# Patient Record
Sex: FEMALE | Race: BLACK OR AFRICAN AMERICAN | NOT HISPANIC OR LATINO | Employment: OTHER | URBAN - METROPOLITAN AREA
[De-identification: names, ages, dates, MRNs, and addresses within clinical notes are randomized per-mention and may not be internally consistent; named-entity substitution may affect disease eponyms.]

---

## 2017-01-03 RX ORDER — MELOXICAM 15 MG/1
TABLET ORAL
Qty: 30 TABLET | Refills: 2 | OUTPATIENT
Start: 2017-01-03

## 2017-01-24 RX ORDER — MELOXICAM 15 MG/1
TABLET ORAL
Qty: 30 TABLET | Refills: 0 | Status: SHIPPED | OUTPATIENT
Start: 2017-01-24 | End: 2017-02-20 | Stop reason: SDUPTHER

## 2017-01-24 RX ORDER — OXYBUTYNIN CHLORIDE 5 MG/1
5 TABLET ORAL 3 TIMES DAILY PRN
Qty: 90 TABLET | Refills: 0 | Status: SHIPPED | OUTPATIENT
Start: 2017-01-24 | End: 2017-02-20 | Stop reason: SDUPTHER

## 2017-01-24 NOTE — TELEPHONE ENCOUNTER
----- Message from Hillary Rich sent at 1/24/2017  2:04 PM CST -----  Contact: pt  Pt states she can afford the meloxicam, but can not afford the other medication, pt don't have the name.    Patient needs a new script for meloxicam called into [facility] pharmacy at [address] [facility].  Please call patient at [phone], pt states she also need a cheaper brand of script,Tolterozine tartrate 4mg,  Thanks!

## 2017-01-24 NOTE — TELEPHONE ENCOUNTER
lov 9/24/15  lp 8/7/15  Pt states her detrol is to expensive wants to know if it can be changed to something else  Physical scheduled for 2/20/17

## 2017-01-24 NOTE — TELEPHONE ENCOUNTER
----- Message from Monica Hamilton sent at 1/24/2017  1:35 PM CST -----  Contact: pt  States she would like to see if she can change her medication, its to expensive. Please call pt at [phone]. Thank you

## 2017-02-20 ENCOUNTER — OFFICE VISIT (OUTPATIENT)
Dept: FAMILY MEDICINE | Facility: CLINIC | Age: 67
End: 2017-02-20
Payer: MEDICARE

## 2017-02-20 VITALS
HEIGHT: 60 IN | DIASTOLIC BLOOD PRESSURE: 65 MMHG | WEIGHT: 166 LBS | TEMPERATURE: 98 F | SYSTOLIC BLOOD PRESSURE: 138 MMHG | HEART RATE: 114 BPM | BODY MASS INDEX: 32.59 KG/M2 | RESPIRATION RATE: 18 BRPM

## 2017-02-20 DIAGNOSIS — Z12.11 COLON CANCER SCREENING: ICD-10-CM

## 2017-02-20 DIAGNOSIS — D17.1 LIPOMA OF ABDOMINAL WALL: ICD-10-CM

## 2017-02-20 DIAGNOSIS — Z00.00 PREVENTATIVE HEALTH CARE: Primary | ICD-10-CM

## 2017-02-20 DIAGNOSIS — Z12.31 ENCOUNTER FOR SCREENING MAMMOGRAM FOR MALIGNANT NEOPLASM OF BREAST: ICD-10-CM

## 2017-02-20 DIAGNOSIS — E66.9 OBESITY (BMI 30.0-34.9): ICD-10-CM

## 2017-02-20 DIAGNOSIS — Z23 NEED FOR VACCINATION WITH 13-POLYVALENT PNEUMOCOCCAL CONJUGATE VACCINE: ICD-10-CM

## 2017-02-20 DIAGNOSIS — I10 ESSENTIAL HYPERTENSION: ICD-10-CM

## 2017-02-20 DIAGNOSIS — F17.200 TOBACCO USE DISORDER: ICD-10-CM

## 2017-02-20 PROCEDURE — 90670 PCV13 VACCINE IM: CPT | Mod: S$GLB,,, | Performed by: FAMILY MEDICINE

## 2017-02-20 PROCEDURE — 3078F DIAST BP <80 MM HG: CPT | Mod: S$GLB,,, | Performed by: FAMILY MEDICINE

## 2017-02-20 PROCEDURE — 99397 PER PM REEVAL EST PAT 65+ YR: CPT | Mod: 25,S$GLB,, | Performed by: FAMILY MEDICINE

## 2017-02-20 PROCEDURE — 99499 UNLISTED E&M SERVICE: CPT | Mod: S$PBB,,, | Performed by: FAMILY MEDICINE

## 2017-02-20 PROCEDURE — 3075F SYST BP GE 130 - 139MM HG: CPT | Mod: S$GLB,,, | Performed by: FAMILY MEDICINE

## 2017-02-20 PROCEDURE — G0009 ADMIN PNEUMOCOCCAL VACCINE: HCPCS | Mod: S$GLB,,, | Performed by: FAMILY MEDICINE

## 2017-02-20 PROCEDURE — 90688 IIV4 VACCINE SPLT 0.5 ML IM: CPT | Mod: S$GLB,,, | Performed by: FAMILY MEDICINE

## 2017-02-20 PROCEDURE — 99999 PR PBB SHADOW E&M-EST. PATIENT-LVL III: CPT | Mod: PBBFAC,,, | Performed by: FAMILY MEDICINE

## 2017-02-20 PROCEDURE — G0008 ADMIN INFLUENZA VIRUS VAC: HCPCS | Mod: S$GLB,,, | Performed by: FAMILY MEDICINE

## 2017-02-20 RX ORDER — OXYBUTYNIN CHLORIDE 5 MG/1
5 TABLET ORAL 3 TIMES DAILY PRN
Qty: 90 TABLET | Refills: 11 | Status: SHIPPED | OUTPATIENT
Start: 2017-02-20 | End: 2018-02-26 | Stop reason: SDUPTHER

## 2017-02-20 RX ORDER — HYDROCHLOROTHIAZIDE 12.5 MG/1
12.5 CAPSULE ORAL DAILY
Qty: 30 CAPSULE | Refills: 11 | Status: SHIPPED | OUTPATIENT
Start: 2017-02-20 | End: 2018-02-26 | Stop reason: SDUPTHER

## 2017-02-20 RX ORDER — MELOXICAM 15 MG/1
TABLET ORAL
Qty: 30 TABLET | Refills: 3 | Status: SHIPPED | OUTPATIENT
Start: 2017-02-20 | End: 2018-01-27 | Stop reason: SDUPTHER

## 2017-02-20 NOTE — PROGRESS NOTES
CHIEF COMPLAINT: This is a 66-year-old female here for preventive health exam.    SUBJECTIVE: Patient reports increased size of lipoma in right upper quadrant causing discomfort.  She requests surgical evaluation.  Patient's blood pressure is elevated today at 174/88.  She reports that blood pressure is elevated because her brother recently  about heart attack.  However, medical record reflects elevated blood pressure in the past.  Patient has lost 13 pounds in the last year.  The patient takes oxybutynin 3 times daily for urinary incontinence.  She takes meloxicam for osteoarthritis of hands.  Patient continues to smoke one pack of cigarettes per day.  She is not interested in quitting at this time.    Eye exam 2015. Mammogram 2015. Colonoscopy 2013, due again in 2016 (noncompliant).  Bone DEXA scan 2013, due again in 2018.  Tdap 2013.  Influenza vaccine 2015.     ROS:  GENERAL: Patient denies fever, chills, night sweats. Patient denies weight gain. Patient denies anorexia, fatigue, weakness or swollen glands.  SKIN: Patient denies rash or hair loss.  HEENT: Patient denies sore throat, ear pain, hearing loss, nasal congestion, or runny nose. Patient denies visual disturbance, eye irritation or discharge.  LUNGS: Patient denies cough, wheeze or hemoptysis.  CARDIOVASCULAR: Patient denies chest pain, shortness of breath, palpitations, syncope or lower extremity edema.  GI: Patient denies abdominal pain, nausea, vomiting, diarrhea, constipation, blood in stool or melena.  GENITOURINARY: Patient denies pelvic pain, vaginal discharge, itch or odor. Patient denies irregular vaginal bleeding. Patient denies dysuria, frequency, hematuria, nocturia, urgency or incontinence.  BREASTS: Patient denies breast pain, mass or nipple discharge.  MUSCULOSKELETAL: Patient denies joint swelling, redness or warmth.  NEUROLOGIC: Patient denies headache, vertigo, paresthesias,  weakness in limb, dysarthria, dysphagia or abnormality of gait.  PSYCHIATRIC: Patient denies anxiety, depression, or memory loss.     OBJECTIVE:   GENERAL: Well-developed well-nourished obese black female alert and oriented x3, in no acute distress. Memory, judgment and cognition without deficit. Weight loss of 13 pounds in the last 2 years.  SKIN: Clear without rash. Normal color and tone.  HEENT: Eyes: Clear conjunctivae. Pupils equal reactive to light and accommodation. Ears: Clear TMs. Clear canals. Nose: Without congestion. Pharynx: Without injection  or exudates.  NECK: Supple, normal range of motion. No masses, lymphadenopathy or enlarged thyroid. No JVD. Carotids 2+ and equal. No bruits.  LUNGS: Clear to auscultation. Normal respiratory effort.  CARDIOVASCULAR: Regular rhythm, normal S1, S2 without murmur, gallop or rub.  BACK: No CVA or spinal tenderness.  BREASTS: No masses, tenderness or nipple discharge.  ABDOMEN:  Large lipoma right upper quadrant, nontender. Active bowel sounds. Soft, nontender without mass or organomegaly. No rebound or guarding.  EXTREMITIES: Without cyanosis, clubbing or edema. Distal pulses 2+ and equal. Normal range of motion in all extremities. No joint effusion, erythema or warmth. Bilateral hammertoes. Calluses/hyperkeratotic areas plantar surface of both feet.  NEUROLOGIC: Cranial nerves II through XII without deficit. Motor strength equal bilaterally. Sensation normal to touch. Deep tendon reflexes 2+ and equal. Gait without abnormality. No tremor. Negative cerebellar signs.  PELVIC: Deferred.  MARCO. No masses, nontender heme-negative stool x2.    ASSESSMENT:  1. Preventative health care    2. Essential hypertension    3. Obesity (BMI 30.0-34.9)    4. Tobacco use disorder    5. Lipoma of abdominal wall    6. Encounter for screening mammogram for malignant neoplasm of breast    7. Colon cancer screening    8. Need for vaccination with 13-polyvalent pneumococcal conjugate  vaccine      PLAN:   1.  Weight reduction.  Exercise regularly.  2.  Age-appropriate counseling.  3.  Fasting lab.  4.  Mammogram.  5.  Colonoscopy.  6.  Prevnar vaccine.  7.  Influenza vaccine.  8.  Refill oxybutynin and meloxicam.  9.  Start HCTZ 12.5 mg daily.  Monitor blood pressure.  Report readings in 2-3 weeks.  10.Patient declines general surgery consult at this time.

## 2017-02-20 NOTE — MR AVS SNAPSHOT
[facility]  [address] [address]  Phone: [phone]                  Leia Rodriguez   2017 4:00 PM   Office Visit    Description:  Female : 1950   Provider:  Yasmin Navarro MD   Department:  [facility]           Reason for Visit     Annual Exam           Diagnoses this Visit        Comments    Preventative health care    -  Primary     Essential hypertension         Obesity (BMI 30.0-34.9)         Tobacco use disorder         Lipoma of abdominal wall         Encounter for screening mammogram for malignant neoplasm of breast         Colon cancer screening         Need for vaccination with 13-polyvalent pneumococcal conjugate vaccine                To Do List           Goals (5 Years of Data)     None       These Medications        Disp Refills Start End    hydrochlorothiazide (MICROZIDE) 12.5 mg capsule 30 capsule 11 2017    Take 1 capsule (12.5 mg total) by mouth once daily. For high blood pressure - Oral    Pharmacy: [facility]/pharmacy #[id number] - [place] - [address] AT [facility] Ph #: [phone]       meloxicam (MOBIC) 15 MG tablet 30 tablet 3 2017     TAKE 1 TABLET (15 MG TOTAL) BY MOUTH ONCE DAILY.    Pharmacy: [facility]/pharmacy #[id number] [facility] [place] [address] [facility] Ph #: [phone]       Notes to Pharmacy: Refill once.  No refills until she sees me.  She has not seen me since 2015.    oxybutynin (DITROPAN) 5 MG Tab 90 tablet 11 2017    Take 1 tablet (5 mg total) by mouth 3 (three) times daily as needed. - Oral    Pharmacy: [facility]/pharmacy #[id number] - [place] - [address] AT AT [facility] Ph #: [phone]         [facility] On [facility]     [facility] On Call Nurse Care Line -  Assistance  Registered nurses in the [facility] On Call Center provide clinical advisement, health education, appointment booking, and  other advisory services.  Call for this free service at [phone].             Medications           START taking these NEW medications        Refills    hydrochlorothiazide (MICROZIDE) 12.5 mg capsule 11    Sig: Take 1 capsule (12.5 mg total) by mouth once daily. For high blood pressure    Class: Normal    Route: Oral           Verify that the below list of medications is an accurate representation of the medications you are currently taking.  If none reported, the list may be blank. If incorrect, please contact your healthcare provider. Carry this list with you in case of emergency.           Current Medications     meloxicam (MOBIC) 15 MG tablet TAKE 1 TABLET (15 MG TOTAL) BY MOUTH ONCE DAILY.    oxybutynin (DITROPAN) 5 MG Tab Take 1 tablet (5 mg total) by mouth 3 (three) times daily as needed.    hydrochlorothiazide (MICROZIDE) 12.5 mg capsule Take 1 capsule (12.5 mg total) by mouth once daily. For high blood pressure    urea (CARMOL) 40 % Crea Apply topically 2 (two) times daily.           Clinical Reference Information           Your Vitals Were     BP Pulse Temp Resp Height Weight    138/65 114 97.9 °F (36.6 °C) (Tympanic) 18 5' (1.524 m) 75.3 kg (166 lb 0.1 oz)    BMI                32.42 kg/m2          Blood Pressure          Most Recent Value    BP  138/65      Allergies as of 2/20/2017     No Known Allergies      Immunizations Administered on Date of Encounter - 2/20/2017     Name Date Dose VIS Date Route    Pneumococcal Conjugate - 13 Valent  Incomplete 0.5 mL 11/5/2015 Intramuscular      Orders Placed During Today's Visit      Normal Orders This Visit    Case request GI: COLONOSCOPY     Pneumococcal Conjugate Vaccine (13 Valent) (IM)     Future Labs/Procedures Expected by Expires    CBC auto differential  2/20/2017 4/21/2018    Comprehensive metabolic panel  2/20/2017 4/21/2018    Lipid panel  2/20/2017 4/21/2018    Mammo Digital Screening Bilat with CAD  2/20/2017 4/22/2018    TSH  2/20/2017  4/21/2018      [facility] Sign-Up     Activating your [facility] account is as easy as 1-2-3!     1) Visit [email or web address], select Sign Up Now, enter this activation code and your date of birth, then select Next.  [email or web address]  Expires: 4/6/2017  4:43 PM      2) Create a username and password to use when you visit [facility] in the future and select a security question in case you lose your password and select Next.    3) Enter your e-mail address and click Sign Up!    Additional Information  If you have questions, please e-mail [email or web address] or call [phone] to talk to our [facility] staff. Remember, [facility] is NOT to be used for urgent needs. For medical emergencies, dial 911.         Smoking Cessation     If you would like to quit smoking:   You may be eligible for free services if you are a [place] resident and started smoking cigarettes before September 1, 1988.  Call the Smoking Cessation Trust (SCT) toll free at (022) 471-5265 or (735) 440-1436.   Call 8-101-QUIT-NOW if you do not meet the above criteria.            Language Assistance Services     ATTENTION: Language assistance services are available, free of charge. Please call [phone].      ATENCIÓN: Si habla español, tiene a santoyo disposición servicios gratuitos de asistencia lingüística. Llame al [phone].     CHÚ Ý: N?u b?n nói Ti?ng Vi?t, có các d?ch v? h? tr? ngôn ng? mi?n phí dành cho b?n. G?i s? [phone].         [facility] complies with applicable Federal civil rights laws and does not discriminate on the basis of race, color, national origin, age, disability, or sex.

## 2017-02-28 ENCOUNTER — LAB VISIT (OUTPATIENT)
Dept: LAB | Facility: HOSPITAL | Age: 67
End: 2017-02-28
Attending: FAMILY MEDICINE
Payer: MEDICARE

## 2017-02-28 DIAGNOSIS — I10 ESSENTIAL HYPERTENSION: ICD-10-CM

## 2017-02-28 LAB
ALBUMIN SERPL BCP-MCNC: 4.3 G/DL
ALP SERPL-CCNC: 73 U/L
ALT SERPL W/O P-5'-P-CCNC: 14 U/L
ANION GAP SERPL CALC-SCNC: 7 MMOL/L
AST SERPL-CCNC: 15 U/L
BASOPHILS # BLD AUTO: 0.02 K/UL
BASOPHILS NFR BLD: 0.4 %
BILIRUB SERPL-MCNC: 1.3 MG/DL
BUN SERPL-MCNC: 15 MG/DL
CALCIUM SERPL-MCNC: 10.3 MG/DL
CHLORIDE SERPL-SCNC: 99 MMOL/L
CHOLEST/HDLC SERPL: 3.1 {RATIO}
CO2 SERPL-SCNC: 30 MMOL/L
CREAT SERPL-MCNC: 0.8 MG/DL
DIFFERENTIAL METHOD: NORMAL
EOSINOPHIL # BLD AUTO: 0.2 K/UL
EOSINOPHIL NFR BLD: 3.1 %
ERYTHROCYTE [DISTWIDTH] IN BLOOD BY AUTOMATED COUNT: 13.8 %
EST. GFR  (AFRICAN AMERICAN): >60 ML/MIN/1.73 M^2
EST. GFR  (NON AFRICAN AMERICAN): >60 ML/MIN/1.73 M^2
GLUCOSE SERPL-MCNC: 95 MG/DL
HCT VFR BLD AUTO: 41.6 %
HDL/CHOLESTEROL RATIO: 32.2 %
HDLC SERPL-MCNC: 227 MG/DL
HDLC SERPL-MCNC: 73 MG/DL
HGB BLD-MCNC: 13.8 G/DL
LDLC SERPL CALC-MCNC: 137.2 MG/DL
LYMPHOCYTES # BLD AUTO: 2.5 K/UL
LYMPHOCYTES NFR BLD: 45.6 %
MCH RBC QN AUTO: 30.9 PG
MCHC RBC AUTO-ENTMCNC: 33.2 %
MCV RBC AUTO: 93 FL
MONOCYTES # BLD AUTO: 0.5 K/UL
MONOCYTES NFR BLD: 9.3 %
NEUTROPHILS # BLD AUTO: 2.3 K/UL
NEUTROPHILS NFR BLD: 41.4 %
NONHDLC SERPL-MCNC: 154 MG/DL
PLATELET # BLD AUTO: 291 K/UL
PMV BLD AUTO: 10.7 FL
POTASSIUM SERPL-SCNC: 5.2 MMOL/L
PROT SERPL-MCNC: 8 G/DL
RBC # BLD AUTO: 4.47 M/UL
SODIUM SERPL-SCNC: 136 MMOL/L
TRIGL SERPL-MCNC: 84 MG/DL
TSH SERPL DL<=0.005 MIU/L-ACNC: 0.8 UIU/ML
WBC # BLD AUTO: 5.46 K/UL

## 2017-02-28 PROCEDURE — 80061 LIPID PANEL: CPT

## 2017-02-28 PROCEDURE — 85025 COMPLETE CBC W/AUTO DIFF WBC: CPT

## 2017-02-28 PROCEDURE — 84443 ASSAY THYROID STIM HORMONE: CPT

## 2017-02-28 PROCEDURE — 80053 COMPREHEN METABOLIC PANEL: CPT

## 2017-02-28 PROCEDURE — 36415 COLL VENOUS BLD VENIPUNCTURE: CPT | Mod: PO

## 2017-03-03 RX ORDER — SODIUM, POTASSIUM,MAG SULFATES 17.5-3.13G
SOLUTION, RECONSTITUTED, ORAL ORAL
Qty: 354 ML | Refills: 0 | Status: ON HOLD | OUTPATIENT
Start: 2017-03-03 | End: 2017-03-08 | Stop reason: CLARIF

## 2017-03-06 ENCOUNTER — TELEPHONE (OUTPATIENT)
Dept: FAMILY MEDICINE | Facility: CLINIC | Age: 67
End: 2017-03-06

## 2017-03-06 NOTE — TELEPHONE ENCOUNTER
----- Message from Arielle Monroe sent at 3/6/2017  2:59 PM CST -----  States she is schedule for a colonoscopy and she contacted her pharmacy to find out the price of the prep she have to take before her procedure. States she was advised it cost $80.00. Wants to know if she can get something that is less expensive.    Pt use..    [id number] - [place] - [address] AT AT [facility]  [address] [place] [address]  Phone: [phone] Fax: [phone]    Please adv/call [phone].//thanks. cw

## 2017-03-08 ENCOUNTER — ANESTHESIA EVENT (OUTPATIENT)
Dept: ENDOSCOPY | Facility: HOSPITAL | Age: 67
End: 2017-03-08
Payer: MEDICARE

## 2017-03-08 ENCOUNTER — SURGERY (OUTPATIENT)
Age: 67
End: 2017-03-08

## 2017-03-08 ENCOUNTER — HOSPITAL ENCOUNTER (OUTPATIENT)
Facility: HOSPITAL | Age: 67
Discharge: HOME | End: 2017-03-08
Attending: INTERNAL MEDICINE | Admitting: INTERNAL MEDICINE
Payer: MEDICARE

## 2017-03-08 ENCOUNTER — ANESTHESIA (OUTPATIENT)
Dept: ENDOSCOPY | Facility: HOSPITAL | Age: 67
End: 2017-03-08
Payer: MEDICARE

## 2017-03-08 VITALS
OXYGEN SATURATION: 98 % | RESPIRATION RATE: 19 BRPM | HEIGHT: 60 IN | RESPIRATION RATE: 16 BRPM | BODY MASS INDEX: 32 KG/M2 | SYSTOLIC BLOOD PRESSURE: 134 MMHG | TEMPERATURE: 98 F | HEART RATE: 80 BPM | DIASTOLIC BLOOD PRESSURE: 74 MMHG | WEIGHT: 163 LBS

## 2017-03-08 DIAGNOSIS — Z12.11 SCREEN FOR COLON CANCER: ICD-10-CM

## 2017-03-08 PROCEDURE — 88305 TISSUE EXAM BY PATHOLOGIST: CPT | Performed by: PATHOLOGY

## 2017-03-08 PROCEDURE — 45385 COLONOSCOPY W/LESION REMOVAL: CPT | Performed by: INTERNAL MEDICINE

## 2017-03-08 PROCEDURE — 25000003 PHARM REV CODE 250: Performed by: INTERNAL MEDICINE

## 2017-03-08 PROCEDURE — 27201089 HC SNARE, DISP (ANY): Performed by: INTERNAL MEDICINE

## 2017-03-08 PROCEDURE — 25000003 PHARM REV CODE 250: Performed by: NURSE ANESTHETIST, CERTIFIED REGISTERED

## 2017-03-08 PROCEDURE — 37000009 HC ANESTHESIA EA ADD 15 MINS: Performed by: INTERNAL MEDICINE

## 2017-03-08 PROCEDURE — 27201012 HC FORCEPS, HOT/COLD, DISP: Performed by: INTERNAL MEDICINE

## 2017-03-08 PROCEDURE — 88305 TISSUE EXAM BY PATHOLOGIST: CPT | Mod: 26,,, | Performed by: PATHOLOGY

## 2017-03-08 PROCEDURE — 45380 COLONOSCOPY AND BIOPSY: CPT | Mod: 59,,, | Performed by: INTERNAL MEDICINE

## 2017-03-08 PROCEDURE — 63600175 PHARM REV CODE 636 W HCPCS: Performed by: NURSE ANESTHETIST, CERTIFIED REGISTERED

## 2017-03-08 PROCEDURE — 45385 COLONOSCOPY W/LESION REMOVAL: CPT | Mod: PT,,, | Performed by: INTERNAL MEDICINE

## 2017-03-08 PROCEDURE — 45380 COLONOSCOPY AND BIOPSY: CPT | Performed by: INTERNAL MEDICINE

## 2017-03-08 PROCEDURE — 37000008 HC ANESTHESIA 1ST 15 MINUTES: Performed by: INTERNAL MEDICINE

## 2017-03-08 RX ORDER — PROPOFOL 10 MG/ML
INJECTION, EMULSION INTRAVENOUS
Status: DISCONTINUED | OUTPATIENT
Start: 2017-03-08 | End: 2017-03-08

## 2017-03-08 RX ORDER — SODIUM CHLORIDE, SODIUM LACTATE, POTASSIUM CHLORIDE, CALCIUM CHLORIDE 600; 310; 30; 20 MG/100ML; MG/100ML; MG/100ML; MG/100ML
INJECTION, SOLUTION INTRAVENOUS CONTINUOUS PRN
Status: DISCONTINUED | OUTPATIENT
Start: 2017-03-08 | End: 2017-03-08

## 2017-03-08 RX ORDER — LIDOCAINE HCL/PF 100 MG/5ML
SYRINGE (ML) INTRAVENOUS
Status: DISCONTINUED | OUTPATIENT
Start: 2017-03-08 | End: 2017-03-08

## 2017-03-08 RX ORDER — SODIUM CHLORIDE, SODIUM LACTATE, POTASSIUM CHLORIDE, CALCIUM CHLORIDE 600; 310; 30; 20 MG/100ML; MG/100ML; MG/100ML; MG/100ML
INJECTION, SOLUTION INTRAVENOUS CONTINUOUS
Status: DISCONTINUED | OUTPATIENT
Start: 2017-03-08 | End: 2017-03-08 | Stop reason: HOSPADM

## 2017-03-08 RX ADMIN — PROPOFOL 30 MG: 10 INJECTION, EMULSION INTRAVENOUS at 07:03

## 2017-03-08 RX ADMIN — PROPOFOL 20 MG: 10 INJECTION, EMULSION INTRAVENOUS at 07:03

## 2017-03-08 RX ADMIN — PROPOFOL 110 MG: 10 INJECTION, EMULSION INTRAVENOUS at 07:03

## 2017-03-08 RX ADMIN — LIDOCAINE HYDROCHLORIDE 75 MG: 20 INJECTION, SOLUTION INTRAVENOUS at 07:03

## 2017-03-08 RX ADMIN — PROPOFOL 20 MG: 10 INJECTION, EMULSION INTRAVENOUS at 08:03

## 2017-03-08 RX ADMIN — SODIUM CHLORIDE, SODIUM LACTATE, POTASSIUM CHLORIDE, AND CALCIUM CHLORIDE: .6; .31; .03; .02 INJECTION, SOLUTION INTRAVENOUS at 07:03

## 2017-03-08 RX ADMIN — SODIUM CHLORIDE, SODIUM LACTATE, POTASSIUM CHLORIDE, AND CALCIUM CHLORIDE: 600; 310; 30; 20 INJECTION, SOLUTION INTRAVENOUS at 07:03

## 2017-03-08 NOTE — DISCHARGE INSTRUCTIONS
Diverticulosis    Diverticulosis means that small pouches have formed in the wall of your large intestine (colon). Most often, this problem causes no symptoms and is common as people age. But the pouches in the colon are at risk of becoming infected. When this happens, the condition is called diverticulitis. Although most people with diverticulosis never develop diverticulitis, it is still not uncommon. Rectal bleeding can also occur and in less common situations, a type of colon inflammation called colitis.  While most people do not have symptoms, some people with diverticulosis may have:  · Abdominal cramps and pain  · Bloating  · Constipation  · Change in bowel habits  Causes  The exact cause of diverticulosis (and diverticulitis) has not been proved, but a few things are associated with the condition:  · Low-fiber diet  · Constipation  · Lack of exercise  Your healthcare provider will talk with you about how to manage your condition. Diet changes may be all that are needed to help control diverticulosis and prevent progression to diverticulitis. If you develop diverticulitis, you will likely need other treatments.  Home care  You may be told to take fiber supplements daily. Fiber adds bulk to the stool so that it passes through the colon more easily. Stool softeners may be recommended. You may also be given medications for pain relief. Be sure to take all medications as directed.  In the past, people were told to avoid corn, nuts, and seeds. This is no longer necessary.  Follow these guidelines when caring for yourself at home:  · Eat unprocessed foods that are high in fiber. Whole grains, fruits, and vegetables are good choices.  · Drink 6 to 8 glasses of water every day unless your healthcare provider has you limit how much fluid you should have.  · Watch for changes in your bowel movements. Tell your provider if you notice any changes.  · Begin an exercise program. Ask your provider how to get started.  Generally, walking is the best.  · Get plenty of rest and sleep.  Follow-up care  Follow up with your healthcare provider, or as advised. Regular visits may be needed to check on your health. Sometimes special procedures such as colonoscopy, are needed after an episode of diverticulitis or blooding. Be sure to keep all your appointments.  If a stool sample was taken, or cultures were done, you should be told if they are positive, or if your treatment needs to be changed. You can call as directed for the results.  If X-rays were done, a radiologist will look at them. You will be told if there is a change in your treatment.  If antibiotics were prescribed, be sure to finish them all.  When to seek medical advice  Call your healthcare provider right away if any of these occur:  · Fever of 100.4°F (38°C) or higher, or as directed by your healthcare provider  · Severe cramps in the lower left side of the abdomen or pain that is getting worse  · Tenderness in the lower left side of the abdomen or worsening pain throughout the abdomen  · Diarrhea or constipation that doesn't get better within 24 hours  · Nausea and vomiting  · Bleeding from the rectum  Call 911  Call emergency services if any of the following occur:  · Trouble breathing  · Confusion  · Very drowsy or trouble awakening  · Fainting or loss of consciousness  · Rapid heart rate  · Chest pain  Date Last Reviewed: 12/30/2015 © 2000-2016 [employer]. [address] [place] [address]. All rights reserved. This information is not intended as a substitute for professional medical care. Always follow your healthcare professional's instructions.        Understanding Colon and Rectal Polyps    The colon (also called the large intestine) is a muscular tube that forms the last part of the digestive tract. It absorbs water and stores food waste. The colon is about 4 to 6 feet long. The rectum is the last 6 inches of the colon. The colon and rectum  have a smooth lining composed of millions of cells. Changes in these cells can lead to growths in the colon that can become cancerous and should be removed. Multiple tests are available to screen for colon cancer, but the colonoscopy is the most recommended test. During colonoscopy, these polyps can be removed. How often you need this test depends on many things including your condition, your family history, symptoms, and what the findings were at the previous colonoscopy.   When the colon lining changes  Changes that happen in the cells that line the colon or rectum can lead to growths called polyps. Over a period of years, polyps can turn cancerous. Removing polyps early may prevent cancer from ever forming.  Polyps  Polyps are fleshy clumps of tissue that form on the lining of the colon or rectum. Small polyps are usually benign (not cancerous). However, over time, cells in a polyp can change and become cancerous. Certain types of polyps known as adenomatous polyps are premalignant. The risk for invasive cancer increases with the size of the polyp and certain cell and gene features. This means that they can become cancerous if they're not removed. Hyperplastic polyps are benign. They can grow quite large and not turn cancerous.   Cancer  Almost all colorectal cancers start when polyp cells begin growing abnormally. As a cancerous tumor grows, it may involve more and more of the colon or rectum. In time, cancer can also grow beyond the colon or rectum and spread to nearby organs or to glands called lymph nodes. The cells can also travel to other parts of the body. This is known as metastasis. The earlier a cancerous tumor is removed, the better the chance of preventing its spread.    Date Last Reviewed: 8/1/2016  © 4540-8587 [employer]. [address] [place] [address], [place] [address]. All rights reserved. This information is not intended as a substitute for professional medical care. Always follow your  healthcare professional's instructions.

## 2017-03-08 NOTE — TRANSFER OF CARE
Anesthesia Transfer of Care Note    Patient: eLia Rodriguez    Procedure(s) Performed: Procedure(s) (LRB):  COLONOSCOPY (N/A)    Patient location: PACU    Anesthesia Type: MAC    Transport from OR: Transported from OR on room air with adequate spontaneous ventilation    Post pain: adequate analgesia    Post assessment: no apparent anesthetic complications    Post vital signs: stable    Level of consciousness: awake and alert    Nausea/Vomiting: no nausea/vomiting    Complications: none          Last vitals:   Visit Vitals    BP (!) 147/88 (BP Location: Right leg, Patient Position: Lying, BP Method: Automatic)    Pulse 82    Temp 36.8 °C (98.2 °F) (Oral)    Resp 16    Ht 5' (1.524 m)    Wt 73.9 kg (163 lb)    SpO2 96%    Breastfeeding No    BMI 31.83 kg/m2

## 2017-03-08 NOTE — IP AVS SNAPSHOT
[address] [place] [address]           Patient Discharge Instructions     Our goal is to set you up for success. This packet includes information on your condition, medications, and your home care. It will help you to care for yourself so you don't get sicker and need to go back to the hospital.     Please ask your nurse if you have any questions.        There are many details to remember when preparing to leave the hospital. Here is what you will need to do:    1. Take your medicine. If you are prescribed medications, review your Medication List in the following pages. You may have new medications to  at the pharmacy and others that you'll need to stop taking. Review the instructions for how and when to take your medications. Talk with your doctor or nurses if you are unsure of what to do.     2. Go to your follow-up appointments. Specific follow-up information is listed in the following pages. Your may be contacted by a transition nurse or clinical provider about future appointments. Be sure we have all of the phone numbers to reach you, if needed. Please contact your provider's office if you are unable to make an appointment.     3. Watch for warning signs. Your doctor or nurse will give you detailed warning signs to watch for and when to call for assistance. These instructions may also include educational information about your condition. If you experience any of warning signs to your health, call your doctor.               ** Verify the list of medication(s) below is accurate and up to date. Carry this with you in case of emergency. If your medications have changed, please notify your healthcare provider.             Medication List      CONTINUE taking these medications        Additional Info                      hydrochlorothiazide 12.5 mg capsule   Commonly known as:  MICROZIDE   Quantity:  30 capsule   Refills:  11   Dose:  12.5 mg    Instructions:   Take 1 capsule (12.5 mg total) by mouth once daily. For high blood pressure     Begin Date    AM    Noon    PM    Bedtime       meloxicam 15 MG tablet   Commonly known as:  MOBIC   Quantity:  30 tablet   Refills:  3   Comments:  Refill once.  No refills until she sees me.  She has not seen me since September 2015.    Instructions:  TAKE 1 TABLET (15 MG TOTAL) BY MOUTH ONCE DAILY.     Begin Date    AM    Noon    PM    Bedtime       oxybutynin 5 MG Tab   Commonly known as:  DITROPAN   Quantity:  90 tablet   Refills:  11   Dose:  5 mg    Instructions:  Take 1 tablet (5 mg total) by mouth 3 (three) times daily as needed.     Begin Date    AM    Noon    PM    Bedtime       urea 40 % Crea   Commonly known as:  CARMOL   Quantity:  1 Tube   Refills:  3    Instructions:  Apply topically 2 (two) times daily.     Begin Date    AM    Noon    PM    Bedtime                  Please bring to all follow up appointments:    1. A copy of your discharge instructions.  2. All medicines you are currently taking in their original bottles.  3. Identification and insurance card.    Please arrive 15 minutes ahead of scheduled appointment time.    Please call 24 hours in advance if you must reschedule your appointment and/or time.        Follow-up Information     Follow up with Yasmin Navarro MD.    Specialty:  Family Medicine    Contact information:    [address] [facility] [address] [phone]          Discharge Instructions     Future Orders    Activity as tolerated     Diet general     Questions:    Total calories:      Fat restriction, if any:      Protein restriction, if any:      Na restriction, if any:      Fluid restriction:      Additional restrictions:          Discharge Instructions         Diverticulosis    Diverticulosis means that small pouches have formed in the wall of your large intestine (colon). Most often, this problem causes no symptoms and is common as people age. But the pouches in the colon are at risk  of becoming infected. When this happens, the condition is called diverticulitis. Although most people with diverticulosis never develop diverticulitis, it is still not uncommon. Rectal bleeding can also occur and in less common situations, a type of colon inflammation called colitis.  While most people do not have symptoms, some people with diverticulosis may have:  · Abdominal cramps and pain  · Bloating  · Constipation  · Change in bowel habits  Causes  The exact cause of diverticulosis (and diverticulitis) has not been proved, but a few things are associated with the condition:  · Low-fiber diet  · Constipation  · Lack of exercise  Your healthcare provider will talk with you about how to manage your condition. Diet changes may be all that are needed to help control diverticulosis and prevent progression to diverticulitis. If you develop diverticulitis, you will likely need other treatments.  Home care  You may be told to take fiber supplements daily. Fiber adds bulk to the stool so that it passes through the colon more easily. Stool softeners may be recommended. You may also be given medications for pain relief. Be sure to take all medications as directed.  In the past, people were told to avoid corn, nuts, and seeds. This is no longer necessary.  Follow these guidelines when caring for yourself at home:  · Eat unprocessed foods that are high in fiber. Whole grains, fruits, and vegetables are good choices.  · Drink 6 to 8 glasses of water every day unless your healthcare provider has you limit how much fluid you should have.  · Watch for changes in your bowel movements. Tell your provider if you notice any changes.  · Begin an exercise program. Ask your provider how to get started. Generally, walking is the best.  · Get plenty of rest and sleep.  Follow-up care  Follow up with your healthcare provider, or as advised. Regular visits may be needed to check on your health. Sometimes special procedures such as  colonoscopy, are needed after an episode of diverticulitis or blooding. Be sure to keep all your appointments.  If a stool sample was taken, or cultures were done, you should be told if they are positive, or if your treatment needs to be changed. You can call as directed for the results.  If X-rays were done, a radiologist will look at them. You will be told if there is a change in your treatment.  If antibiotics were prescribed, be sure to finish them all.  When to seek medical advice  Call your healthcare provider right away if any of these occur:  · Fever of 100.4°F (38°C) or higher, or as directed by your healthcare provider  · Severe cramps in the lower left side of the abdomen or pain that is getting worse  · Tenderness in the lower left side of the abdomen or worsening pain throughout the abdomen  · Diarrhea or constipation that doesn't get better within 24 hours  · Nausea and vomiting  · Bleeding from the rectum  Call 911  Call emergency services if any of the following occur:  · Trouble breathing  · Confusion  · Very drowsy or trouble awakening  · Fainting or loss of consciousness  · Rapid heart rate  · Chest pain  Date Last Reviewed: 12/30/2015  © 9835-9824 [employer]. [address] [place] [address]. All rights reserved. This information is not intended as a substitute for professional medical care. Always follow your healthcare professional's instructions.        Understanding Colon and Rectal Polyps    The colon (also called the large intestine) is a muscular tube that forms the last part of the digestive tract. It absorbs water and stores food waste. The colon is about 4 to 6 feet long. The rectum is the last 6 inches of the colon. The colon and rectum have a smooth lining composed of millions of cells. Changes in these cells can lead to growths in the colon that can become cancerous and should be removed. Multiple tests are available to screen for colon cancer, but the  colonoscopy is the most recommended test. During colonoscopy, these polyps can be removed. How often you need this test depends on many things including your condition, your family history, symptoms, and what the findings were at the previous colonoscopy.   When the colon lining changes  Changes that happen in the cells that line the colon or rectum can lead to growths called polyps. Over a period of years, polyps can turn cancerous. Removing polyps early may prevent cancer from ever forming.  Polyps  Polyps are fleshy clumps of tissue that form on the lining of the colon or rectum. Small polyps are usually benign (not cancerous). However, over time, cells in a polyp can change and become cancerous. Certain types of polyps known as adenomatous polyps are premalignant. The risk for invasive cancer increases with the size of the polyp and certain cell and gene features. This means that they can become cancerous if they're not removed. Hyperplastic polyps are benign. They can grow quite large and not turn cancerous.   Cancer  Almost all colorectal cancers start when polyp cells begin growing abnormally. As a cancerous tumor grows, it may involve more and more of the colon or rectum. In time, cancer can also grow beyond the colon or rectum and spread to nearby organs or to glands called lymph nodes. The cells can also travel to other parts of the body. This is known as metastasis. The earlier a cancerous tumor is removed, the better the chance of preventing its spread.    Date Last Reviewed: 8/1/2016  © 4262-8922 [employer]. [address] [place] [address]. All rights reserved. This information is not intended as a substitute for professional medical care. Always follow your healthcare professional's instructions.            Admission Information     Date & Time Provider Department [facility]    3/8/2017  6:25 AM Tyron Paris MD [facility] [id number]      Care Providers      Provider Role Specialty Primary office phone    Tyron Paris MD Attending Provider Gastroenterology [phone]    Tyron Paris MD Surgeon  Gastroenterology [phone]      Your Vitals Were     BP Pulse Temp Resp Height Weight    147/88 (BP Location: Right leg, Patient Position: Lying, BP Method: Automatic) 82 98.2 °F (36.8 °C) (Oral) 16 5' (1.524 m) 73.9 kg (163 lb)    SpO2 BMI             96% 31.83 kg/m2         Recent Lab Values     No lab values to display.      Pending Labs     Order Current Status    Specimen to Pathology - Surgery Collected (03/08/17 0814)      Allergies as of 3/8/2017     No Known Allergies      [facility]     [facility] Nurse Care Line - 24/7 Assistance  Unless otherwise directed by your provider, please contact [facility], our nurse care line that is available for 24/7 assistance.     Registered nurses in the [facility] provide clinical advisement, health education, appointment booking, and other advisory services.  Call for this free service at [phone].        Advance Directives     An advance directive is a document which, in the event you are no longer able to make decisions for yourself, tells your healthcare team what kind of treatment you do or do not want to receive, or who you would like to make those decisions for you.  If you do not currently have an advance directive, [facility] encourages you to create one.  For more information call:  [phone]), [phone] ([phone]),  or log on to [email or web address].        Language Assistance Services     ATTENTION: Language assistance services are available, free of charge. Please call [phone].      ATENCIÓN: Si habla español, tiene a santoyo disposición servicios gratuitos de asistencia lingüística. Llame al [phone].     CHÚ Ý: N?u b?n nói Ti?ng Vi?t, có các d?ch v? h? tr? ngôn ng? mi?n phí dành cho b?n. G?i s? [phone].        [facility] Sign-Up      Activating your [facility] account is as easy as 1-2-3!     1) Visit [email or web address], select Sign Up Now, enter this activation code and your date of birth, then select Next.  [email or web address]  Expires: 4/6/2017  4:43 PM      2) Create a username and password to use when you visit [facility] in the future and select a security question in case you lose your password and select Next.    3) Enter your e-mail address and click Sign Up!    Additional Information  If you have questions, please e-mail [email or web address] or call [phone] to talk to our [facility] staff. Remember, [facility] is NOT to be used for urgent needs. For medical emergencies, dial 911.          [facility] complies with applicable Federal civil rights laws and does not discriminate on the basis of race, color, national origin, age, disability, or sex.

## 2017-03-08 NOTE — H&P
Short Stay Endoscopy History and Physical    PCP - Yasmin Navarro MD    Procedure - Colonoscopy  ASA - II  Mallampati - per anesthesia  History of Anesthesia problems - no  Family history Anesthesia problems -  no     HPI:  This is a 66 y.o. female here for evaluation of :  Colon polyps    Average Risk Screening:no  Family history of colon cancer: No  History of polyps: yes  Anemia: No  Blood in stools: No  Diarrhea: No  Abdominal Pain: No    Review of Systems:  CONSTITUTIONAL: Denies weight change,  fatigue, fevers, chills, night sweats.  CARDIOVASCULAR: Denies chest pain, shortness of breath, orthopnea and edema.  RESPIRATORY: Denies cough, hemoptysis, dyspnea, and wheezing.  GI: See HPI.    Medical History:  Past Medical History:   Diagnosis Date    Essential hypertension     Hemorrhoids     Lipoma of abdominal wall     Obesity     Overactive bladder     Pap smear abnormality of cervix with ASCUS favoring benign     Thyroid nodule     Tobacco use disorder     Tubular adenoma of colon     Urinary incontinence        Surgical History:   Past Surgical History:   Procedure Laterality Date    ANTERIOR VAGINAL REPAIR      BLADDER SURGERY      CATARACT EXTRACTION Right      SECTION      X2    HYSTERECTOMY      THYROID LOBECTOMY Left 2005    TOTAL VAGINAL HYSTERECTOMY      With BSO    TUBAL LIGATION         Family History:   Family History   Problem Relation Age of Onset    Hypertension Father     Cataracts Father     Prostate cancer Father     Hypertension Daughter     Cirrhosis Mother     Alcohol abuse Mother     Cervical cancer Daughter     Diabetes Brother     Heart attack Brother     Heart murmur Brother        Social History:   Social History   Substance Use Topics    Smoking status: Current Every Day Smoker     Packs/day: 1.00     Years: 50.00     Types: Cigarettes    Smokeless tobacco: Current User    Alcohol use 50.4 oz/week     84 Cans of beer per week        Allergies: Reviewed.    Medications:  No current facility-administered medications on file prior to encounter.      Current Outpatient Prescriptions on File Prior to Encounter   Medication Sig Dispense Refill    hydrochlorothiazide (MICROZIDE) 12.5 mg capsule Take 1 capsule (12.5 mg total) by mouth once daily. For high blood pressure 30 capsule 11    oxybutynin (DITROPAN) 5 MG Tab Take 1 tablet (5 mg total) by mouth 3 (three) times daily as needed. 90 tablet 11    meloxicam (MOBIC) 15 MG tablet TAKE 1 TABLET (15 MG TOTAL) BY MOUTH ONCE DAILY. 30 tablet 3    urea (CARMOL) 40 % Crea Apply topically 2 (two) times daily. 1 Tube 3       Physical Exam:  Vital Signs:   Vitals:    03/08/17 0711   BP: (!) 170/83   Pulse: 79   Resp: 20   Temp: 98.2 °F (36.8 °C)     General Appearance: Well appearing in no acute distress  ENT: OP clear  Chest: CTA B  CV: RRR, no m/r/g  Abd: s/nt/nd/nabs  Ext: no edema    Labs:  Lab Results   Component Value Date    WBC 5.46 02/28/2017    HGB 13.8 02/28/2017    HCT 41.6 02/28/2017    MCV 93 02/28/2017     02/28/2017     No results found for: INR, PROTIME  No results found for: IRON, TIBC, FERRITIN, SATURATEDIRO      IMPRESSION:  Patient Active Problem List   Diagnosis    Urinary incontinence    Obesity (BMI 30.0-34.9)    Primary osteoarthritis of both hands    Essential hypertension    Tobacco use disorder    Lipoma of abdominal wall    Screen for colon cancer       Colon polyps     Plan:  I have explained the risks and benefits of colonoscopy to the patient including but not limited to bleeding, perforation, infection, and death. The patient wishes to proceed with colonoscopy.

## 2017-03-08 NOTE — ANESTHESIA POSTPROCEDURE EVALUATION
Anesthesia Post Evaluation    Patient: Leia Rodriguez    Procedure(s) Performed: Procedure(s) (LRB):  COLONOSCOPY (N/A)    Final Anesthesia Type: MAC  Patient location during evaluation: PACU  Patient participation: Yes- Able to Participate  Level of consciousness: awake and alert and oriented  Post-procedure vital signs: reviewed and stable  Pain management: adequate  Airway patency: patent  PONV status at discharge: No PONV  Anesthetic complications: no      Cardiovascular status: blood pressure returned to baseline  Respiratory status: unassisted, room air and spontaneous ventilation  Hydration status: euvolemic  Follow-up not needed.        Visit Vitals    BP (!) 147/88 (BP Location: Right leg, Patient Position: Lying, BP Method: Automatic)    Pulse 82    Temp 36.8 °C (98.2 °F) (Oral)    Resp 16    Ht 5' (1.524 m)    Wt 73.9 kg (163 lb)    SpO2 96%    Breastfeeding No    BMI 31.83 kg/m2       Pain/Naveen Score: Pain Assessment Performed: Yes (3/8/2017  8:18 AM)  Presence of Pain: denies (3/8/2017  8:18 AM)  Naveen Score: 10 (3/8/2017  8:18 AM)

## 2017-03-08 NOTE — ANESTHESIA PREPROCEDURE EVALUATION
03/08/2017  Leia Rodriguez is a 66 y.o., female.    [facility] Anesthesia Evaluation    I have reviewed the Patient Summary Reports.    I have reviewed the Nursing Notes.   I have reviewed the Medications.     Review of Systems  Anesthesia Hx:  No problems with previous Anesthesia    Social:  Smoker    Cardiovascular:   Hypertension  Hypertension, Essential Hypertension    Hepatic/GI:   Bowel Prep.    Musculoskeletal:   Arthritis     Neurological:   Denies Neuromuscular Disease.  Denies Neuromuscular Disease   Endocrine:  Metabolic Disorders, Obesity / BMI > 30  Psych:   Psychiatric History          Physical Exam  General:  Obesity    Airway/Jaw/Neck:  Airway Findings: Mouth Opening: Normal Tongue: Normal  General Airway Assessment: Adult       Chest/Lungs:  Chest/Lungs Findings: Normal Respiratory Rate     Heart/Vascular:  Heart Findings: Rate: Normal             Anesthesia Plan  Type of Anesthesia, risks & benefits discussed:  Anesthesia Type:  MAC  Patient's Preference:   Intra-op Monitoring Plan:   Intra-op Monitoring Plan Comments:   Post Op Pain Control Plan:   Post Op Pain Control Plan Comments:   Induction:   IV  Beta Blocker:  Patient is not currently on a Beta-Blocker (No further documentation required).       Informed Consent: Patient understands risks and agrees with Anesthesia plan.  Questions answered. Anesthesia consent signed with patient.  ASA Score: 2     Day of Surgery Review of History & Physical: I have interviewed and examined the patient. I have reviewed the patient's H&P dated:  There are no significant changes.          Ready For Surgery From Anesthesia Perspective.

## 2017-03-08 NOTE — ANESTHESIA RELEASE NOTE
Anesthesia Release from PACU Note    Patient: Leia Rodriguez    Procedure(s) Performed: Procedure(s) (LRB):  COLONOSCOPY (N/A)    Anesthesia type: MAC    Post pain: Adequate analgesia    Post assessment: no apparent anesthetic complications, tolerated procedure well and no evidence of recall    Last Vitals:   Visit Vitals    BP (!) 147/88 (BP Location: Right leg, Patient Position: Lying, BP Method: Automatic)    Pulse 82    Temp 36.8 °C (98.2 °F) (Oral)    Resp 16    Ht 5' (1.524 m)    Wt 73.9 kg (163 lb)    SpO2 96%    Breastfeeding No    BMI 31.83 kg/m2       Post vital signs: stable    Level of consciousness: awake, alert  and oriented    Nausea/Vomiting: no nausea/no vomiting    Complications: none    Airway Patency: patent    Respiratory: unassisted, spontaneous ventilation, room air    Cardiovascular: stable    Hydration: euvolemic

## 2017-03-08 NOTE — BRIEF OP NOTE
[facility]  Brief Operative Note     SUMMARY     Surgery Date: 3/8/2017     Surgeon(s) and Role:     * Tyron Paris MD - Primary    Assisting Surgeon: None    Pre-op Diagnosis:  Colon cancer screening [Z12.11]    Post-op Diagnosis:  Post-Op Diagnosis Codes:     * Colon cancer screening [Z12.11]    Procedure(s) (LRB):  COLONOSCOPY (N/A)    Anesthesia: Choice    Description of the findings of the procedure: Procedure completed. See Procedure note for details.     Findings/Key Components:  Procedure completed. See Procedure note for details.     Prosthetic/Devices: None    Estimated Blood Loss: None         Specimens:   Specimen (12h ago through future)    Start     Ordered    03/08/17 0758  Specimen to Pathology - Surgery  Once     Comments:  1. Cecal polyp  2. Ascending colon polyp  3. Transverse colon polyp  4. Rectal polyp    03/08/17 0814          Discharge Note    SUMMARY     Admit Date: 3/8/2017    Discharge Date and Time: 3/8/2017    Hospital Course (synopsis of major diagnoses, care, treatment, and services provided during the course of the hospital stay): Procedure completed. See Procedure note for details.     Final Diagnosis: Post-Op Diagnosis Codes:     * Colon cancer screening [Z12.11]    Disposition: Discharge home when discharge criteria met.    Follow Up/Patient Instructions: With primary care physician as previously scheduled.    Medications:  Reconciled Home Medications: Current Discharge Medication List      CONTINUE these medications which have NOT CHANGED    Details   hydrochlorothiazide (MICROZIDE) 12.5 mg capsule Take 1 capsule (12.5 mg total) by mouth once daily. For high blood pressure  Qty: 30 capsule, Refills: 11      oxybutynin (DITROPAN) 5 MG Tab Take 1 tablet (5 mg total) by mouth 3 (three) times daily as needed.  Qty: 90 tablet, Refills: 11      meloxicam (MOBIC) 15 MG tablet TAKE 1 TABLET (15 MG TOTAL) BY MOUTH ONCE DAILY.  Qty: 30 tablet, Refills: 3     Comments: Refill once.  No refills until she sees me.  She has not seen me since September 2015.      urea (CARMOL) 40 % Crea Apply topically 2 (two) times daily.  Qty: 1 Tube, Refills: 3             Discharge Procedure Orders  Diet general     Activity as tolerated       Follow-up Information     Follow up with Yasmin Navarro MD.    Specialty:  Family Medicine    Contact information:    [address] [place] [address] [phone]

## 2017-03-22 ENCOUNTER — HOSPITAL ENCOUNTER (OUTPATIENT)
Dept: RADIOLOGY | Facility: HOSPITAL | Age: 67
Discharge: HOME | End: 2017-03-22
Attending: FAMILY MEDICINE
Payer: MEDICARE

## 2017-03-22 DIAGNOSIS — Z12.31 ENCOUNTER FOR SCREENING MAMMOGRAM FOR MALIGNANT NEOPLASM OF BREAST: ICD-10-CM

## 2017-03-22 PROCEDURE — 77067 SCR MAMMO BI INCL CAD: CPT | Mod: 26,,, | Performed by: RADIOLOGY

## 2017-03-22 PROCEDURE — 77067 SCR MAMMO BI INCL CAD: CPT | Mod: TC

## 2017-03-27 ENCOUNTER — TELEPHONE (OUTPATIENT)
Dept: RADIOLOGY | Facility: HOSPITAL | Age: 67
End: 2017-03-27

## 2017-03-27 DIAGNOSIS — R92.8 ABNORMAL MAMMOGRAM: Primary | ICD-10-CM

## 2017-03-27 NOTE — TELEPHONE ENCOUNTER
Breast Care Management Follow-Up:    Date of Mammogram:03/22/17    Mammogram Reason:Screening    Mammogram Results:10mm lobular mass in the left breast      Referrals/Recommendations:Left dx mammo and u/s - suspicious solid mass in the left breast. Cat 4. Surgery consult rec.        Patient Status:  03/27/17 Results given to pt. Mammo and u/s on 3/29/17. Results letter and report mailed to pt.  03/30/17 Results and rec given to pt. Appt with Seema Negro NP. Results letter and report mailed to pt.

## 2017-03-28 ENCOUNTER — TELEPHONE (OUTPATIENT)
Dept: RADIOLOGY | Facility: HOSPITAL | Age: 67
End: 2017-03-28

## 2017-03-29 ENCOUNTER — HOSPITAL ENCOUNTER (OUTPATIENT)
Dept: RADIOLOGY | Facility: HOSPITAL | Age: 67
Discharge: HOME | End: 2017-03-29
Attending: FAMILY MEDICINE
Payer: MEDICARE

## 2017-03-29 DIAGNOSIS — R92.8 ABNORMAL MAMMOGRAM: ICD-10-CM

## 2017-03-29 PROCEDURE — 76642 ULTRASOUND BREAST LIMITED: CPT | Mod: TC,PO,LT

## 2017-03-29 PROCEDURE — 76642 ULTRASOUND BREAST LIMITED: CPT | Mod: 26,LT,, | Performed by: RADIOLOGY

## 2017-03-29 PROCEDURE — 77061 BREAST TOMOSYNTHESIS UNI: CPT | Mod: TC,LT

## 2017-03-29 PROCEDURE — 77061 BREAST TOMOSYNTHESIS UNI: CPT | Mod: 26,LT,, | Performed by: RADIOLOGY

## 2017-03-29 PROCEDURE — 77065 DX MAMMO INCL CAD UNI: CPT | Mod: 26,LT,, | Performed by: RADIOLOGY

## 2017-03-30 ENCOUNTER — TELEPHONE (OUTPATIENT)
Dept: RADIOLOGY | Facility: HOSPITAL | Age: 67
End: 2017-03-30

## 2017-04-12 ENCOUNTER — OFFICE VISIT (OUTPATIENT)
Dept: SURGERY | Facility: CLINIC | Age: 67
End: 2017-04-12
Payer: MEDICARE

## 2017-04-12 VITALS
WEIGHT: 161.81 LBS | HEART RATE: 106 BPM | BODY MASS INDEX: 31.6 KG/M2 | SYSTOLIC BLOOD PRESSURE: 143 MMHG | DIASTOLIC BLOOD PRESSURE: 73 MMHG | TEMPERATURE: 99 F

## 2017-04-12 DIAGNOSIS — N63.0 BREAST MASS: Primary | ICD-10-CM

## 2017-04-12 PROCEDURE — 3077F SYST BP >= 140 MM HG: CPT | Mod: S$GLB,,, | Performed by: SURGERY

## 2017-04-12 PROCEDURE — 99999 PR PBB SHADOW E&M-EST. PATIENT-LVL III: CPT | Mod: PBBFAC,,, | Performed by: SURGERY

## 2017-04-12 PROCEDURE — 3078F DIAST BP <80 MM HG: CPT | Mod: S$GLB,,, | Performed by: SURGERY

## 2017-04-12 PROCEDURE — 1160F RVW MEDS BY RX/DR IN RCRD: CPT | Mod: S$GLB,,, | Performed by: SURGERY

## 2017-04-12 PROCEDURE — 99203 OFFICE O/P NEW LOW 30 MIN: CPT | Mod: S$GLB,,, | Performed by: SURGERY

## 2017-04-12 NOTE — MR AVS SNAPSHOT
[facility]  [address] [place] [address]  Phone: [phone]  Fax: [phone]                  Leia Rodriguez   2017 2:30 PM   Office Visit    Description:  Female : 1950   Provider:  Jony Duke MD   Department:  [facility]           Reason for Visit     Consult           Diagnoses this Visit        Comments    Breast mass    -  Primary            To Do List           Future Appointments        Provider Department Dept Phone    2017 9:30 AM Jony Duke MD [facility] [phone]      Goals (5 Years of Data)     None      [facility]     [facility] Nurse Care Line -  Assistance  Unless otherwise directed by your provider, please contact [facility], our nurse care line that is available for  assistance.     Registered nurses in the [facility] On Call Center provide: appointment scheduling, clinical advisement, health education, and other advisory services.  Call: [phone] (toll free)               Medications                Verify that the below list of medications is an accurate representation of the medications you are currently taking.  If none reported, the list may be blank. If incorrect, please contact your healthcare provider. Carry this list with you in case of emergency.           Current Medications     hydrochlorothiazide (MICROZIDE) 12.5 mg capsule Take 1 capsule (12.5 mg total) by mouth once daily. For high blood pressure    meloxicam (MOBIC) 15 MG tablet TAKE 1 TABLET (15 MG TOTAL) BY MOUTH ONCE DAILY.    oxybutynin (DITROPAN) 5 MG Tab Take 1 tablet (5 mg total) by mouth 3 (three) times daily as needed.    urea (CARMOL) 40 % Crea Apply topically 2 (two) times daily.           Clinical Reference Information           Your Vitals Were     BP Pulse Temp Weight BMI    143/73 106 98.5 °F (36.9 °C) (Oral) 73.4 kg (161 lb 13.1 oz) 31.6 kg/m2      Blood Pressure          Most Recent Value    BP  (!)   143/73      Allergies as of 4/12/2017     No Known Allergies      Immunizations Administered on Date of Encounter - 4/12/2017     None      Orders Placed During Today's Visit     Future Labs/Procedures Expected by Expires    Mammo Breast Stereotactic Biopsy 1st sit  4/12/2017 6/12/2018      Smoking Cessation     If you would like to quit smoking:   You may be eligible for free services if you are a [place] resident and started smoking cigarettes before September 1, 1988.  Call the Smoking Cessation Trust ([facility]) toll free at (462) 578-6787 or (024) 622-1829.   Call 1-800-QUIT-NOW if you do not meet the above criteria.   Contact us via email: [email or web address]   View our website for more information: [email or web address]        Language Assistance Services     ATTENTION: Language assistance services are available, free of charge. Please call [phone].      ATENCIÓN: Si habla español, tiene a santoyo disposición servicios gratuitos de asistencia lingüística. Llame al [phone].     CHÚ Ý: N?u b?n nói Ti?ng Vi?t, có các d?ch v? h? tr? ngôn ng? mi?n phí dành cho b?n. G?i s? [phone].         [facility] complies with applicable Federal civil rights laws and does not discriminate on the basis of race, color, national origin, age, disability, or sex.

## 2017-04-13 NOTE — PROGRESS NOTES
Patient ID: Leia Rodriguez is a 66 y.o. female.    Chief Complaint: Consult (abnormal mammogram)      HPI:  HPI Comments: Patient found on routine mammogram to have solid 7mm lesion at 7 oclock . It is mildly irregular. She denies lumps, denies nipple discharge, no fh of breast cancer, no hrt, and no prior bx of breast. She is .       Review of Systems   Constitutional: Negative.    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 ,   Respiratory: Negative.         Smokers cough   Cardiovascular: Negative.    Endocrine: Negative.    Genitourinary: Positive for frequency.   Musculoskeletal:        Hand and back arthritic pain   Neurological: Negative.    Hematological: Negative.    Psychiatric/Behavioral: Negative.        Current Outpatient Prescriptions   Medication Sig Dispense Refill    hydrochlorothiazide (MICROZIDE) 12.5 mg capsule Take 1 capsule (12.5 mg total) by mouth once daily. For high blood pressure 30 capsule 11    meloxicam (MOBIC) 15 MG tablet TAKE 1 TABLET (15 MG TOTAL) BY MOUTH ONCE DAILY. 30 tablet 3    oxybutynin (DITROPAN) 5 MG Tab Take 1 tablet (5 mg total) by mouth 3 (three) times daily as needed. 90 tablet 11    urea (CARMOL) 40 % Crea Apply topically 2 (two) times daily. 1 Tube 3     No current facility-administered medications for this visit.        Review of patient's allergies indicates:  No Known Allergies    Past Medical History:   Diagnosis Date    Essential hypertension     Hemorrhoids     Lipoma of abdominal wall     Obesity     Overactive bladder     Pap smear abnormality of cervix with ASCUS favoring benign     Thyroid nodule     Tobacco use disorder     Tubular adenoma of colon     Urinary incontinence        Past Surgical History:   Procedure Laterality Date    ANTERIOR VAGINAL REPAIR      BLADDER SURGERY      CATARACT EXTRACTION Right      SECTION      X2    COLONOSCOPY N/A 3/8/2017    Procedure: COLONOSCOPY;  Surgeon: Tyron Paris MD;  Location:  [facility];  Service: Endoscopy;  Laterality: N/A;    HYSTERECTOMY      THYROID LOBECTOMY Left 2005    TOTAL VAGINAL HYSTERECTOMY      With BSO    TUBAL LIGATION         Family History   Problem Relation Age of Onset    Hypertension Father     Cataracts Father     Prostate cancer Father     Hypertension Daughter     Cirrhosis Mother     Alcohol abuse Mother     Cervical cancer Daughter     Diabetes Brother     Heart attack Brother     Heart murmur Brother        Social History     Social History    Marital status:      Spouse name: N/A    Number of children: N/A    Years of education: N/A     Occupational History    Not on file.     Social History Main Topics    Smoking status: Current Every Day Smoker     Packs/day: 1.00     Years: 50.00     Types: Cigarettes    Smokeless tobacco: Current User    Alcohol use 50.4 oz/week     84 Cans of beer per week    Drug use: No    Sexual activity: No     Other Topics Concern    Not on file     Social History Narrative    The patient is .  She is retired from [employer].       Vitals:    04/12/17 1515   BP: (!) 143/73   Pulse: 106   Temp: 98.5 °F (36.9 °C)       Physical Exam   Constitutional: She is oriented to person, place, and time. She appears well-developed and well-nourished.   HENT:   Head: Normocephalic.   Eyes:   Blind in right eye   Neck: Normal range of motion. Neck supple. No thyromegaly present.   Cardiovascular: Normal rate, regular rhythm and normal heart sounds.    Pulmonary/Chest: Effort normal.   Abdominal: Soft. Bowel sounds are normal.   5cm soft right upper quadrant mobile fatty mass, non tender   Musculoskeletal:   Some stiffness in hands   Lymphadenopathy:     She has no cervical adenopathy.   Neurological: She is alert and oriented to person, place, and time.   Skin:   Breast exam is normal bilaterally. I do not palpate any masses , axilla have shoddy nodes.    Psychiatric: She has a normal mood and affect. Her  behavior is normal.   Vitals reviewed.  reviewed mammogram that shows distinct solid mass.     Assessment & Plan:    7mm solid mass 7 oclock left breast not palpable  P: core bx . Discussed this with patient and daughter. If suspicious or malignant, will need excision. Will see post bx

## 2017-04-28 ENCOUNTER — HOSPITAL ENCOUNTER (OUTPATIENT)
Dept: RADIOLOGY | Facility: HOSPITAL | Age: 67
Discharge: HOME | End: 2017-04-28
Attending: SURGERY
Payer: MEDICARE

## 2017-04-28 ENCOUNTER — OFFICE VISIT (OUTPATIENT)
Dept: SURGERY | Facility: CLINIC | Age: 67
End: 2017-04-28
Payer: MEDICARE

## 2017-04-28 ENCOUNTER — CLINICAL SUPPORT (OUTPATIENT)
Dept: CARDIOLOGY | Facility: CLINIC | Age: 67
End: 2017-04-28
Payer: MEDICARE

## 2017-04-28 VITALS
HEART RATE: 89 BPM | WEIGHT: 165.81 LBS | DIASTOLIC BLOOD PRESSURE: 79 MMHG | SYSTOLIC BLOOD PRESSURE: 172 MMHG | TEMPERATURE: 98 F | BODY MASS INDEX: 32.38 KG/M2

## 2017-04-28 DIAGNOSIS — C50.212 MALIGNANT NEOPLASM OF UPPER-INNER QUADRANT OF LEFT FEMALE BREAST: ICD-10-CM

## 2017-04-28 DIAGNOSIS — C50.312 MALIGNANT NEOPLASM OF LOWER-INNER QUADRANT OF LEFT FEMALE BREAST: ICD-10-CM

## 2017-04-28 DIAGNOSIS — C50.212 MALIGNANT NEOPLASM OF UPPER-INNER QUADRANT OF LEFT FEMALE BREAST: Primary | ICD-10-CM

## 2017-04-28 PROCEDURE — 1160F RVW MEDS BY RX/DR IN RCRD: CPT | Mod: S$GLB,,, | Performed by: SURGERY

## 2017-04-28 PROCEDURE — 71020 XR CHEST PA AND LATERAL: CPT | Mod: TC

## 2017-04-28 PROCEDURE — 3078F DIAST BP <80 MM HG: CPT | Mod: S$GLB,,, | Performed by: SURGERY

## 2017-04-28 PROCEDURE — 93000 ELECTROCARDIOGRAM COMPLETE: CPT | Mod: S$GLB,,, | Performed by: INTERNAL MEDICINE

## 2017-04-28 PROCEDURE — 99999 PR PBB SHADOW E&M-EST. PATIENT-LVL IV: CPT | Mod: PBBFAC,,, | Performed by: SURGERY

## 2017-04-28 PROCEDURE — 99212 OFFICE O/P EST SF 10 MIN: CPT | Mod: S$GLB,,, | Performed by: SURGERY

## 2017-04-28 PROCEDURE — 71020 XR CHEST PA AND LATERAL: CPT | Mod: 26,,, | Performed by: RADIOLOGY

## 2017-04-28 PROCEDURE — 3077F SYST BP >= 140 MM HG: CPT | Mod: S$GLB,,, | Performed by: SURGERY

## 2017-04-28 RX ORDER — SODIUM CHLORIDE 9 MG/ML
INJECTION, SOLUTION INTRAVENOUS CONTINUOUS
Status: CANCELLED | OUTPATIENT
Start: 2017-04-28

## 2017-04-28 RX ORDER — ONDANSETRON 4 MG/1
8 TABLET, ORALLY DISINTEGRATING ORAL EVERY 8 HOURS PRN
Status: CANCELLED | OUTPATIENT
Start: 2017-04-28

## 2017-04-28 NOTE — MR AVS SNAPSHOT
[facility]  [address]  [place] [address]  Phone: [phone]  Fax: [phone]                  Leia Rodriguez   2017 11:30 AM   Office Visit    Description:  Female : 1950   Provider:  Jony Duke MD   Department:  [facility]           Reason for Visit     Follow-up           Diagnoses this Visit        Comments    Malignant neoplasm of upper-inner quadrant of left female breast    -  Primary            To Do List           Future Appointments        Provider Department Dept Phone    2017 1:00 PM EKG, [facility] [phone]    2017 2:00 PM LAB, SAME DAY [facility] [phone]    2017 11:30 AM Jony Duke MD [facility] [phone]      Goals (5 Years of Data)     None      [facility]     [facility] Nurse Care Line -  Assistance  Unless otherwise directed by your provider, please contact [facility], our nurse care line that is available for  assistance.     Registered nurses in the [facility] Center provide: appointment scheduling, clinical advisement, health education, and other advisory services.  Call: [phone] (toll free)               Medications                Verify that the below list of medications is an accurate representation of the medications you are currently taking.  If none reported, the list may be blank. If incorrect, please contact your healthcare provider. Carry this list with you in case of emergency.           Current Medications     hydrochlorothiazide (MICROZIDE) 12.5 mg capsule Take 1 capsule (12.5 mg total) by mouth once daily. For high blood pressure    meloxicam (MOBIC) 15 MG tablet TAKE 1 TABLET (15 MG TOTAL) BY MOUTH ONCE DAILY.    oxybutynin (DITROPAN) 5 MG Tab Take 1 tablet (5 mg total) by mouth 3 (three) times daily as needed.    urea (CARMOL) 40 % Crea Apply topically 2 (two) times  daily.           Clinical Reference Information           Your Vitals Were     BP Pulse Temp Weight BMI    172/79 89 98.4 °F (36.9 °C) (Oral) 75.2 kg (165 lb 12.6 oz) 32.38 kg/m2      Blood Pressure          Most Recent Value    BP  (!)  172/79      Allergies as of 4/28/2017     No Known Allergies      Immunizations Administered on Date of Encounter - 4/28/2017     None      Orders Placed During Today's Visit      Normal Orders This Visit    Case Request Operating Room: MASTECTOMY-PARTIAL after needle loc     Future Labs/Procedures Expected by Expires    CBC auto differential  4/28/2017 6/27/2018    Comprehensive metabolic panel  4/28/2017 6/27/2018    Mammo Needle Loc with Mammo Guidance 1st  4/28/2017 6/26/2018    X-Ray Chest PA And Lateral  4/28/2017 4/28/2018    EKG 12-lead  As directed 4/28/2018      Smoking Cessation     If you would like to quit smoking:   You may be eligible for free services if you are a [place] resident and started smoking cigarettes before September 1, 1988.  Call the Smoking Cessation Trust ([facility]) toll free at (165) 636-6503 or (488) 577-2907.   Call 1-800-QUIT-NOW if you do not meet the above criteria.   Contact us via email: [email or web address]   View our website for more information: [email or web address]        Language Assistance Services     ATTENTION: Language assistance services are available, free of charge. Please call [phone].      ATENCIÓN: Si habla español, tiene a santoyo disposición servicios gratuitos de asistencia lingüística. Llame al [phone].     CHÚ Ý: N?u b?n nói Ti?ng Vi?t, có các d?ch v? h? tr? ngôn ng? mi?n phí dành cho b?n. G?i s? [phone].         [facility] complies with applicable Federal civil rights laws and does not discriminate on the basis of race, color, national origin, age, disability, or sex.

## 2017-04-29 NOTE — PROGRESS NOTES
CLINIC VISIT    The patient returns now in followup.  Please see my previous note.  Since I have   seen the patient on the last visit, she underwent a stereotactic core biopsy of   a small 7 mm lesion in the lower inner quadrant of the left breast   approximately 7 o'clock fairly deep in the breast.  She denies really any   problems post-biopsy.    PHYSICAL EXAMINATION:  GENERAL:  Today, the patient is alert and oriented.  VITALS SIGNS:  Her blood pressure mildly elevated 170/79, pulse is 80,   temperature 98.3.  She is 75 kg.  HEART:  Reveals normal rate.  CHEST:  Clear.  BREAST:  Examination of the left breast reveals what appears to be the needle   biopsy incision about 6 o'clock left breast.  About 7 o'clock position, there is   a small area of induration fairly deep in the breast at the area of the lesion,   but it is minimally tender.  No significant bruising.    Pathology returned that this small lesion obtained, apocrine ductal carcinoma in   situ.  There was no invasive carcinoma identified and it was a low-grade DCIS.    She was ER positive 99%, OH positive 5% to 10%.    IMPRESSION:  A 7 mm area of ductal carcinoma in situ, left breast, lower inner   quadrant.  This is a nonpalpable mass.    RECOMMENDATIONS:  I had a long discussion with the patient and the patient's   daughter.  I did talk to him about this was a breast cancer, but it was in situ   cancer and the vast majority easily cured with treatment.  My treatment   recommendation will be to do a partial mastectomy, which involves placing a   needle to localize the area, right before the surgery and then going from   localization to surgery and remove this area with a margin of normal tissue.  I   told her we would mammogram the tissue removed to make sure that the proof of   mass have been removed.  I will also check for margins.  I also talked to her   that she would need a postop radiation treatment.  Because this is DCIS and   noninvasive tumor,  she would not need a sentinel node biopsy.  I also talked to   her about a small chance of less than 20%, that the margins will not be clear of   the next biopsy, require going back and getting a little more of a margin.    Also about a 20% chance that there is some invasive component to this lesion   requiring her to go back for sentinel node biopsy.  I did  the   alternatives of mastectomy, but she also wants to proceed with needle   localization than breast partial mastectomy followed by radiation therapy.  I   have counseled her as to the risks including bleeding, infection, local   recurrence, possibility of not finding the lesion despite needle localization   and the patient agrees with the procedure.      JUVENTINO/MONA  dd: 04/28/2017 16:42:08 (CDT)  td: 04/28/2017 20:14:23 (CDT)  Doc ID   #[id number]  Job ID #[id number]    CC: Yasmin Navarro M.D.

## 2017-05-18 ENCOUNTER — HOSPITAL ENCOUNTER (OUTPATIENT)
Dept: RADIOLOGY | Facility: HOSPITAL | Age: 67
Discharge: HOME | End: 2017-05-18
Attending: SURGERY | Admitting: SURGERY
Payer: MEDICARE

## 2017-05-18 ENCOUNTER — SURGERY (OUTPATIENT)
Age: 67
End: 2017-05-18

## 2017-05-18 DIAGNOSIS — C50.212 MALIGNANT NEOPLASM OF UPPER-INNER QUADRANT OF LEFT FEMALE BREAST: ICD-10-CM

## 2017-06-02 DIAGNOSIS — C50.912 BREAST CANCER, LEFT: ICD-10-CM

## 2017-06-02 DIAGNOSIS — C50.311 MALIGNANT NEOPLASM OF LOWER-INNER QUADRANT OF RIGHT FEMALE BREAST: Primary | ICD-10-CM

## 2017-06-02 RX ORDER — SODIUM CHLORIDE 9 MG/ML
INJECTION, SOLUTION INTRAVENOUS CONTINUOUS
Status: CANCELLED | OUTPATIENT
Start: 2017-06-02

## 2017-06-13 ENCOUNTER — LAB VISIT (OUTPATIENT)
Dept: LAB | Facility: HOSPITAL | Age: 67
End: 2017-06-13
Attending: SURGERY
Payer: MEDICARE

## 2017-06-13 DIAGNOSIS — C50.219: Primary | ICD-10-CM

## 2017-06-13 DIAGNOSIS — C50.219: ICD-10-CM

## 2017-06-13 PROCEDURE — 36415 COLL VENOUS BLD VENIPUNCTURE: CPT | Mod: PO

## 2017-06-13 PROCEDURE — 85025 COMPLETE CBC W/AUTO DIFF WBC: CPT

## 2017-06-13 PROCEDURE — 80053 COMPREHEN METABOLIC PANEL: CPT

## 2017-06-13 NOTE — PRE-PROCEDURE INSTRUCTIONS
Pre op instructions reviewed with patient per phone:    To confirm, Your surgeon has instructed you:  Surgery is scheduled 6/15/17 at 1130.      [facility] [place] 1st floor main lobby by 0730 To Rad @0900. Pre admit office will call day prior to surgery to verify arrival time.      INSTRUCTIONS IMPORTANT!!!  ¨ Do not eat, drink, or smoke after 12 midnight. May have water or clear liquid juice until 3 hrs prior to surgery. OK to brush teeth, no gum, candy or mints!    ¨ Take only these medicines with a small swallow of water-morning of surgery.  none  ____  Do not wear makeup, including mascara.  ____  No powder, lotions or creams to surgical area.  ____  Please remove all jewelry, including piercings and leave at home.  ____  No money or valuables needed. Please leave at home.  ____  Please bring identification and insurance information to hospital.  ____  If going home the same day, arrange for a ride home. You will not be able to   drive if Anesthesia was used.  ____  Children, under 12 years old, must remain in the waiting room with an adult.  They are not allowed in patient areas.  ____  Wear loose fitting clothing. Allow for dressings, bandages.  ____  Stop Aspirin, Ibuprofen, Motrin and Aleve at least 5-7 days before surgery, unless otherwise instructed by your doctor, or the nurse.   You MAY use Tylenol/acetaminophen until day of surgery.  ____  If you take diabetic medication, do not take am of surgery unless instructed by   Doctor.  ____ Stop taking any Fish Oil supplement or any Vitamins that contain Vitamin E at least 5 days prior to surgery.          Bathing Instructions-- The night before surgery and the morning prior to coming to the hospital:   -Do not shave the surgical area.   -Shower and wash your hair and body as usual with anti-bacterial  soap and shampoo.   -Rinse your hair and body completely.   -Use one packet of hibiclens to wash the surgical site (using  your hand) gently for 5 minutes.  Do not scrub you skin too hard.   -Do not use hibiclens on your head, face, or genitals.   -Do not wash with anti-bacterial soap after you use the hibiclens.   -Rinse your body thoroughly.   -Dry with clean, soft towel.  Do not use lotion, cream, deodorant, or powders on   the surgical site.    Use antibacterial soap in place of hibiclens if your surgery is on the head, face or genitals.         Surgical Site Infection    Prevention of surgical site infections:     -Keep incisions clean and dry.   -Do not soak/submerge incisions in water until completely healed.   -Do not apply lotions, powders, creams, or deodorants to site.   -Always make sure hands are cleaned with antibacterial soap/ alcohol-based   prior to touching the surgical site.  (This includes doctors, nurses, staff, and yourself.)    Signs and symptoms:   -Redness and pain around the area where you had surgery   -Drainage of cloudy fluid from your surgical wound   -Fever over 100.4  I have read or had read and explained to me, and understand the above information.

## 2017-06-14 ENCOUNTER — ANESTHESIA EVENT (OUTPATIENT)
Dept: SURGERY | Facility: HOSPITAL | Age: 67
End: 2017-06-14
Payer: MEDICARE

## 2017-06-14 LAB
ALBUMIN SERPL BCP-MCNC: 4 G/DL
ALP SERPL-CCNC: 81 U/L
ALT SERPL W/O P-5'-P-CCNC: 10 U/L
ANION GAP SERPL CALC-SCNC: 9 MMOL/L
AST SERPL-CCNC: 16 U/L
BASOPHILS # BLD AUTO: 0.02 K/UL
BASOPHILS NFR BLD: 0.3 %
BILIRUB SERPL-MCNC: 0.9 MG/DL
BUN SERPL-MCNC: 14 MG/DL
CALCIUM SERPL-MCNC: 9.6 MG/DL
CHLORIDE SERPL-SCNC: 97 MMOL/L
CO2 SERPL-SCNC: 31 MMOL/L
CREAT SERPL-MCNC: 0.8 MG/DL
DIFFERENTIAL METHOD: ABNORMAL
EOSINOPHIL # BLD AUTO: 0.2 K/UL
EOSINOPHIL NFR BLD: 2.6 %
ERYTHROCYTE [DISTWIDTH] IN BLOOD BY AUTOMATED COUNT: 13 %
EST. GFR  (AFRICAN AMERICAN): >60 ML/MIN/1.73 M^2
EST. GFR  (NON AFRICAN AMERICAN): >60 ML/MIN/1.73 M^2
GLUCOSE SERPL-MCNC: 106 MG/DL
HCT VFR BLD AUTO: 41.4 %
HGB BLD-MCNC: 13.6 G/DL
LYMPHOCYTES # BLD AUTO: 2.5 K/UL
LYMPHOCYTES NFR BLD: 36.3 %
MCH RBC QN AUTO: 31.4 PG
MCHC RBC AUTO-ENTMCNC: 32.9 %
MCV RBC AUTO: 96 FL
MONOCYTES # BLD AUTO: 0.5 K/UL
MONOCYTES NFR BLD: 7 %
NEUTROPHILS # BLD AUTO: 3.8 K/UL
NEUTROPHILS NFR BLD: 53.5 %
PLATELET # BLD AUTO: 272 K/UL
PMV BLD AUTO: 10.9 FL
POTASSIUM SERPL-SCNC: 3.7 MMOL/L
PROT SERPL-MCNC: 7.9 G/DL
RBC # BLD AUTO: 4.33 M/UL
SODIUM SERPL-SCNC: 137 MMOL/L
WBC # BLD AUTO: 7 K/UL

## 2017-06-14 NOTE — H&P
Chief Complaint: Consult (abnormal mammogram)        HPI:  HPI Comments: Patient found on routine mammogram to have solid 7mm lesion at 7 oclock . It is mildly irregular. She denies lumps, denies nipple discharge, no fh of breast cancer, no hrt, and no prior bx of breast. She is .         Review of Systems   Constitutional: Negative.    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 ,   Respiratory: Negative.         Smokers cough   Cardiovascular: Negative.    Endocrine: Negative.    Genitourinary: Positive for frequency.   Musculoskeletal:        Hand and back arthritic pain   Neurological: Negative.    Hematological: Negative.    Psychiatric/Behavioral: Negative.           Current Medications           Current Outpatient Prescriptions   Medication Sig Dispense Refill    hydrochlorothiazide (MICROZIDE) 12.5 mg capsule Take 1 capsule (12.5 mg total) by mouth once daily. For high blood pressure 30 capsule 11    meloxicam (MOBIC) 15 MG tablet TAKE 1 TABLET (15 MG TOTAL) BY MOUTH ONCE DAILY. 30 tablet 3    oxybutynin (DITROPAN) 5 MG Tab Take 1 tablet (5 mg total) by mouth 3 (three) times daily as needed. 90 tablet 11    urea (CARMOL) 40 % Crea Apply topically 2 (two) times daily. 1 Tube 3      No current facility-administered medications for this visit.             Review of patient's allergies indicates:  No Known Allergies          Past Medical History:   Diagnosis Date    Essential hypertension      Hemorrhoids      Lipoma of abdominal wall      Obesity      Overactive bladder      Pap smear abnormality of cervix with ASCUS favoring benign      Thyroid nodule      Tobacco use disorder      Tubular adenoma of colon      Urinary incontinence                 Past Surgical History:   Procedure Laterality Date    ANTERIOR VAGINAL REPAIR        BLADDER SURGERY        CATARACT EXTRACTION Right       SECTION         X2    COLONOSCOPY N/A 3/8/2017     Procedure: COLONOSCOPY;  Surgeon: Tyron BWOENS  MD Raina;  [facility];  Service: Endoscopy;  Laterality: N/A;    HYSTERECTOMY        THYROID LOBECTOMY Left 2005    TOTAL VAGINAL HYSTERECTOMY         With BSO    TUBAL LIGATION                   Family History   Problem Relation Age of Onset    Hypertension Father      Cataracts Father      Prostate cancer Father      Hypertension Daughter      Cirrhosis Mother      Alcohol abuse Mother      Cervical cancer Daughter      Diabetes Brother      Heart attack Brother      Heart murmur Brother            Social History    Social History            Social History    Marital status:        Spouse name: N/A    Number of children: N/A    Years of education: N/A          Occupational History    Not on file.             Social History Main Topics    Smoking status: Current Every Day Smoker       Packs/day: 1.00       Years: 50.00       Types: Cigarettes    Smokeless tobacco: Current User    Alcohol use 50.4 oz/week        84 Cans of beer per week     Drug use: No    Sexual activity: No           Other Topics Concern    Not on file          Social History Narrative     The patient is .  She is retired from [employer].                Vitals:     04/12/17 1515   BP: (!) 143/73   Pulse: 106   Temp: 98.5 °F (36.9 °C)         Physical Exam   Constitutional: She is oriented to person, place, and time. She appears well-developed and well-nourished.   HENT:   Head: Normocephalic.   Eyes:   Blind in right eye   Neck: Normal range of motion. Neck supple. No thyromegaly present.   Cardiovascular: Normal rate, regular rhythm and normal heart sounds.    Pulmonary/Chest: Effort normal.   Abdominal: Soft. Bowel sounds are normal.   5cm soft right upper quadrant mobile fatty mass, non tender   Musculoskeletal:   Some stiffness in hands   Lymphadenopathy:     She has no cervical adenopathy.   Neurological: She is alert and oriented to person, place, and time.   Skin:   Breast exam is  normal bilaterally. I do not palpate any masses , axilla have shoddy nodes.    Psychiatric: She has a normal mood and affect. Her behavior is normal.   Vitals reviewed.  reviewed mammogram that shows distinct solid mass.      Assessment & Plan:    7mm solid mass 7 oclock left breast not palpable  P: core bx . Discussed this with patient and daughter. If suspicious or malignant, will need excision. Will see post bx         Communicat     ions    AMB Visit Summary: Provider Version sent to Jaret Navarro MD and Yasmin Navarro MD  Sent 4/13/2017

## 2017-06-14 NOTE — INTERVAL H&P NOTE
The patient has been examined and the H&P has been reviewed:    I concur with the findings and no changes have occurred since H&P was written. the patient has dcis on left breast by core bx. She was scheduled for lumpectomy after needle loc but needle loc machine was down so now here after rescheduling.     Anesthesia/Surgery risks, benefits and alternative options discussed and understood by patient/family.          There are no hospital problems to display for this patient.

## 2017-06-15 ENCOUNTER — ANESTHESIA (OUTPATIENT)
Dept: SURGERY | Facility: HOSPITAL | Age: 67
End: 2017-06-15
Payer: MEDICARE

## 2017-06-15 ENCOUNTER — HOSPITAL ENCOUNTER (OUTPATIENT)
Facility: HOSPITAL | Age: 67
Discharge: HOME | End: 2017-06-15
Attending: SURGERY | Admitting: SURGERY
Payer: MEDICARE

## 2017-06-15 ENCOUNTER — HOSPITAL ENCOUNTER (OUTPATIENT)
Dept: RADIOLOGY | Facility: HOSPITAL | Age: 67
Discharge: HOME | End: 2017-06-15
Attending: SURGERY | Admitting: SURGERY
Payer: MEDICARE

## 2017-06-15 VITALS
HEART RATE: 73 BPM | BODY MASS INDEX: 30.4 KG/M2 | WEIGHT: 161 LBS | TEMPERATURE: 97 F | SYSTOLIC BLOOD PRESSURE: 165 MMHG | DIASTOLIC BLOOD PRESSURE: 75 MMHG | RESPIRATION RATE: 18 BRPM | OXYGEN SATURATION: 95 % | HEIGHT: 61 IN

## 2017-06-15 DIAGNOSIS — C50.311 MALIGNANT NEOPLASM OF LOWER-INNER QUADRANT OF RIGHT FEMALE BREAST: ICD-10-CM

## 2017-06-15 DIAGNOSIS — C50.919 BREAST CA: ICD-10-CM

## 2017-06-15 DIAGNOSIS — C50.912 BREAST CANCER, LEFT: ICD-10-CM

## 2017-06-15 PROCEDURE — 25000003 PHARM REV CODE 250: Performed by: CLINIC/CENTER

## 2017-06-15 PROCEDURE — 25000003 PHARM REV CODE 250: Performed by: ANESTHESIOLOGY

## 2017-06-15 PROCEDURE — 37000008 HC ANESTHESIA 1ST 15 MINUTES: Performed by: SURGERY

## 2017-06-15 PROCEDURE — 27201423 OPTIME MED/SURG SUP & DEVICES STERILE SUPPLY: Performed by: SURGERY

## 2017-06-15 PROCEDURE — 71000039 HC RECOVERY, EACH ADD'L HOUR: Performed by: SURGERY

## 2017-06-15 PROCEDURE — 25000003 PHARM REV CODE 250: Performed by: SURGERY

## 2017-06-15 PROCEDURE — 63600175 PHARM REV CODE 636 W HCPCS: Performed by: CLINIC/CENTER

## 2017-06-15 PROCEDURE — 36000707: Performed by: SURGERY

## 2017-06-15 PROCEDURE — 88307 TISSUE EXAM BY PATHOLOGIST: CPT | Mod: 26,,, | Performed by: PATHOLOGY

## 2017-06-15 PROCEDURE — 71000015 HC POSTOP RECOV 1ST HR: Performed by: SURGERY

## 2017-06-15 PROCEDURE — 36000706: Performed by: SURGERY

## 2017-06-15 PROCEDURE — 19281 PERQ DEVICE BREAST 1ST IMAG: CPT | Mod: TC

## 2017-06-15 PROCEDURE — 71000033 HC RECOVERY, INTIAL HOUR: Performed by: SURGERY

## 2017-06-15 PROCEDURE — 37000009 HC ANESTHESIA EA ADD 15 MINS: Performed by: SURGERY

## 2017-06-15 PROCEDURE — 63600175 PHARM REV CODE 636 W HCPCS: Performed by: SURGERY

## 2017-06-15 PROCEDURE — 88307 TISSUE EXAM BY PATHOLOGIST: CPT | Performed by: PATHOLOGY

## 2017-06-15 PROCEDURE — 63600175 PHARM REV CODE 636 W HCPCS: Performed by: ANESTHESIOLOGY

## 2017-06-15 RX ORDER — SUCCINYLCHOLINE CHLORIDE 20 MG/ML
INJECTION INTRAMUSCULAR; INTRAVENOUS
Status: DISCONTINUED | OUTPATIENT
Start: 2017-06-15 | End: 2017-06-15

## 2017-06-15 RX ORDER — LIDOCAINE HYDROCHLORIDE 10 MG/ML
1 INJECTION, SOLUTION EPIDURAL; INFILTRATION; INTRACAUDAL; PERINEURAL ONCE
Status: DISCONTINUED | OUTPATIENT
Start: 2017-06-15 | End: 2017-06-15 | Stop reason: HOSPADM

## 2017-06-15 RX ORDER — SODIUM CHLORIDE, SODIUM LACTATE, POTASSIUM CHLORIDE, CALCIUM CHLORIDE 600; 310; 30; 20 MG/100ML; MG/100ML; MG/100ML; MG/100ML
INJECTION, SOLUTION INTRAVENOUS CONTINUOUS
Status: DISCONTINUED | OUTPATIENT
Start: 2017-06-15 | End: 2017-06-15 | Stop reason: HOSPADM

## 2017-06-15 RX ORDER — LABETALOL HYDROCHLORIDE 5 MG/ML
5 INJECTION, SOLUTION INTRAVENOUS EVERY 10 MIN PRN
Status: DISCONTINUED | OUTPATIENT
Start: 2017-06-15 | End: 2017-06-15 | Stop reason: HOSPADM

## 2017-06-15 RX ORDER — MORPHINE SULFATE 10 MG/ML
2 INJECTION INTRAMUSCULAR; INTRAVENOUS; SUBCUTANEOUS EVERY 5 MIN PRN
Status: DISCONTINUED | OUTPATIENT
Start: 2017-06-15 | End: 2017-06-15 | Stop reason: SDUPTHER

## 2017-06-15 RX ORDER — SODIUM CHLORIDE 9 MG/ML
3 INJECTION, SOLUTION INTRAMUSCULAR; INTRAVENOUS; SUBCUTANEOUS
Status: DISCONTINUED | OUTPATIENT
Start: 2017-06-15 | End: 2017-06-15 | Stop reason: HOSPADM

## 2017-06-15 RX ORDER — LIDOCAINE HYDROCHLORIDE 10 MG/ML
INJECTION INFILTRATION; PERINEURAL
Status: DISCONTINUED | OUTPATIENT
Start: 2017-06-15 | End: 2017-06-15

## 2017-06-15 RX ORDER — ACETAMINOPHEN 10 MG/ML
1000 INJECTION, SOLUTION INTRAVENOUS ONCE
Status: COMPLETED | OUTPATIENT
Start: 2017-06-15 | End: 2017-06-15

## 2017-06-15 RX ORDER — MEPERIDINE HYDROCHLORIDE 50 MG/ML
12.5 INJECTION INTRAMUSCULAR; INTRAVENOUS; SUBCUTANEOUS ONCE AS NEEDED
Status: DISCONTINUED | OUTPATIENT
Start: 2017-06-15 | End: 2017-06-15 | Stop reason: SDUPTHER

## 2017-06-15 RX ORDER — HYDROCODONE BITARTRATE AND ACETAMINOPHEN 5; 325 MG/1; MG/1
1 TABLET ORAL EVERY 4 HOURS PRN
Status: DISCONTINUED | OUTPATIENT
Start: 2017-06-15 | End: 2017-06-15 | Stop reason: HOSPADM

## 2017-06-15 RX ORDER — SODIUM CHLORIDE 9 MG/ML
3 INJECTION, SOLUTION INTRAMUSCULAR; INTRAVENOUS; SUBCUTANEOUS EVERY 8 HOURS
Status: DISCONTINUED | OUTPATIENT
Start: 2017-06-15 | End: 2017-06-15 | Stop reason: HOSPADM

## 2017-06-15 RX ORDER — ONDANSETRON 2 MG/ML
INJECTION INTRAMUSCULAR; INTRAVENOUS
Status: DISCONTINUED | OUTPATIENT
Start: 2017-06-15 | End: 2017-06-15

## 2017-06-15 RX ORDER — SODIUM CHLORIDE 9 MG/ML
INJECTION, SOLUTION INTRAVENOUS CONTINUOUS
Status: DISCONTINUED | OUTPATIENT
Start: 2017-06-15 | End: 2017-06-15 | Stop reason: HOSPADM

## 2017-06-15 RX ORDER — CEFAZOLIN SODIUM 2 G/50ML
2 SOLUTION INTRAVENOUS
Status: COMPLETED | OUTPATIENT
Start: 2017-06-15 | End: 2017-06-15

## 2017-06-15 RX ORDER — LIDOCAINE HYDROCHLORIDE 10 MG/ML
20 INJECTION, SOLUTION EPIDURAL; INFILTRATION; INTRACAUDAL; PERINEURAL ONCE
Status: DISCONTINUED | OUTPATIENT
Start: 2017-06-15 | End: 2017-06-15 | Stop reason: HOSPADM

## 2017-06-15 RX ORDER — PROPOFOL 10 MG/ML
VIAL (ML) INTRAVENOUS
Status: DISCONTINUED | OUTPATIENT
Start: 2017-06-15 | End: 2017-06-15

## 2017-06-15 RX ORDER — OXYCODONE HYDROCHLORIDE 5 MG/1
5 TABLET ORAL
Status: DISCONTINUED | OUTPATIENT
Start: 2017-06-15 | End: 2017-06-15 | Stop reason: HOSPADM

## 2017-06-15 RX ORDER — HYDROMORPHONE HYDROCHLORIDE 2 MG/ML
0.2 INJECTION, SOLUTION INTRAMUSCULAR; INTRAVENOUS; SUBCUTANEOUS EVERY 5 MIN PRN
Status: DISCONTINUED | OUTPATIENT
Start: 2017-06-15 | End: 2017-06-15 | Stop reason: HOSPADM

## 2017-06-15 RX ORDER — ROCURONIUM BROMIDE 10 MG/ML
INJECTION, SOLUTION INTRAVENOUS
Status: DISCONTINUED | OUTPATIENT
Start: 2017-06-15 | End: 2017-06-15

## 2017-06-15 RX ORDER — FENTANYL CITRATE 50 UG/ML
INJECTION, SOLUTION INTRAMUSCULAR; INTRAVENOUS
Status: DISCONTINUED | OUTPATIENT
Start: 2017-06-15 | End: 2017-06-15

## 2017-06-15 RX ORDER — METHYLENE BLUE 5 MG/ML
50 INJECTION INTRAVENOUS ONCE
Status: DISCONTINUED | OUTPATIENT
Start: 2017-06-15 | End: 2017-06-15 | Stop reason: HOSPADM

## 2017-06-15 RX ORDER — MIDAZOLAM HYDROCHLORIDE 1 MG/ML
INJECTION, SOLUTION INTRAMUSCULAR; INTRAVENOUS
Status: DISCONTINUED | OUTPATIENT
Start: 2017-06-15 | End: 2017-06-15

## 2017-06-15 RX ORDER — ONDANSETRON 8 MG/1
8 TABLET, ORALLY DISINTEGRATING ORAL EVERY 8 HOURS PRN
Status: DISCONTINUED | OUTPATIENT
Start: 2017-06-15 | End: 2017-06-15 | Stop reason: HOSPADM

## 2017-06-15 RX ORDER — HYDROCODONE BITARTRATE AND ACETAMINOPHEN 5; 325 MG/1; MG/1
1 TABLET ORAL
Qty: 15 TABLET | Refills: 0 | Status: SHIPPED | OUTPATIENT
Start: 2017-06-15 | End: 2017-06-22 | Stop reason: SDUPTHER

## 2017-06-15 RX ORDER — MEPERIDINE HYDROCHLORIDE 50 MG/ML
12.5 INJECTION INTRAMUSCULAR; INTRAVENOUS; SUBCUTANEOUS ONCE AS NEEDED
Status: DISCONTINUED | OUTPATIENT
Start: 2017-06-15 | End: 2017-06-15 | Stop reason: HOSPADM

## 2017-06-15 RX ORDER — BUPIVACAINE HYDROCHLORIDE AND EPINEPHRINE 2.5; 5 MG/ML; UG/ML
INJECTION, SOLUTION EPIDURAL; INFILTRATION; INTRACAUDAL; PERINEURAL
Status: DISCONTINUED | OUTPATIENT
Start: 2017-06-15 | End: 2017-06-15 | Stop reason: HOSPADM

## 2017-06-15 RX ADMIN — LIDOCAINE HYDROCHLORIDE 60 MG: 10 INJECTION, SOLUTION INFILTRATION; PERINEURAL at 11:06

## 2017-06-15 RX ADMIN — LABETALOL HYDROCHLORIDE 5 MG: 5 INJECTION INTRAVENOUS at 02:06

## 2017-06-15 RX ADMIN — FENTANYL CITRATE 25 MCG: 50 INJECTION, SOLUTION INTRAMUSCULAR; INTRAVENOUS at 11:06

## 2017-06-15 RX ADMIN — PROPOFOL 30 MG: 10 INJECTION, EMULSION INTRAVENOUS at 12:06

## 2017-06-15 RX ADMIN — CEFAZOLIN SODIUM 2 G: 2 SOLUTION INTRAVENOUS at 11:06

## 2017-06-15 RX ADMIN — ONDANSETRON 4 MG: 2 INJECTION, SOLUTION INTRAMUSCULAR; INTRAVENOUS at 11:06

## 2017-06-15 RX ADMIN — PROPOFOL 50 MG: 10 INJECTION, EMULSION INTRAVENOUS at 11:06

## 2017-06-15 RX ADMIN — EPHEDRINE SULFATE 10 MG: 50 INJECTION, SOLUTION INTRAMUSCULAR; INTRAVENOUS; SUBCUTANEOUS at 12:06

## 2017-06-15 RX ADMIN — FENTANYL CITRATE 50 MCG: 50 INJECTION, SOLUTION INTRAMUSCULAR; INTRAVENOUS at 11:06

## 2017-06-15 RX ADMIN — FENTANYL CITRATE 25 MCG: 50 INJECTION, SOLUTION INTRAMUSCULAR; INTRAVENOUS at 12:06

## 2017-06-15 RX ADMIN — LABETALOL HYDROCHLORIDE 5 MG: 5 INJECTION INTRAVENOUS at 01:06

## 2017-06-15 RX ADMIN — SUCCINYLCHOLINE CHLORIDE 100 MG: 20 INJECTION, SOLUTION INTRAMUSCULAR; INTRAVENOUS at 11:06

## 2017-06-15 RX ADMIN — ACETAMINOPHEN 1000 MG: 10 INJECTION, SOLUTION INTRAVENOUS at 02:06

## 2017-06-15 RX ADMIN — FENTANYL CITRATE 50 MCG: 50 INJECTION, SOLUTION INTRAMUSCULAR; INTRAVENOUS at 12:06

## 2017-06-15 RX ADMIN — MIDAZOLAM HYDROCHLORIDE 2 MG: 1 INJECTION, SOLUTION INTRAMUSCULAR; INTRAVENOUS at 11:06

## 2017-06-15 RX ADMIN — ROCURONIUM BROMIDE 5 MG: 10 INJECTION, SOLUTION INTRAVENOUS at 11:06

## 2017-06-15 RX ADMIN — SODIUM CHLORIDE, SODIUM LACTATE, POTASSIUM CHLORIDE, AND CALCIUM CHLORIDE: 600; 310; 30; 20 INJECTION, SOLUTION INTRAVENOUS at 11:06

## 2017-06-15 RX ADMIN — SODIUM CHLORIDE, SODIUM LACTATE, POTASSIUM CHLORIDE, AND CALCIUM CHLORIDE: 600; 310; 30; 20 INJECTION, SOLUTION INTRAVENOUS at 12:06

## 2017-06-15 RX ADMIN — PROPOFOL 150 MG: 10 INJECTION, EMULSION INTRAVENOUS at 11:06

## 2017-06-15 NOTE — DISCHARGE INSTRUCTIONS
General Information:    1.  Do not drink alcoholic beverages including beer for 24 hours or as long as you are on pain medication..  2.  Do not drive a motor vehicle, operate machinery or power tools, or signs legal papers for 24 hours or as long as you are on pain medication.   3.  You may experience light-headedness, dizziness, and sleepiness following surgery. Please do not stay alone. A responsible adult should be with you for this 24 hour period.  4.  Go home and rest.  5. Progress slowly to a normal diet unless instructed.  Otherwise, begin with liquids such as soft drinks, then soup and crackers working up to solid foods. Drink plenty of nonalcoholic fluids.  6.  Certain anesthetics and pain medications produce nausea and vomiting in certain       individuals. If nausea becomes a problem at home, call you doctor.  7. A nurse will be calling you sometime after surgery. Do not be alarmed. This is our way of finding out how you are doing.  8. Several times every hour while you are awake, take 2-3 deep breaths and cough. If you had stomach surgery hold a pillow or rolled towel firmly against your stomach before you cough. This will help with any pain the cough might cause.  9. Several times every hour while you are awake, pump and flex your feet 5-6 times and do foot circles. This will help prevent blood clots.  10.Call your doctor for severe pain, bleeding, fever, or signs or symptoms of infection (pain, swelling, redness, foul odor, drainage).  11.You can contact your doctor anytime by [phone] for the [facility] (at [facility]) or [phone] for the [facility] on [facility].   [email or web address] is another way to contact your doctor if you are an active participant online with [facility].        Discharge Instructions for Mastectomy or Breast Lumpectomy  You are being treated for breast cancer or precancer. The cancer or precancerous tissue was removed with surgery. This may have  been done with a lumpectomy. Or it may have been with a total mastectomy. A lumpectomy means that the tumor and a bit of tissue around it were removed. Lymph nodes in your armpit may also have been removed. A mastectomy means that all of the breast tissue and maybe nearby lymph nodes have been removed.  Activity  · Be sure you understand what you can and cannot do as you recover from surgery:  · Ask for help with chores and errands while you recover.  · Do not lift anything heavy until your healthcare provider says it's OK.  · Do not vacuum or do active or strenuous housework until your healthcare provider says it's OK.  · Do the range-of-motion exercises that you learned in the hospital.   Home care  Here are suggestions for taking care of yourself at home:  · Take pain medicine as directed.  · Keep your incisions clean and dry.  · Check your incisions daily for signs of infection. These include redness, swelling, and drainage. They also include the edges of an incision opening up.  · Follow your healthcare provider's instructions about bathing or showering.  · If your healthcare provider says it's OK,wash your incisions gently. Use mild soap and warm water. Pat dry.  · Don't soak in a tub, hot tub, or pool until your healthcare provider says it's OK.   · Take your temperature each day for 7 days after the surgery.  · Eat normal meals as soon as you feel able. Stick to a healthy, well-balanced diet.  Follow-up  Make a follow-up appointment as directed by your healthcare provider. If you had a mastectomy, you may have choices for reconstructive breast surgery or a prosthesis.Ask to talk to someone who can tell you more about your choices.  When to call your healthcare provider  Call your healthcare provider right away if you have any of the following:  · Fever of 100.4°F (38°C) or higher  · Chills  · Drainage from your incisions  · Swelling around your incisions  · Increasing pain in or around your  incisions  · Swelling in your arm or hand on the surgery side  Know what problems to watch for and when you need to call your healthcare providers. Also be sure you know how to get help after office hours and on weekends and holidays, too.    Date Last Reviewed: 10/31/2015  © 6010-2326 [employer]. [address] [place] [address], [place] [address]. All rights reserved. This information is not intended as a substitute for professional medical care. Always follow your healthcare professional's instructions.        Acetaminophen; Hydrocodone tablets or capsules  What is this medicine?  ACETAMINOPHEN; HYDROCODONE (a set a NOAM chaz fen; maye droe KOE done) is a pain reliever. It is used to treat moderate to severe pain.  How should I use this medicine?  Take this medicine by mouth with a glass of water. Follow the directions on the prescription label. You can take it with or without food. If it upsets your stomach, take it with food. Do not take your medicine more often than directed.  A special MedGuide will be given to you by the pharmacist with each prescription and refill. Be sure to read this information carefully each time.  Talk to your pediatrician regarding the use of this medicine in children. Special care may be needed.  What side effects may I notice from receiving this medicine?  Side effects that you should report to your doctor or health care professional as soon as possible:  · allergic reactions like skin rash, itching or hives, swelling of the face, lips, or tongue  · breathing problems  · confusion  · redness, blistering, peeling or loosening of the skin, including inside the mouth  · signs and symptoms of low blood pressure like dizziness; feeling faint or lightheaded, falls; unusually weak or tired  · trouble passing urine or change in the amount of urine  · yellowing of the eyes or skin  Side effects that usually do not require medical attention (report to your doctor or health care professional  if they continue or are bothersome):  · constipation  · dry mouth  · nausea, vomiting  · tiredness  What may interact with this medicine?  This medicine may interact with the following medications:  · alcohol  · antiviral medicines for HIV or AIDS  · atropine  · antihistamines for allergy, cough and cold  · certain antibiotics like erythromycin, clarithromycin  · certain medicines for anxiety or sleep  · certain medicines for bladder problems like oxybutynin, tolterodine  · certain medicines for depression like amitriptyline, fluoxetine, sertraline  · certain medicines for fungal infections like ketoconazole and itraconazole  · certain medicines for Parkinson's disease like benztropine, trihexyphenidyl  · certain medicines for seizures like carbamazepine, phenobarbital, phenytoin, primidone  · certain medicines for stomach problems like dicyclomine, hyoscyamine  · certain medicines for travel sickness like scopolamine  · general anesthetics like halothane, isoflurane, methoxyflurane, propofol  · ipratropium  · local anesthetics like lidocaine, pramoxine, tetracaine  · MAOIs like Carbex, Eldepryl, Marplan, Nardil, and Parnate  · medicines that relax muscles for surgery  · other medicines with acetaminophen  · other narcotic medicines for pain or cough  · phenothiazines like chlorpromazine, mesoridazine, prochlorperazine, thioridazine  · rifampin  What if I miss a dose?  If you miss a dose, take it as soon as you can. If it is almost time for your next dose, take only that dose. Do not take double or extra doses.  Where should I keep my medicine?  Keep out of the reach of children. This medicine can be abused. Keep your medicine in a safe place to protect it from theft. Do not share this medicine with anyone. Selling or giving away this medicine is dangerous and against the law.  This medicine may cause accidental overdose and death if it taken by other adults, children, or pets. Mix any unused medicine with a  substance like cat litter or coffee grounds. Then throw the medicine away in a sealed container like a sealed bag or a coffee can with a lid. Do not use the medicine after the expiration date.  Store at room temperature between 15 and 30 degrees C (59 and 86 degrees F).  What should I tell my health care provider before I take this medicine?  They need to know if you have any of these conditions:  · brain tumor  · Crohn's disease, inflammatory bowel disease, or ulcerative colitis  · drug abuse or addiction  · head injury  · heart or circulation problems  · if you often drink alcohol  · kidney disease or problems going to the bathroom  · liver disease  · lung disease, asthma, or breathing problems  · an unusual or allergic reaction to acetaminophen, hydrocodone, other opioid analgesics, other medicines, foods, dyes, or preservatives  · pregnant or trying to get pregnant  · breast-feeding  What should I watch for while using this medicine?  Tell your doctor or health care professional if your pain does not go away, if it gets worse, or if you have new or a different type of pain. You may develop tolerance to the medicine. Tolerance means that you will need a higher dose of the medicine for pain relief. Tolerance is normal and is expected if you take the medicine for a long time.  Do not suddenly stop taking your medicine because you may develop a severe reaction. Your body becomes used to the medicine. This does NOT mean you are addicted. Addiction is a behavior related to getting and using a drug for a non-medical reason. If you have pain, you have a medical reason to take pain medicine. Your doctor will tell you how much medicine to take. If your doctor wants you to stop the medicine, the dose will be slowly lowered over time to avoid any side effects.  There are different types of narcotic medicines (opiates). If you take more than one type at the same time or if you are taking another medicine that also causes  drowsiness, you may have more side effects. Give your health care provider a list of all medicines you use. Your doctor will tell you how much medicine to take. Do not take more medicine than directed. Call emergency for help if you have problems breathing or unusual sleepiness.  Do not take other medicines that contain acetaminophen with this medicine. Always read labels carefully. If you have questions, ask your doctor or pharmacist.  If you take too much acetaminophen get medical help right away. Too much acetaminophen can be very dangerous and cause liver damage. Even if you do not have symptoms, it is important to get help right away.  You may get drowsy or dizzy. Do not drive, use machinery, or do anything that needs mental alertness until you know how this medicine affects you. Do not stand or sit up quickly, especially if you are an older patient. This reduces the risk of dizzy or fainting spells. Alcohol may interfere with the effect of this medicine. Avoid alcoholic drinks.  The medicine will cause constipation. Try to have a bowel movement at least every 2 to 3 days. If you do not have a bowel movement for 3 days, call your doctor or health care professional.  Your mouth may get dry. Chewing sugarless gum or sucking hard candy, and drinking plenty of water may help. Contact your doctor if the problem does not go away or is severe.  Date Last Reviewed:   NOTE:This sheet is a summary. It may not cover all possible information. If you have questions about this medicine, talk to your doctor, pharmacist, or health care provider. Copyright© 2016 [employer]

## 2017-06-15 NOTE — ANESTHESIA POSTPROCEDURE EVALUATION
"Anesthesia Post Evaluation    Patient: Leia Rodriguez    Procedure(s) Performed: Procedure(s) (LRB):  MASTECTOMY-PARTIAL (Left)    Final Anesthesia Type: general  Patient location during evaluation: PACU  Patient participation: Yes- Able to Participate  Level of consciousness: awake and alert  Post-procedure vital signs: reviewed and stable  Pain management: adequate  Airway patency: patent  PONV status at discharge: No PONV  Anesthetic complications: no      Cardiovascular status: blood pressure returned to baseline  Respiratory status: unassisted  Hydration status: euvolemic  Follow-up not needed.        Visit Vitals  BP (!) 165/75   Pulse 73   Temp 36.3 °C (97.3 °F) (Temporal)   Resp 18   Ht 5' 1" (1.549 m)   Wt 73 kg (161 lb)   SpO2 95%   Breastfeeding? No   BMI 30.42 kg/m²       Pain/Naveen Score: Pain Assessment Performed: Yes (6/15/2017  2:45 PM)  Presence of Pain: denies (6/15/2017  2:45 PM)  Pain Rating Prior to Med Admin: 4 (6/15/2017  2:02 PM)  Naveen Score: 10 (6/15/2017  2:45 PM)      "

## 2017-06-15 NOTE — BRIEF OP NOTE
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     Anesthesia: General and 20ml of.25%marcaine with epi    Description of the findings of the procedure:      Findings/Key Components: small area of previous bx, clip included in specimen and margins appear widely clear    Estimated Blood Loss:15ml       Specimens:   Specimen (12h ago through future)    Start     Ordered    06/15/17 1213  Specimen to Pathology - Surgery  Once     Comments:  Left breast DCIS--long suture lateral, short suture superior (fresh for margins)Dx:  DCIS      06/15/17 1217          Discharge Note    SUMMARY     Admit Date: 6/15/2017    Discharge Date and Time:  06/15/2017 2:55 PM    Hospital Course (synopsis of major diagnoses, care, treatment, and services provided during the course of the hospital stay): tolerated procedure well     Final Diagnosis: Post-Op Diagnosis Codes:     * 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  Qty: 15 tablet, Refills: 0         CONTINUE these medications which have NOT CHANGED    Details   hydrochlorothiazide (MICROZIDE) 12.5 mg capsule Take 1 capsule (12.5 mg total) by mouth once daily. For  high blood pressure  Qty: 30 capsule, Refills: 11      oxybutynin (DITROPAN) 5 MG Tab Take 1 tablet (5 mg total) by mouth 3 (three) times daily as needed.  Qty: 90 tablet, Refills: 11      meloxicam (MOBIC) 15 MG tablet TAKE 1 TABLET (15 MG TOTAL) BY MOUTH ONCE DAILY.  Qty: 30 tablet, Refills: 3    Comments: Refill once.  No refills until she sees me.  She has not seen me since September 2015.      urea (CARMOL) 40 % Crea Apply topically 2 (two) times daily.  Qty: 1 Tube, Refills: 3             Discharge Procedure Orders  Diet general     Activity as tolerated     Shower on day dressing removed (No bath)     Lifting restrictions     Weight bearing restrictions (specify)     Call MD for:  temperature >100.4     Call MD for:  persistent nausea and vomiting     Call MD for:  difficulty breathing, headache or visual disturbances     Call MD for:  redness, tenderness, or signs of infection (pain, swelling, redness, odor or green/yellow discharge around incision site)     Remove dressing in 48 hours       Follow-up Information     Jony Duke MD In 2 weeks.    Specialty:  General Surgery  Why:  For wound re-check  Contact information:  [address]  [place] [address] [phone]

## 2017-06-15 NOTE — OP NOTE
DATE OF PROCEDURE:  06/15/2017.    PREOPERATIVE DIAGNOSES:  Ductal carcinoma in situ and a small lesion lower inner   quadrant of the left breast.    POSTOPERATIVE DIAGNOSES:  Ductal carcinoma in situ and a small lesion lower   inner quadrant of the left breast.    OPERATIVE PROCEDURE:  Left partial mastectomy after needle localization.    SURGEON:  Jony Duke M.D.    ESTIMATED BLOOD LOSS:  15 mL.    FLUIDS RECEIVED:  800 mL of crystalloid.    ANESTHESIA:  General endotracheal also 20 mL of 0.25% Marcaine with epinephrine   injected locally, injecting the muscle fascia underneath the base of the biopsy   site, subcu and skin.    OPERATIVE FINDINGS:  A small area appeared to be the previous biopsy site.    Margins appeared to be well clear and the clip was well within the biopsy mass.    DESCRIPTION OF PROCEDURE:  The patient was placed in the preoperative area, then   she went for needle localization of this lower inner quadrant of the left   breast lesion which on prior core biopsy proved to be a DCIS.  After localizing   with a guidewire the patient was taken to the Operating Room and general   endotracheal anesthesia was obtained.  Her left breast was prepped and draped in   sterile fashion.  Preoperatively, she had SCD hose and IV antibiotics.  Next, a   transverse incision was made medially in the left breast.  The incision was   made directly over where the lesion was felt to be which was about assisted   between the medial guidewire and the areola.  Incision was carried through skin   using knife and then as the lesion was fairly deep the incision was carried down   a couple of centimeters into the breast parenchyma.  Breast flap was raised for   a few centimeters superiorly and inferiorly.  After doing that the guidewire   could be seen inside the breast itself.  The guidewire externally was transected   and removed.  Using guidewire as a guide dissection was carried around the area   ____ this was done  using some blunt dissection and then cautery.  Due to the   fact the lesion was fairly deep breast was elevated off the pectoralis muscle   and the area was felt the lesion was.  After this was done, a good margin of   tissue was obtained around the lesion.  The specimen was radiographed and   clipped appeared to be in good location and with pathology examined appeared to   have good margins.  The breast cavity was then irrigated.  There were some small   vessels which were cauterized.  There was a deep vessel on the pectoralis   muscle which was actually clipped proximally and distally and suture ligated.    The clips were placed to rodrigo the deep portion of the area deep to the lesion   for x-ray therapy purposes.  After this was done, the wound was irrigated and   then injected with Marcaine solution.  Superiorly and inferiorly for about a   couple of centimeters superior and inferior the breast was elevated off the   pectoralis.  This allowed the deep breast parenchyma to be reapproximated using   interrupted 3-0 Vicryl suture.  Subcutaneous tissues closed using running 3-0   Vicryl.  Skin was closed using running 4-0 Vicryl subcuticular.  Steri-Strips   were applied, fluff, 4 x 4s and surgery bra.  The patient was awakened in   Operating Room and taken to Recovery Room in stable condition.      JUVENTINO/  dd: 06/15/2017 15:02:45 (CDT)  td: 06/15/2017 18:20:33 (CDT)  Doc ID   #[id number]  Job ID #[id number]    CC: [facility] [employer]

## 2017-06-15 NOTE — ANESTHESIA PREPROCEDURE EVALUATION
06/15/2017  Leia Rodriguez is a 66 y.o., female.    Anesthesia Evaluation    I have reviewed the Patient Summary Reports.    I have reviewed the Nursing Notes.   I have reviewed the Medications.     Review of Systems  Anesthesia Hx:  No problems with previous Anesthesia  Denies Family Hx of Anesthesia complications.   Denies Personal Hx of Anesthesia complications.   Social:  Smoker    Hematology/Oncology:  Hematology Normal      Current/Recent Cancer. Breast   EENT/Dental:EENT/Dental Normal   Cardiovascular:   Exercise tolerance: good Hypertension, well controlled ECG has been reviewed.    Pulmonary:  Pulmonary Normal    Renal/:  Renal/ Normal     Hepatic/GI:  Hepatic/GI Normal    Musculoskeletal:   Arthritis     Neurological:   Neuromuscular Disease,    Endocrine:  Endocrine Normal    Dermatological:  Skin Normal    Psych:   Psychiatric History          Physical Exam  General:  Well nourished    Airway/Jaw/Neck:  Airway Findings: Mouth Opening: Normal Tongue: Normal  General Airway Assessment: Adult, Average  Mallampati: II  TM Distance: Normal, at least 6 cm      Dental:  Dental Findings: Upper Dentures, Lower Dentures   Chest/Lungs:  Chest/Lungs Findings: Clear to auscultation, Normal Respiratory Rate     Heart/Vascular:  Heart Findings: Rate: Normal  Rhythm: Regular Rhythm  Sounds: Normal        Mental Status:  Mental Status Findings:  Cooperative, Alert and Oriented         Anesthesia Plan  Type of Anesthesia, risks & benefits discussed:  Anesthesia Type:  general  Patient's Preference:   Intra-op Monitoring Plan:   Intra-op Monitoring Plan Comments:   Post Op Pain Control Plan:   Post Op Pain Control Plan Comments:   Induction:   IV  Beta Blocker:  Patient is not currently on a Beta-Blocker (No further documentation required).       Informed Consent: Patient understands risks and agrees with  Anesthesia plan.  Questions answered. Anesthesia consent signed with patient.  ASA Score: 2     Day of Surgery Review of History & Physical: I have interviewed and examined the patient. I have reviewed the patient's H&P dated: 6/15/17. There are no significant changes.  H&P update referred to the surgeon.         Ready For Surgery From Anesthesia Perspective.

## 2017-06-15 NOTE — TRANSFER OF CARE
"Anesthesia Transfer of Care Note    Patient: Leia Rodriguez    Procedure(s) Performed: Procedure(s) (LRB):  MASTECTOMY-PARTIAL (Left)    Patient location: PACU    Anesthesia Type: general    Transport from OR: Transported from OR on room air with adequate spontaneous ventilation    Post pain: adequate analgesia    Post assessment: no apparent anesthetic complications and tolerated procedure well    Post vital signs: stable    Level of consciousness: awake    Nausea/Vomiting: no nausea/vomiting    Complications: none    Transfer of care protocol was followed      Last vitals:   Visit Vitals  BP (!) 167/81   Pulse 70   Temp 36.7 °C (98 °F) (Oral)   Resp 18   Ht 5' 1" (1.549 m)   Wt 73 kg (161 lb)   SpO2 97%   Breastfeeding? No   BMI 30.42 kg/m²     "

## 2017-06-22 RX ORDER — HYDROCODONE BITARTRATE AND ACETAMINOPHEN 5; 325 MG/1; MG/1
1 TABLET ORAL
Qty: 15 TABLET | Refills: 0 | Status: SHIPPED | OUTPATIENT
Start: 2017-06-22 | End: 2017-06-22 | Stop reason: SDUPTHER

## 2017-06-22 RX ORDER — HYDROCODONE BITARTRATE AND ACETAMINOPHEN 5; 325 MG/1; MG/1
1 TABLET ORAL
Qty: 15 TABLET | Refills: 0 | Status: SHIPPED | OUTPATIENT
Start: 2017-06-22 | End: 2017-07-19

## 2017-06-30 ENCOUNTER — OFFICE VISIT (OUTPATIENT)
Dept: SURGERY | Facility: CLINIC | Age: 67
End: 2017-06-30
Payer: MEDICARE

## 2017-06-30 VITALS
TEMPERATURE: 98 F | SYSTOLIC BLOOD PRESSURE: 155 MMHG | HEART RATE: 82 BPM | DIASTOLIC BLOOD PRESSURE: 76 MMHG | WEIGHT: 167.56 LBS | BODY MASS INDEX: 31.66 KG/M2

## 2017-06-30 DIAGNOSIS — Z98.890 POST-OPERATIVE STATE: Primary | ICD-10-CM

## 2017-06-30 PROCEDURE — 99024 POSTOP FOLLOW-UP VISIT: CPT | Mod: S$GLB,,, | Performed by: SURGERY

## 2017-06-30 PROCEDURE — 99999 PR PBB SHADOW E&M-EST. PATIENT-LVL III: CPT | Mod: PBBFAC,,, | Performed by: SURGERY

## 2017-06-30 NOTE — PROGRESS NOTES
Subjective:       Patient ID: Leia Rodriguez is a 66 y.o. female.    Chief Complaint: Post-op Evaluation    Patient returns now 2 weeks post left partial mastectomy for lower inner quadrant ductal carcinoma in situ. She is feeling well and denies any wound complaints. Eating and ambulating well.       Review of Systems    Objective:      Physical Exam   Constitutional: She is oriented to person, place, and time. She appears well-developed and well-nourished.   Eyes: EOM are normal. Pupils are equal, round, and reactive to light.   Cardiovascular: Normal rate, regular rhythm and normal heart sounds.    Pulmonary/Chest: Effort normal and breath sounds normal.   Neurological: She is alert and oriented to person, place, and time.   Skin:   Left breast incision medial breast is clean and dry . There is about a 5 cm seroma at lumpectomy site. It is not tender and not tense. No redness or edema   Psychiatric: She has a normal mood and affect. Her behavior is normal.     PATH RETURNED 8MM RESIDUAL LOW GRADE 1, DCIS . ONE MARGIN AT FOCAL MICROSCOPIC MARGIN  Assessment:     STABLE POST EXCISION OF 8MM LOWGRADE DCIS. ONE FOCAL MICRO MARGIN.       5 CM SEROMA  WITH NO SXS FROM IT AND NOT TENSE     Plan:       DISCUSSED WITH dR. MARIANO AND HE WOULD NOT RECOMMEND ANY FURTHER EXCISION BUT JUST XRT OR ENDOCRINE RX. WILL HAVE HER WEAR SUPPORT BRA . SEE ME AND ONCOLOGY BACK IN 2-3 WEEKS.

## 2017-07-19 ENCOUNTER — OFFICE VISIT (OUTPATIENT)
Dept: HEMATOLOGY/ONCOLOGY | Facility: CLINIC | Age: 67
End: 2017-07-19
Payer: MEDICARE

## 2017-07-19 ENCOUNTER — OFFICE VISIT (OUTPATIENT)
Dept: SURGERY | Facility: CLINIC | Age: 67
End: 2017-07-19
Payer: MEDICARE

## 2017-07-19 VITALS
WEIGHT: 166 LBS | OXYGEN SATURATION: 96 % | SYSTOLIC BLOOD PRESSURE: 140 MMHG | HEART RATE: 81 BPM | HEIGHT: 60 IN | TEMPERATURE: 98 F | BODY MASS INDEX: 32.59 KG/M2 | DIASTOLIC BLOOD PRESSURE: 80 MMHG | RESPIRATION RATE: 18 BRPM

## 2017-07-19 VITALS
HEART RATE: 97 BPM | TEMPERATURE: 98 F | SYSTOLIC BLOOD PRESSURE: 150 MMHG | WEIGHT: 166 LBS | DIASTOLIC BLOOD PRESSURE: 83 MMHG | BODY MASS INDEX: 31.37 KG/M2

## 2017-07-19 DIAGNOSIS — Z98.890 POST-OPERATIVE STATE: Primary | ICD-10-CM

## 2017-07-19 DIAGNOSIS — D05.12 DUCTAL CARCINOMA IN SITU (DCIS) OF LEFT BREAST: ICD-10-CM

## 2017-07-19 PROBLEM — C50.212 MALIGNANT NEOPLASM OF UPPER-INNER QUADRANT OF LEFT FEMALE BREAST: Status: RESOLVED | Noted: 2017-05-18 | Resolved: 2017-07-19

## 2017-07-19 PROBLEM — C50.912 BREAST CANCER, LEFT: Status: RESOLVED | Noted: 2017-06-15 | Resolved: 2017-07-19

## 2017-07-19 PROCEDURE — 99999 PR PBB SHADOW E&M-EST. PATIENT-LVL II: CPT | Mod: PBBFAC,,, | Performed by: SURGERY

## 2017-07-19 PROCEDURE — 99024 POSTOP FOLLOW-UP VISIT: CPT | Mod: S$GLB,,, | Performed by: SURGERY

## 2017-07-19 PROCEDURE — 1125F AMNT PAIN NOTED PAIN PRSNT: CPT | Mod: S$GLB,,, | Performed by: INTERNAL MEDICINE

## 2017-07-19 PROCEDURE — 1159F MED LIST DOCD IN RCRD: CPT | Mod: S$GLB,,, | Performed by: INTERNAL MEDICINE

## 2017-07-19 PROCEDURE — 99999 PR PBB SHADOW E&M-EST. PATIENT-LVL III: CPT | Mod: PBBFAC,,, | Performed by: INTERNAL MEDICINE

## 2017-07-19 PROCEDURE — 99205 OFFICE O/P NEW HI 60 MIN: CPT | Mod: S$GLB,,, | Performed by: INTERNAL MEDICINE

## 2017-07-19 NOTE — LETTER
July 19, 2017      Jony Duke MD  [address] [facility] [place] [address]           [facility] - Hemotology Oncology  [address] [facility] [address] [address] [place] [address]  Phone: [phone]  Fax: [phone]          Patient: Leia Rodriguez   MR Number: [id number]   YOB: 1950   Date of Visit: 7/19/2017       Dear Dr. Jony Duke:    Thank you for referring Leia Rodriguez to me for evaluation. Attached you will find relevant portions of my assessment and plan of care.    If you have questions, please do not hesitate to call me. I look forward to following Leia Rodriguez along with you.    Sincerely,    Mario Fernández MD    Enclosure  CC:  No Recipients    If you would like to receive this communication electronically, please contact [email or web address] or [phone] to request more information on Context app Link access.    For providers and/or their staff who would like to refer a patient to [facility], please contact us through our one-stop-shop provider referral line, [facility], at [phone].    If you feel you have received this communication in error or would no longer like to receive these types of communications, please e-mail [email or web address]

## 2017-07-19 NOTE — PROGRESS NOTES
Subjective:       Patient ID: Leia Rodriguez is a 67 y.o. female.    Chief Complaint: Consult; Results; and Prostate Cancer    HPI 67-year-old female referred for 0.8 cm intraductal breast carcinoma with positive margins grade 1 is asked see the patient for further evaluation and treatment recommendations    Past Medical History:   Diagnosis Date    Breast cancer 06/15/2017    0.8 cm Grade 1 INTRADUCTAL BREAST 9Positve margin    Essential hypertension     Hemorrhoids     Lipoma of abdominal wall     Obesity     Overactive bladder     Pap smear abnormality of cervix with ASCUS favoring benign     Thyroid nodule     Tobacco use disorder     Tubular adenoma of colon     L breast cancer    Urinary incontinence      Family History   Problem Relation Age of Onset    Hypertension Father     Cataracts Father     Prostate cancer Father     Cancer Father 80     Prostate    Hypertension Daughter     Clotting disorder Daughter 32     Cervical Cancer    Cirrhosis Mother     Alcohol abuse Mother     Cervical cancer Daughter     Diabetes Brother     Heart attack Brother     Heart murmur Brother      Social History     Social History    Marital status:      Spouse name: N/A    Number of children: N/A    Years of education: N/A     Occupational History    Not on file.     Social History Main Topics    Smoking status: Current Every Day Smoker     Packs/day: 1.00     Years: 50.00     Types: Cigarettes    Smokeless tobacco: Current User      Comment: no smoking after m.n prior to sx    Alcohol use 50.4 oz/week     84 Cans of beer per week      Comment: no alcohol prior to surgery    Drug use: No    Sexual activity: No     Other Topics Concern    Not on file     Social History Narrative    The patient is .  She is retired from [employer].     Past Surgical History:   Procedure Laterality Date    ANTERIOR VAGINAL REPAIR      BLADDER SURGERY      BREAST SURGERY      CATARACT  EXTRACTION Right      SECTION      X2    COLONOSCOPY N/A 3/8/2017    Procedure: COLONOSCOPY;  Surgeon: Tyron Paris MD;  Location[facility];  Service: Endoscopy;  Laterality: N/A;    HYSTERECTOMY      THYROID LOBECTOMY Left 2005    TUBAL LIGATION         Labs:  Lab Results   Component Value Date    WBC 7.00 2017    HGB 13.6 2017    HCT 41.4 2017    MCV 96 2017     2017     BMP  Lab Results   Component Value Date     2017    K 3.7 2017    CL 97 2017    CO2 31 (H) 2017    BUN 14 2017    CREATININE 0.8 2017    CALCIUM 9.6 2017    ANIONGAP 9 2017    ESTGFRAFRICA >60.0 2017    EGFRNONAA >60.0 2017     Lab Results   Component Value Date    ALT 10 2017    AST 16 2017    ALKPHOS 81 2017    BILITOT 0.9 2017       No results found for: IRON, TIBC, FERRITIN, SATURATEDIRO  No results found for: ALLLMRIE43  No results found for: FOLATE  Lab Results   Component Value Date    TSH 0.797 2017         Review of Systems   Constitutional: Negative for activity change, appetite change, chills, diaphoresis, fatigue, fever and unexpected weight change.   HENT: Negative for congestion, dental problem, drooling, ear discharge, ear pain, facial swelling, hearing loss, mouth sores, nosebleeds, postnasal drip, rhinorrhea, sinus pressure, sneezing, sore throat, tinnitus, trouble swallowing and voice change.    Eyes: Negative for photophobia, pain, discharge, redness, itching and visual disturbance.   Respiratory: Negative for cough, choking, chest tightness, shortness of breath, wheezing and stridor.    Cardiovascular: Negative for chest pain, palpitations and leg swelling.   Gastrointestinal: Negative for abdominal distention, abdominal pain, anal bleeding, blood in stool, constipation, diarrhea, nausea, rectal pain and vomiting.   Endocrine: Negative for cold intolerance, heat intolerance,  polydipsia, polyphagia and polyuria.   Genitourinary: Negative for decreased urine volume, difficulty urinating, dyspareunia, dysuria, enuresis, flank pain, frequency, genital sores, hematuria, menstrual problem, pelvic pain, urgency, vaginal bleeding, vaginal discharge and vaginal pain.   Musculoskeletal: Negative for arthralgias, back pain, gait problem, joint swelling, myalgias, neck pain and neck stiffness.   Skin: Negative for color change, pallor and rash.   Allergic/Immunologic: Negative for environmental allergies, food allergies and immunocompromised state.   Neurological: Negative for dizziness, tremors, seizures, syncope, facial asymmetry, speech difficulty, weakness, light-headedness, numbness and headaches.   Hematological: Negative for adenopathy. Does not bruise/bleed easily.   Psychiatric/Behavioral: Positive for dysphoric mood. Negative for agitation, behavioral problems, confusion, decreased concentration, hallucinations, self-injury, sleep disturbance and suicidal ideas. The patient is nervous/anxious. The patient is not hyperactive.        Objective:      Physical Exam   Constitutional: She is oriented to person, place, and time. She appears well-developed and well-nourished. She appears distressed.   HENT:   Head: Normocephalic and atraumatic.   Right Ear: External ear normal.   Left Ear: External ear normal.   Nose: Nose normal. Right sinus exhibits no maxillary sinus tenderness and no frontal sinus tenderness. Left sinus exhibits no maxillary sinus tenderness and no frontal sinus tenderness.   Mouth/Throat: Oropharynx is clear and moist. No oropharyngeal exudate.   Eyes: Conjunctivae, EOM and lids are normal. Pupils are equal, round, and reactive to light. Right eye exhibits no discharge. Left eye exhibits no discharge. Right conjunctiva is not injected. Right conjunctiva has no hemorrhage. Left conjunctiva is not injected. Left conjunctiva has no hemorrhage. No scleral icterus.   Neck: Normal  range of motion. Neck supple. No JVD present. No tracheal deviation present. No thyromegaly present.   Cardiovascular: Normal rate, regular rhythm, normal heart sounds and intact distal pulses.    Pulmonary/Chest: Effort normal and breath sounds normal. No stridor. No respiratory distress. She exhibits no tenderness.       Abdominal: Soft. Bowel sounds are normal. She exhibits no distension and no mass. There is no splenomegaly or hepatomegaly. There is no tenderness. There is no rebound.   Musculoskeletal: Normal range of motion. She exhibits no edema or tenderness.   Lymphadenopathy:     She has no cervical adenopathy.     She has no axillary adenopathy.        Right: No supraclavicular adenopathy present.        Left: No supraclavicular adenopathy present.   Neurological: She is alert and oriented to person, place, and time. No cranial nerve deficit. Coordination normal.   Skin: Skin is dry. No rash noted. She is not diaphoretic. No erythema.   Psychiatric: Her behavior is normal. Judgment and thought content normal. Her mood appears anxious. She exhibits a depressed mood.   Vitals reviewed.          Assessment:      1. Ductal carcinoma in situ (DCIS) of left breast           Plan:   Reviewed diagnosis with patient and daughter who accompanied her would recommend evaluation by radiation oncology for primary breast irradiation would consider adjuvant therapy posttreatment with aromatase inhibitor will discuss further follow-up with them discussed and sent them articles from up-to-date on intraductal breast cancer at their request for both patients and physicians return in 2 months after evaluation by radiation oncology for consideration of adjuvant therapy and discussion. Psychosocial Distress screening score of Distress Score: 4 noted and reviewed.  Ambulatory referral  made

## 2017-07-19 NOTE — PROGRESS NOTES
Distress Screening Results: Psychosocial Distress screening score of Distress Score: 4 {AMB ONC DISTRESS SCORE:25528}

## 2017-07-19 NOTE — PROGRESS NOTES
The patient returns now 1 month status post partial mastectomy for a left breast lower inner quadrant 8 mm grade 1 intraductal carcinoma.  The final pathology showed just low-grade intraductal carcinoma, that is ductal carcinoma in situ.  There was a stage I single small focal positive margin.  The patient feels well.  She is back to her usual activities.  She denies any wound drainage or any wound complaints and her tenderness is basically resolved.  Objective vital signs are stable blood pressure is 150/83 temperature is 98.4.  She is alert and oriented ×3.  Heart reveals normal rate and her respirations are normal.  The left breast exhibits a well healed incision.  The swelling has basically completely resolved.  I cannot really feel a distinct seroma anymore.  It is about a 4 cm area of firmness consistent with postoperative healing.  Impression: Doing well status post partial mastectomy for 8 mm grade 1 ductal carcinoma in situ.  Plan: I told the patient she may resume all her regular activities.  She is to see Dr. Fernández and oncology today for consideration for radiation treatment and endocrine therapy.  I had had a discussion with the radiation therapist about this single focal positive margin.  Because of the low-grade lesion he would recommend radiation treatment and endocrine therapy but not any reexcision.  The patient is to see me when necessary.

## 2017-07-20 ENCOUNTER — INITIAL CONSULT (OUTPATIENT)
Dept: RADIATION ONCOLOGY | Facility: CLINIC | Age: 67
End: 2017-07-20
Payer: MEDICARE

## 2017-07-20 VITALS
SYSTOLIC BLOOD PRESSURE: 147 MMHG | HEART RATE: 77 BPM | HEIGHT: 60 IN | WEIGHT: 164 LBS | BODY MASS INDEX: 32.2 KG/M2 | RESPIRATION RATE: 18 BRPM | TEMPERATURE: 98 F | DIASTOLIC BLOOD PRESSURE: 69 MMHG

## 2017-07-20 DIAGNOSIS — D05.12 DUCTAL CARCINOMA IN SITU (DCIS) OF LEFT BREAST: Primary | ICD-10-CM

## 2017-07-20 PROCEDURE — 1159F MED LIST DOCD IN RCRD: CPT | Mod: S$GLB,,, | Performed by: RADIOLOGY

## 2017-07-20 PROCEDURE — 99204 OFFICE O/P NEW MOD 45 MIN: CPT | Mod: S$GLB,,, | Performed by: RADIOLOGY

## 2017-07-20 PROCEDURE — 99999 PR PBB SHADOW E&M-EST. PATIENT-LVL III: CPT | Mod: PBBFAC,,, | Performed by: RADIOLOGY

## 2017-07-20 PROCEDURE — 1126F AMNT PAIN NOTED NONE PRSNT: CPT | Mod: S$GLB,,, | Performed by: RADIOLOGY

## 2017-07-20 NOTE — PROGRESS NOTES
HISTORY OF PRESENT ILLNESS:   This patient presents for discussion of adjuvant radiotherapy following a recent diagnosis of DCIS of the Lt. breast.      Mrs. Rodriguez' history dates back to March of this year when diagnostic MMG confirmed the presence of a round mass with indistinct margins in the Lt. breast at the 7 o'clock position.  The mass measured 9 x 5 x 6 mm.  The patient was referred to Dr. Duke and on 6/15/17 underwent wire localization lumpectomy.  Pathology revealed an 8 mm focus of ductal carcinoma in situ.  The tumor was low grade.  There was tumor present at the anterior inferior margin.  Ninety percent of the tumor cells were ER positive, 5- 10% of the tumor cells were ID positive.  The patient presents for discussion of adjuvant therapy.  Today the patient states she feels well.  No complaints of breast pain or tenderness.       REVIEW OF SYSTEMS:   Review of Systems   Constitutional: Negative for chills, diaphoresis, fever and weight loss.   HENT: Negative for congestion and sore throat.    Respiratory: Negative for cough and hemoptysis.    Cardiovascular: Negative for chest pain and palpitations.   Gastrointestinal: Negative for abdominal pain, diarrhea, nausea and vomiting.       GYN HISTORY:  Menarche at age 9, G3, P3 first birth at age 17.  Status post JOE unclear of the date.  No HRT, no family history of breast cancer.     PAST MEDICAL HISTORY:  Past Medical History:   Diagnosis Date    Breast cancer 06/15/2017    0.8 cm Grade 1 INTRADUCTAL BREAST 9Positve margin    Essential hypertension     Hemorrhoids     Lipoma of abdominal wall     Obesity     Overactive bladder     Pap smear abnormality of cervix with ASCUS favoring benign     Thyroid nodule     Tobacco use disorder     Tubular adenoma of colon     L breast cancer    Urinary incontinence        PAST SURGICAL HISTORY:  Past Surgical History:   Procedure Laterality Date    ANTERIOR VAGINAL REPAIR      BLADDER SURGERY       BREAST SURGERY      CATARACT EXTRACTION Right      SECTION      X2    COLONOSCOPY N/A 3/8/2017    Procedure: COLONOSCOPY;  Surgeon: Tyron Paris MD;  Location: [facility];  Service: Endoscopy;  Laterality: N/A;    HYSTERECTOMY      THYROID LOBECTOMY Left 2005    TUBAL LIGATION         ALLERGIES:   Review of patient's allergies indicates:  No Known Allergies    MEDICATIONS:  Current Outpatient Prescriptions   Medication Sig    hydrochlorothiazide (MICROZIDE) 12.5 mg capsule Take 1 capsule (12.5 mg total) by mouth once daily. For high blood pressure    meloxicam (MOBIC) 15 MG tablet TAKE 1 TABLET (15 MG TOTAL) BY MOUTH ONCE DAILY.    oxybutynin (DITROPAN) 5 MG Tab Take 1 tablet (5 mg total) by mouth 3 (three) times daily as needed.    urea (CARMOL) 40 % Crea Apply topically 2 (two) times daily.     No current facility-administered medications for this visit.        SOCIAL HISTORY:  Social History     Social History    Marital status:      Spouse name: N/A    Number of children: N/A    Years of education: N/A     Occupational History    Not on file.     Social History Main Topics    Smoking status: Current Every Day Smoker     Packs/day: 1.00     Years: 50.00     Types: Cigarettes    Smokeless tobacco: Current User      Comment: no smoking after m.n prior to sx    Alcohol use 50.4 oz/week     84 Cans of beer per week      Comment: no alcohol prior to surgery    Drug use: No    Sexual activity: No     Other Topics Concern    Not on file     Social History Narrative    The patient is .  She is retired from [employer].       FAMILY HISTORY:  Family History   Problem Relation Age of Onset    Hypertension Father     Cataracts Father     Prostate cancer Father     Cancer Father 80     Prostate    Hypertension Daughter     Clotting disorder Daughter 32     Cervical Cancer    Cirrhosis Mother     Alcohol abuse Mother     Cervical cancer Daughter     Diabetes  Brother     Heart attack Brother     Heart murmur Brother          PHYSICAL EXAMINATION:   BP  147/69    Pulse 77    Temp 97.6 °F (36.4 °C)    Resp 18    Ht 5' (1.524 m)    Wt 74.4 kg (164 lb 0.4 oz)    BMI 32.03 kg/m²    BSA 1.77 m²   Physical Exam   Constitutional: She is well-developed, well-nourished, and in no distress.   HENT:   Head: Normocephalic and atraumatic.   Eyes: EOM are normal. 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   Neck: Normal range of motion. Neck supple.   Pulmonary/Chest: Right breast exhibits no inverted nipple, no mass, no nipple discharge, no skin change and no tenderness. Left breast exhibits no inverted nipple, no mass, no nipple discharge, no skin change and no tenderness.       Lymphadenopathy:     She has no cervical adenopathy.     She has no axillary adenopathy.        Right: No supraclavicular adenopathy present.        Left: No supraclavicular adenopathy present.       ASSESSMENT/PLAN:  Stage 0 (Tis, N0, M0) DCIS of the Lt. breast.      ECO    I had a long discussion with the patient and her daughter.  We discussed her diagnosis and overall excellent prognosis.  Discussed the rational for further local therapy given the positive margin.  Discussed the options of possible re-excision +/- radiotherapy depending on the results or radiotherapy to the breast with boost to the resection cavity.  Discussed the pros and cons of each treatment approach.  Discussed the procedures, risks and benefits of radiotherapy including the long term side effects of lung fibrosis and increased cardiac events.  The patient is a current smoker.  Explained this is a risk factor for increased cardiac events following radiotherapy.  The patient is reluctant to consider any further surgery and would like to proceed with radiotherapy.  Given the low grade nature of her disease we may consider partial breast irradiation using IMRT which would also help decrease the dose to the  heart.  Will plan simulation.       Psychosocial Distress screening score of Distress Score: 4 noted and reviewed. No intervention indicated.    I spent approximately 45 minutes reviewing the available records and evaluating the patient, out of which over 50% of the time was spent face to face with the patient in counseling and coordinating this patient's care.

## 2017-07-20 NOTE — LETTER
July 20, 2017      Mario Fernández MD  [address]  [facility] [address]           [place] - Radiation Oncology  [address] [place] [address]  Phone: [phone]  Fax: [phone]          Patient: Leia Rodriguez   MR Number: [id number]   YOB: 1950   Date of Visit: 7/20/2017       Dear Dr. Mario Fernández:    Thank you for referring Leia Rodriguez to me for evaluation. Attached you will find relevant portions of my assessment and plan of care.    If you have questions, please do not hesitate to call me. I look forward to following Leia Rodriguez along with you.    Sincerely,    Herson Khan Jr., MD    Enclosure  CC:  No Recipients    If you would like to receive this communication electronically, please contact [email or web address] or [phone] to request more information on [employer] Link access.    For providers and/or their staff who would like to refer a patient to [facility], please contact us through our one-stop-shop provider referral line, [facility], at [phone].    If you feel you have received this communication in error or would no longer like to receive these types of communications, please e-mail [email or web address]

## 2017-07-20 NOTE — PROGRESS NOTES
Distress Screening Results: Psychosocial Distress screening score of Distress Score: 4 noted and reviewed. No intervention indicated.

## 2017-07-25 ENCOUNTER — HOSPITAL ENCOUNTER (OUTPATIENT)
Dept: RADIATION THERAPY | Facility: HOSPITAL | Age: 67
Discharge: HOME | End: 2017-07-25
Attending: RADIOLOGY
Payer: MEDICARE

## 2017-07-25 PROCEDURE — 77290 THER RAD SIMULAJ FIELD CPLX: CPT | Mod: 26,,, | Performed by: RADIOLOGY

## 2017-07-25 PROCEDURE — 77334 RADIATION TREATMENT AID(S): CPT | Mod: TC | Performed by: RADIOLOGY

## 2017-07-25 PROCEDURE — 77290 THER RAD SIMULAJ FIELD CPLX: CPT | Mod: TC | Performed by: RADIOLOGY

## 2017-07-25 PROCEDURE — 77334 RADIATION TREATMENT AID(S): CPT | Mod: 26,,, | Performed by: RADIOLOGY

## 2017-07-25 PROCEDURE — 77014 HC CT GUIDANCE RADIATION THERAPY FLDS PLACEMENT: CPT | Mod: TC | Performed by: RADIOLOGY

## 2017-07-25 PROCEDURE — 77263 THER RADIOLOGY TX PLNG CPLX: CPT | Mod: ,,, | Performed by: RADIOLOGY

## 2017-07-29 PROCEDURE — 77301 RADIOTHERAPY DOSE PLAN IMRT: CPT | Mod: 26,,, | Performed by: RADIOLOGY

## 2017-07-29 PROCEDURE — 77301 RADIOTHERAPY DOSE PLAN IMRT: CPT | Mod: TC | Performed by: RADIOLOGY

## 2017-07-31 ENCOUNTER — DOCUMENTATION ONLY (OUTPATIENT)
Dept: RADIATION ONCOLOGY | Facility: CLINIC | Age: 67
End: 2017-07-31

## 2017-07-31 ENCOUNTER — SOCIAL WORK (OUTPATIENT)
Dept: RADIATION ONCOLOGY | Facility: CLINIC | Age: 67
End: 2017-07-31

## 2017-07-31 PROCEDURE — 77300 RADIATION THERAPY DOSE PLAN: CPT | Mod: 26,,, | Performed by: RADIOLOGY

## 2017-07-31 PROCEDURE — 77300 RADIATION THERAPY DOSE PLAN: CPT | Mod: TC | Performed by: RADIOLOGY

## 2017-07-31 PROCEDURE — 77280 THER RAD SIMULAJ FIELD SMPL: CPT | Mod: 26,,, | Performed by: RADIOLOGY

## 2017-07-31 PROCEDURE — 77338 DESIGN MLC DEVICE FOR IMRT: CPT | Mod: TC | Performed by: RADIOLOGY

## 2017-07-31 PROCEDURE — 77338 DESIGN MLC DEVICE FOR IMRT: CPT | Mod: 26,,, | Performed by: RADIOLOGY

## 2017-07-31 PROCEDURE — 77280 THER RAD SIMULAJ FIELD SMPL: CPT | Mod: TC | Performed by: RADIOLOGY

## 2017-07-31 NOTE — PROGRESS NOTES
Met with pt, who was accompanied by two daughters, after radiation oncology appointment. Reviewed SW checklist; referrals made to [facility] and [employer] for local resources. Aside from this, main (if not only) concern is of financial assistance. It was determined that one of the first courses of action would be to apply for Medicaid. If she qualifies for Medicaid this will probably be the most helpful for medical financial assistance. Information provided to contact [facility] program representatives. Additionally, SW will look into any other small grants or available financial assistance that might benefit pt and will f/u later this week to provide information to patient. She was provided with SW contact info and encouraged to call as needs arise.

## 2017-08-01 ENCOUNTER — HOSPITAL ENCOUNTER (OUTPATIENT)
Dept: RADIATION THERAPY | Facility: HOSPITAL | Age: 67
Discharge: HOME | End: 2017-08-01
Attending: RADIOLOGY
Payer: MEDICARE

## 2017-08-01 PROCEDURE — 77014 HC CT GUIDANCE RADIATION THERAPY FLDS PLACEMENT: CPT | Mod: TC | Performed by: RADIOLOGY

## 2017-08-01 PROCEDURE — 77385 HC IMRT, SIMPLE: CPT | Performed by: RADIOLOGY

## 2017-08-01 NOTE — PLAN OF CARE
Problem: Patient Care Overview  Goal: Plan of Care Review  Outcome: Ongoing (interventions implemented as appropriate)  Education session: Pt here for film only of RT to breast.  Process of radiation to breast explained along with side effects with ARIA handout given.  Miaderm given as well with instructions on how to use.  Met with SW today.  Has #'s to call if she needs us.  Daughter present.  Will continue to monitor daily. Instructed to call with any questions or concerns.  Verbalized understanding.

## 2017-08-02 PROCEDURE — 77385 HC IMRT, SIMPLE: CPT | Performed by: RADIOLOGY

## 2017-08-02 PROCEDURE — 77014 HC CT GUIDANCE RADIATION THERAPY FLDS PLACEMENT: CPT | Mod: TC | Performed by: RADIOLOGY

## 2017-08-03 PROCEDURE — 77385 HC IMRT, SIMPLE: CPT | Performed by: RADIOLOGY

## 2017-08-03 PROCEDURE — 77014 HC CT GUIDANCE RADIATION THERAPY FLDS PLACEMENT: CPT | Mod: TC | Performed by: RADIOLOGY

## 2017-08-04 PROCEDURE — 77014 HC CT GUIDANCE RADIATION THERAPY FLDS PLACEMENT: CPT | Mod: TC | Performed by: RADIOLOGY

## 2017-08-04 PROCEDURE — 77385 HC IMRT, SIMPLE: CPT | Performed by: RADIOLOGY

## 2017-08-07 PROCEDURE — 77336 RADIATION PHYSICS CONSULT: CPT | Performed by: RADIOLOGY

## 2017-08-07 PROCEDURE — 77014 HC CT GUIDANCE RADIATION THERAPY FLDS PLACEMENT: CPT | Mod: TC | Performed by: RADIOLOGY

## 2017-08-07 PROCEDURE — 77385 HC IMRT, SIMPLE: CPT | Performed by: RADIOLOGY

## 2017-08-08 ENCOUNTER — DOCUMENTATION ONLY (OUTPATIENT)
Dept: RADIATION ONCOLOGY | Facility: CLINIC | Age: 67
End: 2017-08-08

## 2017-08-08 PROCEDURE — 77014 HC CT GUIDANCE RADIATION THERAPY FLDS PLACEMENT: CPT | Mod: TC | Performed by: RADIOLOGY

## 2017-08-08 PROCEDURE — 77385 HC IMRT, SIMPLE: CPT | Performed by: RADIOLOGY

## 2017-08-09 PROCEDURE — 77014 HC CT GUIDANCE RADIATION THERAPY FLDS PLACEMENT: CPT | Mod: TC | Performed by: RADIOLOGY

## 2017-08-09 PROCEDURE — 77385 HC IMRT, SIMPLE: CPT | Performed by: RADIOLOGY

## 2017-08-09 NOTE — PLAN OF CARE
Problem: Patient Care Overview  Goal: Plan of Care Review  Outcome: Ongoing (interventions implemented as appropriate)  Pt here for RT #6/10 to breast with no irritation/discoloration or issues.  Saw Dr. COLUNGA today.  Will continue to monitor.

## 2017-08-10 PROCEDURE — 77014 HC CT GUIDANCE RADIATION THERAPY FLDS PLACEMENT: CPT | Mod: TC | Performed by: RADIOLOGY

## 2017-08-10 PROCEDURE — 77385 HC IMRT, SIMPLE: CPT | Performed by: RADIOLOGY

## 2017-08-11 PROCEDURE — 77385 HC IMRT, SIMPLE: CPT | Performed by: RADIOLOGY

## 2017-08-11 PROCEDURE — 77014 HC CT GUIDANCE RADIATION THERAPY FLDS PLACEMENT: CPT | Mod: TC | Performed by: RADIOLOGY

## 2017-08-14 ENCOUNTER — DOCUMENTATION ONLY (OUTPATIENT)
Dept: RADIATION ONCOLOGY | Facility: CLINIC | Age: 67
End: 2017-08-14

## 2017-08-14 PROCEDURE — 77385 HC IMRT, SIMPLE: CPT | Performed by: RADIOLOGY

## 2017-08-14 PROCEDURE — 77014 HC CT GUIDANCE RADIATION THERAPY FLDS PLACEMENT: CPT | Mod: TC | Performed by: RADIOLOGY

## 2017-08-15 DIAGNOSIS — R05.9 COUGH: Primary | ICD-10-CM

## 2017-08-15 PROCEDURE — 77336 RADIATION PHYSICS CONSULT: CPT | Performed by: RADIOLOGY

## 2017-08-16 NOTE — PLAN OF CARE
Problem: Patient Care Overview  Goal: Plan of Care Review  Outcome: Outcome(s) achieved Date Met: 08/16/17  Pt completed RT to breast #15/15.  No complaints; mild erythema.  Using Miaderm prn.  Skin intact.  No complaints.  Will f/u with elissa garcia and Dr. COLUNGA in 2 mos.  Pt aware.

## 2017-08-21 ENCOUNTER — TELEPHONE (OUTPATIENT)
Dept: RADIATION ONCOLOGY | Facility: CLINIC | Age: 67
End: 2017-08-21

## 2017-08-21 NOTE — TELEPHONE ENCOUNTER
Called and s/w pt.  States she is doing and feeling well. Skin is intact.  Still using miaderm.  Reminded of f/u with Dr. Fernández on 8/30 and advised that I will call her with a Dr. Khan f/u when his schedule opens up for that time .  Verbalized understanding.   ----- Message from Jacque Sena RN sent at 8/14/2017  3:30 PM CDT -----  1 wk f/u call.  2 mos f/u with TS

## 2017-08-30 ENCOUNTER — OFFICE VISIT (OUTPATIENT)
Dept: HEMATOLOGY/ONCOLOGY | Facility: CLINIC | Age: 67
End: 2017-08-30
Payer: MEDICARE

## 2017-08-30 VITALS
SYSTOLIC BLOOD PRESSURE: 120 MMHG | RESPIRATION RATE: 18 BRPM | WEIGHT: 169.06 LBS | HEIGHT: 60 IN | HEART RATE: 75 BPM | BODY MASS INDEX: 33.19 KG/M2 | DIASTOLIC BLOOD PRESSURE: 84 MMHG | OXYGEN SATURATION: 93 % | TEMPERATURE: 98 F

## 2017-08-30 DIAGNOSIS — D05.12 DUCTAL CARCINOMA IN SITU (DCIS) OF LEFT BREAST: Primary | ICD-10-CM

## 2017-08-30 DIAGNOSIS — M81.0 AGE-RELATED OSTEOPOROSIS WITHOUT CURRENT PATHOLOGICAL FRACTURE: ICD-10-CM

## 2017-08-30 PROCEDURE — 3079F DIAST BP 80-89 MM HG: CPT | Mod: S$GLB,,, | Performed by: INTERNAL MEDICINE

## 2017-08-30 PROCEDURE — 99214 OFFICE O/P EST MOD 30 MIN: CPT | Mod: S$GLB,,, | Performed by: INTERNAL MEDICINE

## 2017-08-30 PROCEDURE — 3008F BODY MASS INDEX DOCD: CPT | Mod: S$GLB,,, | Performed by: INTERNAL MEDICINE

## 2017-08-30 PROCEDURE — 1159F MED LIST DOCD IN RCRD: CPT | Mod: S$GLB,,, | Performed by: INTERNAL MEDICINE

## 2017-08-30 PROCEDURE — 3074F SYST BP LT 130 MM HG: CPT | Mod: S$GLB,,, | Performed by: INTERNAL MEDICINE

## 2017-08-30 PROCEDURE — 99499 UNLISTED E&M SERVICE: CPT | Mod: S$PBB,,, | Performed by: INTERNAL MEDICINE

## 2017-08-30 PROCEDURE — 99999 PR PBB SHADOW E&M-EST. PATIENT-LVL III: CPT | Mod: PBBFAC,,, | Performed by: INTERNAL MEDICINE

## 2017-08-30 PROCEDURE — 1126F AMNT PAIN NOTED NONE PRSNT: CPT | Mod: S$GLB,,, | Performed by: INTERNAL MEDICINE

## 2017-08-30 RX ORDER — ANASTROZOLE 1 MG/1
1 TABLET ORAL DAILY
Qty: 90 TABLET | Refills: 3 | Status: SHIPPED | OUTPATIENT
Start: 2017-08-30 | End: 2018-05-23 | Stop reason: SDUPTHER

## 2017-08-30 NOTE — PROGRESS NOTES
Subjective:       Patient ID: Leia Rodriguez is a 67 y.o. female.    Chief Complaint: Follow-up; Results; and Breast Cancer    HPI 67-year-old female returns after completion of primary breast irradiation for 0.8 cm grade 1 intraductal breast carcinoma carcinoma with positive margin.  For consideration of adjuvant therapy    Past Medical History:   Diagnosis Date    Breast cancer 06/15/2017    0.8 cm Grade 1 INTRADUCTAL BREAST 9Positve margin    Essential hypertension     Hemorrhoids     Lipoma of abdominal wall     Obesity     Overactive bladder     Pap smear abnormality of cervix with ASCUS favoring benign     Thyroid nodule     Tobacco use disorder     Tubular adenoma of colon     L breast cancer    Urinary incontinence      Family History   Problem Relation Age of Onset    Hypertension Father     Cataracts Father     Prostate cancer Father     Cancer Father 80     Prostate    Hypertension Daughter     Clotting disorder Daughter 32     Cervical Cancer    Cirrhosis Mother     Alcohol abuse Mother     Cervical cancer Daughter     Diabetes Brother     Heart attack Brother     Heart murmur Brother      Social History     Social History    Marital status:      Spouse name: N/A    Number of children: N/A    Years of education: N/A     Occupational History    Not on file.     Social History Main Topics    Smoking status: Current Every Day Smoker     Packs/day: 1.00     Years: 50.00     Types: Cigarettes    Smokeless tobacco: Current User      Comment: no smoking after m.n prior to sx    Alcohol use 50.4 oz/week     84 Cans of beer per week      Comment: no alcohol prior to surgery    Drug use: No    Sexual activity: No     Other Topics Concern    Not on file     Social History Narrative    The patient is .  She is retired from [employer].     Past Surgical History:   Procedure Laterality Date    ANTERIOR VAGINAL REPAIR      BLADDER SURGERY      BREAST SURGERY       CATARACT EXTRACTION Right      SECTION      X2    COLONOSCOPY N/A 3/8/2017    Procedure: COLONOSCOPY;  Surgeon: Tyron Paris MD;  Location: [facility];  Service: Endoscopy;  Laterality: N/A;    HYSTERECTOMY      JOE    THYROID LOBECTOMY Left 2005    TUBAL LIGATION         Labs:  Lab Results   Component Value Date    WBC 7.00 2017    HGB 13.6 2017    HCT 41.4 2017    MCV 96 2017     2017     BMP  Lab Results   Component Value Date     2017    K 3.7 2017    CL 97 2017    CO2 31 (H) 2017    BUN 14 2017    CREATININE 0.8 2017    CALCIUM 9.6 2017    ANIONGAP 9 2017    ESTGFRAFRICA >60.0 2017    EGFRNONAA >60.0 2017     Lab Results   Component Value Date    ALT 10 2017    AST 16 2017    ALKPHOS 81 2017    BILITOT 0.9 2017       No results found for: IRON, TIBC, FERRITIN, SATURATEDIRO  No results found for: [id number]  No results found for: FOLATE  Lab Results   Component Value Date    TSH 0.797 2017         Review of Systems   Constitutional: Negative for activity change, appetite change, chills, diaphoresis, fatigue, fever and unexpected weight change.   HENT: Negative for congestion, dental problem, drooling, ear discharge, ear pain, facial swelling, hearing loss, mouth sores, nosebleeds, postnasal drip, rhinorrhea, sinus pressure, sneezing, sore throat, tinnitus, trouble swallowing and voice change.    Eyes: Negative for photophobia, pain, discharge, redness, itching and visual disturbance.   Respiratory: Negative for choking, chest tightness, shortness of breath, wheezing and stridor.    Cardiovascular: Negative for chest pain, palpitations and leg swelling.   Gastrointestinal: Negative for abdominal distention, abdominal pain, anal bleeding, blood in stool, constipation, diarrhea, nausea, rectal pain and vomiting.   Endocrine: Negative for cold intolerance, heat  intolerance, polydipsia, polyphagia and polyuria.   Genitourinary: Positive for frequency. Negative for decreased urine volume, difficulty urinating, dyspareunia, dysuria, enuresis, flank pain, genital sores, hematuria, menstrual problem, pelvic pain, urgency, vaginal bleeding, vaginal discharge and vaginal pain.   Musculoskeletal: Negative for arthralgias, gait problem, joint swelling, myalgias, neck pain and neck stiffness.   Skin: Negative for color change, pallor and rash.   Allergic/Immunologic: Negative for environmental allergies, food allergies and immunocompromised state.   Neurological: Negative for dizziness, tremors, seizures, syncope, facial asymmetry, speech difficulty, weakness, light-headedness, numbness and headaches.   Hematological: Negative for adenopathy. Does not bruise/bleed easily.   Psychiatric/Behavioral: Positive for dysphoric mood. Negative for agitation, behavioral problems, confusion, decreased concentration, hallucinations, sleep disturbance and suicidal ideas. The patient is nervous/anxious. The patient is not hyperactive.        Objective:      Physical Exam   Constitutional: She is oriented to person, place, and time. She appears distressed.   HENT:   Head: Normocephalic and atraumatic.   Right Ear: External ear normal.   Left Ear: External ear normal.   Nose: Nose normal. Right sinus exhibits no maxillary sinus tenderness and no frontal sinus tenderness. Left sinus exhibits no maxillary sinus tenderness and no frontal sinus tenderness.   Mouth/Throat: Oropharynx is clear and moist. No oropharyngeal exudate.   Eyes: Conjunctivae, EOM and lids are normal. Pupils are equal, round, and reactive to light. Right eye exhibits no discharge. Left eye exhibits no discharge. Right conjunctiva is not injected. Right conjunctiva has no hemorrhage. Left conjunctiva is not injected. Left conjunctiva has no hemorrhage. No scleral icterus.   Neck: Normal range of motion. Neck supple. No JVD  present. No tracheal deviation present. No thyromegaly present.   Cardiovascular: Normal rate and regular rhythm.    Pulmonary/Chest: Effort normal. No stridor. No respiratory distress. She exhibits no tenderness.   Abdominal: Soft. She exhibits no distension and no mass. There is no splenomegaly or hepatomegaly. There is no tenderness. There is no rebound.   Musculoskeletal: Normal range of motion. She exhibits no edema or tenderness.   Lymphadenopathy:     She has no cervical adenopathy.     She has no axillary adenopathy.        Right: No supraclavicular adenopathy present.        Left: No supraclavicular adenopathy present.   Neurological: She is alert and oriented to person, place, and time. No cranial nerve deficit. Coordination normal.   Skin: Skin is dry. No rash noted. She is not diaphoretic. No erythema.   Psychiatric: Her behavior is normal. Judgment and thought content normal. Her mood appears anxious. She exhibits a depressed mood.   Vitals reviewed.          Assessment:      1. Ductal carcinoma in situ (DCIS) of left breast    2. Age-related osteoporosis without current pathological fracture           Plan:   Counseled patient on discontinuation of tobacco we'll discuss about lung cancer screening on next visit.  At this point would recommend patient start on Arimidex 1 mg daily for 5 years prescription written risk obligations discussed information on drug sent as well baseline DEXA scan ordered DEXA scan 2013 showed normal findings

## 2017-10-16 ENCOUNTER — OFFICE VISIT (OUTPATIENT)
Dept: RADIATION ONCOLOGY | Facility: CLINIC | Age: 67
End: 2017-10-16
Payer: MEDICARE

## 2017-10-16 VITALS
DIASTOLIC BLOOD PRESSURE: 85 MMHG | SYSTOLIC BLOOD PRESSURE: 177 MMHG | HEIGHT: 61 IN | TEMPERATURE: 97 F | BODY MASS INDEX: 31.92 KG/M2 | WEIGHT: 169.06 LBS | RESPIRATION RATE: 20 BRPM | HEART RATE: 80 BPM

## 2017-10-16 DIAGNOSIS — D05.12 DUCTAL CARCINOMA IN SITU (DCIS) OF LEFT BREAST: Primary | ICD-10-CM

## 2017-10-16 PROCEDURE — 99999 PR PBB SHADOW E&M-EST. PATIENT-LVL III: CPT | Mod: PBBFAC,,, | Performed by: RADIOLOGY

## 2017-10-16 PROCEDURE — 99499 UNLISTED E&M SERVICE: CPT | Mod: S$GLB,,, | Performed by: RADIOLOGY

## 2017-10-31 ENCOUNTER — TELEPHONE (OUTPATIENT)
Dept: FAMILY MEDICINE | Facility: CLINIC | Age: 67
End: 2017-10-31

## 2017-10-31 NOTE — TELEPHONE ENCOUNTER
----- Message from Stephanie Velasco sent at 10/31/2017  2:12 PM CDT -----  needs to be diann w/i week for hosp f/u (uti)...[phone] (home) [phone]

## 2017-11-08 ENCOUNTER — OFFICE VISIT (OUTPATIENT)
Dept: FAMILY MEDICINE | Facility: CLINIC | Age: 67
End: 2017-11-08
Payer: MEDICARE

## 2017-11-08 VITALS
RESPIRATION RATE: 18 BRPM | DIASTOLIC BLOOD PRESSURE: 76 MMHG | SYSTOLIC BLOOD PRESSURE: 142 MMHG | HEIGHT: 61 IN | OXYGEN SATURATION: 98 % | BODY MASS INDEX: 31.75 KG/M2 | WEIGHT: 168.19 LBS | TEMPERATURE: 97 F | HEART RATE: 99 BPM

## 2017-11-08 DIAGNOSIS — N39.0 URINARY TRACT INFECTION WITHOUT HEMATURIA, SITE UNSPECIFIED: Primary | ICD-10-CM

## 2017-11-08 DIAGNOSIS — R53.1 WEAKNESS: ICD-10-CM

## 2017-11-08 DIAGNOSIS — M79.671 FOOT PAIN, RIGHT: ICD-10-CM

## 2017-11-08 LAB
BILIRUB SERPL-MCNC: NEGATIVE MG/DL
BLOOD URINE, POC: NEGATIVE
COLOR, POC UA: YELLOW
GLUCOSE UR QL STRIP: NORMAL
KETONES UR QL STRIP: NEGATIVE
LEUKOCYTE ESTERASE URINE, POC: NEGATIVE
NITRITE, POC UA: NEGATIVE
PH, POC UA: 6
PROTEIN, POC: NEGATIVE
SPECIFIC GRAVITY, POC UA: 1.01
UROBILINOGEN, POC UA: NORMAL

## 2017-11-08 PROCEDURE — 99999 PR PBB SHADOW E&M-EST. PATIENT-LVL IV: CPT | Mod: PBBFAC,,, | Performed by: FAMILY MEDICINE

## 2017-11-08 PROCEDURE — 90662 IIV NO PRSV INCREASED AG IM: CPT | Mod: S$GLB,,, | Performed by: FAMILY MEDICINE

## 2017-11-08 PROCEDURE — 99214 OFFICE O/P EST MOD 30 MIN: CPT | Mod: 25,S$GLB,, | Performed by: FAMILY MEDICINE

## 2017-11-08 PROCEDURE — 81002 URINALYSIS NONAUTO W/O SCOPE: CPT | Mod: S$GLB,,, | Performed by: FAMILY MEDICINE

## 2017-11-08 PROCEDURE — G0008 ADMIN INFLUENZA VIRUS VAC: HCPCS | Mod: S$GLB,,, | Performed by: FAMILY MEDICINE

## 2017-11-08 RX ORDER — CHOLECALCIFEROL (VITAMIN D3) 25 MCG
1000 TABLET ORAL DAILY
COMMUNITY

## 2017-11-08 RX ORDER — CIPROFLOXACIN 500 MG/1
TABLET ORAL
COMMUNITY
Start: 2017-10-31 | End: 2017-11-08

## 2017-11-08 NOTE — PROGRESS NOTES
CHIEF COMPLAINT: This is a 67-year-old female here for follow-up ED visit.    SUBJECTIVE: The patient was seen in the ED on October 31 complaining of being weak and shaky.  She also complained of back pain, and shortness breath when walking.  Workup in the ED revealed UTI, hyponatremia, possible dehydration and hypokalemia.  Patient was given potassium replacement in the ED and was started on ciprofloxacin 500 mg twice daily for 6 days.  Patient reports improvement in energy level and back pain.  She complains of right foot pain due to hyperkeratotic lesions on plantar surface.  These lesions have been pared before by podiatrist.    Since last seen in February 2017.  Patient was diagnosed with intraductal carcinoma in situ of the left breast.  She's undergone lumpectomy and completed radiation.  She currently takes Arimidex.    ROS:  GENERAL: Patient denies fever, chills, night sweats. Patient denies weight gain. Patient denies anorexia, fatigue, weakness or swollen glands.  SKIN: Patient denies rash or hair loss.  HEENT: Patient denies sore throat, ear pain, hearing loss, nasal congestion, or runny nose. Patient denies visual disturbance, eye irritation or discharge.  LUNGS: Patient denies cough, wheeze or hemoptysis.  CARDIOVASCULAR: Patient denies chest pain, shortness of breath, palpitations, syncope or lower extremity edema.  GI: Patient denies abdominal pain, nausea, vomiting, diarrhea, constipation, blood in stool or melena.  GENITOURINARY: Patient denies pelvic pain, vaginal discharge, itch or odor. Patient denies irregular vaginal bleeding. Patient denies dysuria, frequency, hematuria, nocturia, urgency or incontinence.  BREASTS: Patient denies breast pain, mass or nipple discharge.  MUSCULOSKELETAL: Patient denies joint swelling, redness or warmth.  Positive for foot pain.  NEUROLOGIC: Patient denies headache, vertigo, paresthesias, weakness in limb, dysarthria, dysphagia or abnormality of  gait.  PSYCHIATRIC: Patient denies anxiety, depression, or memory loss.     OBJECTIVE:   GENERAL: Well-developed well-nourished obese black female alert and oriented x3, in no acute distress. Memory, judgment and cognition without deficit.  SKIN: Clear without rash. Normal color and tone.  HEENT: Eyes: Clear conjunctivae.  No scleral icterus.  NECK: Supple, normal range of motion. No masses, lymphadenopathy or enlarged thyroid. No JVD. Carotids 2+ and equal. No bruits.  LUNGS: Clear to auscultation. Normal respiratory effort.  CARDIOVASCULAR: Regular rhythm, normal S1, S2 without murmur, gallop or rub.  BACK: No CVA or spinal tenderness.  BREASTS: Well-healed incision left breast.  ABDOMEN:  Large lipoma right upper quadrant, nontender. Active bowel sounds. Soft, nontender without mass or organomegaly. No rebound or guarding.  EXTREMITIES: Without cyanosis, clubbing or edema. Distal pulses 2+ and equal. Normal range of motion in all extremities. No joint effusion, erythema or warmth. Bilateral hammertoes. Calluses/hyperkeratotic areas plantar surface of both feet.  NEUROLOGIC: Gait without abnormality. No tremor.     UA dip: Clear.    ASSESSMENT:  1. Urinary tract infection without hematuria, site unspecified    2. Weakness    3. Foot pain, right      PLAN:   1.  Increase daily water intake to 48-64 ounces.  2.  Influenza vaccine.  3.  Follow-up with recurrent symptoms.

## 2017-11-09 ENCOUNTER — OFFICE VISIT (OUTPATIENT)
Dept: HEMATOLOGY/ONCOLOGY | Facility: CLINIC | Age: 67
End: 2017-11-09
Payer: MEDICARE

## 2017-11-09 VITALS
HEIGHT: 61 IN | RESPIRATION RATE: 18 BRPM | BODY MASS INDEX: 31.88 KG/M2 | OXYGEN SATURATION: 94 % | DIASTOLIC BLOOD PRESSURE: 76 MMHG | SYSTOLIC BLOOD PRESSURE: 120 MMHG | HEART RATE: 85 BPM | TEMPERATURE: 98 F | WEIGHT: 168.88 LBS

## 2017-11-09 DIAGNOSIS — M81.0 AGE-RELATED OSTEOPOROSIS WITHOUT CURRENT PATHOLOGICAL FRACTURE: ICD-10-CM

## 2017-11-09 DIAGNOSIS — D05.12 DUCTAL CARCINOMA IN SITU (DCIS) OF LEFT BREAST: Primary | ICD-10-CM

## 2017-11-09 PROCEDURE — 99214 OFFICE O/P EST MOD 30 MIN: CPT | Mod: S$GLB,,, | Performed by: INTERNAL MEDICINE

## 2017-11-09 PROCEDURE — 99999 PR PBB SHADOW E&M-EST. PATIENT-LVL III: CPT | Mod: PBBFAC,,, | Performed by: INTERNAL MEDICINE

## 2017-11-09 PROCEDURE — 99499 UNLISTED E&M SERVICE: CPT | Mod: S$PBB,,, | Performed by: INTERNAL MEDICINE

## 2017-11-09 NOTE — PROGRESS NOTES
Subjective:       Patient ID: Leia Rodriguez is a 67 y.o. female.    Chief Complaint: Follow-up; Results; and Breast Cancer    HPI 67-year-old female history of intraductal breast carcinoma completion of radiation therapy currently on Arimidex 1 mg daily completion in 5 years     Past Medical History:   Diagnosis Date    Breast cancer 06/15/2017    0.8 cm Grade 1 INTRADUCTAL BREAST 9Positve margin    Essential hypertension     Hemorrhoids     Lipoma of abdominal wall     Obesity     Overactive bladder     Pap smear abnormality of cervix with ASCUS favoring benign     Thyroid nodule     Tobacco use disorder     Tubular adenoma of colon     L breast cancer    Urinary incontinence      Family History   Problem Relation Age of Onset    Hypertension Father     Cataracts Father     Prostate cancer Father     Cancer Father 80     Prostate    Hypertension Daughter     Clotting disorder Daughter 32     Cervical Cancer    Cirrhosis Mother     Alcohol abuse Mother     Cervical cancer Daughter     Diabetes Brother     Heart attack Brother     Heart murmur Brother      Social History     Social History    Marital status:      Spouse name: N/A    Number of children: N/A    Years of education: N/A     Occupational History    Not on file.     Social History Main Topics    Smoking status: Current Every Day Smoker     Packs/day: 1.00     Years: 50.00     Types: Cigarettes    Smokeless tobacco: Current User      Comment: no smoking after m.n prior to sx    Alcohol use 50.4 oz/week     84 Cans of beer per week      Comment: no alcohol prior to surgery    Drug use: No    Sexual activity: No     Other Topics Concern    Not on file     Social History Narrative    The patient is .  She is retired from [employer].     Past Surgical History:   Procedure Laterality Date    ANTERIOR VAGINAL REPAIR      BLADDER SURGERY      CATARACT EXTRACTION Right      SECTION      X2     COLONOSCOPY N/A 3/8/2017    Procedure: COLONOSCOPY;  Surgeon: Tyron Paris MD;  Location: [facility];  Service: Endoscopy;  Laterality: N/A;    MASTECTOMY, PARTIAL Left 06/2017    THYROID LOBECTOMY Left 2005    TOTAL ABDOMINAL HYSTERECTOMY      TUBAL LIGATION         Labs:  Lab Results   Component Value Date    WBC 7.00 06/13/2017    HGB 13.6 06/13/2017    HCT 41.4 06/13/2017    MCV 96 06/13/2017     06/13/2017     BMP  Lab Results   Component Value Date     06/13/2017    K 3.7 06/13/2017    CL 97 06/13/2017    CO2 31 (H) 06/13/2017    BUN 14 06/13/2017    CREATININE 0.8 06/13/2017    CALCIUM 9.6 06/13/2017    ANIONGAP 9 06/13/2017    ESTGFRAFRICA >60.0 06/13/2017    EGFRNONAA >60.0 06/13/2017     Lab Results   Component Value Date    ALT 10 06/13/2017    AST 16 06/13/2017    ALKPHOS 81 06/13/2017    BILITOT 0.9 06/13/2017       No results found for: IRON, TIBC, FERRITIN, SATURATEDIRO  No results found for: [id number]  No results found for: FOLATE  Lab Results   Component Value Date    TSH 0.797 02/28/2017         Review of Systems   Constitutional: Negative for activity change, appetite change, chills, diaphoresis, fatigue, fever and unexpected weight change.   HENT: Negative for congestion, dental problem, drooling, ear discharge, ear pain, facial swelling, hearing loss, mouth sores, nosebleeds, postnasal drip, rhinorrhea, sinus pressure, sneezing, sore throat, tinnitus, trouble swallowing and voice change.    Eyes: Negative for photophobia, pain, discharge, redness, itching and visual disturbance.   Respiratory: Negative for cough, choking, chest tightness, shortness of breath, wheezing and stridor.    Cardiovascular: Negative for chest pain, palpitations and leg swelling.   Gastrointestinal: Negative for abdominal distention, abdominal pain, anal bleeding, blood in stool, constipation, diarrhea, nausea, rectal pain and vomiting.   Endocrine: Negative for cold intolerance, heat  intolerance, polydipsia, polyphagia and polyuria.   Genitourinary: Positive for frequency. Negative for decreased urine volume, difficulty urinating, dyspareunia, dysuria, enuresis, flank pain, genital sores, hematuria, menstrual problem, pelvic pain, urgency, vaginal bleeding, vaginal discharge and vaginal pain.   Musculoskeletal: Positive for gait problem. Negative for arthralgias, back pain, joint swelling, myalgias, neck pain and neck stiffness.   Skin: Negative for color change, pallor and rash.   Allergic/Immunologic: Negative for environmental allergies, food allergies and immunocompromised state.   Neurological: Negative for dizziness, tremors, seizures, syncope, facial asymmetry, speech difficulty, weakness, light-headedness, numbness and headaches.   Hematological: Negative for adenopathy. Does not bruise/bleed easily.   Psychiatric/Behavioral: Negative for agitation, behavioral problems, confusion, decreased concentration, dysphoric mood, hallucinations, self-injury, sleep disturbance and suicidal ideas. The patient is not nervous/anxious and is not hyperactive.        Objective:      Physical Exam   Constitutional: She is oriented to person, place, and time. She appears well-developed and well-nourished. No distress.   HENT:   Head: Normocephalic and atraumatic.   Right Ear: External ear normal.   Left Ear: External ear normal.   Nose: Nose normal. Right sinus exhibits no maxillary sinus tenderness and no frontal sinus tenderness. Left sinus exhibits no maxillary sinus tenderness and no frontal sinus tenderness.   Mouth/Throat: Oropharynx is clear and moist. No oropharyngeal exudate.   Eyes: Conjunctivae, EOM and lids are normal. Pupils are equal, round, and reactive to light. Right eye exhibits no discharge. Left eye exhibits no discharge. Right conjunctiva is not injected. Right conjunctiva has no hemorrhage. Left conjunctiva is not injected. Left conjunctiva has no hemorrhage. No scleral icterus.    Neck: Normal range of motion. Neck supple. No JVD present. No tracheal deviation present. No thyromegaly present.   Cardiovascular: Normal rate and regular rhythm.    Pulmonary/Chest: Effort normal. No stridor. No respiratory distress. She exhibits no tenderness.   Abdominal: Soft. She exhibits no distension and no mass. There is no splenomegaly or hepatomegaly. There is no tenderness. There is no rebound.   Musculoskeletal: Normal range of motion. She exhibits no edema or tenderness.   Lymphadenopathy:     She has no cervical adenopathy.     She has no axillary adenopathy.        Right: No supraclavicular adenopathy present.        Left: No supraclavicular adenopathy present.   Neurological: She is alert and oriented to person, place, and time. No cranial nerve deficit.   Skin: Skin is dry. No rash noted. She is not diaphoretic. No erythema.   Psychiatric: She has a normal mood and affect. Her behavior is normal. Judgment and thought content normal.   Vitals reviewed.          Assessment:      1. Ductal carcinoma in situ (DCIS) of left breast    2. Age-related osteoporosis without current pathological fracture           Plan:   Results of DEXA scan normal.  Patient will continue on Arimidex 1 mg daily return in 6 months clinical follow-up versus mammogram to be ordered by radiation oncology continued with evaluation and treatment Arimidex completion in September 2022

## 2018-01-28 RX ORDER — MELOXICAM 15 MG/1
TABLET ORAL
Qty: 30 TABLET | Refills: 0 | Status: SHIPPED | OUTPATIENT
Start: 2018-01-28 | End: 2018-02-26 | Stop reason: SDUPTHER

## 2018-02-26 RX ORDER — MELOXICAM 15 MG/1
TABLET ORAL
Qty: 30 TABLET | Refills: 0 | Status: SHIPPED | OUTPATIENT
Start: 2018-02-26 | End: 2018-04-10 | Stop reason: SDUPTHER

## 2018-02-26 RX ORDER — OXYBUTYNIN CHLORIDE 5 MG/1
5 TABLET ORAL 3 TIMES DAILY PRN
Qty: 90 TABLET | Refills: 0 | Status: SHIPPED | OUTPATIENT
Start: 2018-02-26 | End: 2018-06-25 | Stop reason: SDUPTHER

## 2018-02-26 RX ORDER — HYDROCHLOROTHIAZIDE 12.5 MG/1
12.5 CAPSULE ORAL DAILY
Qty: 30 CAPSULE | Refills: 0 | Status: SHIPPED | OUTPATIENT
Start: 2018-02-26 | End: 2018-05-08 | Stop reason: SDUPTHER

## 2018-04-02 RX ORDER — HYDROCHLOROTHIAZIDE 12.5 MG/1
12.5 CAPSULE ORAL DAILY
Qty: 30 CAPSULE | Refills: 0 | OUTPATIENT
Start: 2018-04-02 | End: 2018-05-02

## 2018-04-10 ENCOUNTER — TELEPHONE (OUTPATIENT)
Dept: HEMATOLOGY/ONCOLOGY | Facility: CLINIC | Age: 68
End: 2018-04-10

## 2018-04-10 ENCOUNTER — TELEPHONE (OUTPATIENT)
Dept: RADIATION ONCOLOGY | Facility: CLINIC | Age: 68
End: 2018-04-10

## 2018-04-10 RX ORDER — MELOXICAM 15 MG/1
TABLET ORAL
Qty: 30 TABLET | Refills: 0 | Status: SHIPPED | OUTPATIENT
Start: 2018-04-10 | End: 2018-09-06 | Stop reason: SDUPTHER

## 2018-04-10 NOTE — TELEPHONE ENCOUNTER
----- Message from Mary Reyes sent at 4/10/2018  7:34 AM CDT -----  Contact: Patients daughter, Moe Rosa is checking to see if her mother needs an Xray before her appt in May, please call Ms Rosa back at [phone]. Thank you

## 2018-04-10 NOTE — TELEPHONE ENCOUNTER
Made call to patient and had mammogram scheduled    ---- Message from Marie Scott MA sent at 4/10/2018 10:43 AM CDT -----  Contact: Patients daughter, Moe    ----- Message -----  From: Mary Reyes  Sent: 4/10/2018   7:35 AM  To: Erin SHEIKH Jr Staff    Ms Rosa is requesting anew order for a mammogram for her mother, please call her back at [phone]. Thank you

## 2018-05-08 RX ORDER — HYDROCHLOROTHIAZIDE 12.5 MG/1
12.5 CAPSULE ORAL DAILY
Qty: 30 CAPSULE | Refills: 1 | Status: SHIPPED | OUTPATIENT
Start: 2018-05-08 | End: 2018-06-25 | Stop reason: SDUPTHER

## 2018-05-08 RX ORDER — MELOXICAM 15 MG/1
TABLET ORAL
Qty: 30 TABLET | Refills: 0 | OUTPATIENT
Start: 2018-05-08

## 2018-05-08 NOTE — TELEPHONE ENCOUNTER
----- Message from Manasa Monroe sent at 5/8/2018  3:55 PM CDT -----  Contact: pt  The pt states she is returning a missed call, can be reached at  [phone]///[id number]

## 2018-05-16 ENCOUNTER — HOSPITAL ENCOUNTER (OUTPATIENT)
Dept: RADIOLOGY | Facility: HOSPITAL | Age: 68
Discharge: HOME | End: 2018-05-16
Attending: RADIOLOGY
Payer: MEDICARE

## 2018-05-16 VITALS — BODY MASS INDEX: 31.72 KG/M2 | HEIGHT: 61 IN | WEIGHT: 168 LBS

## 2018-05-16 DIAGNOSIS — D05.12 DUCTAL CARCINOMA IN SITU (DCIS) OF LEFT BREAST: ICD-10-CM

## 2018-05-16 PROCEDURE — 77062 BREAST TOMOSYNTHESIS BI: CPT | Mod: 26,,, | Performed by: RADIOLOGY

## 2018-05-16 PROCEDURE — 77062 BREAST TOMOSYNTHESIS BI: CPT | Mod: TC,PO

## 2018-05-16 PROCEDURE — 77066 DX MAMMO INCL CAD BI: CPT | Mod: 26,,, | Performed by: RADIOLOGY

## 2018-05-21 ENCOUNTER — TELEPHONE (OUTPATIENT)
Dept: RADIATION ONCOLOGY | Facility: CLINIC | Age: 68
End: 2018-05-21

## 2018-05-21 NOTE — TELEPHONE ENCOUNTER
Notified patient of mammogram results and informed pt to repeat mammogram in 1 year. Pt verbalized understanding.

## 2018-05-23 ENCOUNTER — OFFICE VISIT (OUTPATIENT)
Dept: HEMATOLOGY/ONCOLOGY | Facility: CLINIC | Age: 68
End: 2018-05-23
Payer: MEDICARE

## 2018-05-23 VITALS
SYSTOLIC BLOOD PRESSURE: 138 MMHG | OXYGEN SATURATION: 98 % | DIASTOLIC BLOOD PRESSURE: 58 MMHG | BODY MASS INDEX: 33.86 KG/M2 | TEMPERATURE: 98 F | WEIGHT: 179.19 LBS

## 2018-05-23 DIAGNOSIS — D05.12 DUCTAL CARCINOMA IN SITU (DCIS) OF LEFT BREAST: ICD-10-CM

## 2018-05-23 PROCEDURE — 99214 OFFICE O/P EST MOD 30 MIN: CPT | Mod: S$GLB,,, | Performed by: INTERNAL MEDICINE

## 2018-05-23 PROCEDURE — 3075F SYST BP GE 130 - 139MM HG: CPT | Mod: CPTII,S$GLB,, | Performed by: INTERNAL MEDICINE

## 2018-05-23 PROCEDURE — 99999 PR PBB SHADOW E&M-EST. PATIENT-LVL III: CPT | Mod: PBBFAC,,, | Performed by: INTERNAL MEDICINE

## 2018-05-23 PROCEDURE — 3078F DIAST BP <80 MM HG: CPT | Mod: CPTII,S$GLB,, | Performed by: INTERNAL MEDICINE

## 2018-05-23 RX ORDER — ANASTROZOLE 1 MG/1
1 TABLET ORAL DAILY
Qty: 90 TABLET | Refills: 3 | Status: SHIPPED | OUTPATIENT
Start: 2018-05-23 | End: 2019-09-03 | Stop reason: SDUPTHER

## 2018-05-23 NOTE — PROGRESS NOTES
Subjective:       Patient ID: Leia Rodriguez is a 67 y.o. female.    Chief Complaint: Results and Breast Cancer    HPI 67-year-old female DCIS follow-up status post primary breast radiation currently on Arimidex 1 mg daily DEXA scan normal    Past Medical History:   Diagnosis Date    Breast cancer 06/15/2017    0.8 cm Grade 1 INTRADUCTAL BREAST 9Positve margin    Essential hypertension     Hemorrhoids     Lipoma of abdominal wall     Obesity     Overactive bladder     Pap smear abnormality of cervix with ASCUS favoring benign     Thyroid nodule     Tobacco use disorder     Tubular adenoma of colon     L breast cancer    Urinary incontinence      Family History   Problem Relation Age of Onset    Hypertension Father     Cataracts Father     Prostate cancer Father     Cancer Father 80        Prostate    Hypertension Daughter     Clotting disorder Daughter 32        Cervical Cancer    Cirrhosis Mother     Alcohol abuse Mother     Cervical cancer Daughter     Diabetes Brother     Heart attack Brother     Heart murmur Brother      Social History     Social History    Marital status:      Spouse name: N/A    Number of children: N/A    Years of education: N/A     Occupational History    Not on file.     Social History Main Topics    Smoking status: Current Every Day Smoker     Packs/day: 1.00     Years: 50.00     Types: Cigarettes    Smokeless tobacco: Current User      Comment: no smoking after m.n prior to sx    Alcohol use 50.4 oz/week     84 Cans of beer per week      Comment: no alcohol prior to surgery    Drug use: No    Sexual activity: No     Other Topics Concern    Not on file     Social History Narrative    The patient is .  She is retired from [employer].     Past Surgical History:   Procedure Laterality Date    ANTERIOR VAGINAL REPAIR      BLADDER SURGERY      BREAST LUMPECTOMY Left 2017    CATARACT EXTRACTION Right      SECTION      X2     COLONOSCOPY N/A 3/8/2017    Procedure: COLONOSCOPY;  Surgeon: Tyron Paris MD;  Location: [facility];  Service: Endoscopy;  Laterality: N/A;    MASTECTOMY, PARTIAL Left 06/2017    THYROID LOBECTOMY Left 2005    TOTAL ABDOMINAL HYSTERECTOMY      TUBAL LIGATION         Labs:  Lab Results   Component Value Date    WBC 7.00 06/13/2017    HGB 13.6 06/13/2017    HCT 41.4 06/13/2017    MCV 96 06/13/2017     06/13/2017     BMP  Lab Results   Component Value Date     06/13/2017    K 3.7 06/13/2017    CL 97 06/13/2017    CO2 31 (H) 06/13/2017    BUN 14 06/13/2017    CREATININE 0.8 06/13/2017    CALCIUM 9.6 06/13/2017    ANIONGAP 9 06/13/2017    ESTGFRAFRICA >60.0 06/13/2017    EGFRNONAA >60.0 06/13/2017     Lab Results   Component Value Date    ALT 10 06/13/2017    AST 16 06/13/2017    ALKPHOS 81 06/13/2017    BILITOT 0.9 06/13/2017       No results found for: IRON, TIBC, FERRITIN, SATURATEDIRO  No results found for: [id number]  No results found for: FOLATE  Lab Results   Component Value Date    TSH 0.797 02/28/2017         Review of Systems   Constitutional: Negative for activity change, appetite change, chills, diaphoresis, fatigue, fever and unexpected weight change.   HENT: Negative for congestion, dental problem, drooling, ear discharge, ear pain, facial swelling, hearing loss, mouth sores, nosebleeds, postnasal drip, rhinorrhea, sinus pressure, sneezing, sore throat, tinnitus, trouble swallowing and voice change.    Eyes: Negative for photophobia, pain, discharge, redness, itching and visual disturbance.   Respiratory: Negative for cough, choking, chest tightness, shortness of breath, wheezing and stridor.    Cardiovascular: Negative for chest pain, palpitations and leg swelling.   Gastrointestinal: Negative for abdominal distention, abdominal pain, anal bleeding, blood in stool, constipation, diarrhea, nausea, rectal pain and vomiting.   Endocrine: Negative for cold intolerance, heat  intolerance, polydipsia, polyphagia and polyuria.   Genitourinary: Negative for decreased urine volume, difficulty urinating, dyspareunia, dysuria, enuresis, flank pain, frequency, genital sores, hematuria, menstrual problem, pelvic pain, urgency, vaginal bleeding, vaginal discharge and vaginal pain.   Musculoskeletal: Negative for arthralgias, back pain, gait problem, joint swelling, myalgias, neck pain and neck stiffness.   Skin: Negative for color change, pallor and rash.   Allergic/Immunologic: Negative for environmental allergies, food allergies and immunocompromised state.   Neurological: Negative for dizziness, tremors, seizures, syncope, facial asymmetry, speech difficulty, weakness, light-headedness, numbness and headaches.   Hematological: Negative for adenopathy. Does not bruise/bleed easily.   Psychiatric/Behavioral: Negative for agitation, behavioral problems, confusion, decreased concentration, dysphoric mood, hallucinations, self-injury, sleep disturbance and suicidal ideas. The patient is not nervous/anxious and is not hyperactive.        Objective:      Physical Exam   Constitutional: She is oriented to person, place, and time. She appears well-developed and well-nourished. She appears distressed.   HENT:   Head: Normocephalic and atraumatic.   Right Ear: External ear normal.   Left Ear: External ear normal.   Nose: Nose normal. Right sinus exhibits no maxillary sinus tenderness and no frontal sinus tenderness. Left sinus exhibits no maxillary sinus tenderness and no frontal sinus tenderness.   Mouth/Throat: Oropharynx is clear and moist. No oropharyngeal exudate.   Eyes: Conjunctivae, EOM and lids are normal. Pupils are equal, round, and reactive to light. Right eye exhibits no discharge. Left eye exhibits no discharge. Right conjunctiva is not injected. Right conjunctiva has no hemorrhage. Left conjunctiva is not injected. Left conjunctiva has no hemorrhage. No scleral icterus.   Neck: Normal  range of motion. Neck supple. No JVD present. No tracheal deviation present. No thyromegaly present.   Cardiovascular: Normal rate, regular rhythm and normal heart sounds.    Pulmonary/Chest: Effort normal and breath sounds normal. No stridor. No respiratory distress. She exhibits no tenderness.       Abdominal: Soft. She exhibits no distension and no mass. There is no splenomegaly or hepatomegaly. There is no tenderness. There is no rebound.   Musculoskeletal: Normal range of motion. She exhibits no edema or tenderness.   Lymphadenopathy:     She has no cervical adenopathy.     She has no axillary adenopathy.        Right: No supraclavicular adenopathy present.        Left: No supraclavicular adenopathy present.   Neurological: She is alert and oriented to person, place, and time. No cranial nerve deficit. Coordination normal.   Skin: Skin is dry. No rash noted. She is not diaphoretic. No erythema.   Psychiatric: She has a normal mood and affect. Her behavior is normal. Judgment and thought content normal.   Vitals reviewed.          Assessment:      1. Ductal carcinoma in situ (DCIS) of left breast           Plan:   Patient history of intraductal breast cancer status post radiation continue Arimidex for 5 years next follow-up will be with nurse practitioner in breast cancer follow-up clinic

## 2018-06-21 ENCOUNTER — LAB VISIT (OUTPATIENT)
Dept: LAB | Facility: HOSPITAL | Age: 68
End: 2018-06-21
Payer: MEDICARE

## 2018-06-21 ENCOUNTER — OFFICE VISIT (OUTPATIENT)
Dept: FAMILY MEDICINE | Facility: CLINIC | Age: 68
End: 2018-06-21
Payer: MEDICARE

## 2018-06-21 VITALS
HEART RATE: 112 BPM | WEIGHT: 179.69 LBS | SYSTOLIC BLOOD PRESSURE: 139 MMHG | BODY MASS INDEX: 33.93 KG/M2 | RESPIRATION RATE: 18 BRPM | TEMPERATURE: 99 F | HEIGHT: 61 IN | DIASTOLIC BLOOD PRESSURE: 70 MMHG | OXYGEN SATURATION: 97 %

## 2018-06-21 DIAGNOSIS — Z23 NEED FOR PNEUMOCOCCAL VACCINATION: ICD-10-CM

## 2018-06-21 DIAGNOSIS — Z00.00 PREVENTATIVE HEALTH CARE: Primary | ICD-10-CM

## 2018-06-21 DIAGNOSIS — M81.0 AGE-RELATED OSTEOPOROSIS WITHOUT CURRENT PATHOLOGICAL FRACTURE: ICD-10-CM

## 2018-06-21 DIAGNOSIS — Z00.00 PREVENTATIVE HEALTH CARE: ICD-10-CM

## 2018-06-21 DIAGNOSIS — Z17.0 MALIGNANT NEOPLASM OF LEFT BREAST IN FEMALE, ESTROGEN RECEPTOR POSITIVE, UNSPECIFIED SITE OF BREAST: ICD-10-CM

## 2018-06-21 DIAGNOSIS — E66.9 OBESITY (BMI 30.0-34.9): ICD-10-CM

## 2018-06-21 DIAGNOSIS — I10 ESSENTIAL HYPERTENSION: ICD-10-CM

## 2018-06-21 DIAGNOSIS — F17.200 TOBACCO USE DISORDER: ICD-10-CM

## 2018-06-21 DIAGNOSIS — C50.912 MALIGNANT NEOPLASM OF LEFT BREAST IN FEMALE, ESTROGEN RECEPTOR POSITIVE, UNSPECIFIED SITE OF BREAST: ICD-10-CM

## 2018-06-21 PROBLEM — Z12.11 SCREEN FOR COLON CANCER: Status: RESOLVED | Noted: 2017-03-08 | Resolved: 2018-06-21

## 2018-06-21 PROBLEM — C50.919 MALIGNANT NEOPLASM OF BREAST IN FEMALE, ESTROGEN RECEPTOR POSITIVE: Status: ACTIVE | Noted: 2017-06-15

## 2018-06-21 LAB
ALBUMIN SERPL BCP-MCNC: 4.2 G/DL
ALP SERPL-CCNC: 81 U/L
ALT SERPL W/O P-5'-P-CCNC: 15 U/L
ANION GAP SERPL CALC-SCNC: 8 MMOL/L
AST SERPL-CCNC: 15 U/L
BASOPHILS # BLD AUTO: 0.03 K/UL
BASOPHILS NFR BLD: 0.6 %
BILIRUB SERPL-MCNC: 0.9 MG/DL
BUN SERPL-MCNC: 15 MG/DL
CALCIUM SERPL-MCNC: 10.3 MG/DL
CHLORIDE SERPL-SCNC: 99 MMOL/L
CHOLEST SERPL-MCNC: 212 MG/DL
CHOLEST/HDLC SERPL: 3.3 {RATIO}
CO2 SERPL-SCNC: 31 MMOL/L
CREAT SERPL-MCNC: 0.8 MG/DL
DIFFERENTIAL METHOD: ABNORMAL
EOSINOPHIL # BLD AUTO: 0.1 K/UL
EOSINOPHIL NFR BLD: 2.7 %
ERYTHROCYTE [DISTWIDTH] IN BLOOD BY AUTOMATED COUNT: 13.2 %
EST. GFR  (AFRICAN AMERICAN): >60 ML/MIN/1.73 M^2
EST. GFR  (NON AFRICAN AMERICAN): >60 ML/MIN/1.73 M^2
GLUCOSE SERPL-MCNC: 107 MG/DL
HCT VFR BLD AUTO: 40.2 %
HDLC SERPL-MCNC: 65 MG/DL
HDLC SERPL: 30.7 %
HGB BLD-MCNC: 13.3 G/DL
IMM GRANULOCYTES # BLD AUTO: 0.01 K/UL
IMM GRANULOCYTES NFR BLD AUTO: 0.2 %
LDLC SERPL CALC-MCNC: 131.6 MG/DL
LYMPHOCYTES # BLD AUTO: 1.3 K/UL
LYMPHOCYTES NFR BLD: 27.9 %
MCH RBC QN AUTO: 31.7 PG
MCHC RBC AUTO-ENTMCNC: 33.1 G/DL
MCV RBC AUTO: 96 FL
MONOCYTES # BLD AUTO: 0.5 K/UL
MONOCYTES NFR BLD: 9.6 %
NEUTROPHILS # BLD AUTO: 2.8 K/UL
NEUTROPHILS NFR BLD: 59 %
NONHDLC SERPL-MCNC: 147 MG/DL
NRBC BLD-RTO: 0 /100 WBC
PLATELET # BLD AUTO: 315 K/UL
PMV BLD AUTO: 10.7 FL
POTASSIUM SERPL-SCNC: 4.3 MMOL/L
PROT SERPL-MCNC: 8 G/DL
RBC # BLD AUTO: 4.19 M/UL
SODIUM SERPL-SCNC: 138 MMOL/L
TRIGL SERPL-MCNC: 77 MG/DL
TSH SERPL DL<=0.005 MIU/L-ACNC: 1.59 UIU/ML
WBC # BLD AUTO: 4.81 K/UL

## 2018-06-21 PROCEDURE — 86803 HEPATITIS C AB TEST: CPT

## 2018-06-21 PROCEDURE — 85025 COMPLETE CBC W/AUTO DIFF WBC: CPT

## 2018-06-21 PROCEDURE — 80061 LIPID PANEL: CPT

## 2018-06-21 PROCEDURE — G0101 CA SCREEN;PELVIC/BREAST EXAM: HCPCS | Mod: S$GLB,,, | Performed by: FAMILY MEDICINE

## 2018-06-21 PROCEDURE — 80053 COMPREHEN METABOLIC PANEL: CPT

## 2018-06-21 PROCEDURE — 84443 ASSAY THYROID STIM HORMONE: CPT

## 2018-06-21 PROCEDURE — 3075F SYST BP GE 130 - 139MM HG: CPT | Mod: CPTII,S$GLB,, | Performed by: FAMILY MEDICINE

## 2018-06-21 PROCEDURE — 99499 UNLISTED E&M SERVICE: CPT | Mod: S$GLB,,, | Performed by: FAMILY MEDICINE

## 2018-06-21 PROCEDURE — 3078F DIAST BP <80 MM HG: CPT | Mod: CPTII,S$GLB,, | Performed by: FAMILY MEDICINE

## 2018-06-21 PROCEDURE — 99999 PR PBB SHADOW E&M-EST. PATIENT-LVL IV: CPT | Mod: PBBFAC,,, | Performed by: FAMILY MEDICINE

## 2018-06-21 PROCEDURE — 36415 COLL VENOUS BLD VENIPUNCTURE: CPT | Mod: PO

## 2018-06-21 NOTE — PROGRESS NOTES
CHIEF COMPLAINT: This is a 67-year-old female here for preventive health exam.    SUBJECTIVE: Patient was diagnosed with intraductal carcinoma in situ of left breast in February 2017.  She's currently taking Arimidex after undergoing lumpectomy and radiation.  Patient takes hydrochlorothiazide 12.5 mg daily for hypertension.  She does not monitor her blood pressure.  The patient takes oxybutynin 3 times daily for urinary incontinence.  She takes meloxicam for osteoarthritis of hands.  Patient continues to smoke one pack of cigarettes per day.  She is not interested in quitting at this time.     Eye exam July 2015. Mammogram May 2018. Colonoscopy March 2017, due again in March 2020.  Bone DEXA scan July 2013, due again in July 2018.  Tdap March 2013.  Influenza vaccine, November 2017.     ROS:  GENERAL: Patient denies fever, chills, night sweats. Patient denies weight loss. Patient denies anorexia, fatigue, or swollen glands.  Positive for weakness.  SKIN: Patient denies rash or hair loss.  HEENT: Patient denies sore throat, ear pain, hearing loss, nasal congestion, or runny nose. Patient denies visual disturbance, eye irritation or discharge.  LUNGS: Patient denies cough, wheeze or hemoptysis.  CARDIOVASCULAR: Patient denies chest pain, shortness of breath, palpitations, syncope or lower extremity edema.  GI: Patient denies abdominal pain, nausea, vomiting, diarrhea, constipation, blood in stool or melena.  GENITOURINARY: Patient denies pelvic pain, vaginal discharge, itch or odor. Patient denies irregular vaginal bleeding. Patient denies dysuria, frequency, hematuria, nocturia, urgency or incontinence.  BREASTS: Patient denies breast pain, mass or nipple discharge.  MUSCULOSKELETAL: Patient denies joint swelling, redness or warmth.  Positive for joint pain.  NEUROLOGIC: Patient denies headache, vertigo, paresthesias, weakness in limb, dysarthria, dysphagia or abnormality of gait.  PSYCHIATRIC: Patient denies  anxiety, depression, or memory loss.     OBJECTIVE:   GENERAL: Well-developed well-nourished obese black female alert and oriented x3, in no acute distress. Memory, judgment and cognition without deficit.  Weight gain of 13 pounds in the last year.    SKIN: Clear without rash. Normal color and tone.  HEENT: Eyes: Clear conjunctivae. Pupils equal reactive to light and accommodation. Ears: Clear TMs. Clear canals. Nose: Without congestion. Pharynx: Without injection  or exudates.  NECK: Supple, normal range of motion. No masses, lymphadenopathy or enlarged thyroid. No JVD. Carotids 2+ and equal. No bruits.  LUNGS: Clear to auscultation. Normal respiratory effort.  CARDIOVASCULAR: Regular rhythm, normal S1, S2 without murmur, gallop or rub.  BACK: No CVA or spinal tenderness.  BREASTS: No masses, tenderness or nipple discharge.  ABDOMEN:  Large lipoma right upper quadrant, nontender. Active bowel sounds. Soft, nontender without mass or organomegaly. No rebound or guarding.  EXTREMITIES: Without cyanosis, clubbing or edema. Distal pulses 2+ and equal. Normal range of motion in all extremities. No joint effusion, erythema or warmth. Bilateral hammertoes. Calluses/hyperkeratotic areas plantar surface of both feet.  NEUROLOGIC: Cranial nerves II through XII without deficit. Motor strength equal bilaterally. Sensation normal to touch. Deep tendon reflexes 2+ and equal. Gait without abnormality. No tremor. Negative cerebellar signs.  PELVIC: External: Without lesions or inflammation.  Vaginal: Anterior wall prolapse.  Atrophic changes.  Cuff intact.  Bimanual: Non-tender, without masses.  Rectovaginal: Confirms, heme-negative stool x2.    ASSESSMENT:  1. Preventative health care    2. Age-related osteoporosis without current pathological fracture    3. Essential hypertension    4. Obesity (BMI 30.0-34.9)    5. Tobacco use disorder    6. Malignant neoplasm of left breast in female, estrogen receptor positive, unspecified site  of breast    7. Need for pneumococcal vaccination      PLAN:   1.  Weight reduction.  Exercise regularly.  2.  Age-appropriate counseling.  3.  Fasting lab.  4.  Bone DEXA scan.  5.  Pneumovax.  6.  Refill medications as needed.

## 2018-06-22 LAB — HCV AB SERPL QL IA: ABNORMAL

## 2018-06-25 RX ORDER — OXYBUTYNIN CHLORIDE 5 MG/1
5 TABLET ORAL 3 TIMES DAILY PRN
Qty: 90 TABLET | Refills: 11 | Status: SHIPPED | OUTPATIENT
Start: 2018-06-25 | End: 2019-07-19 | Stop reason: SDUPTHER

## 2018-06-25 RX ORDER — HYDROCHLOROTHIAZIDE 12.5 MG/1
12.5 CAPSULE ORAL DAILY
Qty: 30 CAPSULE | Refills: 11 | Status: SHIPPED | OUTPATIENT
Start: 2018-06-25 | End: 2019-06-23 | Stop reason: SDUPTHER

## 2018-06-27 ENCOUNTER — TELEPHONE (OUTPATIENT)
Dept: FAMILY MEDICINE | Facility: CLINIC | Age: 68
End: 2018-06-27

## 2018-06-27 DIAGNOSIS — R76.8 POSITIVE HEPATITIS C ANTIBODY TEST: Primary | ICD-10-CM

## 2018-06-27 NOTE — TELEPHONE ENCOUNTER
----- Message from Aiyana Raza MA sent at 6/26/2018  2:42 PM CDT -----  Spoke with pt about results and is ok with doing another blood test.

## 2018-06-29 ENCOUNTER — APPOINTMENT (OUTPATIENT)
Dept: RADIOLOGY | Facility: CLINIC | Age: 68
End: 2018-06-29
Attending: FAMILY MEDICINE
Payer: MEDICARE

## 2018-06-29 DIAGNOSIS — M81.0 AGE-RELATED OSTEOPOROSIS WITHOUT CURRENT PATHOLOGICAL FRACTURE: ICD-10-CM

## 2018-06-29 PROCEDURE — 77080 DXA BONE DENSITY AXIAL: CPT | Mod: 26,,, | Performed by: RADIOLOGY

## 2018-06-29 PROCEDURE — 77080 DXA BONE DENSITY AXIAL: CPT | Mod: TC,PO

## 2018-08-19 DIAGNOSIS — D05.12 DUCTAL CARCINOMA IN SITU (DCIS) OF LEFT BREAST: ICD-10-CM

## 2018-08-19 RX ORDER — ANASTROZOLE 1 MG/1
1 TABLET ORAL DAILY
Qty: 90 TABLET | Refills: 3 | Status: SHIPPED | OUTPATIENT
Start: 2018-08-19 | End: 2018-11-28 | Stop reason: SDUPTHER

## 2018-09-06 RX ORDER — MELOXICAM 15 MG/1
TABLET ORAL
Qty: 30 TABLET | Refills: 3 | Status: SHIPPED | OUTPATIENT
Start: 2018-09-06 | End: 2018-12-30 | Stop reason: SDUPTHER

## 2018-11-27 NOTE — PROGRESS NOTES
Patient ID: Leia Rodriguez is a 68 y.o. female.    Chief Complaint: Breast Cancer (f/u)      HPI: Breast cancer follow-up    Pt has a history of Stage 0 DCIS of Lt. breast.  She presented in March of 2017 when diagnostic MMG confirmed the presence of a round mass with indistinct margins in the Lt. breast at the 7 o'clock position. The mass measured 9 x 5 x 6 mm. The patient was referred to Dr. Duke and on 6/15/17 underwent wire localization lumpectomy. Pathology revealed an 8 mm focus of ductal carcinoma in situ. The tumor was low grade. There was tumor present at the anterior inferior margin. Ninety percent of the tumor cells were ER positive, 5- 10% of the tumor cells were MS positive. The patient was referred for adjuvant radiotherapy. Completed a course of partial breast irradiation to the LIQ of the Lt. breast on 8/14/17.  Currently on hormonal therapy with Arimidex that started 8/2017.      DEXA- 6/29/18- wnl- f/u in 2 years due to AI    Risk factors identified:     Menarche at 9 y/o  G 3 P 3  First pregnancy at 18 y/o  LMP: 50's  Estrogen: none  Radiation to the neck or chest wall - has had left breast radiation  Prior breast biopsies or atypical hyperplasia- left breast lumpectomy     FH: no breast cancer    Body mass index is 34.2 kg/m².    Review of Systems   Constitutional: Negative.    HENT: Negative.    Eyes: Negative.    Respiratory: Negative.    Cardiovascular: Negative.    Gastrointestinal: Negative.         No reflux   Endocrine: Negative.    Genitourinary: Negative.    Musculoskeletal: Negative.    Skin: Negative.    Allergic/Immunologic: Negative.    Neurological: Negative.    Hematological: Negative.  Negative for adenopathy.   Psychiatric/Behavioral: Negative.    Breast: Pt denies any breast pain, nipple discharge, or palpable mass. No prior trauma or bruising. No breast surgeries or abnormalities.    Current Outpatient Medications   Medication Sig Dispense Refill    anastrozole (ARIMIDEX) 1  mg Tab Take 1 tablet (1 mg total) by mouth once daily. 90 tablet 3    hydroCHLOROthiazide (MICROZIDE) 12.5 mg capsule TAKE 1 CAPSULE (12.5 MG TOTAL) BY MOUTH ONCE DAILY. FOR HIGH BLOOD PRESSURE 30 capsule 11    meloxicam (MOBIC) 15 MG tablet TAKE 1 TABLET BY MOUTH EVERY DAY 30 tablet 3    oxybutynin (DITROPAN) 5 MG Tab TAKE 1 TABLET (5 MG TOTAL) BY MOUTH 3 (THREE) TIMES DAILY AS NEEDED. 90 tablet 11    vitamin D 1000 units Tab Take 1,000 Units by mouth once daily.       No current facility-administered medications for this visit.        Review of patient's allergies indicates:  No Known Allergies    Past Medical History:   Diagnosis Date    Blind right eye     Traumatic    Breast cancer 06/15/2017    0.8 cm Grade1 INTRADUCTAL BREAST 9 positve margin    Essential hypertension     Hemorrhoids     Lipoma of abdominal wall     Obesity     Overactive bladder     Pap smear abnormality of cervix with ASCUS favoring benign     Thyroid nodule     Tobacco use disorder     Tubular adenoma of colon     Urinary incontinence        Past Surgical History:   Procedure Laterality Date    ANTERIOR VAGINAL REPAIR      BLADDER SURGERY      BREAST LUMPECTOMY Left     CATARACT EXTRACTION Right      SECTION      X2    COLONOSCOPY N/A 3/8/2017    Procedure: COLONOSCOPY;  Surgeon: Tyron Paris MD;  Location: [facility];  Service: Endoscopy;  Laterality: N/A;    COLONOSCOPY N/A 3/8/2017    [facility]    MASTECTOMY, PARTIAL Left 2017    MASTECTOMY-PARTIAL Left 6/15/2017    [facility] Jony Duke [facility] at [facility] OR    THYROID LOBECTOMY Left     TOTAL ABDOMINAL HYSTERECTOMY      TUBAL LIGATION         Family History   Problem Relation Age of Onset    Hypertension Father     Cataracts Father     Prostate cancer Father 80    Cervical cancer Daughter 32    Cirrhosis Mother     Alcohol abuse Mother     Hypertension Daughter     Diabetes Brother      Heart attack Brother     Heart murmur Brother     No Known Problems Daughter        Social History     Socioeconomic History    Marital status:      Spouse name: Not on file    Number of children: Not on file    Years of education: Not on file    Highest education level: Not on file   Social Needs    Financial resource strain: Not on file    Food insecurity - worry: Not on file    Food insecurity - inability: Not on file    Transportation needs - medical: Not on file    Transportation needs - non-medical: Not on file   Occupational History    Not on file   Tobacco Use    Smoking status: Current Every Day Smoker     Packs/day: 1.00     Years: 50.00     Pack years: 50.00     Types: Cigarettes    Smokeless tobacco: Never Used   Substance and Sexual Activity    Alcohol use: Yes     Alcohol/week: 12.6 - 16.8 oz     Types: 21 - 28 Cans of beer per week    Drug use: No    Sexual activity: No     Partners: Male   Other Topics Concern    Not on file   Social History Narrative    The patient is .  She is retired from [employer].       Vitals:    11/28/18 1306   BP: (!) 148/70   Pulse: 95   Resp: 18   Temp: 98 °F (36.7 °C)       Physical Exam   Constitutional: She is oriented to person, place, and time. She appears well-developed and well-nourished.   HENT:   Head: Normocephalic and atraumatic.   Right Ear: External ear normal.   Left Ear: External ear normal.   Mouth/Throat: No oropharyngeal exudate.   Eyes: Conjunctivae and EOM are normal. Pupils are equal, round, and reactive to light. Right eye exhibits no discharge. Left eye exhibits no discharge. No scleral icterus.   Neck: Normal range of motion. Neck supple. No thyromegaly present.   Cardiovascular: Normal rate, regular rhythm and normal heart sounds.   Pulmonary/Chest: Effort normal and breath sounds normal. Right breast exhibits no inverted nipple, no mass, no nipple discharge, no skin change and no tenderness. Left breast  exhibits no inverted nipple, no mass, no nipple discharge, no skin change and no tenderness.   Abdominal: Soft. Bowel sounds are normal.   Musculoskeletal: Normal range of motion. She exhibits no edema.        Right shoulder: She exhibits no crepitus and normal strength.   Lymphadenopathy:        Head (right side): No submental, no submandibular, no tonsillar, no preauricular, no posterior auricular and no occipital adenopathy present.        Head (left side): No submental, no submandibular, no tonsillar, no preauricular, no posterior auricular and no occipital adenopathy present.     She has no cervical adenopathy.        Right cervical: No superficial cervical and no posterior cervical adenopathy present.       Left cervical: No superficial cervical and no posterior cervical adenopathy present.     She has no axillary adenopathy.        Right: No supraclavicular adenopathy present.        Left: No supraclavicular adenopathy present.   Neurological: She is alert and oriented to person, place, and time. She has normal reflexes.   Skin: Skin is warm and dry. No rash noted. No erythema. No pallor.   Psychiatric: She has a normal mood and affect. Her behavior is normal. Judgment and thought content normal.        5/16/18  Result:   Mammo Digital Diagnostic Bilat with Tomosynthesis_CAD     History:  Patient is 67 y.o. and is seen for a diagnostic mammogram.     Films Compared:  03/29/2017 Mammo Digital Diagnostic Left with Tomosynthesis_CAD, 03/22/2017 Mammo Digital Screening Bilat with CAD, and 08/12/2015 Mammo Digital Screening Bilat with CAD     Findings:  This procedure was performed using tomosynthesis.  Computer-aided detection was utilized in the interpretation of this examination.  The breasts are heterogeneously dense, which may obscure small masses.      Left  There are post-surgical findings seen in the lower inner quadrant of the left breast. There has been no interval development of a suspicious mass,  microcalcification, or architectural distortion.      Right  There is no evidence of suspicious masses, calcifications, or other abnormal findings.     Impression:  Bilateral  There is no mammographic evidence of malignancy.     BI-RADS Category:   Overall: 2 - Benign     Recommendation:  Routine screening mammogram in 1 year is recommended.              Assessment & Plan:  Malignant neoplasm of lower-inner quadrant of left breast in female, estrogen receptor positive    Tobacco use disorder    Educational circumstance    Counseling regarding goals of care    Encounter for monitoring adjuvant hormonal therapy    Other orders  -     Mammo Digital Diagnostic Bilat; Future; Expected date: 05/27/2019    1. History of DCIS left breast s/p radiation and on adjuvant therapy- arimidex 1 mg until 8/2022. Tolerating without side effects  2. Negative mammogram 5/16/18  3. Negative clinical exam  4. Encouraged pt to stop smoking and offered tobacco cessation counseling/assistance - pt states not ready yet - risks of smoking reviewed with pt who verbalizes understanding  5. Encouraged pt to walk for 30 minutes most days of the week to reduce risk of recurrence- pt states will try to walk a little more- pt states will try to loose 10 # by her next visit in May- wt gain and recurrence risk discussed.   6. BSE monthly- call for any changes

## 2018-11-28 ENCOUNTER — OFFICE VISIT (OUTPATIENT)
Dept: HEMATOLOGY/ONCOLOGY | Facility: CLINIC | Age: 68
End: 2018-11-28
Payer: MEDICARE

## 2018-11-28 VITALS
HEIGHT: 61 IN | OXYGEN SATURATION: 98 % | RESPIRATION RATE: 18 BRPM | HEART RATE: 95 BPM | DIASTOLIC BLOOD PRESSURE: 70 MMHG | WEIGHT: 181 LBS | BODY MASS INDEX: 34.17 KG/M2 | TEMPERATURE: 98 F | SYSTOLIC BLOOD PRESSURE: 148 MMHG

## 2018-11-28 DIAGNOSIS — F17.200 TOBACCO USE DISORDER: ICD-10-CM

## 2018-11-28 DIAGNOSIS — Z17.0 MALIGNANT NEOPLASM OF LOWER-INNER QUADRANT OF LEFT BREAST IN FEMALE, ESTROGEN RECEPTOR POSITIVE: Primary | ICD-10-CM

## 2018-11-28 DIAGNOSIS — Z71.89 COUNSELING REGARDING GOALS OF CARE: ICD-10-CM

## 2018-11-28 DIAGNOSIS — C50.312 MALIGNANT NEOPLASM OF LOWER-INNER QUADRANT OF LEFT BREAST IN FEMALE, ESTROGEN RECEPTOR POSITIVE: Primary | ICD-10-CM

## 2018-11-28 DIAGNOSIS — Z55.9 EDUCATIONAL CIRCUMSTANCE: ICD-10-CM

## 2018-11-28 DIAGNOSIS — Z51.81 ENCOUNTER FOR MONITORING ADJUVANT HORMONAL THERAPY: ICD-10-CM

## 2018-11-28 DIAGNOSIS — Z79.899 ENCOUNTER FOR MONITORING ADJUVANT HORMONAL THERAPY: ICD-10-CM

## 2018-11-28 PROCEDURE — 3077F SYST BP >= 140 MM HG: CPT | Mod: CPTII,S$GLB,, | Performed by: NURSE PRACTITIONER

## 2018-11-28 PROCEDURE — 3078F DIAST BP <80 MM HG: CPT | Mod: CPTII,S$GLB,, | Performed by: NURSE PRACTITIONER

## 2018-11-28 PROCEDURE — 99214 OFFICE O/P EST MOD 30 MIN: CPT | Mod: S$GLB,,, | Performed by: NURSE PRACTITIONER

## 2018-11-28 PROCEDURE — 99999 PR PBB SHADOW E&M-EST. PATIENT-LVL III: CPT | Mod: PBBFAC,,, | Performed by: NURSE PRACTITIONER

## 2018-11-28 PROCEDURE — 1101F PT FALLS ASSESS-DOCD LE1/YR: CPT | Mod: CPTII,S$GLB,, | Performed by: NURSE PRACTITIONER

## 2018-11-28 SDOH — SOCIAL DETERMINANTS OF HEALTH (SDOH): PROBLEMS RELATED TO EDUCATION AND LITERACY, UNSPECIFIED: Z55.9

## 2018-12-05 ENCOUNTER — TELEPHONE (OUTPATIENT)
Dept: FAMILY MEDICINE | Facility: CLINIC | Age: 68
End: 2018-12-05

## 2018-12-05 NOTE — TELEPHONE ENCOUNTER
Called pt and informed her that she did not receive a flu vaccine this year. Pt voiced understanding and stated that she will receive flu vaccine at her local pharmacy.

## 2018-12-05 NOTE — TELEPHONE ENCOUNTER
----- Message from Saskia Sabillon sent at 12/5/2018  7:57 AM CST -----  Contact: pt   Call pt regarding if she had the flu vaccine this year.   [phone]

## 2019-01-01 RX ORDER — MELOXICAM 15 MG/1
TABLET ORAL
Qty: 30 TABLET | Refills: 3 | Status: SHIPPED | OUTPATIENT
Start: 2019-01-01 | End: 2019-07-19 | Stop reason: SDUPTHER

## 2019-05-28 NOTE — PROGRESS NOTES
Patient ID: Leia Rodriguez is a 68 y.o. female.    Chief Complaint: dcis (follow-up)      HPI: Breast cancer follow-up    Pt has a history of Stage 0 DCIS of Lt. breast.  She presented in March of 2017 when diagnostic MMG confirmed the presence of a round mass with indistinct margins in the Lt. breast at the 7 o'clock position. The mass measured 9 x 5 x 6 mm. The patient was referred to Dr. Duke and on 6/15/17 underwent wire localization lumpectomy. Pathology revealed an 8 mm focus of ductal carcinoma in situ. The tumor was low grade. There was tumor present at the anterior inferior margin. Ninety percent of the tumor cells were ER positive, 5- 10% of the tumor cells were MS positive. The patient was referred for adjuvant radiotherapy. Completed a course of partial breast irradiation to the LIQ of the Lt. breast on 8/14/17.  Currently on hormonal therapy with Arimidex that started 8/2017.      DEXA- 6/29/18- wnl- f/u in 2 years due to AI- 6/2020 due    Risk factors identified:     Menarche at 9 y/o  G 3 P 3  First pregnancy at 16 y/o  LMP: 50's  Estrogen: none  Radiation to the neck or chest wall - has had left breast radiation  Prior breast biopsies or atypical hyperplasia- left breast lumpectomy     FH: no breast cancer, daughter cervical cancer 30's, father prostate cancer 70's-     Body mass index is 34.32 kg/m².    Review of Systems   Constitutional: Negative.    HENT: Negative.    Eyes: Negative.    Respiratory: Negative.    Cardiovascular: Negative.    Gastrointestinal: Negative.         No reflux   Endocrine: Negative.    Genitourinary: Negative.    Musculoskeletal: Negative.    Skin: Negative.    Allergic/Immunologic: Negative.    Neurological: Negative.    Hematological: Negative.  Negative for adenopathy.   Psychiatric/Behavioral: Negative.    Breast: Pt denies any breast pain, nipple discharge, or palpable mass. No prior trauma or bruising. No breast surgeries or abnormalities.    Current Outpatient  Medications   Medication Sig Dispense Refill    anastrozole (ARIMIDEX) 1 mg Tab Take 1 tablet (1 mg total) by mouth once daily. 90 tablet 3    hydroCHLOROthiazide (MICROZIDE) 12.5 mg capsule TAKE 1 CAPSULE (12.5 MG TOTAL) BY MOUTH ONCE DAILY. FOR HIGH BLOOD PRESSURE 30 capsule 11    meloxicam (MOBIC) 15 MG tablet TAKE 1 TABLET BY MOUTH EVERY DAY 30 tablet 3    oxybutynin (DITROPAN) 5 MG Tab TAKE 1 TABLET (5 MG TOTAL) BY MOUTH 3 (THREE) TIMES DAILY AS NEEDED. 90 tablet 11    vitamin D 1000 units Tab Take 1,000 Units by mouth once daily.       No current facility-administered medications for this visit.        Review of patient's allergies indicates:  No Known Allergies    Past Medical History:   Diagnosis Date    Blind right eye     Traumatic    Breast cancer 06/15/2017    0.8 cm Grade1 INTRADUCTAL BREAST 9 positve margin    Essential hypertension     Hemorrhoids     Lipoma of abdominal wall     Obesity     Overactive bladder     Pap smear abnormality of cervix with ASCUS favoring benign     Thyroid nodule     Tobacco use disorder     Tubular adenoma of colon     Urinary incontinence        Past Surgical History:   Procedure Laterality Date    ANTERIOR VAGINAL REPAIR      BLADDER SURGERY      BREAST LUMPECTOMY Left     CATARACT EXTRACTION Right      SECTION      X2    COLONOSCOPY N/A 3/8/2017    [facility]formed [facility]    MASTECTOMY-PARTIAL Left 6/15/2017    [facility] by [facility] Duke [facility] at [facility] OR    THYROID LOBECTOMY Left     TOTAL ABDOMINAL HYSTERECTOMY      TUBAL LIGATION         Family History   Problem Relation Age of Onset    Hypertension Father     Cataracts Father     Prostate cancer Father 80    Cervical cancer Daughter 32    Cirrhosis Mother     Alcohol abuse Mother     Hypertension Daughter     Diabetes Brother     Heart attack Brother     Heart murmur Brother     No Known Problems Daughter        Social History      Socioeconomic History    Marital status:      Spouse name: Not on file    Number of children: Not on file    Years of education: Not on file    Highest education level: Not on file   Occupational History    Not on file   Social Needs    Financial resource strain: Not on file    Food insecurity:     Worry: Not on file     Inability: Not on file    Transportation needs:     Medical: Not on file     Non-medical: Not on file   Tobacco Use    Smoking status: Current Every Day Smoker     Packs/day: 1.00     Years: 50.00     Pack years: 50.00     Types: Cigarettes    Smokeless tobacco: Never Used   Substance and Sexual Activity    Alcohol use: Yes     Alcohol/week: 12.6 - 16.8 oz     Types: 21 - 28 Cans of beer per week    Drug use: No    Sexual activity: Never     Partners: Male   Lifestyle    Physical activity:     Days per week: Not on file     Minutes per session: Not on file    Stress: Not on file   Relationships    Social connections:     Talks on phone: Not on file     Gets together: Not on file     Attends Sikhism service: Not on file     Active member of club or organization: Not on file     Attends meetings of clubs or organizations: Not on file     Relationship status: Not on file   Other Topics Concern    Not on file   Social History Narrative    The patient is .  She is retired from [email or web address].       Vitals:    05/29/19 1018   BP: (!) 152/86   Pulse: 85   Temp: 98.8 °F (37.1 °C)       Physical Exam   Constitutional: She is oriented to person, place, and time. She appears well-developed and well-nourished.   HENT:   Head: Normocephalic and atraumatic.   Right Ear: External ear normal.   Left Ear: External ear normal.   Mouth/Throat: No oropharyngeal exudate.   Eyes: Pupils are equal, round, and reactive to light. Conjunctivae and EOM are normal. Right eye exhibits no discharge. Left eye exhibits no discharge. No scleral icterus.   Neck: Normal range of motion. Neck  supple. No thyromegaly present.   Cardiovascular: Normal rate, regular rhythm and normal heart sounds.   Pulmonary/Chest: Effort normal and breath sounds normal. Right breast exhibits no inverted nipple, no mass, no nipple discharge, no skin change and no tenderness. Left breast exhibits no inverted nipple, no mass, no nipple discharge, no skin change and no tenderness.   Abdominal: Soft. Bowel sounds are normal.   Musculoskeletal: Normal range of motion. She exhibits no edema.        Right shoulder: She exhibits no crepitus and normal strength.   Lymphadenopathy:        Head (right side): No submental, no submandibular, no tonsillar, no preauricular, no posterior auricular and no occipital adenopathy present.        Head (left side): No submental, no submandibular, no tonsillar, no preauricular, no posterior auricular and no occipital adenopathy present.     She has no cervical adenopathy.        Right cervical: No superficial cervical and no posterior cervical adenopathy present.       Left cervical: No superficial cervical and no posterior cervical adenopathy present.     She has no axillary adenopathy.        Right: No supraclavicular adenopathy present.        Left: No supraclavicular adenopathy present.   Neurological: She is alert and oriented to person, place, and time. She has normal reflexes.   Skin: Skin is warm and dry. No rash noted. No erythema. No pallor.   Psychiatric: She has a normal mood and affect. Her behavior is normal. Judgment and thought content normal.       mammo today- results pending    Assessment & Plan:  Malignant neoplasm of lower-inner quadrant of left breast in female, estrogen receptor positive    Ductal carcinoma in situ (DCIS) of left breast    Encounter for monitoring adjuvant hormonal therapy    1. History of DCIS left breast s/p radiation and on adjuvant therapy- arimidex 1 mg until 8/2022. Tolerating without side effects- dexa normal 6/2018- due again 6/2020  2. mammogram  today- wnl  3. Negative clinical exam  4. Encouraged pt to stop smoking and offered tobacco cessation counseling/assistance - pt states not ready yet - risks of smoking reviewed with pt who verbalizes understanding- offered pt screening lung CT and pt declined  5. Encouraged pt to walk for 30 minutes most days of the week to reduce risk of recurrence- pt states will try to walk a little more- pt states will try to loose 10 # by her next visit in May- wt gain and recurrence risk discussed.   6. BSE monthly- call for any changes  7. RTC 6 mons for recheck

## 2019-05-29 ENCOUNTER — HOSPITAL ENCOUNTER (OUTPATIENT)
Dept: RADIOLOGY | Facility: HOSPITAL | Age: 69
Discharge: HOME | End: 2019-05-29
Attending: NURSE PRACTITIONER
Payer: MEDICARE

## 2019-05-29 ENCOUNTER — OFFICE VISIT (OUTPATIENT)
Dept: HEMATOLOGY/ONCOLOGY | Facility: CLINIC | Age: 69
End: 2019-05-29
Payer: MEDICARE

## 2019-05-29 VITALS
SYSTOLIC BLOOD PRESSURE: 152 MMHG | BODY MASS INDEX: 34.32 KG/M2 | TEMPERATURE: 99 F | OXYGEN SATURATION: 96 % | DIASTOLIC BLOOD PRESSURE: 86 MMHG | HEART RATE: 85 BPM | WEIGHT: 181.69 LBS

## 2019-05-29 VITALS — BODY MASS INDEX: 34.17 KG/M2 | HEIGHT: 61 IN | WEIGHT: 181 LBS

## 2019-05-29 DIAGNOSIS — C50.312 MALIGNANT NEOPLASM OF LOWER-INNER QUADRANT OF LEFT BREAST IN FEMALE, ESTROGEN RECEPTOR POSITIVE: ICD-10-CM

## 2019-05-29 DIAGNOSIS — Z17.0 MALIGNANT NEOPLASM OF LOWER-INNER QUADRANT OF LEFT BREAST IN FEMALE, ESTROGEN RECEPTOR POSITIVE: Primary | ICD-10-CM

## 2019-05-29 DIAGNOSIS — D05.12 DUCTAL CARCINOMA IN SITU (DCIS) OF LEFT BREAST: ICD-10-CM

## 2019-05-29 DIAGNOSIS — Z79.899 ENCOUNTER FOR MONITORING ADJUVANT HORMONAL THERAPY: ICD-10-CM

## 2019-05-29 DIAGNOSIS — Z51.81 ENCOUNTER FOR MONITORING ADJUVANT HORMONAL THERAPY: ICD-10-CM

## 2019-05-29 DIAGNOSIS — C50.312 MALIGNANT NEOPLASM OF LOWER-INNER QUADRANT OF LEFT BREAST IN FEMALE, ESTROGEN RECEPTOR POSITIVE: Primary | ICD-10-CM

## 2019-05-29 DIAGNOSIS — Z17.0 MALIGNANT NEOPLASM OF LOWER-INNER QUADRANT OF LEFT BREAST IN FEMALE, ESTROGEN RECEPTOR POSITIVE: ICD-10-CM

## 2019-05-29 PROCEDURE — 3077F PR MOST RECENT SYSTOLIC BLOOD PRESSURE >= 140 MM HG: ICD-10-PCS | Mod: CPTII,S$GLB,, | Performed by: NURSE PRACTITIONER

## 2019-05-29 PROCEDURE — 77066 DX MAMMO INCL CAD BI: CPT | Mod: TC

## 2019-05-29 PROCEDURE — 77062 MAMMO DIGITAL DIAGNOSTIC BILAT WITH TOMOSYNTHESIS_CAD: ICD-10-PCS | Mod: 26,,, | Performed by: RADIOLOGY

## 2019-05-29 PROCEDURE — 77062 BREAST TOMOSYNTHESIS BI: CPT | Mod: 26,,, | Performed by: RADIOLOGY

## 2019-05-29 PROCEDURE — 1101F PT FALLS ASSESS-DOCD LE1/YR: CPT | Mod: CPTII,S$GLB,, | Performed by: NURSE PRACTITIONER

## 2019-05-29 PROCEDURE — 3077F SYST BP >= 140 MM HG: CPT | Mod: CPTII,S$GLB,, | Performed by: NURSE PRACTITIONER

## 2019-05-29 PROCEDURE — 99999 PR PBB SHADOW E&M-EST. PATIENT-LVL III: CPT | Mod: PBBFAC,,, | Performed by: NURSE PRACTITIONER

## 2019-05-29 PROCEDURE — 77066 MAMMO DIGITAL DIAGNOSTIC BILAT WITH TOMOSYNTHESIS_CAD: ICD-10-PCS | Mod: 26,,, | Performed by: RADIOLOGY

## 2019-05-29 PROCEDURE — 3079F DIAST BP 80-89 MM HG: CPT | Mod: CPTII,S$GLB,, | Performed by: NURSE PRACTITIONER

## 2019-05-29 PROCEDURE — 99214 PR OFFICE/OUTPT VISIT, EST, LEVL IV, 30-39 MIN: ICD-10-PCS | Mod: S$GLB,,, | Performed by: NURSE PRACTITIONER

## 2019-05-29 PROCEDURE — 3079F PR MOST RECENT DIASTOLIC BLOOD PRESSURE 80-89 MM HG: ICD-10-PCS | Mod: CPTII,S$GLB,, | Performed by: NURSE PRACTITIONER

## 2019-05-29 PROCEDURE — 77066 DX MAMMO INCL CAD BI: CPT | Mod: 26,,, | Performed by: RADIOLOGY

## 2019-05-29 PROCEDURE — 99214 OFFICE O/P EST MOD 30 MIN: CPT | Mod: S$GLB,,, | Performed by: NURSE PRACTITIONER

## 2019-05-29 PROCEDURE — 1101F PR PT FALLS ASSESS DOC 0-1 FALLS W/OUT INJ PAST YR: ICD-10-PCS | Mod: CPTII,S$GLB,, | Performed by: NURSE PRACTITIONER

## 2019-05-29 PROCEDURE — 99999 PR PBB SHADOW E&M-EST. PATIENT-LVL III: ICD-10-PCS | Mod: PBBFAC,,, | Performed by: NURSE PRACTITIONER

## 2019-06-23 RX ORDER — HYDROCHLOROTHIAZIDE 12.5 MG/1
12.5 CAPSULE ORAL DAILY
Qty: 30 CAPSULE | Refills: 0 | Status: SHIPPED | OUTPATIENT
Start: 2019-06-23 | End: 2019-07-26 | Stop reason: SDUPTHER

## 2019-07-19 RX ORDER — OXYBUTYNIN CHLORIDE 5 MG/1
5 TABLET ORAL 3 TIMES DAILY PRN
Qty: 90 TABLET | Refills: 0 | Status: SHIPPED | OUTPATIENT
Start: 2019-07-19 | End: 2019-09-12 | Stop reason: SDUPTHER

## 2019-07-19 RX ORDER — MELOXICAM 15 MG/1
TABLET ORAL
Qty: 30 TABLET | Refills: 0 | Status: SHIPPED | OUTPATIENT
Start: 2019-07-19 | End: 2019-09-12 | Stop reason: SDUPTHER

## 2019-07-25 RX ORDER — HYDROCHLOROTHIAZIDE 12.5 MG/1
12.5 CAPSULE ORAL DAILY
Qty: 30 CAPSULE | Refills: 0 | OUTPATIENT
Start: 2019-07-25 | End: 2019-08-24

## 2019-07-26 RX ORDER — HYDROCHLOROTHIAZIDE 12.5 MG/1
12.5 CAPSULE ORAL DAILY
Qty: 30 CAPSULE | Refills: 1 | Status: SHIPPED | OUTPATIENT
Start: 2019-07-26 | End: 2019-09-19 | Stop reason: SDUPTHER

## 2019-07-26 NOTE — TELEPHONE ENCOUNTER
----- Message from Lazaro Bennett sent at 7/26/2019  3:36 PM CDT -----  Contact: Pt   Pt is calling .Type:  Sooner Apoointment Request    Pt  is calling regarding  requesting to be work in a sooner appointment. Pt  declined first available appointment listed below.  Pt  will not accept being placed on the waitlist and is requesting a message be sent to doctor.  Name of Caller: Pt   When is the first available appointment? Sept 20, 2019.   Symptoms: Pt states that she will be out of Blood Pressure medication refill  and needs to have an appt schedule. And a physical   Would the patient rather a call back or a response via [facility]?  Call Back   Best Call Back Number: [phone]        .Thank You  Arely Bennett

## 2019-08-24 DIAGNOSIS — D05.12 DUCTAL CARCINOMA IN SITU (DCIS) OF LEFT BREAST: ICD-10-CM

## 2019-08-24 RX ORDER — ANASTROZOLE 1 MG/1
1 TABLET ORAL DAILY
Qty: 90 TABLET | Refills: 3 | Status: SHIPPED | OUTPATIENT
Start: 2019-08-24 | End: 2020-11-11

## 2019-09-03 ENCOUNTER — OFFICE VISIT (OUTPATIENT)
Dept: FAMILY MEDICINE | Facility: CLINIC | Age: 69
End: 2019-09-03
Payer: MEDICARE

## 2019-09-03 ENCOUNTER — LAB VISIT (OUTPATIENT)
Dept: LAB | Facility: HOSPITAL | Age: 69
End: 2019-09-03
Payer: MEDICARE

## 2019-09-03 VITALS
DIASTOLIC BLOOD PRESSURE: 89 MMHG | HEIGHT: 60 IN | BODY MASS INDEX: 35.28 KG/M2 | WEIGHT: 179.69 LBS | SYSTOLIC BLOOD PRESSURE: 139 MMHG | TEMPERATURE: 99 F | HEART RATE: 84 BPM | OXYGEN SATURATION: 100 %

## 2019-09-03 DIAGNOSIS — D05.12 DUCTAL CARCINOMA IN SITU (DCIS) OF LEFT BREAST: ICD-10-CM

## 2019-09-03 DIAGNOSIS — I10 ESSENTIAL HYPERTENSION: Chronic | ICD-10-CM

## 2019-09-03 DIAGNOSIS — N39.46 MIXED STRESS AND URGE URINARY INCONTINENCE: ICD-10-CM

## 2019-09-03 DIAGNOSIS — Z00.00 PREVENTATIVE HEALTH CARE: Primary | ICD-10-CM

## 2019-09-03 DIAGNOSIS — Z12.9 SCREENING FOR CANCER: ICD-10-CM

## 2019-09-03 DIAGNOSIS — M81.0 AGE-RELATED OSTEOPOROSIS WITHOUT CURRENT PATHOLOGICAL FRACTURE: Chronic | ICD-10-CM

## 2019-09-03 DIAGNOSIS — Z87.891 PERSONAL HISTORY OF NICOTINE DEPENDENCE: ICD-10-CM

## 2019-09-03 PROBLEM — F17.200 TOBACCO USE DISORDER: Chronic | Status: ACTIVE | Noted: 2017-02-20

## 2019-09-03 LAB
BASOPHILS # BLD AUTO: 0.03 K/UL (ref 0–0.2)
BASOPHILS NFR BLD: 0.4 % (ref 0–1.9)
DIFFERENTIAL METHOD: ABNORMAL
EOSINOPHIL # BLD AUTO: 0.2 K/UL (ref 0–0.5)
EOSINOPHIL NFR BLD: 2.3 % (ref 0–8)
ERYTHROCYTE [DISTWIDTH] IN BLOOD BY AUTOMATED COUNT: 13 % (ref 11.5–14.5)
HCT VFR BLD AUTO: 36.2 % (ref 37–48.5)
HGB BLD-MCNC: 12.3 G/DL (ref 12–16)
IMM GRANULOCYTES # BLD AUTO: 0.02 K/UL (ref 0–0.04)
IMM GRANULOCYTES NFR BLD AUTO: 0.3 % (ref 0–0.5)
LYMPHOCYTES # BLD AUTO: 1.9 K/UL (ref 1–4.8)
LYMPHOCYTES NFR BLD: 28 % (ref 18–48)
MCH RBC QN AUTO: 32 PG (ref 27–31)
MCHC RBC AUTO-ENTMCNC: 34 G/DL (ref 32–36)
MCV RBC AUTO: 94 FL (ref 82–98)
MONOCYTES # BLD AUTO: 0.8 K/UL (ref 0.3–1)
MONOCYTES NFR BLD: 12.2 % (ref 4–15)
NEUTROPHILS # BLD AUTO: 3.9 K/UL (ref 1.8–7.7)
NEUTROPHILS NFR BLD: 56.8 % (ref 38–73)
NRBC BLD-RTO: 0 /100 WBC
PLATELET # BLD AUTO: 323 K/UL (ref 150–350)
PMV BLD AUTO: 10.2 FL (ref 9.2–12.9)
RBC # BLD AUTO: 3.84 M/UL (ref 4–5.4)
WBC # BLD AUTO: 6.86 K/UL (ref 3.9–12.7)

## 2019-09-03 PROCEDURE — G0009 ADMIN PNEUMOCOCCAL VACCINE: HCPCS | Mod: S$GLB,,, | Performed by: FAMILY MEDICINE

## 2019-09-03 PROCEDURE — G0009 PNEUMOCOCCAL POLYSACCHARIDE VACCINE 23-VALENT =>2YO SQ IM: ICD-10-PCS | Mod: S$GLB,,, | Performed by: FAMILY MEDICINE

## 2019-09-03 PROCEDURE — G0297 LDCT FOR LUNG CA SCREEN: HCPCS | Mod: 26,,, | Performed by: FAMILY MEDICINE

## 2019-09-03 PROCEDURE — G0297 PR CT CHEST LUNG SCREENING; LOW DOSE: ICD-10-PCS | Mod: 26,,, | Performed by: FAMILY MEDICINE

## 2019-09-03 PROCEDURE — 99999 PR PBB SHADOW E&M-EST. PATIENT-LVL IV: CPT | Mod: PBBFAC,,, | Performed by: FAMILY MEDICINE

## 2019-09-03 PROCEDURE — 99406 BEHAV CHNG SMOKING 3-10 MIN: CPT | Mod: S$GLB,,, | Performed by: FAMILY MEDICINE

## 2019-09-03 PROCEDURE — 90732 PPSV23 VACC 2 YRS+ SUBQ/IM: CPT | Mod: S$GLB,,, | Performed by: FAMILY MEDICINE

## 2019-09-03 PROCEDURE — 84443 ASSAY THYROID STIM HORMONE: CPT

## 2019-09-03 PROCEDURE — 99406 PR TOBACCO USE CESSATION INTERMEDIATE 3-10 MINUTES: ICD-10-PCS | Mod: S$GLB,,, | Performed by: FAMILY MEDICINE

## 2019-09-03 PROCEDURE — 3079F PR MOST RECENT DIASTOLIC BLOOD PRESSURE 80-89 MM HG: ICD-10-PCS | Mod: CPTII,S$GLB,, | Performed by: FAMILY MEDICINE

## 2019-09-03 PROCEDURE — 80053 COMPREHEN METABOLIC PANEL: CPT

## 2019-09-03 PROCEDURE — 99397 PER PM REEVAL EST PAT 65+ YR: CPT | Mod: 25,S$GLB,, | Performed by: FAMILY MEDICINE

## 2019-09-03 PROCEDURE — 3075F SYST BP GE 130 - 139MM HG: CPT | Mod: CPTII,S$GLB,, | Performed by: FAMILY MEDICINE

## 2019-09-03 PROCEDURE — 85025 COMPLETE CBC W/AUTO DIFF WBC: CPT

## 2019-09-03 PROCEDURE — 99999 PR PBB SHADOW E&M-EST. PATIENT-LVL IV: ICD-10-PCS | Mod: PBBFAC,,, | Performed by: FAMILY MEDICINE

## 2019-09-03 PROCEDURE — 36415 COLL VENOUS BLD VENIPUNCTURE: CPT | Mod: PO

## 2019-09-03 PROCEDURE — 80061 LIPID PANEL: CPT

## 2019-09-03 PROCEDURE — 3079F DIAST BP 80-89 MM HG: CPT | Mod: CPTII,S$GLB,, | Performed by: FAMILY MEDICINE

## 2019-09-03 PROCEDURE — 90732 PNEUMOCOCCAL POLYSACCHARIDE VACCINE 23-VALENT =>2YO SQ IM: ICD-10-PCS | Mod: S$GLB,,, | Performed by: FAMILY MEDICINE

## 2019-09-03 PROCEDURE — 99499 RISK ADDL DX/OHS AUDIT: ICD-10-PCS | Mod: S$GLB,,, | Performed by: FAMILY MEDICINE

## 2019-09-03 PROCEDURE — 99397 PR PREVENTIVE VISIT,EST,65 & OVER: ICD-10-PCS | Mod: 25,S$GLB,, | Performed by: FAMILY MEDICINE

## 2019-09-03 PROCEDURE — 3075F PR MOST RECENT SYSTOLIC BLOOD PRESS GE 130-139MM HG: ICD-10-PCS | Mod: CPTII,S$GLB,, | Performed by: FAMILY MEDICINE

## 2019-09-03 PROCEDURE — 99499 UNLISTED E&M SERVICE: CPT | Mod: S$GLB,,, | Performed by: FAMILY MEDICINE

## 2019-09-03 NOTE — PROGRESS NOTES
CHIEF COMPLAINT: This is a 67-year-old female here for preventive health exam.     SUBJECTIVE: Patient was diagnosed with intraductal carcinoma in situ of left breast in February 2017.  She's currently taking Arimidex after undergoing lumpectomy and radiation.  Patient takes hydrochlorothiazide 12.5 mg daily for hypertension.  She does not monitor her blood pressure.  Her blood pressure today is elevated at 170 8/76.  She denies headache, vertigo or paresthesias.   The patient takes oxybutynin 3 times daily for urinary incontinence.  She takes meloxicam for osteoarthritis of hands.  Patient continues to smoke one pack of cigarettes per day.  She is not interested in quitting at this time.     Eye exam July 2015. Mammogram May 2019. Colonoscopy March 2017, due again in March 2020.  Bone DEXA scan June 2018, due again June 2023. Tdap March 2013.  Prevnar February 2017. Influenza vaccine, December 2018.     ROS:  GENERAL: Patient denies fever, chills, night sweats. Patient denies weight loss. Patient denies anorexia, fatigue, or swollen glands.  Positive for weakness.  SKIN: Patient denies rash or hair loss.  HEENT: Patient denies sore throat, ear pain, hearing loss, nasal congestion, or runny nose. Patient denies visual disturbance, eye irritation or discharge.  LUNGS: Patient denies cough, wheeze or hemoptysis.  CARDIOVASCULAR: Patient denies chest pain, shortness of breath, palpitations, syncope or lower extremity edema.  GI: Patient denies abdominal pain, nausea, vomiting, diarrhea, constipation, blood in stool or melena.  GENITOURINARY: Patient denies pelvic pain, vaginal discharge, itch or odor. Patient denies irregular vaginal bleeding. Patient denies dysuria, frequency, hematuria, or nocturia.  Positive for urge incontinence.  BREASTS: Patient denies breast pain, mass or nipple discharge.  MUSCULOSKELETAL: Patient denies joint swelling, redness or warmth.  Positive for joint pain.  NEUROLOGIC: Patient denies  headache, vertigo, paresthesias, weakness in limb, dysarthria, dysphagia or abnormality of gait.  PSYCHIATRIC: Patient denies anxiety, depression, or memory loss.     OBJECTIVE:   GENERAL: Well-developed well-nourished obese black female alert and oriented x3, in no acute distress. Memory, judgment and cognition without deficit.  Weight gain of 13 pounds in the last year.    SKIN: Clear without rash. Normal color and tone.  HEENT: Eyes: Clear conjunctivae. Pupils equal reactive to light and accommodation. Ears: Clear TMs. Clear canals. Nose: Without congestion. Pharynx: Without injection  or exudates.  NECK: Supple, normal range of motion. No masses, lymphadenopathy or enlarged thyroid. No JVD. Carotids 2+ and equal. No bruits.  LUNGS: Clear to auscultation. Normal respiratory effort.  CARDIOVASCULAR: Regular rhythm, normal S1, S2 without murmur, gallop or rub.  BACK: No CVA or spinal tenderness.  BREASTS: No masses, tenderness or nipple discharge.  ABDOMEN:  Large lipoma right upper quadrant, nontender. Active bowel sounds. Soft, nontender without mass or organomegaly. No rebound or guarding.  EXTREMITIES: Without cyanosis, clubbing or edema. Distal pulses 2+ and equal. Normal range of motion in all extremities. No joint effusion, erythema or warmth. Bilateral hammertoes. Calluses/hyperkeratotic areas plantar surface of both feet.  NEUROLOGIC: Cranial nerves II through XII without deficit. Motor strength equal bilaterally. Sensation normal to touch. Deep tendon reflexes 2+ and equal. Gait without abnormality. No tremor. Negative cerebellar signs.  PELVIC: External: Without lesions or inflammation.  Vaginal: Anterior wall prolapse.  Atrophic changes.  Cuff intact.  Bimanual: Non-tender, without masses.  Rectovaginal: Confirms, heme-negative stool x2.     ASSESSMENT:  1. Preventative health care    2. Age-related osteoporosis without current pathological fracture    3. Essential hypertension    4. Personal history of  nicotine dependence    5. Mixed stress and urge urinary incontinence    6. Ductal carcinoma in situ (DCIS) of left breast    7. Screening for cancer      PLAN:   1.  Weight reduction.  Exercise regularly.  2.  Age-appropriate counseling.  3.  Fasting lab.  4.  Pneumovax.  5.  Refill medications as needed.  6.  Low-dose CT scan of the lungs.  Discussed criteria with patient.  7.  Smoking cessation discussed for 3-10 minutes.  Reviewed treatment options including Chantix, Zyban, nicotine products and counseling.    This note is generated with speech recognition software and is subject to transcription error and sound alike phrases that may be missed by proofreading.

## 2019-09-03 NOTE — PROGRESS NOTES
Patient tolerated injection well in left deltoid. I asked her to wait in the clinic for 15 minutes before leaving to verify no adverse reactions. Patient verbally verified understanding./vLw

## 2019-09-04 LAB
ALBUMIN SERPL BCP-MCNC: 4.4 G/DL (ref 3.5–5.2)
ALP SERPL-CCNC: 93 U/L (ref 55–135)
ALT SERPL W/O P-5'-P-CCNC: 16 U/L (ref 10–44)
ANION GAP SERPL CALC-SCNC: 12 MMOL/L (ref 8–16)
AST SERPL-CCNC: 22 U/L (ref 10–40)
BILIRUB SERPL-MCNC: 0.8 MG/DL (ref 0.1–1)
BUN SERPL-MCNC: 11 MG/DL (ref 8–23)
CALCIUM SERPL-MCNC: 10.7 MG/DL (ref 8.7–10.5)
CHLORIDE SERPL-SCNC: 96 MMOL/L (ref 95–110)
CHOLEST SERPL-MCNC: 207 MG/DL (ref 120–199)
CHOLEST/HDLC SERPL: 3.9 {RATIO} (ref 2–5)
CO2 SERPL-SCNC: 27 MMOL/L (ref 23–29)
CREAT SERPL-MCNC: 0.9 MG/DL (ref 0.5–1.4)
EST. GFR  (AFRICAN AMERICAN): >60 ML/MIN/1.73 M^2
EST. GFR  (NON AFRICAN AMERICAN): >60 ML/MIN/1.73 M^2
GLUCOSE SERPL-MCNC: 86 MG/DL (ref 70–110)
HDLC SERPL-MCNC: 53 MG/DL (ref 40–75)
HDLC SERPL: 25.6 % (ref 20–50)
LDLC SERPL CALC-MCNC: 134.2 MG/DL (ref 63–159)
NONHDLC SERPL-MCNC: 154 MG/DL
POTASSIUM SERPL-SCNC: 4.3 MMOL/L (ref 3.5–5.1)
PROT SERPL-MCNC: 8.2 G/DL (ref 6–8.4)
SODIUM SERPL-SCNC: 135 MMOL/L (ref 136–145)
TRIGL SERPL-MCNC: 99 MG/DL (ref 30–150)
TSH SERPL DL<=0.005 MIU/L-ACNC: 1.64 UIU/ML (ref 0.4–4)

## 2019-09-04 NOTE — PROGRESS NOTES
Patient, Leia Rodriguez (MRN #[id number]), presented with a recorded BMI of 35.38 kg/m^2 and a documented comorbidity(s):  - Hypertension  to which the severe obesity is a contributing factor. This is consistent with the definition of severe obesity (BMI 35.0-39.9) with comorbidity (ICD-10 E66.01, Z68.35). The patient's severe obesity was monitored, evaluated, addressed and/or treated. This addendum to the medical record is made on 09/03/2019.

## 2019-09-12 RX ORDER — MELOXICAM 15 MG/1
15 TABLET ORAL DAILY
Qty: 30 TABLET | Refills: 5 | Status: SHIPPED | OUTPATIENT
Start: 2019-09-12 | End: 2020-03-16

## 2019-09-12 RX ORDER — OXYBUTYNIN CHLORIDE 5 MG/1
5 TABLET ORAL 3 TIMES DAILY PRN
Qty: 90 TABLET | Refills: 11 | Status: SHIPPED | OUTPATIENT
Start: 2019-09-12 | End: 2020-11-22

## 2019-09-12 NOTE — TELEPHONE ENCOUNTER
Last annual 9/3/19    ----- Message from Manasa Monroe sent at 9/12/2019 11:43 AM CDT -----  Contact: pt  1. What is the name of the medication you are requesting? Oxybutynin, Meloxicam  2. What is the dose? 5mg, 15mg  3. How do you take the medication? Orally, topically, etc? n/a  4. How often do you take this medication? n/a  5. Do you need a 30 day or 90 day supply? n/a  6. How many refills are you requesting? n/a  7. What is your preferred pharmacy and location of the pharmacy? [facility]  8. Who can we contact with further questions? The pt at [phone]

## 2019-09-18 ENCOUNTER — HOSPITAL ENCOUNTER (OUTPATIENT)
Dept: RADIOLOGY | Facility: HOSPITAL | Age: 69
Discharge: HOME | End: 2019-09-18
Attending: FAMILY MEDICINE
Payer: MEDICARE

## 2019-09-18 DIAGNOSIS — Z87.891 PERSONAL HISTORY OF NICOTINE DEPENDENCE: ICD-10-CM

## 2019-09-18 DIAGNOSIS — Z12.9 SCREENING FOR CANCER: ICD-10-CM

## 2019-09-18 PROCEDURE — G0297 LDCT FOR LUNG CA SCREEN: HCPCS | Mod: TC

## 2019-09-18 PROCEDURE — G0297 CT CHEST LUNG SCREENING LOW DOSE: ICD-10-PCS | Mod: 26,,, | Performed by: RADIOLOGY

## 2019-09-18 PROCEDURE — G0297 LDCT FOR LUNG CA SCREEN: HCPCS | Mod: 26,,, | Performed by: RADIOLOGY

## 2019-09-19 RX ORDER — HYDROCHLOROTHIAZIDE 12.5 MG/1
12.5 CAPSULE ORAL DAILY
Qty: 30 CAPSULE | Refills: 11 | Status: SHIPPED | OUTPATIENT
Start: 2019-09-19 | End: 2020-09-24

## 2019-11-13 ENCOUNTER — LAB VISIT (OUTPATIENT)
Dept: LAB | Facility: HOSPITAL | Age: 69
End: 2019-11-13
Attending: REGISTERED NURSE
Payer: MEDICARE

## 2019-11-13 ENCOUNTER — OFFICE VISIT (OUTPATIENT)
Dept: FAMILY MEDICINE | Facility: CLINIC | Age: 69
End: 2019-11-13
Payer: MEDICARE

## 2019-11-13 VITALS
TEMPERATURE: 98 F | SYSTOLIC BLOOD PRESSURE: 142 MMHG | WEIGHT: 176.13 LBS | DIASTOLIC BLOOD PRESSURE: 79 MMHG | HEIGHT: 59 IN | BODY MASS INDEX: 35.51 KG/M2 | HEART RATE: 82 BPM

## 2019-11-13 DIAGNOSIS — Z09 HOSPITAL DISCHARGE FOLLOW-UP: Primary | ICD-10-CM

## 2019-11-13 DIAGNOSIS — A41.9 SEPSIS, DUE TO UNSPECIFIED ORGANISM, UNSPECIFIED WHETHER ACUTE ORGAN DYSFUNCTION PRESENT: ICD-10-CM

## 2019-11-13 DIAGNOSIS — N39.0 ACUTE UTI (URINARY TRACT INFECTION): ICD-10-CM

## 2019-11-13 DIAGNOSIS — Z09 HOSPITAL DISCHARGE FOLLOW-UP: ICD-10-CM

## 2019-11-13 LAB
BASOPHILS # BLD AUTO: 0.03 K/UL (ref 0–0.2)
BASOPHILS NFR BLD: 0.5 % (ref 0–1.9)
DIFFERENTIAL METHOD: ABNORMAL
EOSINOPHIL # BLD AUTO: 0.1 K/UL (ref 0–0.5)
EOSINOPHIL NFR BLD: 1.8 % (ref 0–8)
ERYTHROCYTE [DISTWIDTH] IN BLOOD BY AUTOMATED COUNT: 13.3 % (ref 11.5–14.5)
HCT VFR BLD AUTO: 34.1 % (ref 37–48.5)
HGB BLD-MCNC: 11 G/DL (ref 12–16)
IMM GRANULOCYTES # BLD AUTO: 0.03 K/UL (ref 0–0.04)
IMM GRANULOCYTES NFR BLD AUTO: 0.5 % (ref 0–0.5)
LYMPHOCYTES # BLD AUTO: 1.3 K/UL (ref 1–4.8)
LYMPHOCYTES NFR BLD: 21.8 % (ref 18–48)
MCH RBC QN AUTO: 30.6 PG (ref 27–31)
MCHC RBC AUTO-ENTMCNC: 32.3 G/DL (ref 32–36)
MCV RBC AUTO: 95 FL (ref 82–98)
MONOCYTES # BLD AUTO: 0.7 K/UL (ref 0.3–1)
MONOCYTES NFR BLD: 10.7 % (ref 4–15)
NEUTROPHILS # BLD AUTO: 3.9 K/UL (ref 1.8–7.7)
NEUTROPHILS NFR BLD: 64.7 % (ref 38–73)
NRBC BLD-RTO: 0 /100 WBC
PLATELET # BLD AUTO: 458 K/UL (ref 150–350)
PMV BLD AUTO: 8.9 FL (ref 9.2–12.9)
RBC # BLD AUTO: 3.6 M/UL (ref 4–5.4)
WBC # BLD AUTO: 6.05 K/UL (ref 3.9–12.7)

## 2019-11-13 PROCEDURE — 36415 COLL VENOUS BLD VENIPUNCTURE: CPT | Mod: PO

## 2019-11-13 PROCEDURE — 99499 UNLISTED E&M SERVICE: CPT | Mod: S$GLB,,, | Performed by: REGISTERED NURSE

## 2019-11-13 PROCEDURE — 3077F SYST BP >= 140 MM HG: CPT | Mod: CPTII,S$GLB,, | Performed by: REGISTERED NURSE

## 2019-11-13 PROCEDURE — 1126F AMNT PAIN NOTED NONE PRSNT: CPT | Mod: S$GLB,,, | Performed by: REGISTERED NURSE

## 2019-11-13 PROCEDURE — 1159F PR MEDICATION LIST DOCUMENTED IN MEDICAL RECORD: ICD-10-PCS | Mod: S$GLB,,, | Performed by: REGISTERED NURSE

## 2019-11-13 PROCEDURE — 85025 COMPLETE CBC W/AUTO DIFF WBC: CPT

## 2019-11-13 PROCEDURE — 1126F PR PAIN SEVERITY QUANTIFIED, NO PAIN PRESENT: ICD-10-PCS | Mod: S$GLB,,, | Performed by: REGISTERED NURSE

## 2019-11-13 PROCEDURE — 99214 OFFICE O/P EST MOD 30 MIN: CPT | Mod: S$GLB,,, | Performed by: REGISTERED NURSE

## 2019-11-13 PROCEDURE — 99999 PR PBB SHADOW E&M-EST. PATIENT-LVL III: CPT | Mod: PBBFAC,,, | Performed by: REGISTERED NURSE

## 2019-11-13 PROCEDURE — 1101F PT FALLS ASSESS-DOCD LE1/YR: CPT | Mod: CPTII,S$GLB,, | Performed by: REGISTERED NURSE

## 2019-11-13 PROCEDURE — 3077F PR MOST RECENT SYSTOLIC BLOOD PRESSURE >= 140 MM HG: ICD-10-PCS | Mod: CPTII,S$GLB,, | Performed by: REGISTERED NURSE

## 2019-11-13 PROCEDURE — 3078F PR MOST RECENT DIASTOLIC BLOOD PRESSURE < 80 MM HG: ICD-10-PCS | Mod: CPTII,S$GLB,, | Performed by: REGISTERED NURSE

## 2019-11-13 PROCEDURE — 99499 RISK ADDL DX/OHS AUDIT: ICD-10-PCS | Mod: S$GLB,,, | Performed by: REGISTERED NURSE

## 2019-11-13 PROCEDURE — 1101F PR PT FALLS ASSESS DOC 0-1 FALLS W/OUT INJ PAST YR: ICD-10-PCS | Mod: CPTII,S$GLB,, | Performed by: REGISTERED NURSE

## 2019-11-13 PROCEDURE — 3078F DIAST BP <80 MM HG: CPT | Mod: CPTII,S$GLB,, | Performed by: REGISTERED NURSE

## 2019-11-13 PROCEDURE — 99214 PR OFFICE/OUTPT VISIT, EST, LEVL IV, 30-39 MIN: ICD-10-PCS | Mod: S$GLB,,, | Performed by: REGISTERED NURSE

## 2019-11-13 PROCEDURE — 99999 PR PBB SHADOW E&M-EST. PATIENT-LVL III: ICD-10-PCS | Mod: PBBFAC,,, | Performed by: REGISTERED NURSE

## 2019-11-13 PROCEDURE — 80053 COMPREHEN METABOLIC PANEL: CPT

## 2019-11-13 PROCEDURE — 1159F MED LIST DOCD IN RCRD: CPT | Mod: S$GLB,,, | Performed by: REGISTERED NURSE

## 2019-11-13 PROCEDURE — 87086 URINE CULTURE/COLONY COUNT: CPT

## 2019-11-13 RX ORDER — CIPROFLOXACIN 500 MG/1
500 TABLET ORAL 2 TIMES DAILY
Refills: 0 | COMMUNITY
Start: 2019-11-08 | End: 2020-11-22

## 2019-11-13 RX ORDER — METRONIDAZOLE 500 MG/1
500 TABLET ORAL 3 TIMES DAILY
Refills: 0 | COMMUNITY
Start: 2019-11-08 | End: 2020-11-22

## 2019-11-13 NOTE — PROGRESS NOTES
Subjective:       Patient ID: Leia Rodriguez is a 69 y.o. female.    Chief Complaint   Patient presents with    Hospital Follow Up       BR GENERAL  Admitted: 11/3/2019  Discharged: 11/8/2019      HPI    Leia Rodriguez is here today for hospital follow-up appointment.  Admitted to hospital on 11/3/2019 for UTI with sepsis.  She was also evaluated for a possible hole in her bladder and a thickened bladder wall.  Does have f/u appt today with Urology (Dr. Madhu Ordonez).  She was sent home on Cipro 500 po bid with Flagyl 500 po tid x 10 days.  Denies pain, fever or chills.  No urinary issues reported.  Does have [facility] for PT due to gait problems.  Does drink about 3 bottles water daily along w/ 6 beers.        Review of Systems   Constitutional: Negative.    Respiratory: Negative.    Cardiovascular: Negative.    Gastrointestinal: Negative.    Genitourinary: Negative.    Musculoskeletal: Positive for gait problem. Negative for arthralgias, joint swelling and myalgias.   Skin: Negative.          Review of patient's allergies indicates:  No Known Allergies      Medication List with Changes/Refills   Current Medications    ANASTROZOLE (ARIMIDEX) 1 MG TAB    TAKE 1 TABLET (1 MG TOTAL) BY MOUTH ONCE DAILY.    CIPROFLOXACIN HCL (CIPRO) 500 MG TABLET    Take 500 mg by mouth 2 (two) times daily.    HYDROCHLOROTHIAZIDE (MICROZIDE) 12.5 MG CAPSULE    TAKE 1 CAPSULE (12.5 MG TOTAL) BY MOUTH ONCE DAILY. FOR HIGH BLOOD PRESSURE    MELOXICAM (MOBIC) 15 MG TABLET    Take 1 tablet (15 mg total) by mouth once daily.    METRONIDAZOLE (FLAGYL) 500 MG TABLET    Take 500 mg by mouth 3 (three) times daily.    OXYBUTYNIN (DITROPAN) 5 MG TAB    Take 1 tablet (5 mg total) by mouth 3 (three) times daily as needed.    VITAMIN D 1000 UNITS TAB    Take 1,000 Units by mouth once daily.       Patient Active Problem List   Diagnosis    Urinary incontinence    Obesity (BMI 30.0-34.9)    Primary osteoarthritis of both hands    Essential  "hypertension    Tobacco use disorder    Lipoma of abdominal wall    Malignant neoplasm of breast in female, estrogen receptor positive    Ductal carcinoma in situ (DCIS) of left breast    Age-related osteoporosis without current pathological fracture         Past medical, surgical, family and social histories have been reviewed today.        Objective:     Vitals:    11/13/19 0940   BP: (!) 142/79   Pulse: 82   Temp: 98.2 °F (36.8 °C)   Weight: 79.9 kg (176 lb 2.4 oz)   Height: 4' 11" (1.499 m)   PainSc: 0-No pain         Physical Exam   Constitutional: She is oriented to person, place, and time. She appears well-developed and well-nourished.   HENT:   Head: Normocephalic and atraumatic.   Nose: No mucosal edema, rhinorrhea, sinus tenderness or nasal deformity. No epistaxis.   Mouth/Throat: Uvula is midline and mucous membranes are normal. Mucous membranes are not dry and not cyanotic. No oral lesions. Normal dentition. No uvula swelling. No tonsillar abscesses.   Eyes: Pupils are equal, round, and reactive to light. Conjunctivae, EOM and lids are normal. Lids are everted and swept, no foreign bodies found. Right eye exhibits no discharge and no exudate. Left eye exhibits no discharge and no exudate. Right conjunctiva is not injected. Left conjunctiva is not injected.   Neck: Normal range of motion. Neck supple. No JVD present. Carotid bruit is not present. No tracheal deviation, no edema, no erythema and normal range of motion present. No thyroid mass and no thyromegaly present.   Cardiovascular: Normal rate, regular rhythm and intact distal pulses. Exam reveals no gallop and no friction rub.   No murmur heard.  Pulmonary/Chest: Effort normal and breath sounds normal. No stridor. No respiratory distress. She has no wheezes. She exhibits no tenderness.   Abdominal: Soft. Bowel sounds are normal. She exhibits no distension and no mass. There is no hepatosplenomegaly. There is no tenderness. There is no CVA " tenderness.   Genitourinary: No breast tenderness or discharge.   Musculoskeletal: Normal range of motion. She exhibits no edema or tenderness.   Neurological: She is alert and oriented to person, place, and time. She has normal strength and normal reflexes. She displays no atrophy and no tremor. No cranial nerve deficit or sensory deficit. She exhibits normal muscle tone. Coordination and gait normal.   Skin: Skin is warm, dry and intact. Capillary refill takes less than 2 seconds. No bruising and no rash noted. She is not diaphoretic. No erythema. No pallor.   Psychiatric: She has a normal mood and affect. Her speech is normal and behavior is normal. Judgment and thought content normal. Cognition and memory are normal. Cognition and memory are not impaired.         Diagnosis       1. Hospital discharge follow-up    2. Acute UTI (urinary tract infection)    3. Sepsis, due to unspecified organism, unspecified whether acute organ dysfunction present          Assessment/ Plan     Hospital discharge follow-up  -     CBC auto differential; Future; Expected date: 11/13/2019  -     Comprehensive metabolic panel; Future; Expected date: 11/13/2019  -     Urine culture    Acute UTI (urinary tract infection)  -     Urine culture    Sepsis, due to unspecified organism, unspecified whether acute organ dysfunction present  -     CBC auto differential; Future; Expected date: 11/13/2019  -     Comprehensive metabolic panel; Future; Expected date: 11/13/2019          Recheck lab and urine.  MedCard reviewed and reconciled with patient.  Finish out ABX as ordered.  Hydration and infection precautions discussed.  Alcohol cessation, in moderation.  To see Urology today for hosp f/u.  Follow-up in clinic as needed.        Patient Care Team:  Yasmin Navarro MD as PCP - General (Family Medicine)  Pema Comer LPN as Care Coordinator (Internal Medicine)      MAURY Ford  [facility]

## 2019-11-14 LAB
ALBUMIN SERPL BCP-MCNC: 3.8 G/DL (ref 3.5–5.2)
ALP SERPL-CCNC: 67 U/L (ref 55–135)
ALT SERPL W/O P-5'-P-CCNC: 27 U/L (ref 10–44)
ANION GAP SERPL CALC-SCNC: 12 MMOL/L (ref 8–16)
AST SERPL-CCNC: 32 U/L (ref 10–40)
BACTERIA UR CULT: NORMAL
BACTERIA UR CULT: NORMAL
BILIRUB SERPL-MCNC: 0.7 MG/DL (ref 0.1–1)
BUN SERPL-MCNC: 4 MG/DL (ref 8–23)
CALCIUM SERPL-MCNC: 10.5 MG/DL (ref 8.7–10.5)
CHLORIDE SERPL-SCNC: 92 MMOL/L (ref 95–110)
CO2 SERPL-SCNC: 27 MMOL/L (ref 23–29)
CREAT SERPL-MCNC: 0.8 MG/DL (ref 0.5–1.4)
EST. GFR  (AFRICAN AMERICAN): >60 ML/MIN/1.73 M^2
EST. GFR  (NON AFRICAN AMERICAN): >60 ML/MIN/1.73 M^2
GLUCOSE SERPL-MCNC: 100 MG/DL (ref 70–110)
POTASSIUM SERPL-SCNC: 3.6 MMOL/L (ref 3.5–5.1)
PROT SERPL-MCNC: 7.5 G/DL (ref 6–8.4)
SODIUM SERPL-SCNC: 131 MMOL/L (ref 136–145)

## 2019-11-26 NOTE — PROGRESS NOTES
Patient ID: Leia Rodriguez is a 69 y.o. female.    Chief Complaint: Breast Cancer (f/u)      HPI: Breast cancer follow-up - last visit was 5/29/19    Pt has a history of Stage 0 DCIS of Lt. breast.  She presented in March of 2017 when diagnostic MMG confirmed the presence of a round mass with indistinct margins in the Lt. breast at the 7 o'clock position. The mass measured 9 x 5 x 6 mm. The patient was referred to Dr. Duke and on 6/15/17 underwent wire localization lumpectomy. Pathology revealed an 8 mm focus of ductal carcinoma in situ. The tumor was low grade. There was tumor present at the anterior inferior margin. Ninety percent of the tumor cells were ER positive, 5- 10% of the tumor cells were IN positive. The patient was referred for adjuvant radiotherapy. Completed a course of partial breast irradiation to the LIQ of the Lt. breast on 8/14/17.  Currently on hormonal therapy with Arimidex that started 8/2017.  Due off 8/2022    DEXA- 6/29/18- wnl- f/u in 2 years due to AI- 6/2020 due    Risk factors identified:     Menarche at 11 y/o  G 3 P 3  First pregnancy at 18 y/o  LMP: 50's  Estrogen: none  Radiation to the neck or chest wall - has had left breast radiation  Prior breast biopsies or atypical hyperplasia- left breast lumpectomy     FH: no breast cancer, daughter cervical cancer 30's, father prostate cancer 70's-     Body mass index is 33.93 kg/m².    Review of Systems   Constitutional: Negative.    HENT: Negative.    Eyes: Negative.    Respiratory: Negative.    Cardiovascular: Negative.    Gastrointestinal: Negative.         No reflux   Endocrine: Negative.    Genitourinary: Negative.    Musculoskeletal: Negative.    Skin: Negative.    Allergic/Immunologic: Negative.    Neurological: Negative.    Hematological: Negative.  Negative for adenopathy.   Psychiatric/Behavioral: Negative.    Breast: Pt denies any breast pain, nipple discharge, or palpable mass. No prior trauma or bruising. No breast surgeries  or abnormalities.    Current Outpatient Medications   Medication Sig Dispense Refill    anastrozole (ARIMIDEX) 1 mg Tab TAKE 1 TABLET (1 MG TOTAL) BY MOUTH ONCE DAILY. 90 tablet 3    ciprofloxacin HCl (CIPRO) 500 MG tablet Take 500 mg by mouth 2 (two) times daily.  0    fluconazole (DIFLUCAN) 100 MG tablet       hydroCHLOROthiazide (MICROZIDE) 12.5 mg capsule TAKE 1 CAPSULE (12.5 MG TOTAL) BY MOUTH ONCE DAILY. FOR HIGH BLOOD PRESSURE 30 capsule 11    meloxicam (MOBIC) 15 MG tablet Take 1 tablet (15 mg total) by mouth once daily. 30 tablet 5    metroNIDAZOLE (FLAGYL) 500 MG tablet Take 500 mg by mouth 3 (three) times daily.  0    nitrofurantoin, macrocrystal-monohydrate, (MACROBID) 100 MG capsule       oxybutynin (DITROPAN) 5 MG Tab Take 1 tablet (5 mg total) by mouth 3 (three) times daily as needed. 90 tablet 11    vitamin D 1000 units Tab Take 1,000 Units by mouth once daily.       No current facility-administered medications for this visit.        Review of patient's allergies indicates:  No Known Allergies    Past Medical History:   Diagnosis Date    Blind right eye     Traumatic    Breast cancer 06/15/2017    0.8 cm Grade1 INTRADUCTAL BREAST 9 positve margin    Essential hypertension     Hemorrhoids     Lipoma of abdominal wall     Obesity     Overactive bladder     Pap smear abnormality of cervix with ASCUS favoring benign     Thyroid nodule     Tobacco use disorder     Tubular adenoma of colon     Urinary incontinence        Past Surgical History:   Procedure Laterality Date    ANTERIOR VAGINAL REPAIR      BLADDER SURGERY      BREAST LUMPECTOMY Left     CATARACT EXTRACTION Right      SECTION      X2    COLONOSCOPY N/A 3/8/2017    Procedure: COLONOSCOPY;  Surgeon: Tyron Paris MD;  Location: [facility];  Service: Endoscopy;  Laterality: N/A;    THYROID LOBECTOMY Left     TOTAL ABDOMINAL HYSTERECTOMY      TUBAL LIGATION         Family History   Problem  Relation Age of Onset    Hypertension Father     Cataracts Father     Prostate cancer Father 80    Cervical cancer Daughter 32    Allergic rhinitis Daughter     Cirrhosis Mother     Alcohol abuse Mother     Hypertension Daughter     Diabetes Brother     Heart attack Brother     Heart murmur Brother     No Known Problems Daughter        Social History     Socioeconomic History    Marital status:      Spouse name: Not on file    Number of children: Not on file    Years of education: Not on file    Highest education level: Not on file   Occupational History    Not on file   Social Needs    Financial resource strain: Not on file    Food insecurity:     Worry: Not on file     Inability: Not on file    Transportation needs:     Medical: Not on file     Non-medical: Not on file   Tobacco Use    Smoking status: Current Every Day Smoker     Packs/day: 1.00     Years: 50.00     Pack years: 50.00     Types: Cigarettes    Smokeless tobacco: Never Used   Substance and Sexual Activity    Alcohol use: Yes     Alcohol/week: 28.0 standard drinks     Types: 28 Cans of beer per week     Frequency: 4 or more times a week     Drinks per session: 3 or 4     Binge frequency: Less than monthly    Drug use: No    Sexual activity: Never     Partners: Male   Lifestyle    Physical activity:     Days per week: Not on file     Minutes per session: Not on file    Stress: Not on file   Relationships    Social connections:     Talks on phone: Not on file     Gets together: Not on file     Attends Zoroastrianism service: Not on file     Active member of club or organization: Not on file     Attends meetings of clubs or organizations: Not on file     Relationship status: Not on file   Other Topics Concern    Not on file   Social History Narrative    The patient is .  She is retired from [employer].       Vitals:    12/04/19 1109   BP: (!) 157/83   Pulse: 84   Temp: 98.1 °F (36.7 °C)       Physical Exam    Constitutional: She is oriented to person, place, and time. She appears well-developed and well-nourished.   HENT:   Head: Normocephalic and atraumatic.   Right Ear: External ear normal.   Left Ear: External ear normal.   Mouth/Throat: No oropharyngeal exudate.   Eyes: Pupils are equal, round, and reactive to light. Conjunctivae and EOM are normal. Right eye exhibits no discharge. Left eye exhibits no discharge. No scleral icterus.   Neck: Normal range of motion. Neck supple. No thyromegaly present.   Cardiovascular: Normal rate, regular rhythm and normal heart sounds.   Pulmonary/Chest: Effort normal and breath sounds normal. Right breast exhibits no inverted nipple, no mass, no nipple discharge, no skin change and no tenderness. Left breast exhibits no inverted nipple, no mass, no nipple discharge, no skin change and no tenderness.   Abdominal: Soft. Bowel sounds are normal.   Musculoskeletal: Normal range of motion. She exhibits no edema.        Right shoulder: She exhibits no crepitus and normal strength.   Lymphadenopathy:        Head (right side): No submental, no submandibular, no tonsillar, no preauricular, no posterior auricular and no occipital adenopathy present.        Head (left side): No submental, no submandibular, no tonsillar, no preauricular, no posterior auricular and no occipital adenopathy present.     She has no cervical adenopathy.        Right cervical: No superficial cervical and no posterior cervical adenopathy present.       Left cervical: No superficial cervical and no posterior cervical adenopathy present.     She has no axillary adenopathy.        Right: No supraclavicular adenopathy present.        Left: No supraclavicular adenopathy present.   Neurological: She is alert and oriented to person, place, and time. She has normal reflexes.   Skin: Skin is warm and dry. No rash noted. No erythema. No pallor.   Psychiatric: She has a normal mood and affect. Her behavior is normal. Judgment  and thought content normal.       mammo 5/29/19- wnl    Assessment & Plan:  Malignant neoplasm of lower-inner quadrant of left breast in female, estrogen receptor positive  -     Mammo Digital Screening Bilat; Future; Expected date: 11/26/2020    Ductal carcinoma in situ (DCIS) of left breast  -     Mammo Digital Screening Bilat; Future; Expected date: 11/26/2020    Encounter for monitoring adjuvant hormonal therapy    Encounter for follow-up surveillance of breast cancer    Encounter for screening mammogram for malignant neoplasm of breast   -     Mammo Digital Screening Bilat; Future; Expected date: 11/26/2020    1. History of DCIS left breast s/p radiation and on adjuvant therapy- arimidex 1 mg until 8/2022. Tolerating without side effects- dexa normal 6/2018- due again 6/2020  2. mammogram - wnl-  May 2019  3. Negative clinical exam  4. Encouraged pt to stop smoking and offered tobacco cessation counseling/assistance - pt states not ready yet - risks of smoking reviewed with pt who verbalizes understanding- offered pt screening lung CT and pt declined- 2-3 cigs a day  5. Encouraged pt to walk for 30 minutes most days of the week to reduce risk of recurrence- pt states will try to walk a little more- pt states will try to loose 10 # by her next visit in May- wt gain and recurrence risk discussed.   6. BSE monthly- call for any changes  7. RTC 6 mons for recheck with [facility]  8. Stopped drinking beer about 3 mons ago

## 2019-12-04 ENCOUNTER — OFFICE VISIT (OUTPATIENT)
Dept: HEMATOLOGY/ONCOLOGY | Facility: CLINIC | Age: 69
End: 2019-12-04
Payer: MEDICARE

## 2019-12-04 VITALS
HEART RATE: 84 BPM | BODY MASS INDEX: 33.93 KG/M2 | DIASTOLIC BLOOD PRESSURE: 83 MMHG | TEMPERATURE: 98 F | WEIGHT: 168 LBS | SYSTOLIC BLOOD PRESSURE: 157 MMHG

## 2019-12-04 DIAGNOSIS — D05.12 DUCTAL CARCINOMA IN SITU (DCIS) OF LEFT BREAST: ICD-10-CM

## 2019-12-04 DIAGNOSIS — Z85.3 ENCOUNTER FOR FOLLOW-UP SURVEILLANCE OF BREAST CANCER: ICD-10-CM

## 2019-12-04 DIAGNOSIS — Z17.0 MALIGNANT NEOPLASM OF LOWER-INNER QUADRANT OF LEFT BREAST IN FEMALE, ESTROGEN RECEPTOR POSITIVE: Primary | ICD-10-CM

## 2019-12-04 DIAGNOSIS — Z79.899 ENCOUNTER FOR MONITORING ADJUVANT HORMONAL THERAPY: ICD-10-CM

## 2019-12-04 DIAGNOSIS — Z12.31 ENCOUNTER FOR SCREENING MAMMOGRAM FOR MALIGNANT NEOPLASM OF BREAST: ICD-10-CM

## 2019-12-04 DIAGNOSIS — Z51.81 ENCOUNTER FOR MONITORING ADJUVANT HORMONAL THERAPY: ICD-10-CM

## 2019-12-04 DIAGNOSIS — Z08 ENCOUNTER FOR FOLLOW-UP SURVEILLANCE OF BREAST CANCER: ICD-10-CM

## 2019-12-04 DIAGNOSIS — C50.312 MALIGNANT NEOPLASM OF LOWER-INNER QUADRANT OF LEFT BREAST IN FEMALE, ESTROGEN RECEPTOR POSITIVE: Primary | ICD-10-CM

## 2019-12-04 PROCEDURE — 99999 PR PBB SHADOW E&M-EST. PATIENT-LVL III: CPT | Mod: PBBFAC,,, | Performed by: NURSE PRACTITIONER

## 2019-12-04 PROCEDURE — 1101F PR PT FALLS ASSESS DOC 0-1 FALLS W/OUT INJ PAST YR: ICD-10-PCS | Mod: CPTII,S$GLB,, | Performed by: NURSE PRACTITIONER

## 2019-12-04 PROCEDURE — 3079F PR MOST RECENT DIASTOLIC BLOOD PRESSURE 80-89 MM HG: ICD-10-PCS | Mod: CPTII,S$GLB,, | Performed by: NURSE PRACTITIONER

## 2019-12-04 PROCEDURE — 1101F PT FALLS ASSESS-DOCD LE1/YR: CPT | Mod: CPTII,S$GLB,, | Performed by: NURSE PRACTITIONER

## 2019-12-04 PROCEDURE — 99999 PR PBB SHADOW E&M-EST. PATIENT-LVL III: ICD-10-PCS | Mod: PBBFAC,,, | Performed by: NURSE PRACTITIONER

## 2019-12-04 PROCEDURE — 3077F PR MOST RECENT SYSTOLIC BLOOD PRESSURE >= 140 MM HG: ICD-10-PCS | Mod: CPTII,S$GLB,, | Performed by: NURSE PRACTITIONER

## 2019-12-04 PROCEDURE — 99214 PR OFFICE/OUTPT VISIT, EST, LEVL IV, 30-39 MIN: ICD-10-PCS | Mod: S$GLB,,, | Performed by: NURSE PRACTITIONER

## 2019-12-04 PROCEDURE — 1125F AMNT PAIN NOTED PAIN PRSNT: CPT | Mod: S$GLB,,, | Performed by: NURSE PRACTITIONER

## 2019-12-04 PROCEDURE — 1159F PR MEDICATION LIST DOCUMENTED IN MEDICAL RECORD: ICD-10-PCS | Mod: S$GLB,,, | Performed by: NURSE PRACTITIONER

## 2019-12-04 PROCEDURE — 99214 OFFICE O/P EST MOD 30 MIN: CPT | Mod: S$GLB,,, | Performed by: NURSE PRACTITIONER

## 2019-12-04 PROCEDURE — 3079F DIAST BP 80-89 MM HG: CPT | Mod: CPTII,S$GLB,, | Performed by: NURSE PRACTITIONER

## 2019-12-04 PROCEDURE — 1125F PR PAIN SEVERITY QUANTIFIED, PAIN PRESENT: ICD-10-PCS | Mod: S$GLB,,, | Performed by: NURSE PRACTITIONER

## 2019-12-04 PROCEDURE — 3077F SYST BP >= 140 MM HG: CPT | Mod: CPTII,S$GLB,, | Performed by: NURSE PRACTITIONER

## 2019-12-04 PROCEDURE — 1159F MED LIST DOCD IN RCRD: CPT | Mod: S$GLB,,, | Performed by: NURSE PRACTITIONER

## 2019-12-04 RX ORDER — NITROFURANTOIN 25; 75 MG/1; MG/1
CAPSULE ORAL
COMMUNITY
Start: 2019-12-02 | End: 2020-11-22

## 2019-12-04 RX ORDER — FLUCONAZOLE 100 MG/1
TABLET ORAL
COMMUNITY
Start: 2019-12-02 | End: 2020-11-22

## 2020-01-22 ENCOUNTER — IMMUNIZATION (OUTPATIENT)
Dept: FAMILY MEDICINE | Facility: CLINIC | Age: 70
End: 2020-01-22
Payer: MEDICARE

## 2020-01-22 PROCEDURE — 99999 PR PBB SHADOW E&M-EST. PATIENT-LVL I: ICD-10-PCS | Mod: PBBFAC,,,

## 2020-01-22 PROCEDURE — 99999 PR PBB SHADOW E&M-EST. PATIENT-LVL I: CPT | Mod: PBBFAC,,,

## 2020-01-22 PROCEDURE — 90662 FLU VACCINE - HIGH DOSE (65+) PRESERVATIVE FREE IM: ICD-10-PCS | Mod: S$GLB,,, | Performed by: FAMILY MEDICINE

## 2020-01-22 PROCEDURE — G0008 ADMIN INFLUENZA VIRUS VAC: HCPCS | Mod: S$GLB,,, | Performed by: FAMILY MEDICINE

## 2020-01-22 PROCEDURE — G0008 FLU VACCINE - HIGH DOSE (65+) PRESERVATIVE FREE IM: ICD-10-PCS | Mod: S$GLB,,, | Performed by: FAMILY MEDICINE

## 2020-01-22 PROCEDURE — 90662 IIV NO PRSV INCREASED AG IM: CPT | Mod: S$GLB,,, | Performed by: FAMILY MEDICINE

## 2020-01-22 NOTE — PROGRESS NOTES
VIS given. Patient tolerated injection well in right deltoid. I asked patient to wait in the clinic for 15 minutes before leaving to verify no adverse reactions. Patient verbally verified understanding

## 2020-03-16 RX ORDER — MELOXICAM 15 MG/1
TABLET ORAL
Qty: 30 TABLET | Refills: 5 | Status: SHIPPED | OUTPATIENT
Start: 2020-03-16 | End: 2020-09-17

## 2020-04-21 DIAGNOSIS — M81.0 AGE-RELATED OSTEOPOROSIS WITHOUT CURRENT PATHOLOGICAL FRACTURE: Primary | Chronic | ICD-10-CM

## 2020-07-08 NOTE — PROGRESS NOTES
Patient ID: Leia Rodriguez is a 70 y.o. female.    Chief Complaint: Breast Cancer (f/u) and Abnormal Mammogram      HPI: Breast cancer follow-up - last visit was 12/4/19    Pt has a history of Stage 0 DCIS of Lt. breast.  She presented in March of 2017 when diagnostic MMG confirmed the presence of a round mass with indistinct margins in the Lt. breast at the 7 o'clock position. The mass measured 9 x 5 x 6 mm. The patient was referred to Dr. Duke and on 6/15/17 underwent wire localization lumpectomy. Pathology revealed an 8 mm focus of ductal carcinoma in situ. The tumor was low grade. There was tumor present at the anterior inferior margin. Ninety percent of the tumor cells were ER positive, 5- 10% of the tumor cells were OK positive. The patient was referred for adjuvant radiotherapy. Completed a course of partial breast irradiation to the LIQ of the Lt. breast on 8/14/17.  Currently on hormonal therapy with Arimidex that started 8/2017.  Due off 8/2022- states tolerating without difficulty    DEXA- 6/29/18- wnl- f/u in 2 years - Dexa today- normal    Risk factors identified:     Menarche at 9 y/o  G 3 P 3  First pregnancy at 18 y/o  LMP: 50's  Estrogen: none  Radiation to the neck or chest wall - has had left breast radiation  Prior breast biopsies or atypical hyperplasia- left breast lumpectomy     FH: no breast cancer, daughter cervical cancer 30's, father prostate cancer 70's-     Body mass index is 33.67 kg/m².    Review of Systems   Constitutional: Negative.    HENT: Negative.    Eyes: Negative.    Respiratory: Negative.    Cardiovascular: Negative.    Gastrointestinal: Negative.         No reflux   Endocrine: Negative.    Genitourinary: Negative.    Musculoskeletal: Negative.    Skin: Negative.    Allergic/Immunologic: Negative.    Neurological: Negative.    Hematological: Negative.  Negative for adenopathy.   Psychiatric/Behavioral: Negative.    Breast: Pt denies any breast pain, nipple discharge, or  palpable mass. No prior trauma or bruising. No breast surgeries or abnormalities.    Current Outpatient Medications   Medication Sig Dispense Refill    anastrozole (ARIMIDEX) 1 mg Tab TAKE 1 TABLET (1 MG TOTAL) BY MOUTH ONCE DAILY. 90 tablet 3    ciprofloxacin HCl (CIPRO) 500 MG tablet Take 500 mg by mouth 2 (two) times daily.  0    fluconazole (DIFLUCAN) 100 MG tablet       hydroCHLOROthiazide (MICROZIDE) 12.5 mg capsule TAKE 1 CAPSULE (12.5 MG TOTAL) BY MOUTH ONCE DAILY. FOR HIGH BLOOD PRESSURE 30 capsule 11    meloxicam (MOBIC) 15 MG tablet TAKE 1 TABLET BY MOUTH EVERY DAY 30 tablet 5    metroNIDAZOLE (FLAGYL) 500 MG tablet Take 500 mg by mouth 3 (three) times daily.  0    nitrofurantoin, macrocrystal-monohydrate, (MACROBID) 100 MG capsule       oxybutynin (DITROPAN) 5 MG Tab Take 1 tablet (5 mg total) by mouth 3 (three) times daily as needed. 90 tablet 11    tamsulosin (FLOMAX) 0.4 mg Cap Take 1 capsule by mouth once daily.      vitamin D 1000 units Tab Take 1,000 Units by mouth once daily.       No current facility-administered medications for this visit.        Review of patient's allergies indicates:  No Known Allergies    Past Medical History:   Diagnosis Date    Blind right eye     Traumatic    Breast cancer 06/15/2017    0.8 cm Grade1 INTRADUCTAL BREAST 9 positve margin (left)    Essential hypertension     Hemorrhoids     Lipoma of abdominal wall     Obesity     Overactive bladder     Pap smear abnormality of cervix with ASCUS favoring benign     Thyroid nodule     Tobacco use disorder     Tubular adenoma of colon     Urinary incontinence        Past Surgical History:   Procedure Laterality Date    ANTERIOR VAGINAL REPAIR      BLADDER SURGERY      BREAST LUMPECTOMY Left 2017    CATARACT EXTRACTION Right      SECTION      X2    COLONOSCOPY N/A 3/8/2017    Procedure: COLONOSCOPY;  Surgeon: Tyron Paris MD;  Location: [facility];  Service: Endoscopy;  Laterality:  N/A;    THYROID LOBECTOMY Left 2005    TOTAL ABDOMINAL HYSTERECTOMY      TUBAL LIGATION         Family History   Problem Relation Age of Onset    Hypertension Father     Cataracts Father     Prostate cancer Father 80    Cervical cancer Daughter 32    Allergic rhinitis Daughter     Cirrhosis Mother     Alcohol abuse Mother     Hypertension Daughter     Diabetes Brother     Heart attack Brother     Heart murmur Brother     No Known Problems Daughter        Social History     Socioeconomic History    Marital status:      Spouse name: Not on file    Number of children: Not on file    Years of education: Not on file    Highest education level: Not on file   Occupational History    Not on file   Social Needs    Financial resource strain: Not on file    Food insecurity     Worry: Not on file     Inability: Not on file    Transportation needs     Medical: Not on file     Non-medical: Not on file   Tobacco Use    Smoking status: Current Every Day Smoker     Packs/day: 1.00     Years: 50.00     Pack years: 50.00     Types: Cigarettes    Smokeless tobacco: Never Used   Substance and Sexual Activity    Alcohol use: Yes     Alcohol/week: 28.0 standard drinks     Types: 28 Cans of beer per week     Frequency: 4 or more times a week     Drinks per session: 3 or 4     Binge frequency: Less than monthly    Drug use: No    Sexual activity: Never     Partners: Male   Lifestyle    Physical activity     Days per week: Not on file     Minutes per session: Not on file    Stress: Not on file   Relationships    Social connections     Talks on phone: Not on file     Gets together: Not on file     Attends [employer] service: Not on file     Active member of club or organization: Not on file     Attends meetings of clubs or organizations: Not on file     Relationship status: Not on file   Other Topics Concern    Not on file   Social History Narrative    The patient is .  She is retired from [employer]  [employer].       Vitals:    07/14/20 1344   BP: (!) 182/91   Pulse: 87   Resp: 14   Temp: 98.4 °F (36.9 °C)       Physical Exam  Constitutional:       Appearance: She is well-developed.   HENT:      Head: Normocephalic and atraumatic.      Right Ear: External ear normal.      Left Ear: External ear normal.      Mouth/Throat:      Pharynx: No oropharyngeal exudate.   Eyes:      General: No scleral icterus.        Right eye: No discharge.         Left eye: No discharge.      Conjunctiva/sclera: Conjunctivae normal.      Pupils: Pupils are equal, round, and reactive to light.   Neck:      Musculoskeletal: Normal range of motion and neck supple.      Thyroid: No thyromegaly.   Cardiovascular:      Rate and Rhythm: Normal rate and regular rhythm.      Heart sounds: Normal heart sounds.   Pulmonary:      Effort: Pulmonary effort is normal.      Breath sounds: Normal breath sounds.   Chest:      Breasts:         Right: No inverted nipple, mass, nipple discharge, skin change or tenderness.         Left: No inverted nipple, mass, nipple discharge, skin change or tenderness.       Abdominal:      General: Bowel sounds are normal.      Palpations: Abdomen is soft.   Musculoskeletal: Normal range of motion.      Right shoulder: She exhibits no crepitus and normal strength.   Lymphadenopathy:      Head:      Right side of head: No submental, submandibular, tonsillar, preauricular, posterior auricular or occipital adenopathy.      Left side of head: No submental, submandibular, tonsillar, preauricular, posterior auricular or occipital adenopathy.      Cervical: No cervical adenopathy.      Right cervical: No superficial or posterior cervical adenopathy.     Left cervical: No superficial or posterior cervical adenopathy.      Upper Body:      Right upper body: No supraclavicular adenopathy.      Left upper body: No supraclavicular adenopathy.   Skin:     General: Skin is warm and dry.      Coloration: Skin is not pale.      Findings:  No erythema or rash.   Neurological:      Mental Status: She is alert and oriented to person, place, and time.      Deep Tendon Reflexes: Reflexes are normal and symmetric.   Psychiatric:         Behavior: Behavior normal.         Thought Content: Thought content normal.         Judgment: Judgment normal.     Dexa today- normal    mammo today-   Result:   Mammo Digital Diagnostic Bilat w/ Navjot  US Breast Left Limited     History:  Patient is 70 y.o. and is seen for a diagnostic mammogram.     Films Compared:  Compared to: 05/29/2019 Mammo Digital Diagnostic Bilat w/ Navjot and 05/16/2018 Mammo Digital Diagnostic Bilat with Tomosynthesis_CAD     Findings:  This procedure was performed using tomosynthesis. Computer-aided detection was utilized in the interpretation of this examination.  The breasts are heterogeneously dense, which may obscure small masses.      Left  Mammo Digital Diagnostic Bilat w/ Navjot  There is an 11 mm oval mass with spiculated margins seen in the upper outer quadrant of the left breast in the posterior depth.      US Breast Left Limited  There is an irregularly shaped, non-parallel, hypoechoic mass with microlobulated margins with shadowing seen in the left breast at 2 o'clock, 10 cm from the nipple.      No suspicious left axillary adenopathy demonstrated.      Right     Mammo Digital Diagnostic Bilat w/ Navjot  There is no evidence of suspicious masses, calcifications, or other abnormal findings in the right breast.     Impression:  Left  Mass: Left breast 11 mm mass at the upper outer posterior position. Assessment: 5 - Highly suggestive of malignancy. Biopsy is recommended.      Right  There is no mammographic or sonographic evidence of malignancy in the right breast.     BI-RADS Category:   Overall: 5 - Highly Suggestive of Malignancy     Recommendation:  Ultrasound guided Biopsy is recommended.          Assessment & Plan:  Malignant neoplasm of lower-inner quadrant of left breast in female,  estrogen receptor positive  -     CBC auto differential; Future; Expected date: 07/14/2020  -     Comprehensive metabolic panel; Future; Expected date: 07/14/2020    Ductal carcinoma in situ (DCIS) of left breast    Tobacco use disorder    Encounter for follow-up surveillance of breast cancer    Encounter for monitoring adjuvant hormonal therapy    Counseling and coordination of care    Counseling on health promotion and disease prevention    Health education/counseling    Normocytic anemia  -     CBC auto differential; Future; Expected date: 07/14/2020  -     Comprehensive metabolic panel; Future; Expected date: 07/14/2020  -     Ferritin; Future; Expected date: 07/14/2020  -     Iron and TIBC; Future; Expected date: 07/14/2020    Colon cancer screening  -     Case request GI: COLONOSCOPY    Polyp of colon, unspecified part of colon, unspecified type  -     Case request GI: COLONOSCOPY    Abnormal mammogram  -     US Breast Biopsy with Imaging 1st site Left; Future; Expected date: 07/28/2020  -     Mammo Digital Diagnostic Post Procedure_Mammo Guide_Clip_Needle Loc only; Future; Expected date: 07/28/2020    1. History of DCIS left breast s/p radiation and on adjuvant therapy- arimidex 1 mg until 8/2022. Tolerating without side effects- dexa today- normal  2. mammogram - abnormal irregular mass 2 oclock left breast- ultrasound guided core biopsy recommended- scheduled for August 5th at 8 am. Per daughter requested date. Explained results and need for biopsy. Explained process of biopsy. Pre, neisha and postop instructions were given verbally and in writing to pt and daughter Moe [phone]. Pt would like for us to notify her daughter with the path results. Scheduled post biopsy f/u for 8/12/2020. Pt instructed to stop nsaid use, fish oil and vit e for 7 days prior to biopsy. If biopsy result benign will plan for 6 mon f/u imaging. If biopsy cancer explained would schedule f/u with oncology/gen surg/rad onc and  plastics. Discussed possible recommendation for mastectomy if cancer due to previous history of cancer in the left breast with radiation. Discussed if interested in reconstruction must stop smoking. Pt verbalizes understanding ans states will try. We would get pt navigation involved with coordinating her care. Daughter and pt verbalize understanding  3. Negative clinical exam - vague thickening upper outer quad left breast  4. Encouraged pt to walk for 30 minutes most days of the week to reduce risk of recurrence- pt states will try to walk a little more- pt states will try to loose 10 # by her next visit in May- wt gain and recurrence risk discussed.   5. BSE monthly- call for any changes  6. Normocytic anemia with thrombocytosis Nov 2019- needs labs for iron def work-up, due for colonoscopy this year- last 2017 - 4 polyps- rec 3 year f/u- orders placed- labs today  7. Screening CT of lungs performed 9/18/19 and was wnl

## 2020-07-14 ENCOUNTER — HOSPITAL ENCOUNTER (OUTPATIENT)
Dept: RADIOLOGY | Facility: HOSPITAL | Age: 70
Discharge: HOME | End: 2020-07-14
Attending: NURSE PRACTITIONER
Payer: MEDICARE

## 2020-07-14 ENCOUNTER — OFFICE VISIT (OUTPATIENT)
Dept: HEMATOLOGY/ONCOLOGY | Facility: CLINIC | Age: 70
End: 2020-07-14
Payer: MEDICARE

## 2020-07-14 VITALS
TEMPERATURE: 98 F | RESPIRATION RATE: 14 BRPM | BODY MASS INDEX: 33.87 KG/M2 | HEIGHT: 62 IN | HEART RATE: 87 BPM | OXYGEN SATURATION: 94 % | SYSTOLIC BLOOD PRESSURE: 182 MMHG | WEIGHT: 184.06 LBS | DIASTOLIC BLOOD PRESSURE: 91 MMHG

## 2020-07-14 VITALS — BODY MASS INDEX: 33.87 KG/M2 | HEIGHT: 59 IN | WEIGHT: 168 LBS

## 2020-07-14 DIAGNOSIS — K63.5 POLYP OF COLON, UNSPECIFIED PART OF COLON, UNSPECIFIED TYPE: ICD-10-CM

## 2020-07-14 DIAGNOSIS — C50.312 MALIGNANT NEOPLASM OF LOWER-INNER QUADRANT OF LEFT BREAST IN FEMALE, ESTROGEN RECEPTOR POSITIVE: ICD-10-CM

## 2020-07-14 DIAGNOSIS — Z71.89 COUNSELING AND COORDINATION OF CARE: ICD-10-CM

## 2020-07-14 DIAGNOSIS — Z17.0 MALIGNANT NEOPLASM OF LOWER-INNER QUADRANT OF LEFT BREAST IN FEMALE, ESTROGEN RECEPTOR POSITIVE: ICD-10-CM

## 2020-07-14 DIAGNOSIS — E87.6 HYPOKALEMIA: Primary | ICD-10-CM

## 2020-07-14 DIAGNOSIS — Z12.31 ENCOUNTER FOR SCREENING MAMMOGRAM FOR MALIGNANT NEOPLASM OF BREAST: ICD-10-CM

## 2020-07-14 DIAGNOSIS — Z85.3 ENCOUNTER FOR FOLLOW-UP SURVEILLANCE OF BREAST CANCER: ICD-10-CM

## 2020-07-14 DIAGNOSIS — Z71.89 COUNSELING ON HEALTH PROMOTION AND DISEASE PREVENTION: ICD-10-CM

## 2020-07-14 DIAGNOSIS — Z17.0 MALIGNANT NEOPLASM OF LOWER-INNER QUADRANT OF LEFT BREAST IN FEMALE, ESTROGEN RECEPTOR POSITIVE: Primary | ICD-10-CM

## 2020-07-14 DIAGNOSIS — Z12.11 COLON CANCER SCREENING: ICD-10-CM

## 2020-07-14 DIAGNOSIS — C50.312 MALIGNANT NEOPLASM OF LOWER-INNER QUADRANT OF LEFT BREAST IN FEMALE, ESTROGEN RECEPTOR POSITIVE: Primary | ICD-10-CM

## 2020-07-14 DIAGNOSIS — D05.12 DUCTAL CARCINOMA IN SITU (DCIS) OF LEFT BREAST: ICD-10-CM

## 2020-07-14 DIAGNOSIS — F17.200 TOBACCO USE DISORDER: Chronic | ICD-10-CM

## 2020-07-14 DIAGNOSIS — Z08 ENCOUNTER FOR FOLLOW-UP SURVEILLANCE OF BREAST CANCER: ICD-10-CM

## 2020-07-14 DIAGNOSIS — Z51.81 ENCOUNTER FOR MONITORING ADJUVANT HORMONAL THERAPY: ICD-10-CM

## 2020-07-14 DIAGNOSIS — D64.9 NORMOCYTIC ANEMIA: ICD-10-CM

## 2020-07-14 DIAGNOSIS — Z71.9 HEALTH EDUCATION/COUNSELING: ICD-10-CM

## 2020-07-14 DIAGNOSIS — Z79.899 ENCOUNTER FOR MONITORING ADJUVANT HORMONAL THERAPY: ICD-10-CM

## 2020-07-14 DIAGNOSIS — R92.8 ABNORMAL MAMMOGRAM: ICD-10-CM

## 2020-07-14 PROCEDURE — 3080F DIAST BP >= 90 MM HG: CPT | Mod: CPTII,S$GLB,, | Performed by: NURSE PRACTITIONER

## 2020-07-14 PROCEDURE — 99999 PR PBB SHADOW E&M-EST. PATIENT-LVL V: CPT | Mod: PBBFAC,,, | Performed by: NURSE PRACTITIONER

## 2020-07-14 PROCEDURE — 3008F BODY MASS INDEX DOCD: CPT | Mod: CPTII,S$GLB,, | Performed by: NURSE PRACTITIONER

## 2020-07-14 PROCEDURE — 99215 OFFICE O/P EST HI 40 MIN: CPT | Mod: S$GLB,,, | Performed by: NURSE PRACTITIONER

## 2020-07-14 PROCEDURE — 77066 MAMMO DIGITAL DIAGNOSTIC BILAT WITH TOMOSYNTHESIS_CAD: ICD-10-PCS | Mod: 26,,, | Performed by: RADIOLOGY

## 2020-07-14 PROCEDURE — 77062 MAMMO DIGITAL DIAGNOSTIC BILAT WITH TOMOSYNTHESIS_CAD: ICD-10-PCS | Mod: 26,,, | Performed by: RADIOLOGY

## 2020-07-14 PROCEDURE — 1101F PT FALLS ASSESS-DOCD LE1/YR: CPT | Mod: CPTII,S$GLB,, | Performed by: NURSE PRACTITIONER

## 2020-07-14 PROCEDURE — 3077F PR MOST RECENT SYSTOLIC BLOOD PRESSURE >= 140 MM HG: ICD-10-PCS | Mod: CPTII,S$GLB,, | Performed by: NURSE PRACTITIONER

## 2020-07-14 PROCEDURE — 76642 US BREAST LEFT LIMITED: ICD-10-PCS | Mod: 26,LT,, | Performed by: RADIOLOGY

## 2020-07-14 PROCEDURE — 3080F PR MOST RECENT DIASTOLIC BLOOD PRESSURE >= 90 MM HG: ICD-10-PCS | Mod: CPTII,S$GLB,, | Performed by: NURSE PRACTITIONER

## 2020-07-14 PROCEDURE — 1159F MED LIST DOCD IN RCRD: CPT | Mod: S$GLB,,, | Performed by: NURSE PRACTITIONER

## 2020-07-14 PROCEDURE — 77062 BREAST TOMOSYNTHESIS BI: CPT | Mod: 26,,, | Performed by: RADIOLOGY

## 2020-07-14 PROCEDURE — 76642 ULTRASOUND BREAST LIMITED: CPT | Mod: TC,LT

## 2020-07-14 PROCEDURE — 99999 PR PBB SHADOW E&M-EST. PATIENT-LVL V: ICD-10-PCS | Mod: PBBFAC,,, | Performed by: NURSE PRACTITIONER

## 2020-07-14 PROCEDURE — 1126F PR PAIN SEVERITY QUANTIFIED, NO PAIN PRESENT: ICD-10-PCS | Mod: S$GLB,,, | Performed by: NURSE PRACTITIONER

## 2020-07-14 PROCEDURE — 1126F AMNT PAIN NOTED NONE PRSNT: CPT | Mod: S$GLB,,, | Performed by: NURSE PRACTITIONER

## 2020-07-14 PROCEDURE — 1101F PR PT FALLS ASSESS DOC 0-1 FALLS W/OUT INJ PAST YR: ICD-10-PCS | Mod: CPTII,S$GLB,, | Performed by: NURSE PRACTITIONER

## 2020-07-14 PROCEDURE — 3077F SYST BP >= 140 MM HG: CPT | Mod: CPTII,S$GLB,, | Performed by: NURSE PRACTITIONER

## 2020-07-14 PROCEDURE — 77066 DX MAMMO INCL CAD BI: CPT | Mod: 26,,, | Performed by: RADIOLOGY

## 2020-07-14 PROCEDURE — 99215 PR OFFICE/OUTPT VISIT, EST, LEVL V, 40-54 MIN: ICD-10-PCS | Mod: S$GLB,,, | Performed by: NURSE PRACTITIONER

## 2020-07-14 PROCEDURE — 1159F PR MEDICATION LIST DOCUMENTED IN MEDICAL RECORD: ICD-10-PCS | Mod: S$GLB,,, | Performed by: NURSE PRACTITIONER

## 2020-07-14 PROCEDURE — 3008F PR BODY MASS INDEX (BMI) DOCUMENTED: ICD-10-PCS | Mod: CPTII,S$GLB,, | Performed by: NURSE PRACTITIONER

## 2020-07-14 PROCEDURE — 76642 ULTRASOUND BREAST LIMITED: CPT | Mod: 26,LT,, | Performed by: RADIOLOGY

## 2020-07-14 PROCEDURE — 77066 DX MAMMO INCL CAD BI: CPT | Mod: TC

## 2020-07-14 RX ORDER — TAMSULOSIN HYDROCHLORIDE 0.4 MG/1
1 CAPSULE ORAL DAILY
COMMUNITY
Start: 2020-06-24 | End: 2023-11-01

## 2020-07-15 DIAGNOSIS — D50.0 IRON DEFICIENCY ANEMIA DUE TO CHRONIC BLOOD LOSS: Primary | ICD-10-CM

## 2020-07-15 DIAGNOSIS — D64.9 NORMOCYTIC ANEMIA: ICD-10-CM

## 2020-07-16 ENCOUNTER — TELEPHONE (OUTPATIENT)
Dept: ENDOSCOPY | Facility: HOSPITAL | Age: 70
End: 2020-07-16

## 2020-07-16 NOTE — TELEPHONE ENCOUNTER
Location Screening:    If answers yes to any of the following, schedule at [place] ONLY. If No, OK for either location.    1. Is there a diagnosis of heart failure, severe heart valve disease (aortic stenosis) or mechanical valve? no  a. Is the Left Ventricle Ejection Fraction <30% ? not applicable    2. Does the pt have pulmonary hypertension? no   a. Is pulmonary arterial pressure gradient >50mmHg? not applicable   b. Is there evidence of right ventricular dysfunction? no    3. Does the pt have achalasia? no    4. Any history of negative reaction to anesthesia? no   a. Myasthenia gravis? no   b. Malignant hyperthermia? no   c. Other? no    5. Is procedure for esophageal banding? no      COVID Screening    1. Have you had a fever in the last 7 days or have you used fever reducing medicines for a fever in the last 7 days?  no    2. Are you experiencing shortness of breath, cough, muscle aches, loss of taste or loss of smell?  no    If answered yes to questions 1 and 2, the patient must seek medical attention with their PCP.  Do not schedule their procedure.     3. Are you residing with anyone who has tested positive for Covid?  no    If answered yes to question 3, recommend 14 day self-quarantine from the date of relative's positive test and place special needs note in the depot.  Wait to schedule.     ENDO screening    1. Have you been admitted for cardiac, kidney or pulmonary causes to the hospital in the past 3 months? no   If yes, schedule an appointment with PCP before scheduling endoscopic procedure.     2. Have you had a stent placed in the last 12 months? no   If yes, for a screening visit, cancel and message the ordering provider.  The patient will need a new order when the time is appropriate.     3. Have you had a stroke or heart attack in the past 6 months? no   If yes, cancel and refer patient to ordering provider for clearance, also message ordering provider to inform.     4. Have you had any chest pain  "in the past 3 months? no   If yes, Have you been evaluated by your PCP and/or cardiologist and it was determined to not be heart related? not applicable   If No, Pt needs to be seen by PCP or Cardiologist .  Pt can be scheduled once clearance obtained by either of those providers.     5. Do you take prescription weight loss medications?  no   If yes, must stop for 2 weeks prior to procedure.     6. Have you been diagnosed with diverticulitis within the past 3 months? no   If yes, must have been seen by GI within the last 3 months, if not schedule with GI SHANI[facility].    If pt has been seen by GI, schedule procedure 8-12 weeks post antibiotic treatment.     7. Are you on Dialysis? no  If yes, schedule procedure for the day AFTER dialysis.  Appt time should be 9am or later, patient arrival time is 2 hours prior.  Nulytely or miralax prep for all patients with kidney disease.     8. Are you diabetic?  no   If yes, schedule morning appt. Advise pt to hold all diabetic meds day of procedure.     9. If pt is older than 80 years of age and HAS NOT been seen by GI or PCP within the last 6 months, needs appt with GI SHANIQUE.   If pt has been seen by the GI provider or PCP within the past 6  months AND meets criteria, schedule procedure AND send message to the endoscopist.     10. Is patient on a "high risk" medication (blood thinner/antiplatelet agent)?  no   If yes, has cardiac clearance been obtained within the last 60 days? N/A   If no, a new clearance needs to be obtained.     Final Questions:    1.I have reviewed the last colonoscopy for recommendations regarding next procedure bowel prep.  yes  2. I have reviewed medications and allergies.  yes  3. I have verified the pharmacy information and appropriate prep sent if needed. yes  4. Prep instructions have been mailed or sent to portal per patient request. yes        All schedulers will have ability to reach out to the ordering GI provider to clarify any issues.     "

## 2020-07-20 ENCOUNTER — TELEPHONE (OUTPATIENT)
Dept: ENDOSCOPY | Facility: HOSPITAL | Age: 70
End: 2020-07-20

## 2020-07-20 NOTE — TELEPHONE ENCOUNTER
Attempted to speak with patient's daughter regarding scheduling procedure, no answer and unable to leave a message.

## 2020-08-05 ENCOUNTER — HOSPITAL ENCOUNTER (OUTPATIENT)
Dept: RADIOLOGY | Facility: HOSPITAL | Age: 70
Discharge: HOME | End: 2020-08-05
Attending: NURSE PRACTITIONER
Payer: MEDICARE

## 2020-08-05 ENCOUNTER — TELEPHONE (OUTPATIENT)
Dept: ENDOSCOPY | Facility: HOSPITAL | Age: 70
End: 2020-08-05

## 2020-08-05 DIAGNOSIS — R92.8 ABNORMAL MAMMOGRAM: ICD-10-CM

## 2020-08-05 DIAGNOSIS — D50.0 CHRONIC BLOOD LOSS ANEMIA: Primary | ICD-10-CM

## 2020-08-05 PROCEDURE — 88360 TUMOR IMMUNOHISTOCHEM/MANUAL: CPT | Mod: 59 | Performed by: PATHOLOGY

## 2020-08-05 PROCEDURE — 88341 PR IHC OR ICC EACH ADD'L SINGLE ANTIBODY  STAINPR: ICD-10-PCS | Mod: 26,59,, | Performed by: PATHOLOGY

## 2020-08-05 PROCEDURE — 88341 IMHCHEM/IMCYTCHM EA ADD ANTB: CPT | Mod: 26,59,, | Performed by: PATHOLOGY

## 2020-08-05 PROCEDURE — 88342 IMHCHEM/IMCYTCHM 1ST ANTB: CPT | Mod: 26,59,, | Performed by: PATHOLOGY

## 2020-08-05 PROCEDURE — 88360 PR  TUMOR IMMUNOHISTOCHEM/MANUAL: ICD-10-PCS | Mod: 26,,, | Performed by: PATHOLOGY

## 2020-08-05 PROCEDURE — 88305 TISSUE EXAM BY PATHOLOGIST: CPT | Mod: 26,,, | Performed by: PATHOLOGY

## 2020-08-05 PROCEDURE — A4648 IMPLANTABLE TISSUE MARKER: HCPCS

## 2020-08-05 PROCEDURE — 19083 BX BREAST 1ST LESION US IMAG: CPT | Mod: LT,,, | Performed by: RADIOLOGY

## 2020-08-05 PROCEDURE — 88377 M/PHMTRC ALYS ISHQUANT/SEMIQ: CPT | Performed by: PATHOLOGY

## 2020-08-05 PROCEDURE — A4550 SURGICAL TRAYS: HCPCS

## 2020-08-05 PROCEDURE — 88305 TISSUE EXAM BY PATHOLOGIST: CPT | Performed by: PATHOLOGY

## 2020-08-05 PROCEDURE — 88342 CHG IMMUNOCYTOCHEMISTRY: ICD-10-PCS | Mod: 26,59,, | Performed by: PATHOLOGY

## 2020-08-05 PROCEDURE — 88305 TISSUE EXAM BY PATHOLOGIST: ICD-10-PCS | Mod: 26,,, | Performed by: PATHOLOGY

## 2020-08-05 PROCEDURE — 88360 TUMOR IMMUNOHISTOCHEM/MANUAL: CPT | Mod: 26,,, | Performed by: PATHOLOGY

## 2020-08-05 PROCEDURE — 19083 US BREAST BIOPSY WITH IMAGING 1ST SITE LEFT: ICD-10-PCS | Mod: LT,,, | Performed by: RADIOLOGY

## 2020-08-05 RX ORDER — SODIUM, POTASSIUM,MAG SULFATES 17.5-3.13G
1 SOLUTION, RECONSTITUTED, ORAL ORAL DAILY
Qty: 1 KIT | Refills: 0 | Status: SHIPPED | OUTPATIENT
Start: 2020-08-05 | End: 2020-08-07

## 2020-08-05 NOTE — TELEPHONE ENCOUNTER
Spoke with patient's daughter and scheduled procedure for 8/26/2020 with dr del rosario.  Instructions sent via portal and patient's daughter verbalized understanding of the above.

## 2020-08-10 ENCOUNTER — TELEPHONE (OUTPATIENT)
Dept: HEMATOLOGY/ONCOLOGY | Facility: CLINIC | Age: 70
End: 2020-08-10

## 2020-08-10 NOTE — TELEPHONE ENCOUNTER
Called patient regarding need for medical and surgical oncology appointments. Left voicemail for daughter per Melquiades Negro NP's instruction. Provided my direct number on voicemail and instructed daughter to return call at earliest convenience

## 2020-08-10 NOTE — TELEPHONE ENCOUNTER
----- Message from Seema Negro NP sent at 8/10/2020  8:04 AM CDT -----  Regarding: new breast cancer  Good morning,    This patient has been recently diagnosed with breast cancer.    She has a previous history of left breast DCIS that was diagnosed in 2017.    Her oncologist is Dr. Fernández and her previous surgeon was Dr. Duke.     Patient had a lumpectomy previously with radiation therapy per Dr. Khan and has been on Arimidex since completion of radiation.    Pt had abnormal mammogram 7/14/2020 that revealed highly suspicious finding in the left breast. Palpable thickening noted upper outer left breast.     Ultrasound core biopsy revealed:    Left breast upper outer quadrant, biopsy:   Invasive ductal carcinoma, Grade 2 ( Tubules=3, nuclei=3, mitosis=1),   measuring 1.4 cm (14 mm) in greatest linear dimension within the core biopsy   Ductal carcinoma in situ (DCIS), nuclear grade 3, cribriform pattern,   measuring 3 mm   Microcalcifications present   Molecular markers are pending and will be reported in a supplemental     Please contact pt daughter to coordinate appointments and discuss- pt requested this at the time of her visit with me.     Daughter name is Moe and phone is [phone]. I have discussed the diagnosis with her today and let her know that you all will be contacting her to schedule the oncology appt and the surgeon appointment. They have no preference in regard tot he surgeon. Daughter asked if possible would like to have appts on a Wednesday since that is her day off and she has to bring pt to appts and be present since pt does not understand some things well.     Thank you!    Seema

## 2020-08-10 NOTE — TELEPHONE ENCOUNTER
Daughter returned my call, discussed appt options. Explained that FISH is pending, however due to daughter's schedule and their desire to discuss options quickly with a physician, scheduled with Dr. Fernández and Dr. oMyer for Wednesday 8/12. Reviewed location of the [facility], daughter has my number if she needs anything. Verbalized understanding to all information.

## 2020-08-11 ENCOUNTER — PATIENT OUTREACH (OUTPATIENT)
Dept: ADMINISTRATIVE | Facility: OTHER | Age: 70
End: 2020-08-11

## 2020-08-12 ENCOUNTER — OFFICE VISIT (OUTPATIENT)
Dept: HEMATOLOGY/ONCOLOGY | Facility: CLINIC | Age: 70
End: 2020-08-12
Payer: MEDICARE

## 2020-08-12 ENCOUNTER — CLINICAL SUPPORT (OUTPATIENT)
Dept: SMOKING CESSATION | Facility: CLINIC | Age: 70
End: 2020-08-12
Payer: COMMERCIAL

## 2020-08-12 ENCOUNTER — OFFICE VISIT (OUTPATIENT)
Dept: SURGERY | Facility: CLINIC | Age: 70
End: 2020-08-12
Payer: MEDICARE

## 2020-08-12 VITALS
HEART RATE: 98 BPM | BODY MASS INDEX: 34.37 KG/M2 | TEMPERATURE: 99 F | DIASTOLIC BLOOD PRESSURE: 72 MMHG | OXYGEN SATURATION: 100 % | WEIGHT: 186.75 LBS | HEIGHT: 62 IN | SYSTOLIC BLOOD PRESSURE: 155 MMHG

## 2020-08-12 VITALS
BODY MASS INDEX: 34.23 KG/M2 | SYSTOLIC BLOOD PRESSURE: 155 MMHG | HEIGHT: 62 IN | DIASTOLIC BLOOD PRESSURE: 72 MMHG | TEMPERATURE: 99 F | WEIGHT: 186 LBS | HEART RATE: 78 BPM

## 2020-08-12 DIAGNOSIS — C50.312 MALIGNANT NEOPLASM OF LOWER-INNER QUADRANT OF LEFT BREAST IN FEMALE, ESTROGEN RECEPTOR POSITIVE: Primary | ICD-10-CM

## 2020-08-12 DIAGNOSIS — Z17.0 MALIGNANT NEOPLASM OF LOWER-INNER QUADRANT OF LEFT BREAST IN FEMALE, ESTROGEN RECEPTOR POSITIVE: ICD-10-CM

## 2020-08-12 DIAGNOSIS — Z17.0 MALIGNANT NEOPLASM OF LOWER-INNER QUADRANT OF LEFT BREAST IN FEMALE, ESTROGEN RECEPTOR POSITIVE: Primary | ICD-10-CM

## 2020-08-12 DIAGNOSIS — C50.312 MALIGNANT NEOPLASM OF LOWER-INNER QUADRANT OF LEFT BREAST IN FEMALE, ESTROGEN RECEPTOR POSITIVE: ICD-10-CM

## 2020-08-12 DIAGNOSIS — F17.200 NICOTINE DEPENDENCE: Primary | ICD-10-CM

## 2020-08-12 DIAGNOSIS — D05.12 DUCTAL CARCINOMA IN SITU (DCIS) OF LEFT BREAST: Primary | ICD-10-CM

## 2020-08-12 PROCEDURE — 99404 PR PREVENT COUNSEL,INDIV,60 MIN: ICD-10-PCS | Mod: S$GLB,,, | Performed by: GENERAL PRACTICE

## 2020-08-12 PROCEDURE — 3078F DIAST BP <80 MM HG: CPT | Mod: CPTII,S$GLB,, | Performed by: INTERNAL MEDICINE

## 2020-08-12 PROCEDURE — 99999 PR PBB SHADOW E&M-EST. PATIENT-LVL III: CPT | Mod: PBBFAC,,, | Performed by: SURGERY

## 2020-08-12 PROCEDURE — 1126F AMNT PAIN NOTED NONE PRSNT: CPT | Mod: S$GLB,,, | Performed by: SURGERY

## 2020-08-12 PROCEDURE — 1101F PR PT FALLS ASSESS DOC 0-1 FALLS W/OUT INJ PAST YR: ICD-10-PCS | Mod: CPTII,S$GLB,, | Performed by: SURGERY

## 2020-08-12 PROCEDURE — 1125F PR PAIN SEVERITY QUANTIFIED, PAIN PRESENT: ICD-10-PCS | Mod: S$GLB,,, | Performed by: INTERNAL MEDICINE

## 2020-08-12 PROCEDURE — 1101F PT FALLS ASSESS-DOCD LE1/YR: CPT | Mod: CPTII,S$GLB,, | Performed by: SURGERY

## 2020-08-12 PROCEDURE — 99215 PR OFFICE/OUTPT VISIT, EST, LEVL V, 40-54 MIN: ICD-10-PCS | Mod: S$GLB,,, | Performed by: INTERNAL MEDICINE

## 2020-08-12 PROCEDURE — 3008F BODY MASS INDEX DOCD: CPT | Mod: CPTII,S$GLB,, | Performed by: INTERNAL MEDICINE

## 2020-08-12 PROCEDURE — 99404 PREV MED CNSL INDIV APPRX 60: CPT | Mod: S$GLB,,, | Performed by: GENERAL PRACTICE

## 2020-08-12 PROCEDURE — 99999 PR PBB SHADOW E&M-EST. PATIENT-LVL I: ICD-10-PCS | Mod: PBBFAC,,,

## 2020-08-12 PROCEDURE — 1125F AMNT PAIN NOTED PAIN PRSNT: CPT | Mod: S$GLB,,, | Performed by: INTERNAL MEDICINE

## 2020-08-12 PROCEDURE — 3077F SYST BP >= 140 MM HG: CPT | Mod: CPTII,S$GLB,, | Performed by: INTERNAL MEDICINE

## 2020-08-12 PROCEDURE — 99214 PR OFFICE/OUTPT VISIT, EST, LEVL IV, 30-39 MIN: ICD-10-PCS | Mod: S$GLB,,, | Performed by: SURGERY

## 2020-08-12 PROCEDURE — 99215 OFFICE O/P EST HI 40 MIN: CPT | Mod: S$GLB,,, | Performed by: INTERNAL MEDICINE

## 2020-08-12 PROCEDURE — 3077F PR MOST RECENT SYSTOLIC BLOOD PRESSURE >= 140 MM HG: ICD-10-PCS | Mod: CPTII,S$GLB,, | Performed by: INTERNAL MEDICINE

## 2020-08-12 PROCEDURE — 3078F DIAST BP <80 MM HG: CPT | Mod: CPTII,S$GLB,, | Performed by: SURGERY

## 2020-08-12 PROCEDURE — 1159F MED LIST DOCD IN RCRD: CPT | Mod: S$GLB,,, | Performed by: INTERNAL MEDICINE

## 2020-08-12 PROCEDURE — 3077F PR MOST RECENT SYSTOLIC BLOOD PRESSURE >= 140 MM HG: ICD-10-PCS | Mod: CPTII,S$GLB,, | Performed by: SURGERY

## 2020-08-12 PROCEDURE — 99214 OFFICE O/P EST MOD 30 MIN: CPT | Mod: S$GLB,,, | Performed by: SURGERY

## 2020-08-12 PROCEDURE — 3078F PR MOST RECENT DIASTOLIC BLOOD PRESSURE < 80 MM HG: ICD-10-PCS | Mod: CPTII,S$GLB,, | Performed by: SURGERY

## 2020-08-12 PROCEDURE — 3078F PR MOST RECENT DIASTOLIC BLOOD PRESSURE < 80 MM HG: ICD-10-PCS | Mod: CPTII,S$GLB,, | Performed by: INTERNAL MEDICINE

## 2020-08-12 PROCEDURE — 99999 PR PBB SHADOW E&M-EST. PATIENT-LVL I: CPT | Mod: PBBFAC,,,

## 2020-08-12 PROCEDURE — 3008F BODY MASS INDEX DOCD: CPT | Mod: CPTII,S$GLB,, | Performed by: SURGERY

## 2020-08-12 PROCEDURE — 1101F PR PT FALLS ASSESS DOC 0-1 FALLS W/OUT INJ PAST YR: ICD-10-PCS | Mod: CPTII,S$GLB,, | Performed by: INTERNAL MEDICINE

## 2020-08-12 PROCEDURE — 99999 PR PBB SHADOW E&M-EST. PATIENT-LVL IV: ICD-10-PCS | Mod: PBBFAC,,, | Performed by: INTERNAL MEDICINE

## 2020-08-12 PROCEDURE — 99999 PR PBB SHADOW E&M-EST. PATIENT-LVL IV: CPT | Mod: PBBFAC,,, | Performed by: INTERNAL MEDICINE

## 2020-08-12 PROCEDURE — 3008F PR BODY MASS INDEX (BMI) DOCUMENTED: ICD-10-PCS | Mod: CPTII,S$GLB,, | Performed by: INTERNAL MEDICINE

## 2020-08-12 PROCEDURE — 99999 PR PBB SHADOW E&M-EST. PATIENT-LVL III: ICD-10-PCS | Mod: PBBFAC,,, | Performed by: SURGERY

## 2020-08-12 PROCEDURE — 3008F PR BODY MASS INDEX (BMI) DOCUMENTED: ICD-10-PCS | Mod: CPTII,S$GLB,, | Performed by: SURGERY

## 2020-08-12 PROCEDURE — 1159F PR MEDICATION LIST DOCUMENTED IN MEDICAL RECORD: ICD-10-PCS | Mod: S$GLB,,, | Performed by: SURGERY

## 2020-08-12 PROCEDURE — 1159F MED LIST DOCD IN RCRD: CPT | Mod: S$GLB,,, | Performed by: SURGERY

## 2020-08-12 PROCEDURE — 1101F PT FALLS ASSESS-DOCD LE1/YR: CPT | Mod: CPTII,S$GLB,, | Performed by: INTERNAL MEDICINE

## 2020-08-12 PROCEDURE — 1159F PR MEDICATION LIST DOCUMENTED IN MEDICAL RECORD: ICD-10-PCS | Mod: S$GLB,,, | Performed by: INTERNAL MEDICINE

## 2020-08-12 PROCEDURE — 1126F PR PAIN SEVERITY QUANTIFIED, NO PAIN PRESENT: ICD-10-PCS | Mod: S$GLB,,, | Performed by: SURGERY

## 2020-08-12 PROCEDURE — 3077F SYST BP >= 140 MM HG: CPT | Mod: CPTII,S$GLB,, | Performed by: SURGERY

## 2020-08-12 RX ORDER — BUPROPION HYDROCHLORIDE 150 MG/1
150 TABLET, EXTENDED RELEASE ORAL 2 TIMES DAILY
Qty: 60 TABLET | Refills: 2 | Status: SHIPPED | OUTPATIENT
Start: 2020-08-12 | End: 2021-10-21

## 2020-08-12 NOTE — LETTER
August 12, 2020      Seema Negro NP  [address]  [place] [address]           [facility]  [address]  [place] [address]  Phone: [phone]  Fax: [phone]          Patient: Leia Rodriguez   MR Number: [id number]   YOB: 1950   Date of Visit: 8/12/2020       Dear Seema Negro:    Thank you for referring Leia Rodriguez to me for evaluation. Attached you will find relevant portions of my assessment and plan of care.    If you have questions, please do not hesitate to call me. I look forward to following Leia Rodriguez along with you.    Sincerely,    Vincent Moyer MD    Enclosure  CC:  No Recipients    If you would like to receive this communication electronically, please contact [email or web address] or [phone] to request more information on [facility] access.    For providers and/or their staff who would like to refer a patient to [facility], please contact us through our one-stop-shop provider referral line, Wilfredo Hearn, at [phone].    If you feel you have received this communication in error or would no longer like to receive these types of communications, please e-mail [email or web address]

## 2020-08-12 NOTE — PROGRESS NOTES
Subjective:       Patient ID: Leia Rodriguez is a 70 y.o. female.    Chief Complaint: Results and Breast Cancer    HPI 70-year-old female history intraductal breast carcinoma in 2017 status post radiation patient has in feel recurrence with invasive breast carcinoma cm.  Patient evaluation for treatment recommendations ECOG status 2    Past Medical History:   Diagnosis Date    Blind right eye     Traumatic    Breast cancer 06/15/2017    0.8 cm Grade1 INTRADUCTAL BREAST 9 positve margin (left)    Essential hypertension     Hemorrhoids     Lipoma of abdominal wall     Obesity     Overactive bladder     Pap smear abnormality of cervix with ASCUS favoring benign     Thyroid nodule     Tobacco use disorder     Tubular adenoma of colon     Urinary incontinence      Family History   Problem Relation Age of Onset    Hypertension Father     Cataracts Father     Prostate cancer Father 80    Cervical cancer Daughter 32    Allergic rhinitis Daughter     Cirrhosis Mother     Alcohol abuse Mother     Hypertension Daughter     Diabetes Brother     Heart attack Brother     Heart murmur Brother     No Known Problems Daughter      Social History     Socioeconomic History    Marital status:      Spouse name: Not on file    Number of children: Not on file    Years of education: Not on file    Highest education level: Not on file   Occupational History    Not on file   Social Needs    Financial resource strain: Not on file    Food insecurity     Worry: Not on file     Inability: Not on file    Transportation needs     Medical: Not on file     Non-medical: Not on file   Tobacco Use    Smoking status: Current Every Day Smoker     Packs/day: 1.00     Years: 50.00     Pack years: 50.00     Types: Cigarettes    Smokeless tobacco: Never Used   Substance and Sexual Activity    Alcohol use: Yes     Alcohol/week: 28.0 standard drinks     Types: 28 Cans of beer per week     Frequency: 4 or more times a  week     Drinks per session: 3 or 4     Binge frequency: Less than monthly    Drug use: No    Sexual activity: Never     Partners: Male   Lifestyle    Physical activity     Days per week: Not on file     Minutes per session: Not on file    Stress: Not on file   Relationships    Social connections     Talks on phone: Not on file     Gets together: Not on file     Attends Evangelical service: Not on file     Active member of club or organization: Not on file     Attends meetings of clubs or organizations: Not on file     Relationship status: Not on file   Other Topics Concern    Not on file   Social History Narrative    The patient is .  She is retired from [employer].     Past Surgical History:   Procedure Laterality Date    ANTERIOR VAGINAL REPAIR      BLADDER SURGERY      BREAST LUMPECTOMY Left     CATARACT EXTRACTION Right      SECTION      X2    COLONOSCOPY N/A 3/8/2017    Procedure: COLONOSCOPY;  Surgeon: Tyron Paris MD;  [facility];  Service: Endoscopy;  Laterality: N/A;    THYROID LOBECTOMY Left     TOTAL ABDOMINAL HYSTERECTOMY      TUBAL LIGATION         Labs:  Lab Results   Component Value Date    WBC 6.77 2020    HGB 10.2 (L) 2020    HCT 30.5 (L) 2020    MCV 88 2020     2020     BMP  Lab Results   Component Value Date     2020    K 3.4 (L) 2020    CL 96 2020    CO2 32 (H) 2020    BUN 5 (L) 2020    CREATININE 0.9 2020    CALCIUM 9.7 2020    ANIONGAP 13 2020    ESTGFRAFRICA >60 2020    EGFRNONAA >60 2020     Lab Results   Component Value Date    ALT 7 (L) 2020    AST 13 2020    ALKPHOS 106 2020    BILITOT 0.8 2020       Lab Results   Component Value Date    IRON 37 2020    TIBC 295 2020    FERRITIN 519 (H) 2020     No results found for: AFNYSPHH64  No results found for: FOLATE  Lab Results   Component Value  Date    TSH 1.636 09/03/2019         Review of Systems   Constitutional: Positive for fatigue. Negative for activity change, appetite change, chills, diaphoresis, fever and unexpected weight change.   HENT: Negative for congestion, dental problem, drooling, ear discharge, ear pain, facial swelling, hearing loss, mouth sores, nosebleeds, postnasal drip, rhinorrhea, sinus pressure, sneezing, sore throat, tinnitus, trouble swallowing and voice change.    Eyes: Negative for photophobia, pain, discharge, redness, itching and visual disturbance.   Respiratory: Negative for cough, choking, chest tightness, shortness of breath, wheezing and stridor.    Cardiovascular: Negative for chest pain, palpitations and leg swelling.   Gastrointestinal: Negative for abdominal distention, abdominal pain, anal bleeding, blood in stool, constipation, diarrhea, nausea, rectal pain and vomiting.   Endocrine: Negative for cold intolerance, heat intolerance, polydipsia, polyphagia and polyuria.   Genitourinary: Negative for decreased urine volume, difficulty urinating, dyspareunia, dysuria, enuresis, flank pain, frequency, genital sores, hematuria, menstrual problem, pelvic pain, urgency, vaginal bleeding, vaginal discharge and vaginal pain.   Musculoskeletal: Negative for arthralgias, back pain, gait problem, joint swelling, myalgias, neck pain and neck stiffness.   Skin: Negative for color change, pallor and rash.   Allergic/Immunologic: Negative for environmental allergies, food allergies and immunocompromised state.   Neurological: Negative for dizziness, tremors, seizures, syncope, facial asymmetry, speech difficulty, weakness, light-headedness, numbness and headaches.   Hematological: Negative for adenopathy. Does not bruise/bleed easily.   Psychiatric/Behavioral: Positive for dysphoric mood. Negative for agitation, behavioral problems, confusion, decreased concentration, hallucinations, self-injury, sleep disturbance and suicidal  ideas. The patient is nervous/anxious. The patient is not hyperactive.        Objective:      Physical Exam  Vitals signs reviewed.   Constitutional:       General: She is not in acute distress.     Appearance: She is well-developed. She is not diaphoretic.   HENT:      Head: Normocephalic and atraumatic.      Right Ear: External ear normal.      Left Ear: External ear normal.      Nose: Nose normal.      Right Sinus: No maxillary sinus tenderness or frontal sinus tenderness.      Left Sinus: No maxillary sinus tenderness or frontal sinus tenderness.      Mouth/Throat:      Pharynx: No oropharyngeal exudate.   Eyes:      General: Lids are normal. No scleral icterus.        Right eye: No discharge.         Left eye: No discharge.      Conjunctiva/sclera: Conjunctivae normal.      Right eye: Right conjunctiva is not injected. No hemorrhage.     Left eye: Left conjunctiva is not injected. No hemorrhage.     Pupils: Pupils are equal, round, and reactive to light.   Neck:      Musculoskeletal: Normal range of motion and neck supple.      Thyroid: No thyromegaly.      Vascular: No JVD.      Trachea: No tracheal deviation.   Cardiovascular:      Rate and Rhythm: Normal rate and regular rhythm.      Heart sounds: Normal heart sounds.   Pulmonary:      Effort: Pulmonary effort is normal. No respiratory distress.      Breath sounds: Normal breath sounds. No stridor.   Chest:      Chest wall: No tenderness.       Abdominal:      General: Bowel sounds are normal. There is no distension.      Palpations: Abdomen is soft. There is no hepatomegaly, splenomegaly or mass.      Tenderness: There is no abdominal tenderness. There is no rebound.   Musculoskeletal: Normal range of motion.         General: No tenderness.   Lymphadenopathy:      Cervical: No cervical adenopathy.      Upper Body:      Right upper body: No supraclavicular adenopathy.      Left upper body: No supraclavicular adenopathy.   Skin:     General: Skin is dry.       Findings: No erythema or rash.   Neurological:      Mental Status: She is alert and oriented to person, place, and time.      Cranial Nerves: No cranial nerve deficit.      Coordination: Coordination normal.   Psychiatric:         Behavior: Behavior normal.         Thought Content: Thought content normal.         Judgment: Judgment normal.             Assessment:      1. Ductal carcinoma in situ (DCIS) of left breast    2. Malignant neoplasm of lower-inner quadrant of left breast in female, estrogen receptor positive           Plan:   Extensive conversation with patient and family at this point she has had 2 separate breast carcinomas in 1 intraductal N1 invasive involving her left breast.  I recommendation would be germ line testing because of the fact that she has recurred with invasive breast carcinoma while on aromatase inhibitor would recommend PET scan to rule out metastatic disease heavy smoker.  Discussed implications with her and will return after PET scan for review germ line testing drawn today but will most likely need mastectomy involving left breast to proceed          Mario Fernández Jr, MD FACP

## 2020-08-12 NOTE — PROGRESS NOTES
Patient ID: Leia Rodriguez is a 70 y.o. female.    Chief Complaint: Consult (discuss mascectomy)      HPI:  70-year-old female referred for recurrent left breast cancer.  She recently underwent biopsy showing invasive ductal carcinoma ER+/WV- Her 2 pending.  She previously underwent a left breast lumpectomy with adjuvant radiation in 2017.      For Seema Negro:  Breast cancer follow-up - last visit was 5/29/19    Pt has a history of Stage 0 DCIS of Lt. breast.  She presented in March of 2017 when diagnostic MMG confirmed the presence of a round mass with indistinct margins in the Lt. breast at the 7 o'clock position. The mass measured 9 x 5 x 6 mm. The patient was referred to Dr. Duke and on 6/15/17 underwent wire localization lumpectomy. Pathology revealed an 8 mm focus of ductal carcinoma in situ. The tumor was low grade. There was tumor present at the anterior inferior margin. Ninety percent of the tumor cells were ER positive, 5- 10% of the tumor cells were WV positive. The patient was referred for adjuvant radiotherapy. Completed a course of partial breast irradiation to the LIQ of the Lt. breast on 8/14/17.  Currently on hormonal therapy with Arimidex that started 8/2017.  Due off 8/2022    DEXA- 6/29/18- wnl- f/u in 2 years due to AI- 6/2020 due    Risk factors identified:     Menarche at 9 y/o  G 3 P 3  First pregnancy at 16 y/o  LMP: 50's  Estrogen: none  Radiation to the neck or chest wall - has had left breast radiation  Prior breast biopsies or atypical hyperplasia- left breast lumpectomy     FH: no breast cancer, daughter cervical cancer 30's, father prostate cancer 70's-     Body mass index is 34.02 kg/m².    Review of Systems   Constitutional: Negative.  Negative for activity change and unexpected weight change.   HENT: Negative.  Negative for hearing loss, rhinorrhea and trouble swallowing.    Eyes: Negative.  Negative for discharge and visual disturbance.   Respiratory: Negative.  Negative  for chest tightness and wheezing.    Cardiovascular: Negative.  Negative for chest pain and palpitations.   Gastrointestinal: Negative.  Negative for blood in stool, diarrhea and vomiting.        No reflux   Endocrine: Negative.  Negative for polydipsia and polyuria.   Genitourinary: Negative.  Negative for difficulty urinating, dysuria, hematuria and menstrual problem.   Musculoskeletal: Negative.  Negative for joint swelling and neck pain.   Skin: Negative.    Allergic/Immunologic: Negative.    Neurological: Negative.  Negative for headaches.   Hematological: Negative.  Negative for adenopathy.   Psychiatric/Behavioral: Negative.  Negative for confusion and dysphoric mood.   Breast: Pt denies any breast pain, nipple discharge, or palpable mass. No prior trauma or bruising. No breast surgeries or abnormalities.    Current Outpatient Medications   Medication Sig Dispense Refill    anastrozole (ARIMIDEX) 1 mg Tab TAKE 1 TABLET (1 MG TOTAL) BY MOUTH ONCE DAILY. 90 tablet 3    buPROPion (WELLBUTRIN SR) 150 MG TBSR 12 hr tablet Take 1 tablet (150 mg total) by mouth 2 (two) times daily. 60 tablet 2    ciprofloxacin HCl (CIPRO) 500 MG tablet Take 500 mg by mouth 2 (two) times daily.  0    fluconazole (DIFLUCAN) 100 MG tablet       hydroCHLOROthiazide (MICROZIDE) 12.5 mg capsule TAKE 1 CAPSULE (12.5 MG TOTAL) BY MOUTH ONCE DAILY. FOR HIGH BLOOD PRESSURE 30 capsule 11    meloxicam (MOBIC) 15 MG tablet TAKE 1 TABLET BY MOUTH EVERY DAY 30 tablet 5    metroNIDAZOLE (FLAGYL) 500 MG tablet Take 500 mg by mouth 3 (three) times daily.  0    nitrofurantoin, macrocrystal-monohydrate, (MACROBID) 100 MG capsule       oxybutynin (DITROPAN) 5 MG Tab Take 1 tablet (5 mg total) by mouth 3 (three) times daily as needed. 90 tablet 11    tamsulosin (FLOMAX) 0.4 mg Cap Take 1 capsule by mouth once daily.      vitamin D 1000 units Tab Take 1,000 Units by mouth once daily.       No current facility-administered medications for this  visit.        Review of patient's allergies indicates:  No Known Allergies    Past Medical History:   Diagnosis Date    Blind right eye     Traumatic    Breast cancer 06/15/2017    0.8 cm Grade1 INTRADUCTAL BREAST 9 positve margin (left)    Essential hypertension     Hemorrhoids     Lipoma of abdominal wall     Obesity     Overactive bladder     Pap smear abnormality of cervix with ASCUS favoring benign     Thyroid nodule     Tobacco use disorder     Tubular adenoma of colon     Urinary incontinence        Past Surgical History:   Procedure Laterality Date    ANTERIOR VAGINAL REPAIR      BLADDER SURGERY      BREAST LUMPECTOMY Left 2017    CATARACT EXTRACTION Right      SECTION      X2    COLONOSCOPY N/A 3/8/2017    Procedure: COLONOSCOPY;  Surgeon: Tyron Paris MD;  Location: [facility] ENDO;  Service: Endoscopy;  Laterality: N/A;    THYROID LOBECTOMY Left     TOTAL ABDOMINAL HYSTERECTOMY      TUBAL LIGATION         Family History   Problem Relation Age of Onset    Hypertension Father     Cataracts Father     Prostate cancer Father 80    Cervical cancer Daughter 32    Allergic rhinitis Daughter     Cirrhosis Mother     Alcohol abuse Mother     Hypertension Daughter     Diabetes Brother     Heart attack Brother     Heart murmur Brother     No Known Problems Daughter        Social History     Socioeconomic History    Marital status:      Spouse name: Not on file    Number of children: Not on file    Years of education: Not on file    Highest education level: Not on file   Occupational History    Not on file   Social Needs    Financial resource strain: Not on file    Food insecurity     Worry: Not on file     Inability: Not on file    Transportation needs     Medical: Not on file     Non-medical: Not on file   Tobacco Use    Smoking status: Current Every Day Smoker     Packs/day: 1.00     Years: 50.00     Pack years: 50.00     Types: Cigarettes     Smokeless tobacco: Never Used   Substance and Sexual Activity    Alcohol use: Yes     Alcohol/week: 28.0 standard drinks     Types: 28 Cans of beer per week     Frequency: 4 or more times a week     Drinks per session: 3 or 4     Binge frequency: Less than monthly    Drug use: No    Sexual activity: Never     Partners: Male   Lifestyle    Physical activity     Days per week: Not on file     Minutes per session: Not on file    Stress: Not on file   Relationships    Social connections     Talks on phone: Not on file     Gets together: Not on file     Attends [employer] service: Not on file     Active member of club or organization: Not on file     Attends meetings of clubs or organizations: Not on file     Relationship status: Not on file   Other Topics Concern    Not on file   Social History Narrative    The patient is .  She is retired from [employer].       Vitals:    08/12/20 1439   BP: (!) 155/72   Pulse: 78   Temp: 98.7 °F (37.1 °C)       Physical Exam  Constitutional:       Appearance: She is well-developed.   HENT:      Head: Normocephalic and atraumatic.      Right Ear: External ear normal.      Left Ear: External ear normal.      Mouth/Throat:      Pharynx: No oropharyngeal exudate.   Eyes:      General: No scleral icterus.        Right eye: No discharge.         Left eye: No discharge.      Conjunctiva/sclera: Conjunctivae normal.      Pupils: Pupils are equal, round, and reactive to light.   Neck:      Musculoskeletal: Normal range of motion and neck supple.      Thyroid: No thyromegaly.   Cardiovascular:      Rate and Rhythm: Normal rate and regular rhythm.      Heart sounds: Normal heart sounds.   Pulmonary:      Effort: Pulmonary effort is normal.      Breath sounds: Normal breath sounds.   Chest:      Breasts:         Right: No inverted nipple, mass, nipple discharge, skin change or tenderness.         Left: No inverted nipple, mass, nipple discharge, skin change or tenderness.    Abdominal:      General: Bowel sounds are normal.      Palpations: Abdomen is soft.   Musculoskeletal: Normal range of motion.      Right shoulder: She exhibits no crepitus and normal strength.   Lymphadenopathy:      Head:      Right side of head: No submental, submandibular, tonsillar, preauricular, posterior auricular or occipital adenopathy.      Left side of head: No submental, submandibular, tonsillar, preauricular, posterior auricular or occipital adenopathy.      Cervical: No cervical adenopathy.      Right cervical: No superficial or posterior cervical adenopathy.     Left cervical: No superficial or posterior cervical adenopathy.      Upper Body:      Right upper body: No supraclavicular adenopathy.      Left upper body: No supraclavicular adenopathy.   Skin:     General: Skin is warm and dry.      Coloration: Skin is not pale.      Findings: No erythema or rash.   Neurological:      Mental Status: She is alert and oriented to person, place, and time.      Deep Tendon Reflexes: Reflexes are normal and symmetric.   Psychiatric:         Behavior: Behavior normal.         Thought Content: Thought content normal.         Judgment: Judgment normal.         Result:   Mammo Digital Diagnostic Bilat w/ Navjot  US Breast Left Limited     History:  Patient is 70 y.o. and is seen for a diagnostic mammogram.     Films Compared:  Compared to: 05/29/2019 Mammo Digital Diagnostic Bilat w/ Navjot and 05/16/2018 Mammo Digital Diagnostic Bilat with Tomosynthesis_CAD     Findings:  This procedure was performed using tomosynthesis. Computer-aided detection was utilized in the interpretation of this examination.  The breasts are heterogeneously dense, which may obscure small masses.      Left  Mammo Digital Diagnostic Bilat w/ Navjot  There is an 11 mm oval mass with spiculated margins seen in the upper outer quadrant of the left breast in the posterior depth.      US Breast Left Limited  There is an irregularly shaped,  non-parallel, hypoechoic mass with microlobulated margins with shadowing seen in the left breast at 2 o'clock, 10 cm from the nipple.      No suspicious left axillary adenopathy demonstrated.      Right     Mammo Digital Diagnostic Bilat w/ Navjot  There is no evidence of suspicious masses, calcifications, or other abnormal findings in the right breast.     Impression:  Left  Mass: Left breast 11 mm mass at the upper outer posterior position. Assessment: 5 - Highly suggestive of malignancy. Biopsy is recommended.      Right  There is no mammographic or sonographic evidence of malignancy in the right breast.     BI-RADS Category:   Overall: 5 - Highly Suggestive of Malignancy     Recommendation:  Ultrasound guided Biopsy is recommended.     Final Pathologic Diagnosis Left breast upper outer quadrant, biopsy:   Invasive ductal carcinoma, Grade 2 ( Tubules=3, nuclei=3, mitosis=1),   measuring 1.4 cm (14 mm) in greatest linear dimension within the core biopsy   Ductal carcinoma in situ (DCIS), nuclear grade 3, cribriform pattern,   measuring 3 mm   Microcalcifications present   Molecular markers are pending and will be reported in a supplemental   Skye DO Johan has reviewed this case and agrees with the above   diagnoses  VC      Comment: Interpreted by: Hortencia Tran M.D., Signed on 08/10/2020 at 09:21   Supplemental Diagnosis BREAST BIOMARKER RESULTS   Estrogen receptor (ER):  Low Positive (10% weak)   Progesterone receptor (KY): Negative (less than 1%)   HER2 IHC:  Equivocal (Score 2+), HER2 FISH is pending and will be reported in   a supplemental   Ki-67 proliferation index:  50%             Assessment & Plan:  Malignant neoplasm of lower-inner quadrant of left breast in female, estrogen receptor positive     Follow-up PET scan and if negative for metastatic disease will proceed with surgery  Will plan for mastectomy without reconstruction with SLNbx possible ALND; reconstruction offered but declined by  patient; discussed why alternative surgical options including lumpectomy + XRT would not be recommended based on the patient's previous radiation  Preop: CBC, CMP, EKG, COVID-19  Risks and benefits discussed with patient including but not limited to: pain, bleeding, infection, injury to underlying nerves or vessels, lymphedema,parathesias, need for further surgery

## 2020-08-18 ENCOUNTER — TELEPHONE (OUTPATIENT)
Dept: RADIOLOGY | Facility: HOSPITAL | Age: 70
End: 2020-08-18

## 2020-08-19 ENCOUNTER — HOSPITAL ENCOUNTER (OUTPATIENT)
Dept: RADIOLOGY | Facility: HOSPITAL | Age: 70
Discharge: HOME | End: 2020-08-19
Attending: INTERNAL MEDICINE
Payer: MEDICARE

## 2020-08-19 ENCOUNTER — CLINICAL SUPPORT (OUTPATIENT)
Dept: SMOKING CESSATION | Facility: CLINIC | Age: 70
End: 2020-08-19
Payer: MEDICARE

## 2020-08-19 DIAGNOSIS — C50.312 MALIGNANT NEOPLASM OF LOWER-INNER QUADRANT OF LEFT BREAST IN FEMALE, ESTROGEN RECEPTOR POSITIVE: ICD-10-CM

## 2020-08-19 DIAGNOSIS — Z17.0 MALIGNANT NEOPLASM OF LOWER-INNER QUADRANT OF LEFT BREAST IN FEMALE, ESTROGEN RECEPTOR POSITIVE: ICD-10-CM

## 2020-08-19 DIAGNOSIS — F17.200 NICOTINE DEPENDENCE: Primary | ICD-10-CM

## 2020-08-19 LAB
FINAL PATHOLOGIC DIAGNOSIS: NORMAL
GROSS: NORMAL
Lab: NORMAL
SUPPLEMENTAL DIAGNOSIS: NORMAL

## 2020-08-19 PROCEDURE — 78815 PET IMAGE W/CT SKULL-THIGH: CPT | Mod: TC,PI

## 2020-08-19 PROCEDURE — 99404 PR PREVENT COUNSEL,INDIV,60 MIN: ICD-10-PCS | Mod: S$GLB,,, | Performed by: GENERAL PRACTICE

## 2020-08-19 PROCEDURE — A9552 F18 FDG: HCPCS

## 2020-08-19 PROCEDURE — 78815 PET IMAGE W/CT SKULL-THIGH: CPT | Mod: 26,PI,, | Performed by: RADIOLOGY

## 2020-08-19 PROCEDURE — 99404 PREV MED CNSL INDIV APPRX 60: CPT | Mod: S$GLB,,, | Performed by: GENERAL PRACTICE

## 2020-08-19 PROCEDURE — 78815 NM PET CT ROUTINE: ICD-10-PCS | Mod: 26,PI,, | Performed by: RADIOLOGY

## 2020-08-19 NOTE — PROGRESS NOTES
Individual Follow-Up Form    8/19/2020    Clinical Status of Patient: Outpatient    Length of Service: 60 minutes    Continuing Medication: yes  Wellbutrin    Comments: Patient was seen today for a smoking cessation progress update. She has successfully reached her target quit date. The patient remains on the prescribed tobacco cessation medication regimen of 150 mg Wellbutrin daily without any negative side effects at this time. Discussed increasing her dose to twice daily as prescribed. She is here today with her daughter and stated that she has only taken the medication for the past 3 days and will continue to use once daily until Saturday then will increase to twice daily. The patient denies any abnormal behavioral or mental changes at this time. Will continue to encourage and monitor progress.    Diagnosis: F17.200    Next Visit: 1 week

## 2020-08-23 ENCOUNTER — LAB VISIT (OUTPATIENT)
Dept: URGENT CARE | Facility: CLINIC | Age: 70
End: 2020-08-23
Payer: MEDICARE

## 2020-08-23 VITALS
BODY MASS INDEX: 34.24 KG/M2 | OXYGEN SATURATION: 98 % | TEMPERATURE: 97 F | HEART RATE: 84 BPM | HEIGHT: 62 IN | WEIGHT: 186.06 LBS

## 2020-08-23 DIAGNOSIS — D50.0 CHRONIC BLOOD LOSS ANEMIA: ICD-10-CM

## 2020-08-23 PROCEDURE — U0003 INFECTIOUS AGENT DETECTION BY NUCLEIC ACID (DNA OR RNA); SEVERE ACUTE RESPIRATORY SYNDROME CORONAVIRUS 2 (SARS-COV-2) (CORONAVIRUS DISEASE [COVID-19]), AMPLIFIED PROBE TECHNIQUE, MAKING USE OF HIGH THROUGHPUT TECHNOLOGIES AS DESCRIBED BY CMS-2020-01-R: HCPCS

## 2020-08-24 LAB — SARS-COV-2 RNA RESP QL NAA+PROBE: NOT DETECTED

## 2020-08-25 ENCOUNTER — NURSE TRIAGE (OUTPATIENT)
Dept: ADMINISTRATIVE | Facility: CLINIC | Age: 70
End: 2020-08-25

## 2020-08-25 NOTE — TELEPHONE ENCOUNTER
Spoke with Moe she states that patient vomited after drinking prep for colonoscopy tomorrow.  She states that patient drank the entire 16 oz prep in 15 minutes. Reports that she doesn't think a lot of prep solution came up.  Wants to know if her vomiting a little prep will affect anything with colonoscopy tomorrow.   Secure chat message sent to Dr. Ellison.  She stated patient probably experienced emesis because she drank alot of volume in a short period of time.  No further instructions given.  Moe stated they will continue with instructions as written.       Reason for Disposition   Question about upcoming scheduled test, no triage required and triager able to answer question    Protocols used: INFORMATION ONLY CALL-A-

## 2020-08-25 NOTE — TELEPHONE ENCOUNTER
Caller was already spoken to.  Duplicate message.     Reason for Disposition   Caller has already spoken with another triager and has no further questions.    Protocols used: NO CONTACT OR DUPLICATE CONTACT CALL-A-AH

## 2020-08-26 ENCOUNTER — ANESTHESIA EVENT (OUTPATIENT)
Dept: ENDOSCOPY | Facility: HOSPITAL | Age: 70
End: 2020-08-26
Payer: MEDICARE

## 2020-08-26 ENCOUNTER — HOSPITAL ENCOUNTER (OUTPATIENT)
Facility: HOSPITAL | Age: 70
Discharge: HOME | End: 2020-08-26
Attending: INTERNAL MEDICINE | Admitting: INTERNAL MEDICINE
Payer: MEDICARE

## 2020-08-26 ENCOUNTER — SOCIAL WORK (OUTPATIENT)
Dept: HEMATOLOGY/ONCOLOGY | Facility: CLINIC | Age: 70
End: 2020-08-26

## 2020-08-26 ENCOUNTER — ANESTHESIA (OUTPATIENT)
Dept: ENDOSCOPY | Facility: HOSPITAL | Age: 70
End: 2020-08-26
Payer: MEDICARE

## 2020-08-26 ENCOUNTER — OFFICE VISIT (OUTPATIENT)
Dept: HEMATOLOGY/ONCOLOGY | Facility: CLINIC | Age: 70
End: 2020-08-26
Payer: MEDICARE

## 2020-08-26 ENCOUNTER — TELEPHONE (OUTPATIENT)
Dept: HEMATOLOGY/ONCOLOGY | Facility: CLINIC | Age: 70
End: 2020-08-26

## 2020-08-26 VITALS
RESPIRATION RATE: 18 BRPM | BODY MASS INDEX: 33.39 KG/M2 | SYSTOLIC BLOOD PRESSURE: 161 MMHG | OXYGEN SATURATION: 99 % | DIASTOLIC BLOOD PRESSURE: 93 MMHG | TEMPERATURE: 98 F | WEIGHT: 181.44 LBS | HEIGHT: 62 IN | HEART RATE: 94 BPM

## 2020-08-26 VITALS
TEMPERATURE: 98 F | HEART RATE: 97 BPM | DIASTOLIC BLOOD PRESSURE: 81 MMHG | RESPIRATION RATE: 14 BRPM | SYSTOLIC BLOOD PRESSURE: 149 MMHG | OXYGEN SATURATION: 97 %

## 2020-08-26 DIAGNOSIS — E66.9 OBESITY (BMI 30.0-34.9): ICD-10-CM

## 2020-08-26 DIAGNOSIS — D05.12 DUCTAL CARCINOMA IN SITU (DCIS) OF LEFT BREAST: Primary | ICD-10-CM

## 2020-08-26 DIAGNOSIS — F17.200 TOBACCO USE DISORDER: Chronic | ICD-10-CM

## 2020-08-26 DIAGNOSIS — D64.9 CHRONIC ANEMIA: ICD-10-CM

## 2020-08-26 DIAGNOSIS — C50.312 MALIGNANT NEOPLASM OF LOWER-INNER QUADRANT OF LEFT BREAST IN FEMALE, ESTROGEN RECEPTOR POSITIVE: ICD-10-CM

## 2020-08-26 DIAGNOSIS — Z17.0 MALIGNANT NEOPLASM OF LOWER-INNER QUADRANT OF LEFT BREAST IN FEMALE, ESTROGEN RECEPTOR POSITIVE: ICD-10-CM

## 2020-08-26 PROCEDURE — 43270 EGD LESION ABLATION: CPT | Performed by: INTERNAL MEDICINE

## 2020-08-26 PROCEDURE — 88305 TISSUE EXAM BY PATHOLOGIST: ICD-10-PCS | Mod: 26,,, | Performed by: PATHOLOGY

## 2020-08-26 PROCEDURE — 37000009 HC ANESTHESIA EA ADD 15 MINS: Performed by: INTERNAL MEDICINE

## 2020-08-26 PROCEDURE — 45385 COLONOSCOPY W/LESION REMOVAL: CPT | Mod: PT,,, | Performed by: INTERNAL MEDICINE

## 2020-08-26 PROCEDURE — 88305 TISSUE EXAM BY PATHOLOGIST: CPT | Performed by: PATHOLOGY

## 2020-08-26 PROCEDURE — 1126F AMNT PAIN NOTED NONE PRSNT: CPT | Mod: S$GLB,,, | Performed by: INTERNAL MEDICINE

## 2020-08-26 PROCEDURE — 45385 PR COLONOSCOPY,REMV LESN,SNARE: ICD-10-PCS | Mod: PT,,, | Performed by: INTERNAL MEDICINE

## 2020-08-26 PROCEDURE — 88305 TISSUE EXAM BY PATHOLOGIST: CPT | Mod: 26,,, | Performed by: PATHOLOGY

## 2020-08-26 PROCEDURE — 63600175 PHARM REV CODE 636 W HCPCS: Performed by: NURSE ANESTHETIST, CERTIFIED REGISTERED

## 2020-08-26 PROCEDURE — 99999 PR PBB SHADOW E&M-EST. PATIENT-LVL III: CPT | Mod: PBBFAC,,, | Performed by: INTERNAL MEDICINE

## 2020-08-26 PROCEDURE — 43255 EGD CONTROL BLEEDING ANY: CPT | Performed by: INTERNAL MEDICINE

## 2020-08-26 PROCEDURE — 99215 PR OFFICE/OUTPT VISIT, EST, LEVL V, 40-54 MIN: ICD-10-PCS | Mod: S$GLB,,, | Performed by: INTERNAL MEDICINE

## 2020-08-26 PROCEDURE — 25000003 PHARM REV CODE 250: Performed by: NURSE ANESTHETIST, CERTIFIED REGISTERED

## 2020-08-26 PROCEDURE — 3080F DIAST BP >= 90 MM HG: CPT | Mod: CPTII,S$GLB,, | Performed by: INTERNAL MEDICINE

## 2020-08-26 PROCEDURE — 45380 COLONOSCOPY AND BIOPSY: CPT | Mod: 59,,, | Performed by: INTERNAL MEDICINE

## 2020-08-26 PROCEDURE — 63600175 PHARM REV CODE 636 W HCPCS: Performed by: INTERNAL MEDICINE

## 2020-08-26 PROCEDURE — 3080F PR MOST RECENT DIASTOLIC BLOOD PRESSURE >= 90 MM HG: ICD-10-PCS | Mod: CPTII,S$GLB,, | Performed by: INTERNAL MEDICINE

## 2020-08-26 PROCEDURE — 27202087 HC PROBE, APC: Performed by: INTERNAL MEDICINE

## 2020-08-26 PROCEDURE — 99999 PR PBB SHADOW E&M-EST. PATIENT-LVL III: ICD-10-PCS | Mod: PBBFAC,,, | Performed by: INTERNAL MEDICINE

## 2020-08-26 PROCEDURE — 27201089 HC SNARE, DISP (ANY): Performed by: INTERNAL MEDICINE

## 2020-08-26 PROCEDURE — 1101F PT FALLS ASSESS-DOCD LE1/YR: CPT | Mod: CPTII,S$GLB,, | Performed by: INTERNAL MEDICINE

## 2020-08-26 PROCEDURE — 43270 EGD LESION ABLATION: CPT | Mod: 51,,, | Performed by: INTERNAL MEDICINE

## 2020-08-26 PROCEDURE — 43270 PR EGD, FLEX, W/ABLATION, TUMOR/POLYP/LESION(S), W/ DILATION: ICD-10-PCS | Mod: 51,,, | Performed by: INTERNAL MEDICINE

## 2020-08-26 PROCEDURE — 45385 COLONOSCOPY W/LESION REMOVAL: CPT | Performed by: INTERNAL MEDICINE

## 2020-08-26 PROCEDURE — 99215 OFFICE O/P EST HI 40 MIN: CPT | Mod: S$GLB,,, | Performed by: INTERNAL MEDICINE

## 2020-08-26 PROCEDURE — 1126F PR PAIN SEVERITY QUANTIFIED, NO PAIN PRESENT: ICD-10-PCS | Mod: S$GLB,,, | Performed by: INTERNAL MEDICINE

## 2020-08-26 PROCEDURE — 3077F PR MOST RECENT SYSTOLIC BLOOD PRESSURE >= 140 MM HG: ICD-10-PCS | Mod: CPTII,S$GLB,, | Performed by: INTERNAL MEDICINE

## 2020-08-26 PROCEDURE — 27201012 HC FORCEPS, HOT/COLD, DISP: Performed by: INTERNAL MEDICINE

## 2020-08-26 PROCEDURE — 3077F SYST BP >= 140 MM HG: CPT | Mod: CPTII,S$GLB,, | Performed by: INTERNAL MEDICINE

## 2020-08-26 PROCEDURE — 37000008 HC ANESTHESIA 1ST 15 MINUTES: Performed by: INTERNAL MEDICINE

## 2020-08-26 PROCEDURE — 3008F BODY MASS INDEX DOCD: CPT | Mod: CPTII,S$GLB,, | Performed by: INTERNAL MEDICINE

## 2020-08-26 PROCEDURE — 1159F PR MEDICATION LIST DOCUMENTED IN MEDICAL RECORD: ICD-10-PCS | Mod: S$GLB,,, | Performed by: INTERNAL MEDICINE

## 2020-08-26 PROCEDURE — 45380 PR COLONOSCOPY,BIOPSY: ICD-10-PCS | Mod: 59,,, | Performed by: INTERNAL MEDICINE

## 2020-08-26 PROCEDURE — 45380 COLONOSCOPY AND BIOPSY: CPT | Performed by: INTERNAL MEDICINE

## 2020-08-26 PROCEDURE — 1159F MED LIST DOCD IN RCRD: CPT | Mod: S$GLB,,, | Performed by: INTERNAL MEDICINE

## 2020-08-26 PROCEDURE — 1101F PR PT FALLS ASSESS DOC 0-1 FALLS W/OUT INJ PAST YR: ICD-10-PCS | Mod: CPTII,S$GLB,, | Performed by: INTERNAL MEDICINE

## 2020-08-26 PROCEDURE — 3008F PR BODY MASS INDEX (BMI) DOCUMENTED: ICD-10-PCS | Mod: CPTII,S$GLB,, | Performed by: INTERNAL MEDICINE

## 2020-08-26 RX ORDER — PROPOFOL 10 MG/ML
VIAL (ML) INTRAVENOUS
Status: DISCONTINUED | OUTPATIENT
Start: 2020-08-26 | End: 2020-08-26

## 2020-08-26 RX ORDER — LIDOCAINE HYDROCHLORIDE 10 MG/ML
INJECTION, SOLUTION EPIDURAL; INFILTRATION; INTRACAUDAL; PERINEURAL
Status: DISCONTINUED | OUTPATIENT
Start: 2020-08-26 | End: 2020-08-26

## 2020-08-26 RX ORDER — GLYCOPYRROLATE 0.2 MG/ML
INJECTION INTRAMUSCULAR; INTRAVENOUS
Status: DISCONTINUED | OUTPATIENT
Start: 2020-08-26 | End: 2020-08-26

## 2020-08-26 RX ORDER — SODIUM CHLORIDE, SODIUM LACTATE, POTASSIUM CHLORIDE, CALCIUM CHLORIDE 600; 310; 30; 20 MG/100ML; MG/100ML; MG/100ML; MG/100ML
INJECTION, SOLUTION INTRAVENOUS CONTINUOUS
Status: DISCONTINUED | OUTPATIENT
Start: 2020-08-26 | End: 2020-11-22

## 2020-08-26 RX ADMIN — PROPOFOL 20 MG: 10 INJECTION, EMULSION INTRAVENOUS at 10:08

## 2020-08-26 RX ADMIN — PROPOFOL 20 MG: 10 INJECTION, EMULSION INTRAVENOUS at 09:08

## 2020-08-26 RX ADMIN — PROPOFOL 100 MG: 10 INJECTION, EMULSION INTRAVENOUS at 09:08

## 2020-08-26 RX ADMIN — LIDOCAINE HYDROCHLORIDE 50 MG: 10 INJECTION, SOLUTION EPIDURAL; INFILTRATION; INTRACAUDAL; PERINEURAL at 09:08

## 2020-08-26 RX ADMIN — GLYCOPYRROLATE 0.2 MG: 0.2 INJECTION INTRAMUSCULAR; INTRAVENOUS at 09:08

## 2020-08-26 RX ADMIN — SODIUM CHLORIDE, SODIUM LACTATE, POTASSIUM CHLORIDE, AND CALCIUM CHLORIDE: 600; 310; 30; 20 INJECTION, SOLUTION INTRAVENOUS at 09:08

## 2020-08-26 NOTE — ANESTHESIA POSTPROCEDURE EVALUATION
Anesthesia Post Evaluation    Patient: Leia Rodriguez    Procedure(s) Performed: Procedure(s) (LRB):  COLONOSCOPY (N/A)  EGD (ESOPHAGOGASTRODUODENOSCOPY) (N/A)    Final Anesthesia Type: MAC    Patient location during evaluation: PACU  Patient participation: Yes- Able to Participate  Level of consciousness: awake  Post-procedure vital signs: reviewed and stable  Pain management: adequate  Airway patency: patent    PONV status at discharge: No PONV  Anesthetic complications: no      Cardiovascular status: blood pressure returned to baseline and hemodynamically stable  Respiratory status: unassisted and spontaneous ventilation  Hydration status: euvolemic  Follow-up not needed.          Vitals Value Taken Time   BP  08/26/20 1017   Temp  08/26/20 1017   Pulse  08/26/20 1017   Resp  08/26/20 1017   SpO2  08/26/20 1017         No case tracking events are documented in the log.      Pain/Naveen Score: No data recorded

## 2020-08-26 NOTE — DISCHARGE INSTRUCTIONS
Diverticulosis    Diverticulosis means that small pouches have formed in the wall of your large intestine (colon). Most often, this problem causes no symptoms and is common as people age. But the pouches in the colon are at risk of becoming infected. When this happens, the condition is called diverticulitis. Although most people with diverticulosis never develop diverticulitis, it is still not uncommon. Rectal bleeding can also occur and in less common situations, a type of colon inflammation called colitis.  While most people do not have symptoms, some people with diverticulosis may have:  · Abdominal cramps and pain  · Bloating  · Constipation  · Change in bowel habits  Causes  The exact cause of diverticulosis (and diverticulitis) has not been proved, but a few things are associated with the condition:  · Low-fiber diet  · Constipation  · Lack of exercise  Your healthcare provider will talk with you about how to manage your condition. Diet changes may be all that are needed to help control diverticulosis and prevent progression to diverticulitis. If you develop diverticulitis, you will likely need other treatments.  Home care  You may be told to take fiber supplements daily. Fiber adds bulk to the stool so that it passes through the colon more easily. Stool softeners may be recommended. You may also be given medications for pain relief. Be sure to take all medications as directed.  In the past, people were told to avoid corn, nuts, and seeds. This is no longer necessary.  Follow these guidelines when caring for yourself at home:  · Eat unprocessed foods that are high in fiber. Whole grains, fruits, and vegetables are good choices.  · Drink 6 to 8 glasses of water every day unless your healthcare provider has you limit how much fluid you should have.  · Watch for changes in your bowel movements. Tell your provider if you notice any changes.  · Begin an exercise program. Ask your provider how to get started.  Generally, walking is the best.  · Get plenty of rest and sleep.  Follow-up care  Follow up with your healthcare provider, or as advised. Regular visits may be needed to check on your health. Sometimes special procedures such as colonoscopy, are needed after an episode of diverticulitis or blooding. Be sure to keep all your appointments.  If a stool sample was taken, or cultures were done, you should be told if they are positive, or if your treatment needs to be changed. You can call as directed for the results.  If X-rays were done, a radiologist will look at them. You will be told if there is a change in your treatment.  If antibiotics were prescribed, be sure to finish them all.  When to seek medical advice  Call your healthcare provider right away if any of these occur:  · Fever of 100.4°F (38°C) or higher, or as directed by your healthcare provider  · Severe cramps in the lower left side of the abdomen or pain that is getting worse  · Tenderness in the lower left side of the abdomen or worsening pain throughout the abdomen  · Diarrhea or constipation that doesn't get better within 24 hours  · Nausea and vomiting  · Bleeding from the rectum  Call 911  Call emergency services if any of the following occur:  · Trouble breathing  · Confusion  · Very drowsy or trouble awakening  · Fainting or loss of consciousness  · Rapid heart rate  · Chest pain  Date Last Reviewed: 12/30/2015 © 2000-2017 [employer]. [address] [place] [address]. All rights reserved. This information is not intended as a substitute for professional medical care. Always follow your healthcare professional's instructions.        Arteriovenous Malformation (AVM)  You have been told that you have an arteriovenous malformation (AVM). An AVM is an abnormal tangle of blood vessels within the brain. Although some AVMs never rupture, both known factors (such as an increase in blood pressure) and unknown factors can lead to  rupture. If you have an AVM, you were probably born with it. But most people don't know they have one until a problem happens. Signs of an AVM include bad headaches, sudden or progressive paralysis or loss of sensation, blurred or double vision, and seizures (jerking movements that are out of your control).     Understanding an AVM  The brain controls the body. You can move and feel because of the brain. And it is the brain that makes you able to think, show emotions, remember, and make judgments. An AVM can damage the brain and put the rest of the body in danger.  Inside the skull  Under the scalp and the skull, a tough membrane (called the dura) surrounds the brain. Beneath the dura, cerebrospinal fluid (CSF) cushions the brain. Blood vessels carry nutrients and oxygen-rich blood throughout the brain.    A problem with blood flow  An AVM is a tangle of blood vessels. It can cause pressure to build up in the blood vessel and prevent normal blood flow. If the pressure becomes too great or the wall of the AVM vessel weakens a blood vessel can burst and blood can leak or spurt into the brain. This can damage parts of the brain that control vital body functions, such as sight, sensation, language, critical thinking, and movements. In some cases, problems caused by an AVM can even lead to death. The high blood flow in an AVM can also shunt the oxygen from the arteries directly to the veins, bypassing the brain capillaries. This shunting can lead to strokes. But an AVM can be treated.  Date Last Reviewed: 10/8/2015  © 2342-5585 [employer]. [address] [place] [address], [place] [address]. All rights reserved. This information is not intended as a substitute for professional medical care. Always follow your healthcare professional's instructions.

## 2020-08-26 NOTE — PLAN OF CARE
DR SUTTON AT BEDSIDE TO SPEAK TO PT. REGARDING RESULTS.  VSS, NO GI BLEEDING, NO ABD. PAIN, NO N/V. PT. DISCHARGED FROM UNIT.

## 2020-08-26 NOTE — TRANSFER OF CARE
Anesthesia Transfer of Care Note    Patient: Leia Rodriguez    Procedure(s) Performed: Procedure(s) (LRB):  COLONOSCOPY (N/A)  EGD (ESOPHAGOGASTRODUODENOSCOPY) (N/A)    Patient location: PACU    Anesthesia Type: MAC    Transport from OR: Transported from OR on room air with adequate spontaneous ventilation    Post pain: adequate analgesia    Post assessment: no apparent anesthetic complications and tolerated procedure well    Post vital signs: stable    Level of consciousness: awake    Nausea/Vomiting: no nausea/vomiting    Complications: none    Transfer of care protocol was followed      Last vitals:   Visit Vitals  BP (!) 170/89 (BP Location: Left arm, Patient Position: Lying)   Pulse 93   Temp 36.5 °C (97.7 °F) (Temporal)   Resp 20   SpO2 99%   Breastfeeding No

## 2020-08-26 NOTE — [employer] PATIENT INSTRUCTIONS
Discharge Summary/Instructions after an Endoscopic Procedure  Patient Name: Leia Rodriguez  Patient MRN: [id number]  Patient YOB: 1950 Wednesday, August 26, 2020 Keira Ellison MD  RESTRICTIONS:  During your procedure today, you received medications for sedation.  These   medications may affect your judgment, balance and coordination.  Therefore,   for 24 hours, you have the following restrictions:   - DO NOT drive a car, operate machinery, make legal/financial decisions,   sign important papers or drink alcohol.    ACTIVITY:  Today: no heavy lifting, straining or running due to procedural   sedation/anesthesia.  The following day: return to full activity including work.  DIET:  Eat and drink normally unless instructed otherwise.     TREATMENT FOR COMMON SIDE EFFECTS:  - Mild abdominal pain, nausea, belching, bloating or excessive gas:  rest,   eat lightly and use a heating pad.  - Sore Throat: treat with throat lozenges and/or gargle with warm salt   water.  - Because air was used during the procedure, expelling large amounts of air   from your rectum or belching is normal.  - If a bowel prep was taken, you may not have a bowel movement for 1-3 days.    This is normal.  SYMPTOMS TO WATCH FOR AND REPORT TO YOUR PHYSICIAN:  1. Abdominal pain or bloating, other than gas cramps.  2. Chest pain.  3. Back pain.  4. Signs of infection such as: chills or fever occurring within 24 hours   after the procedure.  5. Rectal bleeding, which would show as bright red, maroon, or black stools.   (A tablespoon of blood from the rectum is not serious, especially if   hemorrhoids are present.)  6. Vomiting.  7. Weakness or dizziness.  GO DIRECTLY TO THE NEAREST EMERGENCY ROOM IF YOU HAVE ANY OF THE FOLLOWING:      Difficulty breathing              Chills and/or fever over 101 F   Persistent vomiting and/or vomiting blood   Severe abdominal pain   Severe chest pain   Black, tarry stools   Bleeding- more than one  tablespoon   Any other symptom or condition that you feel may need urgent attention  Your doctor recommends these additional instructions:  If any biopsies were taken, your doctors clinic will contact you in 1 to 2   weeks with any results.  - Discharge patient to home (with escort).   - Resume previous diet.   - Continue present medications.   - Await pathology results.   - Repeat colonoscopy in 3 - 5 years for surveillance based on pathology   results.  For questions, problems or results please call your physician Keira Ellison MD at Work:  [phone]  If you have any questions about the above instructions, call the GI   department at [phone] or call the endoscopy unit at [phone]   from 7am until 3 pm.  [facility] [place], EMERGENCY ROOM PHONE NUMBER:   [phone]  IF A COMPLICATION OR EMERGENCY SITUATION ARISES AND YOU ARE UNABLE TO REACH   YOUR PHYSICIAN - GO DIRECTLY TO THE EMERGENCY ROOM.  I have read or have had read to me these discharge instructions for my   procedure and have received a written copy.  I understand these   instructions and will follow-up with my physician if I have any questions.     __________________________________       _____________________________________  Nurse Signature                                          Patient/Designated   Responsible Party Signature  MD Keira Cooley MD  8/26/2020 10:42:21 AM  This report has been verified and signed electronically.  [employer]

## 2020-08-26 NOTE — TELEPHONE ENCOUNTER
Patient informed that her results for genetic testing will be back in 7-14 days patient verbalized his understanding.

## 2020-08-26 NOTE — PROGRESS NOTES
Dr. Fernández requested SW meet with pt as she was a new consult to oncology. Sw provided brief introductions and explained her primary SW, Leonela, would be following them on a continuous basis. Pt and daughter verbalized understanding and SW left to return with SW colleague, Leonela.

## 2020-08-26 NOTE — ANESTHESIA PREPROCEDURE EVALUATION
08/26/2020  Leia Rodriguez is a 70 y.o., female.    Anesthesia Evaluation    I have reviewed the Patient Summary Reports.    I have reviewed the Nursing Notes. I have reviewed the NPO Status.   I have reviewed the Medications.     Review of Systems  Anesthesia Hx:  No problems with previous Anesthesia  Denies Family Hx of Anesthesia complications.   Denies Personal Hx of Anesthesia complications.   Social:  Former Smoker, Alcohol Use    Hematology/Oncology:         -- Anemia: Oncology Comments: Breast    EENT/Dental:   Blind right eye   Cardiovascular:   Hypertension Denies MI.   Denies CABG/stent.         Pulmonary:   Denies COPD.  Denies Asthma.  Denies Sleep Apnea.    Renal/:  Renal/ Normal     Hepatic/GI:   Bowel Prep. Denies GERD. Denies Liver Disease.  Denies Hepatitis.    Musculoskeletal:   Arthritis     Neurological:   Denies CVA. Denies Seizures.    Endocrine:  Endocrine Normal        Physical Exam  General:  Well nourished    Airway/Jaw/Neck:  Airway Findings: Mouth Opening: Normal Tongue: Normal  General Airway Assessment: Adult  Mallampati: II      Dental:  Dental Findings: Lower Dentures, Upper Dentures   Chest/Lungs:  Chest/Lungs Findings: Clear to auscultation, Normal Respiratory Rate     Heart/Vascular:  Heart Findings: Rate: Normal  Rhythm: Regular Rhythm             Anesthesia Plan  Type of Anesthesia, risks & benefits discussed:  Anesthesia Type:  MAC  Patient's Preference:   Intra-op Monitoring Plan: standard ASA monitors  Intra-op Monitoring Plan Comments:   Post Op Pain Control Plan:   Post Op Pain Control Plan Comments:   Induction:   IV  Beta Blocker:  Patient is not currently on a Beta-Blocker (No further documentation required).       Informed Consent: Patient understands risks and agrees with Anesthesia plan.  Questions answered. Anesthesia consent signed with patient.  ASA  Score: 2     Day of Surgery Review of History & Physical: I have interviewed and examined the patient. I have reviewed the patient's H&P dated:  There are no significant changes.  H&P update referred to the surgeon.         Ready For Surgery From Anesthesia Perspective.

## 2020-08-26 NOTE — H&P
PRE PROCEDURE H&P    Patient Name: Leia Rodriguez  MRN: [id number]  : 1950  Date of Procedure:  2020  Referring Physician: Seema Negro NP  Primary Physician: Yasmin Navarro MD  Procedure Physician: Keira Ellison MD       Planned Procedure: Colonoscopy and EGD  Diagnosis: normocytic anemia and hx of polyps  Chief Complaint: Same as above    HPI: Patient is an 70 y.o. female is here for the above. Patient on iron supplementation. Did not tolerate bowel prep well. Denies overt GI bleeding. She has been referred by her PCP for endoscopic ealuation    Last colonoscopy:   Family history: none  Anticoagulation: none    Past Medical History:   Past Medical History:   Diagnosis Date    Blind right eye     Traumatic    Breast cancer 06/15/2017    0.8 cm Grade1 INTRADUCTAL BREAST 9 positve margin (left)    Essential hypertension     Hemorrhoids     Lipoma of abdominal wall     Obesity     Overactive bladder     Pap smear abnormality of cervix with ASCUS favoring benign     Thyroid nodule     Tobacco use disorder     Tubular adenoma of colon     Urinary incontinence         Past Surgical History:  Past Surgical History:   Procedure Laterality Date    ANTERIOR VAGINAL REPAIR      BLADDER SURGERY      BREAST LUMPECTOMY Left     CATARACT EXTRACTION Right      SECTION      X2    COLONOSCOPY N/A 3/8/2017    Procedure: COLONOSCOPY;  Surgeon: Tyron Paris MD;  Location: [facility];  Service: Endoscopy;  Laterality: N/A;    THYROID LOBECTOMY Left     TOTAL ABDOMINAL HYSTERECTOMY      TUBAL LIGATION          Home Medications:  Prior to Admission medications    Medication Sig Start Date End Date Taking? Authorizing Provider   anastrozole (ARIMIDEX) 1 mg Tab TAKE 1 TABLET (1 MG TOTAL) BY MOUTH ONCE DAILY. 19  Yes Mario Fernández MD   buPROPion (WELLBUTRIN SR) 150 MG TBSR 12 hr tablet Take 1 tablet (150 mg total) by mouth 2 (two) times daily. 20 Yes  Eli Hamlin MD   ciprofloxacin HCl (CIPRO) 500 MG tablet Take 500 mg by mouth 2 (two) times daily. 19  Yes Historical Provider, MD   fluconazole (DIFLUCAN) 100 MG tablet  19  Yes Historical Provider, MD   hydroCHLOROthiazide (MICROZIDE) 12.5 mg capsule TAKE 1 CAPSULE (12.5 MG TOTAL) BY MOUTH ONCE DAILY. FOR HIGH BLOOD PRESSURE 19 Yes Yasmin Navarro MD   meloxicam (MOBIC) 15 MG tablet TAKE 1 TABLET BY MOUTH EVERY DAY 3/16/20  Yes Yasmin Navarro MD   metroNIDAZOLE (FLAGYL) 500 MG tablet Take 500 mg by mouth 3 (three) times daily. 19  Yes Historical Provider, MD   nitrofurantoin, macrocrystal-monohydrate, (MACROBID) 100 MG capsule  19  Yes Historical Provider, MD   oxybutynin (DITROPAN) 5 MG Tab Take 1 tablet (5 mg total) by mouth 3 (three) times daily as needed. 19 Yes Yasmin Navarro MD   tamsulosin (FLOMAX) 0.4 mg Cap Take 1 capsule by mouth once daily. 20  Yes Historical Provider, MD   vitamin D 1000 units Tab Take 1,000 Units by mouth once daily.   Yes Historical Provider, MD        Allergies:  Review of patient's allergies indicates:  No Known Allergies     Social History:   Social History     Socioeconomic History    Marital status:      Spouse name: Not on file    Number of children: Not on file    Years of education: Not on file    Highest education level: Not on file   Occupational History    Not on file   Social Needs    Financial resource strain: Not on file    Food insecurity     Worry: Not on file     Inability: Not on file    Transportation needs     Medical: Not on file     Non-medical: Not on file   Tobacco Use    Smoking status: Former Smoker     Packs/day: 1.00     Years: 50.00     Pack years: 50.00     Types: Cigarettes     Quit date: 2020     Years since quittin.0    Smokeless tobacco: Never Used   Substance and Sexual Activity    Alcohol use: Not Currently     Alcohol/week: 28.0 standard drinks     Types: 28  Cans of beer per week     Frequency: 4 or more times a week     Drinks per session: 3 or 4     Binge frequency: Less than monthly    Drug use: No    Sexual activity: Never     Partners: Male   Lifestyle    Physical activity     Days per week: Not on file     Minutes per session: Not on file    Stress: Not on file   Relationships    Social connections     Talks on phone: Not on file     Gets together: Not on file     Attends Rastafari service: Not on file     Active member of club or organization: Not on file     Attends meetings of clubs or organizations: Not on file     Relationship status: Not on file   Other Topics Concern    Not on file   Social History Narrative    The patient is .  She is retired from [employer].       Family History:  Family History   Problem Relation Age of Onset    Hypertension Father     Cataracts Father     Prostate cancer Father 80    Cervical cancer Daughter 32    Allergic rhinitis Daughter     Cirrhosis Mother     Alcohol abuse Mother     Hypertension Daughter     Diabetes Brother     Heart attack Brother     Heart murmur Brother     No Known Problems Daughter        ROS: No acute cardiac events, no acute respiratory complaints.     Physical Exam (all patients):    BP (!) 170/89 (BP Location: Left arm, Patient Position: Lying)   Pulse 93   Temp 97.7 °F (36.5 °C) (Temporal)   Resp 20   SpO2 99%   Breastfeeding No   Lungs: Clear to auscultation bilaterally, respirations unlabored  Heart: Regular rate and rhythm, S1 and S2 normal, no obvious murmurs  Abdomen:         Soft, non-tender, bowel sounds normal, no masses, no organomegaly    Lab Results   Component Value Date    WBC 6.77 07/14/2020    MCV 88 07/14/2020    RDW 13.6 07/14/2020     07/14/2020     08/05/2020    BUN 5 (L) 08/05/2020     08/05/2020    K 3.4 (L) 08/05/2020    CL 96 08/05/2020        SEDATION PLAN: per anesthesia      History reviewed, vital signs satisfactory,  cardiopulmonary status satisfactory, sedation options, risks and plans have been discussed with the patient  All their questions were answered and the patient agrees to the sedation procedures as planned and the patient is deemed an appropriate candidate for the sedation as planned.    The risks, benefits and alternatives of the procedure were discussed with the patient in detail. This discussion was had in the presence of endoscopy staff. The risks include, risks of adverse reaction to sedation requiring the use of reversal agents, bleeding requiring blood transfusion, perforation requiring surgical intervention and technical failure. Other risks include aspiration leading to respiratory distress and respiratory failure resulting in endotracheal intubation and mechanical ventilation including death. If anesthesia is being utilized for this procedure, it is up to the anesthesiologist to determine airway safety including elective endotracheal intubation. Questions were answered, they agree to proceed. There was no language barriers.     Procedure explained to patient, informed consent obtained and placed in chart.    Keira Ellison  8/26/2020  9:41 AM

## 2020-08-26 NOTE — PROGRESS NOTES
Subjective:       Patient ID: Leia Rodriguez is a 70 y.o. female.    Chief Complaint: Results and Breast Cancer    HPI 70-year-old female returns with her daughter for review of PET scan with recurrent invasive ductal carcinoma with previous history of intraductal breast cancer.  ECOG status 2    Past Medical History:   Diagnosis Date    Blind right eye     Traumatic    Breast cancer 06/15/2017    0.8 cm Grade1 INTRADUCTAL BREAST 9 positve margin (left)    Essential hypertension     Hemorrhoids     Lipoma of abdominal wall     Obesity     Overactive bladder     Pap smear abnormality of cervix with ASCUS favoring benign     Thyroid nodule     Tobacco use disorder     Tubular adenoma of colon     Urinary incontinence      Family History   Problem Relation Age of Onset    Hypertension Father     Cataracts Father     Prostate cancer Father 80    Cervical cancer Daughter 32    Allergic rhinitis Daughter     Cirrhosis Mother     Alcohol abuse Mother     Hypertension Daughter     Diabetes Brother     Heart attack Brother     Heart murmur Brother     No Known Problems Daughter      Social History     Socioeconomic History    Marital status:      Spouse name: Not on file    Number of children: Not on file    Years of education: Not on file    Highest education level: Not on file   Occupational History    Not on file   Social Needs    Financial resource strain: Not on file    Food insecurity     Worry: Not on file     Inability: Not on file    Transportation needs     Medical: Not on file     Non-medical: Not on file   Tobacco Use    Smoking status: Former Smoker     Packs/day: 1.00     Years: 50.00     Pack years: 50.00     Types: Cigarettes     Quit date: 2020     Years since quittin.0    Smokeless tobacco: Never Used   Substance and Sexual Activity    Alcohol use: Not Currently     Alcohol/week: 28.0 standard drinks     Types: 28 Cans of beer per week     Frequency: 4 or  more times a week     Drinks per session: 3 or 4     Binge frequency: Less than monthly    Drug use: No    Sexual activity: Never     Partners: Male   Lifestyle    Physical activity     Days per week: Not on file     Minutes per session: Not on file    Stress: Not on file   Relationships    Social connections     Talks on phone: Not on file     Gets together: Not on file     Attends Mandaen service: Not on file     Active member of club or organization: Not on file     Attends meetings of clubs or organizations: Not on file     Relationship status: Not on file   Other Topics Concern    Not on file   Social History Narrative    The patient is .  She is retired from [employer].     Past Surgical History:   Procedure Laterality Date    ANTERIOR VAGINAL REPAIR      BLADDER SURGERY      BREAST LUMPECTOMY Left     CATARACT EXTRACTION Right      SECTION      X2    COLONOSCOPY N/A 3/8/2017    Procedure: COLONOSCOPY;  Surgeon: Tyron Paris MD;  [facility];  Service: Endoscopy;  Laterality: N/A;    THYROID LOBECTOMY Left     TOTAL ABDOMINAL HYSTERECTOMY      TUBAL LIGATION         Labs:  Lab Results   Component Value Date    WBC 6.77 2020    HGB 10.2 (L) 2020    HCT 30.5 (L) 2020    MCV 88 2020     2020     BMP  Lab Results   Component Value Date     2020    K 3.4 (L) 2020    CL 96 2020    CO2 32 (H) 2020    BUN 5 (L) 2020    CREATININE 0.9 2020    CALCIUM 9.7 2020    ANIONGAP 13 2020    ESTGFRAFRICA >60 2020    EGFRNONAA >60 2020     Lab Results   Component Value Date    ALT 7 (L) 2020    AST 13 2020    ALKPHOS 106 2020    BILITOT 0.8 2020       Lab Results   Component Value Date    IRON 37 2020    TIBC 295 2020    FERRITIN 519 (H) 2020     No results found for: [id number]  No results found for: FOLATE  Lab Results    Component Value Date    TSH 1.636 09/03/2019         Review of Systems   Constitutional: Negative for activity change, appetite change, chills, diaphoresis, fatigue, fever and unexpected weight change.   HENT: Negative for congestion, dental problem, drooling, ear discharge, ear pain, facial swelling, hearing loss, mouth sores, nosebleeds, postnasal drip, rhinorrhea, sinus pressure, sneezing, sore throat, tinnitus, trouble swallowing and voice change.    Eyes: Negative for photophobia, pain, discharge, redness, itching and visual disturbance.   Respiratory: Negative for cough, choking, chest tightness, shortness of breath, wheezing and stridor.    Cardiovascular: Negative for chest pain, palpitations and leg swelling.   Gastrointestinal: Negative for abdominal distention, abdominal pain, anal bleeding, blood in stool, constipation, diarrhea, nausea, rectal pain and vomiting.   Endocrine: Negative for cold intolerance, heat intolerance, polydipsia, polyphagia and polyuria.   Genitourinary: Negative for decreased urine volume, difficulty urinating, dyspareunia, dysuria, enuresis, flank pain, frequency, genital sores, hematuria, menstrual problem, pelvic pain, urgency, vaginal bleeding, vaginal discharge and vaginal pain.   Musculoskeletal: Positive for gait problem. Negative for arthralgias, back pain, joint swelling, myalgias, neck pain and neck stiffness.   Skin: Negative for color change, pallor and rash.   Allergic/Immunologic: Negative for environmental allergies, food allergies and immunocompromised state.   Neurological: Negative for dizziness, tremors, seizures, syncope, facial asymmetry, speech difficulty, weakness, light-headedness, numbness and headaches.   Hematological: Negative for adenopathy. Does not bruise/bleed easily.   Psychiatric/Behavioral: Positive for dysphoric mood. Negative for agitation, behavioral problems, confusion, decreased concentration, hallucinations, self-injury, sleep disturbance  and suicidal ideas. The patient is nervous/anxious. The patient is not hyperactive.        Objective:      Physical Exam  Vitals signs reviewed.   Constitutional:       General: She is not in acute distress.     Appearance: She is well-developed. She is ill-appearing. She is not diaphoretic.   HENT:      Head: Normocephalic and atraumatic.      Right Ear: External ear normal.      Left Ear: External ear normal.      Nose: Nose normal.      Right Sinus: No maxillary sinus tenderness or frontal sinus tenderness.      Left Sinus: No maxillary sinus tenderness or frontal sinus tenderness.      Mouth/Throat:      Pharynx: No oropharyngeal exudate.   Eyes:      General: Lids are normal. No scleral icterus.        Right eye: No discharge.         Left eye: No discharge.      Conjunctiva/sclera: Conjunctivae normal.      Right eye: Right conjunctiva is not injected. No hemorrhage.     Left eye: Left conjunctiva is not injected. No hemorrhage.     Pupils: Pupils are equal, round, and reactive to light.   Neck:      Musculoskeletal: Normal range of motion and neck supple.      Thyroid: No thyromegaly.      Vascular: No JVD.      Trachea: No tracheal deviation.   Cardiovascular:      Rate and Rhythm: Normal rate.   Pulmonary:      Effort: Pulmonary effort is normal. No respiratory distress.      Breath sounds: No stridor.   Chest:      Chest wall: No tenderness.   Abdominal:      General: Bowel sounds are normal. There is no distension.      Palpations: Abdomen is soft. There is no hepatomegaly, splenomegaly or mass.      Tenderness: There is no abdominal tenderness. There is no rebound.   Musculoskeletal: Normal range of motion.         General: No tenderness.   Lymphadenopathy:      Cervical: No cervical adenopathy.      Upper Body:      Right upper body: No supraclavicular adenopathy.      Left upper body: No supraclavicular adenopathy.   Skin:     General: Skin is dry.      Findings: No erythema or rash.   Neurological:       Mental Status: She is alert and oriented to person, place, and time.      Cranial Nerves: No cranial nerve deficit.      Coordination: Coordination abnormal.   Psychiatric:         Behavior: Behavior normal.         Thought Content: Thought content normal.         Judgment: Judgment normal.             Assessment:      1. Ductal carcinoma in situ (DCIS) of left breast    2. Malignant neoplasm of lower-inner quadrant of left breast in female, estrogen receptor positive    3. Obesity (BMI 30.0-34.9)    4. Tobacco use disorder           Plan:     Extensive conversation with patient's daughter  available as well.  At this point results of biopsy consistent with ER less than 10% OH negative HER2 negative invasive breast carcinoma PET scan no evidence of metastatic disease.  Will proceed with referral to surgery for definitive surgical resection.  Discussed implications with her and will return after was for review.  Results of germ line testing pending and should be available within the next 14 days will not alter treatment recommendations at this point return to see me after definitive surgery accomplished 40 min face-to-face time coordination of care greater 50% time face-to-face with patient and daughter        Mario Fernández Jr, MD FACP

## 2020-08-26 NOTE — [employer] PATIENT INSTRUCTIONS
Discharge Summary/Instructions after an Endoscopic Procedure  Patient Name: Leia Rodriguez  Patient MRN: [id number]  Patient YOB: 1950 Wednesday, August 26, 2020 Keira Ellison MD  RESTRICTIONS:  During your procedure today, you received medications for sedation.  These   medications may affect your judgment, balance and coordination.  Therefore,   for 24 hours, you have the following restrictions:   - DO NOT drive a car, operate machinery, make legal/financial decisions,   sign important papers or drink alcohol.    ACTIVITY:  Today: no heavy lifting, straining or running due to procedural   sedation/anesthesia.  The following day: return to full activity including work.  DIET:  Eat and drink normally unless instructed otherwise.     TREATMENT FOR COMMON SIDE EFFECTS:  - Mild abdominal pain, nausea, belching, bloating or excessive gas:  rest,   eat lightly and use a heating pad.  - Sore Throat: treat with throat lozenges and/or gargle with warm salt   water.  - Because air was used during the procedure, expelling large amounts of air   from your rectum or belching is normal.  - If a bowel prep was taken, you may not have a bowel movement for 1-3 days.    This is normal.  SYMPTOMS TO WATCH FOR AND REPORT TO YOUR PHYSICIAN:  1. Abdominal pain or bloating, other than gas cramps.  2. Chest pain.  3. Back pain.  4. Signs of infection such as: chills or fever occurring within 24 hours   after the procedure.  5. Rectal bleeding, which would show as bright red, maroon, or black stools.   (A tablespoon of blood from the rectum is not serious, especially if   hemorrhoids are present.)  6. Vomiting.  7. Weakness or dizziness.  GO DIRECTLY TO THE NEAREST EMERGENCY ROOM IF YOU HAVE ANY OF THE FOLLOWING:      Difficulty breathing              Chills and/or fever over 101 F   Persistent vomiting and/or vomiting blood   Severe abdominal pain   Severe chest pain   Black, tarry stools   Bleeding- more than one  tablespoon   Any other symptom or condition that you feel may need urgent attention  Your doctor recommends these additional instructions:  If any biopsies were taken, your doctors clinic will contact you in 1 to 2   weeks with any results.  - Discharge patient to home after colonoscopy.   - Resume previous diet.   - Continue present medications.   - To visualize the small bowel, perform video capsule endoscopy.  For questions, problems or results please call your physician Keira Ellison MD at Work:  [phone]  If you have any questions about the above instructions, call the GI   department at [phone] or call the endoscopy unit at [phone]   from 7am until 3 pm.  [facility], EMERGENCY ROOM PHONE NUMBER:   [phone]  IF A COMPLICATION OR EMERGENCY SITUATION ARISES AND YOU ARE UNABLE TO REACH   YOUR PHYSICIAN - GO DIRECTLY TO THE EMERGENCY ROOM.  I have read or have had read to me these discharge instructions for my   procedure and have received a written copy.  I understand these   instructions and will follow-up with my physician if I have any questions.     __________________________________       _____________________________________  Nurse Signature                                          Patient/Designated   Responsible Party Signature  MD Keira Cooley MD  8/26/2020 10:37:35 AM  This report has been verified and signed electronically.  [employer]

## 2020-08-27 ENCOUNTER — SOCIAL WORK (OUTPATIENT)
Dept: HEMATOLOGY/ONCOLOGY | Facility: CLINIC | Age: 70
End: 2020-08-27

## 2020-08-27 NOTE — PROGRESS NOTES
Met with pt and daughter who were referred to SW by Dr. Fernández. Pt scored an 8/10 on distress screening. Reviewed with pt and daughter. Discussed resources especially pertaining to emotional support. Daughter is primary caregiver and handles most things for pt.     Reviewed supportive care packet as well as checklist. Referrals made and follow up based on needs that were expressed. Provided with contact info for ongoing support and assistance. Will plan to f/u again when treatment plan is determined.    Oncology Social Work   Intake  Date of Diagnosis: 08/12/20  Cancer Type: Breast  MD Assigned: Mario Fernández  Date of referral to social work: 08/26/20  Initial social work contact: 08/26/20  Referral to initial contact timeline: 0  Referral contact method: Other  Other referral contact method: distress, workqueue and in person requests  Contact method: In person  Date worked: 08/26/20     Intervention  Current Status: Staging work-up  Surgeon Name: Vincent Moyer    General Referrals: Surgical oncology    Support Systems: Spouse/significant other;Children  Concerns: resource needs/questions     Supportive Checklist  I am interested in support group(s): Caregiver/family support group  I need help or more information on navigating insurance: Applying for Medicaid  I need financial assistance: For medical and treatment expenses.;For medication co-pays.;For other financial concerns.  I have concerns about nutrition and/or weight loss: Y  I would like more information on Advance Directives (Medical Power of  and/or Living Will) or involvement of care for my family members: Y  I am interested in a referral to: [facility];[employer]     Follow Up  Follow up as treatment commences, for ongoing follow-up.

## 2020-08-28 LAB
FINAL PATHOLOGIC DIAGNOSIS: NORMAL
GROSS: NORMAL

## 2020-08-30 NOTE — PROGRESS NOTES
AN staff: inform patient 2 of the 3 polyps removed were precancerous or adenomas. Guidelines recommend a repeat colonoscopy in 3 years. Place in recall. 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 Recommend capsule endoscopy. This has been ordered. Please schedule.

## 2020-09-02 ENCOUNTER — HOSPITAL ENCOUNTER (OUTPATIENT)
Dept: CARDIOLOGY | Facility: HOSPITAL | Age: 70
Discharge: HOME | End: 2020-09-02
Attending: SURGERY
Payer: MEDICARE

## 2020-09-02 ENCOUNTER — OFFICE VISIT (OUTPATIENT)
Dept: SURGERY | Facility: CLINIC | Age: 70
End: 2020-09-02
Payer: MEDICARE

## 2020-09-02 VITALS
HEIGHT: 62 IN | WEIGHT: 177.94 LBS | BODY MASS INDEX: 32.74 KG/M2 | HEART RATE: 98 BPM | DIASTOLIC BLOOD PRESSURE: 85 MMHG | SYSTOLIC BLOOD PRESSURE: 162 MMHG

## 2020-09-02 DIAGNOSIS — Z01.818 PREOP EXAMINATION: ICD-10-CM

## 2020-09-02 DIAGNOSIS — Z17.0 MALIGNANT NEOPLASM OF LOWER-INNER QUADRANT OF LEFT BREAST IN FEMALE, ESTROGEN RECEPTOR POSITIVE: ICD-10-CM

## 2020-09-02 DIAGNOSIS — Z17.0 MALIGNANT NEOPLASM OF LOWER-INNER QUADRANT OF LEFT BREAST IN FEMALE, ESTROGEN RECEPTOR POSITIVE: Primary | ICD-10-CM

## 2020-09-02 DIAGNOSIS — C50.312 MALIGNANT NEOPLASM OF LOWER-INNER QUADRANT OF LEFT BREAST IN FEMALE, ESTROGEN RECEPTOR POSITIVE: ICD-10-CM

## 2020-09-02 DIAGNOSIS — C50.312 MALIGNANT NEOPLASM OF LOWER-INNER QUADRANT OF LEFT BREAST IN FEMALE, ESTROGEN RECEPTOR POSITIVE: Primary | ICD-10-CM

## 2020-09-02 PROCEDURE — 3079F PR MOST RECENT DIASTOLIC BLOOD PRESSURE 80-89 MM HG: ICD-10-PCS | Mod: CPTII,S$GLB,, | Performed by: SURGERY

## 2020-09-02 PROCEDURE — 99999 PR PBB SHADOW E&M-EST. PATIENT-LVL V: CPT | Mod: PBBFAC,,, | Performed by: SURGERY

## 2020-09-02 PROCEDURE — 99213 PR OFFICE/OUTPT VISIT, EST, LEVL III, 20-29 MIN: ICD-10-PCS | Mod: S$GLB,,, | Performed by: SURGERY

## 2020-09-02 PROCEDURE — 99213 OFFICE O/P EST LOW 20 MIN: CPT | Mod: S$GLB,,, | Performed by: SURGERY

## 2020-09-02 PROCEDURE — 3079F DIAST BP 80-89 MM HG: CPT | Mod: CPTII,S$GLB,, | Performed by: SURGERY

## 2020-09-02 PROCEDURE — 1159F PR MEDICATION LIST DOCUMENTED IN MEDICAL RECORD: ICD-10-PCS | Mod: S$GLB,,, | Performed by: SURGERY

## 2020-09-02 PROCEDURE — 93005 ELECTROCARDIOGRAM TRACING: CPT

## 2020-09-02 PROCEDURE — 1159F MED LIST DOCD IN RCRD: CPT | Mod: S$GLB,,, | Performed by: SURGERY

## 2020-09-02 PROCEDURE — 3077F SYST BP >= 140 MM HG: CPT | Mod: CPTII,S$GLB,, | Performed by: SURGERY

## 2020-09-02 PROCEDURE — 99999 PR PBB SHADOW E&M-EST. PATIENT-LVL V: ICD-10-PCS | Mod: PBBFAC,,, | Performed by: SURGERY

## 2020-09-02 PROCEDURE — 1101F PR PT FALLS ASSESS DOC 0-1 FALLS W/OUT INJ PAST YR: ICD-10-PCS | Mod: CPTII,S$GLB,, | Performed by: SURGERY

## 2020-09-02 PROCEDURE — 3008F PR BODY MASS INDEX (BMI) DOCUMENTED: ICD-10-PCS | Mod: CPTII,S$GLB,, | Performed by: SURGERY

## 2020-09-02 PROCEDURE — 93010 EKG 12-LEAD: ICD-10-PCS | Mod: ,,, | Performed by: INTERNAL MEDICINE

## 2020-09-02 PROCEDURE — 3077F PR MOST RECENT SYSTOLIC BLOOD PRESSURE >= 140 MM HG: ICD-10-PCS | Mod: CPTII,S$GLB,, | Performed by: SURGERY

## 2020-09-02 PROCEDURE — 1101F PT FALLS ASSESS-DOCD LE1/YR: CPT | Mod: CPTII,S$GLB,, | Performed by: SURGERY

## 2020-09-02 PROCEDURE — 93010 ELECTROCARDIOGRAM REPORT: CPT | Mod: ,,, | Performed by: INTERNAL MEDICINE

## 2020-09-02 PROCEDURE — 3008F BODY MASS INDEX DOCD: CPT | Mod: CPTII,S$GLB,, | Performed by: SURGERY

## 2020-09-02 PROCEDURE — 1125F AMNT PAIN NOTED PAIN PRSNT: CPT | Mod: S$GLB,,, | Performed by: SURGERY

## 2020-09-02 PROCEDURE — 1125F PR PAIN SEVERITY QUANTIFIED, PAIN PRESENT: ICD-10-PCS | Mod: S$GLB,,, | Performed by: SURGERY

## 2020-09-02 RX ORDER — LIDOCAINE HYDROCHLORIDE 10 MG/ML
1 INJECTION, SOLUTION EPIDURAL; INFILTRATION; INTRACAUDAL; PERINEURAL ONCE
Status: CANCELLED | OUTPATIENT
Start: 2020-09-02 | End: 2020-09-02

## 2020-09-02 RX ORDER — SODIUM CHLORIDE 9 MG/ML
INJECTION, SOLUTION INTRAVENOUS CONTINUOUS
Status: CANCELLED | OUTPATIENT
Start: 2020-09-02

## 2020-09-02 NOTE — LETTER
September 7, 2020      Mario Fernández MD  [address] [place] [address]           [facility]  [address] [place] [address]  Phone: [phone]  Fax: [phone]          Patient: Leia Rodriguez   MR Number: [id number]   YOB: 1950   Date of Visit: 9/2/2020       Dear Dr. Mario Fernández:    Thank you for referring Leia Rodriguez to me for evaluation. Attached you will find relevant portions of my assessment and plan of care.    If you have questions, please do not hesitate to call me. I look forward to following Leia Rodriguez along with you.    Sincerely,    Vincent Moyer MD    Enclosure  CC:  No Recipients    If you would like to receive this communication electronically, please contact [email or web address] or [phone] to request more information on [employer] Mobile Link access.    For providers and/or their staff who would like to refer a patient to [facility], please contact us through our one-stop-shop provider referral line, [facility], at [phone].    If you feel you have received this communication in error or would no longer like to receive these types of communications, please e-mail [email or web address]

## 2020-09-02 NOTE — PRE ADMISSION SCREENING
Pre op instructions reviewed with patient's daughter, Moe, per phone:    To confirm, Your surgeon has instructed you:  Surgery is scheduled 09/08/20 at 1250.      Please report to [facility] 1st floor main lobby by 1120.   Pre admit office to call afternoon prior to surgery with final arrival time    Covid 19 testing is scheduled for 09/05/20 at 1010  @ 1250  Please self quarantine after Covid testing, prior to surgery      INSTRUCTIONS IMPORTANT!!!  ¨ Do not eat, drink, or smoke after 12 midnight prior to surgery, including water. OK to brush teeth, no gum, candy or mints!    ¨ Take only these medicines with a small swallow of water-morning of surgery.  wellbutrin          ____   Due to COVID 19 concerns, 1 visitor will be allowed in the pre operative area, and must adhere to social distancing guidelines.  One visitor/family member is currently allowed to visit in-patient rooms from 08:00 a.m - 6:00 p.m    ____   Family/caregivers will be updated re pt status via text/cell phone      ____  Do not wear makeup, including mascara.  ____  No powder, lotions or creams to surgical area.  ____  Please remove all jewelry, including piercings and leave at home.  ____  No money or valuables needed. Please leave at home.  ____  Please bring identification and insurance information to hospital.  ____  If going home the same day, arrange for a ride home. You will not be able to   drive if Anesthesia was used.  ____  Children, under 12 years old, must remain in the waiting room with an adult.  They are not allowed in patient areas.  ____  Wear loose fitting clothing. Allow for dressings, bandages.  ____  Stop Aspirin, Ibuprofen, Motrin and Aleve at least 5-7 days before surgery, unless otherwise instructed by your doctor, or the nurse.   You MAY use Tylenol/acetaminophen until day of surgery.  ____  If you take diabetic medication, do not take am of surgery unless instructed by   Doctor.  ____ Stop  taking any Fish Oil supplement or any Vitamins that contain Vitamin E at least 5 days prior to surgery.          Bathing Instructions-- The night before surgery and the morning prior to coming to the hospital:   -Do not shave the surgical area.   -Shower and wash your hair and body as usual with anti-bacterial  soap and shampoo.   -Rinse your hair and body completely.   -Use one packet of hibiclens to wash the surgical site (using your hand) gently for 5 minutes.  Do not scrub you skin too hard.   -Do not use hibiclens on your head, face, or genitals.   -Do not wash with anti-bacterial soap after you use the hibiclens.   -Rinse your body thoroughly.   -Dry with clean, soft towel.  Do not use lotion, cream, deodorant, or powders on   the surgical site.    Use antibacterial soap in place of hibiclens if your surgery is on the head, face or genitals.         Surgical Site Infection    Prevention of surgical site infections:     -Keep incisions clean and dry.   -Do not soak/submerge incisions in water until completely healed.   -Do not apply lotions, powders, creams, or deodorants to site.   -Always make sure hands are cleaned with antibacterial soap/ alcohol-based   prior to touching the surgical site.  (This includes doctors, nurses, staff, and yourself.)    Signs and symptoms:   -Redness and pain around the area where you had surgery   -Drainage of cloudy fluid from your surgical wound   -Fever over 100.4  I have read or had read and explained to me, and understand the above information.

## 2020-09-04 ENCOUNTER — TELEPHONE (OUTPATIENT)
Dept: GASTROENTEROLOGY | Facility: CLINIC | Age: 70
End: 2020-09-04

## 2020-09-04 NOTE — TELEPHONE ENCOUNTER
Called pt with results and recommendations. Pt states she needs someone to contact her daugher, listed in her contact information, but do not call her now, pt is at work.   Advised her someone will call her and review the results with her.

## 2020-09-04 NOTE — TELEPHONE ENCOUNTER
----- Message from Keira Ellison MD sent at 8/30/2020  1:50 PM CDT -----  AN staff: inform patient 2 of the 3 polyps removed were precancerous or adenomas. Guidelines recommend a repeat colonoscopy in 3 years. Place in recall. 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 Recommend capsule endoscopy. This has been ordered. Please schedule.

## 2020-09-05 ENCOUNTER — LAB VISIT (OUTPATIENT)
Dept: URGENT CARE | Facility: CLINIC | Age: 70
End: 2020-09-05
Payer: MEDICARE

## 2020-09-05 DIAGNOSIS — Z01.818 PREOP EXAMINATION: ICD-10-CM

## 2020-09-05 PROCEDURE — U0003 INFECTIOUS AGENT DETECTION BY NUCLEIC ACID (DNA OR RNA); SEVERE ACUTE RESPIRATORY SYNDROME CORONAVIRUS 2 (SARS-COV-2) (CORONAVIRUS DISEASE [COVID-19]), AMPLIFIED PROBE TECHNIQUE, MAKING USE OF HIGH THROUGHPUT TECHNOLOGIES AS DESCRIBED BY CMS-2020-01-R: HCPCS

## 2020-09-06 LAB — SARS-COV-2 RNA RESP QL NAA+PROBE: NOT DETECTED

## 2020-09-07 NOTE — PROGRESS NOTES
Patient ID: Leia Rodriguez is a 70 y.o. female.    Chief Complaint: No chief complaint on file.      HPI:  70-year-old female presents to discuss surgery. In the interim she has undergone PET scan which was negative for metastatic disease. Genetic testing pending    Initially referred for recurrent left breast cancer.  She recently underwent biopsy showing invasive ductal carcinoma ER+/VT- Her 2 -.  She previously underwent a left breast lumpectomy with adjuvant radiation in 2017.      For Seema Negro:  Breast cancer follow-up - last visit was 5/29/19    Pt has a history of Stage 0 DCIS of Lt. breast.  She presented in March of 2017 when diagnostic MMG confirmed the presence of a round mass with indistinct margins in the Lt. breast at the 7 o'clock position. The mass measured 9 x 5 x 6 mm. The patient was referred to Dr. Duke and on 6/15/17 underwent wire localization lumpectomy. Pathology revealed an 8 mm focus of ductal carcinoma in situ. The tumor was low grade. There was tumor present at the anterior inferior margin. Ninety percent of the tumor cells were ER positive, 5- 10% of the tumor cells were VT positive. The patient was referred for adjuvant radiotherapy. Completed a course of partial breast irradiation to the LIQ of the Lt. breast on 8/14/17.  Currently on hormonal therapy with Arimidex that started 8/2017.  Due off 8/2022    DEXA- 6/29/18- wnl- f/u in 2 years due to AI- 6/2020 due    Risk factors identified:     Menarche at 9 y/o  G 3 P 3  First pregnancy at 18 y/o  LMP: 50's  Estrogen: none  Radiation to the neck or chest wall - has had left breast radiation  Prior breast biopsies or atypical hyperplasia- left breast lumpectomy     FH: no breast cancer, daughter cervical cancer 30's, father prostate cancer 70's-     Body mass index is 32.54 kg/m².    Review of Systems   Constitutional: Negative.  Negative for activity change and unexpected weight change.   HENT: Negative.  Negative for hearing  loss, rhinorrhea and trouble swallowing.    Eyes: Negative.  Negative for discharge and visual disturbance.   Respiratory: Negative.  Negative for chest tightness and wheezing.    Cardiovascular: Negative.  Negative for chest pain and palpitations.   Gastrointestinal: Negative.  Negative for blood in stool, constipation, diarrhea and vomiting.        No reflux   Endocrine: Negative.  Negative for polydipsia and polyuria.   Genitourinary: Negative.  Negative for difficulty urinating, dysuria, hematuria and menstrual problem.   Musculoskeletal: Negative.  Negative for joint swelling and neck pain.   Skin: Negative.    Allergic/Immunologic: Negative.    Neurological: Negative.  Negative for headaches.   Hematological: Negative.  Negative for adenopathy.   Psychiatric/Behavioral: Negative.  Negative for confusion and dysphoric mood.   Breast: Pt denies any breast pain, nipple discharge, or palpable mass. No prior trauma or bruising. No breast surgeries or abnormalities.    Current Outpatient Medications   Medication Sig Dispense Refill    anastrozole (ARIMIDEX) 1 mg Tab TAKE 1 TABLET (1 MG TOTAL) BY MOUTH ONCE DAILY. 90 tablet 3    buPROPion (WELLBUTRIN SR) 150 MG TBSR 12 hr tablet Take 1 tablet (150 mg total) by mouth 2 (two) times daily. 60 tablet 2    hydroCHLOROthiazide (MICROZIDE) 12.5 mg capsule TAKE 1 CAPSULE (12.5 MG TOTAL) BY MOUTH ONCE DAILY. FOR HIGH BLOOD PRESSURE 30 capsule 11    meloxicam (MOBIC) 15 MG tablet TAKE 1 TABLET BY MOUTH EVERY DAY 30 tablet 5    metroNIDAZOLE (FLAGYL) 500 MG tablet Take 500 mg by mouth 3 (three) times daily.  0    nitrofurantoin, macrocrystal-monohydrate, (MACROBID) 100 MG capsule       oxybutynin (DITROPAN) 5 MG Tab Take 1 tablet (5 mg total) by mouth 3 (three) times daily as needed. 90 tablet 11    tamsulosin (FLOMAX) 0.4 mg Cap Take 1 capsule by mouth once daily.      vitamin D 1000 units Tab Take 1,000 Units by mouth once daily.      ciprofloxacin HCl (CIPRO) 500  MG tablet Take 500 mg by mouth 2 (two) times daily.  0    fluconazole (DIFLUCAN) 100 MG tablet       iron, carbonyl, (ICAR) 15 mg/1.25 mL Susp Take by mouth.       No current facility-administered medications for this visit.      Facility-Administered Medications Ordered in Other Visits   Medication Dose Route Frequency Provider Last Rate Last Dose    lactated ringers infusion   Intravenous Continuous Keira Ellison MD   Stopped at 20 1013       Review of patient's allergies indicates:  No Known Allergies    Past Medical History:   Diagnosis Date    Arthritis     EDGAR HANDS, KNEES    Blind right eye     Traumatic    Breast cancer 06/15/2017    0.8 cm Grade1 INTRADUCTAL BREAST 9 positve margin (left)    Essential hypertension     Hemorrhoids     Lipoma of abdominal wall     Obesity     Overactive bladder     Pap smear abnormality of cervix with ASCUS favoring benign     Thyroid nodule     Tobacco use disorder     Tubular adenoma of colon     Urinary incontinence        Past Surgical History:   Procedure Laterality Date    ANTERIOR VAGINAL REPAIR      BLADDER SURGERY      BREAST LUMPECTOMY Left     CATARACT EXTRACTION Right      SECTION      X2    COLONOSCOPY N/A 3/8/2017    Procedure: COLONOSCOPY;  Surgeon: Tyron Paris MD;  Location: [facility];  Service: Endoscopy;  Laterality: N/A;    COLONOSCOPY N/A 2020    Procedure: COLONOSCOPY;  Surgeon: Keira Ellison MD;  Location: [facility];  Service: Endoscopy;  Laterality: N/A;    ESOPHAGOGASTRODUODENOSCOPY N/A 2020    Procedure: EGD (ESOPHAGOGASTRODUODENOSCOPY);  Surgeon: Keira Ellison MD;  Location: [facility];  Service: Endoscopy;  Laterality: N/A;  new onset iron deficiency with prior history of breast cancer    THYROID LOBECTOMY Left     TOTAL ABDOMINAL HYSTERECTOMY      TUBAL LIGATION         Family History   Problem Relation Age of Onset    Hypertension Father     Cataracts Father      Prostate cancer Father 80    Cervical cancer Daughter 32    Allergic rhinitis Daughter     Cirrhosis Mother     Alcohol abuse Mother     Hypertension Daughter     Diabetes Brother     Heart attack Brother     Heart murmur Brother     No Known Problems Daughter        Social History     Socioeconomic History    Marital status:      Spouse name: Not on file    Number of children: Not on file    Years of education: Not on file    Highest education level: Not on file   Occupational History    Not on file   Social Needs    Financial resource strain: Not on file    Food insecurity     Worry: Not on file     Inability: Not on file    Transportation needs     Medical: Not on file     Non-medical: Not on file   Tobacco Use    Smoking status: Former Smoker     Packs/day: 1.00     Years: 50.00     Pack years: 50.00     Types: Cigarettes     Quit date: 2020     Years since quittin.0    Smokeless tobacco: Never Used   Substance and Sexual Activity    Alcohol use: Never     Alcohol/week: 28.0 standard drinks     Types: 28 Cans of beer per week     Frequency: 4 or more times a week     Drinks per session: 3 or 4     Binge frequency: Less than monthly    Drug use: No    Sexual activity: Never     Partners: Male   Lifestyle    Physical activity     Days per week: Not on file     Minutes per session: Not on file    Stress: Not on file   Relationships    Social connections     Talks on phone: Not on file     Gets together: Not on file     Attends Islam service: Not on file     Active member of club or organization: Not on file     Attends meetings of clubs or organizations: Not on file     Relationship status: Not on file   Other Topics Concern    Not on file   Social History Narrative    The patient is .  She is retired from [employer].       Vitals:    20 0912   BP: (!) 162/85   Pulse: 98       Physical Exam  Constitutional:       Appearance: She is well-developed.    HENT:      Head: Normocephalic and atraumatic.      Right Ear: External ear normal.      Left Ear: External ear normal.      Mouth/Throat:      Pharynx: No oropharyngeal exudate.   Eyes:      General: No scleral icterus.        Right eye: No discharge.         Left eye: No discharge.      Conjunctiva/sclera: Conjunctivae normal.      Pupils: Pupils are equal, round, and reactive to light.   Neck:      Musculoskeletal: Normal range of motion and neck supple.      Thyroid: No thyromegaly.   Cardiovascular:      Rate and Rhythm: Normal rate and regular rhythm.      Heart sounds: Normal heart sounds.   Pulmonary:      Effort: Pulmonary effort is normal.      Breath sounds: Normal breath sounds.   Chest:      Breasts:         Right: No inverted nipple, mass, nipple discharge, skin change or tenderness.         Left: No inverted nipple, mass, nipple discharge, skin change or tenderness.   Abdominal:      General: Bowel sounds are normal.      Palpations: Abdomen is soft.   Musculoskeletal: Normal range of motion.      Right shoulder: She exhibits no crepitus and normal strength.   Lymphadenopathy:      Head:      Right side of head: No submental, submandibular, tonsillar, preauricular, posterior auricular or occipital adenopathy.      Left side of head: No submental, submandibular, tonsillar, preauricular, posterior auricular or occipital adenopathy.      Cervical: No cervical adenopathy.      Right cervical: No superficial or posterior cervical adenopathy.     Left cervical: No superficial or posterior cervical adenopathy.      Upper Body:      Right upper body: No supraclavicular adenopathy.      Left upper body: No supraclavicular adenopathy.   Skin:     General: Skin is warm and dry.      Coloration: Skin is not pale.      Findings: No erythema or rash.   Neurological:      Mental Status: She is alert and oriented to person, place, and time.      Deep Tendon Reflexes: Reflexes are normal and symmetric.    Psychiatric:         Behavior: Behavior normal.         Thought Content: Thought content normal.         Judgment: Judgment normal.         Result:   Mammo Digital Diagnostic Bilat w/ Navjot  US Breast Left Limited     History:  Patient is 70 y.o. and is seen for a diagnostic mammogram.     Films Compared:  Compared to: 05/29/2019 Mammo Digital Diagnostic Bilat w/ Navjot and 05/16/2018 Mammo Digital Diagnostic Bilat with Tomosynthesis_CAD     Findings:  This procedure was performed using tomosynthesis. Computer-aided detection was utilized in the interpretation of this examination.  The breasts are heterogeneously dense, which may obscure small masses.      Left  Mammo Digital Diagnostic Bilat w/ Navjot  There is an 11 mm oval mass with spiculated margins seen in the upper outer quadrant of the left breast in the posterior depth.      US Breast Left Limited  There is an irregularly shaped, non-parallel, hypoechoic mass with microlobulated margins with shadowing seen in the left breast at 2 o'clock, 10 cm from the nipple.      No suspicious left axillary adenopathy demonstrated.      Right     Mammo Digital Diagnostic Bilat w/ Navjot  There is no evidence of suspicious masses, calcifications, or other abnormal findings in the right breast.     Impression:  Left  Mass: Left breast 11 mm mass at the upper outer posterior position. Assessment: 5 - Highly suggestive of malignancy. Biopsy is recommended.      Right  There is no mammographic or sonographic evidence of malignancy in the right breast.     BI-RADS Category:   Overall: 5 - Highly Suggestive of Malignancy     Recommendation:  Ultrasound guided Biopsy is recommended.     Final Pathologic Diagnosis Left breast upper outer quadrant, biopsy:   Invasive ductal carcinoma, Grade 2 ( Tubules=3, nuclei=3, mitosis=1),   measuring 1.4 cm (14 mm) in greatest linear dimension within the core biopsy   Ductal carcinoma in situ (DCIS), nuclear grade 3, cribriform pattern,    measuring 3 mm   Microcalcifications present   Molecular markers are pending and will be reported in a supplemental   Skye DO Johan has reviewed this case and agrees with the above   diagnoses  VC      Comment: Interpreted by: Hortencia Tran M.D., Signed on 08/10/2020 at 09:21   Supplemental Diagnosis BREAST BIOMARKER RESULTS   Estrogen receptor (ER):  Low Positive (10% weak)   Progesterone receptor (FL): Negative (less than 1%)   HER2 IHC:  Equivocal (Score 2+), HER2 FISH is pending and will be reported in   a supplemental   Ki-67 proliferation index:  50%         PET/CT:  Impression:  New diagnosis of left breast carcinoma.  No evidence of FDG avid regional lymphadenopathy or distant metastatic disease.    Assessment & Plan:  69 y/o female with left breast  invasive ductal carcinoma ER+/FL- Her 2 -    Left mastectomy without reconstruction with SLNbx possible ALND 9/8/20; reconstruction offered but declined by patient; discussed why alternative surgical options including lumpectomy + XRT would not be recommended based on the patient's previous radiation  Preop: CBC, CMP, EKG, COVID-19  Risks and benefits discussed with patient including but not limited to: pain, bleeding, infection, injury to underlying nerves or vessels, lymphedema,parathesias, need for further surgery    Genetic testing is still pending on patient. If test result is not back by date of surgery will delay as this could alter plan for surgery if positive as patient would likely desire prophylactic right mastectomy in the event test would be positive.

## 2020-09-07 NOTE — H&P (VIEW-ONLY)
Patient ID: Leia Rodriguez is a 70 y.o. female.    Chief Complaint: No chief complaint on file.      HPI:  70-year-old female presents to discuss surgery. In the interim she has undergone PET scan which was negative for metastatic disease. Genetic testing pending    Initially referred for recurrent left breast cancer.  She recently underwent biopsy showing invasive ductal carcinoma ER+/NM- Her 2 -.  She previously underwent a left breast lumpectomy with adjuvant radiation in 2017.      For Seema Negro:  Breast cancer follow-up - last visit was 5/29/19    Pt has a history of Stage 0 DCIS of Lt. breast.  She presented in March of 2017 when diagnostic MMG confirmed the presence of a round mass with indistinct margins in the Lt. breast at the 7 o'clock position. The mass measured 9 x 5 x 6 mm. The patient was referred to Dr. Duke and on 6/15/17 underwent wire localization lumpectomy. Pathology revealed an 8 mm focus of ductal carcinoma in situ. The tumor was low grade. There was tumor present at the anterior inferior margin. Ninety percent of the tumor cells were ER positive, 5- 10% of the tumor cells were NM positive. The patient was referred for adjuvant radiotherapy. Completed a course of partial breast irradiation to the LIQ of the Lt. breast on 8/14/17.  Currently on hormonal therapy with Arimidex that started 8/2017.  Due off 8/2022    DEXA- 6/29/18- wnl- f/u in 2 years due to AI- 6/2020 due    Risk factors identified:     Menarche at 9 y/o  G 3 P 3  First pregnancy at 16 y/o  LMP: 50's  Estrogen: none  Radiation to the neck or chest wall - has had left breast radiation  Prior breast biopsies or atypical hyperplasia- left breast lumpectomy     FH: no breast cancer, daughter cervical cancer 30's, father prostate cancer 70's-     Body mass index is 32.54 kg/m².    Review of Systems   Constitutional: Negative.  Negative for activity change and unexpected weight change.   HENT: Negative.  Negative for hearing  loss, rhinorrhea and trouble swallowing.    Eyes: Negative.  Negative for discharge and visual disturbance.   Respiratory: Negative.  Negative for chest tightness and wheezing.    Cardiovascular: Negative.  Negative for chest pain and palpitations.   Gastrointestinal: Negative.  Negative for blood in stool, constipation, diarrhea and vomiting.        No reflux   Endocrine: Negative.  Negative for polydipsia and polyuria.   Genitourinary: Negative.  Negative for difficulty urinating, dysuria, hematuria and menstrual problem.   Musculoskeletal: Negative.  Negative for joint swelling and neck pain.   Skin: Negative.    Allergic/Immunologic: Negative.    Neurological: Negative.  Negative for headaches.   Hematological: Negative.  Negative for adenopathy.   Psychiatric/Behavioral: Negative.  Negative for confusion and dysphoric mood.   Breast: Pt denies any breast pain, nipple discharge, or palpable mass. No prior trauma or bruising. No breast surgeries or abnormalities.    Current Outpatient Medications   Medication Sig Dispense Refill    anastrozole (ARIMIDEX) 1 mg Tab TAKE 1 TABLET (1 MG TOTAL) BY MOUTH ONCE DAILY. 90 tablet 3    buPROPion (WELLBUTRIN SR) 150 MG TBSR 12 hr tablet Take 1 tablet (150 mg total) by mouth 2 (two) times daily. 60 tablet 2    hydroCHLOROthiazide (MICROZIDE) 12.5 mg capsule TAKE 1 CAPSULE (12.5 MG TOTAL) BY MOUTH ONCE DAILY. FOR HIGH BLOOD PRESSURE 30 capsule 11    meloxicam (MOBIC) 15 MG tablet TAKE 1 TABLET BY MOUTH EVERY DAY 30 tablet 5    metroNIDAZOLE (FLAGYL) 500 MG tablet Take 500 mg by mouth 3 (three) times daily.  0    nitrofurantoin, macrocrystal-monohydrate, (MACROBID) 100 MG capsule       oxybutynin (DITROPAN) 5 MG Tab Take 1 tablet (5 mg total) by mouth 3 (three) times daily as needed. 90 tablet 11    tamsulosin (FLOMAX) 0.4 mg Cap Take 1 capsule by mouth once daily.      vitamin D 1000 units Tab Take 1,000 Units by mouth once daily.      ciprofloxacin HCl (CIPRO) 500  MG tablet Take 500 mg by mouth 2 (two) times daily.  0    fluconazole (DIFLUCAN) 100 MG tablet       iron, carbonyl, (ICAR) 15 mg/1.25 mL Susp Take by mouth.       No current facility-administered medications for this visit.      Facility-Administered Medications Ordered in Other Visits   Medication Dose Route Frequency Provider Last Rate Last Dose    lactated ringers infusion   Intravenous Continuous Keira Ellison MD   Stopped at 20 1013       Review of patient's allergies indicates:  No Known Allergies    Past Medical History:   Diagnosis Date    Arthritis     EDGAR HANDS, KNEES    Blind right eye     Traumatic    Breast cancer 06/15/2017    0.8 cm Grade1 INTRADUCTAL BREAST 9 positve margin (left)    Essential hypertension     Hemorrhoids     Lipoma of abdominal wall     Obesity     Overactive bladder     Pap smear abnormality of cervix with ASCUS favoring benign     Thyroid nodule     Tobacco use disorder     Tubular adenoma of colon     Urinary incontinence        Past Surgical History:   Procedure Laterality Date    ANTERIOR VAGINAL REPAIR      BLADDER SURGERY      BREAST LUMPECTOMY Left     CATARACT EXTRACTION Right      SECTION      X2    COLONOSCOPY N/A 3/8/2017    Procedure: COLONOSCOPY;  Surgeon: Tyron Paris MD;  Location: [facility];  Service: Endoscopy;  Laterality: N/A;    COLONOSCOPY N/A 2020    Procedure: COLONOSCOPY;  Surgeon: Keira Ellison MD;  Location: [facility];  Service: Endoscopy;  Laterality: N/A;    ESOPHAGOGASTRODUODENOSCOPY N/A 2020    Procedure: EGD (ESOPHAGOGASTRODUODENOSCOPY);  Surgeon: Keira Ellison MD;  Location: [facility];  Service: Endoscopy;  Laterality: N/A;  new onset iron deficiency with prior history of breast cancer    THYROID LOBECTOMY Left     TOTAL ABDOMINAL HYSTERECTOMY      TUBAL LIGATION         Family History   Problem Relation Age of Onset    Hypertension Father     Cataracts Father      Prostate cancer Father 80    Cervical cancer Daughter 32    Allergic rhinitis Daughter     Cirrhosis Mother     Alcohol abuse Mother     Hypertension Daughter     Diabetes Brother     Heart attack Brother     Heart murmur Brother     No Known Problems Daughter        Social History     Socioeconomic History    Marital status:      Spouse name: Not on file    Number of children: Not on file    Years of education: Not on file    Highest education level: Not on file   Occupational History    Not on file   Social Needs    Financial resource strain: Not on file    Food insecurity     Worry: Not on file     Inability: Not on file    Transportation needs     Medical: Not on file     Non-medical: Not on file   Tobacco Use    Smoking status: Former Smoker     Packs/day: 1.00     Years: 50.00     Pack years: 50.00     Types: Cigarettes     Quit date: 2020     Years since quittin.0    Smokeless tobacco: Never Used   Substance and Sexual Activity    Alcohol use: Never     Alcohol/week: 28.0 standard drinks     Types: 28 Cans of beer per week     Frequency: 4 or more times a week     Drinks per session: 3 or 4     Binge frequency: Less than monthly    Drug use: No    Sexual activity: Never     Partners: Male   Lifestyle    Physical activity     Days per week: Not on file     Minutes per session: Not on file    Stress: Not on file   Relationships    Social connections     Talks on phone: Not on file     Gets together: Not on file     Attends Yazdanism service: Not on file     Active member of club or organization: Not on file     Attends meetings of clubs or organizations: Not on file     Relationship status: Not on file   Other Topics Concern    Not on file   Social History Narrative    The patient is .  She is retired from [employer].       Vitals:    20 0912   BP: (!) 162/85   Pulse: 98       Physical Exam  Constitutional:       Appearance: She is well-developed.    HENT:      Head: Normocephalic and atraumatic.      Right Ear: External ear normal.      Left Ear: External ear normal.      Mouth/Throat:      Pharynx: No oropharyngeal exudate.   Eyes:      General: No scleral icterus.        Right eye: No discharge.         Left eye: No discharge.      Conjunctiva/sclera: Conjunctivae normal.      Pupils: Pupils are equal, round, and reactive to light.   Neck:      Musculoskeletal: Normal range of motion and neck supple.      Thyroid: No thyromegaly.   Cardiovascular:      Rate and Rhythm: Normal rate and regular rhythm.      Heart sounds: Normal heart sounds.   Pulmonary:      Effort: Pulmonary effort is normal.      Breath sounds: Normal breath sounds.   Chest:      Breasts:         Right: No inverted nipple, mass, nipple discharge, skin change or tenderness.         Left: No inverted nipple, mass, nipple discharge, skin change or tenderness.   Abdominal:      General: Bowel sounds are normal.      Palpations: Abdomen is soft.   Musculoskeletal: Normal range of motion.      Right shoulder: She exhibits no crepitus and normal strength.   Lymphadenopathy:      Head:      Right side of head: No submental, submandibular, tonsillar, preauricular, posterior auricular or occipital adenopathy.      Left side of head: No submental, submandibular, tonsillar, preauricular, posterior auricular or occipital adenopathy.      Cervical: No cervical adenopathy.      Right cervical: No superficial or posterior cervical adenopathy.     Left cervical: No superficial or posterior cervical adenopathy.      Upper Body:      Right upper body: No supraclavicular adenopathy.      Left upper body: No supraclavicular adenopathy.   Skin:     General: Skin is warm and dry.      Coloration: Skin is not pale.      Findings: No erythema or rash.   Neurological:      Mental Status: She is alert and oriented to person, place, and time.      Deep Tendon Reflexes: Reflexes are normal and symmetric.    Psychiatric:         Behavior: Behavior normal.         Thought Content: Thought content normal.         Judgment: Judgment normal.         Result:   Mammo Digital Diagnostic Bilat w/ Navjot  US Breast Left Limited     History:  Patient is 70 y.o. and is seen for a diagnostic mammogram.     Films Compared:  Compared to: 05/29/2019 Mammo Digital Diagnostic Bilat w/ Navjot and 05/16/2018 Mammo Digital Diagnostic Bilat with Tomosynthesis_CAD     Findings:  This procedure was performed using tomosynthesis. Computer-aided detection was utilized in the interpretation of this examination.  The breasts are heterogeneously dense, which may obscure small masses.      Left  Mammo Digital Diagnostic Bilat w/ Navjot  There is an 11 mm oval mass with spiculated margins seen in the upper outer quadrant of the left breast in the posterior depth.      US Breast Left Limited  There is an irregularly shaped, non-parallel, hypoechoic mass with microlobulated margins with shadowing seen in the left breast at 2 o'clock, 10 cm from the nipple.      No suspicious left axillary adenopathy demonstrated.      Right     Mammo Digital Diagnostic Bilat w/ Navjot  There is no evidence of suspicious masses, calcifications, or other abnormal findings in the right breast.     Impression:  Left  Mass: Left breast 11 mm mass at the upper outer posterior position. Assessment: 5 - Highly suggestive of malignancy. Biopsy is recommended.      Right  There is no mammographic or sonographic evidence of malignancy in the right breast.     BI-RADS Category:   Overall: 5 - Highly Suggestive of Malignancy     Recommendation:  Ultrasound guided Biopsy is recommended.     Final Pathologic Diagnosis Left breast upper outer quadrant, biopsy:   Invasive ductal carcinoma, Grade 2 ( Tubules=3, nuclei=3, mitosis=1),   measuring 1.4 cm (14 mm) in greatest linear dimension within the core biopsy   Ductal carcinoma in situ (DCIS), nuclear grade 3, cribriform pattern,    measuring 3 mm   Microcalcifications present   Molecular markers are pending and will be reported in a supplemental   Skye DO Johan has reviewed this case and agrees with the above   diagnoses  VC      Comment: Interpreted by: Hortencia Tran M.D., Signed on 08/10/2020 at 09:21   Supplemental Diagnosis BREAST BIOMARKER RESULTS   Estrogen receptor (ER):  Low Positive (10% weak)   Progesterone receptor (KS): Negative (less than 1%)   HER2 IHC:  Equivocal (Score 2+), HER2 FISH is pending and will be reported in   a supplemental   Ki-67 proliferation index:  50%         PET/CT:  Impression:  New diagnosis of left breast carcinoma.  No evidence of FDG avid regional lymphadenopathy or distant metastatic disease.    Assessment & Plan:  71 y/o female with left breast  invasive ductal carcinoma ER+/KS- Her 2 -    Left mastectomy without reconstruction with SLNbx possible ALND 9/8/20; reconstruction offered but declined by patient; discussed why alternative surgical options including lumpectomy + XRT would not be recommended based on the patient's previous radiation  Preop: CBC, CMP, EKG, COVID-19  Risks and benefits discussed with patient including but not limited to: pain, bleeding, infection, injury to underlying nerves or vessels, lymphedema,parathesias, need for further surgery    Genetic testing is still pending on patient. If test result is not back by date of surgery will delay as this could alter plan for surgery if positive as patient would likely desire prophylactic right mastectomy in the event test would be positive.

## 2020-09-08 ENCOUNTER — HOSPITAL ENCOUNTER (OUTPATIENT)
Dept: RADIOLOGY | Facility: HOSPITAL | Age: 70
Discharge: HOME | End: 2020-09-08
Attending: SURGERY
Payer: MEDICARE

## 2020-09-08 ENCOUNTER — HOSPITAL ENCOUNTER (OUTPATIENT)
Facility: HOSPITAL | Age: 70
Discharge: HOME | End: 2020-09-09
Attending: SURGERY | Admitting: SURGERY
Payer: MEDICARE

## 2020-09-08 ENCOUNTER — ANESTHESIA (OUTPATIENT)
Dept: SURGERY | Facility: HOSPITAL | Age: 70
End: 2020-09-08
Payer: MEDICARE

## 2020-09-08 ENCOUNTER — ANESTHESIA EVENT (OUTPATIENT)
Dept: SURGERY | Facility: HOSPITAL | Age: 70
End: 2020-09-08
Payer: MEDICARE

## 2020-09-08 DIAGNOSIS — Z17.0 MALIGNANT NEOPLASM OF LOWER-INNER QUADRANT OF LEFT BREAST IN FEMALE, ESTROGEN RECEPTOR POSITIVE: ICD-10-CM

## 2020-09-08 DIAGNOSIS — C50.312 MALIGNANT NEOPLASM OF LOWER-INNER QUADRANT OF LEFT BREAST IN FEMALE, ESTROGEN RECEPTOR POSITIVE: ICD-10-CM

## 2020-09-08 DIAGNOSIS — C50.919 BREAST CANCER IN FEMALE: ICD-10-CM

## 2020-09-08 PROCEDURE — A9520 TC99 TILMANOCEPT DIAG 0.5MCI: HCPCS

## 2020-09-08 PROCEDURE — 88307 TISSUE EXAM BY PATHOLOGIST: CPT | Mod: 26,,, | Performed by: PATHOLOGY

## 2020-09-08 PROCEDURE — 63600175 PHARM REV CODE 636 W HCPCS: Performed by: SURGERY

## 2020-09-08 PROCEDURE — 97110 THERAPEUTIC EXERCISES: CPT

## 2020-09-08 PROCEDURE — 19303 MAST SIMPLE COMPLETE: CPT | Mod: LT,,, | Performed by: SURGERY

## 2020-09-08 PROCEDURE — 88307 TISSUE EXAM BY PATHOLOGIST: CPT | Performed by: PATHOLOGY

## 2020-09-08 PROCEDURE — 88331 PR  PATH CONSULT IN SURG,W FRZ SEC: ICD-10-PCS | Mod: 26,,, | Performed by: PATHOLOGY

## 2020-09-08 PROCEDURE — 94799 UNLISTED PULMONARY SVC/PX: CPT

## 2020-09-08 PROCEDURE — 63600175 PHARM REV CODE 636 W HCPCS: Performed by: NURSE ANESTHETIST, CERTIFIED REGISTERED

## 2020-09-08 PROCEDURE — 38900 PR INTRAOPERATIVE SENTINEL LYMPH NODE ID W DYE INJECTION: ICD-10-PCS | Mod: LT,,, | Performed by: SURGERY

## 2020-09-08 PROCEDURE — 36000707: Performed by: SURGERY

## 2020-09-08 PROCEDURE — 94760 N-INVAS EAR/PLS OXIMETRY 1: CPT

## 2020-09-08 PROCEDURE — 37000008 HC ANESTHESIA 1ST 15 MINUTES: Performed by: SURGERY

## 2020-09-08 PROCEDURE — 19303 PR MASTECTOMY, SIMPLE, COMPLETE: ICD-10-PCS | Mod: LT,,, | Performed by: SURGERY

## 2020-09-08 PROCEDURE — 38525 BIOPSY/REMOVAL LYMPH NODES: CPT | Mod: 51,LT,, | Performed by: SURGERY

## 2020-09-08 PROCEDURE — 88341 IMHCHEM/IMCYTCHM EA ADD ANTB: CPT | Mod: 26,59,, | Performed by: PATHOLOGY

## 2020-09-08 PROCEDURE — 88331 PATH CONSLTJ SURG 1 BLK 1SPC: CPT | Mod: 26,,, | Performed by: PATHOLOGY

## 2020-09-08 PROCEDURE — 63600175 PHARM REV CODE 636 W HCPCS: Performed by: INTERNAL MEDICINE

## 2020-09-08 PROCEDURE — 88302 TISSUE EXAM BY PATHOLOGIST: CPT | Performed by: PATHOLOGY

## 2020-09-08 PROCEDURE — 38525 PR BIOPSY/REM LYMPH NODES, AXILLARY: ICD-10-PCS | Mod: 51,LT,, | Performed by: SURGERY

## 2020-09-08 PROCEDURE — 36000706: Performed by: SURGERY

## 2020-09-08 PROCEDURE — 38900 IO MAP OF SENT LYMPH NODE: CPT | Mod: LT,,, | Performed by: SURGERY

## 2020-09-08 PROCEDURE — 97162 PT EVAL MOD COMPLEX 30 MIN: CPT

## 2020-09-08 PROCEDURE — 88302 PR  SURG PATH,LEVEL II: ICD-10-PCS | Mod: 26,,, | Performed by: PATHOLOGY

## 2020-09-08 PROCEDURE — 88341 IMHCHEM/IMCYTCHM EA ADD ANTB: CPT | Mod: 59 | Performed by: PATHOLOGY

## 2020-09-08 PROCEDURE — 97530 THERAPEUTIC ACTIVITIES: CPT

## 2020-09-08 PROCEDURE — 88360 PR  TUMOR IMMUNOHISTOCHEM/MANUAL: ICD-10-PCS | Mod: 26,,, | Performed by: PATHOLOGY

## 2020-09-08 PROCEDURE — 88341 PR IHC OR ICC EACH ADD'L SINGLE ANTIBODY  STAINPR: ICD-10-PCS | Mod: 26,59,, | Performed by: PATHOLOGY

## 2020-09-08 PROCEDURE — 88307 PR  SURG PATH,LEVEL V: ICD-10-PCS | Mod: 26,,, | Performed by: PATHOLOGY

## 2020-09-08 PROCEDURE — 88360 TUMOR IMMUNOHISTOCHEM/MANUAL: CPT | Mod: 59 | Performed by: PATHOLOGY

## 2020-09-08 PROCEDURE — 25000003 PHARM REV CODE 250: Performed by: NURSE ANESTHETIST, CERTIFIED REGISTERED

## 2020-09-08 PROCEDURE — 88342 IMHCHEM/IMCYTCHM 1ST ANTB: CPT | Mod: 59 | Performed by: PATHOLOGY

## 2020-09-08 PROCEDURE — 88342 IMHCHEM/IMCYTCHM 1ST ANTB: CPT | Mod: 26,59,, | Performed by: PATHOLOGY

## 2020-09-08 PROCEDURE — 63600175 PHARM REV CODE 636 W HCPCS: Mod: JG | Performed by: SURGERY

## 2020-09-08 PROCEDURE — C1729 CATH, DRAINAGE: HCPCS | Performed by: SURGERY

## 2020-09-08 PROCEDURE — 27201423 OPTIME MED/SURG SUP & DEVICES STERILE SUPPLY: Performed by: SURGERY

## 2020-09-08 PROCEDURE — 88302 TISSUE EXAM BY PATHOLOGIST: CPT | Mod: 26,,, | Performed by: PATHOLOGY

## 2020-09-08 PROCEDURE — 37000009 HC ANESTHESIA EA ADD 15 MINS: Performed by: SURGERY

## 2020-09-08 PROCEDURE — 88331 PATH CONSLTJ SURG 1 BLK 1SPC: CPT | Performed by: PATHOLOGY

## 2020-09-08 PROCEDURE — 88342 CHG IMMUNOCYTOCHEMISTRY: ICD-10-PCS | Mod: 26,59,, | Performed by: PATHOLOGY

## 2020-09-08 PROCEDURE — 25000003 PHARM REV CODE 250: Performed by: SURGERY

## 2020-09-08 PROCEDURE — 71000033 HC RECOVERY, INTIAL HOUR: Performed by: SURGERY

## 2020-09-08 PROCEDURE — 63600175 PHARM REV CODE 636 W HCPCS: Performed by: ANESTHESIOLOGY

## 2020-09-08 PROCEDURE — 88360 TUMOR IMMUNOHISTOCHEM/MANUAL: CPT | Mod: 26,,, | Performed by: PATHOLOGY

## 2020-09-08 RX ORDER — TAMSULOSIN HYDROCHLORIDE 0.4 MG/1
1 CAPSULE ORAL DAILY
Status: DISCONTINUED | OUTPATIENT
Start: 2020-09-09 | End: 2020-09-09 | Stop reason: HOSPADM

## 2020-09-08 RX ORDER — TALC
9 POWDER (GRAM) TOPICAL NIGHTLY PRN
Status: DISCONTINUED | OUTPATIENT
Start: 2020-09-08 | End: 2020-09-09 | Stop reason: HOSPADM

## 2020-09-08 RX ORDER — PROPOFOL 10 MG/ML
VIAL (ML) INTRAVENOUS
Status: DISCONTINUED | OUTPATIENT
Start: 2020-09-08 | End: 2020-09-08

## 2020-09-08 RX ORDER — FENTANYL CITRATE 50 UG/ML
INJECTION, SOLUTION INTRAMUSCULAR; INTRAVENOUS
Status: DISCONTINUED | OUTPATIENT
Start: 2020-09-08 | End: 2020-09-08

## 2020-09-08 RX ORDER — HYDROMORPHONE HYDROCHLORIDE 2 MG/ML
0.2 INJECTION, SOLUTION INTRAMUSCULAR; INTRAVENOUS; SUBCUTANEOUS EVERY 5 MIN PRN
Status: DISCONTINUED | OUTPATIENT
Start: 2020-09-08 | End: 2020-09-08 | Stop reason: HOSPADM

## 2020-09-08 RX ORDER — AMOXICILLIN 250 MG
1 CAPSULE ORAL 2 TIMES DAILY
Status: DISCONTINUED | OUTPATIENT
Start: 2020-09-08 | End: 2020-09-09 | Stop reason: HOSPADM

## 2020-09-08 RX ORDER — IBUPROFEN 200 MG
1 CAPSULE ORAL DAILY
COMMUNITY

## 2020-09-08 RX ORDER — FERROUS SULFATE 325(65) MG
65 TABLET ORAL DAILY
COMMUNITY

## 2020-09-08 RX ORDER — MIDAZOLAM HYDROCHLORIDE 1 MG/ML
INJECTION, SOLUTION INTRAMUSCULAR; INTRAVENOUS
Status: DISCONTINUED | OUTPATIENT
Start: 2020-09-08 | End: 2020-09-08

## 2020-09-08 RX ORDER — ONDANSETRON 2 MG/ML
INJECTION INTRAMUSCULAR; INTRAVENOUS
Status: DISCONTINUED | OUTPATIENT
Start: 2020-09-08 | End: 2020-09-08

## 2020-09-08 RX ORDER — DEXAMETHASONE SODIUM PHOSPHATE 4 MG/ML
INJECTION, SOLUTION INTRA-ARTICULAR; INTRALESIONAL; INTRAMUSCULAR; INTRAVENOUS; SOFT TISSUE
Status: DISCONTINUED | OUTPATIENT
Start: 2020-09-08 | End: 2020-09-08

## 2020-09-08 RX ORDER — CLONIDINE HYDROCHLORIDE 0.1 MG/1
0.1 TABLET ORAL EVERY 6 HOURS PRN
Status: DISCONTINUED | OUTPATIENT
Start: 2020-09-08 | End: 2020-09-09 | Stop reason: HOSPADM

## 2020-09-08 RX ORDER — LIDOCAINE HYDROCHLORIDE 20 MG/ML
INJECTION INTRAVENOUS
Status: DISCONTINUED | OUTPATIENT
Start: 2020-09-08 | End: 2020-09-08

## 2020-09-08 RX ORDER — HYDRALAZINE HYDROCHLORIDE 20 MG/ML
10 INJECTION INTRAMUSCULAR; INTRAVENOUS ONCE
Status: COMPLETED | OUTPATIENT
Start: 2020-09-08 | End: 2020-09-08

## 2020-09-08 RX ORDER — SUCCINYLCHOLINE CHLORIDE 20 MG/ML
INJECTION INTRAMUSCULAR; INTRAVENOUS
Status: DISCONTINUED | OUTPATIENT
Start: 2020-09-08 | End: 2020-09-08

## 2020-09-08 RX ORDER — CHLORHEXIDINE GLUCONATE ORAL RINSE 1.2 MG/ML
10 SOLUTION DENTAL 2 TIMES DAILY
Status: DISCONTINUED | OUTPATIENT
Start: 2020-09-08 | End: 2020-09-09 | Stop reason: HOSPADM

## 2020-09-08 RX ORDER — ISOSULFAN BLUE 50 MG/5ML
INJECTION, SOLUTION SUBCUTANEOUS
Status: DISCONTINUED | OUTPATIENT
Start: 2020-09-08 | End: 2020-09-08 | Stop reason: HOSPADM

## 2020-09-08 RX ORDER — OXYCODONE AND ACETAMINOPHEN 5; 325 MG/1; MG/1
1 TABLET ORAL
Status: DISCONTINUED | OUTPATIENT
Start: 2020-09-08 | End: 2020-09-08 | Stop reason: HOSPADM

## 2020-09-08 RX ORDER — ONDANSETRON 2 MG/ML
4 INJECTION INTRAMUSCULAR; INTRAVENOUS EVERY 12 HOURS PRN
Status: DISCONTINUED | OUTPATIENT
Start: 2020-09-08 | End: 2020-09-09 | Stop reason: HOSPADM

## 2020-09-08 RX ORDER — HYDROCHLOROTHIAZIDE 12.5 MG/1
12.5 TABLET ORAL DAILY
Status: DISCONTINUED | OUTPATIENT
Start: 2020-09-09 | End: 2020-09-09 | Stop reason: HOSPADM

## 2020-09-08 RX ORDER — CYCLOBENZAPRINE HCL 10 MG
10 TABLET ORAL 3 TIMES DAILY PRN
Status: DISCONTINUED | OUTPATIENT
Start: 2020-09-08 | End: 2020-09-09 | Stop reason: HOSPADM

## 2020-09-08 RX ORDER — SODIUM CHLORIDE, SODIUM LACTATE, POTASSIUM CHLORIDE, CALCIUM CHLORIDE 600; 310; 30; 20 MG/100ML; MG/100ML; MG/100ML; MG/100ML
INJECTION, SOLUTION INTRAVENOUS CONTINUOUS
Status: DISCONTINUED | OUTPATIENT
Start: 2020-09-08 | End: 2020-09-09 | Stop reason: HOSPADM

## 2020-09-08 RX ORDER — HYDROCODONE BITARTRATE AND ACETAMINOPHEN 5; 325 MG/1; MG/1
2 TABLET ORAL EVERY 4 HOURS PRN
Status: DISCONTINUED | OUTPATIENT
Start: 2020-09-08 | End: 2020-09-09 | Stop reason: HOSPADM

## 2020-09-08 RX ORDER — DIPHENHYDRAMINE HYDROCHLORIDE 50 MG/ML
25 INJECTION INTRAMUSCULAR; INTRAVENOUS EVERY 4 HOURS PRN
Status: DISCONTINUED | OUTPATIENT
Start: 2020-09-08 | End: 2020-09-09 | Stop reason: HOSPADM

## 2020-09-08 RX ORDER — LIDOCAINE HYDROCHLORIDE 10 MG/ML
1 INJECTION, SOLUTION EPIDURAL; INFILTRATION; INTRACAUDAL; PERINEURAL ONCE
Status: DISCONTINUED | OUTPATIENT
Start: 2020-09-08 | End: 2020-09-08 | Stop reason: HOSPADM

## 2020-09-08 RX ORDER — BUPROPION HYDROCHLORIDE 150 MG/1
150 TABLET, EXTENDED RELEASE ORAL 2 TIMES DAILY
Status: DISCONTINUED | OUTPATIENT
Start: 2020-09-08 | End: 2020-09-09 | Stop reason: HOSPADM

## 2020-09-08 RX ORDER — ONDANSETRON 2 MG/ML
4 INJECTION INTRAMUSCULAR; INTRAVENOUS DAILY PRN
Status: DISCONTINUED | OUTPATIENT
Start: 2020-09-08 | End: 2020-09-08 | Stop reason: HOSPADM

## 2020-09-08 RX ORDER — PHENYLEPHRINE HYDROCHLORIDE 10 MG/ML
INJECTION INTRAVENOUS
Status: DISCONTINUED | OUTPATIENT
Start: 2020-09-08 | End: 2020-09-08

## 2020-09-08 RX ORDER — ROCURONIUM BROMIDE 10 MG/ML
INJECTION, SOLUTION INTRAVENOUS
Status: DISCONTINUED | OUTPATIENT
Start: 2020-09-08 | End: 2020-09-08

## 2020-09-08 RX ORDER — ACETAMINOPHEN 325 MG/1
650 TABLET ORAL EVERY 6 HOURS PRN
Status: DISCONTINUED | OUTPATIENT
Start: 2020-09-08 | End: 2020-09-09 | Stop reason: HOSPADM

## 2020-09-08 RX ORDER — MORPHINE SULFATE 2 MG/ML
2 INJECTION, SOLUTION INTRAMUSCULAR; INTRAVENOUS
Status: DISCONTINUED | OUTPATIENT
Start: 2020-09-08 | End: 2020-09-09 | Stop reason: HOSPADM

## 2020-09-08 RX ORDER — CEFAZOLIN SODIUM 2 G/50ML
2 SOLUTION INTRAVENOUS
Status: COMPLETED | OUTPATIENT
Start: 2020-09-08 | End: 2020-09-08

## 2020-09-08 RX ORDER — SODIUM CHLORIDE 9 MG/ML
INJECTION, SOLUTION INTRAVENOUS CONTINUOUS
Status: DISCONTINUED | OUTPATIENT
Start: 2020-09-08 | End: 2020-09-08

## 2020-09-08 RX ADMIN — BUPROPION HYDROCHLORIDE 150 MG: 150 TABLET, EXTENDED RELEASE ORAL at 09:09

## 2020-09-08 RX ADMIN — SUCCINYLCHOLINE CHLORIDE 140 MG: 20 INJECTION, SOLUTION INTRAMUSCULAR; INTRAVENOUS at 10:09

## 2020-09-08 RX ADMIN — FENTANYL CITRATE 50 MCG: 50 INJECTION, SOLUTION INTRAMUSCULAR; INTRAVENOUS at 11:09

## 2020-09-08 RX ADMIN — STANDARDIZED SENNA CONCENTRATE AND DOCUSATE SODIUM 1 TABLET: 8.6; 5 TABLET ORAL at 09:09

## 2020-09-08 RX ADMIN — ONDANSETRON 4 MG: 2 INJECTION, SOLUTION INTRAMUSCULAR; INTRAVENOUS at 12:09

## 2020-09-08 RX ADMIN — PROPOFOL 100 MG: 10 INJECTION, EMULSION INTRAVENOUS at 10:09

## 2020-09-08 RX ADMIN — FENTANYL CITRATE 25 MCG: 50 INJECTION, SOLUTION INTRAMUSCULAR; INTRAVENOUS at 12:09

## 2020-09-08 RX ADMIN — SODIUM CHLORIDE, SODIUM LACTATE, POTASSIUM CHLORIDE, AND CALCIUM CHLORIDE: 600; 310; 30; 20 INJECTION, SOLUTION INTRAVENOUS at 10:09

## 2020-09-08 RX ADMIN — PHENYLEPHRINE HYDROCHLORIDE 200 MCG: 10 INJECTION INTRAVENOUS at 11:09

## 2020-09-08 RX ADMIN — MIDAZOLAM 2 MG: 1 INJECTION INTRAMUSCULAR; INTRAVENOUS at 10:09

## 2020-09-08 RX ADMIN — DEXAMETHASONE SODIUM PHOSPHATE 8 MG: 4 INJECTION, SOLUTION INTRA-ARTICULAR; INTRALESIONAL; INTRAMUSCULAR; INTRAVENOUS; SOFT TISSUE at 12:09

## 2020-09-08 RX ADMIN — SODIUM CHLORIDE, SODIUM LACTATE, POTASSIUM CHLORIDE, AND CALCIUM CHLORIDE: .6; .31; .03; .02 INJECTION, SOLUTION INTRAVENOUS at 02:09

## 2020-09-08 RX ADMIN — HYDROCODONE BITARTRATE AND ACETAMINOPHEN 2 TABLET: 5; 325 TABLET ORAL at 09:09

## 2020-09-08 RX ADMIN — Medication 100 MG: at 10:09

## 2020-09-08 RX ADMIN — HYDRALAZINE HYDROCHLORIDE 10 MG: 20 INJECTION INTRAMUSCULAR; INTRAVENOUS at 01:09

## 2020-09-08 RX ADMIN — HYDROCODONE BITARTRATE AND ACETAMINOPHEN 2 TABLET: 5; 325 TABLET ORAL at 05:09

## 2020-09-08 RX ADMIN — CLONIDINE HYDROCHLORIDE 0.1 MG: 0.1 TABLET ORAL at 11:09

## 2020-09-08 RX ADMIN — ROCURONIUM BROMIDE 5 MG: 10 INJECTION, SOLUTION INTRAVENOUS at 10:09

## 2020-09-08 RX ADMIN — CHLORHEXIDINE GLUCONATE 0.12% ORAL RINSE 10 ML: 1.2 LIQUID ORAL at 09:09

## 2020-09-08 RX ADMIN — CLONIDINE HYDROCHLORIDE 0.1 MG: 0.1 TABLET ORAL at 05:09

## 2020-09-08 RX ADMIN — CEFAZOLIN SODIUM 2 G: 2 SOLUTION INTRAVENOUS at 10:09

## 2020-09-08 RX ADMIN — FENTANYL CITRATE 100 MCG: 50 INJECTION, SOLUTION INTRAMUSCULAR; INTRAVENOUS at 10:09

## 2020-09-08 NOTE — ANESTHESIA PREPROCEDURE EVALUATION
09/08/2020  Leia Rodriguez is a 70 y.o., female.    Anesthesia Evaluation    I have reviewed the Patient Summary Reports.    I have reviewed the Nursing Notes.    I have reviewed the Medications.     Review of Systems  Anesthesia Hx:  No problems with previous Anesthesia    Social:  Former Smoker Recently quit smoking.   Hematology/Oncology:         -- Anemia:   Cardiovascular:   Hypertension    Pulmonary:  Pulmonary Normal    Renal/:  Renal/ Normal     Hepatic/GI:  Hepatic/GI Normal    Musculoskeletal:   Arthritis     Neurological:  Neurology Normal    Endocrine:  Endocrine Normal      Patient Active Problem List   Diagnosis    Urinary incontinence    Obesity (BMI 30.0-34.9)    Primary osteoarthritis of both hands    Essential hypertension    Tobacco use disorder    Lipoma of abdominal wall    Malignant neoplasm of breast in female, estrogen receptor positive    Ductal carcinoma in situ (DCIS) of left breast    Age-related osteoporosis without current pathological fracture    Chronic anemia    Breast cancer in female     Current Facility-Administered Medications on File Prior to Encounter   Medication Dose Route Frequency Provider Last Rate Last Dose    lactated ringers infusion   Intravenous Continuous Keira Ellison MD   Stopped at 08/26/20 1013     Current Outpatient Medications on File Prior to Encounter   Medication Sig Dispense Refill    anastrozole (ARIMIDEX) 1 mg Tab TAKE 1 TABLET (1 MG TOTAL) BY MOUTH ONCE DAILY. 90 tablet 3    buPROPion (WELLBUTRIN SR) 150 MG TBSR 12 hr tablet Take 1 tablet (150 mg total) by mouth 2 (two) times daily. 60 tablet 2    hydroCHLOROthiazide (MICROZIDE) 12.5 mg capsule TAKE 1 CAPSULE (12.5 MG TOTAL) BY MOUTH ONCE DAILY. FOR HIGH BLOOD PRESSURE 30 capsule 11    iron, carbonyl, (ICAR) 15 mg/1.25 mL Susp Take by mouth.      meloxicam (MOBIC) 15 MG  tablet TAKE 1 TABLET BY MOUTH EVERY DAY 30 tablet 5    oxybutynin (DITROPAN) 5 MG Tab Take 1 tablet (5 mg total) by mouth 3 (three) times daily as needed. 90 tablet 11    tamsulosin (FLOMAX) 0.4 mg Cap Take 1 capsule by mouth once daily.      vitamin D 1000 units Tab Take 1,000 Units by mouth once daily.      ciprofloxacin HCl (CIPRO) 500 MG tablet Take 500 mg by mouth 2 (two) times daily.  0    fluconazole (DIFLUCAN) 100 MG tablet       metroNIDAZOLE (FLAGYL) 500 MG tablet Take 500 mg by mouth 3 (three) times daily.  0    nitrofurantoin, macrocrystal-monohydrate, (MACROBID) 100 MG capsule        Past Surgical History:   Procedure Laterality Date    ANTERIOR VAGINAL REPAIR      BLADDER SURGERY      BREAST LUMPECTOMY Left     CATARACT EXTRACTION Right      SECTION      X2    COLONOSCOPY N/A 3/8/2017    Procedure: COLONOSCOPY;  Surgeon: Tyron Paris MD;  Location: [facility];  Service: Endoscopy;  Laterality: N/A;    COLONOSCOPY N/A 2020    Procedure: COLONOSCOPY;  Surgeon: Keira Ellison MD;  Location: [facility];  Service: Endoscopy;  Laterality: N/A;    ESOPHAGOGASTRODUODENOSCOPY N/A 2020    Procedure: EGD (ESOPHAGOGASTRODUODENOSCOPY);  Surgeon: Keira Ellison MD;  Location: [facility];  Service: Endoscopy;  Laterality: N/A;  new onset iron deficiency with prior history of breast cancer    THYROID LOBECTOMY Left     TOTAL ABDOMINAL HYSTERECTOMY      TUBAL LIGATION           Physical Exam  General:  Well nourished    Airway/Jaw/Neck:  Airway Findings: Mouth Opening: Normal Tongue: Normal  General Airway Assessment: Adult  Mallampati: II  TM Distance: 4 - 6 cm  Jaw/Neck Findings:  Neck ROM: Normal ROM      Dental:  Dental Findings: Edentulous   Chest/Lungs:  Chest/Lungs Findings: Clear to auscultation, Normal Respiratory Rate     Heart/Vascular:  Heart Findings: Rate: Normal  Rhythm: Regular Rhythm  Sounds: Normal        Mental Status:  Mental Status Findings:   Cooperative, Alert and Oriented         Anesthesia Plan  Type of Anesthesia, risks & benefits discussed:  Anesthesia Type:  general  Patient's Preference:   Intra-op Monitoring Plan: standard ASA monitors  Intra-op Monitoring Plan Comments:   Post Op Pain Control Plan: multimodal analgesia and per primary service following discharge from PACU  Post Op Pain Control Plan Comments:   Induction:   IV  Beta Blocker:  Patient is not currently on a Beta-Blocker (No further documentation required).       Informed Consent: Patient understands risks and agrees with Anesthesia plan.  Questions answered. Anesthesia consent signed with patient.  ASA Score: 2     Day of Surgery Review of History & Physical:  There are no significant changes.          Ready For Surgery From Anesthesia Perspective.       Chemistry        Component Value Date/Time     09/02/2020 1036    K 3.5 09/02/2020 1036    CL 97 09/02/2020 1036    CO2 30 (H) 09/02/2020 1036    BUN 7 (L) 09/02/2020 1036    CREATININE 1.0 09/02/2020 1036    GLU 91 09/02/2020 1036        Component Value Date/Time    CALCIUM 9.9 09/02/2020 1036    ALKPHOS 95 09/02/2020 1036    AST 14 09/02/2020 1036    ALT 10 09/02/2020 1036    BILITOT 0.6 09/02/2020 1036    ESTGFRAFRICA >60.0 09/02/2020 1036    EGFRNONAA 57.6 (A) 09/02/2020 1036        Lab Results   Component Value Date    WBC 6.14 09/02/2020    HGB 10.5 (L) 09/02/2020    HCT 34.0 (L) 09/02/2020    MCV 92 09/02/2020     09/02/2020     Normal sinus rhythm   Possible Left atrial enlargement   LVH   T wave abnormality, consider inferior ischemia   T wave abnormality, consider anterolateral ischemia   Abnormal ECG   When compared with ECG of 28-APR-2017 13:01,   T wave inversion now evident in Inferior leads   T wave inversion now evident in Anterior-lateral leads   Confirmed by BRIAN HERRON, TAHIRA ([id number]) on 9/3/2020 12:03:02 AM

## 2020-09-08 NOTE — ANESTHESIA POSTPROCEDURE EVALUATION
Anesthesia Post Evaluation    Patient: Leia Rodriguez    Procedure(s) Performed: Procedure(s) (LRB):  MASTECTOMY, SIMPLE (Left)  BIOPSY, LYMPH NODE, SENTINEL (Left)    Final Anesthesia Type: general    Patient location during evaluation: PACU  Patient participation: Yes- Able to Participate  Level of consciousness: awake  Post-procedure vital signs: reviewed and stable  Pain management: adequate  Airway patency: patent    PONV status at discharge: No PONV  Anesthetic complications: no      Cardiovascular status: blood pressure returned to baseline and hemodynamically stable  Respiratory status: unassisted and spontaneous ventilation  Hydration status: euvolemic  Follow-up not needed.          Vitals Value Taken Time   /93 09/08/20 0923   Temp 36.9 °C (98.4 °F) 09/08/20 0923   Pulse 95 09/08/20 0923   Resp 18 09/08/20 0923   SpO2 98 % 09/08/20 0923         No case tracking events are documented in the log.      Pain/Naveen Score: No data recorded

## 2020-09-08 NOTE — NURSING
Pt resting in bed quietly. C/O  moderate pain to left chest (surgical site). Medicated as prescribed. Pt does have serosanguineous drainage on surgical bra. Its old drainage from PACU. Able to make needs known. Safety measures maintained. Call bell within reach. Instructed to call for assistance when needed. Verbalized understanding. Will continue to monitor

## 2020-09-08 NOTE — ANESTHESIA PROCEDURE NOTES
Intubation  Performed by: Will Pollack CRNA  Authorized by: Silvano Hair MD     Intubation:     Induction:  Intravenous    Intubated:  Postinduction    Mask Ventilation:  Easy with oral airway    Attempts:  1    Attempted By:  CRNA    Method of Intubation:  Direct    Blade:  Valdovinos 2    Laryngeal View Grade: Grade I - full view of chords      Difficult Airway Encountered?: No      Complications:  None    Airway Device:  Oral endotracheal tube    Airway Device Size:  7.5    Style/Cuff Inflation:  Cuffed    Inflation Amount (mL):  5    Tube secured:  21    Secured at:  The lips    Placement Verified By:  Capnometry    Complicating Factors:  None    Findings Post-Intubation:  BS equal bilateral and atraumatic/condition of teeth unchanged

## 2020-09-08 NOTE — OP NOTE
[facility]  Surgery Department  Operative Note    SUMMARY     Date of Procedure: 9/8/2020     Procedure: Procedure(s) (LRB):  MASTECTOMY, SIMPLE (Left)  BIOPSY, LYMPH NODE, SENTINEL (Left)     Surgeon(s) and Role:     * Vincent Moyer MD - Primary    Assisting Surgeon: None    Pre-Operative Diagnosis: Malignant neoplasm of lower-inner quadrant of left breast in female, estrogen receptor positive [C50.312, Z17.0]    Post-Operative Diagnosis: Post-Op Diagnosis Codes:     * Malignant neoplasm of lower-inner quadrant of left breast in female, estrogen receptor positive [C50.312, Z17.0]    Anesthesia: General    Technical Procedures Used:  Left mastectomy with sentinel lymph node biopsy    Description of the Findings of the Procedure:  Mastectomy, sentinel lymph node biopsy negative x3 on frozen section    Complications: No    Estimated Blood Loss (EBL): 20 mL           Implants: * No implants in log *    Specimens:   Specimen (12h ago, onward)    None                  Condition: Good    Disposition: PACU - hemodynamically stable.    PROCEDURE IN DETAIL:    Prior to arriving in the operating room, the patient's left breast was injected with technetium to facilitate sentinel lymph node identification. The patient was then brought to the operating room and placed in the supine position with both upper extremities extended.  general anesthesia was administered. Perioperative antibiotics were administered consisting of Ancef and a time out was performed confirming the patient, site, and procedure.  The left chest and axilla was then prepped and draped in the usual sterile fashion.    We then turned our attention to the left breast where an elliptical incision was fashioned to incorporate the nipple areolar complex.  The incision was made with a 10-blade and extended through the subcutaneous tissues with Bovie electrocautery.  Skin flaps were raised to the clavicle superiorly.  We then  turned our attention to  the left axilla.  Blue dye was injected prior to the incision in the usual subareolar fashion. The gamma probe was used to identify an area of increased radioactivity within the lower axilla. The clavipectoral sheath was sharply incised to reveal the level I axillary lymph nodes. The probe was used to identify a single node with increased radioactivity.  This node was brought into the operative field and carefully dissected free of the surrounding lymphovascular structures.  The highest ex vivo count of the node was 5000.  The node was then sent to pathology for frozen section evaluation, labeled as sentinel node #1.  A total of 3 axillary sentinel nodes and 0 axillary non-sentinel nodes were identified, excised and submitted to pathology.  Bed counts were obtained to confirm that the 10% rule had not been violated.   The wound was irrigated with normal saline, and all bleeding points were secured with Bovie electrocautery.     We then proceeded to raise the remainder of the flaps to the lateral border of the sternum medially, to the inframammary fold inferiorly, and to the anterior border of the latissimus dorsi muscle laterally. The breast tissue was sharply excised off the chest wall taking care to incorporate the pectoralis fascia while leaving the serratus fascia behind.  The resulting mastectomy specimen was marked using a short stitch superiorly and long stitch laterally.  The breast was sent to pathology for permanent evaluation.      Frozen section musa evaluation revealed no evidence of metastatic disease.  Therefore, the operative field was irrigated with normal saline and all bleeding points were secured with Bovie electrocautery.  Two 15 Fr panchito drain was placed under the mastectomy flap. The incision was closed using an interrupted 3-0 vicryl deep dermal stitch followed by a running 4-0 monocryl subcuticular.      Steri-Strips were applied. A post surgical bra was placed on the patient. At the end of  the operation, all sponge, instrument, and needle counts x 2 were correct.

## 2020-09-08 NOTE — INTERVAL H&P NOTE
The patient has been examined and the H&P has been reviewed:    I concur with the findings and no changes have occurred since H&P was written.     Genetic results are negative.  Okay to proceed with planned surgery    Surgery risks, benefits and alternative options discussed and understood by patient/family.          Active Hospital Problems    Diagnosis  POA    Breast cancer in female [C50.919]  Yes      Resolved Hospital Problems   No resolved problems to display.

## 2020-09-08 NOTE — TRANSFER OF CARE
"Anesthesia Transfer of Care Note    Patient: Leia Rodriguez    Procedure(s) Performed: Procedure(s) (LRB):  MASTECTOMY, SIMPLE (Left)  BIOPSY, LYMPH NODE, SENTINEL (Left)    Patient location: PACU    Anesthesia Type: general    Transport from OR: Transported from OR on room air with adequate spontaneous ventilation    Post pain: adequate analgesia    Post assessment: no apparent anesthetic complications and tolerated procedure well    Post vital signs: stable    Level of consciousness: awake    Nausea/Vomiting: no nausea/vomiting    Complications: none    Transfer of care protocol was followed      Last vitals:   Visit Vitals  BP (!) 169/93 (BP Location: Left arm, Patient Position: Sitting)   Pulse 95   Temp 36.9 °C (98.4 °F) (Temporal)   Resp 18   Ht 5' 2" (1.575 m)   Wt 79.5 kg (175 lb 4.3 oz)   SpO2 98%   Breastfeeding No   BMI 32.06 kg/m²     "

## 2020-09-09 VITALS
DIASTOLIC BLOOD PRESSURE: 57 MMHG | RESPIRATION RATE: 18 BRPM | SYSTOLIC BLOOD PRESSURE: 128 MMHG | OXYGEN SATURATION: 95 % | WEIGHT: 175.25 LBS | TEMPERATURE: 98 F | BODY MASS INDEX: 32.25 KG/M2 | HEART RATE: 66 BPM | HEIGHT: 62 IN

## 2020-09-09 PROCEDURE — 99900035 HC TECH TIME PER 15 MIN (STAT)

## 2020-09-09 PROCEDURE — 25000003 PHARM REV CODE 250: Performed by: SURGERY

## 2020-09-09 PROCEDURE — 94761 N-INVAS EAR/PLS OXIMETRY MLT: CPT

## 2020-09-09 RX ORDER — CYCLOBENZAPRINE HCL 10 MG
10 TABLET ORAL 3 TIMES DAILY PRN
Qty: 30 TABLET | Refills: 0 | Status: SHIPPED | OUTPATIENT
Start: 2020-09-09 | End: 2020-09-19

## 2020-09-09 RX ORDER — HYDROCODONE BITARTRATE AND ACETAMINOPHEN 5; 325 MG/1; MG/1
2 TABLET ORAL EVERY 4 HOURS PRN
Qty: 30 TABLET | Refills: 0 | Status: SHIPPED | OUTPATIENT
Start: 2020-09-09 | End: 2020-11-22

## 2020-09-09 RX ADMIN — STANDARDIZED SENNA CONCENTRATE AND DOCUSATE SODIUM 1 TABLET: 8.6; 5 TABLET ORAL at 08:09

## 2020-09-09 RX ADMIN — BUPROPION HYDROCHLORIDE 150 MG: 150 TABLET, EXTENDED RELEASE ORAL at 08:09

## 2020-09-09 RX ADMIN — HYDROCODONE BITARTRATE AND ACETAMINOPHEN 2 TABLET: 5; 325 TABLET ORAL at 11:09

## 2020-09-09 RX ADMIN — HYDROCODONE BITARTRATE AND ACETAMINOPHEN 2 TABLET: 5; 325 TABLET ORAL at 05:09

## 2020-09-09 RX ADMIN — HYDROCHLOROTHIAZIDE 12.5 MG: 12.5 TABLET ORAL at 08:09

## 2020-09-09 RX ADMIN — CHLORHEXIDINE GLUCONATE 0.12% ORAL RINSE 10 ML: 1.2 LIQUID ORAL at 08:09

## 2020-09-09 RX ADMIN — TAMSULOSIN HYDROCHLORIDE 0.4 MG: 0.4 CAPSULE ORAL at 08:09

## 2020-09-09 NOTE — PLAN OF CARE
CM spoke with pt and daughter Zheng to complete initial assessment. Independent of ADLs. Uses straight cane at home. Help at home is spouse, who pt lives with. Daughters can assist as well. Daughter will provide transport home. Denies discharge needs at this time. Updated white board with CM contact information provided. Transitional care folder given to pt, information on bedside delivery, [facility], discharge begins on admit pamphlet, and  advance directive information provided to pt. Instructed to call CM with questions or concerns.       D/c plan: home with family  Transport home: daughter  PCP:Melanie  Preferred pharmacy: [facility]  Bedside delivery: yes  [facility]:  active     09/09/20 1145   Discharge Assessment   Assessment Type Discharge Planning Assessment   Confirmed/corrected address and phone number on facesheet? Yes   Assessment information obtained from? Patient   Expected Length of Stay (days) 1   Prior to hospitilization cognitive status: Alert/Oriented;No Deficits   Prior to hospitalization functional status: Independent   Current cognitive status: Alert/Oriented   Facility Arrived From: NA   Lives With spouse   Able to Return to Prior Arrangements yes   Is patient able to care for self after discharge? Yes   Who are your caregiver(s) and their phone number(s)? Moe Figueroa, daughter, [phone]   Patient's perception of discharge disposition home or selfcare   Readmission Within the Last 30 Days no previous admission in last 30 days   Patient currently being followed by outpatient case management? No   Patient currently receives any other outside agency services? No   Equipment Currently Used at Home cane, straight   Part D Coverage NA   Do you have any problems affording any of your prescribed medications? No   Is the patient taking medications as prescribed? yes   Does the patient have transportation home? Yes   Transportation Anticipated family or friend will provide   Dialysis Name and  Scheduled days NA   Does the patient receive services at the [facility]? No   Discharge Plan A Home with family;Home   DME Needed Upon Discharge  none   Patient/Family in Agreement with Plan yes

## 2020-09-09 NOTE — HOSPITAL COURSE
Status post left mastectomy with sentinel lymph node biopsy  Postop day 1 pain controlled, SHERI drain with serosanguineous output, tolerating diet, ambulating, stable and ready for discharge

## 2020-09-09 NOTE — PLAN OF CARE
Problem: Adult Inpatient Plan of Care  Goal: Plan of Care Review  Outcome: Met  Goal: Patient-Specific Goal (Individualization)  Outcome: Met  Goal: Absence of Hospital-Acquired Illness or Injury  Outcome: Met  Goal: Optimal Comfort and Wellbeing  Outcome: Met  Goal: Readiness for Transition of Care  Outcome: Met  Goal: Rounds/Family Conference  Outcome: Met     Problem: Fall Injury Risk  Goal: Absence of Fall and Fall-Related Injury  Outcome: Met     Problem: Skin Injury Risk Increased  Goal: Skin Health and Integrity  Outcome: Met     Problem: Adjustment to Surgery (Breast Surgery)  Goal: Optimal Coping with Surgery  Outcome: Met     Problem: Bleeding (Breast Surgery)  Goal: Absence of Bleeding  Outcome: Met     Problem: Bowel Elimination Impaired (Breast Surgery)  Goal: Effective Bowel Elimination  Outcome: Met     Problem: Infection (Breast Surgery)  Goal: Absence of Infection Signs/Symptoms  Outcome: Met     Problem: Ongoing Anesthesia Effects (Breast Surgery)  Goal: Anesthesia/Sedation Recovery  Outcome: Met     Problem: Pain (Breast Surgery)  Goal: Acceptable Pain Control  Outcome: Met     Problem: Postoperative Nausea and Vomiting (Breast Surgery)  Goal: Nausea and Vomiting Relief  Outcome: Met     Problem: Postoperative Urinary Retention (Breast Surgery)  Goal: Effective Urinary Elimination  Outcome: Met

## 2020-09-09 NOTE — PLAN OF CARE
Bed Mobility min A; Transfers min A; Amb 20ft RW min A; PT reviewed mastectomy handout with patient/dtr; rec hhpt/ot upon D/C AND 24/7 spv/assist prn from family

## 2020-09-09 NOTE — PLAN OF CARE
Problem: Adult Inpatient Plan of Care  Goal: Plan of Care Review  Outcome: Ongoing, Progressing  Flowsheets (Taken 9/9/2020 0144)  Plan of Care Reviewed With: patient   Pt had no adverse events during shift. Pt free of falls. Call light in reach. Side rails x 2. Pain well controlled w/ prn meds. Pt repositions independently. IVF administered as ordered. Surgical bra with drainage, awaiting new one from OR supplies. Monitored J/P drain output. VSS. Chart reviewed, will continue to monitor.

## 2020-09-09 NOTE — PT/OT/SLP EVAL
Physical Therapy Evaluation    Patient Name:  Leia Rodriguez   MRN:  [id number]    Recommendations:     Discharge Recommendations:  home health PT, home health OT, home(with family)   Discharge Equipment Recommendations: none   Barriers to discharge: None    Assessment:     Leia Rodriguez is a 70 y.o. female admitted with a medical diagnosis of <principal problem not specified>.  She presents with the following impairments/functional limitations:  weakness, decreased ROM, decreased upper extremity function, gait instability, impaired balance, impaired endurance, decreased lower extremity function, decreased coordination, impaired functional mobilty, pain, impaired self care skills, decreased safety awareness.    Rehab Prognosis: Good; patient would benefit from acute skilled PT services to address these deficits and reach maximum level of function.    Recent Surgery: Procedure(s) (LRB):  MASTECTOMY, SIMPLE (Left)  BIOPSY, LYMPH NODE, SENTINEL (Left) 1 Day Post-Op    Plan:     During this hospitalization, patient to be seen 5 x/week to address the identified rehab impairments via gait training, therapeutic activities, therapeutic exercises and progress toward the following goals:    · Plan of Care Expires:  09/15/20    Subjective     Chief Complaint: weakness; soreness  Patient/Family Comments/goals: to go home; return to plof  Pain/Comfort:  · Pain Rating 1: 3/10(with movement)  · Location - Side 1: Left  · Location - Orientation 1: generalized  · Location 1: chest  · Pain Addressed 1: Cessation of Activity  · Pain Rating Post-Intervention 1: 0/10(at rest)    Patients cultural, spiritual, L[employer] conflicts given the current situation:      Living Environment:  Lives with ; good family support/dtr will be helping her for 2 wks; no steps to enter home  Prior to admission, patients level of function was mod indep/dec speed.  Equipment used at home: walker, rolling, cane, straight, bedside commode, shower chair.   DME owned (not currently used): none.  Upon discharge, patient will have assistance from family; home health.    Objective:     Communicated with RN prior to session.  Patient found HOB elevated with SHERI drain, bed alarm, peripheral IV  upon PT entry to room.    General Precautions: Standard, fall; blind in R eye  Orthopedic Precautions:(L mastectomy)   Braces: N/A     Exams:  · B LE grossly 4-/5 and rom wfl; R UE wfl; L UE NT r/t sx today    Functional Mobility:  · Bed Mobility - supine to/from sit min A with cues for log-rolling technique  · Transfers - sit to/from stand with RW from bed min A and then from toilet min A; cues for safe hand placement  · Gt - Amb 20ft total with RW min/cga - cues for safe RW use; dec speed    Therapeutic Activities and Exercises:   PT educated patient/dtr on POC, mastectomy program for L UE with vc & tc's/PT demo/handout, B LE TE to prep mobility and safety/fall precautions with mobility using RW    AM-PAC 6 CLICK MOBILITY  Total Score:16     Patient left HOB elevated with all lines intact, call button in reach, bed alarm on, RN notified and dtr present.    GOALS:   Multidisciplinary Problems     Physical Therapy Goals        Problem: Physical Therapy Goal    Goal Priority Disciplines Outcome Goal Variances Interventions   Physical Therapy Goal     PT, PT/OT      Description: 1. Patient will perform supine to/from sit spv  2. Patient will perform sit to/from stand with RW and spv  3. Patient/cg will perform L mastectomy TE program/recall precautions indep  4. Patient will ambulate 100ft RW sba no gross LOB                   History:     Past Medical History:   Diagnosis Date    Arthritis     EDGAR HANDS, KNEES    Blind right eye     Traumatic    Breast cancer 06/15/2017    0.8 cm Grade1 INTRADUCTAL BREAST 9 positve margin (left)    Essential hypertension     Hemorrhoids     Lipoma of abdominal wall     Obesity     Overactive bladder     Pap smear abnormality of cervix with  ASCUS favoring benign     Thyroid nodule     Tobacco use disorder     Tubular adenoma of colon     Urinary incontinence        Past Surgical History:   Procedure Laterality Date    ANTERIOR VAGINAL REPAIR      BLADDER SURGERY      BREAST LUMPECTOMY Left 2017    CATARACT EXTRACTION Right      SECTION      X2    COLONOSCOPY N/A 3/8/2017    Procedure: COLONOSCOPY;  Surgeon: Tyron Paris MD;  Location: [facility];  Service: Endoscopy;  Laterality: N/A;    COLONOSCOPY N/A 2020    Procedure: COLONOSCOPY;  Surgeon: Keira Ellison MD;  Location: [facility];  Service: Endoscopy;  Laterality: N/A;    ESOPHAGOGASTRODUODENOSCOPY N/A 2020    Procedure: EGD (ESOPHAGOGASTRODUODENOSCOPY);  Surgeon: Keira Ellison MD;  Location: [facility];  Service: Endoscopy;  Laterality: N/A;  new onset iron deficiency with prior history of breast cancer    THYROID LOBECTOMY Left     TOTAL ABDOMINAL HYSTERECTOMY      TUBAL LIGATION         Time Tracking:     PT Received On: 20  PT Start Time: 1635     PT Stop Time: 1715  PT Total Time (min): 40 min     Billable Minutes: Evaluation 15, Therapeutic Activity 15 and Therapeutic Exercise 10      Hemant Gómez, PT  2020

## 2020-09-09 NOTE — DISCHARGE INSTRUCTIONS
POSTOPERATIVE INSTRUCTIONS FOLLOWING   MASTECTOMY AND/OR AXILLARY LYMPH NODE DISSECTION    The following are post-operative instructions that will help you to recover from your surgery.  Please read over these instructions carefully and contact us if we can answer any of your questions or concerns.    Post-op care/Dressing/breast binder (surgi-bra)  A surgical bra may be placed around your chest after your surgery.  If you are given the bra, please wear it for the first 48 hours after surgery. After 48 hours you can remove your surgical bra and dressing to shower/cleanse the chest wall with antibacterial soap and warm water. Do not take a tub bath and do not soak the surgical site for at least 2 weeks.     The final pathology report will be available approximately 7-10 days after your surgery.  Our office will call you with your pathology report when it becomes available.    If blue dye was used to locate your sentinel lymph nodes, your urine and stool may be blue-green in color for 1 or 2 days.    Please wear the surgical bra as close to 24 hours a day as possible until your post-operative clinic appointment.  If the elastic around the bra irritates your skin, you may wear a soft t-shirt underneath the bra. You may shower AFTER the drains are removed.  Please sponge bathe until then. Please do not remove the white strips of tape (steri-strips) that cover your incision.  They will be removed at your clinic visit. You may go without wearing the bra long enough to bath, to launder and dry the bra. If you have fluffy filler placed inside the bra, the filler should be removed whenever the bra is taken off. Please reinsert the fluffy filler, or insert the new soft filler, under the bra when you put the bra back on.  If the bra is extremely uncomfortable, you may wear a supportive sports bra instead after 2 days.    Activity    You will be able to do much of your own personal care, such as bathing, dressing, preparing  simple meals, etc.   A short walk each day will help with your recovery   You may find that you need to take rest breaks between activities, but you should not need to stay in bed for prolonged periods of time during the day. A good rule during this time is to listen to your body, do what is comfortable, and stop and rest when your feel tired.  If it hurts, dont do it.   Return to taking your daily medications as prescribed   Please avoid activities that require moderate to heavy lifting (grocery shopping) or pushing/pulling (vacuuming) and repetitive motions (such as washing windows). Do not lift anything heavier than a gallon of milk.   Following a lymph node dissection, dont avoid using your arm, but dont exercise your arm until after your first post-operative visit.  At your first post-op visit, you will be given arm exercises to regain movement and flexibility.  You may be referred to physical therapy if needed.   You may restart driving when you are no longer on narcotics and you feel safe turning the wheel and stopping quickly.   You will need to be out of work approximately 2-6 weeks depending on your particular surgery and how well you are recovering.  We will evaluate how you are doing at the first post-op appointment.  This is a good time to ask when you may return to work and what activities you may do.    Medication for pain   You will be given a prescription for pain medication. You should not drive or operate machinery while taking these.  Please take prescription pain medication (narcotics) with food.  Narcotics can cause, or worsen, constipation.  You will need to increase your fluid intake, eat high fiber foods (such as fruits and bran) and make sure that you are up and walking. You may need to take an over the counter stool softener for constipation.   Short term use of an icepack may be helpful to decrease discomfort and swelling, particularly to the armpit after lymph node  surgery.   A small pillow positioned in the armpit may also decrease discomfort after lymph node surgery.   If you are given a prescription for antibiotics, take them as prescribed.    How to care for your Drain(s)  1. Wash hands--STRIP or milk the drainage tube as it comes out of your body toward the bulb.   a. Beginning where the drain comes out of your body, hold drainage tubing with one hand and with the other, stretch and release tubing an inch at time while moving downward with both hands toward the bulb.  b. Do this 2-3 times before emptying the bulb.  2. Remove the stopper from the bulbs port  (drainage port)  3. Pour the drainage in the measuring cup provided by the nurse  4. Flatten/squeeze the bulb to create a vacuum and replace the stopper before letting go of the bulb.  5. Record the date, time and amount of drainage in ccs (not ounces) each time bulb is emptied. If you have more than one drain, record each separately.  6. Discard the drainage into the toilet after measuring and then wash hands.  7. Empty bulbs 2-3 times/day or as needed if it fills up before 8 hours.  8. Remember to bring the output record with you to your doctors appointment.    Please report the following:   Temperature greater than 101 degrees   Discharge or bad odor from the wound   Excessive bleeding, such as saturated bloody dressing or extreme bruising   Redness at incision and/or drain sites   Swelling or buildup of fluid around incision   Persistent fevers, chills, nausea, vomiting, or diarrhea    Additional information  Your surgeon will see you approximately 2 weeks following your surgery.  If this follow-up appointment has not been made, please call the office.    If you have any questions or problems, please call my office or my nurse.  Dr. Vincent Moyer  [phone]    After hours and on weekends, you may call the [facility] at [phone] (toll free).  Our nurse line is available 24/7 for  assistance.      Lymphedema Risk Reduction    Lymphedema is a swelling of a part of the body, caused by an insufficient lymphatic system and an accumulation of fluid in the bodys tissues.  Lymphedema may occur when normal drainage of fluid is disrupted, such as an infection, injury, cancer, scar tissue, or removal of lymph nodes.    If you had a full axillary lymph node dissection procedure, you may be at greater risk for lymphedema.     For those patients having a sentinel lymph node biopsy, these risks may be smaller and the recommendations are provided for your review and consideration.    The following list contains recommendations for reducing your risk of developing lymphedema.    I. Skin Care--avoid trauma/injury to reduce infection risk   Keep the hand and arm on the side of surgery clean and dry   Pay attention to nail care and do not cut cuticles   Avoid punctures, such as injections and blood draws from you on the side of your surgery   Wear gloves while doing activities that may cause skin injury (washing dishes, gardening, etc.)   If scratches or punctures occur, wash area with soap and water, and observe for signs of infections (redness, drainage, swelling)   If a rash, itching, redness, pain, increased skin temperatures, fever, or flu-like symptoms occur, contact your physician immediately for early treatment of a possible infection  II. Activity/Lifestyle   Gradually build up the duration and intensity of any activity or exercise   Take frequent rest periods during activity to allow for arm recovery   Monitor your arm and upper body during and after activity for any change in size, shape, tissue, texture, soreness, heaviness, or firmness  III. Avoid constriction of your arm on the side of your surgery   Avoid having blood pressure taken on the arm on the side of your surgery   Wear loose fitting jewelry and clothing   Be careful not to rest a heavy purse, luggage, or grocery bags on that  arm   When you return to wearing a bra, make sure that it is well fitted and not too tight

## 2020-09-09 NOTE — DISCHARGE SUMMARY
[facility] -   General Surgery  Discharge Summary      Patient Name: Leia Rodriguez  MRN: [id number]  Admission Date: 9/8/2020  Hospital Length of Stay: 0 days  Discharge Date and Time:  09/09/2020 10:08 AM  Attending Physician: Yusuf Sebastian MD   Discharging Provider: Yusuf Sebastian MD  Primary Care Provider: Yasmin Navarro MD    HPI:   No notes on file    Procedure(s) (LRB):  MASTECTOMY, SIMPLE (Left)  BIOPSY, LYMPH NODE, SENTINEL (Left)      Indwelling Lines/Drains at time of discharge:   Lines/Drains/Airways     Drain                 Closed/Suction Drain 09/08/20 1218 Left Chest Bulb 15 Fr. less than 1 day         Closed/Suction Drain 09/08/20 1219 Left Chest Bulb 15 Fr. less than 1 day              Hospital Course: Status post left mastectomy with sentinel lymph node biopsy  Postop day 1 pain controlled, SHERI drain with serosanguineous output, tolerating diet, ambulating, stable and ready for discharge    Consults:   Consults (From admission, onward)        Status Ordering Provider     Consult to Case Management/Social Work  Once     Provider:  (Not yet assigned)    Acknowledged YUSUF SEBASTIAN          Significant Diagnostic Studies: none    Pending Diagnostic Studies:     Procedure Component Value Units Date/Time    Specimen to Pathology, Surgery Breast [[id number]] Collected: 09/08/20 1252    Order Status: Sent Lab Status: In process Updated: 09/09/20 0813        Final Active Diagnoses:    Diagnosis Date Noted POA    PRINCIPAL PROBLEM:  Breast cancer in female [C50.919] 09/08/2020 Yes      Problems Resolved During this Admission:      Discharged Condition: good    Disposition: Home or Self Care    Follow Up:  Follow-up Information     Yusuf Sebastian MD In 1 week.    Specialties: General Surgery, Bariatrics  Why: Drain check  Contact information:  [address]  [address]th Floor  [facility] [id number] [phone]                 Patient Instructions:   No discharge procedures on  file.  Medications:  Reconciled Home Medications:      Medication List      START taking these medications    cyclobenzaprine 10 MG tablet  Commonly known as: FLEXERIL  Take 1 tablet (10 mg total) by mouth 3 (three) times daily as needed for Muscle spasms.     HYDROcodone-acetaminophen 5-325 mg per tablet  Commonly known as: NORCO  Take 2 tablets by mouth every 4 (four) hours as needed.        CONTINUE taking these medications    anastrozole 1 mg Tab  Commonly known as: ARIMIDEX  TAKE 1 TABLET (1 MG TOTAL) BY MOUTH ONCE DAILY.     buPROPion 150 MG TBSR 12 hr tablet  Commonly known as: WELLBUTRIN SR  Take 1 tablet (150 mg total) by mouth 2 (two) times daily.     calcium citrate 200 mg (950 mg) tablet  Commonly known as: CALCITRATE  Take 1 tablet by mouth once daily.     ferrous sulfate 325 mg (65 mg iron) Tab tablet  Commonly known as: FEOSOL  Take 65 mg by mouth once daily.     hydroCHLOROthiazide 12.5 mg capsule  Commonly known as: MICROZIDE  TAKE 1 CAPSULE (12.5 MG TOTAL) BY MOUTH ONCE DAILY. FOR HIGH BLOOD PRESSURE     iron (carbonyl) 15 mg/1.25 mL Susp  Commonly known as: ICAR  Take by mouth.     meloxicam 15 MG tablet  Commonly known as: MOBIC  TAKE 1 TABLET BY MOUTH EVERY DAY     tamsulosin 0.4 mg Cap  Commonly known as: FLOMAX  Take 1 capsule by mouth once daily.     vitamin D 1000 units Tab  Commonly known as: VITAMIN D3  Take 1,000 Units by mouth once daily.        ASK your doctor about these medications    ciprofloxacin HCl 500 MG tablet  Commonly known as: CIPRO  Take 500 mg by mouth 2 (two) times daily.     fluconazole 100 MG tablet  Commonly known as: DIFLUCAN     metroNIDAZOLE 500 MG tablet  Commonly known as: FLAGYL  Take 500 mg by mouth 3 (three) times daily.     nitrofurantoin (macrocrystal-monohydrate) 100 MG capsule  Commonly known as: MACROBID     oxybutynin 5 MG Tab  Commonly known as: DITROPAN  Take 1 tablet (5 mg total) by mouth 3 (three) times daily as needed.          Time spent on the  discharge of patient: 30 minutes    Vincent Moyer MD  General Surgery  [facility] -

## 2020-09-09 NOTE — NURSING
Pt's discharge instructions, prescriptions and follow up appts given and explained. Verbalized understanding. IV discontinued. Pt's daughter educated on drain care and was able to properly demonstrate back. Surgical bra replaced with clean one. All questions answered. Will transport out

## 2020-09-14 LAB
FINAL PATHOLOGIC DIAGNOSIS: NORMAL
FROZEN SECTION DIAGNOSIS: NORMAL
GROSS: NORMAL
Lab: NORMAL
SUPPLEMENTAL DIAGNOSIS: NORMAL

## 2020-09-15 ENCOUNTER — PATIENT OUTREACH (OUTPATIENT)
Dept: ADMINISTRATIVE | Facility: OTHER | Age: 70
End: 2020-09-15

## 2020-09-16 ENCOUNTER — OFFICE VISIT (OUTPATIENT)
Dept: SURGERY | Facility: CLINIC | Age: 70
End: 2020-09-16
Payer: MEDICARE

## 2020-09-16 VITALS
DIASTOLIC BLOOD PRESSURE: 82 MMHG | HEIGHT: 62 IN | SYSTOLIC BLOOD PRESSURE: 136 MMHG | TEMPERATURE: 99 F | HEART RATE: 100 BPM | WEIGHT: 173.5 LBS | BODY MASS INDEX: 31.93 KG/M2

## 2020-09-16 DIAGNOSIS — C50.312 MALIGNANT NEOPLASM OF LOWER-INNER QUADRANT OF LEFT BREAST IN FEMALE, ESTROGEN RECEPTOR POSITIVE: ICD-10-CM

## 2020-09-16 DIAGNOSIS — Z17.0 MALIGNANT NEOPLASM OF LOWER-INNER QUADRANT OF LEFT BREAST IN FEMALE, ESTROGEN RECEPTOR POSITIVE: ICD-10-CM

## 2020-09-16 DIAGNOSIS — D05.12 DUCTAL CARCINOMA IN SITU (DCIS) OF LEFT BREAST: Primary | ICD-10-CM

## 2020-09-16 PROCEDURE — 99024 PR POST-OP FOLLOW-UP VISIT: ICD-10-PCS | Mod: S$GLB,,, | Performed by: SURGERY

## 2020-09-16 PROCEDURE — 3288F PR FALLS RISK ASSESSMENT DOCUMENTED: ICD-10-PCS | Mod: CPTII,S$GLB,, | Performed by: SURGERY

## 2020-09-16 PROCEDURE — 1101F PT FALLS ASSESS-DOCD LE1/YR: CPT | Mod: CPTII,S$GLB,, | Performed by: SURGERY

## 2020-09-16 PROCEDURE — 1125F AMNT PAIN NOTED PAIN PRSNT: CPT | Mod: S$GLB,,, | Performed by: SURGERY

## 2020-09-16 PROCEDURE — 99999 PR PBB SHADOW E&M-EST. PATIENT-LVL IV: ICD-10-PCS | Mod: PBBFAC,,, | Performed by: SURGERY

## 2020-09-16 PROCEDURE — 1101F PR PT FALLS ASSESS DOC 0-1 FALLS W/OUT INJ PAST YR: ICD-10-PCS | Mod: CPTII,S$GLB,, | Performed by: SURGERY

## 2020-09-16 PROCEDURE — 3008F BODY MASS INDEX DOCD: CPT | Mod: CPTII,S$GLB,, | Performed by: SURGERY

## 2020-09-16 PROCEDURE — 1125F PR PAIN SEVERITY QUANTIFIED, PAIN PRESENT: ICD-10-PCS | Mod: S$GLB,,, | Performed by: SURGERY

## 2020-09-16 PROCEDURE — 3008F PR BODY MASS INDEX (BMI) DOCUMENTED: ICD-10-PCS | Mod: CPTII,S$GLB,, | Performed by: SURGERY

## 2020-09-16 PROCEDURE — 3288F FALL RISK ASSESSMENT DOCD: CPT | Mod: CPTII,S$GLB,, | Performed by: SURGERY

## 2020-09-16 PROCEDURE — 99024 POSTOP FOLLOW-UP VISIT: CPT | Mod: S$GLB,,, | Performed by: SURGERY

## 2020-09-16 PROCEDURE — 99999 PR PBB SHADOW E&M-EST. PATIENT-LVL IV: CPT | Mod: PBBFAC,,, | Performed by: SURGERY

## 2020-09-17 NOTE — PROGRESS NOTES
Patient ID: Leia Rodriguez is a 70 y.o. female.    Chief Complaint: Post-op Evaluation      HPI:  70-year-old female s/p left mastectomy with sentinel lymph node biopsy 9/8/20 presents for post op. Denies any complaints. SHERI #2 < 30cc/day; SHERI 1> 30cc/day     presents to discuss surgery. In the interim she has undergone PET scan which was negative for metastatic disease. Genetic testing pending    Initially referred for recurrent left breast cancer.  She recently underwent biopsy showing invasive ductal carcinoma ER+/FL- Her 2 -.  She previously underwent a left breast lumpectomy with adjuvant radiation in 2017.      For Seema Negro:  Breast cancer follow-up - last visit was 5/29/19    Pt has a history of Stage 0 DCIS of Lt. breast.  She presented in March of 2017 when diagnostic MMG confirmed the presence of a round mass with indistinct margins in the Lt. breast at the 7 o'clock position. The mass measured 9 x 5 x 6 mm. The patient was referred to Dr. Duke and on 6/15/17 underwent wire localization lumpectomy. Pathology revealed an 8 mm focus of ductal carcinoma in situ. The tumor was low grade. There was tumor present at the anterior inferior margin. Ninety percent of the tumor cells were ER positive, 5- 10% of the tumor cells were FL positive. The patient was referred for adjuvant radiotherapy. Completed a course of partial breast irradiation to the LIQ of the Lt. breast on 8/14/17.  Currently on hormonal therapy with Arimidex that started 8/2017.  Due off 8/2022    DEXA- 6/29/18- wnl- f/u in 2 years due to AI- 6/2020 due    Risk factors identified:     Menarche at 11 y/o  G 3 P 3  First pregnancy at 16 y/o  LMP: 50's  Estrogen: none  Radiation to the neck or chest wall - has had left breast radiation  Prior breast biopsies or atypical hyperplasia- left breast lumpectomy     FH: no breast cancer, daughter cervical cancer 30's, father prostate cancer 70's-     Body mass index is 31.73 kg/m².    Review of  Systems   Constitutional: Negative.  Negative for activity change and unexpected weight change.   HENT: Negative.  Negative for hearing loss, rhinorrhea and trouble swallowing.    Eyes: Negative.  Negative for discharge and visual disturbance.   Respiratory: Negative.  Negative for chest tightness and wheezing.    Cardiovascular: Negative.  Negative for chest pain and palpitations.   Gastrointestinal: Negative.  Negative for blood in stool, constipation, diarrhea and vomiting.        No reflux   Endocrine: Negative.  Negative for polydipsia and polyuria.   Genitourinary: Negative.  Negative for difficulty urinating, dysuria, hematuria and menstrual problem.   Musculoskeletal: Negative.  Negative for joint swelling and neck pain.   Skin: Negative.    Allergic/Immunologic: Negative.    Neurological: Negative.  Negative for headaches.   Hematological: Negative.  Negative for adenopathy.   Psychiatric/Behavioral: Negative.  Negative for confusion and dysphoric mood.   Breast: Pt denies any breast pain, nipple discharge, or palpable mass. No prior trauma or bruising. No breast surgeries or abnormalities.    Current Outpatient Medications   Medication Sig Dispense Refill    anastrozole (ARIMIDEX) 1 mg Tab TAKE 1 TABLET (1 MG TOTAL) BY MOUTH ONCE DAILY. 90 tablet 3    buPROPion (WELLBUTRIN SR) 150 MG TBSR 12 hr tablet Take 1 tablet (150 mg total) by mouth 2 (two) times daily. 60 tablet 2    calcium citrate (CALCITRATE) 200 mg (950 mg) tablet Take 1 tablet by mouth once daily.      ciprofloxacin HCl (CIPRO) 500 MG tablet Take 500 mg by mouth 2 (two) times daily.  0    cyclobenzaprine (FLEXERIL) 10 MG tablet Take 1 tablet (10 mg total) by mouth 3 (three) times daily as needed for Muscle spasms. 30 tablet 0    ferrous sulfate (FEOSOL) 325 mg (65 mg iron) Tab tablet Take 65 mg by mouth once daily.      fluconazole (DIFLUCAN) 100 MG tablet       hydroCHLOROthiazide (MICROZIDE) 12.5 mg capsule TAKE 1 CAPSULE (12.5 MG  TOTAL) BY MOUTH ONCE DAILY. FOR HIGH BLOOD PRESSURE 30 capsule 11    HYDROcodone-acetaminophen (NORCO) 5-325 mg per tablet Take 2 tablets by mouth every 4 (four) hours as needed. 30 tablet 0    iron, carbonyl, (ICAR) 15 mg/1.25 mL Susp Take by mouth.      meloxicam (MOBIC) 15 MG tablet TAKE 1 TABLET BY MOUTH EVERY DAY 30 tablet 5    metroNIDAZOLE (FLAGYL) 500 MG tablet Take 500 mg by mouth 3 (three) times daily.  0    nitrofurantoin, macrocrystal-monohydrate, (MACROBID) 100 MG capsule       oxybutynin (DITROPAN) 5 MG Tab Take 1 tablet (5 mg total) by mouth 3 (three) times daily as needed. 90 tablet 11    tamsulosin (FLOMAX) 0.4 mg Cap Take 1 capsule by mouth once daily.      vitamin D 1000 units Tab Take 1,000 Units by mouth once daily.       No current facility-administered medications for this visit.      Facility-Administered Medications Ordered in Other Visits   Medication Dose Route Frequency Provider Last Rate Last Dose    lactated ringers infusion   Intravenous Continuous Keira Ellison MD 0 mL/hr at 20 1013         Review of patient's allergies indicates:  No Known Allergies    Past Medical History:   Diagnosis Date    Arthritis     EDGAR HANDS, KNEES    Blind right eye     Traumatic    Breast cancer 06/15/2017    0.8 cm Grade1 INTRADUCTAL BREAST 9 positve margin (left)    Essential hypertension     Hemorrhoids     Lipoma of abdominal wall     Obesity     Overactive bladder     Pap smear abnormality of cervix with ASCUS favoring benign     Thyroid nodule     Tobacco use disorder     Tubular adenoma of colon     Urinary incontinence        Past Surgical History:   Procedure Laterality Date    ANTERIOR VAGINAL REPAIR      BLADDER SURGERY      BREAST LUMPECTOMY Left 2017    CATARACT EXTRACTION Right      SECTION      X2    COLONOSCOPY N/A 3/8/2017    Procedure: COLONOSCOPY;  Surgeon: Tyron Paris MD;  [facility];  Service: Endoscopy;  Laterality: N/A;     COLONOSCOPY N/A 2020    Procedure: COLONOSCOPY;  Surgeon: Keira Ellison MD;  Location: [facility] ENDO;  Service: Endoscopy;  Laterality: N/A;    ESOPHAGOGASTRODUODENOSCOPY N/A 2020    Procedure: EGD (ESOPHAGOGASTRODUODENOSCOPY);  Surgeon: Keira Ellison MD;  Location: [facility] ENDO;  Service: Endoscopy;  Laterality: N/A;  new onset iron deficiency with prior history of breast cancer    SENTINEL LYMPH NODE BIOPSY Left 2020    Procedure: BIOPSY, LYMPH NODE, SENTINEL;  Surgeon: Vincent Moyer MD;  Location: [facility] OR;  Service: General;  Laterality: Left;    SIMPLE MASTECTOMY Left 2020    Procedure: MASTECTOMY, SIMPLE;  Surgeon: Vincent Moyer MD;  Location: [facility] OR;  Service: General;  Laterality: Left;    THYROID LOBECTOMY Left     TOTAL ABDOMINAL HYSTERECTOMY      TUBAL LIGATION         Family History   Problem Relation Age of Onset    Hypertension Father     Cataracts Father     Prostate cancer Father 80    Cervical cancer Daughter 32    Allergic rhinitis Daughter     Cirrhosis Mother     Alcohol abuse Mother     Hypertension Daughter     Diabetes Brother     Heart attack Brother     Heart murmur Brother     No Known Problems Daughter        Social History     Socioeconomic History    Marital status:      Spouse name: Not on file    Number of children: Not on file    Years of education: Not on file    Highest education level: Not on file   Occupational History    Not on file   Social Needs    Financial resource strain: Not on file    Food insecurity     Worry: Not on file     Inability: Not on file    Transportation needs     Medical: Not on file     Non-medical: Not on file   Tobacco Use    Smoking status: Former Smoker     Packs/day: 1.00     Years: 50.00     Pack years: 50.00     Types: Cigarettes     Quit date: 2020     Years since quittin.0    Smokeless tobacco: Never Used   Substance and Sexual Activity    Alcohol use: Never     Alcohol/week:  28.0 standard drinks     Types: 28 Cans of beer per week     Frequency: 4 or more times a week     Drinks per session: 3 or 4     Binge frequency: Less than monthly    Drug use: No    Sexual activity: Never     Partners: Male   Lifestyle    Physical activity     Days per week: Not on file     Minutes per session: Not on file    Stress: Not on file   Relationships    Social connections     Talks on phone: Not on file     Gets together: Not on file     Attends Anabaptism service: Not on file     Active member of club or organization: Not on file     Attends meetings of clubs or organizations: Not on file     Relationship status: Not on file   Other Topics Concern    Not on file   Social History Narrative    The patient is .  She is retired from [employer].       Vitals:    09/16/20 1602   BP: 136/82   Pulse: 100   Temp: 98.6 °F (37 °C)       Physical Exam  Constitutional:       Appearance: She is well-developed.   HENT:      Head: Normocephalic and atraumatic.      Right Ear: External ear normal.      Left Ear: External ear normal.      Mouth/Throat:      Pharynx: No oropharyngeal exudate.   Eyes:      General: No scleral icterus.        Right eye: No discharge.         Left eye: No discharge.      Conjunctiva/sclera: Conjunctivae normal.      Pupils: Pupils are equal, round, and reactive to light.   Neck:      Musculoskeletal: Normal range of motion and neck supple.      Thyroid: No thyromegaly.   Cardiovascular:      Rate and Rhythm: Normal rate and regular rhythm.      Heart sounds: Normal heart sounds.   Pulmonary:      Effort: Pulmonary effort is normal.      Breath sounds: Normal breath sounds.   Chest:      Breasts:         Right: No inverted nipple, mass, nipple discharge, skin change or tenderness.         Left: No inverted nipple, mass, nipple discharge, skin change or tenderness.       Abdominal:      General: Bowel sounds are normal.      Palpations: Abdomen is soft.   Musculoskeletal:  Normal range of motion.      Right shoulder: She exhibits no crepitus and normal strength.   Lymphadenopathy:      Head:      Right side of head: No submental, submandibular, tonsillar, preauricular, posterior auricular or occipital adenopathy.      Left side of head: No submental, submandibular, tonsillar, preauricular, posterior auricular or occipital adenopathy.      Cervical: No cervical adenopathy.      Right cervical: No superficial or posterior cervical adenopathy.     Left cervical: No superficial or posterior cervical adenopathy.      Upper Body:      Right upper body: No supraclavicular adenopathy.      Left upper body: No supraclavicular adenopathy.   Skin:     General: Skin is warm and dry.      Coloration: Skin is not pale.      Findings: No erythema or rash.   Neurological:      Mental Status: She is alert and oriented to person, place, and time.      Deep Tendon Reflexes: Reflexes are normal and symmetric.   Psychiatric:         Behavior: Behavior normal.         Thought Content: Thought content normal.         Judgment: Judgment normal.         Result:   Mammo Digital Diagnostic Bilat w/ Navjot  US Breast Left Limited     History:  Patient is 70 y.o. and is seen for a diagnostic mammogram.     Films Compared:  Compared to: 05/29/2019 Mammo Digital Diagnostic Bilat w/ Navjot and 05/16/2018 Mammo Digital Diagnostic Bilat with Tomosynthesis_CAD     Findings:  This procedure was performed using tomosynthesis. Computer-aided detection was utilized in the interpretation of this examination.  The breasts are heterogeneously dense, which may obscure small masses.      Left  Mammo Digital Diagnostic Bilat w/ Navjot  There is an 11 mm oval mass with spiculated margins seen in the upper outer quadrant of the left breast in the posterior depth.      US Breast Left Limited  There is an irregularly shaped, non-parallel, hypoechoic mass with microlobulated margins with shadowing seen in the left breast at 2 o'clock, 10  cm from the nipple.      No suspicious left axillary adenopathy demonstrated.      Right     Mammo Digital Diagnostic Bilat w/ Navjot  There is no evidence of suspicious masses, calcifications, or other abnormal findings in the right breast.     Impression:  Left  Mass: Left breast 11 mm mass at the upper outer posterior position. Assessment: 5 - Highly suggestive of malignancy. Biopsy is recommended.      Right  There is no mammographic or sonographic evidence of malignancy in the right breast.     BI-RADS Category:   Overall: 5 - Highly Suggestive of Malignancy     Recommendation:  Ultrasound guided Biopsy is recommended.     PET/CT:  Impression:  New diagnosis of left breast carcinoma.  No evidence of FDG avid regional lymphadenopathy or distant metastatic disease.      Final Pathologic Diagnosis 1.  Left axilla, sentinel lymph node #1, excisional biopsy: 3 benign lymph   nodes, negative for metastatic carcinoma (0/3).          Confirmed by negative immunohistochemical staining with cytokeratins   AE1/AE3, CAM 5.2 and wide spectrum keratin with          satisfactory positive and negative controls.   2.  Left axilla, sentinel lymph node #2, excisional biopsy: 1 benign lymph   node, negative for metastatic carcinoma (0/1).          Confirmed by negative immunohistochemical staining with cytokeratins   AE1/AE3, CAM 5.2 and wide spectrum keratin with          satisfactory positive and negative controls.   3.  Left axilla, sentinel lymph node #3, excisional biopsy: 1 benign lymph   node, negative for metastatic carcinoma (0/1).          Confirmed by negative immunohistochemical staining with cytokeratins   AE1/AE3, CAM 5.2 and wide spectrum keratin with          satisfactory positive and negative controls.   4.  Left breast, mastectomy: Invasive ductal carcinoma, multifocal with two   (2) foci measuring as follows:  Lesion #1 (Blocks 4E-F, corresponding to   prior          biopsy site/clip): 12 mm (1.2 cm) and Lesion #2           (Block 4D)  8 mm (0.8 cm) in greatest dimensions.          Microcalcifications are present associated with invasive carcinoma.          Margins: Invasive carcinoma is present at closest approach within less   than 1 mm (within less than 0.1 cm) of the                  black/deep surgical margin (this is the smaller lesion #2   measuring 8 mm in total size).  Invasive carcinoma is                  greater than 10 mm (greater than 1 cm) from all other surgical   margins.                  Ductal carcinoma in situ is greater than 10 mm (greater than 1   cm) from all surgical margins.          Ductal carcinoma in situ, high nuclear grade with solid and   comedonecrosis patterns are present and associated with          invasive tumor.          Previous biopsy cavity site and clip are identified.          Incidental calcified fibroadenoma.          Unremarkable nipple and skin.          See synoptic report in comment section for further details.   5.  Excess skin left breast, excision: Unremarkable skin and soft tissue.          No evidence of malignancy.   Comment:  Synoptic report   Procedure:  Total mastectomy   Specimen laterality:  Left   Tumor focality:  Multifocal; 2 foci   Tumor size: Focus/Lesion #1:  Greatest dimension:  12 mm (1.2 cm)          Focus/Lesion #2:  Greatest dimension:  8 mm (0.8 cm)   Histologic type:  Invasive carcinoma of no special type (ductal).   Histologic grade: Glandular/tubular differentiation:  2          Nuclear pleomorphism:  3          Mitotic rate:  1          Overall grade:  Grade  2   Ductal carcinoma in situ:  Present Negative for extensive intraductal   component   Tumor extension:   Skin is present and uninvolved by tumor.          Nipple is present and uninvolved by tumor.          Skeletal muscle is not present for evaluation.   Margins:         - Invasive carcinoma is present at closest approach within   less than 1 mm (within less than 0.1 cm) of the                  " black/deep surgical margin (this is the smaller lesion #2   measuring 8 mm in total size).  Invasive carcinoma is                  greater than 10 mm (greater than 1 cm) from all other surgical   margins.                  - Ductal carcinoma in situ is greater than 10 mm (greater than   1 cm) from all surgical margins.   Regional lymph nodes:                             Number examined:  5                             Number involved:  0   Treatment effect and breast:  No known presurgical therapy   Pathologic stage classification:   TNM descriptors:  "m" (multiple foci of invasive carcinoma)   Primary tumor:        pT1c:  Tumor greater than 10 mm but less than or equal to 2 mm in   greatest dimension.   Regional lymph nodes:        (sn)pN0:  No regional lymph node metastasis   Distant metastasis:        pMX:  Cannot be assessed.     Ancillary studies:   Immunostaining for lesion #1, which is the larger lesion associated with clip   and biopsy site measuring 12 mm in greatest dimension (corresponding to the   lesion seen in blocks 4 E and F):   Immunohistochemical stains are completed on this patient's previous biopsy   from the left upper outer quadrant breast biopsy from August 2020, case   number [id number], with the following results:   "BREAST BIOMARKER RESULTS   Estrogen receptor (ER): Low Positive (10% weak)   Progesterone receptor (ID): Negative (less than 1%)   HER2 IHC: Equivocal (Score 2+), HER2 FISH is pending and will be reported in a   supplemental   Ki-67 proliferation index: 50%   SUPPLEMENTAL (2):   HER2, Breast Tumor, FISH, Tissue   Result Summary:   Negative"   Immunohistochemical staining for lesion #2, which is the smaller calcified 8   mm lesion represented Block 4 D will be completed for hormonal markers and   results will follow in supplemental report.     Tumor Blocks for possible Future Studies:  4E-F (Lesion #1); 4D (Lesion #2)           Assessment & Plan:  69 y/o female s/p left breast " mastectomy with sentinel lymph node biopsy      Pathology reviewed of note HER2 + on final path was initially negative on core. deep margin within 1 mm but no ink on tumor per pathology this would be chest wall where pectoral fascia was removed.. Will discuss these findings at tumor board for further treatment recs  Keep oncology appt   F/u with me next week brijesh removal and wound check.

## 2020-09-18 ENCOUNTER — TELEPHONE (OUTPATIENT)
Dept: HEMATOLOGY/ONCOLOGY | Facility: CLINIC | Age: 70
End: 2020-09-18

## 2020-09-18 ENCOUNTER — PATIENT OUTREACH (OUTPATIENT)
Dept: ADMINISTRATIVE | Facility: OTHER | Age: 70
End: 2020-09-18

## 2020-09-18 ENCOUNTER — TUMOR BOARD CONFERENCE (OUTPATIENT)
Dept: SURGERY | Facility: CLINIC | Age: 70
End: 2020-09-18

## 2020-09-18 NOTE — TELEPHONE ENCOUNTER
Called patient this morning to set up f/u with Dr. Fernández post-op. Spoke to daughter who asked for Sept 30th. Scheduled at 3pm Reviewed the location of the [place] [facility], verbalized understanding.

## 2020-09-18 NOTE — PROGRESS NOTES
Tumor Board Documentation      Leia Rodriguez was presented by Vincent Moyer MD at our Tumor Board on 9/18/2020, which included representatives from Medical Oncology, Radiation Oncology, Surgical Oncology, Pathology, Navigation, Radiology.    Leia currently presents as a current patient with Breast cancer, with history of the following treatments: Surgical Intervention(s)(mastectomy 9/8/2020).    Additionally, we reviewed previous medical and familial history, history of present illness, and recent lab results along with all available histopathologic and imaging studies. The tumor board considered available treatment options and made the following recommendations:     Chemotherapy, Endocrine Therapy   now that HER2 positive, will need adjuvant HER2-directed systemic therapy, followed by endocrine therapy for ER positivity. No need for radiation post-mastectomy even with close posterior margin    The following procedures/referrals were also placed: No orders of the defined types were placed in this encounter.      Clinical Trial Status: Not discussed     National site-specific guidelines were discussed with respect to the case.    Tumor board is a meeting of clinicians from various specialty areas who evaluate and discuss patients for whom a multidisciplinary approach is being considered. Final determinations in the plan of care are those of the provider(s). The responsibility for follow up of recommendations given during tumor board is that of the provider.     Vincent Moyer MD

## 2020-09-23 ENCOUNTER — OFFICE VISIT (OUTPATIENT)
Dept: SURGERY | Facility: CLINIC | Age: 70
End: 2020-09-23
Payer: MEDICARE

## 2020-09-23 ENCOUNTER — PATIENT MESSAGE (OUTPATIENT)
Dept: GASTROENTEROLOGY | Facility: CLINIC | Age: 70
End: 2020-09-23

## 2020-09-23 VITALS
HEART RATE: 101 BPM | HEIGHT: 62 IN | SYSTOLIC BLOOD PRESSURE: 152 MMHG | TEMPERATURE: 99 F | DIASTOLIC BLOOD PRESSURE: 75 MMHG | BODY MASS INDEX: 31.52 KG/M2 | WEIGHT: 171.31 LBS

## 2020-09-23 DIAGNOSIS — Z17.0 MALIGNANT NEOPLASM OF LOWER-INNER QUADRANT OF LEFT BREAST IN FEMALE, ESTROGEN RECEPTOR POSITIVE: Primary | ICD-10-CM

## 2020-09-23 DIAGNOSIS — C50.312 MALIGNANT NEOPLASM OF LOWER-INNER QUADRANT OF LEFT BREAST IN FEMALE, ESTROGEN RECEPTOR POSITIVE: Primary | ICD-10-CM

## 2020-09-23 PROCEDURE — 99024 PR POST-OP FOLLOW-UP VISIT: ICD-10-PCS | Mod: S$GLB,,, | Performed by: SURGERY

## 2020-09-23 PROCEDURE — 99024 POSTOP FOLLOW-UP VISIT: CPT | Mod: S$GLB,,, | Performed by: SURGERY

## 2020-09-23 PROCEDURE — 99999 PR PBB SHADOW E&M-EST. PATIENT-LVL IV: CPT | Mod: PBBFAC,,, | Performed by: SURGERY

## 2020-09-23 PROCEDURE — 99999 PR PBB SHADOW E&M-EST. PATIENT-LVL IV: ICD-10-PCS | Mod: PBBFAC,,, | Performed by: SURGERY

## 2020-09-24 ENCOUNTER — TELEPHONE (OUTPATIENT)
Dept: PREADMISSION TESTING | Facility: HOSPITAL | Age: 70
End: 2020-09-24

## 2020-09-24 RX ORDER — HYDROCHLOROTHIAZIDE 12.5 MG/1
12.5 CAPSULE ORAL DAILY
Qty: 30 CAPSULE | Refills: 0 | Status: SHIPPED | OUTPATIENT
Start: 2020-09-24 | End: 2020-10-21

## 2020-09-24 NOTE — PRE ADMISSION SCREENING
PAT call completed and patients daughter educated on procedure instructions. Medical history discussed and patients daughter informed of arrival times 0600. Pt will be accompanied by daughter and is made aware of limited-visitor policy, and that  is to remain during entire visit. All questions and concerns addressed. Endoscopy instructions reviewed.Daughter verbalizes understanding of teaching and all instructions.

## 2020-09-27 NOTE — PROGRESS NOTES
Patient ID: Leia Rodriguez is a 70 y.o. female.    Chief Complaint: Post-op Evaluation      HPI:  70-year-old female s/p left mastectomy with sentinel lymph node biopsy 9/8/20 presents for post op. Denies any complaints.  SHERI drain with less than 30 cc per day.     presents to discuss surgery. In the interim she has undergone PET scan which was negative for metastatic disease. Genetic testing pending    Initially referred for recurrent left breast cancer.  She recently underwent biopsy showing invasive ductal carcinoma ER+/MO- Her 2 -.  She previously underwent a left breast lumpectomy with adjuvant radiation in 2017.      For Seema Negro:  Breast cancer follow-up - last visit was 5/29/19    Pt has a history of Stage 0 DCIS of Lt. breast.  She presented in March of 2017 when diagnostic MMG confirmed the presence of a round mass with indistinct margins in the Lt. breast at the 7 o'clock position. The mass measured 9 x 5 x 6 mm. The patient was referred to Dr. Duke and on 6/15/17 underwent wire localization lumpectomy. Pathology revealed an 8 mm focus of ductal carcinoma in situ. The tumor was low grade. There was tumor present at the anterior inferior margin. Ninety percent of the tumor cells were ER positive, 5- 10% of the tumor cells were MO positive. The patient was referred for adjuvant radiotherapy. Completed a course of partial breast irradiation to the LIQ of the Lt. breast on 8/14/17.  Currently on hormonal therapy with Arimidex that started 8/2017.  Due off 8/2022    DEXA- 6/29/18- wnl- f/u in 2 years due to AI- 6/2020 due    Risk factors identified:     Menarche at 9 y/o  G 3 P 3  First pregnancy at 18 y/o  LMP: 50's  Estrogen: none  Radiation to the neck or chest wall - has had left breast radiation  Prior breast biopsies or atypical hyperplasia- left breast lumpectomy     FH: no breast cancer, daughter cervical cancer 30's, father prostate cancer 70's-     Body mass index is 31.33 kg/m².    Review  of Systems   Constitutional: Negative.  Negative for activity change and unexpected weight change.   HENT: Negative.  Negative for hearing loss, rhinorrhea and trouble swallowing.    Eyes: Negative.  Negative for discharge and visual disturbance.   Respiratory: Negative.  Negative for chest tightness and wheezing.    Cardiovascular: Negative.  Negative for chest pain and palpitations.   Gastrointestinal: Negative.  Negative for blood in stool, constipation, diarrhea and vomiting.        No reflux   Endocrine: Negative.  Negative for polydipsia and polyuria.   Genitourinary: Negative.  Negative for difficulty urinating, dysuria, hematuria and menstrual problem.   Musculoskeletal: Negative.  Negative for joint swelling and neck pain.   Skin: Negative.    Allergic/Immunologic: Negative.    Neurological: Negative.  Negative for headaches.   Hematological: Negative.  Negative for adenopathy.   Psychiatric/Behavioral: Negative.  Negative for confusion and dysphoric mood.   Breast: Pt denies any breast pain, nipple discharge, or palpable mass. No prior trauma or bruising. No breast surgeries or abnormalities.    Current Outpatient Medications   Medication Sig Dispense Refill    anastrozole (ARIMIDEX) 1 mg Tab TAKE 1 TABLET (1 MG TOTAL) BY MOUTH ONCE DAILY. 90 tablet 3    buPROPion (WELLBUTRIN SR) 150 MG TBSR 12 hr tablet Take 1 tablet (150 mg total) by mouth 2 (two) times daily. 60 tablet 2    calcium citrate (CALCITRATE) 200 mg (950 mg) tablet Take 1 tablet by mouth once daily.      ferrous sulfate (FEOSOL) 325 mg (65 mg iron) Tab tablet Take 65 mg by mouth once daily.      HYDROcodone-acetaminophen (NORCO) 5-325 mg per tablet Take 2 tablets by mouth every 4 (four) hours as needed. 30 tablet 0    meloxicam (MOBIC) 15 MG tablet TAKE 1 TABLET BY MOUTH EVERY DAY 30 tablet 0    nitrofurantoin, macrocrystal-monohydrate, (MACROBID) 100 MG capsule       tamsulosin (FLOMAX) 0.4 mg Cap Take 1 capsule by mouth once daily.       vitamin D 1000 units Tab Take 1,000 Units by mouth once daily.      ciprofloxacin HCl (CIPRO) 500 MG tablet Take 500 mg by mouth 2 (two) times daily.  0    fluconazole (DIFLUCAN) 100 MG tablet       hydroCHLOROthiazide (MICROZIDE) 12.5 mg capsule TAKE 1 CAPSULE (12.5 MG TOTAL) BY MOUTH ONCE DAILY. FOR HIGH BLOOD PRESSURE 30 capsule 0    iron, carbonyl, (ICAR) 15 mg/1.25 mL Susp Take by mouth.      metroNIDAZOLE (FLAGYL) 500 MG tablet Take 500 mg by mouth 3 (three) times daily.  0    oxybutynin (DITROPAN) 5 MG Tab Take 1 tablet (5 mg total) by mouth 3 (three) times daily as needed. 90 tablet 11     No current facility-administered medications for this visit.      Facility-Administered Medications Ordered in Other Visits   Medication Dose Route Frequency Provider Last Rate Last Dose    lactated ringers infusion   Intravenous Continuous Keira Ellison MD 0 mL/hr at 20 1013         Review of patient's allergies indicates:  No Known Allergies    Past Medical History:   Diagnosis Date    Arthritis     EDGAR HANDS, KNEES    Blind right eye     Traumatic    Breast cancer 06/15/2017    0.8 cm Grade1 INTRADUCTAL BREAST 9 positve margin (left)    Essential hypertension     Hemorrhoids     Lipoma of abdominal wall     Obesity     Overactive bladder     Pap smear abnormality of cervix with ASCUS favoring benign     Thyroid nodule     Tobacco use disorder     Tubular adenoma of colon     Urinary incontinence        Past Surgical History:   Procedure Laterality Date    ANTERIOR VAGINAL REPAIR      BLADDER SURGERY      BREAST LUMPECTOMY Left 2017    CATARACT EXTRACTION Right      SECTION      X2    COLONOSCOPY N/A 3/8/2017    Procedure: COLONOSCOPY;  Surgeon: Tyron Paris MD;  Location[facility];  Service: Endoscopy;  Laterality: N/A;    COLONOSCOPY N/A 2020    Procedure: COLONOSCOPY;  Surgeon: Keira Ellison MD;  Location: [facility];  Service: Endoscopy;  Laterality:  N/A;    ESOPHAGOGASTRODUODENOSCOPY N/A 2020    Procedure: EGD (ESOPHAGOGASTRODUODENOSCOPY);  Surgeon: Keira Ellison MD;  Location: [facility];  Service: Endoscopy;  Laterality: N/A;  new onset iron deficiency with prior history of breast cancer    SENTINEL LYMPH NODE BIOPSY Left 2020    Procedure: BIOPSY, LYMPH NODE, SENTINEL;  Surgeon: Vincent Moyer MD;  Location: [facility] OR;  Service: General;  Laterality: Left;    SIMPLE MASTECTOMY Left 2020    Procedure: MASTECTOMY, SIMPLE;  Surgeon: Vincent Moyer MD;  Location: [facility] OR;  Service: General;  Laterality: Left;    THYROID LOBECTOMY Left     TOTAL ABDOMINAL HYSTERECTOMY      TUBAL LIGATION         Family History   Problem Relation Age of Onset    Hypertension Father     Cataracts Father     Prostate cancer Father 80    Cervical cancer Daughter 32    Allergic rhinitis Daughter     Cirrhosis Mother     Alcohol abuse Mother     Hypertension Daughter     Diabetes Brother     Heart attack Brother     Heart murmur Brother     No Known Problems Daughter        Social History     Socioeconomic History    Marital status:      Spouse name: Not on file    Number of children: Not on file    Years of education: Not on file    Highest education level: Not on file   Occupational History    Not on file   Social Needs    Financial resource strain: Not on file    Food insecurity     Worry: Not on file     Inability: Not on file    Transportation needs     Medical: Not on file     Non-medical: Not on file   Tobacco Use    Smoking status: Former Smoker     Packs/day: 1.00     Years: 50.00     Pack years: 50.00     Types: Cigarettes     Quit date: 2020     Years since quittin.1    Smokeless tobacco: Never Used   Substance and Sexual Activity    Alcohol use: Never     Alcohol/week: 28.0 standard drinks     Types: 28 Cans of beer per week     Frequency: 4 or more times a week     Drinks per session: 3 or 4     Binge frequency:  Less than monthly    Drug use: No    Sexual activity: Never     Partners: Male   Lifestyle    Physical activity     Days per week: Not on file     Minutes per session: Not on file    Stress: Not on file   Relationships    Social connections     Talks on phone: Not on file     Gets together: Not on file     Attends Congregation service: Not on file     Active member of club or organization: Not on file     Attends meetings of clubs or organizations: Not on file     Relationship status: Not on file   Other Topics Concern    Not on file   Social History Narrative    The patient is .  She is retired from [employer].       Vitals:    09/23/20 1019   BP: (!) 152/75   Pulse: 101   Temp: 98.6 °F (37 °C)       Physical Exam  Constitutional:       Appearance: She is well-developed.   HENT:      Head: Normocephalic and atraumatic.      Right Ear: External ear normal.      Left Ear: External ear normal.      Mouth/Throat:      Pharynx: No oropharyngeal exudate.   Eyes:      General: No scleral icterus.        Right eye: No discharge.         Left eye: No discharge.      Conjunctiva/sclera: Conjunctivae normal.      Pupils: Pupils are equal, round, and reactive to light.   Neck:      Musculoskeletal: Normal range of motion and neck supple.      Thyroid: No thyromegaly.   Cardiovascular:      Rate and Rhythm: Normal rate and regular rhythm.      Heart sounds: Normal heart sounds.   Pulmonary:      Effort: Pulmonary effort is normal.      Breath sounds: Normal breath sounds.   Chest:      Breasts:         Right: No inverted nipple, mass, nipple discharge, skin change or tenderness.         Left: No inverted nipple, mass, nipple discharge, skin change or tenderness.       Abdominal:      General: Bowel sounds are normal.      Palpations: Abdomen is soft.   Musculoskeletal: Normal range of motion.      Right shoulder: She exhibits no crepitus and normal strength.   Lymphadenopathy:      Head:      Right side of head:  No submental, submandibular, tonsillar, preauricular, posterior auricular or occipital adenopathy.      Left side of head: No submental, submandibular, tonsillar, preauricular, posterior auricular or occipital adenopathy.      Cervical: No cervical adenopathy.      Right cervical: No superficial or posterior cervical adenopathy.     Left cervical: No superficial or posterior cervical adenopathy.      Upper Body:      Right upper body: No supraclavicular adenopathy.      Left upper body: No supraclavicular adenopathy.   Skin:     General: Skin is warm and dry.      Coloration: Skin is not pale.      Findings: No erythema or rash.   Neurological:      Mental Status: She is alert and oriented to person, place, and time.      Deep Tendon Reflexes: Reflexes are normal and symmetric.   Psychiatric:         Behavior: Behavior normal.         Thought Content: Thought content normal.         Judgment: Judgment normal.         Result:   Mammo Digital Diagnostic Bilat w/ Navjot  US Breast Left Limited     History:  Patient is 70 y.o. and is seen for a diagnostic mammogram.     Films Compared:  Compared to: 05/29/2019 Mammo Digital Diagnostic Bilat w/ Navjot and 05/16/2018 Mammo Digital Diagnostic Bilat with Tomosynthesis_CAD     Findings:  This procedure was performed using tomosynthesis. Computer-aided detection was utilized in the interpretation of this examination.  The breasts are heterogeneously dense, which may obscure small masses.      Left  Mammo Digital Diagnostic Bilat w/ Navjot  There is an 11 mm oval mass with spiculated margins seen in the upper outer quadrant of the left breast in the posterior depth.      US Breast Left Limited  There is an irregularly shaped, non-parallel, hypoechoic mass with microlobulated margins with shadowing seen in the left breast at 2 o'clock, 10 cm from the nipple.      No suspicious left axillary adenopathy demonstrated.      Right     Mammo Digital Diagnostic Bilat w/ Navjot  There is no  evidence of suspicious masses, calcifications, or other abnormal findings in the right breast.     Impression:  Left  Mass: Left breast 11 mm mass at the upper outer posterior position. Assessment: 5 - Highly suggestive of malignancy. Biopsy is recommended.      Right  There is no mammographic or sonographic evidence of malignancy in the right breast.     BI-RADS Category:   Overall: 5 - Highly Suggestive of Malignancy     Recommendation:  Ultrasound guided Biopsy is recommended.     PET/CT:  Impression:  New diagnosis of left breast carcinoma.  No evidence of FDG avid regional lymphadenopathy or distant metastatic disease.      Final Pathologic Diagnosis 1.  Left axilla, sentinel lymph node #1, excisional biopsy: 3 benign lymph   nodes, negative for metastatic carcinoma (0/3).          Confirmed by negative immunohistochemical staining with cytokeratins   AE1/AE3, CAM 5.2 and wide spectrum keratin with          satisfactory positive and negative controls.   2.  Left axilla, sentinel lymph node #2, excisional biopsy: 1 benign lymph   node, negative for metastatic carcinoma (0/1).          Confirmed by negative immunohistochemical staining with cytokeratins   AE1/AE3, CAM 5.2 and wide spectrum keratin with          satisfactory positive and negative controls.   3.  Left axilla, sentinel lymph node #3, excisional biopsy: 1 benign lymph   node, negative for metastatic carcinoma (0/1).          Confirmed by negative immunohistochemical staining with cytokeratins   AE1/AE3, CAM 5.2 and wide spectrum keratin with          satisfactory positive and negative controls.   4.  Left breast, mastectomy: Invasive ductal carcinoma, multifocal with two   (2) foci measuring as follows:  Lesion #1 (Blocks 4E-F, corresponding to   prior          biopsy site/clip): 12 mm (1.2 cm) and Lesion #2          (Block 4D)  8 mm (0.8 cm) in greatest dimensions.          Microcalcifications are present associated with invasive carcinoma.           Margins: Invasive carcinoma is present at closest approach within less   than 1 mm (within less than 0.1 cm) of the                  black/deep surgical margin (this is the smaller lesion #2   measuring 8 mm in total size).  Invasive carcinoma is                  greater than 10 mm (greater than 1 cm) from all other surgical   margins.                  Ductal carcinoma in situ is greater than 10 mm (greater than 1   cm) from all surgical margins.          Ductal carcinoma in situ, high nuclear grade with solid and   comedonecrosis patterns are present and associated with          invasive tumor.          Previous biopsy cavity site and clip are identified.          Incidental calcified fibroadenoma.          Unremarkable nipple and skin.          See synoptic report in comment section for further details.   5.  Excess skin left breast, excision: Unremarkable skin and soft tissue.          No evidence of malignancy.   Comment:  Synoptic report   Procedure:  Total mastectomy   Specimen laterality:  Left   Tumor focality:  Multifocal; 2 foci   Tumor size: Focus/Lesion #1:  Greatest dimension:  12 mm (1.2 cm)          Focus/Lesion #2:  Greatest dimension:  8 mm (0.8 cm)   Histologic type:  Invasive carcinoma of no special type (ductal).   Histologic grade: Glandular/tubular differentiation:  2          Nuclear pleomorphism:  3          Mitotic rate:  1          Overall grade:  Grade  2   Ductal carcinoma in situ:  Present Negative for extensive intraductal   component   Tumor extension:   Skin is present and uninvolved by tumor.          Nipple is present and uninvolved by tumor.          Skeletal muscle is not present for evaluation.   Margins:         - Invasive carcinoma is present at closest approach within   less than 1 mm (within less than 0.1 cm) of the                  black/deep surgical margin (this is the smaller lesion #2   measuring 8 mm in total size).  Invasive carcinoma is                  greater than  "10 mm (greater than 1 cm) from all other surgical   margins.                  - Ductal carcinoma in situ is greater than 10 mm (greater than   1 cm) from all surgical margins.   Regional lymph nodes:                             Number examined:  5                             Number involved:  0   Treatment effect and breast:  No known presurgical therapy   Pathologic stage classification:   TNM descriptors:  "m" (multiple foci of invasive carcinoma)   Primary tumor:        pT1c:  Tumor greater than 10 mm but less than or equal to 2 mm in   greatest dimension.   Regional lymph nodes:        (sn)pN0:  No regional lymph node metastasis   Distant metastasis:        pMX:  Cannot be assessed.     Ancillary studies:   Immunostaining for lesion #1, which is the larger lesion associated with clip   and biopsy site measuring 12 mm in greatest dimension (corresponding to the   lesion seen in blocks 4 E and F):   Immunohistochemical stains are completed on this patient's previous biopsy   from the left upper outer quadrant breast biopsy from August 2020, case   number [id number], with the following results:   "BREAST BIOMARKER RESULTS   Estrogen receptor (ER): Low Positive (10% weak)   Progesterone receptor (ND): Negative (less than 1%)   HER2 IHC: Equivocal (Score 2+), HER2 FISH is pending and will be reported in a   supplemental   Ki-67 proliferation index: 50%   SUPPLEMENTAL (2):   HER2, Breast Tumor, FISH, Tissue   Result Summary:   Negative"   Immunohistochemical staining for lesion #2, which is the smaller calcified 8   mm lesion represented Block 4 D will be completed for hormonal markers and   results will follow in supplemental report.     Tumor Blocks for possible Future Studies:  4E-F (Lesion #1); 4D (Lesion #2)           Assessment & Plan:  71 y/o female s/p left breast mastectomy with sentinel lymph node biopsy      Pathology reviewed and tumor board recommendations discussed with patient  Remaining SHERI " removed  Keep oncology appt   Port placement will call to schedule  Risks and benefits discussed with patient including: Pain, bleeding, infection, injury to vessels, pneumothorax, need for further procedure  Follow-up in 1 month

## 2020-09-28 ENCOUNTER — NURSE TRIAGE (OUTPATIENT)
Dept: ADMINISTRATIVE | Facility: CLINIC | Age: 70
End: 2020-09-28

## 2020-09-29 ENCOUNTER — TELEPHONE (OUTPATIENT)
Dept: SURGERY | Facility: CLINIC | Age: 70
End: 2020-09-29

## 2020-09-29 NOTE — TELEPHONE ENCOUNTER
Patient's daughter is going to call and schedule a follow up with Dr Moyer once she is released from the hospital

## 2020-09-29 NOTE — TELEPHONE ENCOUNTER
9/23/20 left breast surgery by doctor Vincent Moyer.  Patient was taken to [facility] in [place] tonight by her daughter Zheng while the other daughter Moe was with me on the phone.  Mrs. Moe Ellis said the her mother was disoriented and that her drain was full of yellow liquid.  The patient made it to the triage room at the [facility] and they are taking care of the patient now.    Reason for Disposition   Patient already left for the hospital/clinic.    Additional Information   Negative: Caller is angry or rude (e.g., hangs up, verbally abusive, yelling)   Negative: Caller hangs up   Negative: Caller has already spoken with the PCP and has no further questions.   Negative: Caller has already spoken with another triager and has no further questions.   Negative: Caller has already spoken with another triager or PCP AND has further questions AND triager able to answer questions.   Negative: Busy signal.  First attempt to contact caller.  Follow-up call scheduled within 15 minutes.   Negative: No answer.  First attempt to contact caller.  Follow-up call scheduled within 15 minutes.   Negative: Message left on identified voice mail   Negative: Message left on unidentified voice mail.  Phone number verified.   Negative: Message left with person in household.   Negative: Wrong number reached.  Phone number verified.   Negative: Second attempt to contact family AND no contact made.  Phone number verified.   Negative: Cell phone out of range.  Phone number verified.   Negative: Pager number given.  Answering service notified.    Protocols used: NO CONTACT OR DUPLICATE CONTACT CALL-A-

## 2020-09-30 ENCOUNTER — TELEPHONE (OUTPATIENT)
Dept: SURGERY | Facility: CLINIC | Age: 70
End: 2020-09-30

## 2020-09-30 NOTE — TELEPHONE ENCOUNTER
Pt daughter says that the incision is leaking and she would like to come get it checked on 10/07/2020. I scheduled her an appointment.  ----- Message from Layne Reddy sent at 9/30/2020  3:23 PM CDT -----  Contact: Lars Figueroa [phone]  Please call Patient regarding an appointment. Patients daughter number is [phone].      Thanks/KATHY

## 2020-10-07 ENCOUNTER — OFFICE VISIT (OUTPATIENT)
Dept: SURGERY | Facility: CLINIC | Age: 70
End: 2020-10-07
Payer: MEDICARE

## 2020-10-07 VITALS
HEART RATE: 103 BPM | BODY MASS INDEX: 31.64 KG/M2 | HEIGHT: 62 IN | TEMPERATURE: 97 F | WEIGHT: 171.94 LBS | SYSTOLIC BLOOD PRESSURE: 143 MMHG | DIASTOLIC BLOOD PRESSURE: 74 MMHG

## 2020-10-07 DIAGNOSIS — C50.312 MALIGNANT NEOPLASM OF LOWER-INNER QUADRANT OF LEFT BREAST IN FEMALE, ESTROGEN RECEPTOR POSITIVE: Primary | ICD-10-CM

## 2020-10-07 DIAGNOSIS — Z17.0 MALIGNANT NEOPLASM OF LOWER-INNER QUADRANT OF LEFT BREAST IN FEMALE, ESTROGEN RECEPTOR POSITIVE: Primary | ICD-10-CM

## 2020-10-07 PROCEDURE — 99499 RISK ADDL DX/OHS AUDIT: ICD-10-PCS | Mod: S$GLB,,, | Performed by: SURGERY

## 2020-10-07 PROCEDURE — 99499 UNLISTED E&M SERVICE: CPT | Mod: S$GLB,,, | Performed by: SURGERY

## 2020-10-07 PROCEDURE — 99024 POSTOP FOLLOW-UP VISIT: CPT | Mod: S$GLB,,, | Performed by: SURGERY

## 2020-10-07 PROCEDURE — 99999 PR PBB SHADOW E&M-EST. PATIENT-LVL V: ICD-10-PCS | Mod: PBBFAC,,, | Performed by: SURGERY

## 2020-10-07 PROCEDURE — 99999 PR PBB SHADOW E&M-EST. PATIENT-LVL V: CPT | Mod: PBBFAC,,, | Performed by: SURGERY

## 2020-10-07 PROCEDURE — 99024 PR POST-OP FOLLOW-UP VISIT: ICD-10-PCS | Mod: S$GLB,,, | Performed by: SURGERY

## 2020-10-11 RX ORDER — SODIUM CHLORIDE 9 MG/ML
INJECTION, SOLUTION INTRAVENOUS CONTINUOUS
Status: CANCELLED | OUTPATIENT
Start: 2020-10-11

## 2020-10-11 RX ORDER — LIDOCAINE HYDROCHLORIDE 10 MG/ML
1 INJECTION, SOLUTION EPIDURAL; INFILTRATION; INTRACAUDAL; PERINEURAL ONCE
Status: CANCELLED | OUTPATIENT
Start: 2020-10-11 | End: 2020-10-11

## 2020-10-12 NOTE — PROGRESS NOTES
Patient ID: Leia Rodriguez is a 70 y.o. female.    Chief Complaint: Wound Check      HPI:  70-year-old female s/p left mastectomy with sentinel lymph node biopsy 9/8/20 presents for follow up. After drain removal had serous drainage along mastectomy incision and subsequent breakdown of incision. Denies any pain or purulent drainage.     presents to discuss surgery. In the interim she has undergone PET scan which was negative for metastatic disease. Genetic testing pending    Initially referred for recurrent left breast cancer.  She recently underwent biopsy showing invasive ductal carcinoma ER+/ND- Her 2 -.  She previously underwent a left breast lumpectomy with adjuvant radiation in 2017.      For Seema Marky:  Breast cancer follow-up - last visit was 5/29/19    Pt has a history of Stage 0 DCIS of Lt. breast.  She presented in March of 2017 when diagnostic MMG confirmed the presence of a round mass with indistinct margins in the Lt. breast at the 7 o'clock position. The mass measured 9 x 5 x 6 mm. The patient was referred to Dr. Duke and on 6/15/17 underwent wire localization lumpectomy. Pathology revealed an 8 mm focus of ductal carcinoma in situ. The tumor was low grade. There was tumor present at the anterior inferior margin. Ninety percent of the tumor cells were ER positive, 5- 10% of the tumor cells were ND positive. The patient was referred for adjuvant radiotherapy. Completed a course of partial breast irradiation to the LIQ of the Lt. breast on 8/14/17.  Currently on hormonal therapy with Arimidex that started 8/2017.  Due off 8/2022    DEXA- 6/29/18- wnl- f/u in 2 years due to AI- 6/2020 due    Risk factors identified:     Menarche at 9 y/o  G 3 P 3  First pregnancy at 16 y/o  LMP: 50's  Estrogen: none  Radiation to the neck or chest wall - has had left breast radiation  Prior breast biopsies or atypical hyperplasia- left breast lumpectomy     FH: no breast cancer, daughter cervical cancer 30's,  father prostate cancer 70's-     Body mass index is 31.45 kg/m².    Review of Systems   Constitutional: Negative.  Negative for activity change and unexpected weight change.   HENT: Negative.  Negative for hearing loss, rhinorrhea and trouble swallowing.    Eyes: Negative.  Negative for discharge and visual disturbance.   Respiratory: Negative.  Negative for chest tightness and wheezing.    Cardiovascular: Negative.  Negative for chest pain and palpitations.   Gastrointestinal: Negative.  Negative for blood in stool, constipation, diarrhea and vomiting.        No reflux   Endocrine: Negative.  Negative for polydipsia and polyuria.   Genitourinary: Negative.  Negative for difficulty urinating, dysuria, hematuria and menstrual problem.   Musculoskeletal: Negative.  Negative for joint swelling and neck pain.   Skin: Negative.    Allergic/Immunologic: Negative.    Neurological: Negative.  Negative for headaches.   Hematological: Negative.  Negative for adenopathy.   Psychiatric/Behavioral: Negative.  Negative for confusion and dysphoric mood.   Breast: Pt denies any breast pain, nipple discharge, or palpable mass. No prior trauma or bruising. No breast surgeries or abnormalities.    Current Outpatient Medications   Medication Sig Dispense Refill    buPROPion (WELLBUTRIN SR) 150 MG TBSR 12 hr tablet Take 1 tablet (150 mg total) by mouth 2 (two) times daily. 60 tablet 2    calcium citrate (CALCITRATE) 200 mg (950 mg) tablet Take 1 tablet by mouth once daily.      ciprofloxacin HCl (CIPRO) 500 MG tablet Take 500 mg by mouth 2 (two) times daily.  0    ferrous sulfate (FEOSOL) 325 mg (65 mg iron) Tab tablet Take 65 mg by mouth once daily.      fluconazole (DIFLUCAN) 100 MG tablet       hydroCHLOROthiazide (MICROZIDE) 12.5 mg capsule TAKE 1 CAPSULE (12.5 MG TOTAL) BY MOUTH ONCE DAILY. FOR HIGH BLOOD PRESSURE 30 capsule 0    HYDROcodone-acetaminophen (NORCO) 5-325 mg per tablet Take 2 tablets by mouth every 4 (four)  hours as needed. 30 tablet 0    iron, carbonyl, (ICAR) 15 mg/1.25 mL Susp Take by mouth.      metroNIDAZOLE (FLAGYL) 500 MG tablet Take 500 mg by mouth 3 (three) times daily.  0    nitrofurantoin, macrocrystal-monohydrate, (MACROBID) 100 MG capsule       tamsulosin (FLOMAX) 0.4 mg Cap Take 1 capsule by mouth once daily.      vitamin D 1000 units Tab Take 1,000 Units by mouth once daily.      anastrozole (ARIMIDEX) 1 mg Tab TAKE 1 TABLET (1 MG TOTAL) BY MOUTH ONCE DAILY. (Patient not taking: Reported on 10/7/2020.) 90 tablet 3    meloxicam (MOBIC) 15 MG tablet TAKE 1 TABLET BY MOUTH EVERY DAY 30 tablet 0    oxybutynin (DITROPAN) 5 MG Tab Take 1 tablet (5 mg total) by mouth 3 (three) times daily as needed. 90 tablet 11     No current facility-administered medications for this visit.      Facility-Administered Medications Ordered in Other Visits   Medication Dose Route Frequency Provider Last Rate Last Dose    lactated ringers infusion   Intravenous Continuous Keira Ellison MD 0 mL/hr at 20 1013         Review of patient's allergies indicates:  No Known Allergies    Past Medical History:   Diagnosis Date    Arthritis     EDGAR HANDS, KNEES    Blind right eye     Traumatic    Breast cancer 06/15/2017    0.8 cm Grade1 INTRADUCTAL BREAST 9 positve margin (left)    Essential hypertension     Hemorrhoids     Lipoma of abdominal wall     Obesity     Overactive bladder     Pap smear abnormality of cervix with ASCUS favoring benign     Thyroid nodule     Tobacco use disorder     Tubular adenoma of colon     Urinary incontinence        Past Surgical History:   Procedure Laterality Date    ANTERIOR VAGINAL REPAIR      BLADDER SURGERY      BREAST LUMPECTOMY Left 2017    CATARACT EXTRACTION Right      SECTION      X2    COLONOSCOPY N/A 3/8/2017    Procedure: COLONOSCOPY;  Surgeon: Tyron Paris MD;  Location: [facility];  Service: Endoscopy;  Laterality: N/A;    COLONOSCOPY N/A  2020    Procedure: COLONOSCOPY;  Surgeon: Keira Ellison MD;  Location: [facility] ENDO;  Service: Endoscopy;  Laterality: N/A;    ESOPHAGOGASTRODUODENOSCOPY N/A 2020    Procedure: EGD (ESOPHAGOGASTRODUODENOSCOPY);  Surgeon: Keira Ellison MD;  Location: [facility] ENDO;  Service: Endoscopy;  Laterality: N/A;  new onset iron deficiency with prior history of breast cancer    SENTINEL LYMPH NODE BIOPSY Left 2020    Procedure: BIOPSY, LYMPH NODE, SENTINEL;  Surgeon: Vincent Moyer MD;  Location: [facility] OR;  Service: General;  Laterality: Left;    SIMPLE MASTECTOMY Left 2020    Procedure: MASTECTOMY, SIMPLE;  Surgeon: Vincent Moyer MD;  Location: [facility] OR;  Service: General;  Laterality: Left;    THYROID LOBECTOMY Left     TOTAL ABDOMINAL HYSTERECTOMY      TUBAL LIGATION         Family History   Problem Relation Age of Onset    Hypertension Father     Cataracts Father     Prostate cancer Father 80    Cervical cancer Daughter 32    Allergic rhinitis Daughter     Cirrhosis Mother     Alcohol abuse Mother     Hypertension Daughter     Diabetes Brother     Heart attack Brother     Heart murmur Brother     No Known Problems Daughter        Social History     Socioeconomic History    Marital status:      Spouse name: Not on file    Number of children: Not on file    Years of education: Not on file    Highest education level: Not on file   Occupational History    Not on file   Social Needs    Financial resource strain: Not on file    Food insecurity     Worry: Not on file     Inability: Not on file    Transportation needs     Medical: Not on file     Non-medical: Not on file   Tobacco Use    Smoking status: Former Smoker     Packs/day: 1.00     Years: 50.00     Pack years: 50.00     Types: Cigarettes     Quit date: 2020     Years since quittin.1    Smokeless tobacco: Never Used   Substance and Sexual Activity    Alcohol use: Never     Alcohol/week: 28.0 standard drinks      Types: 28 Cans of beer per week     Frequency: 4 or more times a week     Drinks per session: 3 or 4     Binge frequency: Less than monthly    Drug use: No    Sexual activity: Never     Partners: Male   Lifestyle    Physical activity     Days per week: Not on file     Minutes per session: Not on file    Stress: Not on file   Relationships    Social connections     Talks on phone: Not on file     Gets together: Not on file     Attends Islam service: Not on file     Active member of club or organization: Not on file     Attends meetings of clubs or organizations: Not on file     Relationship status: Not on file   Other Topics Concern    Not on file   Social History Narrative    The patient is .  She is retired from [employer].       Vitals:    10/07/20 1313   BP: (!) 143/74   Pulse: 103   Temp: 97.1 °F (36.2 °C)       Physical Exam  Constitutional:       Appearance: She is well-developed.   HENT:      Head: Normocephalic and atraumatic.      Right Ear: External ear normal.      Left Ear: External ear normal.      Mouth/Throat:      Pharynx: No oropharyngeal exudate.   Eyes:      General: No scleral icterus.        Right eye: No discharge.         Left eye: No discharge.      Conjunctiva/sclera: Conjunctivae normal.      Pupils: Pupils are equal, round, and reactive to light.   Neck:      Musculoskeletal: Normal range of motion and neck supple.      Thyroid: No thyromegaly.   Cardiovascular:      Rate and Rhythm: Normal rate and regular rhythm.      Heart sounds: Normal heart sounds.   Pulmonary:      Effort: Pulmonary effort is normal.      Breath sounds: Normal breath sounds.   Chest:      Breasts:         Right: No inverted nipple, mass, nipple discharge, skin change or tenderness.         Left: No inverted nipple, mass, nipple discharge, skin change or tenderness.       Abdominal:      General: Bowel sounds are normal.      Palpations: Abdomen is soft.   Musculoskeletal: Normal range of  motion.      Right shoulder: She exhibits no crepitus and normal strength.   Lymphadenopathy:      Head:      Right side of head: No submental, submandibular, tonsillar, preauricular, posterior auricular or occipital adenopathy.      Left side of head: No submental, submandibular, tonsillar, preauricular, posterior auricular or occipital adenopathy.      Cervical: No cervical adenopathy.      Right cervical: No superficial or posterior cervical adenopathy.     Left cervical: No superficial or posterior cervical adenopathy.      Upper Body:      Right upper body: No supraclavicular adenopathy.      Left upper body: No supraclavicular adenopathy.   Skin:     General: Skin is warm and dry.      Coloration: Skin is not pale.      Findings: No erythema or rash.   Neurological:      Mental Status: She is alert and oriented to person, place, and time.      Deep Tendon Reflexes: Reflexes are normal and symmetric.   Psychiatric:         Behavior: Behavior normal.         Thought Content: Thought content normal.         Judgment: Judgment normal.         Result:   Mammo Digital Diagnostic Bilat w/ Navjot  US Breast Left Limited     History:  Patient is 70 y.o. and is seen for a diagnostic mammogram.     Films Compared:  Compared to: 05/29/2019 Mammo Digital Diagnostic Bilat w/ Navjot and 05/16/2018 Mammo Digital Diagnostic Bilat with Tomosynthesis_CAD     Findings:  This procedure was performed using tomosynthesis. Computer-aided detection was utilized in the interpretation of this examination.  The breasts are heterogeneously dense, which may obscure small masses.      Left  Mammo Digital Diagnostic Bilat w/ Navjot  There is an 11 mm oval mass with spiculated margins seen in the upper outer quadrant of the left breast in the posterior depth.      US Breast Left Limited  There is an irregularly shaped, non-parallel, hypoechoic mass with microlobulated margins with shadowing seen in the left breast at 2 o'clock, 10 cm from the  nipple.      No suspicious left axillary adenopathy demonstrated.      Right     Mammo Digital Diagnostic Bilat w/ Navjot  There is no evidence of suspicious masses, calcifications, or other abnormal findings in the right breast.     Impression:  Left  Mass: Left breast 11 mm mass at the upper outer posterior position. Assessment: 5 - Highly suggestive of malignancy. Biopsy is recommended.      Right  There is no mammographic or sonographic evidence of malignancy in the right breast.     BI-RADS Category:   Overall: 5 - Highly Suggestive of Malignancy     Recommendation:  Ultrasound guided Biopsy is recommended.     PET/CT:  Impression:  New diagnosis of left breast carcinoma.  No evidence of FDG avid regional lymphadenopathy or distant metastatic disease.      Final Pathologic Diagnosis 1.  Left axilla, sentinel lymph node #1, excisional biopsy: 3 benign lymph   nodes, negative for metastatic carcinoma (0/3).          Confirmed by negative immunohistochemical staining with cytokeratins   AE1/AE3, CAM 5.2 and wide spectrum keratin with          satisfactory positive and negative controls.   2.  Left axilla, sentinel lymph node #2, excisional biopsy: 1 benign lymph   node, negative for metastatic carcinoma (0/1).          Confirmed by negative immunohistochemical staining with cytokeratins   AE1/AE3, CAM 5.2 and wide spectrum keratin with          satisfactory positive and negative controls.   3.  Left axilla, sentinel lymph node #3, excisional biopsy: 1 benign lymph   node, negative for metastatic carcinoma (0/1).          Confirmed by negative immunohistochemical staining with cytokeratins   AE1/AE3, CAM 5.2 and wide spectrum keratin with          satisfactory positive and negative controls.   4.  Left breast, mastectomy: Invasive ductal carcinoma, multifocal with two   (2) foci measuring as follows:  Lesion #1 (Blocks 4E-F, corresponding to   prior          biopsy site/clip): 12 mm (1.2 cm) and Lesion #2           (Block 4D)  8 mm (0.8 cm) in greatest dimensions.          Microcalcifications are present associated with invasive carcinoma.          Margins: Invasive carcinoma is present at closest approach within less   than 1 mm (within less than 0.1 cm) of the                  black/deep surgical margin (this is the smaller lesion #2   measuring 8 mm in total size).  Invasive carcinoma is                  greater than 10 mm (greater than 1 cm) from all other surgical   margins.                  Ductal carcinoma in situ is greater than 10 mm (greater than 1   cm) from all surgical margins.          Ductal carcinoma in situ, high nuclear grade with solid and   comedonecrosis patterns are present and associated with          invasive tumor.          Previous biopsy cavity site and clip are identified.          Incidental calcified fibroadenoma.          Unremarkable nipple and skin.          See synoptic report in comment section for further details.   5.  Excess skin left breast, excision: Unremarkable skin and soft tissue.          No evidence of malignancy.   Comment:  Synoptic report   Procedure:  Total mastectomy   Specimen laterality:  Left   Tumor focality:  Multifocal; 2 foci   Tumor size: Focus/Lesion #1:  Greatest dimension:  12 mm (1.2 cm)          Focus/Lesion #2:  Greatest dimension:  8 mm (0.8 cm)   Histologic type:  Invasive carcinoma of no special type (ductal).   Histologic grade: Glandular/tubular differentiation:  2          Nuclear pleomorphism:  3          Mitotic rate:  1          Overall grade:  Grade  2   Ductal carcinoma in situ:  Present Negative for extensive intraductal   component   Tumor extension:   Skin is present and uninvolved by tumor.          Nipple is present and uninvolved by tumor.          Skeletal muscle is not present for evaluation.   Margins:         - Invasive carcinoma is present at closest approach within   less than 1 mm (within less than 0.1 cm) of the                  " black/deep surgical margin (this is the smaller lesion #2   measuring 8 mm in total size).  Invasive carcinoma is                  greater than 10 mm (greater than 1 cm) from all other surgical   margins.                  - Ductal carcinoma in situ is greater than 10 mm (greater than   1 cm) from all surgical margins.   Regional lymph nodes:                             Number examined:  5                             Number involved:  0   Treatment effect and breast:  No known presurgical therapy   Pathologic stage classification:   TNM descriptors:  "m" (multiple foci of invasive carcinoma)   Primary tumor:        pT1c:  Tumor greater than 10 mm but less than or equal to 2 mm in   greatest dimension.   Regional lymph nodes:        (sn)pN0:  No regional lymph node metastasis   Distant metastasis:        pMX:  Cannot be assessed.     Ancillary studies:   Immunostaining for lesion #1, which is the larger lesion associated with clip   and biopsy site measuring 12 mm in greatest dimension (corresponding to the   lesion seen in blocks 4 E and F):   Immunohistochemical stains are completed on this patient's previous biopsy   from the left upper outer quadrant breast biopsy from August 2020, case   number [id number], with the following results:   "BREAST BIOMARKER RESULTS   Estrogen receptor (ER): Low Positive (10% weak)   Progesterone receptor (AZ): Negative (less than 1%)   HER2 IHC: Equivocal (Score 2+), HER2 FISH is pending and will be reported in a   supplemental   Ki-67 proliferation index: 50%   SUPPLEMENTAL (2):   HER2, Breast Tumor, FISH, Tissue   Result Summary:   Negative"   Immunohistochemical staining for lesion #2, which is the smaller calcified 8   mm lesion represented Block 4 D will be completed for hormonal markers and   results will follow in supplemental report.     Tumor Blocks for possible Future Studies:  4E-F (Lesion #1); 4D (Lesion #2)           Assessment & Plan:  71 y/o female s/p left breast " mastectomy with sentinel lymph node biopsy      Pathology reviewed and tumor board recommendations discussed with patient  Incision with dehiscence/breakdown - daily wet to dry wound care. Discussed treatment with wound vac. Will schedule for washout/debridement with wound vac placement in OR. Discussed long term healing and treatment for this.  Keep oncology appts  Follow-up in 1 week  Previously discussed port placement with patient will possibly place at same time remains with no signs of infection  Port placement  Risks and benefits discussed with patient including: Pain, bleeding, infection, injury to vessels, pneumothorax, need for further procedure

## 2020-10-13 NOTE — PROGRESS NOTES
Pt was initially scheduled for a VCE for 10/21 at 0700 at [facility]. Pt has since been added to surgical schedule at [facility] for the same date. Attempted to contact the daughter in order to reschedule the VCE. Left message to call back.

## 2020-10-14 ENCOUNTER — OFFICE VISIT (OUTPATIENT)
Dept: SURGERY | Facility: CLINIC | Age: 70
End: 2020-10-14
Payer: MEDICARE

## 2020-10-14 ENCOUNTER — OFFICE VISIT (OUTPATIENT)
Dept: HEMATOLOGY/ONCOLOGY | Facility: CLINIC | Age: 70
End: 2020-10-14
Payer: MEDICARE

## 2020-10-14 VITALS
BODY MASS INDEX: 31.03 KG/M2 | WEIGHT: 168.63 LBS | HEIGHT: 62 IN | HEART RATE: 105 BPM | TEMPERATURE: 98 F | SYSTOLIC BLOOD PRESSURE: 152 MMHG | DIASTOLIC BLOOD PRESSURE: 78 MMHG | OXYGEN SATURATION: 95 %

## 2020-10-14 VITALS
BODY MASS INDEX: 30.91 KG/M2 | SYSTOLIC BLOOD PRESSURE: 136 MMHG | HEIGHT: 62 IN | HEART RATE: 99 BPM | DIASTOLIC BLOOD PRESSURE: 73 MMHG | WEIGHT: 168 LBS | TEMPERATURE: 98 F

## 2020-10-14 DIAGNOSIS — T81.30XA WOUND DEHISCENCE: Primary | ICD-10-CM

## 2020-10-14 DIAGNOSIS — D05.12 DUCTAL CARCINOMA IN SITU (DCIS) OF LEFT BREAST: ICD-10-CM

## 2020-10-14 DIAGNOSIS — Z17.0 MALIGNANT NEOPLASM OF LOWER-INNER QUADRANT OF LEFT BREAST IN FEMALE, ESTROGEN RECEPTOR POSITIVE: Primary | ICD-10-CM

## 2020-10-14 DIAGNOSIS — C50.312 MALIGNANT NEOPLASM OF LOWER-INNER QUADRANT OF LEFT BREAST IN FEMALE, ESTROGEN RECEPTOR POSITIVE: Primary | ICD-10-CM

## 2020-10-14 PROBLEM — C50.919 BREAST CANCER IN FEMALE: Status: RESOLVED | Noted: 2020-09-08 | Resolved: 2020-10-14

## 2020-10-14 PROCEDURE — 1159F MED LIST DOCD IN RCRD: CPT | Mod: S$GLB,,, | Performed by: INTERNAL MEDICINE

## 2020-10-14 PROCEDURE — 99999 PR PBB SHADOW E&M-EST. PATIENT-LVL IV: ICD-10-PCS | Mod: PBBFAC,,, | Performed by: INTERNAL MEDICINE

## 2020-10-14 PROCEDURE — 99024 POSTOP FOLLOW-UP VISIT: CPT | Mod: S$GLB,,, | Performed by: SURGERY

## 2020-10-14 PROCEDURE — 99999 PR PBB SHADOW E&M-EST. PATIENT-LVL IV: ICD-10-PCS | Mod: PBBFAC,,, | Performed by: SURGERY

## 2020-10-14 PROCEDURE — 99215 PR OFFICE/OUTPT VISIT, EST, LEVL V, 40-54 MIN: ICD-10-PCS | Mod: 25,S$GLB,, | Performed by: INTERNAL MEDICINE

## 2020-10-14 PROCEDURE — 99024 PR POST-OP FOLLOW-UP VISIT: ICD-10-PCS | Mod: S$GLB,,, | Performed by: SURGERY

## 2020-10-14 PROCEDURE — 1101F PR PT FALLS ASSESS DOC 0-1 FALLS W/OUT INJ PAST YR: ICD-10-PCS | Mod: CPTII,S$GLB,, | Performed by: INTERNAL MEDICINE

## 2020-10-14 PROCEDURE — 3077F PR MOST RECENT SYSTOLIC BLOOD PRESSURE >= 140 MM HG: ICD-10-PCS | Mod: CPTII,S$GLB,, | Performed by: INTERNAL MEDICINE

## 2020-10-14 PROCEDURE — 99215 OFFICE O/P EST HI 40 MIN: CPT | Mod: 25,S$GLB,, | Performed by: INTERNAL MEDICINE

## 2020-10-14 PROCEDURE — 3077F SYST BP >= 140 MM HG: CPT | Mod: CPTII,S$GLB,, | Performed by: INTERNAL MEDICINE

## 2020-10-14 PROCEDURE — 1126F AMNT PAIN NOTED NONE PRSNT: CPT | Mod: S$GLB,,, | Performed by: INTERNAL MEDICINE

## 2020-10-14 PROCEDURE — 3078F PR MOST RECENT DIASTOLIC BLOOD PRESSURE < 80 MM HG: ICD-10-PCS | Mod: CPTII,S$GLB,, | Performed by: INTERNAL MEDICINE

## 2020-10-14 PROCEDURE — 1101F PT FALLS ASSESS-DOCD LE1/YR: CPT | Mod: CPTII,S$GLB,, | Performed by: INTERNAL MEDICINE

## 2020-10-14 PROCEDURE — 99999 PR PBB SHADOW E&M-EST. PATIENT-LVL IV: CPT | Mod: PBBFAC,,, | Performed by: INTERNAL MEDICINE

## 2020-10-14 PROCEDURE — 3008F PR BODY MASS INDEX (BMI) DOCUMENTED: ICD-10-PCS | Mod: CPTII,S$GLB,, | Performed by: INTERNAL MEDICINE

## 2020-10-14 PROCEDURE — 1159F PR MEDICATION LIST DOCUMENTED IN MEDICAL RECORD: ICD-10-PCS | Mod: S$GLB,,, | Performed by: INTERNAL MEDICINE

## 2020-10-14 PROCEDURE — 1126F PR PAIN SEVERITY QUANTIFIED, NO PAIN PRESENT: ICD-10-PCS | Mod: S$GLB,,, | Performed by: INTERNAL MEDICINE

## 2020-10-14 PROCEDURE — 3078F DIAST BP <80 MM HG: CPT | Mod: CPTII,S$GLB,, | Performed by: INTERNAL MEDICINE

## 2020-10-14 PROCEDURE — 99999 PR PBB SHADOW E&M-EST. PATIENT-LVL IV: CPT | Mod: PBBFAC,,, | Performed by: SURGERY

## 2020-10-14 PROCEDURE — 3008F BODY MASS INDEX DOCD: CPT | Mod: CPTII,S$GLB,, | Performed by: INTERNAL MEDICINE

## 2020-10-14 NOTE — PROGRESS NOTES
Subjective:       Patient ID: Leia Rodriguez is a 70 y.o. female.    Chief Complaint: Results and Breast Cancer    HPI 70-year-old female status post left mastectomy with axillary dissection.  Previous history of intraductal breast carcinoma patient has 2 separate lesions in her left breast.  One ER positive the other HER2 positive.  Patient has nonhealing wound on that side     Past Medical History:   Diagnosis Date    Arthritis     EDGAR HANDS, KNEES    Blind right eye     Traumatic    Breast cancer 06/15/2017    0.8 cm Grade1 INTRADUCTAL BREAST 9 positve margin (left)    Essential hypertension     Hemorrhoids     Lipoma of abdominal wall     Obesity     Overactive bladder     Pap smear abnormality of cervix with ASCUS favoring benign     Thyroid nodule     Tobacco use disorder     Tubular adenoma of colon     Urinary incontinence      Family History   Problem Relation Age of Onset    Hypertension Father     Cataracts Father     Prostate cancer Father 80    Cervical cancer Daughter 32    Allergic rhinitis Daughter     Cirrhosis Mother     Alcohol abuse Mother     Hypertension Daughter     Diabetes Brother     Heart attack Brother     Heart murmur Brother     No Known Problems Daughter      Social History     Socioeconomic History    Marital status:      Spouse name: Not on file    Number of children: Not on file    Years of education: Not on file    Highest education level: Not on file   Occupational History    Not on file   Social Needs    Financial resource strain: Not on file    Food insecurity     Worry: Not on file     Inability: Not on file    Transportation needs     Medical: Not on file     Non-medical: Not on file   Tobacco Use    Smoking status: Former Smoker     Packs/day: 1.00     Years: 50.00     Pack years: 50.00     Types: Cigarettes     Quit date: 2020     Years since quittin.1    Smokeless tobacco: Never Used   Substance and Sexual Activity     Alcohol use: Never     Alcohol/week: 28.0 standard drinks     Types: 28 Cans of beer per week     Frequency: 4 or more times a week     Drinks per session: 3 or 4     Binge frequency: Less than monthly    Drug use: No    Sexual activity: Never     Partners: Male   Lifestyle    Physical activity     Days per week: Not on file     Minutes per session: Not on file    Stress: Not on file   Relationships    Social connections     Talks on phone: Not on file     Gets together: Not on file     Attends Mandaen service: Not on file     Active member of club or organization: Not on file     Attends meetings of clubs or organizations: Not on file     Relationship status: Not on file   Other Topics Concern    Not on file   Social History Narrative    The patient is .  She is retired from [employer].     Past Surgical History:   Procedure Laterality Date    ANTERIOR VAGINAL REPAIR      BLADDER SURGERY      BREAST LUMPECTOMY Left     CATARACT EXTRACTION Right      SECTION      X2    COLONOSCOPY N/A 3/8/2017    Procedure: COLONOSCOPY;  Surgeon: Tyron Paris MD;  Location: [facility];  Service: Endoscopy;  Laterality: N/A;    COLONOSCOPY N/A 2020    Procedure: COLONOSCOPY;  Surgeon: Keira Ellison MD;  Location: [facility];  Service: Endoscopy;  Laterality: N/A;    ESOPHAGOGASTRODUODENOSCOPY N/A 2020    Procedure: EGD (ESOPHAGOGASTRODUODENOSCOPY);  Surgeon: Keira Ellison MD;  Location: [facility];  Service: Endoscopy;  Laterality: N/A;  new onset iron deficiency with prior history of breast cancer    SENTINEL LYMPH NODE BIOPSY Left 2020    Procedure: BIOPSY, LYMPH NODE, SENTINEL;  Surgeon: Vincent Moyer MD;  Location: [facility] OR;  Service: General;  Laterality: Left;    SIMPLE MASTECTOMY Left 2020    Procedure: MASTECTOMY, SIMPLE;  Surgeon: Vincent Moyer MD;  Location: [facility] OR;  Service: General;  Laterality: Left;    THYROID LOBECTOMY Left     TOTAL  ABDOMINAL HYSTERECTOMY      TUBAL LIGATION         Labs:  Lab Results   Component Value Date    WBC 6.14 09/02/2020    HGB 10.5 (L) 09/02/2020    HCT 34.0 (L) 09/02/2020    MCV 92 09/02/2020     09/02/2020     BMP  Lab Results   Component Value Date     09/02/2020    K 3.5 09/02/2020    CL 97 09/02/2020    CO2 30 (H) 09/02/2020    BUN 7 (L) 09/02/2020    CREATININE 1.0 09/02/2020    CALCIUM 9.9 09/02/2020    ANIONGAP 11 09/02/2020    ESTGFRAFRICA >60.0 09/02/2020    EGFRNONAA 57.6 (A) 09/02/2020     Lab Results   Component Value Date    ALT 10 09/02/2020    AST 14 09/02/2020    ALKPHOS 95 09/02/2020    BILITOT 0.6 09/02/2020       Lab Results   Component Value Date    IRON 37 07/14/2020    TIBC 295 07/14/2020    FERRITIN 519 (H) 07/14/2020     No results found for: [id number]  No results found for: FOLATE  Lab Results   Component Value Date    TSH 1.636 09/03/2019         Review of Systems   Constitutional: Negative for activity change, appetite change, chills, diaphoresis, fatigue, fever and unexpected weight change.   HENT: Negative for congestion, dental problem, drooling, ear discharge, ear pain, facial swelling, hearing loss, mouth sores, nosebleeds, postnasal drip, rhinorrhea, sinus pressure, sneezing, sore throat, tinnitus, trouble swallowing and voice change.    Eyes: Negative for photophobia, pain, discharge, redness, itching and visual disturbance.   Respiratory: Negative for cough, choking, chest tightness, shortness of breath, wheezing and stridor.    Cardiovascular: Negative for chest pain, palpitations and leg swelling.   Gastrointestinal: Negative for abdominal distention, abdominal pain, anal bleeding, blood in stool, constipation, diarrhea, nausea, rectal pain and vomiting.   Endocrine: Negative for cold intolerance, heat intolerance, polydipsia, polyphagia and polyuria.   Genitourinary: Negative for decreased urine volume, difficulty urinating, dyspareunia, dysuria, enuresis, flank  pain, frequency, genital sores, hematuria, menstrual problem, pelvic pain, urgency, vaginal bleeding, vaginal discharge and vaginal pain.   Musculoskeletal: Negative for arthralgias, back pain, gait problem, joint swelling, myalgias, neck pain and neck stiffness.   Skin: Negative for color change, pallor and rash.   Allergic/Immunologic: Negative for environmental allergies, food allergies and immunocompromised state.   Neurological: Negative for dizziness, tremors, seizures, syncope, facial asymmetry, speech difficulty, weakness, light-headedness, numbness and headaches.   Hematological: Negative for adenopathy. Does not bruise/bleed easily.   Psychiatric/Behavioral: Positive for dysphoric mood. Negative for agitation, behavioral problems, confusion, decreased concentration, hallucinations, self-injury, sleep disturbance and suicidal ideas. The patient is nervous/anxious. The patient is not hyperactive.        Objective:      Physical Exam  Vitals signs reviewed.   Constitutional:       General: She is not in acute distress.     Appearance: She is well-developed. She is not diaphoretic.   HENT:      Head: Normocephalic and atraumatic.      Right Ear: External ear normal.      Left Ear: External ear normal.      Nose: Nose normal.      Right Sinus: No maxillary sinus tenderness or frontal sinus tenderness.      Left Sinus: No maxillary sinus tenderness or frontal sinus tenderness.      Mouth/Throat:      Pharynx: No oropharyngeal exudate.   Eyes:      General: Lids are normal. No scleral icterus.        Right eye: No discharge.         Left eye: No discharge.      Conjunctiva/sclera: Conjunctivae normal.      Right eye: Right conjunctiva is not injected. No hemorrhage.     Left eye: Left conjunctiva is not injected. No hemorrhage.     Pupils: Pupils are equal, round, and reactive to light.   Neck:      Musculoskeletal: Normal range of motion and neck supple.      Thyroid: No thyromegaly.      Vascular: No JVD.       Trachea: No tracheal deviation.   Cardiovascular:      Rate and Rhythm: Normal rate.   Pulmonary:      Effort: Pulmonary effort is normal. No respiratory distress.      Breath sounds: No stridor.   Chest:      Chest wall: No tenderness.       Abdominal:      General: Bowel sounds are normal. There is no distension.      Palpations: Abdomen is soft. There is no hepatomegaly, splenomegaly or mass.      Tenderness: There is no abdominal tenderness. There is no rebound.   Musculoskeletal: Normal range of motion.         General: No tenderness.   Lymphadenopathy:      Cervical: No cervical adenopathy.      Upper Body:      Right upper body: No supraclavicular adenopathy.      Left upper body: No supraclavicular adenopathy.   Skin:     General: Skin is dry.      Findings: No erythema or rash.   Neurological:      Mental Status: She is alert and oriented to person, place, and time.      Cranial Nerves: No cranial nerve deficit.      Coordination: Coordination normal.   Psychiatric:         Behavior: Behavior normal.         Thought Content: Thought content normal.         Judgment: Judgment normal.             Assessment:      1. Malignant neoplasm of lower-inner quadrant of left breast in female, estrogen receptor positive    2. Ductal carcinoma in situ (DCIS) of left breast           Plan:     Extensive conversation with patient forms for FMLA for daughter caring for her sent to nurse practitioner for review and for documentation.  In addition spoken to general surgery this morning.  Plans for consideration of wound VAC placement I am not able to do any treatment at the present time we had presented the patient at tumor conference where we consider the use of short course platinum and Herceptin.  Followed by hormone therapy.  But will have to hold treatment at present time until wound is cleared would not recommend MediPort be placed until wound is clear discussed implications with her and her family well as surgery 40  min face-to-face time coordination of care greater 50% time face-to-face with patient        Mario Fernández Jr, MD FACP

## 2020-10-15 ENCOUNTER — DOCUMENTATION ONLY (OUTPATIENT)
Dept: HEMATOLOGY/ONCOLOGY | Facility: CLINIC | Age: 70
End: 2020-10-15

## 2020-10-15 NOTE — PROGRESS NOTES
Khris faxed home health orders to [facility] for patient on today. Patient prefers [facility]. Khris faxed clinicals and necessity form to [employer] at [phone]. No further needs at this time.

## 2020-10-15 NOTE — PROGRESS NOTES
Sw received call from [facility] and they declined patient's referral. [facility] does not have People contract at this time. Sw will let [facility] [employer] connect patient with a home health company.

## 2020-10-19 ENCOUNTER — DOCUMENTATION ONLY (OUTPATIENT)
Dept: HEMATOLOGY/ONCOLOGY | Facility: CLINIC | Age: 70
End: 2020-10-19

## 2020-10-19 PROCEDURE — G0180 MD CERTIFICATION HHA PATIENT: HCPCS | Mod: ,,, | Performed by: SURGERY

## 2020-10-19 PROCEDURE — G0180 PR HOME HEALTH MD CERTIFICATION: ICD-10-PCS | Mod: ,,, | Performed by: SURGERY

## 2020-10-19 RX ORDER — CEFDINIR 300 MG/1
300 CAPSULE ORAL EVERY 12 HOURS
COMMUNITY
Start: 2020-09-30 | End: 2020-11-22

## 2020-10-19 NOTE — PROGRESS NOTES
Patient ID: Leia Rodriguez is a 70 y.o. female.    Chief Complaint: Post-op Evaluation (wound check)      HPI:  70-year-old female s/p left mastectomy with sentinel lymph node biopsy 9/8/20 presents for follow up. She has been doing wet to dry dressing changes. She will need port once wound healed for adjuvant therapy.     presents to discuss surgery. In the interim she has undergone PET scan which was negative for metastatic disease. Genetic testing pending    Initially referred for recurrent left breast cancer.  She recently underwent biopsy showing invasive ductal carcinoma ER+/PA- Her 2 -.  She previously underwent a left breast lumpectomy with adjuvant radiation in 2017.      For Seema Negro:  Breast cancer follow-up - last visit was 5/29/19    Pt has a history of Stage 0 DCIS of Lt. breast.  She presented in March of 2017 when diagnostic MMG confirmed the presence of a round mass with indistinct margins in the Lt. breast at the 7 o'clock position. The mass measured 9 x 5 x 6 mm. The patient was referred to Dr. Duke and on 6/15/17 underwent wire localization lumpectomy. Pathology revealed an 8 mm focus of ductal carcinoma in situ. The tumor was low grade. There was tumor present at the anterior inferior margin. Ninety percent of the tumor cells were ER positive, 5- 10% of the tumor cells were PA positive. The patient was referred for adjuvant radiotherapy. Completed a course of partial breast irradiation to the LIQ of the Lt. breast on 8/14/17.  Currently on hormonal therapy with Arimidex that started 8/2017.  Due off 8/2022    DEXA- 6/29/18- wnl- f/u in 2 years due to AI- 6/2020 due    Risk factors identified:     Menarche at 9 y/o  G 3 P 3  First pregnancy at 16 y/o  LMP: 50's  Estrogen: none  Radiation to the neck or chest wall - has had left breast radiation  Prior breast biopsies or atypical hyperplasia- left breast lumpectomy     FH: no breast cancer, daughter cervical cancer 30's, father prostate  cancer 70's-     Body mass index is 30.73 kg/m².    Review of Systems   Constitutional: Negative.  Negative for activity change and unexpected weight change.   HENT: Negative.  Negative for hearing loss, rhinorrhea and trouble swallowing.    Eyes: Negative.  Negative for discharge and visual disturbance.   Respiratory: Negative.  Negative for chest tightness and wheezing.    Cardiovascular: Negative.  Negative for chest pain and palpitations.   Gastrointestinal: Negative.  Negative for blood in stool, constipation, diarrhea and vomiting.        No reflux   Endocrine: Negative.  Negative for polydipsia and polyuria.   Genitourinary: Negative.  Negative for difficulty urinating, dysuria, hematuria and menstrual problem.   Musculoskeletal: Negative.  Negative for joint swelling and neck pain.   Skin: Negative.    Allergic/Immunologic: Negative.    Neurological: Negative.  Negative for headaches.   Hematological: Negative.  Negative for adenopathy.   Psychiatric/Behavioral: Negative.  Negative for confusion and dysphoric mood.   Breast: Pt denies any breast pain, nipple discharge, or palpable mass. No prior trauma or bruising. No breast surgeries or abnormalities.    Current Outpatient Medications   Medication Sig Dispense Refill    anastrozole (ARIMIDEX) 1 mg Tab TAKE 1 TABLET (1 MG TOTAL) BY MOUTH ONCE DAILY. 90 tablet 3    buPROPion (WELLBUTRIN SR) 150 MG TBSR 12 hr tablet Take 1 tablet (150 mg total) by mouth 2 (two) times daily. 60 tablet 2    calcium citrate (CALCITRATE) 200 mg (950 mg) tablet Take 1 tablet by mouth once daily.      ciprofloxacin HCl (CIPRO) 500 MG tablet Take 500 mg by mouth 2 (two) times daily.  0    ferrous sulfate (FEOSOL) 325 mg (65 mg iron) Tab tablet Take 65 mg by mouth once daily.      fluconazole (DIFLUCAN) 100 MG tablet       hydroCHLOROthiazide (MICROZIDE) 12.5 mg capsule TAKE 1 CAPSULE (12.5 MG TOTAL) BY MOUTH ONCE DAILY. FOR HIGH BLOOD PRESSURE 30 capsule 0     HYDROcodone-acetaminophen (NORCO) 5-325 mg per tablet Take 2 tablets by mouth every 4 (four) hours as needed. 30 tablet 0    iron, carbonyl, (ICAR) 15 mg/1.25 mL Susp Take by mouth.      meloxicam (MOBIC) 15 MG tablet TAKE 1 TABLET BY MOUTH EVERY DAY 30 tablet 0    metroNIDAZOLE (FLAGYL) 500 MG tablet Take 500 mg by mouth 3 (three) times daily.  0    nitrofurantoin, macrocrystal-monohydrate, (MACROBID) 100 MG capsule       tamsulosin (FLOMAX) 0.4 mg Cap Take 1 capsule by mouth once daily.      vitamin D 1000 units Tab Take 1,000 Units by mouth once daily.      cefdinir (OMNICEF) 300 MG capsule Take 300 mg by mouth every 12 (twelve) hours.      oxybutynin (DITROPAN) 5 MG Tab Take 1 tablet (5 mg total) by mouth 3 (three) times daily as needed. 90 tablet 11     No current facility-administered medications for this visit.      Facility-Administered Medications Ordered in Other Visits   Medication Dose Route Frequency Provider Last Rate Last Dose    lactated ringers infusion   Intravenous Continuous Keira Ellison MD 0 mL/hr at 20 1013         Review of patient's allergies indicates:  No Known Allergies    Past Medical History:   Diagnosis Date    Arthritis     EDGAR HANDS, KNEES    Blind right eye     Traumatic    Breast cancer 06/15/2017    0.8 cm Grade1 INTRADUCTAL BREAST 9 positve margin (left)    Essential hypertension     Hemorrhoids     Lipoma of abdominal wall     Obesity     Overactive bladder     Pap smear abnormality of cervix with ASCUS favoring benign     Thyroid nodule     Tobacco use disorder     Tubular adenoma of colon     Urinary incontinence        Past Surgical History:   Procedure Laterality Date    ANTERIOR VAGINAL REPAIR      BLADDER SURGERY      BREAST LUMPECTOMY Left 2017    CATARACT EXTRACTION Right      SECTION      X2    COLONOSCOPY N/A 3/8/2017    Procedure: COLONOSCOPY;  Surgeon: Tyron Paris MD;  Location: [facility];  Service: Endoscopy;   Laterality: N/A;    COLONOSCOPY N/A 2020    Procedure: COLONOSCOPY;  Surgeon: Keira Ellison MD;  Location: [facility] ENDO;  Service: Endoscopy;  Laterality: N/A;    ESOPHAGOGASTRODUODENOSCOPY N/A 2020    Procedure: EGD (ESOPHAGOGASTRODUODENOSCOPY);  Surgeon: Keira Ellison MD;  Location: [facility] ENDO;  Service: Endoscopy;  Laterality: N/A;  new onset iron deficiency with prior history of breast cancer    SENTINEL LYMPH NODE BIOPSY Left 2020    Procedure: BIOPSY, LYMPH NODE, SENTINEL;  Surgeon: Vincent Moyer MD;  Location: [facility] OR;  Service: General;  Laterality: Left;    SIMPLE MASTECTOMY Left 2020    Procedure: MASTECTOMY, SIMPLE;  Surgeon: Vincent Moyer MD;  Location: [facility] OR;  Service: General;  Laterality: Left;    THYROID LOBECTOMY Left     TOTAL ABDOMINAL HYSTERECTOMY      TUBAL LIGATION         Family History   Problem Relation Age of Onset    Hypertension Father     Cataracts Father     Prostate cancer Father 80    Cervical cancer Daughter 32    Allergic rhinitis Daughter     Cirrhosis Mother     Alcohol abuse Mother     Hypertension Daughter     Diabetes Brother     Heart attack Brother     Heart murmur Brother     No Known Problems Daughter        Social History     Socioeconomic History    Marital status:      Spouse name: Not on file    Number of children: Not on file    Years of education: Not on file    Highest education level: Not on file   Occupational History    Not on file   Social Needs    Financial resource strain: Not on file    Food insecurity     Worry: Not on file     Inability: Not on file    Transportation needs     Medical: Not on file     Non-medical: Not on file   Tobacco Use    Smoking status: Former Smoker     Packs/day: 1.00     Years: 50.00     Pack years: 50.00     Types: Cigarettes     Quit date: 2020     Years since quittin.1    Smokeless tobacco: Never Used   Substance and Sexual Activity    Alcohol use: Never      Alcohol/week: 28.0 standard drinks     Types: 28 Cans of beer per week     Frequency: 4 or more times a week     Drinks per session: 3 or 4     Binge frequency: Less than monthly    Drug use: No    Sexual activity: Never     Partners: Male   Lifestyle    Physical activity     Days per week: Not on file     Minutes per session: Not on file    Stress: Not on file   Relationships    Social connections     Talks on phone: Not on file     Gets together: Not on file     Attends [employer] service: Not on file     Active member of club or organization: Not on file     Attends meetings of clubs or organizations: Not on file     Relationship status: Not on file   Other Topics Concern    Not on file   Social History Narrative    The patient is .  She is retired from [employer].       Vitals:    10/14/20 1011   BP: 136/73   Pulse: 99   Temp: 97.8 °F (36.6 °C)       Physical Exam  Constitutional:       Appearance: She is well-developed.   HENT:      Head: Normocephalic and atraumatic.      Right Ear: External ear normal.      Left Ear: External ear normal.      Mouth/Throat:      Pharynx: No oropharyngeal exudate.   Eyes:      General: No scleral icterus.        Right eye: No discharge.         Left eye: No discharge.      Conjunctiva/sclera: Conjunctivae normal.      Pupils: Pupils are equal, round, and reactive to light.   Neck:      Musculoskeletal: Normal range of motion and neck supple.      Thyroid: No thyromegaly.   Cardiovascular:      Rate and Rhythm: Normal rate and regular rhythm.      Heart sounds: Normal heart sounds.   Pulmonary:      Effort: Pulmonary effort is normal.      Breath sounds: Normal breath sounds.   Chest:      Breasts:         Right: No inverted nipple, mass, nipple discharge, skin change or tenderness.         Left: No inverted nipple, mass, nipple discharge, skin change or tenderness.       Abdominal:      General: Bowel sounds are normal.      Palpations: Abdomen is soft.    Musculoskeletal: Normal range of motion.      Right shoulder: She exhibits no crepitus and normal strength.   Lymphadenopathy:      Head:      Right side of head: No submental, submandibular, tonsillar, preauricular, posterior auricular or occipital adenopathy.      Left side of head: No submental, submandibular, tonsillar, preauricular, posterior auricular or occipital adenopathy.      Cervical: No cervical adenopathy.      Right cervical: No superficial or posterior cervical adenopathy.     Left cervical: No superficial or posterior cervical adenopathy.      Upper Body:      Right upper body: No supraclavicular adenopathy.      Left upper body: No supraclavicular adenopathy.   Skin:     General: Skin is warm and dry.      Coloration: Skin is not pale.      Findings: No erythema or rash.   Neurological:      Mental Status: She is alert and oriented to person, place, and time.      Deep Tendon Reflexes: Reflexes are normal and symmetric.   Psychiatric:         Behavior: Behavior normal.         Thought Content: Thought content normal.         Judgment: Judgment normal.         Result:   Mammo Digital Diagnostic Bilat w/ Navjot  US Breast Left Limited     History:  Patient is 70 y.o. and is seen for a diagnostic mammogram.     Films Compared:  Compared to: 05/29/2019 Mammo Digital Diagnostic Bilat w/ Navjot and 05/16/2018 Mammo Digital Diagnostic Bilat with Tomosynthesis_CAD     Findings:  This procedure was performed using tomosynthesis. Computer-aided detection was utilized in the interpretation of this examination.  The breasts are heterogeneously dense, which may obscure small masses.      Left  Mammo Digital Diagnostic Bilat w/ Navjot  There is an 11 mm oval mass with spiculated margins seen in the upper outer quadrant of the left breast in the posterior depth.      US Breast Left Limited  There is an irregularly shaped, non-parallel, hypoechoic mass with microlobulated margins with shadowing seen in the left breast  at 2 o'clock, 10 cm from the nipple.      No suspicious left axillary adenopathy demonstrated.      Right     Mammo Digital Diagnostic Bilat w/ Navjot  There is no evidence of suspicious masses, calcifications, or other abnormal findings in the right breast.     Impression:  Left  Mass: Left breast 11 mm mass at the upper outer posterior position. Assessment: 5 - Highly suggestive of malignancy. Biopsy is recommended.      Right  There is no mammographic or sonographic evidence of malignancy in the right breast.     BI-RADS Category:   Overall: 5 - Highly Suggestive of Malignancy     Recommendation:  Ultrasound guided Biopsy is recommended.     PET/CT:  Impression:  New diagnosis of left breast carcinoma.  No evidence of FDG avid regional lymphadenopathy or distant metastatic disease.      Final Pathologic Diagnosis 1.  Left axilla, sentinel lymph node #1, excisional biopsy: 3 benign lymph   nodes, negative for metastatic carcinoma (0/3).          Confirmed by negative immunohistochemical staining with cytokeratins   AE1/AE3, CAM 5.2 and wide spectrum keratin with          satisfactory positive and negative controls.   2.  Left axilla, sentinel lymph node #2, excisional biopsy: 1 benign lymph   node, negative for metastatic carcinoma (0/1).          Confirmed by negative immunohistochemical staining with cytokeratins   AE1/AE3, CAM 5.2 and wide spectrum keratin with          satisfactory positive and negative controls.   3.  Left axilla, sentinel lymph node #3, excisional biopsy: 1 benign lymph   node, negative for metastatic carcinoma (0/1).          Confirmed by negative immunohistochemical staining with cytokeratins   AE1/AE3, CAM 5.2 and wide spectrum keratin with          satisfactory positive and negative controls.   4.  Left breast, mastectomy: Invasive ductal carcinoma, multifocal with two   (2) foci measuring as follows:  Lesion #1 (Blocks 4E-F, corresponding to   prior          biopsy site/clip): 12 mm (1.2  cm) and Lesion #2          (Block 4D)  8 mm (0.8 cm) in greatest dimensions.          Microcalcifications are present associated with invasive carcinoma.          Margins: Invasive carcinoma is present at closest approach within less   than 1 mm (within less than 0.1 cm) of the                  black/deep surgical margin (this is the smaller lesion #2   measuring 8 mm in total size).  Invasive carcinoma is                  greater than 10 mm (greater than 1 cm) from all other surgical   margins.                  Ductal carcinoma in situ is greater than 10 mm (greater than 1   cm) from all surgical margins.          Ductal carcinoma in situ, high nuclear grade with solid and   comedonecrosis patterns are present and associated with          invasive tumor.          Previous biopsy cavity site and clip are identified.          Incidental calcified fibroadenoma.          Unremarkable nipple and skin.          See synoptic report in comment section for further details.   5.  Excess skin left breast, excision: Unremarkable skin and soft tissue.          No evidence of malignancy.   Comment:  Synoptic report   Procedure:  Total mastectomy   Specimen laterality:  Left   Tumor focality:  Multifocal; 2 foci   Tumor size: Focus/Lesion #1:  Greatest dimension:  12 mm (1.2 cm)          Focus/Lesion #2:  Greatest dimension:  8 mm (0.8 cm)   Histologic type:  Invasive carcinoma of no special type (ductal).   Histologic grade: Glandular/tubular differentiation:  2          Nuclear pleomorphism:  3          Mitotic rate:  1          Overall grade:  Grade  2   Ductal carcinoma in situ:  Present Negative for extensive intraductal   component   Tumor extension:   Skin is present and uninvolved by tumor.          Nipple is present and uninvolved by tumor.          Skeletal muscle is not present for evaluation.   Margins:         - Invasive carcinoma is present at closest approach within   less than 1 mm (within less than 0.1 cm) of the  "                 black/deep surgical margin (this is the smaller lesion #2   measuring 8 mm in total size).  Invasive carcinoma is                  greater than 10 mm (greater than 1 cm) from all other surgical   margins.                  - Ductal carcinoma in situ is greater than 10 mm (greater than   1 cm) from all surgical margins.   Regional lymph nodes:                             Number examined:  5                             Number involved:  0   Treatment effect and breast:  No known presurgical therapy   Pathologic stage classification:   TNM descriptors:  "m" (multiple foci of invasive carcinoma)   Primary tumor:        pT1c:  Tumor greater than 10 mm but less than or equal to 2 mm in   greatest dimension.   Regional lymph nodes:        (sn)pN0:  No regional lymph node metastasis   Distant metastasis:        pMX:  Cannot be assessed.     Ancillary studies:   Immunostaining for lesion #1, which is the larger lesion associated with clip   and biopsy site measuring 12 mm in greatest dimension (corresponding to the   lesion seen in blocks 4 E and F):   Immunohistochemical stains are completed on this patient's previous biopsy   from the left upper outer quadrant breast biopsy from August 2020, case   number [id number], with the following results:   "BREAST BIOMARKER RESULTS   Estrogen receptor (ER): Low Positive (10% weak)   Progesterone receptor (NH): Negative (less than 1%)   HER2 IHC: Equivocal (Score 2+), HER2 FISH is pending and will be reported in a   supplemental   Ki-67 proliferation index: 50%   SUPPLEMENTAL (2):   HER2, Breast Tumor, FISH, Tissue   Result Summary:   Negative"   Immunohistochemical staining for lesion #2, which is the smaller calcified 8   mm lesion represented Block 4 D will be completed for hormonal markers and   results will follow in supplemental report.     Tumor Blocks for possible Future Studies:  4E-F (Lesion #1); 4D (Lesion #2)           Assessment & Plan:  71 y/o female " s/p left breast mastectomy with sentinel lymph node biopsy      Incision with dehiscence/breakdown - daily wet to dry wound care  Consult to home health, wound vac  Will tentatively schedule for wound vac placement in the OR however if can setup as outpatient will cancel surgery.  Awaiting healing prior to port placement, wound clean and healthy today        Port placement once chest wall wound healed  Risks and benefits discussed with patient including: Pain, bleeding, infection, injury to vessels, pneumothorax, need for further procedure

## 2020-10-19 NOTE — PROGRESS NOTES
Khris called [facility] to verify if [facility] sent referral for patient. Staff reports patient is current with them. Staff reports they admitted patient on today. Khris informed surgery staff. No further needs at this time.      bed rails

## 2020-10-21 RX ORDER — HYDROCHLOROTHIAZIDE 12.5 MG/1
12.5 CAPSULE ORAL DAILY
Qty: 30 CAPSULE | Refills: 0 | Status: SHIPPED | OUTPATIENT
Start: 2020-10-21 | End: 2020-11-19

## 2020-10-23 VITALS — BODY MASS INDEX: 30.36 KG/M2 | HEIGHT: 62 IN | WEIGHT: 165 LBS

## 2020-10-28 ENCOUNTER — HOSPITAL ENCOUNTER (OUTPATIENT)
Facility: HOSPITAL | Age: 70
Discharge: HOME | End: 2020-10-28
Attending: SURGERY
Payer: MEDICARE

## 2020-10-28 PROCEDURE — 91110 GI TRC IMG INTRAL ESOPH-ILE: CPT

## 2020-10-28 NOTE — PLAN OF CARE
PillCam swallowed w/o difficulty. Pt VU of all D/C instructions. PillCam visualized in pt's stomach. Discharged from facility w/o difficulties via wheelchair.

## 2020-10-28 NOTE — DISCHARGE INSTRUCTIONS
Capsule Endoscopy     The capsule is a tiny camera that takes pictures as it moves through the digestive tract.   Capsule endoscopy is a test done to take pictures of the digestive tract. It uses a capsule with a tiny camera in it. The capsule is swallowed like a pill. As the capsule travels through the digestive tract, it takes pictures. These pictures are sent to a recorder that is worn outside the body. The capsule passes out of the body through the stool in a few days. Capsule endoscopy is most often done to check for problems in the small bowel (intestine) that are hard to see with a standard endoscopy or colonoscopy. These problems include bleeding and tumors. The test can also help diagnose Crohns disease. This is a condition that causes inflammation, sores, and narrowing of the bowel.  Before the test  Tell your provider about any medicines you are taking. You may need to stop taking all or some of these before the test. This includes:  · All prescription medicines  · Over-the-counter medicines such as aspirin or ibuprofen  · Street drugs  · Herbs, vitamins, and other supplements  Also before the test:  · Tell your provider about any surgeries youve had before, that could cause the capsule to get stuck.  · Switch to a clear liquid diet 16 hours before the test.  · Dont eat anything starting from 12 hours before the test.  · You may be instructed to do a bowel cleansing or use a laxative. This may be needed to help clear your bowels before the test.  · Follow any other instructions from your provider.  During the test  The test is done in a providers office or hospital:  · Youll be asked to raise your shirt.  · Small, sticky, round patches are placed on the skin over your belly (abdomen). The patches have antennas that are attached to short wires (leads).  · The wires are then plugged into a data recorder. The recorder is attached to a belt worn around your waist.  · Once the recorder is confirmed to  be working, youll be given the capsule to swallow. (In rare cases, the pill may be placed in your small bowel with the help of an endoscope. This is a thin, flexible tube that can be inserted through your mouth and down into the digestive tract. Your doctor will tell you more about this, if needed.) The capsule works by sending pictures to the recorder as it moves through your stomach and small bowel.  · After you swallow the capsule, youre usually allowed to leave the facility. You can drink clear liquids after 2 hours, and you can eat food or take medicines after 4 hours. Be sure to follow any other instructions from your provider. For instance, you may be told not to do certain activities if they can affect your test results.  · You will be instructed when to return to your providers office or the hospital. All of the equipment is then removed.  · To prevent complications, do not schedule an MRI exam or go near the MRI device until the capsule has passed out of your body.  · The capsule is passed out of your body through your stool. If this does not happen within 3 days, let your healthcare provider know right away. Treatment may have to be done to remove the capsule. Your provider will tell you more about this, if needed.  After the test  Once the pictures are reviewed, your provider will go over the results with you. This is usually within a few days. If the pictures were blurry or unclear, the test may need to be done again.  Risks and complications  There is a small chance that the capsule will not pass out of your body. In that case, the capsule is most likely stuck in your bowels. Surgery or endoscopy will be needed to remove the capsule.   Date Last Reviewed: 7/1/2016 © 2000-2017 [employer]. [address] [place] [id number]. All rights reserved. This information is not intended as a substitute for professional medical care. Always follow your healthcare professional's  instructions.

## 2020-10-28 NOTE — OR NURSING
Patient arrived for Video Capsule Endoscopy. Two patient identifier verified. Patient verbalized adequate prep. Reviewed the procedure w/ the patient, provided instructions, and answered all questions. Patient verbalized understanding. Dietary restrictions explained. PiilCam swallowed at  0735.  Lot# [id number], Capsule ID# [id number], Exp: 11/27/2021. Pt instructed to return at 1535. No distress noted.

## 2020-11-03 ENCOUNTER — TELEPHONE (OUTPATIENT)
Dept: HEMATOLOGY/ONCOLOGY | Facility: CLINIC | Age: 70
End: 2020-11-03

## 2020-11-03 NOTE — TELEPHONE ENCOUNTER
----- Message from Tiffany Murphy LPN sent at 11/3/2020 12:01 PM CST -----  Regarding: FMLA  Good afternoon !                              I just spoke w this patient's daughter, she called stating that April filled out her FMLA paperwork incorrectly with the wrong dates.. Her daughter  Moe request a call back [phone] so she can address what she states she's needing April to complete and correct.    Thanks  Tiffany

## 2020-11-03 NOTE — TELEPHONE ENCOUNTER
Spoke with Ms. Rosa who states that her FMLA needed more information.  I marked what was needed and informed her that I would give to Ms. Lux to complete. She acknowledged, and states to call her when it's completed and she will come pick it up. I acknowledged. Call ended well.

## 2020-11-05 NOTE — [employer]OVATION PATIENT INSTRUCTIONS
Discharge Summary/Instructions after an Endoscopic Procedure  Patient Name: Leia Rodriguez  Patient MRN: [id number]  Patient YOB: 1950 Wednesday, October 28, 2020 Judy Franks MD  RESTRICTIONS:  During your procedure today, you received medications for sedation.  These   medications may affect your judgment, balance and coordination.  Therefore,   for 24 hours, you have the following restrictions:   - DO NOT drive a car, operate machinery, make legal/financial decisions,   sign important papers or drink alcohol.    ACTIVITY:  Today: no heavy lifting, straining or running due to procedural   sedation/anesthesia.  The following day: return to full activity including work.  DIET:  Eat and drink normally unless instructed otherwise.     TREATMENT FOR COMMON SIDE EFFECTS:  - Mild abdominal pain, nausea, belching, bloating or excessive gas:  rest,   eat lightly and use a heating pad.  - Sore Throat: treat with throat lozenges and/or gargle with warm salt   water.  - Because air was used during the procedure, expelling large amounts of air   from your rectum or belching is normal.  - If a bowel prep was taken, you may not have a bowel movement for 1-3 days.    This is normal.  SYMPTOMS TO WATCH FOR AND REPORT TO YOUR PHYSICIAN:  1. Abdominal pain or bloating, other than gas cramps.  2. Chest pain.  3. Back pain.  4. Signs of infection such as: chills or fever occurring within 24 hours   after the procedure.  5. Rectal bleeding, which would show as bright red, maroon, or black stools.   (A tablespoon of blood from the rectum is not serious, especially if   hemorrhoids are present.)  6. Vomiting.  7. Weakness or dizziness.  GO DIRECTLY TO THE NEAREST EMERGENCY ROOM IF YOU HAVE ANY OF THE FOLLOWING:      Difficulty breathing              Chills and/or fever over 101 F   Persistent vomiting and/or vomiting blood   Severe abdominal pain   Severe chest pain   Black, tarry stools   Bleeding- more than one  tablespoon   Any other symptom or condition that you feel may need urgent attention  Your doctor recommends these additional instructions:  If any biopsies were taken, your doctors clinic will contact you in 1 to 2   weeks with any results.  - Continue present medications.  For questions, problems or results please call your physician Judy Franks MD at Work:  [phone]  If you have any questions about the above instructions, call the GI   department at [phone] or call the endoscopy unit at [phone]   from 7am until 3 pm.  [facility] [place], EMERGENCY ROOM PHONE NUMBER:   [phone]  IF A COMPLICATION OR EMERGENCY SITUATION ARISES AND YOU ARE UNABLE TO REACH   YOUR PHYSICIAN - GO DIRECTLY TO THE EMERGENCY ROOM.  I have read or have had read to me these discharge instructions for my   procedure and have received a written copy.  I understand these   instructions and will follow-up with my physician if I have any questions.     __________________________________       _____________________________________  Nurse Signature                                          Patient/Designated   Responsible Party Signature  MD Judy Henderson MD  11/5/2020 8:26:17 AM  PROVATION

## 2020-11-09 NOTE — PROGRESS NOTES
AN staff: inform patient capsule study negative. Her normocytic anemia has been worked up extensively. Recommend repeat CBC with her PCP. No further GI work up is indicated at this time.

## 2020-11-10 ENCOUNTER — PATIENT MESSAGE (OUTPATIENT)
Dept: GASTROENTEROLOGY | Facility: CLINIC | Age: 70
End: 2020-11-10

## 2020-11-11 ENCOUNTER — OFFICE VISIT (OUTPATIENT)
Dept: HEMATOLOGY/ONCOLOGY | Facility: CLINIC | Age: 70
End: 2020-11-11
Payer: MEDICARE

## 2020-11-11 ENCOUNTER — OFFICE VISIT (OUTPATIENT)
Dept: FAMILY MEDICINE | Facility: CLINIC | Age: 70
End: 2020-11-11
Payer: MEDICARE

## 2020-11-11 VITALS
BODY MASS INDEX: 29.98 KG/M2 | TEMPERATURE: 99 F | WEIGHT: 162.94 LBS | OXYGEN SATURATION: 96 % | SYSTOLIC BLOOD PRESSURE: 132 MMHG | HEIGHT: 62 IN | HEART RATE: 105 BPM | DIASTOLIC BLOOD PRESSURE: 78 MMHG

## 2020-11-11 VITALS
TEMPERATURE: 98 F | OXYGEN SATURATION: 97 % | HEART RATE: 106 BPM | DIASTOLIC BLOOD PRESSURE: 86 MMHG | HEIGHT: 62 IN | BODY MASS INDEX: 30.07 KG/M2 | WEIGHT: 163.38 LBS | SYSTOLIC BLOOD PRESSURE: 152 MMHG

## 2020-11-11 DIAGNOSIS — Z79.899 ENCOUNTER FOR MONITORING CARDIOTOXIC DRUG THERAPY: ICD-10-CM

## 2020-11-11 DIAGNOSIS — Z87.440 HISTORY OF RECURRENT UTIS: ICD-10-CM

## 2020-11-11 DIAGNOSIS — C50.312 MALIGNANT NEOPLASM OF LOWER-INNER QUADRANT OF LEFT BREAST IN FEMALE, ESTROGEN RECEPTOR POSITIVE: Primary | ICD-10-CM

## 2020-11-11 DIAGNOSIS — Z17.0 MALIGNANT NEOPLASM OF LOWER-INNER QUADRANT OF LEFT BREAST IN FEMALE, ESTROGEN RECEPTOR POSITIVE: Primary | ICD-10-CM

## 2020-11-11 DIAGNOSIS — R41.89 COGNITIVE IMPAIRMENT: Primary | ICD-10-CM

## 2020-11-11 DIAGNOSIS — Z51.81 ENCOUNTER FOR MONITORING CARDIOTOXIC DRUG THERAPY: ICD-10-CM

## 2020-11-11 DIAGNOSIS — F17.200 TOBACCO USE DISORDER: Chronic | ICD-10-CM

## 2020-11-11 DIAGNOSIS — I10 ESSENTIAL HYPERTENSION: Chronic | ICD-10-CM

## 2020-11-11 PROCEDURE — 3079F PR MOST RECENT DIASTOLIC BLOOD PRESSURE 80-89 MM HG: ICD-10-PCS | Mod: CPTII,S$GLB,, | Performed by: INTERNAL MEDICINE

## 2020-11-11 PROCEDURE — 3075F SYST BP GE 130 - 139MM HG: CPT | Mod: CPTII,S$GLB,, | Performed by: FAMILY MEDICINE

## 2020-11-11 PROCEDURE — 90694 FLU VACCINE - QUADRIVALENT - ADJUVANTED: ICD-10-PCS | Mod: S$GLB,,, | Performed by: FAMILY MEDICINE

## 2020-11-11 PROCEDURE — 3288F PR FALLS RISK ASSESSMENT DOCUMENTED: ICD-10-PCS | Mod: CPTII,S$GLB,, | Performed by: INTERNAL MEDICINE

## 2020-11-11 PROCEDURE — 3008F PR BODY MASS INDEX (BMI) DOCUMENTED: ICD-10-PCS | Mod: CPTII,S$GLB,, | Performed by: FAMILY MEDICINE

## 2020-11-11 PROCEDURE — 1125F PR PAIN SEVERITY QUANTIFIED, PAIN PRESENT: ICD-10-PCS | Mod: S$GLB,,, | Performed by: FAMILY MEDICINE

## 2020-11-11 PROCEDURE — 3075F PR MOST RECENT SYSTOLIC BLOOD PRESS GE 130-139MM HG: ICD-10-PCS | Mod: CPTII,S$GLB,, | Performed by: FAMILY MEDICINE

## 2020-11-11 PROCEDURE — 99999 PR PBB SHADOW E&M-EST. PATIENT-LVL III: CPT | Mod: PBBFAC,,, | Performed by: FAMILY MEDICINE

## 2020-11-11 PROCEDURE — G0008 FLU VACCINE - QUADRIVALENT - ADJUVANTED: ICD-10-PCS | Mod: S$GLB,,, | Performed by: FAMILY MEDICINE

## 2020-11-11 PROCEDURE — 1125F AMNT PAIN NOTED PAIN PRSNT: CPT | Mod: S$GLB,,, | Performed by: FAMILY MEDICINE

## 2020-11-11 PROCEDURE — 99499 UNLISTED E&M SERVICE: CPT | Mod: S$GLB,,, | Performed by: FAMILY MEDICINE

## 2020-11-11 PROCEDURE — 3074F SYST BP LT 130 MM HG: CPT | Mod: CPTII,S$GLB,, | Performed by: INTERNAL MEDICINE

## 2020-11-11 PROCEDURE — 3078F PR MOST RECENT DIASTOLIC BLOOD PRESSURE < 80 MM HG: ICD-10-PCS | Mod: CPTII,S$GLB,, | Performed by: FAMILY MEDICINE

## 2020-11-11 PROCEDURE — 3288F PR FALLS RISK ASSESSMENT DOCUMENTED: ICD-10-PCS | Mod: CPTII,S$GLB,, | Performed by: FAMILY MEDICINE

## 2020-11-11 PROCEDURE — 3288F FALL RISK ASSESSMENT DOCD: CPT | Mod: CPTII,S$GLB,, | Performed by: INTERNAL MEDICINE

## 2020-11-11 PROCEDURE — G0008 ADMIN INFLUENZA VIRUS VAC: HCPCS | Mod: S$GLB,,, | Performed by: FAMILY MEDICINE

## 2020-11-11 PROCEDURE — 1125F PR PAIN SEVERITY QUANTIFIED, PAIN PRESENT: ICD-10-PCS | Mod: S$GLB,,, | Performed by: INTERNAL MEDICINE

## 2020-11-11 PROCEDURE — 1100F PR PT FALLS ASSESS DOC 2+ FALLS/FALL W/INJURY/YR: ICD-10-PCS | Mod: CPTII,S$GLB,, | Performed by: INTERNAL MEDICINE

## 2020-11-11 PROCEDURE — 3008F BODY MASS INDEX DOCD: CPT | Mod: CPTII,S$GLB,, | Performed by: INTERNAL MEDICINE

## 2020-11-11 PROCEDURE — 1159F MED LIST DOCD IN RCRD: CPT | Mod: S$GLB,,, | Performed by: FAMILY MEDICINE

## 2020-11-11 PROCEDURE — 3288F FALL RISK ASSESSMENT DOCD: CPT | Mod: CPTII,S$GLB,, | Performed by: FAMILY MEDICINE

## 2020-11-11 PROCEDURE — 99214 OFFICE O/P EST MOD 30 MIN: CPT | Mod: 25,S$GLB,, | Performed by: FAMILY MEDICINE

## 2020-11-11 PROCEDURE — 3079F DIAST BP 80-89 MM HG: CPT | Mod: CPTII,S$GLB,, | Performed by: INTERNAL MEDICINE

## 2020-11-11 PROCEDURE — 3008F PR BODY MASS INDEX (BMI) DOCUMENTED: ICD-10-PCS | Mod: CPTII,S$GLB,, | Performed by: INTERNAL MEDICINE

## 2020-11-11 PROCEDURE — 3008F BODY MASS INDEX DOCD: CPT | Mod: CPTII,S$GLB,, | Performed by: FAMILY MEDICINE

## 2020-11-11 PROCEDURE — 99999 PR PBB SHADOW E&M-EST. PATIENT-LVL IV: ICD-10-PCS | Mod: PBBFAC,,, | Performed by: INTERNAL MEDICINE

## 2020-11-11 PROCEDURE — 1125F AMNT PAIN NOTED PAIN PRSNT: CPT | Mod: S$GLB,,, | Performed by: INTERNAL MEDICINE

## 2020-11-11 PROCEDURE — 1159F MED LIST DOCD IN RCRD: CPT | Mod: S$GLB,,, | Performed by: INTERNAL MEDICINE

## 2020-11-11 PROCEDURE — 1101F PR PT FALLS ASSESS DOC 0-1 FALLS W/OUT INJ PAST YR: ICD-10-PCS | Mod: CPTII,S$GLB,, | Performed by: FAMILY MEDICINE

## 2020-11-11 PROCEDURE — 99215 OFFICE O/P EST HI 40 MIN: CPT | Mod: S$GLB,,, | Performed by: INTERNAL MEDICINE

## 2020-11-11 PROCEDURE — 3074F PR MOST RECENT SYSTOLIC BLOOD PRESSURE < 130 MM HG: ICD-10-PCS | Mod: CPTII,S$GLB,, | Performed by: INTERNAL MEDICINE

## 2020-11-11 PROCEDURE — 3078F DIAST BP <80 MM HG: CPT | Mod: CPTII,S$GLB,, | Performed by: FAMILY MEDICINE

## 2020-11-11 PROCEDURE — 99215 PR OFFICE/OUTPT VISIT, EST, LEVL V, 40-54 MIN: ICD-10-PCS | Mod: S$GLB,,, | Performed by: INTERNAL MEDICINE

## 2020-11-11 PROCEDURE — 1100F PTFALLS ASSESS-DOCD GE2>/YR: CPT | Mod: CPTII,S$GLB,, | Performed by: INTERNAL MEDICINE

## 2020-11-11 PROCEDURE — 1159F PR MEDICATION LIST DOCUMENTED IN MEDICAL RECORD: ICD-10-PCS | Mod: S$GLB,,, | Performed by: FAMILY MEDICINE

## 2020-11-11 PROCEDURE — 99499 RISK ADDL DX/OHS AUDIT: ICD-10-PCS | Mod: S$GLB,,, | Performed by: FAMILY MEDICINE

## 2020-11-11 PROCEDURE — 1101F PT FALLS ASSESS-DOCD LE1/YR: CPT | Mod: CPTII,S$GLB,, | Performed by: FAMILY MEDICINE

## 2020-11-11 PROCEDURE — 99999 PR PBB SHADOW E&M-EST. PATIENT-LVL IV: CPT | Mod: PBBFAC,,, | Performed by: INTERNAL MEDICINE

## 2020-11-11 PROCEDURE — 90694 VACC AIIV4 NO PRSRV 0.5ML IM: CPT | Mod: S$GLB,,, | Performed by: FAMILY MEDICINE

## 2020-11-11 PROCEDURE — 99999 PR PBB SHADOW E&M-EST. PATIENT-LVL III: ICD-10-PCS | Mod: PBBFAC,,, | Performed by: FAMILY MEDICINE

## 2020-11-11 PROCEDURE — 99214 PR OFFICE/OUTPT VISIT, EST, LEVL IV, 30-39 MIN: ICD-10-PCS | Mod: 25,S$GLB,, | Performed by: FAMILY MEDICINE

## 2020-11-11 PROCEDURE — 1159F PR MEDICATION LIST DOCUMENTED IN MEDICAL RECORD: ICD-10-PCS | Mod: S$GLB,,, | Performed by: INTERNAL MEDICINE

## 2020-11-11 RX ORDER — ANASTROZOLE 1 MG/1
1 TABLET ORAL DAILY
Qty: 90 TABLET | Refills: 3 | Status: SHIPPED | OUTPATIENT
Start: 2020-11-11 | End: 2021-10-07 | Stop reason: SDUPTHER

## 2020-11-11 NOTE — PLAN OF CARE
START ON PATHWAY REGIMEN - Breast    [id number]        Paclitaxel (Taxol)       Trastuzumab-xxxx       Trastuzumab-xxxx           Additional Orders: At completion of 12 weeks of paclitaxel/trastuzumab   therapy, proceed to q3week trastuzumab maintenance therapy x 13 doses.    Recommend LVEF monitoring at baseline, then q3-4 months.    **Always confirm dose/schedule in your pharmacy ordering system**        Trastuzumab-xxxx     **Always confirm dose/schedule in your pharmacy ordering system**    Patient Characteristics:  Postoperative without Neoadjuvant Therapy (Pathologic Staging), Invasive   Disease, Adjuvant Therapy, HER2 Positive, ER Positive, Node Negative, pT2, pN0,   Tumor Size ?  3 cm  Therapeutic Status: Postoperative without Neoadjuvant Therapy (Pathologic   Staging)  AJCC Grade: G2  AJCC N Category: pN0  AJCC M Category: cM0  ER Status: Positive (+)  AJCC 8 Stage Grouping: IB  HER2 Status: Positive (+)  Oncotype Dx Recurrence Score: Not Appropriate  AJCC T Category: pT2  TN Status: Negative (-)  Intent of Therapy:  Curative Intent, Not Discussed with Patient

## 2020-11-11 NOTE — PROGRESS NOTES
Subjective:       Patient ID: Leia Rodriguez is a 70 y.o. female.    Chief Complaint: Results and Breast Cancer    HPI 70-year-old female history of invasive breast carcinoma ER positive HER2 +3.  Patient has open wound on chest wall nonhealing 2 months status post primary surgery.  ECOG status 2    Past Medical History:   Diagnosis Date    Arthritis     EDGAR HANDS, KNEES    Blind right eye     Traumatic    Breast cancer 06/15/2017    0.8 cm Grade1 INTRADUCTAL BREAST 9 positve margin (left)    Essential hypertension     Hemorrhoids     Lipoma of abdominal wall     Obesity     Overactive bladder     Pap smear abnormality of cervix with ASCUS favoring benign     Thyroid nodule     Tobacco use disorder     Tubular adenoma of colon     Urinary incontinence      Family History   Problem Relation Age of Onset    Hypertension Father     Cataracts Father     Prostate cancer Father 80    Cervical cancer Daughter 32    Allergic rhinitis Daughter     Cirrhosis Mother     Alcohol abuse Mother     Hypertension Daughter     Diabetes Brother     Heart attack Brother     Heart murmur Brother     No Known Problems Daughter      Social History     Socioeconomic History    Marital status:      Spouse name: Not on file    Number of children: Not on file    Years of education: Not on file    Highest education level: Not on file   Occupational History    Not on file   Social Needs    Financial resource strain: Not on file    Food insecurity     Worry: Not on file     Inability: Not on file    Transportation needs     Medical: Not on file     Non-medical: Not on file   Tobacco Use    Smoking status: Former Smoker     Packs/day: 1.00     Years: 50.00     Pack years: 50.00     Types: Cigarettes     Quit date: 2020     Years since quittin.2    Smokeless tobacco: Never Used   Substance and Sexual Activity    Alcohol use: Never     Alcohol/week: 28.0 standard drinks     Types: 28 Cans of beer  per week     Frequency: 4 or more times a week     Drinks per session: 3 or 4     Binge frequency: Less than monthly    Drug use: No    Sexual activity: Never     Partners: Male   Lifestyle    Physical activity     Days per week: Not on file     Minutes per session: Not on file    Stress: Not on file   Relationships    Social connections     Talks on phone: Not on file     Gets together: Not on file     Attends Rastafarian service: Not on file     Active member of club or organization: Not on file     Attends meetings of clubs or organizations: Not on file     Relationship status: Not on file   Other Topics Concern    Not on file   Social History Narrative    The patient is .  She is retired from [employer].     Past Surgical History:   Procedure Laterality Date    ANTERIOR VAGINAL REPAIR      BLADDER SURGERY      BREAST LUMPECTOMY Left 2017    CATARACT EXTRACTION Right      SECTION      X2    COLONOSCOPY N/A 3/8/2017    Procedure: COLONOSCOPY;  Surgeon: Tyron Paris MD;  Location: [facility];  Service: Endoscopy;  Laterality: N/A;    COLONOSCOPY N/A 2020    Procedure: COLONOSCOPY;  Surgeon: Keira Ellison MD;  Location: [facility];  Service: Endoscopy;  Laterality: N/A;    ESOPHAGOGASTRODUODENOSCOPY N/A 2020    Procedure: EGD (ESOPHAGOGASTRODUODENOSCOPY);  Surgeon: Keira Ellison MD;  Location: [facility];  Service: Endoscopy;  Laterality: N/A;  new onset iron deficiency with prior history of breast cancer    INTRALUMINAL GASTROINTESTINAL TRACT IMAGING VIA CAPSULE N/A 10/28/2020    Procedure: IMAGING PROCEDURE, GI TRACT, INTRALUMINAL, VIA CAPSULE;  Surgeon: Finesse Jha RN;  Location[facility];  Service: Endoscopy;  Laterality: N/A;    SENTINEL LYMPH NODE BIOPSY Left 2020    Procedure: BIOPSY, LYMPH NODE, SENTINEL;  Surgeon: Vincent Moyer MD;  Location: [facility];  Service: General;  Laterality: Left;    SIMPLE MASTECTOMY Left 2020    Procedure:  MASTECTOMY, SIMPLE;  Surgeon: Vincent Moyer MD;  Location[facility];  Service: General;  Laterality: Left;    THYROID LOBECTOMY Left 2005    TOTAL ABDOMINAL HYSTERECTOMY      TUBAL LIGATION         Labs:  Lab Results   Component Value Date    WBC 6.14 09/02/2020    HGB 10.5 (L) 09/02/2020    HCT 34.0 (L) 09/02/2020    MCV 92 09/02/2020     09/02/2020     BMP  Lab Results   Component Value Date     09/02/2020    K 3.5 09/02/2020    CL 97 09/02/2020    CO2 30 (H) 09/02/2020    BUN 7 (L) 09/02/2020    CREATININE 1.0 09/02/2020    CALCIUM 9.9 09/02/2020    ANIONGAP 11 09/02/2020    ESTGFRAFRICA >60.0 09/02/2020    EGFRNONAA 57.6 (A) 09/02/2020     Lab Results   Component Value Date    ALT 10 09/02/2020    AST 14 09/02/2020    ALKPHOS 95 09/02/2020    BILITOT 0.6 09/02/2020       Lab Results   Component Value Date    IRON 37 07/14/2020    TIBC 295 07/14/2020    FERRITIN 519 (H) 07/14/2020     No results found for: [id number]  No results found for: FOLATE  Lab Results   Component Value Date    TSH 1.636 09/03/2019         Review of Systems   Constitutional: Positive for activity change and fatigue. Negative for appetite change, chills, diaphoresis, fever and unexpected weight change.   HENT: Negative for congestion, dental problem, drooling, ear discharge, ear pain, facial swelling, hearing loss, mouth sores, nosebleeds, postnasal drip, rhinorrhea, sinus pressure, sneezing, sore throat, tinnitus, trouble swallowing and voice change.    Eyes: Negative for photophobia, pain, discharge, redness, itching and visual disturbance.   Respiratory: Negative for cough, choking, chest tightness, shortness of breath, wheezing and stridor.    Cardiovascular: Negative for chest pain, palpitations and leg swelling.   Gastrointestinal: Negative for abdominal distention, abdominal pain, anal bleeding, blood in stool, constipation, diarrhea, nausea, rectal pain and vomiting.   Endocrine: Negative for cold intolerance, heat  intolerance, polydipsia, polyphagia and polyuria.   Genitourinary: Negative for decreased urine volume, difficulty urinating, dyspareunia, dysuria, enuresis, flank pain, frequency, genital sores, hematuria, menstrual problem, pelvic pain, urgency, vaginal bleeding, vaginal discharge and vaginal pain.   Musculoskeletal: Negative for arthralgias, back pain, gait problem, joint swelling, myalgias, neck pain and neck stiffness.   Skin: Positive for wound. Negative for color change, pallor and rash.   Allergic/Immunologic: Negative for environmental allergies, food allergies and immunocompromised state.   Neurological: Positive for weakness. Negative for dizziness, tremors, seizures, syncope, facial asymmetry, speech difficulty, light-headedness, numbness and headaches.   Hematological: Negative for adenopathy. Does not bruise/bleed easily.   Psychiatric/Behavioral: Positive for dysphoric mood. Negative for agitation, behavioral problems, confusion, decreased concentration, hallucinations, self-injury, sleep disturbance and suicidal ideas. The patient is nervous/anxious. The patient is not hyperactive.        Objective:      Physical Exam  Vitals signs reviewed.   Constitutional:       General: She is not in acute distress.     Appearance: She is well-developed. She is not diaphoretic.   HENT:      Head: Normocephalic and atraumatic.      Right Ear: External ear normal.      Left Ear: External ear normal.      Nose: Nose normal.      Right Sinus: No maxillary sinus tenderness or frontal sinus tenderness.      Left Sinus: No maxillary sinus tenderness or frontal sinus tenderness.      Mouth/Throat:      Pharynx: No oropharyngeal exudate.   Eyes:      General: Lids are normal. No scleral icterus.        Right eye: No discharge.         Left eye: No discharge.      Conjunctiva/sclera: Conjunctivae normal.      Right eye: Right conjunctiva is not injected. No hemorrhage.     Left eye: Left conjunctiva is not injected. No  hemorrhage.     Pupils: Pupils are equal, round, and reactive to light.   Neck:      Musculoskeletal: Normal range of motion and neck supple.      Thyroid: No thyromegaly.      Vascular: No JVD.      Trachea: No tracheal deviation.   Cardiovascular:      Rate and Rhythm: Normal rate.   Pulmonary:      Effort: Pulmonary effort is normal. No respiratory distress.      Breath sounds: No stridor.   Chest:      Chest wall: No tenderness.   Abdominal:      General: Bowel sounds are normal. There is no distension.      Palpations: Abdomen is soft. There is no hepatomegaly, splenomegaly or mass.      Tenderness: There is no abdominal tenderness. There is no rebound.   Musculoskeletal: Normal range of motion.         General: No tenderness.   Lymphadenopathy:      Cervical: No cervical adenopathy.      Upper Body:      Right upper body: No supraclavicular adenopathy.      Left upper body: No supraclavicular adenopathy.   Skin:     General: Skin is dry.      Findings: No erythema or rash.   Neurological:      Mental Status: She is alert and oriented to person, place, and time.      Cranial Nerves: No cranial nerve deficit.      Coordination: Coordination normal.   Psychiatric:         Behavior: Behavior normal.         Thought Content: Thought content normal.         Judgment: Judgment normal.             Assessment:      1. Malignant neoplasm of lower-inner quadrant of left breast in female, estrogen receptor positive    2. Encounter for monitoring cardiotoxic drug therapy           Plan:   Extensive conversation with patient at this point I do not feel that she will be able to receive systemic chemotherapy my recommendations are that we begin subcutaneous Herceptin.  In order to avoid placing a MediPort she has poor venous access this could be done every 3 weeks for year in additional will start the patient on aromatase inhibitor Arimidex 1 mg daily.  In the adjuvant setting for minimum of 5 years discussed indications with  her consent signed patient will also have a 2D echo as baseline and I will see the patient back I have asked the clinical pharmacist to establish a protocol for me in this setting information has been placed throughA Oncology of care greater than 50% time face-to-face with patient  40 min face-to-face time coordination          Mario Fernández Jr, MD FACP

## 2020-11-11 NOTE — PROGRESS NOTES
CHIEF COMPLAINT: This is a 70-year-old female here for follow-up of chronic medical conditions and for preventive health exam.     SUBJECTIVE: Patient is accompanied today by her daughter who provides most of the history.  She had left mastectomy for recurrent breast cancer in September 2020 and has nonhealing draining wound on chest since surgery. She had previous breast cancer in 2017 and underwent lumpectomy and radiation followed by Arimidex.  She continues to take Arimidex status mastectomy.  She was hospitalized for UTI and confusion September 2020.  During hospitalization, she had acute metabolic encephalopathy and acute kidney injury which resolved before discharge.  She completed a course of cefdinir 300 mg twice daily.  According to her daughter, she had had her first episode of confusion in November 2019 which she was admitted for UTI with sepsis.  She continues to have short-term memory loss.     Patient takes hydrochlorothiazide 12.5 mg daily for hypertension.  She does not monitor her blood pressure.  Her blood pressure today is 132/78.  She denies headache, vertigo or paresthesias. She takes meloxicam for osteoarthritis of hands.  Patient stopped smoking in August 2020.  She is taking Wellbutrin  mg once daily  for smoking cessation.  She takes Flomax 0.4 mg daily for urinary retention and recurrent UTIs.  Patient recently had GI workup for iron deficiency anemia.  EGD findings:  gastric AVM.  Adenomatous polyps were found on colonoscopy.  Video capsule endoscopy was negative.  Patient takes iron supplement daily.     Eye exam July 2015. Mammogram August 2020. Colonoscopy August 2020, due again in August 2023.  Bone DEXA scan June 2018, due again June 2023. Tdap March 2013.  Prevnar February 2017. Influenza vaccine,  January 2020.     ROS:  GENERAL: Patient denies fever, chills, night sweats. Patient denies weight loss. Patient denies anorexia, fatigue, or swollen glands.  Positive for  weakness.  SKIN: Patient denies rash or hair loss.  HEENT: Patient denies sore throat, ear pain, hearing loss, nasal congestion, or runny nose. Patient denies visual disturbance, eye irritation or discharge.  LUNGS: Patient denies cough, wheeze or hemoptysis.  CARDIOVASCULAR: Patient denies chest pain, shortness of breath, palpitations, syncope or lower extremity edema.  GI: Patient denies abdominal pain, nausea, vomiting, diarrhea, constipation, blood in stool or melena.  GENITOURINARY: Patient denies pelvic pain, vaginal discharge, itch or odor. Patient denies irregular vaginal bleeding. Patient denies dysuria, frequency, hematuria, or nocturia.  Positive for urinary retention.  BREASTS: Patient denies breast pain, mass or nipple discharge.  MUSCULOSKELETAL: Patient denies joint swelling, redness or warmth.  Positive for joint pain.  NEUROLOGIC: Patient denies headache, vertigo, paresthesias, weakness in limb, dysarthria, dysphagia or abnormality of gait.  PSYCHIATRIC: Patient denies anxiety or depression.  Positive for memory loss.     OBJECTIVE:   GENERAL: Well-developed well-nourished overweight black female alert and oriented x3, in no acute distress.  Mild cognitive impairment.  Weight loss of 5 pounds in the last year.    SKIN: Clear without rash. Normal color and tone.  HEENT: Eyes: Clear conjunctivae. Pupils equal reactive to light and accommodation. Ears: Clear TMs. Clear canals. Nose: Without congestion. Pharynx: Without injection  or exudates.  NECK: Supple, normal range of motion. No masses, lymphadenopathy or enlarged thyroid. No JVD. Carotids 2+ and equal. No bruits.  LUNGS: Clear to auscultation. Normal respiratory effort.  CARDIOVASCULAR: Regular rhythm, normal S1, S2 without murmur, gallop or rub.  BACK: No CVA or spinal tenderness.  BREASTS:  Deferred.  ABDOMEN:  Large lipoma right upper quadrant, nontender. Active bowel sounds. Soft, nontender without mass or organomegaly. No rebound or  guarding.  EXTREMITIES: Without cyanosis, clubbing or edema. Distal pulses 2+ and equal. Normal range of motion in all extremities. No joint effusion, erythema or warmth. Bilateral hammertoes. Calluses/hyperkeratotic areas plantar surface of both feet.  NEUROLOGIC: Cranial nerves II through XII without deficit. Motor strength equal bilaterally. Sensation normal to touch. Deep tendon reflexes 2+ and equal. Gait without abnormality. No tremor. Negative cerebellar signs.  PELVIC:  Deferred.    ASSESSMENT:  1. Cognitive impairment    2. History of recurrent UTIs    3. Essential hypertension    4. Tobacco use disorder        PLAN:  1.  Weight reduction. Exercise regularly.  2.  Age-appropriate counseling.  3.  Recent lab reviewed and acceptable except for iron deficiency anemia.  4.  Urinalysis  5.  Urine culture and sensitivity.  6.  Influenza vaccine.  7.  Follow-up annually.    This note is generated with speech recognition software and is subject to transcription error and sound alike phrases that may be missed by proofreading.

## 2020-11-17 ENCOUNTER — DOCUMENTATION ONLY (OUTPATIENT)
Dept: HEMATOLOGY/ONCOLOGY | Facility: CLINIC | Age: 70
End: 2020-11-17

## 2020-11-17 DIAGNOSIS — Z51.81 ENCOUNTER FOR MONITORING CARDIOTOXIC DRUG THERAPY: Primary | ICD-10-CM

## 2020-11-17 DIAGNOSIS — C50.312 MALIGNANT NEOPLASM OF LOWER-INNER QUADRANT OF LEFT BREAST IN FEMALE, ESTROGEN RECEPTOR POSITIVE: ICD-10-CM

## 2020-11-17 DIAGNOSIS — Z17.0 MALIGNANT NEOPLASM OF LOWER-INNER QUADRANT OF LEFT BREAST IN FEMALE, ESTROGEN RECEPTOR POSITIVE: ICD-10-CM

## 2020-11-17 DIAGNOSIS — Z79.899 ENCOUNTER FOR MONITORING CARDIOTOXIC DRUG THERAPY: Primary | ICD-10-CM

## 2020-11-17 NOTE — NURSING
Called pt about starting biotherapy treatment, no answer at home number and LVM on cell phone.   Oncology Navigation   Intake  Date of Diagnosis: 20  Cancer Type: Breast  Initial Nurse Navigator Contact: 08/10/20  Date Worked: 20     Treatment  Current Status: Active  Date Presented to Tumor Board: 20    Surgical Oncologist: Vincent Moyer    Medical Oncologist: Dr. Fernández  Consult Date: 20  Chemotherapy: Planned (biotherapy)  Chemotherapy Regimen: Herceptin (subcutaneously)          General Referrals: Surgical oncology    ER: Positive (low/weak positivity on bx, moderate positivity on surgical specimen)  NV: Negative  Her2: Postive (negative by FISH on biopsy, 3+ IHC positive on surgical specimen)       Support Systems: Spouse/significant other;Children  Concerns: resource needs/questions     Acuity  Systemic Treatment - predicted or initiated: Chemotherapy regimen with multiple drugs (+1)  Treatment Tolerability: Has not started treatment yet/treatment fully completed and side effects resolved  ECO  Comorbidities in Medical History: 2  Verbalizes the need for more education: 1  Navigation Acuity: 5     Follow Up  Follow up in about 1 day (around 2020).

## 2020-11-18 ENCOUNTER — TELEPHONE (OUTPATIENT)
Dept: SURGERY | Facility: CLINIC | Age: 70
End: 2020-11-18

## 2020-11-18 ENCOUNTER — LAB VISIT (OUTPATIENT)
Dept: LAB | Facility: HOSPITAL | Age: 70
End: 2020-11-18
Attending: FAMILY MEDICINE
Payer: MEDICARE

## 2020-11-18 ENCOUNTER — DOCUMENTATION ONLY (OUTPATIENT)
Dept: HEMATOLOGY/ONCOLOGY | Facility: CLINIC | Age: 70
End: 2020-11-18

## 2020-11-18 DIAGNOSIS — N39.0 ACUTE UTI: ICD-10-CM

## 2020-11-18 DIAGNOSIS — N39.0 ACUTE UTI: Primary | ICD-10-CM

## 2020-11-18 LAB
BACTERIA #/AREA URNS AUTO: ABNORMAL /HPF
BILIRUB UR QL STRIP: NEGATIVE
CLARITY UR REFRACT.AUTO: ABNORMAL
COLOR UR AUTO: ABNORMAL
GLUCOSE UR QL STRIP: NEGATIVE
HGB UR QL STRIP: ABNORMAL
HYALINE CASTS UR QL AUTO: 3 /LPF
KETONES UR QL STRIP: NEGATIVE
LEUKOCYTE ESTERASE UR QL STRIP: ABNORMAL
MICROSCOPIC COMMENT: ABNORMAL
NITRITE UR QL STRIP: POSITIVE
PH UR STRIP: 5 [PH] (ref 5–8)
PROT UR QL STRIP: ABNORMAL
RBC #/AREA URNS AUTO: 21 /HPF (ref 0–4)
SP GR UR STRIP: 1.01 (ref 1–1.03)
SQUAMOUS #/AREA URNS AUTO: 2 /HPF
URN SPEC COLLECT METH UR: ABNORMAL
WBC #/AREA URNS AUTO: >100 /HPF (ref 0–5)
WBC CLUMPS UR QL AUTO: ABNORMAL

## 2020-11-18 PROCEDURE — 87186 SC STD MICRODIL/AGAR DIL: CPT

## 2020-11-18 PROCEDURE — 87088 URINE BACTERIA CULTURE: CPT

## 2020-11-18 PROCEDURE — 87086 URINE CULTURE/COLONY COUNT: CPT

## 2020-11-18 PROCEDURE — 87077 CULTURE AEROBIC IDENTIFY: CPT

## 2020-11-18 PROCEDURE — 81001 URINALYSIS AUTO W/SCOPE: CPT

## 2020-11-18 NOTE — NURSING
"Received call from pt dtr Moe Figueroa about starting chemotherapy. Explained my role as nurse navigator and she has my direct contact information. Dtr states pt aware of Dr. Fernández ordering "shot" for pt. Explained the need for education, covid, labs and echo prior to receiving injections. Dtr states pt had echo 9/23. I told her I would ask Dr. Fernández if she needed a recent one. Dtr states she is off on Wednesdays so that would be the best day for pt appts including chemotherapy. Together we scheduled pt to have teaching Wed 11/25 with a nurse practitioner. We scheduled labs at [facility] one day prior (12/1) to doctor and injection appt which is on 12/2 per pt dtr request for chemo on Wed. Tried to schedule covid test on 11/28 and 11/29 at the [place] and at [place], but no appt times available so I told her we would do a rapid covid day of treatment. Notified referral center to process auth for insurance approval. Notified social work, pharmacist and  of new pt start date. Reviewed all appt dates, times and locations with dtr and she voiced understanding. She asked about a rtc appt with Dr. Moyer, I told her I would reach out to his staff about an appt. In basket msg sent. All questions answered, dtr voiced understanding information and knows to call with any questions/concerns.   Oncology Navigation   Intake  Date of Diagnosis: 09/08/20  Cancer Type: Breast  Initial Nurse Navigator Contact: 08/10/20  Date Worked: 11/17/20     Treatment  Current Status: Active  Date Presented to Tumor Board: 09/18/20    Surgical Oncologist: Vincent Moyer    Medical Oncologist: Dr. Fernández  Consult Date: 11/11/20  Chemotherapy: Planned (biotherapy)  Chemotherapy Regimen: Herceptin (subcutaneously)          General Referrals: Surgical oncology    ER: Positive (low/weak positivity on bx, moderate positivity on surgical specimen)  WV: Negative  Her2: Postive (negative by FISH on biopsy, 3+ IHC positive on " surgical specimen)       Support Systems: Spouse/significant other;Children  Concerns: resource needs/questions     Acuity  Systemic Treatment - predicted or initiated: Chemotherapy regimen with multiple drugs (+1)  Treatment Tolerability: Has not started treatment yet/treatment fully completed and side effects resolved  ECO  Comorbidities in Medical History: 2  Verbalizes the need for more education: 1  Navigation Acuity: 5     Follow Up  Follow up in about 6 days (around 2020) for teaching appt.

## 2020-11-18 NOTE — TELEPHONE ENCOUNTER
----- Message from Saskia Mittal RN sent at 11/17/2020  6:25 PM CST -----  Spoke with pt Moe morton to schedule chemo appts. She asked when did she need to f/u with Dr. Moyer as pt still has a drain in place. Can you reach out to her about this? Her number is [phone].     Thanks

## 2020-11-18 NOTE — TELEPHONE ENCOUNTER
----- Message from Saskia Mittal RN sent at 11/17/2020  6:25 PM CST -----  Spoke with pt Moe morton to schedule chemo appts. She asked when did she need to f/u with Dr. Moyer as pt still has a drain in place. Can you reach out to her about this? Her number is [phone].     Thanks

## 2020-11-18 NOTE — NURSING
Spoke with Moe morton about pts appts. Was able to get echocardiogram r/s to  @ 11am at [facility] [address] [facility], dtr agreeable with appt date/time/location. She knows to call with questions/concerns.   Oncology Navigation   Intake  Date of Diagnosis: 20  Cancer Type: Breast  Initial Nurse Navigator Contact: 08/10/20  Date Worked: 20     Treatment  Current Status: Active  Date Presented to Tumor Board: 20    Surgical Oncologist: Vincent Moyer    Medical Oncologist: Dr. Fernández  Consult Date: 20  Chemotherapy: Planned (biotherapy)  Chemotherapy Regimen: Herceptin (subcutaneously)          General Referrals: Surgical oncology    ER: Positive (low/weak positivity on bx, moderate positivity on surgical specimen)  PA: Negative  Her2: Postive (negative by FISH on biopsy, 3+ IHC positive on surgical specimen)       Support Systems: Spouse/significant other;Children  Concerns: resource needs/questions     Acuity  Systemic Treatment - predicted or initiated: Chemotherapy regimen with multiple drugs (+1)  Treatment Tolerability: Has not started treatment yet/treatment fully completed and side effects resolved  ECO  Comorbidities in Medical History: 2  Verbalizes the need for more education: 1  Navigation Acuity: 5     Follow Up  No follow-ups on file.

## 2020-11-19 RX ORDER — HYDROCHLOROTHIAZIDE 12.5 MG/1
12.5 CAPSULE ORAL DAILY
Qty: 30 CAPSULE | Refills: 5 | Status: ON HOLD | OUTPATIENT
Start: 2020-11-19 | End: 2021-03-07 | Stop reason: HOSPADM

## 2020-11-20 ENCOUNTER — DOCUMENTATION ONLY (OUTPATIENT)
Dept: PHARMACY | Facility: HOSPITAL | Age: 70
End: 2020-11-20

## 2020-11-20 LAB — BACTERIA UR CULT: ABNORMAL

## 2020-11-21 ENCOUNTER — EXTERNAL HOME HEALTH (OUTPATIENT)
Dept: HOME HEALTH SERVICES | Facility: HOSPITAL | Age: 70
End: 2020-11-21
Payer: MEDICARE

## 2020-11-23 ENCOUNTER — TELEPHONE (OUTPATIENT)
Dept: FAMILY MEDICINE | Facility: CLINIC | Age: 70
End: 2020-11-23

## 2020-11-23 RX ORDER — NITROFURANTOIN 25; 75 MG/1; MG/1
100 CAPSULE ORAL 2 TIMES DAILY
Qty: 14 CAPSULE | Refills: 0 | Status: SHIPPED | OUTPATIENT
Start: 2020-11-23 | End: 2021-08-09

## 2020-11-25 ENCOUNTER — HOSPITAL ENCOUNTER (OUTPATIENT)
Dept: CARDIOLOGY | Facility: HOSPITAL | Age: 70
Discharge: HOME | End: 2020-11-25
Attending: INTERNAL MEDICINE
Payer: MEDICARE

## 2020-11-25 VITALS
DIASTOLIC BLOOD PRESSURE: 86 MMHG | SYSTOLIC BLOOD PRESSURE: 152 MMHG | WEIGHT: 163 LBS | BODY MASS INDEX: 30 KG/M2 | HEIGHT: 62 IN | HEART RATE: 101 BPM

## 2020-11-25 DIAGNOSIS — Z79.899 ENCOUNTER FOR MONITORING CARDIOTOXIC DRUG THERAPY: ICD-10-CM

## 2020-11-25 DIAGNOSIS — Z51.81 ENCOUNTER FOR MONITORING CARDIOTOXIC DRUG THERAPY: ICD-10-CM

## 2020-11-25 LAB
BSA FOR ECHO PROCEDURE: 1.8 M2
CV ECHO LV RWT: 0.77 CM
ECHO LV POSTERIOR WALL: 1.53 CM (ref 0.6–1.1)
FRACTIONAL SHORTENING: 33 % (ref 28–44)
INTERVENTRICULAR SEPTUM: 1.73 CM (ref 0.6–1.1)
LA MAJOR: 4.03 CM
LA WIDTH: 2.99 CM
LEFT INTERNAL DIMENSION IN SYSTOLE: 2.68 CM (ref 2.1–4)
LEFT VENTRICLE DIASTOLIC VOLUME INDEX: 39.38 ML/M2
LEFT VENTRICLE DIASTOLIC VOLUME: 69 ML
LEFT VENTRICLE MASS INDEX: 151 G/M2
LEFT VENTRICLE SYSTOLIC VOLUME INDEX: 15.1 ML/M2
LEFT VENTRICLE SYSTOLIC VOLUME: 26.52 ML
LEFT VENTRICULAR INTERNAL DIMENSION IN DIASTOLE: 3.98 CM (ref 3.5–6)
LEFT VENTRICULAR MASS: 263.89 G
RA PRESSURE: 3 MMHG

## 2020-11-25 PROCEDURE — 93306 TTE W/DOPPLER COMPLETE: CPT

## 2020-11-25 PROCEDURE — 93306 TTE W/DOPPLER COMPLETE: CPT | Mod: 26,,, | Performed by: INTERNAL MEDICINE

## 2020-11-25 PROCEDURE — 93306 ECHO (CUPID ONLY): ICD-10-PCS | Mod: 26,,, | Performed by: INTERNAL MEDICINE

## 2020-11-29 ENCOUNTER — CLINICAL SUPPORT (OUTPATIENT)
Dept: URGENT CARE | Facility: CLINIC | Age: 70
End: 2020-11-29
Payer: MEDICARE

## 2020-11-29 DIAGNOSIS — Z03.818 ENCOUNTER FOR OBSERVATION FOR SUSPECTED EXPOSURE TO OTHER BIOLOGICAL AGENTS RULED OUT: Primary | ICD-10-CM

## 2020-11-29 PROCEDURE — U0003 INFECTIOUS AGENT DETECTION BY NUCLEIC ACID (DNA OR RNA); SEVERE ACUTE RESPIRATORY SYNDROME CORONAVIRUS 2 (SARS-COV-2) (CORONAVIRUS DISEASE [COVID-19]), AMPLIFIED PROBE TECHNIQUE, MAKING USE OF HIGH THROUGHPUT TECHNOLOGIES AS DESCRIBED BY CMS-2020-01-R: HCPCS

## 2020-11-30 ENCOUNTER — OFFICE VISIT (OUTPATIENT)
Dept: HEMATOLOGY/ONCOLOGY | Facility: CLINIC | Age: 70
End: 2020-11-30
Payer: MEDICARE

## 2020-11-30 ENCOUNTER — LAB VISIT (OUTPATIENT)
Dept: LAB | Facility: HOSPITAL | Age: 70
End: 2020-11-30
Attending: INTERNAL MEDICINE
Payer: MEDICARE

## 2020-11-30 ENCOUNTER — TELEPHONE (OUTPATIENT)
Dept: SURGERY | Facility: CLINIC | Age: 70
End: 2020-11-30

## 2020-11-30 DIAGNOSIS — Z71.9 ENCOUNTER FOR EDUCATION: Primary | ICD-10-CM

## 2020-11-30 DIAGNOSIS — Z17.0 MALIGNANT NEOPLASM OF LOWER-INNER QUADRANT OF LEFT BREAST IN FEMALE, ESTROGEN RECEPTOR POSITIVE: ICD-10-CM

## 2020-11-30 DIAGNOSIS — C50.312 MALIGNANT NEOPLASM OF LOWER-INNER QUADRANT OF LEFT BREAST IN FEMALE, ESTROGEN RECEPTOR POSITIVE: ICD-10-CM

## 2020-11-30 LAB
ALBUMIN SERPL BCP-MCNC: 3.5 G/DL (ref 3.5–5.2)
ALP SERPL-CCNC: 92 U/L (ref 55–135)
ALT SERPL W/O P-5'-P-CCNC: 11 U/L (ref 10–44)
ANION GAP SERPL CALC-SCNC: 12 MMOL/L (ref 8–16)
AST SERPL-CCNC: 19 U/L (ref 10–40)
BASOPHILS # BLD AUTO: 0.04 K/UL (ref 0–0.2)
BASOPHILS NFR BLD: 0.5 % (ref 0–1.9)
BILIRUB SERPL-MCNC: 1.5 MG/DL (ref 0.1–1)
BUN SERPL-MCNC: 9 MG/DL (ref 8–23)
CALCIUM SERPL-MCNC: 9.7 MG/DL (ref 8.7–10.5)
CHLORIDE SERPL-SCNC: 98 MMOL/L (ref 95–110)
CO2 SERPL-SCNC: 28 MMOL/L (ref 23–29)
CREAT SERPL-MCNC: 0.9 MG/DL (ref 0.5–1.4)
DIFFERENTIAL METHOD: ABNORMAL
EOSINOPHIL # BLD AUTO: 0.4 K/UL (ref 0–0.5)
EOSINOPHIL NFR BLD: 5.9 % (ref 0–8)
ERYTHROCYTE [DISTWIDTH] IN BLOOD BY AUTOMATED COUNT: 15.5 % (ref 11.5–14.5)
EST. GFR  (AFRICAN AMERICAN): >60 ML/MIN/1.73 M^2
EST. GFR  (NON AFRICAN AMERICAN): >60 ML/MIN/1.73 M^2
GLUCOSE SERPL-MCNC: 93 MG/DL (ref 70–110)
HCT VFR BLD AUTO: 29.2 % (ref 37–48.5)
HGB BLD-MCNC: 9.1 G/DL (ref 12–16)
IMM GRANULOCYTES # BLD AUTO: 0.03 K/UL (ref 0–0.04)
IMM GRANULOCYTES NFR BLD AUTO: 0.4 % (ref 0–0.5)
LYMPHOCYTES # BLD AUTO: 1.8 K/UL (ref 1–4.8)
LYMPHOCYTES NFR BLD: 24.1 % (ref 18–48)
MCH RBC QN AUTO: 28.7 PG (ref 27–31)
MCHC RBC AUTO-ENTMCNC: 31.2 G/DL (ref 32–36)
MCV RBC AUTO: 92 FL (ref 82–98)
MONOCYTES # BLD AUTO: 0.6 K/UL (ref 0.3–1)
MONOCYTES NFR BLD: 8.6 % (ref 4–15)
NEUTROPHILS # BLD AUTO: 4.5 K/UL (ref 1.8–7.7)
NEUTROPHILS NFR BLD: 60.5 % (ref 38–73)
NRBC BLD-RTO: 0 /100 WBC
PLATELET # BLD AUTO: 399 K/UL (ref 150–350)
PMV BLD AUTO: 9.4 FL (ref 9.2–12.9)
POTASSIUM SERPL-SCNC: 4.1 MMOL/L (ref 3.5–5.1)
PROT SERPL-MCNC: 7.4 G/DL (ref 6–8.4)
RBC # BLD AUTO: 3.17 M/UL (ref 4–5.4)
SARS-COV-2 RNA RESP QL NAA+PROBE: NOT DETECTED
SODIUM SERPL-SCNC: 138 MMOL/L (ref 136–145)
WBC # BLD AUTO: 7.35 K/UL (ref 3.9–12.7)

## 2020-11-30 PROCEDURE — 36415 COLL VENOUS BLD VENIPUNCTURE: CPT

## 2020-11-30 PROCEDURE — 99999 PR PBB SHADOW E&M-EST. PATIENT-LVL II: ICD-10-PCS | Mod: PBBFAC,,, | Performed by: NURSE PRACTITIONER

## 2020-11-30 PROCEDURE — 80053 COMPREHEN METABOLIC PANEL: CPT

## 2020-11-30 PROCEDURE — 99999 PR PBB SHADOW E&M-EST. PATIENT-LVL II: CPT | Mod: PBBFAC,,, | Performed by: NURSE PRACTITIONER

## 2020-11-30 PROCEDURE — 99214 OFFICE O/P EST MOD 30 MIN: CPT | Mod: S$GLB,,, | Performed by: NURSE PRACTITIONER

## 2020-11-30 PROCEDURE — 1159F MED LIST DOCD IN RCRD: CPT | Mod: S$GLB,,, | Performed by: NURSE PRACTITIONER

## 2020-11-30 PROCEDURE — 1159F PR MEDICATION LIST DOCUMENTED IN MEDICAL RECORD: ICD-10-PCS | Mod: S$GLB,,, | Performed by: NURSE PRACTITIONER

## 2020-11-30 PROCEDURE — 99214 PR OFFICE/OUTPT VISIT, EST, LEVL IV, 30-39 MIN: ICD-10-PCS | Mod: S$GLB,,, | Performed by: NURSE PRACTITIONER

## 2020-11-30 PROCEDURE — 85025 COMPLETE CBC W/AUTO DIFF WBC: CPT

## 2020-11-30 RX ORDER — ONDANSETRON 8 MG/1
8 TABLET, ORALLY DISINTEGRATING ORAL EVERY 12 HOURS PRN
Qty: 30 TABLET | Refills: 1 | Status: SHIPPED | OUTPATIENT
Start: 2020-11-30 | End: 2021-10-21

## 2020-11-30 RX ORDER — PROCHLORPERAZINE MALEATE 5 MG
5 TABLET ORAL EVERY 6 HOURS PRN
Qty: 30 TABLET | Refills: 1 | Status: SHIPPED | OUTPATIENT
Start: 2020-11-30 | End: 2021-10-21

## 2020-11-30 NOTE — PROGRESS NOTES
68 y/o female who presents to [facility] today for chemotherapy teaching.  Patient given the Navigation Notebook.  I had a detailed discussion with patient in regards to how to use notebook.  I had a detailed discussion with patient regarding the rationale for chemotherapy, how the chemotherapy works, the process of treatment, potential side effects along with managing the potential side effects.  Also discussed with patient signs and symptoms to report.     I had a detailed discussion with the patient and reviewed the specific medication(s) and gave them a handout describing the potential side effects of Herceptin Hylecta along with the management of those potential side effects. Also discussed with patient signs and symptoms to report.     Discussed in detail potential side effects such as:  Nausea and vomiting  Leukopenia  Fatigue  Diarrhea  Cystitis  Gastritis  Fever > 100.4  Antiemetics instructions  Skin care  Rash  Small frequent meals  Increased protein  Increased calories  Increased Hydration  Stress   Depression   Community resources      45 minutes total face to face time spent with patient. 35 minutes spent completing chemotherapy education. 10 minutes spent addressing questions & concerns, and obtaining consent

## 2020-11-30 NOTE — TELEPHONE ENCOUNTER
----- Message from Charo Goodwin sent at 11/30/2020  8:20 AM CST -----  Regarding: follow up appt  Pts daughter  Moe Figueroa request call back to verify when pt needs follow up appt ... call back: [phone]

## 2020-12-01 ENCOUNTER — DOCUMENT SCAN (OUTPATIENT)
Dept: HOME HEALTH SERVICES | Facility: HOSPITAL | Age: 70
End: 2020-12-01
Payer: MEDICARE

## 2020-12-02 ENCOUNTER — TELEPHONE (OUTPATIENT)
Dept: HEMATOLOGY/ONCOLOGY | Facility: CLINIC | Age: 70
End: 2020-12-02

## 2020-12-02 ENCOUNTER — OFFICE VISIT (OUTPATIENT)
Dept: HEMATOLOGY/ONCOLOGY | Facility: CLINIC | Age: 70
End: 2020-12-02
Payer: MEDICARE

## 2020-12-02 ENCOUNTER — INFUSION (OUTPATIENT)
Dept: INFUSION THERAPY | Facility: HOSPITAL | Age: 70
End: 2020-12-02
Attending: INTERNAL MEDICINE
Payer: MEDICARE

## 2020-12-02 ENCOUNTER — SOCIAL WORK (OUTPATIENT)
Dept: HEMATOLOGY/ONCOLOGY | Facility: CLINIC | Age: 70
End: 2020-12-02

## 2020-12-02 VITALS
TEMPERATURE: 97 F | WEIGHT: 163.56 LBS | SYSTOLIC BLOOD PRESSURE: 130 MMHG | BODY MASS INDEX: 30.1 KG/M2 | HEART RATE: 93 BPM | OXYGEN SATURATION: 100 % | HEIGHT: 62 IN | DIASTOLIC BLOOD PRESSURE: 75 MMHG

## 2020-12-02 VITALS
HEART RATE: 102 BPM | OXYGEN SATURATION: 98 % | DIASTOLIC BLOOD PRESSURE: 72 MMHG | SYSTOLIC BLOOD PRESSURE: 146 MMHG | TEMPERATURE: 98 F | BODY MASS INDEX: 30.1 KG/M2 | WEIGHT: 163.56 LBS | HEIGHT: 62 IN

## 2020-12-02 DIAGNOSIS — Z03.818 ENCOUNTER FOR OBSERVATION FOR SUSPECTED EXPOSURE TO OTHER BIOLOGICAL AGENTS RULED OUT: Primary | ICD-10-CM

## 2020-12-02 DIAGNOSIS — C50.312 MALIGNANT NEOPLASM OF LOWER-INNER QUADRANT OF LEFT BREAST IN FEMALE, ESTROGEN RECEPTOR POSITIVE: Primary | ICD-10-CM

## 2020-12-02 DIAGNOSIS — Z51.81 ENCOUNTER FOR MONITORING CARDIOTOXIC DRUG THERAPY: ICD-10-CM

## 2020-12-02 DIAGNOSIS — Z17.0 MALIGNANT NEOPLASM OF LOWER-INNER QUADRANT OF LEFT BREAST IN FEMALE, ESTROGEN RECEPTOR POSITIVE: Primary | ICD-10-CM

## 2020-12-02 DIAGNOSIS — Z79.899 ENCOUNTER FOR MONITORING CARDIOTOXIC DRUG THERAPY: ICD-10-CM

## 2020-12-02 DIAGNOSIS — Z17.0 MALIGNANT NEOPLASM OF LOWER-INNER QUADRANT OF LEFT BREAST IN FEMALE, ESTROGEN RECEPTOR POSITIVE: ICD-10-CM

## 2020-12-02 DIAGNOSIS — C50.312 MALIGNANT NEOPLASM OF LOWER-INNER QUADRANT OF LEFT BREAST IN FEMALE, ESTROGEN RECEPTOR POSITIVE: ICD-10-CM

## 2020-12-02 LAB — SARS-COV-2 RDRP RESP QL NAA+PROBE: NEGATIVE

## 2020-12-02 PROCEDURE — 99215 PR OFFICE/OUTPT VISIT, EST, LEVL V, 40-54 MIN: ICD-10-PCS | Mod: 25,S$GLB,, | Performed by: INTERNAL MEDICINE

## 2020-12-02 PROCEDURE — 3077F PR MOST RECENT SYSTOLIC BLOOD PRESSURE >= 140 MM HG: ICD-10-PCS | Mod: CPTII,S$GLB,, | Performed by: INTERNAL MEDICINE

## 2020-12-02 PROCEDURE — 1101F PR PT FALLS ASSESS DOC 0-1 FALLS W/OUT INJ PAST YR: ICD-10-PCS | Mod: CPTII,S$GLB,, | Performed by: INTERNAL MEDICINE

## 2020-12-02 PROCEDURE — 3288F PR FALLS RISK ASSESSMENT DOCUMENTED: ICD-10-PCS | Mod: CPTII,S$GLB,, | Performed by: INTERNAL MEDICINE

## 2020-12-02 PROCEDURE — 3078F DIAST BP <80 MM HG: CPT | Mod: CPTII,S$GLB,, | Performed by: INTERNAL MEDICINE

## 2020-12-02 PROCEDURE — 3288F FALL RISK ASSESSMENT DOCD: CPT | Mod: CPTII,S$GLB,, | Performed by: INTERNAL MEDICINE

## 2020-12-02 PROCEDURE — 3008F BODY MASS INDEX DOCD: CPT | Mod: CPTII,S$GLB,, | Performed by: INTERNAL MEDICINE

## 2020-12-02 PROCEDURE — 3077F SYST BP >= 140 MM HG: CPT | Mod: CPTII,S$GLB,, | Performed by: INTERNAL MEDICINE

## 2020-12-02 PROCEDURE — 1101F PT FALLS ASSESS-DOCD LE1/YR: CPT | Mod: CPTII,S$GLB,, | Performed by: INTERNAL MEDICINE

## 2020-12-02 PROCEDURE — 1159F PR MEDICATION LIST DOCUMENTED IN MEDICAL RECORD: ICD-10-PCS | Mod: S$GLB,,, | Performed by: INTERNAL MEDICINE

## 2020-12-02 PROCEDURE — 1159F MED LIST DOCD IN RCRD: CPT | Mod: S$GLB,,, | Performed by: INTERNAL MEDICINE

## 2020-12-02 PROCEDURE — 63600175 PHARM REV CODE 636 W HCPCS: Mod: TB | Performed by: INTERNAL MEDICINE

## 2020-12-02 PROCEDURE — 96401 CHEMO ANTI-NEOPL SQ/IM: CPT

## 2020-12-02 PROCEDURE — 99999 PR PBB SHADOW E&M-EST. PATIENT-LVL IV: ICD-10-PCS | Mod: PBBFAC,,, | Performed by: INTERNAL MEDICINE

## 2020-12-02 PROCEDURE — 1126F PR PAIN SEVERITY QUANTIFIED, NO PAIN PRESENT: ICD-10-PCS | Mod: S$GLB,,, | Performed by: INTERNAL MEDICINE

## 2020-12-02 PROCEDURE — 1126F AMNT PAIN NOTED NONE PRSNT: CPT | Mod: S$GLB,,, | Performed by: INTERNAL MEDICINE

## 2020-12-02 PROCEDURE — 99999 PR PBB SHADOW E&M-EST. PATIENT-LVL IV: CPT | Mod: PBBFAC,,, | Performed by: INTERNAL MEDICINE

## 2020-12-02 PROCEDURE — U0002 COVID-19 LAB TEST NON-CDC: HCPCS

## 2020-12-02 PROCEDURE — 3078F PR MOST RECENT DIASTOLIC BLOOD PRESSURE < 80 MM HG: ICD-10-PCS | Mod: CPTII,S$GLB,, | Performed by: INTERNAL MEDICINE

## 2020-12-02 PROCEDURE — 99215 OFFICE O/P EST HI 40 MIN: CPT | Mod: 25,S$GLB,, | Performed by: INTERNAL MEDICINE

## 2020-12-02 PROCEDURE — 3008F PR BODY MASS INDEX (BMI) DOCUMENTED: ICD-10-PCS | Mod: CPTII,S$GLB,, | Performed by: INTERNAL MEDICINE

## 2020-12-02 RX ADMIN — TRASTUZUMAB AND HYALURONIDASE-OYSK 600 MG: 600; 10000 INJECTION, SOLUTION SUBCUTANEOUS at 04:12

## 2020-12-02 NOTE — PROGRESS NOTES
Subjective:       Patient ID: Leia Rodriguez is a 70 y.o. female.    Chief Complaint: Results, Breast Cancer, and Chemotherapy    HPI 70-year-old female history of T1c HER2 positive ER positive breast carcinoma patient is to begin adjuvant subcutaneous Herceptin without chemotherapy because of an open wound on chest wall.  ECOG status 2    Past Medical History:   Diagnosis Date    Arthritis     EDGAR HANDS, KNEES    Blind right eye     Traumatic    Breast cancer 06/15/2017    0.8 cm Grade1 INTRADUCTAL BREAST 9 positve margin (left)    Essential hypertension     Hemorrhoids     Lipoma of abdominal wall     Obesity     Overactive bladder     Pap smear abnormality of cervix with ASCUS favoring benign     Thyroid nodule     Tobacco use disorder     Tubular adenoma of colon     Urinary incontinence      Family History   Problem Relation Age of Onset    Hypertension Father     Cataracts Father     Prostate cancer Father 80    Cervical cancer Daughter 32    Allergic rhinitis Daughter     Cirrhosis Mother     Alcohol abuse Mother     Hypertension Daughter     Diabetes Brother     Heart attack Brother     Heart murmur Brother     No Known Problems Daughter      Social History     Socioeconomic History    Marital status:      Spouse name: Not on file    Number of children: Not on file    Years of education: Not on file    Highest education level: Not on file   Occupational History    Not on file   Social Needs    Financial resource strain: Not on file    Food insecurity     Worry: Not on file     Inability: Not on file    Transportation needs     Medical: Not on file     Non-medical: Not on file   Tobacco Use    Smoking status: Former Smoker     Packs/day: 1.00     Years: 50.00     Pack years: 50.00     Types: Cigarettes     Quit date: 2020     Years since quittin.2    Smokeless tobacco: Never Used   Substance and Sexual Activity    Alcohol use: Never     Alcohol/week: 28.0  standard drinks     Types: 28 Cans of beer per week     Frequency: 4 or more times a week     Drinks per session: 3 or 4     Binge frequency: Less than monthly    Drug use: No    Sexual activity: Never     Partners: Male   Lifestyle    Physical activity     Days per week: Not on file     Minutes per session: Not on file    Stress: Not on file   Relationships    Social connections     Talks on phone: Not on file     Gets together: Not on file     Attends Oriental orthodox service: Not on file     Active member of club or organization: Not on file     Attends meetings of clubs or organizations: Not on file     Relationship status: Not on file   Other Topics Concern    Not on file   Social History Narrative    The patient is .  She is retired from [employer].     Past Surgical History:   Procedure Laterality Date    ANTERIOR VAGINAL REPAIR      BLADDER SURGERY      BREAST LUMPECTOMY Left 2017    CATARACT EXTRACTION Right      SECTION      X2    COLONOSCOPY N/A 3/8/2017    Procedure: COLONOSCOPY;  Surgeon: Tyron Paris MD;  Location: [facility];  Service: Endoscopy;  Laterality: N/A;    COLONOSCOPY N/A 2020    Procedure: COLONOSCOPY;  Surgeon: Keira Ellison MD;  Location: [facility];  Service: Endoscopy;  Laterality: N/A;    ESOPHAGOGASTRODUODENOSCOPY N/A 2020    Procedure: EGD (ESOPHAGOGASTRODUODENOSCOPY);  Surgeon: Keira Ellison MD;  Location: [facility];  Service: Endoscopy;  Laterality: N/A;  new onset iron deficiency with prior history of breast cancer    INTRALUMINAL GASTROINTESTINAL TRACT IMAGING VIA CAPSULE N/A 10/28/2020    Procedure: IMAGING PROCEDURE, GI TRACT, INTRALUMINAL, VIA CAPSULE;  Surgeon: Finesse Jha RN;  Location: [facility];  Service: Endoscopy;  Laterality: N/A;    SENTINEL LYMPH NODE BIOPSY Left 2020    Procedure: BIOPSY, LYMPH NODE, SENTINEL;  Surgeon: Vincent Moyer MD;  Location: [facility];  Service: General;  Laterality: Left;    SIMPLE  MASTECTOMY Left 9/8/2020    Procedure: MASTECTOMY, SIMPLE;  Surgeon: Vincent Moyer MD;  Location: [facility];  Service: General;  Laterality: Left;    THYROID LOBECTOMY Left 2005    TOTAL ABDOMINAL HYSTERECTOMY      TUBAL LIGATION         Labs:  Lab Results   Component Value Date    WBC 7.35 11/30/2020    HGB 9.1 (L) 11/30/2020    HCT 29.2 (L) 11/30/2020    MCV 92 11/30/2020     (H) 11/30/2020     BMP  Lab Results   Component Value Date     11/30/2020    K 4.1 11/30/2020    CL 98 11/30/2020    CO2 28 11/30/2020    BUN 9 11/30/2020    CREATININE 0.9 11/30/2020    CALCIUM 9.7 11/30/2020    ANIONGAP 12 11/30/2020    ESTGFRAFRICA >60 11/30/2020    EGFRNONAA >60 11/30/2020     Lab Results   Component Value Date    ALT 11 11/30/2020    AST 19 11/30/2020    ALKPHOS 92 11/30/2020    BILITOT 1.5 (H) 11/30/2020       Lab Results   Component Value Date    IRON 37 07/14/2020    TIBC 295 07/14/2020    FERRITIN 519 (H) 07/14/2020     No results found for: [id number]  No results found for: FOLATE  Lab Results   Component Value Date    TSH 1.636 09/03/2019         Review of Systems   Constitutional: Negative for activity change, appetite change, chills, diaphoresis, fatigue, fever and unexpected weight change.   HENT: Negative for congestion, dental problem, drooling, ear discharge, ear pain, facial swelling, hearing loss, mouth sores, nosebleeds, postnasal drip, rhinorrhea, sinus pressure, sneezing, sore throat, tinnitus, trouble swallowing and voice change.    Eyes: Negative for photophobia, pain, discharge, redness, itching and visual disturbance.   Respiratory: Negative for cough, choking, chest tightness, shortness of breath, wheezing and stridor.    Cardiovascular: Negative for chest pain, palpitations and leg swelling.   Gastrointestinal: Negative for abdominal distention, abdominal pain, anal bleeding, blood in stool, constipation, diarrhea, nausea, rectal pain and vomiting.   Endocrine: Negative for cold  intolerance, heat intolerance, polydipsia, polyphagia and polyuria.   Genitourinary: Negative for decreased urine volume, difficulty urinating, dyspareunia, dysuria, enuresis, flank pain, frequency, genital sores, hematuria, menstrual problem, pelvic pain, urgency, vaginal bleeding, vaginal discharge and vaginal pain.   Musculoskeletal: Negative for arthralgias, back pain, gait problem, joint swelling, myalgias, neck pain and neck stiffness.   Skin: Positive for wound. Negative for color change, pallor and rash.   Allergic/Immunologic: Negative for environmental allergies, food allergies and immunocompromised state.   Neurological: Positive for weakness. Negative for dizziness, tremors, seizures, syncope, facial asymmetry, speech difficulty, light-headedness, numbness and headaches.   Hematological: Negative for adenopathy. Does not bruise/bleed easily.   Psychiatric/Behavioral: Positive for dysphoric mood. Negative for agitation, behavioral problems, confusion, decreased concentration, hallucinations, self-injury, sleep disturbance and suicidal ideas. The patient is nervous/anxious. The patient is not hyperactive.        Objective:      Physical Exam  Vitals signs reviewed.   Constitutional:       General: She is not in acute distress.     Appearance: She is well-developed. She is obese. She is ill-appearing. She is not diaphoretic.   HENT:      Head: Normocephalic and atraumatic.      Right Ear: External ear normal.      Left Ear: External ear normal.      Nose: Nose normal.      Right Sinus: No maxillary sinus tenderness or frontal sinus tenderness.      Left Sinus: No maxillary sinus tenderness or frontal sinus tenderness.      Mouth/Throat:      Pharynx: No oropharyngeal exudate.   Eyes:      General: Lids are normal. No scleral icterus.        Right eye: No discharge.         Left eye: No discharge.      Conjunctiva/sclera: Conjunctivae normal.      Right eye: Right conjunctiva is not injected. No  hemorrhage.     Left eye: Left conjunctiva is not injected. No hemorrhage.     Pupils: Pupils are equal, round, and reactive to light.   Neck:      Musculoskeletal: Normal range of motion and neck supple.      Thyroid: No thyromegaly.      Vascular: No JVD.      Trachea: No tracheal deviation.   Cardiovascular:      Rate and Rhythm: Normal rate.   Pulmonary:      Effort: Pulmonary effort is normal. No respiratory distress.      Breath sounds: No stridor.   Chest:      Chest wall: No tenderness.   Abdominal:      General: Bowel sounds are normal. There is no distension.      Palpations: Abdomen is soft. There is no hepatomegaly, splenomegaly or mass.      Tenderness: There is no abdominal tenderness. There is no rebound.   Musculoskeletal: Normal range of motion.         General: No tenderness.   Lymphadenopathy:      Cervical: No cervical adenopathy.      Upper Body:      Right upper body: No supraclavicular adenopathy.      Left upper body: No supraclavicular adenopathy.   Skin:     General: Skin is dry.      Findings: No erythema or rash.   Neurological:      Mental Status: She is alert and oriented to person, place, and time.      Cranial Nerves: No cranial nerve deficit.      Coordination: Coordination normal.   Psychiatric:         Behavior: Behavior normal.         Thought Content: Thought content normal.         Judgment: Judgment normal.             Assessment:      1. Malignant neoplasm of lower-inner quadrant of left breast in female, estrogen receptor positive    2. Encounter for monitoring cardiotoxic drug therapy           Plan:     Extensive conversation with the patient at this point would recommend that she be started on subcutaneous Herceptin every 12 weeks.  Will see back in 3 months with 2D echo baseline 2D echo ordered.  Continue with Arimidex 1 mg daily.  Recent data shows that this adjuvant setting in early stage HER2 positive disease has excellent long-term benefit.  Discussed implications  with her 40 min face-to-face time coordination of care greater 50% time face-to-face with patient        Mario Fernández Jr, MD FACP

## 2020-12-02 NOTE — TELEPHONE ENCOUNTER
SW intern called [facility] to see if pt had already been referred. Pt has already been referred. CESAR intern asked if [facility] could reach out to pt and pt's daughter to help them with the process of getting boost drinks. SW intern provided pt's phone number and [facility] said they will be reaching out soon.

## 2020-12-02 NOTE — PROGRESS NOTES
CESAR intern met with pt and pt's daughter in lobby to follow up and assess for any new needs. Pt's daughter asked about getting boost from [facility]. Pt's daughter stated that she tried to apply online to get it previously but that she could not figure it out. Pt signed referral form for [facility] and CESAR intern will complete it and fax it in. Pt's daughter asked if SW could meet with her father who is also a pt at the [facility]. CESAR intern will notify supervisor. Pt's daughter expressed that they have no other needs at this time. Leonela will continue to f/u.

## 2020-12-02 NOTE — NURSING
Patient arrived to treatment room for Herceptin #1 SQ injection after seeing Dr. Fernández. Patient has wound vac to left breast area post mastectomy. Patient voices no concerns at this time. Herceptin administered in right lateral thigh SQ as documented on MAR using aseptic technique. Patient tolerated well. Band aid applied to site.

## 2020-12-02 NOTE — DISCHARGE INSTRUCTIONS
[facility]  [address] [facility]  [address]  [phone] phone     [phone] fax  Hours of Operation: Monday- Friday 8:00am- 5:00pm  After hours phone  [phone]  Hematology / Oncology Physicians on call      Dr. Pradeep Escalante, LIANA Barnard NP Tyesha Taylor, NP    Please call with any concerns regarding your appointment today.        FALL PREVENTION   Falls often occur due to slipping, tripping or losing your balance. Here are ways to reduce your risk of falling again.    Was there anything that caused your fall that can be fixed, removed or replaced?    Make your home safe by keeping walkways clear of objects you may trip over.    Use non-slip pads under rugs.    Do not walk in poorly lit areas.    Do not stand on chairs or wobbly ladders.    Use caution when reaching overhead or looking upward. This position can cause a loss of balance.    Be sure your shoes fit properly, have non-slip bottoms and are in good condition.    Be cautious when going up and down stairs, curbs, and when walking on uneven sidewalks.    If your balance is poor, consider using a cane or walker.    If your fall was related to alcohol use, stop or limit alcohol intake.    If your fall was related to use of sleeping medicines, talk to your doctor about this. You may need to reduce your dosage at bedtime if you awaken during the night to go to the bathroom.    To reduce the need for nighttime bathroom trips:    Avoid drinking fluids for several hours before going to bed    Empty your bladder before going to bed    Men can keep a urinal at the bedside   © 3855-8934 [employer], [address] [place] [address], [place] [address]. All rights reserved. This information is not intended as a substitute for professional medical care. Always follow your healthcare professional's  instructions.      Discharge Instructions for Chemotherapy   Your doctor prescribed a type of medication therapy for you called chemotherapy. Doctors prescribe chemotherapy for many different types of illnesses, including cancer. There are many types of chemotherapy. This sheet provides general guidelines on how you can take care of yourself after your chemotherapy.   Mouth Care   Dont be discouraged if you get mouth sores, even if you are following all your doctors instructions. Many people get mouth sores as a side effect of chemotherapy. Heres what you can do to prevent mouth sores:    Keep your mouth clean. Brush your teeth with a soft-bristle toothbrush after every meal.    Use an oral swab or special soft toothbrush if your gums bleed during regular brushing.    Dont use dental floss if your platelet count is below 50,000. Your doctor or nurse will tell you if this is the case.    Use any mouthwashes given to you as directed.    If you cant tolerate regular methods, use salt and baking soda to clean your mouth. Mix 1 teaspoon(s) of salt and 1 teaspoon(s) of baking soda into an 8-ounce glass of warm water. Swish and spit.    Watch your mouth and tongue for white patches. This is a sign of fungal infection, a common side effect of chemotherapy. Be sure to tell your doctor about these patches. Medication can be prescribed to help you fight the fungal infection.  Other Home Care    Try to exercise. Exercise keeps you strong and keeps your heart and lungs active. Walk as much as you can without becoming dizzy or weak.    Keep clean. During chemotherapy, your body cant fight infection very well. Take short baths or showers.    Use moisturizing soap. Chemotherapy can make your skin dry.    Apply moisturizing lotion several times a day to help relieve dry skin.    Dont take very hot or very cold showers or baths.    Dont be surprised if your chemotherapy causes slight burns to your skin--usually on  the hands and feet. Some drugs used in high doses cause this to happen. Ask for a special cream to help relieve the burn and protect your skin.    Let your doctor know if your throat is sore. You may have an infection that needs treatment.    Remember, many patients feel sick and lose their appetites during treatment. Eat small meals several times a day to keep your strength up.    Choose bland foods with little taste or smell if you are reacting strongly to food.    Be sure to cook all food thoroughly. This kills bacteria and helps you avoid infection.    Eat foods that are soft. Soft foods are less likely to cause stomach irritation.   Follow-Up   Make a follow-up appointment as directed by our staff.   When to Call Your Doctor   Call your doctor right away if you have any of the following:    Unexplained bleeding    Trouble concentrating    Ongoing fatigue    Shortness of breath, wheezing, or trouble breathing    Rapid, irregular heartbeat; chest pain    Dizziness, lightheadedness    Constant feeling of being cold    Hives or a cut or rash that swells, turns red, feels hot or painful, or begins to ooze    Fever of 100.4°F or higher, or chills    © 5450-2600 [employer] [facility], [address] [place] [address], [place] [address]. All rights reserved. This information is not intended as a substitute for professional medical care.

## 2020-12-09 ENCOUNTER — OFFICE VISIT (OUTPATIENT)
Dept: SURGERY | Facility: CLINIC | Age: 70
End: 2020-12-09
Payer: MEDICARE

## 2020-12-09 VITALS
TEMPERATURE: 98 F | BODY MASS INDEX: 30 KG/M2 | WEIGHT: 163 LBS | SYSTOLIC BLOOD PRESSURE: 140 MMHG | HEIGHT: 62 IN | DIASTOLIC BLOOD PRESSURE: 69 MMHG | HEART RATE: 104 BPM

## 2020-12-09 DIAGNOSIS — Z17.0 MALIGNANT NEOPLASM OF LOWER-INNER QUADRANT OF LEFT BREAST IN FEMALE, ESTROGEN RECEPTOR POSITIVE: Primary | ICD-10-CM

## 2020-12-09 DIAGNOSIS — C50.312 MALIGNANT NEOPLASM OF LOWER-INNER QUADRANT OF LEFT BREAST IN FEMALE, ESTROGEN RECEPTOR POSITIVE: Primary | ICD-10-CM

## 2020-12-09 PROCEDURE — 99999 PR PBB SHADOW E&M-EST. PATIENT-LVL IV: CPT | Mod: PBBFAC,,, | Performed by: SURGERY

## 2020-12-09 PROCEDURE — 3008F PR BODY MASS INDEX (BMI) DOCUMENTED: ICD-10-PCS | Mod: CPTII,S$GLB,, | Performed by: SURGERY

## 2020-12-09 PROCEDURE — 3008F BODY MASS INDEX DOCD: CPT | Mod: CPTII,S$GLB,, | Performed by: SURGERY

## 2020-12-09 PROCEDURE — 1125F AMNT PAIN NOTED PAIN PRSNT: CPT | Mod: S$GLB,,, | Performed by: SURGERY

## 2020-12-09 PROCEDURE — 99999 PR PBB SHADOW E&M-EST. PATIENT-LVL IV: ICD-10-PCS | Mod: PBBFAC,,, | Performed by: SURGERY

## 2020-12-09 PROCEDURE — 99024 POSTOP FOLLOW-UP VISIT: CPT | Mod: S$GLB,,, | Performed by: SURGERY

## 2020-12-09 PROCEDURE — 99024 PR POST-OP FOLLOW-UP VISIT: ICD-10-PCS | Mod: S$GLB,,, | Performed by: SURGERY

## 2020-12-09 PROCEDURE — 1125F PR PAIN SEVERITY QUANTIFIED, PAIN PRESENT: ICD-10-PCS | Mod: S$GLB,,, | Performed by: SURGERY

## 2020-12-11 NOTE — PROGRESS NOTES
Patient ID: Leia Rodriguez is a 70 y.o. female.    Chief Complaint: Post-op Evaluation (wound check)      HPI:  70-year-old female s/p left mastectomy with sentinel lymph node biopsy 9/8/20 presents for follow up.  She has been undergoing wound VAC therapy with no issue.  She will need port once wound healed for adjuvant therapy.     presents to discuss surgery. In the interim she has undergone PET scan which was negative for metastatic disease. Genetic testing pending    Initially referred for recurrent left breast cancer.  She recently underwent biopsy showing invasive ductal carcinoma ER+/NE- Her 2 -.  She previously underwent a left breast lumpectomy with adjuvant radiation in 2017.      For Seema Negro:  Breast cancer follow-up - last visit was 5/29/19    Pt has a history of Stage 0 DCIS of Lt. breast.  She presented in March of 2017 when diagnostic MMG confirmed the presence of a round mass with indistinct margins in the Lt. breast at the 7 o'clock position. The mass measured 9 x 5 x 6 mm. The patient was referred to Dr. Duke and on 6/15/17 underwent wire localization lumpectomy. Pathology revealed an 8 mm focus of ductal carcinoma in situ. The tumor was low grade. There was tumor present at the anterior inferior margin. Ninety percent of the tumor cells were ER positive, 5- 10% of the tumor cells were NE positive. The patient was referred for adjuvant radiotherapy. Completed a course of partial breast irradiation to the LIQ of the Lt. breast on 8/14/17.  Currently on hormonal therapy with Arimidex that started 8/2017.  Due off 8/2022    DEXA- 6/29/18- wnl- f/u in 2 years due to AI- 6/2020 due    Risk factors identified:     Menarche at 9 y/o  G 3 P 3  First pregnancy at 18 y/o  LMP: 50's  Estrogen: none  Radiation to the neck or chest wall - has had left breast radiation  Prior breast biopsies or atypical hyperplasia- left breast lumpectomy     FH: no breast cancer, daughter cervical cancer 30's,  father prostate cancer 70's-     Body mass index is 29.81 kg/m².    Review of Systems   Constitutional: Negative.  Negative for activity change and unexpected weight change.   HENT: Negative.  Negative for hearing loss, rhinorrhea and trouble swallowing.    Eyes: Negative.  Negative for discharge and visual disturbance.   Respiratory: Negative.  Negative for chest tightness and wheezing.    Cardiovascular: Negative.  Negative for chest pain and palpitations.   Gastrointestinal: Negative.  Negative for blood in stool, constipation, diarrhea and vomiting.        No reflux   Endocrine: Negative.  Negative for polydipsia and polyuria.   Genitourinary: Negative.  Negative for difficulty urinating, dysuria, hematuria and menstrual problem.   Musculoskeletal: Negative.  Negative for joint swelling and neck pain.   Skin: Negative.    Allergic/Immunologic: Negative.    Neurological: Negative.  Negative for headaches.   Hematological: Negative.  Negative for adenopathy.   Psychiatric/Behavioral: Negative.  Negative for confusion and dysphoric mood.   Breast: Pt denies any breast pain, nipple discharge, or palpable mass. No prior trauma or bruising. No breast surgeries or abnormalities.    Current Outpatient Medications   Medication Sig Dispense Refill    anastrozole (ARIMIDEX) 1 mg Tab Take 1 tablet (1 mg total) by mouth once daily. 90 tablet 3    buPROPion (WELLBUTRIN SR) 150 MG TBSR 12 hr tablet Take 1 tablet (150 mg total) by mouth 2 (two) times daily. 60 tablet 2    calcium citrate (CALCITRATE) 200 mg (950 mg) tablet Take 1 tablet by mouth once daily.      ferrous sulfate (FEOSOL) 325 mg (65 mg iron) Tab tablet Take 65 mg by mouth once daily.      hydroCHLOROthiazide (MICROZIDE) 12.5 mg capsule TAKE 1 CAPSULE (12.5 MG TOTAL) BY MOUTH ONCE DAILY. FOR HIGH BLOOD PRESSURE 30 capsule 5    iron, carbonyl, (ICAR) 15 mg/1.25 mL Susp Take by mouth.      meloxicam (MOBIC) 15 MG tablet TAKE 1 TABLET BY MOUTH EVERY DAY 30  tablet 0    nitrofurantoin, macrocrystal-monohydrate, (MACROBID) 100 MG capsule Take 1 capsule (100 mg total) by mouth 2 (two) times daily. 14 capsule 0    ondansetron (ZOFRAN-ODT) 8 MG TbDL Take 1 tablet (8 mg total) by mouth every 12 (twelve) hours as needed. 30 tablet 1    prochlorperazine (COMPAZINE) 5 MG tablet Take 1 tablet (5 mg total) by mouth every 6 (six) hours as needed for Nausea. 30 tablet 1    tamsulosin (FLOMAX) 0.4 mg Cap Take 1 capsule by mouth once daily.      vitamin D 1000 units Tab Take 1,000 Units by mouth once daily.       No current facility-administered medications for this visit.        Review of patient's allergies indicates:  No Known Allergies    Past Medical History:   Diagnosis Date    Arthritis     EDGAR HANDS, KNEES    Blind right eye     Traumatic    Breast cancer 06/15/2017    0.8 cm Grade1 INTRADUCTAL BREAST 9 positve margin (left)    Essential hypertension     Hemorrhoids     Lipoma of abdominal wall     Obesity     Overactive bladder     Pap smear abnormality of cervix with ASCUS favoring benign     Thyroid nodule     Tobacco use disorder     Tubular adenoma of colon     Urinary incontinence        Past Surgical History:   Procedure Laterality Date    ANTERIOR VAGINAL REPAIR      BLADDER SURGERY      BREAST LUMPECTOMY Left 2017    CATARACT EXTRACTION Right      SECTION      X2    COLONOSCOPY N/A 3/8/2017    Procedure: COLONOSCOPY;  Surgeon: Tyron Paris MD;  Location: [facility];  Service: Endoscopy;  Laterality: N/A;    COLONOSCOPY N/A 2020    Procedure: COLONOSCOPY;  Surgeon: Keira Ellison MD;  Location: [facility];  Service: Endoscopy;  Laterality: N/A;    ESOPHAGOGASTRODUODENOSCOPY N/A 2020    Procedure: EGD (ESOPHAGOGASTRODUODENOSCOPY);  Surgeon: Keira Ellison MD;  Location: [facility];  Service: Endoscopy;  Laterality: N/A;  new onset iron deficiency with prior history of breast cancer    INTRALUMINAL  GASTROINTESTINAL TRACT IMAGING VIA CAPSULE N/A 10/28/2020    Procedure: IMAGING PROCEDURE, GI TRACT, INTRALUMINAL, VIA CAPSULE;  Surgeon: Finesse Jha RN;  Location: [facility];  Service: Endoscopy;  Laterality: N/A;    SENTINEL LYMPH NODE BIOPSY Left 2020    Procedure: BIOPSY, LYMPH NODE, SENTINEL;  Surgeon: Vincent Moyer MD;  Location: [facility] OR;  Service: General;  Laterality: Left;    SIMPLE MASTECTOMY Left 2020    Procedure: MASTECTOMY, SIMPLE;  Surgeon: Vincent Moyer MD;  Location: [facility] OR;  Service: General;  Laterality: Left;    THYROID LOBECTOMY Left     TOTAL ABDOMINAL HYSTERECTOMY      TUBAL LIGATION         Family History   Problem Relation Age of Onset    Hypertension Father     Cataracts Father     Prostate cancer Father 80    Cervical cancer Daughter 32    Allergic rhinitis Daughter     Cirrhosis Mother     Alcohol abuse Mother     Hypertension Daughter     Diabetes Brother     Heart attack Brother     Heart murmur Brother     No Known Problems Daughter        Social History     Socioeconomic History    Marital status:      Spouse name: Not on file    Number of children: Not on file    Years of education: Not on file    Highest education level: Not on file   Occupational History    Not on file   Social Needs    Financial resource strain: Not on file    Food insecurity     Worry: Not on file     Inability: Not on file    Transportation needs     Medical: Not on file     Non-medical: Not on file   Tobacco Use    Smoking status: Former Smoker     Packs/day: 1.00     Years: 50.00     Pack years: 50.00     Types: Cigarettes     Quit date: 2020     Years since quittin.3    Smokeless tobacco: Never Used   Substance and Sexual Activity    Alcohol use: Never     Alcohol/week: 28.0 standard drinks     Types: 28 Cans of beer per week     Frequency: 4 or more times a week     Drinks per session: 3 or 4     Binge frequency: Less than monthly    Drug use: No     Sexual activity: Never     Partners: Male   Lifestyle    Physical activity     Days per week: Not on file     Minutes per session: Not on file    Stress: Not on file   Relationships    Social connections     Talks on phone: Not on file     Gets together: Not on file     Attends Sikh service: Not on file     Active member of club or organization: Not on file     Attends meetings of clubs or organizations: Not on file     Relationship status: Not on file   Other Topics Concern    Not on file   Social History Narrative    The patient is .  She is retired from [employer].       Vitals:    12/09/20 1359   BP: (!) 140/69   Pulse: 104   Temp: 97.6 °F (36.4 °C)       Physical Exam  Constitutional:       Appearance: She is well-developed.   HENT:      Head: Normocephalic and atraumatic.      Right Ear: External ear normal.      Left Ear: External ear normal.      Mouth/Throat:      Pharynx: No oropharyngeal exudate.   Eyes:      General: No scleral icterus.        Right eye: No discharge.         Left eye: No discharge.      Conjunctiva/sclera: Conjunctivae normal.      Pupils: Pupils are equal, round, and reactive to light.   Neck:      Musculoskeletal: Normal range of motion and neck supple.      Thyroid: No thyromegaly.   Cardiovascular:      Rate and Rhythm: Normal rate and regular rhythm.      Heart sounds: Normal heart sounds.   Pulmonary:      Effort: Pulmonary effort is normal.      Breath sounds: Normal breath sounds.   Chest:      Breasts:         Right: No inverted nipple, mass, nipple discharge, skin change or tenderness.         Left: No inverted nipple, mass, nipple discharge, skin change or tenderness.       Abdominal:      General: Bowel sounds are normal.      Palpations: Abdomen is soft.   Musculoskeletal: Normal range of motion.      Right shoulder: She exhibits no crepitus and normal strength.   Lymphadenopathy:      Head:      Right side of head: No submental, submandibular,  tonsillar, preauricular, posterior auricular or occipital adenopathy.      Left side of head: No submental, submandibular, tonsillar, preauricular, posterior auricular or occipital adenopathy.      Cervical: No cervical adenopathy.      Right cervical: No superficial or posterior cervical adenopathy.     Left cervical: No superficial or posterior cervical adenopathy.      Upper Body:      Right upper body: No supraclavicular adenopathy.      Left upper body: No supraclavicular adenopathy.   Skin:     General: Skin is warm and dry.      Coloration: Skin is not pale.      Findings: No erythema or rash.   Neurological:      Mental Status: She is alert and oriented to person, place, and time.      Deep Tendon Reflexes: Reflexes are normal and symmetric.   Psychiatric:         Behavior: Behavior normal.         Thought Content: Thought content normal.         Judgment: Judgment normal.         Result:   Mammo Digital Diagnostic Bilat w/ Navjot  US Breast Left Limited     History:  Patient is 70 y.o. and is seen for a diagnostic mammogram.     Films Compared:  Compared to: 05/29/2019 Mammo Digital Diagnostic Bilat w/ Navjot and 05/16/2018 Mammo Digital Diagnostic Bilat with Tomosynthesis_CAD     Findings:  This procedure was performed using tomosynthesis. Computer-aided detection was utilized in the interpretation of this examination.  The breasts are heterogeneously dense, which may obscure small masses.      Left  Mammo Digital Diagnostic Bilat w/ Navjot  There is an 11 mm oval mass with spiculated margins seen in the upper outer quadrant of the left breast in the posterior depth.      US Breast Left Limited  There is an irregularly shaped, non-parallel, hypoechoic mass with microlobulated margins with shadowing seen in the left breast at 2 o'clock, 10 cm from the nipple.      No suspicious left axillary adenopathy demonstrated.      Right     Mammo Digital Diagnostic Bilat w/ Navjot  There is no evidence of suspicious masses,  calcifications, or other abnormal findings in the right breast.     Impression:  Left  Mass: Left breast 11 mm mass at the upper outer posterior position. Assessment: 5 - Highly suggestive of malignancy. Biopsy is recommended.      Right  There is no mammographic or sonographic evidence of malignancy in the right breast.     BI-RADS Category:   Overall: 5 - Highly Suggestive of Malignancy     Recommendation:  Ultrasound guided Biopsy is recommended.     PET/CT:  Impression:  New diagnosis of left breast carcinoma.  No evidence of FDG avid regional lymphadenopathy or distant metastatic disease.      Final Pathologic Diagnosis 1.  Left axilla, sentinel lymph node #1, excisional biopsy: 3 benign lymph   nodes, negative for metastatic carcinoma (0/3).          Confirmed by negative immunohistochemical staining with cytokeratins   AE1/AE3, CAM 5.2 and wide spectrum keratin with          satisfactory positive and negative controls.   2.  Left axilla, sentinel lymph node #2, excisional biopsy: 1 benign lymph   node, negative for metastatic carcinoma (0/1).          Confirmed by negative immunohistochemical staining with cytokeratins   AE1/AE3, CAM 5.2 and wide spectrum keratin with          satisfactory positive and negative controls.   3.  Left axilla, sentinel lymph node #3, excisional biopsy: 1 benign lymph   node, negative for metastatic carcinoma (0/1).          Confirmed by negative immunohistochemical staining with cytokeratins   AE1/AE3, CAM 5.2 and wide spectrum keratin with          satisfactory positive and negative controls.   4.  Left breast, mastectomy: Invasive ductal carcinoma, multifocal with two   (2) foci measuring as follows:  Lesion #1 (Blocks 4E-F, corresponding to   prior          biopsy site/clip): 12 mm (1.2 cm) and Lesion #2          (Block 4D)  8 mm (0.8 cm) in greatest dimensions.          Microcalcifications are present associated with invasive carcinoma.          Margins: Invasive carcinoma is  present at closest approach within less   than 1 mm (within less than 0.1 cm) of the                  black/deep surgical margin (this is the smaller lesion #2   measuring 8 mm in total size).  Invasive carcinoma is                  greater than 10 mm (greater than 1 cm) from all other surgical   margins.                  Ductal carcinoma in situ is greater than 10 mm (greater than 1   cm) from all surgical margins.          Ductal carcinoma in situ, high nuclear grade with solid and   comedonecrosis patterns are present and associated with          invasive tumor.          Previous biopsy cavity site and clip are identified.          Incidental calcified fibroadenoma.          Unremarkable nipple and skin.          See synoptic report in comment section for further details.   5.  Excess skin left breast, excision: Unremarkable skin and soft tissue.          No evidence of malignancy.   Comment:  Synoptic report   Procedure:  Total mastectomy   Specimen laterality:  Left   Tumor focality:  Multifocal; 2 foci   Tumor size: Focus/Lesion #1:  Greatest dimension:  12 mm (1.2 cm)          Focus/Lesion #2:  Greatest dimension:  8 mm (0.8 cm)   Histologic type:  Invasive carcinoma of no special type (ductal).   Histologic grade: Glandular/tubular differentiation:  2          Nuclear pleomorphism:  3          Mitotic rate:  1          Overall grade:  Grade  2   Ductal carcinoma in situ:  Present Negative for extensive intraductal   component   Tumor extension:   Skin is present and uninvolved by tumor.          Nipple is present and uninvolved by tumor.          Skeletal muscle is not present for evaluation.   Margins:         - Invasive carcinoma is present at closest approach within   less than 1 mm (within less than 0.1 cm) of the                  black/deep surgical margin (this is the smaller lesion #2   measuring 8 mm in total size).  Invasive carcinoma is                  greater than 10 mm (greater than 1 cm) from  "all other surgical   margins.                  - Ductal carcinoma in situ is greater than 10 mm (greater than   1 cm) from all surgical margins.   Regional lymph nodes:                             Number examined:  5                             Number involved:  0   Treatment effect and breast:  No known presurgical therapy   Pathologic stage classification:   TNM descriptors:  "m" (multiple foci of invasive carcinoma)   Primary tumor:        pT1c:  Tumor greater than 10 mm but less than or equal to 2 mm in   greatest dimension.   Regional lymph nodes:        (sn)pN0:  No regional lymph node metastasis   Distant metastasis:        pMX:  Cannot be assessed.     Ancillary studies:   Immunostaining for lesion #1, which is the larger lesion associated with clip   and biopsy site measuring 12 mm in greatest dimension (corresponding to the   lesion seen in blocks 4 E and F):   Immunohistochemical stains are completed on this patient's previous biopsy   from the left upper outer quadrant breast biopsy from August 2020, case   number [id number], with the following results:   "BREAST BIOMARKER RESULTS   Estrogen receptor (ER): Low Positive (10% weak)   Progesterone receptor (CO): Negative (less than 1%)   HER2 IHC: Equivocal (Score 2+), HER2 FISH is pending and will be reported in a   supplemental   Ki-67 proliferation index: 50%   SUPPLEMENTAL (2):   HER2, Breast Tumor, FISH, Tissue   Result Summary:   Negative"   Immunohistochemical staining for lesion #2, which is the smaller calcified 8   mm lesion represented Block 4 D will be completed for hormonal markers and   results will follow in supplemental report.     Tumor Blocks for possible Future Studies:  4E-F (Lesion #1); 4D (Lesion #2)           Assessment & Plan:  71 y/o female s/p left breast mastectomy with sentinel lymph node biopsy      Incision with dehiscence/breakdown  DC wound VAC, silver nitrate applied to granulation tissue, daily dressing changes with " gauze  Follow-up in 1 week for reapplication of silver nitrate      Port placement once chest wall wound healed  Risks and benefits discussed with patient including: Pain, bleeding, infection, injury to vessels, pneumothorax, need for further procedure

## 2020-12-16 ENCOUNTER — OFFICE VISIT (OUTPATIENT)
Dept: SURGERY | Facility: CLINIC | Age: 70
End: 2020-12-16
Payer: MEDICARE

## 2020-12-16 VITALS — HEIGHT: 62 IN | BODY MASS INDEX: 29.45 KG/M2 | WEIGHT: 160.06 LBS

## 2020-12-16 DIAGNOSIS — C50.312 MALIGNANT NEOPLASM OF LOWER-INNER QUADRANT OF LEFT BREAST IN FEMALE, ESTROGEN RECEPTOR POSITIVE: Primary | ICD-10-CM

## 2020-12-16 DIAGNOSIS — Z17.0 MALIGNANT NEOPLASM OF LOWER-INNER QUADRANT OF LEFT BREAST IN FEMALE, ESTROGEN RECEPTOR POSITIVE: Primary | ICD-10-CM

## 2020-12-16 PROCEDURE — 1126F AMNT PAIN NOTED NONE PRSNT: CPT | Mod: S$GLB,,, | Performed by: SURGERY

## 2020-12-16 PROCEDURE — 3008F PR BODY MASS INDEX (BMI) DOCUMENTED: ICD-10-PCS | Mod: CPTII,S$GLB,, | Performed by: SURGERY

## 2020-12-16 PROCEDURE — 1101F PT FALLS ASSESS-DOCD LE1/YR: CPT | Mod: CPTII,S$GLB,, | Performed by: SURGERY

## 2020-12-16 PROCEDURE — 99024 PR POST-OP FOLLOW-UP VISIT: ICD-10-PCS | Mod: S$GLB,,, | Performed by: SURGERY

## 2020-12-16 PROCEDURE — 3288F FALL RISK ASSESSMENT DOCD: CPT | Mod: CPTII,S$GLB,, | Performed by: SURGERY

## 2020-12-16 PROCEDURE — 99999 PR PBB SHADOW E&M-EST. PATIENT-LVL III: ICD-10-PCS | Mod: PBBFAC,,, | Performed by: SURGERY

## 2020-12-16 PROCEDURE — 99999 PR PBB SHADOW E&M-EST. PATIENT-LVL III: CPT | Mod: PBBFAC,,, | Performed by: SURGERY

## 2020-12-16 PROCEDURE — 99024 POSTOP FOLLOW-UP VISIT: CPT | Mod: S$GLB,,, | Performed by: SURGERY

## 2020-12-16 PROCEDURE — 1126F PR PAIN SEVERITY QUANTIFIED, NO PAIN PRESENT: ICD-10-PCS | Mod: S$GLB,,, | Performed by: SURGERY

## 2020-12-16 PROCEDURE — 1101F PR PT FALLS ASSESS DOC 0-1 FALLS W/OUT INJ PAST YR: ICD-10-PCS | Mod: CPTII,S$GLB,, | Performed by: SURGERY

## 2020-12-16 PROCEDURE — 3288F PR FALLS RISK ASSESSMENT DOCUMENTED: ICD-10-PCS | Mod: CPTII,S$GLB,, | Performed by: SURGERY

## 2020-12-16 PROCEDURE — 3008F BODY MASS INDEX DOCD: CPT | Mod: CPTII,S$GLB,, | Performed by: SURGERY

## 2020-12-16 RX ORDER — MELOXICAM 15 MG/1
TABLET ORAL
Qty: 30 TABLET | Refills: 5 | Status: ON HOLD | OUTPATIENT
Start: 2020-12-16 | End: 2021-10-12 | Stop reason: HOSPADM

## 2020-12-18 PROCEDURE — G0179 MD RECERTIFICATION HHA PT: HCPCS | Mod: ,,, | Performed by: SURGERY

## 2020-12-18 PROCEDURE — G0179 PR HOME HEALTH MD RECERTIFICATION: ICD-10-PCS | Mod: ,,, | Performed by: SURGERY

## 2020-12-20 NOTE — PROGRESS NOTES
Patient ID: Leia Rodriguez is a 70 y.o. female.    Chief Complaint: Follow-up      HPI:  70-year-old female s/p left mastectomy with sentinel lymph node biopsy 9/8/20 presents for wound check.  She has been undergoing wound VAC therapy with no issue.  She will need port once wound healed for adjuvant therapy.     presents to discuss surgery. In the interim she has undergone PET scan which was negative for metastatic disease. Genetic testing pending    Initially referred for recurrent left breast cancer.  She recently underwent biopsy showing invasive ductal carcinoma ER+/MT- Her 2 -.  She previously underwent a left breast lumpectomy with adjuvant radiation in 2017.      For Seema Marky:  Breast cancer follow-up - last visit was 5/29/19    Pt has a history of Stage 0 DCIS of Lt. breast.  She presented in March of 2017 when diagnostic MMG confirmed the presence of a round mass with indistinct margins in the Lt. breast at the 7 o'clock position. The mass measured 9 x 5 x 6 mm. The patient was referred to Dr. Duke and on 6/15/17 underwent wire localization lumpectomy. Pathology revealed an 8 mm focus of ductal carcinoma in situ. The tumor was low grade. There was tumor present at the anterior inferior margin. Ninety percent of the tumor cells were ER positive, 5- 10% of the tumor cells were MT positive. The patient was referred for adjuvant radiotherapy. Completed a course of partial breast irradiation to the LIQ of the Lt. breast on 8/14/17.  Currently on hormonal therapy with Arimidex that started 8/2017.  Due off 8/2022    DEXA- 6/29/18- wnl- f/u in 2 years due to AI- 6/2020 due    Risk factors identified:     Menarche at 11 y/o  G 3 P 3  First pregnancy at 16 y/o  LMP: 50's  Estrogen: none  Radiation to the neck or chest wall - has had left breast radiation  Prior breast biopsies or atypical hyperplasia- left breast lumpectomy     FH: no breast cancer, daughter cervical cancer 30's, father prostate cancer  70's-     Body mass index is 29.27 kg/m².    Review of Systems   Constitutional: Negative.  Negative for activity change and unexpected weight change.   HENT: Negative.  Negative for hearing loss, rhinorrhea and trouble swallowing.    Eyes: Negative.  Negative for discharge and visual disturbance.   Respiratory: Negative.  Negative for chest tightness and wheezing.    Cardiovascular: Negative.  Negative for chest pain and palpitations.   Gastrointestinal: Negative.  Negative for blood in stool, constipation, diarrhea and vomiting.        No reflux   Endocrine: Negative.  Negative for polydipsia and polyuria.   Genitourinary: Negative.  Negative for difficulty urinating, dysuria, hematuria and menstrual problem.   Musculoskeletal: Negative.  Negative for joint swelling and neck pain.   Skin: Negative.    Allergic/Immunologic: Negative.    Neurological: Negative.  Negative for headaches.   Hematological: Negative.  Negative for adenopathy.   Psychiatric/Behavioral: Negative.  Negative for confusion and dysphoric mood.   Breast: Pt denies any breast pain, nipple discharge, or palpable mass. No prior trauma or bruising. No breast surgeries or abnormalities.    Current Outpatient Medications   Medication Sig Dispense Refill    anastrozole (ARIMIDEX) 1 mg Tab Take 1 tablet (1 mg total) by mouth once daily. 90 tablet 3    buPROPion (WELLBUTRIN SR) 150 MG TBSR 12 hr tablet Take 1 tablet (150 mg total) by mouth 2 (two) times daily. 60 tablet 2    calcium citrate (CALCITRATE) 200 mg (950 mg) tablet Take 1 tablet by mouth once daily.      ferrous sulfate (FEOSOL) 325 mg (65 mg iron) Tab tablet Take 65 mg by mouth once daily.      hydroCHLOROthiazide (MICROZIDE) 12.5 mg capsule TAKE 1 CAPSULE (12.5 MG TOTAL) BY MOUTH ONCE DAILY. FOR HIGH BLOOD PRESSURE 30 capsule 5    iron, carbonyl, (ICAR) 15 mg/1.25 mL Susp Take by mouth.      nitrofurantoin, macrocrystal-monohydrate, (MACROBID) 100 MG capsule Take 1 capsule (100 mg  total) by mouth 2 (two) times daily. 14 capsule 0    ondansetron (ZOFRAN-ODT) 8 MG TbDL Take 1 tablet (8 mg total) by mouth every 12 (twelve) hours as needed. 30 tablet 1    prochlorperazine (COMPAZINE) 5 MG tablet Take 1 tablet (5 mg total) by mouth every 6 (six) hours as needed for Nausea. 30 tablet 1    tamsulosin (FLOMAX) 0.4 mg Cap Take 1 capsule by mouth once daily.      vitamin D 1000 units Tab Take 1,000 Units by mouth once daily.      meloxicam (MOBIC) 15 MG tablet TAKE 1 TABLET BY MOUTH EVERY DAY 30 tablet 5     No current facility-administered medications for this visit.        Review of patient's allergies indicates:  No Known Allergies    Past Medical History:   Diagnosis Date    Arthritis     EDGAR HANDS, KNEES    Blind right eye     Traumatic    Breast cancer 06/15/2017    0.8 cm Grade1 INTRADUCTAL BREAST 9 positve margin (left)    Essential hypertension     Hemorrhoids     Lipoma of abdominal wall     Obesity     Overactive bladder     Pap smear abnormality of cervix with ASCUS favoring benign     Thyroid nodule     Tobacco use disorder     Tubular adenoma of colon     Urinary incontinence        Past Surgical History:   Procedure Laterality Date    ANTERIOR VAGINAL REPAIR      BLADDER SURGERY      BREAST LUMPECTOMY Left 2017    CATARACT EXTRACTION Right      SECTION      X2    COLONOSCOPY N/A 3/8/2017    Procedure: COLONOSCOPY;  Surgeon: Tyron Paris MD;  Location: [facility];  Service: Endoscopy;  Laterality: N/A;    COLONOSCOPY N/A 2020    Procedure: COLONOSCOPY;  Surgeon: Keira Ellison MD;  Location: [facility];  Service: Endoscopy;  Laterality: N/A;    ESOPHAGOGASTRODUODENOSCOPY N/A 2020    Procedure: EGD (ESOPHAGOGASTRODUODENOSCOPY);  Surgeon: Keira Ellison MD;  Location: [facility];  Service: Endoscopy;  Laterality: N/A;  new onset iron deficiency with prior history of breast cancer    INTRALUMINAL GASTROINTESTINAL TRACT IMAGING VIA  CAPSULE N/A 10/28/2020    Procedure: IMAGING PROCEDURE, GI TRACT, INTRALUMINAL, VIA CAPSULE;  Surgeon: Finesse Jha RN;  Location: [facility];  Service: Endoscopy;  Laterality: N/A;    SENTINEL LYMPH NODE BIOPSY Left 2020    Procedure: BIOPSY, LYMPH NODE, SENTINEL;  Surgeon: Vincent Moyer MD;  Location: [facility] OR;  Service: General;  Laterality: Left;    SIMPLE MASTECTOMY Left 2020    Procedure: MASTECTOMY, SIMPLE;  Surgeon: Vincent Moyer MD;  Location: [facility] OR;  Service: General;  Laterality: Left;    THYROID LOBECTOMY Left     TOTAL ABDOMINAL HYSTERECTOMY      TUBAL LIGATION         Family History   Problem Relation Age of Onset    Hypertension Father     Cataracts Father     Prostate cancer Father 80    Cervical cancer Daughter 32    Allergic rhinitis Daughter     Cirrhosis Mother     Alcohol abuse Mother     Hypertension Daughter     Diabetes Brother     Heart attack Brother     Heart murmur Brother     No Known Problems Daughter        Social History     Socioeconomic History    Marital status:      Spouse name: Not on file    Number of children: Not on file    Years of education: Not on file    Highest education level: Not on file   Occupational History    Not on file   Social Needs    Financial resource strain: Not on file    Food insecurity     Worry: Not on file     Inability: Not on file    Transportation needs     Medical: Not on file     Non-medical: Not on file   Tobacco Use    Smoking status: Former Smoker     Packs/day: 1.00     Years: 50.00     Pack years: 50.00     Types: Cigarettes     Quit date: 2020     Years since quittin.3    Smokeless tobacco: Never Used   Substance and Sexual Activity    Alcohol use: Never     Alcohol/week: 28.0 standard drinks     Types: 28 Cans of beer per week     Frequency: 4 or more times a week     Drinks per session: 3 or 4     Binge frequency: Less than monthly    Drug use: No    Sexual activity: Never      Partners: Male   Lifestyle    Physical activity     Days per week: Not on file     Minutes per session: Not on file    Stress: Not on file   Relationships    Social connections     Talks on phone: Not on file     Gets together: Not on file     Attends Congregational service: Not on file     Active member of club or organization: Not on file     Attends meetings of clubs or organizations: Not on file     Relationship status: Not on file   Other Topics Concern    Not on file   Social History Narrative    The patient is .  She is retired from [employer].       There were no vitals filed for this visit.    Physical Exam  Constitutional:       Appearance: She is well-developed.   HENT:      Head: Normocephalic and atraumatic.      Right Ear: External ear normal.      Left Ear: External ear normal.      Mouth/Throat:      Pharynx: No oropharyngeal exudate.   Eyes:      General: No scleral icterus.        Right eye: No discharge.         Left eye: No discharge.      Conjunctiva/sclera: Conjunctivae normal.      Pupils: Pupils are equal, round, and reactive to light.   Neck:      Musculoskeletal: Normal range of motion and neck supple.      Thyroid: No thyromegaly.   Cardiovascular:      Rate and Rhythm: Normal rate and regular rhythm.      Heart sounds: Normal heart sounds.   Pulmonary:      Effort: Pulmonary effort is normal.      Breath sounds: Normal breath sounds.   Chest:      Breasts:         Right: No inverted nipple, mass, nipple discharge, skin change or tenderness.         Left: No inverted nipple, mass, nipple discharge, skin change or tenderness.       Abdominal:      General: Bowel sounds are normal.      Palpations: Abdomen is soft.   Musculoskeletal: Normal range of motion.      Right shoulder: She exhibits no crepitus and normal strength.   Lymphadenopathy:      Head:      Right side of head: No submental, submandibular, tonsillar, preauricular, posterior auricular or occipital adenopathy.       Left side of head: No submental, submandibular, tonsillar, preauricular, posterior auricular or occipital adenopathy.      Cervical: No cervical adenopathy.      Right cervical: No superficial or posterior cervical adenopathy.     Left cervical: No superficial or posterior cervical adenopathy.      Upper Body:      Right upper body: No supraclavicular adenopathy.      Left upper body: No supraclavicular adenopathy.   Skin:     General: Skin is warm and dry.      Coloration: Skin is not pale.      Findings: No erythema or rash.   Neurological:      Mental Status: She is alert and oriented to person, place, and time.      Deep Tendon Reflexes: Reflexes are normal and symmetric.   Psychiatric:         Behavior: Behavior normal.         Thought Content: Thought content normal.         Judgment: Judgment normal.         Result:   Mammo Digital Diagnostic Bilat w/ Navjot  US Breast Left Limited     History:  Patient is 70 y.o. and is seen for a diagnostic mammogram.     Films Compared:  Compared to: 05/29/2019 Mammo Digital Diagnostic Bilat w/ Navjot and 05/16/2018 Mammo Digital Diagnostic Bilat with Tomosynthesis_CAD     Findings:  This procedure was performed using tomosynthesis. Computer-aided detection was utilized in the interpretation of this examination.  The breasts are heterogeneously dense, which may obscure small masses.      Left  Mammo Digital Diagnostic Bilat w/ Navjot  There is an 11 mm oval mass with spiculated margins seen in the upper outer quadrant of the left breast in the posterior depth.      US Breast Left Limited  There is an irregularly shaped, non-parallel, hypoechoic mass with microlobulated margins with shadowing seen in the left breast at 2 o'clock, 10 cm from the nipple.      No suspicious left axillary adenopathy demonstrated.      Right     Mammo Digital Diagnostic Bilat w/ Navjot  There is no evidence of suspicious masses, calcifications, or other abnormal findings in the right  breast.     Impression:  Left  Mass: Left breast 11 mm mass at the upper outer posterior position. Assessment: 5 - Highly suggestive of malignancy. Biopsy is recommended.      Right  There is no mammographic or sonographic evidence of malignancy in the right breast.     BI-RADS Category:   Overall: 5 - Highly Suggestive of Malignancy     Recommendation:  Ultrasound guided Biopsy is recommended.     PET/CT:  Impression:  New diagnosis of left breast carcinoma.  No evidence of FDG avid regional lymphadenopathy or distant metastatic disease.      Final Pathologic Diagnosis 1.  Left axilla, sentinel lymph node #1, excisional biopsy: 3 benign lymph   nodes, negative for metastatic carcinoma (0/3).          Confirmed by negative immunohistochemical staining with cytokeratins   AE1/AE3, CAM 5.2 and wide spectrum keratin with          satisfactory positive and negative controls.   2.  Left axilla, sentinel lymph node #2, excisional biopsy: 1 benign lymph   node, negative for metastatic carcinoma (0/1).          Confirmed by negative immunohistochemical staining with cytokeratins   AE1/AE3, CAM 5.2 and wide spectrum keratin with          satisfactory positive and negative controls.   3.  Left axilla, sentinel lymph node #3, excisional biopsy: 1 benign lymph   node, negative for metastatic carcinoma (0/1).          Confirmed by negative immunohistochemical staining with cytokeratins   AE1/AE3, CAM 5.2 and wide spectrum keratin with          satisfactory positive and negative controls.   4.  Left breast, mastectomy: Invasive ductal carcinoma, multifocal with two   (2) foci measuring as follows:  Lesion #1 (Blocks 4E-F, corresponding to   prior          biopsy site/clip): 12 mm (1.2 cm) and Lesion #2          (Block 4D)  8 mm (0.8 cm) in greatest dimensions.          Microcalcifications are present associated with invasive carcinoma.          Margins: Invasive carcinoma is present at closest approach within less   than 1 mm  (within less than 0.1 cm) of the                  black/deep surgical margin (this is the smaller lesion #2   measuring 8 mm in total size).  Invasive carcinoma is                  greater than 10 mm (greater than 1 cm) from all other surgical   margins.                  Ductal carcinoma in situ is greater than 10 mm (greater than 1   cm) from all surgical margins.          Ductal carcinoma in situ, high nuclear grade with solid and   comedonecrosis patterns are present and associated with          invasive tumor.          Previous biopsy cavity site and clip are identified.          Incidental calcified fibroadenoma.          Unremarkable nipple and skin.          See synoptic report in comment section for further details.   5.  Excess skin left breast, excision: Unremarkable skin and soft tissue.          No evidence of malignancy.   Comment:  Synoptic report   Procedure:  Total mastectomy   Specimen laterality:  Left   Tumor focality:  Multifocal; 2 foci   Tumor size: Focus/Lesion #1:  Greatest dimension:  12 mm (1.2 cm)          Focus/Lesion #2:  Greatest dimension:  8 mm (0.8 cm)   Histologic type:  Invasive carcinoma of no special type (ductal).   Histologic grade: Glandular/tubular differentiation:  2          Nuclear pleomorphism:  3          Mitotic rate:  1          Overall grade:  Grade  2   Ductal carcinoma in situ:  Present Negative for extensive intraductal   component   Tumor extension:   Skin is present and uninvolved by tumor.          Nipple is present and uninvolved by tumor.          Skeletal muscle is not present for evaluation.   Margins:         - Invasive carcinoma is present at closest approach within   less than 1 mm (within less than 0.1 cm) of the                  black/deep surgical margin (this is the smaller lesion #2   measuring 8 mm in total size).  Invasive carcinoma is                  greater than 10 mm (greater than 1 cm) from all other surgical   margins.                  -  "Ductal carcinoma in situ is greater than 10 mm (greater than   1 cm) from all surgical margins.   Regional lymph nodes:                             Number examined:  5                             Number involved:  0   Treatment effect and breast:  No known presurgical therapy   Pathologic stage classification:   TNM descriptors:  "m" (multiple foci of invasive carcinoma)   Primary tumor:        pT1c:  Tumor greater than 10 mm but less than or equal to 2 mm in   greatest dimension.   Regional lymph nodes:        (sn)pN0:  No regional lymph node metastasis   Distant metastasis:        pMX:  Cannot be assessed.     Ancillary studies:   Immunostaining for lesion #1, which is the larger lesion associated with clip   and biopsy site measuring 12 mm in greatest dimension (corresponding to the   lesion seen in blocks 4 E and F):   Immunohistochemical stains are completed on this patient's previous biopsy   from the left upper outer quadrant breast biopsy from August 2020, case   number [id number], with the following results:   "BREAST BIOMARKER RESULTS   Estrogen receptor (ER): Low Positive (10% weak)   Progesterone receptor (ME): Negative (less than 1%)   HER2 IHC: Equivocal (Score 2+), HER2 FISH is pending and will be reported in a   supplemental   Ki-67 proliferation index: 50%   SUPPLEMENTAL (2):   HER2, Breast Tumor, FISH, Tissue   Result Summary:   Negative"   Immunohistochemical staining for lesion #2, which is the smaller calcified 8   mm lesion represented Block 4 D will be completed for hormonal markers and   results will follow in supplemental report.     Tumor Blocks for possible Future Studies:  4E-F (Lesion #1); 4D (Lesion #2)           Assessment & Plan:  71 y/o female s/p left breast mastectomy with sentinel lymph node biopsy      Incision with dehiscence/breakdown  DC wound VAC, silver nitrate applied to granulation tissue, daily dressing changes with gauze  Follow-up in 1 week for reapplication of silver " nitrate      Port placement once chest wall wound healed  Risks and benefits discussed with patient including: Pain, bleeding, infection, injury to vessels, pneumothorax, need for further procedure

## 2020-12-23 ENCOUNTER — OFFICE VISIT (OUTPATIENT)
Dept: SURGERY | Facility: CLINIC | Age: 70
End: 2020-12-23
Payer: MEDICARE

## 2020-12-23 ENCOUNTER — TELEPHONE (OUTPATIENT)
Dept: SURGERY | Facility: CLINIC | Age: 70
End: 2020-12-23

## 2020-12-23 ENCOUNTER — INFUSION (OUTPATIENT)
Dept: INFUSION THERAPY | Facility: HOSPITAL | Age: 70
End: 2020-12-23
Attending: INTERNAL MEDICINE
Payer: MEDICARE

## 2020-12-23 ENCOUNTER — DOCUMENT SCAN (OUTPATIENT)
Dept: HOME HEALTH SERVICES | Facility: HOSPITAL | Age: 70
End: 2020-12-23
Payer: MEDICARE

## 2020-12-23 VITALS
BODY MASS INDEX: 29.11 KG/M2 | TEMPERATURE: 98 F | SYSTOLIC BLOOD PRESSURE: 127 MMHG | RESPIRATION RATE: 18 BRPM | WEIGHT: 159.19 LBS | HEART RATE: 95 BPM | DIASTOLIC BLOOD PRESSURE: 69 MMHG | OXYGEN SATURATION: 98 %

## 2020-12-23 VITALS
DIASTOLIC BLOOD PRESSURE: 75 MMHG | SYSTOLIC BLOOD PRESSURE: 140 MMHG | BODY MASS INDEX: 29.3 KG/M2 | HEIGHT: 62 IN | TEMPERATURE: 98 F | WEIGHT: 159.19 LBS

## 2020-12-23 DIAGNOSIS — Z17.0 MALIGNANT NEOPLASM OF LOWER-INNER QUADRANT OF LEFT BREAST IN FEMALE, ESTROGEN RECEPTOR POSITIVE: Primary | ICD-10-CM

## 2020-12-23 DIAGNOSIS — C50.312 MALIGNANT NEOPLASM OF LOWER-INNER QUADRANT OF LEFT BREAST IN FEMALE, ESTROGEN RECEPTOR POSITIVE: Primary | ICD-10-CM

## 2020-12-23 PROCEDURE — 1126F PR PAIN SEVERITY QUANTIFIED, NO PAIN PRESENT: ICD-10-PCS | Mod: S$GLB,,, | Performed by: SURGERY

## 2020-12-23 PROCEDURE — 3008F PR BODY MASS INDEX (BMI) DOCUMENTED: ICD-10-PCS | Mod: CPTII,S$GLB,, | Performed by: SURGERY

## 2020-12-23 PROCEDURE — 99024 PR POST-OP FOLLOW-UP VISIT: ICD-10-PCS | Mod: S$GLB,,, | Performed by: SURGERY

## 2020-12-23 PROCEDURE — 99999 PR PBB SHADOW E&M-EST. PATIENT-LVL III: CPT | Mod: PBBFAC,,, | Performed by: SURGERY

## 2020-12-23 PROCEDURE — 99024 POSTOP FOLLOW-UP VISIT: CPT | Mod: S$GLB,,, | Performed by: SURGERY

## 2020-12-23 PROCEDURE — 96401 CHEMO ANTI-NEOPL SQ/IM: CPT

## 2020-12-23 PROCEDURE — 1126F AMNT PAIN NOTED NONE PRSNT: CPT | Mod: S$GLB,,, | Performed by: SURGERY

## 2020-12-23 PROCEDURE — 3008F BODY MASS INDEX DOCD: CPT | Mod: CPTII,S$GLB,, | Performed by: SURGERY

## 2020-12-23 PROCEDURE — 63600175 PHARM REV CODE 636 W HCPCS: Mod: TB | Performed by: INTERNAL MEDICINE

## 2020-12-23 PROCEDURE — 99999 PR PBB SHADOW E&M-EST. PATIENT-LVL III: ICD-10-PCS | Mod: PBBFAC,,, | Performed by: SURGERY

## 2020-12-23 RX ADMIN — TRASTUZUMAB AND HYALURONIDASE-OYSK 600 MG: 600; 10000 INJECTION, SOLUTION SUBCUTANEOUS at 01:12

## 2020-12-23 NOTE — DISCHARGE INSTRUCTIONS
[facility]  [address] [facility]  [address]  [phone] phone     [phone] fax  Hours of Operation: Monday- Friday 8:00am- 5:00pm  After hours phone  [phone]  Hematology / Oncology Physicians on call      Dr. Pradeep Escalante, LIANA Barnard NP Tyesha Taylor, NP    Please call with any concerns regarding your appointment today.FALL PREVENTION   Falls often occur due to slipping, tripping or losing your balance. Here are ways to reduce your risk of falling again.   Was there anything that caused your fall that can be fixed, removed or replaced?   Make your home safe by keeping walkways clear of objects you may trip over.   Use non-slip pads under rugs.   Do not walk in poorly lit areas.   Do not stand on chairs or wobbly ladders.   Use caution when reaching overhead or looking upward. This position can cause a loss of balance.   Be sure your shoes fit properly, have non-slip bottoms and are in good condition.   Be cautious when going up and down stairs, curbs, and when walking on uneven sidewalks.   If your balance is poor, consider using a cane or walker.   If your fall was related to alcohol use, stop or limit alcohol intake.   If your fall was related to use of sleeping medicines, talk to your doctor about this. You may need to reduce your dosage at bedtime if you awaken during the night to go to the bathroom.   To reduce the need for nighttime bathroom trips:   Avoid drinking fluids for several hours before going to bed   Empty your bladder before going to bed   Men can keep a urinal at the bedside   © 0514-4192 [employer], [address] [place] [address], [place] [address]. All rights reserved. This information is not intended as a substitute for professional medical care. Always follow your healthcare professional's instructions.

## 2020-12-23 NOTE — TELEPHONE ENCOUNTER
----- Message from Stephanie Velasco sent at 12/23/2020  7:37 AM CST -----  .Type:  Patient Returning Call    Who Called:ms huerta-daughter  Who Left Message for Patient:  Does the patient know what this is regarding?:appt today  Would the patient rather a call back or a response via [facility]? call  Best Call Back Number:.[phone]    Additional Information:

## 2020-12-23 NOTE — NURSING
1315: Herceptin Injection given without difficulties.Bandaid applied. Patient instructed to stay in the clinic for 15 minutes. Patient verbalized understanding and will notify nurse with any complaints.

## 2020-12-24 NOTE — PROGRESS NOTES
Patient ID: Leia Rodriguez is a 70 y.o. female.    Chief Complaint: Post-op Evaluation      HPI:  70-year-old female s/p left mastectomy with sentinel lymph node biopsy 9/8/20 presents for wound check.  Presents for silver nitrated application.  She will need port once wound healed for adjuvant therapy.     presents to discuss surgery. In the interim she has undergone PET scan which was negative for metastatic disease. Genetic testing pending    Initially referred for recurrent left breast cancer.  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  She previously underwent a left breast lumpectomy with adjuvant radiation in 2017.      For Seema Negro:  Breast cancer follow-up - last visit was 5/29/19    Pt has a history of Stage 0 DCIS of Lt. breast.  She presented in March of 2017 when diagnostic MMG confirmed the presence of a round mass with indistinct margins in the Lt. breast at the 7 o'clock position. The mass measured 9 x 5 x 6 mm. The patient was referred to Dr. Duke and on 6/15/17 underwent wire localization lumpectomy. Pathology revealed an 8 mm focus of ductal carcinoma in situ. The tumor was low grade. There was tumor present at the anterior inferior margin. Ninety percent of the tumor cells were ER positive, 5- 10% of the tumor cells were OH positive. The patient was referred for adjuvant radiotherapy. Completed a course of partial breast irradiation to the LIQ of the Lt. breast on 8/14/17.  Currently on hormonal therapy with Arimidex that started 8/2017.  Due off 8/2022    DEXA- 6/29/18- wnl- f/u in 2 years due to AI- 6/2020 due    Risk factors identified:     Menarche at 9 y/o  G 3 P 3  First pregnancy at 16 y/o  LMP: 50's  Estrogen: none  Radiation to the neck or chest wall - has had left breast radiation  Prior breast biopsies or atypical hyperplasia- left breast lumpectomy     FH: no breast cancer, daughter cervical cancer 30's, father prostate cancer 70's-      Body mass index is 29.11 kg/m².    Review of Systems   Constitutional: Negative.  Negative for activity change and unexpected weight change.   HENT: Negative.  Negative for hearing loss, rhinorrhea and trouble swallowing.    Eyes: Negative.  Negative for discharge and visual disturbance.   Respiratory: Negative.  Negative for chest tightness and wheezing.    Cardiovascular: Negative.  Negative for chest pain and palpitations.   Gastrointestinal: Negative.  Negative for blood in stool, constipation, diarrhea and vomiting.        No reflux   Endocrine: Negative.  Negative for polydipsia and polyuria.   Genitourinary: Negative.  Negative for difficulty urinating, dysuria, hematuria and menstrual problem.   Musculoskeletal: Negative.  Negative for joint swelling and neck pain.   Skin: Negative.    Allergic/Immunologic: Negative.    Neurological: Negative.  Negative for headaches.   Hematological: Negative.  Negative for adenopathy.   Psychiatric/Behavioral: Negative.  Negative for confusion and dysphoric mood.   Breast: Pt denies any breast pain, nipple discharge, or palpable mass. No prior trauma or bruising. No breast surgeries or abnormalities.    Current Outpatient Medications   Medication Sig Dispense Refill    anastrozole (ARIMIDEX) 1 mg Tab Take 1 tablet (1 mg total) by mouth once daily. 90 tablet 3    buPROPion (WELLBUTRIN SR) 150 MG TBSR 12 hr tablet Take 1 tablet (150 mg total) by mouth 2 (two) times daily. 60 tablet 2    calcium citrate (CALCITRATE) 200 mg (950 mg) tablet Take 1 tablet by mouth once daily.      ferrous sulfate (FEOSOL) 325 mg (65 mg iron) Tab tablet Take 65 mg by mouth once daily.      hydroCHLOROthiazide (MICROZIDE) 12.5 mg capsule TAKE 1 CAPSULE (12.5 MG TOTAL) BY MOUTH ONCE DAILY. FOR HIGH BLOOD PRESSURE 30 capsule 5    iron, carbonyl, (ICAR) 15 mg/1.25 mL Susp Take by mouth.      meloxicam (MOBIC) 15 MG tablet TAKE 1 TABLET BY MOUTH EVERY DAY 30 tablet 5    ondansetron  (ZOFRAN-ODT) 8 MG TbDL Take 1 tablet (8 mg total) by mouth every 12 (twelve) hours as needed. 30 tablet 1    prochlorperazine (COMPAZINE) 5 MG tablet Take 1 tablet (5 mg total) by mouth every 6 (six) hours as needed for Nausea. 30 tablet 1    tamsulosin (FLOMAX) 0.4 mg Cap Take 1 capsule by mouth once daily.      vitamin D 1000 units Tab Take 1,000 Units by mouth once daily.      nitrofurantoin, macrocrystal-monohydrate, (MACROBID) 100 MG capsule Take 1 capsule (100 mg total) by mouth 2 (two) times daily. 14 capsule 0     No current facility-administered medications for this visit.        Review of patient's allergies indicates:  No Known Allergies    Past Medical History:   Diagnosis Date    Arthritis     EDGAR HANDS, KNEES    Blind right eye     Traumatic    Breast cancer 06/15/2017    0.8 cm Grade1 INTRADUCTAL BREAST 9 positve margin (left)    Essential hypertension     Hemorrhoids     Lipoma of abdominal wall     Obesity     Overactive bladder     Pap smear abnormality of cervix with ASCUS favoring benign     Thyroid nodule     Tobacco use disorder     Tubular adenoma of colon     Urinary incontinence        Past Surgical History:   Procedure Laterality Date    ANTERIOR VAGINAL REPAIR      BLADDER SURGERY      BREAST LUMPECTOMY Left 2017    CATARACT EXTRACTION Right      SECTION      X2    COLONOSCOPY N/A 3/8/2017    Procedure: COLONOSCOPY;  Surgeon: Tyron Paris MD;  Location: [facility];  Service: Endoscopy;  Laterality: N/A;    COLONOSCOPY N/A 2020    Procedure: COLONOSCOPY;  Surgeon: Keira Ellison MD;  Location: [facility];  Service: Endoscopy;  Laterality: N/A;    ESOPHAGOGASTRODUODENOSCOPY N/A 2020    Procedure: EGD (ESOPHAGOGASTRODUODENOSCOPY);  Surgeon: Keira Ellison MD;  Location: [facility];  Service: Endoscopy;  Laterality: N/A;  new onset iron deficiency with prior history of breast cancer    INTRALUMINAL GASTROINTESTINAL TRACT IMAGING VIA  CAPSULE N/A 10/28/2020    Procedure: IMAGING PROCEDURE, GI TRACT, INTRALUMINAL, VIA CAPSULE;  Surgeon: Finesse Jha RN;  Location[facility];  Service: Endoscopy;  Laterality: N/A;    SENTINEL LYMPH NODE BIOPSY Left 2020    Procedure: BIOPSY, LYMPH NODE, SENTINEL;  Surgeon: Vincent Moyer MD;  Location: [facility];  Service: General;  Laterality: Left;    SIMPLE MASTECTOMY Left 2020    Procedure: MASTECTOMY, SIMPLE;  Surgeon: Vincent Moyer MD;  Location: [facility];  Service: General;  Laterality: Left;    THYROID LOBECTOMY Left     TOTAL ABDOMINAL HYSTERECTOMY      TUBAL LIGATION         Family History   Problem Relation Age of Onset    Hypertension Father     Cataracts Father     Prostate cancer Father 80    Cervical cancer Daughter 32    Allergic rhinitis Daughter     Cirrhosis Mother     Alcohol abuse Mother     Hypertension Daughter     Diabetes Brother     Heart attack Brother     Heart murmur Brother     No Known Problems Daughter        Social History     Socioeconomic History    Marital status:      Spouse name: Not on file    Number of children: Not on file    Years of education: Not on file    Highest education level: Not on file   Occupational History    Not on file   Social Needs    Financial resource strain: Not on file    Food insecurity     Worry: Not on file     Inability: Not on file    Transportation needs     Medical: Not on file     Non-medical: Not on file   Tobacco Use    Smoking status: Former Smoker     Packs/day: 1.00     Years: 50.00     Pack years: 50.00     Types: Cigarettes     Quit date: 2020     Years since quittin.3    Smokeless tobacco: Never Used   Substance and Sexual Activity    Alcohol use: Never     Alcohol/week: 28.0 standard drinks     Types: 28 Cans of beer per week     Frequency: 4 or more times a week     Drinks per session: 3 or 4     Binge frequency: Less than monthly    Drug use: No    Sexual activity: Never      Partners: Male   Lifestyle    Physical activity     Days per week: Not on file     Minutes per session: Not on file    Stress: Not on file   Relationships    Social connections     Talks on phone: Not on file     Gets together: Not on file     Attends Hinduism service: Not on file     Active member of club or organization: Not on file     Attends meetings of clubs or organizations: Not on file     Relationship status: Not on file   Other Topics Concern    Not on file   Social History Narrative    The patient is .  She is retired from [employer].       Vitals:    12/23/20 0949   BP: (!) 140/75   Temp: 97.8 °F (36.6 °C)       Physical Exam  Constitutional:       Appearance: She is well-developed.   HENT:      Head: Normocephalic and atraumatic.      Right Ear: External ear normal.      Left Ear: External ear normal.      Mouth/Throat:      Pharynx: No oropharyngeal exudate.   Eyes:      General: No scleral icterus.        Right eye: No discharge.         Left eye: No discharge.      Conjunctiva/sclera: Conjunctivae normal.      Pupils: Pupils are equal, round, and reactive to light.   Neck:      Musculoskeletal: Normal range of motion and neck supple.      Thyroid: No thyromegaly.   Cardiovascular:      Rate and Rhythm: Normal rate and regular rhythm.      Heart sounds: Normal heart sounds.   Pulmonary:      Effort: Pulmonary effort is normal.      Breath sounds: Normal breath sounds.   Chest:      Breasts:         Right: No inverted nipple, mass, nipple discharge, skin change or tenderness.         Left: No inverted nipple, mass, nipple discharge, skin change or tenderness.       Abdominal:      General: Bowel sounds are normal.      Palpations: Abdomen is soft.   Musculoskeletal: Normal range of motion.      Right shoulder: She exhibits no crepitus and normal strength.   Lymphadenopathy:      Head:      Right side of head: No submental, submandibular, tonsillar, preauricular, posterior auricular or  occipital adenopathy.      Left side of head: No submental, submandibular, tonsillar, preauricular, posterior auricular or occipital adenopathy.      Cervical: No cervical adenopathy.      Right cervical: No superficial or posterior cervical adenopathy.     Left cervical: No superficial or posterior cervical adenopathy.      Upper Body:      Right upper body: No supraclavicular adenopathy.      Left upper body: No supraclavicular adenopathy.   Skin:     General: Skin is warm and dry.      Coloration: Skin is not pale.      Findings: No erythema or rash.   Neurological:      Mental Status: She is alert and oriented to person, place, and time.      Deep Tendon Reflexes: Reflexes are normal and symmetric.   Psychiatric:         Behavior: Behavior normal.         Thought Content: Thought content normal.         Judgment: Judgment normal.         Result:   Mammo Digital Diagnostic Bilat w/ Navjot  US Breast Left Limited     History:  Patient is 70 y.o. and is seen for a diagnostic mammogram.     Films Compared:  Compared to: 05/29/2019 Mammo Digital Diagnostic Bilat w/ Navjot and 05/16/2018 Mammo Digital Diagnostic Bilat with Tomosynthesis_CAD     Findings:  This procedure was performed using tomosynthesis. Computer-aided detection was utilized in the interpretation of this examination.  The breasts are heterogeneously dense, which may obscure small masses.      Left  Mammo Digital Diagnostic Bilat w/ Navjot  There is an 11 mm oval mass with spiculated margins seen in the upper outer quadrant of the left breast in the posterior depth.      US Breast Left Limited  There is an irregularly shaped, non-parallel, hypoechoic mass with microlobulated margins with shadowing seen in the left breast at 2 o'clock, 10 cm from the nipple.      No suspicious left axillary adenopathy demonstrated.      Right     Mammo Digital Diagnostic Bilat w/ Navjot  There is no evidence of suspicious masses, calcifications, or other abnormal findings in  the right breast.     Impression:  Left  Mass: Left breast 11 mm mass at the upper outer posterior position. Assessment: 5 - Highly suggestive of malignancy. Biopsy is recommended.      Right  There is no mammographic or sonographic evidence of malignancy in the right breast.     BI-RADS Category:   Overall: 5 - Highly Suggestive of Malignancy     Recommendation:  Ultrasound guided Biopsy is recommended.     PET/CT:  Impression:  New diagnosis of left breast carcinoma.  No evidence of FDG avid regional lymphadenopathy or distant metastatic disease.      Final Pathologic Diagnosis 1.  Left axilla, sentinel lymph node #1, excisional biopsy: 3 benign lymph   nodes, negative for metastatic carcinoma (0/3).          Confirmed by negative immunohistochemical staining with cytokeratins   AE1/AE3, CAM 5.2 and wide spectrum keratin with          satisfactory positive and negative controls.   2.  Left axilla, sentinel lymph node #2, excisional biopsy: 1 benign lymph   node, negative for metastatic carcinoma (0/1).          Confirmed by negative immunohistochemical staining with cytokeratins   AE1/AE3, CAM 5.2 and wide spectrum keratin with          satisfactory positive and negative controls.   3.  Left axilla, sentinel lymph node #3, excisional biopsy: 1 benign lymph   node, negative for metastatic carcinoma (0/1).          Confirmed by negative immunohistochemical staining with cytokeratins   AE1/AE3, CAM 5.2 and wide spectrum keratin with          satisfactory positive and negative controls.   4.  Left breast, mastectomy: Invasive ductal carcinoma, multifocal with two   (2) foci measuring as follows:  Lesion #1 (Blocks 4E-F, corresponding to   prior          biopsy site/clip): 12 mm (1.2 cm) and Lesion #2          (Block 4D)  8 mm (0.8 cm) in greatest dimensions.          Microcalcifications are present associated with invasive carcinoma.          Margins: Invasive carcinoma is present at closest approach within less    than 1 mm (within less than 0.1 cm) of the                  black/deep surgical margin (this is the smaller lesion #2   measuring 8 mm in total size).  Invasive carcinoma is                  greater than 10 mm (greater than 1 cm) from all other surgical   margins.                  Ductal carcinoma in situ is greater than 10 mm (greater than 1   cm) from all surgical margins.          Ductal carcinoma in situ, high nuclear grade with solid and   comedonecrosis patterns are present and associated with          invasive tumor.          Previous biopsy cavity site and clip are identified.          Incidental calcified fibroadenoma.          Unremarkable nipple and skin.          See synoptic report in comment section for further details.   5.  Excess skin left breast, excision: Unremarkable skin and soft tissue.          No evidence of malignancy.   Comment:  Synoptic report   Procedure:  Total mastectomy   Specimen laterality:  Left   Tumor focality:  Multifocal; 2 foci   Tumor size: Focus/Lesion #1:  Greatest dimension:  12 mm (1.2 cm)          Focus/Lesion #2:  Greatest dimension:  8 mm (0.8 cm)   Histologic type:  Invasive carcinoma of no special type (ductal).   Histologic grade: Glandular/tubular differentiation:  2          Nuclear pleomorphism:  3          Mitotic rate:  1          Overall grade:  Grade  2   Ductal carcinoma in situ:  Present Negative for extensive intraductal   component   Tumor extension:   Skin is present and uninvolved by tumor.          Nipple is present and uninvolved by tumor.          Skeletal muscle is not present for evaluation.   Margins:         - Invasive carcinoma is present at closest approach within   less than 1 mm (within less than 0.1 cm) of the                  black/deep surgical margin (this is the smaller lesion #2   measuring 8 mm in total size).  Invasive carcinoma is                  greater than 10 mm (greater than 1 cm) from all other surgical   margins.              "     - Ductal carcinoma in situ is greater than 10 mm (greater than   1 cm) from all surgical margins.   Regional lymph nodes:                             Number examined:  5                             Number involved:  0   Treatment effect and breast:  No known presurgical therapy   Pathologic stage classification:   TNM descriptors:  "m" (multiple foci of invasive carcinoma)   Primary tumor:        pT1c:  Tumor greater than 10 mm but less than or equal to 2 mm in   greatest dimension.   Regional lymph nodes:        (sn)pN0:  No regional lymph node metastasis   Distant metastasis:        pMX:  Cannot be assessed.     Ancillary studies:   Immunostaining for lesion #1, which is the larger lesion associated with clip   and biopsy site measuring 12 mm in greatest dimension (corresponding to the   lesion seen in blocks 4 E and F):   Immunohistochemical stains are completed on this patient's previous biopsy   from the left upper outer quadrant breast biopsy from August 2020, case   number [id number], with the following results:   "BREAST BIOMARKER RESULTS   Estrogen receptor (ER): Low Positive (10% weak)   Progesterone receptor (MN): Negative (less than 1%)   HER2 IHC: Equivocal (Score 2+), HER2 FISH is pending and will be reported in a   supplemental   Ki-67 proliferation index: 50%   SUPPLEMENTAL (2):   HER2, Breast Tumor, FISH, Tissue   Result Summary:   Negative"   Immunohistochemical staining for lesion #2, which is the smaller calcified 8   mm lesion represented Block 4 D will be completed for hormonal markers and   results will follow in supplemental report.     Tumor Blocks for possible Future Studies:  4E-F (Lesion #1); 4D (Lesion #2)           Assessment & Plan:  71 y/o female s/p left breast mastectomy with sentinel lymph node biopsy      silver nitrate applied to granulation tissue, daily dressing changes with gauze  Follow-up in 1 week for reapplication of silver nitrate      Port placement once chest " wall wound healed  Risks and benefits discussed with patient including: Pain, bleeding, infection, injury to vessels, pneumothorax, need for further procedure

## 2020-12-30 ENCOUNTER — OFFICE VISIT (OUTPATIENT)
Dept: SURGERY | Facility: CLINIC | Age: 70
End: 2020-12-30
Payer: MEDICARE

## 2020-12-30 VITALS
SYSTOLIC BLOOD PRESSURE: 140 MMHG | DIASTOLIC BLOOD PRESSURE: 63 MMHG | WEIGHT: 159 LBS | HEIGHT: 62 IN | TEMPERATURE: 98 F | HEART RATE: 88 BPM | BODY MASS INDEX: 29.26 KG/M2

## 2020-12-30 DIAGNOSIS — Z17.0 MALIGNANT NEOPLASM OF LOWER-INNER QUADRANT OF LEFT BREAST IN FEMALE, ESTROGEN RECEPTOR POSITIVE: Primary | ICD-10-CM

## 2020-12-30 DIAGNOSIS — C50.312 MALIGNANT NEOPLASM OF LOWER-INNER QUADRANT OF LEFT BREAST IN FEMALE, ESTROGEN RECEPTOR POSITIVE: Primary | ICD-10-CM

## 2020-12-30 PROCEDURE — 3008F BODY MASS INDEX DOCD: CPT | Mod: CPTII,S$GLB,, | Performed by: SURGERY

## 2020-12-30 PROCEDURE — 3008F PR BODY MASS INDEX (BMI) DOCUMENTED: ICD-10-PCS | Mod: CPTII,S$GLB,, | Performed by: SURGERY

## 2020-12-30 PROCEDURE — 1126F PR PAIN SEVERITY QUANTIFIED, NO PAIN PRESENT: ICD-10-PCS | Mod: S$GLB,,, | Performed by: SURGERY

## 2020-12-30 PROCEDURE — 99999 PR PBB SHADOW E&M-EST. PATIENT-LVL IV: CPT | Mod: PBBFAC,,, | Performed by: SURGERY

## 2020-12-30 PROCEDURE — 99024 PR POST-OP FOLLOW-UP VISIT: ICD-10-PCS | Mod: S$GLB,,, | Performed by: SURGERY

## 2020-12-30 PROCEDURE — 1126F AMNT PAIN NOTED NONE PRSNT: CPT | Mod: S$GLB,,, | Performed by: SURGERY

## 2020-12-30 PROCEDURE — 99999 PR PBB SHADOW E&M-EST. PATIENT-LVL IV: ICD-10-PCS | Mod: PBBFAC,,, | Performed by: SURGERY

## 2020-12-30 PROCEDURE — 99024 POSTOP FOLLOW-UP VISIT: CPT | Mod: S$GLB,,, | Performed by: SURGERY

## 2020-12-31 NOTE — PROGRESS NOTES
Patient ID: Leia Rodriguez is a 70 y.o. female.    Chief Complaint: Post-op Evaluation      HPI:  70-year-old female s/p left mastectomy with sentinel lymph node biopsy 9/8/20 presents for wound check.  Presents for silver nitrate application.  She is doing well and reports wound decreasing in size     presents to discuss surgery. In the interim she has undergone PET scan which was negative for metastatic disease. Genetic testing pending    Initially referred for recurrent left breast cancer.  She recently underwent biopsy showing invasive ductal carcinoma ER+/NM- Her 2 -.  She previously underwent a left breast lumpectomy with adjuvant radiation in 2017.      For Seema Negro:  Breast cancer follow-up - last visit was 5/29/19    Pt has a history of Stage 0 DCIS of Lt. breast.  She presented in March of 2017 when diagnostic MMG confirmed the presence of a round mass with indistinct margins in the Lt. breast at the 7 o'clock position. The mass measured 9 x 5 x 6 mm. The patient was referred to Dr. Duke and on 6/15/17 underwent wire localization lumpectomy. Pathology revealed an 8 mm focus of ductal carcinoma in situ. The tumor was low grade. There was tumor present at the anterior inferior margin. Ninety percent of the tumor cells were ER positive, 5- 10% of the tumor cells were NM positive. The patient was referred for adjuvant radiotherapy. Completed a course of partial breast irradiation to the LIQ of the Lt. breast on 8/14/17.  Currently on hormonal therapy with Arimidex that started 8/2017.  Due off 8/2022    DEXA- 6/29/18- wnl- f/u in 2 years due to AI- 6/2020 due    Risk factors identified:     Menarche at 11 y/o  G 3 P 3  First pregnancy at 18 y/o  LMP: 50's  Estrogen: none  Radiation to the neck or chest wall - has had left breast radiation  Prior breast biopsies or atypical hyperplasia- left breast lumpectomy     FH: no breast cancer, daughter cervical cancer 30's, father prostate cancer 70's-     Body  mass index is 29.08 kg/m².    Review of Systems   Constitutional: Negative.  Negative for activity change and unexpected weight change.   HENT: Negative.  Negative for hearing loss, rhinorrhea and trouble swallowing.    Eyes: Negative.  Negative for discharge and visual disturbance.   Respiratory: Negative.  Negative for chest tightness and wheezing.    Cardiovascular: Negative.  Negative for chest pain and palpitations.   Gastrointestinal: Negative.  Negative for blood in stool, constipation, diarrhea and vomiting.        No reflux   Endocrine: Negative.  Negative for polydipsia and polyuria.   Genitourinary: Negative.  Negative for difficulty urinating, dysuria, hematuria and menstrual problem.   Musculoskeletal: Negative.  Negative for joint swelling and neck pain.   Skin: Negative.    Allergic/Immunologic: Negative.    Neurological: Negative.  Negative for headaches.   Hematological: Negative.  Negative for adenopathy.   Psychiatric/Behavioral: Negative.  Negative for confusion and dysphoric mood.   Breast: Pt denies any breast pain, nipple discharge, or palpable mass. No prior trauma or bruising. No breast surgeries or abnormalities.    Current Outpatient Medications   Medication Sig Dispense Refill    anastrozole (ARIMIDEX) 1 mg Tab Take 1 tablet (1 mg total) by mouth once daily. 90 tablet 3    buPROPion (WELLBUTRIN SR) 150 MG TBSR 12 hr tablet Take 1 tablet (150 mg total) by mouth 2 (two) times daily. 60 tablet 2    calcium citrate (CALCITRATE) 200 mg (950 mg) tablet Take 1 tablet by mouth once daily.      ferrous sulfate (FEOSOL) 325 mg (65 mg iron) Tab tablet Take 65 mg by mouth once daily.      hydroCHLOROthiazide (MICROZIDE) 12.5 mg capsule TAKE 1 CAPSULE (12.5 MG TOTAL) BY MOUTH ONCE DAILY. FOR HIGH BLOOD PRESSURE 30 capsule 5    iron, carbonyl, (ICAR) 15 mg/1.25 mL Susp Take by mouth.      meloxicam (MOBIC) 15 MG tablet TAKE 1 TABLET BY MOUTH EVERY DAY 30 tablet 5    nitrofurantoin,  macrocrystal-monohydrate, (MACROBID) 100 MG capsule Take 1 capsule (100 mg total) by mouth 2 (two) times daily. 14 capsule 0    ondansetron (ZOFRAN-ODT) 8 MG TbDL Take 1 tablet (8 mg total) by mouth every 12 (twelve) hours as needed. 30 tablet 1    prochlorperazine (COMPAZINE) 5 MG tablet Take 1 tablet (5 mg total) by mouth every 6 (six) hours as needed for Nausea. 30 tablet 1    tamsulosin (FLOMAX) 0.4 mg Cap Take 1 capsule by mouth once daily.      vitamin D 1000 units Tab Take 1,000 Units by mouth once daily.       No current facility-administered medications for this visit.        Review of patient's allergies indicates:  No Known Allergies    Past Medical History:   Diagnosis Date    Arthritis     EDGAR HANDS, KNEES    Blind right eye     Traumatic    Breast cancer 06/15/2017    0.8 cm Grade1 INTRADUCTAL BREAST 9 positve margin (left)    Essential hypertension     Hemorrhoids     Lipoma of abdominal wall     Obesity     Overactive bladder     Pap smear abnormality of cervix with ASCUS favoring benign     Thyroid nodule     Tobacco use disorder     Tubular adenoma of colon     Urinary incontinence        Past Surgical History:   Procedure Laterality Date    ANTERIOR VAGINAL REPAIR      BLADDER SURGERY      BREAST LUMPECTOMY Left 2017    CATARACT EXTRACTION Right      SECTION      X2    COLONOSCOPY N/A 3/8/2017    Procedure: COLONOSCOPY;  Surgeon: Tyron Paris MD;  Location: [facility];  Service: Endoscopy;  Laterality: N/A;    COLONOSCOPY N/A 2020    Procedure: COLONOSCOPY;  Surgeon: Keira Ellison MD;  Location: [facility];  Service: Endoscopy;  Laterality: N/A;    ESOPHAGOGASTRODUODENOSCOPY N/A 2020    Procedure: EGD (ESOPHAGOGASTRODUODENOSCOPY);  Surgeon: Keira Ellison MD;  Location: [facility];  Service: Endoscopy;  Laterality: N/A;  new onset iron deficiency with prior history of breast cancer    INTRALUMINAL GASTROINTESTINAL TRACT IMAGING VIA CAPSULE  N/A 10/28/2020    Procedure: IMAGING PROCEDURE, GI TRACT, INTRALUMINAL, VIA CAPSULE;  Surgeon: Finesse Jha RN;  Location[facility];  Service: Endoscopy;  Laterality: N/A;    SENTINEL LYMPH NODE BIOPSY Left 2020    Procedure: BIOPSY, LYMPH NODE, SENTINEL;  Surgeon: Vincent Moyer MD;  Location: [facility] OR;  Service: General;  Laterality: Left;    SIMPLE MASTECTOMY Left 2020    Procedure: MASTECTOMY, SIMPLE;  Surgeon: Vincent Moyer MD;  Location: [facility] OR;  Service: General;  Laterality: Left;    THYROID LOBECTOMY Left     TOTAL ABDOMINAL HYSTERECTOMY      TUBAL LIGATION         Family History   Problem Relation Age of Onset    Hypertension Father     Cataracts Father     Prostate cancer Father 80    Cervical cancer Daughter 32    Allergic rhinitis Daughter     Cirrhosis Mother     Alcohol abuse Mother     Hypertension Daughter     Diabetes Brother     Heart attack Brother     Heart murmur Brother     No Known Problems Daughter        Social History     Socioeconomic History    Marital status:      Spouse name: Not on file    Number of children: Not on file    Years of education: Not on file    Highest education level: Not on file   Occupational History    Not on file   Social Needs    Financial resource strain: Not on file    Food insecurity     Worry: Not on file     Inability: Not on file    Transportation needs     Medical: Not on file     Non-medical: Not on file   Tobacco Use    Smoking status: Former Smoker     Packs/day: 1.00     Years: 50.00     Pack years: 50.00     Types: Cigarettes     Quit date: 2020     Years since quittin.3    Smokeless tobacco: Never Used   Substance and Sexual Activity    Alcohol use: Never     Alcohol/week: 28.0 standard drinks     Types: 28 Cans of beer per week     Frequency: 4 or more times a week     Drinks per session: 3 or 4     Binge frequency: Less than monthly    Drug use: No    Sexual activity: Never     Partners: Male    Lifestyle    Physical activity     Days per week: Not on file     Minutes per session: Not on file    Stress: Not on file   Relationships    Social connections     Talks on phone: Not on file     Gets together: Not on file     Attends Gnosticism service: Not on file     Active member of club or organization: Not on file     Attends meetings of clubs or organizations: Not on file     Relationship status: Not on file   Other Topics Concern    Not on file   Social History Narrative    The patient is .  She is retired from [employer].       Vitals:    12/30/20 1243   BP: (!) 140/63   Pulse: 88   Temp: 98.2 °F (36.8 °C)       Physical Exam  Constitutional:       Appearance: She is well-developed.   HENT:      Head: Normocephalic and atraumatic.      Right Ear: External ear normal.      Left Ear: External ear normal.      Mouth/Throat:      Pharynx: No oropharyngeal exudate.   Eyes:      General: No scleral icterus.        Right eye: No discharge.         Left eye: No discharge.      Conjunctiva/sclera: Conjunctivae normal.      Pupils: Pupils are equal, round, and reactive to light.   Neck:      Musculoskeletal: Normal range of motion and neck supple.      Thyroid: No thyromegaly.   Cardiovascular:      Rate and Rhythm: Normal rate and regular rhythm.      Heart sounds: Normal heart sounds.   Pulmonary:      Effort: Pulmonary effort is normal.      Breath sounds: Normal breath sounds.   Chest:      Breasts:         Right: No inverted nipple, mass, nipple discharge, skin change or tenderness.         Left: No inverted nipple, mass, nipple discharge, skin change or tenderness.       Abdominal:      General: Bowel sounds are normal.      Palpations: Abdomen is soft.   Musculoskeletal: Normal range of motion.      Right shoulder: She exhibits no crepitus and normal strength.   Lymphadenopathy:      Head:      Right side of head: No submental, submandibular, tonsillar, preauricular, posterior auricular or  occipital adenopathy.      Left side of head: No submental, submandibular, tonsillar, preauricular, posterior auricular or occipital adenopathy.      Cervical: No cervical adenopathy.      Right cervical: No superficial or posterior cervical adenopathy.     Left cervical: No superficial or posterior cervical adenopathy.      Upper Body:      Right upper body: No supraclavicular adenopathy.      Left upper body: No supraclavicular adenopathy.   Skin:     General: Skin is warm and dry.      Coloration: Skin is not pale.      Findings: No erythema or rash.   Neurological:      Mental Status: She is alert and oriented to person, place, and time.      Deep Tendon Reflexes: Reflexes are normal and symmetric.   Psychiatric:         Behavior: Behavior normal.         Thought Content: Thought content normal.         Judgment: Judgment normal.         Result:   Mammo Digital Diagnostic Bilat w/ Navjot  US Breast Left Limited     History:  Patient is 70 y.o. and is seen for a diagnostic mammogram.     Films Compared:  Compared to: 05/29/2019 Mammo Digital Diagnostic Bilat w/ Navjot and 05/16/2018 Mammo Digital Diagnostic Bilat with Tomosynthesis_CAD     Findings:  This procedure was performed using tomosynthesis. Computer-aided detection was utilized in the interpretation of this examination.  The breasts are heterogeneously dense, which may obscure small masses.      Left  Mammo Digital Diagnostic Bilat w/ Navjot  There is an 11 mm oval mass with spiculated margins seen in the upper outer quadrant of the left breast in the posterior depth.      US Breast Left Limited  There is an irregularly shaped, non-parallel, hypoechoic mass with microlobulated margins with shadowing seen in the left breast at 2 o'clock, 10 cm from the nipple.      No suspicious left axillary adenopathy demonstrated.      Right     Mammo Digital Diagnostic Bilat w/ Navjot  There is no evidence of suspicious masses, calcifications, or other abnormal findings in  the right breast.     Impression:  Left  Mass: Left breast 11 mm mass at the upper outer posterior position. Assessment: 5 - Highly suggestive of malignancy. Biopsy is recommended.      Right  There is no mammographic or sonographic evidence of malignancy in the right breast.     BI-RADS Category:   Overall: 5 - Highly Suggestive of Malignancy     Recommendation:  Ultrasound guided Biopsy is recommended.     PET/CT:  Impression:  New diagnosis of left breast carcinoma.  No evidence of FDG avid regional lymphadenopathy or distant metastatic disease.      Final Pathologic Diagnosis 1.  Left axilla, sentinel lymph node #1, excisional biopsy: 3 benign lymph   nodes, negative for metastatic carcinoma (0/3).          Confirmed by negative immunohistochemical staining with cytokeratins   AE1/AE3, CAM 5.2 and wide spectrum keratin with          satisfactory positive and negative controls.   2.  Left axilla, sentinel lymph node #2, excisional biopsy: 1 benign lymph   node, negative for metastatic carcinoma (0/1).          Confirmed by negative immunohistochemical staining with cytokeratins   AE1/AE3, CAM 5.2 and wide spectrum keratin with          satisfactory positive and negative controls.   3.  Left axilla, sentinel lymph node #3, excisional biopsy: 1 benign lymph   node, negative for metastatic carcinoma (0/1).          Confirmed by negative immunohistochemical staining with cytokeratins   AE1/AE3, CAM 5.2 and wide spectrum keratin with          satisfactory positive and negative controls.   4.  Left breast, mastectomy: Invasive ductal carcinoma, multifocal with two   (2) foci measuring as follows:  Lesion #1 (Blocks 4E-F, corresponding to   prior          biopsy site/clip): 12 mm (1.2 cm) and Lesion #2          (Block 4D)  8 mm (0.8 cm) in greatest dimensions.          Microcalcifications are present associated with invasive carcinoma.          Margins: Invasive carcinoma is present at closest approach within less    than 1 mm (within less than 0.1 cm) of the                  black/deep surgical margin (this is the smaller lesion #2   measuring 8 mm in total size).  Invasive carcinoma is                  greater than 10 mm (greater than 1 cm) from all other surgical   margins.                  Ductal carcinoma in situ is greater than 10 mm (greater than 1   cm) from all surgical margins.          Ductal carcinoma in situ, high nuclear grade with solid and   comedonecrosis patterns are present and associated with          invasive tumor.          Previous biopsy cavity site and clip are identified.          Incidental calcified fibroadenoma.          Unremarkable nipple and skin.          See synoptic report in comment section for further details.   5.  Excess skin left breast, excision: Unremarkable skin and soft tissue.          No evidence of malignancy.   Comment:  Synoptic report   Procedure:  Total mastectomy   Specimen laterality:  Left   Tumor focality:  Multifocal; 2 foci   Tumor size: Focus/Lesion #1:  Greatest dimension:  12 mm (1.2 cm)          Focus/Lesion #2:  Greatest dimension:  8 mm (0.8 cm)   Histologic type:  Invasive carcinoma of no special type (ductal).   Histologic grade: Glandular/tubular differentiation:  2          Nuclear pleomorphism:  3          Mitotic rate:  1          Overall grade:  Grade  2   Ductal carcinoma in situ:  Present Negative for extensive intraductal   component   Tumor extension:   Skin is present and uninvolved by tumor.          Nipple is present and uninvolved by tumor.          Skeletal muscle is not present for evaluation.   Margins:         - Invasive carcinoma is present at closest approach within   less than 1 mm (within less than 0.1 cm) of the                  black/deep surgical margin (this is the smaller lesion #2   measuring 8 mm in total size).  Invasive carcinoma is                  greater than 10 mm (greater than 1 cm) from all other surgical   margins.              "     - Ductal carcinoma in situ is greater than 10 mm (greater than   1 cm) from all surgical margins.   Regional lymph nodes:                             Number examined:  5                             Number involved:  0   Treatment effect and breast:  No known presurgical therapy   Pathologic stage classification:   TNM descriptors:  "m" (multiple foci of invasive carcinoma)   Primary tumor:        pT1c:  Tumor greater than 10 mm but less than or equal to 2 mm in   greatest dimension.   Regional lymph nodes:        (sn)pN0:  No regional lymph node metastasis   Distant metastasis:        pMX:  Cannot be assessed.     Ancillary studies:   Immunostaining for lesion #1, which is the larger lesion associated with clip   and biopsy site measuring 12 mm in greatest dimension (corresponding to the   lesion seen in blocks 4 E and F):   Immunohistochemical stains are completed on this patient's previous biopsy   from the left upper outer quadrant breast biopsy from August 2020, case   number [id number], with the following results:   "BREAST BIOMARKER RESULTS   Estrogen receptor (ER): Low Positive (10% weak)   Progesterone receptor (DC): Negative (less than 1%)   HER2 IHC: Equivocal (Score 2+), HER2 FISH is pending and will be reported in a   supplemental   Ki-67 proliferation index: 50%   SUPPLEMENTAL (2):   HER2, Breast Tumor, FISH, Tissue   Result Summary:   Negative"   Immunohistochemical staining for lesion #2, which is the smaller calcified 8   mm lesion represented Block 4 D will be completed for hormonal markers and   results will follow in supplemental report.     Tumor Blocks for possible Future Studies:  4E-F (Lesion #1); 4D (Lesion #2)           Assessment & Plan:  69 y/o female s/p left breast mastectomy with sentinel lymph node biopsy      silver nitrate applied to granulation tissue, daily dressing changes with gauze  Follow-up in 1 week for reapplication of silver nitrate      Port placement once chest " wall wound healed if still needed  Risks and benefits discussed with patient including: Pain, bleeding, infection, injury to vessels, pneumothorax, need for further procedure

## 2021-01-06 ENCOUNTER — OFFICE VISIT (OUTPATIENT)
Dept: SURGERY | Facility: CLINIC | Age: 71
End: 2021-01-06
Payer: MEDICARE

## 2021-01-06 VITALS
BODY MASS INDEX: 29.21 KG/M2 | HEART RATE: 101 BPM | TEMPERATURE: 97 F | DIASTOLIC BLOOD PRESSURE: 79 MMHG | HEIGHT: 62 IN | SYSTOLIC BLOOD PRESSURE: 134 MMHG | WEIGHT: 158.75 LBS

## 2021-01-06 DIAGNOSIS — Z17.0 MALIGNANT NEOPLASM OF LOWER-INNER QUADRANT OF LEFT BREAST IN FEMALE, ESTROGEN RECEPTOR POSITIVE: Primary | ICD-10-CM

## 2021-01-06 DIAGNOSIS — C50.312 MALIGNANT NEOPLASM OF LOWER-INNER QUADRANT OF LEFT BREAST IN FEMALE, ESTROGEN RECEPTOR POSITIVE: Primary | ICD-10-CM

## 2021-01-06 PROCEDURE — 99024 PR POST-OP FOLLOW-UP VISIT: ICD-10-PCS | Mod: S$GLB,,, | Performed by: SURGERY

## 2021-01-06 PROCEDURE — 99999 PR PBB SHADOW E&M-EST. PATIENT-LVL III: ICD-10-PCS | Mod: PBBFAC,,, | Performed by: SURGERY

## 2021-01-06 PROCEDURE — 3008F PR BODY MASS INDEX (BMI) DOCUMENTED: ICD-10-PCS | Mod: CPTII,S$GLB,, | Performed by: SURGERY

## 2021-01-06 PROCEDURE — 1126F AMNT PAIN NOTED NONE PRSNT: CPT | Mod: S$GLB,,, | Performed by: SURGERY

## 2021-01-06 PROCEDURE — 1126F PR PAIN SEVERITY QUANTIFIED, NO PAIN PRESENT: ICD-10-PCS | Mod: S$GLB,,, | Performed by: SURGERY

## 2021-01-06 PROCEDURE — 3008F BODY MASS INDEX DOCD: CPT | Mod: CPTII,S$GLB,, | Performed by: SURGERY

## 2021-01-06 PROCEDURE — 99999 PR PBB SHADOW E&M-EST. PATIENT-LVL III: CPT | Mod: PBBFAC,,, | Performed by: SURGERY

## 2021-01-06 PROCEDURE — 99024 POSTOP FOLLOW-UP VISIT: CPT | Mod: S$GLB,,, | Performed by: SURGERY

## 2021-01-06 RX ORDER — LOPERAMIDE HYDROCHLORIDE 2 MG/1
2 CAPSULE ORAL 4 TIMES DAILY PRN
COMMUNITY
End: 2021-10-21

## 2021-01-07 ENCOUNTER — EXTERNAL HOME HEALTH (OUTPATIENT)
Dept: HOME HEALTH SERVICES | Facility: HOSPITAL | Age: 71
End: 2021-01-07
Payer: MEDICARE

## 2021-01-13 ENCOUNTER — INFUSION (OUTPATIENT)
Dept: INFUSION THERAPY | Facility: HOSPITAL | Age: 71
End: 2021-01-13
Attending: INTERNAL MEDICINE
Payer: MEDICARE

## 2021-01-13 ENCOUNTER — OFFICE VISIT (OUTPATIENT)
Dept: SURGERY | Facility: CLINIC | Age: 71
End: 2021-01-13
Payer: MEDICARE

## 2021-01-13 VITALS
BODY MASS INDEX: 28.97 KG/M2 | HEART RATE: 95 BPM | HEIGHT: 62 IN | OXYGEN SATURATION: 98 % | SYSTOLIC BLOOD PRESSURE: 133 MMHG | DIASTOLIC BLOOD PRESSURE: 71 MMHG | WEIGHT: 157.44 LBS | RESPIRATION RATE: 18 BRPM | TEMPERATURE: 97 F

## 2021-01-13 VITALS
DIASTOLIC BLOOD PRESSURE: 87 MMHG | WEIGHT: 157.44 LBS | TEMPERATURE: 76 F | BODY MASS INDEX: 28.97 KG/M2 | HEART RATE: 115 BPM | SYSTOLIC BLOOD PRESSURE: 150 MMHG | HEIGHT: 62 IN

## 2021-01-13 DIAGNOSIS — C50.312 MALIGNANT NEOPLASM OF LOWER-INNER QUADRANT OF LEFT BREAST IN FEMALE, ESTROGEN RECEPTOR POSITIVE: Primary | ICD-10-CM

## 2021-01-13 DIAGNOSIS — Z17.0 MALIGNANT NEOPLASM OF LOWER-INNER QUADRANT OF LEFT BREAST IN FEMALE, ESTROGEN RECEPTOR POSITIVE: Primary | ICD-10-CM

## 2021-01-13 PROCEDURE — 1126F PR PAIN SEVERITY QUANTIFIED, NO PAIN PRESENT: ICD-10-PCS | Mod: S$GLB,,, | Performed by: SURGERY

## 2021-01-13 PROCEDURE — 99024 POSTOP FOLLOW-UP VISIT: CPT | Mod: S$GLB,,, | Performed by: SURGERY

## 2021-01-13 PROCEDURE — 99999 PR PBB SHADOW E&M-EST. PATIENT-LVL III: ICD-10-PCS | Mod: PBBFAC,,, | Performed by: SURGERY

## 2021-01-13 PROCEDURE — 1126F AMNT PAIN NOTED NONE PRSNT: CPT | Mod: S$GLB,,, | Performed by: SURGERY

## 2021-01-13 PROCEDURE — 99024 PR POST-OP FOLLOW-UP VISIT: ICD-10-PCS | Mod: S$GLB,,, | Performed by: SURGERY

## 2021-01-13 PROCEDURE — 3008F PR BODY MASS INDEX (BMI) DOCUMENTED: ICD-10-PCS | Mod: CPTII,S$GLB,, | Performed by: SURGERY

## 2021-01-13 PROCEDURE — 63600175 PHARM REV CODE 636 W HCPCS: Mod: TB | Performed by: INTERNAL MEDICINE

## 2021-01-13 PROCEDURE — 3008F BODY MASS INDEX DOCD: CPT | Mod: CPTII,S$GLB,, | Performed by: SURGERY

## 2021-01-13 PROCEDURE — 99999 PR PBB SHADOW E&M-EST. PATIENT-LVL III: CPT | Mod: PBBFAC,,, | Performed by: SURGERY

## 2021-01-13 PROCEDURE — 96401 CHEMO ANTI-NEOPL SQ/IM: CPT

## 2021-01-13 RX ADMIN — TRASTUZUMAB AND HYALURONIDASE-OYSK 600 MG: 600; 10000 INJECTION, SOLUTION SUBCUTANEOUS at 12:01

## 2021-01-18 ENCOUNTER — DOCUMENT SCAN (OUTPATIENT)
Dept: HOME HEALTH SERVICES | Facility: HOSPITAL | Age: 71
End: 2021-01-18
Payer: MEDICARE

## 2021-02-03 ENCOUNTER — INFUSION (OUTPATIENT)
Dept: INFUSION THERAPY | Facility: HOSPITAL | Age: 71
End: 2021-02-03
Attending: INTERNAL MEDICINE
Payer: MEDICARE

## 2021-02-03 VITALS
WEIGHT: 157.44 LBS | HEART RATE: 120 BPM | TEMPERATURE: 98 F | BODY MASS INDEX: 28.97 KG/M2 | SYSTOLIC BLOOD PRESSURE: 151 MMHG | HEIGHT: 62 IN | RESPIRATION RATE: 18 BRPM | DIASTOLIC BLOOD PRESSURE: 79 MMHG | OXYGEN SATURATION: 98 %

## 2021-02-03 DIAGNOSIS — Z17.0 MALIGNANT NEOPLASM OF LOWER-INNER QUADRANT OF LEFT BREAST IN FEMALE, ESTROGEN RECEPTOR POSITIVE: Primary | ICD-10-CM

## 2021-02-03 DIAGNOSIS — C50.312 MALIGNANT NEOPLASM OF LOWER-INNER QUADRANT OF LEFT BREAST IN FEMALE, ESTROGEN RECEPTOR POSITIVE: Primary | ICD-10-CM

## 2021-02-03 PROCEDURE — 96401 CHEMO ANTI-NEOPL SQ/IM: CPT

## 2021-02-03 PROCEDURE — 63600175 PHARM REV CODE 636 W HCPCS: Mod: TB | Performed by: INTERNAL MEDICINE

## 2021-02-03 RX ADMIN — TRASTUZUMAB AND HYALURONIDASE-OYSK 600 MG: 600; 10000 INJECTION, SOLUTION SUBCUTANEOUS at 01:02

## 2021-02-10 ENCOUNTER — OFFICE VISIT (OUTPATIENT)
Dept: SURGERY | Facility: CLINIC | Age: 71
End: 2021-02-10
Payer: MEDICARE

## 2021-02-10 VITALS
BODY MASS INDEX: 28.51 KG/M2 | WEIGHT: 155.88 LBS | SYSTOLIC BLOOD PRESSURE: 148 MMHG | DIASTOLIC BLOOD PRESSURE: 73 MMHG | HEART RATE: 104 BPM

## 2021-02-10 DIAGNOSIS — Z17.0 MALIGNANT NEOPLASM OF LOWER-INNER QUADRANT OF LEFT BREAST IN FEMALE, ESTROGEN RECEPTOR POSITIVE: Primary | ICD-10-CM

## 2021-02-10 DIAGNOSIS — C50.312 MALIGNANT NEOPLASM OF LOWER-INNER QUADRANT OF LEFT BREAST IN FEMALE, ESTROGEN RECEPTOR POSITIVE: Primary | ICD-10-CM

## 2021-02-10 PROCEDURE — 99024 PR POST-OP FOLLOW-UP VISIT: ICD-10-PCS | Mod: S$GLB,,, | Performed by: SURGERY

## 2021-02-10 PROCEDURE — 1125F AMNT PAIN NOTED PAIN PRSNT: CPT | Mod: S$GLB,,, | Performed by: SURGERY

## 2021-02-10 PROCEDURE — 3288F PR FALLS RISK ASSESSMENT DOCUMENTED: ICD-10-PCS | Mod: CPTII,S$GLB,, | Performed by: SURGERY

## 2021-02-10 PROCEDURE — 1101F PR PT FALLS ASSESS DOC 0-1 FALLS W/OUT INJ PAST YR: ICD-10-PCS | Mod: CPTII,S$GLB,, | Performed by: SURGERY

## 2021-02-10 PROCEDURE — 3008F PR BODY MASS INDEX (BMI) DOCUMENTED: ICD-10-PCS | Mod: CPTII,S$GLB,, | Performed by: SURGERY

## 2021-02-10 PROCEDURE — 99999 PR PBB SHADOW E&M-EST. PATIENT-LVL III: CPT | Mod: PBBFAC,,, | Performed by: SURGERY

## 2021-02-10 PROCEDURE — 1101F PT FALLS ASSESS-DOCD LE1/YR: CPT | Mod: CPTII,S$GLB,, | Performed by: SURGERY

## 2021-02-10 PROCEDURE — 3288F FALL RISK ASSESSMENT DOCD: CPT | Mod: CPTII,S$GLB,, | Performed by: SURGERY

## 2021-02-10 PROCEDURE — 99999 PR PBB SHADOW E&M-EST. PATIENT-LVL III: ICD-10-PCS | Mod: PBBFAC,,, | Performed by: SURGERY

## 2021-02-10 PROCEDURE — 1125F PR PAIN SEVERITY QUANTIFIED, PAIN PRESENT: ICD-10-PCS | Mod: S$GLB,,, | Performed by: SURGERY

## 2021-02-10 PROCEDURE — 3008F BODY MASS INDEX DOCD: CPT | Mod: CPTII,S$GLB,, | Performed by: SURGERY

## 2021-02-10 PROCEDURE — 99024 POSTOP FOLLOW-UP VISIT: CPT | Mod: S$GLB,,, | Performed by: SURGERY

## 2021-02-23 ENCOUNTER — HOSPITAL ENCOUNTER (OUTPATIENT)
Dept: CARDIOLOGY | Facility: HOSPITAL | Age: 71
Discharge: HOME | End: 2021-02-23
Attending: INTERNAL MEDICINE
Payer: MEDICARE

## 2021-02-23 VITALS
SYSTOLIC BLOOD PRESSURE: 148 MMHG | WEIGHT: 155 LBS | DIASTOLIC BLOOD PRESSURE: 73 MMHG | HEIGHT: 62 IN | BODY MASS INDEX: 28.52 KG/M2

## 2021-02-23 DIAGNOSIS — Z79.899 ENCOUNTER FOR MONITORING CARDIOTOXIC DRUG THERAPY: ICD-10-CM

## 2021-02-23 DIAGNOSIS — Z51.81 ENCOUNTER FOR MONITORING CARDIOTOXIC DRUG THERAPY: ICD-10-CM

## 2021-02-23 DIAGNOSIS — C50.312 MALIGNANT NEOPLASM OF LOWER-INNER QUADRANT OF LEFT BREAST IN FEMALE, ESTROGEN RECEPTOR POSITIVE: ICD-10-CM

## 2021-02-23 DIAGNOSIS — Z17.0 MALIGNANT NEOPLASM OF LOWER-INNER QUADRANT OF LEFT BREAST IN FEMALE, ESTROGEN RECEPTOR POSITIVE: ICD-10-CM

## 2021-02-23 LAB
AORTIC ROOT ANNULUS: 2.75 CM
ASCENDING AORTA: 2.79 CM
AV INDEX (PROSTH): 0.72
AV MEAN GRADIENT: 3 MMHG
AV PEAK GRADIENT: 7 MMHG
AV VALVE AREA: 2.24 CM2
AV VELOCITY RATIO: 0.7
BSA FOR ECHO PROCEDURE: 1.75 M2
CV ECHO LV RWT: 0.46 CM
DOP CALC AO PEAK VEL: 1.28 M/S
DOP CALC AO VTI: 24.36 CM
DOP CALC LVOT AREA: 3.1 CM2
DOP CALC LVOT DIAMETER: 1.99 CM
DOP CALC LVOT PEAK VEL: 0.9 M/S
DOP CALC LVOT STROKE VOLUME: 54.65 CM3
DOP CALC RVOT PEAK VEL: 0.89 M/S
DOP CALC RVOT VTI: 18.2 CM
DOP CALCLVOT PEAK VEL VTI: 17.58 CM
E WAVE DECELERATION TIME: 103.52 MSEC
E/A RATIO: 1.77
E/E' RATIO: 15.89 M/S
ECHO LV POSTERIOR WALL: 1.16 CM (ref 0.6–1.1)
FRACTIONAL SHORTENING: 14 % (ref 28–44)
INTERVENTRICULAR SEPTUM: 1.01 CM (ref 0.6–1.1)
LA MAJOR: 6.75 CM
LA MINOR: 5.29 CM
LA WIDTH: 4.27 CM
LEFT ATRIUM SIZE: 3.38 CM
LEFT ATRIUM VOLUME INDEX: 42.3 ML/M2
LEFT ATRIUM VOLUME: 72.77 CM3
LEFT INTERNAL DIMENSION IN SYSTOLE: 4.31 CM (ref 2.1–4)
LEFT VENTRICLE DIASTOLIC VOLUME INDEX: 69.61 ML/M2
LEFT VENTRICLE DIASTOLIC VOLUME: 119.73 ML
LEFT VENTRICLE MASS INDEX: 119 G/M2
LEFT VENTRICLE SYSTOLIC VOLUME INDEX: 48.6 ML/M2
LEFT VENTRICLE SYSTOLIC VOLUME: 83.57 ML
LEFT VENTRICULAR INTERNAL DIMENSION IN DIASTOLE: 5.03 CM (ref 3.5–6)
LEFT VENTRICULAR MASS: 205.26 G
LV LATERAL E/E' RATIO: 11.92 M/S
LV SEPTAL E/E' RATIO: 23.83 M/S
MV PEAK A VEL: 0.81 M/S
MV PEAK E VEL: 1.43 M/S
MV STENOSIS PRESSURE HALF TIME: 30.02 MS
MV VALVE AREA P 1/2 METHOD: 7.33 CM2
PISA MRMAX VEL: 0.06 M/S
PISA RADIUS: 0.93 CM
PISA TR MAX VEL: 3.63 M/S
PISA TR VN NYQUIST: 0.01 M/S
PULM VEIN S/D RATIO: 0.67
PV MEAN GRADIENT: 2 MMHG
PV PEAK D VEL: 1.05 M/S
PV PEAK S VEL: 0.7 M/S
PV PEAK VELOCITY: 1.07 CM/S
RA MAJOR: 4.65 CM
RA PRESSURE: 3 MMHG
RA WIDTH: 3.54 CM
RIGHT VENTRICULAR END-DIASTOLIC DIMENSION: 4.47 CM
SINUS: 2.8 CM
STJ: 3.03 CM
TDI LATERAL: 0.12 M/S
TDI SEPTAL: 0.06 M/S
TDI: 0.09 M/S
TR MAX PG: 53 MMHG
TRICUSPID ANNULAR PLANE SYSTOLIC EXCURSION: 1.99 CM
TV REST PULMONARY ARTERY PRESSURE: 56 MMHG

## 2021-02-23 PROCEDURE — 93306 ECHO (CUPID ONLY): ICD-10-PCS | Mod: 26,,, | Performed by: INTERNAL MEDICINE

## 2021-02-23 PROCEDURE — 93306 TTE W/DOPPLER COMPLETE: CPT | Mod: 26,,, | Performed by: INTERNAL MEDICINE

## 2021-02-23 PROCEDURE — 93306 TTE W/DOPPLER COMPLETE: CPT

## 2021-02-24 ENCOUNTER — OFFICE VISIT (OUTPATIENT)
Dept: HEMATOLOGY/ONCOLOGY | Facility: CLINIC | Age: 71
End: 2021-02-24
Payer: MEDICARE

## 2021-02-24 VITALS
HEIGHT: 62 IN | OXYGEN SATURATION: 97 % | WEIGHT: 155.19 LBS | HEART RATE: 110 BPM | BODY MASS INDEX: 28.56 KG/M2 | SYSTOLIC BLOOD PRESSURE: 155 MMHG | RESPIRATION RATE: 16 BRPM | TEMPERATURE: 97 F | DIASTOLIC BLOOD PRESSURE: 81 MMHG

## 2021-02-24 DIAGNOSIS — Z51.81 ENCOUNTER FOR MONITORING CARDIOTOXIC DRUG THERAPY: ICD-10-CM

## 2021-02-24 DIAGNOSIS — Z17.0 MALIGNANT NEOPLASM OF LOWER-INNER QUADRANT OF LEFT BREAST IN FEMALE, ESTROGEN RECEPTOR POSITIVE: Primary | ICD-10-CM

## 2021-02-24 DIAGNOSIS — F17.200 TOBACCO USE DISORDER: Chronic | ICD-10-CM

## 2021-02-24 DIAGNOSIS — C50.312 MALIGNANT NEOPLASM OF LOWER-INNER QUADRANT OF LEFT BREAST IN FEMALE, ESTROGEN RECEPTOR POSITIVE: Primary | ICD-10-CM

## 2021-02-24 DIAGNOSIS — E66.9 OBESITY (BMI 30.0-34.9): ICD-10-CM

## 2021-02-24 DIAGNOSIS — Z79.899 ENCOUNTER FOR MONITORING CARDIOTOXIC DRUG THERAPY: ICD-10-CM

## 2021-02-24 DIAGNOSIS — I10 ESSENTIAL HYPERTENSION: Chronic | ICD-10-CM

## 2021-02-24 PROCEDURE — 3077F PR MOST RECENT SYSTOLIC BLOOD PRESSURE >= 140 MM HG: ICD-10-PCS | Mod: CPTII,S$GLB,, | Performed by: INTERNAL MEDICINE

## 2021-02-24 PROCEDURE — 3008F BODY MASS INDEX DOCD: CPT | Mod: CPTII,S$GLB,, | Performed by: INTERNAL MEDICINE

## 2021-02-24 PROCEDURE — 1159F PR MEDICATION LIST DOCUMENTED IN MEDICAL RECORD: ICD-10-PCS | Mod: S$GLB,,, | Performed by: INTERNAL MEDICINE

## 2021-02-24 PROCEDURE — 3077F SYST BP >= 140 MM HG: CPT | Mod: CPTII,S$GLB,, | Performed by: INTERNAL MEDICINE

## 2021-02-24 PROCEDURE — 3008F PR BODY MASS INDEX (BMI) DOCUMENTED: ICD-10-PCS | Mod: CPTII,S$GLB,, | Performed by: INTERNAL MEDICINE

## 2021-02-24 PROCEDURE — 1126F AMNT PAIN NOTED NONE PRSNT: CPT | Mod: S$GLB,,, | Performed by: INTERNAL MEDICINE

## 2021-02-24 PROCEDURE — 99214 PR OFFICE/OUTPT VISIT, EST, LEVL IV, 30-39 MIN: ICD-10-PCS | Mod: S$GLB,,, | Performed by: INTERNAL MEDICINE

## 2021-02-24 PROCEDURE — 3079F DIAST BP 80-89 MM HG: CPT | Mod: CPTII,S$GLB,, | Performed by: INTERNAL MEDICINE

## 2021-02-24 PROCEDURE — 1101F PT FALLS ASSESS-DOCD LE1/YR: CPT | Mod: CPTII,S$GLB,, | Performed by: INTERNAL MEDICINE

## 2021-02-24 PROCEDURE — 99214 OFFICE O/P EST MOD 30 MIN: CPT | Mod: S$GLB,,, | Performed by: INTERNAL MEDICINE

## 2021-02-24 PROCEDURE — 3288F FALL RISK ASSESSMENT DOCD: CPT | Mod: CPTII,S$GLB,, | Performed by: INTERNAL MEDICINE

## 2021-02-24 PROCEDURE — 3079F PR MOST RECENT DIASTOLIC BLOOD PRESSURE 80-89 MM HG: ICD-10-PCS | Mod: CPTII,S$GLB,, | Performed by: INTERNAL MEDICINE

## 2021-02-24 PROCEDURE — 1159F MED LIST DOCD IN RCRD: CPT | Mod: S$GLB,,, | Performed by: INTERNAL MEDICINE

## 2021-02-24 PROCEDURE — 99999 PR PBB SHADOW E&M-EST. PATIENT-LVL V: ICD-10-PCS | Mod: PBBFAC,,, | Performed by: INTERNAL MEDICINE

## 2021-02-24 PROCEDURE — 1126F PR PAIN SEVERITY QUANTIFIED, NO PAIN PRESENT: ICD-10-PCS | Mod: S$GLB,,, | Performed by: INTERNAL MEDICINE

## 2021-02-24 PROCEDURE — 99999 PR PBB SHADOW E&M-EST. PATIENT-LVL V: CPT | Mod: PBBFAC,,, | Performed by: INTERNAL MEDICINE

## 2021-02-24 PROCEDURE — 1101F PR PT FALLS ASSESS DOC 0-1 FALLS W/OUT INJ PAST YR: ICD-10-PCS | Mod: CPTII,S$GLB,, | Performed by: INTERNAL MEDICINE

## 2021-02-24 PROCEDURE — 3288F PR FALLS RISK ASSESSMENT DOCUMENTED: ICD-10-PCS | Mod: CPTII,S$GLB,, | Performed by: INTERNAL MEDICINE

## 2021-02-25 ENCOUNTER — LAB VISIT (OUTPATIENT)
Dept: LAB | Facility: HOSPITAL | Age: 71
End: 2021-02-25
Attending: INTERNAL MEDICINE
Payer: MEDICARE

## 2021-02-25 ENCOUNTER — OFFICE VISIT (OUTPATIENT)
Dept: CARDIOLOGY | Facility: CLINIC | Age: 71
End: 2021-02-25
Payer: MEDICARE

## 2021-02-25 VITALS
SYSTOLIC BLOOD PRESSURE: 130 MMHG | BODY MASS INDEX: 28.47 KG/M2 | WEIGHT: 155.63 LBS | HEART RATE: 91 BPM | DIASTOLIC BLOOD PRESSURE: 60 MMHG | OXYGEN SATURATION: 96 %

## 2021-02-25 DIAGNOSIS — R93.1 DECREASED CARDIAC EJECTION FRACTION: ICD-10-CM

## 2021-02-25 DIAGNOSIS — I10 ESSENTIAL HYPERTENSION: Chronic | ICD-10-CM

## 2021-02-25 DIAGNOSIS — R94.31 EKG ABNORMALITIES: ICD-10-CM

## 2021-02-25 DIAGNOSIS — Z51.81 ENCOUNTER FOR MONITORING CARDIOTOXIC DRUG THERAPY: Primary | ICD-10-CM

## 2021-02-25 DIAGNOSIS — I50.32 CHRONIC DIASTOLIC CONGESTIVE HEART FAILURE: ICD-10-CM

## 2021-02-25 DIAGNOSIS — Z79.899 ENCOUNTER FOR MONITORING CARDIOTOXIC DRUG THERAPY: Primary | ICD-10-CM

## 2021-02-25 PROCEDURE — 99205 OFFICE O/P NEW HI 60 MIN: CPT | Mod: S$GLB,,, | Performed by: INTERNAL MEDICINE

## 2021-02-25 PROCEDURE — 99999 PR PBB SHADOW E&M-EST. PATIENT-LVL III: CPT | Mod: PBBFAC,,, | Performed by: INTERNAL MEDICINE

## 2021-02-25 PROCEDURE — 83880 ASSAY OF NATRIURETIC PEPTIDE: CPT

## 2021-02-25 PROCEDURE — 1159F PR MEDICATION LIST DOCUMENTED IN MEDICAL RECORD: ICD-10-PCS | Mod: S$GLB,,, | Performed by: INTERNAL MEDICINE

## 2021-02-25 PROCEDURE — 1159F MED LIST DOCD IN RCRD: CPT | Mod: S$GLB,,, | Performed by: INTERNAL MEDICINE

## 2021-02-25 PROCEDURE — 3075F PR MOST RECENT SYSTOLIC BLOOD PRESS GE 130-139MM HG: ICD-10-PCS | Mod: CPTII,S$GLB,, | Performed by: INTERNAL MEDICINE

## 2021-02-25 PROCEDURE — 84484 ASSAY OF TROPONIN QUANT: CPT

## 2021-02-25 PROCEDURE — 3078F PR MOST RECENT DIASTOLIC BLOOD PRESSURE < 80 MM HG: ICD-10-PCS | Mod: CPTII,S$GLB,, | Performed by: INTERNAL MEDICINE

## 2021-02-25 PROCEDURE — 3075F SYST BP GE 130 - 139MM HG: CPT | Mod: CPTII,S$GLB,, | Performed by: INTERNAL MEDICINE

## 2021-02-25 PROCEDURE — 1126F AMNT PAIN NOTED NONE PRSNT: CPT | Mod: S$GLB,,, | Performed by: INTERNAL MEDICINE

## 2021-02-25 PROCEDURE — 3078F DIAST BP <80 MM HG: CPT | Mod: CPTII,S$GLB,, | Performed by: INTERNAL MEDICINE

## 2021-02-25 PROCEDURE — 36415 COLL VENOUS BLD VENIPUNCTURE: CPT

## 2021-02-25 PROCEDURE — 3008F PR BODY MASS INDEX (BMI) DOCUMENTED: ICD-10-PCS | Mod: CPTII,S$GLB,, | Performed by: INTERNAL MEDICINE

## 2021-02-25 PROCEDURE — 99205 PR OFFICE/OUTPT VISIT, NEW, LEVL V, 60-74 MIN: ICD-10-PCS | Mod: S$GLB,,, | Performed by: INTERNAL MEDICINE

## 2021-02-25 PROCEDURE — 3008F BODY MASS INDEX DOCD: CPT | Mod: CPTII,S$GLB,, | Performed by: INTERNAL MEDICINE

## 2021-02-25 PROCEDURE — 99999 PR PBB SHADOW E&M-EST. PATIENT-LVL III: ICD-10-PCS | Mod: PBBFAC,,, | Performed by: INTERNAL MEDICINE

## 2021-02-25 PROCEDURE — 1126F PR PAIN SEVERITY QUANTIFIED, NO PAIN PRESENT: ICD-10-PCS | Mod: S$GLB,,, | Performed by: INTERNAL MEDICINE

## 2021-02-25 RX ORDER — CARVEDILOL 3.12 MG/1
3.12 TABLET ORAL 2 TIMES DAILY WITH MEALS
Qty: 60 TABLET | Refills: 11 | Status: SHIPPED | OUTPATIENT
Start: 2021-02-25 | End: 2021-12-17

## 2021-02-25 RX ORDER — TELMISARTAN 20 MG/1
20 TABLET ORAL DAILY
Qty: 90 TABLET | Refills: 3 | Status: SHIPPED | OUTPATIENT
Start: 2021-02-25 | End: 2022-02-23

## 2021-02-26 LAB
BNP SERPL-MCNC: 51 PG/ML (ref 0–99)
TROPONIN I SERPL DL<=0.01 NG/ML-MCNC: 0.02 NG/ML (ref 0–0.03)

## 2021-03-02 ENCOUNTER — OFFICE VISIT (OUTPATIENT)
Dept: HEMATOLOGY/ONCOLOGY | Facility: CLINIC | Age: 71
End: 2021-03-02
Payer: MEDICARE

## 2021-03-02 VITALS
DIASTOLIC BLOOD PRESSURE: 71 MMHG | WEIGHT: 157.88 LBS | SYSTOLIC BLOOD PRESSURE: 137 MMHG | BODY MASS INDEX: 26.3 KG/M2 | HEIGHT: 65 IN | HEART RATE: 82 BPM | TEMPERATURE: 96 F | OXYGEN SATURATION: 94 %

## 2021-03-02 DIAGNOSIS — Z17.0 MALIGNANT NEOPLASM OF LOWER-INNER QUADRANT OF LEFT BREAST IN FEMALE, ESTROGEN RECEPTOR POSITIVE: Primary | ICD-10-CM

## 2021-03-02 DIAGNOSIS — R93.1 DECREASED CARDIAC EJECTION FRACTION: ICD-10-CM

## 2021-03-02 DIAGNOSIS — I50.32 CHRONIC DIASTOLIC CONGESTIVE HEART FAILURE: ICD-10-CM

## 2021-03-02 DIAGNOSIS — C50.312 MALIGNANT NEOPLASM OF LOWER-INNER QUADRANT OF LEFT BREAST IN FEMALE, ESTROGEN RECEPTOR POSITIVE: Primary | ICD-10-CM

## 2021-03-02 DIAGNOSIS — I10 ESSENTIAL HYPERTENSION: Chronic | ICD-10-CM

## 2021-03-02 PROCEDURE — 99499 UNLISTED E&M SERVICE: CPT | Mod: S$GLB,,, | Performed by: INTERNAL MEDICINE

## 2021-03-02 PROCEDURE — 1126F AMNT PAIN NOTED NONE PRSNT: CPT | Mod: S$GLB,,, | Performed by: INTERNAL MEDICINE

## 2021-03-02 PROCEDURE — 99999 PR PBB SHADOW E&M-EST. PATIENT-LVL III: ICD-10-PCS | Mod: PBBFAC,,, | Performed by: INTERNAL MEDICINE

## 2021-03-02 PROCEDURE — 1101F PT FALLS ASSESS-DOCD LE1/YR: CPT | Mod: CPTII,S$GLB,, | Performed by: INTERNAL MEDICINE

## 2021-03-02 PROCEDURE — 3078F PR MOST RECENT DIASTOLIC BLOOD PRESSURE < 80 MM HG: ICD-10-PCS | Mod: CPTII,S$GLB,, | Performed by: INTERNAL MEDICINE

## 2021-03-02 PROCEDURE — 99999 PR PBB SHADOW E&M-EST. PATIENT-LVL III: CPT | Mod: PBBFAC,,, | Performed by: INTERNAL MEDICINE

## 2021-03-02 PROCEDURE — 99499 RISK ADDL DX/OHS AUDIT: ICD-10-PCS | Mod: S$GLB,,, | Performed by: INTERNAL MEDICINE

## 2021-03-02 PROCEDURE — 3288F PR FALLS RISK ASSESSMENT DOCUMENTED: ICD-10-PCS | Mod: CPTII,S$GLB,, | Performed by: INTERNAL MEDICINE

## 2021-03-02 PROCEDURE — 99214 OFFICE O/P EST MOD 30 MIN: CPT | Mod: S$GLB,,, | Performed by: INTERNAL MEDICINE

## 2021-03-02 PROCEDURE — 99214 PR OFFICE/OUTPT VISIT, EST, LEVL IV, 30-39 MIN: ICD-10-PCS | Mod: S$GLB,,, | Performed by: INTERNAL MEDICINE

## 2021-03-02 PROCEDURE — 1101F PR PT FALLS ASSESS DOC 0-1 FALLS W/OUT INJ PAST YR: ICD-10-PCS | Mod: CPTII,S$GLB,, | Performed by: INTERNAL MEDICINE

## 2021-03-02 PROCEDURE — 1159F MED LIST DOCD IN RCRD: CPT | Mod: S$GLB,,, | Performed by: INTERNAL MEDICINE

## 2021-03-02 PROCEDURE — 3075F PR MOST RECENT SYSTOLIC BLOOD PRESS GE 130-139MM HG: ICD-10-PCS | Mod: CPTII,S$GLB,, | Performed by: INTERNAL MEDICINE

## 2021-03-02 PROCEDURE — 1159F PR MEDICATION LIST DOCUMENTED IN MEDICAL RECORD: ICD-10-PCS | Mod: S$GLB,,, | Performed by: INTERNAL MEDICINE

## 2021-03-02 PROCEDURE — 3008F PR BODY MASS INDEX (BMI) DOCUMENTED: ICD-10-PCS | Mod: CPTII,S$GLB,, | Performed by: INTERNAL MEDICINE

## 2021-03-02 PROCEDURE — 3008F BODY MASS INDEX DOCD: CPT | Mod: CPTII,S$GLB,, | Performed by: INTERNAL MEDICINE

## 2021-03-02 PROCEDURE — 3078F DIAST BP <80 MM HG: CPT | Mod: CPTII,S$GLB,, | Performed by: INTERNAL MEDICINE

## 2021-03-02 PROCEDURE — 3288F FALL RISK ASSESSMENT DOCD: CPT | Mod: CPTII,S$GLB,, | Performed by: INTERNAL MEDICINE

## 2021-03-02 PROCEDURE — 1126F PR PAIN SEVERITY QUANTIFIED, NO PAIN PRESENT: ICD-10-PCS | Mod: S$GLB,,, | Performed by: INTERNAL MEDICINE

## 2021-03-02 PROCEDURE — 3075F SYST BP GE 130 - 139MM HG: CPT | Mod: CPTII,S$GLB,, | Performed by: INTERNAL MEDICINE

## 2021-03-04 ENCOUNTER — TELEPHONE (OUTPATIENT)
Dept: CARDIOLOGY | Facility: CLINIC | Age: 71
End: 2021-03-04

## 2021-03-05 ENCOUNTER — HOSPITAL ENCOUNTER (INPATIENT)
Facility: HOSPITAL | Age: 71
LOS: 3 days | Discharge: HOME | DRG: 291 | End: 2021-03-08
Attending: INTERNAL MEDICINE | Admitting: INTERNAL MEDICINE
Payer: MEDICARE

## 2021-03-05 DIAGNOSIS — I16.1 HYPERTENSIVE EMERGENCY: ICD-10-CM

## 2021-03-05 DIAGNOSIS — R06.02 SOB (SHORTNESS OF BREATH): ICD-10-CM

## 2021-03-05 DIAGNOSIS — J96.01 ACUTE RESPIRATORY FAILURE WITH HYPOXIA: Primary | ICD-10-CM

## 2021-03-05 DIAGNOSIS — R79.89 TROPONIN LEVEL ELEVATED: ICD-10-CM

## 2021-03-05 DIAGNOSIS — I50.9 CHF EXACERBATION: ICD-10-CM

## 2021-03-05 DIAGNOSIS — I50.1 PULMONARY EDEMA CARDIAC CAUSE: ICD-10-CM

## 2021-03-05 DIAGNOSIS — I50.9 ACUTE CONGESTIVE HEART FAILURE, UNSPECIFIED HEART FAILURE TYPE: ICD-10-CM

## 2021-03-05 LAB
ALBUMIN SERPL BCP-MCNC: 3.8 G/DL (ref 3.5–5.2)
ALLENS TEST: ABNORMAL
ALP SERPL-CCNC: 81 U/L (ref 55–135)
ALT SERPL W/O P-5'-P-CCNC: 12 U/L (ref 10–44)
ANION GAP SERPL CALC-SCNC: 11 MMOL/L (ref 8–16)
AST SERPL-CCNC: 16 U/L (ref 10–40)
BASOPHILS # BLD AUTO: 0.06 K/UL (ref 0–0.2)
BASOPHILS NFR BLD: 0.4 % (ref 0–1.9)
BILIRUB SERPL-MCNC: 0.7 MG/DL (ref 0.1–1)
BILIRUB UR QL STRIP: NEGATIVE
BUN SERPL-MCNC: 20 MG/DL (ref 8–23)
CALCIUM SERPL-MCNC: 9.1 MG/DL (ref 8.7–10.5)
CHLORIDE SERPL-SCNC: 100 MMOL/L (ref 95–110)
CLARITY UR: CLEAR
CO2 SERPL-SCNC: 25 MMOL/L (ref 23–29)
COLOR UR: YELLOW
CREAT SERPL-MCNC: 1.4 MG/DL (ref 0.5–1.4)
CTP QC/QA: YES
DELSYS: ABNORMAL
DIFFERENTIAL METHOD: ABNORMAL
EOSINOPHIL # BLD AUTO: 0.6 K/UL (ref 0–0.5)
EOSINOPHIL NFR BLD: 3.6 % (ref 0–8)
EP: 6
ERYTHROCYTE [DISTWIDTH] IN BLOOD BY AUTOMATED COUNT: 13.2 % (ref 11.5–14.5)
ERYTHROCYTE [SEDIMENTATION RATE] IN BLOOD BY WESTERGREN METHOD: 14 MM/H
EST. GFR  (AFRICAN AMERICAN): 44 ML/MIN/1.73 M^2
EST. GFR  (NON AFRICAN AMERICAN): 38 ML/MIN/1.73 M^2
FIO2: 50
GLUCOSE SERPL-MCNC: 182 MG/DL (ref 70–110)
GLUCOSE UR QL STRIP: NEGATIVE
HCO3 UR-SCNC: 26.8 MMOL/L (ref 24–28)
HCT VFR BLD AUTO: 34.1 % (ref 37–48.5)
HGB BLD-MCNC: 10.3 G/DL (ref 12–16)
HGB UR QL STRIP: ABNORMAL
IMM GRANULOCYTES # BLD AUTO: 0.07 K/UL (ref 0–0.04)
IMM GRANULOCYTES NFR BLD AUTO: 0.5 % (ref 0–0.5)
IP: 12
KETONES UR QL STRIP: NEGATIVE
LACTATE SERPL-SCNC: 1.2 MMOL/L (ref 0.5–2.2)
LEUKOCYTE ESTERASE UR QL STRIP: ABNORMAL
LYMPHOCYTES # BLD AUTO: 3.9 K/UL (ref 1–4.8)
LYMPHOCYTES NFR BLD: 25.8 % (ref 18–48)
MAGNESIUM SERPL-MCNC: 1.9 MG/DL (ref 1.6–2.6)
MCH RBC QN AUTO: 28.1 PG (ref 27–31)
MCHC RBC AUTO-ENTMCNC: 30.2 G/DL (ref 32–36)
MCV RBC AUTO: 93 FL (ref 82–98)
MICROSCOPIC COMMENT: NORMAL
MODE: ABNORMAL
MONOCYTES # BLD AUTO: 0.9 K/UL (ref 0.3–1)
MONOCYTES NFR BLD: 5.6 % (ref 4–15)
NEUTROPHILS # BLD AUTO: 9.8 K/UL (ref 1.8–7.7)
NEUTROPHILS NFR BLD: 64.1 % (ref 38–73)
NITRITE UR QL STRIP: NEGATIVE
NRBC BLD-RTO: 0 /100 WBC
PCO2 BLDA: 54.7 MMHG (ref 35–45)
PH SMN: 7.3 [PH] (ref 7.35–7.45)
PH UR STRIP: 6 [PH] (ref 5–8)
PHOSPHATE SERPL-MCNC: 4.7 MG/DL (ref 2.7–4.5)
PLATELET # BLD AUTO: 337 K/UL (ref 150–350)
PMV BLD AUTO: 10.2 FL (ref 9.2–12.9)
PO2 BLDA: 120 MMHG (ref 80–100)
POC BE: 0 MMOL/L
POC SATURATED O2: 98 % (ref 95–100)
POTASSIUM SERPL-SCNC: 3.6 MMOL/L (ref 3.5–5.1)
PROT SERPL-MCNC: 7.3 G/DL (ref 6–8.4)
PROT UR QL STRIP: NEGATIVE
RBC # BLD AUTO: 3.66 M/UL (ref 4–5.4)
RBC #/AREA URNS HPF: 0 /HPF (ref 0–4)
SAMPLE: ABNORMAL
SARS-COV-2 RDRP RESP QL NAA+PROBE: NEGATIVE
SITE: ABNORMAL
SODIUM SERPL-SCNC: 136 MMOL/L (ref 136–145)
SP GR UR STRIP: 1.01 (ref 1–1.03)
URN SPEC COLLECT METH UR: ABNORMAL
UROBILINOGEN UR STRIP-ACNC: NEGATIVE EU/DL
WBC # BLD AUTO: 15.23 K/UL (ref 3.9–12.7)
WBC #/AREA URNS HPF: 4 /HPF (ref 0–5)

## 2021-03-05 PROCEDURE — 99900035 HC TECH TIME PER 15 MIN (STAT)

## 2021-03-05 PROCEDURE — 25000242 PHARM REV CODE 250 ALT 637 W/ HCPCS: Performed by: EMERGENCY MEDICINE

## 2021-03-05 PROCEDURE — 84484 ASSAY OF TROPONIN QUANT: CPT | Performed by: EMERGENCY MEDICINE

## 2021-03-05 PROCEDURE — 25000003 PHARM REV CODE 250: Performed by: EMERGENCY MEDICINE

## 2021-03-05 PROCEDURE — 93010 EKG 12-LEAD: ICD-10-PCS | Mod: ,,, | Performed by: INTERNAL MEDICINE

## 2021-03-05 PROCEDURE — 11000001 HC ACUTE MED/SURG PRIVATE ROOM

## 2021-03-05 PROCEDURE — 84100 ASSAY OF PHOSPHORUS: CPT | Performed by: EMERGENCY MEDICINE

## 2021-03-05 PROCEDURE — 81000 URINALYSIS NONAUTO W/SCOPE: CPT | Performed by: EMERGENCY MEDICINE

## 2021-03-05 PROCEDURE — 36415 COLL VENOUS BLD VENIPUNCTURE: CPT | Performed by: EMERGENCY MEDICINE

## 2021-03-05 PROCEDURE — 63600175 PHARM REV CODE 636 W HCPCS: Performed by: EMERGENCY MEDICINE

## 2021-03-05 PROCEDURE — 94660 CPAP INITIATION&MGMT: CPT

## 2021-03-05 PROCEDURE — 83880 ASSAY OF NATRIURETIC PEPTIDE: CPT | Performed by: EMERGENCY MEDICINE

## 2021-03-05 PROCEDURE — 83735 ASSAY OF MAGNESIUM: CPT | Performed by: EMERGENCY MEDICINE

## 2021-03-05 PROCEDURE — 83605 ASSAY OF LACTIC ACID: CPT | Performed by: EMERGENCY MEDICINE

## 2021-03-05 PROCEDURE — 96374 THER/PROPH/DIAG INJ IV PUSH: CPT

## 2021-03-05 PROCEDURE — 93010 ELECTROCARDIOGRAM REPORT: CPT | Mod: ,,, | Performed by: INTERNAL MEDICINE

## 2021-03-05 PROCEDURE — 27000190 HC CPAP FULL FACE MASK W/VALVE

## 2021-03-05 PROCEDURE — 93005 ELECTROCARDIOGRAM TRACING: CPT

## 2021-03-05 PROCEDURE — 85025 COMPLETE CBC W/AUTO DIFF WBC: CPT | Performed by: EMERGENCY MEDICINE

## 2021-03-05 PROCEDURE — 80053 COMPREHEN METABOLIC PANEL: CPT | Performed by: EMERGENCY MEDICINE

## 2021-03-05 PROCEDURE — 94761 N-INVAS EAR/PLS OXIMETRY MLT: CPT

## 2021-03-05 PROCEDURE — 87040 BLOOD CULTURE FOR BACTERIA: CPT | Performed by: EMERGENCY MEDICINE

## 2021-03-05 PROCEDURE — 94640 AIRWAY INHALATION TREATMENT: CPT

## 2021-03-05 PROCEDURE — 99291 CRITICAL CARE FIRST HOUR: CPT | Mod: 25

## 2021-03-05 PROCEDURE — 36600 WITHDRAWAL OF ARTERIAL BLOOD: CPT

## 2021-03-05 PROCEDURE — U0002 COVID-19 LAB TEST NON-CDC: HCPCS | Performed by: EMERGENCY MEDICINE

## 2021-03-05 PROCEDURE — 82803 BLOOD GASES ANY COMBINATION: CPT

## 2021-03-05 RX ORDER — FUROSEMIDE 10 MG/ML
60 INJECTION INTRAMUSCULAR; INTRAVENOUS
Status: COMPLETED | OUTPATIENT
Start: 2021-03-05 | End: 2021-03-05

## 2021-03-05 RX ORDER — IPRATROPIUM BROMIDE AND ALBUTEROL SULFATE 2.5; .5 MG/3ML; MG/3ML
3 SOLUTION RESPIRATORY (INHALATION)
Status: COMPLETED | OUTPATIENT
Start: 2021-03-05 | End: 2021-03-05

## 2021-03-05 RX ADMIN — IPRATROPIUM BROMIDE AND ALBUTEROL SULFATE 3 ML: .5; 2.5 SOLUTION RESPIRATORY (INHALATION) at 10:03

## 2021-03-05 RX ADMIN — NITROGLYCERIN 1 INCH: 20 OINTMENT TOPICAL at 10:03

## 2021-03-05 RX ADMIN — FUROSEMIDE 60 MG: 10 INJECTION, SOLUTION INTRAMUSCULAR; INTRAVENOUS at 10:03

## 2021-03-06 PROBLEM — J96.01 ACUTE RESPIRATORY FAILURE WITH HYPOXIA: Status: ACTIVE | Noted: 2021-03-06

## 2021-03-06 PROBLEM — I50.41 ACUTE COMBINED SYSTOLIC AND DIASTOLIC CONGESTIVE HEART FAILURE: Status: ACTIVE | Noted: 2021-02-25

## 2021-03-06 PROBLEM — J96.01 ACUTE RESPIRATORY FAILURE WITH HYPOXIA: Status: RESOLVED | Noted: 2021-03-06 | Resolved: 2021-03-06

## 2021-03-06 PROBLEM — R79.89 ELEVATED TROPONIN: Status: ACTIVE | Noted: 2021-03-06

## 2021-03-06 PROBLEM — I50.43 ACUTE ON CHRONIC COMBINED SYSTOLIC AND DIASTOLIC CONGESTIVE HEART FAILURE: Status: ACTIVE | Noted: 2021-02-25

## 2021-03-06 LAB
ALBUMIN SERPL BCP-MCNC: 3.6 G/DL (ref 3.5–5.2)
ALP SERPL-CCNC: 71 U/L (ref 55–135)
ALT SERPL W/O P-5'-P-CCNC: 9 U/L (ref 10–44)
ANION GAP SERPL CALC-SCNC: 11 MMOL/L (ref 8–16)
AST SERPL-CCNC: 14 U/L (ref 10–40)
BASOPHILS # BLD AUTO: 0.03 K/UL (ref 0–0.2)
BASOPHILS NFR BLD: 0.4 % (ref 0–1.9)
BILIRUB SERPL-MCNC: 0.9 MG/DL (ref 0.1–1)
BNP SERPL-MCNC: 118 PG/ML (ref 0–99)
BUN SERPL-MCNC: 19 MG/DL (ref 8–23)
CALCIUM SERPL-MCNC: 9.3 MG/DL (ref 8.7–10.5)
CHLORIDE SERPL-SCNC: 100 MMOL/L (ref 95–110)
CO2 SERPL-SCNC: 25 MMOL/L (ref 23–29)
CREAT SERPL-MCNC: 1.2 MG/DL (ref 0.5–1.4)
DIFFERENTIAL METHOD: ABNORMAL
EOSINOPHIL # BLD AUTO: 0.2 K/UL (ref 0–0.5)
EOSINOPHIL NFR BLD: 2.9 % (ref 0–8)
ERYTHROCYTE [DISTWIDTH] IN BLOOD BY AUTOMATED COUNT: 13.4 % (ref 11.5–14.5)
EST. GFR  (AFRICAN AMERICAN): 53 ML/MIN/1.73 M^2
EST. GFR  (NON AFRICAN AMERICAN): 46 ML/MIN/1.73 M^2
GLUCOSE SERPL-MCNC: 82 MG/DL (ref 70–110)
HCT VFR BLD AUTO: 33.5 % (ref 37–48.5)
HGB BLD-MCNC: 9.9 G/DL (ref 12–16)
IMM GRANULOCYTES # BLD AUTO: 0.03 K/UL (ref 0–0.04)
IMM GRANULOCYTES NFR BLD AUTO: 0.4 % (ref 0–0.5)
LYMPHOCYTES # BLD AUTO: 2 K/UL (ref 1–4.8)
LYMPHOCYTES NFR BLD: 24.7 % (ref 18–48)
MAGNESIUM SERPL-MCNC: 1.7 MG/DL (ref 1.6–2.6)
MCH RBC QN AUTO: 28.4 PG (ref 27–31)
MCHC RBC AUTO-ENTMCNC: 29.6 G/DL (ref 32–36)
MCV RBC AUTO: 96 FL (ref 82–98)
MONOCYTES # BLD AUTO: 0.5 K/UL (ref 0.3–1)
MONOCYTES NFR BLD: 6.8 % (ref 4–15)
NEUTROPHILS # BLD AUTO: 5.1 K/UL (ref 1.8–7.7)
NEUTROPHILS NFR BLD: 64.8 % (ref 38–73)
NRBC BLD-RTO: 0 /100 WBC
PLATELET # BLD AUTO: 265 K/UL (ref 150–350)
PMV BLD AUTO: 10.3 FL (ref 9.2–12.9)
POTASSIUM SERPL-SCNC: 3.7 MMOL/L (ref 3.5–5.1)
PROT SERPL-MCNC: 7 G/DL (ref 6–8.4)
RBC # BLD AUTO: 3.49 M/UL (ref 4–5.4)
SODIUM SERPL-SCNC: 136 MMOL/L (ref 136–145)
TROPONIN I SERPL DL<=0.01 NG/ML-MCNC: 0.03 NG/ML (ref 0–0.03)
TROPONIN I SERPL DL<=0.01 NG/ML-MCNC: 0.04 NG/ML (ref 0–0.03)
TROPONIN I SERPL DL<=0.01 NG/ML-MCNC: 0.06 NG/ML (ref 0–0.03)
TROPONIN I SERPL DL<=0.01 NG/ML-MCNC: 0.07 NG/ML (ref 0–0.03)
WBC # BLD AUTO: 7.91 K/UL (ref 3.9–12.7)

## 2021-03-06 PROCEDURE — 63600175 PHARM REV CODE 636 W HCPCS: Performed by: NURSE PRACTITIONER

## 2021-03-06 PROCEDURE — 27000221 HC OXYGEN, UP TO 24 HOURS

## 2021-03-06 PROCEDURE — 21400001 HC TELEMETRY ROOM

## 2021-03-06 PROCEDURE — 83735 ASSAY OF MAGNESIUM: CPT | Performed by: NURSE PRACTITIONER

## 2021-03-06 PROCEDURE — 25000003 PHARM REV CODE 250: Performed by: INTERNAL MEDICINE

## 2021-03-06 PROCEDURE — 80053 COMPREHEN METABOLIC PANEL: CPT | Performed by: NURSE PRACTITIONER

## 2021-03-06 PROCEDURE — 94660 CPAP INITIATION&MGMT: CPT

## 2021-03-06 PROCEDURE — 87040 BLOOD CULTURE FOR BACTERIA: CPT | Performed by: EMERGENCY MEDICINE

## 2021-03-06 PROCEDURE — 96376 TX/PRO/DX INJ SAME DRUG ADON: CPT

## 2021-03-06 PROCEDURE — 84484 ASSAY OF TROPONIN QUANT: CPT | Mod: 91 | Performed by: NURSE PRACTITIONER

## 2021-03-06 PROCEDURE — 25000003 PHARM REV CODE 250: Performed by: EMERGENCY MEDICINE

## 2021-03-06 PROCEDURE — 99233 PR SUBSEQUENT HOSPITAL CARE,LEVL III: ICD-10-PCS | Mod: ,,, | Performed by: INTERNAL MEDICINE

## 2021-03-06 PROCEDURE — 84484 ASSAY OF TROPONIN QUANT: CPT | Mod: 91 | Performed by: INTERNAL MEDICINE

## 2021-03-06 PROCEDURE — 36415 COLL VENOUS BLD VENIPUNCTURE: CPT | Performed by: NURSE PRACTITIONER

## 2021-03-06 PROCEDURE — 36415 COLL VENOUS BLD VENIPUNCTURE: CPT | Performed by: INTERNAL MEDICINE

## 2021-03-06 PROCEDURE — 63600175 PHARM REV CODE 636 W HCPCS: Performed by: INTERNAL MEDICINE

## 2021-03-06 PROCEDURE — 25000003 PHARM REV CODE 250: Performed by: NURSE PRACTITIONER

## 2021-03-06 PROCEDURE — 99233 SBSQ HOSP IP/OBS HIGH 50: CPT | Mod: ,,, | Performed by: INTERNAL MEDICINE

## 2021-03-06 PROCEDURE — 85025 COMPLETE CBC W/AUTO DIFF WBC: CPT | Performed by: NURSE PRACTITIONER

## 2021-03-06 PROCEDURE — 99900035 HC TECH TIME PER 15 MIN (STAT)

## 2021-03-06 RX ORDER — ATORVASTATIN CALCIUM 10 MG/1
20 TABLET, FILM COATED ORAL NIGHTLY
Status: DISCONTINUED | OUTPATIENT
Start: 2021-03-06 | End: 2021-03-08 | Stop reason: HOSPADM

## 2021-03-06 RX ORDER — ACETAMINOPHEN 325 MG/1
650 TABLET ORAL EVERY 6 HOURS PRN
Status: DISCONTINUED | OUTPATIENT
Start: 2021-03-06 | End: 2021-03-08 | Stop reason: HOSPADM

## 2021-03-06 RX ORDER — CARVEDILOL 3.12 MG/1
3.12 TABLET ORAL 2 TIMES DAILY WITH MEALS
Status: DISCONTINUED | OUTPATIENT
Start: 2021-03-06 | End: 2021-03-08 | Stop reason: HOSPADM

## 2021-03-06 RX ORDER — TAMSULOSIN HYDROCHLORIDE 0.4 MG/1
1 CAPSULE ORAL DAILY
Status: DISCONTINUED | OUTPATIENT
Start: 2021-03-06 | End: 2021-03-08 | Stop reason: HOSPADM

## 2021-03-06 RX ORDER — NAPROXEN SODIUM 220 MG/1
81 TABLET, FILM COATED ORAL DAILY
Status: DISCONTINUED | OUTPATIENT
Start: 2021-03-06 | End: 2021-03-08 | Stop reason: HOSPADM

## 2021-03-06 RX ORDER — LOSARTAN POTASSIUM 50 MG/1
50 TABLET ORAL DAILY
Status: DISCONTINUED | OUTPATIENT
Start: 2021-03-06 | End: 2021-03-06

## 2021-03-06 RX ORDER — HYDRALAZINE HYDROCHLORIDE 20 MG/ML
10 INJECTION INTRAMUSCULAR; INTRAVENOUS EVERY 6 HOURS PRN
Status: DISCONTINUED | OUTPATIENT
Start: 2021-03-06 | End: 2021-03-08 | Stop reason: HOSPADM

## 2021-03-06 RX ORDER — HYDROCHLOROTHIAZIDE 12.5 MG/1
12.5 TABLET ORAL DAILY
Refills: 5 | Status: DISCONTINUED | OUTPATIENT
Start: 2021-03-06 | End: 2021-03-06

## 2021-03-06 RX ORDER — ASPIRIN 325 MG
325 TABLET ORAL
Status: COMPLETED | OUTPATIENT
Start: 2021-03-06 | End: 2021-03-06

## 2021-03-06 RX ORDER — BUPROPION HYDROCHLORIDE 150 MG/1
150 TABLET, EXTENDED RELEASE ORAL 2 TIMES DAILY
Status: DISCONTINUED | OUTPATIENT
Start: 2021-03-06 | End: 2021-03-06

## 2021-03-06 RX ORDER — ONDANSETRON 2 MG/ML
4 INJECTION INTRAMUSCULAR; INTRAVENOUS EVERY 6 HOURS PRN
Status: DISCONTINUED | OUTPATIENT
Start: 2021-03-06 | End: 2021-03-08 | Stop reason: HOSPADM

## 2021-03-06 RX ORDER — FUROSEMIDE 10 MG/ML
40 INJECTION INTRAMUSCULAR; INTRAVENOUS
Status: DISCONTINUED | OUTPATIENT
Start: 2021-03-06 | End: 2021-03-07

## 2021-03-06 RX ORDER — FUROSEMIDE 10 MG/ML
60 INJECTION INTRAMUSCULAR; INTRAVENOUS EVERY 8 HOURS
Status: DISCONTINUED | OUTPATIENT
Start: 2021-03-06 | End: 2021-03-06

## 2021-03-06 RX ORDER — FERROUS SULFATE 325(65) MG
325 TABLET, DELAYED RELEASE (ENTERIC COATED) ORAL DAILY
Status: DISCONTINUED | OUTPATIENT
Start: 2021-03-06 | End: 2021-03-08 | Stop reason: HOSPADM

## 2021-03-06 RX ADMIN — CARVEDILOL 3.12 MG: 3.12 TABLET, FILM COATED ORAL at 09:03

## 2021-03-06 RX ADMIN — BUPROPION HYDROCHLORIDE 150 MG: 150 TABLET, EXTENDED RELEASE ORAL at 09:03

## 2021-03-06 RX ADMIN — TAMSULOSIN HYDROCHLORIDE 0.4 MG: 0.4 CAPSULE ORAL at 09:03

## 2021-03-06 RX ADMIN — FERROUS SULFATE TAB EC 325 MG (65 MG FE EQUIVALENT) 325 MG: 325 (65 FE) TABLET DELAYED RESPONSE at 09:03

## 2021-03-06 RX ADMIN — CARVEDILOL 3.12 MG: 3.12 TABLET, FILM COATED ORAL at 06:03

## 2021-03-06 RX ADMIN — NITROGLYCERIN 0.5 INCH: 20 OINTMENT TOPICAL at 06:03

## 2021-03-06 RX ADMIN — FUROSEMIDE 40 MG: 10 INJECTION, SOLUTION INTRAMUSCULAR; INTRAVENOUS at 06:03

## 2021-03-06 RX ADMIN — ASPIRIN 81 MG CHEWABLE TABLET 81 MG: 81 TABLET CHEWABLE at 09:03

## 2021-03-06 RX ADMIN — ATORVASTATIN CALCIUM 20 MG: 10 TABLET, FILM COATED ORAL at 08:03

## 2021-03-06 RX ADMIN — NITROGLYCERIN 0.5 INCH: 20 OINTMENT TOPICAL at 12:03

## 2021-03-06 RX ADMIN — FUROSEMIDE 60 MG: 10 INJECTION, SOLUTION INTRAMUSCULAR; INTRAVENOUS at 05:03

## 2021-03-06 RX ADMIN — NITROGLYCERIN 0.5 INCH: 20 OINTMENT TOPICAL at 05:03

## 2021-03-06 RX ADMIN — ASPIRIN 325 MG ORAL TABLET 325 MG: 325 PILL ORAL at 02:03

## 2021-03-07 PROBLEM — R78.81 POSITIVE BLOOD CULTURE: Status: ACTIVE | Noted: 2021-03-07

## 2021-03-07 PROBLEM — N30.00 ACUTE CYSTITIS: Status: ACTIVE | Noted: 2021-03-07

## 2021-03-07 LAB
ALBUMIN SERPL BCP-MCNC: 3.9 G/DL (ref 3.5–5.2)
ALP SERPL-CCNC: 82 U/L (ref 55–135)
ALT SERPL W/O P-5'-P-CCNC: 12 U/L (ref 10–44)
ANION GAP SERPL CALC-SCNC: 16 MMOL/L (ref 8–16)
AST SERPL-CCNC: 16 U/L (ref 10–40)
BASOPHILS # BLD AUTO: 0.02 K/UL (ref 0–0.2)
BASOPHILS NFR BLD: 0.3 % (ref 0–1.9)
BILIRUB SERPL-MCNC: 1 MG/DL (ref 0.1–1)
BNP SERPL-MCNC: 51 PG/ML (ref 0–99)
BUN SERPL-MCNC: 26 MG/DL (ref 8–23)
CALCIUM SERPL-MCNC: 9.6 MG/DL (ref 8.7–10.5)
CHLORIDE SERPL-SCNC: 95 MMOL/L (ref 95–110)
CO2 SERPL-SCNC: 24 MMOL/L (ref 23–29)
CREAT SERPL-MCNC: 1.4 MG/DL (ref 0.5–1.4)
DIFFERENTIAL METHOD: ABNORMAL
EOSINOPHIL # BLD AUTO: 0.3 K/UL (ref 0–0.5)
EOSINOPHIL NFR BLD: 4.8 % (ref 0–8)
ERYTHROCYTE [DISTWIDTH] IN BLOOD BY AUTOMATED COUNT: 13.3 % (ref 11.5–14.5)
EST. GFR  (AFRICAN AMERICAN): 44 ML/MIN/1.73 M^2
EST. GFR  (NON AFRICAN AMERICAN): 38 ML/MIN/1.73 M^2
GLUCOSE SERPL-MCNC: 88 MG/DL (ref 70–110)
HCT VFR BLD AUTO: 34.6 % (ref 37–48.5)
HGB BLD-MCNC: 10.5 G/DL (ref 12–16)
IMM GRANULOCYTES # BLD AUTO: 0.03 K/UL (ref 0–0.04)
IMM GRANULOCYTES NFR BLD AUTO: 0.4 % (ref 0–0.5)
LYMPHOCYTES # BLD AUTO: 2.5 K/UL (ref 1–4.8)
LYMPHOCYTES NFR BLD: 36.7 % (ref 18–48)
MAGNESIUM SERPL-MCNC: 1.7 MG/DL (ref 1.6–2.6)
MCH RBC QN AUTO: 28 PG (ref 27–31)
MCHC RBC AUTO-ENTMCNC: 30.3 G/DL (ref 32–36)
MCV RBC AUTO: 92 FL (ref 82–98)
MONOCYTES # BLD AUTO: 0.6 K/UL (ref 0.3–1)
MONOCYTES NFR BLD: 8.3 % (ref 4–15)
NEUTROPHILS # BLD AUTO: 3.4 K/UL (ref 1.8–7.7)
NEUTROPHILS NFR BLD: 49.5 % (ref 38–73)
NRBC BLD-RTO: 0 /100 WBC
PLATELET # BLD AUTO: 286 K/UL (ref 150–350)
PMV BLD AUTO: 10.2 FL (ref 9.2–12.9)
POTASSIUM SERPL-SCNC: 3.8 MMOL/L (ref 3.5–5.1)
PROCALCITONIN SERPL IA-MCNC: 0.48 NG/ML
PROT SERPL-MCNC: 7.6 G/DL (ref 6–8.4)
RBC # BLD AUTO: 3.75 M/UL (ref 4–5.4)
SODIUM SERPL-SCNC: 135 MMOL/L (ref 136–145)
TROPONIN I SERPL DL<=0.01 NG/ML-MCNC: 0.04 NG/ML (ref 0–0.03)
TROPONIN I SERPL DL<=0.01 NG/ML-MCNC: 0.05 NG/ML (ref 0–0.03)
WBC # BLD AUTO: 6.87 K/UL (ref 3.9–12.7)

## 2021-03-07 PROCEDURE — 99233 SBSQ HOSP IP/OBS HIGH 50: CPT | Mod: ,,, | Performed by: INTERNAL MEDICINE

## 2021-03-07 PROCEDURE — 21400001 HC TELEMETRY ROOM

## 2021-03-07 PROCEDURE — 84484 ASSAY OF TROPONIN QUANT: CPT | Performed by: INTERNAL MEDICINE

## 2021-03-07 PROCEDURE — 99233 PR SUBSEQUENT HOSPITAL CARE,LEVL III: ICD-10-PCS | Mod: ,,, | Performed by: INTERNAL MEDICINE

## 2021-03-07 PROCEDURE — 85025 COMPLETE CBC W/AUTO DIFF WBC: CPT | Performed by: NURSE PRACTITIONER

## 2021-03-07 PROCEDURE — 63600175 PHARM REV CODE 636 W HCPCS: Performed by: INTERNAL MEDICINE

## 2021-03-07 PROCEDURE — 80053 COMPREHEN METABOLIC PANEL: CPT | Performed by: NURSE PRACTITIONER

## 2021-03-07 PROCEDURE — 83880 ASSAY OF NATRIURETIC PEPTIDE: CPT | Performed by: INTERNAL MEDICINE

## 2021-03-07 PROCEDURE — 36415 COLL VENOUS BLD VENIPUNCTURE: CPT | Performed by: INTERNAL MEDICINE

## 2021-03-07 PROCEDURE — 25000003 PHARM REV CODE 250: Performed by: NURSE PRACTITIONER

## 2021-03-07 PROCEDURE — 84145 PROCALCITONIN (PCT): CPT | Performed by: INTERNAL MEDICINE

## 2021-03-07 PROCEDURE — 25000003 PHARM REV CODE 250: Performed by: INTERNAL MEDICINE

## 2021-03-07 PROCEDURE — 83735 ASSAY OF MAGNESIUM: CPT | Performed by: NURSE PRACTITIONER

## 2021-03-07 RX ORDER — FUROSEMIDE 10 MG/ML
40 INJECTION INTRAMUSCULAR; INTRAVENOUS DAILY
Status: DISCONTINUED | OUTPATIENT
Start: 2021-03-08 | End: 2021-03-08 | Stop reason: HOSPADM

## 2021-03-07 RX ORDER — ATORVASTATIN CALCIUM 20 MG/1
20 TABLET, FILM COATED ORAL NIGHTLY
Qty: 90 TABLET | Refills: 3 | Status: SHIPPED | OUTPATIENT
Start: 2021-03-07 | End: 2022-03-15 | Stop reason: SDUPTHER

## 2021-03-07 RX ORDER — FUROSEMIDE 40 MG/1
40 TABLET ORAL
Qty: 30 TABLET | Refills: 1 | Status: SHIPPED | OUTPATIENT
Start: 2021-03-07 | End: 2021-10-21

## 2021-03-07 RX ORDER — NAPROXEN SODIUM 220 MG/1
81 TABLET, FILM COATED ORAL DAILY
Qty: 90 TABLET | Refills: 3 | Status: SHIPPED | OUTPATIENT
Start: 2021-03-08 | End: 2023-03-08 | Stop reason: SDUPTHER

## 2021-03-07 RX ADMIN — FUROSEMIDE 40 MG: 10 INJECTION, SOLUTION INTRAMUSCULAR; INTRAVENOUS at 05:03

## 2021-03-07 RX ADMIN — ATORVASTATIN CALCIUM 20 MG: 10 TABLET, FILM COATED ORAL at 08:03

## 2021-03-07 RX ADMIN — CEFTRIAXONE 1 G: 1 INJECTION, SOLUTION INTRAVENOUS at 01:03

## 2021-03-07 RX ADMIN — CARVEDILOL 3.12 MG: 3.12 TABLET, FILM COATED ORAL at 08:03

## 2021-03-07 RX ADMIN — NITROGLYCERIN 0.5 INCH: 20 OINTMENT TOPICAL at 12:03

## 2021-03-07 RX ADMIN — NITROGLYCERIN 0.5 INCH: 20 OINTMENT TOPICAL at 05:03

## 2021-03-07 RX ADMIN — CARVEDILOL 3.12 MG: 3.12 TABLET, FILM COATED ORAL at 05:03

## 2021-03-07 RX ADMIN — TAMSULOSIN HYDROCHLORIDE 0.4 MG: 0.4 CAPSULE ORAL at 08:03

## 2021-03-07 RX ADMIN — FERROUS SULFATE TAB EC 325 MG (65 MG FE EQUIVALENT) 325 MG: 325 (65 FE) TABLET DELAYED RESPONSE at 08:03

## 2021-03-07 RX ADMIN — ASPIRIN 81 MG CHEWABLE TABLET 81 MG: 81 TABLET CHEWABLE at 08:03

## 2021-03-08 VITALS
WEIGHT: 150.38 LBS | HEIGHT: 65 IN | OXYGEN SATURATION: 96 % | BODY MASS INDEX: 25.05 KG/M2 | SYSTOLIC BLOOD PRESSURE: 110 MMHG | TEMPERATURE: 98 F | DIASTOLIC BLOOD PRESSURE: 59 MMHG | HEART RATE: 75 BPM | RESPIRATION RATE: 15 BRPM

## 2021-03-08 LAB
ALBUMIN SERPL BCP-MCNC: 3.6 G/DL (ref 3.5–5.2)
ALP SERPL-CCNC: 76 U/L (ref 55–135)
ALT SERPL W/O P-5'-P-CCNC: 9 U/L (ref 10–44)
ANION GAP SERPL CALC-SCNC: 8 MMOL/L (ref 8–16)
AST SERPL-CCNC: 13 U/L (ref 10–40)
BASOPHILS # BLD AUTO: 0.03 K/UL (ref 0–0.2)
BASOPHILS NFR BLD: 0.4 % (ref 0–1.9)
BILIRUB SERPL-MCNC: 0.9 MG/DL (ref 0.1–1)
BUN SERPL-MCNC: 29 MG/DL (ref 8–23)
CALCIUM SERPL-MCNC: 9.7 MG/DL (ref 8.7–10.5)
CHLORIDE SERPL-SCNC: 95 MMOL/L (ref 95–110)
CO2 SERPL-SCNC: 33 MMOL/L (ref 23–29)
CREAT SERPL-MCNC: 1.5 MG/DL (ref 0.5–1.4)
DIFFERENTIAL METHOD: ABNORMAL
EOSINOPHIL # BLD AUTO: 0.4 K/UL (ref 0–0.5)
EOSINOPHIL NFR BLD: 6.4 % (ref 0–8)
ERYTHROCYTE [DISTWIDTH] IN BLOOD BY AUTOMATED COUNT: 13.1 % (ref 11.5–14.5)
EST. GFR  (AFRICAN AMERICAN): 41 ML/MIN/1.73 M^2
EST. GFR  (NON AFRICAN AMERICAN): 35 ML/MIN/1.73 M^2
GLUCOSE SERPL-MCNC: 87 MG/DL (ref 70–110)
HCT VFR BLD AUTO: 33.5 % (ref 37–48.5)
HGB BLD-MCNC: 10.4 G/DL (ref 12–16)
IMM GRANULOCYTES # BLD AUTO: 0.02 K/UL (ref 0–0.04)
IMM GRANULOCYTES NFR BLD AUTO: 0.3 % (ref 0–0.5)
LYMPHOCYTES # BLD AUTO: 2.5 K/UL (ref 1–4.8)
LYMPHOCYTES NFR BLD: 37.2 % (ref 18–48)
MAGNESIUM SERPL-MCNC: 1.8 MG/DL (ref 1.6–2.6)
MCH RBC QN AUTO: 28 PG (ref 27–31)
MCHC RBC AUTO-ENTMCNC: 31 G/DL (ref 32–36)
MCV RBC AUTO: 90 FL (ref 82–98)
MONOCYTES # BLD AUTO: 0.7 K/UL (ref 0.3–1)
MONOCYTES NFR BLD: 10.5 % (ref 4–15)
NEUTROPHILS # BLD AUTO: 3.1 K/UL (ref 1.8–7.7)
NEUTROPHILS NFR BLD: 45.2 % (ref 38–73)
NRBC BLD-RTO: 0 /100 WBC
PLATELET # BLD AUTO: 296 K/UL (ref 150–350)
PMV BLD AUTO: 10.4 FL (ref 9.2–12.9)
POTASSIUM SERPL-SCNC: 3.6 MMOL/L (ref 3.5–5.1)
PROT SERPL-MCNC: 7.3 G/DL (ref 6–8.4)
RBC # BLD AUTO: 3.71 M/UL (ref 4–5.4)
SODIUM SERPL-SCNC: 136 MMOL/L (ref 136–145)
WBC # BLD AUTO: 6.77 K/UL (ref 3.9–12.7)

## 2021-03-08 PROCEDURE — 94761 N-INVAS EAR/PLS OXIMETRY MLT: CPT

## 2021-03-08 PROCEDURE — 25000003 PHARM REV CODE 250: Performed by: NURSE PRACTITIONER

## 2021-03-08 PROCEDURE — 80053 COMPREHEN METABOLIC PANEL: CPT | Performed by: NURSE PRACTITIONER

## 2021-03-08 PROCEDURE — 36415 COLL VENOUS BLD VENIPUNCTURE: CPT | Performed by: NURSE PRACTITIONER

## 2021-03-08 PROCEDURE — 63600175 PHARM REV CODE 636 W HCPCS: Performed by: INTERNAL MEDICINE

## 2021-03-08 PROCEDURE — 83735 ASSAY OF MAGNESIUM: CPT | Performed by: NURSE PRACTITIONER

## 2021-03-08 PROCEDURE — 85025 COMPLETE CBC W/AUTO DIFF WBC: CPT | Performed by: NURSE PRACTITIONER

## 2021-03-08 RX ADMIN — CEFTRIAXONE 1 G: 1 INJECTION, SOLUTION INTRAVENOUS at 01:03

## 2021-03-08 RX ADMIN — TAMSULOSIN HYDROCHLORIDE 0.4 MG: 0.4 CAPSULE ORAL at 08:03

## 2021-03-08 RX ADMIN — NITROGLYCERIN 0.5 INCH: 20 OINTMENT TOPICAL at 12:03

## 2021-03-08 RX ADMIN — NITROGLYCERIN 0.5 INCH: 20 OINTMENT TOPICAL at 01:03

## 2021-03-08 RX ADMIN — NITROGLYCERIN 0.5 INCH: 20 OINTMENT TOPICAL at 05:03

## 2021-03-08 RX ADMIN — FERROUS SULFATE TAB EC 325 MG (65 MG FE EQUIVALENT) 325 MG: 325 (65 FE) TABLET DELAYED RESPONSE at 08:03

## 2021-03-08 RX ADMIN — ASPIRIN 81 MG CHEWABLE TABLET 81 MG: 81 TABLET CHEWABLE at 08:03

## 2021-03-08 RX ADMIN — FUROSEMIDE 40 MG: 10 INJECTION, SOLUTION INTRAMUSCULAR; INTRAVENOUS at 08:03

## 2021-03-08 RX ADMIN — CARVEDILOL 3.12 MG: 3.12 TABLET, FILM COATED ORAL at 08:03

## 2021-03-09 LAB
BACTERIA BLD CULT: ABNORMAL

## 2021-03-10 ENCOUNTER — TELEPHONE (OUTPATIENT)
Dept: TRANSPLANT | Facility: CLINIC | Age: 71
End: 2021-03-10

## 2021-03-11 ENCOUNTER — PATIENT MESSAGE (OUTPATIENT)
Dept: HEMATOLOGY/ONCOLOGY | Facility: CLINIC | Age: 71
End: 2021-03-11

## 2021-03-11 LAB — BACTERIA BLD CULT: NORMAL

## 2021-03-15 ENCOUNTER — OFFICE VISIT (OUTPATIENT)
Dept: FAMILY MEDICINE | Facility: CLINIC | Age: 71
End: 2021-03-15
Payer: MEDICARE

## 2021-03-15 ENCOUNTER — LAB VISIT (OUTPATIENT)
Dept: LAB | Facility: HOSPITAL | Age: 71
End: 2021-03-15
Payer: MEDICARE

## 2021-03-15 VITALS
HEART RATE: 83 BPM | OXYGEN SATURATION: 98 % | SYSTOLIC BLOOD PRESSURE: 110 MMHG | BODY MASS INDEX: 25.08 KG/M2 | DIASTOLIC BLOOD PRESSURE: 50 MMHG | WEIGHT: 150.56 LBS | TEMPERATURE: 98 F | RESPIRATION RATE: 17 BRPM | HEIGHT: 65 IN

## 2021-03-15 DIAGNOSIS — Z17.0 MALIGNANT NEOPLASM OF LOWER-INNER QUADRANT OF LEFT BREAST IN FEMALE, ESTROGEN RECEPTOR POSITIVE: ICD-10-CM

## 2021-03-15 DIAGNOSIS — Z09 HOSPITAL DISCHARGE FOLLOW-UP: Primary | ICD-10-CM

## 2021-03-15 DIAGNOSIS — R78.81 POSITIVE BLOOD CULTURE: ICD-10-CM

## 2021-03-15 DIAGNOSIS — I10 ESSENTIAL HYPERTENSION: Chronic | ICD-10-CM

## 2021-03-15 DIAGNOSIS — Z09 HOSPITAL DISCHARGE FOLLOW-UP: ICD-10-CM

## 2021-03-15 DIAGNOSIS — I50.43 ACUTE ON CHRONIC COMBINED SYSTOLIC AND DIASTOLIC CONGESTIVE HEART FAILURE: ICD-10-CM

## 2021-03-15 DIAGNOSIS — N39.0 URINARY TRACT INFECTION WITHOUT HEMATURIA, SITE UNSPECIFIED: ICD-10-CM

## 2021-03-15 DIAGNOSIS — C50.312 MALIGNANT NEOPLASM OF LOWER-INNER QUADRANT OF LEFT BREAST IN FEMALE, ESTROGEN RECEPTOR POSITIVE: ICD-10-CM

## 2021-03-15 PROBLEM — N30.00 ACUTE CYSTITIS: Status: RESOLVED | Noted: 2021-03-07 | Resolved: 2021-03-15

## 2021-03-15 PROBLEM — J96.01 ACUTE RESPIRATORY FAILURE WITH HYPOXIA: Status: RESOLVED | Noted: 2021-03-06 | Resolved: 2021-03-15

## 2021-03-15 LAB
BASOPHILS # BLD AUTO: 0.03 K/UL (ref 0–0.2)
BASOPHILS NFR BLD: 0.4 % (ref 0–1.9)
DIFFERENTIAL METHOD: ABNORMAL
EOSINOPHIL # BLD AUTO: 0.5 K/UL (ref 0–0.5)
EOSINOPHIL NFR BLD: 6.9 % (ref 0–8)
ERYTHROCYTE [DISTWIDTH] IN BLOOD BY AUTOMATED COUNT: 13.5 % (ref 11.5–14.5)
HCT VFR BLD AUTO: 32.6 % (ref 37–48.5)
HGB BLD-MCNC: 9.9 G/DL (ref 12–16)
IMM GRANULOCYTES # BLD AUTO: 0.03 K/UL (ref 0–0.04)
IMM GRANULOCYTES NFR BLD AUTO: 0.4 % (ref 0–0.5)
LYMPHOCYTES # BLD AUTO: 2.4 K/UL (ref 1–4.8)
LYMPHOCYTES NFR BLD: 33.2 % (ref 18–48)
MCH RBC QN AUTO: 28.1 PG (ref 27–31)
MCHC RBC AUTO-ENTMCNC: 30.4 G/DL (ref 32–36)
MCV RBC AUTO: 93 FL (ref 82–98)
MONOCYTES # BLD AUTO: 0.7 K/UL (ref 0.3–1)
MONOCYTES NFR BLD: 9.1 % (ref 4–15)
NEUTROPHILS # BLD AUTO: 3.6 K/UL (ref 1.8–7.7)
NEUTROPHILS NFR BLD: 50 % (ref 38–73)
NRBC BLD-RTO: 0 /100 WBC
PLATELET # BLD AUTO: 340 K/UL (ref 150–350)
PMV BLD AUTO: 10.6 FL (ref 9.2–12.9)
RBC # BLD AUTO: 3.52 M/UL (ref 4–5.4)
WBC # BLD AUTO: 7.28 K/UL (ref 3.9–12.7)

## 2021-03-15 PROCEDURE — 1101F PR PT FALLS ASSESS DOC 0-1 FALLS W/OUT INJ PAST YR: ICD-10-PCS | Mod: CPTII,S$GLB,, | Performed by: REGISTERED NURSE

## 2021-03-15 PROCEDURE — 80053 COMPREHEN METABOLIC PANEL: CPT | Performed by: REGISTERED NURSE

## 2021-03-15 PROCEDURE — 1101F PT FALLS ASSESS-DOCD LE1/YR: CPT | Mod: CPTII,S$GLB,, | Performed by: REGISTERED NURSE

## 2021-03-15 PROCEDURE — 85025 COMPLETE CBC W/AUTO DIFF WBC: CPT | Performed by: REGISTERED NURSE

## 2021-03-15 PROCEDURE — 3074F SYST BP LT 130 MM HG: CPT | Mod: CPTII,S$GLB,, | Performed by: REGISTERED NURSE

## 2021-03-15 PROCEDURE — 87086 URINE CULTURE/COLONY COUNT: CPT | Performed by: REGISTERED NURSE

## 2021-03-15 PROCEDURE — 3078F PR MOST RECENT DIASTOLIC BLOOD PRESSURE < 80 MM HG: ICD-10-PCS | Mod: CPTII,S$GLB,, | Performed by: REGISTERED NURSE

## 2021-03-15 PROCEDURE — 3008F PR BODY MASS INDEX (BMI) DOCUMENTED: ICD-10-PCS | Mod: CPTII,S$GLB,, | Performed by: REGISTERED NURSE

## 2021-03-15 PROCEDURE — 1159F PR MEDICATION LIST DOCUMENTED IN MEDICAL RECORD: ICD-10-PCS | Mod: S$GLB,,, | Performed by: REGISTERED NURSE

## 2021-03-15 PROCEDURE — 99499 UNLISTED E&M SERVICE: CPT | Mod: S$GLB,,, | Performed by: REGISTERED NURSE

## 2021-03-15 PROCEDURE — 36415 COLL VENOUS BLD VENIPUNCTURE: CPT | Mod: PO | Performed by: REGISTERED NURSE

## 2021-03-15 PROCEDURE — 99214 PR OFFICE/OUTPT VISIT, EST, LEVL IV, 30-39 MIN: ICD-10-PCS | Mod: S$GLB,,, | Performed by: REGISTERED NURSE

## 2021-03-15 PROCEDURE — 3288F FALL RISK ASSESSMENT DOCD: CPT | Mod: CPTII,S$GLB,, | Performed by: REGISTERED NURSE

## 2021-03-15 PROCEDURE — 3078F DIAST BP <80 MM HG: CPT | Mod: CPTII,S$GLB,, | Performed by: REGISTERED NURSE

## 2021-03-15 PROCEDURE — 99499 RISK ADDL DX/OHS AUDIT: ICD-10-PCS | Mod: S$GLB,,, | Performed by: REGISTERED NURSE

## 2021-03-15 PROCEDURE — 87186 SC STD MICRODIL/AGAR DIL: CPT | Performed by: REGISTERED NURSE

## 2021-03-15 PROCEDURE — 3008F BODY MASS INDEX DOCD: CPT | Mod: CPTII,S$GLB,, | Performed by: REGISTERED NURSE

## 2021-03-15 PROCEDURE — 99214 OFFICE O/P EST MOD 30 MIN: CPT | Mod: S$GLB,,, | Performed by: REGISTERED NURSE

## 2021-03-15 PROCEDURE — 87077 CULTURE AEROBIC IDENTIFY: CPT | Performed by: REGISTERED NURSE

## 2021-03-15 PROCEDURE — 99999 PR PBB SHADOW E&M-EST. PATIENT-LVL IV: CPT | Mod: PBBFAC,,, | Performed by: REGISTERED NURSE

## 2021-03-15 PROCEDURE — 3288F PR FALLS RISK ASSESSMENT DOCUMENTED: ICD-10-PCS | Mod: CPTII,S$GLB,, | Performed by: REGISTERED NURSE

## 2021-03-15 PROCEDURE — 1159F MED LIST DOCD IN RCRD: CPT | Mod: S$GLB,,, | Performed by: REGISTERED NURSE

## 2021-03-15 PROCEDURE — 99999 PR PBB SHADOW E&M-EST. PATIENT-LVL IV: ICD-10-PCS | Mod: PBBFAC,,, | Performed by: REGISTERED NURSE

## 2021-03-15 PROCEDURE — 3074F PR MOST RECENT SYSTOLIC BLOOD PRESSURE < 130 MM HG: ICD-10-PCS | Mod: CPTII,S$GLB,, | Performed by: REGISTERED NURSE

## 2021-03-15 PROCEDURE — 81001 URINALYSIS AUTO W/SCOPE: CPT | Performed by: REGISTERED NURSE

## 2021-03-15 PROCEDURE — 87088 URINE BACTERIA CULTURE: CPT | Performed by: REGISTERED NURSE

## 2021-03-16 LAB
ALBUMIN SERPL BCP-MCNC: 3.7 G/DL (ref 3.5–5.2)
ALP SERPL-CCNC: 66 U/L (ref 55–135)
ALT SERPL W/O P-5'-P-CCNC: 9 U/L (ref 10–44)
ANION GAP SERPL CALC-SCNC: 7 MMOL/L (ref 8–16)
AST SERPL-CCNC: 13 U/L (ref 10–40)
BILIRUB SERPL-MCNC: 0.7 MG/DL (ref 0.1–1)
BUN SERPL-MCNC: 38 MG/DL (ref 8–23)
CALCIUM SERPL-MCNC: 9.9 MG/DL (ref 8.7–10.5)
CHLORIDE SERPL-SCNC: 96 MMOL/L (ref 95–110)
CO2 SERPL-SCNC: 34 MMOL/L (ref 23–29)
CREAT SERPL-MCNC: 1.6 MG/DL (ref 0.5–1.4)
EST. GFR  (AFRICAN AMERICAN): 37.6 ML/MIN/1.73 M^2
EST. GFR  (NON AFRICAN AMERICAN): 32.6 ML/MIN/1.73 M^2
GLUCOSE SERPL-MCNC: 81 MG/DL (ref 70–110)
POTASSIUM SERPL-SCNC: 4.1 MMOL/L (ref 3.5–5.1)
PROT SERPL-MCNC: 7.5 G/DL (ref 6–8.4)
SODIUM SERPL-SCNC: 137 MMOL/L (ref 136–145)

## 2021-03-17 ENCOUNTER — TELEPHONE (OUTPATIENT)
Dept: FAMILY MEDICINE | Facility: CLINIC | Age: 71
End: 2021-03-17

## 2021-03-17 DIAGNOSIS — N18.32 STAGE 3B CHRONIC KIDNEY DISEASE: Primary | ICD-10-CM

## 2021-03-17 LAB
AMORPH CRY UR QL COMP ASSIST: ABNORMAL
BACTERIA #/AREA URNS AUTO: ABNORMAL /HPF
BILIRUB UR QL STRIP: NEGATIVE
CLARITY UR REFRACT.AUTO: ABNORMAL
COLOR UR AUTO: ABNORMAL
GLUCOSE UR QL STRIP: NEGATIVE
HGB UR QL STRIP: ABNORMAL
HYALINE CASTS UR QL AUTO: 1 /LPF
KETONES UR QL STRIP: NEGATIVE
LEUKOCYTE ESTERASE UR QL STRIP: ABNORMAL
MICROSCOPIC COMMENT: ABNORMAL
NITRITE UR QL STRIP: NEGATIVE
PH UR STRIP: >8 [PH] (ref 5–8)
PROT UR QL STRIP: ABNORMAL
RBC #/AREA URNS AUTO: 5 /HPF (ref 0–4)
SP GR UR STRIP: 1.02 (ref 1–1.03)
SQUAMOUS #/AREA URNS AUTO: 15 /HPF
URN SPEC COLLECT METH UR: ABNORMAL
WBC #/AREA URNS AUTO: 12 /HPF (ref 0–5)

## 2021-03-18 ENCOUNTER — OFFICE VISIT (OUTPATIENT)
Dept: CARDIOLOGY | Facility: CLINIC | Age: 71
End: 2021-03-18
Payer: MEDICARE

## 2021-03-18 VITALS
BODY MASS INDEX: 25.42 KG/M2 | WEIGHT: 152.75 LBS | HEART RATE: 68 BPM | OXYGEN SATURATION: 99 % | SYSTOLIC BLOOD PRESSURE: 133 MMHG | DIASTOLIC BLOOD PRESSURE: 68 MMHG

## 2021-03-18 DIAGNOSIS — C50.312 MALIGNANT NEOPLASM OF LOWER-INNER QUADRANT OF LEFT BREAST IN FEMALE, ESTROGEN RECEPTOR POSITIVE: ICD-10-CM

## 2021-03-18 DIAGNOSIS — R79.89 ELEVATED TROPONIN: ICD-10-CM

## 2021-03-18 DIAGNOSIS — I10 ESSENTIAL HYPERTENSION: Chronic | ICD-10-CM

## 2021-03-18 DIAGNOSIS — I50.43 ACUTE ON CHRONIC COMBINED SYSTOLIC AND DIASTOLIC CONGESTIVE HEART FAILURE: Primary | ICD-10-CM

## 2021-03-18 DIAGNOSIS — Z17.0 MALIGNANT NEOPLASM OF LOWER-INNER QUADRANT OF LEFT BREAST IN FEMALE, ESTROGEN RECEPTOR POSITIVE: ICD-10-CM

## 2021-03-18 PROCEDURE — 99999 PR PBB SHADOW E&M-EST. PATIENT-LVL III: ICD-10-PCS | Mod: PBBFAC,,, | Performed by: INTERNAL MEDICINE

## 2021-03-18 PROCEDURE — 1159F MED LIST DOCD IN RCRD: CPT | Mod: S$GLB,,, | Performed by: INTERNAL MEDICINE

## 2021-03-18 PROCEDURE — 1159F PR MEDICATION LIST DOCUMENTED IN MEDICAL RECORD: ICD-10-PCS | Mod: S$GLB,,, | Performed by: INTERNAL MEDICINE

## 2021-03-18 PROCEDURE — 3008F PR BODY MASS INDEX (BMI) DOCUMENTED: ICD-10-PCS | Mod: CPTII,S$GLB,, | Performed by: INTERNAL MEDICINE

## 2021-03-18 PROCEDURE — 3008F BODY MASS INDEX DOCD: CPT | Mod: CPTII,S$GLB,, | Performed by: INTERNAL MEDICINE

## 2021-03-18 PROCEDURE — 3075F SYST BP GE 130 - 139MM HG: CPT | Mod: CPTII,S$GLB,, | Performed by: INTERNAL MEDICINE

## 2021-03-18 PROCEDURE — 3078F PR MOST RECENT DIASTOLIC BLOOD PRESSURE < 80 MM HG: ICD-10-PCS | Mod: CPTII,S$GLB,, | Performed by: INTERNAL MEDICINE

## 2021-03-18 PROCEDURE — 99214 OFFICE O/P EST MOD 30 MIN: CPT | Mod: S$GLB,,, | Performed by: INTERNAL MEDICINE

## 2021-03-18 PROCEDURE — 99499 RISK ADDL DX/OHS AUDIT: ICD-10-PCS | Mod: S$GLB,,, | Performed by: INTERNAL MEDICINE

## 2021-03-18 PROCEDURE — 99999 PR PBB SHADOW E&M-EST. PATIENT-LVL III: CPT | Mod: PBBFAC,,, | Performed by: INTERNAL MEDICINE

## 2021-03-18 PROCEDURE — 99499 UNLISTED E&M SERVICE: CPT | Mod: S$GLB,,, | Performed by: INTERNAL MEDICINE

## 2021-03-18 PROCEDURE — 3075F PR MOST RECENT SYSTOLIC BLOOD PRESS GE 130-139MM HG: ICD-10-PCS | Mod: CPTII,S$GLB,, | Performed by: INTERNAL MEDICINE

## 2021-03-18 PROCEDURE — 99214 PR OFFICE/OUTPT VISIT, EST, LEVL IV, 30-39 MIN: ICD-10-PCS | Mod: S$GLB,,, | Performed by: INTERNAL MEDICINE

## 2021-03-18 PROCEDURE — 3078F DIAST BP <80 MM HG: CPT | Mod: CPTII,S$GLB,, | Performed by: INTERNAL MEDICINE

## 2021-03-19 LAB — BACTERIA UR CULT: ABNORMAL

## 2021-03-19 RX ORDER — CEPHALEXIN 500 MG/1
500 CAPSULE ORAL EVERY 12 HOURS
Qty: 20 CAPSULE | Refills: 0 | Status: SHIPPED | OUTPATIENT
Start: 2021-03-19 | End: 2021-03-29

## 2021-03-22 ENCOUNTER — HOSPITAL ENCOUNTER (OUTPATIENT)
Dept: RADIOLOGY | Facility: HOSPITAL | Age: 71
Discharge: HOME | End: 2021-03-22
Attending: INTERNAL MEDICINE
Payer: MEDICARE

## 2021-03-22 ENCOUNTER — HOSPITAL ENCOUNTER (OUTPATIENT)
Dept: CARDIOLOGY | Facility: HOSPITAL | Age: 71
Discharge: HOME | End: 2021-03-22
Attending: INTERNAL MEDICINE
Payer: MEDICARE

## 2021-03-22 VITALS — HEIGHT: 65 IN | BODY MASS INDEX: 25.33 KG/M2 | WEIGHT: 152 LBS

## 2021-03-22 DIAGNOSIS — Z79.899 ENCOUNTER FOR MONITORING CARDIOTOXIC DRUG THERAPY: ICD-10-CM

## 2021-03-22 DIAGNOSIS — R93.1 DECREASED CARDIAC EJECTION FRACTION: ICD-10-CM

## 2021-03-22 DIAGNOSIS — R94.31 EKG ABNORMALITIES: ICD-10-CM

## 2021-03-22 DIAGNOSIS — Z51.81 ENCOUNTER FOR MONITORING CARDIOTOXIC DRUG THERAPY: ICD-10-CM

## 2021-03-22 LAB
AORTIC ROOT ANNULUS: 2.74 CM
AV INDEX (PROSTH): 0.85
AV MEAN GRADIENT: 3 MMHG
AV PEAK GRADIENT: 5 MMHG
AV VALVE AREA: 2.64 CM2
AV VELOCITY RATIO: 0.81
BSA FOR ECHO PROCEDURE: 1.78 M2
CV ECHO LV RWT: 0.41 CM
CV STRESS BASE HR: 96 BPM
DIASTOLIC BLOOD PRESSURE: 74 MMHG
DOP CALC AO PEAK VEL: 1.15 M/S
DOP CALC AO VTI: 25.23 CM
DOP CALC LVOT AREA: 3.1 CM2
DOP CALC LVOT DIAMETER: 1.99 CM
DOP CALC LVOT PEAK VEL: 0.93 M/S
DOP CALC LVOT STROKE VOLUME: 66.65 CM3
DOP CALC RVOT PEAK VEL: 0.77 M/S
DOP CALC RVOT VTI: 18.11 CM
DOP CALCLVOT PEAK VEL VTI: 21.44 CM
E WAVE DECELERATION TIME: 137.02 MSEC
E/A RATIO: 1.19
E/E' RATIO: 12.78 M/S
ECHO LV POSTERIOR WALL: 1 CM (ref 0.6–1.1)
FRACTIONAL SHORTENING: 19 % (ref 28–44)
INTERVENTRICULAR SEPTUM: 1 CM (ref 0.6–1.1)
IVRT: 88.49 MSEC
LA MAJOR: 4.65 CM
LA MINOR: 4.67 CM
LA WIDTH: 3.73 CM
LEFT ATRIUM SIZE: 4.39 CM
LEFT ATRIUM VOLUME INDEX: 36.9 ML/M2
LEFT ATRIUM VOLUME: 64.86 CM3
LEFT INTERNAL DIMENSION IN SYSTOLE: 3.97 CM (ref 2.1–4)
LEFT VENTRICLE DIASTOLIC VOLUME INDEX: 63.56 ML/M2
LEFT VENTRICLE DIASTOLIC VOLUME: 111.87 ML
LEFT VENTRICLE MASS INDEX: 99 G/M2
LEFT VENTRICLE SYSTOLIC VOLUME INDEX: 39 ML/M2
LEFT VENTRICLE SYSTOLIC VOLUME: 68.64 ML
LEFT VENTRICULAR INTERNAL DIMENSION IN DIASTOLE: 4.88 CM (ref 3.5–6)
LEFT VENTRICULAR MASS: 174.86 G
LV LATERAL E/E' RATIO: 12.78 M/S
LV SEPTAL E/E' RATIO: 12.78 M/S
MV PEAK A VEL: 0.97 M/S
MV PEAK E VEL: 1.15 M/S
NUC REST EJECTION FRACTION: 60
NUC STRESS EJECTION FRACTION: 48 %
OHS CV CPX 85 PERCENT MAX PREDICTED HEART RATE MALE: 123
OHS CV CPX MAX PREDICTED HEART RATE: 145
OHS CV CPX PATIENT IS FEMALE: 1
OHS CV CPX PATIENT IS MALE: 0
OHS CV CPX PEAK DIASTOLIC BLOOD PRESSURE: 75 MMHG
OHS CV CPX PEAK HEAR RATE: 116 BPM
OHS CV CPX PEAK RATE PRESSURE PRODUCT: NORMAL
OHS CV CPX PEAK SYSTOLIC BLOOD PRESSURE: 165 MMHG
OHS CV CPX PERCENT MAX PREDICTED HEART RATE ACHIEVED: 80
OHS CV CPX RATE PRESSURE PRODUCT PRESENTING: NORMAL
PISA MRMAX VEL: 0.06 M/S
PISA TR MAX VEL: 3.6 M/S
PV MEAN GRADIENT: 1 MMHG
PV PEAK VELOCITY: 1.17 CM/S
RA MAJOR: 4.19 CM
RA PRESSURE: 8 MMHG
RA WIDTH: 2.58 CM
RIGHT VENTRICULAR END-DIASTOLIC DIMENSION: 2.62 CM
SINUS: 2.34 CM
STJ: 2.16 CM
STRESS ECHO POST EXERCISE DUR MIN: 1 MINUTES
STRESS ECHO POST EXERCISE DUR SEC: 3 SECONDS
SYSTOLIC BLOOD PRESSURE: 164 MMHG
TDI LATERAL: 0.09 M/S
TDI SEPTAL: 0.09 M/S
TDI: 0.09 M/S
TR MAX PG: 52 MMHG
TRICUSPID ANNULAR PLANE SYSTOLIC EXCURSION: 1.79 CM
TV REST PULMONARY ARTERY PRESSURE: 60 MMHG

## 2021-03-22 PROCEDURE — 93018 STRESS TEST WITH MYOCARDIAL PERFUSION (CUPID ONLY): ICD-10-PCS | Mod: ,,, | Performed by: INTERNAL MEDICINE

## 2021-03-22 PROCEDURE — 78452 HT MUSCLE IMAGE SPECT MULT: CPT

## 2021-03-22 PROCEDURE — 93016 CV STRESS TEST SUPVJ ONLY: CPT | Mod: ,,, | Performed by: INTERNAL MEDICINE

## 2021-03-22 PROCEDURE — 93306 TTE W/DOPPLER COMPLETE: CPT

## 2021-03-22 PROCEDURE — 93018 CV STRESS TEST I&R ONLY: CPT | Mod: ,,, | Performed by: INTERNAL MEDICINE

## 2021-03-22 PROCEDURE — 93306 ECHO (CUPID ONLY): ICD-10-PCS | Mod: 26,,, | Performed by: INTERNAL MEDICINE

## 2021-03-22 PROCEDURE — A9502 TC99M TETROFOSMIN: HCPCS

## 2021-03-22 PROCEDURE — 63600175 PHARM REV CODE 636 W HCPCS: Performed by: INTERNAL MEDICINE

## 2021-03-22 PROCEDURE — 93016 STRESS TEST WITH MYOCARDIAL PERFUSION (CUPID ONLY): ICD-10-PCS | Mod: ,,, | Performed by: INTERNAL MEDICINE

## 2021-03-22 PROCEDURE — 93306 TTE W/DOPPLER COMPLETE: CPT | Mod: 26,,, | Performed by: INTERNAL MEDICINE

## 2021-03-22 PROCEDURE — 93017 CV STRESS TEST TRACING ONLY: CPT

## 2021-03-22 PROCEDURE — 78452 HT MUSCLE IMAGE SPECT MULT: CPT | Mod: 26,,, | Performed by: INTERNAL MEDICINE

## 2021-03-22 PROCEDURE — 78452 STRESS TEST WITH MYOCARDIAL PERFUSION (CUPID ONLY): ICD-10-PCS | Mod: 26,,, | Performed by: INTERNAL MEDICINE

## 2021-03-22 RX ORDER — REGADENOSON 0.08 MG/ML
0.4 INJECTION, SOLUTION INTRAVENOUS ONCE
Status: COMPLETED | OUTPATIENT
Start: 2021-03-22 | End: 2021-03-22

## 2021-03-22 RX ADMIN — REGADENOSON 0.4 MG: 0.08 INJECTION, SOLUTION INTRAVENOUS at 08:03

## 2021-03-25 ENCOUNTER — TELEPHONE (OUTPATIENT)
Dept: CARDIOLOGY | Facility: CLINIC | Age: 71
End: 2021-03-25

## 2021-03-25 DIAGNOSIS — I25.10 CORONARY ARTERY DISEASE INVOLVING NATIVE CORONARY ARTERY OF NATIVE HEART, ANGINA PRESENCE UNSPECIFIED: Primary | ICD-10-CM

## 2021-03-30 ENCOUNTER — TELEPHONE (OUTPATIENT)
Dept: CARDIOLOGY | Facility: CLINIC | Age: 71
End: 2021-03-30

## 2021-03-31 ENCOUNTER — TELEPHONE (OUTPATIENT)
Dept: FAMILY MEDICINE | Facility: CLINIC | Age: 71
End: 2021-03-31

## 2021-03-31 ENCOUNTER — LAB VISIT (OUTPATIENT)
Dept: LAB | Facility: HOSPITAL | Age: 71
End: 2021-03-31
Attending: INTERNAL MEDICINE
Payer: MEDICARE

## 2021-03-31 DIAGNOSIS — I25.10 CORONARY ARTERY DISEASE INVOLVING NATIVE CORONARY ARTERY OF NATIVE HEART, ANGINA PRESENCE UNSPECIFIED: ICD-10-CM

## 2021-03-31 LAB
CHOLEST SERPL-MCNC: 119 MG/DL (ref 120–199)
CHOLEST/HDLC SERPL: 2.6 {RATIO} (ref 2–5)
HDLC SERPL-MCNC: 46 MG/DL (ref 40–75)
HDLC SERPL: 38.7 % (ref 20–50)
LDLC SERPL CALC-MCNC: 61.2 MG/DL (ref 63–159)
NONHDLC SERPL-MCNC: 73 MG/DL
TRIGL SERPL-MCNC: 59 MG/DL (ref 30–150)

## 2021-03-31 PROCEDURE — 80061 LIPID PANEL: CPT | Performed by: INTERNAL MEDICINE

## 2021-03-31 PROCEDURE — 36415 COLL VENOUS BLD VENIPUNCTURE: CPT | Mod: PO | Performed by: INTERNAL MEDICINE

## 2021-04-05 ENCOUNTER — TELEPHONE (OUTPATIENT)
Dept: CARDIOLOGY | Facility: CLINIC | Age: 71
End: 2021-04-05

## 2021-04-14 ENCOUNTER — TELEPHONE (OUTPATIENT)
Dept: FAMILY MEDICINE | Facility: CLINIC | Age: 71
End: 2021-04-14

## 2021-04-15 ENCOUNTER — TELEPHONE (OUTPATIENT)
Dept: FAMILY MEDICINE | Facility: CLINIC | Age: 71
End: 2021-04-15

## 2021-04-21 ENCOUNTER — OFFICE VISIT (OUTPATIENT)
Dept: HEMATOLOGY/ONCOLOGY | Facility: CLINIC | Age: 71
End: 2021-04-21
Payer: MEDICARE

## 2021-04-21 ENCOUNTER — INFUSION (OUTPATIENT)
Dept: INFUSION THERAPY | Facility: HOSPITAL | Age: 71
End: 2021-04-21
Attending: INTERNAL MEDICINE
Payer: MEDICARE

## 2021-04-21 ENCOUNTER — CLINICAL SUPPORT (OUTPATIENT)
Dept: SMOKING CESSATION | Facility: CLINIC | Age: 71
End: 2021-04-21
Payer: COMMERCIAL

## 2021-04-21 VITALS
HEIGHT: 60 IN | HEART RATE: 81 BPM | WEIGHT: 146.81 LBS | DIASTOLIC BLOOD PRESSURE: 80 MMHG | SYSTOLIC BLOOD PRESSURE: 147 MMHG | OXYGEN SATURATION: 100 % | BODY MASS INDEX: 28.82 KG/M2 | TEMPERATURE: 97 F

## 2021-04-21 VITALS
WEIGHT: 146.81 LBS | TEMPERATURE: 98 F | HEIGHT: 60 IN | BODY MASS INDEX: 28.82 KG/M2 | SYSTOLIC BLOOD PRESSURE: 101 MMHG | RESPIRATION RATE: 18 BRPM | DIASTOLIC BLOOD PRESSURE: 64 MMHG | HEART RATE: 71 BPM | OXYGEN SATURATION: 98 %

## 2021-04-21 DIAGNOSIS — C50.312 MALIGNANT NEOPLASM OF LOWER-INNER QUADRANT OF LEFT BREAST IN FEMALE, ESTROGEN RECEPTOR POSITIVE: Primary | ICD-10-CM

## 2021-04-21 DIAGNOSIS — E66.9 OBESITY (BMI 30.0-34.9): ICD-10-CM

## 2021-04-21 DIAGNOSIS — T45.1X5A IMMUNODEFICIENCY DUE TO CHEMOTHERAPY: ICD-10-CM

## 2021-04-21 DIAGNOSIS — R93.1 DECREASED CARDIAC EJECTION FRACTION: Primary | ICD-10-CM

## 2021-04-21 DIAGNOSIS — F17.200 TOBACCO USE DISORDER: Chronic | ICD-10-CM

## 2021-04-21 DIAGNOSIS — F17.200 NICOTINE DEPENDENCE: Primary | ICD-10-CM

## 2021-04-21 DIAGNOSIS — Z17.0 MALIGNANT NEOPLASM OF LOWER-INNER QUADRANT OF LEFT BREAST IN FEMALE, ESTROGEN RECEPTOR POSITIVE: ICD-10-CM

## 2021-04-21 DIAGNOSIS — Z17.0 MALIGNANT NEOPLASM OF LOWER-INNER QUADRANT OF LEFT BREAST IN FEMALE, ESTROGEN RECEPTOR POSITIVE: Primary | ICD-10-CM

## 2021-04-21 DIAGNOSIS — I10 ESSENTIAL HYPERTENSION: Chronic | ICD-10-CM

## 2021-04-21 DIAGNOSIS — D84.821 IMMUNODEFICIENCY DUE TO CHEMOTHERAPY: ICD-10-CM

## 2021-04-21 DIAGNOSIS — C50.312 MALIGNANT NEOPLASM OF LOWER-INNER QUADRANT OF LEFT BREAST IN FEMALE, ESTROGEN RECEPTOR POSITIVE: ICD-10-CM

## 2021-04-21 DIAGNOSIS — Z79.899 IMMUNODEFICIENCY DUE TO CHEMOTHERAPY: ICD-10-CM

## 2021-04-21 PROBLEM — R78.81 POSITIVE BLOOD CULTURE: Status: RESOLVED | Noted: 2021-03-07 | Resolved: 2021-04-21

## 2021-04-21 PROBLEM — Z79.69 IMMUNODEFICIENCY DUE TO CHEMOTHERAPY: Status: ACTIVE | Noted: 2021-04-21

## 2021-04-21 PROCEDURE — 1101F PR PT FALLS ASSESS DOC 0-1 FALLS W/OUT INJ PAST YR: ICD-10-PCS | Mod: CPTII,S$GLB,, | Performed by: INTERNAL MEDICINE

## 2021-04-21 PROCEDURE — 99407 PR TOBACCO USE CESSATION INTENSIVE >10 MINUTES: ICD-10-PCS | Mod: S$GLB,,,

## 2021-04-21 PROCEDURE — 3288F PR FALLS RISK ASSESSMENT DOCUMENTED: ICD-10-PCS | Mod: CPTII,S$GLB,, | Performed by: INTERNAL MEDICINE

## 2021-04-21 PROCEDURE — 99215 PR OFFICE/OUTPT VISIT, EST, LEVL V, 40-54 MIN: ICD-10-PCS | Mod: 25,S$GLB,, | Performed by: INTERNAL MEDICINE

## 2021-04-21 PROCEDURE — 1101F PT FALLS ASSESS-DOCD LE1/YR: CPT | Mod: CPTII,S$GLB,, | Performed by: INTERNAL MEDICINE

## 2021-04-21 PROCEDURE — 3288F FALL RISK ASSESSMENT DOCD: CPT | Mod: CPTII,S$GLB,, | Performed by: INTERNAL MEDICINE

## 2021-04-21 PROCEDURE — 96401 CHEMO ANTI-NEOPL SQ/IM: CPT

## 2021-04-21 PROCEDURE — 1125F PR PAIN SEVERITY QUANTIFIED, PAIN PRESENT: ICD-10-PCS | Mod: S$GLB,,, | Performed by: INTERNAL MEDICINE

## 2021-04-21 PROCEDURE — 99999 PR PBB SHADOW E&M-EST. PATIENT-LVL IV: ICD-10-PCS | Mod: PBBFAC,,, | Performed by: INTERNAL MEDICINE

## 2021-04-21 PROCEDURE — 99499 RISK ADDL DX/OHS AUDIT: ICD-10-PCS | Mod: S$GLB,,, | Performed by: INTERNAL MEDICINE

## 2021-04-21 PROCEDURE — 99499 UNLISTED E&M SERVICE: CPT | Mod: S$GLB,,, | Performed by: INTERNAL MEDICINE

## 2021-04-21 PROCEDURE — 63600175 PHARM REV CODE 636 W HCPCS: Mod: TB | Performed by: INTERNAL MEDICINE

## 2021-04-21 PROCEDURE — 1159F MED LIST DOCD IN RCRD: CPT | Mod: S$GLB,,, | Performed by: INTERNAL MEDICINE

## 2021-04-21 PROCEDURE — 1159F PR MEDICATION LIST DOCUMENTED IN MEDICAL RECORD: ICD-10-PCS | Mod: S$GLB,,, | Performed by: INTERNAL MEDICINE

## 2021-04-21 PROCEDURE — 99999 PR PBB SHADOW E&M-EST. PATIENT-LVL IV: CPT | Mod: PBBFAC,,, | Performed by: INTERNAL MEDICINE

## 2021-04-21 PROCEDURE — 3008F PR BODY MASS INDEX (BMI) DOCUMENTED: ICD-10-PCS | Mod: CPTII,S$GLB,, | Performed by: INTERNAL MEDICINE

## 2021-04-21 PROCEDURE — 99407 BEHAV CHNG SMOKING > 10 MIN: CPT | Mod: S$GLB,,,

## 2021-04-21 PROCEDURE — 99215 OFFICE O/P EST HI 40 MIN: CPT | Mod: 25,S$GLB,, | Performed by: INTERNAL MEDICINE

## 2021-04-21 PROCEDURE — 1125F AMNT PAIN NOTED PAIN PRSNT: CPT | Mod: S$GLB,,, | Performed by: INTERNAL MEDICINE

## 2021-04-21 PROCEDURE — 3008F BODY MASS INDEX DOCD: CPT | Mod: CPTII,S$GLB,, | Performed by: INTERNAL MEDICINE

## 2021-04-21 RX ADMIN — TRASTUZUMAB AND HYALURONIDASE-OYSK 600 MG: 600; 10000 INJECTION, SOLUTION SUBCUTANEOUS at 02:04

## 2021-05-12 ENCOUNTER — INFUSION (OUTPATIENT)
Dept: INFUSION THERAPY | Facility: HOSPITAL | Age: 71
End: 2021-05-12
Attending: INTERNAL MEDICINE
Payer: MEDICARE

## 2021-05-12 ENCOUNTER — SOCIAL WORK (OUTPATIENT)
Dept: HEMATOLOGY/ONCOLOGY | Facility: CLINIC | Age: 71
End: 2021-05-12

## 2021-05-12 VITALS
HEART RATE: 67 BPM | DIASTOLIC BLOOD PRESSURE: 58 MMHG | TEMPERATURE: 98 F | RESPIRATION RATE: 16 BRPM | SYSTOLIC BLOOD PRESSURE: 120 MMHG | OXYGEN SATURATION: 95 %

## 2021-05-12 DIAGNOSIS — Z17.0 MALIGNANT NEOPLASM OF LOWER-INNER QUADRANT OF LEFT BREAST IN FEMALE, ESTROGEN RECEPTOR POSITIVE: Primary | ICD-10-CM

## 2021-05-12 DIAGNOSIS — C50.312 MALIGNANT NEOPLASM OF LOWER-INNER QUADRANT OF LEFT BREAST IN FEMALE, ESTROGEN RECEPTOR POSITIVE: Primary | ICD-10-CM

## 2021-05-12 PROCEDURE — 63600175 PHARM REV CODE 636 W HCPCS: Mod: TB | Performed by: INTERNAL MEDICINE

## 2021-05-12 PROCEDURE — 96401 CHEMO ANTI-NEOPL SQ/IM: CPT

## 2021-05-12 RX ADMIN — TRASTUZUMAB AND HYALURONIDASE-OYSK 600 MG: 600; 10000 INJECTION, SOLUTION SUBCUTANEOUS at 01:05

## 2021-05-26 ENCOUNTER — HOSPITAL ENCOUNTER (OUTPATIENT)
Dept: CARDIOLOGY | Facility: HOSPITAL | Age: 71
Discharge: HOME | End: 2021-05-26
Attending: INTERNAL MEDICINE
Payer: MEDICARE

## 2021-05-26 VITALS
BODY MASS INDEX: 28.66 KG/M2 | DIASTOLIC BLOOD PRESSURE: 58 MMHG | WEIGHT: 146 LBS | HEIGHT: 60 IN | HEART RATE: 65 BPM | SYSTOLIC BLOOD PRESSURE: 120 MMHG

## 2021-05-26 DIAGNOSIS — R93.1 DECREASED CARDIAC EJECTION FRACTION: ICD-10-CM

## 2021-05-26 LAB
ASCENDING AORTA: 2.94 CM
AV INDEX (PROSTH): 0.59
AV MEAN GRADIENT: 3 MMHG
AV PEAK GRADIENT: 7 MMHG
AV VALVE AREA: 1.71 CM2
AV VELOCITY RATIO: 0.57
BSA FOR ECHO PROCEDURE: 1.67 M2
CV ECHO LV RWT: 0.4 CM
DOP CALC AO PEAK VEL: 1.34 M/S
DOP CALC AO VTI: 26.72 CM
DOP CALC LVOT AREA: 2.9 CM2
DOP CALC LVOT DIAMETER: 1.92 CM
DOP CALC LVOT PEAK VEL: 0.76 M/S
DOP CALC LVOT STROKE VOLUME: 45.69 CM3
DOP CALC RVOT PEAK VEL: 0.54 M/S
DOP CALC RVOT VTI: 12.09 CM
DOP CALCLVOT PEAK VEL VTI: 15.79 CM
E WAVE DECELERATION TIME: 188.23 MSEC
E/A RATIO: 1.17
E/E' RATIO: 9.6 M/S
ECHO LV POSTERIOR WALL: 0.98 CM (ref 0.6–1.1)
EJECTION FRACTION: 50 %
FRACTIONAL SHORTENING: 23 % (ref 28–44)
INTERVENTRICULAR SEPTUM: 1.04 CM (ref 0.6–1.1)
IVRT: 85.63 MSEC
LA MAJOR: 4.35 CM
LA MINOR: 3.75 CM
LA WIDTH: 3.34 CM
LEFT ATRIUM SIZE: 3.75 CM
LEFT ATRIUM VOLUME INDEX: 26.3 ML/M2
LEFT ATRIUM VOLUME: 42.88 CM3
LEFT INTERNAL DIMENSION IN SYSTOLE: 3.83 CM (ref 2.1–4)
LEFT VENTRICLE DIASTOLIC VOLUME INDEX: 70.83 ML/M2
LEFT VENTRICLE DIASTOLIC VOLUME: 115.46 ML
LEFT VENTRICLE MASS INDEX: 111 G/M2
LEFT VENTRICLE SYSTOLIC VOLUME INDEX: 38.8 ML/M2
LEFT VENTRICLE SYSTOLIC VOLUME: 63.23 ML
LEFT VENTRICULAR INTERNAL DIMENSION IN DIASTOLE: 4.95 CM (ref 3.5–6)
LEFT VENTRICULAR MASS: 181.41 G
LV LATERAL E/E' RATIO: 8.73 M/S
LV SEPTAL E/E' RATIO: 10.67 M/S
MV PEAK A VEL: 0.82 M/S
MV PEAK E VEL: 0.96 M/S
MV STENOSIS PRESSURE HALF TIME: 54.59 MS
MV VALVE AREA P 1/2 METHOD: 4.03 CM2
PISA TR MAX VEL: 2.33 M/S
PV MEAN GRADIENT: 1 MMHG
PV PEAK VELOCITY: 0.87 CM/S
RA MAJOR: 4.22 CM
RA PRESSURE: 3 MMHG
RA WIDTH: 3.31 CM
RIGHT VENTRICULAR END-DIASTOLIC DIMENSION: 3.02 CM
SINUS: 2.34 CM
STJ: 2.02 CM
TDI LATERAL: 0.11 M/S
TDI SEPTAL: 0.09 M/S
TDI: 0.1 M/S
TR MAX PG: 22 MMHG
TV REST PULMONARY ARTERY PRESSURE: 25 MMHG

## 2021-05-26 PROCEDURE — 93306 ECHO (CUPID ONLY): ICD-10-PCS | Mod: 26,,, | Performed by: INTERNAL MEDICINE

## 2021-05-26 PROCEDURE — 93306 TTE W/DOPPLER COMPLETE: CPT | Mod: 26,,, | Performed by: INTERNAL MEDICINE

## 2021-05-26 PROCEDURE — 93356 ECHO (CUPID ONLY): ICD-10-PCS | Mod: ,,, | Performed by: INTERNAL MEDICINE

## 2021-05-26 PROCEDURE — 93356 MYOCRD STRAIN IMG SPCKL TRCK: CPT | Mod: ,,, | Performed by: INTERNAL MEDICINE

## 2021-05-26 PROCEDURE — 93306 TTE W/DOPPLER COMPLETE: CPT

## 2021-05-28 NOTE — PROGRESS NOTES
5/28/2021 at 0920 a.m. right chest implanted port accessed using a 20 gauge 3/4 inch non-coring needle primed with 0.9% sodium chloride.  Good blood return obtained.  Blood obtained and sent to lab. Flushed with 20ml 0.9% sodium chloride. Implanted port site is not swollen and not warm.  Patient tolerated procedure well. Pt discharged to RN.     Subjective:       Patient ID: Leia Rodriguez is a 67 y.o. female.    Chief Complaint: Breast Cancer (f/u after tx)    This patient returns for her initial follow up visit.     Mrs. Rodriguez has a history of Stage 0 DCIS of Lt. breast.  She presented in March of 2017 when diagnostic MMG confirmed the presence of a round mass with indistinct margins in the Lt. breast at the 7 o'clock position. The mass measured 9 x 5 x 6 mm. The patient was referred to Dr. Duke and on 6/15/17 underwent wire localization lumpectomy. Pathology revealed an 8 mm focus of ductal carcinoma in situ. The tumor was low grade. There was tumor present at the anterior inferior margin. Ninety percent of the tumor cells were ER positive, 5- 10% of the tumor cells were SC positive. The patient was referred for adjuvant radiotherapy.  She was referred to our department and completed a course of partial breast irradiation. She completed 38.5 Gy to the LIQ of the Lt. breast on 8/14/17.  Today, the patient states she feels well.  No significant breast complaints.  Currently on hormonal therapy with Arimidex.        Review of Systems   Constitutional: Negative for activity change, appetite change, chills, fatigue and fever.   HENT: Negative for trouble swallowing.    Respiratory: Negative for cough and shortness of breath.    Cardiovascular: Negative for chest pain and palpitations.   Gastrointestinal: Negative for nausea and vomiting.       Objective:      Physical Exam   Constitutional: She appears well-developed and well-nourished. No distress.   Neck: Normal range of motion. Neck supple.   Pulmonary/Chest: Left breast exhibits no inverted nipple, no mass, no nipple discharge, no skin change and no tenderness.       Lymphadenopathy:     She has no cervical adenopathy.     She has no axillary adenopathy.        Right: No supraclavicular adenopathy present.        Left: No supraclavicular adenopathy present.       Assessment:       1. Ductal carcinoma  in situ (DCIS) of left breast        Plan:       Doing well, recovered from the acute effects of radiotherapy.  Explained we would recommend a repeat MMG in March.  She will continue hormonal therapy per Dr. Fernández.

## 2021-06-01 ENCOUNTER — LAB VISIT (OUTPATIENT)
Dept: LAB | Facility: HOSPITAL | Age: 71
End: 2021-06-01
Attending: INTERNAL MEDICINE
Payer: MEDICARE

## 2021-06-01 DIAGNOSIS — Z17.0 MALIGNANT NEOPLASM OF LOWER-INNER QUADRANT OF LEFT BREAST IN FEMALE, ESTROGEN RECEPTOR POSITIVE: ICD-10-CM

## 2021-06-01 DIAGNOSIS — C50.312 MALIGNANT NEOPLASM OF LOWER-INNER QUADRANT OF LEFT BREAST IN FEMALE, ESTROGEN RECEPTOR POSITIVE: ICD-10-CM

## 2021-06-01 LAB
ALBUMIN SERPL BCP-MCNC: 3.6 G/DL (ref 3.5–5.2)
ALP SERPL-CCNC: 85 U/L (ref 55–135)
ALT SERPL W/O P-5'-P-CCNC: 11 U/L (ref 10–44)
ANION GAP SERPL CALC-SCNC: 9 MMOL/L (ref 8–16)
AST SERPL-CCNC: 11 U/L (ref 10–40)
BASOPHILS # BLD AUTO: 0.02 K/UL (ref 0–0.2)
BASOPHILS NFR BLD: 0.3 % (ref 0–1.9)
BILIRUB SERPL-MCNC: 0.8 MG/DL (ref 0.1–1)
BUN SERPL-MCNC: 21 MG/DL (ref 8–23)
CALCIUM SERPL-MCNC: 9.8 MG/DL (ref 8.7–10.5)
CHLORIDE SERPL-SCNC: 99 MMOL/L (ref 95–110)
CO2 SERPL-SCNC: 30 MMOL/L (ref 23–29)
CREAT SERPL-MCNC: 1 MG/DL (ref 0.5–1.4)
DIFFERENTIAL METHOD: ABNORMAL
EOSINOPHIL # BLD AUTO: 0.5 K/UL (ref 0–0.5)
EOSINOPHIL NFR BLD: 7.5 % (ref 0–8)
ERYTHROCYTE [DISTWIDTH] IN BLOOD BY AUTOMATED COUNT: 13.6 % (ref 11.5–14.5)
EST. GFR  (AFRICAN AMERICAN): >60 ML/MIN/1.73 M^2
EST. GFR  (NON AFRICAN AMERICAN): 58 ML/MIN/1.73 M^2
GLUCOSE SERPL-MCNC: 90 MG/DL (ref 70–110)
HCT VFR BLD AUTO: 26.8 % (ref 37–48.5)
HGB BLD-MCNC: 8.5 G/DL (ref 12–16)
IMM GRANULOCYTES # BLD AUTO: 0.02 K/UL (ref 0–0.04)
IMM GRANULOCYTES NFR BLD AUTO: 0.3 % (ref 0–0.5)
LYMPHOCYTES # BLD AUTO: 2.4 K/UL (ref 1–4.8)
LYMPHOCYTES NFR BLD: 33.7 % (ref 18–48)
MCH RBC QN AUTO: 28.8 PG (ref 27–31)
MCHC RBC AUTO-ENTMCNC: 31.7 G/DL (ref 32–36)
MCV RBC AUTO: 91 FL (ref 82–98)
MONOCYTES # BLD AUTO: 0.6 K/UL (ref 0.3–1)
MONOCYTES NFR BLD: 8.2 % (ref 4–15)
NEUTROPHILS # BLD AUTO: 3.6 K/UL (ref 1.8–7.7)
NEUTROPHILS NFR BLD: 50 % (ref 38–73)
NRBC BLD-RTO: 0 /100 WBC
PLATELET # BLD AUTO: 309 K/UL (ref 150–450)
PMV BLD AUTO: 9.9 FL (ref 9.2–12.9)
POTASSIUM SERPL-SCNC: 4.2 MMOL/L (ref 3.5–5.1)
PROT SERPL-MCNC: 7.7 G/DL (ref 6–8.4)
RBC # BLD AUTO: 2.95 M/UL (ref 4–5.4)
SODIUM SERPL-SCNC: 138 MMOL/L (ref 136–145)
WBC # BLD AUTO: 7.21 K/UL (ref 3.9–12.7)

## 2021-06-01 PROCEDURE — 36415 COLL VENOUS BLD VENIPUNCTURE: CPT | Performed by: INTERNAL MEDICINE

## 2021-06-01 PROCEDURE — 80053 COMPREHEN METABOLIC PANEL: CPT | Performed by: INTERNAL MEDICINE

## 2021-06-01 PROCEDURE — 85025 COMPLETE CBC W/AUTO DIFF WBC: CPT | Performed by: INTERNAL MEDICINE

## 2021-06-02 ENCOUNTER — INFUSION (OUTPATIENT)
Dept: INFUSION THERAPY | Facility: HOSPITAL | Age: 71
End: 2021-06-02
Attending: INTERNAL MEDICINE
Payer: MEDICARE

## 2021-06-02 ENCOUNTER — OFFICE VISIT (OUTPATIENT)
Dept: HEMATOLOGY/ONCOLOGY | Facility: CLINIC | Age: 71
End: 2021-06-02
Payer: MEDICARE

## 2021-06-02 VITALS
RESPIRATION RATE: 18 BRPM | OXYGEN SATURATION: 99 % | SYSTOLIC BLOOD PRESSURE: 120 MMHG | HEART RATE: 70 BPM | DIASTOLIC BLOOD PRESSURE: 58 MMHG | TEMPERATURE: 98 F

## 2021-06-02 VITALS
DIASTOLIC BLOOD PRESSURE: 58 MMHG | OXYGEN SATURATION: 99 % | BODY MASS INDEX: 28.22 KG/M2 | HEIGHT: 60 IN | TEMPERATURE: 98 F | SYSTOLIC BLOOD PRESSURE: 117 MMHG | RESPIRATION RATE: 18 BRPM | WEIGHT: 143.75 LBS | HEART RATE: 77 BPM

## 2021-06-02 DIAGNOSIS — I50.32 CHRONIC DIASTOLIC CONGESTIVE HEART FAILURE: ICD-10-CM

## 2021-06-02 DIAGNOSIS — Z17.0 MALIGNANT NEOPLASM OF LOWER-INNER QUADRANT OF LEFT BREAST IN FEMALE, ESTROGEN RECEPTOR POSITIVE: Primary | ICD-10-CM

## 2021-06-02 DIAGNOSIS — T45.1X5A IMMUNODEFICIENCY DUE TO CHEMOTHERAPY: ICD-10-CM

## 2021-06-02 DIAGNOSIS — I10 ESSENTIAL HYPERTENSION: Chronic | ICD-10-CM

## 2021-06-02 DIAGNOSIS — R93.1 DECREASED CARDIAC EJECTION FRACTION: ICD-10-CM

## 2021-06-02 DIAGNOSIS — D49.89 NEOPLASM OF ABDOMEN: ICD-10-CM

## 2021-06-02 DIAGNOSIS — C50.312 MALIGNANT NEOPLASM OF LOWER-INNER QUADRANT OF LEFT BREAST IN FEMALE, ESTROGEN RECEPTOR POSITIVE: Primary | ICD-10-CM

## 2021-06-02 DIAGNOSIS — E66.9 OBESITY (BMI 30.0-34.9): ICD-10-CM

## 2021-06-02 DIAGNOSIS — Z79.899 IMMUNODEFICIENCY DUE TO CHEMOTHERAPY: ICD-10-CM

## 2021-06-02 DIAGNOSIS — D84.821 IMMUNODEFICIENCY DUE TO CHEMOTHERAPY: ICD-10-CM

## 2021-06-02 PROCEDURE — 3288F PR FALLS RISK ASSESSMENT DOCUMENTED: ICD-10-PCS | Mod: CPTII,S$GLB,, | Performed by: INTERNAL MEDICINE

## 2021-06-02 PROCEDURE — 1159F PR MEDICATION LIST DOCUMENTED IN MEDICAL RECORD: ICD-10-PCS | Mod: S$GLB,,, | Performed by: INTERNAL MEDICINE

## 2021-06-02 PROCEDURE — 1126F PR PAIN SEVERITY QUANTIFIED, NO PAIN PRESENT: ICD-10-PCS | Mod: S$GLB,,, | Performed by: INTERNAL MEDICINE

## 2021-06-02 PROCEDURE — 1159F MED LIST DOCD IN RCRD: CPT | Mod: S$GLB,,, | Performed by: INTERNAL MEDICINE

## 2021-06-02 PROCEDURE — 99999 PR PBB SHADOW E&M-EST. PATIENT-LVL V: ICD-10-PCS | Mod: PBBFAC,,, | Performed by: INTERNAL MEDICINE

## 2021-06-02 PROCEDURE — 99214 OFFICE O/P EST MOD 30 MIN: CPT | Mod: 25,S$GLB,, | Performed by: INTERNAL MEDICINE

## 2021-06-02 PROCEDURE — 1126F AMNT PAIN NOTED NONE PRSNT: CPT | Mod: S$GLB,,, | Performed by: INTERNAL MEDICINE

## 2021-06-02 PROCEDURE — 1101F PT FALLS ASSESS-DOCD LE1/YR: CPT | Mod: CPTII,S$GLB,, | Performed by: INTERNAL MEDICINE

## 2021-06-02 PROCEDURE — 96401 CHEMO ANTI-NEOPL SQ/IM: CPT

## 2021-06-02 PROCEDURE — 3008F BODY MASS INDEX DOCD: CPT | Mod: CPTII,S$GLB,, | Performed by: INTERNAL MEDICINE

## 2021-06-02 PROCEDURE — 3288F FALL RISK ASSESSMENT DOCD: CPT | Mod: CPTII,S$GLB,, | Performed by: INTERNAL MEDICINE

## 2021-06-02 PROCEDURE — 63600175 PHARM REV CODE 636 W HCPCS: Mod: TB | Performed by: INTERNAL MEDICINE

## 2021-06-02 PROCEDURE — 1101F PR PT FALLS ASSESS DOC 0-1 FALLS W/OUT INJ PAST YR: ICD-10-PCS | Mod: CPTII,S$GLB,, | Performed by: INTERNAL MEDICINE

## 2021-06-02 PROCEDURE — 99999 PR PBB SHADOW E&M-EST. PATIENT-LVL V: CPT | Mod: PBBFAC,,, | Performed by: INTERNAL MEDICINE

## 2021-06-02 PROCEDURE — 3008F PR BODY MASS INDEX (BMI) DOCUMENTED: ICD-10-PCS | Mod: CPTII,S$GLB,, | Performed by: INTERNAL MEDICINE

## 2021-06-02 PROCEDURE — 99214 PR OFFICE/OUTPT VISIT, EST, LEVL IV, 30-39 MIN: ICD-10-PCS | Mod: 25,S$GLB,, | Performed by: INTERNAL MEDICINE

## 2021-06-02 RX ADMIN — TRASTUZUMAB AND HYALURONIDASE-OYSK 600 MG: 600; 10000 INJECTION, SOLUTION SUBCUTANEOUS at 02:06

## 2021-06-08 ENCOUNTER — PATIENT MESSAGE (OUTPATIENT)
Dept: ADMINISTRATIVE | Facility: OTHER | Age: 71
End: 2021-06-08

## 2021-06-17 ENCOUNTER — TELEPHONE (OUTPATIENT)
Dept: RADIOLOGY | Facility: HOSPITAL | Age: 71
End: 2021-06-17

## 2021-06-21 ENCOUNTER — HOSPITAL ENCOUNTER (OUTPATIENT)
Dept: RADIOLOGY | Facility: HOSPITAL | Age: 71
Discharge: HOME | End: 2021-06-21
Attending: INTERNAL MEDICINE
Payer: MEDICARE

## 2021-06-21 DIAGNOSIS — C50.312 MALIGNANT NEOPLASM OF LOWER-INNER QUADRANT OF LEFT BREAST IN FEMALE, ESTROGEN RECEPTOR POSITIVE: ICD-10-CM

## 2021-06-21 DIAGNOSIS — D49.89 NEOPLASM OF ABDOMEN: ICD-10-CM

## 2021-06-21 DIAGNOSIS — Z17.0 MALIGNANT NEOPLASM OF LOWER-INNER QUADRANT OF LEFT BREAST IN FEMALE, ESTROGEN RECEPTOR POSITIVE: ICD-10-CM

## 2021-06-21 PROCEDURE — 78815 NM PET CT ROUTINE: ICD-10-PCS | Mod: 26,PS,, | Performed by: RADIOLOGY

## 2021-06-21 PROCEDURE — 78815 PET IMAGE W/CT SKULL-THIGH: CPT | Mod: 59,TC

## 2021-06-21 PROCEDURE — 78815 PET IMAGE W/CT SKULL-THIGH: CPT | Mod: 26,PS,, | Performed by: RADIOLOGY

## 2021-06-23 ENCOUNTER — INFUSION (OUTPATIENT)
Dept: INFUSION THERAPY | Facility: HOSPITAL | Age: 71
End: 2021-06-23
Attending: INTERNAL MEDICINE
Payer: MEDICARE

## 2021-06-23 ENCOUNTER — OFFICE VISIT (OUTPATIENT)
Dept: HEMATOLOGY/ONCOLOGY | Facility: CLINIC | Age: 71
End: 2021-06-23
Payer: MEDICARE

## 2021-06-23 VITALS
WEIGHT: 142.63 LBS | HEIGHT: 60 IN | OXYGEN SATURATION: 99 % | SYSTOLIC BLOOD PRESSURE: 105 MMHG | BODY MASS INDEX: 28 KG/M2 | DIASTOLIC BLOOD PRESSURE: 48 MMHG | TEMPERATURE: 97 F | HEART RATE: 73 BPM

## 2021-06-23 VITALS
SYSTOLIC BLOOD PRESSURE: 112 MMHG | RESPIRATION RATE: 16 BRPM | DIASTOLIC BLOOD PRESSURE: 63 MMHG | HEART RATE: 72 BPM | TEMPERATURE: 98 F | OXYGEN SATURATION: 98 %

## 2021-06-23 DIAGNOSIS — C50.312 MALIGNANT NEOPLASM OF LOWER-INNER QUADRANT OF LEFT BREAST IN FEMALE, ESTROGEN RECEPTOR POSITIVE: Primary | ICD-10-CM

## 2021-06-23 DIAGNOSIS — E66.9 OBESITY (BMI 30.0-34.9): ICD-10-CM

## 2021-06-23 DIAGNOSIS — R94.31 EKG ABNORMALITIES: ICD-10-CM

## 2021-06-23 DIAGNOSIS — T45.1X5A IMMUNODEFICIENCY DUE TO CHEMOTHERAPY: ICD-10-CM

## 2021-06-23 DIAGNOSIS — Z17.0 MALIGNANT NEOPLASM OF LOWER-INNER QUADRANT OF LEFT BREAST IN FEMALE, ESTROGEN RECEPTOR POSITIVE: Primary | ICD-10-CM

## 2021-06-23 DIAGNOSIS — I50.43 ACUTE ON CHRONIC COMBINED SYSTOLIC AND DIASTOLIC CONGESTIVE HEART FAILURE: ICD-10-CM

## 2021-06-23 DIAGNOSIS — D84.821 IMMUNODEFICIENCY DUE TO CHEMOTHERAPY: ICD-10-CM

## 2021-06-23 DIAGNOSIS — Z79.899 IMMUNODEFICIENCY DUE TO CHEMOTHERAPY: ICD-10-CM

## 2021-06-23 DIAGNOSIS — F17.200 TOBACCO USE DISORDER: Chronic | ICD-10-CM

## 2021-06-23 DIAGNOSIS — I10 ESSENTIAL HYPERTENSION: Chronic | ICD-10-CM

## 2021-06-23 PROCEDURE — 99215 PR OFFICE/OUTPT VISIT, EST, LEVL V, 40-54 MIN: ICD-10-PCS | Mod: 25,S$GLB,, | Performed by: INTERNAL MEDICINE

## 2021-06-23 PROCEDURE — 96401 CHEMO ANTI-NEOPL SQ/IM: CPT

## 2021-06-23 PROCEDURE — 99999 PR PBB SHADOW E&M-EST. PATIENT-LVL V: CPT | Mod: PBBFAC,,, | Performed by: INTERNAL MEDICINE

## 2021-06-23 PROCEDURE — 99215 OFFICE O/P EST HI 40 MIN: CPT | Mod: 25,S$GLB,, | Performed by: INTERNAL MEDICINE

## 2021-06-23 PROCEDURE — 1126F PR PAIN SEVERITY QUANTIFIED, NO PAIN PRESENT: ICD-10-PCS | Mod: S$GLB,,, | Performed by: INTERNAL MEDICINE

## 2021-06-23 PROCEDURE — 1101F PR PT FALLS ASSESS DOC 0-1 FALLS W/OUT INJ PAST YR: ICD-10-PCS | Mod: CPTII,S$GLB,, | Performed by: INTERNAL MEDICINE

## 2021-06-23 PROCEDURE — 1159F PR MEDICATION LIST DOCUMENTED IN MEDICAL RECORD: ICD-10-PCS | Mod: S$GLB,,, | Performed by: INTERNAL MEDICINE

## 2021-06-23 PROCEDURE — 63600175 PHARM REV CODE 636 W HCPCS: Mod: TB | Performed by: INTERNAL MEDICINE

## 2021-06-23 PROCEDURE — 3288F FALL RISK ASSESSMENT DOCD: CPT | Mod: CPTII,S$GLB,, | Performed by: INTERNAL MEDICINE

## 2021-06-23 PROCEDURE — 3288F PR FALLS RISK ASSESSMENT DOCUMENTED: ICD-10-PCS | Mod: CPTII,S$GLB,, | Performed by: INTERNAL MEDICINE

## 2021-06-23 PROCEDURE — 1126F AMNT PAIN NOTED NONE PRSNT: CPT | Mod: S$GLB,,, | Performed by: INTERNAL MEDICINE

## 2021-06-23 PROCEDURE — 1101F PT FALLS ASSESS-DOCD LE1/YR: CPT | Mod: CPTII,S$GLB,, | Performed by: INTERNAL MEDICINE

## 2021-06-23 PROCEDURE — 99999 PR PBB SHADOW E&M-EST. PATIENT-LVL V: ICD-10-PCS | Mod: PBBFAC,,, | Performed by: INTERNAL MEDICINE

## 2021-06-23 PROCEDURE — 3008F PR BODY MASS INDEX (BMI) DOCUMENTED: ICD-10-PCS | Mod: CPTII,S$GLB,, | Performed by: INTERNAL MEDICINE

## 2021-06-23 PROCEDURE — 1159F MED LIST DOCD IN RCRD: CPT | Mod: S$GLB,,, | Performed by: INTERNAL MEDICINE

## 2021-06-23 PROCEDURE — 3008F BODY MASS INDEX DOCD: CPT | Mod: CPTII,S$GLB,, | Performed by: INTERNAL MEDICINE

## 2021-06-23 RX ADMIN — TRASTUZUMAB AND HYALURONIDASE-OYSK 600 MG: 600; 10000 INJECTION, SOLUTION SUBCUTANEOUS at 01:06

## 2021-07-01 ENCOUNTER — LAB VISIT (OUTPATIENT)
Dept: LAB | Facility: HOSPITAL | Age: 71
End: 2021-07-01
Attending: INTERNAL MEDICINE
Payer: MEDICARE

## 2021-07-01 ENCOUNTER — PATIENT OUTREACH (OUTPATIENT)
Dept: ADMINISTRATIVE | Facility: OTHER | Age: 71
End: 2021-07-01

## 2021-07-01 ENCOUNTER — TELEPHONE (OUTPATIENT)
Dept: CARDIOLOGY | Facility: HOSPITAL | Age: 71
End: 2021-07-01

## 2021-07-01 ENCOUNTER — OFFICE VISIT (OUTPATIENT)
Dept: CARDIOLOGY | Facility: CLINIC | Age: 71
End: 2021-07-01
Payer: MEDICARE

## 2021-07-01 VITALS
DIASTOLIC BLOOD PRESSURE: 58 MMHG | HEART RATE: 72 BPM | BODY MASS INDEX: 27.77 KG/M2 | OXYGEN SATURATION: 96 % | WEIGHT: 142.19 LBS | SYSTOLIC BLOOD PRESSURE: 108 MMHG

## 2021-07-01 DIAGNOSIS — I25.118 CORONARY ARTERY DISEASE OF NATIVE ARTERY OF NATIVE HEART WITH STABLE ANGINA PECTORIS: ICD-10-CM

## 2021-07-01 DIAGNOSIS — I10 ESSENTIAL HYPERTENSION: Chronic | ICD-10-CM

## 2021-07-01 DIAGNOSIS — C50.312 MALIGNANT NEOPLASM OF LOWER-INNER QUADRANT OF LEFT BREAST IN FEMALE, ESTROGEN RECEPTOR POSITIVE: ICD-10-CM

## 2021-07-01 DIAGNOSIS — Z12.31 ENCOUNTER FOR SCREENING MAMMOGRAM FOR BREAST CANCER: Primary | ICD-10-CM

## 2021-07-01 DIAGNOSIS — I50.43 ACUTE ON CHRONIC COMBINED SYSTOLIC AND DIASTOLIC CONGESTIVE HEART FAILURE: ICD-10-CM

## 2021-07-01 DIAGNOSIS — F17.200 TOBACCO USE DISORDER: Chronic | ICD-10-CM

## 2021-07-01 DIAGNOSIS — Z17.0 MALIGNANT NEOPLASM OF LOWER-INNER QUADRANT OF LEFT BREAST IN FEMALE, ESTROGEN RECEPTOR POSITIVE: ICD-10-CM

## 2021-07-01 DIAGNOSIS — I25.118 CORONARY ARTERY DISEASE OF NATIVE ARTERY OF NATIVE HEART WITH STABLE ANGINA PECTORIS: Primary | ICD-10-CM

## 2021-07-01 LAB — BNP SERPL-MCNC: 15 PG/ML (ref 0–99)

## 2021-07-01 PROCEDURE — 1159F MED LIST DOCD IN RCRD: CPT | Mod: S$GLB,,, | Performed by: INTERNAL MEDICINE

## 2021-07-01 PROCEDURE — 99214 OFFICE O/P EST MOD 30 MIN: CPT | Mod: S$GLB,,, | Performed by: INTERNAL MEDICINE

## 2021-07-01 PROCEDURE — 99214 PR OFFICE/OUTPT VISIT, EST, LEVL IV, 30-39 MIN: ICD-10-PCS | Mod: S$GLB,,, | Performed by: INTERNAL MEDICINE

## 2021-07-01 PROCEDURE — 1159F PR MEDICATION LIST DOCUMENTED IN MEDICAL RECORD: ICD-10-PCS | Mod: S$GLB,,, | Performed by: INTERNAL MEDICINE

## 2021-07-01 PROCEDURE — 99999 PR PBB SHADOW E&M-EST. PATIENT-LVL III: CPT | Mod: PBBFAC,,, | Performed by: INTERNAL MEDICINE

## 2021-07-01 PROCEDURE — 99499 RISK ADDL DX/OHS AUDIT: ICD-10-PCS | Mod: S$GLB,,, | Performed by: INTERNAL MEDICINE

## 2021-07-01 PROCEDURE — 83880 ASSAY OF NATRIURETIC PEPTIDE: CPT | Performed by: INTERNAL MEDICINE

## 2021-07-01 PROCEDURE — 3008F BODY MASS INDEX DOCD: CPT | Mod: CPTII,S$GLB,, | Performed by: INTERNAL MEDICINE

## 2021-07-01 PROCEDURE — 99499 UNLISTED E&M SERVICE: CPT | Mod: S$GLB,,, | Performed by: INTERNAL MEDICINE

## 2021-07-01 PROCEDURE — 3008F PR BODY MASS INDEX (BMI) DOCUMENTED: ICD-10-PCS | Mod: CPTII,S$GLB,, | Performed by: INTERNAL MEDICINE

## 2021-07-01 PROCEDURE — 36415 COLL VENOUS BLD VENIPUNCTURE: CPT | Performed by: INTERNAL MEDICINE

## 2021-07-01 PROCEDURE — 99999 PR PBB SHADOW E&M-EST. PATIENT-LVL III: ICD-10-PCS | Mod: PBBFAC,,, | Performed by: INTERNAL MEDICINE

## 2021-07-02 ENCOUNTER — TELEPHONE (OUTPATIENT)
Dept: CARDIOLOGY | Facility: CLINIC | Age: 71
End: 2021-07-02

## 2021-07-14 ENCOUNTER — INFUSION (OUTPATIENT)
Dept: INFUSION THERAPY | Facility: HOSPITAL | Age: 71
End: 2021-07-14
Attending: INTERNAL MEDICINE
Payer: MEDICARE

## 2021-07-14 VITALS
BODY MASS INDEX: 27.77 KG/M2 | SYSTOLIC BLOOD PRESSURE: 143 MMHG | RESPIRATION RATE: 16 BRPM | OXYGEN SATURATION: 98 % | DIASTOLIC BLOOD PRESSURE: 72 MMHG | WEIGHT: 142.19 LBS | TEMPERATURE: 98 F | HEART RATE: 78 BPM

## 2021-07-14 DIAGNOSIS — C50.312 MALIGNANT NEOPLASM OF LOWER-INNER QUADRANT OF LEFT BREAST IN FEMALE, ESTROGEN RECEPTOR POSITIVE: Primary | ICD-10-CM

## 2021-07-14 DIAGNOSIS — Z17.0 MALIGNANT NEOPLASM OF LOWER-INNER QUADRANT OF LEFT BREAST IN FEMALE, ESTROGEN RECEPTOR POSITIVE: Primary | ICD-10-CM

## 2021-07-14 PROCEDURE — 96401 CHEMO ANTI-NEOPL SQ/IM: CPT

## 2021-07-14 PROCEDURE — 63600175 PHARM REV CODE 636 W HCPCS: Mod: TB | Performed by: INTERNAL MEDICINE

## 2021-07-14 RX ADMIN — TRASTUZUMAB AND HYALURONIDASE-OYSK 600 MG: 600; 10000 INJECTION, SOLUTION SUBCUTANEOUS at 01:07

## 2021-08-04 ENCOUNTER — INFUSION (OUTPATIENT)
Dept: INFUSION THERAPY | Facility: HOSPITAL | Age: 71
End: 2021-08-04
Attending: INTERNAL MEDICINE
Payer: MEDICARE

## 2021-08-04 VITALS
OXYGEN SATURATION: 99 % | SYSTOLIC BLOOD PRESSURE: 136 MMHG | TEMPERATURE: 97 F | HEART RATE: 68 BPM | DIASTOLIC BLOOD PRESSURE: 76 MMHG | RESPIRATION RATE: 18 BRPM

## 2021-08-04 DIAGNOSIS — C50.312 MALIGNANT NEOPLASM OF LOWER-INNER QUADRANT OF LEFT BREAST IN FEMALE, ESTROGEN RECEPTOR POSITIVE: Primary | ICD-10-CM

## 2021-08-04 DIAGNOSIS — Z17.0 MALIGNANT NEOPLASM OF LOWER-INNER QUADRANT OF LEFT BREAST IN FEMALE, ESTROGEN RECEPTOR POSITIVE: Primary | ICD-10-CM

## 2021-08-04 PROCEDURE — 63600175 PHARM REV CODE 636 W HCPCS: Mod: TB | Performed by: INTERNAL MEDICINE

## 2021-08-04 PROCEDURE — 96401 CHEMO ANTI-NEOPL SQ/IM: CPT

## 2021-08-04 RX ADMIN — TRASTUZUMAB AND HYALURONIDASE-OYSK 600 MG: 600; 10000 INJECTION, SOLUTION SUBCUTANEOUS at 01:08

## 2021-08-09 ENCOUNTER — TELEPHONE (OUTPATIENT)
Dept: FAMILY MEDICINE | Facility: CLINIC | Age: 71
End: 2021-08-09

## 2021-08-09 RX ORDER — CEPHALEXIN 500 MG/1
500 CAPSULE ORAL EVERY 12 HOURS
Qty: 14 CAPSULE | Refills: 0 | Status: SHIPPED | OUTPATIENT
Start: 2021-08-09 | End: 2021-08-16

## 2021-08-25 ENCOUNTER — INFUSION (OUTPATIENT)
Dept: INFUSION THERAPY | Facility: HOSPITAL | Age: 71
End: 2021-08-25
Attending: INTERNAL MEDICINE
Payer: MEDICARE

## 2021-08-25 VITALS
TEMPERATURE: 98 F | SYSTOLIC BLOOD PRESSURE: 128 MMHG | WEIGHT: 146.38 LBS | HEART RATE: 78 BPM | BODY MASS INDEX: 28.74 KG/M2 | RESPIRATION RATE: 18 BRPM | HEIGHT: 60 IN | DIASTOLIC BLOOD PRESSURE: 64 MMHG | OXYGEN SATURATION: 98 %

## 2021-08-25 DIAGNOSIS — C50.312 MALIGNANT NEOPLASM OF LOWER-INNER QUADRANT OF LEFT BREAST IN FEMALE, ESTROGEN RECEPTOR POSITIVE: Primary | ICD-10-CM

## 2021-08-25 DIAGNOSIS — Z17.0 MALIGNANT NEOPLASM OF LOWER-INNER QUADRANT OF LEFT BREAST IN FEMALE, ESTROGEN RECEPTOR POSITIVE: Primary | ICD-10-CM

## 2021-08-25 PROCEDURE — 96401 CHEMO ANTI-NEOPL SQ/IM: CPT

## 2021-08-25 PROCEDURE — 63600175 PHARM REV CODE 636 W HCPCS: Mod: TB | Performed by: INTERNAL MEDICINE

## 2021-08-25 RX ADMIN — TRASTUZUMAB AND HYALURONIDASE-OYSK 600 MG: 600; 10000 INJECTION, SOLUTION SUBCUTANEOUS at 01:08

## 2021-09-07 ENCOUNTER — LAB VISIT (OUTPATIENT)
Dept: LAB | Facility: HOSPITAL | Age: 71
End: 2021-09-07
Attending: INTERNAL MEDICINE
Payer: MEDICARE

## 2021-09-07 DIAGNOSIS — R80.0 ISOLATED PROTEINURIA: ICD-10-CM

## 2021-09-07 LAB
ALBUMIN SERPL BCP-MCNC: 3.7 G/DL (ref 3.5–5.2)
ANION GAP SERPL CALC-SCNC: 9 MMOL/L (ref 8–16)
BUN SERPL-MCNC: 16 MG/DL (ref 8–23)
CALCIUM SERPL-MCNC: 9.7 MG/DL (ref 8.7–10.5)
CHLORIDE SERPL-SCNC: 102 MMOL/L (ref 95–110)
CO2 SERPL-SCNC: 26 MMOL/L (ref 23–29)
CREAT SERPL-MCNC: 1 MG/DL (ref 0.5–1.4)
EST. GFR  (AFRICAN AMERICAN): >60 ML/MIN/1.73 M^2
EST. GFR  (NON AFRICAN AMERICAN): 57.2 ML/MIN/1.73 M^2
GLUCOSE SERPL-MCNC: 82 MG/DL (ref 70–110)
PHOSPHATE SERPL-MCNC: 3.3 MG/DL (ref 2.7–4.5)
POTASSIUM SERPL-SCNC: 4.2 MMOL/L (ref 3.5–5.1)
SODIUM SERPL-SCNC: 137 MMOL/L (ref 136–145)

## 2021-09-07 PROCEDURE — 80069 RENAL FUNCTION PANEL: CPT | Performed by: NURSE PRACTITIONER

## 2021-09-07 PROCEDURE — 36415 COLL VENOUS BLD VENIPUNCTURE: CPT | Performed by: NURSE PRACTITIONER

## 2021-09-13 ENCOUNTER — HOSPITAL ENCOUNTER (OUTPATIENT)
Dept: CARDIOLOGY | Facility: HOSPITAL | Age: 71
Discharge: HOME | End: 2021-09-13
Attending: INTERNAL MEDICINE
Payer: MEDICARE

## 2021-09-13 VITALS
HEART RATE: 75 BPM | WEIGHT: 146 LBS | HEIGHT: 60 IN | DIASTOLIC BLOOD PRESSURE: 64 MMHG | BODY MASS INDEX: 28.66 KG/M2 | SYSTOLIC BLOOD PRESSURE: 128 MMHG

## 2021-09-13 DIAGNOSIS — I50.32 CHRONIC DIASTOLIC CONGESTIVE HEART FAILURE: ICD-10-CM

## 2021-09-13 LAB
ASCENDING AORTA: 2.55 CM
AV INDEX (PROSTH): 0.58
AV MEAN GRADIENT: 4 MMHG
AV PEAK GRADIENT: 8 MMHG
AV VALVE AREA: 1.59 CM2
AV VELOCITY RATIO: 0.59
BSA FOR ECHO PROCEDURE: 1.67 M2
CV ECHO LV RWT: 0.35 CM
DOP CALC AO PEAK VEL: 1.43 M/S
DOP CALC AO VTI: 29.92 CM
DOP CALC LVOT AREA: 2.7 CM2
DOP CALC LVOT DIAMETER: 1.87 CM
DOP CALC LVOT PEAK VEL: 0.84 M/S
DOP CALC LVOT STROKE VOLUME: 47.71 CM3
DOP CALC RVOT PEAK VEL: 0.59 M/S
DOP CALC RVOT VTI: 14.53 CM
DOP CALCLVOT PEAK VEL VTI: 17.38 CM
E WAVE DECELERATION TIME: 191.78 MSEC
E/A RATIO: 1.12
ECHO LV POSTERIOR WALL: 0.86 CM (ref 0.6–1.1)
EJECTION FRACTION: 45 %
FRACTIONAL SHORTENING: 25 % (ref 28–44)
INTERVENTRICULAR SEPTUM: 0.77 CM (ref 0.6–1.1)
IVRT: 77.07 MSEC
LA MAJOR: 4.78 CM
LA MINOR: 4.34 CM
LA WIDTH: 3.63 CM
LEFT ATRIUM SIZE: 3.47 CM
LEFT ATRIUM VOLUME INDEX: 29.9 ML/M2
LEFT ATRIUM VOLUME: 48.71 CM3
LEFT INTERNAL DIMENSION IN SYSTOLE: 3.67 CM (ref 2.1–4)
LEFT VENTRICLE DIASTOLIC VOLUME INDEX: 68.45 ML/M2
LEFT VENTRICLE DIASTOLIC VOLUME: 111.57 ML
LEFT VENTRICLE MASS INDEX: 82 G/M2
LEFT VENTRICLE SYSTOLIC VOLUME INDEX: 34.9 ML/M2
LEFT VENTRICLE SYSTOLIC VOLUME: 56.93 ML
LEFT VENTRICULAR INTERNAL DIMENSION IN DIASTOLE: 4.88 CM (ref 3.5–6)
LEFT VENTRICULAR MASS: 133.45 G
MV A" WAVE DURATION": 6.57 MSEC
MV PEAK A VEL: 0.93 M/S
MV PEAK E VEL: 1.04 M/S
MV STENOSIS PRESSURE HALF TIME: 55.62 MS
MV VALVE AREA P 1/2 METHOD: 3.96 CM2
PISA TR MAX VEL: 3.11 M/S
PULM VEIN S/D RATIO: 0.84
PV MEAN GRADIENT: 1 MMHG
PV PEAK D VEL: 0.49 M/S
PV PEAK GRADIENT: 1 MMHG
PV PEAK S VEL: 0.41 M/S
PV PEAK VELOCITY: 0.87 CM/S
QEF: 47 %
RA MAJOR: 4.38 CM
RA PRESSURE: 3 MMHG
RA WIDTH: 3.84 CM
RIGHT VENTRICULAR END-DIASTOLIC DIMENSION: 3.12 CM
SINUS: 2.38 CM
STJ: 2 CM
TR MAX PG: 39 MMHG
TV REST PULMONARY ARTERY PRESSURE: 42 MMHG

## 2021-09-13 PROCEDURE — 93306 TTE W/DOPPLER COMPLETE: CPT

## 2021-09-13 PROCEDURE — 93356 ECHO (CUPID ONLY): ICD-10-PCS | Mod: ,,, | Performed by: INTERNAL MEDICINE

## 2021-09-13 PROCEDURE — 93306 ECHO (CUPID ONLY): ICD-10-PCS | Mod: 26,,, | Performed by: INTERNAL MEDICINE

## 2021-09-13 PROCEDURE — 93356 MYOCRD STRAIN IMG SPCKL TRCK: CPT | Mod: ,,, | Performed by: INTERNAL MEDICINE

## 2021-09-13 PROCEDURE — 93306 TTE W/DOPPLER COMPLETE: CPT | Mod: 26,,, | Performed by: INTERNAL MEDICINE

## 2021-09-15 ENCOUNTER — OFFICE VISIT (OUTPATIENT)
Dept: HEMATOLOGY/ONCOLOGY | Facility: CLINIC | Age: 71
End: 2021-09-15
Payer: MEDICARE

## 2021-09-15 VITALS
HEIGHT: 61 IN | DIASTOLIC BLOOD PRESSURE: 67 MMHG | OXYGEN SATURATION: 99 % | BODY MASS INDEX: 26.47 KG/M2 | HEART RATE: 86 BPM | WEIGHT: 140.19 LBS | TEMPERATURE: 97 F | SYSTOLIC BLOOD PRESSURE: 131 MMHG

## 2021-09-15 DIAGNOSIS — Z17.0 MALIGNANT NEOPLASM OF LOWER-INNER QUADRANT OF LEFT BREAST IN FEMALE, ESTROGEN RECEPTOR POSITIVE: Primary | ICD-10-CM

## 2021-09-15 DIAGNOSIS — D64.9 CHRONIC ANEMIA: ICD-10-CM

## 2021-09-15 DIAGNOSIS — C50.312 MALIGNANT NEOPLASM OF LOWER-INNER QUADRANT OF LEFT BREAST IN FEMALE, ESTROGEN RECEPTOR POSITIVE: Primary | ICD-10-CM

## 2021-09-15 DIAGNOSIS — R93.1 DECREASED CARDIAC EJECTION FRACTION: ICD-10-CM

## 2021-09-15 PROCEDURE — 99999 PR PBB SHADOW E&M-EST. PATIENT-LVL III: CPT | Mod: PBBFAC,,, | Performed by: INTERNAL MEDICINE

## 2021-09-15 PROCEDURE — 3008F BODY MASS INDEX DOCD: CPT | Mod: CPTII,S$GLB,, | Performed by: INTERNAL MEDICINE

## 2021-09-15 PROCEDURE — 3078F DIAST BP <80 MM HG: CPT | Mod: CPTII,S$GLB,, | Performed by: INTERNAL MEDICINE

## 2021-09-15 PROCEDURE — 3075F SYST BP GE 130 - 139MM HG: CPT | Mod: CPTII,S$GLB,, | Performed by: INTERNAL MEDICINE

## 2021-09-15 PROCEDURE — 1159F MED LIST DOCD IN RCRD: CPT | Mod: CPTII,S$GLB,, | Performed by: INTERNAL MEDICINE

## 2021-09-15 PROCEDURE — 3288F FALL RISK ASSESSMENT DOCD: CPT | Mod: CPTII,S$GLB,, | Performed by: INTERNAL MEDICINE

## 2021-09-15 PROCEDURE — 1126F AMNT PAIN NOTED NONE PRSNT: CPT | Mod: CPTII,S$GLB,, | Performed by: INTERNAL MEDICINE

## 2021-09-15 PROCEDURE — 1101F PT FALLS ASSESS-DOCD LE1/YR: CPT | Mod: CPTII,S$GLB,, | Performed by: INTERNAL MEDICINE

## 2021-09-15 PROCEDURE — 3075F PR MOST RECENT SYSTOLIC BLOOD PRESS GE 130-139MM HG: ICD-10-PCS | Mod: CPTII,S$GLB,, | Performed by: INTERNAL MEDICINE

## 2021-09-15 PROCEDURE — 1101F PR PT FALLS ASSESS DOC 0-1 FALLS W/OUT INJ PAST YR: ICD-10-PCS | Mod: CPTII,S$GLB,, | Performed by: INTERNAL MEDICINE

## 2021-09-15 PROCEDURE — 3288F PR FALLS RISK ASSESSMENT DOCUMENTED: ICD-10-PCS | Mod: CPTII,S$GLB,, | Performed by: INTERNAL MEDICINE

## 2021-09-15 PROCEDURE — 1159F PR MEDICATION LIST DOCUMENTED IN MEDICAL RECORD: ICD-10-PCS | Mod: CPTII,S$GLB,, | Performed by: INTERNAL MEDICINE

## 2021-09-15 PROCEDURE — 99999 PR PBB SHADOW E&M-EST. PATIENT-LVL III: ICD-10-PCS | Mod: PBBFAC,,, | Performed by: INTERNAL MEDICINE

## 2021-09-15 PROCEDURE — 4010F PR ACE/ARB THEARPY RXD/TAKEN: ICD-10-PCS | Mod: CPTII,S$GLB,, | Performed by: INTERNAL MEDICINE

## 2021-09-15 PROCEDURE — 1126F PR PAIN SEVERITY QUANTIFIED, NO PAIN PRESENT: ICD-10-PCS | Mod: CPTII,S$GLB,, | Performed by: INTERNAL MEDICINE

## 2021-09-15 PROCEDURE — 4010F ACE/ARB THERAPY RXD/TAKEN: CPT | Mod: CPTII,S$GLB,, | Performed by: INTERNAL MEDICINE

## 2021-09-15 PROCEDURE — 3078F PR MOST RECENT DIASTOLIC BLOOD PRESSURE < 80 MM HG: ICD-10-PCS | Mod: CPTII,S$GLB,, | Performed by: INTERNAL MEDICINE

## 2021-09-15 PROCEDURE — 99214 OFFICE O/P EST MOD 30 MIN: CPT | Mod: S$GLB,,, | Performed by: INTERNAL MEDICINE

## 2021-09-15 PROCEDURE — 99214 PR OFFICE/OUTPT VISIT, EST, LEVL IV, 30-39 MIN: ICD-10-PCS | Mod: S$GLB,,, | Performed by: INTERNAL MEDICINE

## 2021-09-15 PROCEDURE — 3008F PR BODY MASS INDEX (BMI) DOCUMENTED: ICD-10-PCS | Mod: CPTII,S$GLB,, | Performed by: INTERNAL MEDICINE

## 2021-09-22 ENCOUNTER — HOSPITAL ENCOUNTER (OUTPATIENT)
Dept: RADIOLOGY | Facility: HOSPITAL | Age: 71
Discharge: HOME | End: 2021-09-22
Attending: FAMILY MEDICINE
Payer: MEDICARE

## 2021-09-22 VITALS — HEIGHT: 61 IN | BODY MASS INDEX: 26.47 KG/M2 | WEIGHT: 140.19 LBS

## 2021-09-22 DIAGNOSIS — Z85.3 PERSONAL HISTORY OF BREAST CANCER: ICD-10-CM

## 2021-09-22 DIAGNOSIS — Z12.31 ENCOUNTER FOR SCREENING MAMMOGRAM FOR BREAST CANCER: ICD-10-CM

## 2021-09-22 PROCEDURE — 77065 MAMMO DIGITAL DIAGNOSTIC RIGHT WITH TOMO: ICD-10-PCS | Mod: 26,RT,, | Performed by: RADIOLOGY

## 2021-09-22 PROCEDURE — 77061 MAMMO DIGITAL DIAGNOSTIC RIGHT WITH TOMO: ICD-10-PCS | Mod: 26,RT,, | Performed by: RADIOLOGY

## 2021-09-22 PROCEDURE — 77061 BREAST TOMOSYNTHESIS UNI: CPT | Mod: TC,RT

## 2021-09-22 PROCEDURE — 77061 BREAST TOMOSYNTHESIS UNI: CPT | Mod: 26,RT,, | Performed by: RADIOLOGY

## 2021-09-22 PROCEDURE — 77065 DX MAMMO INCL CAD UNI: CPT | Mod: 26,RT,, | Performed by: RADIOLOGY

## 2021-10-01 ENCOUNTER — OFFICE VISIT (OUTPATIENT)
Dept: CARDIOLOGY | Facility: CLINIC | Age: 71
End: 2021-10-01
Payer: MEDICARE

## 2021-10-01 ENCOUNTER — HOSPITAL ENCOUNTER (OUTPATIENT)
Dept: CARDIOLOGY | Facility: HOSPITAL | Age: 71
Discharge: HOME | End: 2021-10-01
Attending: INTERNAL MEDICINE
Payer: MEDICARE

## 2021-10-01 VITALS
OXYGEN SATURATION: 100 % | WEIGHT: 140 LBS | HEART RATE: 71 BPM | DIASTOLIC BLOOD PRESSURE: 64 MMHG | BODY MASS INDEX: 26.45 KG/M2 | SYSTOLIC BLOOD PRESSURE: 118 MMHG

## 2021-10-01 DIAGNOSIS — R93.1 DECREASED CARDIAC EJECTION FRACTION: ICD-10-CM

## 2021-10-01 DIAGNOSIS — I50.43 ACUTE ON CHRONIC COMBINED SYSTOLIC AND DIASTOLIC CONGESTIVE HEART FAILURE: ICD-10-CM

## 2021-10-01 DIAGNOSIS — I10 ESSENTIAL HYPERTENSION: Chronic | ICD-10-CM

## 2021-10-01 DIAGNOSIS — I25.118 CORONARY ARTERY DISEASE OF NATIVE ARTERY OF NATIVE HEART WITH STABLE ANGINA PECTORIS: ICD-10-CM

## 2021-10-01 DIAGNOSIS — R94.39 ABNORMAL NUCLEAR STRESS TEST: ICD-10-CM

## 2021-10-01 DIAGNOSIS — I25.118 CORONARY ARTERY DISEASE OF NATIVE ARTERY OF NATIVE HEART WITH STABLE ANGINA PECTORIS: Primary | ICD-10-CM

## 2021-10-01 DIAGNOSIS — I10 ESSENTIAL HYPERTENSION: ICD-10-CM

## 2021-10-01 DIAGNOSIS — Z17.0 MALIGNANT NEOPLASM OF LOWER-INNER QUADRANT OF LEFT BREAST IN FEMALE, ESTROGEN RECEPTOR POSITIVE: ICD-10-CM

## 2021-10-01 DIAGNOSIS — R94.31 EKG ABNORMALITIES: ICD-10-CM

## 2021-10-01 DIAGNOSIS — C50.312 MALIGNANT NEOPLASM OF LOWER-INNER QUADRANT OF LEFT BREAST IN FEMALE, ESTROGEN RECEPTOR POSITIVE: ICD-10-CM

## 2021-10-01 DIAGNOSIS — R79.89 ELEVATED TROPONIN: ICD-10-CM

## 2021-10-01 DIAGNOSIS — M79.609 PAIN IN EXTREMITY, UNSPECIFIED EXTREMITY: Primary | ICD-10-CM

## 2021-10-01 DIAGNOSIS — I10 ESSENTIAL HYPERTENSION: Primary | ICD-10-CM

## 2021-10-01 PROCEDURE — 93010 ELECTROCARDIOGRAM REPORT: CPT | Mod: ,,, | Performed by: INTERNAL MEDICINE

## 2021-10-01 PROCEDURE — 99215 OFFICE O/P EST HI 40 MIN: CPT | Mod: S$GLB,,, | Performed by: INTERNAL MEDICINE

## 2021-10-01 PROCEDURE — 3008F PR BODY MASS INDEX (BMI) DOCUMENTED: ICD-10-PCS | Mod: CPTII,S$GLB,, | Performed by: INTERNAL MEDICINE

## 2021-10-01 PROCEDURE — 3074F PR MOST RECENT SYSTOLIC BLOOD PRESSURE < 130 MM HG: ICD-10-PCS | Mod: CPTII,S$GLB,, | Performed by: INTERNAL MEDICINE

## 2021-10-01 PROCEDURE — 3078F PR MOST RECENT DIASTOLIC BLOOD PRESSURE < 80 MM HG: ICD-10-PCS | Mod: CPTII,S$GLB,, | Performed by: INTERNAL MEDICINE

## 2021-10-01 PROCEDURE — 99999 PR PBB SHADOW E&M-EST. PATIENT-LVL IV: ICD-10-PCS | Mod: PBBFAC,,, | Performed by: INTERNAL MEDICINE

## 2021-10-01 PROCEDURE — 3078F DIAST BP <80 MM HG: CPT | Mod: CPTII,S$GLB,, | Performed by: INTERNAL MEDICINE

## 2021-10-01 PROCEDURE — 1159F PR MEDICATION LIST DOCUMENTED IN MEDICAL RECORD: ICD-10-PCS | Mod: CPTII,S$GLB,, | Performed by: INTERNAL MEDICINE

## 2021-10-01 PROCEDURE — 3008F BODY MASS INDEX DOCD: CPT | Mod: CPTII,S$GLB,, | Performed by: INTERNAL MEDICINE

## 2021-10-01 PROCEDURE — 1159F MED LIST DOCD IN RCRD: CPT | Mod: CPTII,S$GLB,, | Performed by: INTERNAL MEDICINE

## 2021-10-01 PROCEDURE — 93010 EKG 12-LEAD: ICD-10-PCS | Mod: ,,, | Performed by: INTERNAL MEDICINE

## 2021-10-01 PROCEDURE — 3074F SYST BP LT 130 MM HG: CPT | Mod: CPTII,S$GLB,, | Performed by: INTERNAL MEDICINE

## 2021-10-01 PROCEDURE — 1160F PR REVIEW ALL MEDS BY PRESCRIBER/CLIN PHARMACIST DOCUMENTED: ICD-10-PCS | Mod: CPTII,S$GLB,, | Performed by: INTERNAL MEDICINE

## 2021-10-01 PROCEDURE — 99999 PR PBB SHADOW E&M-EST. PATIENT-LVL IV: CPT | Mod: PBBFAC,,, | Performed by: INTERNAL MEDICINE

## 2021-10-01 PROCEDURE — 93005 ELECTROCARDIOGRAM TRACING: CPT

## 2021-10-01 PROCEDURE — 99215 PR OFFICE/OUTPT VISIT, EST, LEVL V, 40-54 MIN: ICD-10-PCS | Mod: S$GLB,,, | Performed by: INTERNAL MEDICINE

## 2021-10-01 PROCEDURE — 1160F RVW MEDS BY RX/DR IN RCRD: CPT | Mod: CPTII,S$GLB,, | Performed by: INTERNAL MEDICINE

## 2021-10-01 PROCEDURE — 4010F PR ACE/ARB THEARPY RXD/TAKEN: ICD-10-PCS | Mod: CPTII,S$GLB,, | Performed by: INTERNAL MEDICINE

## 2021-10-01 PROCEDURE — 4010F ACE/ARB THERAPY RXD/TAKEN: CPT | Mod: CPTII,S$GLB,, | Performed by: INTERNAL MEDICINE

## 2021-10-03 ENCOUNTER — PATIENT MESSAGE (OUTPATIENT)
Dept: HEMATOLOGY/ONCOLOGY | Facility: CLINIC | Age: 71
End: 2021-10-03

## 2021-10-04 ENCOUNTER — LAB VISIT (OUTPATIENT)
Dept: LAB | Facility: HOSPITAL | Age: 71
End: 2021-10-04
Attending: INTERNAL MEDICINE
Payer: MEDICARE

## 2021-10-04 DIAGNOSIS — C50.312 MALIGNANT NEOPLASM OF LOWER-INNER QUADRANT OF LEFT BREAST IN FEMALE, ESTROGEN RECEPTOR POSITIVE: ICD-10-CM

## 2021-10-04 DIAGNOSIS — D64.9 CHRONIC ANEMIA: ICD-10-CM

## 2021-10-04 DIAGNOSIS — Z17.0 MALIGNANT NEOPLASM OF LOWER-INNER QUADRANT OF LEFT BREAST IN FEMALE, ESTROGEN RECEPTOR POSITIVE: ICD-10-CM

## 2021-10-04 LAB
ALBUMIN SERPL BCP-MCNC: 3.8 G/DL (ref 3.5–5.2)
ALP SERPL-CCNC: 90 U/L (ref 55–135)
ALT SERPL W/O P-5'-P-CCNC: 15 U/L (ref 10–44)
ANION GAP SERPL CALC-SCNC: 10 MMOL/L (ref 8–16)
AST SERPL-CCNC: 13 U/L (ref 10–40)
BASOPHILS # BLD AUTO: 0.04 K/UL (ref 0–0.2)
BASOPHILS NFR BLD: 0.5 % (ref 0–1.9)
BILIRUB SERPL-MCNC: 0.6 MG/DL (ref 0.1–1)
BUN SERPL-MCNC: 20 MG/DL (ref 8–23)
CALCIUM SERPL-MCNC: 9.1 MG/DL (ref 8.7–10.5)
CHLORIDE SERPL-SCNC: 100 MMOL/L (ref 95–110)
CO2 SERPL-SCNC: 29 MMOL/L (ref 23–29)
CREAT SERPL-MCNC: 1.1 MG/DL (ref 0.5–1.4)
DIFFERENTIAL METHOD: ABNORMAL
EOSINOPHIL # BLD AUTO: 0.5 K/UL (ref 0–0.5)
EOSINOPHIL NFR BLD: 6.6 % (ref 0–8)
ERYTHROCYTE [DISTWIDTH] IN BLOOD BY AUTOMATED COUNT: 14 % (ref 11.5–14.5)
EST. GFR  (AFRICAN AMERICAN): 59 ML/MIN/1.73 M^2
EST. GFR  (NON AFRICAN AMERICAN): 51 ML/MIN/1.73 M^2
GLUCOSE SERPL-MCNC: 112 MG/DL (ref 70–110)
HCT VFR BLD AUTO: 30.8 % (ref 37–48.5)
HGB BLD-MCNC: 9.6 G/DL (ref 12–16)
IMM GRANULOCYTES # BLD AUTO: 0.02 K/UL (ref 0–0.04)
IMM GRANULOCYTES NFR BLD AUTO: 0.3 % (ref 0–0.5)
LYMPHOCYTES # BLD AUTO: 2.3 K/UL (ref 1–4.8)
LYMPHOCYTES NFR BLD: 30.8 % (ref 18–48)
MCH RBC QN AUTO: 29.1 PG (ref 27–31)
MCHC RBC AUTO-ENTMCNC: 31.2 G/DL (ref 32–36)
MCV RBC AUTO: 93 FL (ref 82–98)
MONOCYTES # BLD AUTO: 0.7 K/UL (ref 0.3–1)
MONOCYTES NFR BLD: 9.8 % (ref 4–15)
NEUTROPHILS # BLD AUTO: 3.9 K/UL (ref 1.8–7.7)
NEUTROPHILS NFR BLD: 52 % (ref 38–73)
NRBC BLD-RTO: 0 /100 WBC
PLATELET # BLD AUTO: 297 K/UL (ref 150–450)
PMV BLD AUTO: 9.8 FL (ref 9.2–12.9)
POTASSIUM SERPL-SCNC: 3.6 MMOL/L (ref 3.5–5.1)
PROT SERPL-MCNC: 7.7 G/DL (ref 6–8.4)
RBC # BLD AUTO: 3.3 M/UL (ref 4–5.4)
SODIUM SERPL-SCNC: 139 MMOL/L (ref 136–145)
WBC # BLD AUTO: 7.56 K/UL (ref 3.9–12.7)

## 2021-10-04 PROCEDURE — 84466 ASSAY OF TRANSFERRIN: CPT | Performed by: INTERNAL MEDICINE

## 2021-10-04 PROCEDURE — 85025 COMPLETE CBC W/AUTO DIFF WBC: CPT | Performed by: INTERNAL MEDICINE

## 2021-10-04 PROCEDURE — 80053 COMPREHEN METABOLIC PANEL: CPT | Performed by: INTERNAL MEDICINE

## 2021-10-04 PROCEDURE — 36415 COLL VENOUS BLD VENIPUNCTURE: CPT | Performed by: INTERNAL MEDICINE

## 2021-10-04 PROCEDURE — 82728 ASSAY OF FERRITIN: CPT | Performed by: INTERNAL MEDICINE

## 2021-10-05 LAB
FERRITIN SERPL-MCNC: 503 NG/ML (ref 20–300)
IRON SERPL-MCNC: 29 UG/DL (ref 30–160)
SATURATED IRON: 11 % (ref 20–50)
TOTAL IRON BINDING CAPACITY: 271 UG/DL (ref 250–450)
TRANSFERRIN SERPL-MCNC: 183 MG/DL (ref 200–375)

## 2021-10-06 ENCOUNTER — CLINICAL SUPPORT (OUTPATIENT)
Dept: SMOKING CESSATION | Facility: CLINIC | Age: 71
End: 2021-10-06
Payer: COMMERCIAL

## 2021-10-06 DIAGNOSIS — F17.200 NICOTINE DEPENDENCE: Primary | ICD-10-CM

## 2021-10-06 PROCEDURE — 99407 BEHAV CHNG SMOKING > 10 MIN: CPT | Mod: S$GLB,,,

## 2021-10-06 PROCEDURE — 99407 PR TOBACCO USE CESSATION INTENSIVE >10 MINUTES: ICD-10-PCS | Mod: S$GLB,,,

## 2021-10-07 ENCOUNTER — OFFICE VISIT (OUTPATIENT)
Dept: HEMATOLOGY/ONCOLOGY | Facility: CLINIC | Age: 71
End: 2021-10-07
Payer: MEDICARE

## 2021-10-07 VITALS
OXYGEN SATURATION: 98 % | BODY MASS INDEX: 26.17 KG/M2 | HEART RATE: 77 BPM | TEMPERATURE: 97 F | WEIGHT: 142.19 LBS | HEIGHT: 62 IN | SYSTOLIC BLOOD PRESSURE: 148 MMHG | DIASTOLIC BLOOD PRESSURE: 58 MMHG

## 2021-10-07 DIAGNOSIS — I10 ESSENTIAL HYPERTENSION: Chronic | ICD-10-CM

## 2021-10-07 DIAGNOSIS — D50.0 IRON DEFICIENCY ANEMIA DUE TO CHRONIC BLOOD LOSS: ICD-10-CM

## 2021-10-07 DIAGNOSIS — Z79.899 IMMUNODEFICIENCY DUE TO CHEMOTHERAPY: ICD-10-CM

## 2021-10-07 DIAGNOSIS — D84.821 IMMUNODEFICIENCY DUE TO CHEMOTHERAPY: ICD-10-CM

## 2021-10-07 DIAGNOSIS — E66.9 OBESITY (BMI 30.0-34.9): ICD-10-CM

## 2021-10-07 DIAGNOSIS — C50.312 MALIGNANT NEOPLASM OF LOWER-INNER QUADRANT OF LEFT BREAST IN FEMALE, ESTROGEN RECEPTOR POSITIVE: Primary | ICD-10-CM

## 2021-10-07 DIAGNOSIS — I50.43 ACUTE ON CHRONIC COMBINED SYSTOLIC AND DIASTOLIC CONGESTIVE HEART FAILURE: ICD-10-CM

## 2021-10-07 DIAGNOSIS — D50.8 IRON DEFICIENCY ANEMIA SECONDARY TO INADEQUATE DIETARY IRON INTAKE: ICD-10-CM

## 2021-10-07 DIAGNOSIS — Z17.0 MALIGNANT NEOPLASM OF LOWER-INNER QUADRANT OF LEFT BREAST IN FEMALE, ESTROGEN RECEPTOR POSITIVE: Primary | ICD-10-CM

## 2021-10-07 DIAGNOSIS — T45.1X5A IMMUNODEFICIENCY DUE TO CHEMOTHERAPY: ICD-10-CM

## 2021-10-07 PROCEDURE — 4010F ACE/ARB THERAPY RXD/TAKEN: CPT | Mod: CPTII,S$GLB,, | Performed by: INTERNAL MEDICINE

## 2021-10-07 PROCEDURE — 1126F AMNT PAIN NOTED NONE PRSNT: CPT | Mod: CPTII,S$GLB,, | Performed by: INTERNAL MEDICINE

## 2021-10-07 PROCEDURE — 3077F PR MOST RECENT SYSTOLIC BLOOD PRESSURE >= 140 MM HG: ICD-10-PCS | Mod: CPTII,S$GLB,, | Performed by: INTERNAL MEDICINE

## 2021-10-07 PROCEDURE — 1159F MED LIST DOCD IN RCRD: CPT | Mod: CPTII,S$GLB,, | Performed by: INTERNAL MEDICINE

## 2021-10-07 PROCEDURE — 3078F PR MOST RECENT DIASTOLIC BLOOD PRESSURE < 80 MM HG: ICD-10-PCS | Mod: CPTII,S$GLB,, | Performed by: INTERNAL MEDICINE

## 2021-10-07 PROCEDURE — 3077F SYST BP >= 140 MM HG: CPT | Mod: CPTII,S$GLB,, | Performed by: INTERNAL MEDICINE

## 2021-10-07 PROCEDURE — 99999 PR PBB SHADOW E&M-EST. PATIENT-LVL IV: ICD-10-PCS | Mod: PBBFAC,,, | Performed by: INTERNAL MEDICINE

## 2021-10-07 PROCEDURE — 1160F PR REVIEW ALL MEDS BY PRESCRIBER/CLIN PHARMACIST DOCUMENTED: ICD-10-PCS | Mod: CPTII,S$GLB,, | Performed by: INTERNAL MEDICINE

## 2021-10-07 PROCEDURE — 99214 PR OFFICE/OUTPT VISIT, EST, LEVL IV, 30-39 MIN: ICD-10-PCS | Mod: S$GLB,,, | Performed by: INTERNAL MEDICINE

## 2021-10-07 PROCEDURE — 3078F DIAST BP <80 MM HG: CPT | Mod: CPTII,S$GLB,, | Performed by: INTERNAL MEDICINE

## 2021-10-07 PROCEDURE — 3008F BODY MASS INDEX DOCD: CPT | Mod: CPTII,S$GLB,, | Performed by: INTERNAL MEDICINE

## 2021-10-07 PROCEDURE — 1159F PR MEDICATION LIST DOCUMENTED IN MEDICAL RECORD: ICD-10-PCS | Mod: CPTII,S$GLB,, | Performed by: INTERNAL MEDICINE

## 2021-10-07 PROCEDURE — 1126F PR PAIN SEVERITY QUANTIFIED, NO PAIN PRESENT: ICD-10-PCS | Mod: CPTII,S$GLB,, | Performed by: INTERNAL MEDICINE

## 2021-10-07 PROCEDURE — 99214 OFFICE O/P EST MOD 30 MIN: CPT | Mod: S$GLB,,, | Performed by: INTERNAL MEDICINE

## 2021-10-07 PROCEDURE — 3008F PR BODY MASS INDEX (BMI) DOCUMENTED: ICD-10-PCS | Mod: CPTII,S$GLB,, | Performed by: INTERNAL MEDICINE

## 2021-10-07 PROCEDURE — 99999 PR PBB SHADOW E&M-EST. PATIENT-LVL IV: CPT | Mod: PBBFAC,,, | Performed by: INTERNAL MEDICINE

## 2021-10-07 PROCEDURE — 4010F PR ACE/ARB THEARPY RXD/TAKEN: ICD-10-PCS | Mod: CPTII,S$GLB,, | Performed by: INTERNAL MEDICINE

## 2021-10-07 PROCEDURE — 1160F RVW MEDS BY RX/DR IN RCRD: CPT | Mod: CPTII,S$GLB,, | Performed by: INTERNAL MEDICINE

## 2021-10-07 RX ORDER — SODIUM CHLORIDE 0.9 % (FLUSH) 0.9 %
10 SYRINGE (ML) INJECTION
Status: CANCELLED | OUTPATIENT
Start: 2021-11-16

## 2021-10-07 RX ORDER — HEPARIN 100 UNIT/ML
500 SYRINGE INTRAVENOUS
Status: CANCELLED | OUTPATIENT
Start: 2021-10-07

## 2021-10-07 RX ORDER — SODIUM CHLORIDE 0.9 % (FLUSH) 0.9 %
10 SYRINGE (ML) INJECTION
Status: CANCELLED | OUTPATIENT
Start: 2021-10-07

## 2021-10-07 RX ORDER — ANASTROZOLE 1 MG/1
1 TABLET ORAL DAILY
Qty: 90 TABLET | Refills: 3 | Status: SHIPPED | OUTPATIENT
Start: 2021-10-07 | End: 2022-03-15 | Stop reason: SDUPTHER

## 2021-10-07 RX ORDER — HEPARIN 100 UNIT/ML
500 SYRINGE INTRAVENOUS
Status: CANCELLED | OUTPATIENT
Start: 2021-11-16

## 2021-10-09 ENCOUNTER — LAB VISIT (OUTPATIENT)
Dept: PRIMARY CARE CLINIC | Facility: CLINIC | Age: 71
End: 2021-10-09
Payer: MEDICARE

## 2021-10-09 DIAGNOSIS — Z20.822 ENCOUNTER FOR LABORATORY TESTING FOR COVID-19 VIRUS: ICD-10-CM

## 2021-10-09 PROCEDURE — U0003 INFECTIOUS AGENT DETECTION BY NUCLEIC ACID (DNA OR RNA); SEVERE ACUTE RESPIRATORY SYNDROME CORONAVIRUS 2 (SARS-COV-2) (CORONAVIRUS DISEASE [COVID-19]), AMPLIFIED PROBE TECHNIQUE, MAKING USE OF HIGH THROUGHPUT TECHNOLOGIES AS DESCRIBED BY CMS-2020-01-R: HCPCS | Performed by: INTERNAL MEDICINE

## 2021-10-09 PROCEDURE — U0005 INFEC AGEN DETEC AMPLI PROBE: HCPCS | Performed by: INTERNAL MEDICINE

## 2021-10-11 LAB
SARS-COV-2 RNA RESP QL NAA+PROBE: NOT DETECTED
SARS-COV-2- CYCLE NUMBER: NORMAL

## 2021-10-12 ENCOUNTER — HOSPITAL ENCOUNTER (OUTPATIENT)
Facility: HOSPITAL | Age: 71
Discharge: HOME | End: 2021-10-12
Attending: INTERNAL MEDICINE | Admitting: INTERNAL MEDICINE
Payer: MEDICARE

## 2021-10-12 ENCOUNTER — TELEPHONE (OUTPATIENT)
Dept: CARDIOLOGY | Facility: CLINIC | Age: 71
End: 2021-10-12

## 2021-10-12 VITALS
RESPIRATION RATE: 20 BRPM | OXYGEN SATURATION: 100 % | BODY MASS INDEX: 26.13 KG/M2 | DIASTOLIC BLOOD PRESSURE: 83 MMHG | SYSTOLIC BLOOD PRESSURE: 130 MMHG | WEIGHT: 142 LBS | HEART RATE: 69 BPM | HEIGHT: 62 IN | TEMPERATURE: 98 F

## 2021-10-12 DIAGNOSIS — I25.118 CORONARY ARTERY DISEASE OF NATIVE ARTERY OF NATIVE HEART WITH STABLE ANGINA PECTORIS: ICD-10-CM

## 2021-10-12 DIAGNOSIS — R94.39 ABNORMAL NUCLEAR STRESS TEST: Primary | ICD-10-CM

## 2021-10-12 DIAGNOSIS — I50.43 ACUTE ON CHRONIC COMBINED SYSTOLIC AND DIASTOLIC CHF (CONGESTIVE HEART FAILURE): ICD-10-CM

## 2021-10-12 LAB — CATH EF QUANTITATIVE: 45 %

## 2021-10-12 PROCEDURE — 63600175 PHARM REV CODE 636 W HCPCS: Performed by: INTERNAL MEDICINE

## 2021-10-12 PROCEDURE — 27201423 OPTIME MED/SURG SUP & DEVICES STERILE SUPPLY: Performed by: INTERNAL MEDICINE

## 2021-10-12 PROCEDURE — 99152 MOD SED SAME PHYS/QHP 5/>YRS: CPT | Performed by: INTERNAL MEDICINE

## 2021-10-12 PROCEDURE — C1894 INTRO/SHEATH, NON-LASER: HCPCS | Performed by: INTERNAL MEDICINE

## 2021-10-12 PROCEDURE — 93458 L HRT ARTERY/VENTRICLE ANGIO: CPT | Performed by: INTERNAL MEDICINE

## 2021-10-12 PROCEDURE — 99152 MOD SED SAME PHYS/QHP 5/>YRS: CPT | Mod: ,,, | Performed by: INTERNAL MEDICINE

## 2021-10-12 PROCEDURE — 93458 PR CATH PLACE/CORON ANGIO, IMG SUPER/INTERP,W LEFT HEART VENTRICULOGRAPHY: ICD-10-PCS | Mod: 26,,, | Performed by: INTERNAL MEDICINE

## 2021-10-12 PROCEDURE — 93458 L HRT ARTERY/VENTRICLE ANGIO: CPT | Mod: 26,,, | Performed by: INTERNAL MEDICINE

## 2021-10-12 PROCEDURE — 25000003 PHARM REV CODE 250: Performed by: INTERNAL MEDICINE

## 2021-10-12 PROCEDURE — 99152 PR MOD CONSCIOUS SEDATION, SAME PHYS, 5+ YRS, FIRST 15 MIN: ICD-10-PCS | Mod: ,,, | Performed by: INTERNAL MEDICINE

## 2021-10-12 PROCEDURE — C1769 GUIDE WIRE: HCPCS | Performed by: INTERNAL MEDICINE

## 2021-10-12 RX ORDER — MIDAZOLAM HYDROCHLORIDE 1 MG/ML
INJECTION, SOLUTION INTRAMUSCULAR; INTRAVENOUS
Status: DISCONTINUED | OUTPATIENT
Start: 2021-10-12 | End: 2021-10-12 | Stop reason: HOSPADM

## 2021-10-12 RX ORDER — NAPROXEN SODIUM 220 MG/1
81 TABLET, FILM COATED ORAL ONCE
Status: COMPLETED | OUTPATIENT
Start: 2021-10-12 | End: 2021-10-12

## 2021-10-12 RX ORDER — SODIUM CHLORIDE 9 MG/ML
INJECTION, SOLUTION INTRAVENOUS CONTINUOUS
Status: ACTIVE | OUTPATIENT
Start: 2021-10-12 | End: 2021-10-12

## 2021-10-12 RX ORDER — DIPHENHYDRAMINE HCL 50 MG
50 CAPSULE ORAL ONCE
Status: COMPLETED | OUTPATIENT
Start: 2021-10-12 | End: 2021-10-12

## 2021-10-12 RX ORDER — FENTANYL CITRATE 50 UG/ML
INJECTION, SOLUTION INTRAMUSCULAR; INTRAVENOUS
Status: DISCONTINUED | OUTPATIENT
Start: 2021-10-12 | End: 2021-10-12 | Stop reason: HOSPADM

## 2021-10-12 RX ORDER — ONDANSETRON 8 MG/1
8 TABLET, ORALLY DISINTEGRATING ORAL EVERY 8 HOURS PRN
Status: DISCONTINUED | OUTPATIENT
Start: 2021-10-12 | End: 2021-10-12 | Stop reason: HOSPADM

## 2021-10-12 RX ORDER — ACETAMINOPHEN 325 MG/1
650 TABLET ORAL EVERY 4 HOURS PRN
Status: DISCONTINUED | OUTPATIENT
Start: 2021-10-12 | End: 2021-10-12 | Stop reason: HOSPADM

## 2021-10-12 RX ORDER — DIAZEPAM 5 MG/1
5 TABLET ORAL
Status: DISCONTINUED | OUTPATIENT
Start: 2021-10-12 | End: 2021-10-12 | Stop reason: HOSPADM

## 2021-10-12 RX ORDER — LIDOCAINE HYDROCHLORIDE 20 MG/ML
INJECTION, SOLUTION EPIDURAL; INFILTRATION; INTRACAUDAL; PERINEURAL
Status: DISCONTINUED | OUTPATIENT
Start: 2021-10-12 | End: 2021-10-12 | Stop reason: HOSPADM

## 2021-10-12 RX ADMIN — ASPIRIN 81 MG CHEWABLE TABLET 81 MG: 81 TABLET CHEWABLE at 11:10

## 2021-10-12 RX ADMIN — SODIUM CHLORIDE: 0.9 INJECTION, SOLUTION INTRAVENOUS at 11:10

## 2021-10-12 RX ADMIN — DIAZEPAM 5 MG: 5 TABLET ORAL at 11:10

## 2021-10-12 RX ADMIN — DIPHENHYDRAMINE HYDROCHLORIDE 50 MG: 50 CAPSULE ORAL at 11:10

## 2021-10-20 ENCOUNTER — PATIENT MESSAGE (OUTPATIENT)
Dept: FAMILY MEDICINE | Facility: CLINIC | Age: 71
End: 2021-10-20
Payer: MEDICARE

## 2021-10-21 ENCOUNTER — OFFICE VISIT (OUTPATIENT)
Dept: FAMILY MEDICINE | Facility: CLINIC | Age: 71
End: 2021-10-21
Payer: MEDICARE

## 2021-10-21 VITALS
DIASTOLIC BLOOD PRESSURE: 70 MMHG | HEIGHT: 62 IN | SYSTOLIC BLOOD PRESSURE: 126 MMHG | BODY MASS INDEX: 26.78 KG/M2 | OXYGEN SATURATION: 95 % | TEMPERATURE: 97 F | WEIGHT: 145.5 LBS | HEART RATE: 93 BPM | RESPIRATION RATE: 18 BRPM

## 2021-10-21 DIAGNOSIS — I50.42 CHRONIC COMBINED SYSTOLIC AND DIASTOLIC CHF (CONGESTIVE HEART FAILURE): Chronic | ICD-10-CM

## 2021-10-21 DIAGNOSIS — N39.0 RECURRENT UTI: ICD-10-CM

## 2021-10-21 DIAGNOSIS — I10 ESSENTIAL HYPERTENSION: Chronic | ICD-10-CM

## 2021-10-21 DIAGNOSIS — I25.118 CORONARY ARTERY DISEASE OF NATIVE ARTERY OF NATIVE HEART WITH STABLE ANGINA PECTORIS: ICD-10-CM

## 2021-10-21 DIAGNOSIS — R82.90 MALODOROUS URINE: Primary | ICD-10-CM

## 2021-10-21 PROBLEM — R94.39 ABNORMAL NUCLEAR STRESS TEST: Status: RESOLVED | Noted: 2021-10-01 | Resolved: 2021-10-21

## 2021-10-21 PROBLEM — C50.919 MALIGNANT NEOPLASM OF BREAST IN FEMALE, ESTROGEN RECEPTOR POSITIVE: Chronic | Status: ACTIVE | Noted: 2017-06-15

## 2021-10-21 PROBLEM — R79.89 ELEVATED TROPONIN: Status: RESOLVED | Noted: 2021-03-06 | Resolved: 2021-10-21

## 2021-10-21 PROBLEM — R94.31 EKG ABNORMALITIES: Status: RESOLVED | Noted: 2021-02-25 | Resolved: 2021-10-21

## 2021-10-21 PROBLEM — Z17.0 MALIGNANT NEOPLASM OF BREAST IN FEMALE, ESTROGEN RECEPTOR POSITIVE: Chronic | Status: ACTIVE | Noted: 2017-06-15

## 2021-10-21 LAB
BILIRUB SERPL-MCNC: NORMAL MG/DL
BLOOD URINE, POC: NORMAL
CLARITY, POC UA: NORMAL
COLOR, POC UA: NORMAL
GLUCOSE UR QL STRIP: NORMAL
KETONES UR QL STRIP: NORMAL
LEUKOCYTE ESTERASE URINE, POC: NORMAL
NITRITE, POC UA: NORMAL
PH, POC UA: 7
PROTEIN, POC: NORMAL
SPECIFIC GRAVITY, POC UA: 1
UROBILINOGEN, POC UA: NORMAL

## 2021-10-21 PROCEDURE — 3288F FALL RISK ASSESSMENT DOCD: CPT | Mod: CPTII,S$GLB,, | Performed by: FAMILY MEDICINE

## 2021-10-21 PROCEDURE — 87077 CULTURE AEROBIC IDENTIFY: CPT | Performed by: FAMILY MEDICINE

## 2021-10-21 PROCEDURE — 81002 POCT URINE DIPSTICK WITHOUT MICROSCOPE: ICD-10-PCS | Mod: S$GLB,,, | Performed by: FAMILY MEDICINE

## 2021-10-21 PROCEDURE — 99499 UNLISTED E&M SERVICE: CPT | Mod: S$GLB,,, | Performed by: FAMILY MEDICINE

## 2021-10-21 PROCEDURE — 3074F SYST BP LT 130 MM HG: CPT | Mod: CPTII,S$GLB,, | Performed by: FAMILY MEDICINE

## 2021-10-21 PROCEDURE — 1126F PR PAIN SEVERITY QUANTIFIED, NO PAIN PRESENT: ICD-10-PCS | Mod: CPTII,S$GLB,, | Performed by: FAMILY MEDICINE

## 2021-10-21 PROCEDURE — 1101F PR PT FALLS ASSESS DOC 0-1 FALLS W/OUT INJ PAST YR: ICD-10-PCS | Mod: CPTII,S$GLB,, | Performed by: FAMILY MEDICINE

## 2021-10-21 PROCEDURE — 1126F AMNT PAIN NOTED NONE PRSNT: CPT | Mod: CPTII,S$GLB,, | Performed by: FAMILY MEDICINE

## 2021-10-21 PROCEDURE — 1160F RVW MEDS BY RX/DR IN RCRD: CPT | Mod: CPTII,S$GLB,, | Performed by: FAMILY MEDICINE

## 2021-10-21 PROCEDURE — 1159F PR MEDICATION LIST DOCUMENTED IN MEDICAL RECORD: ICD-10-PCS | Mod: CPTII,S$GLB,, | Performed by: FAMILY MEDICINE

## 2021-10-21 PROCEDURE — 4010F PR ACE/ARB THEARPY RXD/TAKEN: ICD-10-PCS | Mod: CPTII,S$GLB,, | Performed by: FAMILY MEDICINE

## 2021-10-21 PROCEDURE — 3008F BODY MASS INDEX DOCD: CPT | Mod: CPTII,S$GLB,, | Performed by: FAMILY MEDICINE

## 2021-10-21 PROCEDURE — 87186 SC STD MICRODIL/AGAR DIL: CPT | Performed by: FAMILY MEDICINE

## 2021-10-21 PROCEDURE — 4010F ACE/ARB THERAPY RXD/TAKEN: CPT | Mod: CPTII,S$GLB,, | Performed by: FAMILY MEDICINE

## 2021-10-21 PROCEDURE — 99214 PR OFFICE/OUTPT VISIT, EST, LEVL IV, 30-39 MIN: ICD-10-PCS | Mod: S$GLB,,, | Performed by: FAMILY MEDICINE

## 2021-10-21 PROCEDURE — 3074F PR MOST RECENT SYSTOLIC BLOOD PRESSURE < 130 MM HG: ICD-10-PCS | Mod: CPTII,S$GLB,, | Performed by: FAMILY MEDICINE

## 2021-10-21 PROCEDURE — 1101F PT FALLS ASSESS-DOCD LE1/YR: CPT | Mod: CPTII,S$GLB,, | Performed by: FAMILY MEDICINE

## 2021-10-21 PROCEDURE — 3008F PR BODY MASS INDEX (BMI) DOCUMENTED: ICD-10-PCS | Mod: CPTII,S$GLB,, | Performed by: FAMILY MEDICINE

## 2021-10-21 PROCEDURE — 87088 URINE BACTERIA CULTURE: CPT | Performed by: FAMILY MEDICINE

## 2021-10-21 PROCEDURE — 99999 PR PBB SHADOW E&M-EST. PATIENT-LVL IV: CPT | Mod: PBBFAC,,, | Performed by: FAMILY MEDICINE

## 2021-10-21 PROCEDURE — 87086 URINE CULTURE/COLONY COUNT: CPT | Performed by: FAMILY MEDICINE

## 2021-10-21 PROCEDURE — 81002 URINALYSIS NONAUTO W/O SCOPE: CPT | Mod: S$GLB,,, | Performed by: FAMILY MEDICINE

## 2021-10-21 PROCEDURE — 99214 OFFICE O/P EST MOD 30 MIN: CPT | Mod: S$GLB,,, | Performed by: FAMILY MEDICINE

## 2021-10-21 PROCEDURE — 99999 PR PBB SHADOW E&M-EST. PATIENT-LVL IV: ICD-10-PCS | Mod: PBBFAC,,, | Performed by: FAMILY MEDICINE

## 2021-10-21 PROCEDURE — 1159F MED LIST DOCD IN RCRD: CPT | Mod: CPTII,S$GLB,, | Performed by: FAMILY MEDICINE

## 2021-10-21 PROCEDURE — 3288F PR FALLS RISK ASSESSMENT DOCUMENTED: ICD-10-PCS | Mod: CPTII,S$GLB,, | Performed by: FAMILY MEDICINE

## 2021-10-21 PROCEDURE — 3078F PR MOST RECENT DIASTOLIC BLOOD PRESSURE < 80 MM HG: ICD-10-PCS | Mod: CPTII,S$GLB,, | Performed by: FAMILY MEDICINE

## 2021-10-21 PROCEDURE — 99499 RISK ADDL DX/OHS AUDIT: ICD-10-PCS | Mod: S$GLB,,, | Performed by: FAMILY MEDICINE

## 2021-10-21 PROCEDURE — 3078F DIAST BP <80 MM HG: CPT | Mod: CPTII,S$GLB,, | Performed by: FAMILY MEDICINE

## 2021-10-21 PROCEDURE — 1160F PR REVIEW ALL MEDS BY PRESCRIBER/CLIN PHARMACIST DOCUMENTED: ICD-10-PCS | Mod: CPTII,S$GLB,, | Performed by: FAMILY MEDICINE

## 2021-10-21 RX ORDER — ESCITALOPRAM OXALATE 10 MG/1
10 TABLET ORAL DAILY
Qty: 90 TABLET | Refills: 3 | Status: SHIPPED | OUTPATIENT
Start: 2021-10-21 | End: 2022-03-31

## 2021-10-24 LAB — BACTERIA UR CULT: ABNORMAL

## 2021-10-26 ENCOUNTER — TELEPHONE (OUTPATIENT)
Dept: FAMILY MEDICINE | Facility: CLINIC | Age: 71
End: 2021-10-26
Payer: MEDICARE

## 2021-10-26 RX ORDER — NITROFURANTOIN 25; 75 MG/1; MG/1
100 CAPSULE ORAL 2 TIMES DAILY
Qty: 14 CAPSULE | Refills: 0 | Status: SHIPPED | OUTPATIENT
Start: 2021-10-26 | End: 2022-01-24

## 2021-10-28 ENCOUNTER — OFFICE VISIT (OUTPATIENT)
Dept: CARDIOLOGY | Facility: CLINIC | Age: 71
End: 2021-10-28
Payer: MEDICARE

## 2021-10-28 VITALS
HEART RATE: 69 BPM | OXYGEN SATURATION: 98 % | SYSTOLIC BLOOD PRESSURE: 142 MMHG | BODY MASS INDEX: 26.57 KG/M2 | DIASTOLIC BLOOD PRESSURE: 74 MMHG | WEIGHT: 145.31 LBS

## 2021-10-28 DIAGNOSIS — Z17.0 MALIGNANT NEOPLASM OF LOWER-INNER QUADRANT OF LEFT BREAST IN FEMALE, ESTROGEN RECEPTOR POSITIVE: ICD-10-CM

## 2021-10-28 DIAGNOSIS — I25.118 CORONARY ARTERY DISEASE OF NATIVE ARTERY OF NATIVE HEART WITH STABLE ANGINA PECTORIS: Primary | Chronic | ICD-10-CM

## 2021-10-28 DIAGNOSIS — C50.312 MALIGNANT NEOPLASM OF LOWER-INNER QUADRANT OF LEFT BREAST IN FEMALE, ESTROGEN RECEPTOR POSITIVE: ICD-10-CM

## 2021-10-28 DIAGNOSIS — F17.200 TOBACCO USE DISORDER: ICD-10-CM

## 2021-10-28 DIAGNOSIS — I50.42 CHRONIC COMBINED SYSTOLIC AND DIASTOLIC CHF (CONGESTIVE HEART FAILURE): Chronic | ICD-10-CM

## 2021-10-28 DIAGNOSIS — I10 ESSENTIAL HYPERTENSION: Chronic | ICD-10-CM

## 2021-10-28 PROCEDURE — 3077F PR MOST RECENT SYSTOLIC BLOOD PRESSURE >= 140 MM HG: ICD-10-PCS | Mod: CPTII,S$GLB,, | Performed by: INTERNAL MEDICINE

## 2021-10-28 PROCEDURE — 4010F PR ACE/ARB THEARPY RXD/TAKEN: ICD-10-PCS | Mod: CPTII,S$GLB,, | Performed by: INTERNAL MEDICINE

## 2021-10-28 PROCEDURE — 3008F PR BODY MASS INDEX (BMI) DOCUMENTED: ICD-10-PCS | Mod: CPTII,S$GLB,, | Performed by: INTERNAL MEDICINE

## 2021-10-28 PROCEDURE — 3077F SYST BP >= 140 MM HG: CPT | Mod: CPTII,S$GLB,, | Performed by: INTERNAL MEDICINE

## 2021-10-28 PROCEDURE — 1160F RVW MEDS BY RX/DR IN RCRD: CPT | Mod: CPTII,S$GLB,, | Performed by: INTERNAL MEDICINE

## 2021-10-28 PROCEDURE — 99214 PR OFFICE/OUTPT VISIT, EST, LEVL IV, 30-39 MIN: ICD-10-PCS | Mod: S$GLB,,, | Performed by: INTERNAL MEDICINE

## 2021-10-28 PROCEDURE — 1159F MED LIST DOCD IN RCRD: CPT | Mod: CPTII,S$GLB,, | Performed by: INTERNAL MEDICINE

## 2021-10-28 PROCEDURE — 3078F DIAST BP <80 MM HG: CPT | Mod: CPTII,S$GLB,, | Performed by: INTERNAL MEDICINE

## 2021-10-28 PROCEDURE — 99214 OFFICE O/P EST MOD 30 MIN: CPT | Mod: S$GLB,,, | Performed by: INTERNAL MEDICINE

## 2021-10-28 PROCEDURE — 4010F ACE/ARB THERAPY RXD/TAKEN: CPT | Mod: CPTII,S$GLB,, | Performed by: INTERNAL MEDICINE

## 2021-10-28 PROCEDURE — 1159F PR MEDICATION LIST DOCUMENTED IN MEDICAL RECORD: ICD-10-PCS | Mod: CPTII,S$GLB,, | Performed by: INTERNAL MEDICINE

## 2021-10-28 PROCEDURE — 3008F BODY MASS INDEX DOCD: CPT | Mod: CPTII,S$GLB,, | Performed by: INTERNAL MEDICINE

## 2021-10-28 PROCEDURE — 3078F PR MOST RECENT DIASTOLIC BLOOD PRESSURE < 80 MM HG: ICD-10-PCS | Mod: CPTII,S$GLB,, | Performed by: INTERNAL MEDICINE

## 2021-10-28 PROCEDURE — 99999 PR PBB SHADOW E&M-EST. PATIENT-LVL III: ICD-10-PCS | Mod: PBBFAC,,, | Performed by: INTERNAL MEDICINE

## 2021-10-28 PROCEDURE — 99999 PR PBB SHADOW E&M-EST. PATIENT-LVL III: CPT | Mod: PBBFAC,,, | Performed by: INTERNAL MEDICINE

## 2021-10-28 PROCEDURE — 1160F PR REVIEW ALL MEDS BY PRESCRIBER/CLIN PHARMACIST DOCUMENTED: ICD-10-PCS | Mod: CPTII,S$GLB,, | Performed by: INTERNAL MEDICINE

## 2021-10-28 RX ORDER — ISOSORBIDE MONONITRATE 30 MG/1
30 TABLET, EXTENDED RELEASE ORAL DAILY
Qty: 30 TABLET | Refills: 11 | Status: SHIPPED | OUTPATIENT
Start: 2021-10-28 | End: 2022-07-14

## 2021-11-15 ENCOUNTER — OFFICE VISIT (OUTPATIENT)
Dept: HEMATOLOGY/ONCOLOGY | Facility: CLINIC | Age: 71
End: 2021-11-15
Payer: MEDICARE

## 2021-11-15 ENCOUNTER — INFUSION (OUTPATIENT)
Dept: INFUSION THERAPY | Facility: HOSPITAL | Age: 71
End: 2021-11-15
Attending: INTERNAL MEDICINE
Payer: MEDICARE

## 2021-11-15 VITALS
HEART RATE: 76 BPM | BODY MASS INDEX: 28.99 KG/M2 | WEIGHT: 147.69 LBS | OXYGEN SATURATION: 98 % | DIASTOLIC BLOOD PRESSURE: 59 MMHG | TEMPERATURE: 98 F | HEIGHT: 60 IN | SYSTOLIC BLOOD PRESSURE: 134 MMHG

## 2021-11-15 VITALS
RESPIRATION RATE: 18 BRPM | DIASTOLIC BLOOD PRESSURE: 54 MMHG | SYSTOLIC BLOOD PRESSURE: 126 MMHG | TEMPERATURE: 98 F | HEART RATE: 72 BPM | OXYGEN SATURATION: 96 %

## 2021-11-15 DIAGNOSIS — Z79.899 IMMUNODEFICIENCY DUE TO CHEMOTHERAPY: ICD-10-CM

## 2021-11-15 DIAGNOSIS — I25.118 CORONARY ARTERY DISEASE OF NATIVE ARTERY OF NATIVE HEART WITH STABLE ANGINA PECTORIS: Chronic | ICD-10-CM

## 2021-11-15 DIAGNOSIS — D50.8 IRON DEFICIENCY ANEMIA SECONDARY TO INADEQUATE DIETARY IRON INTAKE: Primary | ICD-10-CM

## 2021-11-15 DIAGNOSIS — T45.1X5A IMMUNODEFICIENCY DUE TO CHEMOTHERAPY: ICD-10-CM

## 2021-11-15 DIAGNOSIS — D50.8 IRON DEFICIENCY ANEMIA SECONDARY TO INADEQUATE DIETARY IRON INTAKE: ICD-10-CM

## 2021-11-15 DIAGNOSIS — D50.0 IRON DEFICIENCY ANEMIA DUE TO CHRONIC BLOOD LOSS: Primary | ICD-10-CM

## 2021-11-15 DIAGNOSIS — C50.312 MALIGNANT NEOPLASM OF LOWER-INNER QUADRANT OF LEFT BREAST IN FEMALE, ESTROGEN RECEPTOR POSITIVE: ICD-10-CM

## 2021-11-15 DIAGNOSIS — I50.42 CHRONIC COMBINED SYSTOLIC AND DIASTOLIC CHF (CONGESTIVE HEART FAILURE): Chronic | ICD-10-CM

## 2021-11-15 DIAGNOSIS — Z17.0 MALIGNANT NEOPLASM OF LOWER-INNER QUADRANT OF LEFT BREAST IN FEMALE, ESTROGEN RECEPTOR POSITIVE: ICD-10-CM

## 2021-11-15 DIAGNOSIS — D84.821 IMMUNODEFICIENCY DUE TO CHEMOTHERAPY: ICD-10-CM

## 2021-11-15 PROBLEM — D64.9 CHRONIC ANEMIA: Status: RESOLVED | Noted: 2020-08-26 | Resolved: 2021-11-15

## 2021-11-15 PROCEDURE — 4010F PR ACE/ARB THEARPY RXD/TAKEN: ICD-10-PCS | Mod: CPTII,S$GLB,, | Performed by: INTERNAL MEDICINE

## 2021-11-15 PROCEDURE — 4010F ACE/ARB THERAPY RXD/TAKEN: CPT | Mod: CPTII,S$GLB,, | Performed by: INTERNAL MEDICINE

## 2021-11-15 PROCEDURE — 3075F PR MOST RECENT SYSTOLIC BLOOD PRESS GE 130-139MM HG: ICD-10-PCS | Mod: CPTII,S$GLB,, | Performed by: INTERNAL MEDICINE

## 2021-11-15 PROCEDURE — 1101F PT FALLS ASSESS-DOCD LE1/YR: CPT | Mod: CPTII,S$GLB,, | Performed by: INTERNAL MEDICINE

## 2021-11-15 PROCEDURE — 1160F RVW MEDS BY RX/DR IN RCRD: CPT | Mod: CPTII,S$GLB,, | Performed by: INTERNAL MEDICINE

## 2021-11-15 PROCEDURE — 63600175 PHARM REV CODE 636 W HCPCS: Mod: JG | Performed by: INTERNAL MEDICINE

## 2021-11-15 PROCEDURE — 1159F MED LIST DOCD IN RCRD: CPT | Mod: CPTII,S$GLB,, | Performed by: INTERNAL MEDICINE

## 2021-11-15 PROCEDURE — 99999 PR PBB SHADOW E&M-EST. PATIENT-LVL IV: ICD-10-PCS | Mod: PBBFAC,,, | Performed by: INTERNAL MEDICINE

## 2021-11-15 PROCEDURE — 1126F AMNT PAIN NOTED NONE PRSNT: CPT | Mod: CPTII,S$GLB,, | Performed by: INTERNAL MEDICINE

## 2021-11-15 PROCEDURE — 99999 PR PBB SHADOW E&M-EST. PATIENT-LVL IV: CPT | Mod: PBBFAC,,, | Performed by: INTERNAL MEDICINE

## 2021-11-15 PROCEDURE — 1160F PR REVIEW ALL MEDS BY PRESCRIBER/CLIN PHARMACIST DOCUMENTED: ICD-10-PCS | Mod: CPTII,S$GLB,, | Performed by: INTERNAL MEDICINE

## 2021-11-15 PROCEDURE — 25000003 PHARM REV CODE 250: Performed by: INTERNAL MEDICINE

## 2021-11-15 PROCEDURE — 3078F DIAST BP <80 MM HG: CPT | Mod: CPTII,S$GLB,, | Performed by: INTERNAL MEDICINE

## 2021-11-15 PROCEDURE — 3078F PR MOST RECENT DIASTOLIC BLOOD PRESSURE < 80 MM HG: ICD-10-PCS | Mod: CPTII,S$GLB,, | Performed by: INTERNAL MEDICINE

## 2021-11-15 PROCEDURE — 1126F PR PAIN SEVERITY QUANTIFIED, NO PAIN PRESENT: ICD-10-PCS | Mod: CPTII,S$GLB,, | Performed by: INTERNAL MEDICINE

## 2021-11-15 PROCEDURE — 3075F SYST BP GE 130 - 139MM HG: CPT | Mod: CPTII,S$GLB,, | Performed by: INTERNAL MEDICINE

## 2021-11-15 PROCEDURE — 3008F PR BODY MASS INDEX (BMI) DOCUMENTED: ICD-10-PCS | Mod: CPTII,S$GLB,, | Performed by: INTERNAL MEDICINE

## 2021-11-15 PROCEDURE — 3008F BODY MASS INDEX DOCD: CPT | Mod: CPTII,S$GLB,, | Performed by: INTERNAL MEDICINE

## 2021-11-15 PROCEDURE — 1159F PR MEDICATION LIST DOCUMENTED IN MEDICAL RECORD: ICD-10-PCS | Mod: CPTII,S$GLB,, | Performed by: INTERNAL MEDICINE

## 2021-11-15 PROCEDURE — 3288F PR FALLS RISK ASSESSMENT DOCUMENTED: ICD-10-PCS | Mod: CPTII,S$GLB,, | Performed by: INTERNAL MEDICINE

## 2021-11-15 PROCEDURE — 3288F FALL RISK ASSESSMENT DOCD: CPT | Mod: CPTII,S$GLB,, | Performed by: INTERNAL MEDICINE

## 2021-11-15 PROCEDURE — 99214 OFFICE O/P EST MOD 30 MIN: CPT | Mod: 25,S$GLB,, | Performed by: INTERNAL MEDICINE

## 2021-11-15 PROCEDURE — 1101F PR PT FALLS ASSESS DOC 0-1 FALLS W/OUT INJ PAST YR: ICD-10-PCS | Mod: CPTII,S$GLB,, | Performed by: INTERNAL MEDICINE

## 2021-11-15 PROCEDURE — 96365 THER/PROPH/DIAG IV INF INIT: CPT

## 2021-11-15 PROCEDURE — 99214 PR OFFICE/OUTPT VISIT, EST, LEVL IV, 30-39 MIN: ICD-10-PCS | Mod: 25,S$GLB,, | Performed by: INTERNAL MEDICINE

## 2021-11-15 RX ORDER — SODIUM CHLORIDE 0.9 % (FLUSH) 0.9 %
10 SYRINGE (ML) INJECTION
Status: DISCONTINUED | OUTPATIENT
Start: 2021-11-15 | End: 2021-11-15 | Stop reason: HOSPADM

## 2021-11-15 RX ADMIN — FERRIC CARBOXYMALTOSE INJECTION 750 MG: 50 INJECTION, SOLUTION INTRAVENOUS at 01:11

## 2021-11-21 ENCOUNTER — HOSPITAL ENCOUNTER (EMERGENCY)
Facility: HOSPITAL | Age: 71
Discharge: HOME | End: 2021-11-21
Attending: EMERGENCY MEDICINE
Payer: MEDICARE

## 2021-11-21 VITALS
BODY MASS INDEX: 28.86 KG/M2 | DIASTOLIC BLOOD PRESSURE: 98 MMHG | HEART RATE: 96 BPM | OXYGEN SATURATION: 98 % | WEIGHT: 147 LBS | TEMPERATURE: 98 F | HEIGHT: 60 IN | SYSTOLIC BLOOD PRESSURE: 164 MMHG | RESPIRATION RATE: 28 BRPM

## 2021-11-21 DIAGNOSIS — R06.02 SOB (SHORTNESS OF BREATH): ICD-10-CM

## 2021-11-21 DIAGNOSIS — I50.9 ACUTE ON CHRONIC CONGESTIVE HEART FAILURE, UNSPECIFIED HEART FAILURE TYPE: Primary | ICD-10-CM

## 2021-11-21 LAB
ALBUMIN SERPL BCP-MCNC: 3.6 G/DL (ref 3.5–5.2)
ALP SERPL-CCNC: 118 U/L (ref 55–135)
ALT SERPL W/O P-5'-P-CCNC: 69 U/L (ref 10–44)
ANION GAP SERPL CALC-SCNC: 10 MMOL/L (ref 8–16)
AST SERPL-CCNC: 101 U/L (ref 10–40)
BASOPHILS # BLD AUTO: 0.03 K/UL (ref 0–0.2)
BASOPHILS NFR BLD: 0.3 % (ref 0–1.9)
BILIRUB SERPL-MCNC: 0.6 MG/DL (ref 0.1–1)
BNP SERPL-MCNC: 828 PG/ML (ref 0–99)
BUN SERPL-MCNC: 16 MG/DL (ref 8–23)
CALCIUM SERPL-MCNC: 9.1 MG/DL (ref 8.7–10.5)
CHLORIDE SERPL-SCNC: 101 MMOL/L (ref 95–110)
CO2 SERPL-SCNC: 25 MMOL/L (ref 23–29)
CREAT SERPL-MCNC: 1 MG/DL (ref 0.5–1.4)
DIFFERENTIAL METHOD: ABNORMAL
EOSINOPHIL # BLD AUTO: 0.2 K/UL (ref 0–0.5)
EOSINOPHIL NFR BLD: 2.2 % (ref 0–8)
ERYTHROCYTE [DISTWIDTH] IN BLOOD BY AUTOMATED COUNT: 14.2 % (ref 11.5–14.5)
EST. GFR  (AFRICAN AMERICAN): >60 ML/MIN/1.73 M^2
EST. GFR  (NON AFRICAN AMERICAN): 57 ML/MIN/1.73 M^2
GLUCOSE SERPL-MCNC: 137 MG/DL (ref 70–110)
HCT VFR BLD AUTO: 30.4 % (ref 37–48.5)
HGB BLD-MCNC: 9.3 G/DL (ref 12–16)
IMM GRANULOCYTES # BLD AUTO: 0.04 K/UL (ref 0–0.04)
IMM GRANULOCYTES NFR BLD AUTO: 0.4 % (ref 0–0.5)
LYMPHOCYTES # BLD AUTO: 1.4 K/UL (ref 1–4.8)
LYMPHOCYTES NFR BLD: 12.8 % (ref 18–48)
MCH RBC QN AUTO: 28.8 PG (ref 27–31)
MCHC RBC AUTO-ENTMCNC: 30.6 G/DL (ref 32–36)
MCV RBC AUTO: 94 FL (ref 82–98)
MONOCYTES # BLD AUTO: 0.7 K/UL (ref 0.3–1)
MONOCYTES NFR BLD: 6.4 % (ref 4–15)
NEUTROPHILS # BLD AUTO: 8.4 K/UL (ref 1.8–7.7)
NEUTROPHILS NFR BLD: 77.9 % (ref 38–73)
NRBC BLD-RTO: 0 /100 WBC
PLATELET # BLD AUTO: 271 K/UL (ref 150–450)
PMV BLD AUTO: 9.9 FL (ref 9.2–12.9)
POTASSIUM SERPL-SCNC: 4.3 MMOL/L (ref 3.5–5.1)
PROT SERPL-MCNC: 7.3 G/DL (ref 6–8.4)
RBC # BLD AUTO: 3.23 M/UL (ref 4–5.4)
SODIUM SERPL-SCNC: 136 MMOL/L (ref 136–145)
TROPONIN I SERPL DL<=0.01 NG/ML-MCNC: 0.05 NG/ML (ref 0–0.03)
WBC # BLD AUTO: 10.82 K/UL (ref 3.9–12.7)

## 2021-11-21 PROCEDURE — 93005 ELECTROCARDIOGRAM TRACING: CPT

## 2021-11-21 PROCEDURE — 63600175 PHARM REV CODE 636 W HCPCS: Performed by: EMERGENCY MEDICINE

## 2021-11-21 PROCEDURE — 84484 ASSAY OF TROPONIN QUANT: CPT | Performed by: EMERGENCY MEDICINE

## 2021-11-21 PROCEDURE — 96374 THER/PROPH/DIAG INJ IV PUSH: CPT

## 2021-11-21 PROCEDURE — 93010 ELECTROCARDIOGRAM REPORT: CPT | Mod: ,,, | Performed by: INTERNAL MEDICINE

## 2021-11-21 PROCEDURE — 93010 EKG 12-LEAD: ICD-10-PCS | Mod: ,,, | Performed by: INTERNAL MEDICINE

## 2021-11-21 PROCEDURE — 80053 COMPREHEN METABOLIC PANEL: CPT | Performed by: EMERGENCY MEDICINE

## 2021-11-21 PROCEDURE — 83880 ASSAY OF NATRIURETIC PEPTIDE: CPT | Performed by: EMERGENCY MEDICINE

## 2021-11-21 PROCEDURE — 96376 TX/PRO/DX INJ SAME DRUG ADON: CPT

## 2021-11-21 PROCEDURE — 99285 EMERGENCY DEPT VISIT HI MDM: CPT | Mod: 25

## 2021-11-21 PROCEDURE — 85025 COMPLETE CBC W/AUTO DIFF WBC: CPT | Performed by: EMERGENCY MEDICINE

## 2021-11-21 RX ORDER — FUROSEMIDE 10 MG/ML
40 INJECTION INTRAMUSCULAR; INTRAVENOUS
Status: COMPLETED | OUTPATIENT
Start: 2021-11-21 | End: 2021-11-21

## 2021-11-21 RX ORDER — FUROSEMIDE 20 MG/1
20 TABLET ORAL DAILY
Qty: 30 TABLET | Refills: 0 | Status: SHIPPED | OUTPATIENT
Start: 2021-11-21 | End: 2021-12-09 | Stop reason: SDUPTHER

## 2021-11-21 RX ADMIN — FUROSEMIDE 40 MG: 10 INJECTION, SOLUTION INTRAVENOUS at 06:11

## 2021-11-21 RX ADMIN — FUROSEMIDE 40 MG: 10 INJECTION, SOLUTION INTRAVENOUS at 04:11

## 2021-11-22 ENCOUNTER — INFUSION (OUTPATIENT)
Dept: INFUSION THERAPY | Facility: HOSPITAL | Age: 71
End: 2021-11-22
Attending: INTERNAL MEDICINE
Payer: MEDICARE

## 2021-11-22 VITALS
TEMPERATURE: 98 F | DIASTOLIC BLOOD PRESSURE: 61 MMHG | HEART RATE: 73 BPM | RESPIRATION RATE: 16 BRPM | SYSTOLIC BLOOD PRESSURE: 136 MMHG | OXYGEN SATURATION: 97 %

## 2021-11-22 DIAGNOSIS — D50.8 IRON DEFICIENCY ANEMIA SECONDARY TO INADEQUATE DIETARY IRON INTAKE: Primary | ICD-10-CM

## 2021-11-22 PROCEDURE — 96365 THER/PROPH/DIAG IV INF INIT: CPT

## 2021-11-22 PROCEDURE — 63600175 PHARM REV CODE 636 W HCPCS: Mod: JG | Performed by: INTERNAL MEDICINE

## 2021-11-22 PROCEDURE — 25000003 PHARM REV CODE 250: Performed by: INTERNAL MEDICINE

## 2021-11-22 RX ORDER — SODIUM CHLORIDE 0.9 % (FLUSH) 0.9 %
10 SYRINGE (ML) INJECTION
Status: DISCONTINUED | OUTPATIENT
Start: 2021-11-22 | End: 2021-11-22 | Stop reason: HOSPADM

## 2021-11-22 RX ADMIN — SODIUM CHLORIDE: 9 INJECTION, SOLUTION INTRAVENOUS at 01:11

## 2021-11-22 RX ADMIN — FERRIC CARBOXYMALTOSE INJECTION 750 MG: 50 INJECTION, SOLUTION INTRAVENOUS at 01:11

## 2021-12-09 ENCOUNTER — OFFICE VISIT (OUTPATIENT)
Dept: CARDIOLOGY | Facility: CLINIC | Age: 71
End: 2021-12-09
Payer: MEDICARE

## 2021-12-09 ENCOUNTER — LAB VISIT (OUTPATIENT)
Dept: LAB | Facility: HOSPITAL | Age: 71
End: 2021-12-09
Attending: INTERNAL MEDICINE
Payer: MEDICARE

## 2021-12-09 VITALS
SYSTOLIC BLOOD PRESSURE: 138 MMHG | HEART RATE: 64 BPM | OXYGEN SATURATION: 100 % | DIASTOLIC BLOOD PRESSURE: 62 MMHG | WEIGHT: 142.44 LBS | BODY MASS INDEX: 27.81 KG/M2

## 2021-12-09 DIAGNOSIS — I50.43 ACUTE ON CHRONIC COMBINED SYSTOLIC AND DIASTOLIC CONGESTIVE HEART FAILURE: ICD-10-CM

## 2021-12-09 DIAGNOSIS — I50.43 ACUTE ON CHRONIC COMBINED SYSTOLIC AND DIASTOLIC CONGESTIVE HEART FAILURE: Primary | ICD-10-CM

## 2021-12-09 DIAGNOSIS — Z17.0 MALIGNANT NEOPLASM OF LOWER-INNER QUADRANT OF LEFT BREAST IN FEMALE, ESTROGEN RECEPTOR POSITIVE: ICD-10-CM

## 2021-12-09 DIAGNOSIS — I10 ESSENTIAL HYPERTENSION: Chronic | ICD-10-CM

## 2021-12-09 DIAGNOSIS — C50.312 MALIGNANT NEOPLASM OF LOWER-INNER QUADRANT OF LEFT BREAST IN FEMALE, ESTROGEN RECEPTOR POSITIVE: ICD-10-CM

## 2021-12-09 DIAGNOSIS — I25.118 CORONARY ARTERY DISEASE OF NATIVE ARTERY OF NATIVE HEART WITH STABLE ANGINA PECTORIS: Chronic | ICD-10-CM

## 2021-12-09 LAB
ANION GAP SERPL CALC-SCNC: 7 MMOL/L (ref 8–16)
BNP SERPL-MCNC: 338 PG/ML (ref 0–99)
BUN SERPL-MCNC: 18 MG/DL (ref 8–23)
CALCIUM SERPL-MCNC: 10 MG/DL (ref 8.7–10.5)
CHLORIDE SERPL-SCNC: 100 MMOL/L (ref 95–110)
CO2 SERPL-SCNC: 30 MMOL/L (ref 23–29)
CREAT SERPL-MCNC: 1 MG/DL (ref 0.5–1.4)
EST. GFR  (AFRICAN AMERICAN): >60 ML/MIN/1.73 M^2
EST. GFR  (NON AFRICAN AMERICAN): 57.2 ML/MIN/1.73 M^2
GLUCOSE SERPL-MCNC: 86 MG/DL (ref 70–110)
POTASSIUM SERPL-SCNC: 4.4 MMOL/L (ref 3.5–5.1)
SODIUM SERPL-SCNC: 137 MMOL/L (ref 136–145)

## 2021-12-09 PROCEDURE — 36415 COLL VENOUS BLD VENIPUNCTURE: CPT | Performed by: INTERNAL MEDICINE

## 2021-12-09 PROCEDURE — 99214 PR OFFICE/OUTPT VISIT, EST, LEVL IV, 30-39 MIN: ICD-10-PCS | Mod: S$GLB,,, | Performed by: INTERNAL MEDICINE

## 2021-12-09 PROCEDURE — 83880 ASSAY OF NATRIURETIC PEPTIDE: CPT | Performed by: INTERNAL MEDICINE

## 2021-12-09 PROCEDURE — 4010F PR ACE/ARB THEARPY RXD/TAKEN: ICD-10-PCS | Mod: CPTII,S$GLB,, | Performed by: INTERNAL MEDICINE

## 2021-12-09 PROCEDURE — 99214 OFFICE O/P EST MOD 30 MIN: CPT | Mod: S$GLB,,, | Performed by: INTERNAL MEDICINE

## 2021-12-09 PROCEDURE — 99999 PR PBB SHADOW E&M-EST. PATIENT-LVL III: ICD-10-PCS | Mod: PBBFAC,,, | Performed by: INTERNAL MEDICINE

## 2021-12-09 PROCEDURE — 80048 BASIC METABOLIC PNL TOTAL CA: CPT | Performed by: INTERNAL MEDICINE

## 2021-12-09 PROCEDURE — 4010F ACE/ARB THERAPY RXD/TAKEN: CPT | Mod: CPTII,S$GLB,, | Performed by: INTERNAL MEDICINE

## 2021-12-09 PROCEDURE — 99999 PR PBB SHADOW E&M-EST. PATIENT-LVL III: CPT | Mod: PBBFAC,,, | Performed by: INTERNAL MEDICINE

## 2021-12-09 RX ORDER — FUROSEMIDE 20 MG/1
20 TABLET ORAL DAILY
Qty: 30 TABLET | Refills: 6 | Status: SHIPPED | OUTPATIENT
Start: 2021-12-09 | End: 2022-01-21 | Stop reason: SDUPTHER

## 2021-12-09 RX ORDER — SPIRONOLACTONE 25 MG/1
25 TABLET ORAL DAILY
Qty: 30 TABLET | Refills: 11 | Status: SHIPPED | OUTPATIENT
Start: 2021-12-09 | End: 2022-06-20

## 2021-12-12 ENCOUNTER — TELEPHONE (OUTPATIENT)
Dept: CARDIOLOGY | Facility: CLINIC | Age: 71
End: 2021-12-12
Payer: MEDICARE

## 2021-12-12 DIAGNOSIS — I50.42 CHRONIC COMBINED SYSTOLIC AND DIASTOLIC CHF (CONGESTIVE HEART FAILURE): Primary | ICD-10-CM

## 2021-12-13 ENCOUNTER — TELEPHONE (OUTPATIENT)
Dept: CARDIOLOGY | Facility: CLINIC | Age: 71
End: 2021-12-13
Payer: MEDICARE

## 2021-12-17 ENCOUNTER — LAB VISIT (OUTPATIENT)
Dept: LAB | Facility: HOSPITAL | Age: 71
End: 2021-12-17
Attending: INTERNAL MEDICINE
Payer: MEDICARE

## 2021-12-17 DIAGNOSIS — I50.42 CHRONIC COMBINED SYSTOLIC AND DIASTOLIC CHF (CONGESTIVE HEART FAILURE): ICD-10-CM

## 2021-12-17 DIAGNOSIS — I50.42 CHRONIC COMBINED SYSTOLIC AND DIASTOLIC CHF (CONGESTIVE HEART FAILURE): Primary | ICD-10-CM

## 2021-12-17 LAB
ANION GAP SERPL CALC-SCNC: 11 MMOL/L (ref 8–16)
BNP SERPL-MCNC: 205 PG/ML (ref 0–99)
BUN SERPL-MCNC: 17 MG/DL (ref 8–23)
CALCIUM SERPL-MCNC: 9.9 MG/DL (ref 8.7–10.5)
CHLORIDE SERPL-SCNC: 100 MMOL/L (ref 95–110)
CO2 SERPL-SCNC: 27 MMOL/L (ref 23–29)
CREAT SERPL-MCNC: 1 MG/DL (ref 0.5–1.4)
EST. GFR  (AFRICAN AMERICAN): >60 ML/MIN/1.73 M^2
EST. GFR  (NON AFRICAN AMERICAN): 57.2 ML/MIN/1.73 M^2
GLUCOSE SERPL-MCNC: 62 MG/DL (ref 70–110)
POTASSIUM SERPL-SCNC: 4.8 MMOL/L (ref 3.5–5.1)
SODIUM SERPL-SCNC: 138 MMOL/L (ref 136–145)

## 2021-12-17 PROCEDURE — 83880 ASSAY OF NATRIURETIC PEPTIDE: CPT | Performed by: INTERNAL MEDICINE

## 2021-12-17 PROCEDURE — 80048 BASIC METABOLIC PNL TOTAL CA: CPT | Performed by: INTERNAL MEDICINE

## 2021-12-17 PROCEDURE — 36415 COLL VENOUS BLD VENIPUNCTURE: CPT | Mod: PO | Performed by: INTERNAL MEDICINE

## 2021-12-17 RX ORDER — CARVEDILOL 3.12 MG/1
TABLET ORAL
Qty: 180 TABLET | Refills: 3 | Status: SHIPPED | OUTPATIENT
Start: 2021-12-17 | End: 2022-12-07

## 2021-12-20 ENCOUNTER — TELEPHONE (OUTPATIENT)
Dept: CARDIOLOGY | Facility: CLINIC | Age: 71
End: 2021-12-20
Payer: MEDICARE

## 2022-01-03 ENCOUNTER — IMMUNIZATION (OUTPATIENT)
Dept: PRIMARY CARE CLINIC | Facility: CLINIC | Age: 72
End: 2022-01-03
Payer: MEDICARE

## 2022-01-03 DIAGNOSIS — Z23 NEED FOR VACCINATION: Primary | ICD-10-CM

## 2022-01-03 PROCEDURE — 0004A COVID-19, MRNA, LNP-S, PF, 30 MCG/0.3 ML DOSE VACCINE: CPT | Mod: CV19,PBBFAC | Performed by: FAMILY MEDICINE

## 2022-01-07 ENCOUNTER — TELEPHONE (OUTPATIENT)
Dept: ADMINISTRATIVE | Facility: HOSPITAL | Age: 72
End: 2022-01-07
Payer: MEDICARE

## 2022-01-17 ENCOUNTER — PATIENT MESSAGE (OUTPATIENT)
Dept: FAMILY MEDICINE | Facility: CLINIC | Age: 72
End: 2022-01-17
Payer: MEDICARE

## 2022-01-17 DIAGNOSIS — R35.0 URINARY FREQUENCY: Primary | ICD-10-CM

## 2022-01-18 NOTE — TELEPHONE ENCOUNTER
Urinalysis and culture ordered.  Will not treat until results back.  Needs appointment for evaluation for dementia.

## 2022-01-20 ENCOUNTER — OFFICE VISIT (OUTPATIENT)
Dept: FAMILY MEDICINE | Facility: CLINIC | Age: 72
End: 2022-01-20
Payer: MEDICARE

## 2022-01-20 ENCOUNTER — TELEPHONE (OUTPATIENT)
Dept: FAMILY MEDICINE | Facility: CLINIC | Age: 72
End: 2022-01-20
Payer: MEDICARE

## 2022-01-20 ENCOUNTER — LAB VISIT (OUTPATIENT)
Dept: LAB | Facility: HOSPITAL | Age: 72
End: 2022-01-20
Attending: INTERNAL MEDICINE
Payer: MEDICARE

## 2022-01-20 ENCOUNTER — CLINICAL SUPPORT (OUTPATIENT)
Dept: FAMILY MEDICINE | Facility: CLINIC | Age: 72
End: 2022-01-20
Payer: MEDICARE

## 2022-01-20 VITALS
TEMPERATURE: 97 F | WEIGHT: 138 LBS | SYSTOLIC BLOOD PRESSURE: 100 MMHG | HEIGHT: 60 IN | HEART RATE: 87 BPM | BODY MASS INDEX: 27.09 KG/M2 | RESPIRATION RATE: 18 BRPM | OXYGEN SATURATION: 96 % | DIASTOLIC BLOOD PRESSURE: 51 MMHG

## 2022-01-20 DIAGNOSIS — D84.821 IMMUNODEFICIENCY DUE TO CHEMOTHERAPY: ICD-10-CM

## 2022-01-20 DIAGNOSIS — J43.9 PULMONARY EMPHYSEMA, UNSPECIFIED EMPHYSEMA TYPE: ICD-10-CM

## 2022-01-20 DIAGNOSIS — Z17.0 MALIGNANT NEOPLASM OF LOWER-INNER QUADRANT OF LEFT BREAST IN FEMALE, ESTROGEN RECEPTOR POSITIVE: ICD-10-CM

## 2022-01-20 DIAGNOSIS — Z79.899 IMMUNODEFICIENCY DUE TO CHEMOTHERAPY: ICD-10-CM

## 2022-01-20 DIAGNOSIS — T45.1X5A IMMUNODEFICIENCY DUE TO CHEMOTHERAPY: ICD-10-CM

## 2022-01-20 DIAGNOSIS — I50.42 CHRONIC COMBINED SYSTOLIC AND DIASTOLIC CHF (CONGESTIVE HEART FAILURE): Primary | ICD-10-CM

## 2022-01-20 DIAGNOSIS — I50.42 CHRONIC COMBINED SYSTOLIC AND DIASTOLIC CHF (CONGESTIVE HEART FAILURE): Chronic | ICD-10-CM

## 2022-01-20 DIAGNOSIS — I50.42 CHRONIC COMBINED SYSTOLIC AND DIASTOLIC CHF (CONGESTIVE HEART FAILURE): ICD-10-CM

## 2022-01-20 DIAGNOSIS — R82.90 MALODOROUS URINE: Primary | ICD-10-CM

## 2022-01-20 DIAGNOSIS — C50.312 MALIGNANT NEOPLASM OF LOWER-INNER QUADRANT OF LEFT BREAST IN FEMALE, ESTROGEN RECEPTOR POSITIVE: ICD-10-CM

## 2022-01-20 DIAGNOSIS — N39.0 RECURRENT UTI: ICD-10-CM

## 2022-01-20 DIAGNOSIS — I70.0 ATHEROSCLEROSIS OF AORTA: ICD-10-CM

## 2022-01-20 DIAGNOSIS — I25.118 CORONARY ARTERY DISEASE OF NATIVE ARTERY OF NATIVE HEART WITH STABLE ANGINA PECTORIS: Chronic | ICD-10-CM

## 2022-01-20 DIAGNOSIS — N81.10 FEMALE BLADDER PROLAPSE: ICD-10-CM

## 2022-01-20 DIAGNOSIS — Z00.00 ENCOUNTER FOR PREVENTIVE HEALTH EXAMINATION: Primary | ICD-10-CM

## 2022-01-20 DIAGNOSIS — I27.20 PULMONARY HTN: ICD-10-CM

## 2022-01-20 DIAGNOSIS — Z01.00 EYE EXAM, ROUTINE: ICD-10-CM

## 2022-01-20 DIAGNOSIS — R41.3 MEMORY LOSS: ICD-10-CM

## 2022-01-20 DIAGNOSIS — I10 ESSENTIAL HYPERTENSION: Chronic | ICD-10-CM

## 2022-01-20 PROCEDURE — 1125F PR PAIN SEVERITY QUANTIFIED, PAIN PRESENT: ICD-10-PCS | Mod: CPTII,S$GLB,, | Performed by: NURSE PRACTITIONER

## 2022-01-20 PROCEDURE — 3074F SYST BP LT 130 MM HG: CPT | Mod: CPTII,S$GLB,, | Performed by: NURSE PRACTITIONER

## 2022-01-20 PROCEDURE — 3074F PR MOST RECENT SYSTOLIC BLOOD PRESSURE < 130 MM HG: ICD-10-PCS | Mod: CPTII,S$GLB,, | Performed by: NURSE PRACTITIONER

## 2022-01-20 PROCEDURE — 99999 PR PBB SHADOW E&M-EST. PATIENT-LVL IV: CPT | Mod: PBBFAC,,, | Performed by: NURSE PRACTITIONER

## 2022-01-20 PROCEDURE — 99499 RISK ADDL DX/OHS AUDIT: ICD-10-PCS | Mod: S$GLB,,, | Performed by: NURSE PRACTITIONER

## 2022-01-20 PROCEDURE — 99999 PR PBB SHADOW E&M-EST. PATIENT-LVL IV: ICD-10-PCS | Mod: PBBFAC,,, | Performed by: NURSE PRACTITIONER

## 2022-01-20 PROCEDURE — G0439 PPPS, SUBSEQ VISIT: HCPCS | Mod: S$GLB,,, | Performed by: NURSE PRACTITIONER

## 2022-01-20 PROCEDURE — 1101F PT FALLS ASSESS-DOCD LE1/YR: CPT | Mod: CPTII,S$GLB,, | Performed by: NURSE PRACTITIONER

## 2022-01-20 PROCEDURE — 99211 PR OFFICE/OUTPT VISIT, EST, LEVL I: ICD-10-PCS | Mod: 25,S$GLB,, | Performed by: FAMILY MEDICINE

## 2022-01-20 PROCEDURE — 3078F DIAST BP <80 MM HG: CPT | Mod: CPTII,S$GLB,, | Performed by: NURSE PRACTITIONER

## 2022-01-20 PROCEDURE — 4010F PR ACE/ARB THEARPY RXD/TAKEN: ICD-10-PCS | Mod: CPTII,S$GLB,, | Performed by: NURSE PRACTITIONER

## 2022-01-20 PROCEDURE — 1125F AMNT PAIN NOTED PAIN PRSNT: CPT | Mod: CPTII,S$GLB,, | Performed by: NURSE PRACTITIONER

## 2022-01-20 PROCEDURE — G0439 PR MEDICARE ANNUAL WELLNESS SUBSEQUENT VISIT: ICD-10-PCS | Mod: S$GLB,,, | Performed by: NURSE PRACTITIONER

## 2022-01-20 PROCEDURE — P9612 PR CATHETERIZE FOR URINE SPEC: ICD-10-PCS | Mod: ,,, | Performed by: FAMILY MEDICINE

## 2022-01-20 PROCEDURE — 3008F BODY MASS INDEX DOCD: CPT | Mod: CPTII,S$GLB,, | Performed by: NURSE PRACTITIONER

## 2022-01-20 PROCEDURE — 99211 OFF/OP EST MAY X REQ PHY/QHP: CPT | Mod: 25,S$GLB,, | Performed by: FAMILY MEDICINE

## 2022-01-20 PROCEDURE — 3078F PR MOST RECENT DIASTOLIC BLOOD PRESSURE < 80 MM HG: ICD-10-PCS | Mod: CPTII,S$GLB,, | Performed by: NURSE PRACTITIONER

## 2022-01-20 PROCEDURE — 3288F FALL RISK ASSESSMENT DOCD: CPT | Mod: CPTII,S$GLB,, | Performed by: NURSE PRACTITIONER

## 2022-01-20 PROCEDURE — 1101F PR PT FALLS ASSESS DOC 0-1 FALLS W/OUT INJ PAST YR: ICD-10-PCS | Mod: CPTII,S$GLB,, | Performed by: NURSE PRACTITIONER

## 2022-01-20 PROCEDURE — P9612 CATHETERIZE FOR URINE SPEC: HCPCS | Mod: ,,, | Performed by: FAMILY MEDICINE

## 2022-01-20 PROCEDURE — 4010F ACE/ARB THERAPY RXD/TAKEN: CPT | Mod: CPTII,S$GLB,, | Performed by: NURSE PRACTITIONER

## 2022-01-20 PROCEDURE — 3288F PR FALLS RISK ASSESSMENT DOCUMENTED: ICD-10-PCS | Mod: CPTII,S$GLB,, | Performed by: NURSE PRACTITIONER

## 2022-01-20 PROCEDURE — 3008F PR BODY MASS INDEX (BMI) DOCUMENTED: ICD-10-PCS | Mod: CPTII,S$GLB,, | Performed by: NURSE PRACTITIONER

## 2022-01-20 PROCEDURE — 99499 UNLISTED E&M SERVICE: CPT | Mod: S$GLB,,, | Performed by: NURSE PRACTITIONER

## 2022-01-20 NOTE — PROGRESS NOTES
Patient complaining of frequency and malodorous urine.  Patient unable to urinate.  Patient catheterized without difficulty to obtain urine sample.

## 2022-01-20 NOTE — PATIENT INSTRUCTIONS
Counseling and Referral of Other Preventative  (Italic type indicates deductible and co-insurance are waived)    Patient Name: Leia Rodriguez  Today's Date: 1/20/2022    Health Maintenance       Date Due Completion Date    LDCT Lung Screen 09/18/2020 9/18/2019    Shingles Vaccine (1 of 2) 10/21/2022 (Originally 7/14/2001) ---    Mammogram 09/22/2022 9/22/2021    High Dose Statin 12/09/2022 12/9/2021    Aspirin/Antiplatelet Therapy 12/09/2022 12/9/2021    TETANUS VACCINE 03/11/2023 3/11/2013    DEXA SCAN 07/14/2023 7/14/2020    Override on 2/20/2017: Not Clinically Appropriate    Colorectal Cancer Screening 08/26/2023 8/26/2020    Lipid Panel 03/31/2026 3/31/2021        No orders of the defined types were placed in this encounter.    The following information is provided to all patients.  This information is to help you find resources for any of the problems found today that may be affecting your health:                Living healthy guide: www.[facility].[place]      Understanding Diabetes: www.diabetes.org      Eating healthy: www.cdc.gov/healthyweight      CDC home safety checklist: www.cdc.gov/steadi/patient.html      Agency on Aging: www.goea.[place]      Alcoholics anonymous (AA): www.aa.org      Physical Activity: www.[place].nih.gov/em1ipvo      Tobacco use: www.quitwithusla.org     Counseling and Referral of Other Preventative  (Italic type indicates deductible and co-insurance are waived)    Patient Name: Leia Rodriguez  Today's Date: 1/24/2022    Health Maintenance       Date Due Completion Date    LDCT Lung Screen 09/18/2020 9/18/2019    Shingles Vaccine (1 of 2) 10/21/2022 (Originally 7/14/2001) ---    Mammogram 09/22/2022 9/22/2021    High Dose Statin 12/09/2022 12/9/2021    Aspirin/Antiplatelet Therapy 12/09/2022 12/9/2021    TETANUS VACCINE 03/11/2023 3/11/2013    DEXA SCAN 07/14/2023 7/14/2020    Override on 2/20/2017: Not Clinically Appropriate    Colorectal Cancer Screening 08/26/2023 8/26/2020     Lipid Panel 03/31/2026 3/31/2021        Orders Placed This Encounter   Procedures    Ambulatory referral/consult to Neurology    Ambulatory referral/consult to Optometry     The following information is provided to all patients.  This information is to help you find resources for any of the problems found today that may be affecting your health:                Living healthy guide: www.[facility].[place]      Understanding Diabetes: www.diabetes.org      Eating healthy: www.cdc.gov/healthyweight      CDC home safety checklist: www.cdc.gov/steadi/patient.html      Agency on Aging: www.goea.[place]      Alcoholics anonymous (AA): www.aa.org      Physical Activity: www.[place].nih.gov/qk9ynpn      Tobacco use: www.quitwithusla.org

## 2022-01-20 NOTE — PROGRESS NOTES
Leia Rodriguez presented for a  Medicare AWV and comprehensive Health Risk Assessment today. The following components were reviewed and updated:  Patient accompanied by  HELLEN  , who was present throughout the visit and aided in information gathering.        · Medical history  · Family History  · Social history  · Allergies and Current Medications  · Health Risk Assessment  · Health Maintenance  · Care Team         ** See Completed Assessments for Annual Wellness Visit within the encounter summary.**         The following assessments were completed:  · Living Situation  · CAGE  · Depression Screening  · Timed Get Up and Go  · Whisper Test  · Cognitive Function Screening  · Nutrition Screening  · ADL Screening  · PAQ Screening        Vitals:    01/20/22 1430   BP: (!) 100/51   Pulse: 87   Resp: 18   Temp: 97.3 °F (36.3 °C)   SpO2: 96%   Weight: 62.6 kg (138 lb 0.1 oz)   Height: 5' (1.524 m)     Body mass index is 26.95 kg/m².  Physical Exam  Vitals and nursing note reviewed.   Constitutional:       Appearance: Normal appearance. She is well-developed and well-nourished.   HENT:      Head: Normocephalic and atraumatic.   Eyes:      Pupils: Pupils are equal, round, and reactive to light.   Neck:      Vascular: No carotid bruit.   Cardiovascular:      Rate and Rhythm: Normal rate and regular rhythm.      Pulses: Normal pulses and intact distal pulses.      Heart sounds: Normal heart sounds. No murmur heard.  No gallop.    Pulmonary:      Effort: Pulmonary effort is normal.      Breath sounds: Normal breath sounds.   Abdominal:      General: Bowel sounds are normal. There is no distension.      Palpations: Abdomen is soft.      Tenderness: There is no abdominal tenderness.   Musculoskeletal:         General: No tenderness or edema. Normal range of motion.   Skin:     General: Skin is warm, dry and intact.   Neurological:      Mental Status: She is alert.      Motor: No abnormal muscle tone.   Psychiatric:         Mood  and Affect: Mood and affect normal. Affect is flat.         Speech: Speech normal.         Behavior: Behavior is cooperative.         Cognition and Memory: Cognition is impaired. Memory is impaired.             Current Outpatient Medications:     anastrozole (ARIMIDEX) 1 mg Tab, Take 1 tablet (1 mg total) by mouth once daily., Disp: 90 tablet, Rfl: 3    aspirin 81 MG Chew, Take 1 tablet (81 mg total) by mouth once daily., Disp: 90 tablet, Rfl: 3    atorvastatin (LIPITOR) 20 MG tablet, Take 1 tablet (20 mg total) by mouth every evening., Disp: 90 tablet, Rfl: 3    calcium citrate (CALCITRATE) 200 mg (950 mg) tablet, Take 1 tablet by mouth once daily., Disp: , Rfl:     carvediloL (COREG) 3.125 MG tablet, TAKE 1 TABLET BY MOUTH TWICE A DAY WITH MEALS, Disp: 180 tablet, Rfl: 3    EScitalopram oxalate (LEXAPRO) 10 MG tablet, Take 1 tablet (10 mg total) by mouth once daily., Disp: 90 tablet, Rfl: 3    ferrous sulfate (FEOSOL) 325 mg (65 mg iron) Tab tablet, Take 65 mg by mouth once daily., Disp: , Rfl:     isosorbide mononitrate (IMDUR) 30 MG 24 hr tablet, Take 1 tablet (30 mg total) by mouth once daily., Disp: 30 tablet, Rfl: 11    tamsulosin (FLOMAX) 0.4 mg Cap, Take 1 capsule by mouth once daily., Disp: , Rfl:     telmisartan (MICARDIS) 20 MG Tab, Take 1 tablet (20 mg total) by mouth once daily., Disp: 90 tablet, Rfl: 3    vitamin D 1000 units Tab, Take 1,000 Units by mouth once daily., Disp: , Rfl:     furosemide (LASIX) 20 MG tablet, Take 1 tablet (20 mg total) by mouth once daily., Disp: 30 tablet, Rfl: 6    nitrofurantoin, macrocrystal-monohydrate, (MACROBID) 100 MG capsule, Take 1 capsule (100 mg total) by mouth 2 (two) times daily., Disp: 14 capsule, Rfl: 0    spironolactone (ALDACTONE) 25 MG tablet, Take 1 tablet (25 mg total) by mouth once daily., Disp: 30 tablet, Rfl: 11        Diagnoses and health risks identified today and associated recommendations/orders:    1. Encounter for preventive health  examination    2. Memory loss   Ambulatory referral/consult to Neurology sent   Pt failed cognition screening- daughter reports pt has decrease memory of last yr- request ev al    3. Eye exam, routine   Ambulatory referral/consult to Optometry sent- decrease vision     4. Coronary artery disease of native artery of native heart with stable angina pectoris  Chronic and Stable.-see med list. Denies CP   Continue current treatment plan as previously prescribed with your cardiologist    5. Chronic combined systolic and diastolic CHF (congestive heart failure)  9/ 2021 echo  · The left ventricular global longitudinal strain is -14.8%.  · The left ventricle is normal in size with mildly decreased systolic function.  · The estimated ejection fraction is 45%.  · Grade I left ventricular diastolic dysfunction.  · Normal right ventricular size with normal right ventricular systolic function.  · Mild mitral regurgitation.  · Moderate tricuspid regurgitation.  · Normal central venous pressure (3 mmHg).  · The estimated PA systolic pressure is 42 mmHg.  · There is pulmonary hypertension.  · There is mild left ventricular global hypokinesis.  · The quantitatively derived ejection fraction is 47%.  Chronic and ongoing. Continue current treatment plan as previously prescribed with your cardiologist    6. Essential hypertension  Chronic and Stable. Continue current treatment plan as previously prescribed with your PCP/.cardiologist    7. Malignant neoplasm of lower-inner quadrant of left breast in female, estrogen receptor positive  History left lumpectomy- last mammogram 9/ 2021  Chronic and Stable  anastrozole (ARIMIDEX) 1 mg Tab, Take 1 tablet (1 mg total) by mouth once daily x  5 yrs ,   Followed by oncologist    8. Pulmonary emphysema, unspecified emphysema type  09/18/19 CT scan Bilateral emphysematous findings present. Multiple bilateral sub 6 mm pulmonary nodules a few demonstrating more linear orientation and may represent  focal nodular scarring.   A few punctate calcified granulomas also present  This is a new problem that has been identified during today's visit. Please follow up with your PCP     9. Atherosclerosis of aorta  This is a new problem that has been identified during today's visit.    Ct scan 20219 Aorta and vasculature: Atherosclerosis including coronary arteries follow up with PCP      10 Pulmonary HTN  Echo  9/ 2021 ....The estimated PA systolic pressure is 42 mmHg.  This is a new problem that has been identified during today's visit. Please discuss with cardiologist    10. Immunodeficiency due to chemotherapy  Chronic and Stable. Continue current treatment plan as previously prescribed with your oncologist    11. Female bladder prolapse  Chronic and Ongoing.  Follow up with PCP to discuss the need to see urologist     12. Recurrent UTI  Chronic and Ongoing. UA done for ev al per PCP     I offered to discuss advanced care planning, including how to pick a person who would make decisions for you if you were unable to make them for yourself, called a health care power of , and what kind of decisions you might make such as use of life sustaining treatments such as ventilators and tube feeding when faced with a life limiting illness recorded on a living will that they will need to know. (How you want to be cared for as you near the end of your natural life)     X -info given to daughter  Provided Leia with a 5-10 year written screening schedule and personal prevention plan. Recommendations were developed using the USPSTF age appropriate recommendations. Education, counseling, and referrals were provided as needed. After Visit Summary printed and given to patient which includes a list of additional screenings\tests needed.     1year annual   Teresa Monroe NP

## 2022-01-20 NOTE — Clinical Note
Your patient was seen today for a HRA visit.  Several new findings noted per chart review.  I have included a copy of my visit note, please review the note and feel free to contact me with any questions.  Thank you for allowing me to participate in the care of your patients.  Teresa Monroe NP

## 2022-01-21 ENCOUNTER — TELEPHONE (OUTPATIENT)
Dept: CARDIOLOGY | Facility: CLINIC | Age: 72
End: 2022-01-21
Payer: MEDICARE

## 2022-01-21 DIAGNOSIS — I50.42 CHRONIC COMBINED SYSTOLIC AND DIASTOLIC CHF (CONGESTIVE HEART FAILURE): Primary | ICD-10-CM

## 2022-01-21 RX ORDER — FUROSEMIDE 20 MG/1
20 TABLET ORAL
Qty: 30 TABLET | Refills: 6
Start: 2022-01-21 | End: 2022-02-17

## 2022-01-21 NOTE — TELEPHONE ENCOUNTER
Pt contacted about results, pt daughter verbalized understanding.              ----- Message from Jose G Manjarrez MD sent at 1/21/2022  2:51 PM CST -----  The lab showed CHF controlled and elevared Cr level  Decrease Lasix from 20mg daily to 20 mg 5 days a week  Repeat BNP and BMP in 4 weeks

## 2022-01-23 ENCOUNTER — TELEPHONE (OUTPATIENT)
Dept: FAMILY MEDICINE | Facility: CLINIC | Age: 72
End: 2022-01-23
Payer: MEDICARE

## 2022-01-24 ENCOUNTER — OFFICE VISIT (OUTPATIENT)
Dept: NEUROLOGY | Facility: CLINIC | Age: 72
End: 2022-01-24
Payer: MEDICARE

## 2022-01-24 VITALS
DIASTOLIC BLOOD PRESSURE: 70 MMHG | WEIGHT: 139.75 LBS | HEIGHT: 61 IN | BODY MASS INDEX: 26.39 KG/M2 | OXYGEN SATURATION: 95 % | RESPIRATION RATE: 16 BRPM | SYSTOLIC BLOOD PRESSURE: 122 MMHG | HEART RATE: 83 BPM

## 2022-01-24 DIAGNOSIS — G31.84 MILD COGNITIVE IMPAIRMENT WITH MEMORY LOSS: ICD-10-CM

## 2022-01-24 DIAGNOSIS — R41.3 MEMORY LOSS: ICD-10-CM

## 2022-01-24 DIAGNOSIS — M19.042 PRIMARY OSTEOARTHRITIS OF BOTH HANDS: ICD-10-CM

## 2022-01-24 DIAGNOSIS — H54.40 BLINDNESS OF RIGHT EYE WITH NORMAL VISION IN CONTRALATERAL EYE: ICD-10-CM

## 2022-01-24 DIAGNOSIS — Z79.899 IMMUNODEFICIENCY DUE TO CHEMOTHERAPY: ICD-10-CM

## 2022-01-24 DIAGNOSIS — G57.12 MERALGIA PARAESTHETICA, LEFT: ICD-10-CM

## 2022-01-24 DIAGNOSIS — E66.9 OBESITY (BMI 30.0-34.9): ICD-10-CM

## 2022-01-24 DIAGNOSIS — M19.041 PRIMARY OSTEOARTHRITIS OF BOTH HANDS: ICD-10-CM

## 2022-01-24 DIAGNOSIS — N39.0 URINARY TRACT INFECTION WITHOUT HEMATURIA, SITE UNSPECIFIED: Primary | ICD-10-CM

## 2022-01-24 DIAGNOSIS — Z17.0 MALIGNANT NEOPLASM OF LOWER-INNER QUADRANT OF LEFT BREAST IN FEMALE, ESTROGEN RECEPTOR POSITIVE: ICD-10-CM

## 2022-01-24 DIAGNOSIS — T45.1X5A IMMUNODEFICIENCY DUE TO CHEMOTHERAPY: ICD-10-CM

## 2022-01-24 DIAGNOSIS — R93.1 DECREASED CARDIAC EJECTION FRACTION: ICD-10-CM

## 2022-01-24 DIAGNOSIS — D17.1 LIPOMA OF ABDOMINAL WALL: ICD-10-CM

## 2022-01-24 DIAGNOSIS — I27.20 PULMONARY HTN: ICD-10-CM

## 2022-01-24 DIAGNOSIS — I50.42 CHRONIC COMBINED SYSTOLIC AND DIASTOLIC CHF (CONGESTIVE HEART FAILURE): ICD-10-CM

## 2022-01-24 DIAGNOSIS — F10.21 HISTORY OF ALCOHOLISM: ICD-10-CM

## 2022-01-24 DIAGNOSIS — Z90.12 HISTORY OF LEFT MASTECTOMY: ICD-10-CM

## 2022-01-24 DIAGNOSIS — M81.0 AGE-RELATED OSTEOPOROSIS WITHOUT CURRENT PATHOLOGICAL FRACTURE: ICD-10-CM

## 2022-01-24 DIAGNOSIS — R32 URINARY INCONTINENCE, UNSPECIFIED TYPE: ICD-10-CM

## 2022-01-24 DIAGNOSIS — D84.821 IMMUNODEFICIENCY DUE TO CHEMOTHERAPY: ICD-10-CM

## 2022-01-24 DIAGNOSIS — I25.118 CORONARY ARTERY DISEASE OF NATIVE ARTERY OF NATIVE HEART WITH STABLE ANGINA PECTORIS: ICD-10-CM

## 2022-01-24 DIAGNOSIS — D50.8 IRON DEFICIENCY ANEMIA SECONDARY TO INADEQUATE DIETARY IRON INTAKE: ICD-10-CM

## 2022-01-24 DIAGNOSIS — F17.200 TOBACCO USE DISORDER: ICD-10-CM

## 2022-01-24 DIAGNOSIS — I10 ESSENTIAL HYPERTENSION: ICD-10-CM

## 2022-01-24 DIAGNOSIS — I70.0 ATHEROSCLEROSIS OF AORTA: ICD-10-CM

## 2022-01-24 DIAGNOSIS — C50.312 MALIGNANT NEOPLASM OF LOWER-INNER QUADRANT OF LEFT BREAST IN FEMALE, ESTROGEN RECEPTOR POSITIVE: ICD-10-CM

## 2022-01-24 DIAGNOSIS — F03.90 DEMENTIA WITHOUT BEHAVIORAL DISTURBANCE, UNSPECIFIED DEMENTIA TYPE: ICD-10-CM

## 2022-01-24 DIAGNOSIS — J43.9 PULMONARY EMPHYSEMA, UNSPECIFIED EMPHYSEMA TYPE: ICD-10-CM

## 2022-01-24 PROCEDURE — 99999 PR PBB SHADOW E&M-EST. PATIENT-LVL V: ICD-10-PCS | Mod: PBBFAC,,, | Performed by: NURSE PRACTITIONER

## 2022-01-24 PROCEDURE — 99499 UNLISTED E&M SERVICE: CPT | Mod: S$GLB,,, | Performed by: NURSE PRACTITIONER

## 2022-01-24 PROCEDURE — 99417 PR PROLONGED SVC, OUTPT, W/WO DIRECT PT CONTACT,  EA ADDTL 15 MIN: ICD-10-PCS | Mod: S$GLB,,, | Performed by: NURSE PRACTITIONER

## 2022-01-24 PROCEDURE — 1125F PR PAIN SEVERITY QUANTIFIED, PAIN PRESENT: ICD-10-PCS | Mod: CPTII,S$GLB,, | Performed by: NURSE PRACTITIONER

## 2022-01-24 PROCEDURE — 3074F PR MOST RECENT SYSTOLIC BLOOD PRESSURE < 130 MM HG: ICD-10-PCS | Mod: CPTII,S$GLB,, | Performed by: NURSE PRACTITIONER

## 2022-01-24 PROCEDURE — 1101F PR PT FALLS ASSESS DOC 0-1 FALLS W/OUT INJ PAST YR: ICD-10-PCS | Mod: CPTII,S$GLB,, | Performed by: NURSE PRACTITIONER

## 2022-01-24 PROCEDURE — 1125F AMNT PAIN NOTED PAIN PRSNT: CPT | Mod: CPTII,S$GLB,, | Performed by: NURSE PRACTITIONER

## 2022-01-24 PROCEDURE — 4010F PR ACE/ARB THEARPY RXD/TAKEN: ICD-10-PCS | Mod: CPTII,S$GLB,, | Performed by: NURSE PRACTITIONER

## 2022-01-24 PROCEDURE — 3078F PR MOST RECENT DIASTOLIC BLOOD PRESSURE < 80 MM HG: ICD-10-PCS | Mod: CPTII,S$GLB,, | Performed by: NURSE PRACTITIONER

## 2022-01-24 PROCEDURE — 1159F MED LIST DOCD IN RCRD: CPT | Mod: CPTII,S$GLB,, | Performed by: NURSE PRACTITIONER

## 2022-01-24 PROCEDURE — 99205 PR OFFICE/OUTPT VISIT, NEW, LEVL V, 60-74 MIN: ICD-10-PCS | Mod: S$GLB,,, | Performed by: NURSE PRACTITIONER

## 2022-01-24 PROCEDURE — 99999 PR PBB SHADOW E&M-EST. PATIENT-LVL V: CPT | Mod: PBBFAC,,, | Performed by: NURSE PRACTITIONER

## 2022-01-24 PROCEDURE — 3288F PR FALLS RISK ASSESSMENT DOCUMENTED: ICD-10-PCS | Mod: CPTII,S$GLB,, | Performed by: NURSE PRACTITIONER

## 2022-01-24 PROCEDURE — 4010F ACE/ARB THERAPY RXD/TAKEN: CPT | Mod: CPTII,S$GLB,, | Performed by: NURSE PRACTITIONER

## 2022-01-24 PROCEDURE — 3288F FALL RISK ASSESSMENT DOCD: CPT | Mod: CPTII,S$GLB,, | Performed by: NURSE PRACTITIONER

## 2022-01-24 PROCEDURE — 3008F PR BODY MASS INDEX (BMI) DOCUMENTED: ICD-10-PCS | Mod: CPTII,S$GLB,, | Performed by: NURSE PRACTITIONER

## 2022-01-24 PROCEDURE — 3074F SYST BP LT 130 MM HG: CPT | Mod: CPTII,S$GLB,, | Performed by: NURSE PRACTITIONER

## 2022-01-24 PROCEDURE — 1159F PR MEDICATION LIST DOCUMENTED IN MEDICAL RECORD: ICD-10-PCS | Mod: CPTII,S$GLB,, | Performed by: NURSE PRACTITIONER

## 2022-01-24 PROCEDURE — 99205 OFFICE O/P NEW HI 60 MIN: CPT | Mod: S$GLB,,, | Performed by: NURSE PRACTITIONER

## 2022-01-24 PROCEDURE — 3078F DIAST BP <80 MM HG: CPT | Mod: CPTII,S$GLB,, | Performed by: NURSE PRACTITIONER

## 2022-01-24 PROCEDURE — 99417 PROLNG OP E/M EACH 15 MIN: CPT | Mod: S$GLB,,, | Performed by: NURSE PRACTITIONER

## 2022-01-24 PROCEDURE — 3008F BODY MASS INDEX DOCD: CPT | Mod: CPTII,S$GLB,, | Performed by: NURSE PRACTITIONER

## 2022-01-24 PROCEDURE — 99499 RISK ADDL DX/OHS AUDIT: ICD-10-PCS | Mod: S$GLB,,, | Performed by: NURSE PRACTITIONER

## 2022-01-24 PROCEDURE — 1101F PT FALLS ASSESS-DOCD LE1/YR: CPT | Mod: CPTII,S$GLB,, | Performed by: NURSE PRACTITIONER

## 2022-01-24 RX ORDER — SULFAMETHOXAZOLE AND TRIMETHOPRIM 800; 160 MG/1; MG/1
1 TABLET ORAL 2 TIMES DAILY
Qty: 14 TABLET | Refills: 0 | Status: SHIPPED | OUTPATIENT
Start: 2022-01-24 | End: 2022-12-05

## 2022-01-24 NOTE — TELEPHONE ENCOUNTER
Spoke to patients daughter, Moe, advised results, she verbalized understanding and asked that antibiotic go to [facility] on [address] at [facility]. I asked how patient was doing and Moe explained that patient has been very fussy and irritated, they believe is from UTI. I advised Moe that provider would send medication this morning and to please check pharmacy this afternoon. /jayden/

## 2022-01-24 NOTE — PATIENT INSTRUCTIONS
Patient Education       Memantine (me MAN teen)   Brand Names: US Namenda Titration [place]; Namenda XR; Namenda XR Titration Pack [DSC]; Namenda [DSC]   Brand Names: Anthony ACT Memantine; APO-Memantine; Ebixa; MED-Memantine; PMS-Memantine; RAN-Memantine; RATIO-Memantine [DSC]; BRIAN-Memantine; SANDOZ Memantine FCT   What is this drug used for?   · It is used to treat dementia in people with Alzheimer's disease.    What do I need to tell my doctor BEFORE I take this drug?   · If you are allergic to this drug; any part of this drug; or any other drugs, foods, or substances. Tell your doctor about the allergy and what signs you had.  This drug may interact with other drugs or health problems.  Tell your doctor and pharmacist about all of your drugs (prescription or OTC, natural products, vitamins) and health problems. You must check to make sure that it is safe for you to take this drug with all of your drugs and health problems. Do not start, stop, or change the dose of any drug without checking with your doctor.  What are some things I need to know or do while I take this drug?   · Tell all of your health care providers that you take this drug. This includes your doctors, nurses, pharmacists, and dentists.  · Tell your doctor if you are pregnant, plan on getting pregnant, or are breast-feeding. You will need to talk about the benefits and risks to you and the baby.    What are some side effects that I need to call my doctor about right away?   WARNING/CAUTION: Even though it may be rare, some people may have very bad and sometimes deadly side effects when taking a drug. Tell your doctor or get medical help right away if you have any of the following signs or symptoms that may be related to a very bad side effect:  · Signs of an allergic reaction, like rash; hives; itching; red, swollen, blistered, or peeling skin with or without fever; wheezing; tightness in the chest or throat; trouble breathing, swallowing, or talking;  unusual hoarseness; or swelling of the mouth, face, lips, tongue, or throat.  · Feeling confused.  What are some other side effects of this drug?   All drugs may cause side effects. However, many people have no side effects or only have minor side effects. Call your doctor or get medical help if any of these side effects or any other side effects bother you or do not go away:  · Dizziness.  · Headache.  · Diarrhea or constipation.  These are not all of the side effects that may occur. If you have questions about side effects, call your doctor. Call your doctor for medical advice about side effects.  You may report side effects to your national health agency.  You may report side effects to the FDA at 1-802.201.7485. You may also report side effects at https://www.fda.gov/medwatch.  How is this drug best taken?   Use this drug as ordered by your doctor. Read all information given to you. Follow all instructions closely.  All products:   · Take with or without food.  · Keep taking this drug as you have been told by your doctor or other health care provider, even if you feel well.  Liquid (solution):   · Measure liquid doses carefully. Use the measuring device that comes with this drug.  · Do not mix with any liquid.  Extended-release capsules:   · Swallow whole. Do not chew or crush.  · Do not take any capsules that do not look normal or are damaged.  · If you cannot swallow this drug whole, you may sprinkle the contents on applesauce. If you do this, swallow the mixture right away without chewing.  What do I do if I miss a dose?   · Skip the missed dose and go back to your normal time.  · Do not take 2 doses at the same time or extra doses.  · If you miss taking this drug for a few days in a row, call your doctor before you start taking it again.    How do I store and/or throw out this drug?   · Store at room temperature in a dry place. Do not store in a bathroom.  · Keep all drugs in a safe place. Keep all drugs out  of the reach of children and pets.  · Throw away unused or  drugs. Do not flush down a toilet or pour down a drain unless you are told to do so. Check with your pharmacist if you have questions about the best way to throw out drugs. There may be drug take-back programs in your area.    General drug facts   · If your symptoms or health problems do not get better or if they become worse, call your doctor.  · Do not share your drugs with others and do not take anyone else's drugs.  · Some drugs may have another patient information leaflet. If you have any questions about this drug, please talk with your doctor, nurse, pharmacist, or other health care provider.  · Some drugs may have another patient information leaflet. Check with your pharmacist. If you have any questions about this drug, please talk with your doctor, nurse, pharmacist, or other health care provider.  · If you think there has been an overdose, call your poison control center or get medical care right away. Be ready to tell or show what was taken, how much, and when it happened.    Consumer Information Use and Disclaimer   This generalized information is a limited summary of diagnosis, treatment, and/or medication information. It is not meant to be comprehensive and should be used as a tool to help the user understand and/or assess potential diagnostic and treatment options. It does NOT include all information about conditions, treatments, medications, side effects, or risks that may apply to a specific patient. It is not intended to be medical advice or a substitute for the medical advice, diagnosis, or treatment of a health care provider based on the health care provider's examination and assessment of a patient's specific and unique circumstances. Patients must speak with a health care provider for complete information about their health, medical questions, and treatment options, including any risks or benefits regarding use of medications.  This information does not endorse any treatments or medications as safe, effective, or approved for treating a specific patient. [employer] and its affiliates disclaim any warranty or liability relating to this information or the use thereof. The use of this information is governed by the Terms of Use, available at https://www.[employer] [facility] [employer]/en/solutions/lexicomp/about/joceline.  Last Reviewed Date   2020-01-10  Copyright   © 2021 [employer] and its affiliates and/or licensors. All rights reserved.  Patient Education       Donepezil ([place] NEP e zil)   Brand Names: [place] Aricept   Brand Names: Anthony ACCEL-Donepezil [DSC]; ACH-Donepezil; ACT Donepezil ODT; ACT Donepezil [DSC]; AG-Donepezil; APO-Donepezil; Aricept; Aricept RDT; Auro-Donepezil; BIO-Donepezil; JAMP-Donepezil; M-Donepezil; Mar-Donepezil; MINT-Donepezil; MYLAN-Donepezil [DSC]; KAREEM-Donepezil; PMS-Donepezil; PRIVA-Donepezil; BRIAN-Donepezil [DSC]; SANDOZ Donepezil; SANDOZ Donepezil ODT [DSC]; Septa-Donepezil; TARO-Donepezil; TEVA-Donepezil; VAN-Donepezil [DSC]   What is this drug used for?   · It is used to treat dementia in people with Alzheimer's disease.  · It may be given to you for other reasons. Talk with the doctor.    What do I need to tell my doctor BEFORE I take this drug?   · If you have an allergy to donepezil or any other part of this drug.  · If you are allergic to this drug; any part of this drug; or any other drugs, foods, or substances. Tell your doctor about the allergy and what signs you had.  This drug may interact with other drugs or health problems.  Tell your doctor and pharmacist about all of your drugs (prescription or OTC, natural products, vitamins) and health problems. You must check to make sure that it is safe for you to take this drug with all of your drugs and health problems. Do not start, stop, or change the dose of any drug without checking with your doctor.  What are some things I need to know or do while I take this  drug?   · Tell all of your health care providers that you take this drug. This includes your doctors, nurses, pharmacists, and dentists.  · This drug is not a cure for Alzheimer's disease. Stay under the care of your doctor.  · Tell your doctor if you are pregnant, plan on getting pregnant, or are breast-feeding. You will need to talk about the benefits and risks to you and the baby.    What are some side effects that I need to call my doctor about right away?   WARNING/CAUTION: Even though it may be rare, some people may have very bad and sometimes deadly side effects when taking a drug. Tell your doctor or get medical help right away if you have any of the following signs or symptoms that may be related to a very bad side effect:  · Signs of an allergic reaction, like rash; hives; itching; red, swollen, blistered, or peeling skin with or without fever; wheezing; tightness in the chest or throat; trouble breathing, swallowing, or talking; unusual hoarseness; or swelling of the mouth, face, lips, tongue, or throat.  · Very bad dizziness or passing out.  · Slow heartbeat.  · A heartbeat that does not feel normal.  · Trouble passing urine.  · Seizures.  · Heartburn.  · Very bad belly pain.  · Throwing up blood or throw up that looks like coffee grounds.  · Black, tarry, or bloody stools.  · Trouble breathing that is new or worse.  What are some other side effects of this drug?   All drugs may cause side effects. However, many people have no side effects or only have minor side effects. Call your doctor or get medical help if any of these side effects or any other side effects bother you or do not go away:  · Feeling dizzy, tired, or weak.  · Diarrhea, upset stomach, or throwing up.  · Trouble sleeping.  · Muscle cramps.  · Not hungry.  · Weight loss.  · Headache.  These are not all of the side effects that may occur. If you have questions about side effects, call your doctor. Call your doctor for medical advice about  side effects.  You may report side effects to your national health agency.  You may report side effects to the FDA at 1-648.283.2724. You may also report side effects at https://www.fda.gov/medwatch.  How is this drug best taken?   Use this drug as ordered by your doctor. Read all information given to you. Follow all instructions closely.  All products:   · Keep taking this drug as you have been told by your doctor or other health care provider, even if you feel well.  · Take this drug at bedtime.  · Take with or without food.  · Drink lots of noncaffeine liquids unless told to drink less liquid by your doctor.  23 mg tablets:   · Swallow whole. Do not chew, break, or crush.  Oral-disintegrating tablet:   · Wash and dry your hands before you take this drug. Do not touch the tablet with wet or damp hands.  · Place on the tongue and let dissolve.  · Do not swallow it whole.  · Do not take this drug out of the blister pack until you are ready to take it. Take this drug right away after opening the blister pack. Do not store the removed drug for future use.  · Drink a glass of water after the tablet has dissolved.  What do I do if I miss a dose?   · Skip the missed dose and go back to your normal time.  · Do not take 2 doses at the same time or extra doses.  · If you miss 7 days of this drug, call your doctor to find out what to do.    How do I store and/or throw out this drug?   · Store at room temperature.  · Store in a dry place. Do not store in a bathroom.  · Keep all drugs in a safe place. Keep all drugs out of the reach of children and pets.  · Throw away unused or  drugs. Do not flush down a toilet or pour down a drain unless you are told to do so. Check with your pharmacist if you have questions about the best way to throw out drugs. There may be drug take-back programs in your area.    General drug facts   · If your symptoms or health problems do not get better or if they become worse, call your  doctor.  · Do not share your drugs with others and do not take anyone else's drugs.  · Some drugs may have another patient information leaflet. If you have any questions about this drug, please talk with your doctor, nurse, pharmacist, or other health care provider.  · Some drugs may have another patient information leaflet. Check with your pharmacist. If you have any questions about this drug, please talk with your doctor, nurse, pharmacist, or other health care provider.  · If you think there has been an overdose, call your poison control center or get medical care right away. Be ready to tell or show what was taken, how much, and when it happened.    Consumer Information Use and Disclaimer   This generalized information is a limited summary of diagnosis, treatment, and/or medication information. It is not meant to be comprehensive and should be used as a tool to help the user understand and/or assess potential diagnostic and treatment options. It does NOT include all information about conditions, treatments, medications, side effects, or risks that may apply to a specific patient. It is not intended to be medical advice or a substitute for the medical advice, diagnosis, or treatment of a health care provider based on the health care provider's examination and assessment of a patient's specific and unique circumstances. Patients must speak with a health care provider for complete information about their health, medical questions, and treatment options, including any risks or benefits regarding use of medications. This information does not endorse any treatments or medications as safe, effective, or approved for treating a specific patient. Up[employer]Date, Inc. and its affiliates disclaim any warranty or liability relating to this information or the use thereof. The use of this information is governed by the Terms of Use, available at https://www.[employer].com/en/solutions/lexicomp/about/joceline.  Last Reviewed Date    2019-10-08  Copyright   © 2021 Up[employer]Date, Inc. and its affiliates and/or licensors. All rights reserved.  Patient Education       Memantine (me MAN teen)   Brand Names: US Namenda Titration [place]; Namenda XR; Namenda XR Titration Pack [DSC]; Namenda [DSC]   Brand Names: Anthony ACT Memantine; APO-Memantine; Ebixa; MED-Memantine; PMS-Memantine; RAN-Memantine; RATIO-Memantine [DSC]; BRIAN-Memantine; SANDOZ Memantine FCT   What is this drug used for?   · It is used to treat dementia in people with Alzheimer's disease.    What do I need to tell my doctor BEFORE I take this drug?   · If you are allergic to this drug; any part of this drug; or any other drugs, foods, or substances. Tell your doctor about the allergy and what signs you had.  This drug may interact with other drugs or health problems.  Tell your doctor and pharmacist about all of your drugs (prescription or OTC, natural products, vitamins) and health problems. You must check to make sure that it is safe for you to take this drug with all of your drugs and health problems. Do not start, stop, or change the dose of any drug without checking with your doctor.  What are some things I need to know or do while I take this drug?   · Tell all of your health care providers that you take this drug. This includes your doctors, nurses, pharmacists, and dentists.  · Tell your doctor if you are pregnant, plan on getting pregnant, or are breast-feeding. You will need to talk about the benefits and risks to you and the baby.    What are some side effects that I need to call my doctor about right away?   WARNING/CAUTION: Even though it may be rare, some people may have very bad and sometimes deadly side effects when taking a drug. Tell your doctor or get medical help right away if you have any of the following signs or symptoms that may be related to a very bad side effect:  · Signs of an allergic reaction, like rash; hives; itching; red, swollen, blistered, or peeling skin  with or without fever; wheezing; tightness in the chest or throat; trouble breathing, swallowing, or talking; unusual hoarseness; or swelling of the mouth, face, lips, tongue, or throat.  · Feeling confused.  What are some other side effects of this drug?   All drugs may cause side effects. However, many people have no side effects or only have minor side effects. Call your doctor or get medical help if any of these side effects or any other side effects bother you or do not go away:  · Dizziness.  · Headache.  · Diarrhea or constipation.  These are not all of the side effects that may occur. If you have questions about side effects, call your doctor. Call your doctor for medical advice about side effects.  You may report side effects to your national health agency.  You may report side effects to the FDA at 1-137.523.5376. You may also report side effects at https://www.fda.gov/medwatch.  How is this drug best taken?   Use this drug as ordered by your doctor. Read all information given to you. Follow all instructions closely.  All products:   · Take with or without food.  · Keep taking this drug as you have been told by your doctor or other health care provider, even if you feel well.  Liquid (solution):   · Measure liquid doses carefully. Use the measuring device that comes with this drug.  · Do not mix with any liquid.  Extended-release capsules:   · Swallow whole. Do not chew or crush.  · Do not take any capsules that do not look normal or are damaged.  · If you cannot swallow this drug whole, you may sprinkle the contents on applesauce. If you do this, swallow the mixture right away without chewing.  What do I do if I miss a dose?   · Skip the missed dose and go back to your normal time.  · Do not take 2 doses at the same time or extra doses.  · If you miss taking this drug for a few days in a row, call your doctor before you start taking it again.    How do I store and/or throw out this drug?   · Store at room  temperature in a dry place. Do not store in a bathroom.  · Keep all drugs in a safe place. Keep all drugs out of the reach of children and pets.  · Throw away unused or  drugs. Do not flush down a toilet or pour down a drain unless you are told to do so. Check with your pharmacist if you have questions about the best way to throw out drugs. There may be drug take-back programs in your area.    General drug facts   · If your symptoms or health problems do not get better or if they become worse, call your doctor.  · Do not share your drugs with others and do not take anyone else's drugs.  · Some drugs may have another patient information leaflet. If you have any questions about this drug, please talk with your doctor, nurse, pharmacist, or other health care provider.  · Some drugs may have another patient information leaflet. Check with your pharmacist. If you have any questions about this drug, please talk with your doctor, nurse, pharmacist, or other health care provider.  · If you think there has been an overdose, call your poison control center or get medical care right away. Be ready to tell or show what was taken, how much, and when it happened.    Consumer Information Use and Disclaimer   This generalized information is a limited summary of diagnosis, treatment, and/or medication information. It is not meant to be comprehensive and should be used as a tool to help the user understand and/or assess potential diagnostic and treatment options. It does NOT include all information about conditions, treatments, medications, side effects, or risks that may apply to a specific patient. It is not intended to be medical advice or a substitute for the medical advice, diagnosis, or treatment of a health care provider based on the health care provider's examination and assessment of a patient's specific and unique circumstances. Patients must speak with a health care provider for complete information about their  health, medical questions, and treatment options, including any risks or benefits regarding use of medications. This information does not endorse any treatments or medications as safe, effective, or approved for treating a specific patient. Up[employer], Inc. and its affiliates disclaim any warranty or liability relating to this information or the use thereof. The use of this information is governed by the Terms of Use, available at https://www.[employer]/en/solutions/lexicomp/about/joceline.  Last Reviewed Date   2020-01-10  Copyright   © 2021 UpToDate, Inc. and its affiliates and/or licensors. All rights reserved.  Patient Education       Dementia (Including Alzheimer Disease)   The Basics   Written by the doctors and editors at [email or web address]   What is dementia? -- Dementia is the general term for a group of brain disorders that cause memory problems and make it hard to think clearly (figure 1).  What symptoms does dementia cause? -- The symptoms of dementia often start off very mild and get worse slowly. Symptoms can include:  · Forgetfulness  · Acting confused or disoriented  · Trouble with speech and writing (for example, not being able to find the right words for things)  · Trouble concentrating and reasoning  · Problems with tasks such as paying bills or balancing a checkbook  · Getting lost in familiar places  As dementia gets worse, people might:  · Have episodes of anger or aggression  · See things that aren't there or believe things that aren't true  · Be unable to eat, bathe, dress, or do other everyday tasks  · Lose bladder and bowel control  What are the different kinds of dementia? -- The most common kinds include:  · Alzheimer disease - Alzheimer disease is the most common cause of dementia. It is a disorder in which brain cells slowly die over time.  · Vascular dementia - Vascular dementia happens when parts of the brain do not get enough blood. This can happen when blood vessels in the brain get blocked  "with blood clots or damaged by high blood pressure or aging. This form of dementia is most common among people who have had strokes or who are at risk for strokes.  · Parkinson disease dementia - Parkinson disease is a brain disorder that affects movement. It causes trembling, stiffness, and slowness. As Parkinson disease gets worse, some people develop dementia. "Lewy body dementia" is a related form of dementia.   · Other causes of dementia - Dementia can also happen if a person's brain has been damaged. For example, having many head injuries can lead to dementia.  Should I see a doctor or nurse? -- Yes, you should see a doctor or nurse if you think you or someone close to you is showing signs of dementia. Sometimes memory loss and confusion are caused by medical problems other than dementia that can be treated. For example, people with diabetes sometimes show signs of confusion when their blood sugar is not well controlled.  Are there tests I should have? -- Your doctor or nurse will decide which tests you should have based on your individual situation. Many people with signs of dementia do not need a brain scan. That's because the tests that are most useful are the ones that look at how you answer questions and do certain tasks. Even so, your doctor might want to do a brain scan (either CT or MRI) to make sure that your symptoms are not caused by a problem unrelated to dementia.  How is dementia treated? -- That depends on what kind of dementia you have. If you have Alzheimer disease, there are medicines that might help some. If you have vascular dementia, your doctor will focus on keeping your blood pressure and cholesterol as close to normal as possible. Doing that can help reduce further damage to the brain.  Sadly, there really aren't good treatments for most types of dementia. But doctors can sometimes treat troubling symptoms that come with dementia, such as depression or anxiety.  How do I stay " safe? -- If you have dementia, you might not be aware of how much your condition affects you. Trust your family and friends to tell you when it is no longer safe for you to drive, cook, or do other things that could be dangerous.  Be aware, too, that people with dementia often fall and hurt themselves. To reduce the risk of falls, it's a good idea to:  · Secure loose rugs or use non-skid backing on rugs  · Tuck away loose wires or electrical cords  · Wear sturdy, comfortable shoes  · Keep walkways well lit  Can dementia be prevented? -- There are no proven ways to prevent dementia. But here are some things that seem to help keep the brain healthy:  · Physical activity  · A healthy diet  · Social interaction  All topics are updated as new evidence becomes available and our peer review process is complete.  This topic retrieved from [email or web address] on: Sep 21, 2021.  Topic 95103 Version 15.0  Release: 29.4.2 - C29.263  © 2021 UpToDate, Inc. and/or its affiliates. All rights reserved.  figure 1: Dementia caused by changes in the brain     Different forms of dementia are linked to changes in the brain. Alzheimer disease causes many parts of the brain to shrink. Parkinson disease damages parts of the brain involved in movement. Vascular dementia happens when the blood vessels that supply the brain are diseased.  Graphic 07986 Version 2.0    Consumer Information Use and Disclaimer   This information is not specific medical advice and does not replace information you receive from your health care provider. This is only a brief summary of general information. It does NOT include all information about conditions, illnesses, injuries, tests, procedures, treatments, therapies, discharge instructions or life-style choices that may apply to you. You must talk with your health care provider for complete information about your health and treatment options. This information should not be used to decide whether or not to accept your health  "care provider's advice, instructions or recommendations. Only your health care provider has the knowledge and training to provide advice that is right for you. The use of this information is governed by the [employer] End User License Agreement, available at https://www.Let's Talk/en/solutions/iMusicTweet/about/joceline.The use of [employer] content is governed by the [employer] Terms of Use. ©2021 UpToDate, Inc. All rights reserved.  Copyright   © 2021 UpToDate, Inc. and/or its affiliates. All rights reserved.  Patient Education       Tips for Caregivers of People With Alzheimer Disease   The Basics   Written by the doctors and editors at [facility]   What plans should we make for the future? -- After your family member is diagnosed with Alzheimer disease (or another form of dementia), you will need to make plans for their:  · Living situation - If the person lives alone, you need to make sure they are safe. If they can't live alone, you need to make a plan. You might have them live at home with help, or move to a nursing home.  · Decision-making - Early on, your family member should choose a "power of ." A power of  is someone who can make decisions for the person when they are no longer able to. It's important for the power of  to talk to the person to understand what they would want in different situations, especially toward the end of life. For example, some people would want to be put on a breathing machine if they could not breathe on their own, while other people would not. It is hard to have these conversations. But it's important to have them as early as possible, while the person is still able to make their own decisions.  · Money - People have trouble understanding how to manage their money as their disease gets worse. If your family member takes care of their own money, you need to check that they do it correctly.  · Driving - If your family member still drives, talk with the doctor " "about when they should stop. The right time to stop is different for each person.  The symptoms of Alzheimer disease get worse over time. It is important to be aware of this and change your plans regularly as needed.   How can I make the home safe? -- To help keep your family member from getting hurt around the house, you can:  · Keep walkways clear of clutter. If you have loose rugs, remove them or tack them down.  · Put a handrail and non-slip mat in the bathtub or shower.  · Put child-proof locks on cabinets with dangerous items (such as matches or medicines). You can also put child-proof covers on the stove.  How can I keep my family member from wandering away? -- To keep them from wandering away or getting lost, you can:  · Lock the outside door. If your family member can unlock the door, put another lock on the door that they can't reach.  · Have them wear or carry identification at all times.  · Put a system in your home that lets you know when people enter or leave.  There are products available that use GPS technology to let you track where your family member is. The Alzheimer's Association also has a program to support caregivers and people who are at risk of wandering. They provide an identification bracelet, necklace, or tag your family member can wear. They can also help if a person does get lost. You can learn more online at: www.medicalert.org/alz.  How can I make daily activities easier? -- To make your daily activities easier, you can:  · Schedule appointments, visits, and activities for times of the day when your family member is at their best.  · Do activities they enjoy or can still help with.  · Plan on taking extra time for activities or to get where you are going.  · Stick to a routine, and avoid new or crowded places.  · Use simple words, short sentences, and a calm voice (but don't use "baby talk"). When you give directions, give only 1 direction at a time.  · Avoid giving them too many " choices. For example, offer only 2 choices for lunch.  · Buy clothes and shoes that are easy to put on and take off.  · Remember that it doesn't help to argue. Try to move on to something else.  How can I avoid bladder or bowel accidents? -- In the earlier stages of the disease, you can limit accidents by:  · Having your family member use the toilet every few hours.  · Not giving them drinks before bedtime.  In the later stages of dementia, most people will need to wear a pad or adult diaper.  What if my family member isn't eating enough? -- To get them to eat more, you can:  · Give them many small meals each day, instead of 3 large ones.  · Give them high-protein or high-calorie drinks, such as shakes.  · Make food easier to eat by putting it in a bowl or cutting it up.  · Try making the food taste better by adding spices, sweet and sour flavoring, or soy sauce.  As the disease gets worse, the person might need to be fed.  How can I help my family member sleep better at night? -- To help them sleep better at night, you can:  · Not let them nap during the day.  · Make sure they get enough exercise and sunlight during the day (but not right before bedtime).  · Open the shades in the morning to let light in.  · Keep their wake up time and bed time the same every day.  · Keep the bedroom quiet, cool, and dark at night.  · Ask the doctor or nurse if any of your family member's medicines might be making their sleep worse.  What if my family member's behavior changes suddenly? -- If the person's behavior changes suddenly, call the doctor or nurse. It could mean they have an infection or another medical problem that requires treatment. That's because symptoms of Alzheimer disease often get worse when people get infections, especially bladder or lung infections.  What if I am having a hard time? -- If you are having a hard time, let the doctor or nurse know so that they can find ways to get you help. Taking care of a person  with Alzheimer disease is a tough job, and it usually gets harder as the disease gets worse. Don't feel bad or guilty about asking for help. Make sure to ask for help right away if your family member hurts or threatens you.   One way to get help is to take a break from caregiving. You can hire an aide to help you bathe, dress, or feed your family member. You can also find an adult day care program for your family member to go to during the day.  Another way to get help is to join a support group for caregivers of people with Alzheimer disease. It can help to talk to other people who are going through similar things.  The Alzheimer's Association has a lot of information for caregivers. Their website is www.alz.org. Their toll-free phone number in the [place] is 1-477.374.2724.  All topics are updated as new evidence becomes available and our peer review process is complete.  This topic retrieved from [employer] on: Sep 21, 2021.  Topic 07440 Version 7.0  Release: 29.4.2 - C29.263  © 2021 UpToDate, Inc. and/or its affiliates. All rights reserved.  Consumer Information Use and Disclaimer   This information is not specific medical advice and does not replace information you receive from your health care provider. This is only a brief summary of general information. It does NOT include all information about conditions, illnesses, injuries, tests, procedures, treatments, therapies, discharge instructions or life-style choices that may apply to you. You must talk with your health care provider for complete information about your health and treatment options. This information should not be used to decide whether or not to accept your health care provider's advice, instructions or recommendations. Only your health care provider has the knowledge and training to provide advice that is right for you. The use of this information is governed by the [employer] End User License Agreement, available at  https://www.Lumenergier.com/en/solutions/lexicomp/about/joceline.The use of [employer] content is governed by the [employer] Terms of Use. ©2021 UpToDate, Inc. All rights reserved.  Copyright   © 2021 UpToDate, Inc. and/or its affiliates. All rights reserved.  Patient Education       Dementia ED   General Information   You brought the person you care for to the Emergency Department (ED) for problems related to dementia. This is the medical name for a group of brain disorders that cause memory and thinking problems. People with dementia may:  · Not think clearly, be able to concentrate, or pay attention.  · Forget things or the right word for something.  · Get lost in known places.  · Struggle to care for themselves.  · Become confused or disoriented.  · Become agitated, angry, or violent  · Have problems with bowel or bladder control.  Most of the time dementia starts off with mild symptoms and slowly gets worse. Sometimes a new medication or a new medical problem can cause a more sudden change in symptoms.  What care is needed at home?   If you care for someone with dementia, you can do the following to help them:  · Call their regular doctor to let them know they were in the ED. Make a follow-up appointment if you were told to.  · Help the person follow their regular doctors instructions to stay healthy. This might include keeping their blood pressure, cholesterol, or high blood sugar (diabetes) under control. Make sure they take all medications as ordered.  · Try to help the person keep a routine that is the same each day. They should wake up and go to bed at the same time each day. Offer them healthy, balanced meals at the same time each day.  · Help the person plan time for exercise, being with others, and rest.  · Do not let the person drive a car unless they have been told that it is safe to do so.  · Daily hygiene is important. This may become harder as the illness progresses. You may need to help the person with  bathing, brushing teeth, or dentures.  · Keep dim lights on at night. This may help to prevent falls if the person gets up during the night.  · Use notes and a calendar to help the person remember things. Track daily events, therapy, and doctor visits.  · Make sure to lock doors to stop the person wandering out of the home. Alarms or bells on doors may be a way to alert others that they are leaving. You can also get an ID bracelet or necklace that has the persons name on it, Memory Impaired, and contact information for you or another caregiver.  · It is likely that the person will need more help and supervision at home as their disease progresses. Talk with the person about their symptoms and help them plan for the future.  When do I need to get emergency help?   · Return to the ED if:   ? The person has a sudden change in their ability to think or function.  When do I need to call the doctor?   · They are not able to eat or sleep normally.  · They are no longer able to stay home safely.  · You are worried they may harm themselves or someone else.  · They have new or worsening symptoms.  Last Reviewed Date   2021-03-09  Consumer Information Use and Disclaimer   This information is not specific medical advice and does not replace information you receive from your health care provider. This is only a brief summary of general information. It does NOT include all information about conditions, illnesses, injuries, tests, procedures, treatments, therapies, discharge instructions or life-style choices that may apply to you. You must talk with your health care provider for complete information about your health and treatment options. This information should not be used to decide whether or not to accept your health care providers advice, instructions or recommendations. Only your health care provider has the knowledge and training to provide advice that is right for you.  Copyright   Copyright © 2021 [employer] and  its affiliates and/or licensors. All rights reserved.  Patient Education       Dementia Discharge Instructions   About this topic   Dementia is the medical name for a group of brain disorders that cause memory and thinking problems. People with dementia may:  · Not think clearly, be able to concentrate, or pay attention.  · Forget things or the right word for something.  · Get lost in known places.  · Struggle to care for themselves.  · Become confused or disoriented.  · Become agitated, angry, or violent.  · Have problems with bowel or bladder control.  Most of the time dementia starts off with mild symptoms and slowly gets worse. Sometimes a new medication or a new medical problem can cause a more sudden change in symptoms.  What care is needed at home?   · Ask the doctor what you need to do when you go home. Make sure you ask questions if you do not understand what the doctor says.  · If you care for someone with dementia, you can do the following to help them:  ? Help the person follow their regular doctors instructions to stay healthy. This might include keeping their blood pressure, cholesterol, or high blood sugar (diabetes) under control. Make sure they take all medications as ordered.  ? Try to help the person keep a routine that is the same each day. They should wake up and go to bed at the same time each day. Offer them healthy, balanced meals at the same time each day.  ? Help the person plan time for exercise, being with others, and rest.  ? Do not let the person drive a car unless they have been told that it is safe to do so.  ? Daily hygiene is important. This may become harder as the illness progresses. You may need to help the person with bathing, brushing teeth, or dentures.  ? Keep dim lights on at night. This may help to prevent falls if the person gets up during the night.  ? Use notes and a calendar to help the person remember things. Track daily events, therapy, and doctor visits.  ? Make  "sure to lock doors to stop the person wandering out of the home. Alarms or bells on doors may be a way to alert others that they are leaving. You can also get an ID bracelet or necklace that has the persons name on it, Memory Impaired, and contact information for you or another caregiver.  ? It is likely that the person will need more help and supervision at home as their disease progresses. Talk with the person about their symptoms and help them plan for the future.  · Keep your patient safe.  ? Put away sharp objects like knives or scissors. Install separate valve or shut off for stove that the patient is unable to turn on.  ? Make sure any guns are locked away. Keep them safe and where your patient cannot get them.  ? Make sure to lock doors to stop wandering out of the home. Alarms or bells on doors may be a way to alert you that someone is leaving.  ? Have your patient wear an ID necklace or bracelet. It should say "Memory Impaired." It should also have a contact phone number, address, and name of the person and a caregiver.  ? Store medications in a secure location.  · Maintain a calm, soothing tone when speaking.  ? Avoid loud music. Play calming music your patient enjoys.  ? Don't argue. Use distraction or change the subject if talk becomes filled with emotion. It is normal for people with dementia to have problems handling their feelings.  ? Use simple words when talking. Offer fewer choices. For example, ask "Do you want to wear the blue shirt or the red shirt?" rather than "What do you want to wear?"  What follow-up care is needed?   · The doctor may ask you to make visits to the office to check on your patient's progress. Be sure to keep these visits. Follow-up is a vital part of living with this illness.   · The doctor may send your patient to a psychiatrist or psychologist to help with other problems. These may include problems with behavior, sleep, mood, or worries.  What drugs may be needed?   The " doctor may order drugs to:  · Slow progression of dementia  · Treat worry, anger, or low mood  · Control signs like seeing or hearing things that others cannot  · Treat other illnesses  Make sure that drugs are given as directed by the doctor. Some of the drugs may cause unwanted effects. Be sure to talk to the doctor if this happens.  Will physical activity be limited?   · Daily exercise is important. This may help some signs and will help keep muscle tone and balance.  · As the disease progresses, activities like driving and working with machines should be limited. This can keep you, your family member, and other people safe.  What problems could happen?   · Change in behavior ? Your patient may lose interest in things they once enjoyed. They may be irritable and have very high and low moods. They may show sexual behavior that is upsetting to others.  · Loss of memory ? Getting lost in familiar places happens often. So does forgetting common recipes. Your patient may miss meetings or leave the stove on. The patient may lose keys or glasses.  · Loss of ability to function ? Your patient may have trouble with bill paying or other household chores. Shopping or other things in a normal daily routine may be unfamiliar.  · Loss of ability to communicate ? Your patient may have problems finding the right word. This may progress to not being able to express needs. The patient may not be able to understand directions or instructions.  · Harm towards others and self ? This is often not planned but may be a problem. It may be a result of changes in balance or depth perception. A decline in skills or how to stay safe can also cause injury.  · Injuries and illnesses ? Lung infections or bedsores may happen because of limited activity or a drop in function.  When do I need to call the doctor?   · The person has a sudden change in their ability to think or function.  · They are not able to eat or sleep normally.  · They are no  longer able to stay home safely.  · You are worried they may harm themselves or someone else.  Helpful tips   · Join support groups, such as the Alzheimer's Association, to get to know others and families who are living with this illness.  · Caregivers must take time to care for themselves to prevent burnout. Ask family or friends to give care for a few hours a few times a week.  · Talk to doctor or  about day care programs in your area.  · Learn about respite care. This will let you take time off or tend to your own health needs. Your patient will still be cared for.  · Be sure there is someone to help you in case you become ill or are not able to care for your patient. This is a back-up plan.  · Find out if there is a living will or power of  for health care issues. What are your patient's wishes?  Teach Back: Helping You Understand   The Teach Back Method helps you understand the information we are giving you. After you talk with the staff, tell them in your own words what you learned. This helps to make sure the staff has described each thing clearly. It also helps to explain things that may have been confusing. Before going home, make sure you can do these:  · I can tell you about dementia.  · I can tell you ways to help keep a person with dementia safe at home.  · I can tell you what I will do if I am worried I cannot care for my patient with dementia safely.  Where can I learn more?   American Academy of Family Physicians  https://[email or web address]/condition/dementia/   [facility] Choices  https://www.nhs.uk/conditions/vascular-dementia/   [facility] Choices  https://www.nhs.uk/conditions/Alzheimers-disease/   Last Reviewed Date   2021-06-10  Consumer Information Use and Disclaimer   This information is not specific medical advice and does not replace information you receive from your health care provider. This is only a brief summary of general information. It does NOT include all information about  conditions, illnesses, injuries, tests, procedures, treatments, therapies, discharge instructions or life-style choices that may apply to you. You must talk with your health care provider for complete information about your health and treatment options. This information should not be used to decide whether or not to accept your health care providers advice, instructions or recommendations. Only your health care provider has the knowledge and training to provide advice that is right for you.  Copyright   Copyright © 2021 UpToDate, Inc. and its affiliates and/or licensors. All rights reserved.  Patient Education       Memantine (me MAN teen)   Brand Names: [place] Namenda Titration [place]; Namenda XR; Namenda XR Titration Pack [DSC]; Namenda [DSC]   Brand Names: Anthony ACT Memantine; APO-Memantine; Ebixa; MED-Memantine; PMS-Memantine; RAN-Memantine; RATIO-Memantine [DSC]; BRIAN-Memantine; SANDOZ Memantine FCT   What is this drug used for?   · It is used to treat dementia in people with Alzheimer's disease.    What do I need to tell my doctor BEFORE I take this drug?   · If you are allergic to this drug; any part of this drug; or any other drugs, foods, or substances. Tell your doctor about the allergy and what signs you had.  This drug may interact with other drugs or health problems.  Tell your doctor and pharmacist about all of your drugs (prescription or OTC, natural products, vitamins) and health problems. You must check to make sure that it is safe for you to take this drug with all of your drugs and health problems. Do not start, stop, or change the dose of any drug without checking with your doctor.  What are some things I need to know or do while I take this drug?   · Tell all of your health care providers that you take this drug. This includes your doctors, nurses, pharmacists, and dentists.  · Tell your doctor if you are pregnant, plan on getting pregnant, or are breast-feeding. You will need to talk about the  benefits and risks to you and the baby.    What are some side effects that I need to call my doctor about right away?   WARNING/CAUTION: Even though it may be rare, some people may have very bad and sometimes deadly side effects when taking a drug. Tell your doctor or get medical help right away if you have any of the following signs or symptoms that may be related to a very bad side effect:  · Signs of an allergic reaction, like rash; hives; itching; red, swollen, blistered, or peeling skin with or without fever; wheezing; tightness in the chest or throat; trouble breathing, swallowing, or talking; unusual hoarseness; or swelling of the mouth, face, lips, tongue, or throat.  · Feeling confused.  What are some other side effects of this drug?   All drugs may cause side effects. However, many people have no side effects or only have minor side effects. Call your doctor or get medical help if any of these side effects or any other side effects bother you or do not go away:  · Dizziness.  · Headache.  · Diarrhea or constipation.  These are not all of the side effects that may occur. If you have questions about side effects, call your doctor. Call your doctor for medical advice about side effects.  You may report side effects to your national health agency.  You may report side effects to the FDA at 1-826.813.6714. You may also report side effects at https://www.fda.gov/medwatch.  How is this drug best taken?   Use this drug as ordered by your doctor. Read all information given to you. Follow all instructions closely.  All products:   · Take with or without food.  · Keep taking this drug as you have been told by your doctor or other health care provider, even if you feel well.  Liquid (solution):   · Measure liquid doses carefully. Use the measuring device that comes with this drug.  · Do not mix with any liquid.  Extended-release capsules:   · Swallow whole. Do not chew or crush.  · Do not take any capsules that do not  look normal or are damaged.  · If you cannot swallow this drug whole, you may sprinkle the contents on applesauce. If you do this, swallow the mixture right away without chewing.  What do I do if I miss a dose?   · Skip the missed dose and go back to your normal time.  · Do not take 2 doses at the same time or extra doses.  · If you miss taking this drug for a few days in a row, call your doctor before you start taking it again.    How do I store and/or throw out this drug?   · Store at room temperature in a dry place. Do not store in a bathroom.  · Keep all drugs in a safe place. Keep all drugs out of the reach of children and pets.  · Throw away unused or  drugs. Do not flush down a toilet or pour down a drain unless you are told to do so. Check with your pharmacist if you have questions about the best way to throw out drugs. There may be drug take-back programs in your area.    General drug facts   · If your symptoms or health problems do not get better or if they become worse, call your doctor.  · Do not share your drugs with others and do not take anyone else's drugs.  · Some drugs may have another patient information leaflet. If you have any questions about this drug, please talk with your doctor, nurse, pharmacist, or other health care provider.  · Some drugs may have another patient information leaflet. Check with your pharmacist. If you have any questions about this drug, please talk with your doctor, nurse, pharmacist, or other health care provider.  · If you think there has been an overdose, call your poison control center or get medical care right away. Be ready to tell or show what was taken, how much, and when it happened.    Consumer Information Use and Disclaimer   This generalized information is a limited summary of diagnosis, treatment, and/or medication information. It is not meant to be comprehensive and should be used as a tool to help the user understand and/or assess potential  diagnostic and treatment options. It does NOT include all information about conditions, treatments, medications, side effects, or risks that may apply to a specific patient. It is not intended to be medical advice or a substitute for the medical advice, diagnosis, or treatment of a health care provider based on the health care provider's examination and assessment of a patient's specific and unique circumstances. Patients must speak with a health care provider for complete information about their health, medical questions, and treatment options, including any risks or benefits regarding use of medications. This information does not endorse any treatments or medications as safe, effective, or approved for treating a specific patient. UpToDate, Inc. and its affiliates disclaim any warranty or liability relating to this information or the use thereof. The use of this information is governed by the Terms of Use, available at https://www.[employer]/en/solutions/lexicomp/about/joceline.  Last Reviewed Date   2020-01-10  Copyright   © 2021 UpToDate, Inc. and its affiliates and/or licensors. All rights reserved.  Patient Education       Donepezil ([place] NEP e zil)   Brand Names: [place] Aricept   Brand Names: Anthony ACCEL-Donepezil [DSC]; ACH-Donepezil; ACT Donepezil ODT; ACT Donepezil [DSC]; AG-Donepezil; APO-Donepezil; Aricept; Aricept RDT; Auro-Donepezil; BIO-Donepezil; JAMP-Donepezil; M-Donepezil; Mar-Donepezil; MINT-Donepezil; MYLAN-Donepezil [DSC]; KAREEM-Donepezil; PMS-Donepezil; PRIVA-Donepezil; BRIAN-Donepezil [DSC]; SANDOZ Donepezil; SANDOZ Donepezil ODT [DSC]; Septa-Donepezil; TARO-Donepezil; TEVA-Donepezil; VAN-Donepezil [DSC]   What is this drug used for?   · It is used to treat dementia in people with Alzheimer's disease.  · It may be given to you for other reasons. Talk with the doctor.    What do I need to tell my doctor BEFORE I take this drug?   · If you have an allergy to donepezil or any other part of this  drug.  · If you are allergic to this drug; any part of this drug; or any other drugs, foods, or substances. Tell your doctor about the allergy and what signs you had.  This drug may interact with other drugs or health problems.  Tell your doctor and pharmacist about all of your drugs (prescription or OTC, natural products, vitamins) and health problems. You must check to make sure that it is safe for you to take this drug with all of your drugs and health problems. Do not start, stop, or change the dose of any drug without checking with your doctor.  What are some things I need to know or do while I take this drug?   · Tell all of your health care providers that you take this drug. This includes your doctors, nurses, pharmacists, and dentists.  · This drug is not a cure for Alzheimer's disease. Stay under the care of your doctor.  · Tell your doctor if you are pregnant, plan on getting pregnant, or are breast-feeding. You will need to talk about the benefits and risks to you and the baby.    What are some side effects that I need to call my doctor about right away?   WARNING/CAUTION: Even though it may be rare, some people may have very bad and sometimes deadly side effects when taking a drug. Tell your doctor or get medical help right away if you have any of the following signs or symptoms that may be related to a very bad side effect:  · Signs of an allergic reaction, like rash; hives; itching; red, swollen, blistered, or peeling skin with or without fever; wheezing; tightness in the chest or throat; trouble breathing, swallowing, or talking; unusual hoarseness; or swelling of the mouth, face, lips, tongue, or throat.  · Very bad dizziness or passing out.  · Slow heartbeat.  · A heartbeat that does not feel normal.  · Trouble passing urine.  · Seizures.  · Heartburn.  · Very bad belly pain.  · Throwing up blood or throw up that looks like coffee grounds.  · Black, tarry, or bloody stools.  · Trouble breathing that  is new or worse.  What are some other side effects of this drug?   All drugs may cause side effects. However, many people have no side effects or only have minor side effects. Call your doctor or get medical help if any of these side effects or any other side effects bother you or do not go away:  · Feeling dizzy, tired, or weak.  · Diarrhea, upset stomach, or throwing up.  · Trouble sleeping.  · Muscle cramps.  · Not hungry.  · Weight loss.  · Headache.  These are not all of the side effects that may occur. If you have questions about side effects, call your doctor. Call your doctor for medical advice about side effects.  You may report side effects to your national health agency.  You may report side effects to the FDA at 1-499.558.2037. You may also report side effects at https://www.fda.gov/medwatch.  How is this drug best taken?   Use this drug as ordered by your doctor. Read all information given to you. Follow all instructions closely.  All products:   · Keep taking this drug as you have been told by your doctor or other health care provider, even if you feel well.  · Take this drug at bedtime.  · Take with or without food.  · Drink lots of noncaffeine liquids unless told to drink less liquid by your doctor.  23 mg tablets:   · Swallow whole. Do not chew, break, or crush.  Oral-disintegrating tablet:   · Wash and dry your hands before you take this drug. Do not touch the tablet with wet or damp hands.  · Place on the tongue and let dissolve.  · Do not swallow it whole.  · Do not take this drug out of the blister pack until you are ready to take it. Take this drug right away after opening the blister pack. Do not store the removed drug for future use.  · Drink a glass of water after the tablet has dissolved.  What do I do if I miss a dose?   · Skip the missed dose and go back to your normal time.  · Do not take 2 doses at the same time or extra doses.  · If you miss 7 days of this drug, call your doctor to  find out what to do.    How do I store and/or throw out this drug?   · Store at room temperature.  · Store in a dry place. Do not store in a bathroom.  · Keep all drugs in a safe place. Keep all drugs out of the reach of children and pets.  · Throw away unused or  drugs. Do not flush down a toilet or pour down a drain unless you are told to do so. Check with your pharmacist if you have questions about the best way to throw out drugs. There may be drug take-back programs in your area.    General drug facts   · If your symptoms or health problems do not get better or if they become worse, call your doctor.  · Do not share your drugs with others and do not take anyone else's drugs.  · Some drugs may have another patient information leaflet. If you have any questions about this drug, please talk with your doctor, nurse, pharmacist, or other health care provider.  · Some drugs may have another patient information leaflet. Check with your pharmacist. If you have any questions about this drug, please talk with your doctor, nurse, pharmacist, or other health care provider.  · If you think there has been an overdose, call your poison control center or get medical care right away. Be ready to tell or show what was taken, how much, and when it happened.    Consumer Information Use and Disclaimer   This generalized information is a limited summary of diagnosis, treatment, and/or medication information. It is not meant to be comprehensive and should be used as a tool to help the user understand and/or assess potential diagnostic and treatment options. It does NOT include all information about conditions, treatments, medications, side effects, or risks that may apply to a specific patient. It is not intended to be medical advice or a substitute for the medical advice, diagnosis, or treatment of a health care provider based on the health care provider's examination and assessment of a patient's specific and unique  circumstances. Patients must speak with a health care provider for complete information about their health, medical questions, and treatment options, including any risks or benefits regarding use of medications. This information does not endorse any treatments or medications as safe, effective, or approved for treating a specific patient. UpToDate, Inc. and its affiliates disclaim any warranty or liability relating to this information or the use thereof. The use of this information is governed by the Terms of Use, available at https://www.[employer]/en/solutions/lexicomp/about/joceline.  Last Reviewed Date   2019-10-08  Copyright   © 2021 UpToDate, Inc. and its affiliates and/or licensors. All rights reserved.  Patient Education       Alzheimer Disease Discharge Instructions   About this topic   Alzheimer's is a disease that causes changes in brain function. It is a type of dementia that affects your behavior and judgment. It also affects how you process information. You may have problems talking with others or remembering things. Some people have trouble solving problems and finding words when speaking. Alzheimer disease is not a normal part of aging. The disease gets worse over time.  Currently, there is no cure for Alzheimer disease. Treatment includes drugs to improve memory and slow down the worsening of the disease.  What care is needed at home?   · Ask your doctor what you need to do when you go home. Make sure you ask questions if you do not understand what the doctor says. This way you will know what you need to do to care for your patient.  · Share information about Alzheimer with the rest of the family. Talk about how you can help the person with Alzheimer.  · Keep a quiet and calm home for your patient. This will help lower fear and distress.  ? Give a structured daily schedule that is the same each day. Help your patient wake up and go to bed at the same time each day.  ? Give a healthy balanced diet  "at the same time each day. Avoid drinks with caffeine and alcohol.  ? Plan time for exercise, being with others, and rest.  ? Daily hygiene is important. Help your patient continue to get dressed as they normally would. Help them with make up or cologne. Daily hygiene is important. Bathing may become harder as Alzheimer disease progresses. Less often bathing, or washing at the sink, may be needed.  ? Be sure to help with brushing teeth or dentures.  ? Make a calming bedtime routine: Warm milk, soft music, reading aloud, dimming of lights.  ? Keep dim lights on at night. This may help to avoid distress if your patient wakes up at night.  · Help your patient remember.  ? Keep a record each day. This will give your patient a written memory of daily events. It may help your patient keep track of everyday life and guests.  ? Keep a calendar with all office visits on it like therapy and doctor visits. Try not to miss any visits.  · Keep your patient safe.  ? Put away sharp objects like knives or scissors.  ? Put childproof locks on cabinets that hold items like poisons, chemicals, drugs, and lighters.  ? Make sure any guns are locked away. Keep them safe and where your patient cannot get them.  ? Make sure to lock doors to stop wandering out of the home. Alarms or bells on doors may be a way to alert you that someone is leaving.  ? Have your patient wear an ID necklace or bracelet. It should say "Memory Impaired." It should also have a contact phone number, address, and name of patient and caregiver.  ? Do not allow driving when the doctor says your patient has to stop driving. This is often a very hard issue. You may have to hide keys and disable the car.  · Help your patient by using a calm, soothing tone when speaking.  ? Avoid loud music. Play calming music your patient enjoys.  ? Don't argue. Keep your voice calm and soothing. Use distraction or change the subject if talk becomes filled with emotion. It is normal for " "people with Alzheimer disease to have strong reactions to situations.  ? Use simple words when talking. Offer fewer choices. For example, ask "Do you want to wear the blue shirt or the red shirt?" rather than "What do you want to wear?"  What follow-up care is needed?   · The doctor may request visits to the office to check on your patient's progress. Be sure to keep these visits. Follow-up is an important part of the treatment of this disease.   · The doctor may send your patient to a psychiatrist or psychologist to help with behavior concerns with low mood, sleep issues, or distress.  What drugs may be needed?   The doctor may order drugs to:  · Improve memory  · Slow worsening of the disease  · Treat tension or distress  The doctor may stop drugs which could cause confusion, falls, or accidents.  Will physical activity be limited?   · Daily exercise is important. This may help some of your patient's signs, and will help keep muscle tone and balance.  · As the disease progresses, activities like driving and working with machines should be limited. This can keep the patient and other people safe.  What changes to diet are needed?   · Offer foods that include a balanced diet, with lots of vegetables, fruits, and grains.  · Have your family member drink lots of water and other fluids.  What problems could happen?   · Change in behavior ? Your patient may lose interest in things they once enjoyed. They may be irritable and have very high and low moods. They may show sexual behavior that is upsetting to others.  · Loss of memory ? Getting lost in familiar places happens often. So does forgetting common recipes. Your patient may miss meetings or leave the stove on. They may lose their keys or glasses.  · Loss of ability to function ? Your patient may have trouble with bill paying or other household chores. Shopping or other things in a normal daily routine may be unfamiliar.  · Loss of ability to communicate ? They may " have problems finding the right word. This may progress to not being able to express needs. The patient may not be able to understand directions or instructions.  · Harm towards others and self ? This is often not planned but may be a problem. It may be a result of changes in balance or depth perception. A decline in skills or how to stay safe can also cause injury.  · Injuries and illnesses ? Lung infections or bedsores may happen because of limited activity or a drop in function.  What can be done to prevent this health problem?   There is no known way to prevent this illness. Still, some things may improve or slow the signs:  · Stay involved in social and mental activities.  · Keep the brain busy.  · Exercise often.  · Keep a normal weight.  · Eat a balanced diet, including foods with Omega-3 fatty acids and antioxidants.  · Manage blood pressure and blood sugars.  When do I need to call the doctor?   · You are worried the patient may harm himself or someone else  · Patient has a sudden change in mental status or function  · Patient cannot eat or sleep  · You are no longer able to care for the patient safely at home  · Health problem is not better or the patient is feeling worse  Helpful tips   · Join support groups, such as the Alzheimer's Association, to meet others and their families who are living with this disease.  · Caregivers must take time out to care for themselves to prevent burnout. Ask family or friends to provide care for a few hours every few days.  · Talk to doctor or  about day care programs in the area.  · Look for resources for care so that you can take a vacation or get some time to yourself.  · Have a back up person to care for your patient in case you get sick or need a break.  · Find out if there is a living will or power of  for health care issues. What are your patient's wishes?  Teach Back: Helping You Understand   The Teach Back Method helps you understand the  information we are giving you. After you talk with the staff, tell them in your own words what you learned. This helps to make sure the staff has described each thing clearly. It also helps to explain things that may have been confusing. Before going home, make sure you can do these:  · I can tell you about Alzheimer's disease.  · I can tell you ways to help keep my Alzheimer's patient safe at home.  · I can tell you what I will do if I am worried I cannot care for my Alzheimer's patient safely.  Where can I learn more?   Alzheimer's Disease Research  http://www.brightfocus.org/alzheimers/livingwith/caregiving.html   National [place] on Aging  https://www.[place].nih.gov/health/next-steps-after-alzheimers-diagnosis   [employer] [place] on Aging  https://www.[place].nih.gov/health/what-alzheimers-disease   Last Reviewed Date   2020-03-27  Consumer Information Use and Disclaimer   This information is not specific medical advice and does not replace information you receive from your health care provider. This is only a brief summary of general information. It does NOT include all information about conditions, illnesses, injuries, tests, procedures, treatments, therapies, discharge instructions or life-style choices that may apply to you. You must talk with your health care provider for complete information about your health and treatment options. This information should not be used to decide whether or not to accept your health care providers advice, instructions or recommendations. Only your health care provider has the knowledge and training to provide advice that is right for you.  Copyright   Copyright © 2021 UpToDate, Inc. and its affiliates and/or licensors. All rights reserved.  Patient Education       Dementia (Including Alzheimer Disease)   The Basics   Written by the doctors and editors at "[employer]"   What is dementia? -- Dementia is the general term for a group of brain disorders that cause memory problems and  "make it hard to think clearly (figure 1).  What symptoms does dementia cause? -- The symptoms of dementia often start off very mild and get worse slowly. Symptoms can include:  · Forgetfulness  · Acting confused or disoriented  · Trouble with speech and writing (for example, not being able to find the right words for things)  · Trouble concentrating and reasoning  · Problems with tasks such as paying bills or balancing a checkbook  · Getting lost in familiar places  As dementia gets worse, people might:  · Have episodes of anger or aggression  · See things that aren't there or believe things that aren't true  · Be unable to eat, bathe, dress, or do other everyday tasks  · Lose bladder and bowel control  What are the different kinds of dementia? -- The most common kinds include:  · Alzheimer disease - Alzheimer disease is the most common cause of dementia. It is a disorder in which brain cells slowly die over time.  · Vascular dementia - Vascular dementia happens when parts of the brain do not get enough blood. This can happen when blood vessels in the brain get blocked with blood clots or damaged by high blood pressure or aging. This form of dementia is most common among people who have had strokes or who are at risk for strokes.  · Parkinson disease dementia - Parkinson disease is a brain disorder that affects movement. It causes trembling, stiffness, and slowness. As Parkinson disease gets worse, some people develop dementia. "Lewy body dementia" is a related form of dementia.   · Other causes of dementia - Dementia can also happen if a person's brain has been damaged. For example, having many head injuries can lead to dementia.  Should I see a doctor or nurse? -- Yes, you should see a doctor or nurse if you think you or someone close to you is showing signs of dementia. Sometimes memory loss and confusion are caused by medical problems other than dementia that can be treated. For example, people with diabetes " sometimes show signs of confusion when their blood sugar is not well controlled.  Are there tests I should have? -- Your doctor or nurse will decide which tests you should have based on your individual situation. Many people with signs of dementia do not need a brain scan. That's because the tests that are most useful are the ones that look at how you answer questions and do certain tasks. Even so, your doctor might want to do a brain scan (either CT or MRI) to make sure that your symptoms are not caused by a problem unrelated to dementia.  How is dementia treated? -- That depends on what kind of dementia you have. If you have Alzheimer disease, there are medicines that might help some. If you have vascular dementia, your doctor will focus on keeping your blood pressure and cholesterol as close to normal as possible. Doing that can help reduce further damage to the brain.  Sadly, there really aren't good treatments for most types of dementia. But doctors can sometimes treat troubling symptoms that come with dementia, such as depression or anxiety.  How do I stay safe? -- If you have dementia, you might not be aware of how much your condition affects you. Trust your family and friends to tell you when it is no longer safe for you to drive, cook, or do other things that could be dangerous.  Be aware, too, that people with dementia often fall and hurt themselves. To reduce the risk of falls, it's a good idea to:  · Secure loose rugs or use non-skid backing on rugs  · Tuck away loose wires or electrical cords  · Wear sturdy, comfortable shoes  · Keep walkways well lit  Can dementia be prevented? -- There are no proven ways to prevent dementia. But here are some things that seem to help keep the brain healthy:  · Physical activity  · A healthy diet  · Social interaction  All topics are updated as new evidence becomes available and our peer review process is complete.  This topic retrieved from [email or web address] on: Sep 21,  2021.  Topic 58785 Version 15.0  Release: 29.4.2 - C29.263  © 2021 UpToDate, Inc. and/or its affiliates. All rights reserved.  figure 1: Dementia caused by changes in the brain     Different forms of dementia are linked to changes in the brain. Alzheimer disease causes many parts of the brain to shrink. Parkinson disease damages parts of the brain involved in movement. Vascular dementia happens when the blood vessels that supply the brain are diseased.  Graphic 19201 Version 2.0    Consumer Information Use and Disclaimer   This information is not specific medical advice and does not replace information you receive from your health care provider. This is only a brief summary of general information. It does NOT include all information about conditions, illnesses, injuries, tests, procedures, treatments, therapies, discharge instructions or life-style choices that may apply to you. You must talk with your health care provider for complete information about your health and treatment options. This information should not be used to decide whether or not to accept your health care provider's advice, instructions or recommendations. Only your health care provider has the knowledge and training to provide advice that is right for you. The use of this information is governed by the [employer] User License Agreement, available at https://www.[employer]/en/solutions/[employer]/about/joceline.The use of [employer] content is governed by the [employer] Terms of Use. ©2021 UpToDate, Inc. All rights reserved.  Copyright   © 2021 UpToDate, Inc. and/or its affiliates. All rights reserved.  Patient Education       Tips for Caregivers of People With Alzheimer Disease   The Basics   Written by the doctors and editors at [employer]   What plans should we make for the future? -- After your family member is diagnosed with Alzheimer disease (or another form of dementia), you will need to make plans for their:  · Living situation - If the  "person lives alone, you need to make sure they are safe. If they can't live alone, you need to make a plan. You might have them live at home with help, or move to a nursing home.  · Decision-making - Early on, your family member should choose a "power of ." A power of  is someone who can make decisions for the person when they are no longer able to. It's important for the power of  to talk to the person to understand what they would want in different situations, especially toward the end of life. For example, some people would want to be put on a breathing machine if they could not breathe on their own, while other people would not. It is hard to have these conversations. But it's important to have them as early as possible, while the person is still able to make their own decisions.  · Money - People have trouble understanding how to manage their money as their disease gets worse. If your family member takes care of their own money, you need to check that they do it correctly.  · Driving - If your family member still drives, talk with the doctor about when they should stop. The right time to stop is different for each person.  The symptoms of Alzheimer disease get worse over time. It is important to be aware of this and change your plans regularly as needed.   How can I make the home safe? -- To help keep your family member from getting hurt around the house, you can:  · Keep walkways clear of clutter. If you have loose rugs, remove them or tack them down.  · Put a handrail and non-slip mat in the bathtub or shower.  · Put child-proof locks on cabinets with dangerous items (such as matches or medicines). You can also put child-proof covers on the stove.  How can I keep my family member from wandering away? -- To keep them from wandering away or getting lost, you can:  · Lock the outside door. If your family member can unlock the door, put another lock on the door that they can't " "reach.  · Have them wear or carry identification at all times.  · Put a system in your home that lets you know when people enter or leave.  There are products available that use GPS technology to let you track where your family member is. The Alzheimer's Association also has a program to support caregivers and people who are at risk of wandering. They provide an identification bracelet, necklace, or tag your family member can wear. They can also help if a person does get lost. You can learn more online at: www.medicalert.org/alz.  How can I make daily activities easier? -- To make your daily activities easier, you can:  · Schedule appointments, visits, and activities for times of the day when your family member is at their best.  · Do activities they enjoy or can still help with.  · Plan on taking extra time for activities or to get where you are going.  · Stick to a routine, and avoid new or crowded places.  · Use simple words, short sentences, and a calm voice (but don't use "baby talk"). When you give directions, give only 1 direction at a time.  · Avoid giving them too many choices. For example, offer only 2 choices for lunch.  · Buy clothes and shoes that are easy to put on and take off.  · Remember that it doesn't help to argue. Try to move on to something else.  How can I avoid bladder or bowel accidents? -- In the earlier stages of the disease, you can limit accidents by:  · Having your family member use the toilet every few hours.  · Not giving them drinks before bedtime.  In the later stages of dementia, most people will need to wear a pad or adult diaper.  What if my family member isn't eating enough? -- To get them to eat more, you can:  · Give them many small meals each day, instead of 3 large ones.  · Give them high-protein or high-calorie drinks, such as shakes.  · Make food easier to eat by putting it in a bowl or cutting it up.  · Try making the food taste better by adding spices, sweet and sour " flavoring, or soy sauce.  As the disease gets worse, the person might need to be fed.  How can I help my family member sleep better at night? -- To help them sleep better at night, you can:  · Not let them nap during the day.  · Make sure they get enough exercise and sunlight during the day (but not right before bedtime).  · Open the shades in the morning to let light in.  · Keep their wake up time and bed time the same every day.  · Keep the bedroom quiet, cool, and dark at night.  · Ask the doctor or nurse if any of your family member's medicines might be making their sleep worse.  What if my family member's behavior changes suddenly? -- If the person's behavior changes suddenly, call the doctor or nurse. It could mean they have an infection or another medical problem that requires treatment. That's because symptoms of Alzheimer disease often get worse when people get infections, especially bladder or lung infections.  What if I am having a hard time? -- If you are having a hard time, let the doctor or nurse know so that they can find ways to get you help. Taking care of a person with Alzheimer disease is a tough job, and it usually gets harder as the disease gets worse. Don't feel bad or guilty about asking for help. Make sure to ask for help right away if your family member hurts or threatens you.   One way to get help is to take a break from caregiving. You can hire an aide to help you bathe, dress, or feed your family member. You can also find an adult day care program for your family member to go to during the day.  Another way to get help is to join a support group for caregivers of people with Alzheimer disease. It can help to talk to other people who are going through similar things.  The Alzheimer's Association has a lot of information for caregivers. Their website is www.alz.org. Their toll-free phone number in the [place] is 1-304.347.6523.  All topics are updated as new evidence becomes available and our  peer review process is complete.  This topic retrieved from [employer] on: Sep 21, 2021.  Topic 53696 Version 7.0  Release: 29.4.2 - C29.263  © 2021 UpToDate, Inc. and/or its affiliates. All rights reserved.  Consumer Information Use and Disclaimer   This information is not specific medical advice and does not replace information you receive from your health care provider. This is only a brief summary of general information. It does NOT include all information about conditions, illnesses, injuries, tests, procedures, treatments, therapies, discharge instructions or life-style choices that may apply to you. You must talk with your health care provider for complete information about your health and treatment options. This information should not be used to decide whether or not to accept your health care provider's advice, instructions or recommendations. Only your health care provider has the knowledge and training to provide advice that is right for you. The use of this information is governed by the [employer] User License Agreement, available at https://www.[employer]/en/solutions/[employer]/about/joceline.The use of [employer] content is governed by the [employer] Terms of Use. ©2021 UpToDate, Inc. All rights reserved.  Copyright   © 2021 UpToDate, Inc. and/or its affiliates. All rights reserved.  Patient Education       Dementia ED   General Information   You brought the person you care for to the Emergency Department (ED) for problems related to dementia. This is the medical name for a group of brain disorders that cause memory and thinking problems. People with dementia may:  · Not think clearly, be able to concentrate, or pay attention.  · Forget things or the right word for something.  · Get lost in known places.  · Struggle to care for themselves.  · Become confused or disoriented.  · Become agitated, angry, or violent  · Have problems with bowel or bladder control.  Most of the time dementia starts off with  mild symptoms and slowly gets worse. Sometimes a new medication or a new medical problem can cause a more sudden change in symptoms.  What care is needed at home?   If you care for someone with dementia, you can do the following to help them:  · Call their regular doctor to let them know they were in the ED. Make a follow-up appointment if you were told to.  · Help the person follow their regular doctors instructions to stay healthy. This might include keeping their blood pressure, cholesterol, or high blood sugar (diabetes) under control. Make sure they take all medications as ordered.  · Try to help the person keep a routine that is the same each day. They should wake up and go to bed at the same time each day. Offer them healthy, balanced meals at the same time each day.  · Help the person plan time for exercise, being with others, and rest.  · Do not let the person drive a car unless they have been told that it is safe to do so.  · Daily hygiene is important. This may become harder as the illness progresses. You may need to help the person with bathing, brushing teeth, or dentures.  · Keep dim lights on at night. This may help to prevent falls if the person gets up during the night.  · Use notes and a calendar to help the person remember things. Track daily events, therapy, and doctor visits.  · Make sure to lock doors to stop the person wandering out of the home. Alarms or bells on doors may be a way to alert others that they are leaving. You can also get an ID bracelet or necklace that has the persons name on it, Memory Impaired, and contact information for you or another caregiver.  · It is likely that the person will need more help and supervision at home as their disease progresses. Talk with the person about their symptoms and help them plan for the future.  When do I need to get emergency help?   · Return to the ED if:   ? The person has a sudden change in their ability to think or function.  When do  I need to call the doctor?   · They are not able to eat or sleep normally.  · They are no longer able to stay home safely.  · You are worried they may harm themselves or someone else.  · They have new or worsening symptoms.  Last Reviewed Date   2021-03-09  Consumer Information Use and Disclaimer   This information is not specific medical advice and does not replace information you receive from your health care provider. This is only a brief summary of general information. It does NOT include all information about conditions, illnesses, injuries, tests, procedures, treatments, therapies, discharge instructions or life-style choices that may apply to you. You must talk with your health care provider for complete information about your health and treatment options. This information should not be used to decide whether or not to accept your health care providers advice, instructions or recommendations. Only your health care provider has the knowledge and training to provide advice that is right for you.  Copyright   Copyright © 2021 UpToDate, Inc. and its affiliates and/or licensors. All rights reserved.  Patient Education       Dementia Discharge Instructions   About this topic   Dementia is the medical name for a group of brain disorders that cause memory and thinking problems. People with dementia may:  · Not think clearly, be able to concentrate, or pay attention.  · Forget things or the right word for something.  · Get lost in known places.  · Struggle to care for themselves.  · Become confused or disoriented.  · Become agitated, angry, or violent.  · Have problems with bowel or bladder control.  Most of the time dementia starts off with mild symptoms and slowly gets worse. Sometimes a new medication or a new medical problem can cause a more sudden change in symptoms.  What care is needed at home?   · Ask the doctor what you need to do when you go home. Make sure you ask questions if you do not understand what  the doctor says.  · If you care for someone with dementia, you can do the following to help them:  ? Help the person follow their regular doctors instructions to stay healthy. This might include keeping their blood pressure, cholesterol, or high blood sugar (diabetes) under control. Make sure they take all medications as ordered.  ? Try to help the person keep a routine that is the same each day. They should wake up and go to bed at the same time each day. Offer them healthy, balanced meals at the same time each day.  ? Help the person plan time for exercise, being with others, and rest.  ? Do not let the person drive a car unless they have been told that it is safe to do so.  ? Daily hygiene is important. This may become harder as the illness progresses. You may need to help the person with bathing, brushing teeth, or dentures.  ? Keep dim lights on at night. This may help to prevent falls if the person gets up during the night.  ? Use notes and a calendar to help the person remember things. Track daily events, therapy, and doctor visits.  ? Make sure to lock doors to stop the person wandering out of the home. Alarms or bells on doors may be a way to alert others that they are leaving. You can also get an ID bracelet or necklace that has the persons name on it, Memory Impaired, and contact information for you or another caregiver.  ? It is likely that the person will need more help and supervision at home as their disease progresses. Talk with the person about their symptoms and help them plan for the future.  · Keep your patient safe.  ? Put away sharp objects like knives or scissors. Install separate valve or shut off for stove that the patient is unable to turn on.  ? Make sure any guns are locked away. Keep them safe and where your patient cannot get them.  ? Make sure to lock doors to stop wandering out of the home. Alarms or bells on doors may be a way to alert you that someone is leaving.  ? Have your  "patient wear an ID necklace or bracelet. It should say "Memory Impaired." It should also have a contact phone number, address, and name of the person and a caregiver.  ? Store medications in a secure location.  · Maintain a calm, soothing tone when speaking.  ? Avoid loud music. Play calming music your patient enjoys.  ? Don't argue. Use distraction or change the subject if talk becomes filled with emotion. It is normal for people with dementia to have problems handling their feelings.  ? Use simple words when talking. Offer fewer choices. For example, ask "Do you want to wear the blue shirt or the red shirt?" rather than "What do you want to wear?"  What follow-up care is needed?   · The doctor may ask you to make visits to the office to check on your patient's progress. Be sure to keep these visits. Follow-up is a vital part of living with this illness.   · The doctor may send your patient to a psychiatrist or psychologist to help with other problems. These may include problems with behavior, sleep, mood, or worries.  What drugs may be needed?   The doctor may order drugs to:  · Slow progression of dementia  · Treat worry, anger, or low mood  · Control signs like seeing or hearing things that others cannot  · Treat other illnesses  Make sure that drugs are given as directed by the doctor. Some of the drugs may cause unwanted effects. Be sure to talk to the doctor if this happens.  Will physical activity be limited?   · Daily exercise is important. This may help some signs and will help keep muscle tone and balance.  · As the disease progresses, activities like driving and working with machines should be limited. This can keep you, your family member, and other people safe.  What problems could happen?   · Change in behavior ? Your patient may lose interest in things they once enjoyed. They may be irritable and have very high and low moods. They may show sexual behavior that is upsetting to others.  · Loss of " memory ? Getting lost in familiar places happens often. So does forgetting common recipes. Your patient may miss meetings or leave the stove on. The patient may lose keys or glasses.  · Loss of ability to function ? Your patient may have trouble with bill paying or other household chores. Shopping or other things in a normal daily routine may be unfamiliar.  · Loss of ability to communicate ? Your patient may have problems finding the right word. This may progress to not being able to express needs. The patient may not be able to understand directions or instructions.  · Harm towards others and self ? This is often not planned but may be a problem. It may be a result of changes in balance or depth perception. A decline in skills or how to stay safe can also cause injury.  · Injuries and illnesses ? Lung infections or bedsores may happen because of limited activity or a drop in function.  When do I need to call the doctor?   · The person has a sudden change in their ability to think or function.  · They are not able to eat or sleep normally.  · They are no longer able to stay home safely.  · You are worried they may harm themselves or someone else.  Helpful tips   · Join support groups, such as the Alzheimer's Association, to get to know others and families who are living with this illness.  · Caregivers must take time to care for themselves to prevent burnout. Ask family or friends to give care for a few hours a few times a week.  · Talk to doctor or  about day care programs in your area.  · Learn about respite care. This will let you take time off or tend to your own health needs. Your patient will still be cared for.  · Be sure there is someone to help you in case you become ill or are not able to care for your patient. This is a back-up plan.  · Find out if there is a living will or power of  for health care issues. What are your patient's wishes?  Teach Back: Helping You Understand   The  Teach Back Method helps you understand the information we are giving you. After you talk with the staff, tell them in your own words what you learned. This helps to make sure the staff has described each thing clearly. It also helps to explain things that may have been confusing. Before going home, make sure you can do these:  · I can tell you about dementia.  · I can tell you ways to help keep a person with dementia safe at home.  · I can tell you what I will do if I am worried I cannot care for my patient with dementia safely.  Where can I learn more?   American Academy of [employer]icians  https://familydoctor.org/condition/dementia/   [facility] Choices  https://www.nhs.uk/conditions/vascular-dementia/   [facility] Choices  https://www.nhs.uk/conditions/Alzheimers-disease/   Last Reviewed Date   2021-06-10  Consumer Information Use and Disclaimer   This information is not specific medical advice and does not replace information you receive from your health care provider. This is only a brief summary of general information. It does NOT include all information about conditions, illnesses, injuries, tests, procedures, treatments, therapies, discharge instructions or life-style choices that may apply to you. You must talk with your health care provider for complete information about your health and treatment options. This information should not be used to decide whether or not to accept your health care providers advice, instructions or recommendations. Only your health care provider has the knowledge and training to provide advice that is right for you.  Copyright   Copyright © 2021 [employer], [employer] and its affiliates and/or licensors. All rights reserved.  Patient Education       Dementia Discharge Instructions   About this topic   Dementia is the medical name for a group of brain disorders that cause memory and thinking problems. People with dementia may:  · Not think clearly, be able to concentrate, or pay  attention.  · Forget things or the right word for something.  · Get lost in known places.  · Struggle to care for themselves.  · Become confused or disoriented.  · Become agitated, angry, or violent.  · Have problems with bowel or bladder control.  Most of the time dementia starts off with mild symptoms and slowly gets worse. Sometimes a new medication or a new medical problem can cause a more sudden change in symptoms.  What care is needed at home?   · Ask the doctor what you need to do when you go home. Make sure you ask questions if you do not understand what the doctor says.  · If you care for someone with dementia, you can do the following to help them:  ? Help the person follow their regular doctors instructions to stay healthy. This might include keeping their blood pressure, cholesterol, or high blood sugar (diabetes) under control. Make sure they take all medications as ordered.  ? Try to help the person keep a routine that is the same each day. They should wake up and go to bed at the same time each day. Offer them healthy, balanced meals at the same time each day.  ? Help the person plan time for exercise, being with others, and rest.  ? Do not let the person drive a car unless they have been told that it is safe to do so.  ? Daily hygiene is important. This may become harder as the illness progresses. You may need to help the person with bathing, brushing teeth, or dentures.  ? Keep dim lights on at night. This may help to prevent falls if the person gets up during the night.  ? Use notes and a calendar to help the person remember things. Track daily events, therapy, and doctor visits.  ? Make sure to lock doors to stop the person wandering out of the home. Alarms or bells on doors may be a way to alert others that they are leaving. You can also get an ID bracelet or necklace that has the persons name on it, Memory Impaired, and contact information for you or another caregiver.  ? It is likely that  "the person will need more help and supervision at home as their disease progresses. Talk with the person about their symptoms and help them plan for the future.  · Keep your patient safe.  ? Put away sharp objects like knives or scissors. Install separate valve or shut off for stove that the patient is unable to turn on.  ? Make sure any guns are locked away. Keep them safe and where your patient cannot get them.  ? Make sure to lock doors to stop wandering out of the home. Alarms or bells on doors may be a way to alert you that someone is leaving.  ? Have your patient wear an ID necklace or bracelet. It should say "Memory Impaired." It should also have a contact phone number, address, and name of the person and a caregiver.  ? Store medications in a secure location.  · Maintain a calm, soothing tone when speaking.  ? Avoid loud music. Play calming music your patient enjoys.  ? Don't argue. Use distraction or change the subject if talk becomes filled with emotion. It is normal for people with dementia to have problems handling their feelings.  ? Use simple words when talking. Offer fewer choices. For example, ask "Do you want to wear the blue shirt or the red shirt?" rather than "What do you want to wear?"  What follow-up care is needed?   · The doctor may ask you to make visits to the office to check on your patient's progress. Be sure to keep these visits. Follow-up is a vital part of living with this illness.   · The doctor may send your patient to a psychiatrist or psychologist to help with other problems. These may include problems with behavior, sleep, mood, or worries.  What drugs may be needed?   The doctor may order drugs to:  · Slow progression of dementia  · Treat worry, anger, or low mood  · Control signs like seeing or hearing things that others cannot  · Treat other illnesses  Make sure that drugs are given as directed by the doctor. Some of the drugs may cause unwanted effects. Be sure to talk to the " doctor if this happens.  Will physical activity be limited?   · Daily exercise is important. This may help some signs and will help keep muscle tone and balance.  · As the disease progresses, activities like driving and working with machines should be limited. This can keep you, your family member, and other people safe.  What problems could happen?   · Change in behavior ? Your patient may lose interest in things they once enjoyed. They may be irritable and have very high and low moods. They may show sexual behavior that is upsetting to others.  · Loss of memory ? Getting lost in familiar places happens often. So does forgetting common recipes. Your patient may miss meetings or leave the stove on. The patient may lose keys or glasses.  · Loss of ability to function ? Your patient may have trouble with bill paying or other household chores. Shopping or other things in a normal daily routine may be unfamiliar.  · Loss of ability to communicate ? Your patient may have problems finding the right word. This may progress to not being able to express needs. The patient may not be able to understand directions or instructions.  · Harm towards others and self ? This is often not planned but may be a problem. It may be a result of changes in balance or depth perception. A decline in skills or how to stay safe can also cause injury.  · Injuries and illnesses ? Lung infections or bedsores may happen because of limited activity or a drop in function.  When do I need to call the doctor?   · The person has a sudden change in their ability to think or function.  · They are not able to eat or sleep normally.  · They are no longer able to stay home safely.  · You are worried they may harm themselves or someone else.  Helpful tips   · Join support groups, such as the Alzheimer's Association, to get to know others and families who are living with this illness.  · Caregivers must take time to care for themselves to prevent burnout. Ask  family or friends to give care for a few hours a few times a week.  · Talk to doctor or  about day care programs in your area.  · Learn about respite care. This will let you take time off or tend to your own health needs. Your patient will still be cared for.  · Be sure there is someone to help you in case you become ill or are not able to care for your patient. This is a back-up plan.  · Find out if there is a living will or power of  for health care issues. What are your patient's wishes?  Teach Back: Helping You Understand   The Teach Back Method helps you understand the information we are giving you. After you talk with the staff, tell them in your own words what you learned. This helps to make sure the staff has described each thing clearly. It also helps to explain things that may have been confusing. Before going home, make sure you can do these:  · I can tell you about dementia.  · I can tell you ways to help keep a person with dementia safe at home.  · I can tell you what I will do if I am worried I cannot care for my patient with dementia safely.  Where can I learn more?   American Academy of [employer] Physicians  https://familydoctor.org/condition/dementia/   [facility] Choices  https://www.nhs.uk/conditions/vascular-dementia/   [facility] Choices  https://www.nhs.uk/conditions/Alzheimers-disease/   Last Reviewed Date   2021-06-10  Consumer Information Use and Disclaimer   This information is not specific medical advice and does not replace information you receive from your health care provider. This is only a brief summary of general information. It does NOT include all information about conditions, illnesses, injuries, tests, procedures, treatments, therapies, discharge instructions or life-style choices that may apply to you. You must talk with your health care provider for complete information about your health and treatment options. This information should not be used to decide whether or not to  accept your health care providers advice, instructions or recommendations. Only your health care provider has the knowledge and training to provide advice that is right for you.  Copyright   Copyright © 2021 UpToDate, Inc. and its affiliates and/or licensors. All rights reserved.  Patient Education       Dementia ED   General Information   You brought the person you care for to the Emergency Department (ED) for problems related to dementia. This is the medical name for a group of brain disorders that cause memory and thinking problems. People with dementia may:  · Not think clearly, be able to concentrate, or pay attention.  · Forget things or the right word for something.  · Get lost in known places.  · Struggle to care for themselves.  · Become confused or disoriented.  · Become agitated, angry, or violent  · Have problems with bowel or bladder control.  Most of the time dementia starts off with mild symptoms and slowly gets worse. Sometimes a new medication or a new medical problem can cause a more sudden change in symptoms.  What care is needed at home?   If you care for someone with dementia, you can do the following to help them:  · Call their regular doctor to let them know they were in the ED. Make a follow-up appointment if you were told to.  · Help the person follow their regular doctors instructions to stay healthy. This might include keeping their blood pressure, cholesterol, or high blood sugar (diabetes) under control. Make sure they take all medications as ordered.  · Try to help the person keep a routine that is the same each day. They should wake up and go to bed at the same time each day. Offer them healthy, balanced meals at the same time each day.  · Help the person plan time for exercise, being with others, and rest.  · Do not let the person drive a car unless they have been told that it is safe to do so.  · Daily hygiene is important. This may become harder as the illness progresses. You may  need to help the person with bathing, brushing teeth, or dentures.  · Keep dim lights on at night. This may help to prevent falls if the person gets up during the night.  · Use notes and a calendar to help the person remember things. Track daily events, therapy, and doctor visits.  · Make sure to lock doors to stop the person wandering out of the home. Alarms or bells on doors may be a way to alert others that they are leaving. You can also get an ID bracelet or necklace that has the persons name on it, Memory Impaired, and contact information for you or another caregiver.  · It is likely that the person will need more help and supervision at home as their disease progresses. Talk with the person about their symptoms and help them plan for the future.  When do I need to get emergency help?   · Return to the ED if:   ? The person has a sudden change in their ability to think or function.  When do I need to call the doctor?   · They are not able to eat or sleep normally.  · They are no longer able to stay home safely.  · You are worried they may harm themselves or someone else.  · They have new or worsening symptoms.  Last Reviewed Date   2021-03-09  Consumer Information Use and Disclaimer   This information is not specific medical advice and does not replace information you receive from your health care provider. This is only a brief summary of general information. It does NOT include all information about conditions, illnesses, injuries, tests, procedures, treatments, therapies, discharge instructions or life-style choices that may apply to you. You must talk with your health care provider for complete information about your health and treatment options. This information should not be used to decide whether or not to accept your health care providers advice, instructions or recommendations. Only your health care provider has the knowledge and training to provide advice that is right for you.  Copyright  "  Copyright © 2021 UpToDate, Inc. and its affiliates and/or licensors. All rights reserved.  Patient Education       Tips for Caregivers of People With Alzheimer Disease   The Basics   Written by the doctors and editors at [facility]   What plans should we make for the future? -- After your family member is diagnosed with Alzheimer disease (or another form of dementia), you will need to make plans for their:  · Living situation - If the person lives alone, you need to make sure they are safe. If they can't live alone, you need to make a plan. You might have them live at home with help, or move to a nursing home.  · Decision-making - Early on, your family member should choose a "power of ." A power of  is someone who can make decisions for the person when they are no longer able to. It's important for the power of  to talk to the person to understand what they would want in different situations, especially toward the end of life. For example, some people would want to be put on a breathing machine if they could not breathe on their own, while other people would not. It is hard to have these conversations. But it's important to have them as early as possible, while the person is still able to make their own decisions.  · Money - People have trouble understanding how to manage their money as their disease gets worse. If your family member takes care of their own money, you need to check that they do it correctly.  · Driving - If your family member still drives, talk with the doctor about when they should stop. The right time to stop is different for each person.  The symptoms of Alzheimer disease get worse over time. It is important to be aware of this and change your plans regularly as needed.   How can I make the home safe? -- To help keep your family member from getting hurt around the house, you can:  · Keep walkways clear of clutter. If you have loose rugs, remove them or tack them " "down.  · Put a handrail and non-slip mat in the bathtub or shower.  · Put child-proof locks on cabinets with dangerous items (such as matches or medicines). You can also put child-proof covers on the stove.  How can I keep my family member from wandering away? -- To keep them from wandering away or getting lost, you can:  · Lock the outside door. If your family member can unlock the door, put another lock on the door that they can't reach.  · Have them wear or carry identification at all times.  · Put a system in your home that lets you know when people enter or leave.  There are products available that use GPS technology to let you track where your family member is. The Alzheimer's Association also has a program to support caregivers and people who are at risk of wandering. They provide an identification bracelet, necklace, or tag your family member can wear. They can also help if a person does get lost. You can learn more online at: www.medicalert.org/alz.  How can I make daily activities easier? -- To make your daily activities easier, you can:  · Schedule appointments, visits, and activities for times of the day when your family member is at their best.  · Do activities they enjoy or can still help with.  · Plan on taking extra time for activities or to get where you are going.  · Stick to a routine, and avoid new or crowded places.  · Use simple words, short sentences, and a calm voice (but don't use "baby talk"). When you give directions, give only 1 direction at a time.  · Avoid giving them too many choices. For example, offer only 2 choices for lunch.  · Buy clothes and shoes that are easy to put on and take off.  · Remember that it doesn't help to argue. Try to move on to something else.  How can I avoid bladder or bowel accidents? -- In the earlier stages of the disease, you can limit accidents by:  · Having your family member use the toilet every few hours.  · Not giving them drinks before bedtime.  In " the later stages of dementia, most people will need to wear a pad or adult diaper.  What if my family member isn't eating enough? -- To get them to eat more, you can:  · Give them many small meals each day, instead of 3 large ones.  · Give them high-protein or high-calorie drinks, such as shakes.  · Make food easier to eat by putting it in a bowl or cutting it up.  · Try making the food taste better by adding spices, sweet and sour flavoring, or soy sauce.  As the disease gets worse, the person might need to be fed.  How can I help my family member sleep better at night? -- To help them sleep better at night, you can:  · Not let them nap during the day.  · Make sure they get enough exercise and sunlight during the day (but not right before bedtime).  · Open the shades in the morning to let light in.  · Keep their wake up time and bed time the same every day.  · Keep the bedroom quiet, cool, and dark at night.  · Ask the doctor or nurse if any of your family member's medicines might be making their sleep worse.  What if my family member's behavior changes suddenly? -- If the person's behavior changes suddenly, call the doctor or nurse. It could mean they have an infection or another medical problem that requires treatment. That's because symptoms of Alzheimer disease often get worse when people get infections, especially bladder or lung infections.  What if I am having a hard time? -- If you are having a hard time, let the doctor or nurse know so that they can find ways to get you help. Taking care of a person with Alzheimer disease is a tough job, and it usually gets harder as the disease gets worse. Don't feel bad or guilty about asking for help. Make sure to ask for help right away if your family member hurts or threatens you.   One way to get help is to take a break from caregiving. You can hire an aide to help you bathe, dress, or feed your family member. You can also find an adult day care program for your  family member to go to during the day.  Another way to get help is to join a support group for caregivers of people with Alzheimer disease. It can help to talk to other people who are going through similar things.  The Alzheimer's Association has a lot of information for caregivers. Their website is www.alz.org. Their toll-free phone number in the [place] is 1-376.812.1851.  All topics are updated as new evidence becomes available and our peer review process is complete.  This topic retrieved from [employer] on: Sep 21, 2021.  Topic 89170 Version 7.0  Release: 29.4.2 - C29.263  © 2021 UpToDate, Inc. and/or its affiliates. All rights reserved.  Consumer Information Use and Disclaimer   This information is not specific medical advice and does not replace information you receive from your health care provider. This is only a brief summary of general information. It does NOT include all information about conditions, illnesses, injuries, tests, procedures, treatments, therapies, discharge instructions or life-style choices that may apply to you. You must talk with your health care provider for complete information about your health and treatment options. This information should not be used to decide whether or not to accept your health care provider's advice, instructions or recommendations. Only your health care provider has the knowledge and training to provide advice that is right for you. The use of this information is governed by the [employer] End User License Agreement, available at https://[email or web address]/en/solutions/SocialDefender/about/joceline.The use of [employer] content is governed by the [employer] Terms of Use. ©2021 UpToDate, Inc. All rights reserved.  Copyright   © 2021 UpToDate, Inc. and/or its affiliates. All rights reserved.  Patient Education       Dementia (Including Alzheimer Disease)   The Basics   Written by the doctors and editors at [employer]   What is dementia? -- Dementia is the general term for a group of  "brain disorders that cause memory problems and make it hard to think clearly (figure 1).  What symptoms does dementia cause? -- The symptoms of dementia often start off very mild and get worse slowly. Symptoms can include:  · Forgetfulness  · Acting confused or disoriented  · Trouble with speech and writing (for example, not being able to find the right words for things)  · Trouble concentrating and reasoning  · Problems with tasks such as paying bills or balancing a checkbook  · Getting lost in familiar places  As dementia gets worse, people might:  · Have episodes of anger or aggression  · See things that aren't there or believe things that aren't true  · Be unable to eat, bathe, dress, or do other everyday tasks  · Lose bladder and bowel control  What are the different kinds of dementia? -- The most common kinds include:  · Alzheimer disease - Alzheimer disease is the most common cause of dementia. It is a disorder in which brain cells slowly die over time.  · Vascular dementia - Vascular dementia happens when parts of the brain do not get enough blood. This can happen when blood vessels in the brain get blocked with blood clots or damaged by high blood pressure or aging. This form of dementia is most common among people who have had strokes or who are at risk for strokes.  · Parkinson disease dementia - Parkinson disease is a brain disorder that affects movement. It causes trembling, stiffness, and slowness. As Parkinson disease gets worse, some people develop dementia. "Lewy body dementia" is a related form of dementia.   · Other causes of dementia - Dementia can also happen if a person's brain has been damaged. For example, having many head injuries can lead to dementia.  Should I see a doctor or nurse? -- Yes, you should see a doctor or nurse if you think you or someone close to you is showing signs of dementia. Sometimes memory loss and confusion are caused by medical problems other than dementia that can be " treated. For example, people with diabetes sometimes show signs of confusion when their blood sugar is not well controlled.  Are there tests I should have? -- Your doctor or nurse will decide which tests you should have based on your individual situation. Many people with signs of dementia do not need a brain scan. That's because the tests that are most useful are the ones that look at how you answer questions and do certain tasks. Even so, your doctor might want to do a brain scan (either CT or MRI) to make sure that your symptoms are not caused by a problem unrelated to dementia.  How is dementia treated? -- That depends on what kind of dementia you have. If you have Alzheimer disease, there are medicines that might help some. If you have vascular dementia, your doctor will focus on keeping your blood pressure and cholesterol as close to normal as possible. Doing that can help reduce further damage to the brain.  Sadly, there really aren't good treatments for most types of dementia. But doctors can sometimes treat troubling symptoms that come with dementia, such as depression or anxiety.  How do I stay safe? -- If you have dementia, you might not be aware of how much your condition affects you. Trust your family and friends to tell you when it is no longer safe for you to drive, cook, or do other things that could be dangerous.  Be aware, too, that people with dementia often fall and hurt themselves. To reduce the risk of falls, it's a good idea to:  · Secure loose rugs or use non-skid backing on rugs  · Tuck away loose wires or electrical cords  · Wear sturdy, comfortable shoes  · Keep walkways well lit  Can dementia be prevented? -- There are no proven ways to prevent dementia. But here are some things that seem to help keep the brain healthy:  · Physical activity  · A healthy diet  · Social interaction  All topics are updated as new evidence becomes available and our peer review process is complete.  This topic  retrieved from [employer] on: Sep 21, 2021.  Topic 42303 Version 15.0  Release: 29.4.2 - C29.263  © 2021 UpToDate, Inc. and/or its affiliates. All rights reserved.  figure 1: Dementia caused by changes in the brain     Different forms of dementia are linked to changes in the brain. Alzheimer disease causes many parts of the brain to shrink. Parkinson disease damages parts of the brain involved in movement. Vascular dementia happens when the blood vessels that supply the brain are diseased.  Graphic 59499 Version 2.0    Consumer Information Use and Disclaimer   This information is not specific medical advice and does not replace information you receive from your health care provider. This is only a brief summary of general information. It does NOT include all information about conditions, illnesses, injuries, tests, procedures, treatments, therapies, discharge instructions or life-style choices that may apply to you. You must talk with your health care provider for complete information about your health and treatment options. This information should not be used to decide whether or not to accept your health care provider's advice, instructions or recommendations. Only your health care provider has the knowledge and training to provide advice that is right for you. The use of this information is governed by the [employer] End User License Agreement, available at https://[email or web address].[email or web address] Biologist/en/solutions/iConnectivity/about/joceline.The use of [employer] content is governed by the [employer] Terms of Use. ©2021 UpToDate, Inc. All rights reserved.  Copyright   © 2021 UpToDate, Inc. and/or its affiliates. All rights reserved.  Patient Education       Alzheimer Disease Discharge Instructions   About this topic   Alzheimer's is a disease that causes changes in brain function. It is a type of dementia that affects your behavior and judgment. It also affects how you process information. You may have problems talking with others or  remembering things. Some people have trouble solving problems and finding words when speaking. Alzheimer disease is not a normal part of aging. The disease gets worse over time.  Currently, there is no cure for Alzheimer disease. Treatment includes drugs to improve memory and slow down the worsening of the disease.  What care is needed at home?   · Ask your doctor what you need to do when you go home. Make sure you ask questions if you do not understand what the doctor says. This way you will know what you need to do to care for your patient.  · Share information about Alzheimer with the rest of the family. Talk about how you can help the person with Alzheimer.  · Keep a quiet and calm home for your patient. This will help lower fear and distress.  ? Give a structured daily schedule that is the same each day. Help your patient wake up and go to bed at the same time each day.  ? Give a healthy balanced diet at the same time each day. Avoid drinks with caffeine and alcohol.  ? Plan time for exercise, being with others, and rest.  ? Daily hygiene is important. Help your patient continue to get dressed as they normally would. Help them with make up or cologne. Daily hygiene is important. Bathing may become harder as Alzheimer disease progresses. Less often bathing, or washing at the sink, may be needed.  ? Be sure to help with brushing teeth or dentures.  ? Make a calming bedtime routine: Warm milk, soft music, reading aloud, dimming of lights.  ? Keep dim lights on at night. This may help to avoid distress if your patient wakes up at night.  · Help your patient remember.  ? Keep a record each day. This will give your patient a written memory of daily events. It may help your patient keep track of everyday life and guests.  ? Keep a calendar with all office visits on it like therapy and doctor visits. Try not to miss any visits.  · Keep your patient safe.  ? Put away sharp objects like knives or scissors.  ? Put  "childproof locks on cabinets that hold items like poisons, chemicals, drugs, and lighters.  ? Make sure any guns are locked away. Keep them safe and where your patient cannot get them.  ? Make sure to lock doors to stop wandering out of the home. Alarms or bells on doors may be a way to alert you that someone is leaving.  ? Have your patient wear an ID necklace or bracelet. It should say "Memory Impaired." It should also have a contact phone number, address, and name of patient and caregiver.  ? Do not allow driving when the doctor says your patient has to stop driving. This is often a very hard issue. You may have to hide keys and disable the car.  · Help your patient by using a calm, soothing tone when speaking.  ? Avoid loud music. Play calming music your patient enjoys.  ? Don't argue. Keep your voice calm and soothing. Use distraction or change the subject if talk becomes filled with emotion. It is normal for people with Alzheimer disease to have strong reactions to situations.  ? Use simple words when talking. Offer fewer choices. For example, ask "Do you want to wear the blue shirt or the red shirt?" rather than "What do you want to wear?"  What follow-up care is needed?   · The doctor may request visits to the office to check on your patient's progress. Be sure to keep these visits. Follow-up is an important part of the treatment of this disease.   · The doctor may send your patient to a psychiatrist or psychologist to help with behavior concerns with low mood, sleep issues, or distress.  What drugs may be needed?   The doctor may order drugs to:  · Improve memory  · Slow worsening of the disease  · Treat tension or distress  The doctor may stop drugs which could cause confusion, falls, or accidents.  Will physical activity be limited?   · Daily exercise is important. This may help some of your patient's signs, and will help keep muscle tone and balance.  · As the disease progresses, activities like driving " and working with machines should be limited. This can keep the patient and other people safe.  What changes to diet are needed?   · Offer foods that include a balanced diet, with lots of vegetables, fruits, and grains.  · Have your family member drink lots of water and other fluids.  What problems could happen?   · Change in behavior ? Your patient may lose interest in things they once enjoyed. They may be irritable and have very high and low moods. They may show sexual behavior that is upsetting to others.  · Loss of memory ? Getting lost in familiar places happens often. So does forgetting common recipes. Your patient may miss meetings or leave the stove on. They may lose their keys or glasses.  · Loss of ability to function ? Your patient may have trouble with bill paying or other household chores. Shopping or other things in a normal daily routine may be unfamiliar.  · Loss of ability to communicate ? They may have problems finding the right word. This may progress to not being able to express needs. The patient may not be able to understand directions or instructions.  · Harm towards others and self ? This is often not planned but may be a problem. It may be a result of changes in balance or depth perception. A decline in skills or how to stay safe can also cause injury.  · Injuries and illnesses ? Lung infections or bedsores may happen because of limited activity or a drop in function.  What can be done to prevent this health problem?   There is no known way to prevent this illness. Still, some things may improve or slow the signs:  · Stay involved in social and mental activities.  · Keep the brain busy.  · Exercise often.  · Keep a normal weight.  · Eat a balanced diet, including foods with Omega-3 fatty acids and antioxidants.  · Manage blood pressure and blood sugars.  When do I need to call the doctor?   · You are worried the patient may harm himself or someone else  · Patient has a sudden change in mental  status or function  · Patient cannot eat or sleep  · You are no longer able to care for the patient safely at home  · Health problem is not better or the patient is feeling worse  Helpful tips   · Join support groups, such as the Alzheimer's Association, to meet others and their families who are living with this disease.  · Caregivers must take time out to care for themselves to prevent burnout. Ask family or friends to provide care for a few hours every few days.  · Talk to doctor or  about day care programs in the area.  · Look for resources for care so that you can take a vacation or get some time to yourself.  · Have a back up person to care for your patient in case you get sick or need a break.  · Find out if there is a living will or power of  for health care issues. What are your patient's wishes?  Teach Back: Helping You Understand   The Teach Back Method helps you understand the information we are giving you. After you talk with the staff, tell them in your own words what you learned. This helps to make sure the staff has described each thing clearly. It also helps to explain things that may have been confusing. Before going home, make sure you can do these:  · I can tell you about Alzheimer's disease.  · I can tell you ways to help keep my Alzheimer's patient safe at home.  · I can tell you what I will do if I am worried I cannot care for my Alzheimer's patient safely.  Where can I learn more?   Alzheimer's Disease Research  http://www.brightfocus.org/alzheimers/livingwith/caregiving.html   National [place] on Aging  https://www.[place].nih.gov/health/next-steps-after-alzheimers-diagnosis   National [place] on Aging  https://www.[place].nih.gov/health/what-alzheimers-disease   Last Reviewed Date   2020-03-27  Consumer Information Use and Disclaimer   This information is not specific medical advice and does not replace information you receive from your health care provider. This is only a  brief summary of general information. It does NOT include all information about conditions, illnesses, injuries, tests, procedures, treatments, therapies, discharge instructions or life-style choices that may apply to you. You must talk with your health care provider for complete information about your health and treatment options. This information should not be used to decide whether or not to accept your health care providers advice, instructions or recommendations. Only your health care provider has the knowledge and training to provide advice that is right for you.  Copyright   Copyright © 2021 UpToDate, Inc. and its affiliates and/or licensors. All rights reserved.  Patient Education       Donepezil (pe[place] NEP e zil)   Brand Names: [place] Aricept   Brand Names: Anthony ACCEL-Donepezil [DSC]; ACH-Donepezil; ACT Donepezil ODT; ACT Donepezil [DSC]; AG-Donepezil; APO-Donepezil; Aricept; Aricept RDT; Auro-Donepezil; BIO-Donepezil; JAMP-Donepezil; M-Donepezil; Mar-Donepezil; MINT-Donepezil; MYLAN-Donepezil [DSC]; KAREEM-Donepezil; PMS-Donepezil; PRIVA-Donepezil; BRIAN-Donepezil [DSC]; SANDOZ Donepezil; SANDOZ Donepezil ODT [DSC]; Septa-Donepezil; TARO-Donepezil; TEVA-Donepezil; VAN-Donepezil [DSC]   What is this drug used for?   · It is used to treat dementia in people with Alzheimer's disease.  · It may be given to you for other reasons. Talk with the doctor.    What do I need to tell my doctor BEFORE I take this drug?   · If you have an allergy to donepezil or any other part of this drug.  · If you are allergic to this drug; any part of this drug; or any other drugs, foods, or substances. Tell your doctor about the allergy and what signs you had.  This drug may interact with other drugs or health problems.  Tell your doctor and pharmacist about all of your drugs (prescription or OTC, natural products, vitamins) and health problems. You must check to make sure that it is safe for you to take this drug with all of your drugs  and health problems. Do not start, stop, or change the dose of any drug without checking with your doctor.  What are some things I need to know or do while I take this drug?   · Tell all of your health care providers that you take this drug. This includes your doctors, nurses, pharmacists, and dentists.  · This drug is not a cure for Alzheimer's disease. Stay under the care of your doctor.  · Tell your doctor if you are pregnant, plan on getting pregnant, or are breast-feeding. You will need to talk about the benefits and risks to you and the baby.    What are some side effects that I need to call my doctor about right away?   WARNING/CAUTION: Even though it may be rare, some people may have very bad and sometimes deadly side effects when taking a drug. Tell your doctor or get medical help right away if you have any of the following signs or symptoms that may be related to a very bad side effect:  · Signs of an allergic reaction, like rash; hives; itching; red, swollen, blistered, or peeling skin with or without fever; wheezing; tightness in the chest or throat; trouble breathing, swallowing, or talking; unusual hoarseness; or swelling of the mouth, face, lips, tongue, or throat.  · Very bad dizziness or passing out.  · Slow heartbeat.  · A heartbeat that does not feel normal.  · Trouble passing urine.  · Seizures.  · Heartburn.  · Very bad belly pain.  · Throwing up blood or throw up that looks like coffee grounds.  · Black, tarry, or bloody stools.  · Trouble breathing that is new or worse.  What are some other side effects of this drug?   All drugs may cause side effects. However, many people have no side effects or only have minor side effects. Call your doctor or get medical help if any of these side effects or any other side effects bother you or do not go away:  · Feeling dizzy, tired, or weak.  · Diarrhea, upset stomach, or throwing up.  · Trouble sleeping.  · Muscle cramps.  · Not hungry.  · Weight  loss.  · Headache.  These are not all of the side effects that may occur. If you have questions about side effects, call your doctor. Call your doctor for medical advice about side effects.  You may report side effects to your national health agency.  You may report side effects to the FDA at 1-507.895.3995. You may also report side effects at https://www.fda.gov/medwatch.  How is this drug best taken?   Use this drug as ordered by your doctor. Read all information given to you. Follow all instructions closely.  All products:   · Keep taking this drug as you have been told by your doctor or other health care provider, even if you feel well.  · Take this drug at bedtime.  · Take with or without food.  · Drink lots of noncaffeine liquids unless told to drink less liquid by your doctor.  23 mg tablets:   · Swallow whole. Do not chew, break, or crush.  Oral-disintegrating tablet:   · Wash and dry your hands before you take this drug. Do not touch the tablet with wet or damp hands.  · Place on the tongue and let dissolve.  · Do not swallow it whole.  · Do not take this drug out of the blister pack until you are ready to take it. Take this drug right away after opening the blister pack. Do not store the removed drug for future use.  · Drink a glass of water after the tablet has dissolved.  What do I do if I miss a dose?   · Skip the missed dose and go back to your normal time.  · Do not take 2 doses at the same time or extra doses.  · If you miss 7 days of this drug, call your doctor to find out what to do.    How do I store and/or throw out this drug?   · Store at room temperature.  · Store in a dry place. Do not store in a bathroom.  · Keep all drugs in a safe place. Keep all drugs out of the reach of children and pets.  · Throw away unused or  drugs. Do not flush down a toilet or pour down a drain unless you are told to do so. Check with your pharmacist if you have questions about the best way to throw out  drugs. There may be drug take-back programs in your area.    General drug facts   · If your symptoms or health problems do not get better or if they become worse, call your doctor.  · Do not share your drugs with others and do not take anyone else's drugs.  · Some drugs may have another patient information leaflet. If you have any questions about this drug, please talk with your doctor, nurse, pharmacist, or other health care provider.  · Some drugs may have another patient information leaflet. Check with your pharmacist. If you have any questions about this drug, please talk with your doctor, nurse, pharmacist, or other health care provider.  · If you think there has been an overdose, call your poison control center or get medical care right away. Be ready to tell or show what was taken, how much, and when it happened.    Consumer Information Use and Disclaimer   This generalized information is a limited summary of diagnosis, treatment, and/or medication information. It is not meant to be comprehensive and should be used as a tool to help the user understand and/or assess potential diagnostic and treatment options. It does NOT include all information about conditions, treatments, medications, side effects, or risks that may apply to a specific patient. It is not intended to be medical advice or a substitute for the medical advice, diagnosis, or treatment of a health care provider based on the health care provider's examination and assessment of a patient's specific and unique circumstances. Patients must speak with a health care provider for complete information about their health, medical questions, and treatment options, including any risks or benefits regarding use of medications. This information does not endorse any treatments or medications as safe, effective, or approved for treating a specific patient. UpToDate, Inc. and its affiliates disclaim any warranty or liability relating to this information or the  use thereof. The use of this information is governed by the Terms of Use, available at https://www.[facility] [facility].com/en/solutions/lexicomp/about/joceline.  Last Reviewed Date   2019-10-08  Copyright   © 2021 UpToDate, Inc. and its affiliates and/or licensors. All rights reserved.  Patient Education       Memantine (me MAN teen)   Brand Names: [place] Namenda Titration [place]; Namenda XR; Namenda XR Titration Pack [DSC]; Namenda [DSC]   Brand Names: Anthony ACT Memantine; APO-Memantine; Ebixa; MED-Memantine; PMS-Memantine; RAN-Memantine; RATIO-Memantine [DSC]; BRIAN-Memantine; SANDOZ Memantine FCT   What is this drug used for?   · It is used to treat dementia in people with Alzheimer's disease.    What do I need to tell my doctor BEFORE I take this drug?   · If you are allergic to this drug; any part of this drug; or any other drugs, foods, or substances. Tell your doctor about the allergy and what signs you had.  This drug may interact with other drugs or health problems.  Tell your doctor and pharmacist about all of your drugs (prescription or OTC, natural products, vitamins) and health problems. You must check to make sure that it is safe for you to take this drug with all of your drugs and health problems. Do not start, stop, or change the dose of any drug without checking with your doctor.  What are some things I need to know or do while I take this drug?   · Tell all of your health care providers that you take this drug. This includes your doctors, nurses, pharmacists, and dentists.  · Tell your doctor if you are pregnant, plan on getting pregnant, or are breast-feeding. You will need to talk about the benefits and risks to you and the baby.    What are some side effects that I need to call my doctor about right away?   WARNING/CAUTION: Even though it may be rare, some people may have very bad and sometimes deadly side effects when taking a drug. Tell your doctor or get medical help right away if you have any of the  following signs or symptoms that may be related to a very bad side effect:  · Signs of an allergic reaction, like rash; hives; itching; red, swollen, blistered, or peeling skin with or without fever; wheezing; tightness in the chest or throat; trouble breathing, swallowing, or talking; unusual hoarseness; or swelling of the mouth, face, lips, tongue, or throat.  · Feeling confused.  What are some other side effects of this drug?   All drugs may cause side effects. However, many people have no side effects or only have minor side effects. Call your doctor or get medical help if any of these side effects or any other side effects bother you or do not go away:  · Dizziness.  · Headache.  · Diarrhea or constipation.  These are not all of the side effects that may occur. If you have questions about side effects, call your doctor. Call your doctor for medical advice about side effects.  You may report side effects to your national health agency.  You may report side effects to the FDA at 1-603.266.1037. You may also report side effects at https://www.fda.gov/medwatch.  How is this drug best taken?   Use this drug as ordered by your doctor. Read all information given to you. Follow all instructions closely.  All products:   · Take with or without food.  · Keep taking this drug as you have been told by your doctor or other health care provider, even if you feel well.  Liquid (solution):   · Measure liquid doses carefully. Use the measuring device that comes with this drug.  · Do not mix with any liquid.  Extended-release capsules:   · Swallow whole. Do not chew or crush.  · Do not take any capsules that do not look normal or are damaged.  · If you cannot swallow this drug whole, you may sprinkle the contents on applesauce. If you do this, swallow the mixture right away without chewing.  What do I do if I miss a dose?   · Skip the missed dose and go back to your normal time.  · Do not take 2 doses at the same time or extra  doses.  · If you miss taking this drug for a few days in a row, call your doctor before you start taking it again.    How do I store and/or throw out this drug?   · Store at room temperature in a dry place. Do not store in a bathroom.  · Keep all drugs in a safe place. Keep all drugs out of the reach of children and pets.  · Throw away unused or  drugs. Do not flush down a toilet or pour down a drain unless you are told to do so. Check with your pharmacist if you have questions about the best way to throw out drugs. There may be drug take-back programs in your area.    General drug facts   · If your symptoms or health problems do not get better or if they become worse, call your doctor.  · Do not share your drugs with others and do not take anyone else's drugs.  · Some drugs may have another patient information leaflet. If you have any questions about this drug, please talk with your doctor, nurse, pharmacist, or other health care provider.  · Some drugs may have another patient information leaflet. Check with your pharmacist. If you have any questions about this drug, please talk with your doctor, nurse, pharmacist, or other health care provider.  · If you think there has been an overdose, call your poison control center or get medical care right away. Be ready to tell or show what was taken, how much, and when it happened.    Consumer Information Use and Disclaimer   This generalized information is a limited summary of diagnosis, treatment, and/or medication information. It is not meant to be comprehensive and should be used as a tool to help the user understand and/or assess potential diagnostic and treatment options. It does NOT include all information about conditions, treatments, medications, side effects, or risks that may apply to a specific patient. It is not intended to be medical advice or a substitute for the medical advice, diagnosis, or treatment of a health care provider based on the health  care provider's examination and assessment of a patient's specific and unique circumstances. Patients must speak with a health care provider for complete information about their health, medical questions, and treatment options, including any risks or benefits regarding use of medications. This information does not endorse any treatments or medications as safe, effective, or approved for treating a specific patient. UpToDate, Inc. and its affiliates disclaim any warranty or liability relating to this information or the use thereof. The use of this information is governed by the Terms of Use, available at https://www.[employer].R2integrated/en/solutions/lexicomp/about/joceline.  Last Reviewed Date   2020-01-10  Copyright   © 2021 UpToDate, Inc. and its affiliates and/or licensors. All rights reserved.  For behavioral problems I recommended that family to try some of the following communication tips: Try not to react and stay calm, Don't argue , Do not use logic, Let the patient feel safe, Keep reminding the patient and use photos if necessary, Distract the patient, Search for things to distract the person, then talk about what you found. For example, talk about a photograph or keepsake or even food, Use gentle touching or hugging to show you care, Body language matters, Use a cooling off period if needed, when possible. If safe to do so, give the patient some space or breathing room. Will consider antipsychotic medications as a last resort because of the risk of CAD and CVD.    Recommend reading the book: The 36-Hour Day and there are many online and printed resources to help caregivers as well.     For More Information About Hallucinations, Delusions, and Paranoia in Alzheimer's   [facility] Alzheimer's and related Dementias Education and Referral (ADEAR) Center   1-387.523.2983 (toll-free)   adear@[place].nih.gov   www.[place].nih.gov/alzheimers   The National [place] on Aging's ADEAR Center offers information and free print publications  about Alzheimer's disease and related dementias for families, caregivers, and health professionals. [facility] staff answer telephone, email, and written requests and make referrals to local and national resources      PREVENTION OF DELIRIUM       1. Good hydration and avoid electrolyte imbalance  2. Recognize andtreat infections immediately especially UTI.  3. Bladder emptying and prevent constipation.   4. Provide stimulating activities and familiar objects  5. Use eyeglasses and hearing aids if needed.   6. Use simple and regular communication about people, current place and time  7. Mobility and range-of-motion exercises  8. Reduce noise, lighting and avoid sleep interruptions  9. Non-narcotic pain management.  10.Nondrug treatment for sleep problems or anxiety  11. Avoid antihistamines.  12. Avoid narcotics.  13. Avoid benzodiazepines.
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
## 2022-01-27 ENCOUNTER — OFFICE VISIT (OUTPATIENT)
Dept: OPHTHALMOLOGY | Facility: CLINIC | Age: 72
End: 2022-01-27
Payer: MEDICARE

## 2022-01-27 ENCOUNTER — LAB VISIT (OUTPATIENT)
Dept: LAB | Facility: HOSPITAL | Age: 72
End: 2022-01-27
Attending: NURSE PRACTITIONER
Payer: MEDICARE

## 2022-01-27 DIAGNOSIS — T45.1X5A IMMUNODEFICIENCY DUE TO CHEMOTHERAPY: ICD-10-CM

## 2022-01-27 DIAGNOSIS — I10 ESSENTIAL HYPERTENSION: ICD-10-CM

## 2022-01-27 DIAGNOSIS — D84.821 IMMUNODEFICIENCY DUE TO CHEMOTHERAPY: ICD-10-CM

## 2022-01-27 DIAGNOSIS — I50.42 CHRONIC COMBINED SYSTOLIC AND DIASTOLIC CHF (CONGESTIVE HEART FAILURE): ICD-10-CM

## 2022-01-27 DIAGNOSIS — E66.9 OBESITY (BMI 30.0-34.9): ICD-10-CM

## 2022-01-27 DIAGNOSIS — D50.8 IRON DEFICIENCY ANEMIA SECONDARY TO INADEQUATE DIETARY IRON INTAKE: ICD-10-CM

## 2022-01-27 DIAGNOSIS — Z17.0 MALIGNANT NEOPLASM OF LOWER-INNER QUADRANT OF LEFT BREAST IN FEMALE, ESTROGEN RECEPTOR POSITIVE: ICD-10-CM

## 2022-01-27 DIAGNOSIS — H25.12 NUCLEAR SCLEROSIS OF LEFT EYE: Primary | ICD-10-CM

## 2022-01-27 DIAGNOSIS — I25.118 CORONARY ARTERY DISEASE OF NATIVE ARTERY OF NATIVE HEART WITH STABLE ANGINA PECTORIS: ICD-10-CM

## 2022-01-27 DIAGNOSIS — H40.013 OPEN ANGLE WITH BORDERLINE FINDINGS AND LOW GLAUCOMA RISK IN BOTH EYES: ICD-10-CM

## 2022-01-27 DIAGNOSIS — G31.84 MILD COGNITIVE IMPAIRMENT WITH MEMORY LOSS: ICD-10-CM

## 2022-01-27 DIAGNOSIS — F03.90 DEMENTIA WITHOUT BEHAVIORAL DISTURBANCE, UNSPECIFIED DEMENTIA TYPE: ICD-10-CM

## 2022-01-27 DIAGNOSIS — M19.042 PRIMARY OSTEOARTHRITIS OF BOTH HANDS: ICD-10-CM

## 2022-01-27 DIAGNOSIS — M19.041 PRIMARY OSTEOARTHRITIS OF BOTH HANDS: ICD-10-CM

## 2022-01-27 DIAGNOSIS — Z90.12 HISTORY OF LEFT MASTECTOMY: ICD-10-CM

## 2022-01-27 DIAGNOSIS — F10.21 HISTORY OF ALCOHOLISM: ICD-10-CM

## 2022-01-27 DIAGNOSIS — I27.20 PULMONARY HTN: ICD-10-CM

## 2022-01-27 DIAGNOSIS — Z79.899 IMMUNODEFICIENCY DUE TO CHEMOTHERAPY: ICD-10-CM

## 2022-01-27 DIAGNOSIS — J43.9 PULMONARY EMPHYSEMA, UNSPECIFIED EMPHYSEMA TYPE: ICD-10-CM

## 2022-01-27 DIAGNOSIS — R32 URINARY INCONTINENCE, UNSPECIFIED TYPE: ICD-10-CM

## 2022-01-27 DIAGNOSIS — D17.1 LIPOMA OF ABDOMINAL WALL: ICD-10-CM

## 2022-01-27 DIAGNOSIS — N39.0 URINARY TRACT INFECTION WITHOUT HEMATURIA, SITE UNSPECIFIED: ICD-10-CM

## 2022-01-27 DIAGNOSIS — I70.0 ATHEROSCLEROSIS OF AORTA: ICD-10-CM

## 2022-01-27 DIAGNOSIS — R41.3 MEMORY LOSS: ICD-10-CM

## 2022-01-27 DIAGNOSIS — C50.312 MALIGNANT NEOPLASM OF LOWER-INNER QUADRANT OF LEFT BREAST IN FEMALE, ESTROGEN RECEPTOR POSITIVE: ICD-10-CM

## 2022-01-27 DIAGNOSIS — R93.1 DECREASED CARDIAC EJECTION FRACTION: ICD-10-CM

## 2022-01-27 DIAGNOSIS — F17.200 TOBACCO USE DISORDER: ICD-10-CM

## 2022-01-27 DIAGNOSIS — M81.0 AGE-RELATED OSTEOPOROSIS WITHOUT CURRENT PATHOLOGICAL FRACTURE: ICD-10-CM

## 2022-01-27 LAB
HCYS SERPL-SCNC: 23.2 UMOL/L (ref 4–15.5)
TSH SERPL DL<=0.005 MIU/L-ACNC: 0.77 UIU/ML (ref 0.4–4)
VIT B12 SERPL-MCNC: 603 PG/ML (ref 210–950)

## 2022-01-27 PROCEDURE — 86592 SYPHILIS TEST NON-TREP QUAL: CPT | Performed by: NURSE PRACTITIONER

## 2022-01-27 PROCEDURE — 36415 COLL VENOUS BLD VENIPUNCTURE: CPT | Performed by: NURSE PRACTITIONER

## 2022-01-27 PROCEDURE — 84443 ASSAY THYROID STIM HORMONE: CPT | Performed by: NURSE PRACTITIONER

## 2022-01-27 PROCEDURE — 1160F PR REVIEW ALL MEDS BY PRESCRIBER/CLIN PHARMACIST DOCUMENTED: ICD-10-PCS | Mod: CPTII,S$GLB,, | Performed by: STUDENT IN AN ORGANIZED HEALTH CARE EDUCATION/TRAINING PROGRAM

## 2022-01-27 PROCEDURE — 99203 OFFICE O/P NEW LOW 30 MIN: CPT | Mod: S$GLB,,, | Performed by: STUDENT IN AN ORGANIZED HEALTH CARE EDUCATION/TRAINING PROGRAM

## 2022-01-27 PROCEDURE — 4010F ACE/ARB THERAPY RXD/TAKEN: CPT | Mod: CPTII,S$GLB,, | Performed by: STUDENT IN AN ORGANIZED HEALTH CARE EDUCATION/TRAINING PROGRAM

## 2022-01-27 PROCEDURE — 82746 ASSAY OF FOLIC ACID SERUM: CPT | Performed by: NURSE PRACTITIONER

## 2022-01-27 PROCEDURE — 87389 HIV-1 AG W/HIV-1&-2 AB AG IA: CPT | Performed by: NURSE PRACTITIONER

## 2022-01-27 PROCEDURE — 86255 FLUORESCENT ANTIBODY SCREEN: CPT | Mod: 59 | Performed by: NURSE PRACTITIONER

## 2022-01-27 PROCEDURE — 99203 PR OFFICE/OUTPT VISIT, NEW, LEVL III, 30-44 MIN: ICD-10-PCS | Mod: S$GLB,,, | Performed by: STUDENT IN AN ORGANIZED HEALTH CARE EDUCATION/TRAINING PROGRAM

## 2022-01-27 PROCEDURE — 1159F MED LIST DOCD IN RCRD: CPT | Mod: CPTII,S$GLB,, | Performed by: STUDENT IN AN ORGANIZED HEALTH CARE EDUCATION/TRAINING PROGRAM

## 2022-01-27 PROCEDURE — 83921 ORGANIC ACID SINGLE QUANT: CPT | Performed by: NURSE PRACTITIONER

## 2022-01-27 PROCEDURE — 99999 PR PBB SHADOW E&M-EST. PATIENT-LVL III: ICD-10-PCS | Mod: PBBFAC,,, | Performed by: STUDENT IN AN ORGANIZED HEALTH CARE EDUCATION/TRAINING PROGRAM

## 2022-01-27 PROCEDURE — 99999 PR PBB SHADOW E&M-EST. PATIENT-LVL III: CPT | Mod: PBBFAC,,, | Performed by: STUDENT IN AN ORGANIZED HEALTH CARE EDUCATION/TRAINING PROGRAM

## 2022-01-27 PROCEDURE — 86235 NUCLEAR ANTIGEN ANTIBODY: CPT | Mod: 59 | Performed by: NURSE PRACTITIONER

## 2022-01-27 PROCEDURE — 86039 ANTINUCLEAR ANTIBODIES (ANA): CPT | Performed by: NURSE PRACTITIONER

## 2022-01-27 PROCEDURE — 1159F PR MEDICATION LIST DOCUMENTED IN MEDICAL RECORD: ICD-10-PCS | Mod: CPTII,S$GLB,, | Performed by: STUDENT IN AN ORGANIZED HEALTH CARE EDUCATION/TRAINING PROGRAM

## 2022-01-27 PROCEDURE — 4010F PR ACE/ARB THEARPY RXD/TAKEN: ICD-10-PCS | Mod: CPTII,S$GLB,, | Performed by: STUDENT IN AN ORGANIZED HEALTH CARE EDUCATION/TRAINING PROGRAM

## 2022-01-27 PROCEDURE — 83090 ASSAY OF HOMOCYSTEINE: CPT | Performed by: NURSE PRACTITIONER

## 2022-01-27 PROCEDURE — 82607 VITAMIN B-12: CPT | Performed by: NURSE PRACTITIONER

## 2022-01-27 PROCEDURE — 86038 ANTINUCLEAR ANTIBODIES: CPT | Performed by: NURSE PRACTITIONER

## 2022-01-27 PROCEDURE — 84425 ASSAY OF VITAMIN B-1: CPT | Performed by: NURSE PRACTITIONER

## 2022-01-27 PROCEDURE — 1160F RVW MEDS BY RX/DR IN RCRD: CPT | Mod: CPTII,S$GLB,, | Performed by: STUDENT IN AN ORGANIZED HEALTH CARE EDUCATION/TRAINING PROGRAM
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
## 2022-01-28 LAB
ANA PATTERN 1: NORMAL
ANA SER QL IF: POSITIVE
ANA TITR SER IF: NORMAL {TITER}
FOLATE SERPL-MCNC: >40 NG/ML (ref 4–24)
RPR SER QL: NORMAL

## 2022-01-28 PROCEDURE — G0180 PR HOME HEALTH MD CERTIFICATION: ICD-10-PCS | Mod: ,,, | Performed by: NURSE PRACTITIONER

## 2022-01-28 PROCEDURE — G0180 MD CERTIFICATION HHA PATIENT: HCPCS | Mod: ,,, | Performed by: NURSE PRACTITIONER

## 2022-01-31 LAB — HIV 1+2 AB+HIV1 P24 AG SERPL QL IA: NEGATIVE

## 2022-02-01 LAB
ANTI SM ANTIBODY: 0.09 RATIO (ref 0–0.99)
ANTI SM/RNP ANTIBODY: 0.06 RATIO (ref 0–0.99)
ANTI-SM INTERPRETATION: NEGATIVE
ANTI-SM/RNP INTERPRETATION: NEGATIVE
ANTI-SSA ANTIBODY: 0.05 RATIO (ref 0–0.99)
ANTI-SSA INTERPRETATION: NEGATIVE
ANTI-SSB ANTIBODY: 0.08 RATIO (ref 0–0.99)
ANTI-SSB INTERPRETATION: NEGATIVE
DSDNA AB SER-ACNC: NORMAL [IU]/ML
METHYLMALONATE SERPL-SCNC: 0.69 UMOL/L
VIT B1 BLD-MCNC: 78 UG/L (ref 38–122)

## 2022-02-01 RX ORDER — CYANOCOBALAMIN 1000 UG/ML
1000 INJECTION, SOLUTION INTRAMUSCULAR; SUBCUTANEOUS
Qty: 4 ML | Refills: 2 | Status: SHIPPED | OUTPATIENT
Start: 2022-02-01 | End: 2022-05-23

## 2022-02-01 RX ORDER — MULTIVITAMIN
1 TABLET ORAL DAILY
Qty: 30 TABLET | Refills: 2 | Status: SHIPPED | OUTPATIENT
Start: 2022-02-01 | End: 2022-05-02

## 2022-02-01 NOTE — PROGRESS NOTES
Labs Results:    02-    Vit B12 603, MMA 0.69 ^, HC 23.2,  HIV neg, SPEEDY Neg, SPEEDY screen +, RPR Neg, FA ^40.0, TSH NL,       Vit. B12 replacement recommended related to elevated MMA, weekly B 12 injections and also recommend daily Multivitamin related to elevated HC. Rx sent to [facility] on file

## 2022-02-11 ENCOUNTER — PATIENT MESSAGE (OUTPATIENT)
Dept: NEUROLOGY | Facility: CLINIC | Age: 72
End: 2022-02-11
Payer: MEDICARE

## 2022-02-11 LAB — CASPR2-IGG CBA: NEGATIVE

## 2022-02-14 ENCOUNTER — PATIENT MESSAGE (OUTPATIENT)
Dept: FAMILY MEDICINE | Facility: CLINIC | Age: 72
End: 2022-02-14
Payer: MEDICARE

## 2022-02-14 DIAGNOSIS — N39.0 ACUTE UTI: Primary | ICD-10-CM

## 2022-02-14 LAB
AMPHIPHYSIN AB TITR SER: NEGATIVE TITER
CV2 IGG TITR SER: NEGATIVE TITER
GLIAL NUC TYPE 1 AB TITR SER: NEGATIVE TITER
HU1 AB TITR SER: NEGATIVE TITER
HU2 AB TITR SER IF: NEGATIVE TITER
HU3 AB TITR SER: NEGATIVE TITER
IMMUNOLOGIST REVIEW: ABNORMAL
NACHR AB SER-SCNC: 0 NMOL/L
PAVAL REFLEX TEST ADDED: ABNORMAL
PCA-1 AB TITR SER: NEGATIVE TITER
PCA-TR AB TITR SER: NEGATIVE TITER
PURKINJE CELL CYTOPLASMIC AB TYPE2: NEGATIVE TITER
VGCC-P/Q BIND AB SER-SCNC: 0.04 NMOL/L
VGKC AB SER-SCNC: 0.01 NMOL/L

## 2022-02-16 ENCOUNTER — LAB VISIT (OUTPATIENT)
Dept: LAB | Facility: HOSPITAL | Age: 72
End: 2022-02-16
Attending: INTERNAL MEDICINE
Payer: MEDICARE

## 2022-02-16 DIAGNOSIS — N39.0 URINARY TRACT INFECTIOUS DISEASE: Primary | ICD-10-CM

## 2022-02-16 DIAGNOSIS — I27.20 PULMONARY HTN: ICD-10-CM

## 2022-02-16 DIAGNOSIS — I50.42 CHRONIC COMBINED SYSTOLIC AND DIASTOLIC CHF (CONGESTIVE HEART FAILURE): ICD-10-CM

## 2022-02-16 DIAGNOSIS — I10 ESSENTIAL HYPERTENSION: Primary | ICD-10-CM

## 2022-02-16 LAB
ANION GAP SERPL CALC-SCNC: 9 MMOL/L (ref 8–16)
BACTERIA #/AREA URNS HPF: ABNORMAL /HPF
BILIRUB UR QL STRIP: NEGATIVE
BNP SERPL-MCNC: 97 PG/ML (ref 0–99)
BUN SERPL-MCNC: 19 MG/DL (ref 8–23)
CALCIUM SERPL-MCNC: 10 MG/DL (ref 8.7–10.5)
CHLORIDE SERPL-SCNC: 96 MMOL/L (ref 95–110)
CLARITY UR: CLEAR
CO2 SERPL-SCNC: 31 MMOL/L (ref 23–29)
COLOR UR: YELLOW
CREAT SERPL-MCNC: 1.4 MG/DL (ref 0.5–1.4)
EST. GFR  (AFRICAN AMERICAN): 43.9 ML/MIN/1.73 M^2
EST. GFR  (NON AFRICAN AMERICAN): 38.1 ML/MIN/1.73 M^2
GLUCOSE SERPL-MCNC: 89 MG/DL (ref 70–110)
GLUCOSE UR QL STRIP: NEGATIVE
HGB UR QL STRIP: NEGATIVE
KETONES UR QL STRIP: NEGATIVE
LEUKOCYTE ESTERASE UR QL STRIP: NEGATIVE
MICROSCOPIC COMMENT: ABNORMAL
NITRITE UR QL STRIP: POSITIVE
PH UR STRIP: 6 [PH] (ref 5–8)
POTASSIUM SERPL-SCNC: 4.8 MMOL/L (ref 3.5–5.1)
PROT UR QL STRIP: NEGATIVE
SODIUM SERPL-SCNC: 136 MMOL/L (ref 136–145)
SP GR UR STRIP: 1.01 (ref 1–1.03)
URN SPEC COLLECT METH UR: ABNORMAL
WBC #/AREA URNS HPF: 1 /HPF (ref 0–5)

## 2022-02-16 PROCEDURE — 80048 BASIC METABOLIC PNL TOTAL CA: CPT | Performed by: INTERNAL MEDICINE

## 2022-02-16 PROCEDURE — 87088 URINE BACTERIA CULTURE: CPT | Performed by: FAMILY MEDICINE

## 2022-02-16 PROCEDURE — 87186 SC STD MICRODIL/AGAR DIL: CPT | Performed by: FAMILY MEDICINE

## 2022-02-16 PROCEDURE — 87086 URINE CULTURE/COLONY COUNT: CPT | Performed by: FAMILY MEDICINE

## 2022-02-16 PROCEDURE — 81000 URINALYSIS NONAUTO W/SCOPE: CPT | Performed by: FAMILY MEDICINE

## 2022-02-16 PROCEDURE — 36415 COLL VENOUS BLD VENIPUNCTURE: CPT | Mod: PO | Performed by: INTERNAL MEDICINE

## 2022-02-16 PROCEDURE — 83880 ASSAY OF NATRIURETIC PEPTIDE: CPT | Performed by: INTERNAL MEDICINE

## 2022-02-16 PROCEDURE — 87077 CULTURE AEROBIC IDENTIFY: CPT | Performed by: FAMILY MEDICINE

## 2022-02-17 ENCOUNTER — TELEPHONE (OUTPATIENT)
Dept: CARDIOLOGY | Facility: CLINIC | Age: 72
End: 2022-02-17
Payer: MEDICARE

## 2022-02-17 ENCOUNTER — LAB VISIT (OUTPATIENT)
Dept: LAB | Facility: HOSPITAL | Age: 72
End: 2022-02-17
Attending: FAMILY MEDICINE
Payer: MEDICARE

## 2022-02-17 ENCOUNTER — TELEPHONE (OUTPATIENT)
Dept: NEUROLOGY | Facility: CLINIC | Age: 72
End: 2022-02-17
Payer: MEDICARE

## 2022-02-17 ENCOUNTER — OFFICE VISIT (OUTPATIENT)
Dept: CARDIOLOGY | Facility: CLINIC | Age: 72
End: 2022-02-17
Payer: MEDICARE

## 2022-02-17 VITALS
DIASTOLIC BLOOD PRESSURE: 68 MMHG | TEMPERATURE: 76 F | OXYGEN SATURATION: 97 % | BODY MASS INDEX: 26.24 KG/M2 | WEIGHT: 138.88 LBS | SYSTOLIC BLOOD PRESSURE: 118 MMHG

## 2022-02-17 DIAGNOSIS — Z17.0 MALIGNANT NEOPLASM OF LOWER-INNER QUADRANT OF LEFT BREAST IN FEMALE, ESTROGEN RECEPTOR POSITIVE: ICD-10-CM

## 2022-02-17 DIAGNOSIS — I25.118 CORONARY ARTERY DISEASE OF NATIVE ARTERY OF NATIVE HEART WITH STABLE ANGINA PECTORIS: Chronic | ICD-10-CM

## 2022-02-17 DIAGNOSIS — C50.312 MALIGNANT NEOPLASM OF LOWER-INNER QUADRANT OF LEFT BREAST IN FEMALE, ESTROGEN RECEPTOR POSITIVE: ICD-10-CM

## 2022-02-17 DIAGNOSIS — I27.20 PULMONARY HTN: ICD-10-CM

## 2022-02-17 DIAGNOSIS — J43.9 PULMONARY EMPHYSEMA, UNSPECIFIED EMPHYSEMA TYPE: ICD-10-CM

## 2022-02-17 DIAGNOSIS — I50.42 CHRONIC COMBINED SYSTOLIC AND DIASTOLIC CHF (CONGESTIVE HEART FAILURE): Primary | ICD-10-CM

## 2022-02-17 DIAGNOSIS — I70.0 ATHEROSCLEROSIS OF AORTA: ICD-10-CM

## 2022-02-17 DIAGNOSIS — I10 ESSENTIAL HYPERTENSION: Chronic | ICD-10-CM

## 2022-02-17 DIAGNOSIS — I50.42 CHRONIC COMBINED SYSTOLIC AND DIASTOLIC CHF (CONGESTIVE HEART FAILURE): Primary | Chronic | ICD-10-CM

## 2022-02-17 DIAGNOSIS — I10 ESSENTIAL HYPERTENSION: ICD-10-CM

## 2022-02-17 LAB
CREAT SERPL-MCNC: 1.2 MG/DL (ref 0.5–1.4)
EST. GFR  (AFRICAN AMERICAN): 52.9 ML/MIN/1.73 M^2
EST. GFR  (NON AFRICAN AMERICAN): 45.9 ML/MIN/1.73 M^2

## 2022-02-17 PROCEDURE — 1159F MED LIST DOCD IN RCRD: CPT | Mod: CPTII,S$GLB,, | Performed by: INTERNAL MEDICINE

## 2022-02-17 PROCEDURE — 1126F PR PAIN SEVERITY QUANTIFIED, NO PAIN PRESENT: ICD-10-PCS | Mod: CPTII,S$GLB,, | Performed by: INTERNAL MEDICINE

## 2022-02-17 PROCEDURE — 99214 OFFICE O/P EST MOD 30 MIN: CPT | Mod: S$GLB,,, | Performed by: INTERNAL MEDICINE

## 2022-02-17 PROCEDURE — 99214 PR OFFICE/OUTPT VISIT, EST, LEVL IV, 30-39 MIN: ICD-10-PCS | Mod: S$GLB,,, | Performed by: INTERNAL MEDICINE

## 2022-02-17 PROCEDURE — 4010F ACE/ARB THERAPY RXD/TAKEN: CPT | Mod: CPTII,S$GLB,, | Performed by: INTERNAL MEDICINE

## 2022-02-17 PROCEDURE — 3008F PR BODY MASS INDEX (BMI) DOCUMENTED: ICD-10-PCS | Mod: CPTII,S$GLB,, | Performed by: INTERNAL MEDICINE

## 2022-02-17 PROCEDURE — 82565 ASSAY OF CREATININE: CPT | Performed by: NURSE PRACTITIONER

## 2022-02-17 PROCEDURE — 3078F DIAST BP <80 MM HG: CPT | Mod: CPTII,S$GLB,, | Performed by: INTERNAL MEDICINE

## 2022-02-17 PROCEDURE — 1159F PR MEDICATION LIST DOCUMENTED IN MEDICAL RECORD: ICD-10-PCS | Mod: CPTII,S$GLB,, | Performed by: INTERNAL MEDICINE

## 2022-02-17 PROCEDURE — 99999 PR PBB SHADOW E&M-EST. PATIENT-LVL IV: CPT | Mod: PBBFAC,,, | Performed by: INTERNAL MEDICINE

## 2022-02-17 PROCEDURE — 99999 PR PBB SHADOW E&M-EST. PATIENT-LVL IV: ICD-10-PCS | Mod: PBBFAC,,, | Performed by: INTERNAL MEDICINE

## 2022-02-17 PROCEDURE — 1160F RVW MEDS BY RX/DR IN RCRD: CPT | Mod: CPTII,S$GLB,, | Performed by: INTERNAL MEDICINE

## 2022-02-17 PROCEDURE — 4010F PR ACE/ARB THEARPY RXD/TAKEN: ICD-10-PCS | Mod: CPTII,S$GLB,, | Performed by: INTERNAL MEDICINE

## 2022-02-17 PROCEDURE — 3074F SYST BP LT 130 MM HG: CPT | Mod: CPTII,S$GLB,, | Performed by: INTERNAL MEDICINE

## 2022-02-17 PROCEDURE — 1160F PR REVIEW ALL MEDS BY PRESCRIBER/CLIN PHARMACIST DOCUMENTED: ICD-10-PCS | Mod: CPTII,S$GLB,, | Performed by: INTERNAL MEDICINE

## 2022-02-17 PROCEDURE — 3008F BODY MASS INDEX DOCD: CPT | Mod: CPTII,S$GLB,, | Performed by: INTERNAL MEDICINE

## 2022-02-17 PROCEDURE — 3074F PR MOST RECENT SYSTOLIC BLOOD PRESSURE < 130 MM HG: ICD-10-PCS | Mod: CPTII,S$GLB,, | Performed by: INTERNAL MEDICINE

## 2022-02-17 PROCEDURE — 3078F PR MOST RECENT DIASTOLIC BLOOD PRESSURE < 80 MM HG: ICD-10-PCS | Mod: CPTII,S$GLB,, | Performed by: INTERNAL MEDICINE

## 2022-02-17 PROCEDURE — 1126F AMNT PAIN NOTED NONE PRSNT: CPT | Mod: CPTII,S$GLB,, | Performed by: INTERNAL MEDICINE

## 2022-02-17 PROCEDURE — 36415 COLL VENOUS BLD VENIPUNCTURE: CPT | Performed by: NURSE PRACTITIONER

## 2022-02-17 NOTE — PROGRESS NOTES
Subjective:   Patient ID:  Leia Rodriguez is a 71 y.o. female who presents for follow up of No chief complaint on file.      72 yo female, 2 months f/u  PMH CAD, CHFmEF, h/o beast cancer 4 yrs ago lumpectomy and chemo, recurrent in  s/p mastectomy and chemo ongoing, and HTN  Quit smoking in  after 50 yrs smoking.  Some residual left chest pain after mastectomy,   GRISSOM and fatigue after fast walking,   No palpitation, leg swelling, dizziness and faint.    ekg in  NSR and TWI on V2 to v6 and inferior leads   ECHO normal EF and severe LVH   ECHo EF 45%, mod MR and LAE  Weight stable    03/2021 admitted for CHF exacerbation.  and Troponin 0.44 flat. elg NSR and TWI on lateral leads. Improved SOB after diuresis. Then BNP dropped to 58  Now SOB sable. Sleeps on 1 pillow    07/2021 visit   echo Day 15, cycle 6 Biplane=44% 4D LVQ=45% Avg GPLS= -16.1   MPI inferoapical scar  GRISSOM while long distance walking  No chest pain, dizziness faint, leg swelling  Decent appetite  F/u with Dr. Fernández on stage I HER2 positive breast carcinoma being treated with Herceptin q. 3 weeks     visit  Dynamical TWI on EKG   MPI showed apical inferior fixed perfusion defect  No chest pain . Limited walking due to lower back pain  No orthopnea      visit  No chest pain. GRISSOM mild and chronic. Limited activity due to fatigue and leg pain.   S/p LHC done on 10/12/2021, distal % OM2/3 40% and midRCA 50% lesion and EF 45% and LVEDP 17 mmHG. Moderate MR. Continue medical Rx  Serial echo studies    Echo 2/22/21 Cycle 4 Day 20  4dLVQ=47%  AutoEF=48%  BRIDGETT unable to obtain accurate measurements     5-26-21  Day 15, cycle 6  Biplane=44%  4D LVQ=45%  Avg GPLS= -16.1     9-13-21 Day 20, Cycle 11  Biplane=42%  4D LVQ=49%  Avg GPLS= -14.8%    12/2021 visit  11/ went to ER [facility]. EKG sinus tachy and PVCs. BNP up to 800. CXR pulm. Edema. Added lasix 20 mg daily  Now dyspnea  improved. No leg swelling chest pain. Sleeps on 1 pillow and no PND.   On iron infusion rx fro anemia    Interval history  BNP normal and Cr elevated 1.4. will hold lasix  No leg swelling, dizziness sob orthopnea.   On fluid restriction          Past Medical History:   Diagnosis Date    Arthritis     EDGAR HANDS, KNEES    Blind right eye     Traumatic    Breast cancer 06/15/2017    0.8 cm Grade1 INTRADUCTAL BREAST 9 positve margin (left)    Essential hypertension     Hemorrhoids     Lipoma of abdominal wall     Obesity     Overactive bladder     Pap smear abnormality of cervix with ASCUS favoring benign     Thyroid nodule     Tobacco use disorder     Tubular adenoma of colon     Urinary incontinence        Past Surgical History:   Procedure Laterality Date    ANTERIOR VAGINAL REPAIR      BLADDER SURGERY      BREAST LUMPECTOMY Left 2017    CATARACT EXTRACTION Right      SECTION      X2    COLONOSCOPY N/A 3/8/2017    Procedure: COLONOSCOPY;  Surgeon: Tyron Paris MD;  Location: [facility];  Service: Endoscopy;  Laterality: N/A;    COLONOSCOPY N/A 2020    Procedure: COLONOSCOPY;  Surgeon: Keira Ellison MD;  Location: [facility];  Service: Endoscopy;  Laterality: N/A;    ESOPHAGOGASTRODUODENOSCOPY N/A 2020    Procedure: EGD (ESOPHAGOGASTRODUODENOSCOPY);  Surgeon: Keira Ellison MD;  Location: [facility];  Service: Endoscopy;  Laterality: N/A;  new onset iron deficiency with prior history of breast cancer    INTRALUMINAL GASTROINTESTINAL TRACT IMAGING VIA CAPSULE N/A 10/28/2020    Procedure: IMAGING PROCEDURE, GI TRACT, INTRALUMINAL, VIA CAPSULE;  Surgeon: Finesse Jha RN;  Location: [facility];  Service: Endoscopy;  Laterality: N/A;    LEFT HEART CATHETERIZATION Left 10/12/2021    Procedure: CATHETERIZATION, HEART, LEFT;  Surgeon: Tiffanie Santos MD;  Location: [facility];  Service: Cardiology;  Laterality: Left;    SENTINEL LYMPH NODE BIOPSY Left 2020     Procedure: BIOPSY, LYMPH NODE, SENTINEL;  Surgeon: Vincent Moyer MD;  Location: [facility] OR;  Service: General;  Laterality: Left;    SIMPLE MASTECTOMY Left 2020    Procedure: MASTECTOMY, SIMPLE;  Surgeon: Vincent Moyer MD;  Location: [facility] OR;  Service: General;  Laterality: Left;    THYROID LOBECTOMY Left     TOTAL ABDOMINAL HYSTERECTOMY      TUBAL LIGATION         Social History     Tobacco Use    Smoking status: Former Smoker     Packs/day: 1.00     Years: 50.00     Pack years: 50.00     Types: Cigarettes     Quit date: 2020     Years since quittin.5    Smokeless tobacco: Never Used   Substance Use Topics    Alcohol use: Never     Alcohol/week: 28.0 standard drinks     Types: 28 Cans of beer per week    Drug use: No       Family History   Problem Relation Age of Onset    Hypertension Father     Cataracts Father     Prostate cancer Father 80    Cervical cancer Daughter 32    Allergic rhinitis Daughter     Cirrhosis Mother     Alcohol abuse Mother     Hypertension Daughter     Diabetes Brother     Heart attack Brother     Heart murmur Brother     No Known Problems Daughter          Review of Systems   Constitutional: Positive for malaise/fatigue. Negative for decreased appetite, diaphoresis, fever and night sweats.   HENT: Negative for nosebleeds.    Eyes: Negative for blurred vision and double vision.   Cardiovascular: Positive for dyspnea on exertion. Negative for claudication, irregular heartbeat, leg swelling, near-syncope, orthopnea, palpitations, paroxysmal nocturnal dyspnea and syncope.   Respiratory: Negative for cough, shortness of breath, sleep disturbances due to breathing, snoring, sputum production and wheezing.    Endocrine: Negative for cold intolerance and polyuria.   Hematologic/Lymphatic: Does not bruise/bleed easily.   Skin: Negative for rash.   Musculoskeletal: Negative for back pain, falls, joint pain, joint swelling and neck pain.   Gastrointestinal: Negative  for abdominal pain, heartburn, nausea and vomiting.   Genitourinary: Negative for dysuria, frequency and hematuria.   Neurological: Negative for difficulty with concentration, dizziness, focal weakness, headaches, light-headedness, numbness, seizures and weakness.   Psychiatric/Behavioral: Negative for depression, memory loss and substance abuse. The patient does not have insomnia.    Allergic/Immunologic: Negative for HIV exposure and hives.       Objective:   Physical Exam  HENT:      Head: Normocephalic.   Eyes:      Pupils: Pupils are equal, round, and reactive to light.   Neck:      Thyroid: No thyromegaly.      Vascular: Normal carotid pulses. No carotid bruit or JVD.   Cardiovascular:      Rate and Rhythm: Normal rate and regular rhythm.  No extrasystoles are present.     Chest Wall: PMI is not displaced.      Pulses: Normal pulses.      Heart sounds: Normal heart sounds. No murmur heard.  No gallop. No S3 sounds.    Pulmonary:      Effort: No respiratory distress.      Breath sounds: Normal breath sounds. No stridor.   Abdominal:      General: Bowel sounds are normal.      Palpations: Abdomen is soft.      Tenderness: There is no abdominal tenderness. There is no rebound.   Musculoskeletal:         General: Normal range of motion.   Skin:     Findings: No rash.   Neurological:      Mental Status: She is alert and oriented to person, place, and time.   Psychiatric:         Behavior: Behavior normal.         Lab Results   Component Value Date    CHOL 119 (L) 03/31/2021    CHOL 207 (H) 09/03/2019    CHOL 212 (H) 06/21/2018     Lab Results   Component Value Date    HDL 46 03/31/2021    HDL 53 09/03/2019    HDL 65 06/21/2018     Lab Results   Component Value Date    LDLCALC 61.2 (L) 03/31/2021    LDLCALC 134.2 09/03/2019    LDLCALC 131.6 06/21/2018     Lab Results   Component Value Date    TRIG 59 03/31/2021    TRIG 99 09/03/2019    TRIG 77 06/21/2018     Lab Results   Component Value Date    CHOLHDL 38.7  03/31/2021    CHOLHDL 25.6 09/03/2019    CHOLHDL 30.7 06/21/2018       Chemistry        Component Value Date/Time     02/16/2022 1416    K 4.8 02/16/2022 1416    CL 96 02/16/2022 1416    CO2 31 (H) 02/16/2022 1416    BUN 19 02/16/2022 1416    CREATININE 1.4 02/16/2022 1416    GLU 89 02/16/2022 1416        Component Value Date/Time    CALCIUM 10.0 02/16/2022 1416    ALKPHOS 118 11/21/2021 0301     (H) 11/21/2021 0301    ALT 69 (H) 11/21/2021 0301    BILITOT 0.6 11/21/2021 0301    ESTGFRAFRICA 43.9 (A) 02/16/2022 1416    EGFRNONAA 38.1 (A) 02/16/2022 1416          No results found for: LABA1C, HGBA1C  Lab Results   Component Value Date    TSH 0.765 01/27/2022     Lab Results   Component Value Date    INR 1.1 10/01/2021     Lab Results   Component Value Date    WBC 10.82 11/21/2021    HGB 9.3 (L) 11/21/2021    HCT 30.4 (L) 11/21/2021    MCV 94 11/21/2021     11/21/2021     BMP  Sodium   Date Value Ref Range Status   02/16/2022 136 136 - 145 mmol/L Final     Potassium   Date Value Ref Range Status   02/16/2022 4.8 3.5 - 5.1 mmol/L Final     Chloride   Date Value Ref Range Status   02/16/2022 96 95 - 110 mmol/L Final     CO2   Date Value Ref Range Status   02/16/2022 31 (H) 23 - 29 mmol/L Final     BUN   Date Value Ref Range Status   02/16/2022 19 8 - 23 mg/dL Final     Creatinine   Date Value Ref Range Status   02/16/2022 1.4 0.5 - 1.4 mg/dL Final     Calcium   Date Value Ref Range Status   02/16/2022 10.0 8.7 - 10.5 mg/dL Final     Anion Gap   Date Value Ref Range Status   02/16/2022 9 8 - 16 mmol/L Final     eGFR if    Date Value Ref Range Status   02/16/2022 43.9 (A) >60 mL/min/1.73 m^2 Final     eGFR if non    Date Value Ref Range Status   02/16/2022 38.1 (A) >60 mL/min/1.73 m^2 Final     Comment:     Calculation used to obtain the estimated glomerular filtration  rate (eGFR) is the CKD-EPI equation.         BNP  @LABRCNTIP(BNP,BNPTRIAGEBLO)@  @LABRCNTIP(troponini)@  Estimated Creatinine Clearance: 31.8 mL/min (based on SCr of 1.4 mg/dL).  No results found in the last 24 hours.  No results found in the last 24 hours.  No results found in the last 24 hours.    Assessment:      1. Chronic combined systolic and diastolic CHF (congestive heart failure)    2. Coronary artery disease of native artery of native heart with stable angina pectoris    3. Essential hypertension    4. Atherosclerosis of aorta    5. Pulmonary emphysema, unspecified emphysema type    6. Malignant neoplasm of lower-inner quadrant of left breast in female, estrogen receptor positive      CHFreF compensated FC II    Plan:   Contnue AS Ateimisartab coreg aldactone, ASA Lipitor and imdur  Counseled DASH  Check Lipid profile in 6 months  Recommend heart-healthy diet, weight control and regular exercise.  Dipti. Risk modification.   I have reviewed all pertinent labs and cardiac studies independently. Plans and recommendations have been formulated under my direct supervision. All questions answered and patient voiced understanding.   If symptoms persist go to the ED  RTC in 4 months

## 2022-02-17 NOTE — PATIENT INSTRUCTIONS
Daily weight. Add lasix 20 mg daily as needed if gain 3 pounds in 1 day or 5 pounds in 1 week.  Fluid restriction 1.5 liters a day  Na< 2 gm

## 2022-02-17 NOTE — TELEPHONE ENCOUNTER
Spoke wit pt daughter to let her know that pt BMP showed controlled CHF and to d/c lasix and repeat labs in 4 weeks. Scheduled for march 17  ----- Message from Jose G Manjarrez MD sent at 2/17/2022  9:31 AM CST -----  The lab showed CHF controlled  D/c lasix  Check BNP and BMP in 4 weeks

## 2022-02-17 NOTE — TELEPHONE ENCOUNTER
Spoke with patient daughter , patient has an appt today at the [place]. She will get her labs drawn then. ILIANA

## 2022-02-17 NOTE — TELEPHONE ENCOUNTER
----- Message from Layne Lloyd NP sent at 2/16/2022  4:48 PM CST -----  Regarding: FW: Creatinine  Order has been placed. Please coordinate with patient for lab draw prior to MRI testing   ----- Message -----  From: Yoselin Ritter MA  Sent: 2/16/2022   1:14 PM CST  To: Layne Lloyd NP  Subject: FW: Creatinine                                   Please see below  ----- Message -----  From: Duane Avila V  Sent: 2/16/2022  12:15 PM CST  To: Gregg Hamilton, RT, Abida Swartz, #  Subject: Creatinine                                           Macrina Adkins, looking at this pt's creatinine value on file which has GFR (30 -40), pt needs a new creatinine value for her MRI - Brain WW/O contrast on 02/24/22. Please, order and schedule lab work (creatinine) for this pt.  Thank you.

## 2022-02-19 LAB — BACTERIA UR CULT: ABNORMAL

## 2022-02-24 ENCOUNTER — HOSPITAL ENCOUNTER (OUTPATIENT)
Dept: RADIOLOGY | Facility: HOSPITAL | Age: 72
Discharge: HOME | End: 2022-02-24
Attending: NURSE PRACTITIONER
Payer: MEDICARE

## 2022-02-24 DIAGNOSIS — F03.90 DEMENTIA WITHOUT BEHAVIORAL DISTURBANCE, UNSPECIFIED DEMENTIA TYPE: ICD-10-CM

## 2022-02-24 PROCEDURE — 70553 MRI BRAIN STEM W/O & W/DYE: CPT | Mod: TC

## 2022-02-24 PROCEDURE — A9585 GADOBUTROL INJECTION: HCPCS | Performed by: NURSE PRACTITIONER

## 2022-02-24 PROCEDURE — 25500020 PHARM REV CODE 255: Performed by: NURSE PRACTITIONER

## 2022-02-24 PROCEDURE — 70553 MRI BRAIN STEM W/O & W/DYE: CPT | Mod: 26,,, | Performed by: RADIOLOGY

## 2022-02-24 PROCEDURE — 70553 MRI BRAIN W WO CONTRAST: ICD-10-PCS | Mod: 26,,, | Performed by: RADIOLOGY

## 2022-02-24 RX ORDER — GADOBUTROL 604.72 MG/ML
6.5 INJECTION INTRAVENOUS
Status: COMPLETED | OUTPATIENT
Start: 2022-02-24 | End: 2022-02-24

## 2022-02-24 RX ADMIN — GADOBUTROL 6.5 ML: 604.72 INJECTION INTRAVENOUS at 12:02

## 2022-02-25 ENCOUNTER — TELEPHONE (OUTPATIENT)
Dept: NEUROLOGY | Facility: CLINIC | Age: 72
End: 2022-02-25
Payer: MEDICARE

## 2022-02-25 DIAGNOSIS — R90.89 ABNORMAL FINDING ON MRI OF BRAIN: Primary | ICD-10-CM

## 2022-02-25 NOTE — PROGRESS NOTES
MRI Brain [facility]    02-       No acute/subacute infarct, midline shift or extra-axial fluid collection.     Left dural-based abnormal enhancement, nonspecific, given patient's history of breast cancer, metastatic disease is not ruled out, however benign etiologies such as meningioma or in the differential (series 9, axial 120).     Chronic microvascular ischemic changes and volume loss with suspected prior vascular insults in the right centrum semiovale.      Follow up with Neurosurgery for evaluation of MRI Brain results, referral has been placed

## 2022-02-25 NOTE — TELEPHONE ENCOUNTER
----- Message from Layne Lloyd NP sent at 2/25/2022 10:20 AM CST -----  MRI Brain O    02-       No acute/subacute infarct, midline shift or extra-axial fluid collection.     Left dural-based abnormal enhancement, nonspecific, given patient's history of breast cancer, metastatic disease is not ruled out, however benign etiologies such as meningioma or in the differential (series 9, axial 120).     Chronic microvascular ischemic changes and volume loss with suspected prior vascular insults in the right centrum semiovale.      Follow up with Neurosurgery for evaluation of MRI Brain results, referral has been placed

## 2022-02-28 ENCOUNTER — DOCUMENT SCAN (OUTPATIENT)
Dept: HOME HEALTH SERVICES | Facility: HOSPITAL | Age: 72
End: 2022-02-28
Payer: MEDICARE

## 2022-02-28 ENCOUNTER — HOSPITAL ENCOUNTER (OUTPATIENT)
Dept: CARDIOLOGY | Facility: HOSPITAL | Age: 72
Discharge: HOME | End: 2022-02-28
Payer: MEDICARE

## 2022-02-28 ENCOUNTER — OFFICE VISIT (OUTPATIENT)
Dept: NEUROLOGY | Facility: CLINIC | Age: 72
End: 2022-02-28
Payer: MEDICARE

## 2022-02-28 VITALS
DIASTOLIC BLOOD PRESSURE: 82 MMHG | OXYGEN SATURATION: 75 % | HEART RATE: 98 BPM | SYSTOLIC BLOOD PRESSURE: 120 MMHG | RESPIRATION RATE: 16 BRPM | HEIGHT: 61 IN | BODY MASS INDEX: 26.24 KG/M2

## 2022-02-28 DIAGNOSIS — G30.9 MAJOR NEUROCOGNITIVE DISORDER DUE TO ALZHEIMER'S DISEASE: Primary | ICD-10-CM

## 2022-02-28 DIAGNOSIS — R41.3 MEMORY LOSS: ICD-10-CM

## 2022-02-28 DIAGNOSIS — G31.84 MILD COGNITIVE IMPAIRMENT WITH MEMORY LOSS: ICD-10-CM

## 2022-02-28 DIAGNOSIS — F02.80 MAJOR NEUROCOGNITIVE DISORDER DUE TO ALZHEIMER'S DISEASE: Primary | ICD-10-CM

## 2022-02-28 DIAGNOSIS — G30.9 MAJOR NEUROCOGNITIVE DISORDER DUE TO ALZHEIMER'S DISEASE: ICD-10-CM

## 2022-02-28 DIAGNOSIS — F02.80 MAJOR NEUROCOGNITIVE DISORDER DUE TO ALZHEIMER'S DISEASE: ICD-10-CM

## 2022-02-28 DIAGNOSIS — Z91.89 AT RISK FOR PROLONGED QT INTERVAL SYNDROME: ICD-10-CM

## 2022-02-28 DIAGNOSIS — Z91.89: ICD-10-CM

## 2022-02-28 DIAGNOSIS — F03.90 DEMENTIA WITHOUT BEHAVIORAL DISTURBANCE, UNSPECIFIED DEMENTIA TYPE: ICD-10-CM

## 2022-02-28 PROCEDURE — 1125F AMNT PAIN NOTED PAIN PRSNT: CPT | Mod: CPTII,S$GLB,, | Performed by: NURSE PRACTITIONER

## 2022-02-28 PROCEDURE — 3008F BODY MASS INDEX DOCD: CPT | Mod: CPTII,S$GLB,, | Performed by: NURSE PRACTITIONER

## 2022-02-28 PROCEDURE — 3074F PR MOST RECENT SYSTOLIC BLOOD PRESSURE < 130 MM HG: ICD-10-PCS | Mod: CPTII,S$GLB,, | Performed by: NURSE PRACTITIONER

## 2022-02-28 PROCEDURE — 93010 ELECTROCARDIOGRAM REPORT: CPT | Mod: ,,, | Performed by: INTERNAL MEDICINE

## 2022-02-28 PROCEDURE — 3074F SYST BP LT 130 MM HG: CPT | Mod: CPTII,S$GLB,, | Performed by: NURSE PRACTITIONER

## 2022-02-28 PROCEDURE — 99999 PR PBB SHADOW E&M-EST. PATIENT-LVL V: CPT | Mod: PBBFAC,,, | Performed by: NURSE PRACTITIONER

## 2022-02-28 PROCEDURE — 3079F PR MOST RECENT DIASTOLIC BLOOD PRESSURE 80-89 MM HG: ICD-10-PCS | Mod: CPTII,S$GLB,, | Performed by: NURSE PRACTITIONER

## 2022-02-28 PROCEDURE — 1125F PR PAIN SEVERITY QUANTIFIED, PAIN PRESENT: ICD-10-PCS | Mod: CPTII,S$GLB,, | Performed by: NURSE PRACTITIONER

## 2022-02-28 PROCEDURE — 1159F PR MEDICATION LIST DOCUMENTED IN MEDICAL RECORD: ICD-10-PCS | Mod: CPTII,S$GLB,, | Performed by: NURSE PRACTITIONER

## 2022-02-28 PROCEDURE — 3288F PR FALLS RISK ASSESSMENT DOCUMENTED: ICD-10-PCS | Mod: CPTII,S$GLB,, | Performed by: NURSE PRACTITIONER

## 2022-02-28 PROCEDURE — 4010F ACE/ARB THERAPY RXD/TAKEN: CPT | Mod: CPTII,S$GLB,, | Performed by: NURSE PRACTITIONER

## 2022-02-28 PROCEDURE — 99214 PR OFFICE/OUTPT VISIT, EST, LEVL IV, 30-39 MIN: ICD-10-PCS | Mod: S$GLB,,, | Performed by: NURSE PRACTITIONER

## 2022-02-28 PROCEDURE — 4010F PR ACE/ARB THEARPY RXD/TAKEN: ICD-10-PCS | Mod: CPTII,S$GLB,, | Performed by: NURSE PRACTITIONER

## 2022-02-28 PROCEDURE — 1101F PT FALLS ASSESS-DOCD LE1/YR: CPT | Mod: CPTII,S$GLB,, | Performed by: NURSE PRACTITIONER

## 2022-02-28 PROCEDURE — 1160F RVW MEDS BY RX/DR IN RCRD: CPT | Mod: CPTII,S$GLB,, | Performed by: NURSE PRACTITIONER

## 2022-02-28 PROCEDURE — 1160F PR REVIEW ALL MEDS BY PRESCRIBER/CLIN PHARMACIST DOCUMENTED: ICD-10-PCS | Mod: CPTII,S$GLB,, | Performed by: NURSE PRACTITIONER

## 2022-02-28 PROCEDURE — 99214 OFFICE O/P EST MOD 30 MIN: CPT | Mod: S$GLB,,, | Performed by: NURSE PRACTITIONER

## 2022-02-28 PROCEDURE — 93010 EKG 12-LEAD: ICD-10-PCS | Mod: ,,, | Performed by: INTERNAL MEDICINE

## 2022-02-28 PROCEDURE — 3079F DIAST BP 80-89 MM HG: CPT | Mod: CPTII,S$GLB,, | Performed by: NURSE PRACTITIONER

## 2022-02-28 PROCEDURE — 1101F PR PT FALLS ASSESS DOC 0-1 FALLS W/OUT INJ PAST YR: ICD-10-PCS | Mod: CPTII,S$GLB,, | Performed by: NURSE PRACTITIONER

## 2022-02-28 PROCEDURE — 3008F PR BODY MASS INDEX (BMI) DOCUMENTED: ICD-10-PCS | Mod: CPTII,S$GLB,, | Performed by: NURSE PRACTITIONER

## 2022-02-28 PROCEDURE — 3288F FALL RISK ASSESSMENT DOCD: CPT | Mod: CPTII,S$GLB,, | Performed by: NURSE PRACTITIONER

## 2022-02-28 PROCEDURE — 99999 PR PBB SHADOW E&M-EST. PATIENT-LVL V: ICD-10-PCS | Mod: PBBFAC,,, | Performed by: NURSE PRACTITIONER

## 2022-02-28 PROCEDURE — 93005 ELECTROCARDIOGRAM TRACING: CPT

## 2022-02-28 PROCEDURE — 1159F MED LIST DOCD IN RCRD: CPT | Mod: CPTII,S$GLB,, | Performed by: NURSE PRACTITIONER

## 2022-02-28 RX ORDER — MEMANTINE HYDROCHLORIDE 10 MG/1
10 TABLET ORAL 2 TIMES DAILY
Qty: 60 TABLET | Refills: 2 | Status: SHIPPED | OUTPATIENT
Start: 2022-02-28 | End: 2022-06-06

## 2022-02-28 RX ORDER — ACETAMINOPHEN, DIPHENHYDRAMINE HCL, PHENYLEPHRINE HCL 325; 25; 5 MG/1; MG/1; MG/1
1 TABLET ORAL DAILY
Qty: 90 TABLET | Refills: 1 | Status: SHIPPED | OUTPATIENT
Start: 2022-02-28 | End: 2022-06-28
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
## 2022-03-01 ENCOUNTER — TELEPHONE (OUTPATIENT)
Dept: NEUROLOGY | Facility: CLINIC | Age: 72
End: 2022-03-01
Payer: MEDICARE

## 2022-03-01 ENCOUNTER — OFFICE VISIT (OUTPATIENT)
Dept: OPHTHALMOLOGY | Facility: CLINIC | Age: 72
End: 2022-03-01
Payer: MEDICARE

## 2022-03-01 DIAGNOSIS — H25.12 NUCLEAR SCLEROSIS OF LEFT EYE: ICD-10-CM

## 2022-03-01 DIAGNOSIS — H40.032 ANATOMICAL NARROW ANGLE, LEFT EYE: Primary | ICD-10-CM

## 2022-03-01 DIAGNOSIS — H54.40 BLINDNESS OF RIGHT EYE WITH NORMAL VISION IN CONTRALATERAL EYE: ICD-10-CM

## 2022-03-01 PROCEDURE — 4010F ACE/ARB THERAPY RXD/TAKEN: CPT | Mod: CPTII,S$GLB,, | Performed by: STUDENT IN AN ORGANIZED HEALTH CARE EDUCATION/TRAINING PROGRAM

## 2022-03-01 PROCEDURE — 99999 PR PBB SHADOW E&M-EST. PATIENT-LVL III: CPT | Mod: PBBFAC,,, | Performed by: STUDENT IN AN ORGANIZED HEALTH CARE EDUCATION/TRAINING PROGRAM

## 2022-03-01 PROCEDURE — 1159F PR MEDICATION LIST DOCUMENTED IN MEDICAL RECORD: ICD-10-PCS | Mod: CPTII,S$GLB,, | Performed by: STUDENT IN AN ORGANIZED HEALTH CARE EDUCATION/TRAINING PROGRAM

## 2022-03-01 PROCEDURE — 99214 PR OFFICE/OUTPT VISIT, EST, LEVL IV, 30-39 MIN: ICD-10-PCS | Mod: S$GLB,,, | Performed by: STUDENT IN AN ORGANIZED HEALTH CARE EDUCATION/TRAINING PROGRAM

## 2022-03-01 PROCEDURE — 92020 GONIOSCOPY: CPT | Mod: S$GLB,,, | Performed by: STUDENT IN AN ORGANIZED HEALTH CARE EDUCATION/TRAINING PROGRAM

## 2022-03-01 PROCEDURE — 1159F MED LIST DOCD IN RCRD: CPT | Mod: CPTII,S$GLB,, | Performed by: STUDENT IN AN ORGANIZED HEALTH CARE EDUCATION/TRAINING PROGRAM

## 2022-03-01 PROCEDURE — 4010F PR ACE/ARB THEARPY RXD/TAKEN: ICD-10-PCS | Mod: CPTII,S$GLB,, | Performed by: STUDENT IN AN ORGANIZED HEALTH CARE EDUCATION/TRAINING PROGRAM

## 2022-03-01 PROCEDURE — 1160F PR REVIEW ALL MEDS BY PRESCRIBER/CLIN PHARMACIST DOCUMENTED: ICD-10-PCS | Mod: CPTII,S$GLB,, | Performed by: STUDENT IN AN ORGANIZED HEALTH CARE EDUCATION/TRAINING PROGRAM

## 2022-03-01 PROCEDURE — 99214 OFFICE O/P EST MOD 30 MIN: CPT | Mod: S$GLB,,, | Performed by: STUDENT IN AN ORGANIZED HEALTH CARE EDUCATION/TRAINING PROGRAM

## 2022-03-01 PROCEDURE — 1160F RVW MEDS BY RX/DR IN RCRD: CPT | Mod: CPTII,S$GLB,, | Performed by: STUDENT IN AN ORGANIZED HEALTH CARE EDUCATION/TRAINING PROGRAM

## 2022-03-01 PROCEDURE — 99999 PR PBB SHADOW E&M-EST. PATIENT-LVL III: ICD-10-PCS | Mod: PBBFAC,,, | Performed by: STUDENT IN AN ORGANIZED HEALTH CARE EDUCATION/TRAINING PROGRAM

## 2022-03-01 PROCEDURE — 92020 PR SPECIAL EYE EVAL,GONIOSCOPY: ICD-10-PCS | Mod: S$GLB,,, | Performed by: STUDENT IN AN ORGANIZED HEALTH CARE EDUCATION/TRAINING PROGRAM

## 2022-03-01 NOTE — TELEPHONE ENCOUNTER
Left pt's daughter a vm letting her know that we could schedule testing for a sooner date if she'd like. Could test on 6/20 or 6/22 at 12:30 rather than 6/28 at 12:30 if she'd prefer.

## 2022-03-01 NOTE — PROGRESS NOTES
HPI     Glaucoma Suspect      Additional comments: 1 month for m/gOCT, IOP Check, Gonio, and Bscan OD              Comments     Patient states no visual complaints and no ocular pain/discomfort.       1. NS OU ?   2. HTN          Last edited by Mila Lombardo on 3/1/2022  3:17 PM. (History)            Assessment /Plan     For exam results, see Encounter Report.    Anatomical narrow angle, left eye- IOP normal. Angles narrow on gonioscopy but open with dynamic gonioscopy. Explained r/b/a to observation vs. LPI vs. CEIOL. Patient elects for LPI   - Plan for LPI OS    * Discussed importance of treatment due to patient monocular status, Explained the risks to patient     Nuclear sclerosis of left eye- Follow    Blindness of right eye with normal vision in contralateral eye- BSCAN done today, disorganized eye, Bscan done with       Return to clinic for LPI OS

## 2022-03-10 ENCOUNTER — DOCUMENTATION ONLY (OUTPATIENT)
Dept: OPHTHALMOLOGY | Facility: CLINIC | Age: 72
End: 2022-03-10

## 2022-03-10 ENCOUNTER — OFFICE VISIT (OUTPATIENT)
Dept: OPHTHALMOLOGY | Facility: CLINIC | Age: 72
End: 2022-03-10
Payer: MEDICARE

## 2022-03-10 DIAGNOSIS — H54.40 BLINDNESS OF RIGHT EYE WITH NORMAL VISION IN CONTRALATERAL EYE: ICD-10-CM

## 2022-03-10 DIAGNOSIS — H25.12 NUCLEAR SCLEROSIS OF LEFT EYE: ICD-10-CM

## 2022-03-10 DIAGNOSIS — H40.032 ANATOMICAL NARROW ANGLE, LEFT EYE: Primary | ICD-10-CM

## 2022-03-10 PROCEDURE — 99214 PR OFFICE/OUTPT VISIT, EST, LEVL IV, 30-39 MIN: ICD-10-PCS | Mod: S$GLB,,, | Performed by: STUDENT IN AN ORGANIZED HEALTH CARE EDUCATION/TRAINING PROGRAM

## 2022-03-10 PROCEDURE — 92025 CORNEAL TOPOGRAPHY - OU - BOTH EYES: ICD-10-PCS | Mod: S$GLB,,, | Performed by: STUDENT IN AN ORGANIZED HEALTH CARE EDUCATION/TRAINING PROGRAM

## 2022-03-10 PROCEDURE — 92136 OPHTHALMIC BIOMETRY: CPT | Mod: LT,S$GLB,, | Performed by: STUDENT IN AN ORGANIZED HEALTH CARE EDUCATION/TRAINING PROGRAM

## 2022-03-10 PROCEDURE — 1159F PR MEDICATION LIST DOCUMENTED IN MEDICAL RECORD: ICD-10-PCS | Mod: CPTII,S$GLB,, | Performed by: STUDENT IN AN ORGANIZED HEALTH CARE EDUCATION/TRAINING PROGRAM

## 2022-03-10 PROCEDURE — 99999 PR PBB SHADOW E&M-EST. PATIENT-LVL III: ICD-10-PCS | Mod: PBBFAC,,, | Performed by: STUDENT IN AN ORGANIZED HEALTH CARE EDUCATION/TRAINING PROGRAM

## 2022-03-10 PROCEDURE — 92025 CPTRIZED CORNEAL TOPOGRAPHY: CPT | Mod: S$GLB,,, | Performed by: STUDENT IN AN ORGANIZED HEALTH CARE EDUCATION/TRAINING PROGRAM

## 2022-03-10 PROCEDURE — 4010F PR ACE/ARB THEARPY RXD/TAKEN: ICD-10-PCS | Mod: CPTII,S$GLB,, | Performed by: STUDENT IN AN ORGANIZED HEALTH CARE EDUCATION/TRAINING PROGRAM

## 2022-03-10 PROCEDURE — 99214 OFFICE O/P EST MOD 30 MIN: CPT | Mod: S$GLB,,, | Performed by: STUDENT IN AN ORGANIZED HEALTH CARE EDUCATION/TRAINING PROGRAM

## 2022-03-10 PROCEDURE — 92136 IOL MASTER - OS - LEFT EYE: ICD-10-PCS | Mod: LT,S$GLB,, | Performed by: STUDENT IN AN ORGANIZED HEALTH CARE EDUCATION/TRAINING PROGRAM

## 2022-03-10 PROCEDURE — 1160F PR REVIEW ALL MEDS BY PRESCRIBER/CLIN PHARMACIST DOCUMENTED: ICD-10-PCS | Mod: CPTII,S$GLB,, | Performed by: STUDENT IN AN ORGANIZED HEALTH CARE EDUCATION/TRAINING PROGRAM

## 2022-03-10 PROCEDURE — 99999 PR PBB SHADOW E&M-EST. PATIENT-LVL III: CPT | Mod: PBBFAC,,, | Performed by: STUDENT IN AN ORGANIZED HEALTH CARE EDUCATION/TRAINING PROGRAM

## 2022-03-10 PROCEDURE — 4010F ACE/ARB THERAPY RXD/TAKEN: CPT | Mod: CPTII,S$GLB,, | Performed by: STUDENT IN AN ORGANIZED HEALTH CARE EDUCATION/TRAINING PROGRAM

## 2022-03-10 PROCEDURE — 1159F MED LIST DOCD IN RCRD: CPT | Mod: CPTII,S$GLB,, | Performed by: STUDENT IN AN ORGANIZED HEALTH CARE EDUCATION/TRAINING PROGRAM

## 2022-03-10 PROCEDURE — 1160F RVW MEDS BY RX/DR IN RCRD: CPT | Mod: CPTII,S$GLB,, | Performed by: STUDENT IN AN ORGANIZED HEALTH CARE EDUCATION/TRAINING PROGRAM

## 2022-03-10 RX ORDER — DIFLUPREDNATE OPHTHALMIC 0.5 MG/ML
1 EMULSION OPHTHALMIC 4 TIMES DAILY
Qty: 5 ML | Refills: 1 | Status: SHIPPED | OUTPATIENT
Start: 2022-03-10 | End: 2022-03-15 | Stop reason: SDUPTHER

## 2022-03-10 NOTE — PROGRESS NOTES
Short Stay Record    Diagnosis: Nuclear Sclerotic Cataract left and Anatomically Narrow Angle left    CC: Blurry Vision     HPI:  Leia Rodriguez is a 71 y.o. female who presents for evaluation prior to ophthalmic surgery. No current complaints.     Past Medical History:   Diagnosis Date    Arthritis     EDGAR HANDS, KNEES    Blind right eye     Traumatic    Breast cancer 06/15/2017    0.8 cm Grade1 INTRADUCTAL BREAST 9 positve margin (left)    Cataract     Essential hypertension     Hemorrhoids     Lipoma of abdominal wall     Obesity     Overactive bladder     Pap smear abnormality of cervix with ASCUS favoring benign     Thyroid nodule     Tobacco use disorder     Tubular adenoma of colon     Urinary incontinence      Past Surgical History:   Procedure Laterality Date    ANTERIOR VAGINAL REPAIR      BLADDER SURGERY      BREAST LUMPECTOMY Left 2017    CATARACT EXTRACTION Right      SECTION      X2    COLONOSCOPY N/A 3/8/2017    Procedure: COLONOSCOPY;  Surgeon: Tyron Paris MD;  Location: [facility];  Service: Endoscopy;  Laterality: N/A;    COLONOSCOPY N/A 2020    Procedure: COLONOSCOPY;  Surgeon: Keira Ellison MD;  Location: [facility];  Service: Endoscopy;  Laterality: N/A;    ESOPHAGOGASTRODUODENOSCOPY N/A 2020    Procedure: EGD (ESOPHAGOGASTRODUODENOSCOPY);  Surgeon: Keira Ellison MD;  Location: [facility];  Service: Endoscopy;  Laterality: N/A;  new onset iron deficiency with prior history of breast cancer    INTRALUMINAL GASTROINTESTINAL TRACT IMAGING VIA CAPSULE N/A 10/28/2020    Procedure: IMAGING PROCEDURE, GI TRACT, INTRALUMINAL, VIA CAPSULE;  Surgeon: Finesse Jha RN;  Location: [facility];  Service: Endoscopy;  Laterality: N/A;    LEFT HEART CATHETERIZATION Left 10/12/2021    Procedure: CATHETERIZATION, HEART, LEFT;  Surgeon: Tiffanie Santos MD;  Location: [facility] CATH LAB;  Service: Cardiology;  Laterality: Left;    SENTINEL LYMPH NODE BIOPSY Left  2020    Procedure: BIOPSY, LYMPH NODE, SENTINEL;  Surgeon: Vincent Moyer MD;  Location: [facility] OR;  Service: General;  Laterality: Left;    SIMPLE MASTECTOMY Left 2020    Procedure: MASTECTOMY, SIMPLE;  Surgeon: Vincent Moyer MD;  Location: [facility] OR;  Service: General;  Laterality: Left;    THYROID LOBECTOMY Left     TOTAL ABDOMINAL HYSTERECTOMY      TUBAL LIGATION       Social History     Tobacco Use    Smoking status: Former Smoker     Packs/day: 1.00     Years: 50.00     Pack years: 50.00     Types: Cigarettes     Quit date: 2020     Years since quittin.5    Smokeless tobacco: Never Used   Substance Use Topics    Alcohol use: Never     Alcohol/week: 28.0 standard drinks     Types: 28 Cans of beer per week     Family History   Problem Relation Age of Onset    Hypertension Father     Cataracts Father     Prostate cancer Father 80    Cervical cancer Daughter 32    Allergic rhinitis Daughter     Cirrhosis Mother     Alcohol abuse Mother     Hypertension Daughter     Diabetes Brother     Heart attack Brother     Heart murmur Brother     No Known Problems Daughter      Review of patient's allergies indicates:  No Known Allergies      Current Outpatient Medications:     anastrozole (ARIMIDEX) 1 mg Tab, Take 1 tablet (1 mg total) by mouth once daily., Disp: 90 tablet, Rfl: 3    aspirin 81 MG Chew, Take 1 tablet (81 mg total) by mouth once daily., Disp: 90 tablet, Rfl: 3    atorvastatin (LIPITOR) 20 MG tablet, Take 1 tablet (20 mg total) by mouth every evening., Disp: 90 tablet, Rfl: 3    calcium citrate (CALCITRATE) 200 mg (950 mg) tablet, Take 1 tablet by mouth once daily., Disp: , Rfl:     carvediloL (COREG) 3.125 MG tablet, TAKE 1 TABLET BY MOUTH TWICE A DAY WITH MEALS, Disp: 180 tablet, Rfl: 3    cyanocobalamin 1,000 mcg/mL injection, Inject 1 mL (1,000 mcg total) into the muscle every 7 days., Disp: 4 mL, Rfl: 2    cyanocobalamin, vitamin B-12, (VITAMIN B-12) 5,000 mcg  Subl, Place 1 Dose under the tongue once daily., Disp: 90 tablet, Rfl: 1    difluprednate (DUREZOL) 0.05 % Drop ophthalmic solution, Place 1 drop into the left eye 4 (four) times daily., Disp: 5 mL, Rfl: 1    EScitalopram oxalate (LEXAPRO) 10 MG tablet, Take 1 tablet (10 mg total) by mouth once daily., Disp: 90 tablet, Rfl: 3    ferrous sulfate (FEOSOL) 325 mg (65 mg iron) Tab tablet, Take 65 mg by mouth once daily., Disp: , Rfl:     isosorbide mononitrate (IMDUR) 30 MG 24 hr tablet, Take 1 tablet (30 mg total) by mouth once daily., Disp: 30 tablet, Rfl: 11    memantine (NAMENDA) 10 MG Tab, Take 1 tablet (10 mg total) by mouth 2 (two) times daily. Give a 1/2 tablet twice per day for one week then give a whole tablet twice per day thereafter, Disp: 60 tablet, Rfl: 2    multivitamin (THERAGRAN) per tablet, Take 1 tablet by mouth once daily., Disp: 30 tablet, Rfl: 2    spironolactone (ALDACTONE) 25 MG tablet, Take 1 tablet (25 mg total) by mouth once daily., Disp: 30 tablet, Rfl: 11    sulfamethoxazole-trimethoprim 800-160mg (BACTRIM DS) 800-160 mg Tab, Take 1 tablet by mouth 2 (two) times daily., Disp: 14 tablet, Rfl: 0    tamsulosin (FLOMAX) 0.4 mg Cap, Take 1 capsule by mouth once daily., Disp: , Rfl:     telmisartan (MICARDIS) 20 MG Tab, TAKE 1 TABLET BY MOUTH EVERY DAY, Disp: 30 tablet, Rfl: 11    vitamin D 1000 units Tab, Take 1,000 Units by mouth once daily., Disp: , Rfl:     Review of Systems:  10 Pt ROS negative except as stated in HPI    Physical Exam:  General Appearance:    A&Ox3, no distress, appears stated age   Head:    Normocephalic, without obvious abnormality, atraumatic   Eyes:    Reactive pupil OS, EOM's intact OS   Back:     Symmetric, no curvature   Lungs:     respirations unlabored   Chest Wall:    No tenderness or deformity    Heart:  Abdomen:  Extremities:  Skin:    S1 and S2 present    Soft, non-tender    Extremities normal, atraumatic    Skin color, texture, turgor normal      Patient is stable for ophthalmic surgery under local and MAC.

## 2022-03-11 ENCOUNTER — LAB VISIT (OUTPATIENT)
Dept: LAB | Facility: HOSPITAL | Age: 72
End: 2022-03-11
Attending: INTERNAL MEDICINE
Payer: MEDICARE

## 2022-03-11 DIAGNOSIS — D50.0 IRON DEFICIENCY ANEMIA DUE TO CHRONIC BLOOD LOSS: ICD-10-CM

## 2022-03-11 LAB
ANION GAP SERPL CALC-SCNC: 8 MMOL/L (ref 8–16)
BASOPHILS # BLD AUTO: 0.03 K/UL (ref 0–0.2)
BASOPHILS NFR BLD: 0.4 % (ref 0–1.9)
BUN SERPL-MCNC: 19 MG/DL (ref 8–23)
CALCIUM SERPL-MCNC: 9.7 MG/DL (ref 8.7–10.5)
CHLORIDE SERPL-SCNC: 103 MMOL/L (ref 95–110)
CO2 SERPL-SCNC: 29 MMOL/L (ref 23–29)
CREAT SERPL-MCNC: 1 MG/DL (ref 0.5–1.4)
DIFFERENTIAL METHOD: ABNORMAL
EOSINOPHIL # BLD AUTO: 0.3 K/UL (ref 0–0.5)
EOSINOPHIL NFR BLD: 4 % (ref 0–8)
ERYTHROCYTE [DISTWIDTH] IN BLOOD BY AUTOMATED COUNT: 14.6 % (ref 11.5–14.5)
EST. GFR  (AFRICAN AMERICAN): >60 ML/MIN/1.73 M^2
EST. GFR  (NON AFRICAN AMERICAN): 57.2 ML/MIN/1.73 M^2
FERRITIN SERPL-MCNC: 1633 NG/ML (ref 20–300)
GLUCOSE SERPL-MCNC: 83 MG/DL (ref 70–110)
HCT VFR BLD AUTO: 28.4 % (ref 37–48.5)
HGB BLD-MCNC: 8.7 G/DL (ref 12–16)
IMM GRANULOCYTES # BLD AUTO: 0.02 K/UL (ref 0–0.04)
IMM GRANULOCYTES NFR BLD AUTO: 0.2 % (ref 0–0.5)
IRON SERPL-MCNC: 66 UG/DL (ref 30–160)
LYMPHOCYTES # BLD AUTO: 2.4 K/UL (ref 1–4.8)
LYMPHOCYTES NFR BLD: 29.3 % (ref 18–48)
MCH RBC QN AUTO: 30.7 PG (ref 27–31)
MCHC RBC AUTO-ENTMCNC: 30.6 G/DL (ref 32–36)
MCV RBC AUTO: 100 FL (ref 82–98)
MONOCYTES # BLD AUTO: 0.6 K/UL (ref 0.3–1)
MONOCYTES NFR BLD: 7.5 % (ref 4–15)
NEUTROPHILS # BLD AUTO: 4.7 K/UL (ref 1.8–7.7)
NEUTROPHILS NFR BLD: 58.6 % (ref 38–73)
NRBC BLD-RTO: 0 /100 WBC
PLATELET # BLD AUTO: 305 K/UL (ref 150–450)
PMV BLD AUTO: 10.1 FL (ref 9.2–12.9)
POTASSIUM SERPL-SCNC: 4.4 MMOL/L (ref 3.5–5.1)
RBC # BLD AUTO: 2.83 M/UL (ref 4–5.4)
SATURATED IRON: 26 % (ref 20–50)
SODIUM SERPL-SCNC: 140 MMOL/L (ref 136–145)
TOTAL IRON BINDING CAPACITY: 255 UG/DL (ref 250–450)
TRANSFERRIN SERPL-MCNC: 172 MG/DL (ref 200–375)
WBC # BLD AUTO: 8.08 K/UL (ref 3.9–12.7)

## 2022-03-11 PROCEDURE — 36415 COLL VENOUS BLD VENIPUNCTURE: CPT | Mod: PO | Performed by: INTERNAL MEDICINE

## 2022-03-11 PROCEDURE — 82728 ASSAY OF FERRITIN: CPT | Performed by: INTERNAL MEDICINE

## 2022-03-11 PROCEDURE — 84466 ASSAY OF TRANSFERRIN: CPT | Performed by: INTERNAL MEDICINE

## 2022-03-11 PROCEDURE — 80048 BASIC METABOLIC PNL TOTAL CA: CPT | Performed by: INTERNAL MEDICINE

## 2022-03-11 PROCEDURE — 85025 COMPLETE CBC W/AUTO DIFF WBC: CPT | Performed by: INTERNAL MEDICINE

## 2022-03-15 ENCOUNTER — PATIENT MESSAGE (OUTPATIENT)
Dept: FAMILY MEDICINE | Facility: CLINIC | Age: 72
End: 2022-03-15
Payer: MEDICARE

## 2022-03-15 ENCOUNTER — OFFICE VISIT (OUTPATIENT)
Dept: HEMATOLOGY/ONCOLOGY | Facility: CLINIC | Age: 72
End: 2022-03-15
Payer: MEDICARE

## 2022-03-15 ENCOUNTER — PATIENT MESSAGE (OUTPATIENT)
Dept: NEUROLOGY | Facility: CLINIC | Age: 72
End: 2022-03-15
Payer: MEDICARE

## 2022-03-15 ENCOUNTER — LAB VISIT (OUTPATIENT)
Dept: LAB | Facility: HOSPITAL | Age: 72
End: 2022-03-15
Attending: INTERNAL MEDICINE
Payer: MEDICARE

## 2022-03-15 ENCOUNTER — PATIENT MESSAGE (OUTPATIENT)
Dept: OPHTHALMOLOGY | Facility: CLINIC | Age: 72
End: 2022-03-15
Payer: MEDICARE

## 2022-03-15 VITALS
SYSTOLIC BLOOD PRESSURE: 79 MMHG | DIASTOLIC BLOOD PRESSURE: 40 MMHG | TEMPERATURE: 98 F | HEIGHT: 60 IN | OXYGEN SATURATION: 99 % | HEART RATE: 89 BPM | WEIGHT: 142.44 LBS | BODY MASS INDEX: 27.96 KG/M2

## 2022-03-15 DIAGNOSIS — T45.1X5A IMMUNODEFICIENCY DUE TO CHEMOTHERAPY: ICD-10-CM

## 2022-03-15 DIAGNOSIS — D84.821 IMMUNODEFICIENCY DUE TO CHEMOTHERAPY: ICD-10-CM

## 2022-03-15 DIAGNOSIS — N18.32 CHRONIC KIDNEY DISEASE, STAGE 3B: ICD-10-CM

## 2022-03-15 DIAGNOSIS — I10 ESSENTIAL HYPERTENSION: Chronic | ICD-10-CM

## 2022-03-15 DIAGNOSIS — R93.1 DECREASED CARDIAC EJECTION FRACTION: ICD-10-CM

## 2022-03-15 DIAGNOSIS — H25.12 NUCLEAR SCLEROSIS OF LEFT EYE: ICD-10-CM

## 2022-03-15 DIAGNOSIS — C50.312 MALIGNANT NEOPLASM OF LOWER-INNER QUADRANT OF LEFT BREAST IN FEMALE, ESTROGEN RECEPTOR POSITIVE: Primary | ICD-10-CM

## 2022-03-15 DIAGNOSIS — Z17.0 MALIGNANT NEOPLASM OF LOWER-INNER QUADRANT OF LEFT BREAST IN FEMALE, ESTROGEN RECEPTOR POSITIVE: Primary | ICD-10-CM

## 2022-03-15 DIAGNOSIS — H40.032 ANATOMICAL NARROW ANGLE, LEFT EYE: ICD-10-CM

## 2022-03-15 DIAGNOSIS — I50.42 CHRONIC COMBINED SYSTOLIC AND DIASTOLIC CHF (CONGESTIVE HEART FAILURE): ICD-10-CM

## 2022-03-15 DIAGNOSIS — I25.118 CORONARY ARTERY DISEASE OF NATIVE ARTERY OF NATIVE HEART WITH STABLE ANGINA PECTORIS: Chronic | ICD-10-CM

## 2022-03-15 DIAGNOSIS — Z79.899 IMMUNODEFICIENCY DUE TO CHEMOTHERAPY: ICD-10-CM

## 2022-03-15 DIAGNOSIS — M85.862 OTHER SPECIFIED DISORDERS OF BONE DENSITY AND STRUCTURE, LEFT LOWER LEG: ICD-10-CM

## 2022-03-15 LAB
ANION GAP SERPL CALC-SCNC: 7 MMOL/L (ref 8–16)
BNP SERPL-MCNC: 123 PG/ML (ref 0–99)
BUN SERPL-MCNC: 16 MG/DL (ref 8–23)
CALCIUM SERPL-MCNC: 9.9 MG/DL (ref 8.7–10.5)
CHLORIDE SERPL-SCNC: 104 MMOL/L (ref 95–110)
CO2 SERPL-SCNC: 30 MMOL/L (ref 23–29)
CREAT SERPL-MCNC: 1.1 MG/DL (ref 0.5–1.4)
EST. GFR  (AFRICAN AMERICAN): 58.8 ML/MIN/1.73 M^2
EST. GFR  (NON AFRICAN AMERICAN): 51 ML/MIN/1.73 M^2
GLUCOSE SERPL-MCNC: 93 MG/DL (ref 70–110)
POTASSIUM SERPL-SCNC: 5.2 MMOL/L (ref 3.5–5.1)
SODIUM SERPL-SCNC: 141 MMOL/L (ref 136–145)

## 2022-03-15 PROCEDURE — 1125F AMNT PAIN NOTED PAIN PRSNT: CPT | Mod: CPTII,S$GLB,, | Performed by: INTERNAL MEDICINE

## 2022-03-15 PROCEDURE — 4010F ACE/ARB THERAPY RXD/TAKEN: CPT | Mod: CPTII,S$GLB,, | Performed by: INTERNAL MEDICINE

## 2022-03-15 PROCEDURE — 80048 BASIC METABOLIC PNL TOTAL CA: CPT | Performed by: INTERNAL MEDICINE

## 2022-03-15 PROCEDURE — 99214 OFFICE O/P EST MOD 30 MIN: CPT | Mod: S$GLB,,, | Performed by: INTERNAL MEDICINE

## 2022-03-15 PROCEDURE — 36415 COLL VENOUS BLD VENIPUNCTURE: CPT | Performed by: INTERNAL MEDICINE

## 2022-03-15 PROCEDURE — 3008F BODY MASS INDEX DOCD: CPT | Mod: CPTII,S$GLB,, | Performed by: INTERNAL MEDICINE

## 2022-03-15 PROCEDURE — 99999 PR PBB SHADOW E&M-EST. PATIENT-LVL IV: ICD-10-PCS | Mod: PBBFAC,,, | Performed by: INTERNAL MEDICINE

## 2022-03-15 PROCEDURE — 99214 PR OFFICE/OUTPT VISIT, EST, LEVL IV, 30-39 MIN: ICD-10-PCS | Mod: S$GLB,,, | Performed by: INTERNAL MEDICINE

## 2022-03-15 PROCEDURE — 3074F PR MOST RECENT SYSTOLIC BLOOD PRESSURE < 130 MM HG: ICD-10-PCS | Mod: CPTII,S$GLB,, | Performed by: INTERNAL MEDICINE

## 2022-03-15 PROCEDURE — 3074F SYST BP LT 130 MM HG: CPT | Mod: CPTII,S$GLB,, | Performed by: INTERNAL MEDICINE

## 2022-03-15 PROCEDURE — 3008F PR BODY MASS INDEX (BMI) DOCUMENTED: ICD-10-PCS | Mod: CPTII,S$GLB,, | Performed by: INTERNAL MEDICINE

## 2022-03-15 PROCEDURE — 1101F PT FALLS ASSESS-DOCD LE1/YR: CPT | Mod: CPTII,S$GLB,, | Performed by: INTERNAL MEDICINE

## 2022-03-15 PROCEDURE — 3288F PR FALLS RISK ASSESSMENT DOCUMENTED: ICD-10-PCS | Mod: CPTII,S$GLB,, | Performed by: INTERNAL MEDICINE

## 2022-03-15 PROCEDURE — 1125F PR PAIN SEVERITY QUANTIFIED, PAIN PRESENT: ICD-10-PCS | Mod: CPTII,S$GLB,, | Performed by: INTERNAL MEDICINE

## 2022-03-15 PROCEDURE — 3078F PR MOST RECENT DIASTOLIC BLOOD PRESSURE < 80 MM HG: ICD-10-PCS | Mod: CPTII,S$GLB,, | Performed by: INTERNAL MEDICINE

## 2022-03-15 PROCEDURE — 3078F DIAST BP <80 MM HG: CPT | Mod: CPTII,S$GLB,, | Performed by: INTERNAL MEDICINE

## 2022-03-15 PROCEDURE — 83880 ASSAY OF NATRIURETIC PEPTIDE: CPT | Performed by: INTERNAL MEDICINE

## 2022-03-15 PROCEDURE — 3288F FALL RISK ASSESSMENT DOCD: CPT | Mod: CPTII,S$GLB,, | Performed by: INTERNAL MEDICINE

## 2022-03-15 PROCEDURE — 1101F PR PT FALLS ASSESS DOC 0-1 FALLS W/OUT INJ PAST YR: ICD-10-PCS | Mod: CPTII,S$GLB,, | Performed by: INTERNAL MEDICINE

## 2022-03-15 PROCEDURE — 1159F MED LIST DOCD IN RCRD: CPT | Mod: CPTII,S$GLB,, | Performed by: INTERNAL MEDICINE

## 2022-03-15 PROCEDURE — 4010F PR ACE/ARB THEARPY RXD/TAKEN: ICD-10-PCS | Mod: CPTII,S$GLB,, | Performed by: INTERNAL MEDICINE

## 2022-03-15 PROCEDURE — 1159F PR MEDICATION LIST DOCUMENTED IN MEDICAL RECORD: ICD-10-PCS | Mod: CPTII,S$GLB,, | Performed by: INTERNAL MEDICINE

## 2022-03-15 PROCEDURE — 99999 PR PBB SHADOW E&M-EST. PATIENT-LVL IV: CPT | Mod: PBBFAC,,, | Performed by: INTERNAL MEDICINE

## 2022-03-15 RX ORDER — ATORVASTATIN CALCIUM 20 MG/1
20 TABLET, FILM COATED ORAL NIGHTLY
Qty: 90 TABLET | Refills: 1 | Status: SHIPPED | OUTPATIENT
Start: 2022-03-15 | End: 2023-06-12

## 2022-03-15 RX ORDER — DIFLUPREDNATE OPHTHALMIC 0.5 MG/ML
1 EMULSION OPHTHALMIC 4 TIMES DAILY
Qty: 5 ML | Refills: 1 | Status: SHIPPED | OUTPATIENT
Start: 2022-03-15 | End: 2022-03-19 | Stop reason: SDUPTHER

## 2022-03-15 RX ORDER — ANASTROZOLE 1 MG/1
1 TABLET ORAL DAILY
Qty: 90 TABLET | Refills: 3 | Status: SHIPPED | OUTPATIENT
Start: 2022-03-15 | End: 2023-05-25

## 2022-03-15 RX ORDER — ATORVASTATIN CALCIUM 20 MG/1
20 TABLET, FILM COATED ORAL NIGHTLY
Qty: 90 TABLET | Refills: 1 | OUTPATIENT
Start: 2022-03-15 | End: 2022-09-11

## 2022-03-15 NOTE — TELEPHONE ENCOUNTER
Care Due:                  Date            Visit Type   Department     Provider  --------------------------------------------------------------------------------                                MYCHART                              ANNUAL                              CHECKUP/PHY  [facility]  Last Visit: 10-      [facility]       Yasmin Navarro  Next Visit: None Scheduled  None         None Found                                                            Last  Test          Frequency    Reason                     Performed    Due Date  --------------------------------------------------------------------------------    Lipid Panel.  12 months..  atorvastatin.............  03- 03-    Powered by [employer]. Reference number: [id number].   3/15/2022 1:51:49 PM CDT

## 2022-03-15 NOTE — TELEPHONE ENCOUNTER
Lv: 1/20/2022 Teresa    La: 9/3/2019 annual     No upcoming appts with pcp   This is a 32 year old       who presents today for her 6 week postpartum exam.  She gave birth to a female.  Her pregnancy was uncomplicated  She had .  This birth was uncomplicated.  She is currently breastfeeding and complains of 2 days of red, tender right breast with low grade fever.  Denies palpable lump.  Her bleeding has stopped, no abnormal discharge or pelvic pain, no dysuria.   She has not experienced symptoms of postpartum depression.  She has not had intercourse since delivery.  She plans to use Progestin only pill for birth control.  Patient also reports bilateral intermittent numbness and paraesthesia in hands and fingers.  Reports occasional difficulty with fine motor skills.  Denies any history of carpal tunnel syndrome, neck injury or spinal injury.  No problems during pregnancy      Anemia                                                        PE:  Upper extremities_  Good strength bilaterally, sensation intact.  Thyroid - normal to inspection and palpation  Heart - regular rate and rhythm, no murmurs and no clicks or gallops  Lungs - clear to auscultation bilaterally  Breast - Right breast with red streaking and area of erythema in lower left quadrant. No palpable mass.   Abdomen - soft, non-tender  Perineum - healed  Vulva - normal   Vagina - Normal mucosa, no unusual discharge  Cervix - pink  Uterus - well-involuted  Adnexa - nontender, normal size    Impression:  1. Normal 6 week postpartum exam, well-involuted, normal post-operative course  2. Mastitis  3.  Bilateral upper extremity intermittent numbness:  Patient will monitor if persists patient referred to neurology     2. No contraindications to exercise and intercourse    Plan:  1. Pap N/A  2.  Keflex 500 mg. QID for 7 days  3.  Patient will call if persists, patient will be referred to neurology, she reports no PCP  2. Reviewed indication, benefits, risks, adverse reactions and contraindications of Progestin only pill  3. Return  to clinic 1 year or PRN

## 2022-03-15 NOTE — TELEPHONE ENCOUNTER
No new care gaps identified.  Powered by [email or web address]. Reference number: [id number].   3/15/2022 1:13:05 PM CDT

## 2022-03-15 NOTE — PROGRESS NOTES
Subjective:       Patient ID: Leia Rodriguez is a 71 y.o. female.    Chief Complaint: Results, Anemia, and Breast Cancer    HPI 71-year-old female history HER2 positive breast carcinoma discontinued Herceptin after 9 cycles because of decreased ejection fraction patient continues on Arimidex 1 mg daily.  The patient returns for review    Past Medical History:   Diagnosis Date    Arthritis     EDGAR HANDS, KNEES    Blind right eye     Traumatic    Breast cancer 06/15/2017    0.8 cm Grade1 INTRADUCTAL BREAST 9 positve margin (left)    Cataract     Essential hypertension     Hemorrhoids     Lipoma of abdominal wall     Obesity     Overactive bladder     Pap smear abnormality of cervix with ASCUS favoring benign     Thyroid nodule     Tobacco use disorder     Tubular adenoma of colon     Urinary incontinence      Family History   Problem Relation Age of Onset    Hypertension Father     Cataracts Father     Prostate cancer Father 80    Cervical cancer Daughter 32    Allergic rhinitis Daughter     Cirrhosis Mother     Alcohol abuse Mother     Hypertension Daughter     Diabetes Brother     Heart attack Brother     Heart murmur Brother     No Known Problems Daughter      Social History     Socioeconomic History    Marital status:    Tobacco Use    Smoking status: Former Smoker     Packs/day: 1.00     Years: 50.00     Pack years: 50.00     Types: Cigarettes     Quit date: 2020     Years since quittin.5    Smokeless tobacco: Never Used   Substance and Sexual Activity    Alcohol use: Never     Alcohol/week: 28.0 standard drinks     Types: 28 Cans of beer per week    Drug use: No    Sexual activity: Never     Partners: Male   Social History Narrative    The patient is .  She is retired from [employer].     Past Surgical History:   Procedure Laterality Date    ANTERIOR VAGINAL REPAIR      BLADDER SURGERY      BREAST LUMPECTOMY Left 2017    CATARACT EXTRACTION Right       SECTION      X2    COLONOSCOPY N/A 3/8/2017    Procedure: COLONOSCOPY;  Surgeon: Tyron Paris MD;  Location: [facility] ENDO;  Service: Endoscopy;  Laterality: N/A;    COLONOSCOPY N/A 2020    Procedure: COLONOSCOPY;  Surgeon: Keira Ellison MD;  Location: [facility] ENDO;  Service: Endoscopy;  Laterality: N/A;    ESOPHAGOGASTRODUODENOSCOPY N/A 2020    Procedure: EGD (ESOPHAGOGASTRODUODENOSCOPY);  Surgeon: Keira Ellison MD;  Location: [facility] ENDO;  Service: Endoscopy;  Laterality: N/A;  new onset iron deficiency with prior history of breast cancer    INTRALUMINAL GASTROINTESTINAL TRACT IMAGING VIA CAPSULE N/A 10/28/2020    Procedure: IMAGING PROCEDURE, GI TRACT, INTRALUMINAL, VIA CAPSULE;  Surgeon: Finesse Jha RN;  Location: [facility] ENDO;  Service: Endoscopy;  Laterality: N/A;    LEFT HEART CATHETERIZATION Left 10/12/2021    Procedure: CATHETERIZATION, HEART, LEFT;  Surgeon: Tiffanie Santos MD;  Location: [facility] CATH LAB;  Service: Cardiology;  Laterality: Left;    SENTINEL LYMPH NODE BIOPSY Left 2020    Procedure: BIOPSY, LYMPH NODE, SENTINEL;  Surgeon: Vincent Moyer MD;  Location: [facility] OR;  Service: General;  Laterality: Left;    SIMPLE MASTECTOMY Left 2020    Procedure: MASTECTOMY, SIMPLE;  Surgeon: Vincent Moyer MD;  Location: [facility] OR;  Service: General;  Laterality: Left;    THYROID LOBECTOMY Left     TOTAL ABDOMINAL HYSTERECTOMY      TUBAL LIGATION         Labs:  Lab Results   Component Value Date    WBC 8.08 2022    HGB 8.7 (L) 2022    HCT 28.4 (L) 2022     (H) 2022     2022     BMP  Lab Results   Component Value Date     2022    K 4.4 2022     2022    CO2 29 2022    BUN 19 2022    CREATININE 1.0 2022    CALCIUM 9.7 2022    ANIONGAP 8 2022    ESTGFRAFRICA >60.0 2022    EGFRNONAA 57.2 (A) 2022     Lab Results   Component Value Date    ALT 69 (H)  11/21/2021     (H) 11/21/2021    ALKPHOS 118 11/21/2021    BILITOT 0.6 11/21/2021       Lab Results   Component Value Date    IRON 66 03/11/2022    TIBC 255 03/11/2022    FERRITIN 1,633 (H) 03/11/2022     Lab Results   Component Value Date    ECDLQVWM22 603 01/27/2022     Lab Results   Component Value Date    FOLATE >40.0 (H) 01/27/2022     Lab Results   Component Value Date    TSH 0.765 01/27/2022         Review of Systems   Constitutional: Positive for fatigue. Negative for activity change, appetite change, chills, diaphoresis, fever and unexpected weight change.   HENT: Negative for congestion, dental problem, drooling, ear discharge, ear pain, facial swelling, hearing loss, mouth sores, nosebleeds, postnasal drip, rhinorrhea, sinus pressure, sneezing, sore throat, tinnitus, trouble swallowing and voice change.    Eyes: Negative for photophobia, pain, discharge, redness, itching and visual disturbance.   Respiratory: Negative for cough, choking, chest tightness, shortness of breath, wheezing and stridor.    Cardiovascular: Negative for chest pain, palpitations and leg swelling.   Gastrointestinal: Negative for abdominal distention, abdominal pain, anal bleeding, blood in stool, constipation, diarrhea, nausea, rectal pain and vomiting.   Endocrine: Negative for cold intolerance, heat intolerance, polydipsia, polyphagia and polyuria.   Genitourinary: Negative for decreased urine volume, difficulty urinating, dyspareunia, dysuria, enuresis, flank pain, frequency, genital sores, hematuria, menstrual problem, pelvic pain, urgency, vaginal bleeding, vaginal discharge and vaginal pain.   Musculoskeletal: Negative for arthralgias, back pain, gait problem, joint swelling, myalgias, neck pain and neck stiffness.   Skin: Negative for color change, pallor and rash.   Allergic/Immunologic: Negative for environmental allergies, food allergies and immunocompromised state.   Neurological: Positive for weakness. Negative  for dizziness, tremors, seizures, syncope, facial asymmetry, speech difficulty, light-headedness, numbness and headaches.   Hematological: Negative for adenopathy. Does not bruise/bleed easily.   Psychiatric/Behavioral: Positive for dysphoric mood. Negative for agitation, behavioral problems, confusion, decreased concentration, hallucinations, self-injury, sleep disturbance and suicidal ideas. The patient is nervous/anxious. The patient is not hyperactive.        Objective:      Physical Exam  Vitals reviewed.   Constitutional:       General: She is not in acute distress.     Appearance: She is well-developed. She is not diaphoretic.   HENT:      Head: Normocephalic and atraumatic.      Right Ear: External ear normal.      Left Ear: External ear normal.      Nose: Nose normal.      Right Sinus: No maxillary sinus tenderness or frontal sinus tenderness.      Left Sinus: No maxillary sinus tenderness or frontal sinus tenderness.      Mouth/Throat:      Pharynx: No oropharyngeal exudate.   Eyes:      General: Lids are normal. No scleral icterus.        Right eye: No discharge.         Left eye: No discharge.      Conjunctiva/sclera: Conjunctivae normal.      Right eye: Right conjunctiva is not injected. No hemorrhage.     Left eye: Left conjunctiva is not injected. No hemorrhage.     Pupils: Pupils are equal, round, and reactive to light.   Neck:      Thyroid: No thyromegaly.      Vascular: No JVD.      Trachea: No tracheal deviation.   Cardiovascular:      Rate and Rhythm: Normal rate.   Pulmonary:      Effort: Pulmonary effort is normal. No respiratory distress.      Breath sounds: No stridor.   Chest:      Chest wall: No tenderness.   Breasts:      Right: No supraclavicular adenopathy.      Left: No supraclavicular adenopathy.         Abdominal:      General: Bowel sounds are normal. There is no distension.      Palpations: Abdomen is soft. There is no hepatomegaly, splenomegaly or mass.      Tenderness: There is no  abdominal tenderness. There is no rebound.   Musculoskeletal:         General: No tenderness. Normal range of motion.      Cervical back: Normal range of motion and neck supple.   Lymphadenopathy:      Cervical: No cervical adenopathy.      Upper Body:      Right upper body: No supraclavicular adenopathy.      Left upper body: No supraclavicular adenopathy.   Skin:     General: Skin is dry.      Findings: No erythema or rash.   Neurological:      Mental Status: She is alert and oriented to person, place, and time.      Cranial Nerves: No cranial nerve deficit.      Coordination: Coordination normal.   Psychiatric:         Behavior: Behavior normal.         Thought Content: Thought content normal.         Judgment: Judgment normal.             Assessment:      1. Malignant neoplasm of lower-inner quadrant of left breast in female, estrogen receptor positive    2. Chronic kidney disease, stage 3b    3. Immunodeficiency due to chemotherapy    4. Other specified disorders of bone density and structure, left lower leg     5. Essential hypertension    6. Decreased cardiac ejection fraction    7. Coronary artery disease of native artery of native heart with stable angina pectoris           Plan:     Continue with Arimidex 1 mg daily CBC reveals hemoglobin 8.7 iron repleted iron deficiency no longer in play patient's creatinine clearance improved.  Will check additional laboratory studies today communicate results to her see back in 3 months with repeat CBC nurse practitioner in 6 months with bone density and mammogram of right breast discussed implications answered questions discussed the possibility of need for bone marrow aspirate biopsy if un able to make a diagnosis and anemia persists        Mario Fernández Jr, MD FACP

## 2022-03-16 DIAGNOSIS — H25.12 NUCLEAR SCLEROSIS OF LEFT EYE: Primary | ICD-10-CM

## 2022-03-16 RX ORDER — PREDNISOLONE ACETATE 10 MG/ML
1 SUSPENSION/ DROPS OPHTHALMIC 4 TIMES DAILY
Qty: 5 ML | Refills: 1 | Status: SHIPPED | OUTPATIENT
Start: 2022-03-16 | End: 2022-03-19 | Stop reason: SDUPTHER

## 2022-03-16 NOTE — TELEPHONE ENCOUNTER
----- Message from Bill Leslie sent at 3/16/2022  8:19 AM CDT -----  Contact: Moe Rosa (daughter) would like to consult with nurse regarding eye drops that may not be ready for tomorrow's procedure due to prior auth.  Please contact Moe @ [phone].  Thanks/As

## 2022-03-17 ENCOUNTER — OUTSIDE PLACE OF SERVICE (OUTPATIENT)
Dept: OPHTHALMOLOGY | Facility: CLINIC | Age: 72
End: 2022-03-17
Payer: MEDICARE

## 2022-03-17 PROCEDURE — 66984 PR REMOVAL, CATARACT, W/INSRT INTRAOC LENS, W/O ENDO CYCLO: ICD-10-PCS | Mod: LT,,, | Performed by: STUDENT IN AN ORGANIZED HEALTH CARE EDUCATION/TRAINING PROGRAM

## 2022-03-17 PROCEDURE — 66984 XCAPSL CTRC RMVL W/O ECP: CPT | Mod: LT,,, | Performed by: STUDENT IN AN ORGANIZED HEALTH CARE EDUCATION/TRAINING PROGRAM

## 2022-03-18 ENCOUNTER — OFFICE VISIT (OUTPATIENT)
Dept: OPHTHALMOLOGY | Facility: CLINIC | Age: 72
End: 2022-03-18
Payer: MEDICARE

## 2022-03-18 DIAGNOSIS — Z98.42 CATARACT EXTRACTION STATUS OF EYE, LEFT: ICD-10-CM

## 2022-03-18 DIAGNOSIS — Z98.890 POST-OPERATIVE STATE: Primary | ICD-10-CM

## 2022-03-18 PROCEDURE — 4010F PR ACE/ARB THEARPY RXD/TAKEN: ICD-10-PCS | Mod: CPTII,S$GLB,, | Performed by: STUDENT IN AN ORGANIZED HEALTH CARE EDUCATION/TRAINING PROGRAM

## 2022-03-18 PROCEDURE — 99999 PR PBB SHADOW E&M-EST. PATIENT-LVL III: ICD-10-PCS | Mod: PBBFAC,,, | Performed by: STUDENT IN AN ORGANIZED HEALTH CARE EDUCATION/TRAINING PROGRAM

## 2022-03-18 PROCEDURE — 99999 PR PBB SHADOW E&M-EST. PATIENT-LVL III: CPT | Mod: PBBFAC,,, | Performed by: STUDENT IN AN ORGANIZED HEALTH CARE EDUCATION/TRAINING PROGRAM

## 2022-03-18 PROCEDURE — 1159F PR MEDICATION LIST DOCUMENTED IN MEDICAL RECORD: ICD-10-PCS | Mod: CPTII,S$GLB,, | Performed by: STUDENT IN AN ORGANIZED HEALTH CARE EDUCATION/TRAINING PROGRAM

## 2022-03-18 PROCEDURE — 1160F PR REVIEW ALL MEDS BY PRESCRIBER/CLIN PHARMACIST DOCUMENTED: ICD-10-PCS | Mod: CPTII,S$GLB,, | Performed by: STUDENT IN AN ORGANIZED HEALTH CARE EDUCATION/TRAINING PROGRAM

## 2022-03-18 PROCEDURE — 1160F RVW MEDS BY RX/DR IN RCRD: CPT | Mod: CPTII,S$GLB,, | Performed by: STUDENT IN AN ORGANIZED HEALTH CARE EDUCATION/TRAINING PROGRAM

## 2022-03-18 PROCEDURE — 99024 POSTOP FOLLOW-UP VISIT: CPT | Mod: S$GLB,,, | Performed by: STUDENT IN AN ORGANIZED HEALTH CARE EDUCATION/TRAINING PROGRAM

## 2022-03-18 PROCEDURE — 99024 PR POST-OP FOLLOW-UP VISIT: ICD-10-PCS | Mod: S$GLB,,, | Performed by: STUDENT IN AN ORGANIZED HEALTH CARE EDUCATION/TRAINING PROGRAM

## 2022-03-18 PROCEDURE — 1159F MED LIST DOCD IN RCRD: CPT | Mod: CPTII,S$GLB,, | Performed by: STUDENT IN AN ORGANIZED HEALTH CARE EDUCATION/TRAINING PROGRAM

## 2022-03-18 PROCEDURE — 4010F ACE/ARB THERAPY RXD/TAKEN: CPT | Mod: CPTII,S$GLB,, | Performed by: STUDENT IN AN ORGANIZED HEALTH CARE EDUCATION/TRAINING PROGRAM

## 2022-03-18 NOTE — PROGRESS NOTES
HPI     Pt in today for a 1 day post-op of the left eye. Pt states her vision is   doing well. Pt denies any ocular pain or discomfort. Pt states she's   compliant with her drops.    1. PCIOL OS 03/17/2022  NS OD  2. HTN    OS- Pred QID      Last edited by Heidi Chandra on 3/18/2022  8:00 AM. (History)            Assessment /Plan     For exam results, see Encounter Report.    Post-operative state  Cataract extraction status of eye, left- POD#1 S/P CEIOL OS Doing well. Moderate Edema. Instructed patient to start Donn gtts QID in operative eye in addition to post-operative gtts.    Continue gtts to operative eye:  PF QID  Donn 128 QID    Reinstructed in importance of absolute compliance with Post-OP instructions including medications, shield at bedtime, protective glasses during the day, and limitation of activities. Follow up appointments in approximately one and six weeks or call immediately for increased pain, redness or vision loss.     RTC 1 week. MOCT if PH worse than 20/25

## 2022-03-19 DIAGNOSIS — Z98.42 CATARACT EXTRACTION STATUS OF EYE, LEFT: ICD-10-CM

## 2022-03-19 DIAGNOSIS — Z98.890 POST-OPERATIVE STATE: Primary | ICD-10-CM

## 2022-03-19 RX ORDER — PREDNISOLONE ACETATE 10 MG/ML
1 SUSPENSION/ DROPS OPHTHALMIC EVERY 4 HOURS
Qty: 5 ML | Refills: 1 | Status: SHIPPED | OUTPATIENT
Start: 2022-03-19 | End: 2022-12-05

## 2022-03-20 ENCOUNTER — TELEPHONE (OUTPATIENT)
Dept: CARDIOLOGY | Facility: CLINIC | Age: 72
End: 2022-03-20
Payer: MEDICARE

## 2022-03-20 RX ORDER — FUROSEMIDE 20 MG/1
20 TABLET ORAL
Qty: 36 TABLET | Refills: 3 | Status: SHIPPED | OUTPATIENT
Start: 2022-03-21 | End: 2022-10-26

## 2022-03-21 ENCOUNTER — TELEPHONE (OUTPATIENT)
Dept: CARDIOLOGY | Facility: CLINIC | Age: 72
End: 2022-03-21
Payer: MEDICARE

## 2022-03-21 NOTE — TELEPHONE ENCOUNTER
Pt contacted about results, daughter verbalized understanding        ----- Message from Jose G Manjarrez MD sent at 3/20/2022 12:16 PM CDT -----  The lab showed CHF controlled  Recommend lasix 20 mg 3 times a week.

## 2022-03-25 ENCOUNTER — OFFICE VISIT (OUTPATIENT)
Dept: OPHTHALMOLOGY | Facility: CLINIC | Age: 72
End: 2022-03-25
Payer: MEDICARE

## 2022-03-25 DIAGNOSIS — Z98.890 POST-OPERATIVE STATE: Primary | ICD-10-CM

## 2022-03-25 DIAGNOSIS — Z98.42 CATARACT EXTRACTION STATUS OF EYE, LEFT: ICD-10-CM

## 2022-03-25 PROCEDURE — 1159F MED LIST DOCD IN RCRD: CPT | Mod: CPTII,S$GLB,, | Performed by: STUDENT IN AN ORGANIZED HEALTH CARE EDUCATION/TRAINING PROGRAM

## 2022-03-25 PROCEDURE — 99999 PR PBB SHADOW E&M-EST. PATIENT-LVL III: CPT | Mod: PBBFAC,,, | Performed by: STUDENT IN AN ORGANIZED HEALTH CARE EDUCATION/TRAINING PROGRAM

## 2022-03-25 PROCEDURE — 99024 PR POST-OP FOLLOW-UP VISIT: ICD-10-PCS | Mod: S$GLB,,, | Performed by: STUDENT IN AN ORGANIZED HEALTH CARE EDUCATION/TRAINING PROGRAM

## 2022-03-25 PROCEDURE — 1160F PR REVIEW ALL MEDS BY PRESCRIBER/CLIN PHARMACIST DOCUMENTED: ICD-10-PCS | Mod: CPTII,S$GLB,, | Performed by: STUDENT IN AN ORGANIZED HEALTH CARE EDUCATION/TRAINING PROGRAM

## 2022-03-25 PROCEDURE — 1160F RVW MEDS BY RX/DR IN RCRD: CPT | Mod: CPTII,S$GLB,, | Performed by: STUDENT IN AN ORGANIZED HEALTH CARE EDUCATION/TRAINING PROGRAM

## 2022-03-25 PROCEDURE — 99024 POSTOP FOLLOW-UP VISIT: CPT | Mod: S$GLB,,, | Performed by: STUDENT IN AN ORGANIZED HEALTH CARE EDUCATION/TRAINING PROGRAM

## 2022-03-25 PROCEDURE — 4010F PR ACE/ARB THEARPY RXD/TAKEN: ICD-10-PCS | Mod: CPTII,S$GLB,, | Performed by: STUDENT IN AN ORGANIZED HEALTH CARE EDUCATION/TRAINING PROGRAM

## 2022-03-25 PROCEDURE — 4010F ACE/ARB THERAPY RXD/TAKEN: CPT | Mod: CPTII,S$GLB,, | Performed by: STUDENT IN AN ORGANIZED HEALTH CARE EDUCATION/TRAINING PROGRAM

## 2022-03-25 PROCEDURE — 99999 PR PBB SHADOW E&M-EST. PATIENT-LVL III: ICD-10-PCS | Mod: PBBFAC,,, | Performed by: STUDENT IN AN ORGANIZED HEALTH CARE EDUCATION/TRAINING PROGRAM

## 2022-03-25 PROCEDURE — 1159F PR MEDICATION LIST DOCUMENTED IN MEDICAL RECORD: ICD-10-PCS | Mod: CPTII,S$GLB,, | Performed by: STUDENT IN AN ORGANIZED HEALTH CARE EDUCATION/TRAINING PROGRAM

## 2022-03-25 NOTE — PROGRESS NOTES
HPI     Pt in today for a 1 week post-op. Pt states her vision is doing well. Pt   denies any ocular pain or discomfort. Pt states she's compliant with her   drops.    1. PCIOL OS 03/17/2022  NS OD  2. HTN    OS- Pred forte QID      Last edited by Heidi Chandra on 3/25/2022  9:07 AM. (History)            Assessment /Plan     For exam results, see Encounter Report.    Post-operative state  Cataract extraction status of eye, left- Impression/Plan  POW#1 S/P CEIOL OS : Doing well with no evidence of infection. Persistent inflammation. Will continue steroids.    PF QID until next visit   Stop Donn 128    Pt given and instructed in one week postop instructions. Can resume normal activitites and d/c eye shield. OTC reading glasses can be used until evaluated for final MR. Follow up in one month or PRN pain, redness, vision loss, or other concerns.      Return to clinic in 4 week PO

## 2022-03-31 ENCOUNTER — OFFICE VISIT (OUTPATIENT)
Dept: NEUROLOGY | Facility: CLINIC | Age: 72
End: 2022-03-31
Payer: MEDICARE

## 2022-03-31 VITALS
OXYGEN SATURATION: 99 % | WEIGHT: 141.56 LBS | HEART RATE: 87 BPM | BODY MASS INDEX: 26.73 KG/M2 | RESPIRATION RATE: 16 BRPM | DIASTOLIC BLOOD PRESSURE: 76 MMHG | SYSTOLIC BLOOD PRESSURE: 116 MMHG | HEIGHT: 61 IN

## 2022-03-31 DIAGNOSIS — R32 URINARY INCONTINENCE, UNSPECIFIED TYPE: ICD-10-CM

## 2022-03-31 DIAGNOSIS — F10.21 HISTORY OF ALCOHOLISM: ICD-10-CM

## 2022-03-31 DIAGNOSIS — R90.89 ABNORMAL FINDING ON MRI OF BRAIN: ICD-10-CM

## 2022-03-31 DIAGNOSIS — Z91.89 AT RISK FOR PROLONGED QT INTERVAL SYNDROME: Primary | ICD-10-CM

## 2022-03-31 DIAGNOSIS — G30.9 MAJOR NEUROCOGNITIVE DISORDER DUE TO ALZHEIMER'S DISEASE: ICD-10-CM

## 2022-03-31 DIAGNOSIS — R41.3 MEMORY LOSS: ICD-10-CM

## 2022-03-31 DIAGNOSIS — F03.90 DEMENTIA WITHOUT BEHAVIORAL DISTURBANCE, UNSPECIFIED DEMENTIA TYPE: ICD-10-CM

## 2022-03-31 DIAGNOSIS — G31.84 MILD COGNITIVE IMPAIRMENT WITH MEMORY LOSS: ICD-10-CM

## 2022-03-31 DIAGNOSIS — C50.312 MALIGNANT NEOPLASM OF LOWER-INNER QUADRANT OF LEFT BREAST IN FEMALE, ESTROGEN RECEPTOR POSITIVE: ICD-10-CM

## 2022-03-31 DIAGNOSIS — F02.80 MAJOR NEUROCOGNITIVE DISORDER DUE TO ALZHEIMER'S DISEASE: ICD-10-CM

## 2022-03-31 DIAGNOSIS — M81.0 AGE-RELATED OSTEOPOROSIS WITHOUT CURRENT PATHOLOGICAL FRACTURE: ICD-10-CM

## 2022-03-31 DIAGNOSIS — Z91.89: ICD-10-CM

## 2022-03-31 DIAGNOSIS — F17.200 TOBACCO USE DISORDER: ICD-10-CM

## 2022-03-31 DIAGNOSIS — Z17.0 MALIGNANT NEOPLASM OF LOWER-INNER QUADRANT OF LEFT BREAST IN FEMALE, ESTROGEN RECEPTOR POSITIVE: ICD-10-CM

## 2022-03-31 PROCEDURE — 3008F PR BODY MASS INDEX (BMI) DOCUMENTED: ICD-10-PCS | Mod: CPTII,S$GLB,, | Performed by: NURSE PRACTITIONER

## 2022-03-31 PROCEDURE — 3074F SYST BP LT 130 MM HG: CPT | Mod: CPTII,S$GLB,, | Performed by: NURSE PRACTITIONER

## 2022-03-31 PROCEDURE — 1159F PR MEDICATION LIST DOCUMENTED IN MEDICAL RECORD: ICD-10-PCS | Mod: CPTII,S$GLB,, | Performed by: NURSE PRACTITIONER

## 2022-03-31 PROCEDURE — 99214 OFFICE O/P EST MOD 30 MIN: CPT | Mod: S$GLB,,, | Performed by: NURSE PRACTITIONER

## 2022-03-31 PROCEDURE — 99214 PR OFFICE/OUTPT VISIT, EST, LEVL IV, 30-39 MIN: ICD-10-PCS | Mod: S$GLB,,, | Performed by: NURSE PRACTITIONER

## 2022-03-31 PROCEDURE — 4010F PR ACE/ARB THEARPY RXD/TAKEN: ICD-10-PCS | Mod: CPTII,S$GLB,, | Performed by: NURSE PRACTITIONER

## 2022-03-31 PROCEDURE — 1125F PR PAIN SEVERITY QUANTIFIED, PAIN PRESENT: ICD-10-PCS | Mod: CPTII,S$GLB,, | Performed by: NURSE PRACTITIONER

## 2022-03-31 PROCEDURE — 1160F PR REVIEW ALL MEDS BY PRESCRIBER/CLIN PHARMACIST DOCUMENTED: ICD-10-PCS | Mod: CPTII,S$GLB,, | Performed by: NURSE PRACTITIONER

## 2022-03-31 PROCEDURE — 3078F PR MOST RECENT DIASTOLIC BLOOD PRESSURE < 80 MM HG: ICD-10-PCS | Mod: CPTII,S$GLB,, | Performed by: NURSE PRACTITIONER

## 2022-03-31 PROCEDURE — 99999 PR PBB SHADOW E&M-EST. PATIENT-LVL V: ICD-10-PCS | Mod: PBBFAC,,, | Performed by: NURSE PRACTITIONER

## 2022-03-31 PROCEDURE — 3008F BODY MASS INDEX DOCD: CPT | Mod: CPTII,S$GLB,, | Performed by: NURSE PRACTITIONER

## 2022-03-31 PROCEDURE — 3078F DIAST BP <80 MM HG: CPT | Mod: CPTII,S$GLB,, | Performed by: NURSE PRACTITIONER

## 2022-03-31 PROCEDURE — 1101F PT FALLS ASSESS-DOCD LE1/YR: CPT | Mod: CPTII,S$GLB,, | Performed by: NURSE PRACTITIONER

## 2022-03-31 PROCEDURE — 3288F FALL RISK ASSESSMENT DOCD: CPT | Mod: CPTII,S$GLB,, | Performed by: NURSE PRACTITIONER

## 2022-03-31 PROCEDURE — 99999 PR PBB SHADOW E&M-EST. PATIENT-LVL V: CPT | Mod: PBBFAC,,, | Performed by: NURSE PRACTITIONER

## 2022-03-31 PROCEDURE — 3074F PR MOST RECENT SYSTOLIC BLOOD PRESSURE < 130 MM HG: ICD-10-PCS | Mod: CPTII,S$GLB,, | Performed by: NURSE PRACTITIONER

## 2022-03-31 PROCEDURE — 1160F RVW MEDS BY RX/DR IN RCRD: CPT | Mod: CPTII,S$GLB,, | Performed by: NURSE PRACTITIONER

## 2022-03-31 PROCEDURE — 3288F PR FALLS RISK ASSESSMENT DOCUMENTED: ICD-10-PCS | Mod: CPTII,S$GLB,, | Performed by: NURSE PRACTITIONER

## 2022-03-31 PROCEDURE — 1125F AMNT PAIN NOTED PAIN PRSNT: CPT | Mod: CPTII,S$GLB,, | Performed by: NURSE PRACTITIONER

## 2022-03-31 PROCEDURE — 1159F MED LIST DOCD IN RCRD: CPT | Mod: CPTII,S$GLB,, | Performed by: NURSE PRACTITIONER

## 2022-03-31 PROCEDURE — 4010F ACE/ARB THERAPY RXD/TAKEN: CPT | Mod: CPTII,S$GLB,, | Performed by: NURSE PRACTITIONER

## 2022-03-31 PROCEDURE — 1101F PR PT FALLS ASSESS DOC 0-1 FALLS W/OUT INJ PAST YR: ICD-10-PCS | Mod: CPTII,S$GLB,, | Performed by: NURSE PRACTITIONER

## 2022-03-31 RX ORDER — DONEPEZIL HYDROCHLORIDE 10 MG/1
10 TABLET, FILM COATED ORAL NIGHTLY
Qty: 30 TABLET | Refills: 1 | Status: SHIPPED | OUTPATIENT
Start: 2022-03-31 | End: 2022-05-25

## 2022-03-31 NOTE — PROGRESS NOTES
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    RTC 4 weeks

## 2022-04-12 ENCOUNTER — HOSPITAL ENCOUNTER (EMERGENCY)
Facility: HOSPITAL | Age: 72
Discharge: HOME | End: 2022-04-12
Attending: EMERGENCY MEDICINE
Payer: MEDICARE

## 2022-04-12 ENCOUNTER — NURSE TRIAGE (OUTPATIENT)
Dept: ADMINISTRATIVE | Facility: CLINIC | Age: 72
End: 2022-04-12
Payer: MEDICARE

## 2022-04-12 VITALS
BODY MASS INDEX: 24.55 KG/M2 | TEMPERATURE: 99 F | HEART RATE: 64 BPM | RESPIRATION RATE: 23 BRPM | HEIGHT: 61 IN | OXYGEN SATURATION: 99 % | WEIGHT: 130.06 LBS | SYSTOLIC BLOOD PRESSURE: 137 MMHG | DIASTOLIC BLOOD PRESSURE: 63 MMHG

## 2022-04-12 DIAGNOSIS — T50.901A OVERDOSE: ICD-10-CM

## 2022-04-12 DIAGNOSIS — T50.901A ACCIDENTAL DRUG INGESTION: ICD-10-CM

## 2022-04-12 LAB
ALBUMIN SERPL BCP-MCNC: 3.7 G/DL (ref 3.5–5.2)
ALP SERPL-CCNC: 71 U/L (ref 55–135)
ALT SERPL W/O P-5'-P-CCNC: 16 U/L (ref 10–44)
AMPHET+METHAMPHET UR QL: NEGATIVE
ANION GAP SERPL CALC-SCNC: 10 MMOL/L (ref 8–16)
APAP SERPL-MCNC: <3 UG/ML (ref 10–20)
AST SERPL-CCNC: 17 U/L (ref 10–40)
BACTERIA #/AREA URNS HPF: ABNORMAL /HPF
BARBITURATES UR QL SCN>200 NG/ML: NEGATIVE
BASOPHILS # BLD AUTO: 0.04 K/UL (ref 0–0.2)
BASOPHILS NFR BLD: 0.5 % (ref 0–1.9)
BENZODIAZ UR QL SCN>200 NG/ML: NEGATIVE
BILIRUB SERPL-MCNC: 0.2 MG/DL (ref 0.1–1)
BILIRUB UR QL STRIP: NEGATIVE
BUN SERPL-MCNC: 26 MG/DL (ref 8–23)
BZE UR QL SCN: NEGATIVE
CALCIUM SERPL-MCNC: 9 MG/DL (ref 8.7–10.5)
CANNABINOIDS UR QL SCN: NEGATIVE
CHLORIDE SERPL-SCNC: 106 MMOL/L (ref 95–110)
CLARITY UR: CLEAR
CO2 SERPL-SCNC: 26 MMOL/L (ref 23–29)
COLOR UR: YELLOW
CREAT SERPL-MCNC: 1.4 MG/DL (ref 0.5–1.4)
CREAT UR-MCNC: 117.7 MG/DL (ref 15–325)
DIFFERENTIAL METHOD: ABNORMAL
EOSINOPHIL # BLD AUTO: 0.4 K/UL (ref 0–0.5)
EOSINOPHIL NFR BLD: 5.2 % (ref 0–8)
ERYTHROCYTE [DISTWIDTH] IN BLOOD BY AUTOMATED COUNT: 14.1 % (ref 11.5–14.5)
EST. GFR  (AFRICAN AMERICAN): 44 ML/MIN/1.73 M^2
EST. GFR  (NON AFRICAN AMERICAN): 38 ML/MIN/1.73 M^2
GLUCOSE SERPL-MCNC: 93 MG/DL (ref 70–110)
GLUCOSE UR QL STRIP: NEGATIVE
HCT VFR BLD AUTO: 30.2 % (ref 37–48.5)
HCV AB SERPL QL IA: NEGATIVE
HEP C VIRUS HOLD SPECIMEN: NORMAL
HGB BLD-MCNC: 9.4 G/DL (ref 12–16)
HGB UR QL STRIP: NEGATIVE
IMM GRANULOCYTES # BLD AUTO: 0.04 K/UL (ref 0–0.04)
IMM GRANULOCYTES NFR BLD AUTO: 0.5 % (ref 0–0.5)
KETONES UR QL STRIP: NEGATIVE
LEUKOCYTE ESTERASE UR QL STRIP: NEGATIVE
LYMPHOCYTES # BLD AUTO: 2.6 K/UL (ref 1–4.8)
LYMPHOCYTES NFR BLD: 31.7 % (ref 18–48)
MCH RBC QN AUTO: 31.3 PG (ref 27–31)
MCHC RBC AUTO-ENTMCNC: 31.1 G/DL (ref 32–36)
MCV RBC AUTO: 101 FL (ref 82–98)
METHADONE UR QL SCN>300 NG/ML: NEGATIVE
MICROSCOPIC COMMENT: ABNORMAL
MONOCYTES # BLD AUTO: 0.7 K/UL (ref 0.3–1)
MONOCYTES NFR BLD: 8.8 % (ref 4–15)
NEUTROPHILS # BLD AUTO: 4.4 K/UL (ref 1.8–7.7)
NEUTROPHILS NFR BLD: 53.3 % (ref 38–73)
NITRITE UR QL STRIP: POSITIVE
NRBC BLD-RTO: 0 /100 WBC
OPIATES UR QL SCN: NEGATIVE
PCP UR QL SCN>25 NG/ML: NEGATIVE
PH UR STRIP: 6 [PH] (ref 5–8)
PLATELET # BLD AUTO: 352 K/UL (ref 150–450)
PMV BLD AUTO: 11.3 FL (ref 9.2–12.9)
POTASSIUM SERPL-SCNC: 4.5 MMOL/L (ref 3.5–5.1)
PROT SERPL-MCNC: 7.2 G/DL (ref 6–8.4)
PROT UR QL STRIP: NEGATIVE
RBC # BLD AUTO: 3 M/UL (ref 4–5.4)
SALICYLATES SERPL-MCNC: 6.6 MG/DL (ref 15–30)
SODIUM SERPL-SCNC: 142 MMOL/L (ref 136–145)
SP GR UR STRIP: 1.02 (ref 1–1.03)
TOXICOLOGY INFORMATION: NORMAL
URN SPEC COLLECT METH UR: ABNORMAL
UROBILINOGEN UR STRIP-ACNC: NEGATIVE EU/DL
WBC # BLD AUTO: 8.15 K/UL (ref 3.9–12.7)
WBC #/AREA URNS HPF: 5 /HPF (ref 0–5)

## 2022-04-12 PROCEDURE — 86803 HEPATITIS C AB TEST: CPT | Performed by: EMERGENCY MEDICINE

## 2022-04-12 PROCEDURE — 93010 ELECTROCARDIOGRAM REPORT: CPT | Mod: ,,, | Performed by: INTERNAL MEDICINE

## 2022-04-12 PROCEDURE — 93010 EKG 12-LEAD: ICD-10-PCS | Mod: 76,,, | Performed by: INTERNAL MEDICINE

## 2022-04-12 PROCEDURE — 80143 DRUG ASSAY ACETAMINOPHEN: CPT | Performed by: EMERGENCY MEDICINE

## 2022-04-12 PROCEDURE — 80307 DRUG TEST PRSMV CHEM ANLYZR: CPT | Performed by: EMERGENCY MEDICINE

## 2022-04-12 PROCEDURE — 93005 ELECTROCARDIOGRAM TRACING: CPT

## 2022-04-12 PROCEDURE — 96360 HYDRATION IV INFUSION INIT: CPT

## 2022-04-12 PROCEDURE — 96361 HYDRATE IV INFUSION ADD-ON: CPT

## 2022-04-12 PROCEDURE — 85025 COMPLETE CBC W/AUTO DIFF WBC: CPT | Performed by: PHYSICIAN ASSISTANT

## 2022-04-12 PROCEDURE — 99284 EMERGENCY DEPT VISIT MOD MDM: CPT | Mod: 25

## 2022-04-12 PROCEDURE — 25000003 PHARM REV CODE 250: Performed by: EMERGENCY MEDICINE

## 2022-04-12 PROCEDURE — 80179 DRUG ASSAY SALICYLATE: CPT | Performed by: EMERGENCY MEDICINE

## 2022-04-12 PROCEDURE — 81000 URINALYSIS NONAUTO W/SCOPE: CPT | Mod: 59 | Performed by: EMERGENCY MEDICINE

## 2022-04-12 PROCEDURE — 93010 ELECTROCARDIOGRAM REPORT: CPT | Mod: 76,,, | Performed by: INTERNAL MEDICINE

## 2022-04-12 PROCEDURE — 80053 COMPREHEN METABOLIC PANEL: CPT | Performed by: EMERGENCY MEDICINE

## 2022-04-12 RX ADMIN — SODIUM CHLORIDE 500 ML: 0.9 INJECTION, SOLUTION INTRAVENOUS at 07:04

## 2022-04-12 NOTE — FIRST PROVIDER EVALUATION
Medical screening exam completed.  I have conducted a focused provider triage encounter, findings are as follows:    Brief history of present illness:  Daughter thinks that pt took her husbands medications instead of her own.  Daughter says pt was hypotensive at home.  Pt denies any symptoms    There were no vitals filed for this visit.    Pertinent physical exam:  nad      Preliminary workup initiated; this workup will be continued and followed by the physician or advanced practice provider that is assigned to the patient when roomed.

## 2022-04-12 NOTE — TELEPHONE ENCOUNTER
"Pt's daughter, Zheng, was transferred to [facility]. Daughter states mother has dementia and took her 's medication this morning instead of her own. The  is on amlodipine, metoprolol, zetia, rosuvastatin, clopidogrel, aspirin, and Vit D. They are not sure exactly which medications she took and how much. They state the rest of the week's worth of pills are missing but the pt is stating she only took today's pills. Before the call they checked the pt's blood pressure and one arm was 91/48 and the other arm was 79/48. The pt can be heard in the background saying "I'm fine, I'm fine" pt denies CP, dizziness, lightheadedness, weakness, SOB, increased confusion, slurred speech. Daughter believes pt took medication around 7 AM this morning. Current BP 94/53. Advised daughter since they cannot be sure of how much medication pt actually took recommendation is to call 911. Blood pressure is too low and can continue to drop. Daughter verbalized understanding but stated she would probably take her to the hospital. Gave number to O for further concerns.    Reason for Disposition   Took another person's prescription drug   Sounds like a life-threatening emergency to the triager    Additional Information   Negative: Systolic BP < 90 and feeling dizzy, lightheaded, or weak   Negative: Started suddenly after an allergic medicine, an allergic food, or bee sting   Negative: Shock suspected (e.g., cold/pale/clammy skin, too weak to stand, low BP, rapid pulse)   Negative: Difficult to awaken or acting confused  (e.g., disoriented, slurred speech)   Negative: Fainted   Negative: Chest pain   Negative: Bleeding (e.g., vomiting blood, rectal bleeding or tarry stools, severe vaginal bleeding)   Negative: Extra heart beats or heart is beating fast  (i.e., "palpitations")    Protocols used: MEDICATION QUESTION CALL-A-OH, LOW BLOOD PRESSURE-A-OH      "

## 2022-04-12 NOTE — ED PROVIDER NOTES
"SCRIBE #1 NOTE: I, Kesha Clay, am scribing for, and in the presence of, Jasper Lopez Jr., MD. I have scribed the HPI, ROS, and PEx.     SCRIBE #2 NOTE: I, Erika Reddy, am scribing for, and in the presence of,  Gary Corea MD. I have scribed the remaining portions of the note not scribed by Scribe #1.      History     Chief Complaint   Patient presents with    Drug Overdose     Daughter states, "SHe accidentally took my dad's medication and we noticed that there is 3 days of medication missing."     Review of patient's allergies indicates:  No Known Allergies      History of Present Illness     HPI    4/12/2022, 5:32 PM  History obtained from the daughter and patient      History of Present Illness: Leia Rodriguez is a 71 y.o. female patient with a PMHx of breast cancer and essential HTN who presents to the Emergency Department for evaluation after accidentally taking her 's medications instead of her own. Daughter states she is unsure if pt took 1 or 3 days worth of his medications because there were 3 days of the meds worth missing. The  is on aspirin, amiodarone, metoprolol, plavix, and rosuvastatin. Daughter reports low BP with systolic in the low 90's just pta. Symptoms are constant and moderate in severity. No mitigating or exacerbating factors reported. No associated sxs reported. Patient denies any fatigue, weakness, dizziness, CP, SOB, n/v, abdominal pain, and all other sxs at this time. No prior tx reported. No further complaints or concerns at this time.       Arrival mode: Personal vehicle    PCP: Yasmin Navarro MD        Past Medical History:  Past Medical History:   Diagnosis Date    Arthritis     EDGAR HANDS, KNEES    Blind right eye     Traumatic    Breast cancer 06/15/2017    0.8 cm Grade1 INTRADUCTAL BREAST 9 positve margin (left)    Cataract     Essential hypertension     Hemorrhoids     Lipoma of abdominal wall     Obesity     Overactive bladder     Pap smear " abnormality of cervix with ASCUS favoring benign     Thyroid nodule     Tobacco use disorder     Tubular adenoma of colon     Urinary incontinence        Past Surgical History:  Past Surgical History:   Procedure Laterality Date    ANTERIOR VAGINAL REPAIR      BLADDER SURGERY      BREAST LUMPECTOMY Left 2017    CATARACT EXTRACTION Left 2022     SECTION      X2    COLONOSCOPY N/A 3/8/2017    Procedure: COLONOSCOPY;  Surgeon: Tyron Paris MD;  Location: [facility];  Service: Endoscopy;  Laterality: N/A;    COLONOSCOPY N/A 2020    Procedure: COLONOSCOPY;  Surgeon: Keira Ellison MD;  Location: [facility];  Service: Endoscopy;  Laterality: N/A;    ESOPHAGOGASTRODUODENOSCOPY N/A 2020    Procedure: EGD (ESOPHAGOGASTRODUODENOSCOPY);  Surgeon: Keira Ellison MD;  Location: [facility];  Service: Endoscopy;  Laterality: N/A;  new onset iron deficiency with prior history of breast cancer    INTRALUMINAL GASTROINTESTINAL TRACT IMAGING VIA CAPSULE N/A 10/28/2020    Procedure: IMAGING PROCEDURE, GI TRACT, INTRALUMINAL, VIA CAPSULE;  Surgeon: Finesse Jha RN;  [facility];  Service: Endoscopy;  Laterality: N/A;    LEFT HEART CATHETERIZATION Left 10/12/2021    Procedure: CATHETERIZATION, HEART, LEFT;  Surgeon: Tiffanie Santos MD;  Location: [facility] CATH LAB;  Service: Cardiology;  Laterality: Left;    SENTINEL LYMPH NODE BIOPSY Left 2020    Procedure: BIOPSY, LYMPH NODE, SENTINEL;  Surgeon: Vincent Moyer MD;  Location: [facility] OR;  Service: General;  Laterality: Left;    SIMPLE MASTECTOMY Left 2020    Procedure: MASTECTOMY, SIMPLE;  Surgeon: Vincent Moyer MD;  Location: [facility] OR;  Service: General;  Laterality: Left;    THYROID LOBECTOMY Left     TOTAL ABDOMINAL HYSTERECTOMY      TUBAL LIGATION           Family History:  Family History   Problem Relation Age of Onset    Hypertension Father     Cataracts Father     Prostate cancer Father 80    Cervical cancer  Daughter 32    Allergic rhinitis Daughter     Cirrhosis Mother     Alcohol abuse Mother     Hypertension Daughter     Diabetes Brother     Heart attack Brother     Heart murmur Brother     No Known Problems Daughter        Social History:  Social History     Tobacco Use    Smoking status: Former Smoker     Packs/day: 1.00     Years: 50.00     Pack years: 50.00     Types: Cigarettes     Quit date: 2020     Years since quittin.6    Smokeless tobacco: Never Used   Substance and Sexual Activity    Alcohol use: Never     Alcohol/week: 28.0 standard drinks     Types: 28 Cans of beer per week    Drug use: No    Sexual activity: Never     Partners: Male        Review of Systems     Review of Systems   Constitutional: Negative for fatigue and fever.        (+) low BP   HENT: Negative for sore throat.    Respiratory: Negative for shortness of breath.    Cardiovascular: Negative for chest pain.   Gastrointestinal: Negative for abdominal pain, nausea and vomiting.   Genitourinary: Negative for dysuria.   Musculoskeletal: Negative for back pain.   Skin: Negative for rash.   Neurological: Negative for dizziness and weakness.   Hematological: Does not bruise/bleed easily.   All other systems reviewed and are negative.     Physical Exam     Initial Vitals [22 1352]   BP Pulse Resp Temp SpO2   (!) 122/59 74 20 99.4 °F (37.4 °C) 100 %      MAP       --          Physical Exam  Nursing Notes and Vital Signs Reviewed.  Constitutional: Patient is in no acute distress. Well-developed and well-nourished.  Head: Atraumatic. Normocephalic.  Eyes:  EOM intact.  No scleral icterus.  ENT: Mucous membranes are moist.  Nares clear   Neck:  Full ROM. No JVD.  Cardiovascular: Regular rate. Regular rhythm No murmurs, rubs, or gallops. Distal pulses are 2+ and symmetric  Pulmonary/Chest: No respiratory distress. Clear to auscultation bilaterally. No wheezing or rales.  Equal chest wall rise bilaterally  Abdominal: Soft and  "non-distended.  There is no tenderness.  No rebound, guarding, or rigidity. Good bowel sounds.  Genitourinary: No CVA tenderness.  No suprapubic tenderness  Musculoskeletal: Moves all extremities. No obvious deformities.  5 x 5 strength in all extremities   Skin: Warm and dry.  Neurological:  Alert, awake, and appropriate.  Normal speech.  No acute focal neurological deficits are appreciated.  Two through 12 intact bilaterally.  Psychiatric: Normal affect. Good eye contact. Appropriate in content.     ED Course   Procedures  ED Vital Signs:  Vitals:    04/12/22 1352 04/12/22 1812 04/12/22 1824 04/12/22 1902   BP: (!) 122/59 (!) 159/67 (!) 148/77 139/65   Pulse: 74  64 65   Resp: 20  (!) 21 20   Temp: 99.4 °F (37.4 °C)      TempSrc: Oral      SpO2: 100%   99%   Weight:       Height: 5' 1" (1.549 m)       04/12/22 1911 04/12/22 2030 04/12/22 2100 04/12/22 2128   BP:  (!) 142/65 124/60    Pulse:  67 68 65   Resp:  (!) 25 (!) 27 (!) 26   Temp:       TempSrc:       SpO2:  100% 100% 100%   Weight: 59 kg (130 lb 1.1 oz)      Height:        04/12/22 2130 04/12/22 2200   BP: 135/66 137/63   Pulse: 66 64   Resp: (!) 25 (!) 23   Temp:     TempSrc:     SpO2: 100% 99%   Weight:     Height:         Abnormal Lab Results:  Labs Reviewed   CBC W/ AUTO DIFFERENTIAL - Abnormal; Notable for the following components:       Result Value    RBC 3.00 (*)     Hemoglobin 9.4 (*)     Hematocrit 30.2 (*)      (*)     MCH 31.3 (*)     MCHC 31.1 (*)     All other components within normal limits    Narrative:     Release to patient->Immediate   URINALYSIS, REFLEX TO URINE CULTURE - Abnormal; Notable for the following components:    Nitrite, UA Positive (*)     All other components within normal limits    Narrative:     Specimen Source->Urine   URINALYSIS MICROSCOPIC - Abnormal; Notable for the following components:    Bacteria Many (*)     All other components within normal limits    Narrative:     Specimen Source->Urine   ACETAMINOPHEN " LEVEL - Abnormal; Notable for the following components:    Acetaminophen (Tylenol), Serum <3.0 (*)     All other components within normal limits    Narrative:     Release to patient->Immediate   SALICYLATE LEVEL - Abnormal; Notable for the following components:    Salicylate Lvl 6.6 (*)     All other components within normal limits    Narrative:     Release to patient->Immediate   COMPREHENSIVE METABOLIC PANEL - Abnormal; Notable for the following components:    BUN 26 (*)     eGFR if  44 (*)     eGFR if non  38 (*)     All other components within normal limits    Narrative:     Release to patient->Immediate   DRUG SCREEN PANEL, URINE EMERGENCY    Narrative:     Specimen Source->Urine   HEPATITIS C ANTIBODY    Narrative:     Release to patient->Immediate   HEP C VIRUS HOLD SPECIMEN    Narrative:     Release to patient->Immediate        All Lab Results:  Results for orders placed or performed during the hospital encounter of 04/12/22   CBC auto differential   Result Value Ref Range    WBC 8.15 3.90 - 12.70 K/uL    RBC 3.00 (L) 4.00 - 5.40 M/uL    Hemoglobin 9.4 (L) 12.0 - 16.0 g/dL    Hematocrit 30.2 (L) 37.0 - 48.5 %     (H) 82 - 98 fL    MCH 31.3 (H) 27.0 - 31.0 pg    MCHC 31.1 (L) 32.0 - 36.0 g/dL    RDW 14.1 11.5 - 14.5 %    Platelets 352 150 - 450 K/uL    MPV 11.3 9.2 - 12.9 fL    Immature Granulocytes 0.5 0.0 - 0.5 %    Gran # (ANC) 4.4 1.8 - 7.7 K/uL    Immature Grans (Abs) 0.04 0.00 - 0.04 K/uL    Lymph # 2.6 1.0 - 4.8 K/uL    Mono # 0.7 0.3 - 1.0 K/uL    Eos # 0.4 0.0 - 0.5 K/uL    Baso # 0.04 0.00 - 0.20 K/uL    nRBC 0 0 /100 WBC    Gran % 53.3 38.0 - 73.0 %    Lymph % 31.7 18.0 - 48.0 %    Mono % 8.8 4.0 - 15.0 %    Eosinophil % 5.2 0.0 - 8.0 %    Basophil % 0.5 0.0 - 1.9 %    Differential Method Automated    Urinalysis, Reflex to Urine Culture Urine, Clean Catch    Specimen: Urine   Result Value Ref Range    Specimen UA Urine, Catheterized     Color, UA Yellow  Yellow, Straw, Cynthia    Appearance, UA Clear Clear    pH, UA 6.0 5.0 - 8.0    Specific Gravity, UA 1.020 1.005 - 1.030    Protein, UA Negative Negative    Glucose, UA Negative Negative    Ketones, UA Negative Negative    Bilirubin (UA) Negative Negative    Occult Blood UA Negative Negative    Nitrite, UA Positive (A) Negative    Urobilinogen, UA Negative <2.0 EU/dL    Leukocytes, UA Negative Negative   Drug screen panel, emergency   Result Value Ref Range    Benzodiazepines Negative Negative    Methadone metabolites Negative Negative    Cocaine (Metab.) Negative Negative    Opiate Scrn, Ur Negative Negative    Barbiturate Screen, Ur Negative Negative    Amphetamine Screen, Ur Negative Negative    THC Negative Negative    Phencyclidine Negative Negative    Creatinine, Urine 117.7 15.0 - 325.0 mg/dL    Toxicology Information SEE COMMENT    Urinalysis Microscopic   Result Value Ref Range    WBC, UA 5 0 - 5 /hpf    Bacteria Many (A) None-Occ /hpf    Microscopic Comment SEE COMMENT    Acetaminophen level   Result Value Ref Range    Acetaminophen (Tylenol), Serum <3.0 (L) 10.0 - 20.0 ug/mL   Salicylate level   Result Value Ref Range    Salicylate Lvl 6.6 (L) 15.0 - 30.0 mg/dL   Comprehensive metabolic panel   Result Value Ref Range    Sodium 142 136 - 145 mmol/L    Potassium 4.5 3.5 - 5.1 mmol/L    Chloride 106 95 - 110 mmol/L    CO2 26 23 - 29 mmol/L    Glucose 93 70 - 110 mg/dL    BUN 26 (H) 8 - 23 mg/dL    Creatinine 1.4 0.5 - 1.4 mg/dL    Calcium 9.0 8.7 - 10.5 mg/dL    Total Protein 7.2 6.0 - 8.4 g/dL    Albumin 3.7 3.5 - 5.2 g/dL    Total Bilirubin 0.2 0.1 - 1.0 mg/dL    Alkaline Phosphatase 71 55 - 135 U/L    AST 17 10 - 40 U/L    ALT 16 10 - 44 U/L    Anion Gap 10 8 - 16 mmol/L    eGFR if African American 44 (A) >60 mL/min/1.73 m^2    eGFR if non African American 38 (A) >60 mL/min/1.73 m^2   Hepatitis C Antibody   Result Value Ref Range    Hepatitis C Ab Negative Negative   HCV Virus Hold Specimen   Result Value  Ref Range    HEP C Virus Hold Specimen Hold for HCV sendout        Imaging Results:  Imaging Results    None          The EKG was ordered, reviewed, and independently interpreted by the ED provider.  Interpretation time: 1755  Rate: 68 BPM  Rhythm: normal sinus rhythm  Interpretation: Moderate voltage criteria for LVH, may be normal variant. T wave abnormality, consider lateral ischemia. No STEMI.           The Emergency Provider reviewed the vital signs and test results, which are outlined above.     ED Discussion     8:00 PM: Dr. Lopez transfers care of patient to Dr. Corea pending lab results.    9:51 PM: Reassessed pt at this time. Discussed with pt all pertinent ED information and results. Discussed pt dx and plan of tx. Gave pt all f/u and return to the ED instructions. All questions and concerns were addressed at this time. Pt expresses understanding of information and instructions, and is comfortable with plan to discharge. Pt is stable for discharge.    I discussed with patient and/or family/caretaker that evaluation in the ED does not suggest any emergent or life threatening medical conditions requiring immediate intervention beyond what was provided in the ED, and I believe patient is safe for discharge.  Regardless, an unremarkable evaluation in the ED does not preclude the development or presence of a serious of life threatening condition. As such, patient was instructed to return immediately for any worsening or change in current symptoms.         Medical Decision Making:   Clinical Tests:   Lab Tests: Ordered and Reviewed  Medical Tests: Ordered and Reviewed           ED Medication(s):  Medications   sodium chloride 0.9% bolus 500 mL (0 mLs Intravenous Stopped 4/12/22 2102)       Discharge Medication List as of 4/12/2022  9:52 PM           Follow-up Information     Yasmin Navarro MD In 2 days.    Specialty: Family Medicine  Contact information:  [address]  [id number]  [phone]             [facility] Emergency Dept..    Specialty: Emergency Medicine  Why: As needed, If symptoms worsen  Contact information:  [address]  [facility] [address] [phone]                           Scribe Attestation:   Scribe #1: I performed the above scribed service and the documentation accurately describes the services I performed. I attest to the accuracy of the note.     Attending:   Physician Attestation Statement for Scribe #1: I, Jasper Lopez Jr., MD, personally performed the services described in this documentation, as scribed by Kesha Clay, in my presence, and it is both accurate and complete.       Scribe Attestation:   Scribe #2: I performed the above scribed service and the documentation accurately describes the services I performed. I attest to the accuracy of the note.    Attending Attestation:           Physician Attestation for Scribe:    Physician Attestation Statement for Scribe #2: I, Gary Corea MD, reviewed documentation, as scribed by Erika Reddy in my presence, and it is both accurate and complete. I also acknowledge and confirm the content of the note done by Scribe #1.           Clinical Impression       ICD-10-CM ICD-9-CM   1. Accidental drug ingestion  T50.901A 977.9     E858.9   2. Overdose  T50.901A 977.9     E980.5       Disposition:   Disposition: Discharged  Condition: Stable         Gary Corea MD  04/14/22 0359

## 2022-04-22 DIAGNOSIS — M79.672 BILATERAL FOOT PAIN: Primary | ICD-10-CM

## 2022-04-22 DIAGNOSIS — M79.671 BILATERAL FOOT PAIN: Primary | ICD-10-CM

## 2022-04-25 ENCOUNTER — OFFICE VISIT (OUTPATIENT)
Dept: PODIATRY | Facility: CLINIC | Age: 72
End: 2022-04-25
Payer: MEDICARE

## 2022-04-25 ENCOUNTER — HOSPITAL ENCOUNTER (OUTPATIENT)
Dept: RADIOLOGY | Facility: HOSPITAL | Age: 72
Discharge: HOME | End: 2022-04-25
Attending: PODIATRIST
Payer: MEDICARE

## 2022-04-25 VITALS — HEIGHT: 61 IN | BODY MASS INDEX: 24.55 KG/M2 | WEIGHT: 130.06 LBS

## 2022-04-25 DIAGNOSIS — M79.674 TOE PAIN, BILATERAL: ICD-10-CM

## 2022-04-25 DIAGNOSIS — M79.672 BILATERAL FOOT PAIN: Primary | ICD-10-CM

## 2022-04-25 DIAGNOSIS — M20.41 HAMMERTOES OF BOTH FEET: ICD-10-CM

## 2022-04-25 DIAGNOSIS — Q82.8 POROKERATOSIS: ICD-10-CM

## 2022-04-25 DIAGNOSIS — M79.671 BILATERAL FOOT PAIN: ICD-10-CM

## 2022-04-25 DIAGNOSIS — M79.671 BILATERAL FOOT PAIN: Primary | ICD-10-CM

## 2022-04-25 DIAGNOSIS — M20.42 HAMMERTOES OF BOTH FEET: ICD-10-CM

## 2022-04-25 DIAGNOSIS — M79.675 TOE PAIN, BILATERAL: ICD-10-CM

## 2022-04-25 DIAGNOSIS — B35.1 DERMATOPHYTOSIS OF NAIL: ICD-10-CM

## 2022-04-25 DIAGNOSIS — M79.672 BILATERAL FOOT PAIN: ICD-10-CM

## 2022-04-25 DIAGNOSIS — L84 CORN OR CALLUS: ICD-10-CM

## 2022-04-25 PROCEDURE — 1125F AMNT PAIN NOTED PAIN PRSNT: CPT | Mod: CPTII,S$GLB,, | Performed by: PODIATRIST

## 2022-04-25 PROCEDURE — 1159F MED LIST DOCD IN RCRD: CPT | Mod: CPTII,S$GLB,, | Performed by: PODIATRIST

## 2022-04-25 PROCEDURE — 99204 OFFICE O/P NEW MOD 45 MIN: CPT | Mod: 25,S$GLB,, | Performed by: PODIATRIST

## 2022-04-25 PROCEDURE — 1101F PR PT FALLS ASSESS DOC 0-1 FALLS W/OUT INJ PAST YR: ICD-10-PCS | Mod: CPTII,S$GLB,, | Performed by: PODIATRIST

## 2022-04-25 PROCEDURE — 99999 PR PBB SHADOW E&M-EST. PATIENT-LVL III: CPT | Mod: PBBFAC,,, | Performed by: PODIATRIST

## 2022-04-25 PROCEDURE — 73630 X-RAY EXAM OF FOOT: CPT | Mod: TC,50

## 2022-04-25 PROCEDURE — 11721 DEBRIDE NAIL 6 OR MORE: CPT | Mod: S$GLB,,, | Performed by: PODIATRIST

## 2022-04-25 PROCEDURE — 3288F PR FALLS RISK ASSESSMENT DOCUMENTED: ICD-10-PCS | Mod: CPTII,S$GLB,, | Performed by: PODIATRIST

## 2022-04-25 PROCEDURE — 73630 XR FOOT COMPLETE 3 VIEW BILATERAL: ICD-10-PCS | Mod: 26,50,, | Performed by: RADIOLOGY

## 2022-04-25 PROCEDURE — 4010F ACE/ARB THERAPY RXD/TAKEN: CPT | Mod: CPTII,S$GLB,, | Performed by: PODIATRIST

## 2022-04-25 PROCEDURE — 4010F PR ACE/ARB THEARPY RXD/TAKEN: ICD-10-PCS | Mod: CPTII,S$GLB,, | Performed by: PODIATRIST

## 2022-04-25 PROCEDURE — 3008F BODY MASS INDEX DOCD: CPT | Mod: CPTII,S$GLB,, | Performed by: PODIATRIST

## 2022-04-25 PROCEDURE — 99204 PR OFFICE/OUTPT VISIT, NEW, LEVL IV, 45-59 MIN: ICD-10-PCS | Mod: 25,S$GLB,, | Performed by: PODIATRIST

## 2022-04-25 PROCEDURE — 3008F PR BODY MASS INDEX (BMI) DOCUMENTED: ICD-10-PCS | Mod: CPTII,S$GLB,, | Performed by: PODIATRIST

## 2022-04-25 PROCEDURE — 1159F PR MEDICATION LIST DOCUMENTED IN MEDICAL RECORD: ICD-10-PCS | Mod: CPTII,S$GLB,, | Performed by: PODIATRIST

## 2022-04-25 PROCEDURE — 1125F PR PAIN SEVERITY QUANTIFIED, PAIN PRESENT: ICD-10-PCS | Mod: CPTII,S$GLB,, | Performed by: PODIATRIST

## 2022-04-25 PROCEDURE — 11721 PR DEBRIDEMENT OF NAILS, 6 OR MORE: ICD-10-PCS | Mod: S$GLB,,, | Performed by: PODIATRIST

## 2022-04-25 PROCEDURE — 73630 X-RAY EXAM OF FOOT: CPT | Mod: 26,50,, | Performed by: RADIOLOGY

## 2022-04-25 PROCEDURE — 1101F PT FALLS ASSESS-DOCD LE1/YR: CPT | Mod: CPTII,S$GLB,, | Performed by: PODIATRIST

## 2022-04-25 PROCEDURE — 3288F FALL RISK ASSESSMENT DOCD: CPT | Mod: CPTII,S$GLB,, | Performed by: PODIATRIST

## 2022-04-25 PROCEDURE — 99999 PR PBB SHADOW E&M-EST. PATIENT-LVL III: ICD-10-PCS | Mod: PBBFAC,,, | Performed by: PODIATRIST

## 2022-04-25 NOTE — PROGRESS NOTES
PODIATRIC MEDICINE AND SURGERY   Yasmin Navarro MD    CHIEF COMPLAINT  Chief Complaint   Patient presents with    Foot Pain     10/10 pain at present to right foot at night. Left isnt as bas as right. C/o possible fungus. Non diabetic PCP: Dr. Navarro last seen 10/21/21         HPI    SUBJECTIVE: Leia Rodriguez is a 71 y.o. female who  has a past medical history of Arthritis, Blind right eye, Breast cancer (06/15/2017), Cataract, Essential hypertension, Hemorrhoids, Lipoma of abdominal wall, Obesity, Overactive bladder, Pap smear abnormality of cervix with ASCUS favoring benign, Thyroid nodule, Tobacco use disorder, Tubular adenoma of colon, and Urinary incontinence. Leia presenting to podiatry clinic with complaint of thickened, discolored, and painful toenails. Pt states nails result in pain with enclosed shoe wear aggravated due to pressure  and/or with ambulation. They are unable to trim nails. They are requesting nail care for relief of painful symptoms. She also complains about pain with walking on RIGHT foot.  Patient has no further pedal complaints.      PMH  Past Medical History:   Diagnosis Date    Arthritis     EDGAR HANDS, KNEES    Blind right eye     Traumatic    Breast cancer 06/15/2017    0.8 cm Grade1 INTRADUCTAL BREAST 9 positve margin (left)    Cataract     Essential hypertension     Hemorrhoids     Lipoma of abdominal wall     Obesity     Overactive bladder     Pap smear abnormality of cervix with ASCUS favoring benign     Thyroid nodule     Tobacco use disorder     Tubular adenoma of colon     Urinary incontinence      Patient Active Problem List   Diagnosis    Urinary incontinence    Obesity (BMI 30.0-34.9)    Primary osteoarthritis of both hands    Essential hypertension    Tobacco use disorder    Lipoma of abdominal wall    Malignant neoplasm of breast in female, estrogen receptor positive    Age-related osteoporosis without current pathological fracture     Decreased cardiac ejection fraction    Immunodeficiency due to chemotherapy    Coronary artery disease of native artery of native heart with stable angina pectoris    Iron deficiency anemia secondary to inadequate dietary iron intake    Chronic combined systolic and diastolic CHF (congestive heart failure)    Emphysema of lung    Atherosclerosis of aorta    Pulmonary HTN    Urinary tract infection without hematuria    Chronic kidney disease, stage 3b       MEDS  Current Outpatient Medications on File Prior to Visit   Medication Sig Dispense Refill    anastrozole (ARIMIDEX) 1 mg Tab Take 1 tablet (1 mg total) by mouth once daily. 90 tablet 3    atorvastatin (LIPITOR) 20 MG tablet Take 1 tablet (20 mg total) by mouth every evening. 90 tablet 1    calcium citrate (CALCITRATE) 200 mg (950 mg) tablet Take 1 tablet by mouth once daily.      carvediloL (COREG) 3.125 MG tablet TAKE 1 TABLET BY MOUTH TWICE A DAY WITH MEALS 180 tablet 3    cyanocobalamin 1,000 mcg/mL injection Inject 1 mL (1,000 mcg total) into the muscle every 7 days. 4 mL 2    cyanocobalamin, vitamin B-12, (VITAMIN B-12) 5,000 mcg Subl Place 1 Dose under the tongue once daily. 90 tablet 1    donepeziL (ARICEPT) 10 MG tablet Take 1 tablet (10 mg total) by mouth every evening. 30 tablet 1    ferrous sulfate (FEOSOL) 325 mg (65 mg iron) Tab tablet Take 65 mg by mouth once daily.      furosemide (LASIX) 20 MG tablet Take 1 tablet (20 mg total) by mouth every Mon, Wed, Fri. 36 tablet 3    isosorbide mononitrate (IMDUR) 30 MG 24 hr tablet Take 1 tablet (30 mg total) by mouth once daily. 30 tablet 11    memantine (NAMENDA) 10 MG Tab Take 1 tablet (10 mg total) by mouth 2 (two) times daily. Give a 1/2 tablet twice per day for one week then give a whole tablet twice per day thereafter 60 tablet 2    multivitamin (THERAGRAN) per tablet Take 1 tablet by mouth once daily. 30 tablet 2    prednisoLONE acetate (PRED FORTE) 1 % DrpS Place 1 drop into  the left eye every 4 (four) hours. 5 mL 1    sulfamethoxazole-trimethoprim 800-160mg (BACTRIM DS) 800-160 mg Tab Take 1 tablet by mouth 2 (two) times daily. 14 tablet 0    tamsulosin (FLOMAX) 0.4 mg Cap Take 1 capsule by mouth once daily.      telmisartan (MICARDIS) 20 MG Tab TAKE 1 TABLET BY MOUTH EVERY DAY 30 tablet 11    vitamin D 1000 units Tab Take 1,000 Units by mouth once daily.      aspirin 81 MG Chew Take 1 tablet (81 mg total) by mouth once daily. 90 tablet 3    spironolactone (ALDACTONE) 25 MG tablet Take 1 tablet (25 mg total) by mouth once daily. 30 tablet 11     No current facility-administered medications on file prior to visit.       PSH     Past Surgical History:   Procedure Laterality Date    ANTERIOR VAGINAL REPAIR      BLADDER SURGERY      BREAST LUMPECTOMY Left     CATARACT EXTRACTION Left 2022     SECTION      X2    COLONOSCOPY N/A 3/8/2017    Procedure: COLONOSCOPY;  Surgeon: Tyron Paris MD;  Location: [facility];  Service: Endoscopy;  Laterality: N/A;    COLONOSCOPY N/A 2020    Procedure: COLONOSCOPY;  Surgeon: Keira Ellison MD;  Location: [facility];  Service: Endoscopy;  Laterality: N/A;    ESOPHAGOGASTRODUODENOSCOPY N/A 2020    Procedure: EGD (ESOPHAGOGASTRODUODENOSCOPY);  Surgeon: Keira Ellison MD;  Location: [facility];  Service: Endoscopy;  Laterality: N/A;  new onset iron deficiency with prior history of breast cancer    INTRALUMINAL GASTROINTESTINAL TRACT IMAGING VIA CAPSULE N/A 10/28/2020    Procedure: IMAGING PROCEDURE, GI TRACT, INTRALUMINAL, VIA CAPSULE;  Surgeon: Finesse Jha RN;  Location: [facility] ENDO;  Service: Endoscopy;  Laterality: N/A;    LEFT HEART CATHETERIZATION Left 10/12/2021    Procedure: CATHETERIZATION, HEART, LEFT;  Surgeon: Tiffanie Santos MD;  Location: [facility];  Service: Cardiology;  Laterality: Left;    SENTINEL LYMPH NODE BIOPSY Left 2020    Procedure: BIOPSY, LYMPH NODE, SENTINEL;  Surgeon:  "Vincent Moyer MD;  Location: [facility] OR;  Service: General;  Laterality: Left;    SIMPLE MASTECTOMY Left 2020    Procedure: MASTECTOMY, SIMPLE;  Surgeon: Vincent Moyer MD;  Location: [facility] OR;  Service: General;  Laterality: Left;    THYROID LOBECTOMY Left     TOTAL ABDOMINAL HYSTERECTOMY      TUBAL LIGATION          ALL  Review of patient's allergies indicates:  No Known Allergies    SOC     Social History     Tobacco Use    Smoking status: Former Smoker     Packs/day: 1.00     Years: 50.00     Pack years: 50.00     Types: Cigarettes     Quit date: 2020     Years since quittin.6    Smokeless tobacco: Never Used   Substance Use Topics    Alcohol use: Never     Alcohol/week: 28.0 standard drinks     Types: 28 Cans of beer per week    Drug use: No         Family HX    Family History   Problem Relation Age of Onset    Hypertension Father     Cataracts Father     Prostate cancer Father 80    Cervical cancer Daughter 32    Allergic rhinitis Daughter     Cirrhosis Mother     Alcohol abuse Mother     Hypertension Daughter     Diabetes Brother     Heart attack Brother     Heart murmur Brother     No Known Problems Daughter             REVIEW OF SYSTEMS  General: Denies any fever or chills  Chest: Denies shortness of breath, wheezing, coughing, or sputum production  Heart: Denies chest pain.  As noted above and per history of current illness above, otherwise negative in the remainder of the 14 systems.     PHYSICAL EXAM  Vitals:    22 1514   Weight: 59 kg (130 lb 1.1 oz)   Height: 5' 1" (1.549 m)   PainSc: 10-Worst pain ever       GEN:  This patient is well-developed, well-nourished and appears stated age, well-oriented to person, place and time, and cooperative and pleasant on today's visit.      LOWER EXTREMITY    VASCULAR  DP pedal pulse 1/4 RIGHT, LEFT1/4   PT pedal pulse 2/4 RIGHT, LEFT2/4  Capillary refill time immediate to the toes.   Feet are warm to the touch. Skin temperature " warm to warm from proximally to distally   There are no varicosities, telangiectasias noted to bilateral foot and ankle regions.   There are no ecchymoses noted to bilateral foot and ankle regions.   There is no gross lower extremity edema.    DERMATOLOGIC  Skin moist with healthy texture and turgor.  Thickened, dystrophic, elongated, discolored toenails with subungal debris 1,2, 3, 4, 5 RIGHT FOOT, 1, 2, 3, 4 , 5 LEFT FOOT   There are no open ulcerations, lacerations, or fissures to bilateral foot and ankle regions. There are no signs of infection as there is no erythema, no proximal-extending lymphangiitis, no fluctuance, or crepitus noted on palpation to bilateral foot and ankle regions.   There is no interdigital maceration.   There are LEFT foot hyperkeratotic lesions noted to feet x 2    NEUROLOGIC  Neurological sensation is grossly intact to all sites tested    ORTHOPEDIC/BIOMECHANICAL  No symptomatic structural abnormalities noted. Muscle strength is 5/5 for foot inverters, everters, plantarflexors, and dorsiflexors. Muscle tone is normal.   Inspection/palpation of bone, joints and muscles unremarkable. Digital contractures.     ASSESSMENT  Bilateral foot pain    Corn or callus    Porokeratosis    Toe pain, bilateral    Dermatophytosis of nail    Hammertoes of both feet        PLAN  -The patient was examined and evaulated. Patient was given verbal instructions on maintenance care for mycotic nails. The need for proper skin care in order to prevent infection and colonization in the nails was explained to the patient.    - The nails are painful with applied pressure and shoe gear. If a mycotic infection is left untreated, the infection could spread, causing marked limitation of ambulation, and/or make the patient prone to secondary infection. Failure to debride the nails at necessary intervals could likely cause recurrence of pain.   -I recommend Vicks vapor topically once daily to affected toenails. This is a  natural antifungal regimen that will help in reduction of fungal load and will also soften the nail to allow for easier debridements.   -With patient's permission, the elongated onychomycotic toenails, as outlined in the physical examination, were sharply debrided with a double action nail nipper to their soft tissue attachment. If indicated, the nails were then smoothed down in thickness with an chinyere board to facilitate in further debridement removing all offending nail and subungual debris. Patient relates relief following the procedure.     -With patient's permission via verbal consent, the involved area was cleansed with an alcohol swab. Trimming of hyperkeratotic lesions deep to epidermal layer x 2 was performed with a #15 blade without incident. Patient relates relief following the procedure. Patient will continue to monitor the areas daily, inspect feet, wear protective shoe gear when ambulatory, moisturizer to maintain skin integrity.    -Recommendations on purchase of Urea 40 and/or Amlactin was provided for calluses. Metatarsal pad dispensed and patient was instructed on how to apply for offloading callus. Shoe recommendations provided for accomodative foot wear.    Pressure relief inserts and extra depth shoe Rx provided to accommodate digital contractures and ball of foot pain.    Disclaimer: This note was partially prepared using a voice recognition system and is likely to have sound alike errors within the text.        Future Appointments   Date Time Provider Department Center   4/26/2022  2:15 PM Thuy B. Stuart, MD [facility] [place]   5/9/2022 10:45 AM EKG, [facility]   5/12/2022 10:30 AM Layne Lloyd NP [facility]   6/15/2022  1:00 PM Juan Henderson, PhD [facility]   6/20/2022 12:30 PM NEUROPSYCH TESTING, IRINEO [facility] NEUR[facility]   6/21/2022 10:00 AM MD ROCIO Saunders [facility] [place]       Report Electronically Signed By:      Sheri Cifuentes, MYRTLE   Podiatry  [facility]  4/25/2022

## 2022-04-26 ENCOUNTER — OFFICE VISIT (OUTPATIENT)
Dept: OPHTHALMOLOGY | Facility: CLINIC | Age: 72
End: 2022-04-26
Payer: MEDICARE

## 2022-04-26 DIAGNOSIS — I10 ESSENTIAL HYPERTENSION: ICD-10-CM

## 2022-04-26 DIAGNOSIS — Z98.890 POST-OPERATIVE STATE: Primary | ICD-10-CM

## 2022-04-26 DIAGNOSIS — Z98.42 CATARACT EXTRACTION STATUS OF EYE, LEFT: ICD-10-CM

## 2022-04-26 DIAGNOSIS — H54.40 BLINDNESS OF RIGHT EYE WITH NORMAL VISION IN CONTRALATERAL EYE: ICD-10-CM

## 2022-04-26 PROCEDURE — 99024 PR POST-OP FOLLOW-UP VISIT: ICD-10-PCS | Mod: S$GLB,,, | Performed by: STUDENT IN AN ORGANIZED HEALTH CARE EDUCATION/TRAINING PROGRAM

## 2022-04-26 PROCEDURE — 99024 POSTOP FOLLOW-UP VISIT: CPT | Mod: S$GLB,,, | Performed by: STUDENT IN AN ORGANIZED HEALTH CARE EDUCATION/TRAINING PROGRAM

## 2022-04-26 PROCEDURE — 99999 PR PBB SHADOW E&M-EST. PATIENT-LVL III: CPT | Mod: PBBFAC,,, | Performed by: STUDENT IN AN ORGANIZED HEALTH CARE EDUCATION/TRAINING PROGRAM

## 2022-04-26 PROCEDURE — 1159F MED LIST DOCD IN RCRD: CPT | Mod: CPTII,S$GLB,, | Performed by: STUDENT IN AN ORGANIZED HEALTH CARE EDUCATION/TRAINING PROGRAM

## 2022-04-26 PROCEDURE — 4010F PR ACE/ARB THEARPY RXD/TAKEN: ICD-10-PCS | Mod: CPTII,S$GLB,, | Performed by: STUDENT IN AN ORGANIZED HEALTH CARE EDUCATION/TRAINING PROGRAM

## 2022-04-26 PROCEDURE — 99999 PR PBB SHADOW E&M-EST. PATIENT-LVL III: ICD-10-PCS | Mod: PBBFAC,,, | Performed by: STUDENT IN AN ORGANIZED HEALTH CARE EDUCATION/TRAINING PROGRAM

## 2022-04-26 PROCEDURE — 4010F ACE/ARB THERAPY RXD/TAKEN: CPT | Mod: CPTII,S$GLB,, | Performed by: STUDENT IN AN ORGANIZED HEALTH CARE EDUCATION/TRAINING PROGRAM

## 2022-04-26 PROCEDURE — 1159F PR MEDICATION LIST DOCUMENTED IN MEDICAL RECORD: ICD-10-PCS | Mod: CPTII,S$GLB,, | Performed by: STUDENT IN AN ORGANIZED HEALTH CARE EDUCATION/TRAINING PROGRAM

## 2022-04-26 PROCEDURE — 1160F RVW MEDS BY RX/DR IN RCRD: CPT | Mod: CPTII,S$GLB,, | Performed by: STUDENT IN AN ORGANIZED HEALTH CARE EDUCATION/TRAINING PROGRAM

## 2022-04-26 PROCEDURE — 1160F PR REVIEW ALL MEDS BY PRESCRIBER/CLIN PHARMACIST DOCUMENTED: ICD-10-PCS | Mod: CPTII,S$GLB,, | Performed by: STUDENT IN AN ORGANIZED HEALTH CARE EDUCATION/TRAINING PROGRAM

## 2022-04-26 NOTE — PROGRESS NOTES
HPI     Post-op Evaluation      Additional comments: 4 weeks rtc PCIOL OS               Comments     Patient states left eye doing well, no visual complaints, and no ocular   pain/discomfort.     1. PCIOL OS 03/17/2022  NS OD  2. HTN    OS- Pred forte tapering          Last edited by Mila Lombardo on 4/26/2022  2:33 PM. (History)            Assessment /Plan     For exam results, see Encounter Report.    Post-operative state    Cataract extraction status of eye, left    Blindness of right eye with normal vision in contralateral eye    Essential hypertension          Assessment:    POM#1 S/P CEIOL OS : Doing Well and completing healing process. Final visual correction options discussed and appropriate prescriptions and/or OTC reading glass recommendations offered to patient. Mrx offered. Pt instructed to follow up in 3 months for next eye exam or PRN any pain, redness, vision changes or other concerns.  PF taper 4-3-2-1    RTC 3 mo w/ DFE

## 2022-05-09 ENCOUNTER — HOSPITAL ENCOUNTER (OUTPATIENT)
Dept: CARDIOLOGY | Facility: HOSPITAL | Age: 72
Discharge: HOME | End: 2022-05-09
Payer: MEDICARE

## 2022-05-09 DIAGNOSIS — Z91.89 AT RISK FOR PROLONGED QT INTERVAL SYNDROME: ICD-10-CM

## 2022-05-09 DIAGNOSIS — F02.80 MAJOR NEUROCOGNITIVE DISORDER DUE TO ALZHEIMER'S DISEASE: ICD-10-CM

## 2022-05-09 DIAGNOSIS — G30.9 MAJOR NEUROCOGNITIVE DISORDER DUE TO ALZHEIMER'S DISEASE: ICD-10-CM

## 2022-05-09 DIAGNOSIS — G31.84 MILD COGNITIVE IMPAIRMENT WITH MEMORY LOSS: ICD-10-CM

## 2022-05-09 PROCEDURE — 93010 EKG 12-LEAD: ICD-10-PCS | Mod: ,,, | Performed by: STUDENT IN AN ORGANIZED HEALTH CARE EDUCATION/TRAINING PROGRAM

## 2022-05-09 PROCEDURE — 93010 ELECTROCARDIOGRAM REPORT: CPT | Mod: ,,, | Performed by: STUDENT IN AN ORGANIZED HEALTH CARE EDUCATION/TRAINING PROGRAM

## 2022-05-09 PROCEDURE — 93005 ELECTROCARDIOGRAM TRACING: CPT

## 2022-05-11 ENCOUNTER — PATIENT OUTREACH (OUTPATIENT)
Dept: ADMINISTRATIVE | Facility: OTHER | Age: 72
End: 2022-05-11
Payer: MEDICARE

## 2022-05-11 NOTE — PROGRESS NOTES
Health Maintenance Due   Topic Date Due    Aspirin/Antiplatelet Therapy  Never done    LDCT Lung Screen  09/18/2020    COVID-19 Vaccine (4 - Booster for Pfizer series) 05/03/2022     Updates were requested from care everywhere.  Chart was reviewed for overdue Proactive [facility] Encounters ([facility]) topics (CRS, Breast Cancer Screening, Eye exam)  Health Maintenance has been updated.  LINKS immunization registry triggered.  Immunizations were reconciled.

## 2022-05-12 ENCOUNTER — OFFICE VISIT (OUTPATIENT)
Dept: NEUROLOGY | Facility: CLINIC | Age: 72
End: 2022-05-12
Payer: MEDICARE

## 2022-05-12 VITALS
OXYGEN SATURATION: 95 % | RESPIRATION RATE: 16 BRPM | HEART RATE: 73 BPM | BODY MASS INDEX: 24.58 KG/M2 | HEIGHT: 61 IN | DIASTOLIC BLOOD PRESSURE: 65 MMHG | SYSTOLIC BLOOD PRESSURE: 121 MMHG

## 2022-05-12 DIAGNOSIS — R41.3 MEMORY LOSS: ICD-10-CM

## 2022-05-12 DIAGNOSIS — F03.911 AGITATION DUE TO DEMENTIA: ICD-10-CM

## 2022-05-12 DIAGNOSIS — G30.9 MAJOR NEUROCOGNITIVE DISORDER DUE TO ALZHEIMER'S DISEASE: Primary | ICD-10-CM

## 2022-05-12 DIAGNOSIS — Z91.89: ICD-10-CM

## 2022-05-12 DIAGNOSIS — Z91.89 AT RISK FOR PROLONGED QT INTERVAL SYNDROME: ICD-10-CM

## 2022-05-12 DIAGNOSIS — F99 INAPPROPRIATE BEHAVIOR: ICD-10-CM

## 2022-05-12 DIAGNOSIS — E66.9 OBESITY (BMI 30.0-34.9): ICD-10-CM

## 2022-05-12 DIAGNOSIS — F02.80 MAJOR NEUROCOGNITIVE DISORDER DUE TO ALZHEIMER'S DISEASE: Primary | ICD-10-CM

## 2022-05-12 DIAGNOSIS — F17.200 TOBACCO USE DISORDER: ICD-10-CM

## 2022-05-12 PROCEDURE — 1126F AMNT PAIN NOTED NONE PRSNT: CPT | Mod: CPTII,S$GLB,, | Performed by: NURSE PRACTITIONER

## 2022-05-12 PROCEDURE — 3288F FALL RISK ASSESSMENT DOCD: CPT | Mod: CPTII,S$GLB,, | Performed by: NURSE PRACTITIONER

## 2022-05-12 PROCEDURE — 1126F PR PAIN SEVERITY QUANTIFIED, NO PAIN PRESENT: ICD-10-PCS | Mod: CPTII,S$GLB,, | Performed by: NURSE PRACTITIONER

## 2022-05-12 PROCEDURE — 3078F DIAST BP <80 MM HG: CPT | Mod: CPTII,S$GLB,, | Performed by: NURSE PRACTITIONER

## 2022-05-12 PROCEDURE — 99214 OFFICE O/P EST MOD 30 MIN: CPT | Mod: S$GLB,,, | Performed by: NURSE PRACTITIONER

## 2022-05-12 PROCEDURE — 3078F PR MOST RECENT DIASTOLIC BLOOD PRESSURE < 80 MM HG: ICD-10-PCS | Mod: CPTII,S$GLB,, | Performed by: NURSE PRACTITIONER

## 2022-05-12 PROCEDURE — 3288F PR FALLS RISK ASSESSMENT DOCUMENTED: ICD-10-PCS | Mod: CPTII,S$GLB,, | Performed by: NURSE PRACTITIONER

## 2022-05-12 PROCEDURE — 3074F PR MOST RECENT SYSTOLIC BLOOD PRESSURE < 130 MM HG: ICD-10-PCS | Mod: CPTII,S$GLB,, | Performed by: NURSE PRACTITIONER

## 2022-05-12 PROCEDURE — 3074F SYST BP LT 130 MM HG: CPT | Mod: CPTII,S$GLB,, | Performed by: NURSE PRACTITIONER

## 2022-05-12 PROCEDURE — 1160F RVW MEDS BY RX/DR IN RCRD: CPT | Mod: CPTII,S$GLB,, | Performed by: NURSE PRACTITIONER

## 2022-05-12 PROCEDURE — 1101F PR PT FALLS ASSESS DOC 0-1 FALLS W/OUT INJ PAST YR: ICD-10-PCS | Mod: CPTII,S$GLB,, | Performed by: NURSE PRACTITIONER

## 2022-05-12 PROCEDURE — 4010F ACE/ARB THERAPY RXD/TAKEN: CPT | Mod: CPTII,S$GLB,, | Performed by: NURSE PRACTITIONER

## 2022-05-12 PROCEDURE — 4010F PR ACE/ARB THEARPY RXD/TAKEN: ICD-10-PCS | Mod: CPTII,S$GLB,, | Performed by: NURSE PRACTITIONER

## 2022-05-12 PROCEDURE — 1101F PT FALLS ASSESS-DOCD LE1/YR: CPT | Mod: CPTII,S$GLB,, | Performed by: NURSE PRACTITIONER

## 2022-05-12 PROCEDURE — 99214 PR OFFICE/OUTPT VISIT, EST, LEVL IV, 30-39 MIN: ICD-10-PCS | Mod: S$GLB,,, | Performed by: NURSE PRACTITIONER

## 2022-05-12 PROCEDURE — 1159F MED LIST DOCD IN RCRD: CPT | Mod: CPTII,S$GLB,, | Performed by: NURSE PRACTITIONER

## 2022-05-12 PROCEDURE — 3008F BODY MASS INDEX DOCD: CPT | Mod: CPTII,S$GLB,, | Performed by: NURSE PRACTITIONER

## 2022-05-12 PROCEDURE — 3008F PR BODY MASS INDEX (BMI) DOCUMENTED: ICD-10-PCS | Mod: CPTII,S$GLB,, | Performed by: NURSE PRACTITIONER

## 2022-05-12 PROCEDURE — 1159F PR MEDICATION LIST DOCUMENTED IN MEDICAL RECORD: ICD-10-PCS | Mod: CPTII,S$GLB,, | Performed by: NURSE PRACTITIONER

## 2022-05-12 PROCEDURE — 99999 PR PBB SHADOW E&M-EST. PATIENT-LVL V: ICD-10-PCS | Mod: PBBFAC,,, | Performed by: NURSE PRACTITIONER

## 2022-05-12 PROCEDURE — 1160F PR REVIEW ALL MEDS BY PRESCRIBER/CLIN PHARMACIST DOCUMENTED: ICD-10-PCS | Mod: CPTII,S$GLB,, | Performed by: NURSE PRACTITIONER

## 2022-05-12 PROCEDURE — 99999 PR PBB SHADOW E&M-EST. PATIENT-LVL V: CPT | Mod: PBBFAC,,, | Performed by: NURSE PRACTITIONER

## 2022-05-12 RX ORDER — BUSPIRONE HYDROCHLORIDE 10 MG/1
10 TABLET ORAL 2 TIMES DAILY
Qty: 60 TABLET | Refills: 11 | Status: SHIPPED | OUTPATIENT
Start: 2022-05-12 | End: 2022-06-20 | Stop reason: SDUPTHER

## 2022-05-12 NOTE — PROGRESS NOTES
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 smoking, alcohol and recreational drugs. The patient verbalized full understanding.       Advised the patient to follow COVID-19 prevention measures.       I spent 30 minutes total E/M more than 50 % spent  face to face with the patient     Tiff Lloyd MSN NP      Collaborating Provider: Barbara Hurst MD, FAAN Neurologist/Epileptologist    RTC 4 weeks

## 2022-05-16 NOTE — PROGRESS NOTES
HPI     C/o blurred vision , glare, trouble driving at night , difficulty   reading, and seeing the television over the past several years. Pt states   not having any eye pain or discomfort. Pt denies any other ocular issues   at this time.     Which eye is most affected? OD > OS    Activities of daily living impacted: reading, driving, and watching tV    Do you have difficulty, even with glasses, with the following activities?    Reading small print such as labels on medicine bottles, a telephone book,   or food labels.   yes  Reading a newspaper or book?  yes  Seeing steps, stairs, or curbs  yes  Reading traffic signs, street signs, or store signs  yes  Doing fine handwork like sewing, knitting, crocheting or carpentry?  yes  Writing checks or filling out forms  yes  Playing games such as bingo, dominos, card games or mahjong?  yes  Watching television?  yes  Driving at night?  yes       1. NS OU ?  2. HTN    Last edited by Sarah Tapia on 3/10/2022  3:44 PM. (History)            Assessment /Plan     For exam results, see Encounter Report.    Anatomical narrow angle, left eye- IOP normal. Angles narrow on gonioscopy but open with dynamic gonioscopy. Explained r/b/a to observation vs. LPI vs. CEIOL. Patient elects for CEIOL.  - Plan for CEIOL OS      Nuclear sclerosis of left eye- Visually Significant Cataract OS with Anatomical Narrow angle of left eye and risk of angle closure  Patient reports decreased vision consistent with the clinical amount of lenticular opacity, which reaches the level of visual significance and affects activities of daily living including reading and glare. Risks, benefits, and alternatives to cataract surgery were discussed.  Discussion of risks included possibility of infection as well as permanent vision loss.The pt expressed a desire to proceed with surgery with the potential for some reasonable degree of visual improvement. Recommended regular use of artificial tears and good lid  hygiene to optimize surgical outcome.     Discussed IOL options and refractive outcomes for this patient.    Phaco left eye,   Topical  monofocal IOL.  Will aim for distance  Referral to [facility] for Ophthalmic [facility]  Prescriptions sent for preoperative medications  Durezol or Prednisolone Acetate  Explained that patient may need glasses after surgery.    Discussed that vision may be limited by SPEEDY   Dilation: good  Alpha Blockers: none      Blindness of right eye with normal vision in contralateral eye- Follow      Return to clinic PCIOL OS          no concerns

## 2022-05-17 ENCOUNTER — TELEPHONE (OUTPATIENT)
Dept: NEUROLOGY | Facility: CLINIC | Age: 72
End: 2022-05-17
Payer: MEDICARE

## 2022-05-17 NOTE — TELEPHONE ENCOUNTER
[facility] - Neurology  Medical Specialty       Patient Name: Leia Rodriguez  MRN: [id number]  Primary Care Physician: Yasmin Navarro MD    Received call back from pt's daughter. She reports she discussed sitter options at pt's neuro appt but wasn't sure what is covered by insurance.  Discussed options of private pay sitters versus Medicaid LTC waiver, if pt were to qualify for Medicaid. She reports last time they checked, pt was over income for Medicaid but she is willing to try again. E-mailed information for Home Instead and Medicaid LTC waiver to her ([email or web address]). Encouraged pt's daughter to call with any other needs that may arise.       Jose Copeland, MSW, [facility]  [phone]

## 2022-05-17 NOTE — TELEPHONE ENCOUNTER
[facility] - Neurology  Medical Specialty       Patient Name: Leia Rodriguez  MRN: [id number]  Primary Care Physician: Yasmin Navarro MD  Reason for Referral: Community Resources and sitter information    LVM for pt's daughter.      Plan: Will await call back/ call again at a later time.       Jose Copeland, MSW, LCSW  [phone]

## 2022-05-18 ENCOUNTER — TELEPHONE (OUTPATIENT)
Dept: CARDIOLOGY | Facility: HOSPITAL | Age: 72
End: 2022-05-18
Payer: MEDICARE

## 2022-05-18 ENCOUNTER — EXTERNAL HOME HEALTH (OUTPATIENT)
Dept: HOME HEALTH SERVICES | Facility: HOSPITAL | Age: 72
End: 2022-05-18
Payer: MEDICARE

## 2022-05-18 NOTE — TELEPHONE ENCOUNTER
Returned call. Spoke with daughter Ms. Figueroa. Ms. Figueroa requested appt with Dr. Manjarrez be rescheduled to a day with other appts to avoid missing work, due to started a new job.    Appt rescheduled to 06/20/2022 at 0820, [place] location.  Daughter accepted appt.

## 2022-05-18 NOTE — TELEPHONE ENCOUNTER
----- Message from Adelaida Crook sent at 5/18/2022  3:09 PM CDT -----  Contact: Ms. Figueroa Mobile [phone]  Ms. Figueroa patients daughter would like a call back in regards to her wanting to reschedule her mom's appointment that is scheduled on 06/21/2022 please.

## 2022-06-15 ENCOUNTER — OFFICE VISIT (OUTPATIENT)
Dept: NEUROLOGY | Facility: CLINIC | Age: 72
End: 2022-06-15
Payer: COMMERCIAL

## 2022-06-15 DIAGNOSIS — R90.89 ABNORMAL FINDING ON MRI OF BRAIN: ICD-10-CM

## 2022-06-15 DIAGNOSIS — F03.90 MAJOR NEUROCOGNITIVE DISORDER: Primary | ICD-10-CM

## 2022-06-15 DIAGNOSIS — R46.89 BEHAVIORAL CHANGE: ICD-10-CM

## 2022-06-15 DIAGNOSIS — F03.911 AGITATION DUE TO DEMENTIA: ICD-10-CM

## 2022-06-15 PROCEDURE — 99499 UNLISTED E&M SERVICE: CPT | Mod: S$GLB,,, | Performed by: STUDENT IN AN ORGANIZED HEALTH CARE EDUCATION/TRAINING PROGRAM

## 2022-06-15 PROCEDURE — 99999 PR PBB SHADOW E&M-EST. PATIENT-LVL II: ICD-10-PCS | Mod: PBBFAC,,, | Performed by: STUDENT IN AN ORGANIZED HEALTH CARE EDUCATION/TRAINING PROGRAM

## 2022-06-15 PROCEDURE — 1159F MED LIST DOCD IN RCRD: CPT | Mod: CPTII,S$GLB,, | Performed by: STUDENT IN AN ORGANIZED HEALTH CARE EDUCATION/TRAINING PROGRAM

## 2022-06-15 PROCEDURE — 4010F PR ACE/ARB THEARPY RXD/TAKEN: ICD-10-PCS | Mod: CPTII,S$GLB,, | Performed by: STUDENT IN AN ORGANIZED HEALTH CARE EDUCATION/TRAINING PROGRAM

## 2022-06-15 PROCEDURE — 1159F PR MEDICATION LIST DOCUMENTED IN MEDICAL RECORD: ICD-10-PCS | Mod: CPTII,S$GLB,, | Performed by: STUDENT IN AN ORGANIZED HEALTH CARE EDUCATION/TRAINING PROGRAM

## 2022-06-15 PROCEDURE — 99999 PR PBB SHADOW E&M-EST. PATIENT-LVL II: CPT | Mod: PBBFAC,,, | Performed by: STUDENT IN AN ORGANIZED HEALTH CARE EDUCATION/TRAINING PROGRAM

## 2022-06-15 PROCEDURE — 99499 NO LOS: ICD-10-PCS | Mod: S$GLB,,, | Performed by: STUDENT IN AN ORGANIZED HEALTH CARE EDUCATION/TRAINING PROGRAM

## 2022-06-15 PROCEDURE — 96116 NUBHVL XM PHYS/QHP 1ST HR: CPT | Mod: S$GLB,,, | Performed by: STUDENT IN AN ORGANIZED HEALTH CARE EDUCATION/TRAINING PROGRAM

## 2022-06-15 PROCEDURE — 96116 PR NEUROBEHAVIORAL STATUS EXAM BY PSYCH/PHYS: ICD-10-PCS | Mod: S$GLB,,, | Performed by: STUDENT IN AN ORGANIZED HEALTH CARE EDUCATION/TRAINING PROGRAM

## 2022-06-15 PROCEDURE — 4010F ACE/ARB THERAPY RXD/TAKEN: CPT | Mod: CPTII,S$GLB,, | Performed by: STUDENT IN AN ORGANIZED HEALTH CARE EDUCATION/TRAINING PROGRAM

## 2022-06-15 PROCEDURE — 96121 NUBHVL XM PHY/QHP EA ADDL HR: CPT | Mod: S$GLB,,, | Performed by: STUDENT IN AN ORGANIZED HEALTH CARE EDUCATION/TRAINING PROGRAM

## 2022-06-15 PROCEDURE — 96121 PR NEUROBEHAVIORAL STAT EXAM, EA ADDTL HR: ICD-10-PCS | Mod: S$GLB,,, | Performed by: STUDENT IN AN ORGANIZED HEALTH CARE EDUCATION/TRAINING PROGRAM
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
## 2022-06-15 NOTE — PATIENT INSTRUCTIONS
Today you completed the interview portion of your neuropsychological evaluation. The next step will be to come in for a testing appointment. Our office will reach out to you to schedule this. Some helpful information is included below to help you be ready for your testing appointment.    Please call Dr. Henderson at [phone] or send a [employer] message with any questions.     In the meantime, please see below for recommendations and resources we discussed during your visit.     Please review the sleep hygiene handout we discussed.         _____________________  Juan Henderson, Ph.D.  Neuropsychologist  [facility]  Department of Neurology      Instructions for your upcoming neuropsychological assessment    If you must cancel your appointment please do so 48 hours in advance of your scheduled appointment.   Please take all of your medications as prescribed on the date of testing, unless specifically asked not to do so.   Be sure to eat a full breakfast the morning before testing if you typically eat in the morning.   Bring any water, snacks, or other food supplies you may need for a 2-4 hour appointment.  Do your best to get a good night of sleep before testing.  There is nothing you should do or need to do to study for the tests. Just come ready to do your best.

## 2022-06-20 ENCOUNTER — LAB VISIT (OUTPATIENT)
Dept: LAB | Facility: HOSPITAL | Age: 72
End: 2022-06-20
Attending: INTERNAL MEDICINE
Payer: MEDICARE

## 2022-06-20 ENCOUNTER — OFFICE VISIT (OUTPATIENT)
Dept: NEUROLOGY | Facility: CLINIC | Age: 72
End: 2022-06-20
Payer: MEDICARE

## 2022-06-20 ENCOUNTER — OFFICE VISIT (OUTPATIENT)
Dept: CARDIOLOGY | Facility: CLINIC | Age: 72
End: 2022-06-20
Payer: MEDICARE

## 2022-06-20 VITALS
WEIGHT: 149.69 LBS | DIASTOLIC BLOOD PRESSURE: 60 MMHG | SYSTOLIC BLOOD PRESSURE: 96 MMHG | BODY MASS INDEX: 28.28 KG/M2 | HEART RATE: 79 BPM

## 2022-06-20 VITALS
HEIGHT: 61 IN | DIASTOLIC BLOOD PRESSURE: 60 MMHG | HEART RATE: 79 BPM | RESPIRATION RATE: 16 BRPM | OXYGEN SATURATION: 95 % | BODY MASS INDEX: 28.26 KG/M2 | WEIGHT: 149.69 LBS | SYSTOLIC BLOOD PRESSURE: 96 MMHG

## 2022-06-20 DIAGNOSIS — F02.80 MAJOR NEUROCOGNITIVE DISORDER DUE TO ALZHEIMER'S DISEASE: ICD-10-CM

## 2022-06-20 DIAGNOSIS — F10.21 HISTORY OF ALCOHOLISM: ICD-10-CM

## 2022-06-20 DIAGNOSIS — F03.90 DEMENTIA WITHOUT BEHAVIORAL DISTURBANCE, UNSPECIFIED DEMENTIA TYPE: ICD-10-CM

## 2022-06-20 DIAGNOSIS — F99 INAPPROPRIATE BEHAVIOR: ICD-10-CM

## 2022-06-20 DIAGNOSIS — F03.90 MAJOR NEUROCOGNITIVE DISORDER: Primary | ICD-10-CM

## 2022-06-20 DIAGNOSIS — I50.42 CHRONIC COMBINED SYSTOLIC AND DIASTOLIC CHF (CONGESTIVE HEART FAILURE): Chronic | ICD-10-CM

## 2022-06-20 DIAGNOSIS — F17.200 TOBACCO USE DISORDER: ICD-10-CM

## 2022-06-20 DIAGNOSIS — F10.21 ALCOHOL USE DISORDER, MODERATE, IN SUSTAINED REMISSION: ICD-10-CM

## 2022-06-20 DIAGNOSIS — R32 URINARY INCONTINENCE, UNSPECIFIED TYPE: ICD-10-CM

## 2022-06-20 DIAGNOSIS — I50.42 CHRONIC COMBINED SYSTOLIC AND DIASTOLIC CHF (CONGESTIVE HEART FAILURE): Primary | Chronic | ICD-10-CM

## 2022-06-20 DIAGNOSIS — F33.0 MILD EPISODE OF RECURRENT MAJOR DEPRESSIVE DISORDER: ICD-10-CM

## 2022-06-20 DIAGNOSIS — G30.9 MAJOR NEUROCOGNITIVE DISORDER DUE TO ALZHEIMER'S DISEASE: ICD-10-CM

## 2022-06-20 DIAGNOSIS — F02.818 MAJOR NEUROCOGNITIVE DISORDER DUE TO MULTIPLE ETIOLOGIES WITH BEHAVIORAL DISTURBANCE: Primary | ICD-10-CM

## 2022-06-20 DIAGNOSIS — F03.911 AGITATION DUE TO DEMENTIA: ICD-10-CM

## 2022-06-20 DIAGNOSIS — I70.0 ATHEROSCLEROSIS OF AORTA: ICD-10-CM

## 2022-06-20 DIAGNOSIS — Z91.89 AT RISK FOR PROLONGED QT INTERVAL SYNDROME: ICD-10-CM

## 2022-06-20 DIAGNOSIS — R41.3 MEMORY LOSS: ICD-10-CM

## 2022-06-20 DIAGNOSIS — I10 ESSENTIAL HYPERTENSION: Chronic | ICD-10-CM

## 2022-06-20 DIAGNOSIS — R46.89 BEHAVIORAL CHANGE: ICD-10-CM

## 2022-06-20 LAB
ANION GAP SERPL CALC-SCNC: 8 MMOL/L (ref 8–16)
BNP SERPL-MCNC: 34 PG/ML (ref 0–99)
BUN SERPL-MCNC: 39 MG/DL (ref 8–23)
CALCIUM SERPL-MCNC: 10 MG/DL (ref 8.7–10.5)
CHLORIDE SERPL-SCNC: 101 MMOL/L (ref 95–110)
CO2 SERPL-SCNC: 29 MMOL/L (ref 23–29)
CREAT SERPL-MCNC: 1.3 MG/DL (ref 0.5–1.4)
EST. GFR  (AFRICAN AMERICAN): 48 ML/MIN/1.73 M^2
EST. GFR  (NON AFRICAN AMERICAN): 41.7 ML/MIN/1.73 M^2
GLUCOSE SERPL-MCNC: 76 MG/DL (ref 70–110)
POTASSIUM SERPL-SCNC: 4.5 MMOL/L (ref 3.5–5.1)
SODIUM SERPL-SCNC: 138 MMOL/L (ref 136–145)

## 2022-06-20 PROCEDURE — 96138 PSYCL/NRPSYC TECH 1ST: CPT | Mod: S$GLB,,, | Performed by: STUDENT IN AN ORGANIZED HEALTH CARE EDUCATION/TRAINING PROGRAM

## 2022-06-20 PROCEDURE — 99999 PR PBB SHADOW E&M-EST. PATIENT-LVL II: CPT | Mod: PBBFAC,,, | Performed by: STUDENT IN AN ORGANIZED HEALTH CARE EDUCATION/TRAINING PROGRAM

## 2022-06-20 PROCEDURE — 96139 PR PSYCH/NEUROPSYCH TEST ADMIN/SCORING, BY TECH, 2+ TESTS, EA ADDTL 30 MIN: ICD-10-PCS | Mod: S$GLB,,, | Performed by: STUDENT IN AN ORGANIZED HEALTH CARE EDUCATION/TRAINING PROGRAM

## 2022-06-20 PROCEDURE — 1159F MED LIST DOCD IN RCRD: CPT | Mod: CPTII,S$GLB,, | Performed by: INTERNAL MEDICINE

## 2022-06-20 PROCEDURE — 4010F PR ACE/ARB THEARPY RXD/TAKEN: ICD-10-PCS | Mod: CPTII,S$GLB,, | Performed by: STUDENT IN AN ORGANIZED HEALTH CARE EDUCATION/TRAINING PROGRAM

## 2022-06-20 PROCEDURE — 3074F SYST BP LT 130 MM HG: CPT | Mod: CPTII,S$GLB,, | Performed by: NURSE PRACTITIONER

## 2022-06-20 PROCEDURE — 1126F PR PAIN SEVERITY QUANTIFIED, NO PAIN PRESENT: ICD-10-PCS | Mod: CPTII,S$GLB,, | Performed by: NURSE PRACTITIONER

## 2022-06-20 PROCEDURE — 36415 COLL VENOUS BLD VENIPUNCTURE: CPT | Performed by: INTERNAL MEDICINE

## 2022-06-20 PROCEDURE — 99999 PR PBB SHADOW E&M-EST. PATIENT-LVL V: ICD-10-PCS | Mod: PBBFAC,,, | Performed by: NURSE PRACTITIONER

## 2022-06-20 PROCEDURE — 4010F ACE/ARB THERAPY RXD/TAKEN: CPT | Mod: CPTII,S$GLB,, | Performed by: NURSE PRACTITIONER

## 2022-06-20 PROCEDURE — 3078F PR MOST RECENT DIASTOLIC BLOOD PRESSURE < 80 MM HG: ICD-10-PCS | Mod: CPTII,S$GLB,, | Performed by: NURSE PRACTITIONER

## 2022-06-20 PROCEDURE — 80048 BASIC METABOLIC PNL TOTAL CA: CPT | Performed by: INTERNAL MEDICINE

## 2022-06-20 PROCEDURE — 1159F PR MEDICATION LIST DOCUMENTED IN MEDICAL RECORD: ICD-10-PCS | Mod: CPTII,S$GLB,, | Performed by: STUDENT IN AN ORGANIZED HEALTH CARE EDUCATION/TRAINING PROGRAM

## 2022-06-20 PROCEDURE — 96138 PR PSYCH/NEUROPSYCH TEST ADMIN/SCORING, BY TECH, 2+ TESTS, 1ST 30 MIN: ICD-10-PCS | Mod: S$GLB,,, | Performed by: STUDENT IN AN ORGANIZED HEALTH CARE EDUCATION/TRAINING PROGRAM

## 2022-06-20 PROCEDURE — 99999 PR PBB SHADOW E&M-EST. PATIENT-LVL III: ICD-10-PCS | Mod: PBBFAC,,, | Performed by: INTERNAL MEDICINE

## 2022-06-20 PROCEDURE — 4010F ACE/ARB THERAPY RXD/TAKEN: CPT | Mod: CPTII,S$GLB,, | Performed by: INTERNAL MEDICINE

## 2022-06-20 PROCEDURE — 99499 NO LOS: ICD-10-PCS | Mod: S$GLB,,, | Performed by: STUDENT IN AN ORGANIZED HEALTH CARE EDUCATION/TRAINING PROGRAM

## 2022-06-20 PROCEDURE — 3074F PR MOST RECENT SYSTOLIC BLOOD PRESSURE < 130 MM HG: ICD-10-PCS | Mod: CPTII,S$GLB,, | Performed by: INTERNAL MEDICINE

## 2022-06-20 PROCEDURE — 1126F AMNT PAIN NOTED NONE PRSNT: CPT | Mod: CPTII,S$GLB,, | Performed by: NURSE PRACTITIONER

## 2022-06-20 PROCEDURE — 99999 PR PBB SHADOW E&M-EST. PATIENT-LVL II: ICD-10-PCS | Mod: PBBFAC,,, | Performed by: STUDENT IN AN ORGANIZED HEALTH CARE EDUCATION/TRAINING PROGRAM

## 2022-06-20 PROCEDURE — 3074F SYST BP LT 130 MM HG: CPT | Mod: CPTII,S$GLB,, | Performed by: INTERNAL MEDICINE

## 2022-06-20 PROCEDURE — 99999 PR PBB SHADOW E&M-EST. PATIENT-LVL V: CPT | Mod: PBBFAC,,, | Performed by: NURSE PRACTITIONER

## 2022-06-20 PROCEDURE — 1101F PR PT FALLS ASSESS DOC 0-1 FALLS W/OUT INJ PAST YR: ICD-10-PCS | Mod: CPTII,S$GLB,, | Performed by: NURSE PRACTITIONER

## 2022-06-20 PROCEDURE — 3008F PR BODY MASS INDEX (BMI) DOCUMENTED: ICD-10-PCS | Mod: CPTII,S$GLB,, | Performed by: NURSE PRACTITIONER

## 2022-06-20 PROCEDURE — 1160F PR REVIEW ALL MEDS BY PRESCRIBER/CLIN PHARMACIST DOCUMENTED: ICD-10-PCS | Mod: CPTII,S$GLB,, | Performed by: INTERNAL MEDICINE

## 2022-06-20 PROCEDURE — 1101F PT FALLS ASSESS-DOCD LE1/YR: CPT | Mod: CPTII,S$GLB,, | Performed by: NURSE PRACTITIONER

## 2022-06-20 PROCEDURE — 1159F PR MEDICATION LIST DOCUMENTED IN MEDICAL RECORD: ICD-10-PCS | Mod: CPTII,S$GLB,, | Performed by: INTERNAL MEDICINE

## 2022-06-20 PROCEDURE — 99999 PR PBB SHADOW E&M-EST. PATIENT-LVL III: CPT | Mod: PBBFAC,,, | Performed by: INTERNAL MEDICINE

## 2022-06-20 PROCEDURE — 3008F BODY MASS INDEX DOCD: CPT | Mod: CPTII,S$GLB,, | Performed by: INTERNAL MEDICINE

## 2022-06-20 PROCEDURE — 96133 PR NEUROPSYCHOLOGIC TEST EVAL SVCS, EA ADDTL HR: ICD-10-PCS | Mod: S$GLB,,, | Performed by: STUDENT IN AN ORGANIZED HEALTH CARE EDUCATION/TRAINING PROGRAM

## 2022-06-20 PROCEDURE — 99213 OFFICE O/P EST LOW 20 MIN: CPT | Mod: S$GLB,,, | Performed by: NURSE PRACTITIONER

## 2022-06-20 PROCEDURE — 3078F DIAST BP <80 MM HG: CPT | Mod: CPTII,S$GLB,, | Performed by: NURSE PRACTITIONER

## 2022-06-20 PROCEDURE — 3074F PR MOST RECENT SYSTOLIC BLOOD PRESSURE < 130 MM HG: ICD-10-PCS | Mod: CPTII,S$GLB,, | Performed by: NURSE PRACTITIONER

## 2022-06-20 PROCEDURE — 1160F RVW MEDS BY RX/DR IN RCRD: CPT | Mod: CPTII,S$GLB,, | Performed by: INTERNAL MEDICINE

## 2022-06-20 PROCEDURE — 99213 PR OFFICE/OUTPT VISIT, EST, LEVL III, 20-29 MIN: ICD-10-PCS | Mod: S$GLB,,, | Performed by: NURSE PRACTITIONER

## 2022-06-20 PROCEDURE — 83880 ASSAY OF NATRIURETIC PEPTIDE: CPT | Performed by: INTERNAL MEDICINE

## 2022-06-20 PROCEDURE — 96139 PSYCL/NRPSYC TST TECH EA: CPT | Mod: S$GLB,,, | Performed by: STUDENT IN AN ORGANIZED HEALTH CARE EDUCATION/TRAINING PROGRAM

## 2022-06-20 PROCEDURE — 1159F MED LIST DOCD IN RCRD: CPT | Mod: CPTII,S$GLB,, | Performed by: STUDENT IN AN ORGANIZED HEALTH CARE EDUCATION/TRAINING PROGRAM

## 2022-06-20 PROCEDURE — 1160F RVW MEDS BY RX/DR IN RCRD: CPT | Mod: CPTII,S$GLB,, | Performed by: NURSE PRACTITIONER

## 2022-06-20 PROCEDURE — 3008F PR BODY MASS INDEX (BMI) DOCUMENTED: ICD-10-PCS | Mod: CPTII,S$GLB,, | Performed by: INTERNAL MEDICINE

## 2022-06-20 PROCEDURE — 99214 OFFICE O/P EST MOD 30 MIN: CPT | Mod: S$GLB,,, | Performed by: INTERNAL MEDICINE

## 2022-06-20 PROCEDURE — 4010F PR ACE/ARB THEARPY RXD/TAKEN: ICD-10-PCS | Mod: CPTII,S$GLB,, | Performed by: NURSE PRACTITIONER

## 2022-06-20 PROCEDURE — 99499 UNLISTED E&M SERVICE: CPT | Mod: S$GLB,,, | Performed by: NURSE PRACTITIONER

## 2022-06-20 PROCEDURE — 96132 NRPSYC TST EVAL PHYS/QHP 1ST: CPT | Mod: S$GLB,,, | Performed by: STUDENT IN AN ORGANIZED HEALTH CARE EDUCATION/TRAINING PROGRAM

## 2022-06-20 PROCEDURE — 3078F PR MOST RECENT DIASTOLIC BLOOD PRESSURE < 80 MM HG: ICD-10-PCS | Mod: CPTII,S$GLB,, | Performed by: INTERNAL MEDICINE

## 2022-06-20 PROCEDURE — 99499 RISK ADDL DX/OHS AUDIT: ICD-10-PCS | Mod: S$GLB,,, | Performed by: NURSE PRACTITIONER

## 2022-06-20 PROCEDURE — 1159F PR MEDICATION LIST DOCUMENTED IN MEDICAL RECORD: ICD-10-PCS | Mod: CPTII,S$GLB,, | Performed by: NURSE PRACTITIONER

## 2022-06-20 PROCEDURE — 1159F MED LIST DOCD IN RCRD: CPT | Mod: CPTII,S$GLB,, | Performed by: NURSE PRACTITIONER

## 2022-06-20 PROCEDURE — 4010F ACE/ARB THERAPY RXD/TAKEN: CPT | Mod: CPTII,S$GLB,, | Performed by: STUDENT IN AN ORGANIZED HEALTH CARE EDUCATION/TRAINING PROGRAM

## 2022-06-20 PROCEDURE — 3078F DIAST BP <80 MM HG: CPT | Mod: CPTII,S$GLB,, | Performed by: INTERNAL MEDICINE

## 2022-06-20 PROCEDURE — 99499 UNLISTED E&M SERVICE: CPT | Mod: S$GLB,,, | Performed by: STUDENT IN AN ORGANIZED HEALTH CARE EDUCATION/TRAINING PROGRAM

## 2022-06-20 PROCEDURE — 3288F FALL RISK ASSESSMENT DOCD: CPT | Mod: CPTII,S$GLB,, | Performed by: NURSE PRACTITIONER

## 2022-06-20 PROCEDURE — 3008F BODY MASS INDEX DOCD: CPT | Mod: CPTII,S$GLB,, | Performed by: NURSE PRACTITIONER

## 2022-06-20 PROCEDURE — 4010F PR ACE/ARB THEARPY RXD/TAKEN: ICD-10-PCS | Mod: CPTII,S$GLB,, | Performed by: INTERNAL MEDICINE

## 2022-06-20 PROCEDURE — 96132 PR NEUROPSYCHOLOGIC TEST EVAL SVCS, 1ST HR: ICD-10-PCS | Mod: S$GLB,,, | Performed by: STUDENT IN AN ORGANIZED HEALTH CARE EDUCATION/TRAINING PROGRAM

## 2022-06-20 PROCEDURE — 3288F PR FALLS RISK ASSESSMENT DOCUMENTED: ICD-10-PCS | Mod: CPTII,S$GLB,, | Performed by: NURSE PRACTITIONER

## 2022-06-20 PROCEDURE — 96133 NRPSYC TST EVAL PHYS/QHP EA: CPT | Mod: S$GLB,,, | Performed by: STUDENT IN AN ORGANIZED HEALTH CARE EDUCATION/TRAINING PROGRAM

## 2022-06-20 PROCEDURE — 1160F PR REVIEW ALL MEDS BY PRESCRIBER/CLIN PHARMACIST DOCUMENTED: ICD-10-PCS | Mod: CPTII,S$GLB,, | Performed by: NURSE PRACTITIONER

## 2022-06-20 PROCEDURE — 99214 PR OFFICE/OUTPT VISIT, EST, LEVL IV, 30-39 MIN: ICD-10-PCS | Mod: S$GLB,,, | Performed by: INTERNAL MEDICINE

## 2022-06-20 RX ORDER — BUSPIRONE HYDROCHLORIDE 15 MG/1
15 TABLET ORAL 2 TIMES DAILY
Qty: 60 TABLET | Refills: 11 | Status: SHIPPED | OUTPATIENT
Start: 2022-06-20 | End: 2023-04-17

## 2022-06-20 RX ORDER — SPIRONOLACTONE 25 MG/1
25 TABLET ORAL DAILY
Qty: 30 TABLET | Refills: 11
Start: 2022-06-20 | End: 2022-07-14

## 2022-06-20 NOTE — PROGRESS NOTES
Subjective:   Patient ID:  Leia Rodriguez is a 71 y.o. female who presents for follow up of No chief complaint on file.      72 yo female, 4 months f/u  PMH CAD, CHFmEF, h/o beast cancer 4 yrs ago lumpectomy and chemo, recurrent in  s/p mastectomy and chemo ongoing, and HTN  Quit smoking in  after 50 yrs smoking.  Some residual left chest pain after mastectomy,   GRISSOM and fatigue after fast walking,   No palpitation, leg swelling, dizziness and faint.    ekg in  NSR and TWI on V2 to v6 and inferior leads   ECHO normal EF and severe LVH   ECHo EF 45%, mod MR and LAE  Weight stable    03/2021 admitted for CHF exacerbation.  and Troponin 0.44 flat. elg NSR and TWI on lateral leads. Improved SOB after diuresis. Then BNP dropped to 58  Now SOB sable. Sleeps on 1 pillow    07/2021 visit   echo Day 15, cycle 6 Biplane=44% 4D LVQ=45% Avg GPLS= -16.1   MPI inferoapical scar  GRISSOM while long distance walking  No chest pain, dizziness faint, leg swelling  Decent appetite  F/u with Dr. Fernández on stage I HER2 positive breast carcinoma being treated with Herceptin q. 3 weeks     visit  Dynamical TWI on EKG   MPI showed apical inferior fixed perfusion defect  No chest pain . Limited walking due to lower back pain  No orthopnea      visit  No chest pain. GRISSOM mild and chronic. Limited activity due to fatigue and leg pain.   S/p LHC done on 10/12/2021, distal % OM2/3 40% and midRCA 50% lesion and EF 45% and LVEDP 17 mmHG. Moderate MR. Continue medical Rx  Serial echo studies    Echo 2/22/21 Cycle 4 Day 20  4dLVQ=47%  AutoEF=48%  BRIDGETT unable to obtain accurate measurements   5-26-21  Day 15, cycle 6  Biplane=44%  4D LVQ=45%  Avg GPLS= -16.1   9-13-21 Day 20, Cycle 11  Biplane=42%  4D LVQ=49%  Avg GPLS= -14.8%    12/2021 visit  11/ went to ER [facility] [place]. EKG sinus tachy and PVCs. BNP up to 800. CXR pulm. Edema. Added lasix 20 mg daily  Now dyspnea  improved. No leg swelling chest pain. Sleeps on 1 pillow and no PND.   On iron infusion rx fro anemia     visit  BNP normal and Cr elevated 1.4. will hold lasix  No leg swelling, dizziness sob orthopnea. On fluid restriction    Interval history  Improved appetite after held chemo due to decreased EF about .   Gained the weight. No SOB, sleeps on 2 pillows        Past Medical History:   Diagnosis Date    Arthritis     EDGAR HANDS, KNEES    Blind right eye     Traumatic    Breast cancer 06/15/2017    0.8 cm Grade1 INTRADUCTAL BREAST 9 positve margin (left)    Cataract     Essential hypertension     Hemorrhoids     Lipoma of abdominal wall     Obesity     Overactive bladder     Pap smear abnormality of cervix with ASCUS favoring benign     Thyroid nodule     Tobacco use disorder     Tubular adenoma of colon     Urinary incontinence        Past Surgical History:   Procedure Laterality Date    ANTERIOR VAGINAL REPAIR      BLADDER SURGERY      BREAST LUMPECTOMY Left     CATARACT EXTRACTION Left 2022     SECTION      X2    COLONOSCOPY N/A 3/8/2017    Procedure: COLONOSCOPY;  Surgeon: Tyron Paris MD;  Location: [facility];  Service: Endoscopy;  Laterality: N/A;    COLONOSCOPY N/A 2020    Procedure: COLONOSCOPY;  Surgeon: Keira Ellison MD;  Location: [facility];  Service: Endoscopy;  Laterality: N/A;    ESOPHAGOGASTRODUODENOSCOPY N/A 2020    Procedure: EGD (ESOPHAGOGASTRODUODENOSCOPY);  Surgeon: Keira Ellison MD;  Location: [facility];  Service: Endoscopy;  Laterality: N/A;  new onset iron deficiency with prior history of breast cancer    INTRALUMINAL GASTROINTESTINAL TRACT IMAGING VIA CAPSULE N/A 10/28/2020    Procedure: IMAGING PROCEDURE, GI TRACT, INTRALUMINAL, VIA CAPSULE;  Surgeon: Finesse Jha RN;  Location: [facility];  Service: Endoscopy;  Laterality: N/A;    LEFT HEART CATHETERIZATION Left 10/12/2021    Procedure: CATHETERIZATION, HEART,  LEFT;  Surgeon: Tiffanie Santos MD;  Location: [facility] CATH LAB;  Service: Cardiology;  Laterality: Left;    SENTINEL LYMPH NODE BIOPSY Left 2020    Procedure: BIOPSY, LYMPH NODE, SENTINEL;  Surgeon: Vincent Moyer MD;  Location: [facility] OR;  Service: General;  Laterality: Left;    SIMPLE MASTECTOMY Left 2020    Procedure: MASTECTOMY, SIMPLE;  Surgeon: Vincent Moyer MD;  Location: [facility] OR;  Service: General;  Laterality: Left;    THYROID LOBECTOMY Left     TOTAL ABDOMINAL HYSTERECTOMY      TUBAL LIGATION         Social History     Tobacco Use    Smoking status: Former Smoker     Packs/day: 1.00     Years: 50.00     Pack years: 50.00     Types: Cigarettes     Quit date: 2020     Years since quittin.8    Smokeless tobacco: Never Used   Substance Use Topics    Alcohol use: Never     Alcohol/week: 28.0 standard drinks     Types: 28 Cans of beer per week    Drug use: No       Family History   Problem Relation Age of Onset    Hypertension Father     Cataracts Father     Prostate cancer Father 80    Cervical cancer Daughter 32    Allergic rhinitis Daughter     Cirrhosis Mother     Alcohol abuse Mother     Hypertension Daughter     Diabetes Brother     Heart attack Brother     Heart murmur Brother     No Known Problems Daughter          Review of Systems   Unable to perform ROS: dementia       Objective:   Physical Exam  HENT:      Head: Normocephalic.   Eyes:      Pupils: Pupils are equal, round, and reactive to light.   Neck:      Thyroid: No thyromegaly.      Vascular: Normal carotid pulses. No carotid bruit or JVD.   Cardiovascular:      Rate and Rhythm: Normal rate and regular rhythm.  No extrasystoles are present.     Chest Wall: PMI is not displaced.      Pulses: Normal pulses.      Heart sounds: Normal heart sounds. No murmur heard.    No gallop. No S3 sounds.   Pulmonary:      Effort: No respiratory distress.      Breath sounds: Normal breath sounds. No stridor.   Abdominal:       General: Bowel sounds are normal.      Palpations: Abdomen is soft.      Tenderness: There is no abdominal tenderness. There is no rebound.   Musculoskeletal:         General: Normal range of motion.   Skin:     Findings: No rash.   Neurological:      Mental Status: She is alert and oriented to person, place, and time.   Psychiatric:         Behavior: Behavior normal.         Lab Results   Component Value Date    CHOL 119 (L) 03/31/2021    CHOL 207 (H) 09/03/2019    CHOL 212 (H) 06/21/2018     Lab Results   Component Value Date    HDL 46 03/31/2021    HDL 53 09/03/2019    HDL 65 06/21/2018     Lab Results   Component Value Date    LDLCALC 61.2 (L) 03/31/2021    LDLCALC 134.2 09/03/2019    LDLCALC 131.6 06/21/2018     Lab Results   Component Value Date    TRIG 59 03/31/2021    TRIG 99 09/03/2019    TRIG 77 06/21/2018     Lab Results   Component Value Date    CHOLHDL 38.7 03/31/2021    CHOLHDL 25.6 09/03/2019    CHOLHDL 30.7 06/21/2018       Chemistry        Component Value Date/Time     04/12/2022 2005    K 4.5 04/12/2022 2005     04/12/2022 2005    CO2 26 04/12/2022 2005    BUN 26 (H) 04/12/2022 2005    CREATININE 1.4 04/12/2022 2005    GLU 93 04/12/2022 2005        Component Value Date/Time    CALCIUM 9.0 04/12/2022 2005    ALKPHOS 71 04/12/2022 2005    AST 17 04/12/2022 2005    ALT 16 04/12/2022 2005    BILITOT 0.2 04/12/2022 2005    ESTGFRAFRICA 44 (A) 04/12/2022 2005    EGFRNONAA 38 (A) 04/12/2022 2005          No results found for: LABA1C, HGBA1C  Lab Results   Component Value Date    TSH 0.765 01/27/2022     Lab Results   Component Value Date    INR 1.1 10/01/2021     Lab Results   Component Value Date    WBC 8.15 04/12/2022    HGB 9.4 (L) 04/12/2022    HCT 30.2 (L) 04/12/2022     (H) 04/12/2022     04/12/2022     BMP  Sodium   Date Value Ref Range Status   04/12/2022 142 136 - 145 mmol/L Final     Potassium   Date Value Ref Range Status   04/12/2022 4.5 3.5 - 5.1 mmol/L Final      Chloride   Date Value Ref Range Status   04/12/2022 106 95 - 110 mmol/L Final     CO2   Date Value Ref Range Status   04/12/2022 26 23 - 29 mmol/L Final     BUN   Date Value Ref Range Status   04/12/2022 26 (H) 8 - 23 mg/dL Final     Creatinine   Date Value Ref Range Status   04/12/2022 1.4 0.5 - 1.4 mg/dL Final     Calcium   Date Value Ref Range Status   04/12/2022 9.0 8.7 - 10.5 mg/dL Final     Anion Gap   Date Value Ref Range Status   04/12/2022 10 8 - 16 mmol/L Final     eGFR if    Date Value Ref Range Status   04/12/2022 44 (A) >60 mL/min/1.73 m^2 Final     eGFR if non    Date Value Ref Range Status   04/12/2022 38 (A) >60 mL/min/1.73 m^2 Final     Comment:     Calculation used to obtain the estimated glomerular filtration  rate (eGFR) is the CKD-EPI equation.        BNP  @LABRCNTIP(BNP,BNPTRIAGEBLO)@  @LABRCNTIP(troponini)@  CrCl cannot be calculated (Patient's most recent lab result is older than the maximum 7 days allowed.).  No results found in the last 24 hours.  No results found in the last 24 hours.  No results found in the last 24 hours.    Assessment:      1. Chronic combined systolic and diastolic CHF (congestive heart failure)    2. Essential hypertension    3. Atherosclerosis of aorta    4. Tobacco use disorder      Weight gain  CHF compensated     Plan:   Check BNP and BMP  Continue Coreg telmisartan aldactone imdur and lasix  Continue ASA and Lipitor  Fluid restriction 1.5 liters a day  Na< 2 gm  RTC in 6 months
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
## 2022-06-21 PROBLEM — F02.818 MAJOR NEUROCOGNITIVE DISORDER DUE TO MULTIPLE ETIOLOGIES WITH BEHAVIORAL DISTURBANCE: Status: ACTIVE | Noted: 2022-06-21

## 2022-06-21 NOTE — PROGRESS NOTES
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 120).    Chronic microvascular ischemic changes and volume loss with suspected prior vascular insults in the right centrum semiovale.      Electronically signed by: Earnest Carrion  Date:    02/24/2022  Time:    18:19     Current medications: Ms. Rodriguez has a current medication list which includes the following prescription(s): anastrozole, aspirin, atorvastatin, buspirone, calcium citrate, carvedilol, cyanocobalamin, cyanocobalamin (vitamin b-12), donepezil, ferrous sulfate, furosemide, isosorbide mononitrate, memantine, prednisolone acetate, spironolactone, sulfamethoxazole-trimethoprim 800-160mg, tamsulosin, telmisartan, and vitamin d.     Review of patient's allergies indicates:  No Known Allergies    PSYCHIATRIC HISTORY: Denied     SUICIDAL IDEATION:  History: Denied  Active Suicidal Ideation: Denied  Plan/Intent: Denied     FAMILY HISTORY: family history includes Alcohol abuse in her mother; Allergic rhinitis in her daughter; Cataracts in her father; Cervical cancer (age of onset: 32) in her daughter; Cirrhosis in her mother; Diabetes in her brother; Heart attack in her brother; Heart murmur in her brother; Hypertension in her daughter and father; No Known Problems in her daughter; Prostate cancer (age of onset: 80) in her father.     PSYCHOSOCIAL HISTORY:   Education:               Level Attained: 9th grade.               Learning Difficulties: Denied              Special Education: Denied              Repeated Grade: Denied                Vocation:              Highest Attained: Customer service              Retired: 08/2016     Relationship Status:              : yes for approximately 50 years              : no              Children: 3 daughters.      SUBSTANCE USE:   Alcohol: Ms. Rodriguez reported that she drank 6-7 beers a day until 2017; records indicate drinking as many as 12 beers per day during that time-frame.  Recreational Substances: Denied.   Tobacco Products: She smoked 1.5 PPD for 54 years.     MENTAL STATUS AND OBSERVATIONS:  APPEARANCE: Casually dressed and adequate grooming/hygiene.   ALERTNESS/ORIENTATION: Attentive and alert.   GAIT/MOTOR: Ms. Rodriguez arrived in a wheelchair and did not demonstrate significant motor abnormality on casual observation.  SENSORY: Vision and hearing appeared adequate for testing. Ms. Rodriguez did not bring her reading glasses but denied needing them regularly.   SPEECH/LANGUAGE: Normal in rate, rhythm, tone, and volume. Expressive and receptive language were grossly intact.  MOOD/AFFECT: Mood was generally euthymic and affect was blunted.   INTERPERSONAL BEHAVIOR: Rapport was quickly and easily established   THOUGHT PROCESSES: Thoughts seemed logical and goal-directed.   TESTING OBSERVATIONS: Ms. Rodriguez was polite and did all that was asked of her without complaint. She required a lot of repetition, rewording and clarification of instructions, and demonstration, and at times she was still unable to follow directions. This led to discontinuation of several tasks that she could not perform in spite of instruction and scaffolding well beyond standardization. She often lost cognitive set across the evaluation and correction or re-orientation to tasks was of limited benefit. She often engaged in stimulus-bound or impulsive behavior, for  example, she kept touching or grabbing the examiner's hands when the examiner attempted to demonstrate alternating movements Ms. Rodriguez was to mimic. This behavior continued in spite of multiple efforts at re-instruction.     RESULTS     Tests Administered (manual norms used unless otherwise indicated): [employer] - Test of Premorbid Functioning (TOPF), Controlled Oral Word Association Test (COWAT), Semantic Fluency (Animals), [place] Naming Test (BNT), Stroop Color and Word Test, Dellis Guerrero Executive Functioning Scale Trails subtest, Grooved Pegboard Test, Dementia Rating Scale, 2nd Ed. (DRS-2), Frontal Systems Behavior Scale (FrSBe; Family Rating Form), Activities of Daily Living Questionnaire (ADL-Q) Geriatric Depression Scale (GDS) and Garcia Anxiety Inventory (MADIHA).    Performance Validity: Ms. Rodriguez completed one stand-alone measure of task engagement. Ms. Rodriguez scored at cutoff designed for individuals with mild dementia on this measure. Due to the above documented neurologic history, below-cutoff scores on measures of task engagement are not unreasonable. As such, the resultant profile is interpreted to be a valid reflection of their cognitive status.     PREMORBID FUNCTIONING Raw Score Type of Standardized Score Standardized Score Percentile/CP Descriptor   TOPF simple dem. eFSIQ - SS 77 6 Below Average   TOPF pred. eFSIQ - SS 72 3 Below Average   TOPF simple + pred. eFSIQ - SS 73 4 Below Average   COGNITIVE SCREENING Raw Score Type of Standardized Score Standardized Score Percentile/CP Descriptor   DRS-2         Attention 28 ss 4 2 Below Average   Initiation/Perseveration 17 ss 2 0.4 Exceptionally Low    Construction 3 ss 6 9 Low Average   Conceptualization 19 ss 2 0.4 Exceptionally Low    Memory 10 ss 2 0.4 Exceptionally Low    Total Scale 77 ss 2 0.4 Exceptionally Low    LANGUAGE FUNCTIONING Raw Score Type of Standardized Score Standardized Score Percentile/CP Descriptor   TOPF Word  Reading 15 SS 75 5 Below Average   BNT-60 19 ss 5 5 Below Average   FAS 8 ss 3 1 Exceptionally Low    Animal Naming 5 ss 3  Exceptionally Low    MENTAL PROCESSING SPEED Raw Score Type of Standardized Score Standardized Score Percentile/CP Descriptor   DKEFS Trails: Visual Scanning 38 ss 7 16 Low Average   DKEFS Trails: Number Sequencing d/c ss N/A N/A N/A   Stroop: Word Reading 18 ss 2  Exceptionally Low    Stroop: Color Naming 6 ss 2  Exceptionally Low    EXECUTIVE FUNCTIONING Raw Score Type of Standardized Score Standardized Score Percentile/CP Descriptor   Stroop: C/W 5 ss 5 5 Below Average   Stroop: Interference 0 Tscore N/A N/A N/A   FrSBE: Before         Total  109 Tscore 70 98 Exceptionally High   Apathy 37 Tscore 75 99 Exceptionally High   Disinhibition 27 Tscore 60 84 High Average   Executive Dysfunction 45 Tscore 43 25 Average   FrSBE: After         Total  162 Tscore 93 > 99.9 Exceptionally High   Apathy 55 Tscore 105 > 99.9 Exceptionally High   Disinhibition 40 Tscore 88 > 99.9 Exceptionally High   Executive Dysfunction 67 Tscore 93 > 99.9 Exceptionally High   FRONTOMOTOR  Raw Score Type of Standardized Score Standardized Score Percentile/CP Descriptor   GPT  Tscore 30 2 Below Average   GPD NDH d/c Tscore N/A N/A N/A   FUNCTIONAL  Raw Score Type of Standardized Score Standardized Score Percentile/CP Descriptor   ADLQ Self Care Activities - Percent - 61 Moderate Impairment   ADLQ Household Care - Percent - 67 Moderate Impairment   ADLQ Employment & Recreation - Percent - 78 Severe Impairment   ADLQ Shopping & Money - Percent - 67 Moderate Impairment   ADLQ Travel - Percent - 83 Severe Impairment   ADLQ Communication - Percent - 25 None to Mild   MOOD & PERSONALITY Raw Score Type of Standardized Score Standardized Score Percentile/CP Descriptor   GDS-30 15 - - - Mild   MADIHA 61 - - - Severe   ss = scaled score (mean = 10, SD = 3); SS = standard score (mean = 100, SD = 15); Tscore mean = 50, SD = 10;  zscore (mean = 0.00, SD = 1); d/c = discontinued       Billing Documentation     Time spent in review of pertinent records, conducting face to face interview with the patient, and documenting history: 121 minutes; 29825 & 28325(x1) Billed separately.  Time on review of neuropsychological test data, data interpretation, providing feedback about these results, and documentation of my interpretation: 224 minutes; [id number] &[id number] (x3).  Psychometrist time spent in the administration and scoring of 2 or more neuropsychological tests 180 minutes; 26070 & 91417 (x5).

## 2022-06-22 ENCOUNTER — TELEPHONE (OUTPATIENT)
Dept: CARDIOLOGY | Facility: CLINIC | Age: 72
End: 2022-06-22
Payer: MEDICARE

## 2022-06-22 NOTE — TELEPHONE ENCOUNTER
Pt contacted about results, pt daughter verbalized understanding.          ----- Message from Jose G Manjarrez MD sent at 6/21/2022  8:55 PM CDT -----  The lab showed CHF controled  Continue current rx

## 2022-07-11 ENCOUNTER — PATIENT MESSAGE (OUTPATIENT)
Dept: NEUROLOGY | Facility: CLINIC | Age: 72
End: 2022-07-11
Payer: MEDICARE

## 2022-07-11 DIAGNOSIS — R41.3 MEMORY LOSS: ICD-10-CM

## 2022-07-11 DIAGNOSIS — F02.80 MAJOR NEUROCOGNITIVE DISORDER DUE TO ALZHEIMER'S DISEASE: ICD-10-CM

## 2022-07-11 DIAGNOSIS — Z91.89 AT RISK FOR PROLONGED QT INTERVAL SYNDROME: ICD-10-CM

## 2022-07-11 DIAGNOSIS — G30.9 MAJOR NEUROCOGNITIVE DISORDER DUE TO ALZHEIMER'S DISEASE: ICD-10-CM

## 2022-07-11 RX ORDER — MEMANTINE HYDROCHLORIDE 10 MG/1
TABLET ORAL
Qty: 60 TABLET | Refills: 2 | OUTPATIENT
Start: 2022-07-11

## 2022-07-20 ENCOUNTER — PATIENT MESSAGE (OUTPATIENT)
Dept: HEMATOLOGY/ONCOLOGY | Facility: HOSPITAL | Age: 72
End: 2022-07-20
Payer: MEDICARE

## 2022-07-20 ENCOUNTER — APPOINTMENT (OUTPATIENT)
Dept: RADIOLOGY | Facility: HOSPITAL | Age: 72
End: 2022-07-20
Attending: INTERNAL MEDICINE
Payer: MEDICARE

## 2022-07-20 DIAGNOSIS — Z17.0 MALIGNANT NEOPLASM OF LOWER-INNER QUADRANT OF LEFT BREAST IN FEMALE, ESTROGEN RECEPTOR POSITIVE: ICD-10-CM

## 2022-07-20 DIAGNOSIS — M85.862 OTHER SPECIFIED DISORDERS OF BONE DENSITY AND STRUCTURE, LEFT LOWER LEG: ICD-10-CM

## 2022-07-20 DIAGNOSIS — C50.312 MALIGNANT NEOPLASM OF LOWER-INNER QUADRANT OF LEFT BREAST IN FEMALE, ESTROGEN RECEPTOR POSITIVE: ICD-10-CM

## 2022-07-20 PROCEDURE — 77080 DEXA BONE DENSITY SPINE HIP: ICD-10-PCS | Mod: 26,,, | Performed by: RADIOLOGY

## 2022-07-20 PROCEDURE — 77080 DXA BONE DENSITY AXIAL: CPT | Mod: TC

## 2022-07-20 PROCEDURE — 77080 DXA BONE DENSITY AXIAL: CPT | Mod: 26,,, | Performed by: RADIOLOGY

## 2022-07-21 ENCOUNTER — OFFICE VISIT (OUTPATIENT)
Dept: NEUROLOGY | Facility: CLINIC | Age: 72
End: 2022-07-21
Payer: MEDICARE

## 2022-07-21 DIAGNOSIS — F02.818 MAJOR NEUROCOGNITIVE DISORDER DUE TO MULTIPLE ETIOLOGIES WITH BEHAVIORAL DISTURBANCE: Primary | ICD-10-CM

## 2022-07-21 PROCEDURE — 99499 NO LOS: ICD-10-PCS | Mod: 95,,, | Performed by: STUDENT IN AN ORGANIZED HEALTH CARE EDUCATION/TRAINING PROGRAM

## 2022-07-21 PROCEDURE — 4010F ACE/ARB THERAPY RXD/TAKEN: CPT | Mod: CPTII,95,, | Performed by: STUDENT IN AN ORGANIZED HEALTH CARE EDUCATION/TRAINING PROGRAM

## 2022-07-21 PROCEDURE — 1159F PR MEDICATION LIST DOCUMENTED IN MEDICAL RECORD: ICD-10-PCS | Mod: CPTII,95,, | Performed by: STUDENT IN AN ORGANIZED HEALTH CARE EDUCATION/TRAINING PROGRAM

## 2022-07-21 PROCEDURE — 99499 UNLISTED E&M SERVICE: CPT | Mod: 95,,, | Performed by: STUDENT IN AN ORGANIZED HEALTH CARE EDUCATION/TRAINING PROGRAM

## 2022-07-21 PROCEDURE — 4010F PR ACE/ARB THEARPY RXD/TAKEN: ICD-10-PCS | Mod: CPTII,95,, | Performed by: STUDENT IN AN ORGANIZED HEALTH CARE EDUCATION/TRAINING PROGRAM

## 2022-07-21 PROCEDURE — 1159F MED LIST DOCD IN RCRD: CPT | Mod: CPTII,95,, | Performed by: STUDENT IN AN ORGANIZED HEALTH CARE EDUCATION/TRAINING PROGRAM

## 2022-07-21 NOTE — PROGRESS NOTES
Ms. Rodriguez and her daughter attended a tele-health (audio and video) feedback session to discuss the results from her recent neuropsychological testing (see report dated 06/20/2022). We discussed her test results, diagnoses, and my recommendations. Ms. Rodriguez and her daughter voiced their understanding of the information provided, and voiced their intention to follow through on the recommendations provided. All questions were answered to their satisfaction and Ms. Rodriguez was provided with a copy of her report. she was encouraged to contact our office with any future questions or concerns.         _____________________  Juan Henderson, Ph.D.  Neuropsychologist  [facility]  Department of Neurology     Billing Information:   Time spent discussing test results with the patient: 43 minutes  Total time spent for this encounter, including discussion of test results, review of pertinent records, and documentation of this encounter: 60 minutes  18560 (1) Billed separately.

## 2022-07-26 ENCOUNTER — OFFICE VISIT (OUTPATIENT)
Dept: OPHTHALMOLOGY | Facility: CLINIC | Age: 72
End: 2022-07-26
Payer: MEDICARE

## 2022-07-26 DIAGNOSIS — H52.7 REFRACTIVE DISORDER: ICD-10-CM

## 2022-07-26 DIAGNOSIS — Z96.1 PSEUDOPHAKIA, LEFT EYE: Primary | ICD-10-CM

## 2022-07-26 DIAGNOSIS — H54.40 BLINDNESS OF RIGHT EYE WITH NORMAL VISION IN CONTRALATERAL EYE: ICD-10-CM

## 2022-07-26 PROCEDURE — 99214 OFFICE O/P EST MOD 30 MIN: CPT | Mod: S$GLB,,, | Performed by: STUDENT IN AN ORGANIZED HEALTH CARE EDUCATION/TRAINING PROGRAM

## 2022-07-26 PROCEDURE — 99999 PR PBB SHADOW E&M-EST. PATIENT-LVL III: ICD-10-PCS | Mod: PBBFAC,,, | Performed by: STUDENT IN AN ORGANIZED HEALTH CARE EDUCATION/TRAINING PROGRAM

## 2022-07-26 PROCEDURE — 4010F ACE/ARB THERAPY RXD/TAKEN: CPT | Mod: CPTII,S$GLB,, | Performed by: STUDENT IN AN ORGANIZED HEALTH CARE EDUCATION/TRAINING PROGRAM

## 2022-07-26 PROCEDURE — 1160F RVW MEDS BY RX/DR IN RCRD: CPT | Mod: CPTII,S$GLB,, | Performed by: STUDENT IN AN ORGANIZED HEALTH CARE EDUCATION/TRAINING PROGRAM

## 2022-07-26 PROCEDURE — 4010F PR ACE/ARB THEARPY RXD/TAKEN: ICD-10-PCS | Mod: CPTII,S$GLB,, | Performed by: STUDENT IN AN ORGANIZED HEALTH CARE EDUCATION/TRAINING PROGRAM

## 2022-07-26 PROCEDURE — 99214 PR OFFICE/OUTPT VISIT, EST, LEVL IV, 30-39 MIN: ICD-10-PCS | Mod: S$GLB,,, | Performed by: STUDENT IN AN ORGANIZED HEALTH CARE EDUCATION/TRAINING PROGRAM

## 2022-07-26 PROCEDURE — 1159F MED LIST DOCD IN RCRD: CPT | Mod: CPTII,S$GLB,, | Performed by: STUDENT IN AN ORGANIZED HEALTH CARE EDUCATION/TRAINING PROGRAM

## 2022-07-26 PROCEDURE — 1160F PR REVIEW ALL MEDS BY PRESCRIBER/CLIN PHARMACIST DOCUMENTED: ICD-10-PCS | Mod: CPTII,S$GLB,, | Performed by: STUDENT IN AN ORGANIZED HEALTH CARE EDUCATION/TRAINING PROGRAM

## 2022-07-26 PROCEDURE — 1159F PR MEDICATION LIST DOCUMENTED IN MEDICAL RECORD: ICD-10-PCS | Mod: CPTII,S$GLB,, | Performed by: STUDENT IN AN ORGANIZED HEALTH CARE EDUCATION/TRAINING PROGRAM

## 2022-07-26 PROCEDURE — 99999 PR PBB SHADOW E&M-EST. PATIENT-LVL III: CPT | Mod: PBBFAC,,, | Performed by: STUDENT IN AN ORGANIZED HEALTH CARE EDUCATION/TRAINING PROGRAM

## 2022-07-26 NOTE — PROGRESS NOTES
HPI     Pt in today for a 3 month DFE. Pt states her vision is doing well. Denies   any ocular pain or discomfort.     1. PCIOL OS 03/17/2022  NS OD  2. HTN      Last edited by Heidi Chandra on 7/26/2022  9:31 AM. (History)            Assessment /Plan     For exam results, see Encounter Report.    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

## 2022-08-17 DIAGNOSIS — D50.8 IRON DEFICIENCY ANEMIA SECONDARY TO INADEQUATE DIETARY IRON INTAKE: ICD-10-CM

## 2022-08-17 DIAGNOSIS — I25.118 CORONARY ARTERY DISEASE OF NATIVE ARTERY OF NATIVE HEART WITH STABLE ANGINA PECTORIS: Primary | ICD-10-CM

## 2022-08-17 DIAGNOSIS — D53.9 NUTRITIONAL ANEMIA, UNSPECIFIED: ICD-10-CM

## 2022-08-17 NOTE — PROGRESS NOTES
Subjective:       Patient ID: Leia Rodriguez is a 72 y.o. female.    Chief Complaint: Chemotherapy, Breast Cancer, Anemia, and Osteopenia    Primary Oncologist/Hematologist: Dr. Fernández    HPI: Ms. Rodriguez is a 72-year-old female who is following up for her HER2 positive breast carcinoma.  She was previously on Herceptin was discontinued after 9 cycles due to decreased ejection fraction.  She has been on Arimidex 1 mg daily since. Dexa scan on 22 showing osteopenia. Patient also has history of iron deficiency anemia.  She has received IV iron Injectafer in the past.    Today: Patient presents with daughter.  She states her appetite has been good she has been trying to stay hydrated.  She denies any fatigue, nausea, vomiting, diarrhea, constipation, hot flashes, fevers, night sweats, weight loss.  She has been taking vitamin-D, calcium and Arimidex.  She also gets B12 injections through her neurologist.  She takes B12 sublingual. She denies any dental issues or falls.    Social History     Socioeconomic History    Marital status:    Tobacco Use    Smoking status: Former Smoker     Packs/day: 1.00     Years: 50.00     Pack years: 50.00     Types: Cigarettes     Quit date: 2020     Years since quittin.0    Smokeless tobacco: Never Used   Substance and Sexual Activity    Alcohol use: Never     Alcohol/week: 28.0 standard drinks     Types: 28 Cans of beer per week    Drug use: No    Sexual activity: Never     Partners: Male   Social History Narrative    The patient is .  She is retired from [employer].       Past Medical History:   Diagnosis Date    Arthritis     EDGAR HANDS, KNEES    Blind right eye     Traumatic    Breast cancer 06/15/2017    0.8 cm Grade1 INTRADUCTAL BREAST 9 positve margin (left)    Cataract     Essential hypertension     Hemorrhoids     Lipoma of abdominal wall     Obesity     Overactive bladder     Pap smear abnormality of cervix with ASCUS favoring  benign     Thyroid nodule     Tobacco use disorder     Tubular adenoma of colon     Urinary incontinence        Family History   Problem Relation Age of Onset    Hypertension Father     Cataracts Father     Prostate cancer Father 80    Cervical cancer Daughter 32    Allergic rhinitis Daughter     Cirrhosis Mother     Alcohol abuse Mother     Hypertension Daughter     Diabetes Brother     Heart attack Brother     Heart murmur Brother     No Known Problems Daughter        Past Surgical History:   Procedure Laterality Date    ANTERIOR VAGINAL REPAIR      BLADDER SURGERY      BREAST LUMPECTOMY Left 2017    CATARACT EXTRACTION Left 2022     SECTION      X2    COLONOSCOPY N/A 3/8/2017    Procedure: COLONOSCOPY;  Surgeon: Tyron Paris MD;  Location: [facility] ENDO;  Service: Endoscopy;  Laterality: N/A;    COLONOSCOPY N/A 2020    Procedure: COLONOSCOPY;  Surgeon: Keira Ellison MD;  Location: [facility];  Service: Endoscopy;  Laterality: N/A;    ESOPHAGOGASTRODUODENOSCOPY N/A 2020    Procedure: EGD (ESOPHAGOGASTRODUODENOSCOPY);  Surgeon: Keira Ellison MD;  Location: [facility];  Service: Endoscopy;  Laterality: N/A;  new onset iron deficiency with prior history of breast cancer    INTRALUMINAL GASTROINTESTINAL TRACT IMAGING VIA CAPSULE N/A 10/28/2020    Procedure: IMAGING PROCEDURE, GI TRACT, INTRALUMINAL, VIA CAPSULE;  Surgeon: Finesse Jha RN;  Location[facility];  Service: Endoscopy;  Laterality: N/A;    LEFT HEART CATHETERIZATION Left 10/12/2021    Procedure: CATHETERIZATION, HEART, LEFT;  Surgeon: Tiffanie Santos MD;  Location: [facility] CATH LAB;  Service: Cardiology;  Laterality: Left;    SENTINEL LYMPH NODE BIOPSY Left 2020    Procedure: BIOPSY, LYMPH NODE, SENTINEL;  Surgeon: Vincent Moyer MD;  Location: [facility] OR;  Service: General;  Laterality: Left;    SIMPLE MASTECTOMY Left 2020    Procedure: MASTECTOMY, SIMPLE;  Surgeon: Vincent Moyer MD;   Location: [facility] OR;  Service: General;  Laterality: Left;    THYROID LOBECTOMY Left 2005    TOTAL ABDOMINAL HYSTERECTOMY      TUBAL LIGATION         Review of Systems   Constitutional: Negative for activity change, appetite change, chills, diaphoresis, fatigue, fever and unexpected weight change.   HENT: Negative for congestion, nosebleeds and rhinorrhea.    Respiratory: Negative for cough and shortness of breath.    Cardiovascular: Negative for chest pain and leg swelling.   Gastrointestinal: Negative for abdominal pain, blood in stool, constipation, diarrhea, nausea and vomiting.   Genitourinary: Negative for hematuria.   Musculoskeletal: Positive for gait problem.   Skin: Negative for color change.   Allergic/Immunologic: Positive for immunocompromised state.   Neurological: Negative for dizziness and light-headedness.         Medication List with Changes/Refills   Current Medications    ANASTROZOLE (ARIMIDEX) 1 MG TAB    Take 1 tablet (1 mg total) by mouth once daily.    ASPIRIN 81 MG CHEW    Take 1 tablet (81 mg total) by mouth once daily.    ATORVASTATIN (LIPITOR) 20 MG TABLET    Take 1 tablet (20 mg total) by mouth every evening.    BUSPIRONE (BUSPAR) 15 MG TABLET    Take 1 tablet (15 mg total) by mouth 2 (two) times daily.    CALCIUM CITRATE (CALCITRATE) 200 MG (950 MG) TABLET    Take 1 tablet by mouth once daily.    CARVEDILOL (COREG) 3.125 MG TABLET    TAKE 1 TABLET BY MOUTH TWICE A DAY WITH MEALS    CYANOCOBALAMIN 1,000 MCG/ML INJECTION    Inject 1 mL (1,000 mcg total) into the muscle every 7 days.    DONEPEZIL (ARICEPT) 10 MG TABLET    Take 1 tablet (10 mg total) by mouth every evening.    FERROUS SULFATE (FEOSOL) 325 MG (65 MG IRON) TAB TABLET    Take 65 mg by mouth once daily.    FUROSEMIDE (LASIX) 20 MG TABLET    Take 1 tablet (20 mg total) by mouth every Mon, Wed, Fri.    ISOSORBIDE MONONITRATE (IMDUR) 30 MG 24 HR TABLET    TAKE 1 TABLET BY MOUTH EVERY DAY    MEMANTINE (NAMENDA) 10 MG TAB    GIVE A  1/2 TABLET BY MOUTH TWICE PER DAY FOR ONE WEEK THEN GIVE A WHOLE TABLET TWICE PER DAY THEREAFTER    PREDNISOLONE ACETATE (PRED FORTE) 1 % DRPS    Place 1 drop into the left eye every 4 (four) hours.    SPIRONOLACTONE (ALDACTONE) 25 MG TABLET    TAKE 1 TABLET BY MOUTH EVERY DAY    SULFAMETHOXAZOLE-TRIMETHOPRIM 800-160MG (BACTRIM DS) 800-160 MG TAB    Take 1 tablet by mouth 2 (two) times daily.    TAMSULOSIN (FLOMAX) 0.4 MG CAP    Take 1 capsule by mouth once daily.    TELMISARTAN (MICARDIS) 20 MG TAB    TAKE 1 TABLET BY MOUTH EVERY DAY    VITAMIN D 1000 UNITS TAB    Take 1,000 Units by mouth once daily.     Objective:     Vitals:    08/18/22 1124   BP: (!) 103/57   Pulse: 78   Temp: 97.7 °F (36.5 °C)       Physical Exam  Vitals reviewed.   Constitutional:       General: She is not in acute distress.     Appearance: She is not ill-appearing, toxic-appearing or diaphoretic.   HENT:      Head: Normocephalic and atraumatic.   Eyes:      Conjunctiva/sclera: Conjunctivae normal.   Cardiovascular:      Rate and Rhythm: Normal rate.      Pulses: Normal pulses.   Pulmonary:      Effort: Pulmonary effort is normal.   Abdominal:      General: Bowel sounds are normal.   Musculoskeletal:      Right lower leg: No edema.      Left lower leg: No edema.   Skin:     General: Skin is warm and dry.      Coloration: Skin is not jaundiced or pale.      Findings: No bruising or erythema.   Neurological:      Mental Status: She is alert.      Gait: Gait abnormal (wheelchair).   Psychiatric:         Mood and Affect: Mood normal.         Behavior: Behavior normal.         Thought Content: Thought content normal.            Labs/Results:  Lab Results   Component Value Date    WBC 6.15 08/18/2022    RBC 3.44 (L) 08/18/2022    HGB 10.5 (L) 08/18/2022    HCT 33.0 (L) 08/18/2022    MCV 96 08/18/2022    MCH 30.5 08/18/2022    MCHC 31.8 (L) 08/18/2022    RDW 13.1 08/18/2022     08/18/2022    MPV 9.7 08/18/2022    GRAN 3.2 08/18/2022    GRAN  52.3 08/18/2022    LYMPH 2.1 08/18/2022    LYMPH 34.0 08/18/2022    MONO 0.4 08/18/2022    MONO 7.0 08/18/2022    EOS 0.4 08/18/2022    BASO 0.03 08/18/2022    EOSINOPHIL 5.9 08/18/2022    BASOPHIL 0.5 08/18/2022       Assessment:     Problem List Items Addressed This Visit        Immunology/Multi System    Immunodeficiency due to chemotherapy       Oncology    Malignant neoplasm of breast in female, estrogen receptor positive - Primary    Iron deficiency anemia secondary to inadequate dietary iron intake       Orthopedic    Age-related osteoporosis without current pathological fracture        Plan:     Malignant neoplasm of lower-inner quadrant of left breast in female, estrogen receptor positive, Immunodeficiency due to chemotherapy  --Herceptin d/c after 9 cycles due to decreased EF  --continue with arimidex 1mg daily   --mammogram overdue  --dexa scan: 7/20/22 showing osteopenia. Repeat in 2 years    Iron deficiency anemia secondary to inadequate dietary iron intake  --hgb: 10.5g/dL  --iron labs pending from today   --if anemia persists despite iron repletion and no kidney dysfunction, consider bone marrow bx for dx.  --minor kidney dysfunction with normal cret.     Age-related osteoporosis without current pathological fracture  --dexa scan 7/2022- showing osteopenia. Due in 2 years.   --continue to take vitamin d and calcium  --initiate zometa q 6 months     Follow-Up: mammogram needs to be done. Iron labs pending from today-will call patient with results. 3 months with cbc cmp prior     Karol Goodwin PA-C  Hematology Oncology

## 2022-08-18 ENCOUNTER — LAB VISIT (OUTPATIENT)
Dept: LAB | Facility: HOSPITAL | Age: 72
End: 2022-08-18
Attending: INTERNAL MEDICINE
Payer: MEDICARE

## 2022-08-18 ENCOUNTER — OFFICE VISIT (OUTPATIENT)
Dept: HEMATOLOGY/ONCOLOGY | Facility: CLINIC | Age: 72
End: 2022-08-18
Payer: MEDICARE

## 2022-08-18 VITALS
SYSTOLIC BLOOD PRESSURE: 103 MMHG | HEIGHT: 64 IN | TEMPERATURE: 98 F | WEIGHT: 151.88 LBS | HEART RATE: 78 BPM | OXYGEN SATURATION: 99 % | DIASTOLIC BLOOD PRESSURE: 57 MMHG | BODY MASS INDEX: 25.93 KG/M2

## 2022-08-18 DIAGNOSIS — D53.9 NUTRITIONAL ANEMIA, UNSPECIFIED: ICD-10-CM

## 2022-08-18 DIAGNOSIS — Z17.0 MALIGNANT NEOPLASM OF LOWER-INNER QUADRANT OF LEFT BREAST IN FEMALE, ESTROGEN RECEPTOR POSITIVE: ICD-10-CM

## 2022-08-18 DIAGNOSIS — C50.312 MALIGNANT NEOPLASM OF LOWER-INNER QUADRANT OF LEFT BREAST IN FEMALE, ESTROGEN RECEPTOR POSITIVE: ICD-10-CM

## 2022-08-18 DIAGNOSIS — Z79.899 IMMUNODEFICIENCY DUE TO CHEMOTHERAPY: ICD-10-CM

## 2022-08-18 DIAGNOSIS — M81.0 AGE-RELATED OSTEOPOROSIS WITHOUT CURRENT PATHOLOGICAL FRACTURE: ICD-10-CM

## 2022-08-18 DIAGNOSIS — Z17.0 MALIGNANT NEOPLASM OF LOWER-INNER QUADRANT OF LEFT BREAST IN FEMALE, ESTROGEN RECEPTOR POSITIVE: Primary | ICD-10-CM

## 2022-08-18 DIAGNOSIS — C50.312 MALIGNANT NEOPLASM OF LOWER-INNER QUADRANT OF LEFT BREAST IN FEMALE, ESTROGEN RECEPTOR POSITIVE: Primary | ICD-10-CM

## 2022-08-18 DIAGNOSIS — D50.8 IRON DEFICIENCY ANEMIA SECONDARY TO INADEQUATE DIETARY IRON INTAKE: ICD-10-CM

## 2022-08-18 DIAGNOSIS — T45.1X5A IMMUNODEFICIENCY DUE TO CHEMOTHERAPY: ICD-10-CM

## 2022-08-18 DIAGNOSIS — D84.821 IMMUNODEFICIENCY DUE TO CHEMOTHERAPY: ICD-10-CM

## 2022-08-18 LAB
BASOPHILS # BLD AUTO: 0.03 K/UL (ref 0–0.2)
BASOPHILS NFR BLD: 0.5 % (ref 0–1.9)
DIFFERENTIAL METHOD: ABNORMAL
EOSINOPHIL # BLD AUTO: 0.4 K/UL (ref 0–0.5)
EOSINOPHIL NFR BLD: 5.9 % (ref 0–8)
ERYTHROCYTE [DISTWIDTH] IN BLOOD BY AUTOMATED COUNT: 13.1 % (ref 11.5–14.5)
FERRITIN SERPL-MCNC: 1124 NG/ML (ref 20–300)
HCT VFR BLD AUTO: 33 % (ref 37–48.5)
HGB BLD-MCNC: 10.5 G/DL (ref 12–16)
IMM GRANULOCYTES # BLD AUTO: 0.02 K/UL (ref 0–0.04)
IMM GRANULOCYTES NFR BLD AUTO: 0.3 % (ref 0–0.5)
IRON SERPL-MCNC: 77 UG/DL (ref 30–160)
LYMPHOCYTES # BLD AUTO: 2.1 K/UL (ref 1–4.8)
LYMPHOCYTES NFR BLD: 34 % (ref 18–48)
MCH RBC QN AUTO: 30.5 PG (ref 27–31)
MCHC RBC AUTO-ENTMCNC: 31.8 G/DL (ref 32–36)
MCV RBC AUTO: 96 FL (ref 82–98)
MONOCYTES # BLD AUTO: 0.4 K/UL (ref 0.3–1)
MONOCYTES NFR BLD: 7 % (ref 4–15)
NEUTROPHILS # BLD AUTO: 3.2 K/UL (ref 1.8–7.7)
NEUTROPHILS NFR BLD: 52.3 % (ref 38–73)
NRBC BLD-RTO: 0 /100 WBC
PLATELET # BLD AUTO: 245 K/UL (ref 150–450)
PMV BLD AUTO: 9.7 FL (ref 9.2–12.9)
RBC # BLD AUTO: 3.44 M/UL (ref 4–5.4)
SATURATED IRON: 27 % (ref 20–50)
TOTAL IRON BINDING CAPACITY: 290 UG/DL (ref 250–450)
TRANSFERRIN SERPL-MCNC: 196 MG/DL (ref 200–375)
WBC # BLD AUTO: 6.15 K/UL (ref 3.9–12.7)

## 2022-08-18 PROCEDURE — 1126F PR PAIN SEVERITY QUANTIFIED, NO PAIN PRESENT: ICD-10-PCS | Mod: CPTII,S$GLB,,

## 2022-08-18 PROCEDURE — 3008F PR BODY MASS INDEX (BMI) DOCUMENTED: ICD-10-PCS | Mod: CPTII,S$GLB,,

## 2022-08-18 PROCEDURE — 99215 PR OFFICE/OUTPT VISIT, EST, LEVL V, 40-54 MIN: ICD-10-PCS | Mod: S$GLB,,,

## 2022-08-18 PROCEDURE — 82607 VITAMIN B-12: CPT

## 2022-08-18 PROCEDURE — 82746 ASSAY OF FOLIC ACID SERUM: CPT

## 2022-08-18 PROCEDURE — 99215 OFFICE O/P EST HI 40 MIN: CPT | Mod: S$GLB,,,

## 2022-08-18 PROCEDURE — 3078F PR MOST RECENT DIASTOLIC BLOOD PRESSURE < 80 MM HG: ICD-10-PCS | Mod: CPTII,S$GLB,,

## 2022-08-18 PROCEDURE — 3288F PR FALLS RISK ASSESSMENT DOCUMENTED: ICD-10-PCS | Mod: CPTII,S$GLB,,

## 2022-08-18 PROCEDURE — 1101F PR PT FALLS ASSESS DOC 0-1 FALLS W/OUT INJ PAST YR: ICD-10-PCS | Mod: CPTII,S$GLB,,

## 2022-08-18 PROCEDURE — 85025 COMPLETE CBC W/AUTO DIFF WBC: CPT | Performed by: INTERNAL MEDICINE

## 2022-08-18 PROCEDURE — 82728 ASSAY OF FERRITIN: CPT

## 2022-08-18 PROCEDURE — 1101F PT FALLS ASSESS-DOCD LE1/YR: CPT | Mod: CPTII,S$GLB,,

## 2022-08-18 PROCEDURE — 4010F PR ACE/ARB THEARPY RXD/TAKEN: ICD-10-PCS | Mod: CPTII,S$GLB,,

## 2022-08-18 PROCEDURE — 1159F MED LIST DOCD IN RCRD: CPT | Mod: CPTII,S$GLB,,

## 2022-08-18 PROCEDURE — 4010F ACE/ARB THERAPY RXD/TAKEN: CPT | Mod: CPTII,S$GLB,,

## 2022-08-18 PROCEDURE — 36415 COLL VENOUS BLD VENIPUNCTURE: CPT

## 2022-08-18 PROCEDURE — 84466 ASSAY OF TRANSFERRIN: CPT

## 2022-08-18 PROCEDURE — 99999 PR PBB SHADOW E&M-EST. PATIENT-LVL III: ICD-10-PCS | Mod: PBBFAC,,,

## 2022-08-18 PROCEDURE — 3078F DIAST BP <80 MM HG: CPT | Mod: CPTII,S$GLB,,

## 2022-08-18 PROCEDURE — 3074F SYST BP LT 130 MM HG: CPT | Mod: CPTII,S$GLB,,

## 2022-08-18 PROCEDURE — 1159F PR MEDICATION LIST DOCUMENTED IN MEDICAL RECORD: ICD-10-PCS | Mod: CPTII,S$GLB,,

## 2022-08-18 PROCEDURE — 1126F AMNT PAIN NOTED NONE PRSNT: CPT | Mod: CPTII,S$GLB,,

## 2022-08-18 PROCEDURE — 3288F FALL RISK ASSESSMENT DOCD: CPT | Mod: CPTII,S$GLB,,

## 2022-08-18 PROCEDURE — 99999 PR PBB SHADOW E&M-EST. PATIENT-LVL III: CPT | Mod: PBBFAC,,,

## 2022-08-18 PROCEDURE — 3074F PR MOST RECENT SYSTOLIC BLOOD PRESSURE < 130 MM HG: ICD-10-PCS | Mod: CPTII,S$GLB,,

## 2022-08-18 PROCEDURE — 3008F BODY MASS INDEX DOCD: CPT | Mod: CPTII,S$GLB,,

## 2022-08-19 LAB
FOLATE SERPL-MCNC: 14.4 NG/ML (ref 4–24)
VIT B12 SERPL-MCNC: >2000 PG/ML (ref 210–950)

## 2022-08-21 RX ORDER — SODIUM CHLORIDE 0.9 % (FLUSH) 0.9 %
10 SYRINGE (ML) INJECTION
Status: CANCELLED | OUTPATIENT
Start: 2022-08-26

## 2022-08-21 RX ORDER — HEPARIN 100 UNIT/ML
500 SYRINGE INTRAVENOUS
Status: CANCELLED | OUTPATIENT
Start: 2022-08-26

## 2022-08-26 ENCOUNTER — INFUSION (OUTPATIENT)
Dept: INFUSION THERAPY | Facility: HOSPITAL | Age: 72
End: 2022-08-26
Payer: MEDICARE

## 2022-08-26 VITALS
HEART RATE: 66 BPM | RESPIRATION RATE: 18 BRPM | OXYGEN SATURATION: 99 % | DIASTOLIC BLOOD PRESSURE: 59 MMHG | TEMPERATURE: 98 F | SYSTOLIC BLOOD PRESSURE: 127 MMHG

## 2022-08-26 DIAGNOSIS — T45.1X5A IMMUNODEFICIENCY DUE TO CHEMOTHERAPY: Primary | ICD-10-CM

## 2022-08-26 DIAGNOSIS — D84.821 IMMUNODEFICIENCY DUE TO CHEMOTHERAPY: Primary | ICD-10-CM

## 2022-08-26 DIAGNOSIS — M81.0 AGE-RELATED OSTEOPOROSIS WITHOUT CURRENT PATHOLOGICAL FRACTURE: ICD-10-CM

## 2022-08-26 DIAGNOSIS — Z79.899 IMMUNODEFICIENCY DUE TO CHEMOTHERAPY: Primary | ICD-10-CM

## 2022-08-26 PROCEDURE — 63600175 PHARM REV CODE 636 W HCPCS

## 2022-08-26 PROCEDURE — 25000003 PHARM REV CODE 250

## 2022-08-26 PROCEDURE — 96365 THER/PROPH/DIAG IV INF INIT: CPT

## 2022-08-26 RX ORDER — ZOLEDRONIC ACID 0.04 MG/ML
4 INJECTION, SOLUTION INTRAVENOUS
OUTPATIENT
Start: 2022-08-26

## 2022-08-26 RX ORDER — ZOLEDRONIC ACID 0.04 MG/ML
4 INJECTION, SOLUTION INTRAVENOUS
Status: DISCONTINUED | OUTPATIENT
Start: 2022-08-26 | End: 2022-08-26

## 2022-08-26 RX ORDER — HEPARIN 100 UNIT/ML
500 SYRINGE INTRAVENOUS
OUTPATIENT
Start: 2022-08-26

## 2022-08-26 RX ORDER — SODIUM CHLORIDE 0.9 % (FLUSH) 0.9 %
10 SYRINGE (ML) INJECTION
OUTPATIENT
Start: 2022-08-26

## 2022-08-26 RX ADMIN — ZOLEDRONIC ACID 3 MG: 4 INJECTION, SOLUTION, CONCENTRATE INTRAVENOUS at 11:08

## 2022-08-26 RX ADMIN — SODIUM CHLORIDE: 9 INJECTION, SOLUTION INTRAVENOUS at 11:08

## 2022-08-26 NOTE — PLAN OF CARE
Problem: Adult Inpatient Plan of Care  Goal: Plan of Care Review  Outcome: Ongoing, Progressing  Flowsheets (Taken 8/26/2022 1118)  Plan of Care Reviewed With: patient  Goal: Patient-Specific Goal (Individualized)  Outcome: Ongoing, Progressing  Flowsheets (Taken 8/26/2022 1118)  Anxieties, Fears or Concerns: no concerns at this time  Individualized Care Needs: legs elevated, snack and drink provided  Patient-Specific Goals (Include Timeframe): pt tolerate zometa with no adverse reaction     Problem: Fall Injury Risk  Goal: Absence of Fall and Fall-Related Injury  Outcome: Ongoing, Progressing

## 2022-09-12 ENCOUNTER — LAB VISIT (OUTPATIENT)
Dept: LAB | Facility: HOSPITAL | Age: 72
End: 2022-09-12
Attending: FAMILY MEDICINE
Payer: MEDICARE

## 2022-09-12 DIAGNOSIS — Z79.899 IMMUNODEFICIENCY DUE TO CHEMOTHERAPY: ICD-10-CM

## 2022-09-12 DIAGNOSIS — M81.0 AGE-RELATED OSTEOPOROSIS WITHOUT CURRENT PATHOLOGICAL FRACTURE: ICD-10-CM

## 2022-09-12 DIAGNOSIS — T45.1X5A IMMUNODEFICIENCY DUE TO CHEMOTHERAPY: ICD-10-CM

## 2022-09-12 DIAGNOSIS — D84.821 IMMUNODEFICIENCY DUE TO CHEMOTHERAPY: ICD-10-CM

## 2022-09-12 DIAGNOSIS — Z17.0 MALIGNANT NEOPLASM OF LOWER-INNER QUADRANT OF LEFT BREAST IN FEMALE, ESTROGEN RECEPTOR POSITIVE: ICD-10-CM

## 2022-09-12 DIAGNOSIS — D50.8 IRON DEFICIENCY ANEMIA SECONDARY TO INADEQUATE DIETARY IRON INTAKE: ICD-10-CM

## 2022-09-12 DIAGNOSIS — C50.312 MALIGNANT NEOPLASM OF LOWER-INNER QUADRANT OF LEFT BREAST IN FEMALE, ESTROGEN RECEPTOR POSITIVE: ICD-10-CM

## 2022-09-12 LAB
ALBUMIN SERPL BCP-MCNC: 3.7 G/DL (ref 3.5–5.2)
ALP SERPL-CCNC: 79 U/L (ref 55–135)
ALT SERPL W/O P-5'-P-CCNC: 17 U/L (ref 10–44)
ANION GAP SERPL CALC-SCNC: 7 MMOL/L (ref 8–16)
AST SERPL-CCNC: 16 U/L (ref 10–40)
BASOPHILS # BLD AUTO: 0.05 K/UL (ref 0–0.2)
BASOPHILS NFR BLD: 0.7 % (ref 0–1.9)
BILIRUB SERPL-MCNC: 0.8 MG/DL (ref 0.1–1)
BUN SERPL-MCNC: 33 MG/DL (ref 8–23)
CALCIUM SERPL-MCNC: 9.2 MG/DL (ref 8.7–10.5)
CHLORIDE SERPL-SCNC: 106 MMOL/L (ref 95–110)
CO2 SERPL-SCNC: 27 MMOL/L (ref 23–29)
CREAT SERPL-MCNC: 1.3 MG/DL (ref 0.5–1.4)
DIFFERENTIAL METHOD: ABNORMAL
EOSINOPHIL # BLD AUTO: 0.4 K/UL (ref 0–0.5)
EOSINOPHIL NFR BLD: 5.6 % (ref 0–8)
ERYTHROCYTE [DISTWIDTH] IN BLOOD BY AUTOMATED COUNT: 12.8 % (ref 11.5–14.5)
EST. GFR  (NO RACE VARIABLE): 44 ML/MIN/1.73 M^2
GLUCOSE SERPL-MCNC: 89 MG/DL (ref 70–110)
HCT VFR BLD AUTO: 35.7 % (ref 37–48.5)
HGB BLD-MCNC: 10.8 G/DL (ref 12–16)
IMM GRANULOCYTES # BLD AUTO: 0.03 K/UL (ref 0–0.04)
IMM GRANULOCYTES NFR BLD AUTO: 0.4 % (ref 0–0.5)
LYMPHOCYTES # BLD AUTO: 2.4 K/UL (ref 1–4.8)
LYMPHOCYTES NFR BLD: 35.5 % (ref 18–48)
MCH RBC QN AUTO: 30.2 PG (ref 27–31)
MCHC RBC AUTO-ENTMCNC: 30.3 G/DL (ref 32–36)
MCV RBC AUTO: 100 FL (ref 82–98)
MONOCYTES # BLD AUTO: 0.7 K/UL (ref 0.3–1)
MONOCYTES NFR BLD: 10.2 % (ref 4–15)
NEUTROPHILS # BLD AUTO: 3.2 K/UL (ref 1.8–7.7)
NEUTROPHILS NFR BLD: 47.6 % (ref 38–73)
NRBC BLD-RTO: 0 /100 WBC
PLATELET # BLD AUTO: 308 K/UL (ref 150–450)
PMV BLD AUTO: 10.2 FL (ref 9.2–12.9)
POTASSIUM SERPL-SCNC: 4.6 MMOL/L (ref 3.5–5.1)
PROT SERPL-MCNC: 7.7 G/DL (ref 6–8.4)
RBC # BLD AUTO: 3.58 M/UL (ref 4–5.4)
SODIUM SERPL-SCNC: 140 MMOL/L (ref 136–145)
WBC # BLD AUTO: 6.76 K/UL (ref 3.9–12.7)

## 2022-09-12 PROCEDURE — 80053 COMPREHEN METABOLIC PANEL: CPT

## 2022-09-12 PROCEDURE — 85025 COMPLETE CBC W/AUTO DIFF WBC: CPT

## 2022-09-12 PROCEDURE — 36415 COLL VENOUS BLD VENIPUNCTURE: CPT | Mod: PO

## 2022-09-21 ENCOUNTER — OFFICE VISIT (OUTPATIENT)
Dept: NEUROLOGY | Facility: CLINIC | Age: 72
End: 2022-09-21
Payer: MEDICARE

## 2022-09-21 VITALS
OXYGEN SATURATION: 98 % | HEART RATE: 84 BPM | BODY MASS INDEX: 26.07 KG/M2 | HEIGHT: 64 IN | DIASTOLIC BLOOD PRESSURE: 68 MMHG | SYSTOLIC BLOOD PRESSURE: 113 MMHG | RESPIRATION RATE: 16 BRPM

## 2022-09-21 DIAGNOSIS — C50.312 MALIGNANT NEOPLASM OF LOWER-INNER QUADRANT OF LEFT BREAST IN FEMALE, ESTROGEN RECEPTOR POSITIVE: ICD-10-CM

## 2022-09-21 DIAGNOSIS — Z90.12 HISTORY OF LEFT MASTECTOMY: ICD-10-CM

## 2022-09-21 DIAGNOSIS — M81.0 AGE-RELATED OSTEOPOROSIS WITHOUT CURRENT PATHOLOGICAL FRACTURE: ICD-10-CM

## 2022-09-21 DIAGNOSIS — F10.21 HISTORY OF ALCOHOLISM: ICD-10-CM

## 2022-09-21 DIAGNOSIS — F03.90 DEMENTIA WITHOUT BEHAVIORAL DISTURBANCE, UNSPECIFIED DEMENTIA TYPE: ICD-10-CM

## 2022-09-21 DIAGNOSIS — E66.9 OBESITY (BMI 30.0-34.9): ICD-10-CM

## 2022-09-21 DIAGNOSIS — Z91.89: ICD-10-CM

## 2022-09-21 DIAGNOSIS — G30.9 MAJOR NEUROCOGNITIVE DISORDER DUE TO ALZHEIMER'S DISEASE: ICD-10-CM

## 2022-09-21 DIAGNOSIS — N39.46 MIXED STRESS AND URGE URINARY INCONTINENCE: ICD-10-CM

## 2022-09-21 DIAGNOSIS — R32 URINARY INCONTINENCE, UNSPECIFIED TYPE: ICD-10-CM

## 2022-09-21 DIAGNOSIS — F99 INAPPROPRIATE BEHAVIOR: ICD-10-CM

## 2022-09-21 DIAGNOSIS — R90.89 ABNORMAL FINDING ON MRI OF BRAIN: ICD-10-CM

## 2022-09-21 DIAGNOSIS — F03.911 AGITATION DUE TO DEMENTIA: ICD-10-CM

## 2022-09-21 DIAGNOSIS — F33.0 MILD EPISODE OF RECURRENT MAJOR DEPRESSIVE DISORDER: ICD-10-CM

## 2022-09-21 DIAGNOSIS — R46.89 BEHAVIORAL CHANGE: ICD-10-CM

## 2022-09-21 DIAGNOSIS — Z17.0 MALIGNANT NEOPLASM OF LOWER-INNER QUADRANT OF LEFT BREAST IN FEMALE, ESTROGEN RECEPTOR POSITIVE: ICD-10-CM

## 2022-09-21 DIAGNOSIS — F17.200 TOBACCO USE DISORDER: ICD-10-CM

## 2022-09-21 DIAGNOSIS — F02.80 MAJOR NEUROCOGNITIVE DISORDER DUE TO ALZHEIMER'S DISEASE: ICD-10-CM

## 2022-09-21 DIAGNOSIS — F03.90 MAJOR NEUROCOGNITIVE DISORDER: ICD-10-CM

## 2022-09-21 DIAGNOSIS — N39.0 URINARY TRACT INFECTION WITHOUT HEMATURIA, SITE UNSPECIFIED: ICD-10-CM

## 2022-09-21 DIAGNOSIS — R41.3 MEMORY LOSS: ICD-10-CM

## 2022-09-21 DIAGNOSIS — F02.818 MAJOR NEUROCOGNITIVE DISORDER DUE TO MULTIPLE ETIOLOGIES WITH BEHAVIORAL DISTURBANCE: Primary | ICD-10-CM

## 2022-09-21 DIAGNOSIS — F10.21 ALCOHOL USE DISORDER, MODERATE, IN SUSTAINED REMISSION: ICD-10-CM

## 2022-09-21 DIAGNOSIS — Z91.89 AT RISK FOR PROLONGED QT INTERVAL SYNDROME: ICD-10-CM

## 2022-09-21 PROCEDURE — 1160F PR REVIEW ALL MEDS BY PRESCRIBER/CLIN PHARMACIST DOCUMENTED: ICD-10-PCS | Mod: CPTII,S$GLB,, | Performed by: NURSE PRACTITIONER

## 2022-09-21 PROCEDURE — 1159F MED LIST DOCD IN RCRD: CPT | Mod: CPTII,S$GLB,, | Performed by: NURSE PRACTITIONER

## 2022-09-21 PROCEDURE — 99499 UNLISTED E&M SERVICE: CPT | Mod: S$GLB,,, | Performed by: NURSE PRACTITIONER

## 2022-09-21 PROCEDURE — 1101F PT FALLS ASSESS-DOCD LE1/YR: CPT | Mod: CPTII,S$GLB,, | Performed by: NURSE PRACTITIONER

## 2022-09-21 PROCEDURE — 3008F BODY MASS INDEX DOCD: CPT | Mod: CPTII,S$GLB,, | Performed by: NURSE PRACTITIONER

## 2022-09-21 PROCEDURE — 1126F AMNT PAIN NOTED NONE PRSNT: CPT | Mod: CPTII,S$GLB,, | Performed by: NURSE PRACTITIONER

## 2022-09-21 PROCEDURE — 99215 OFFICE O/P EST HI 40 MIN: CPT | Mod: FS,S$GLB,, | Performed by: NURSE PRACTITIONER

## 2022-09-21 PROCEDURE — 99999 PR PBB SHADOW E&M-EST. PATIENT-LVL V: ICD-10-PCS | Mod: PBBFAC,,, | Performed by: NURSE PRACTITIONER

## 2022-09-21 PROCEDURE — 1160F RVW MEDS BY RX/DR IN RCRD: CPT | Mod: CPTII,S$GLB,, | Performed by: NURSE PRACTITIONER

## 2022-09-21 PROCEDURE — 3288F PR FALLS RISK ASSESSMENT DOCUMENTED: ICD-10-PCS | Mod: CPTII,S$GLB,, | Performed by: NURSE PRACTITIONER

## 2022-09-21 PROCEDURE — 3074F PR MOST RECENT SYSTOLIC BLOOD PRESSURE < 130 MM HG: ICD-10-PCS | Mod: CPTII,S$GLB,, | Performed by: NURSE PRACTITIONER

## 2022-09-21 PROCEDURE — 3078F PR MOST RECENT DIASTOLIC BLOOD PRESSURE < 80 MM HG: ICD-10-PCS | Mod: CPTII,S$GLB,, | Performed by: NURSE PRACTITIONER

## 2022-09-21 PROCEDURE — 3008F PR BODY MASS INDEX (BMI) DOCUMENTED: ICD-10-PCS | Mod: CPTII,S$GLB,, | Performed by: NURSE PRACTITIONER

## 2022-09-21 PROCEDURE — 1101F PR PT FALLS ASSESS DOC 0-1 FALLS W/OUT INJ PAST YR: ICD-10-PCS | Mod: CPTII,S$GLB,, | Performed by: NURSE PRACTITIONER

## 2022-09-21 PROCEDURE — 4010F ACE/ARB THERAPY RXD/TAKEN: CPT | Mod: CPTII,S$GLB,, | Performed by: NURSE PRACTITIONER

## 2022-09-21 PROCEDURE — 99999 PR PBB SHADOW E&M-EST. PATIENT-LVL V: CPT | Mod: PBBFAC,,, | Performed by: NURSE PRACTITIONER

## 2022-09-21 PROCEDURE — 99215 PR OFFICE/OUTPT VISIT, EST, LEVL V, 40-54 MIN: ICD-10-PCS | Mod: FS,S$GLB,, | Performed by: NURSE PRACTITIONER

## 2022-09-21 PROCEDURE — 3074F SYST BP LT 130 MM HG: CPT | Mod: CPTII,S$GLB,, | Performed by: NURSE PRACTITIONER

## 2022-09-21 PROCEDURE — 3288F FALL RISK ASSESSMENT DOCD: CPT | Mod: CPTII,S$GLB,, | Performed by: NURSE PRACTITIONER

## 2022-09-21 PROCEDURE — 3078F DIAST BP <80 MM HG: CPT | Mod: CPTII,S$GLB,, | Performed by: NURSE PRACTITIONER

## 2022-09-21 PROCEDURE — 4010F PR ACE/ARB THEARPY RXD/TAKEN: ICD-10-PCS | Mod: CPTII,S$GLB,, | Performed by: NURSE PRACTITIONER

## 2022-09-21 PROCEDURE — 99499 RISK ADDL DX/OHS AUDIT: ICD-10-PCS | Mod: S$GLB,,, | Performed by: NURSE PRACTITIONER

## 2022-09-21 PROCEDURE — 1159F PR MEDICATION LIST DOCUMENTED IN MEDICAL RECORD: ICD-10-PCS | Mod: CPTII,S$GLB,, | Performed by: NURSE PRACTITIONER

## 2022-09-21 PROCEDURE — 1126F PR PAIN SEVERITY QUANTIFIED, NO PAIN PRESENT: ICD-10-PCS | Mod: CPTII,S$GLB,, | Performed by: NURSE PRACTITIONER

## 2022-09-21 RX ORDER — CYANOCOBALAMIN 1000 UG/ML
1 INJECTION, SOLUTION INTRAMUSCULAR; SUBCUTANEOUS
COMMUNITY
Start: 2022-08-25 | End: 2022-09-21
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
## 2022-09-27 ENCOUNTER — TELEPHONE (OUTPATIENT)
Dept: UROLOGY | Facility: CLINIC | Age: 72
End: 2022-09-27
Payer: MEDICARE

## 2022-09-27 NOTE — TELEPHONE ENCOUNTER
..Received referral for pt to be seen in Urology.  Attempted to contact pt to schedule appt but unsuccessful.  Unable to leave .

## 2022-09-28 ENCOUNTER — TELEPHONE (OUTPATIENT)
Dept: PAIN MEDICINE | Facility: CLINIC | Age: 72
End: 2022-09-28
Payer: MEDICARE

## 2022-09-28 NOTE — TELEPHONE ENCOUNTER
----- Message from Pauline Blue sent at 9/28/2022  3:33 PM CDT -----  The pt would like to schedule an appt form the referral. Call her daughter back Cenetra back at [phone]. Thx EL

## 2022-09-28 NOTE — TELEPHONE ENCOUNTER
Occupational Therapy Screening:  Services are not indicated at this time. [facility] based on results obtained during the nursing admission assessment. The patients chart was reviewed and the patient is not appropriate for a skilled therapy evaluation at this time. Please consult occupational therapy if any therapy needs arise. Thank you.     Janell Watt Spoke with pts daughter she stated that the pt wanted to get in for a mass on the brain ref by Barbara Lloyd,LIANA pt daughter was advised that Dr Morocho specializes in the back & spine pt daughter was advised to reach out to Dr Wade Martinez in [facility], pt daughter zac.

## 2022-09-29 ENCOUNTER — TELEPHONE (OUTPATIENT)
Dept: NEUROLOGY | Facility: CLINIC | Age: 72
End: 2022-09-29
Payer: MEDICARE

## 2022-09-29 DIAGNOSIS — F33.0 MILD EPISODE OF RECURRENT MAJOR DEPRESSIVE DISORDER: ICD-10-CM

## 2022-09-29 DIAGNOSIS — R90.89 ABNORMAL FINDING ON MRI OF BRAIN: ICD-10-CM

## 2022-09-29 DIAGNOSIS — F02.80 MAJOR NEUROCOGNITIVE DISORDER DUE TO ALZHEIMER'S DISEASE: ICD-10-CM

## 2022-09-29 DIAGNOSIS — G30.9 MAJOR NEUROCOGNITIVE DISORDER DUE TO ALZHEIMER'S DISEASE: ICD-10-CM

## 2022-09-29 DIAGNOSIS — R41.3 OTHER AMNESIA: Primary | ICD-10-CM

## 2022-09-29 DIAGNOSIS — F02.80 MAJOR NEUROCOGNITIVE DISORDER DUE TO ALZHEIMER'S DISEASE: Primary | ICD-10-CM

## 2022-09-29 DIAGNOSIS — G30.9 MAJOR NEUROCOGNITIVE DISORDER DUE TO ALZHEIMER'S DISEASE: Primary | ICD-10-CM

## 2022-09-29 NOTE — TELEPHONE ENCOUNTER
----- Message from Darwin Brasher sent at 9/29/2022 10:54 AM CDT -----  Contact: Moe (daughter)  Type:  Patient Returning Call    Who Called:Moe   Who Left Message for Patient: nurse   Does the patient know what this is regarding?: MRI   Would the patient rather a call back or a response via [facility]?  Call back   Best Call Back Number: [phone]   Additional Information:  she states that if there is an earlier time for the MRI that would be fine with her.

## 2022-10-04 ENCOUNTER — PATIENT MESSAGE (OUTPATIENT)
Dept: ADMINISTRATIVE | Facility: HOSPITAL | Age: 72
End: 2022-10-04
Payer: MEDICARE

## 2022-10-10 ENCOUNTER — TELEPHONE (OUTPATIENT)
Dept: NEUROLOGY | Facility: CLINIC | Age: 72
End: 2022-10-10
Payer: MEDICARE

## 2022-10-10 NOTE — TELEPHONE ENCOUNTER
----- Message from Malena Desir sent at 10/10/2022  3:47 PM CDT -----  Contact: Cenetra/daughter  Patient daughter is calling to speak with the nurse regarding appointment 10/11/2022 (MRI). Please give daughter a call back at [phone] as requested.  Thanks  LR

## 2022-10-10 NOTE — TELEPHONE ENCOUNTER
Spoke with daughter advised her she would have to reach out to radiology her to see if one of the campus her could get her scheduled her . Number give [phone].

## 2022-10-11 ENCOUNTER — HOSPITAL ENCOUNTER (OUTPATIENT)
Dept: RADIOLOGY | Facility: HOSPITAL | Age: 72
Discharge: HOME | End: 2022-10-11
Attending: NURSE PRACTITIONER
Payer: MEDICARE

## 2022-10-11 DIAGNOSIS — R41.3 OTHER AMNESIA: ICD-10-CM

## 2022-10-11 DIAGNOSIS — F02.80 MAJOR NEUROCOGNITIVE DISORDER DUE TO ALZHEIMER'S DISEASE: ICD-10-CM

## 2022-10-11 DIAGNOSIS — G30.9 MAJOR NEUROCOGNITIVE DISORDER DUE TO ALZHEIMER'S DISEASE: ICD-10-CM

## 2022-10-11 DIAGNOSIS — R90.89 ABNORMAL FINDING ON MRI OF BRAIN: ICD-10-CM

## 2022-10-11 PROCEDURE — 70553 MRI BRAIN STEM W/O & W/DYE: CPT | Mod: TC

## 2022-10-11 PROCEDURE — A9585 GADOBUTROL INJECTION: HCPCS | Performed by: NURSE PRACTITIONER

## 2022-10-11 PROCEDURE — 25500020 PHARM REV CODE 255: Performed by: NURSE PRACTITIONER

## 2022-10-11 RX ORDER — GADOBUTROL 604.72 MG/ML
10 INJECTION INTRAVENOUS
Status: COMPLETED | OUTPATIENT
Start: 2022-10-11 | End: 2022-10-11

## 2022-10-11 RX ADMIN — GADOBUTROL 6 ML: 604.72 INJECTION INTRAVENOUS at 04:10

## 2022-10-12 ENCOUNTER — OFFICE VISIT (OUTPATIENT)
Dept: NEUROSURGERY | Facility: CLINIC | Age: 72
End: 2022-10-12
Payer: MEDICARE

## 2022-10-12 VITALS
HEART RATE: 73 BPM | BODY MASS INDEX: 25.75 KG/M2 | WEIGHT: 150.81 LBS | SYSTOLIC BLOOD PRESSURE: 113 MMHG | DIASTOLIC BLOOD PRESSURE: 62 MMHG | HEIGHT: 64 IN | RESPIRATION RATE: 18 BRPM

## 2022-10-12 DIAGNOSIS — R90.89 ABNORMAL FINDING ON MRI OF BRAIN: ICD-10-CM

## 2022-10-12 DIAGNOSIS — G93.9 BRAIN LESION: Primary | ICD-10-CM

## 2022-10-12 PROCEDURE — 3074F PR MOST RECENT SYSTOLIC BLOOD PRESSURE < 130 MM HG: ICD-10-PCS | Mod: CPTII,S$GLB,, | Performed by: NURSE PRACTITIONER

## 2022-10-12 PROCEDURE — 1125F PR PAIN SEVERITY QUANTIFIED, PAIN PRESENT: ICD-10-PCS | Mod: CPTII,S$GLB,, | Performed by: NURSE PRACTITIONER

## 2022-10-12 PROCEDURE — 1159F MED LIST DOCD IN RCRD: CPT | Mod: CPTII,S$GLB,, | Performed by: NURSE PRACTITIONER

## 2022-10-12 PROCEDURE — 99499 RISK ADDL DX/OHS AUDIT: ICD-10-PCS | Mod: S$GLB,,, | Performed by: NURSE PRACTITIONER

## 2022-10-12 PROCEDURE — 1159F PR MEDICATION LIST DOCUMENTED IN MEDICAL RECORD: ICD-10-PCS | Mod: CPTII,S$GLB,, | Performed by: NURSE PRACTITIONER

## 2022-10-12 PROCEDURE — 1101F PR PT FALLS ASSESS DOC 0-1 FALLS W/OUT INJ PAST YR: ICD-10-PCS | Mod: CPTII,S$GLB,, | Performed by: NURSE PRACTITIONER

## 2022-10-12 PROCEDURE — 4010F PR ACE/ARB THEARPY RXD/TAKEN: ICD-10-PCS | Mod: CPTII,S$GLB,, | Performed by: NURSE PRACTITIONER

## 2022-10-12 PROCEDURE — 3074F SYST BP LT 130 MM HG: CPT | Mod: CPTII,S$GLB,, | Performed by: NURSE PRACTITIONER

## 2022-10-12 PROCEDURE — 1125F AMNT PAIN NOTED PAIN PRSNT: CPT | Mod: CPTII,S$GLB,, | Performed by: NURSE PRACTITIONER

## 2022-10-12 PROCEDURE — 3008F BODY MASS INDEX DOCD: CPT | Mod: CPTII,S$GLB,, | Performed by: NURSE PRACTITIONER

## 2022-10-12 PROCEDURE — 3008F PR BODY MASS INDEX (BMI) DOCUMENTED: ICD-10-PCS | Mod: CPTII,S$GLB,, | Performed by: NURSE PRACTITIONER

## 2022-10-12 PROCEDURE — 99204 PR OFFICE/OUTPT VISIT, NEW, LEVL IV, 45-59 MIN: ICD-10-PCS | Mod: S$GLB,,, | Performed by: NURSE PRACTITIONER

## 2022-10-12 PROCEDURE — 3288F PR FALLS RISK ASSESSMENT DOCUMENTED: ICD-10-PCS | Mod: CPTII,S$GLB,, | Performed by: NURSE PRACTITIONER

## 2022-10-12 PROCEDURE — 3078F DIAST BP <80 MM HG: CPT | Mod: CPTII,S$GLB,, | Performed by: NURSE PRACTITIONER

## 2022-10-12 PROCEDURE — 99204 OFFICE O/P NEW MOD 45 MIN: CPT | Mod: S$GLB,,, | Performed by: NURSE PRACTITIONER

## 2022-10-12 PROCEDURE — 3078F PR MOST RECENT DIASTOLIC BLOOD PRESSURE < 80 MM HG: ICD-10-PCS | Mod: CPTII,S$GLB,, | Performed by: NURSE PRACTITIONER

## 2022-10-12 PROCEDURE — 1101F PT FALLS ASSESS-DOCD LE1/YR: CPT | Mod: CPTII,S$GLB,, | Performed by: NURSE PRACTITIONER

## 2022-10-12 PROCEDURE — 3288F FALL RISK ASSESSMENT DOCD: CPT | Mod: CPTII,S$GLB,, | Performed by: NURSE PRACTITIONER

## 2022-10-12 PROCEDURE — 4010F ACE/ARB THERAPY RXD/TAKEN: CPT | Mod: CPTII,S$GLB,, | Performed by: NURSE PRACTITIONER

## 2022-10-12 PROCEDURE — 99499 UNLISTED E&M SERVICE: CPT | Mod: S$GLB,,, | Performed by: NURSE PRACTITIONER

## 2022-10-12 RX ORDER — WITCH HAZEL 50 %
2000 PADS, MEDICATED (EA) TOPICAL 2 TIMES DAILY
COMMUNITY

## 2022-10-12 NOTE — PROGRESS NOTES
Neurosurgery History & Physical    Patient ID: Leia Rodriguez is a 72 y.o. female.    Chief Complaint   Patient presents with    Extremity Pain     Dr Lloyd said she had a mass in her fontal lobe and wanted her to see neuro. Pt reports pain in both ankles. Also a pinched nerve in back. She has bee dx'd w dementia so difficult to assess.       History of Present Illness:   Ms. Rodriguez is a 72 year old female with alzheimer's dementia, hx of breast ca s/p mastectomy, chemotherapy and RTX who presents today for evaluation of recent brain imaging. Her AZ is managed by Neurology. Imaging performed during neurology workup on 2/2022 revealed left frontal dural based lesion. She had updated imaging yesterday and recommended she see Neurology for discussion of options.     Her daughter is present with the patient and provides much of the history. She denies extremity weakness, numbness. Her daughter reports a remote fall causing lumbar spine issues and perineal numbness. She had imaging and was evaluated by a spine specialist but opted against surgery at that time. This was prior to 2013. Unsure who the provider was.     She ambulates short distances only. She is in a wheelchair today. She has chronic urinary incontinence and was referred to urology for this.     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/21/2021    Lipoma of abdominal wall     Major neurocognitive disorder 6/21/2022    Obesity     Overactive bladder     Pap smear abnormality of cervix with ASCUS favoring benign     Thyroid nodule     Tobacco use disorder     Tubular adenoma of colon     Urinary incontinence      Social History      Socioeconomic History    Marital status:    Tobacco Use    Smoking status: Former     Packs/day: 1.00     Years: 50.00     Pack years: 50.00     Types: Cigarettes     Quit date: 2020     Years since quittin.1    Smokeless tobacco: Never   Substance and Sexual Activity    Alcohol use: Never     Alcohol/week: 28.0 standard drinks     Types: 28 Cans of beer per week    Drug use: No    Sexual activity: Never     Partners: Male   Social History Narrative    The patient is .  She is retired from [employer].     Family History   Problem Relation Age of Onset    Hypertension Father     Cataracts Father     Prostate cancer Father 80    Cervical cancer Daughter 32    Allergic rhinitis Daughter     Cirrhosis Mother     Alcohol abuse Mother     Hypertension Daughter     Diabetes Brother     Heart attack Brother     Heart murmur Brother     No Known Problems Daughter      Review of patient's allergies indicates:  No Known Allergies    Current Outpatient Medications:     anastrozole (ARIMIDEX) 1 mg Tab, Take 1 tablet (1 mg total) by mouth once daily., Disp: 90 tablet, Rfl: 3    aspirin 81 MG Chew, Take 1 tablet (81 mg total) by mouth once daily., Disp: 90 tablet, Rfl: 3    atorvastatin (LIPITOR) 20 MG tablet, Take 1 tablet (20 mg total) by mouth every evening., Disp: 90 tablet, Rfl: 1    busPIRone (BUSPAR) 15 MG tablet, Take 1 tablet (15 mg total) by mouth 2 (two) times daily., Disp: 60 tablet, Rfl: 11    calcium citrate (CALCITRATE) 200 mg (950 mg) tablet, Take 1 tablet by mouth once daily., Disp: , Rfl:     carvediloL (COREG) 3.125 MG tablet, TAKE 1 TABLET BY MOUTH TWICE A DAY WITH MEALS, Disp: 180 tablet, Rfl: 3    cyanocobalamin 2000 MCG tablet, Take 2,000 mcg by mouth 2 (two) times a day., Disp: , Rfl:     donepeziL (ARICEPT) 10 MG tablet, Take 1 tablet (10 mg total) by mouth every evening., Disp: 30 tablet, Rfl: 3    furosemide (LASIX) 20 MG tablet, Take 1 tablet (20 mg total) by mouth every  "Mon, Wed, Fri. (Patient taking differently: Take 20 mg by mouth every Mon, Wed, Fri. 3 time a week), Disp: 36 tablet, Rfl: 3    isosorbide mononitrate (IMDUR) 30 MG 24 hr tablet, TAKE 1 TABLET BY MOUTH EVERY DAY, Disp: 90 tablet, Rfl: 3    memantine (NAMENDA) 10 MG Tab, GIVE A 1/2 TABLET BY MOUTH TWICE PER DAY FOR ONE WEEK THEN GIVE A WHOLE TABLET TWICE PER DAY THEREAFTER, Disp: 60 tablet, Rfl: 2    spironolactone (ALDACTONE) 25 MG tablet, TAKE 1 TABLET BY MOUTH EVERY DAY, Disp: 90 tablet, Rfl: 3    tamsulosin (FLOMAX) 0.4 mg Cap, Take 1 capsule by mouth once daily., Disp: , Rfl:     telmisartan (MICARDIS) 20 MG Tab, TAKE 1 TABLET BY MOUTH EVERY DAY, Disp: 30 tablet, Rfl: 11    vitamin D 1000 units Tab, Take 1,000 Units by mouth once daily., Disp: , Rfl:     ferrous sulfate (FEOSOL) 325 mg (65 mg iron) Tab tablet, Take 65 mg by mouth once daily., Disp: , Rfl:     prednisoLONE acetate (PRED FORTE) 1 % DrpS, Place 1 drop into the left eye every 4 (four) hours. (Patient not taking: Reported on 10/12/2022), Disp: 5 mL, Rfl: 1    sulfamethoxazole-trimethoprim 800-160mg (BACTRIM DS) 800-160 mg Tab, Take 1 tablet by mouth 2 (two) times daily. (Patient not taking: Reported on 10/12/2022), Disp: 14 tablet, Rfl: 0  No current facility-administered medications for this visit.  Blood pressure 113/62, pulse 73, resp. rate 18, height 5' 4" (1.626 m), weight 68.4 kg (150 lb 12.7 oz).      Neurologic Exam     Mental Status   Oriented to person, place, and time.   Level of consciousness: alert    Cranial Nerves     CN III, IV, VI   Pupils are equal, round, and reactive to light.    CN V   Facial sensation intact.     CN VII   Facial expression full, symmetric.     CN VIII   Hearing: intact    CN XI   CN XI normal.     CN XII   CN XII normal.     Motor Exam   Muscle bulk: normal  Overall muscle tone: normal    Strength   Strength 5/5 throughout.     Sensory Exam   Light touch normal.     Gait, Coordination, and Reflexes "     Gait  Gait: (deferred)    Physical Exam  Vitals and nursing note reviewed.   Constitutional:       Appearance: She is well-developed.   HENT:      Head: Normocephalic and atraumatic.   Eyes:      Pupils: Pupils are equal, round, and reactive to light.   Pulmonary:      Effort: Pulmonary effort is normal.   Skin:     General: Skin is warm and dry.   Neurological:      Mental Status: She is alert and oriented to person, place, and time.      Motor: Motor strength is normal.         Imaging:   MRI 10/11/22 personally reviewed and discussed with the patient.   FINDINGS:  Mild atrophy with white matter degeneration is present.  Left parasagittal frontal lobe white matter infarction.  Right centrum semiovale/corona radiata white matter infarction also unchanged.     There is a persistent plaque-like area of dural enhancement superficial to the left frontal convexity.  The approximate diameter is 12 mm.  Approximate thickness 5 mm.  Similar to the previous study.  Possible dural based metastatic disease versus meningioma.  No detrimental change.     No new findings.     Deformed right ocular globe.     Diffusion sequence is normal.     Impression:     Stable MRI of the brain.  Atrophy.  Old infarcts.  Left frontal dural-based enhancement with considerations including meningioma versus dural metastatic disease.  No change since 02/24/2022.    Assessment & Plan:   1. Brain lesion            72 year old female with Alzheimer's disease, hx of breast ca with left frontal dural based mass on imaging. We discussed imaging findings at length, likely meningioma versus metastatic lesion.  We discussed treatment options with surveillance monitoring versus surgical resection.  Given the size of the mass, imaging has remained stable over the last 8 months, and the patient is asymptomatic, recommend surveillance monitoring.  Will plan for repeat MRI brain with and without contrast in 8 months.  They will contact our office in the  meantime with any questions or concerns.

## 2022-10-13 ENCOUNTER — TELEPHONE (OUTPATIENT)
Dept: NEUROLOGY | Facility: CLINIC | Age: 72
End: 2022-10-13
Payer: MEDICARE

## 2022-10-13 NOTE — PROGRESS NOTES
MRI Brain WWO Contrast    10-    Stable MRI of the brain.  Atrophy.  Old infarcts.  Left frontal dural-based enhancement with considerations including meningioma versus dural metastatic disease.  No change since 02/24/2022.

## 2022-10-13 NOTE — TELEPHONE ENCOUNTER
----- Message from Layne Lloyd NP sent at 10/13/2022 11:31 AM CDT -----  MRI Brain WWO Contrast    10-    Stable MRI of the brain.  Atrophy.  Old infarcts.  Left frontal dural-based enhancement with considerations including meningioma versus dural metastatic disease.  No change since 02/24/2022.

## 2022-10-26 ENCOUNTER — IMMUNIZATION (OUTPATIENT)
Dept: INTERNAL MEDICINE | Facility: CLINIC | Age: 72
End: 2022-10-26
Payer: MEDICARE

## 2022-10-26 ENCOUNTER — OFFICE VISIT (OUTPATIENT)
Dept: UROLOGY | Facility: CLINIC | Age: 72
End: 2022-10-26
Payer: MEDICARE

## 2022-10-26 VITALS
SYSTOLIC BLOOD PRESSURE: 132 MMHG | WEIGHT: 150 LBS | BODY MASS INDEX: 25.75 KG/M2 | DIASTOLIC BLOOD PRESSURE: 58 MMHG | HEART RATE: 65 BPM

## 2022-10-26 DIAGNOSIS — F02.80 MAJOR NEUROCOGNITIVE DISORDER DUE TO ALZHEIMER'S DISEASE: ICD-10-CM

## 2022-10-26 DIAGNOSIS — N28.89 RENAL MASS: Primary | ICD-10-CM

## 2022-10-26 DIAGNOSIS — N39.41 URGE INCONTINENCE OF URINE: ICD-10-CM

## 2022-10-26 DIAGNOSIS — N39.0 URINARY TRACT INFECTION WITHOUT HEMATURIA, SITE UNSPECIFIED: ICD-10-CM

## 2022-10-26 DIAGNOSIS — G30.9 MAJOR NEUROCOGNITIVE DISORDER DUE TO ALZHEIMER'S DISEASE: ICD-10-CM

## 2022-10-26 DIAGNOSIS — N13.30 HYDRONEPHROSIS, UNSPECIFIED HYDRONEPHROSIS TYPE: ICD-10-CM

## 2022-10-26 PROCEDURE — 1101F PT FALLS ASSESS-DOCD LE1/YR: CPT | Mod: CPTII,S$GLB,, | Performed by: UROLOGY

## 2022-10-26 PROCEDURE — 4010F PR ACE/ARB THEARPY RXD/TAKEN: ICD-10-PCS | Mod: CPTII,S$GLB,, | Performed by: UROLOGY

## 2022-10-26 PROCEDURE — 3288F PR FALLS RISK ASSESSMENT DOCUMENTED: ICD-10-PCS | Mod: CPTII,S$GLB,, | Performed by: UROLOGY

## 2022-10-26 PROCEDURE — 90694 FLU VACCINE - QUADRIVALENT - ADJUVANTED: ICD-10-PCS | Mod: S$GLB,,, | Performed by: FAMILY MEDICINE

## 2022-10-26 PROCEDURE — 3078F PR MOST RECENT DIASTOLIC BLOOD PRESSURE < 80 MM HG: ICD-10-PCS | Mod: CPTII,S$GLB,, | Performed by: UROLOGY

## 2022-10-26 PROCEDURE — 1159F PR MEDICATION LIST DOCUMENTED IN MEDICAL RECORD: ICD-10-PCS | Mod: CPTII,S$GLB,, | Performed by: UROLOGY

## 2022-10-26 PROCEDURE — 4010F ACE/ARB THERAPY RXD/TAKEN: CPT | Mod: CPTII,S$GLB,, | Performed by: UROLOGY

## 2022-10-26 PROCEDURE — 99999 PR PBB SHADOW E&M-EST. PATIENT-LVL IV: ICD-10-PCS | Mod: PBBFAC,,, | Performed by: UROLOGY

## 2022-10-26 PROCEDURE — 3078F DIAST BP <80 MM HG: CPT | Mod: CPTII,S$GLB,, | Performed by: UROLOGY

## 2022-10-26 PROCEDURE — 3075F SYST BP GE 130 - 139MM HG: CPT | Mod: CPTII,S$GLB,, | Performed by: UROLOGY

## 2022-10-26 PROCEDURE — 90694 VACC AIIV4 NO PRSRV 0.5ML IM: CPT | Mod: S$GLB,,, | Performed by: FAMILY MEDICINE

## 2022-10-26 PROCEDURE — G0008 ADMIN INFLUENZA VIRUS VAC: HCPCS | Mod: S$GLB,,, | Performed by: FAMILY MEDICINE

## 2022-10-26 PROCEDURE — 1101F PR PT FALLS ASSESS DOC 0-1 FALLS W/OUT INJ PAST YR: ICD-10-PCS | Mod: CPTII,S$GLB,, | Performed by: UROLOGY

## 2022-10-26 PROCEDURE — 3288F FALL RISK ASSESSMENT DOCD: CPT | Mod: CPTII,S$GLB,, | Performed by: UROLOGY

## 2022-10-26 PROCEDURE — 99203 OFFICE O/P NEW LOW 30 MIN: CPT | Mod: S$GLB,,, | Performed by: UROLOGY

## 2022-10-26 PROCEDURE — 1159F MED LIST DOCD IN RCRD: CPT | Mod: CPTII,S$GLB,, | Performed by: UROLOGY

## 2022-10-26 PROCEDURE — 99203 PR OFFICE/OUTPT VISIT, NEW, LEVL III, 30-44 MIN: ICD-10-PCS | Mod: S$GLB,,, | Performed by: UROLOGY

## 2022-10-26 PROCEDURE — G0008 FLU VACCINE - QUADRIVALENT - ADJUVANTED: ICD-10-PCS | Mod: S$GLB,,, | Performed by: FAMILY MEDICINE

## 2022-10-26 PROCEDURE — 3075F PR MOST RECENT SYSTOLIC BLOOD PRESS GE 130-139MM HG: ICD-10-PCS | Mod: CPTII,S$GLB,, | Performed by: UROLOGY

## 2022-10-26 PROCEDURE — 99999 PR PBB SHADOW E&M-EST. PATIENT-LVL IV: CPT | Mod: PBBFAC,,, | Performed by: UROLOGY

## 2022-10-26 NOTE — PROGRESS NOTES
Chief Complaint:   Encounter Diagnoses   Name Primary?    Renal mass Yes    Urge incontinence of urine     Urinary tract infection without hematuria, site unspecified     Hydronephrosis, unspecified hydronephrosis type     Major neurocognitive disorder due to Alzheimer's disease        HPI:  72-year-old female who comes in to be evaluated for possible lesion on her kidney.  She previously seen a urologist, they admit to this not being addressed but the incontinence instead.  Again uncertain as the history is little questionable.  Patient has significant Alzheimer's disease and she is uncertain.  The did find this lesion no couple years ago, was not re-identified a PET-CT scan.  She does have significant history of breast cancer, no gross hematuria, she does have a history of smoking.  Patient states that she only voids into her pads, does not get up at all per night, this was confirmed by the daughter.  No dysuria, she has about 2 infections per year, currently doing well.  Full incontinence, no real evidence of stress incontinence mostly urge incontinence.  She has significant prolapse per the daughter, was started on tamsulosin by our cardiologist they felt there was some obstruction and now she is voiding well and not getting as many infections.  No other urological gynecological history, except for total vaginal hysterectomy with bilateral salpingo oophorectomy, no pelvic surgeries.  She states that she feels empty, again though she does have Alzheimer's dementia.  She treats her constipation, 3 pregnancies, 2 natural 1 .  No family history of urological cancers or stones.    Allergies:  Patient has no known allergies.    Medications:  has a current medication list which includes the following prescription(s): anastrozole, buspirone, calcium citrate, carvedilol, cyanocobalamin, cyanocobalamin, donepezil, ferrous sulfate, furosemide, isosorbide mononitrate, memantine, prednisolone acetate, spironolactone,  sulfamethoxazole-trimethoprim 800-160mg, tamsulosin, telmisartan, vitamin d, aspirin, and atorvastatin.    Review of Systems:  General: No fever, chills, fatigability, or weight loss.  Skin: No rashes, itching, or changes in color or texture of skin.  Chest: Denies GRISSOM, cyanosis, wheezing, cough, and sputum production.  Abdomen: Appetite fine. No weight loss. Denies diarrhea, abdominal pain, hematemesis, or blood in stool.  Musculoskeletal: No joint stiffness or swelling. Denies back pain.  : As above.  All other review of systems negative.    PMH:   has a past medical history of Arthritis, Behavioral change (2022), Blind right eye, Breast cancer (06/15/2017), Cataract, Essential hypertension, Hemorrhoids, Immunodeficiency due to chemotherapy (2021), Lipoma of abdominal wall, Major neurocognitive disorder (2022), Obesity, Overactive bladder, Pap smear abnormality of cervix with ASCUS favoring benign, Thyroid nodule, Tobacco use disorder, Tubular adenoma of colon, and Urinary incontinence.    PSH:   has a past surgical history that includes Anterior vaginal repair; Thyroid lobectomy (Left, ); Bladder surgery;  section; Tubal ligation; Colonoscopy (N/A, 3/8/2017); Total abdominal hysterectomy; Colonoscopy (N/A, 2020); Esophagogastroduodenoscopy (N/A, 2020); Simple mastectomy (Left, 2020); Arnett lymph node biopsy (Left, 2020); Intraluminal gastrointestinal tract imaging via capsule (N/A, 10/28/2020); Left heart catheterization (Left, 10/12/2021); Breast lumpectomy (Left, ); and Cataract extraction (Left, 2022).    FamHx: family history includes Alcohol abuse in her mother; Allergic rhinitis in her daughter; Cataracts in her father; Cervical cancer (age of onset: 32) in her daughter; Cirrhosis in her mother; Diabetes in her brother; Heart attack in her brother; Heart murmur in her brother; Hypertension in her daughter and father; No Known Problems in her  daughter; Prostate cancer (age of onset: 80) in her father.    SocHx:  reports that she quit smoking about 2 years ago. Her smoking use included cigarettes. She has a 50.00 pack-year smoking history. She has never used smokeless tobacco. She reports that she does not drink alcohol and does not use drugs.      Physical Exam:  Vitals:    10/26/22 0904   BP: (!) 132/58   Pulse: 65     General: A&Ox3, no apparent distress, no deformities  Neck: No masses, normal ROM  Lungs: normal inspiration, no use of accessory muscles  Heart: normal pulse, no arrhythmias  Abdomen: Soft, NT, ND, no masses, no hernias, no hepatosplenomegaly  Skin: The skin is warm and dry. No jaundice.  Ext: No c/c/e.    Labs/Studies:   PET-CT no metastatic disease 6/21   PIPE questionable right hydronephrosis, 6.1 cm right renal mass 4/21   Creatinine 1.3 10/22    Impression/Plan:     1. Renal mass- questionable mass identified on previous renal ultrasound, no definitive workup per the family.  Will go ahead and obtain a CT urogram and bring her back following the study.  Proceed as appropriate from there.  Of note she does have significant history of breast cancer.    2. Hydronephrosis- will be able to further evaluate with the above-stated CT scan.  Creatinine is otherwise stable.    3. Urge incontinence- full incontinence, currently has tried no medical management.  This is obviously complicated by her Alzheimer's dementia.  At this point they want to monitor and not start any new medications.  Taking tamsulosin due to perceived obstruction, due to significant prolapse per patient's family.    4. Recurrent UTI- she has about 2 per year, currently doing well.  Call with a recurrence.

## 2022-11-02 ENCOUNTER — HOSPITAL ENCOUNTER (OUTPATIENT)
Dept: RADIOLOGY | Facility: HOSPITAL | Age: 72
Discharge: HOME | End: 2022-11-02
Attending: UROLOGY
Payer: MEDICARE

## 2022-11-02 DIAGNOSIS — R16.1 SPLENIC MASS: Primary | ICD-10-CM

## 2022-11-02 DIAGNOSIS — N13.30 HYDRONEPHROSIS, UNSPECIFIED HYDRONEPHROSIS TYPE: ICD-10-CM

## 2022-11-02 DIAGNOSIS — N28.89 RENAL MASS: ICD-10-CM

## 2022-11-02 PROCEDURE — 25500020 PHARM REV CODE 255: Performed by: UROLOGY

## 2022-11-02 PROCEDURE — 74178 CT ABD&PLV WO CNTR FLWD CNTR: CPT | Mod: TC

## 2022-11-02 RX ADMIN — IOHEXOL 100 ML: 350 INJECTION, SOLUTION INTRAVENOUS at 04:11

## 2022-11-03 ENCOUNTER — TELEPHONE (OUTPATIENT)
Dept: UROLOGY | Facility: CLINIC | Age: 72
End: 2022-11-03
Payer: MEDICARE

## 2022-11-03 ENCOUNTER — PATIENT MESSAGE (OUTPATIENT)
Dept: UROLOGY | Facility: CLINIC | Age: 72
End: 2022-11-03
Payer: MEDICARE

## 2022-11-03 NOTE — TELEPHONE ENCOUNTER
Patient was advised about the CT scan results.     ----- Message from John Reynoso MD sent at 11/2/2022  5:22 PM CDT -----  This appears to be a large cyst in the kidney not hydronephrosis, nothing further.  No evidence of renal mass consistent with cancer.  There is an abnormality to the spleen and I would like for her to be referred to General surgery, order has been placed.

## 2022-11-04 ENCOUNTER — TELEPHONE (OUTPATIENT)
Dept: SURGERY | Facility: CLINIC | Age: 72
End: 2022-11-04
Payer: MEDICARE

## 2022-11-04 NOTE — TELEPHONE ENCOUNTER
----- Message from Judy Herzog MA sent at 11/4/2022 11:12 AM CDT -----  Hey , pt's mother was reaching back out to your miss call. We tried to transfer her to you but she said there was no answer. So she asked if we'd send you a message letting you know she reached out.

## 2022-11-16 ENCOUNTER — OFFICE VISIT (OUTPATIENT)
Dept: SURGERY | Facility: CLINIC | Age: 72
End: 2022-11-16
Payer: MEDICARE

## 2022-11-16 VITALS
WEIGHT: 152 LBS | TEMPERATURE: 98 F | HEART RATE: 74 BPM | SYSTOLIC BLOOD PRESSURE: 84 MMHG | BODY MASS INDEX: 26.09 KG/M2 | DIASTOLIC BLOOD PRESSURE: 55 MMHG

## 2022-11-16 DIAGNOSIS — R16.1 SPLENIC MASS: ICD-10-CM

## 2022-11-16 PROCEDURE — 1126F PR PAIN SEVERITY QUANTIFIED, NO PAIN PRESENT: ICD-10-PCS | Mod: CPTII,S$GLB,, | Performed by: SURGERY

## 2022-11-16 PROCEDURE — 3008F PR BODY MASS INDEX (BMI) DOCUMENTED: ICD-10-PCS | Mod: CPTII,S$GLB,, | Performed by: SURGERY

## 2022-11-16 PROCEDURE — 1159F MED LIST DOCD IN RCRD: CPT | Mod: CPTII,S$GLB,, | Performed by: SURGERY

## 2022-11-16 PROCEDURE — 1160F PR REVIEW ALL MEDS BY PRESCRIBER/CLIN PHARMACIST DOCUMENTED: ICD-10-PCS | Mod: CPTII,S$GLB,, | Performed by: SURGERY

## 2022-11-16 PROCEDURE — 3078F PR MOST RECENT DIASTOLIC BLOOD PRESSURE < 80 MM HG: ICD-10-PCS | Mod: CPTII,S$GLB,, | Performed by: SURGERY

## 2022-11-16 PROCEDURE — 1159F PR MEDICATION LIST DOCUMENTED IN MEDICAL RECORD: ICD-10-PCS | Mod: CPTII,S$GLB,, | Performed by: SURGERY

## 2022-11-16 PROCEDURE — 1126F AMNT PAIN NOTED NONE PRSNT: CPT | Mod: CPTII,S$GLB,, | Performed by: SURGERY

## 2022-11-16 PROCEDURE — 3008F BODY MASS INDEX DOCD: CPT | Mod: CPTII,S$GLB,, | Performed by: SURGERY

## 2022-11-16 PROCEDURE — 99999 PR PBB SHADOW E&M-EST. PATIENT-LVL V: ICD-10-PCS | Mod: PBBFAC,,, | Performed by: SURGERY

## 2022-11-16 PROCEDURE — 99214 PR OFFICE/OUTPT VISIT, EST, LEVL IV, 30-39 MIN: ICD-10-PCS | Mod: S$GLB,,, | Performed by: SURGERY

## 2022-11-16 PROCEDURE — 4010F ACE/ARB THERAPY RXD/TAKEN: CPT | Mod: CPTII,S$GLB,, | Performed by: SURGERY

## 2022-11-16 PROCEDURE — 4010F PR ACE/ARB THEARPY RXD/TAKEN: ICD-10-PCS | Mod: CPTII,S$GLB,, | Performed by: SURGERY

## 2022-11-16 PROCEDURE — 1160F RVW MEDS BY RX/DR IN RCRD: CPT | Mod: CPTII,S$GLB,, | Performed by: SURGERY

## 2022-11-16 PROCEDURE — 3074F PR MOST RECENT SYSTOLIC BLOOD PRESSURE < 130 MM HG: ICD-10-PCS | Mod: CPTII,S$GLB,, | Performed by: SURGERY

## 2022-11-16 PROCEDURE — 3078F DIAST BP <80 MM HG: CPT | Mod: CPTII,S$GLB,, | Performed by: SURGERY

## 2022-11-16 PROCEDURE — 99999 PR PBB SHADOW E&M-EST. PATIENT-LVL V: CPT | Mod: PBBFAC,,, | Performed by: SURGERY

## 2022-11-16 PROCEDURE — 3074F SYST BP LT 130 MM HG: CPT | Mod: CPTII,S$GLB,, | Performed by: SURGERY

## 2022-11-16 PROCEDURE — 99214 OFFICE O/P EST MOD 30 MIN: CPT | Mod: S$GLB,,, | Performed by: SURGERY

## 2022-11-26 ENCOUNTER — HOSPITAL ENCOUNTER (EMERGENCY)
Facility: HOSPITAL | Age: 72
Discharge: HOME | End: 2022-11-26
Attending: EMERGENCY MEDICINE
Payer: MEDICARE

## 2022-11-26 VITALS
OXYGEN SATURATION: 100 % | WEIGHT: 151.88 LBS | BODY MASS INDEX: 26.07 KG/M2 | TEMPERATURE: 99 F | RESPIRATION RATE: 18 BRPM | SYSTOLIC BLOOD PRESSURE: 132 MMHG | DIASTOLIC BLOOD PRESSURE: 62 MMHG | HEART RATE: 63 BPM

## 2022-11-26 DIAGNOSIS — N39.0 URINARY TRACT INFECTION WITHOUT HEMATURIA, SITE UNSPECIFIED: Primary | ICD-10-CM

## 2022-11-26 DIAGNOSIS — E86.0 DEHYDRATION: ICD-10-CM

## 2022-11-26 DIAGNOSIS — R06.02 SOB (SHORTNESS OF BREATH): ICD-10-CM

## 2022-11-26 LAB
ALBUMIN SERPL BCP-MCNC: 3.8 G/DL (ref 3.5–5.2)
ALP SERPL-CCNC: 58 U/L (ref 55–135)
ALT SERPL W/O P-5'-P-CCNC: 11 U/L (ref 10–44)
ANION GAP SERPL CALC-SCNC: 14 MMOL/L (ref 8–16)
AST SERPL-CCNC: 15 U/L (ref 10–40)
BACTERIA #/AREA URNS HPF: ABNORMAL /HPF
BASOPHILS # BLD AUTO: 0.03 K/UL (ref 0–0.2)
BASOPHILS NFR BLD: 0.4 % (ref 0–1.9)
BILIRUB SERPL-MCNC: 0.6 MG/DL (ref 0.1–1)
BILIRUB UR QL STRIP: NEGATIVE
BNP SERPL-MCNC: 67 PG/ML (ref 0–99)
BUN SERPL-MCNC: 58 MG/DL (ref 8–23)
CALCIUM SERPL-MCNC: 9.4 MG/DL (ref 8.7–10.5)
CHLORIDE SERPL-SCNC: 104 MMOL/L (ref 95–110)
CLARITY UR: CLEAR
CO2 SERPL-SCNC: 24 MMOL/L (ref 23–29)
COLOR UR: COLORLESS
CREAT SERPL-MCNC: 1.6 MG/DL (ref 0.5–1.4)
DIFFERENTIAL METHOD: ABNORMAL
EOSINOPHIL # BLD AUTO: 0.4 K/UL (ref 0–0.5)
EOSINOPHIL NFR BLD: 4.9 % (ref 0–8)
ERYTHROCYTE [DISTWIDTH] IN BLOOD BY AUTOMATED COUNT: 12.8 % (ref 11.5–14.5)
EST. GFR  (NO RACE VARIABLE): 34 ML/MIN/1.73 M^2
GLUCOSE SERPL-MCNC: 111 MG/DL (ref 70–110)
GLUCOSE UR QL STRIP: NEGATIVE
HCT VFR BLD AUTO: 31.4 % (ref 37–48.5)
HGB BLD-MCNC: 10.3 G/DL (ref 12–16)
HGB UR QL STRIP: NEGATIVE
HYALINE CASTS #/AREA URNS LPF: 4 /LPF
IMM GRANULOCYTES # BLD AUTO: 0.02 K/UL (ref 0–0.04)
IMM GRANULOCYTES NFR BLD AUTO: 0.3 % (ref 0–0.5)
INFLUENZA A, MOLECULAR: NEGATIVE
INFLUENZA B, MOLECULAR: NEGATIVE
KETONES UR QL STRIP: NEGATIVE
LEUKOCYTE ESTERASE UR QL STRIP: NEGATIVE
LYMPHOCYTES # BLD AUTO: 2.2 K/UL (ref 1–4.8)
LYMPHOCYTES NFR BLD: 30.3 % (ref 18–48)
MCH RBC QN AUTO: 29.8 PG (ref 27–31)
MCHC RBC AUTO-ENTMCNC: 32.8 G/DL (ref 32–36)
MCV RBC AUTO: 91 FL (ref 82–98)
MICROSCOPIC COMMENT: ABNORMAL
MONOCYTES # BLD AUTO: 0.7 K/UL (ref 0.3–1)
MONOCYTES NFR BLD: 9.3 % (ref 4–15)
NEUTROPHILS # BLD AUTO: 3.9 K/UL (ref 1.8–7.7)
NEUTROPHILS NFR BLD: 54.8 % (ref 38–73)
NITRITE UR QL STRIP: POSITIVE
NRBC BLD-RTO: 0 /100 WBC
PH UR STRIP: 6 [PH] (ref 5–8)
PLATELET # BLD AUTO: 242 K/UL (ref 150–450)
PMV BLD AUTO: 10.4 FL (ref 9.2–12.9)
POTASSIUM SERPL-SCNC: 3.1 MMOL/L (ref 3.5–5.1)
PROT SERPL-MCNC: 7.6 G/DL (ref 6–8.4)
PROT UR QL STRIP: NEGATIVE
RBC # BLD AUTO: 3.46 M/UL (ref 4–5.4)
SARS-COV-2 RDRP RESP QL NAA+PROBE: NEGATIVE
SODIUM SERPL-SCNC: 142 MMOL/L (ref 136–145)
SP GR UR STRIP: 1.01 (ref 1–1.03)
SPECIMEN SOURCE: NORMAL
SQUAMOUS #/AREA URNS HPF: 2 /HPF
TROPONIN I SERPL DL<=0.01 NG/ML-MCNC: 0.03 NG/ML (ref 0–0.03)
URN SPEC COLLECT METH UR: ABNORMAL
UROBILINOGEN UR STRIP-ACNC: NEGATIVE EU/DL
WBC # BLD AUTO: 7.09 K/UL (ref 3.9–12.7)

## 2022-11-26 PROCEDURE — 83880 ASSAY OF NATRIURETIC PEPTIDE: CPT | Performed by: EMERGENCY MEDICINE

## 2022-11-26 PROCEDURE — 99285 EMERGENCY DEPT VISIT HI MDM: CPT | Mod: 25,27

## 2022-11-26 PROCEDURE — 96360 HYDRATION IV INFUSION INIT: CPT

## 2022-11-26 PROCEDURE — 81000 URINALYSIS NONAUTO W/SCOPE: CPT | Performed by: EMERGENCY MEDICINE

## 2022-11-26 PROCEDURE — 93010 ELECTROCARDIOGRAM REPORT: CPT | Mod: ,,, | Performed by: INTERNAL MEDICINE

## 2022-11-26 PROCEDURE — 84484 ASSAY OF TROPONIN QUANT: CPT | Performed by: EMERGENCY MEDICINE

## 2022-11-26 PROCEDURE — 99285 EMERGENCY DEPT VISIT HI MDM: CPT | Mod: 25

## 2022-11-26 PROCEDURE — 87502 INFLUENZA DNA AMP PROBE: CPT

## 2022-11-26 PROCEDURE — 85025 COMPLETE CBC W/AUTO DIFF WBC: CPT | Performed by: EMERGENCY MEDICINE

## 2022-11-26 PROCEDURE — 93005 ELECTROCARDIOGRAM TRACING: CPT

## 2022-11-26 PROCEDURE — U0002 COVID-19 LAB TEST NON-CDC: HCPCS | Performed by: EMERGENCY MEDICINE

## 2022-11-26 PROCEDURE — 25000003 PHARM REV CODE 250: Performed by: EMERGENCY MEDICINE

## 2022-11-26 PROCEDURE — 80053 COMPREHEN METABOLIC PANEL: CPT | Performed by: EMERGENCY MEDICINE

## 2022-11-26 PROCEDURE — 87502 INFLUENZA DNA AMP PROBE: CPT | Performed by: EMERGENCY MEDICINE

## 2022-11-26 PROCEDURE — 93010 EKG 12-LEAD: ICD-10-PCS | Mod: ,,, | Performed by: INTERNAL MEDICINE

## 2022-11-26 RX ORDER — CEFUROXIME AXETIL 500 MG/1
500 TABLET ORAL 2 TIMES DAILY
Qty: 14 TABLET | Refills: 0 | Status: SHIPPED | OUTPATIENT
Start: 2022-11-26 | End: 2022-12-03

## 2022-11-26 RX ADMIN — SODIUM CHLORIDE 500 ML: 0.9 INJECTION, SOLUTION INTRAVENOUS at 07:11

## 2022-11-26 NOTE — ED PROVIDER NOTES
SCRIBE #1 NOTE: I, Chaka Gross and My Claudio, am scribing for, and in the presence of, Gary Corea MD. I have scribed the entire note.       History     Chief Complaint   Patient presents with    Shortness of Breath     Shortness of breath x 1 day      Review of patient's allergies indicates:  No Known Allergies      History of Present Illness     HPI    2022, 5:42 PM  History obtained from the patient      History of Present Illness: Leia Rodriguez is a 72 y.o. female patient with a PMHx of HTN, CHF, and breast cancer who presents to the Emergency Department for evaluation of SOB which onset gradually within the past 2 to 3 days. Symptoms are constant and moderate in severity. No mitigating or exacerbating factors reported. Associated sxs include runny nose and chills. Patient denies any fever, cough, abd pain, n/v, dysuria, and all other sxs at this time. No Prior Tx. PCP increased fluid pill dosage. No further complaints or concerns at this time.       Arrival mode: Personal vehicle      PCP: Yasmin Navarro MD        Past Medical History:  Past Medical History:   Diagnosis Date    Arthritis     EDGAR HANDS, KNEES    Behavioral change 2022    Blind right eye     Traumatic    Breast cancer 06/15/2017    0.8 cm Grade1 INTRADUCTAL BREAST 9 positve margin (left)    Cataract     Essential hypertension     Hemorrhoids     Immunodeficiency due to chemotherapy 2021    Lipoma of abdominal wall     Major neurocognitive disorder 2022    Obesity     Overactive bladder     Pap smear abnormality of cervix with ASCUS favoring benign     Thyroid nodule     Tobacco use disorder     Tubular adenoma of colon     Urinary incontinence        Past Surgical History:  Past Surgical History:   Procedure Laterality Date    ANTERIOR VAGINAL REPAIR      BLADDER SURGERY      BREAST LUMPECTOMY Left 2017    CATARACT EXTRACTION Left 2022     SECTION      X2    COLONOSCOPY N/A 3/8/2017    Procedure:  COLONOSCOPY;  Surgeon: Tyron Paris MD;  Location: [facility];  Service: Endoscopy;  Laterality: N/A;    COLONOSCOPY N/A 2020    Procedure: COLONOSCOPY;  Surgeon: Keira Ellison MD;  Location: [facility];  Service: Endoscopy;  Laterality: N/A;    ESOPHAGOGASTRODUODENOSCOPY N/A 2020    Procedure: EGD (ESOPHAGOGASTRODUODENOSCOPY);  Surgeon: Kiera Ellison MD;  Location: [facility];  Service: Endoscopy;  Laterality: N/A;  new onset iron deficiency with prior history of breast cancer    INTRALUMINAL GASTROINTESTINAL TRACT IMAGING VIA CAPSULE N/A 10/28/2020    Procedure: IMAGING PROCEDURE, GI TRACT, INTRALUMINAL, VIA CAPSULE;  Surgeon: Finesse Jha RN;  Location: [facility];  Service: Endoscopy;  Laterality: N/A;    LEFT HEART CATHETERIZATION Left 10/12/2021    Procedure: CATHETERIZATION, HEART, LEFT;  Surgeon: Tiffanie Santos MD;  Location: [facility] CATH LAB;  Service: Cardiology;  Laterality: Left;    SENTINEL LYMPH NODE BIOPSY Left 2020    Procedure: BIOPSY, LYMPH NODE, SENTINEL;  Surgeon: Vincent Moyer MD;  Location: [facility] OR;  Service: General;  Laterality: Left;    SIMPLE MASTECTOMY Left 2020    Procedure: MASTECTOMY, SIMPLE;  Surgeon: Vincent Moyer MD;  Location: [facility] OR;  Service: General;  Laterality: Left;    THYROID LOBECTOMY Left     TOTAL ABDOMINAL HYSTERECTOMY      TUBAL LIGATION           Family History:  Family History   Problem Relation Age of Onset    Hypertension Father     Cataracts Father     Prostate cancer Father 80    Cervical cancer Daughter 32    Allergic rhinitis Daughter     Cirrhosis Mother     Alcohol abuse Mother     Hypertension Daughter     Diabetes Brother     Heart attack Brother     Heart murmur Brother     No Known Problems Daughter        Social History:  Social History     Tobacco Use    Smoking status: Former     Packs/day: 1.00     Years: 50.00     Pack years: 50.00     Types: Cigarettes     Quit date: 2020     Years since quittin.2     Smokeless tobacco: Never   Substance and Sexual Activity    Alcohol use: Never     Alcohol/week: 28.0 standard drinks     Types: 28 Cans of beer per week    Drug use: No    Sexual activity: Never     Partners: Male        Review of Systems     Review of Systems   Constitutional:  Positive for chills. Negative for fever.   HENT:  Negative for congestion and sore throat.         (+) Runny nose   Respiratory:  Positive for shortness of breath. Negative for cough.    Cardiovascular:  Negative for chest pain and leg swelling.   Gastrointestinal:  Negative for abdominal pain, nausea and vomiting.   Genitourinary:  Negative for dysuria.   Musculoskeletal:  Negative for back pain.   Skin:  Negative for rash.   Neurological:  Negative for weakness.   Hematological:  Does not bruise/bleed easily.   All other systems reviewed and are negative.     Physical Exam     Initial Vitals [11/26/22 1709]   BP Pulse Resp Temp SpO2   (!) 140/74 92 (!) 30 98.6 °F (37 °C) 96 %      MAP       --          Physical Exam  Nursing Notes and Vital Signs Reviewed.  Constitutional: Patient is in no acute distress. Well-developed and well-nourished.  Head: Atraumatic. Normocephalic.  Eyes: PERRL. EOM intact. Conjunctivae are not pale. No scleral icterus.  ENT: Mucous membranes are moist. Oropharynx is clear and symmetric.    Neck: Supple. Full ROM. No lymphadenopathy.  Cardiovascular: Regular rate. Regular rhythm. No murmurs, rubs, or gallops. Distal pulses are 2+ and symmetric.  Pulmonary/Chest: No respiratory distress. Clear to auscultation bilaterally. No wheezing or rales.  Abdominal: Soft and non-distended.  There is no tenderness.  No rebound, guarding, or rigidity. Good bowel sounds.  Genitourinary: No CVA tenderness  Musculoskeletal: Moves all extremities. No obvious deformities. No edema. No calf tenderness.  Skin: Warm and dry.  Neurological:  Alert, awake, and appropriate.  Normal speech.  No acute focal neurological deficits are  appreciated.  Psychiatric: Normal affect. Good eye contact. Appropriate in content.     ED Course   Procedures  ED Vital Signs:  Vitals:    11/26/22 1709 11/26/22 1716 11/26/22 1717 11/26/22 1748   BP: (!) 140/74 (!) 141/65     Pulse: 92   68   Resp: (!) 30      Temp: 98.6 °F (37 °C)      TempSrc: Oral      SpO2: 96%      Weight:   68.9 kg (151 lb 14.4 oz)     11/26/22 1802 11/26/22 1900 11/26/22 2030   BP: (!) 126/59 (!) 118/55 132/62   Pulse: 66 69 63   Resp:  11 18   Temp:      TempSrc:      SpO2: 97% 98% 100%   Weight:          Abnormal Lab Results:  Labs Reviewed   CBC W/ AUTO DIFFERENTIAL - Abnormal; Notable for the following components:       Result Value    RBC 3.46 (*)     Hemoglobin 10.3 (*)     Hematocrit 31.4 (*)     All other components within normal limits   COMPREHENSIVE METABOLIC PANEL - Abnormal; Notable for the following components:    Potassium 3.1 (*)     Glucose 111 (*)     BUN 58 (*)     Creatinine 1.6 (*)     eGFR 34 (*)     All other components within normal limits   TROPONIN I - Abnormal; Notable for the following components:    Troponin I 0.027 (*)     All other components within normal limits   URINALYSIS, REFLEX TO URINE CULTURE - Abnormal; Notable for the following components:    Color, UA Colorless (*)     Nitrite, UA Positive (*)     All other components within normal limits    Narrative:     Specimen Source->Urine   URINALYSIS MICROSCOPIC - Abnormal; Notable for the following components:    Bacteria Moderate (*)     Hyaline Casts, UA 4 (*)     All other components within normal limits    Narrative:     Specimen Source->Urine   INFLUENZA A & B BY MOLECULAR   B-TYPE NATRIURETIC PEPTIDE   SARS-COV-2 RNA AMPLIFICATION, QUAL        All Lab Results:  Results for orders placed or performed during the hospital encounter of 11/26/22   Influenza A & B by Molecular    Specimen: Nasopharyngeal Swab   Result Value Ref Range    Influenza A, Molecular Negative Negative    Influenza B, Molecular  Negative Negative    Flu A & B Source NP    CBC auto differential   Result Value Ref Range    WBC 7.09 3.90 - 12.70 K/uL    RBC 3.46 (L) 4.00 - 5.40 M/uL    Hemoglobin 10.3 (L) 12.0 - 16.0 g/dL    Hematocrit 31.4 (L) 37.0 - 48.5 %    MCV 91 82 - 98 fL    MCH 29.8 27.0 - 31.0 pg    MCHC 32.8 32.0 - 36.0 g/dL    RDW 12.8 11.5 - 14.5 %    Platelets 242 150 - 450 K/uL    MPV 10.4 9.2 - 12.9 fL    Immature Granulocytes 0.3 0.0 - 0.5 %    Gran # (ANC) 3.9 1.8 - 7.7 K/uL    Immature Grans (Abs) 0.02 0.00 - 0.04 K/uL    Lymph # 2.2 1.0 - 4.8 K/uL    Mono # 0.7 0.3 - 1.0 K/uL    Eos # 0.4 0.0 - 0.5 K/uL    Baso # 0.03 0.00 - 0.20 K/uL    nRBC 0 0 /100 WBC    Gran % 54.8 38.0 - 73.0 %    Lymph % 30.3 18.0 - 48.0 %    Mono % 9.3 4.0 - 15.0 %    Eosinophil % 4.9 0.0 - 8.0 %    Basophil % 0.4 0.0 - 1.9 %    Differential Method Automated    Comprehensive metabolic panel   Result Value Ref Range    Sodium 142 136 - 145 mmol/L    Potassium 3.1 (L) 3.5 - 5.1 mmol/L    Chloride 104 95 - 110 mmol/L    CO2 24 23 - 29 mmol/L    Glucose 111 (H) 70 - 110 mg/dL    BUN 58 (H) 8 - 23 mg/dL    Creatinine 1.6 (H) 0.5 - 1.4 mg/dL    Calcium 9.4 8.7 - 10.5 mg/dL    Total Protein 7.6 6.0 - 8.4 g/dL    Albumin 3.8 3.5 - 5.2 g/dL    Total Bilirubin 0.6 0.1 - 1.0 mg/dL    Alkaline Phosphatase 58 55 - 135 U/L    AST 15 10 - 40 U/L    ALT 11 10 - 44 U/L    Anion Gap 14 8 - 16 mmol/L    eGFR 34 (A) >60 mL/min/1.73 m^2   Brain natriuretic peptide   Result Value Ref Range    BNP 67 0 - 99 pg/mL   Troponin I   Result Value Ref Range    Troponin I 0.027 (H) 0.000 - 0.026 ng/mL   COVID-19 Rapid Screening   Result Value Ref Range    SARS-CoV-2 RNA, Amplification, Qual Negative Negative   Urinalysis, Reflex to Urine Culture Urine, Clean Catch    Specimen: Urine   Result Value Ref Range    Specimen UA Urine, Clean Catch     Color, UA Colorless (A) Yellow, Straw, Cynthia    Appearance, UA Clear Clear    pH, UA 6.0 5.0 - 8.0    Specific Gravity, UA 1.010 1.005 -  1.030    Protein, UA Negative Negative    Glucose, UA Negative Negative    Ketones, UA Negative Negative    Bilirubin (UA) Negative Negative    Occult Blood UA Negative Negative    Nitrite, UA Positive (A) Negative    Urobilinogen, UA Negative <2.0 EU/dL    Leukocytes, UA Negative Negative   Urinalysis Microscopic   Result Value Ref Range    Bacteria Moderate (A) None-Occ /hpf    Squam Epithel, UA 2 /hpf    Hyaline Casts, UA 4 (A) 0-1/lpf /lpf    Microscopic Comment SEE COMMENT         Imaging Results:  Imaging Results              X-Ray Chest AP Portable (Final result)  Result time 11/26/22 18:01:14      Final result by Emma Harrell MD (11/26/22 18:01:14)                   Impression:      No acute abnormality.      Electronically signed by: Julio Cesar Carter  Date:    11/26/2022  Time:    18:01               Narrative:    EXAMINATION:  XR CHEST AP PORTABLE    CLINICAL HISTORY:  Shortness of breath    TECHNIQUE:  Single frontal view of the chest was performed.    COMPARISON:  None    FINDINGS:  Left axillary surgical clips.The lungs are clear, with normal appearance of pulmonary vasculature and no pleural effusion or pneumothorax.    The cardiac silhouette is normal in size. The hilar and mediastinal contours are unremarkable.    Bones are intact.                                       The EKG was ordered, reviewed, and independently interpreted by the ED provider.  Interpretation time: 18:03  Rate: 84 BPM  Rhythm: Sinus rhythm with Premature atrial complexes.  Interpretation: Nonspecific T wave abnormality. No STEMI.             The Emergency Provider reviewed the vital signs and test results, which are outlined above.     ED Discussion       8:05 PM: Reassessed pt at this time. Discussed with pt all pertinent ED information and results. Discussed pt dx and plan of tx. Gave pt all f/u and return to the ED instructions. All questions and concerns were addressed at this time. Pt expresses understanding of information  and instructions, and is comfortable with plan to discharge. Pt is stable for discharge.    I discussed with patient and/or family/caretaker that evaluation in the ED does not suggest any emergent or life threatening medical conditions requiring immediate intervention beyond what was provided in the ED, and I believe patient is safe for discharge.  Regardless, an unremarkable evaluation in the ED does not preclude the development or presence of a serious of life threatening condition. As such, patient was instructed to return immediately for any worsening or change in current symptoms.        Medical Decision Making:   Clinical Tests:   Lab Tests: Ordered and Reviewed  Radiological Study: Ordered and Reviewed  Medical Tests: Ordered and Reviewed         ED Medication(s):  Medications   sodium chloride 0.9% bolus 500 mL (0 mLs Intravenous Stopped 11/26/22 2020)       Discharge Medication List as of 11/26/2022  8:03 PM        START taking these medications    Details   cefUROXime (CEFTIN) 500 MG tablet Take 1 tablet (500 mg total) by mouth 2 (two) times daily. for 7 days, Starting Sat 11/26/2022, Until Sat 12/3/2022, Print              Follow-up Information       Yasmin Navarro MD In 2 days.    Specialty: Family Medicine  Contact information:  [address] [facility] [address]  [phone]               [facility] - Emergency Dept..    Specialty: Emergency Medicine  Why: As needed, If symptoms worsen  Contact information:  [address] [facility] [address] [phone]                               Scribe Attestation:   Scribe #1: I performed the above scribed service and the documentation accurately describes the services I performed. I attest to the accuracy of the note.     Attending:   Physician Attestation Statement for Scribe #1: I, Gary Corea MD, personally performed the services described in this documentation, as scribed by Chaka Gross and My Claudio, in my presence, and  it is both accurate and complete.           Clinical Impression       ICD-10-CM ICD-9-CM   1. Urinary tract infection without hematuria, site unspecified  N39.0 599.0   2. SOB (shortness of breath)  R06.02 786.05   3. Dehydration  E86.0 276.51       Disposition:   Disposition: Discharged  Condition: Stable      Gary Corea MD  11/27/22 0040

## 2022-11-28 ENCOUNTER — NURSE TRIAGE (OUTPATIENT)
Dept: ADMINISTRATIVE | Facility: CLINIC | Age: 72
End: 2022-11-28
Payer: MEDICARE

## 2022-11-28 NOTE — TELEPHONE ENCOUNTER
HR 82 O2 sat 97 Daughter is calling because her mom is fixated on needing to go to the Ed. Her daughter feels that it is her anxiety causing the panic but her mom reenters and her mother  feels sob. I have advised as per protocol.  Reason for Disposition   Hysterical or combative behavior    Additional Information   Negative: SEVERE difficulty breathing (e.g., struggling for each breath, speaks in single words)   Negative: Bluish (or gray) lips or face now   Negative: Difficult to awaken or acting confused (e.g., disoriented, slurred speech)   Negative: [1] Difficult to awaken or acting confused (e.g., disoriented, slurred speech) AND [2] present now AND [3] new-onset AND [4] has diabetes (diabetes mellitus)   Negative: [1] Difficult to awaken or acting confused (e.g., disoriented, slurred speech) AND [2] present now AND [3] new-onset   Negative: [1] Weakness of the face, arm, or leg on one side of the body AND [2] new-onset   Negative: [1] Numbness of the face, arm, or leg on one side of the body AND [2] new-onset   Negative: [1] Loss of speech or garbled speech AND [2] new-onset   Negative: Shock suspected (e.g., cold/pale/clammy skin, too weak to stand, low BP, rapid pulse)     HR 81 B/P 146/78   Negative: Sounds like a life-threatening emergency to the triager   Negative: [1] New-onset confusion AND [2] no prior diagnosis of dementia   Negative: Swallowing problems   Negative: Weakness or fatigue is main concern   Anxiety or panic attack is main concern    Protocols used: Dementia Symptoms and Ntatedzar-H-GO, Anxiety and Panic Attack-A-AH

## 2022-11-30 ENCOUNTER — TELEPHONE (OUTPATIENT)
Dept: SURGERY | Facility: CLINIC | Age: 72
End: 2022-11-30
Payer: MEDICARE

## 2022-11-30 DIAGNOSIS — R16.1 SPLENIC MASS: Primary | ICD-10-CM

## 2022-11-30 NOTE — TELEPHONE ENCOUNTER
Called patient's daughter in regards to scheduling a creatinine test prior to MRI on 12/12. Scheduled appointment. Patient verbalized understanding.

## 2022-12-02 ENCOUNTER — HOSPITAL ENCOUNTER (OUTPATIENT)
Dept: RADIOLOGY | Facility: HOSPITAL | Age: 72
Discharge: HOME | End: 2022-12-02
Attending: INTERNAL MEDICINE
Payer: MEDICARE

## 2022-12-02 ENCOUNTER — TELEPHONE (OUTPATIENT)
Dept: HEMATOLOGY/ONCOLOGY | Facility: CLINIC | Age: 72
End: 2022-12-02
Payer: MEDICARE

## 2022-12-02 DIAGNOSIS — C50.312 MALIGNANT NEOPLASM OF LOWER-INNER QUADRANT OF LEFT BREAST IN FEMALE, ESTROGEN RECEPTOR POSITIVE: ICD-10-CM

## 2022-12-02 DIAGNOSIS — Z17.0 MALIGNANT NEOPLASM OF LOWER-INNER QUADRANT OF LEFT BREAST IN FEMALE, ESTROGEN RECEPTOR POSITIVE: ICD-10-CM

## 2022-12-02 PROCEDURE — 77065 DX MAMMO INCL CAD UNI: CPT | Mod: 26,RT,, | Performed by: RADIOLOGY

## 2022-12-02 PROCEDURE — 77061 BREAST TOMOSYNTHESIS UNI: CPT | Mod: 26,RT,, | Performed by: RADIOLOGY

## 2022-12-02 PROCEDURE — 77065 DX MAMMO INCL CAD UNI: CPT | Mod: TC,RT

## 2022-12-02 PROCEDURE — 77061 MAMMO DIGITAL DIAGNOSTIC RIGHT WITH TOMO: ICD-10-PCS | Mod: 26,RT,, | Performed by: RADIOLOGY

## 2022-12-02 PROCEDURE — 77065 MAMMO DIGITAL DIAGNOSTIC RIGHT WITH TOMO: ICD-10-PCS | Mod: 26,RT,, | Performed by: RADIOLOGY

## 2022-12-03 ENCOUNTER — HOSPITAL ENCOUNTER (EMERGENCY)
Facility: HOSPITAL | Age: 72
Discharge: HOME | End: 2022-12-03
Attending: EMERGENCY MEDICINE
Payer: MEDICARE

## 2022-12-03 VITALS
HEART RATE: 74 BPM | TEMPERATURE: 98 F | BODY MASS INDEX: 25.75 KG/M2 | OXYGEN SATURATION: 100 % | RESPIRATION RATE: 18 BRPM | DIASTOLIC BLOOD PRESSURE: 63 MMHG | SYSTOLIC BLOOD PRESSURE: 130 MMHG | WEIGHT: 150 LBS

## 2022-12-03 DIAGNOSIS — R53.1 WEAKNESS: ICD-10-CM

## 2022-12-03 DIAGNOSIS — R41.82 ALTERED MENTAL STATUS, UNSPECIFIED ALTERED MENTAL STATUS TYPE: Primary | ICD-10-CM

## 2022-12-03 DIAGNOSIS — R90.89 ABNORMAL CT OF BRAIN: ICD-10-CM

## 2022-12-03 LAB
ALBUMIN SERPL BCP-MCNC: 3.7 G/DL (ref 3.5–5.2)
ALP SERPL-CCNC: 59 U/L (ref 55–135)
ALT SERPL W/O P-5'-P-CCNC: 13 U/L (ref 10–44)
ANION GAP SERPL CALC-SCNC: 9 MMOL/L (ref 8–16)
AST SERPL-CCNC: 14 U/L (ref 10–40)
BASOPHILS # BLD AUTO: 0.02 K/UL (ref 0–0.2)
BASOPHILS NFR BLD: 0.3 % (ref 0–1.9)
BILIRUB SERPL-MCNC: 0.7 MG/DL (ref 0.1–1)
BILIRUB UR QL STRIP: NEGATIVE
BNP SERPL-MCNC: 72 PG/ML (ref 0–99)
BUN SERPL-MCNC: 23 MG/DL (ref 8–23)
CALCIUM SERPL-MCNC: 9.4 MG/DL (ref 8.7–10.5)
CHLORIDE SERPL-SCNC: 101 MMOL/L (ref 95–110)
CLARITY UR: CLEAR
CO2 SERPL-SCNC: 26 MMOL/L (ref 23–29)
COLOR UR: YELLOW
CREAT SERPL-MCNC: 1.1 MG/DL (ref 0.5–1.4)
CTP QC/QA: YES
CTP QC/QA: YES
DIFFERENTIAL METHOD: ABNORMAL
EOSINOPHIL # BLD AUTO: 0.2 K/UL (ref 0–0.5)
EOSINOPHIL NFR BLD: 3.7 % (ref 0–8)
ERYTHROCYTE [DISTWIDTH] IN BLOOD BY AUTOMATED COUNT: 12.9 % (ref 11.5–14.5)
EST. GFR  (NO RACE VARIABLE): 54 ML/MIN/1.73 M^2
GLUCOSE SERPL-MCNC: 98 MG/DL (ref 70–110)
GLUCOSE UR QL STRIP: NEGATIVE
HCT VFR BLD AUTO: 33.4 % (ref 37–48.5)
HGB BLD-MCNC: 10.7 G/DL (ref 12–16)
HGB UR QL STRIP: NEGATIVE
IMM GRANULOCYTES # BLD AUTO: 0.03 K/UL (ref 0–0.04)
IMM GRANULOCYTES NFR BLD AUTO: 0.5 % (ref 0–0.5)
KETONES UR QL STRIP: ABNORMAL
LACTATE SERPL-SCNC: 0.8 MMOL/L (ref 0.5–2.2)
LACTATE SERPL-SCNC: 1 MMOL/L (ref 0.5–2.2)
LEUKOCYTE ESTERASE UR QL STRIP: NEGATIVE
LIPASE SERPL-CCNC: 51 U/L (ref 4–60)
LYMPHOCYTES # BLD AUTO: 1.5 K/UL (ref 1–4.8)
LYMPHOCYTES NFR BLD: 24.2 % (ref 18–48)
MCH RBC QN AUTO: 29.4 PG (ref 27–31)
MCHC RBC AUTO-ENTMCNC: 32 G/DL (ref 32–36)
MCV RBC AUTO: 92 FL (ref 82–98)
MONOCYTES # BLD AUTO: 0.5 K/UL (ref 0.3–1)
MONOCYTES NFR BLD: 8.3 % (ref 4–15)
NEUTROPHILS # BLD AUTO: 3.9 K/UL (ref 1.8–7.7)
NEUTROPHILS NFR BLD: 63 % (ref 38–73)
NITRITE UR QL STRIP: NEGATIVE
NRBC BLD-RTO: 0 /100 WBC
PH UR STRIP: 6 [PH] (ref 5–8)
PLATELET # BLD AUTO: 237 K/UL (ref 150–450)
PMV BLD AUTO: 10 FL (ref 9.2–12.9)
POC MOLECULAR INFLUENZA A AGN: NEGATIVE
POC MOLECULAR INFLUENZA B AGN: NEGATIVE
POTASSIUM SERPL-SCNC: 4.1 MMOL/L (ref 3.5–5.1)
PROCALCITONIN SERPL IA-MCNC: 0.03 NG/ML
PROT SERPL-MCNC: 7.5 G/DL (ref 6–8.4)
PROT UR QL STRIP: NEGATIVE
RBC # BLD AUTO: 3.64 M/UL (ref 4–5.4)
SARS-COV-2 RDRP RESP QL NAA+PROBE: NEGATIVE
SODIUM SERPL-SCNC: 136 MMOL/L (ref 136–145)
SP GR UR STRIP: 1.01 (ref 1–1.03)
TROPONIN I SERPL DL<=0.01 NG/ML-MCNC: 0.01 NG/ML (ref 0–0.03)
URN SPEC COLLECT METH UR: ABNORMAL
UROBILINOGEN UR STRIP-ACNC: NEGATIVE EU/DL
WBC # BLD AUTO: 6.24 K/UL (ref 3.9–12.7)

## 2022-12-03 PROCEDURE — 93010 ELECTROCARDIOGRAM REPORT: CPT | Mod: ,,, | Performed by: STUDENT IN AN ORGANIZED HEALTH CARE EDUCATION/TRAINING PROGRAM

## 2022-12-03 PROCEDURE — 81003 URINALYSIS AUTO W/O SCOPE: CPT | Performed by: EMERGENCY MEDICINE

## 2022-12-03 PROCEDURE — 84145 PROCALCITONIN (PCT): CPT | Performed by: EMERGENCY MEDICINE

## 2022-12-03 PROCEDURE — 83880 ASSAY OF NATRIURETIC PEPTIDE: CPT | Performed by: EMERGENCY MEDICINE

## 2022-12-03 PROCEDURE — 83605 ASSAY OF LACTIC ACID: CPT | Performed by: EMERGENCY MEDICINE

## 2022-12-03 PROCEDURE — 83690 ASSAY OF LIPASE: CPT | Performed by: EMERGENCY MEDICINE

## 2022-12-03 PROCEDURE — 84484 ASSAY OF TROPONIN QUANT: CPT | Performed by: EMERGENCY MEDICINE

## 2022-12-03 PROCEDURE — 85025 COMPLETE CBC W/AUTO DIFF WBC: CPT | Performed by: EMERGENCY MEDICINE

## 2022-12-03 PROCEDURE — 87040 BLOOD CULTURE FOR BACTERIA: CPT | Performed by: EMERGENCY MEDICINE

## 2022-12-03 PROCEDURE — 80053 COMPREHEN METABOLIC PANEL: CPT | Performed by: EMERGENCY MEDICINE

## 2022-12-03 PROCEDURE — 87502 INFLUENZA DNA AMP PROBE: CPT

## 2022-12-03 PROCEDURE — 93010 EKG 12-LEAD: ICD-10-PCS | Mod: ,,, | Performed by: STUDENT IN AN ORGANIZED HEALTH CARE EDUCATION/TRAINING PROGRAM

## 2022-12-03 PROCEDURE — 93005 ELECTROCARDIOGRAM TRACING: CPT

## 2022-12-03 PROCEDURE — 87635 SARS-COV-2 COVID-19 AMP PRB: CPT | Performed by: EMERGENCY MEDICINE

## 2022-12-03 PROCEDURE — 99285 EMERGENCY DEPT VISIT HI MDM: CPT | Mod: 25

## 2022-12-03 NOTE — ED PROVIDER NOTES
SCRIBE #1 NOTE: I, Erika Reddy, am scribing for, and in the presence of, Lenny Salmeron MD. I have scribed the HPI, ROS, and PEx.     SCRIBE #2 NOTE: I, My Claudio, am scribing for, and in the presence of,  John Wilson MD. I have scribed the remaining portions of the note not scribed by Scribe #1.   History      Chief Complaint   Patient presents with    Altered Mental Status     Pt. Presents to ED via EMS due to haivng AMS since last night, with this morning being difficult to arouse. Pt was DX with uti a few days ago a finished ABX treatment yesterday. Pt is alert to only year, and states she hurts all over.        Review of patient's allergies indicates:  No Known Allergies     HPI   HPI    12/3/2022, 12:48 PM   History obtained from EMS and the pt's daughter at bedside  HPI/ROS limited secondary to mental status change      History of Present Illness: Leia Rodriguez is a 72 y.o. female patient with a PMHx of breast cancer, essential HTN, thyroid nodule, tobacco abuse disorder, and tubular adenoma of colon who presents to the Emergency Department for AMS. Per the pt's daughter, the pt was seen in this ER on 11/26/22 and was dx with a UTI. The pt's daughter states that the pt has been on abx for the UTI and has 3 doses left in her course. Last night, the pt went to bed normal, but this morning, the pt's  had trouble waking her up, per the pt daughter, so they called for EMS.      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    Lipoma of abdominal wall     Major neurocognitive disorder 6/21/2022    Obesity     Overactive bladder     Pap smear  abnormality of cervix with ASCUS favoring benign     Thyroid nodule     Tobacco use disorder     Tubular adenoma of colon     Urinary incontinence        Past Surgical History:  Past Surgical History:   Procedure Laterality Date    ANTERIOR VAGINAL REPAIR      BLADDER SURGERY      BREAST LUMPECTOMY Left 2017    CATARACT EXTRACTION Left 2022     SECTION      X2    COLONOSCOPY N/A 3/8/2017    Procedure: COLONOSCOPY;  Surgeon: Tyron Paris MD;  Location: [facility];  Service: Endoscopy;  Laterality: N/A;    COLONOSCOPY N/A 2020    Procedure: COLONOSCOPY;  Surgeon: Keira Ellison MD;  Location: [facility];  Service: Endoscopy;  Laterality: N/A;    ESOPHAGOGASTRODUODENOSCOPY N/A 2020    Procedure: EGD (ESOPHAGOGASTRODUODENOSCOPY);  Surgeon: Keira Ellison MD;  Location: [facility];  Service: Endoscopy;  Laterality: N/A;  new onset iron deficiency with prior history of breast cancer    INTRALUMINAL GASTROINTESTINAL TRACT IMAGING VIA CAPSULE N/A 10/28/2020    Procedure: IMAGING PROCEDURE, GI TRACT, INTRALUMINAL, VIA CAPSULE;  Surgeon: Finesse Jha RN;  Location[facility];  Service: Endoscopy;  Laterality: N/A;    LEFT HEART CATHETERIZATION Left 10/12/2021    Procedure: CATHETERIZATION, HEART, LEFT;  Surgeon: Tiffanie Santos MD;  Location: [facility] CATH LAB;  Service: Cardiology;  Laterality: Left;    SENTINEL LYMPH NODE BIOPSY Left 2020    Procedure: BIOPSY, LYMPH NODE, SENTINEL;  Surgeon: Vincent Moyer MD;  Location: [facility] OR;  Service: General;  Laterality: Left;    SIMPLE MASTECTOMY Left 2020    Procedure: MASTECTOMY, SIMPLE;  Surgeon: Vincent Moyer MD;  Location: [facility] OR;  Service: General;  Laterality: Left;    THYROID LOBECTOMY Left     TOTAL ABDOMINAL HYSTERECTOMY      TUBAL LIGATION           Family History:  Family History   Problem Relation Age of Onset    Hypertension Father     Cataracts Father     Prostate cancer Father 80    Cervical cancer Daughter 32    Allergic  rhinitis Daughter     Cirrhosis Mother     Alcohol abuse Mother     Hypertension Daughter     Diabetes Brother     Heart attack Brother     Heart murmur Brother     No Known Problems Daughter        Social History:  Social History     Tobacco Use    Smoking status: Former     Packs/day: 1.00     Years: 50.00     Pack years: 50.00     Types: Cigarettes     Quit date: 2020     Years since quittin.2    Smokeless tobacco: Never   Substance and Sexual Activity    Alcohol use: Never     Alcohol/week: 28.0 standard drinks     Types: 28 Cans of beer per week    Drug use: No    Sexual activity: Never     Partners: Male       ROS   Review of Systems   Unable to perform ROS: Mental status change     Physical Exam      Initial Vitals [22 1242]   BP Pulse Resp Temp SpO2   (!) 160/70 90 (!) 24 99.1 °F (37.3 °C) 100 %      MAP       --          Physical Exam  Nursing Notes and Vital Signs Reviewed.  Constitutional: Patient is in no acute distress. Elderly. Frail.  Head: Atraumatic. Normocephalic.  Eyes: PERRL. EOM intact. Conjunctivae are not pale. No scleral icterus.  ENT: Mucous membranes are moist. Oropharynx is clear and symmetric.    Neck: Supple. Full ROM. No lymphadenopathy.  Cardiovascular: Regular rate. Regular rhythm. No murmurs, rubs, or gallops. Distal pulses are 2+ and symmetric.  Pulmonary/Chest: No respiratory distress. Clear to auscultation bilaterally. No wheezing or rales.  Abdominal: Soft and non-distended.  There is no tenderness.  No rebound, guarding, or rigidity.   Musculoskeletal: Moves all extremities. No obvious deformities. No edema.  Skin: Warm and dry.  Neurological:  Pt does not follow commands. Pt does not respond to verbal stimuli.  Psychiatric: Normal affect. Good eye contact. Appropriate in content.    ED Course    Procedures  ED Vital Signs:  Vitals:    22 1242 22 1257 22 1330 22 1400   BP: (!) 160/70 (!) 176/81 (!) 171/78 (!) 153/69   Pulse: 90 85 86 84    Resp: (!) 24 18 18 20   Temp: 99.1 °F (37.3 °C)      TempSrc: Oral      SpO2: 100% 98% 97% 98%   Weight:  68 kg (150 lb)      12/03/22 1430 12/03/22 1500 12/03/22 1530 12/03/22 1600   BP: (!) 107/57 133/61 132/63 132/69   Pulse: 83 80 84 82   Resp: 20 20 18 18   Temp:       TempSrc:       SpO2: 99% 96% 98% 96%   Weight:        12/03/22 1630 12/03/22 1700 12/03/22 1730 12/03/22 1930   BP: (!) 101/55 (!) 121/56 (!) 114/59 138/60   Pulse: 84 94 76 81   Resp: 18 18 18 18   Temp:    98.9 °F (37.2 °C)   TempSrc:       SpO2: 98% 98% 98% 96%   Weight:        12/03/22 2000 12/03/22 2215   BP: 122/60 (!) 147/85   Pulse: 72 77   Resp: 20 20   Temp: 97.6 °F (36.4 °C) 98.4 °F (36.9 °C)   TempSrc:  Oral   SpO2: 97% 95%   Weight:         Abnormal Lab Results:  Labs Reviewed   CBC W/ AUTO DIFFERENTIAL - Abnormal; Notable for the following components:       Result Value    RBC 3.64 (*)     Hemoglobin 10.7 (*)     Hematocrit 33.4 (*)     All other components within normal limits   COMPREHENSIVE METABOLIC PANEL - Abnormal; Notable for the following components:    eGFR 54 (*)     All other components within normal limits   URINALYSIS, REFLEX TO URINE CULTURE - Abnormal; Notable for the following components:    Ketones, UA Trace (*)     All other components within normal limits    Narrative:     Specimen Source->Urine   CULTURE, BLOOD   CULTURE, BLOOD   LACTIC ACID, PLASMA   PROCALCITONIN   TROPONIN I   LIPASE   B-TYPE NATRIURETIC PEPTIDE   LACTIC ACID, PLASMA   SARS-COV-2 RDRP GENE    Narrative:     This test utilizes isothermal nucleic acid amplification technology to detect the SARS-CoV-2 RdRp nucleic acid segment. The analytical sensitivity (limit of detection) is 500 copies/swab.     A POSITIVE result is indicative of the presence of SARS-CoV-2 RNA; clinical correlation with patient history and other diagnostic information is necessary to determine patient infection status.    A NEGATIVE result means that SARS-CoV-2 nucleic acids  are not present above the limit of detection. A NEGATIVE result should be treated as presumptive. It does not rule out the possibility of COVID-19 and should not be the sole basis for treatment decisions. If COVID-19 is strongly suspected based on clinical and exposure history, re-testing using an alternate molecular assay should be considered.     This test is only for use under the Food and Drug Administration s Emergency Use Authorization (EUA).     Commercial kits are provided by [employer]. Performance characteristics of the EUA have been independently verified by [facility] Department of Pathology and Laboratory Medicine.   _________________________________________________________________   The authorized Fact Sheet for Healthcare Providers and the authorized Fact Sheet for Patients of the ID NOW COVID-19 are available on the FDA website:    https://www.fda.gov/media/058415/download      https://www.fda.gov/media/267184/download      POCT INFLUENZA A/B MOLECULAR        All Lab Results:  Results for orders placed or performed during the hospital encounter of 12/03/22   CBC auto differential   Result Value Ref Range    WBC 6.24 3.90 - 12.70 K/uL    RBC 3.64 (L) 4.00 - 5.40 M/uL    Hemoglobin 10.7 (L) 12.0 - 16.0 g/dL    Hematocrit 33.4 (L) 37.0 - 48.5 %    MCV 92 82 - 98 fL    MCH 29.4 27.0 - 31.0 pg    MCHC 32.0 32.0 - 36.0 g/dL    RDW 12.9 11.5 - 14.5 %    Platelets 237 150 - 450 K/uL    MPV 10.0 9.2 - 12.9 fL    Immature Granulocytes 0.5 0.0 - 0.5 %    Gran # (ANC) 3.9 1.8 - 7.7 K/uL    Immature Grans (Abs) 0.03 0.00 - 0.04 K/uL    Lymph # 1.5 1.0 - 4.8 K/uL    Mono # 0.5 0.3 - 1.0 K/uL    Eos # 0.2 0.0 - 0.5 K/uL    Baso # 0.02 0.00 - 0.20 K/uL    nRBC 0 0 /100 WBC    Gran % 63.0 38.0 - 73.0 %    Lymph % 24.2 18.0 - 48.0 %    Mono % 8.3 4.0 - 15.0 %    Eosinophil % 3.7 0.0 - 8.0 %    Basophil % 0.3 0.0 - 1.9 %    Differential Method Automated    Comprehensive metabolic panel   Result  Value Ref Range    Sodium 136 136 - 145 mmol/L    Potassium 4.1 3.5 - 5.1 mmol/L    Chloride 101 95 - 110 mmol/L    CO2 26 23 - 29 mmol/L    Glucose 98 70 - 110 mg/dL    BUN 23 8 - 23 mg/dL    Creatinine 1.1 0.5 - 1.4 mg/dL    Calcium 9.4 8.7 - 10.5 mg/dL    Total Protein 7.5 6.0 - 8.4 g/dL    Albumin 3.7 3.5 - 5.2 g/dL    Total Bilirubin 0.7 0.1 - 1.0 mg/dL    Alkaline Phosphatase 59 55 - 135 U/L    AST 14 10 - 40 U/L    ALT 13 10 - 44 U/L    Anion Gap 9 8 - 16 mmol/L    eGFR 54 (A) >60 mL/min/1.73 m^2   Lactic acid, plasma #1   Result Value Ref Range    Lactate (Lactic Acid) 0.8 0.5 - 2.2 mmol/L   Urinalysis, Reflex to Urine Culture Urine, Clean Catch    Specimen: Urine   Result Value Ref Range    Specimen UA Urine, Clean Catch     Color, UA Yellow Yellow, Straw, Cynthia    Appearance, UA Clear Clear    pH, UA 6.0 5.0 - 8.0    Specific Gravity, UA 1.015 1.005 - 1.030    Protein, UA Negative Negative    Glucose, UA Negative Negative    Ketones, UA Trace (A) Negative    Bilirubin (UA) Negative Negative    Occult Blood UA Negative Negative    Nitrite, UA Negative Negative    Urobilinogen, UA Negative <2.0 EU/dL    Leukocytes, UA Negative Negative   Procalcitonin   Result Value Ref Range    Procalcitonin 0.03 <0.25 ng/mL   Troponin I   Result Value Ref Range    Troponin I 0.015 0.000 - 0.026 ng/mL   Lipase   Result Value Ref Range    Lipase 51 4 - 60 U/L   Brain natriuretic peptide   Result Value Ref Range    BNP 72 0 - 99 pg/mL   Lactic acid, plasma #2   Result Value Ref Range    Lactate (Lactic Acid) 1.0 0.5 - 2.2 mmol/L   POCT COVID-19 Rapid Screening   Result Value Ref Range    POC Rapid COVID Negative Negative     Acceptable Yes    POCT Influenza A/B Molecular   Result Value Ref Range    POC Molecular Influenza A Ag Negative Negative, Not Reported    POC Molecular Influenza B Ag Negative Negative, Not Reported     Acceptable Yes          Imaging Results:  Imaging Results               MRI Brain Without Contrast (Final result)  Result time 12/03/22 21:53:42      Final result by Skyler Pineda MD (12/03/22 21:53:42)                   Impression:      No acute abnormality identified.    Senescent changes as above.      Electronically signed by: Skyler Pineda  Date:    12/03/2022  Time:    21:53               Narrative:    EXAMINATION:  MRI BRAIN WITHOUT CONTRAST    CLINICAL HISTORY:  Mental status change, unknown cause;.    TECHNIQUE:  Multiplanar multisequence MR imaging of the brain was performed without contrast.    COMPARISON:  Multiple priors.    FINDINGS:  Intracranial compartment:    Ventricles and sulci are normal in size for age without evidence of hydrocephalus. No extra-axial blood or fluid collections.    Moderate T2 FLAIR hyperintensities concerning for microvascular ischemic change.  Right frontal and left frontal areas of encephalomalacia.  No mass lesion, acute hemorrhage, edema or acute infarct.    Normal vascular flow voids are preserved.    Skull/extracranial contents (limited evaluation): Bone marrow signal intensity is normal.                                       X-Ray Chest AP Portable (Final result)  Result time 12/03/22 15:12:45      Final result by Lasha Murphy Jr., MD (12/03/22 15:12:45)                   Impression:      No acute findings.      Electronically signed by: Lasha Murphy Jr., MD  Date:    12/03/2022  Time:    15:12               Narrative:    EXAMINATION:  XR CHEST AP PORTABLE    CLINICAL HISTORY:  Sepsis;    COMPARISON:  Prior from 11/26/2022.    FINDINGS:  Prior left-sided mastectomy and axillary musa dissection.  The lungs are clear.  No pleural fluid or pneumothorax.  Heart size within normal limits.  No significant bony findings.                                       CT Head Without Contrast (Final result)  Result time 12/03/22 14:28:07      Final result by Lasha Murphy Jr., MD (12/03/22 14:28:07)                   Impression:      1. No acute  intracranial CT abnormality.  2. Encephalomalacia involving the anterolateral left frontal lobe is new compared to the previous MRI relating to interval infarction.  3. Encephalomalacia in involving the superior right frontal lobe and right corona radiata is similar to prior.  All CT scans at this facility use dose modulation, iterative reconstruction, and/or weight base dosing when appropriate to reduce radiation dose to as low as reasonably achievable.      Electronically signed by: Lasha Murphy Jr., MD  Date:    12/03/2022  Time:    14:28               Narrative:    EXAMINATION:  CT HEAD WITHOUT CONTRAST    CLINICAL HISTORY:  Syncope, recurrent;Mental status change, unknown cause;    TECHNIQUE:  Contiguous axial CT images were obtained from the skull base through the vertex without intravenous contrast.    COMPARISON:  Prior MRI of the brain from 10/11/2022.    FINDINGS:  No intracranial hemorrhage. No mass effect or midline shift. There is encephalomalacia involving the superior right frontal lobe involving the right corona radiata.  Encephalomalacia involving the anterolateral left aspect of the frontal lobe which is new compared to prior MRI.  The ventricles and sulci are normal in size and configuration. The paranasal sinuses and mastoid air cells are clear.  No acute osseous findings.  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                                     The EKG was ordered, reviewed, and independently interpreted by the ED provider.  Interpretation time: 13:12  Rate: 86 BPM  Rhythm: normal sinus rhythm  Interpretation: T wave abnormality, consider anterolateral ischemia. No STEMI.           The Emergency Provider reviewed the vital signs and test results, which are outlined above.    ED Discussion     4:00 PM: Dr. Salmeron transfers care of patient to Dr. Wilson pending lab results.    10:26 PM: Reassessed pt at this time. She has multiple family members at bedside. Patient has returned to her baseline  functioning. She feels well and would like to go home. Discussed with pt all pertinent ED information and results. Discussed pt dx and plan of tx. Gave pt all f/u and return to the ED instructions. All questions and concerns were addressed at this time. Pt expresses understanding of information and instructions, and is comfortable with plan to discharge. Pt is stable for discharge.    I discussed with patient and/or family/caretaker that evaluation in the ED does not suggest any emergent or life threatening medical conditions requiring immediate intervention beyond what was provided in the ED, and I believe patient is safe for discharge.  Regardless, an unremarkable evaluation in the ED does not preclude the development or presence of a serious of life threatening condition. As such, patient was instructed to return immediately for any worsening or change in current symptoms.          ED Medication(s):  Medications   lactated ringers bolus 2,040 mL (2,040 mLs Intravenous Not Given 12/3/22 1300)        Follow-up Information       Yasmin Navarro MD. Schedule an appointment as soon as possible for a visit in 2 days.    Specialty: Family Medicine  Why: For re-evaluation and further treatment  Contact information:  [address] [facility] [address]  [phone]               [facility] - Emergency Dept.. Go today.    Specialty: Emergency Medicine  Why: If symptoms worsen, For re-evaluation and further treatment, As needed  Contact information:  [address] [facility] [address] [phone]             [address] Schedule an appointment as soon as possible for a visit in 1 week.    Specialty: Neurology  Why: For re-evaluation and further treatment  Contact information:  [address] [facility] [address] [phone]  Additional information:  Please park on the [address] side and use the Clinic entrance. Check in on the 1st floor, to the right  across from the café.                           New Prescriptions    No medications on file         Medical Decision Making    Medical Decision Making:   Clinical Tests:   Lab Tests: Ordered and Reviewed  Radiological Study: Ordered and Reviewed  Medical Tests: Ordered and Reviewed         Scribe Attestation:   Scribe #1: I performed the above scribed service and the documentation accurately describes the services I performed. I attest to the accuracy of the note.    Attending:   Physician Attestation Statement for Scribe #1: I, Lenny Salmeron MD, personally performed the services described in this documentation, as scribed by Erika Reddy, in my presence, and it is both accurate and complete.       Scribe Attestation:   Scribe #2: I performed the above scribed service and the documentation accurately describes the services I performed. I attest to the accuracy of the note.    Attending Attestation:           Physician Attestation for Scribe:    Physician Attestation Statement for Scribe #2: I, John Wilson MD, reviewed documentation, as scribed by My Claudio in my presence, and it is both accurate and complete. I also acknowledge and confirm the content of the note done by Scribe #1.        Clinical Impression       ICD-10-CM ICD-9-CM   1. Altered mental status, unspecified altered mental status type  R41.82 780.97   2. Weakness  R53.1 780.79   3. Abnormal CT of brain  R90.89 793.0       Disposition:   Disposition: Discharged  Condition: Stable       John Wilson MD  12/04/22 0419

## 2022-12-05 NOTE — PROGRESS NOTES
Patient ID: Leia Rodriguez is a 72 y.o. female.    Chief Complaint: No chief complaint on file.    HPI:  70-year-old female s/p left mastectomy with sentinel lymph node biopsy 9/8/20 referred for splenic mass.  Patient was undergoing evaluation of renal mass and hydronephrosis incidentally noted to have splenic mass.    for follow up. Doing well with no complaints.     presents to discuss surgery. In the interim she has undergone PET scan which was negative for metastatic disease. Genetic testing pending    Initially referred for recurrent left breast cancer.  She recently underwent biopsy showing invasive ductal carcinoma ER+/CT- Her 2 -.  She previously underwent a left breast lumpectomy with adjuvant radiation in 2017.      For Seema Negro:  Breast cancer follow-up - last visit was 5/29/19    Pt has a history of Stage 0 DCIS of Lt. breast.  She presented in March of 2017 when diagnostic MMG confirmed the presence of a round mass with indistinct margins in the Lt. breast at the 7 o'clock position. The mass measured 9 x 5 x 6 mm. The patient was referred to Dr. Duke and on 6/15/17 underwent wire localization lumpectomy. Pathology revealed an 8 mm focus of ductal carcinoma in situ. The tumor was low grade. There was tumor present at the anterior inferior margin. Ninety percent of the tumor cells were ER positive, 5- 10% of the tumor cells were CT positive. The patient was referred for adjuvant radiotherapy. Completed a course of partial breast irradiation to the LIQ of the Lt. breast on 8/14/17.  Currently on hormonal therapy with Arimidex that started 8/2017.  Due off 8/2022    DEXA- 6/29/18- wnl- f/u in 2 years due to AI- 6/2020 due    Risk factors identified:     Menarche at 11 y/o  G 3 P 3  First pregnancy at 18 y/o  LMP: 50's  Estrogen: none  Radiation to the neck or chest wall - has had left breast radiation  Prior breast biopsies or atypical hyperplasia- left breast lumpectomy     FH: no breast cancer,  daughter cervical cancer 30's, father prostate cancer 70's-     Body mass index is 26.09 kg/m².    Review of Systems   Constitutional: Negative.  Negative for activity change and unexpected weight change.   HENT: Negative.  Negative for hearing loss, rhinorrhea and trouble swallowing.    Eyes: Negative.  Negative for discharge and visual disturbance.   Respiratory: Negative.  Negative for chest tightness and wheezing.    Cardiovascular: Negative.  Negative for chest pain and palpitations.   Gastrointestinal: Negative.  Negative for blood in stool, constipation, diarrhea and vomiting.        No reflux   Endocrine: Negative.  Negative for polydipsia and polyuria.   Genitourinary: Negative.  Negative for difficulty urinating, dysuria, hematuria and menstrual problem.   Musculoskeletal: Negative.  Negative for joint swelling and neck pain.   Skin: Negative.    Allergic/Immunologic: Negative.    Neurological: Negative.  Negative for headaches.   Hematological: Negative.  Negative for adenopathy.   Psychiatric/Behavioral: Negative.  Negative for confusion and dysphoric mood.  Breast: Pt denies any breast pain, nipple discharge, or palpable mass. No prior trauma or bruising. No breast surgeries or abnormalities.    Current Outpatient Medications   Medication Sig Dispense Refill    anastrozole (ARIMIDEX) 1 mg Tab Take 1 tablet (1 mg total) by mouth once daily. 90 tablet 3    busPIRone (BUSPAR) 15 MG tablet Take 1 tablet (15 mg total) by mouth 2 (two) times daily. 60 tablet 11    calcium citrate (CALCITRATE) 200 mg (950 mg) tablet Take 1 tablet by mouth once daily.      carvediloL (COREG) 3.125 MG tablet TAKE 1 TABLET BY MOUTH TWICE A DAY WITH MEALS 180 tablet 3    cyanocobalamin 2000 MCG tablet Take 2,000 mcg by mouth 2 (two) times a day.      donepeziL (ARICEPT) 10 MG tablet TAKE 1 TABLET BY MOUTH EVERY DAY IN THE EVENING 90 tablet 1    ferrous sulfate (FEOSOL) 325 mg (65 mg iron) Tab tablet Take 65 mg by mouth once daily.       furosemide (LASIX) 20 MG tablet Take 1 tablet (20 mg total) by mouth 2 (two) times daily. 60 tablet 11    isosorbide mononitrate (IMDUR) 30 MG 24 hr tablet TAKE 1 TABLET BY MOUTH EVERY DAY 90 tablet 3    memantine (NAMENDA) 10 MG Tab Take 1 tablet (10 mg total) by mouth 2 (two) times daily. 180 tablet 3    spironolactone (ALDACTONE) 25 MG tablet TAKE 1 TABLET BY MOUTH EVERY DAY 90 tablet 3    tamsulosin (FLOMAX) 0.4 mg Cap Take 1 capsule by mouth once daily.      telmisartan (MICARDIS) 20 MG Tab TAKE 1 TABLET BY MOUTH EVERY DAY 30 tablet 11    vitamin D 1000 units Tab Take 1,000 Units by mouth once daily.      aspirin 81 MG Chew Take 1 tablet (81 mg total) by mouth once daily. 90 tablet 3    atorvastatin (LIPITOR) 20 MG tablet Take 1 tablet (20 mg total) by mouth every evening. 90 tablet 1     No current facility-administered medications for this visit.       Review of patient's allergies indicates:  No Known Allergies    Past Medical History:   Diagnosis Date    Arthritis     DEGAR HANDS, KNEES    Behavioral change 2022    Blind right eye     Traumatic    Breast cancer 06/15/2017    0.8 cm Grade1 INTRADUCTAL BREAST 9 positve margin (left)    Cataract     Essential hypertension     Hemorrhoids     Immunodeficiency due to chemotherapy 2021    Lipoma of abdominal wall     Major neurocognitive disorder 2022    Obesity     Overactive bladder     Pap smear abnormality of cervix with ASCUS favoring benign     Thyroid nodule     Tobacco use disorder     Tubular adenoma of colon     Urinary incontinence        Past Surgical History:   Procedure Laterality Date    ANTERIOR VAGINAL REPAIR      BLADDER SURGERY      BREAST LUMPECTOMY Left     CATARACT EXTRACTION Left 2022     SECTION      X2    COLONOSCOPY N/A 3/8/2017    Procedure: COLONOSCOPY;  Surgeon: Tyron Paris MD;  Location: [facility];  Service: Endoscopy;  Laterality: N/A;    COLONOSCOPY N/A 2020    Procedure:  COLONOSCOPY;  Surgeon: Keira Ellison MD;  Location: [facility] ENDO;  Service: Endoscopy;  Laterality: N/A;    ESOPHAGOGASTRODUODENOSCOPY N/A 2020    Procedure: EGD (ESOPHAGOGASTRODUODENOSCOPY);  Surgeon: Keira Ellison MD;  Location: [facility] ENDO;  Service: Endoscopy;  Laterality: N/A;  new onset iron deficiency with prior history of breast cancer    INTRALUMINAL GASTROINTESTINAL TRACT IMAGING VIA CAPSULE N/A 10/28/2020    Procedure: IMAGING PROCEDURE, GI TRACT, INTRALUMINAL, VIA CAPSULE;  Surgeon: Finesse Jha RN;  Location: [facility] ENDO;  Service: Endoscopy;  Laterality: N/A;    LEFT HEART CATHETERIZATION Left 10/12/2021    Procedure: CATHETERIZATION, HEART, LEFT;  Surgeon: Tiffanie Santos MD;  Location: [facility] CATH LAB;  Service: Cardiology;  Laterality: Left;    SENTINEL LYMPH NODE BIOPSY Left 2020    Procedure: BIOPSY, LYMPH NODE, SENTINEL;  Surgeon: Vincent Moyer MD;  Location: [facility] OR;  Service: General;  Laterality: Left;    SIMPLE MASTECTOMY Left 2020    Procedure: MASTECTOMY, SIMPLE;  Surgeon: Vincent Moyer MD;  Location: [facility] OR;  Service: General;  Laterality: Left;    THYROID LOBECTOMY Left     TOTAL ABDOMINAL HYSTERECTOMY      TUBAL LIGATION         Family History   Problem Relation Age of Onset    Hypertension Father     Cataracts Father     Prostate cancer Father 80    Cervical cancer Daughter 32    Allergic rhinitis Daughter     Cirrhosis Mother     Alcohol abuse Mother     Hypertension Daughter     Diabetes Brother     Heart attack Brother     Heart murmur Brother     No Known Problems Daughter        Social History     Socioeconomic History    Marital status:    Tobacco Use    Smoking status: Former     Packs/day: 1.00     Years: 50.00     Pack years: 50.00     Types: Cigarettes     Quit date: 2020     Years since quittin.3    Smokeless tobacco: Never   Substance and Sexual Activity    Alcohol use: Never     Alcohol/week: 28.0 standard drinks     Types: 28 Cans of beer  per week    Drug use: No    Sexual activity: Never     Partners: Male   Social History Narrative    The patient is .  She is retired from [employer].       Vitals:    11/16/22 1116   BP: (!) 84/55   Pulse: 74   Temp: 98.3 °F (36.8 °C)       Physical Exam  Constitutional:       Appearance: She is well-developed.   HENT:      Head: Normocephalic and atraumatic.      Right Ear: External ear normal.      Left Ear: External ear normal.      Mouth/Throat:      Pharynx: No oropharyngeal exudate.   Eyes:      General: No scleral icterus.        Right eye: No discharge.         Left eye: No discharge.      Conjunctiva/sclera: Conjunctivae normal.      Pupils: Pupils are equal, round, and reactive to light.   Neck:      Thyroid: No thyromegaly.   Cardiovascular:      Rate and Rhythm: Normal rate and regular rhythm.      Heart sounds: Normal heart sounds.   Pulmonary:      Effort: Pulmonary effort is normal.      Breath sounds: Normal breath sounds.   Chest:   Breasts:     Right: No inverted nipple, mass, nipple discharge, skin change or tenderness.      Left: No inverted nipple, mass, nipple discharge, skin change or tenderness.       Abdominal:      General: Bowel sounds are normal.      Palpations: Abdomen is soft.   Musculoskeletal:         General: Normal range of motion.      Right shoulder: No crepitus. Normal strength.      Cervical back: Normal range of motion and neck supple.   Lymphadenopathy:      Head:      Right side of head: No submental, submandibular, tonsillar, preauricular, posterior auricular or occipital adenopathy.      Left side of head: No submental, submandibular, tonsillar, preauricular, posterior auricular or occipital adenopathy.      Cervical: No cervical adenopathy.      Right cervical: No superficial or posterior cervical adenopathy.     Left cervical: No superficial or posterior cervical adenopathy.      Upper Body:      Right upper body: No supraclavicular adenopathy.      Left upper  body: No supraclavicular adenopathy.   Skin:     General: Skin is warm and dry.      Coloration: Skin is not pale.      Findings: No erythema or rash.   Neurological:      Mental Status: She is alert and oriented to person, place, and time.      Deep Tendon Reflexes: Reflexes are normal and symmetric.   Psychiatric:         Behavior: Behavior normal.         Thought Content: Thought content normal.         Judgment: Judgment normal.       Result:   Mammo Digital Diagnostic Bilat w/ Navjot  US Breast Left Limited     History:  Patient is 70 y.o. and is seen for a diagnostic mammogram.     Films Compared:  Compared to: 05/29/2019 Mammo Digital Diagnostic Bilat w/ Navjot and 05/16/2018 Mammo Digital Diagnostic Bilat with Tomosynthesis_CAD     Findings:  This procedure was performed using tomosynthesis. Computer-aided detection was utilized in the interpretation of this examination.  The breasts are heterogeneously dense, which may obscure small masses.      Left  Mammo Digital Diagnostic Bilat w/ Navjot  There is an 11 mm oval mass with spiculated margins seen in the upper outer quadrant of the left breast in the posterior depth.      US Breast Left Limited  There is an irregularly shaped, non-parallel, hypoechoic mass with microlobulated margins with shadowing seen in the left breast at 2 o'clock, 10 cm from the nipple.      No suspicious left axillary adenopathy demonstrated.      Right     Mammo Digital Diagnostic Bilat w/ Navjot  There is no evidence of suspicious masses, calcifications, or other abnormal findings in the right breast.     Impression:  Left  Mass: Left breast 11 mm mass at the upper outer posterior position. Assessment: 5 - Highly suggestive of malignancy. Biopsy is recommended.      Right  There is no mammographic or sonographic evidence of malignancy in the right breast.     BI-RADS Category:   Overall: 5 - Highly Suggestive of Malignancy     Recommendation:  Ultrasound guided Biopsy is recommended.      PET/CT:  Impression:  New diagnosis of left breast carcinoma.  No evidence of FDG avid regional lymphadenopathy or distant metastatic disease.      Final Pathologic Diagnosis 1.  Left axilla, sentinel lymph node #1, excisional biopsy: 3 benign lymph   nodes, negative for metastatic carcinoma (0/3).          Confirmed by negative immunohistochemical staining with cytokeratins   AE1/AE3, CAM 5.2 and wide spectrum keratin with          satisfactory positive and negative controls.   2.  Left axilla, sentinel lymph node #2, excisional biopsy: 1 benign lymph   node, negative for metastatic carcinoma (0/1).          Confirmed by negative immunohistochemical staining with cytokeratins   AE1/AE3, CAM 5.2 and wide spectrum keratin with          satisfactory positive and negative controls.   3.  Left axilla, sentinel lymph node #3, excisional biopsy: 1 benign lymph   node, negative for metastatic carcinoma (0/1).          Confirmed by negative immunohistochemical staining with cytokeratins   AE1/AE3, CAM 5.2 and wide spectrum keratin with          satisfactory positive and negative controls.   4.  Left breast, mastectomy: Invasive ductal carcinoma, multifocal with two   (2) foci measuring as follows:  Lesion #1 (Blocks 4E-F, corresponding to   prior          biopsy site/clip): 12 mm (1.2 cm) and Lesion #2          (Block 4D)  8 mm (0.8 cm) in greatest dimensions.          Microcalcifications are present associated with invasive carcinoma.          Margins: Invasive carcinoma is present at closest approach within less   than 1 mm (within less than 0.1 cm) of the                  black/deep surgical margin (this is the smaller lesion #2   measuring 8 mm in total size).  Invasive carcinoma is                  greater than 10 mm (greater than 1 cm) from all other surgical   margins.                  Ductal carcinoma in situ is greater than 10 mm (greater than 1   cm) from all surgical margins.          Ductal carcinoma in situ,  "high nuclear grade with solid and   comedonecrosis patterns are present and associated with          invasive tumor.          Previous biopsy cavity site and clip are identified.          Incidental calcified fibroadenoma.          Unremarkable nipple and skin.          See synoptic report in comment section for further details.   5.  Excess skin left breast, excision: Unremarkable skin and soft tissue.          No evidence of malignancy.   Comment:  Synoptic report   Procedure:  Total mastectomy   Specimen laterality:  Left   Tumor focality:  Multifocal; 2 foci   Tumor size: Focus/Lesion #1:  Greatest dimension:  12 mm (1.2 cm)          Focus/Lesion #2:  Greatest dimension:  8 mm (0.8 cm)   Histologic type:  Invasive carcinoma of no special type (ductal).   Histologic grade: Glandular/tubular differentiation:  2          Nuclear pleomorphism:  3          Mitotic rate:  1          Overall grade:  Grade  2   Ductal carcinoma in situ:  Present Negative for extensive intraductal   component   Tumor extension:   Skin is present and uninvolved by tumor.          Nipple is present and uninvolved by tumor.          Skeletal muscle is not present for evaluation.   Margins:         - Invasive carcinoma is present at closest approach within   less than 1 mm (within less than 0.1 cm) of the                  black/deep surgical margin (this is the smaller lesion #2   measuring 8 mm in total size).  Invasive carcinoma is                  greater than 10 mm (greater than 1 cm) from all other surgical   margins.                  - Ductal carcinoma in situ is greater than 10 mm (greater than   1 cm) from all surgical margins.   Regional lymph nodes:                             Number examined:  5                             Number involved:  0   Treatment effect and breast:  No known presurgical therapy   Pathologic stage classification:   TNM descriptors:  "m" (multiple foci of invasive carcinoma)   Primary tumor:        pT1c:  " "Tumor greater than 10 mm but less than or equal to 2 mm in   greatest dimension.   Regional lymph nodes:        (sn)pN0:  No regional lymph node metastasis   Distant metastasis:        pMX:  Cannot be assessed.     Ancillary studies:   Immunostaining for lesion #1, which is the larger lesion associated with clip   and biopsy site measuring 12 mm in greatest dimension (corresponding to the   lesion seen in blocks 4 E and F):   Immunohistochemical stains are completed on this patient's previous biopsy   from the left upper outer quadrant breast biopsy from August 2020, case   number [id number], with the following results:   "BREAST BIOMARKER RESULTS   Estrogen receptor (ER): Low Positive (10% weak)   Progesterone receptor (IL): Negative (less than 1%)   HER2 IHC: Equivocal (Score 2+), HER2 FISH is pending and will be reported in a   supplemental   Ki-67 proliferation index: 50%   SUPPLEMENTAL (2):   HER2, Breast Tumor, FISH, Tissue   Result Summary:   Negative"   Immunohistochemical staining for lesion #2, which is the smaller calcified 8   mm lesion represented Block 4 D will be completed for hormonal markers and   results will follow in supplemental report.     Tumor Blocks for possible Future Studies:  4E-F (Lesion #1); 4D (Lesion #2)         CT urogram:   2. There is a 2.1 cm splenic mass. Etiology uncertain. Further characterization with a splenic MRI is recommended.    Assessment & Plan:  71 y/o female s/p left breast mastectomy with sentinel lymph node biopsy     Incidental splenic mass noted     Splenic MRI for further evaluation  Further recommendations to follow based on MRI findings  30 minutes of total time spent on the encounter, which includes face to face time and non-face to face time preparing to see the patient (eg, review of tests), Obtaining and/or reviewing separately obtained history, Documenting clinical information in the electronic or other health record, Independently interpreting results " (not separately reported) and communicating results to the patient/family/caregiver, or Care coordination (not separately reported).

## 2022-12-06 ENCOUNTER — OFFICE VISIT (OUTPATIENT)
Dept: HEMATOLOGY/ONCOLOGY | Facility: CLINIC | Age: 72
End: 2022-12-06
Payer: MEDICARE

## 2022-12-06 VITALS
SYSTOLIC BLOOD PRESSURE: 106 MMHG | OXYGEN SATURATION: 98 % | WEIGHT: 152.56 LBS | DIASTOLIC BLOOD PRESSURE: 60 MMHG | HEART RATE: 74 BPM | BODY MASS INDEX: 28.8 KG/M2 | HEIGHT: 61 IN | TEMPERATURE: 98 F

## 2022-12-06 DIAGNOSIS — D50.8 IRON DEFICIENCY ANEMIA SECONDARY TO INADEQUATE DIETARY IRON INTAKE: Primary | ICD-10-CM

## 2022-12-06 PROCEDURE — 3008F PR BODY MASS INDEX (BMI) DOCUMENTED: ICD-10-PCS | Mod: CPTII,S$GLB,, | Performed by: INTERNAL MEDICINE

## 2022-12-06 PROCEDURE — 3074F SYST BP LT 130 MM HG: CPT | Mod: CPTII,S$GLB,, | Performed by: INTERNAL MEDICINE

## 2022-12-06 PROCEDURE — 1126F AMNT PAIN NOTED NONE PRSNT: CPT | Mod: CPTII,S$GLB,, | Performed by: INTERNAL MEDICINE

## 2022-12-06 PROCEDURE — 99999 PR PBB SHADOW E&M-EST. PATIENT-LVL IV: CPT | Mod: PBBFAC,,, | Performed by: INTERNAL MEDICINE

## 2022-12-06 PROCEDURE — 1160F RVW MEDS BY RX/DR IN RCRD: CPT | Mod: CPTII,S$GLB,, | Performed by: INTERNAL MEDICINE

## 2022-12-06 PROCEDURE — 4010F PR ACE/ARB THEARPY RXD/TAKEN: ICD-10-PCS | Mod: CPTII,S$GLB,, | Performed by: INTERNAL MEDICINE

## 2022-12-06 PROCEDURE — 1159F PR MEDICATION LIST DOCUMENTED IN MEDICAL RECORD: ICD-10-PCS | Mod: CPTII,S$GLB,, | Performed by: INTERNAL MEDICINE

## 2022-12-06 PROCEDURE — 3074F PR MOST RECENT SYSTOLIC BLOOD PRESSURE < 130 MM HG: ICD-10-PCS | Mod: CPTII,S$GLB,, | Performed by: INTERNAL MEDICINE

## 2022-12-06 PROCEDURE — 3078F DIAST BP <80 MM HG: CPT | Mod: CPTII,S$GLB,, | Performed by: INTERNAL MEDICINE

## 2022-12-06 PROCEDURE — 4010F ACE/ARB THERAPY RXD/TAKEN: CPT | Mod: CPTII,S$GLB,, | Performed by: INTERNAL MEDICINE

## 2022-12-06 PROCEDURE — 1101F PT FALLS ASSESS-DOCD LE1/YR: CPT | Mod: CPTII,S$GLB,, | Performed by: INTERNAL MEDICINE

## 2022-12-06 PROCEDURE — 3288F PR FALLS RISK ASSESSMENT DOCUMENTED: ICD-10-PCS | Mod: CPTII,S$GLB,, | Performed by: INTERNAL MEDICINE

## 2022-12-06 PROCEDURE — 1159F MED LIST DOCD IN RCRD: CPT | Mod: CPTII,S$GLB,, | Performed by: INTERNAL MEDICINE

## 2022-12-06 PROCEDURE — 1160F PR REVIEW ALL MEDS BY PRESCRIBER/CLIN PHARMACIST DOCUMENTED: ICD-10-PCS | Mod: CPTII,S$GLB,, | Performed by: INTERNAL MEDICINE

## 2022-12-06 PROCEDURE — 1101F PR PT FALLS ASSESS DOC 0-1 FALLS W/OUT INJ PAST YR: ICD-10-PCS | Mod: CPTII,S$GLB,, | Performed by: INTERNAL MEDICINE

## 2022-12-06 PROCEDURE — 99214 OFFICE O/P EST MOD 30 MIN: CPT | Mod: S$GLB,,, | Performed by: INTERNAL MEDICINE

## 2022-12-06 PROCEDURE — 1126F PR PAIN SEVERITY QUANTIFIED, NO PAIN PRESENT: ICD-10-PCS | Mod: CPTII,S$GLB,, | Performed by: INTERNAL MEDICINE

## 2022-12-06 PROCEDURE — 3288F FALL RISK ASSESSMENT DOCD: CPT | Mod: CPTII,S$GLB,, | Performed by: INTERNAL MEDICINE

## 2022-12-06 PROCEDURE — 99999 PR PBB SHADOW E&M-EST. PATIENT-LVL IV: ICD-10-PCS | Mod: PBBFAC,,, | Performed by: INTERNAL MEDICINE

## 2022-12-06 PROCEDURE — 3078F PR MOST RECENT DIASTOLIC BLOOD PRESSURE < 80 MM HG: ICD-10-PCS | Mod: CPTII,S$GLB,, | Performed by: INTERNAL MEDICINE

## 2022-12-06 PROCEDURE — 3008F BODY MASS INDEX DOCD: CPT | Mod: CPTII,S$GLB,, | Performed by: INTERNAL MEDICINE

## 2022-12-06 PROCEDURE — 99214 PR OFFICE/OUTPT VISIT, EST, LEVL IV, 30-39 MIN: ICD-10-PCS | Mod: S$GLB,,, | Performed by: INTERNAL MEDICINE

## 2022-12-06 NOTE — PROGRESS NOTES
Subjective:      Patient ID: Leia Rodriguez is a 72 y.o. female.    Chief Complaint: No chief complaint on file.      HPI: This is a 72 year old woman with Left breast cancer - [id number] -ER positive/HER2 positive - -s/p left mastectomy. Herceptin was discontinued at cycle 9 due to decline in EF.  She is on Arimidex, calcium and vitamin D. She also has history of iron deficiency anemia.  She was recently admitted to hospital for probably hypoglycemia. MRI brain on 12/3/2022 was unremarkable.    Review of Systems   Constitutional:  Negative for chills and fever.   HENT:  Negative for mouth sores and sore throat.    Respiratory:  Negative for cough, shortness of breath and wheezing.    Cardiovascular:  Negative for palpitations.   Gastrointestinal:  Negative for abdominal pain and blood in stool.   Genitourinary:  Negative for hematuria.   Neurological:  Negative for dizziness, syncope and headaches.   Psychiatric/Behavioral:  Negative for agitation and confusion.      Objective:     Physical Exam  Constitutional:       Appearance: Normal appearance.   HENT:      Head: Normocephalic and atraumatic.      Mouth/Throat:      Mouth: Mucous membranes are moist.      Pharynx: Oropharynx is clear. No oropharyngeal exudate or posterior oropharyngeal erythema.   Cardiovascular:      Heart sounds:     No friction rub. No gallop.   Abdominal:      Tenderness: There is no guarding or rebound.   Musculoskeletal:      Cervical back: Neck supple.      Right lower leg: No edema.      Left lower leg: No edema.   Skin:     Findings: No lesion or rash.   Neurological:      Mental Status: She is alert and oriented to person, place, and time.   Psychiatric:         Mood and Affect: Mood normal.         Behavior: Behavior normal.         Thought Content: Thought content normal.         Judgment: Judgment normal.       Assessment:     Problem List Items Addressed This Visit    None       Left breast cancer - stage I (ER positive/HER2  positive)  - s/p left mastectomy.  2. History of iron deficiency anemia.     Plan:     Patient continues Arimidex . Bone mineral density - Q 2 years last 7/20/2022 - osteopenia.  She is on calcium and vitamin D.   Yearly right breast mammograms - last 12/2/2022 was normal.  She has history of iron deficiency anemia - Hb 11 on 12/2 2022.  RTC ; 3 months - CBC/iron/TIBC/ferritin.

## 2022-12-08 LAB
BACTERIA BLD CULT: NORMAL
BACTERIA BLD CULT: NORMAL

## 2022-12-12 ENCOUNTER — OFFICE VISIT (OUTPATIENT)
Dept: CARDIOLOGY | Facility: CLINIC | Age: 72
End: 2022-12-12
Payer: MEDICARE

## 2022-12-12 VITALS
HEART RATE: 87 BPM | OXYGEN SATURATION: 98 % | BODY MASS INDEX: 28.08 KG/M2 | DIASTOLIC BLOOD PRESSURE: 72 MMHG | SYSTOLIC BLOOD PRESSURE: 124 MMHG | WEIGHT: 148.56 LBS

## 2022-12-12 DIAGNOSIS — I50.42 CHRONIC COMBINED SYSTOLIC AND DIASTOLIC CHF (CONGESTIVE HEART FAILURE): Primary | Chronic | ICD-10-CM

## 2022-12-12 DIAGNOSIS — I25.118 CORONARY ARTERY DISEASE OF NATIVE ARTERY OF NATIVE HEART WITH STABLE ANGINA PECTORIS: Chronic | ICD-10-CM

## 2022-12-12 DIAGNOSIS — I49.1 PAC (PREMATURE ATRIAL CONTRACTION): ICD-10-CM

## 2022-12-12 DIAGNOSIS — I70.0 ATHEROSCLEROSIS OF AORTA: ICD-10-CM

## 2022-12-12 DIAGNOSIS — I10 ESSENTIAL HYPERTENSION: Chronic | ICD-10-CM

## 2022-12-12 DIAGNOSIS — F10.21 HISTORY OF ALCOHOLISM: ICD-10-CM

## 2022-12-12 PROCEDURE — 99214 OFFICE O/P EST MOD 30 MIN: CPT | Mod: S$GLB,,, | Performed by: INTERNAL MEDICINE

## 2022-12-12 PROCEDURE — 99214 PR OFFICE/OUTPT VISIT, EST, LEVL IV, 30-39 MIN: ICD-10-PCS | Mod: S$GLB,,, | Performed by: INTERNAL MEDICINE

## 2022-12-12 PROCEDURE — 3078F DIAST BP <80 MM HG: CPT | Mod: CPTII,S$GLB,, | Performed by: INTERNAL MEDICINE

## 2022-12-12 PROCEDURE — 4010F ACE/ARB THERAPY RXD/TAKEN: CPT | Mod: CPTII,S$GLB,, | Performed by: INTERNAL MEDICINE

## 2022-12-12 PROCEDURE — 1160F PR REVIEW ALL MEDS BY PRESCRIBER/CLIN PHARMACIST DOCUMENTED: ICD-10-PCS | Mod: CPTII,S$GLB,, | Performed by: INTERNAL MEDICINE

## 2022-12-12 PROCEDURE — 3008F BODY MASS INDEX DOCD: CPT | Mod: CPTII,S$GLB,, | Performed by: INTERNAL MEDICINE

## 2022-12-12 PROCEDURE — 1159F MED LIST DOCD IN RCRD: CPT | Mod: CPTII,S$GLB,, | Performed by: INTERNAL MEDICINE

## 2022-12-12 PROCEDURE — 3008F PR BODY MASS INDEX (BMI) DOCUMENTED: ICD-10-PCS | Mod: CPTII,S$GLB,, | Performed by: INTERNAL MEDICINE

## 2022-12-12 PROCEDURE — 3074F SYST BP LT 130 MM HG: CPT | Mod: CPTII,S$GLB,, | Performed by: INTERNAL MEDICINE

## 2022-12-12 PROCEDURE — 1159F PR MEDICATION LIST DOCUMENTED IN MEDICAL RECORD: ICD-10-PCS | Mod: CPTII,S$GLB,, | Performed by: INTERNAL MEDICINE

## 2022-12-12 PROCEDURE — 1160F RVW MEDS BY RX/DR IN RCRD: CPT | Mod: CPTII,S$GLB,, | Performed by: INTERNAL MEDICINE

## 2022-12-12 PROCEDURE — 4010F PR ACE/ARB THEARPY RXD/TAKEN: ICD-10-PCS | Mod: CPTII,S$GLB,, | Performed by: INTERNAL MEDICINE

## 2022-12-12 PROCEDURE — 3074F PR MOST RECENT SYSTOLIC BLOOD PRESSURE < 130 MM HG: ICD-10-PCS | Mod: CPTII,S$GLB,, | Performed by: INTERNAL MEDICINE

## 2022-12-12 PROCEDURE — 99999 PR PBB SHADOW E&M-EST. PATIENT-LVL III: ICD-10-PCS | Mod: PBBFAC,,, | Performed by: INTERNAL MEDICINE

## 2022-12-12 PROCEDURE — 99999 PR PBB SHADOW E&M-EST. PATIENT-LVL III: CPT | Mod: PBBFAC,,, | Performed by: INTERNAL MEDICINE

## 2022-12-12 PROCEDURE — 3078F PR MOST RECENT DIASTOLIC BLOOD PRESSURE < 80 MM HG: ICD-10-PCS | Mod: CPTII,S$GLB,, | Performed by: INTERNAL MEDICINE

## 2022-12-12 RX ORDER — FUROSEMIDE 20 MG/1
20 TABLET ORAL DAILY
Qty: 90 TABLET | Refills: 2 | Status: SHIPPED | OUTPATIENT
Start: 2022-12-12 | End: 2023-03-24

## 2022-12-12 NOTE — PROGRESS NOTES
Subjective:   Patient ID:  Leia Rodriguez is a 72 y.o. female who presents for follow up of Chest Pain, Dizziness, and Palpitations      73 yo female, 6 months f/u  PMH CAD, CHFmEF, h/o beast cancer 4 yrs ago lumpectomy and chemo, recurrent in  s/p mastectomy and chemo ongoing, and HTN  Quit smoking in  after 50 yrs smoking.  Some residual left chest pain after mastectomy,   GRISSOM and fatigue after fast walking,   No palpitation, leg swelling, dizziness and faint.    ekg in  NSR and TWI on V2 to v6 and inferior leads   ECHO normal EF and severe LVH   ECHo EF 45%, mod MR and LAE  Weight stable    03/2021 admitted for CHF exacerbation.  and Troponin 0.44 flat. elg NSR and TWI on lateral leads. Improved SOB after diuresis. Then BNP dropped to 58  Now SOB sable. Sleeps on 1 pillow    07/2021 visit   echo Day 15, cycle 6 Biplane=44% 4D LVQ=45% Avg GPLS= -16.1   MPI inferoapical scar  GRISSOM while long distance walking  No chest pain, dizziness faint, leg swelling  Decent appetite  F/u with Dr. Fernández on stage I HER2 positive breast carcinoma being treated with Herceptin q. 3 weeks     visit  Dynamical TWI on EKG   MPI showed apical inferior fixed perfusion defect  No chest pain . Limited walking due to lower back pain  No orthopnea      visit  No chest pain. GRISSOM mild and chronic. Limited activity due to fatigue and leg pain.   S/p LHC done on 10/12/2021, distal % OM2/3 40% and midRCA 50% lesion and EF 45% and LVEDP 17 mmHG. Moderate MR. Continue medical Rx  Serial echo studies    Echo 2/22/21 Cycle 4 Day 20  4dLVQ=47%  AutoEF=48%  BRIDGETT unable to obtain accurate measurements   5-26-21  Day 15, cycle 6  Biplane=44%  4D LVQ=45%  Avg GPLS= -16.1   9-13-21 Day 20, Cycle 11  Biplane=42%  4D LVQ=49%  Avg GPLS= -14.8%    12/2021 visit  11/ went to ER [facility]. EKG sinus tachy and PVCs. BNP up to 800. CXR pulm. Edema. Added lasix 20 mg daily  Now  dyspnea improved. No leg swelling chest pain. Sleeps on 1 pillow and no PND.   On iron infusion rx fro anemia     visit  BNP normal and Cr elevated 1.4. will hold lasix  No leg swelling, dizziness sob orthopnea. On fluid restriction     visit  Improved appetite after held chemo due to decreased EF about .   Gained the weight. No SOB, sleeps on 2 pillows    Interval history  Twice ER visits for not feeling well, SOB, the lab and CRX mri unremarkable, except some +UTI.Occurred at night and the family concerning anxiety ?  EKG in  NSR PACs          Past Medical History:   Diagnosis Date    Arthritis     EDGAR HANDS, KNEES    Behavioral change 2022    Blind right eye     Traumatic    Breast cancer 06/15/2017    0.8 cm Grade1 INTRADUCTAL BREAST 9 positve margin (left)    Cataract     Essential hypertension     Hemorrhoids     Immunodeficiency due to chemotherapy 2021    Lipoma of abdominal wall     Major neurocognitive disorder 2022    Obesity     Overactive bladder     Pap smear abnormality of cervix with ASCUS favoring benign     Thyroid nodule     Tobacco use disorder     Tubular adenoma of colon     Urinary incontinence        Past Surgical History:   Procedure Laterality Date    ANTERIOR VAGINAL REPAIR      BLADDER SURGERY      BREAST LUMPECTOMY Left     CATARACT EXTRACTION Left 2022     SECTION      X2    COLONOSCOPY N/A 3/8/2017    Procedure: COLONOSCOPY;  Surgeon: Tyron Paris MD;  Location: [facility];  Service: Endoscopy;  Laterality: N/A;    COLONOSCOPY N/A 2020    Procedure: COLONOSCOPY;  Surgeon: Keira Ellison MD;  Location: [facility];  Service: Endoscopy;  Laterality: N/A;    ESOPHAGOGASTRODUODENOSCOPY N/A 2020    Procedure: EGD (ESOPHAGOGASTRODUODENOSCOPY);  Surgeon: Keira Ellison MD;  Location: [facility];  Service: Endoscopy;  Laterality: N/A;  new onset iron deficiency with prior history of breast cancer    INTRALUMINAL  GASTROINTESTINAL TRACT IMAGING VIA CAPSULE N/A 10/28/2020    Procedure: IMAGING PROCEDURE, GI TRACT, INTRALUMINAL, VIA CAPSULE;  Surgeon: Finesse Jha RN;  Location: [facility] ENDO;  Service: Endoscopy;  Laterality: N/A;    LEFT HEART CATHETERIZATION Left 10/12/2021    Procedure: CATHETERIZATION, HEART, LEFT;  Surgeon: Tiffanie Santos MD;  Location: [facility] CATH LAB;  Service: Cardiology;  Laterality: Left;    SENTINEL LYMPH NODE BIOPSY Left 2020    Procedure: BIOPSY, LYMPH NODE, SENTINEL;  Surgeon: Vincent Moyer MD;  Location: [facility] OR;  Service: General;  Laterality: Left;    SIMPLE MASTECTOMY Left 2020    Procedure: MASTECTOMY, SIMPLE;  Surgeon: Vincent Moyer MD;  Location: [facility];  Service: General;  Laterality: Left;    THYROID LOBECTOMY Left     TOTAL ABDOMINAL HYSTERECTOMY      TUBAL LIGATION         Social History     Tobacco Use    Smoking status: Former     Packs/day: 1.00     Years: 50.00     Pack years: 50.00     Types: Cigarettes     Quit date: 2020     Years since quittin.3    Smokeless tobacco: Never   Substance Use Topics    Alcohol use: Never     Alcohol/week: 28.0 standard drinks     Types: 28 Cans of beer per week    Drug use: No       Family History   Problem Relation Age of Onset    Hypertension Father     Cataracts Father     Prostate cancer Father 80    Cervical cancer Daughter 32    Allergic rhinitis Daughter     Cirrhosis Mother     Alcohol abuse Mother     Hypertension Daughter     Diabetes Brother     Heart attack Brother     Heart murmur Brother     No Known Problems Daughter          ROS    Objective:   Physical Exam  HENT:      Head: Normocephalic.   Eyes:      Pupils: Pupils are equal, round, and reactive to light.   Neck:      Thyroid: No thyromegaly.      Vascular: Normal carotid pulses. No carotid bruit or JVD.   Cardiovascular:      Rate and Rhythm: Normal rate and regular rhythm. No extrasystoles are present.     Chest Wall: PMI is not displaced.      Pulses: Normal  pulses.      Heart sounds: Normal heart sounds. No murmur heard.    No gallop. No S3 sounds.   Pulmonary:      Effort: No respiratory distress.      Breath sounds: Normal breath sounds. No stridor.   Abdominal:      General: Bowel sounds are normal.      Palpations: Abdomen is soft.      Tenderness: There is no abdominal tenderness. There is no rebound.   Skin:     General: Skin is warm and dry.      Findings: No rash.   Neurological:      Mental Status: She is alert.       Lab Results   Component Value Date    CHOL 119 (L) 03/31/2021    CHOL 207 (H) 09/03/2019    CHOL 212 (H) 06/21/2018     Lab Results   Component Value Date    HDL 46 03/31/2021    HDL 53 09/03/2019    HDL 65 06/21/2018     Lab Results   Component Value Date    LDLCALC 61.2 (L) 03/31/2021    LDLCALC 134.2 09/03/2019    LDLCALC 131.6 06/21/2018     Lab Results   Component Value Date    TRIG 59 03/31/2021    TRIG 99 09/03/2019    TRIG 77 06/21/2018     Lab Results   Component Value Date    CHOLHDL 38.7 03/31/2021    CHOLHDL 25.6 09/03/2019    CHOLHDL 30.7 06/21/2018       Chemistry        Component Value Date/Time     12/03/2022 1307    K 4.1 12/03/2022 1307     12/03/2022 1307    CO2 26 12/03/2022 1307    BUN 23 12/03/2022 1307    CREATININE 1.1 12/03/2022 1307    GLU 98 12/03/2022 1307        Component Value Date/Time    CALCIUM 9.4 12/03/2022 1307    ALKPHOS 59 12/03/2022 1307    AST 14 12/03/2022 1307    ALT 13 12/03/2022 1307    BILITOT 0.7 12/03/2022 1307    ESTGFRAFRICA 48.0 (A) 06/20/2022 1020    EGFRNONAA 41.7 (A) 06/20/2022 1020          No results found for: LABA1C, HGBA1C  Lab Results   Component Value Date    TSH 0.765 01/27/2022     Lab Results   Component Value Date    INR 1.1 10/01/2021     Lab Results   Component Value Date    WBC 6.24 12/03/2022    HGB 10.7 (L) 12/03/2022    HCT 33.4 (L) 12/03/2022    MCV 92 12/03/2022     12/03/2022     BMP  Sodium   Date Value Ref Range Status   12/03/2022 136 136 - 145 mmol/L  Final     Potassium   Date Value Ref Range Status   12/03/2022 4.1 3.5 - 5.1 mmol/L Final     Chloride   Date Value Ref Range Status   12/03/2022 101 95 - 110 mmol/L Final     CO2   Date Value Ref Range Status   12/03/2022 26 23 - 29 mmol/L Final     BUN   Date Value Ref Range Status   12/03/2022 23 8 - 23 mg/dL Final     Creatinine   Date Value Ref Range Status   12/03/2022 1.1 0.5 - 1.4 mg/dL Final     Calcium   Date Value Ref Range Status   12/03/2022 9.4 8.7 - 10.5 mg/dL Final     Anion Gap   Date Value Ref Range Status   12/03/2022 9 8 - 16 mmol/L Final     eGFR if    Date Value Ref Range Status   06/20/2022 48.0 (A) >60 mL/min/1.73 m^2 Final     eGFR if non    Date Value Ref Range Status   06/20/2022 41.7 (A) >60 mL/min/1.73 m^2 Final     Comment:     Calculation used to obtain the estimated glomerular filtration  rate (eGFR) is the CKD-EPI equation.        BNP  @LABRCNTIP(BNP,BNPTRIAGEBLO)@  @LABRCNTIP(troponini)@  CrCl cannot be calculated (Patient's most recent lab result is older than the maximum 7 days allowed.).  No results found in the last 24 hours.  No results found in the last 24 hours.  No results found in the last 24 hours.    Assessment:      1. Chronic combined systolic and diastolic CHF (congestive heart failure)    2. Coronary artery disease of native artery of native heart with stable angina pectoris    3. Essential hypertension    4. Atherosclerosis of aorta    5. History of alcoholism        Plan:   Continue Coreg telmisartan aldactone imdur and lasix  Continue ASA and Lipitor  Decrease lasix to once a day  HOLTER for PACs    Counseled DASH  Check Lipid profile in 6 months  Recommend heart-healthy diet  Dipti. Risk modification.   I have reviewed all pertinent labs and cardiac studies independently. Plans and recommendations have been formulated under my direct supervision. All questions answered and patient voiced understanding.   If symptoms persist go to  the ED  RTC in 6 months

## 2022-12-16 ENCOUNTER — PATIENT MESSAGE (OUTPATIENT)
Dept: SURGERY | Facility: CLINIC | Age: 72
End: 2022-12-16
Payer: MEDICARE

## 2022-12-16 ENCOUNTER — TUMOR BOARD CONFERENCE (OUTPATIENT)
Dept: HEMATOLOGY/ONCOLOGY | Facility: CLINIC | Age: 72
End: 2022-12-16
Payer: MEDICARE

## 2022-12-16 DIAGNOSIS — R16.1 SPLENIC MASS: Primary | ICD-10-CM

## 2022-12-16 NOTE — PROGRESS NOTES
Tumor Board Documentation      Leia Rodriguez was presented by Vincent Moyer MD at our Tumor Board on 12/16/2022, which included representatives from Medical Oncology, Hematology, Radiation Oncology, Surgical Oncology, Pathology, Genetics, Navigation, Radiology, Gastrointestinal, Pulmonology.    Leia currently presents as a current patient with Other (n/a), with history of the following treatments: None.    Additionally, we reviewed previous medical and familial history, history of present illness, and recent lab results along with all available histopathologic and imaging studies. The tumor board considered available treatment options and made the following recommendations:  Imaging     Repeat imaging with CT in 6 months    The following procedures/referrals were also placed: No orders of the defined types were placed in this encounter.      Clinical Trial Status: Not discussed     National site-specific guidelines were discussed with respect to the case.    Tumor board is a meeting of clinicians from various specialty areas who evaluate and discuss patients for whom a multidisciplinary approach is being considered. Final determinations in the plan of care are those of the provider(s). The responsibility for follow up of recommendations given during tumor board is that of the provider.     Vincent Moyer MD

## 2022-12-21 ENCOUNTER — OFFICE VISIT (OUTPATIENT)
Dept: NEUROLOGY | Facility: CLINIC | Age: 72
End: 2022-12-21
Payer: MEDICARE

## 2022-12-21 VITALS
RESPIRATION RATE: 16 BRPM | BODY MASS INDEX: 28.08 KG/M2 | DIASTOLIC BLOOD PRESSURE: 48 MMHG | SYSTOLIC BLOOD PRESSURE: 95 MMHG | HEIGHT: 61 IN | HEART RATE: 96 BPM

## 2022-12-21 DIAGNOSIS — G47.00 INSOMNIA, UNSPECIFIED TYPE: ICD-10-CM

## 2022-12-21 DIAGNOSIS — N18.32 CHRONIC KIDNEY DISEASE, STAGE 3B: Primary | ICD-10-CM

## 2022-12-21 DIAGNOSIS — Z91.89 AT RISK FOR PROLONGED QT INTERVAL SYNDROME: ICD-10-CM

## 2022-12-21 PROCEDURE — 1125F AMNT PAIN NOTED PAIN PRSNT: CPT | Mod: CPTII,S$GLB,, | Performed by: NURSE PRACTITIONER

## 2022-12-21 PROCEDURE — 1159F PR MEDICATION LIST DOCUMENTED IN MEDICAL RECORD: ICD-10-PCS | Mod: CPTII,S$GLB,, | Performed by: NURSE PRACTITIONER

## 2022-12-21 PROCEDURE — 3074F SYST BP LT 130 MM HG: CPT | Mod: CPTII,S$GLB,, | Performed by: NURSE PRACTITIONER

## 2022-12-21 PROCEDURE — 3008F PR BODY MASS INDEX (BMI) DOCUMENTED: ICD-10-PCS | Mod: CPTII,S$GLB,, | Performed by: NURSE PRACTITIONER

## 2022-12-21 PROCEDURE — 3288F PR FALLS RISK ASSESSMENT DOCUMENTED: ICD-10-PCS | Mod: CPTII,S$GLB,, | Performed by: NURSE PRACTITIONER

## 2022-12-21 PROCEDURE — 99417 PROLNG OP E/M EACH 15 MIN: CPT | Mod: S$GLB,,, | Performed by: NURSE PRACTITIONER

## 2022-12-21 PROCEDURE — 99215 PR OFFICE/OUTPT VISIT, EST, LEVL V, 40-54 MIN: ICD-10-PCS | Mod: S$GLB,,, | Performed by: NURSE PRACTITIONER

## 2022-12-21 PROCEDURE — 99999 PR PBB SHADOW E&M-EST. PATIENT-LVL IV: CPT | Mod: PBBFAC,,, | Performed by: NURSE PRACTITIONER

## 2022-12-21 PROCEDURE — 3078F DIAST BP <80 MM HG: CPT | Mod: CPTII,S$GLB,, | Performed by: NURSE PRACTITIONER

## 2022-12-21 PROCEDURE — 99215 OFFICE O/P EST HI 40 MIN: CPT | Mod: S$GLB,,, | Performed by: NURSE PRACTITIONER

## 2022-12-21 PROCEDURE — 1159F MED LIST DOCD IN RCRD: CPT | Mod: CPTII,S$GLB,, | Performed by: NURSE PRACTITIONER

## 2022-12-21 PROCEDURE — 3008F BODY MASS INDEX DOCD: CPT | Mod: CPTII,S$GLB,, | Performed by: NURSE PRACTITIONER

## 2022-12-21 PROCEDURE — 4010F PR ACE/ARB THEARPY RXD/TAKEN: ICD-10-PCS | Mod: CPTII,S$GLB,, | Performed by: NURSE PRACTITIONER

## 2022-12-21 PROCEDURE — 1160F PR REVIEW ALL MEDS BY PRESCRIBER/CLIN PHARMACIST DOCUMENTED: ICD-10-PCS | Mod: CPTII,S$GLB,, | Performed by: NURSE PRACTITIONER

## 2022-12-21 PROCEDURE — 99999 PR PBB SHADOW E&M-EST. PATIENT-LVL IV: ICD-10-PCS | Mod: PBBFAC,,, | Performed by: NURSE PRACTITIONER

## 2022-12-21 PROCEDURE — 4010F ACE/ARB THERAPY RXD/TAKEN: CPT | Mod: CPTII,S$GLB,, | Performed by: NURSE PRACTITIONER

## 2022-12-21 PROCEDURE — 3078F PR MOST RECENT DIASTOLIC BLOOD PRESSURE < 80 MM HG: ICD-10-PCS | Mod: CPTII,S$GLB,, | Performed by: NURSE PRACTITIONER

## 2022-12-21 PROCEDURE — 1125F PR PAIN SEVERITY QUANTIFIED, PAIN PRESENT: ICD-10-PCS | Mod: CPTII,S$GLB,, | Performed by: NURSE PRACTITIONER

## 2022-12-21 PROCEDURE — 99417 PR PROLONGED SVC, OUTPT, W/WO DIRECT PT CONTACT,  EA ADDTL 15 MIN: ICD-10-PCS | Mod: S$GLB,,, | Performed by: NURSE PRACTITIONER

## 2022-12-21 PROCEDURE — 1101F PT FALLS ASSESS-DOCD LE1/YR: CPT | Mod: CPTII,S$GLB,, | Performed by: NURSE PRACTITIONER

## 2022-12-21 PROCEDURE — 1101F PR PT FALLS ASSESS DOC 0-1 FALLS W/OUT INJ PAST YR: ICD-10-PCS | Mod: CPTII,S$GLB,, | Performed by: NURSE PRACTITIONER

## 2022-12-21 PROCEDURE — 3074F PR MOST RECENT SYSTOLIC BLOOD PRESSURE < 130 MM HG: ICD-10-PCS | Mod: CPTII,S$GLB,, | Performed by: NURSE PRACTITIONER

## 2022-12-21 PROCEDURE — 1160F RVW MEDS BY RX/DR IN RCRD: CPT | Mod: CPTII,S$GLB,, | Performed by: NURSE PRACTITIONER

## 2022-12-21 PROCEDURE — 3288F FALL RISK ASSESSMENT DOCD: CPT | Mod: CPTII,S$GLB,, | Performed by: NURSE PRACTITIONER

## 2022-12-21 RX ORDER — TRAZODONE HYDROCHLORIDE 50 MG/1
50 TABLET ORAL NIGHTLY
Qty: 30 TABLET | Refills: 11 | Status: SHIPPED | OUTPATIENT
Start: 2022-12-21 | End: 2023-01-11

## 2022-12-21 RX ORDER — MEMANTINE HYDROCHLORIDE 5 MG/1
5 TABLET ORAL 2 TIMES DAILY
Qty: 180 TABLET | Refills: 3 | Status: SHIPPED | OUTPATIENT
Start: 2022-12-21 | End: 2023-04-21 | Stop reason: SDUPTHER

## 2022-12-21 RX ORDER — DONEPEZIL HYDROCHLORIDE 5 MG/1
5 TABLET, FILM COATED ORAL NIGHTLY
Qty: 90 TABLET | Refills: 3 | Status: ON HOLD | OUTPATIENT
Start: 2022-12-21 | End: 2023-07-12 | Stop reason: HOSPADM
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
## 2023-01-10 ENCOUNTER — PATIENT MESSAGE (OUTPATIENT)
Dept: NEUROLOGY | Facility: CLINIC | Age: 73
End: 2023-01-10
Payer: MEDICARE

## 2023-01-10 ENCOUNTER — TELEPHONE (OUTPATIENT)
Dept: CARDIOLOGY | Facility: CLINIC | Age: 73
End: 2023-01-10
Payer: MEDICARE

## 2023-01-10 DIAGNOSIS — I49.1 PAC (PREMATURE ATRIAL CONTRACTION): Primary | ICD-10-CM

## 2023-01-11 RX ORDER — TRAZODONE HYDROCHLORIDE 150 MG/1
150 TABLET ORAL NIGHTLY
Qty: 30 TABLET | Refills: 11 | Status: SHIPPED | OUTPATIENT
Start: 2023-01-11 | End: 2023-03-08

## 2023-01-18 ENCOUNTER — HOSPITAL ENCOUNTER (OUTPATIENT)
Dept: CARDIOLOGY | Facility: HOSPITAL | Age: 73
Discharge: HOME | End: 2023-01-18
Attending: INTERNAL MEDICINE
Payer: MEDICARE

## 2023-01-18 DIAGNOSIS — I49.1 PAC (PREMATURE ATRIAL CONTRACTION): ICD-10-CM

## 2023-01-18 DIAGNOSIS — G47.00 INSOMNIA, UNSPECIFIED TYPE: ICD-10-CM

## 2023-01-18 DIAGNOSIS — Z91.89 AT RISK FOR PROLONGED QT INTERVAL SYNDROME: ICD-10-CM

## 2023-01-18 PROCEDURE — 93010 EKG 12-LEAD: ICD-10-PCS | Mod: ,,, | Performed by: INTERNAL MEDICINE

## 2023-01-18 PROCEDURE — 93005 ELECTROCARDIOGRAM TRACING: CPT

## 2023-01-18 PROCEDURE — 93225 XTRNL ECG REC<48 HRS REC: CPT

## 2023-01-18 PROCEDURE — 93010 ELECTROCARDIOGRAM REPORT: CPT | Mod: ,,, | Performed by: INTERNAL MEDICINE

## 2023-01-20 ENCOUNTER — TELEPHONE (OUTPATIENT)
Dept: NEUROLOGY | Facility: CLINIC | Age: 73
End: 2023-01-20
Payer: MEDICARE

## 2023-01-20 NOTE — TELEPHONE ENCOUNTER
----- Message from Inga Reddy sent at 1/20/2023 11:07 AM CST -----  Contact: [phone]  Patient is calling for LAB results. Please call patient at [phone]. Thanks!

## 2023-01-20 NOTE — TELEPHONE ENCOUNTER
----- Message from Layne Lloyd NP sent at 1/19/2023  7:40 PM CST -----  EKG RESULTS:     01-    QT Interval 366, HR 85     Normal results

## 2023-02-22 ENCOUNTER — PES CALL (OUTPATIENT)
Dept: ADMINISTRATIVE | Facility: CLINIC | Age: 73
End: 2023-02-22
Payer: MEDICARE

## 2023-02-28 ENCOUNTER — TELEPHONE (OUTPATIENT)
Dept: ADMINISTRATIVE | Facility: HOSPITAL | Age: 73
End: 2023-02-28
Payer: MEDICARE

## 2023-03-07 ENCOUNTER — OFFICE VISIT (OUTPATIENT)
Dept: FAMILY MEDICINE | Facility: CLINIC | Age: 73
End: 2023-03-07
Payer: MEDICARE

## 2023-03-07 VITALS
BODY MASS INDEX: 26.47 KG/M2 | TEMPERATURE: 99 F | HEIGHT: 61 IN | HEART RATE: 87 BPM | OXYGEN SATURATION: 97 % | WEIGHT: 140.19 LBS | DIASTOLIC BLOOD PRESSURE: 46 MMHG | SYSTOLIC BLOOD PRESSURE: 98 MMHG

## 2023-03-07 VITALS
SYSTOLIC BLOOD PRESSURE: 95 MMHG | WEIGHT: 140.19 LBS | HEART RATE: 86 BPM | HEIGHT: 61 IN | BODY MASS INDEX: 26.47 KG/M2 | DIASTOLIC BLOOD PRESSURE: 44 MMHG | TEMPERATURE: 84 F

## 2023-03-07 DIAGNOSIS — F10.21 HISTORY OF ALCOHOLISM: ICD-10-CM

## 2023-03-07 DIAGNOSIS — M81.0 AGE-RELATED OSTEOPOROSIS WITHOUT CURRENT PATHOLOGICAL FRACTURE: ICD-10-CM

## 2023-03-07 DIAGNOSIS — Z17.0 MALIGNANT NEOPLASM OF LOWER-INNER QUADRANT OF LEFT BREAST IN FEMALE, ESTROGEN RECEPTOR POSITIVE: ICD-10-CM

## 2023-03-07 DIAGNOSIS — G30.9 MAJOR NEUROCOGNITIVE DISORDER DUE TO ALZHEIMER'S DISEASE: ICD-10-CM

## 2023-03-07 DIAGNOSIS — I50.42 CHRONIC COMBINED SYSTOLIC AND DIASTOLIC CHF (CONGESTIVE HEART FAILURE): Chronic | ICD-10-CM

## 2023-03-07 DIAGNOSIS — I70.0 ATHEROSCLEROSIS OF AORTA: ICD-10-CM

## 2023-03-07 DIAGNOSIS — F02.80 MAJOR NEUROCOGNITIVE DISORDER DUE TO ALZHEIMER'S DISEASE: ICD-10-CM

## 2023-03-07 DIAGNOSIS — F33.0 MILD EPISODE OF RECURRENT MAJOR DEPRESSIVE DISORDER: ICD-10-CM

## 2023-03-07 DIAGNOSIS — N18.32 CHRONIC KIDNEY DISEASE, STAGE 3B: Chronic | ICD-10-CM

## 2023-03-07 DIAGNOSIS — Z00.00 ENCOUNTER FOR PREVENTIVE HEALTH EXAMINATION: Primary | ICD-10-CM

## 2023-03-07 DIAGNOSIS — D50.8 IRON DEFICIENCY ANEMIA SECONDARY TO INADEQUATE DIETARY IRON INTAKE: ICD-10-CM

## 2023-03-07 DIAGNOSIS — F17.210 CIGARETTE NICOTINE DEPENDENCE WITHOUT COMPLICATION: ICD-10-CM

## 2023-03-07 DIAGNOSIS — I10 ESSENTIAL HYPERTENSION: Chronic | ICD-10-CM

## 2023-03-07 DIAGNOSIS — Z79.899 IMMUNODEFICIENCY DUE TO CHEMOTHERAPY: ICD-10-CM

## 2023-03-07 DIAGNOSIS — I50.42 CHRONIC COMBINED SYSTOLIC AND DIASTOLIC CHF (CONGESTIVE HEART FAILURE): ICD-10-CM

## 2023-03-07 DIAGNOSIS — C50.312 MALIGNANT NEOPLASM OF LOWER-INNER QUADRANT OF LEFT BREAST IN FEMALE, ESTROGEN RECEPTOR POSITIVE: ICD-10-CM

## 2023-03-07 DIAGNOSIS — T45.1X5A IMMUNODEFICIENCY DUE TO CHEMOTHERAPY: ICD-10-CM

## 2023-03-07 DIAGNOSIS — M19.041 PRIMARY OSTEOARTHRITIS OF BOTH HANDS: ICD-10-CM

## 2023-03-07 DIAGNOSIS — G30.9 MAJOR NEUROCOGNITIVE DISORDER DUE TO ALZHEIMER'S DISEASE: Primary | ICD-10-CM

## 2023-03-07 DIAGNOSIS — I27.20 PULMONARY HTN: ICD-10-CM

## 2023-03-07 DIAGNOSIS — M19.042 PRIMARY OSTEOARTHRITIS OF BOTH HANDS: ICD-10-CM

## 2023-03-07 DIAGNOSIS — J43.9 PULMONARY EMPHYSEMA, UNSPECIFIED EMPHYSEMA TYPE: ICD-10-CM

## 2023-03-07 DIAGNOSIS — N18.4 CHRONIC KIDNEY DISEASE (CKD), STAGE IV (SEVERE): ICD-10-CM

## 2023-03-07 DIAGNOSIS — I25.118 CORONARY ARTERY DISEASE OF NATIVE ARTERY OF NATIVE HEART WITH STABLE ANGINA PECTORIS: ICD-10-CM

## 2023-03-07 DIAGNOSIS — R82.90 ABNORMAL URINE ODOR: ICD-10-CM

## 2023-03-07 DIAGNOSIS — D84.821 IMMUNODEFICIENCY DUE TO CHEMOTHERAPY: ICD-10-CM

## 2023-03-07 DIAGNOSIS — I25.118 CORONARY ARTERY DISEASE OF NATIVE ARTERY OF NATIVE HEART WITH STABLE ANGINA PECTORIS: Chronic | ICD-10-CM

## 2023-03-07 DIAGNOSIS — F03.911 AGITATION DUE TO DEMENTIA: ICD-10-CM

## 2023-03-07 DIAGNOSIS — F02.80 MAJOR NEUROCOGNITIVE DISORDER DUE TO ALZHEIMER'S DISEASE: Primary | ICD-10-CM

## 2023-03-07 PROBLEM — N39.0 URINARY TRACT INFECTION WITHOUT HEMATURIA: Status: RESOLVED | Noted: 2022-01-24 | Resolved: 2023-03-07

## 2023-03-07 PROBLEM — Z87.891 HISTORY OF TOBACCO USE: Status: ACTIVE | Noted: 2017-02-20

## 2023-03-07 PROBLEM — Z91.89: Status: RESOLVED | Noted: 2022-09-21 | Resolved: 2023-03-07

## 2023-03-07 PROBLEM — R90.89 ABNORMAL FINDING ON MRI OF BRAIN: Status: RESOLVED | Noted: 2022-09-21 | Resolved: 2023-03-07

## 2023-03-07 PROBLEM — R41.3 MEMORY LOSS: Status: RESOLVED | Noted: 2022-09-21 | Resolved: 2023-03-07

## 2023-03-07 PROCEDURE — 3074F PR MOST RECENT SYSTOLIC BLOOD PRESSURE < 130 MM HG: ICD-10-PCS | Mod: CPTII,S$GLB,, | Performed by: FAMILY MEDICINE

## 2023-03-07 PROCEDURE — 99999 PR PBB SHADOW E&M-EST. PATIENT-LVL IV: ICD-10-PCS | Mod: PBBFAC,,, | Performed by: NURSE PRACTITIONER

## 2023-03-07 PROCEDURE — 1126F PR PAIN SEVERITY QUANTIFIED, NO PAIN PRESENT: ICD-10-PCS | Mod: CPTII,S$GLB,, | Performed by: FAMILY MEDICINE

## 2023-03-07 PROCEDURE — 3078F DIAST BP <80 MM HG: CPT | Mod: CPTII,S$GLB,, | Performed by: FAMILY MEDICINE

## 2023-03-07 PROCEDURE — 3008F BODY MASS INDEX DOCD: CPT | Mod: CPTII,S$GLB,, | Performed by: FAMILY MEDICINE

## 2023-03-07 PROCEDURE — 3078F PR MOST RECENT DIASTOLIC BLOOD PRESSURE < 80 MM HG: ICD-10-PCS | Mod: CPTII,S$GLB,, | Performed by: NURSE PRACTITIONER

## 2023-03-07 PROCEDURE — 3288F PR FALLS RISK ASSESSMENT DOCUMENTED: ICD-10-PCS | Mod: CPTII,S$GLB,, | Performed by: NURSE PRACTITIONER

## 2023-03-07 PROCEDURE — 99214 PR OFFICE/OUTPT VISIT, EST, LEVL IV, 30-39 MIN: ICD-10-PCS | Mod: S$GLB,,, | Performed by: FAMILY MEDICINE

## 2023-03-07 PROCEDURE — 3008F BODY MASS INDEX DOCD: CPT | Mod: CPTII,S$GLB,, | Performed by: NURSE PRACTITIONER

## 2023-03-07 PROCEDURE — 3008F PR BODY MASS INDEX (BMI) DOCUMENTED: ICD-10-PCS | Mod: CPTII,S$GLB,, | Performed by: FAMILY MEDICINE

## 2023-03-07 PROCEDURE — 3008F PR BODY MASS INDEX (BMI) DOCUMENTED: ICD-10-PCS | Mod: CPTII,S$GLB,, | Performed by: NURSE PRACTITIONER

## 2023-03-07 PROCEDURE — 1101F PR PT FALLS ASSESS DOC 0-1 FALLS W/OUT INJ PAST YR: ICD-10-PCS | Mod: CPTII,S$GLB,, | Performed by: NURSE PRACTITIONER

## 2023-03-07 PROCEDURE — 3074F SYST BP LT 130 MM HG: CPT | Mod: CPTII,S$GLB,, | Performed by: FAMILY MEDICINE

## 2023-03-07 PROCEDURE — 99214 OFFICE O/P EST MOD 30 MIN: CPT | Mod: S$GLB,,, | Performed by: FAMILY MEDICINE

## 2023-03-07 PROCEDURE — G0439 PPPS, SUBSEQ VISIT: HCPCS | Mod: S$GLB,,, | Performed by: NURSE PRACTITIONER

## 2023-03-07 PROCEDURE — 1126F AMNT PAIN NOTED NONE PRSNT: CPT | Mod: CPTII,S$GLB,, | Performed by: FAMILY MEDICINE

## 2023-03-07 PROCEDURE — 1101F PT FALLS ASSESS-DOCD LE1/YR: CPT | Mod: CPTII,S$GLB,, | Performed by: NURSE PRACTITIONER

## 2023-03-07 PROCEDURE — 99999 PR PBB SHADOW E&M-EST. PATIENT-LVL IV: CPT | Mod: PBBFAC,,, | Performed by: FAMILY MEDICINE

## 2023-03-07 PROCEDURE — 3078F DIAST BP <80 MM HG: CPT | Mod: CPTII,S$GLB,, | Performed by: NURSE PRACTITIONER

## 2023-03-07 PROCEDURE — 3288F FALL RISK ASSESSMENT DOCD: CPT | Mod: CPTII,S$GLB,, | Performed by: NURSE PRACTITIONER

## 2023-03-07 PROCEDURE — 99999 PR PBB SHADOW E&M-EST. PATIENT-LVL IV: CPT | Mod: PBBFAC,,, | Performed by: NURSE PRACTITIONER

## 2023-03-07 PROCEDURE — 99999 PR PBB SHADOW E&M-EST. PATIENT-LVL IV: ICD-10-PCS | Mod: PBBFAC,,, | Performed by: FAMILY MEDICINE

## 2023-03-07 PROCEDURE — 3074F PR MOST RECENT SYSTOLIC BLOOD PRESSURE < 130 MM HG: ICD-10-PCS | Mod: CPTII,S$GLB,, | Performed by: NURSE PRACTITIONER

## 2023-03-07 PROCEDURE — 3074F SYST BP LT 130 MM HG: CPT | Mod: CPTII,S$GLB,, | Performed by: NURSE PRACTITIONER

## 2023-03-07 PROCEDURE — 3078F PR MOST RECENT DIASTOLIC BLOOD PRESSURE < 80 MM HG: ICD-10-PCS | Mod: CPTII,S$GLB,, | Performed by: FAMILY MEDICINE

## 2023-03-07 PROCEDURE — G0439 PR MEDICARE ANNUAL WELLNESS SUBSEQUENT VISIT: ICD-10-PCS | Mod: S$GLB,,, | Performed by: NURSE PRACTITIONER

## 2023-03-07 NOTE — PROGRESS NOTES
CHIEF COMPLAINT: This is a 72-year-old female here for follow-up of chronic medical conditions and for preventive health exam.     SUBJECTIVE: Patient is accompanied today by her daughter who provides most of the history. She has had progression of dementia with behavioral disturbance including agitation and difficulty sleeping at night.  Doses of Namenda and Aricept have been halved because of chronic renal failure. She takes Flomax for urinary retention and recurrent UTIs.  Delete Her daughter states that her urine has a strong odor.    The patient is followed by Cardiology for CAD, chronic diastolic and systolic heart failure with decreased ejection fraction and hypertension for which she takes aspirin 81 mg daily, atorvastatin 20 mg daily, carvedilol 3.125 mg twice daily, Lasix 20 mg daily and telmisartan 20 mg daily.  Her blood pressure today is 105/66.  She has chronic kidney disease stage 3b.      The patient had left mastectomy for recurrent breast cancer in September 2020 and had previous breast cancer in 2017 and underwent lumpectomy and radiation followed by Arimidex.  She continues to take Arimidex status mastectomy. She takes meloxicam for osteoarthritis of hands.  Patient stopped smoking in August 2020 after 50 pack-year history of smoking.  She takes Flomax 0.4 mg daily for urinary retention and recurrent UTIs.  Patient had GI workup for iron deficiency anemia.  EGD findings:  gastric AVM.  Adenomatous polyps were found on colonoscopy.  Video capsule endoscopy was negative.  Patient takes iron supplement daily.     Eye exam July 2022.  Mammogram December 2022.  Colonoscopy August 2020, due again in August 2023.  Bone DEXA scan June 2018, due again June 2023. Tdap March 2013.  Prevnar February 2017. Influenza vaccine October 2022. COVID 19 vaccine March, 2021, January 2022.     ROS:  GENERAL: Patient denies fever, chills, night sweats. Patient denies weight loss. Patient denies anorexia, fatigue, or  swollen glands.  Positive for weakness.  SKIN: Patient denies rash or hair loss.  HEENT: Patient denies sore throat, ear pain, hearing loss, nasal congestion, or runny nose. Patient denies visual disturbance, eye irritation or discharge.  LUNGS: Patient denies cough, wheeze or hemoptysis.  CARDIOVASCULAR: Patient denies chest pain, shortness of breath, palpitations, syncope or lower extremity edema.  GI: Patient denies abdominal pain, nausea, vomiting, diarrhea, constipation, blood in stool or melena.  GENITOURINARY: Patient denies pelvic pain, vaginal discharge, itch or odor. Patient denies irregular vaginal bleeding. Patient denies dysuria, frequency, hematuria, or nocturia.  Positive for urinary retention.  BREASTS: Patient denies breast pain, mass or nipple discharge.  MUSCULOSKELETAL: Patient denies joint swelling, redness or warmth.  Positive for joint pain.  NEUROLOGIC: Patient denies headache, vertigo, paresthesias, weakness in limb, dysarthria, dysphagia or abnormality of gait.  PSYCHIATRIC: Patient denies anxiety or depression.  Positive for memory loss.     OBJECTIVE:   GENERAL: Well-developed well-nourished minimally overweight black female alert and oriented x3, in no acute distress.  Mild cognitive impairment.     SKIN: Clear without rash. Normal color and tone.  HEENT: Eyes: Clear conjunctivae. Pupils equal reactive to light and accommodation. Ears: Clear TMs. Clear canals. Nose: Without congestion. Pharynx: Without injection  or exudates.  NECK: Supple, normal range of motion. No masses, lymphadenopathy or enlarged thyroid. No JVD. Carotids 2+ and equal. No bruits.  LUNGS: Clear to auscultation. Normal respiratory effort.  CARDIOVASCULAR: Regular rhythm, normal S1, S2 without murmur, gallop or rub.  BACK: No CVA or spinal tenderness.  BREASTS:  Deferred.  ABDOMEN:  Large lipoma right upper quadrant, nontender. Active bowel sounds. Soft, nontender without mass or organomegaly. No rebound or  guarding.  EXTREMITIES: Without cyanosis, clubbing or edema. Distal pulses 2+ and equal. Normal range of motion in all extremities. No joint effusion, erythema or warmth. Bilateral hammertoes. Calluses/hyperkeratotic areas plantar surface of both feet.  NEUROLOGIC: Cranial nerves II through XII without deficit. Motor strength equal bilaterally. Sensation normal to touch. Deep tendon reflexes 2+ and equal. Gait without abnormality. No tremor. Negative cerebellar signs.  PELVIC:  Deferred.    ASSESSMENT:  1. Major neurocognitive disorder due to Alzheimer's disease    2. History of alcoholism    3. Essential hypertension    4. Coronary artery disease of native artery of native heart with stable angina pectoris    5. Chronic combined systolic and diastolic CHF (congestive heart failure)    6. Chronic kidney disease, stage 3b    7. Cigarette nicotine dependence without complication    8. Abnormal urine odor      PLAN:  1. Exercise regularly.  2. Age-appropriate counseling.  3. Recent lab reviewed and acceptable.    4.  Urinalysis.  5.  Urine culture and sensitivity.  6.  Low-dose screening CT scan of the lung.  7.  Refill medications as needed  8.  Follow-up annually.      30 minutes of total time spent on the encounter, which includes face to face time and non-face to face time preparing to see the patient (eg, review of tests), Obtaining and/or reviewing separately obtained history, Documenting clinical information in the electronic or other health record, Independently interpreting results (not separately reported) and communicating results to the patient/family/caregiver, or Care coordination (not separately reported).     This note is generated with speech recognition software and is subject to transcription error and sound alike phrases that may be missed by proofreading.

## 2023-03-07 NOTE — PROGRESS NOTES
"  Leia Rodriguez presented for a  Medicare AWV and comprehensive Health Risk Assessment today. The following components were reviewed and updated:  Patient accompanied by daughter, who was present throughout the visit and aided in information gathering.        Medical history  Family History  Social history  Allergies and Current Medications  Health Risk Assessment  Health Maintenance  Care Team         ** See Completed Assessments for Annual Wellness Visit within the encounter summary.**         The following assessments were completed:  Living Situation  CAGE  Depression Screening  Timed Get Up and Go  Whisper Test  Cognitive Function Screening  Nutrition Screening  ADL Screening  PAQ Screening        Vitals:    23 1408 23 1418   BP: (!) 80/40 (!) 95/44   Pulse: 86    Temp: 96.9 °F (36.1 °C) (!) 84 °F (28.9 °C)   Weight: 63.6 kg (140 lb 3.4 oz)    Height: 5' 1" (1.549 m)      Body mass index is 26.49 kg/m².  Physical Exam  Vitals and nursing note reviewed.   Constitutional:       Appearance: Normal appearance. She is well-developed. She is ill-appearing.   HENT:      Head: Normocephalic and atraumatic.   Eyes:      Pupils: Pupils are equal, round, and reactive to light.      Comments: Rt eye blind   Neck:      Vascular: No carotid bruit.   Cardiovascular:      Rate and Rhythm: Normal rate and regular rhythm.      Pulses: Normal pulses.      Heart sounds: Murmur heard.     No gallop.      Comments: Left ankle edeam  Pulmonary:      Effort: Pulmonary effort is normal.      Breath sounds: Normal breath sounds.   Abdominal:      General: Bowel sounds are normal. There is no distension.      Palpations: Abdomen is soft.      Tenderness: There is no abdominal tenderness.   Musculoskeletal:         General: No tenderness. Normal range of motion.      Right lower leg: Edema present.      Left lower le+ Edema present.   Skin:     General: Skin is warm and dry.   Neurological:      Mental Status: She is alert. "      Motor: Weakness present. No abnormal muscle tone.      Gait: Gait abnormal.      Comments: Hand tremors   Psychiatric:         Mood and Affect: Affect is flat.         Speech: Speech is delayed.         Behavior: Behavior normal.         Thought Content: Thought content normal.         Cognition and Memory: Cognition is impaired. Memory is impaired.         Judgment: Judgment normal.     Current Outpatient Medications   Medication Instructions    anastrozole (ARIMIDEX) 1 mg, Oral, Daily    aspirin 81 mg, Oral, Daily    atorvastatin (LIPITOR) 20 mg, Oral, Nightly    busPIRone (BUSPAR) 15 mg, Oral, 2 times daily    calcium citrate (CALCITRATE) 200 mg (950 mg) tablet 1 tablet, Oral, Daily    carvediloL (COREG) 3.125 MG tablet TAKE 1 TABLET BY MOUTH TWICE A DAY WITH MEALS    cyanocobalamin 2,000 mcg, Oral, 2 times daily    donepeziL (ARICEPT) 5 mg, Oral, Nightly    ferrous sulfate (FEOSOL) 65 mg, Oral, Daily    furosemide (LASIX) 20 mg, Oral, Daily    isosorbide mononitrate (IMDUR) 30 MG 24 hr tablet TAKE 1 TABLET BY MOUTH EVERY DAY    memantine (NAMENDA) 5 mg, Oral, 2 times daily    spironolactone (ALDACTONE) 25 MG tablet TAKE 1 TABLET BY MOUTH EVERY DAY    tamsulosin (FLOMAX) 0.4 mg Cap 1 capsule, Oral, Daily    telmisartan (MICARDIS) 20 MG Tab TAKE 1 TABLET BY MOUTH EVERY DAY    traZODone (DESYREL) 150 mg, Oral, Nightly    vitamin D (VITAMIN D3) 1,000 Units, Oral, Daily                 Diagnoses and health risks identified today and associated recommendations/orders:    1. Encounter for preventive health examination  Review for Opioid Screening: Patient does not have rx for Opioids.  Review for Substance Use Disorders: Patient does not use substance.      2. Chronic kidney disease (CKD), stage IV (severe)  eGFR >60 mL/min/1.73 m^2 26 Abnormal   54 Abnormal   48 Abnormal    Chronic and Ongoing BP low- scheduled to see PCP this am  Recommend to f/u with neprhologist for med eval    3. Chronic combined systolic and  diastolic CHF (congestive heart failure  The left ventricular global longitudinal strain is -14.8%.  The left ventricle is normal in size with mildly decreased systolic function.  The estimated ejection fraction is 45%.  Grade I left ventricular diastolic dysfunction.  Normal right ventricular size with normal right ventricular systolic function.  Mild mitral regurgitation.  Moderate tricuspid regurgitation.  Normal central venous pressure (3 mmHg).  The estimated PA systolic pressure is 42 mmHg.  There is pulmonary hypertension.  There is mild left ventricular global hypokinesis.  The quantitatively derived ejection fraction is 47%.   Chronic and Ongoing- see med list. Followed by card    4. Malignant neoplasm of lower-inner quadrant of left breast in female, estrogen receptor positive  Chronic and Stable-  last mammogram 12/ 2022  anastrozole (ARIMIDEX) 1 mg Tab, Take 1 tablet (1 mg total) by mouth once daily x  5 yrs ,   Followed by oncologist    5. Immunodeficiency due to chemotherapy  Component      Latest Ref Rng & Units 12/3/2022   WBC      3.90 - 12.70 K/uL 6.24   RBC      4.00 - 5.40 M/uL 3.64 (L)   Hemoglobin      12.0 - 16.0 g/dL 10.7 (L)   Hematocrit      37.0 - 48.5 % 33.4 (L)   MCV      82 - 98 fL 92   MCH      27.0 - 31.0 pg 29.4   MCHC      32.0 - 36.0 g/dL 32.0   Chronic and Ongoing. Continue current treatment plan as previously prescribed with your PCP    6. History of alcoholism  Stable no longer drin    7. Mild episode of recurrent major depressive disorder  Chronic and Ongoing. See meds  Continue current treatment plan as previously prescribed with your neuro md    8. Coronary artery disease of native artery of native heart with stable angina pectoris  Chronic and Ongoing. See meds Continue current treatment plan as previously prescribed with your card m    9. Atherosclerosis of aorta  Chronic and Ongoing. Continue current treatment plan as previously prescribed with your PCP    10. Pulmonary  emphysema, unspecified emphysema type  Chronic and Ongoing. Continue current treatment plan as previously prescribed with your PCP    11. Major neurocognitive disorder due to multiple etiologies with behavioral disturbance  Chronic and Ongoing.  See meds Continue current treatment plan as previously prescribed with your  neurologist    13. Abnormality of gait and mobility  Chronic and Ongoing. Hx of falls Continue current treatment plan as previously prescribed with your PCP    15. Agitation due to dementia   Chronic and Ongoing-new meds Continue current treatment plan as previously prescribed with your neurologist    17. Pulmonary HTN  Chronic and Ongoing. Continue current treatment plan as previously prescribed with your card    18. Iron deficiency anemia secondary to inadequate dietary iron intake  Chronic and Ongoing. Continue current treatment plan as previously prescribed with your PCP    19. Age-related osteoporosis without current pathological fracture  Chronic and Ongoing. Continue current treatment plan as previously prescribed with your PCP    20. Primary osteoarthritis of both hands  Chronic and Ongoing. Continue current treatment plan as previously prescribed with your PCP    I offered to discuss advanced care planning, including how to pick a person who would make decisions for you if you were unable to make them for yourself, called a health care power of , and what kind of decisions you might make such as use of life sustaining treatments such as ventilators and tube feeding when faced with a life limiting illness recorded on a living will that they will need to know. (How you want to be cared for as you near the end of your natural life)     X  Patient is unable to engage in a discussion regarding advanced directives due to severe physical and/or cognitive impairment.- dtg present     Provided Leia with a 5-10 year written screening schedule and personal prevention plan. Recommendations  were developed using the USPSTF age appropriate recommendations. Education, counseling, and referrals were provided as needed. After Visit Summary printed and given to patient which includes a list of additional screenings\tests needed.    Follow up in about 1 year (around 3/7/2024).    Teresa Monroe NP

## 2023-03-08 ENCOUNTER — OFFICE VISIT (OUTPATIENT)
Dept: NEUROLOGY | Facility: CLINIC | Age: 73
End: 2023-03-08
Payer: MEDICARE

## 2023-03-08 VITALS
HEIGHT: 61 IN | HEART RATE: 79 BPM | BODY MASS INDEX: 26.49 KG/M2 | SYSTOLIC BLOOD PRESSURE: 105 MMHG | DIASTOLIC BLOOD PRESSURE: 66 MMHG

## 2023-03-08 DIAGNOSIS — F33.0 MILD EPISODE OF RECURRENT MAJOR DEPRESSIVE DISORDER: ICD-10-CM

## 2023-03-08 DIAGNOSIS — N18.32 CHRONIC KIDNEY DISEASE, STAGE 3B: ICD-10-CM

## 2023-03-08 DIAGNOSIS — F03.90 MAJOR NEUROCOGNITIVE DISORDER: ICD-10-CM

## 2023-03-08 DIAGNOSIS — F02.80 MAJOR NEUROCOGNITIVE DISORDER DUE TO ALZHEIMER'S DISEASE: Primary | ICD-10-CM

## 2023-03-08 DIAGNOSIS — R90.89 ABNORMAL FINDING ON MRI OF BRAIN: ICD-10-CM

## 2023-03-08 DIAGNOSIS — F10.21 HISTORY OF ALCOHOLISM: ICD-10-CM

## 2023-03-08 DIAGNOSIS — F10.21 ALCOHOL USE DISORDER, MODERATE, IN SUSTAINED REMISSION: ICD-10-CM

## 2023-03-08 DIAGNOSIS — F03.911 AGITATION DUE TO DEMENTIA: ICD-10-CM

## 2023-03-08 DIAGNOSIS — G30.9 MAJOR NEUROCOGNITIVE DISORDER DUE TO ALZHEIMER'S DISEASE: Primary | ICD-10-CM

## 2023-03-08 DIAGNOSIS — Z91.89 AT RISK FOR PROLONGED QT INTERVAL SYNDROME: ICD-10-CM

## 2023-03-08 DIAGNOSIS — G47.00 INSOMNIA, UNSPECIFIED TYPE: ICD-10-CM

## 2023-03-08 DIAGNOSIS — F02.818 MAJOR NEUROCOGNITIVE DISORDER DUE TO MULTIPLE ETIOLOGIES WITH BEHAVIORAL DISTURBANCE: ICD-10-CM

## 2023-03-08 DIAGNOSIS — F99 INAPPROPRIATE BEHAVIOR: ICD-10-CM

## 2023-03-08 DIAGNOSIS — R46.89 BEHAVIORAL CHANGE: ICD-10-CM

## 2023-03-08 DIAGNOSIS — R41.3 OTHER AMNESIA: ICD-10-CM

## 2023-03-08 DIAGNOSIS — N39.41 URGE INCONTINENCE OF URINE: ICD-10-CM

## 2023-03-08 DIAGNOSIS — N39.46 MIXED STRESS AND URGE URINARY INCONTINENCE: ICD-10-CM

## 2023-03-08 DIAGNOSIS — R41.3 MEMORY LOSS: ICD-10-CM

## 2023-03-08 PROCEDURE — 99999 PR PBB SHADOW E&M-EST. PATIENT-LVL IV: ICD-10-PCS | Mod: PBBFAC,,, | Performed by: NURSE PRACTITIONER

## 2023-03-08 PROCEDURE — 3288F PR FALLS RISK ASSESSMENT DOCUMENTED: ICD-10-PCS | Mod: CPTII,S$GLB,, | Performed by: NURSE PRACTITIONER

## 2023-03-08 PROCEDURE — 3078F PR MOST RECENT DIASTOLIC BLOOD PRESSURE < 80 MM HG: ICD-10-PCS | Mod: CPTII,S$GLB,, | Performed by: NURSE PRACTITIONER

## 2023-03-08 PROCEDURE — 99999 PR PBB SHADOW E&M-EST. PATIENT-LVL IV: CPT | Mod: PBBFAC,,, | Performed by: NURSE PRACTITIONER

## 2023-03-08 PROCEDURE — 99214 PR OFFICE/OUTPT VISIT, EST, LEVL IV, 30-39 MIN: ICD-10-PCS | Mod: S$GLB,,, | Performed by: NURSE PRACTITIONER

## 2023-03-08 PROCEDURE — 3074F PR MOST RECENT SYSTOLIC BLOOD PRESSURE < 130 MM HG: ICD-10-PCS | Mod: CPTII,S$GLB,, | Performed by: NURSE PRACTITIONER

## 2023-03-08 PROCEDURE — 1160F RVW MEDS BY RX/DR IN RCRD: CPT | Mod: CPTII,S$GLB,, | Performed by: NURSE PRACTITIONER

## 2023-03-08 PROCEDURE — 3074F SYST BP LT 130 MM HG: CPT | Mod: CPTII,S$GLB,, | Performed by: NURSE PRACTITIONER

## 2023-03-08 PROCEDURE — 3288F FALL RISK ASSESSMENT DOCD: CPT | Mod: CPTII,S$GLB,, | Performed by: NURSE PRACTITIONER

## 2023-03-08 PROCEDURE — 1101F PR PT FALLS ASSESS DOC 0-1 FALLS W/OUT INJ PAST YR: ICD-10-PCS | Mod: CPTII,S$GLB,, | Performed by: NURSE PRACTITIONER

## 2023-03-08 PROCEDURE — 1160F PR REVIEW ALL MEDS BY PRESCRIBER/CLIN PHARMACIST DOCUMENTED: ICD-10-PCS | Mod: CPTII,S$GLB,, | Performed by: NURSE PRACTITIONER

## 2023-03-08 PROCEDURE — 3008F PR BODY MASS INDEX (BMI) DOCUMENTED: ICD-10-PCS | Mod: CPTII,S$GLB,, | Performed by: NURSE PRACTITIONER

## 2023-03-08 PROCEDURE — 99214 OFFICE O/P EST MOD 30 MIN: CPT | Mod: S$GLB,,, | Performed by: NURSE PRACTITIONER

## 2023-03-08 PROCEDURE — 1159F PR MEDICATION LIST DOCUMENTED IN MEDICAL RECORD: ICD-10-PCS | Mod: CPTII,S$GLB,, | Performed by: NURSE PRACTITIONER

## 2023-03-08 PROCEDURE — 3008F BODY MASS INDEX DOCD: CPT | Mod: CPTII,S$GLB,, | Performed by: NURSE PRACTITIONER

## 2023-03-08 PROCEDURE — 3078F DIAST BP <80 MM HG: CPT | Mod: CPTII,S$GLB,, | Performed by: NURSE PRACTITIONER

## 2023-03-08 PROCEDURE — 1159F MED LIST DOCD IN RCRD: CPT | Mod: CPTII,S$GLB,, | Performed by: NURSE PRACTITIONER

## 2023-03-08 PROCEDURE — 1125F AMNT PAIN NOTED PAIN PRSNT: CPT | Mod: CPTII,S$GLB,, | Performed by: NURSE PRACTITIONER

## 2023-03-08 PROCEDURE — 1125F PR PAIN SEVERITY QUANTIFIED, PAIN PRESENT: ICD-10-PCS | Mod: CPTII,S$GLB,, | Performed by: NURSE PRACTITIONER

## 2023-03-08 PROCEDURE — 1101F PT FALLS ASSESS-DOCD LE1/YR: CPT | Mod: CPTII,S$GLB,, | Performed by: NURSE PRACTITIONER

## 2023-03-08 RX ORDER — QUETIAPINE FUMARATE 100 MG/1
100 TABLET, FILM COATED ORAL NIGHTLY
Qty: 30 TABLET | Refills: 11 | Status: SHIPPED | OUTPATIENT
Start: 2023-03-08 | End: 2023-12-07

## 2023-03-08 RX ORDER — NAPROXEN SODIUM 220 MG/1
81 TABLET, FILM COATED ORAL DAILY
Qty: 90 TABLET | Refills: 3
Start: 2023-03-08 | End: 2024-03-07

## 2023-03-08 NOTE — PROGRESS NOTES
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    Proofing the house and use labeling.    Avoid antihistamines and anticholinergics.    Avoid changing routine.    Use written reminders.    Avoid multitasking.    Healthy diet, exercise (physical and cognitive).    Good sleep hygiene.      LEFT  MERALGIA PARAESTHETICA/NUMBNESS     Clinically Monitor      Avoid prolonged standing.     Wear loose clothes.    No need for neuropathic pain med's Numbness complaint only       URINARY INCONTINENCE     Refer to Urologist        MEDICAL/SURGICAL COMORBIDITIES     All relevant medical comorbidities noted and managed by primary care physician and medical care team.          MISCELLANEOUS MEDICAL PROBLEMS       HEALTHY LIFESTYLE AND PREVENTATIVE CARE    Encouraged the patient to adhere to the age-appropriate health maintenance guidelines including screening tests and vaccinations.     Discussed the overall importance of healthy lifestyle, optimal weight, exercise, healthy diet, good sleep hygiene and avoiding drugs including smoking, alcohol and recreational drugs. The patient verbalized full understanding.       Advised the patient to follow COVID-19 prevention  measures.       I spent 30 minutes total E/M more than 50 % spent  face to face with the patient    RTC 8 week       Tiff Lloyd, MSN NP      Collaborating Provider: Barbara Hurst MD, FAAN Neurologist/Epileptologist

## 2023-03-10 ENCOUNTER — TELEPHONE (OUTPATIENT)
Dept: FAMILY MEDICINE | Facility: CLINIC | Age: 73
End: 2023-03-10
Payer: MEDICARE

## 2023-03-10 ENCOUNTER — APPOINTMENT (OUTPATIENT)
Dept: LAB | Facility: HOSPITAL | Age: 73
End: 2023-03-10
Attending: FAMILY MEDICINE
Payer: MEDICARE

## 2023-03-10 ENCOUNTER — PATIENT MESSAGE (OUTPATIENT)
Dept: NEUROLOGY | Facility: CLINIC | Age: 73
End: 2023-03-10
Payer: MEDICARE

## 2023-03-10 NOTE — TELEPHONE ENCOUNTER
Pt and pt daughter were in clinic today to get bloodwork. Pt daughter asked if there was a way for urine orders to be collected via catheter, as pt is unable to use the restroom. Please advise.

## 2023-03-13 ENCOUNTER — OFFICE VISIT (OUTPATIENT)
Dept: HEMATOLOGY/ONCOLOGY | Facility: CLINIC | Age: 73
End: 2023-03-13
Payer: MEDICARE

## 2023-03-13 ENCOUNTER — LAB VISIT (OUTPATIENT)
Dept: LAB | Facility: HOSPITAL | Age: 73
End: 2023-03-13
Attending: INTERNAL MEDICINE
Payer: MEDICARE

## 2023-03-13 VITALS
TEMPERATURE: 97 F | HEART RATE: 74 BPM | DIASTOLIC BLOOD PRESSURE: 58 MMHG | WEIGHT: 135.38 LBS | HEIGHT: 60 IN | BODY MASS INDEX: 26.58 KG/M2 | SYSTOLIC BLOOD PRESSURE: 103 MMHG

## 2023-03-13 DIAGNOSIS — D50.8 IRON DEFICIENCY ANEMIA SECONDARY TO INADEQUATE DIETARY IRON INTAKE: ICD-10-CM

## 2023-03-13 DIAGNOSIS — D50.8 IRON DEFICIENCY ANEMIA SECONDARY TO INADEQUATE DIETARY IRON INTAKE: Primary | ICD-10-CM

## 2023-03-13 LAB
BASOPHILS # BLD AUTO: 0.04 K/UL (ref 0–0.2)
BASOPHILS NFR BLD: 0.5 % (ref 0–1.9)
DIFFERENTIAL METHOD: ABNORMAL
EOSINOPHIL # BLD AUTO: 0.5 K/UL (ref 0–0.5)
EOSINOPHIL NFR BLD: 6.4 % (ref 0–8)
ERYTHROCYTE [DISTWIDTH] IN BLOOD BY AUTOMATED COUNT: 13.2 % (ref 11.5–14.5)
HCT VFR BLD AUTO: 31.1 % (ref 37–48.5)
HGB BLD-MCNC: 9.8 G/DL (ref 12–16)
IMM GRANULOCYTES # BLD AUTO: 0.03 K/UL (ref 0–0.04)
IMM GRANULOCYTES NFR BLD AUTO: 0.4 % (ref 0–0.5)
LYMPHOCYTES # BLD AUTO: 1.7 K/UL (ref 1–4.8)
LYMPHOCYTES NFR BLD: 23.5 % (ref 18–48)
MCH RBC QN AUTO: 29.8 PG (ref 27–31)
MCHC RBC AUTO-ENTMCNC: 31.5 G/DL (ref 32–36)
MCV RBC AUTO: 95 FL (ref 82–98)
MONOCYTES # BLD AUTO: 0.6 K/UL (ref 0.3–1)
MONOCYTES NFR BLD: 8.7 % (ref 4–15)
NEUTROPHILS # BLD AUTO: 4.5 K/UL (ref 1.8–7.7)
NEUTROPHILS NFR BLD: 60.5 % (ref 38–73)
NRBC BLD-RTO: 0 /100 WBC
PLATELET # BLD AUTO: 301 K/UL (ref 150–450)
PMV BLD AUTO: 9.8 FL (ref 9.2–12.9)
RBC # BLD AUTO: 3.29 M/UL (ref 4–5.4)
WBC # BLD AUTO: 7.36 K/UL (ref 3.9–12.7)

## 2023-03-13 PROCEDURE — 3288F FALL RISK ASSESSMENT DOCD: CPT | Mod: CPTII,S$GLB,, | Performed by: INTERNAL MEDICINE

## 2023-03-13 PROCEDURE — 3008F PR BODY MASS INDEX (BMI) DOCUMENTED: ICD-10-PCS | Mod: CPTII,S$GLB,, | Performed by: INTERNAL MEDICINE

## 2023-03-13 PROCEDURE — 1160F PR REVIEW ALL MEDS BY PRESCRIBER/CLIN PHARMACIST DOCUMENTED: ICD-10-PCS | Mod: CPTII,S$GLB,, | Performed by: INTERNAL MEDICINE

## 2023-03-13 PROCEDURE — 99213 OFFICE O/P EST LOW 20 MIN: CPT | Mod: S$GLB,,, | Performed by: INTERNAL MEDICINE

## 2023-03-13 PROCEDURE — 3074F SYST BP LT 130 MM HG: CPT | Mod: CPTII,S$GLB,, | Performed by: INTERNAL MEDICINE

## 2023-03-13 PROCEDURE — 3008F BODY MASS INDEX DOCD: CPT | Mod: CPTII,S$GLB,, | Performed by: INTERNAL MEDICINE

## 2023-03-13 PROCEDURE — 99999 PR PBB SHADOW E&M-EST. PATIENT-LVL IV: CPT | Mod: PBBFAC,,, | Performed by: INTERNAL MEDICINE

## 2023-03-13 PROCEDURE — 36415 COLL VENOUS BLD VENIPUNCTURE: CPT | Performed by: INTERNAL MEDICINE

## 2023-03-13 PROCEDURE — 3288F PR FALLS RISK ASSESSMENT DOCUMENTED: ICD-10-PCS | Mod: CPTII,S$GLB,, | Performed by: INTERNAL MEDICINE

## 2023-03-13 PROCEDURE — 99213 PR OFFICE/OUTPT VISIT, EST, LEVL III, 20-29 MIN: ICD-10-PCS | Mod: S$GLB,,, | Performed by: INTERNAL MEDICINE

## 2023-03-13 PROCEDURE — 1101F PR PT FALLS ASSESS DOC 0-1 FALLS W/OUT INJ PAST YR: ICD-10-PCS | Mod: CPTII,S$GLB,, | Performed by: INTERNAL MEDICINE

## 2023-03-13 PROCEDURE — 1125F PR PAIN SEVERITY QUANTIFIED, PAIN PRESENT: ICD-10-PCS | Mod: CPTII,S$GLB,, | Performed by: INTERNAL MEDICINE

## 2023-03-13 PROCEDURE — 1125F AMNT PAIN NOTED PAIN PRSNT: CPT | Mod: CPTII,S$GLB,, | Performed by: INTERNAL MEDICINE

## 2023-03-13 PROCEDURE — 1101F PT FALLS ASSESS-DOCD LE1/YR: CPT | Mod: CPTII,S$GLB,, | Performed by: INTERNAL MEDICINE

## 2023-03-13 PROCEDURE — 3078F DIAST BP <80 MM HG: CPT | Mod: CPTII,S$GLB,, | Performed by: INTERNAL MEDICINE

## 2023-03-13 PROCEDURE — 1159F PR MEDICATION LIST DOCUMENTED IN MEDICAL RECORD: ICD-10-PCS | Mod: CPTII,S$GLB,, | Performed by: INTERNAL MEDICINE

## 2023-03-13 PROCEDURE — 1160F RVW MEDS BY RX/DR IN RCRD: CPT | Mod: CPTII,S$GLB,, | Performed by: INTERNAL MEDICINE

## 2023-03-13 PROCEDURE — 3078F PR MOST RECENT DIASTOLIC BLOOD PRESSURE < 80 MM HG: ICD-10-PCS | Mod: CPTII,S$GLB,, | Performed by: INTERNAL MEDICINE

## 2023-03-13 PROCEDURE — 85025 COMPLETE CBC W/AUTO DIFF WBC: CPT | Performed by: INTERNAL MEDICINE

## 2023-03-13 PROCEDURE — 1159F MED LIST DOCD IN RCRD: CPT | Mod: CPTII,S$GLB,, | Performed by: INTERNAL MEDICINE

## 2023-03-13 PROCEDURE — 99999 PR PBB SHADOW E&M-EST. PATIENT-LVL IV: ICD-10-PCS | Mod: PBBFAC,,, | Performed by: INTERNAL MEDICINE

## 2023-03-13 PROCEDURE — 3074F PR MOST RECENT SYSTOLIC BLOOD PRESSURE < 130 MM HG: ICD-10-PCS | Mod: CPTII,S$GLB,, | Performed by: INTERNAL MEDICINE

## 2023-03-13 NOTE — PROGRESS NOTES
Subjective:      Patient ID: Leia Rodriguez is a 72 y.o. female.    Chief Complaint: No chief complaint on file.      HPI: This is a 72 year old woman with CAD, CHF, CKD , iron deficiency anemia and   stage I breast cancer - s/p left mastectomy. She is on Arimidex.  She had been on oral iron for iron deficiency.    Review of Systems   Constitutional:  Negative for chills and fever.   HENT:  Negative for sore throat.    Cardiovascular:  Negative for chest pain and palpitations.   Gastrointestinal:  Negative for abdominal pain and blood in stool.   Genitourinary:  Negative for hematuria.   Neurological:  Negative for dizziness and headaches.     Objective:     Physical Exam  Constitutional:       Appearance: Normal appearance.   HENT:      Head: Normocephalic and atraumatic.      Mouth/Throat:      Pharynx: Oropharynx is clear. No oropharyngeal exudate.   Cardiovascular:      Heart sounds:     No friction rub. No gallop.   Pulmonary:      Breath sounds: Normal breath sounds. No wheezing or rales.   Abdominal:      Palpations: Abdomen is soft.      Tenderness: There is no guarding or rebound.   Musculoskeletal:      Cervical back: Neck supple.      Right lower leg: No edema.      Left lower leg: No edema.   Skin:     Findings: No erythema.   Neurological:      Mental Status: She is alert and oriented to person, place, and time.   Psychiatric:         Mood and Affect: Mood normal.         Behavior: Behavior normal.         Thought Content: Thought content normal.         Judgment: Judgment normal.       Assessment:     Problem List Items Addressed This Visit          Oncology    Iron deficiency anemia secondary to inadequate dietary iron intake - Primary    Relevant Orders    CBC Auto Differential (Completed)        Anemia - CKD/iron deficiency  Stage I left breast cancer.  - - s/p mastectomy.     Plan:     Recent labs showed Hb 9.8 and iron saturation 13 %.  She is on oral ferrous sulfate.  3.  I discussed bone  marrow biopsy but she opts to defer.  4. Continue Arimidex, calcium/vitamin D.  5. Yearly mammogram for right breast.  6. RTC: 3 months - CBC/iron studies/CMP.

## 2023-03-14 ENCOUNTER — TELEPHONE (OUTPATIENT)
Dept: HEMATOLOGY/ONCOLOGY | Facility: CLINIC | Age: 73
End: 2023-03-14
Payer: MEDICARE

## 2023-03-14 NOTE — TELEPHONE ENCOUNTER
CESAR intern attempted to call pt regarding distress score from appt on 3/13/23. [phone] is unavailable. CESAR intern left voicemail at [phone]. CESAR intern will remain available.

## 2023-03-14 NOTE — TELEPHONE ENCOUNTER
CESAR intern attempted to return a missed call from [phone]. CESAR intern left a second voicemail. CESAR intern will remain available.

## 2023-03-20 ENCOUNTER — HOSPITAL ENCOUNTER (OUTPATIENT)
Dept: RADIOLOGY | Facility: HOSPITAL | Age: 73
Discharge: HOME | End: 2023-03-20
Attending: FAMILY MEDICINE
Payer: MEDICARE

## 2023-03-20 DIAGNOSIS — F17.210 CIGARETTE NICOTINE DEPENDENCE WITHOUT COMPLICATION: ICD-10-CM

## 2023-03-20 PROCEDURE — 71271 CT CHEST LUNG SCREENING LOW DOSE: ICD-10-PCS | Mod: 26,,, | Performed by: RADIOLOGY

## 2023-03-20 PROCEDURE — 71271 CT THORAX LUNG CANCER SCR C-: CPT | Mod: 26,,, | Performed by: RADIOLOGY

## 2023-03-20 PROCEDURE — 71271 CT THORAX LUNG CANCER SCR C-: CPT | Mod: TC

## 2023-05-16 ENCOUNTER — PATIENT MESSAGE (OUTPATIENT)
Dept: FAMILY MEDICINE | Facility: CLINIC | Age: 73
End: 2023-05-16
Payer: MEDICARE

## 2023-05-24 DIAGNOSIS — C50.312 MALIGNANT NEOPLASM OF LOWER-INNER QUADRANT OF LEFT BREAST IN FEMALE, ESTROGEN RECEPTOR POSITIVE: ICD-10-CM

## 2023-05-24 DIAGNOSIS — Z17.0 MALIGNANT NEOPLASM OF LOWER-INNER QUADRANT OF LEFT BREAST IN FEMALE, ESTROGEN RECEPTOR POSITIVE: ICD-10-CM

## 2023-05-25 ENCOUNTER — TELEPHONE (OUTPATIENT)
Dept: FAMILY MEDICINE | Facility: CLINIC | Age: 73
End: 2023-05-25

## 2023-05-25 RX ORDER — ANASTROZOLE 1 MG/1
TABLET ORAL
Qty: 90 TABLET | Refills: 3 | Status: SHIPPED | OUTPATIENT
Start: 2023-05-25

## 2023-05-25 NOTE — TELEPHONE ENCOUNTER
----- Message from Ene Hernandez sent at 5/25/2023 11:29 AM CDT -----  Contact: Zheng Calzada called in to check the status of the paperwork for the [facility]. Their fax machine is down, but Zheng can stop by and  the paperwork today. Please call her back at [phone].    Thanks  TS

## 2023-05-25 NOTE — TELEPHONE ENCOUNTER
Spoke with pt's daughter and she stated that she would come by and pick the [employer] form. 5/25/2023

## 2023-06-12 ENCOUNTER — OFFICE VISIT (OUTPATIENT)
Dept: CARDIOLOGY | Facility: CLINIC | Age: 73
End: 2023-06-12
Payer: MEDICARE

## 2023-06-12 ENCOUNTER — LAB VISIT (OUTPATIENT)
Dept: LAB | Facility: HOSPITAL | Age: 73
End: 2023-06-12
Attending: INTERNAL MEDICINE
Payer: MEDICARE

## 2023-06-12 VITALS
SYSTOLIC BLOOD PRESSURE: 142 MMHG | HEIGHT: 60 IN | DIASTOLIC BLOOD PRESSURE: 68 MMHG | WEIGHT: 139.31 LBS | BODY MASS INDEX: 27.35 KG/M2

## 2023-06-12 DIAGNOSIS — I50.42 CHRONIC COMBINED SYSTOLIC AND DIASTOLIC CHF (CONGESTIVE HEART FAILURE): Chronic | ICD-10-CM

## 2023-06-12 DIAGNOSIS — I50.42 CHRONIC COMBINED SYSTOLIC AND DIASTOLIC CHF (CONGESTIVE HEART FAILURE): Primary | Chronic | ICD-10-CM

## 2023-06-12 DIAGNOSIS — I70.0 ATHEROSCLEROSIS OF AORTA: ICD-10-CM

## 2023-06-12 DIAGNOSIS — I10 ESSENTIAL HYPERTENSION: Chronic | ICD-10-CM

## 2023-06-12 DIAGNOSIS — I25.118 CORONARY ARTERY DISEASE OF NATIVE ARTERY OF NATIVE HEART WITH STABLE ANGINA PECTORIS: Chronic | ICD-10-CM

## 2023-06-12 DIAGNOSIS — F10.21 HISTORY OF ALCOHOLISM: ICD-10-CM

## 2023-06-12 DIAGNOSIS — Z87.891 HISTORY OF TOBACCO USE: ICD-10-CM

## 2023-06-12 DIAGNOSIS — J43.8 OTHER EMPHYSEMA: ICD-10-CM

## 2023-06-12 LAB
ANION GAP SERPL CALC-SCNC: 11 MMOL/L (ref 8–16)
BNP SERPL-MCNC: 23 PG/ML (ref 0–99)
BUN SERPL-MCNC: 51 MG/DL (ref 8–23)
CALCIUM SERPL-MCNC: 10.6 MG/DL (ref 8.7–10.5)
CHLORIDE SERPL-SCNC: 99 MMOL/L (ref 95–110)
CO2 SERPL-SCNC: 26 MMOL/L (ref 23–29)
CREAT SERPL-MCNC: 1.9 MG/DL (ref 0.5–1.4)
EST. GFR  (NO RACE VARIABLE): 27.9 ML/MIN/1.73 M^2
GLUCOSE SERPL-MCNC: 96 MG/DL (ref 70–110)
POTASSIUM SERPL-SCNC: 4.3 MMOL/L (ref 3.5–5.1)
SODIUM SERPL-SCNC: 136 MMOL/L (ref 136–145)

## 2023-06-12 PROCEDURE — 1159F MED LIST DOCD IN RCRD: CPT | Mod: CPTII,S$GLB,, | Performed by: INTERNAL MEDICINE

## 2023-06-12 PROCEDURE — 1160F PR REVIEW ALL MEDS BY PRESCRIBER/CLIN PHARMACIST DOCUMENTED: ICD-10-PCS | Mod: CPTII,S$GLB,, | Performed by: INTERNAL MEDICINE

## 2023-06-12 PROCEDURE — 1101F PT FALLS ASSESS-DOCD LE1/YR: CPT | Mod: CPTII,S$GLB,, | Performed by: INTERNAL MEDICINE

## 2023-06-12 PROCEDURE — 99214 PR OFFICE/OUTPT VISIT, EST, LEVL IV, 30-39 MIN: ICD-10-PCS | Mod: S$GLB,,, | Performed by: INTERNAL MEDICINE

## 2023-06-12 PROCEDURE — 3078F DIAST BP <80 MM HG: CPT | Mod: CPTII,S$GLB,, | Performed by: INTERNAL MEDICINE

## 2023-06-12 PROCEDURE — 80048 BASIC METABOLIC PNL TOTAL CA: CPT | Performed by: INTERNAL MEDICINE

## 2023-06-12 PROCEDURE — 1160F RVW MEDS BY RX/DR IN RCRD: CPT | Mod: CPTII,S$GLB,, | Performed by: INTERNAL MEDICINE

## 2023-06-12 PROCEDURE — 3078F PR MOST RECENT DIASTOLIC BLOOD PRESSURE < 80 MM HG: ICD-10-PCS | Mod: CPTII,S$GLB,, | Performed by: INTERNAL MEDICINE

## 2023-06-12 PROCEDURE — 83880 ASSAY OF NATRIURETIC PEPTIDE: CPT | Performed by: INTERNAL MEDICINE

## 2023-06-12 PROCEDURE — 3008F PR BODY MASS INDEX (BMI) DOCUMENTED: ICD-10-PCS | Mod: CPTII,S$GLB,, | Performed by: INTERNAL MEDICINE

## 2023-06-12 PROCEDURE — 3288F PR FALLS RISK ASSESSMENT DOCUMENTED: ICD-10-PCS | Mod: CPTII,S$GLB,, | Performed by: INTERNAL MEDICINE

## 2023-06-12 PROCEDURE — 3288F FALL RISK ASSESSMENT DOCD: CPT | Mod: CPTII,S$GLB,, | Performed by: INTERNAL MEDICINE

## 2023-06-12 PROCEDURE — 3077F PR MOST RECENT SYSTOLIC BLOOD PRESSURE >= 140 MM HG: ICD-10-PCS | Mod: CPTII,S$GLB,, | Performed by: INTERNAL MEDICINE

## 2023-06-12 PROCEDURE — 3077F SYST BP >= 140 MM HG: CPT | Mod: CPTII,S$GLB,, | Performed by: INTERNAL MEDICINE

## 2023-06-12 PROCEDURE — 1159F PR MEDICATION LIST DOCUMENTED IN MEDICAL RECORD: ICD-10-PCS | Mod: CPTII,S$GLB,, | Performed by: INTERNAL MEDICINE

## 2023-06-12 PROCEDURE — 99999 PR PBB SHADOW E&M-EST. PATIENT-LVL IV: CPT | Mod: PBBFAC,,, | Performed by: INTERNAL MEDICINE

## 2023-06-12 PROCEDURE — 1101F PR PT FALLS ASSESS DOC 0-1 FALLS W/OUT INJ PAST YR: ICD-10-PCS | Mod: CPTII,S$GLB,, | Performed by: INTERNAL MEDICINE

## 2023-06-12 PROCEDURE — 3008F BODY MASS INDEX DOCD: CPT | Mod: CPTII,S$GLB,, | Performed by: INTERNAL MEDICINE

## 2023-06-12 PROCEDURE — 36415 COLL VENOUS BLD VENIPUNCTURE: CPT | Performed by: INTERNAL MEDICINE

## 2023-06-12 PROCEDURE — 99214 OFFICE O/P EST MOD 30 MIN: CPT | Mod: S$GLB,,, | Performed by: INTERNAL MEDICINE

## 2023-06-12 PROCEDURE — 99999 PR PBB SHADOW E&M-EST. PATIENT-LVL IV: ICD-10-PCS | Mod: PBBFAC,,, | Performed by: INTERNAL MEDICINE

## 2023-06-12 PROCEDURE — 1126F AMNT PAIN NOTED NONE PRSNT: CPT | Mod: CPTII,S$GLB,, | Performed by: INTERNAL MEDICINE

## 2023-06-12 PROCEDURE — 1126F PR PAIN SEVERITY QUANTIFIED, NO PAIN PRESENT: ICD-10-PCS | Mod: CPTII,S$GLB,, | Performed by: INTERNAL MEDICINE

## 2023-06-12 PROCEDURE — 4010F ACE/ARB THERAPY RXD/TAKEN: CPT | Mod: CPTII,S$GLB,, | Performed by: INTERNAL MEDICINE

## 2023-06-12 PROCEDURE — 4010F PR ACE/ARB THEARPY RXD/TAKEN: ICD-10-PCS | Mod: CPTII,S$GLB,, | Performed by: INTERNAL MEDICINE

## 2023-06-12 RX ORDER — AMLODIPINE BESYLATE 5 MG/1
5 TABLET ORAL DAILY
Qty: 30 TABLET | Refills: 11 | Status: SHIPPED | OUTPATIENT
Start: 2023-06-12 | End: 2023-08-22

## 2023-06-12 NOTE — PROGRESS NOTES
Subjective:   Patient ID:  Leia Rodriguez is a 72 y.o. female who presents for follow up of Shortness of Breath and Edema (Feet swelling)      71 yo female, 6 months f/u  PMH CAD, CHFmrEF, h/o beast cancer 4 yrs ago lumpectomy and chemo, recurrent in  s/p mastectomy and chemo ongoing, and HTN, dementia lower back pain wheelchair helps  Quit smoking in  after 50 yrs smoking.  Some residual left chest pain after mastectomy,   GRISSOM and fatigue after fast walking,   No palpitation, leg swelling, dizziness and faint.    ekg in  NSR and TWI on V2 to v6 and inferior leads   ECHO normal EF and severe LVH   ECHo EF 45%, mod MR and LAE  Weight stable    03/2021 admitted for CHF exacerbation.  and Troponin 0.44 flat. elg NSR and TWI on lateral leads. Improved SOB after diuresis. Then BNP dropped to 58  Now SOB sable. Sleeps on 1 pillow    07/2021 visit   echo Day 15, cycle 6 Biplane=44% 4D LVQ=45% Avg GPLS= -16.1   MPI inferoapical scar  GRISSOM while long distance walking  No chest pain, dizziness faint, leg swelling  Decent appetite  F/u with Dr. Fernández on stage I HER2 positive breast carcinoma being treated with Herceptin q. 3 weeks     visit  Dynamical TWI on EKG   MPI showed apical inferior fixed perfusion defect  No chest pain . Limited walking due to lower back pain  No orthopnea      visit  No chest pain. GRISSOM mild and chronic. Limited activity due to fatigue and leg pain.   S/p LHC done on 10/12/2021, distal % OM2/3 40% and midRCA 50% lesion and EF 45% and LVEDP 17 mmHG. Moderate MR. Continue medical Rx  Serial echo studies    Echo 2/22/21 Cycle 4 Day 20  4dLVQ=47%  AutoEF=48%  BRIDGETT unable to obtain accurate measurements   5-26-21  Day 15, cycle 6  Biplane=44%  4D LVQ=45%  Avg GPLS= -16.1   9-13-21 Day 20, Cycle 11  Biplane=42%  4D LVQ=49%  Avg GPLS= -14.8%    12/2021 visit  11/ went to ER [facility]. EKG sinus tachy and PVCs. BNP up to 800.  CXR pulm. Edema. Added lasix 20 mg daily  Now dyspnea improved. No leg swelling chest pain. Sleeps on 1 pillow and no PND.   On iron infusion rx fro anemia     visit  BNP normal and Cr elevated 1.4. will hold lasix  No leg swelling, dizziness sob orthopnea. On fluid restriction     visit  Improved appetite after held chemo due to decreased EF about .   Gained the weight. No SOB, sleeps on 2 pillows     visit   Twice ER visits for not feeling well, SOB, the lab and CRX mri unremarkable, except some +UTI.Occurred at night and the family concerning anxiety ?  EKG in  NSR PACs    Interval history  Per daughter, pt c/o SOB + orthopnea and leg swelling. Lost 13 lbs in 6 months. Had teeth work recently, dementia slightyl worse. Some mood disturbance. Talked to family member.           Past Medical History:   Diagnosis Date    Arthritis     EDGAR HANDS, KNEES    Behavioral change 2022    Blind right eye     Traumatic    Breast cancer 06/15/2017    0.8 cm Grade1 INTRADUCTAL BREAST 9 positve margin (left)    Essential hypertension     Hemorrhoids     Immunodeficiency due to chemotherapy 2021    Lipoma of abdominal wall     Major neurocognitive disorder 2022    Obesity     Overactive bladder     Pap smear abnormality of cervix with ASCUS favoring benign     Thyroid nodule     Tobacco use disorder     Tubular adenoma of colon     Urinary incontinence        Past Surgical History:   Procedure Laterality Date    ANTERIOR VAGINAL REPAIR      BLADDER SURGERY      BREAST LUMPECTOMY Left     CATARACT EXTRACTION Left 2022     SECTION      X2    COLONOSCOPY N/A 3/8/2017    Procedure: COLONOSCOPY;  Surgeon: Tyron Paris MD;  Location: [facility];  Service: Endoscopy;  Laterality: N/A;    COLONOSCOPY N/A 2020    Procedure: COLONOSCOPY;  Surgeon: Keira Ellison MD;  Location: [facility];  Service: Endoscopy;  Laterality: N/A;    ESOPHAGOGASTRODUODENOSCOPY  N/A 2020    Procedure: EGD (ESOPHAGOGASTRODUODENOSCOPY);  Surgeon: Keira Ellison MD;  Location: [facility] ENDO;  Service: Endoscopy;  Laterality: N/A;  new onset iron deficiency with prior history of breast cancer    INTRALUMINAL GASTROINTESTINAL TRACT IMAGING VIA CAPSULE N/A 10/28/2020    Procedure: IMAGING PROCEDURE, GI TRACT, INTRALUMINAL, VIA CAPSULE;  Surgeon: Finesse Jha RN;  Location: [facility] ENDO;  Service: Endoscopy;  Laterality: N/A;    LEFT HEART CATHETERIZATION Left 10/12/2021    Procedure: CATHETERIZATION, HEART, LEFT;  Surgeon: Tiffanie Santos MD;  Location: [facility] CATH LAB;  Service: Cardiology;  Laterality: Left;    SENTINEL LYMPH NODE BIOPSY Left 2020    Procedure: BIOPSY, LYMPH NODE, SENTINEL;  Surgeon: Vincent Moyer MD;  Location: [facility] OR;  Service: General;  Laterality: Left;    SIMPLE MASTECTOMY Left 2020    Procedure: MASTECTOMY, SIMPLE;  Surgeon: Vincent Moyer MD;  Location: [facility] OR;  Service: General;  Laterality: Left;    THYROID LOBECTOMY Left     TOTAL ABDOMINAL HYSTERECTOMY      TUBAL LIGATION         Social History     Tobacco Use    Smoking status: Former     Packs/day: 1.00     Years: 50.00     Pack years: 50.00     Types: Cigarettes     Quit date: 2020     Years since quittin.8    Smokeless tobacco: Never   Substance Use Topics    Alcohol use: Never     Alcohol/week: 28.0 standard drinks     Types: 28 Cans of beer per week    Drug use: No       Family History   Problem Relation Age of Onset    Hypertension Father     Cataracts Father     Prostate cancer Father 80    Cervical cancer Daughter 32    Allergic rhinitis Daughter     Cirrhosis Mother     Alcohol abuse Mother     Hypertension Daughter     Diabetes Brother     Heart attack Brother     Heart murmur Brother     No Known Problems Daughter          ROS    Objective:   Physical Exam  HENT:      Head: Normocephalic.   Eyes:      Pupils: Pupils are equal, round, and reactive to light.   Neck:      Thyroid: No  thyromegaly.      Vascular: Normal carotid pulses. No carotid bruit or JVD.   Cardiovascular:      Rate and Rhythm: Normal rate and regular rhythm. FrequentExtrasystoles are present.     Chest Wall: PMI is not displaced.      Pulses: Normal pulses.           Carotid pulses are 2+ on the right side and 2+ on the left side.     Heart sounds: Normal heart sounds. No murmur heard.    No gallop. No S3 sounds.   Pulmonary:      Effort: No respiratory distress.      Breath sounds: Normal breath sounds. No stridor.   Abdominal:      General: Bowel sounds are normal.      Palpations: Abdomen is soft.      Tenderness: There is no abdominal tenderness. There is no rebound.   Skin:     General: Skin is warm and dry.      Findings: No rash.   Neurological:      Mental Status: She is alert.       Lab Results   Component Value Date    CHOL 119 (L) 03/31/2021    CHOL 207 (H) 09/03/2019    CHOL 212 (H) 06/21/2018     Lab Results   Component Value Date    HDL 46 03/31/2021    HDL 53 09/03/2019    HDL 65 06/21/2018     Lab Results   Component Value Date    LDLCALC 61.2 (L) 03/31/2021    LDLCALC 134.2 09/03/2019    LDLCALC 131.6 06/21/2018     Lab Results   Component Value Date    TRIG 59 03/31/2021    TRIG 99 09/03/2019    TRIG 77 06/21/2018     Lab Results   Component Value Date    CHOLHDL 38.7 03/31/2021    CHOLHDL 25.6 09/03/2019    CHOLHDL 30.7 06/21/2018       Chemistry        Component Value Date/Time     12/03/2022 1307    K 4.1 12/03/2022 1307     12/03/2022 1307    CO2 26 12/03/2022 1307    BUN 23 12/03/2022 1307    CREATININE 2.0 (H) 12/12/2022 1422    GLU 98 12/03/2022 1307        Component Value Date/Time    CALCIUM 9.4 12/03/2022 1307    ALKPHOS 59 12/03/2022 1307    AST 14 12/03/2022 1307    ALT 13 12/03/2022 1307    BILITOT 0.7 12/03/2022 1307    ESTGFRAFRICA 48.0 (A) 06/20/2022 1020    EGFRNONAA 41.7 (A) 06/20/2022 1020          No results found for: LABA1C, HGBA1C  Lab Results   Component Value Date    TSH  0.765 01/27/2022     Lab Results   Component Value Date    INR 1.1 10/01/2021     Lab Results   Component Value Date    WBC 7.36 03/13/2023    HGB 9.8 (L) 03/13/2023    HCT 31.1 (L) 03/13/2023    MCV 95 03/13/2023     03/13/2023     BMP  Sodium   Date Value Ref Range Status   12/03/2022 136 136 - 145 mmol/L Final     Potassium   Date Value Ref Range Status   12/03/2022 4.1 3.5 - 5.1 mmol/L Final     Chloride   Date Value Ref Range Status   12/03/2022 101 95 - 110 mmol/L Final     CO2   Date Value Ref Range Status   12/03/2022 26 23 - 29 mmol/L Final     BUN   Date Value Ref Range Status   12/03/2022 23 8 - 23 mg/dL Final     Creatinine   Date Value Ref Range Status   12/12/2022 2.0 (H) 0.5 - 1.4 mg/dL Final     Calcium   Date Value Ref Range Status   12/03/2022 9.4 8.7 - 10.5 mg/dL Final     Anion Gap   Date Value Ref Range Status   12/03/2022 9 8 - 16 mmol/L Final     eGFR if    Date Value Ref Range Status   06/20/2022 48.0 (A) >60 mL/min/1.73 m^2 Final     eGFR if non    Date Value Ref Range Status   06/20/2022 41.7 (A) >60 mL/min/1.73 m^2 Final     Comment:     Calculation used to obtain the estimated glomerular filtration  rate (eGFR) is the CKD-EPI equation.        BNP  @LABRCNTIP(BNP,BNPTRIAGEBLO)@  @LABRCNTIP(troponini)@  CrCl cannot be calculated (Patient's most recent lab result is older than the maximum 7 days allowed.).  No results found in the last 24 hours.  No results found in the last 24 hours.  No results found in the last 24 hours.    Assessment:      1. Chronic combined systolic and diastolic CHF (congestive heart failure)    2. Essential hypertension    3. Coronary artery disease of native artery of native heart with stable angina pectoris    4. Atherosclerosis of aorta    5. Other emphysema    6. History of alcoholism    7. History of tobacco use      Fluid over loaded per the symptoms    Plan:   BNP and BMP today  Echo  Increase Lasix from 20 mg 3 times a  week to daily  Continue Coreg telmisartan imdur and lasix    D/c aldactone due to Cr 2.0 in   Add Amloidpine 5 mg for HTN  DASH  RTC in 2 months    Recommend heart-healthy diet, weight control   Dipti. Risk modification.   I have reviewed all pertinent labs and cardiac studies independently. Plans and recommendations have been formulated under my direct supervision. All questions answered and patient voiced understanding.   If symptoms persist go to the ED

## 2023-06-14 ENCOUNTER — TELEPHONE (OUTPATIENT)
Dept: CARDIOLOGY | Facility: CLINIC | Age: 73
End: 2023-06-14
Payer: MEDICARE

## 2023-06-14 DIAGNOSIS — I50.42 CHRONIC COMBINED SYSTOLIC AND DIASTOLIC CHF (CONGESTIVE HEART FAILURE): Primary | Chronic | ICD-10-CM

## 2023-06-14 DIAGNOSIS — R16.1 SPLENIC MASS: Primary | ICD-10-CM

## 2023-06-14 NOTE — TELEPHONE ENCOUNTER
Contacted patient; Patient received and understood lab work with no questions or concerns.     ----- Message from Jose G Manjarrez MD sent at 6/14/2023  2:45 PM CDT -----  The lab showed CHF controlled and elevated Cr level.  Hold Lasix now.  Repeat BMP in 1 week

## 2023-06-16 ENCOUNTER — HOSPITAL ENCOUNTER (OUTPATIENT)
Dept: RADIOLOGY | Facility: HOSPITAL | Age: 73
Discharge: HOME | End: 2023-06-16
Attending: SURGERY
Payer: MEDICARE

## 2023-06-16 DIAGNOSIS — R16.1 SPLENIC MASS: ICD-10-CM

## 2023-06-16 PROBLEM — D63.1 ANEMIA DUE TO STAGE 3B CHRONIC KIDNEY DISEASE: Status: ACTIVE | Noted: 2022-03-15

## 2023-06-16 PROBLEM — C50.312 MALIGNANT NEOPLASM OF LOWER-INNER QUADRANT OF LEFT BREAST IN FEMALE, ESTROGEN RECEPTOR POSITIVE: Status: ACTIVE | Noted: 2017-06-15

## 2023-06-16 PROCEDURE — A9698 NON-RAD CONTRAST MATERIALNOC: HCPCS | Performed by: SURGERY

## 2023-06-16 PROCEDURE — 25500020 PHARM REV CODE 255: Performed by: SURGERY

## 2023-06-16 PROCEDURE — 74176 CT ABD & PELVIS W/O CONTRAST: CPT | Mod: TC

## 2023-06-16 PROCEDURE — 74176 CT ABDOMEN PELVIS WITHOUT CONTRAST: ICD-10-PCS | Mod: 26,,, | Performed by: STUDENT IN AN ORGANIZED HEALTH CARE EDUCATION/TRAINING PROGRAM

## 2023-06-16 PROCEDURE — 74176 CT ABD & PELVIS W/O CONTRAST: CPT | Mod: 26,,, | Performed by: STUDENT IN AN ORGANIZED HEALTH CARE EDUCATION/TRAINING PROGRAM

## 2023-06-16 RX ADMIN — IOHEXOL 1000 ML: 12 SOLUTION ORAL at 12:06

## 2023-06-16 NOTE — PROGRESS NOTES
Subjective:       Patient ID: Leia Rodriguez is a 72 y.o. female.    Chief Complaint:   1. Malignant neoplasm of lower-inner quadrant of left breast in female, estrogen receptor positive  Stage IA (T1c, N0, cM0)      2. Iron deficiency anemia secondary to inadequate dietary iron intake        3. Anemia due to stage 3b chronic kidney disease          Current Treatment:  Arimidex 1mg po daily    2. Oral iron supplementation daily    ZOLEDRONIC ACID (ZOMETA) IV q 6 months     Treatment History:  S/p left mastectomy        Herceptin discontinued after 9 cycles due to decreased EF    2. Injectafer     HPI: This is a 72 year old  woman with medical history significant for arthritis, thyroid nodule, urinary incontinence, obesity, blindness in right eye, tobacco use, overactive bladder, hemorrhoids, cataract, and HTN who is seen in Hem/Onc for breast cancer. She was initially seen in 7/2017 after being diagnosed.     She presented in March of 2017 when diagnostic MMG confirmed the presence of a round mass with indistinct margins in the Lt. breast at the 7 o'clock position. The mass measured 9 x 5 x 6 mm. The patient was referred to Dr. Duke and on 6/15/17 underwent wire localization lumpectomy. Pathology revealed an 8 mm focus of ductal carcinoma in situ. The tumor was low grade. There was tumor present at the anterior inferior margin. Ninety percent of the tumor cells were ER positive, 5- 10% of the tumor cells were [place] positive. The patient was referred for adjuvant radiotherapy. Completed a course of partial breast irradiation to the LIQ of the Lt. breast on 8/14/17.      She was treated with Herceptin which was discontinued after 9 cycles due to decreased EF. She was started on Arimidex which she currently takes. DEXA scan on 7/20/22 showed osteopenia, and she was started on calcium + vitamin D.    Of note, she was found to be iron deficient and was treated with Injectafer in the past. She now takes  oral iron. She takes B12 sublingual as recommended by her neurologist.     Her primary Hematologist/Oncologist is Dr. Fernández.    Interval History: Patient presents for follow up on iron, B12, and Arimidex. She presents in a wheelchair with her daughter and complains of generalized arthritis pain. She reports adherence to Arimidex and calcium + vitamin D as well as B12. She has been taking oral iron despite significant constipation worsened by oral iron. Advised her to stop oral iron. Review of labs reveals persistent iron deficiency except in 3/2022; she had received Injectafer in 11/2021 with resolution of iron deficiency and improvement in H&H that lasted until 3/2023. Will proceed with IV iron to replete iron stores; given the fact that constipation interferes with oral iron adherence, will likely start liquid iron afterwards to maintain adequate iron stores. Discussed bone marrow biopsy to determine why iron stores remain low despite elevated ferritin; however, patient still not interested. Discussed maintaining good hydration for optimal kidney function.     Reviewed labs with patient:   CBC:   Recent Labs   Lab 06/16/23  1252   WBC 9.71   RBC 3.21 L   Hemoglobin 9.7 L   Hematocrit 30.1 L   Platelets 398   MCV 94   MCH 30.2   MCHC 32.2     CMP:  Recent Labs   Lab 06/16/23  1252   Glucose 98   Calcium 10.1   Albumin 3.7   Total Protein 7.5   Sodium 136   Potassium 4.6   CO2 24   Chloride 102   BUN 38 H   Creatinine 1.4   Alkaline Phosphatase 56   ALT 14   AST 14   Total Bilirubin 0.4     Lab Results   Component Value Date    IRON 36 06/16/2023    TRANSFERRIN 180 (L) 06/16/2023    TIBC 266 06/16/2023    FESATURATED 14 (L) 06/16/2023      Lab Results   Component Value Date    FERRITIN 1,014 (H) 06/16/2023     Social History     Socioeconomic History    Marital status:    Tobacco Use    Smoking status: Former     Packs/day: 1.00     Years: 50.00     Pack years: 50.00     Types: Cigarettes     Quit date:  2020     Years since quittin.8    Smokeless tobacco: Never   Substance and Sexual Activity    Alcohol use: Never     Alcohol/week: 28.0 standard drinks     Types: 28 Cans of beer per week    Drug use: No    Sexual activity: Never     Partners: Male   Social History Narrative    The patient is .  She is retired from [employer].     Social Determinants of Health     Financial Resource Strain: Medium Risk    Difficulty of Paying Living Expenses: Somewhat hard   Food Insecurity: Food Insecurity Present    Worried About Running Out of Food in the Last Year: Sometimes true    Ran Out of Food in the Last Year: Sometimes true   Transportation Needs: Unknown    Lack of Transportation (Medical): No   Physical Activity: Inactive    Days of Exercise per Week: 0 days    Minutes of Exercise per Session: 0 min   Stress: No Stress Concern Present    Feeling of Stress : Only a little   Social Connections: Socially Integrated    Frequency of Communication with Friends and Family: More than three times a week    Frequency of Social Gatherings with Friends and Family: More than three times a week    Attends [employer] Services: More than 4 times per year    Attends Club or Organization Meetings: More than 4 times per year    Marital Status:    Housing Stability: Unknown    Unable to Pay for Housing in the Last Year: No    Unstable Housing in the Last Year: No     Past Medical History:   Diagnosis Date    Arthritis     EDGAR HANDS, KNEES    Behavioral change 2022    Blind right eye     Traumatic    Breast cancer 06/15/2017    0.8 cm Grade1 INTRADUCTAL BREAST 9 positve margin (left)    Essential hypertension     Hemorrhoids     Immunodeficiency due to chemotherapy 2021    Lipoma of abdominal wall     Major neurocognitive disorder 2022    Obesity     Overactive bladder     Pap smear abnormality of cervix with ASCUS favoring benign     Thyroid nodule     Tobacco use  disorder     Tubular adenoma of colon     Urinary incontinence      Family History   Problem Relation Age of Onset    Hypertension Father     Cataracts Father     Prostate cancer Father 80    Cervical cancer Daughter 32    Allergic rhinitis Daughter     Cirrhosis Mother     Alcohol abuse Mother     Hypertension Daughter     Diabetes Brother     Heart attack Brother     Heart murmur Brother     No Known Problems Daughter      Past Surgical History:   Procedure Laterality Date    ANTERIOR VAGINAL REPAIR      BLADDER SURGERY      BREAST LUMPECTOMY Left 2017    CATARACT EXTRACTION Left 2022     SECTION      X2    COLONOSCOPY N/A 3/8/2017    Procedure: COLONOSCOPY;  Surgeon: Tyron Paris MD;  Location: [facility];  Service: Endoscopy;  Laterality: N/A;    COLONOSCOPY N/A 2020    Procedure: COLONOSCOPY;  Surgeon: Keira Ellison MD;  Location: [facility];  Service: Endoscopy;  Laterality: N/A;    ESOPHAGOGASTRODUODENOSCOPY N/A 2020    Procedure: EGD (ESOPHAGOGASTRODUODENOSCOPY);  Surgeon: Keira Ellison MD;  Location: [facility];  Service: Endoscopy;  Laterality: N/A;  new onset iron deficiency with prior history of breast cancer    INTRALUMINAL GASTROINTESTINAL TRACT IMAGING VIA CAPSULE N/A 10/28/2020    Procedure: IMAGING PROCEDURE, GI TRACT, INTRALUMINAL, VIA CAPSULE;  Surgeon: Finesse Jha RN;  Location: [facility] ENDO;  Service: Endoscopy;  Laterality: N/A;    LEFT HEART CATHETERIZATION Left 10/12/2021    Procedure: CATHETERIZATION, HEART, LEFT;  Surgeon: Tiffanie Santos MD;  Location: [facility] CATH LAB;  Service: Cardiology;  Laterality: Left;    SENTINEL LYMPH NODE BIOPSY Left 2020    Procedure: BIOPSY, LYMPH NODE, SENTINEL;  Surgeon: Vincent Moyer MD;  Location: [facility] OR;  Service: General;  Laterality: Left;    SIMPLE MASTECTOMY Left 2020    Procedure: MASTECTOMY, SIMPLE;  Surgeon: Vincent Moyer MD;  Location: [facility] OR;  Service: General;  Laterality:  Left;    THYROID LOBECTOMY Left 2005    TOTAL ABDOMINAL HYSTERECTOMY      TUBAL LIGATION       Review of Systems   Constitutional:  Negative for appetite change and fatigue.   HENT:  Negative for mouth sores, rhinorrhea and sore throat.    Eyes: Negative.    Respiratory: Negative.     Cardiovascular: Negative.    Gastrointestinal:  Negative for constipation, diarrhea, nausea and vomiting.   Endocrine: Negative.    Genitourinary: Negative.    Musculoskeletal:  Positive for arthralgias.   Integumentary:  Negative.   Allergic/Immunologic: Negative.    Neurological:  Negative for weakness and numbness.   Hematological: Negative.    Psychiatric/Behavioral: Negative.         Medication List with Changes/Refills   Current Medications    AMLODIPINE (NORVASC) 5 MG TABLET    Take 1 tablet (5 mg total) by mouth once daily.    ANASTROZOLE (ARIMIDEX) 1 MG TAB    TAKE 1 TABLET BY MOUTH EVERY DAY    ASPIRIN 81 MG CHEW    Take 1 tablet (81 mg total) by mouth once daily.    BUSPIRONE (BUSPAR) 15 MG TABLET    TAKE 1 TABLET BY MOUTH 2 TIMES DAILY.    CALCIUM CITRATE (CALCITRATE) 200 MG (950 MG) TABLET    Take 1 tablet by mouth once daily.    CARVEDILOL (COREG) 3.125 MG TABLET    TAKE 1 TABLET BY MOUTH TWICE A DAY WITH MEALS    CYANOCOBALAMIN 2000 MCG TABLET    Take 2,000 mcg by mouth 2 (two) times a day.    DONEPEZIL (ARICEPT) 5 MG TABLET    Take 1 tablet (5 mg total) by mouth every evening.    FERROUS SULFATE (FEOSOL) 325 MG (65 MG IRON) TAB TABLET    Take 65 mg by mouth once daily.    ISOSORBIDE MONONITRATE (IMDUR) 30 MG 24 HR TABLET    TAKE 1 TABLET BY MOUTH EVERY DAY    MEMANTINE (NAMENDA) 5 MG TAB    Take 1 tablet (5 mg total) by mouth 2 (two) times daily.    QUETIAPINE (SEROQUEL) 100 MG TAB    Take 1 tablet (100 mg total) by mouth every evening.    TAMSULOSIN (FLOMAX) 0.4 MG CAP    Take 1 capsule by mouth once daily.    TELMISARTAN (MICARDIS) 20 MG TAB    TAKE 1 TABLET BY MOUTH EVERY DAY    VITAMIN D 1000 UNITS TAB    Take  1,000 Units by mouth once daily.     Objective:     Vitals:    06/19/23 1333   BP: 120/60   Pulse: (!) 112   Temp: 98.5 °F (36.9 °C)     Physical Exam  Vitals reviewed.   Constitutional:       Appearance: Normal appearance.   HENT:      Head: Normocephalic.      Mouth/Throat:      Comments:     Eyes:      Extraocular Movements: Extraocular movements intact.      Pupils: Pupils are equal, round, and reactive to light.   Cardiovascular:      Rate and Rhythm: Normal rate and regular rhythm.      Heart sounds: Normal heart sounds.   Pulmonary:      Effort: Pulmonary effort is normal.      Breath sounds: Normal breath sounds.   Abdominal:      General: Bowel sounds are normal.      Palpations: Abdomen is soft.      Comments: rounded     Genitourinary:     Comments: deferred    Musculoskeletal:         General: Normal range of motion.      Cervical back: Normal range of motion and neck supple.   Skin:     General: Skin is warm and dry.   Neurological:      Mental Status: She is alert and oriented to person, place, and time.   Psychiatric:         Behavior: Behavior normal.         Thought Content: Thought content normal.        (2) Ambulatory and capable of self care, unable to carry out work activity, up and about > 50% or waking hours  Assessment:     Problem List Items Addressed This Visit          Oncology    Malignant neoplasm of lower-inner quadrant of left breast in female, estrogen receptor positive - Primary    Iron deficiency anemia secondary to inadequate dietary iron intake    Anemia due to stage 3b chronic kidney disease       Orthopedic    Osteopenia     Plan:     Malignant neoplasm of lower-inner quadrant of left breast in female, estrogen receptor positive    Iron deficiency anemia secondary to inadequate dietary iron intake    Anemia due to stage 3b chronic kidney disease    Osteopenia, unspecified location    Labs reviewed; anemia stable.   Continue B12 supplementation daily.   Start Injectafer x 2 doses  1 week apart.   Continue Arimidex and calcium + vitamin D daily.   Follow up in 8 weeks with iron profile, ferritin, CBC, and Comprehensive Metabolic Panel.    Per NCCN Guidelines BINV-17:  H&P 1-4 times per year as clinically appropriate for 5 years then annually  Periodic screening for changes in family history and genetic testing indications and referral to genetic counseling as indicated  Educate, monitor, and refer for lymphedema management  MMG every 12 months  Routine imaging of reconstructed breast is not indicated  For patients receiving anthracycline-based therapy, see NCCN Guidelines for Survivorship for echocardiogram recommendations  In the absence of clinical signs and symptoms suggestive of recurrent disease, there is no indication for lab or imaging studies for metastases screening    Route Chart for Scheduling    Med Onc Chart Routing      Follow up with physician    Follow up with SHANIQUE Other. 8 weeks, Heriberto   Infusion scheduling note    Injection scheduling note Injectafer x 2 doses 1 week apart   Labs CBC, ferritin, iron and TIBC and CMP   Scheduling:  Preferred lab:  Lab interval:  in 8 weeks, 2 days prior   Imaging None      Pharmacy appointment No pharmacy appointment needed      Other referrals     No additional referrals needed         I will review assessment/plan with collaborating physician.      YUNIEL Washburn

## 2023-06-19 ENCOUNTER — OFFICE VISIT (OUTPATIENT)
Dept: HEMATOLOGY/ONCOLOGY | Facility: CLINIC | Age: 73
End: 2023-06-19
Payer: MEDICARE

## 2023-06-19 VITALS
HEART RATE: 112 BPM | BODY MASS INDEX: 27.79 KG/M2 | DIASTOLIC BLOOD PRESSURE: 60 MMHG | HEIGHT: 60 IN | SYSTOLIC BLOOD PRESSURE: 120 MMHG | TEMPERATURE: 99 F | WEIGHT: 141.56 LBS

## 2023-06-19 DIAGNOSIS — D63.1 ANEMIA DUE TO STAGE 3B CHRONIC KIDNEY DISEASE: ICD-10-CM

## 2023-06-19 DIAGNOSIS — D50.9 IRON DEFICIENCY ANEMIA, UNSPECIFIED IRON DEFICIENCY ANEMIA TYPE: ICD-10-CM

## 2023-06-19 DIAGNOSIS — Z17.0 MALIGNANT NEOPLASM OF LOWER-INNER QUADRANT OF LEFT BREAST IN FEMALE, ESTROGEN RECEPTOR POSITIVE: Primary | ICD-10-CM

## 2023-06-19 DIAGNOSIS — N18.32 ANEMIA DUE TO STAGE 3B CHRONIC KIDNEY DISEASE: ICD-10-CM

## 2023-06-19 DIAGNOSIS — D50.8 IRON DEFICIENCY ANEMIA SECONDARY TO INADEQUATE DIETARY IRON INTAKE: ICD-10-CM

## 2023-06-19 DIAGNOSIS — C50.312 MALIGNANT NEOPLASM OF LOWER-INNER QUADRANT OF LEFT BREAST IN FEMALE, ESTROGEN RECEPTOR POSITIVE: Primary | ICD-10-CM

## 2023-06-19 DIAGNOSIS — M85.80 OSTEOPENIA, UNSPECIFIED LOCATION: ICD-10-CM

## 2023-06-19 PROCEDURE — 1101F PT FALLS ASSESS-DOCD LE1/YR: CPT | Mod: CPTII,S$GLB,, | Performed by: NURSE PRACTITIONER

## 2023-06-19 PROCEDURE — 99214 PR OFFICE/OUTPT VISIT, EST, LEVL IV, 30-39 MIN: ICD-10-PCS | Mod: S$GLB,,, | Performed by: NURSE PRACTITIONER

## 2023-06-19 PROCEDURE — 3288F FALL RISK ASSESSMENT DOCD: CPT | Mod: CPTII,S$GLB,, | Performed by: NURSE PRACTITIONER

## 2023-06-19 PROCEDURE — 3078F PR MOST RECENT DIASTOLIC BLOOD PRESSURE < 80 MM HG: ICD-10-PCS | Mod: CPTII,S$GLB,, | Performed by: NURSE PRACTITIONER

## 2023-06-19 PROCEDURE — 3288F PR FALLS RISK ASSESSMENT DOCUMENTED: ICD-10-PCS | Mod: CPTII,S$GLB,, | Performed by: NURSE PRACTITIONER

## 2023-06-19 PROCEDURE — 1126F AMNT PAIN NOTED NONE PRSNT: CPT | Mod: CPTII,S$GLB,, | Performed by: NURSE PRACTITIONER

## 2023-06-19 PROCEDURE — 1159F PR MEDICATION LIST DOCUMENTED IN MEDICAL RECORD: ICD-10-PCS | Mod: CPTII,S$GLB,, | Performed by: NURSE PRACTITIONER

## 2023-06-19 PROCEDURE — 4010F ACE/ARB THERAPY RXD/TAKEN: CPT | Mod: CPTII,S$GLB,, | Performed by: NURSE PRACTITIONER

## 2023-06-19 PROCEDURE — 3074F PR MOST RECENT SYSTOLIC BLOOD PRESSURE < 130 MM HG: ICD-10-PCS | Mod: CPTII,S$GLB,, | Performed by: NURSE PRACTITIONER

## 2023-06-19 PROCEDURE — 1126F PR PAIN SEVERITY QUANTIFIED, NO PAIN PRESENT: ICD-10-PCS | Mod: CPTII,S$GLB,, | Performed by: NURSE PRACTITIONER

## 2023-06-19 PROCEDURE — 1160F PR REVIEW ALL MEDS BY PRESCRIBER/CLIN PHARMACIST DOCUMENTED: ICD-10-PCS | Mod: CPTII,S$GLB,, | Performed by: NURSE PRACTITIONER

## 2023-06-19 PROCEDURE — 1159F MED LIST DOCD IN RCRD: CPT | Mod: CPTII,S$GLB,, | Performed by: NURSE PRACTITIONER

## 2023-06-19 PROCEDURE — 1160F RVW MEDS BY RX/DR IN RCRD: CPT | Mod: CPTII,S$GLB,, | Performed by: NURSE PRACTITIONER

## 2023-06-19 PROCEDURE — 99214 OFFICE O/P EST MOD 30 MIN: CPT | Mod: S$GLB,,, | Performed by: NURSE PRACTITIONER

## 2023-06-19 PROCEDURE — 1101F PR PT FALLS ASSESS DOC 0-1 FALLS W/OUT INJ PAST YR: ICD-10-PCS | Mod: CPTII,S$GLB,, | Performed by: NURSE PRACTITIONER

## 2023-06-19 PROCEDURE — 4010F PR ACE/ARB THEARPY RXD/TAKEN: ICD-10-PCS | Mod: CPTII,S$GLB,, | Performed by: NURSE PRACTITIONER

## 2023-06-19 PROCEDURE — 99999 PR PBB SHADOW E&M-EST. PATIENT-LVL III: CPT | Mod: PBBFAC,,, | Performed by: NURSE PRACTITIONER

## 2023-06-19 PROCEDURE — 99999 PR PBB SHADOW E&M-EST. PATIENT-LVL III: ICD-10-PCS | Mod: PBBFAC,,, | Performed by: NURSE PRACTITIONER

## 2023-06-19 PROCEDURE — 3008F BODY MASS INDEX DOCD: CPT | Mod: CPTII,S$GLB,, | Performed by: NURSE PRACTITIONER

## 2023-06-19 PROCEDURE — 3074F SYST BP LT 130 MM HG: CPT | Mod: CPTII,S$GLB,, | Performed by: NURSE PRACTITIONER

## 2023-06-19 PROCEDURE — 3008F PR BODY MASS INDEX (BMI) DOCUMENTED: ICD-10-PCS | Mod: CPTII,S$GLB,, | Performed by: NURSE PRACTITIONER

## 2023-06-19 PROCEDURE — 3078F DIAST BP <80 MM HG: CPT | Mod: CPTII,S$GLB,, | Performed by: NURSE PRACTITIONER

## 2023-06-19 RX ORDER — SODIUM CHLORIDE 9 MG/ML
INJECTION, SOLUTION INTRAVENOUS CONTINUOUS
Status: CANCELLED | OUTPATIENT
Start: 2023-06-19

## 2023-06-19 RX ORDER — HEPARIN 100 UNIT/ML
5 SYRINGE INTRAVENOUS
Status: CANCELLED | OUTPATIENT
Start: 2023-06-19

## 2023-06-19 RX ORDER — SODIUM CHLORIDE 0.9 % (FLUSH) 0.9 %
10 SYRINGE (ML) INJECTION
Status: CANCELLED | OUTPATIENT
Start: 2023-06-19

## 2023-06-20 ENCOUNTER — PATIENT MESSAGE (OUTPATIENT)
Dept: SURGERY | Facility: HOSPITAL | Age: 73
End: 2023-06-20
Payer: MEDICARE

## 2023-06-20 DIAGNOSIS — R16.1 SPLENIC MASS: Primary | ICD-10-CM

## 2023-06-20 NOTE — TELEPHONE ENCOUNTER
CT reviewed unable to visualize previous splenic mass without contrast.  Discuss case with radiologist who reports it will be safe to undergo MRI abdomen with contrast for better visualization and evaluation of the spleen

## 2023-06-22 ENCOUNTER — TELEPHONE (OUTPATIENT)
Dept: SURGERY | Facility: CLINIC | Age: 73
End: 2023-06-22
Payer: MEDICARE

## 2023-06-22 ENCOUNTER — HOSPITAL ENCOUNTER (OUTPATIENT)
Dept: CARDIOLOGY | Facility: HOSPITAL | Age: 73
Discharge: HOME | End: 2023-06-22
Attending: INTERNAL MEDICINE
Payer: MEDICARE

## 2023-06-22 VITALS
SYSTOLIC BLOOD PRESSURE: 120 MMHG | BODY MASS INDEX: 27.68 KG/M2 | HEART RATE: 103 BPM | WEIGHT: 141 LBS | DIASTOLIC BLOOD PRESSURE: 60 MMHG | HEIGHT: 60 IN

## 2023-06-22 DIAGNOSIS — I50.42 CHRONIC COMBINED SYSTOLIC AND DIASTOLIC CHF (CONGESTIVE HEART FAILURE): ICD-10-CM

## 2023-06-22 LAB
AORTIC ROOT ANNULUS: 2.95 CM
AV INDEX (PROSTH): 0.72
AV MEAN GRADIENT: 6 MMHG
AV PEAK GRADIENT: 9 MMHG
AV VALVE AREA: 1.92 CM2
AV VELOCITY RATIO: 0.83
BSA FOR ECHO PROCEDURE: 1.65 M2
CV ECHO LV RWT: 0.4 CM
DOP CALC AO PEAK VEL: 1.49 M/S
DOP CALC AO VTI: 35.1 CM
DOP CALC LVOT AREA: 2.7 CM2
DOP CALC LVOT DIAMETER: 1.85 CM
DOP CALC LVOT PEAK VEL: 1.24 M/S
DOP CALC LVOT STROKE VOLUME: 67.44 CM3
DOP CALC RVOT PEAK VEL: 0.92 M/S
DOP CALC RVOT VTI: 19.3 CM
DOP CALCLVOT PEAK VEL VTI: 25.1 CM
E WAVE DECELERATION TIME: 248.13 MSEC
E/A RATIO: 0.71
ECHO LV POSTERIOR WALL: 0.98 CM (ref 0.6–1.1)
EJECTION FRACTION: 50 %
FRACTIONAL SHORTENING: 24 % (ref 28–44)
INTERVENTRICULAR SEPTUM: 0.91 CM (ref 0.6–1.1)
IVRT: 106.57 MSEC
LA MAJOR: 3.85 CM
LA MINOR: 3.14 CM
LA WIDTH: 3.6 CM
LEFT ATRIUM SIZE: 2.97 CM
LEFT ATRIUM VOLUME INDEX MOD: 21.5 ML/M2
LEFT ATRIUM VOLUME INDEX: 19.5 ML/M2
LEFT ATRIUM VOLUME MOD: 34.64 CM3
LEFT ATRIUM VOLUME: 31.44 CM3
LEFT INTERNAL DIMENSION IN SYSTOLE: 3.69 CM (ref 2.1–4)
LEFT VENTRICLE DIASTOLIC VOLUME INDEX: 68.81 ML/M2
LEFT VENTRICLE DIASTOLIC VOLUME: 110.78 ML
LEFT VENTRICLE MASS INDEX: 100 G/M2
LEFT VENTRICLE SYSTOLIC VOLUME INDEX: 35.8 ML/M2
LEFT VENTRICLE SYSTOLIC VOLUME: 57.57 ML
LEFT VENTRICULAR INTERNAL DIMENSION IN DIASTOLE: 4.86 CM (ref 3.5–6)
LEFT VENTRICULAR MASS: 160.97 G
LV SEPTAL E/E' RATIO: 8.78 M/S
LVOT MG: 3.3 MMHG
LVOT MV: 0.86 CM/S
MV PEAK A VEL: 1.11 M/S
MV PEAK E VEL: 0.79 M/S
MV STENOSIS PRESSURE HALF TIME: 71.96 MS
MV VALVE AREA P 1/2 METHOD: 3.06 CM2
PISA TR MAX VEL: 2.96 M/S
PV MEAN GRADIENT: 2 MMHG
PV PEAK VELOCITY: 1.07 CM/S
RA MAJOR: 3.38 CM
RA PRESSURE: 8 MMHG
RA WIDTH: 3.27 CM
RIGHT VENTRICULAR END-DIASTOLIC DIMENSION: 3.84 CM
SINUS: 2.76 CM
STJ: 2.74 CM
TDI SEPTAL: 0.09 M/S
TR MAX PG: 35 MMHG
TRICUSPID ANNULAR PLANE SYSTOLIC EXCURSION: 1.97 CM
TV REST PULMONARY ARTERY PRESSURE: 43 MMHG

## 2023-06-22 PROCEDURE — 93306 TTE W/DOPPLER COMPLETE: CPT | Mod: 26,,, | Performed by: INTERNAL MEDICINE

## 2023-06-22 PROCEDURE — 93306 TTE W/DOPPLER COMPLETE: CPT

## 2023-06-22 PROCEDURE — 93306 ECHO (CUPID ONLY): ICD-10-PCS | Mod: 26,,, | Performed by: INTERNAL MEDICINE

## 2023-06-22 NOTE — TELEPHONE ENCOUNTER
----- Message from Vincent Moyer MD sent at 6/20/2023  1:27 PM CDT -----  Please let patient know that we are unable to visualize the splenic mass with a CT.  We have discussed the case with the radiologist and they would like for the patient to undergo an MRI with contrast.  We discussed her kidney function along with contrast and they assured us it is safe to use contrast with the MRI and her current kidney function.

## 2023-06-23 ENCOUNTER — TELEPHONE (OUTPATIENT)
Dept: INFUSION THERAPY | Facility: HOSPITAL | Age: 73
End: 2023-06-23
Payer: MEDICARE

## 2023-06-23 ENCOUNTER — TELEPHONE (OUTPATIENT)
Dept: CARDIOLOGY | Facility: CLINIC | Age: 73
End: 2023-06-23
Payer: MEDICARE

## 2023-06-23 DIAGNOSIS — I27.20 PULMONARY HTN: Primary | ICD-10-CM

## 2023-06-23 RX ORDER — FUROSEMIDE 20 MG/1
20 TABLET ORAL
Qty: 60 TABLET | Refills: 2 | Status: ON HOLD | OUTPATIENT
Start: 2023-06-23 | End: 2023-07-12 | Stop reason: HOSPADM

## 2023-06-23 NOTE — TELEPHONE ENCOUNTER
Spoke to pt  about lab results. KA    ----- Message from Jose G Manjarrez MD sent at 6/23/2023  2:13 PM CDT -----  The lab showed kidney function improved  Continue lasix 20 mg 3 times a week  Repeat BMP in 2 weeks

## 2023-06-26 ENCOUNTER — TELEPHONE (OUTPATIENT)
Dept: INFUSION THERAPY | Facility: HOSPITAL | Age: 73
End: 2023-06-26
Payer: MEDICARE

## 2023-07-03 ENCOUNTER — HOSPITAL ENCOUNTER (OUTPATIENT)
Dept: RADIOLOGY | Facility: HOSPITAL | Age: 73
Discharge: HOME | End: 2023-07-03
Attending: SURGERY
Payer: MEDICARE

## 2023-07-03 DIAGNOSIS — R16.1 SPLENIC MASS: ICD-10-CM

## 2023-07-03 PROCEDURE — 25500020 PHARM REV CODE 255: Performed by: SURGERY

## 2023-07-03 PROCEDURE — A9585 GADOBUTROL INJECTION: HCPCS | Performed by: SURGERY

## 2023-07-03 PROCEDURE — 74183 MRI ABDOMEN W WO CONTRAST: ICD-10-PCS | Mod: 26,,, | Performed by: RADIOLOGY

## 2023-07-03 PROCEDURE — 74183 MRI ABD W/O CNTR FLWD CNTR: CPT | Mod: TC

## 2023-07-03 PROCEDURE — 74183 MRI ABD W/O CNTR FLWD CNTR: CPT | Mod: 26,,, | Performed by: RADIOLOGY

## 2023-07-03 RX ORDER — GADOBUTROL 604.72 MG/ML
6 INJECTION INTRAVENOUS
Status: COMPLETED | OUTPATIENT
Start: 2023-07-03 | End: 2023-07-03

## 2023-07-03 RX ADMIN — GADOBUTROL 6 ML: 604.72 INJECTION INTRAVENOUS at 04:07

## 2023-07-05 ENCOUNTER — TELEPHONE (OUTPATIENT)
Dept: CARDIOLOGY | Facility: CLINIC | Age: 73
End: 2023-07-05
Payer: MEDICARE

## 2023-07-05 NOTE — TELEPHONE ENCOUNTER
Pt was contacted about results:     The lab stable.   Continue current Rx   F/U as scheduled     Pt verbalized understanding with no questions or concerns.        ----- Message from Jose G Manjarrez MD sent at 7/4/2023  6:25 PM CDT -----  The lab stable.  Continue current Rx  F/U as scheduled

## 2023-07-09 ENCOUNTER — HOSPITAL ENCOUNTER (INPATIENT)
Facility: HOSPITAL | Age: 73
LOS: 1 days | Discharge: HOME HEALTH | DRG: 872 | End: 2023-07-12
Attending: EMERGENCY MEDICINE | Admitting: FAMILY MEDICINE
Payer: MEDICARE

## 2023-07-09 DIAGNOSIS — N30.01 ACUTE CYSTITIS WITH HEMATURIA: ICD-10-CM

## 2023-07-09 DIAGNOSIS — R07.9 CHEST PAIN: ICD-10-CM

## 2023-07-09 DIAGNOSIS — B96.20 BACTEREMIA DUE TO ESCHERICHIA COLI: ICD-10-CM

## 2023-07-09 DIAGNOSIS — D63.1 ANEMIA DUE TO STAGE 3B CHRONIC KIDNEY DISEASE: ICD-10-CM

## 2023-07-09 DIAGNOSIS — D50.8 IRON DEFICIENCY ANEMIA SECONDARY TO INADEQUATE DIETARY IRON INTAKE: ICD-10-CM

## 2023-07-09 DIAGNOSIS — A41.9 SEPSIS: Primary | ICD-10-CM

## 2023-07-09 DIAGNOSIS — N18.32 ANEMIA DUE TO STAGE 3B CHRONIC KIDNEY DISEASE: ICD-10-CM

## 2023-07-09 DIAGNOSIS — N17.9 AKI (ACUTE KIDNEY INJURY): ICD-10-CM

## 2023-07-09 DIAGNOSIS — R00.0 TACHYCARDIA: ICD-10-CM

## 2023-07-09 DIAGNOSIS — R78.81 BACTEREMIA DUE TO ESCHERICHIA COLI: ICD-10-CM

## 2023-07-09 PROBLEM — N18.30 ACUTE RENAL FAILURE SUPERIMPOSED ON STAGE 3 CHRONIC KIDNEY DISEASE: Status: ACTIVE | Noted: 2023-07-09

## 2023-07-09 LAB
ALBUMIN SERPL BCP-MCNC: 3.5 G/DL (ref 3.5–5.2)
ALP SERPL-CCNC: 79 U/L (ref 55–135)
ALT SERPL W/O P-5'-P-CCNC: 7 U/L (ref 10–44)
ANION GAP SERPL CALC-SCNC: 15 MMOL/L (ref 8–16)
AST SERPL-CCNC: 12 U/L (ref 10–40)
BACTERIA #/AREA URNS HPF: ABNORMAL /HPF
BASOPHILS # BLD AUTO: 0.07 K/UL (ref 0–0.2)
BASOPHILS NFR BLD: 0.3 % (ref 0–1.9)
BILIRUB SERPL-MCNC: 0.8 MG/DL (ref 0.1–1)
BILIRUB UR QL STRIP: NEGATIVE
BUN SERPL-MCNC: 18 MG/DL (ref 8–23)
CALCIUM SERPL-MCNC: 10 MG/DL (ref 8.7–10.5)
CHLORIDE SERPL-SCNC: 98 MMOL/L (ref 95–110)
CLARITY UR: ABNORMAL
CO2 SERPL-SCNC: 24 MMOL/L (ref 23–29)
COLOR UR: ABNORMAL
CREAT SERPL-MCNC: 1.7 MG/DL (ref 0.5–1.4)
DIFFERENTIAL METHOD: ABNORMAL
EOSINOPHIL # BLD AUTO: 0 K/UL (ref 0–0.5)
EOSINOPHIL NFR BLD: 0 % (ref 0–8)
ERYTHROCYTE [DISTWIDTH] IN BLOOD BY AUTOMATED COUNT: 13.2 % (ref 11.5–14.5)
EST. GFR  (NO RACE VARIABLE): 32 ML/MIN/1.73 M^2
GLUCOSE SERPL-MCNC: 141 MG/DL (ref 70–110)
GLUCOSE UR QL STRIP: NEGATIVE
HCT VFR BLD AUTO: 30 % (ref 37–48.5)
HGB BLD-MCNC: 9.7 G/DL (ref 12–16)
HGB UR QL STRIP: ABNORMAL
HYALINE CASTS #/AREA URNS LPF: 2 /LPF
IMM GRANULOCYTES # BLD AUTO: 0.5 K/UL (ref 0–0.04)
IMM GRANULOCYTES NFR BLD AUTO: 1.8 % (ref 0–0.5)
KETONES UR QL STRIP: NEGATIVE
LACTATE SERPL-SCNC: 1.2 MMOL/L (ref 0.5–2.2)
LACTATE SERPL-SCNC: 1.5 MMOL/L (ref 0.5–2.2)
LEUKOCYTE ESTERASE UR QL STRIP: ABNORMAL
LYMPHOCYTES # BLD AUTO: 1.1 K/UL (ref 1–4.8)
LYMPHOCYTES NFR BLD: 4 % (ref 18–48)
MCH RBC QN AUTO: 29.3 PG (ref 27–31)
MCHC RBC AUTO-ENTMCNC: 32.3 G/DL (ref 32–36)
MCV RBC AUTO: 91 FL (ref 82–98)
MICROSCOPIC COMMENT: ABNORMAL
MONOCYTES # BLD AUTO: 1.7 K/UL (ref 0.3–1)
MONOCYTES NFR BLD: 6.1 % (ref 4–15)
NEUTROPHILS # BLD AUTO: 23.9 K/UL (ref 1.8–7.7)
NEUTROPHILS NFR BLD: 87.8 % (ref 38–73)
NITRITE UR QL STRIP: NEGATIVE
NRBC BLD-RTO: 0 /100 WBC
PH UR STRIP: 8 [PH] (ref 5–8)
PLATELET # BLD AUTO: 380 K/UL (ref 150–450)
PMV BLD AUTO: 9.2 FL (ref 9.2–12.9)
POTASSIUM SERPL-SCNC: 4.5 MMOL/L (ref 3.5–5.1)
PROT SERPL-MCNC: 7.9 G/DL (ref 6–8.4)
PROT UR QL STRIP: ABNORMAL
RBC # BLD AUTO: 3.31 M/UL (ref 4–5.4)
RBC #/AREA URNS HPF: 32 /HPF (ref 0–4)
SODIUM SERPL-SCNC: 137 MMOL/L (ref 136–145)
SP GR UR STRIP: 1.01 (ref 1–1.03)
UNIDENT CRYS URNS QL MICRO: ABNORMAL
URN SPEC COLLECT METH UR: ABNORMAL
UROBILINOGEN UR STRIP-ACNC: NEGATIVE EU/DL
WBC # BLD AUTO: 27.23 K/UL (ref 3.9–12.7)
WBC #/AREA URNS HPF: >100 /HPF (ref 0–5)
WBC CLUMPS URNS QL MICRO: ABNORMAL

## 2023-07-09 PROCEDURE — 93010 EKG 12-LEAD: ICD-10-PCS | Mod: ,,, | Performed by: INTERNAL MEDICINE

## 2023-07-09 PROCEDURE — 80053 COMPREHEN METABOLIC PANEL: CPT | Performed by: PHYSICIAN ASSISTANT

## 2023-07-09 PROCEDURE — 93010 ELECTROCARDIOGRAM REPORT: CPT | Mod: ,,, | Performed by: INTERNAL MEDICINE

## 2023-07-09 PROCEDURE — 93005 ELECTROCARDIOGRAM TRACING: CPT

## 2023-07-09 PROCEDURE — 83605 ASSAY OF LACTIC ACID: CPT | Performed by: PHYSICIAN ASSISTANT

## 2023-07-09 PROCEDURE — 83605 ASSAY OF LACTIC ACID: CPT | Mod: 91 | Performed by: PHYSICIAN ASSISTANT

## 2023-07-09 PROCEDURE — 96361 HYDRATE IV INFUSION ADD-ON: CPT

## 2023-07-09 PROCEDURE — 87088 URINE BACTERIA CULTURE: CPT | Performed by: PHYSICIAN ASSISTANT

## 2023-07-09 PROCEDURE — G0378 HOSPITAL OBSERVATION PER HR: HCPCS

## 2023-07-09 PROCEDURE — 87186 SC STD MICRODIL/AGAR DIL: CPT | Performed by: PHYSICIAN ASSISTANT

## 2023-07-09 PROCEDURE — 85025 COMPLETE CBC W/AUTO DIFF WBC: CPT | Performed by: PHYSICIAN ASSISTANT

## 2023-07-09 PROCEDURE — 25000003 PHARM REV CODE 250: Performed by: NURSE PRACTITIONER

## 2023-07-09 PROCEDURE — 87077 CULTURE AEROBIC IDENTIFY: CPT | Mod: 59 | Performed by: PHYSICIAN ASSISTANT

## 2023-07-09 PROCEDURE — 63600175 PHARM REV CODE 636 W HCPCS: Performed by: EMERGENCY MEDICINE

## 2023-07-09 PROCEDURE — 81000 URINALYSIS NONAUTO W/SCOPE: CPT | Performed by: PHYSICIAN ASSISTANT

## 2023-07-09 PROCEDURE — 96365 THER/PROPH/DIAG IV INF INIT: CPT

## 2023-07-09 PROCEDURE — 25000003 PHARM REV CODE 250: Performed by: PHYSICIAN ASSISTANT

## 2023-07-09 PROCEDURE — 87086 URINE CULTURE/COLONY COUNT: CPT | Performed by: PHYSICIAN ASSISTANT

## 2023-07-09 PROCEDURE — 99291 CRITICAL CARE FIRST HOUR: CPT

## 2023-07-09 PROCEDURE — 87154 CUL TYP ID BLD PTHGN 6+ TRGT: CPT | Performed by: PHYSICIAN ASSISTANT

## 2023-07-09 PROCEDURE — 87040 BLOOD CULTURE FOR BACTERIA: CPT | Performed by: PHYSICIAN ASSISTANT

## 2023-07-09 PROCEDURE — 25000003 PHARM REV CODE 250: Performed by: EMERGENCY MEDICINE

## 2023-07-09 RX ORDER — GLUCAGON 1 MG
1 KIT INJECTION
Status: DISCONTINUED | OUTPATIENT
Start: 2023-07-09 | End: 2023-07-12 | Stop reason: HOSPADM

## 2023-07-09 RX ORDER — LANOLIN ALCOHOL/MO/W.PET/CERES
1 CREAM (GRAM) TOPICAL DAILY
Status: DISCONTINUED | OUTPATIENT
Start: 2023-07-10 | End: 2023-07-12 | Stop reason: HOSPADM

## 2023-07-09 RX ORDER — CARVEDILOL 3.12 MG/1
3.12 TABLET ORAL 2 TIMES DAILY WITH MEALS
Status: DISCONTINUED | OUTPATIENT
Start: 2023-07-09 | End: 2023-07-12 | Stop reason: HOSPADM

## 2023-07-09 RX ORDER — TALC
6 POWDER (GRAM) TOPICAL NIGHTLY PRN
Status: DISCONTINUED | OUTPATIENT
Start: 2023-07-09 | End: 2023-07-12 | Stop reason: HOSPADM

## 2023-07-09 RX ORDER — MEMANTINE HYDROCHLORIDE 5 MG/1
5 TABLET ORAL 2 TIMES DAILY
Status: DISCONTINUED | OUTPATIENT
Start: 2023-07-09 | End: 2023-07-12 | Stop reason: HOSPADM

## 2023-07-09 RX ORDER — TAMSULOSIN HYDROCHLORIDE 0.4 MG/1
1 CAPSULE ORAL DAILY
Status: DISCONTINUED | OUTPATIENT
Start: 2023-07-10 | End: 2023-07-12 | Stop reason: HOSPADM

## 2023-07-09 RX ORDER — NALOXONE HCL 0.4 MG/ML
0.02 VIAL (ML) INJECTION
Status: DISCONTINUED | OUTPATIENT
Start: 2023-07-09 | End: 2023-07-12 | Stop reason: HOSPADM

## 2023-07-09 RX ORDER — AMOXICILLIN 250 MG
1 CAPSULE ORAL 2 TIMES DAILY
Status: DISCONTINUED | OUTPATIENT
Start: 2023-07-09 | End: 2023-07-12 | Stop reason: HOSPADM

## 2023-07-09 RX ORDER — QUETIAPINE FUMARATE 100 MG/1
100 TABLET, FILM COATED ORAL NIGHTLY
Status: DISCONTINUED | OUTPATIENT
Start: 2023-07-09 | End: 2023-07-12 | Stop reason: HOSPADM

## 2023-07-09 RX ORDER — ONDANSETRON 2 MG/ML
4 INJECTION INTRAMUSCULAR; INTRAVENOUS EVERY 8 HOURS PRN
Status: DISCONTINUED | OUTPATIENT
Start: 2023-07-09 | End: 2023-07-12 | Stop reason: HOSPADM

## 2023-07-09 RX ORDER — AMLODIPINE BESYLATE 5 MG/1
5 TABLET ORAL DAILY
Status: DISCONTINUED | OUTPATIENT
Start: 2023-07-10 | End: 2023-07-12 | Stop reason: HOSPADM

## 2023-07-09 RX ORDER — IBUPROFEN 200 MG
24 TABLET ORAL
Status: DISCONTINUED | OUTPATIENT
Start: 2023-07-09 | End: 2023-07-12 | Stop reason: HOSPADM

## 2023-07-09 RX ORDER — SODIUM CHLORIDE 0.9 % (FLUSH) 0.9 %
10 SYRINGE (ML) INJECTION EVERY 12 HOURS PRN
Status: DISCONTINUED | OUTPATIENT
Start: 2023-07-09 | End: 2023-07-12 | Stop reason: HOSPADM

## 2023-07-09 RX ORDER — ISOSORBIDE MONONITRATE 30 MG/1
30 TABLET, EXTENDED RELEASE ORAL DAILY
Status: DISCONTINUED | OUTPATIENT
Start: 2023-07-10 | End: 2023-07-12 | Stop reason: HOSPADM

## 2023-07-09 RX ORDER — ENOXAPARIN SODIUM 100 MG/ML
40 INJECTION SUBCUTANEOUS EVERY 24 HOURS
Status: DISCONTINUED | OUTPATIENT
Start: 2023-07-10 | End: 2023-07-12 | Stop reason: HOSPADM

## 2023-07-09 RX ORDER — SODIUM CHLORIDE 9 MG/ML
INJECTION, SOLUTION INTRAVENOUS CONTINUOUS
Status: ACTIVE | OUTPATIENT
Start: 2023-07-09 | End: 2023-07-10

## 2023-07-09 RX ORDER — ANASTROZOLE 1 MG/1
1 TABLET ORAL DAILY
Status: DISCONTINUED | OUTPATIENT
Start: 2023-07-10 | End: 2023-07-12 | Stop reason: HOSPADM

## 2023-07-09 RX ORDER — DONEPEZIL HYDROCHLORIDE 5 MG/1
5 TABLET, FILM COATED ORAL NIGHTLY
Status: DISCONTINUED | OUTPATIENT
Start: 2023-07-09 | End: 2023-07-12 | Stop reason: HOSPADM

## 2023-07-09 RX ORDER — ACETAMINOPHEN 325 MG/1
650 TABLET ORAL EVERY 4 HOURS PRN
Status: DISCONTINUED | OUTPATIENT
Start: 2023-07-09 | End: 2023-07-12 | Stop reason: HOSPADM

## 2023-07-09 RX ORDER — IBUPROFEN 200 MG
16 TABLET ORAL
Status: DISCONTINUED | OUTPATIENT
Start: 2023-07-09 | End: 2023-07-12 | Stop reason: HOSPADM

## 2023-07-09 RX ORDER — NAPROXEN SODIUM 220 MG/1
81 TABLET, FILM COATED ORAL DAILY
Status: DISCONTINUED | OUTPATIENT
Start: 2023-07-10 | End: 2023-07-12 | Stop reason: HOSPADM

## 2023-07-09 RX ADMIN — SENNOSIDES AND DOCUSATE SODIUM 1 TABLET: 50; 8.6 TABLET ORAL at 08:07

## 2023-07-09 RX ADMIN — SODIUM CHLORIDE 1000 ML: 9 INJECTION, SOLUTION INTRAVENOUS at 01:07

## 2023-07-09 RX ADMIN — DONEPEZIL HYDROCHLORIDE 5 MG: 5 TABLET, FILM COATED ORAL at 08:07

## 2023-07-09 RX ADMIN — MEMANTINE 5 MG: 5 TABLET ORAL at 08:07

## 2023-07-09 RX ADMIN — SODIUM CHLORIDE: 9 INJECTION, SOLUTION INTRAVENOUS at 08:07

## 2023-07-09 RX ADMIN — CEFTRIAXONE 1 G: 1 INJECTION, POWDER, FOR SOLUTION INTRAMUSCULAR; INTRAVENOUS at 02:07

## 2023-07-09 RX ADMIN — CARVEDILOL 3.12 MG: 3.12 TABLET, FILM COATED ORAL at 05:07

## 2023-07-09 RX ADMIN — QUETIAPINE FUMARATE 100 MG: 100 TABLET ORAL at 08:07

## 2023-07-09 NOTE — ASSESSMENT & PLAN NOTE
- baseline creatinine 1.3, today 1.7, will gently hydrate over night and repeat labs in the morning  - avoid nephrotoxic agents and renal dose meds as able

## 2023-07-09 NOTE — SUBJECTIVE & OBJECTIVE
Past Medical History:   Diagnosis Date    Arthritis     EDGAR HANDS, KNEES    Behavioral change 2022    Blind right eye     Traumatic    Breast cancer 06/15/2017    0.8 cm Grade1 INTRADUCTAL BREAST 9 positve margin (left)    Essential hypertension     Hemorrhoids     Immunodeficiency due to chemotherapy 2021    Lipoma of abdominal wall     Major neurocognitive disorder 2022    Obesity     Overactive bladder     Pap smear abnormality of cervix with ASCUS favoring benign     Thyroid nodule     Tobacco use disorder     Tubular adenoma of colon     Urinary incontinence        Past Surgical History:   Procedure Laterality Date    ANTERIOR VAGINAL REPAIR      BLADDER SURGERY      BREAST LUMPECTOMY Left 2017    CATARACT EXTRACTION Left 2022     SECTION      X2    COLONOSCOPY N/A 3/8/2017    Procedure: COLONOSCOPY;  Surgeon: Tyron Paris MD;  Location: [facility];  Service: Endoscopy;  Laterality: N/A;    COLONOSCOPY N/A 2020    Procedure: COLONOSCOPY;  Surgeon: Keira Ellison MD;  Location: [facility];  Service: Endoscopy;  Laterality: N/A;    ESOPHAGOGASTRODUODENOSCOPY N/A 2020    Procedure: EGD (ESOPHAGOGASTRODUODENOSCOPY);  Surgeon: Keira Ellison MD;  Location: [facility];  Service: Endoscopy;  Laterality: N/A;  new onset iron deficiency with prior history of breast cancer    INTRALUMINAL GASTROINTESTINAL TRACT IMAGING VIA CAPSULE N/A 10/28/2020    Procedure: IMAGING PROCEDURE, GI TRACT, INTRALUMINAL, VIA CAPSULE;  Surgeon: Finesse Jha RN;  Location: [facility];  Service: Endoscopy;  Laterality: N/A;    LEFT HEART CATHETERIZATION Left 10/12/2021    Procedure: CATHETERIZATION, HEART, LEFT;  Surgeon: Tiffanie Santos MD;  Location: [facility] CATH LAB;  Service: Cardiology;  Laterality: Left;    SENTINEL LYMPH NODE BIOPSY Left 2020    Procedure: BIOPSY, LYMPH NODE, SENTINEL;  Surgeon: Vincent Moyer MD;  Location: [facility] OR;  Service: General;  Laterality: Left;    SIMPLE  MASTECTOMY Left 9/8/2020    Procedure: MASTECTOMY, SIMPLE;  Surgeon: Vincent Moyer MD;  Location: [facility];  Service: General;  Laterality: Left;    THYROID LOBECTOMY Left 2005    TOTAL ABDOMINAL HYSTERECTOMY      TUBAL LIGATION         Review of patient's allergies indicates:  No Known Allergies    No current facility-administered medications on file prior to encounter.     Current Outpatient Medications on File Prior to Encounter   Medication Sig    amLODIPine (NORVASC) 5 MG tablet Take 1 tablet (5 mg total) by mouth once daily.    anastrozole (ARIMIDEX) 1 mg Tab TAKE 1 TABLET BY MOUTH EVERY DAY    aspirin 81 MG Chew Take 1 tablet (81 mg total) by mouth once daily.    busPIRone (BUSPAR) 15 MG tablet TAKE 1 TABLET BY MOUTH 2 TIMES DAILY.    calcium citrate (CALCITRATE) 200 mg (950 mg) tablet Take 1 tablet by mouth once daily.    carvediloL (COREG) 3.125 MG tablet TAKE 1 TABLET BY MOUTH TWICE A DAY WITH MEALS    cyanocobalamin 2000 MCG tablet Take 2,000 mcg by mouth 2 (two) times a day.    donepeziL (ARICEPT) 5 MG tablet Take 1 tablet (5 mg total) by mouth every evening.    ferrous sulfate (FEOSOL) 325 mg (65 mg iron) Tab tablet Take 65 mg by mouth once daily.    furosemide (LASIX) 20 MG tablet Take 1 tablet (20 mg total) by mouth every Mon, Wed, Fri.    isosorbide mononitrate (IMDUR) 30 MG 24 hr tablet TAKE 1 TABLET BY MOUTH EVERY DAY    memantine (NAMENDA) 5 MG Tab Take 1 tablet (5 mg total) by mouth 2 (two) times daily.    QUEtiapine (SEROQUEL) 100 MG Tab Take 1 tablet (100 mg total) by mouth every evening.    tamsulosin (FLOMAX) 0.4 mg Cap Take 1 capsule by mouth once daily.    telmisartan (MICARDIS) 20 MG Tab TAKE 1 TABLET BY MOUTH EVERY DAY    vitamin D 1000 units Tab Take 1,000 Units by mouth once daily.     Family History       Problem Relation (Age of Onset)    Alcohol abuse Mother    Allergic rhinitis Daughter    Cataracts Father    Cervical cancer Daughter (32)    Cirrhosis Mother    Diabetes Brother     Heart attack Brother    Heart murmur Brother    Hypertension Father, Daughter    No Known Problems Daughter    Prostate cancer Father (80)          Tobacco Use    Smoking status: Former     Packs/day: 1.00     Years: 50.00     Pack years: 50.00     Types: Cigarettes     Quit date: 2020     Years since quittin.8    Smokeless tobacco: Never   Substance and Sexual Activity    Alcohol use: Never     Alcohol/week: 28.0 standard drinks     Types: 28 Cans of beer per week    Drug use: No    Sexual activity: Never     Partners: Male     Review of Systems   Constitutional:  Positive for activity change, appetite change and fever.   Neurological:  Positive for weakness.   Objective:     Vital Signs (Most Recent):  Temp: 100.2 °F (37.9 °C) (23 1132)  Pulse: 110 (23 1500)  Resp: 20 (23 1500)  BP: (!) 144/62 (23 1500)  SpO2: 98 % (23 1304) Vital Signs (24h Range):  Temp:  [100.2 °F (37.9 °C)] 100.2 °F (37.9 °C)  Pulse:  [109-113] 110  Resp:  [18-20] 20  SpO2:  [97 %-98 %] 98 %  BP: ()/(54-80) 144/62     Weight: 57.3 kg (126 lb 5.2 oz)  Body mass index is 24.67 kg/m².     Physical Exam  Vitals and nursing note reviewed.   Constitutional:       Appearance: Normal appearance.   Cardiovascular:      Rate and Rhythm: Normal rate and regular rhythm.      Pulses: Normal pulses.      Heart sounds: Normal heart sounds.   Pulmonary:      Effort: Pulmonary effort is normal.      Breath sounds: Normal breath sounds. No wheezing.   Abdominal:      General: Bowel sounds are normal. There is no distension.      Palpations: Abdomen is soft.      Tenderness: There is no abdominal tenderness.   Musculoskeletal:         General: No swelling. Normal range of motion.   Skin:     General: Skin is warm and dry.   Neurological:      Comments: Oriented to person and place, lethargic, opens eyes to voice, moves all extremities spontaneously               Significant Labs: All pertinent labs within the past 24  hours have been reviewed.    Significant Imaging: I have reviewed all pertinent imaging results/findings within the past 24 hours.

## 2023-07-09 NOTE — H&P
[facility] - Emergency Dept.  [facility] Medicine  History & Physical    Patient Name: Leia Rodriguez  MRN: [id number]  Patient Class: OP- Observation  Admission Date: 7/9/2023  Attending Physician: Carter Barahona MD   Primary Care Provider: Yasmin Navarro MD         Patient information was obtained from patient, past medical records and ER records.     Subjective:     Principal Problem:Sepsis    Chief Complaint:   Chief Complaint   Patient presents with    Weakness     Weakness, loss of appetite, fell asleep this morning and couldn't wake her up.        HPI: Ms. Rodriguez is a 72 year old female with a PMH of CAD, CHFmrEF, chronic constipation, breast cancer s/p mastectomy,  HTN and dementia who presents to Ed with daugther d/t AMS and generalized weakness which started yesterday.  Patient is a poor historian, so hx was taken from daughters at bedside and chart review.  Per daughter, mother was more confused than normal yesterday and today she noticed her mother was more lethargic as well and had some difficulty with getting into car and going to [employer] this am.  At baseline, patient ambulates with walker with no assistance and oriented to person.   In the ED tmax was 100.2, patient noted to be tachycardiac as well.  Lab work notable for WBC 27, creatinine 1.7 (baseline 1.3).  UA with many bacteria and WBC.  Patient started on IV abx.  Blood and urine cultures obtained,.  She will be admitted to observation for sepsis d/t UTI.      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    Lipoma of abdominal wall     Major neurocognitive disorder 06/21/2022    Obesity     Overactive bladder      Pap smear abnormality of cervix with ASCUS favoring benign     Thyroid nodule     Tobacco use disorder     Tubular adenoma of colon     Urinary incontinence        Past Surgical History:   Procedure Laterality Date    ANTERIOR VAGINAL REPAIR      BLADDER SURGERY      BREAST LUMPECTOMY Left 2017    CATARACT EXTRACTION Left 2022     SECTION      X2    COLONOSCOPY N/A 3/8/2017    Procedure: COLONOSCOPY;  Surgeon: Tyron Paris MD;  Location: [facility];  Service: Endoscopy;  Laterality: N/A;    COLONOSCOPY N/A 2020    Procedure: COLONOSCOPY;  Surgeon: Keira Ellison MD;  Location: [facility];  Service: Endoscopy;  Laterality: N/A;    ESOPHAGOGASTRODUODENOSCOPY N/A 2020    Procedure: EGD (ESOPHAGOGASTRODUODENOSCOPY);  Surgeon: Keira Ellison MD;  Location: [facility];  Service: Endoscopy;  Laterality: N/A;  new onset iron deficiency with prior history of breast cancer    INTRALUMINAL GASTROINTESTINAL TRACT IMAGING VIA CAPSULE N/A 10/28/2020    Procedure: IMAGING PROCEDURE, GI TRACT, INTRALUMINAL, VIA CAPSULE;  Surgeon: Finesse Jha RN;  Location: [facility];  Service: Endoscopy;  Laterality: N/A;    LEFT HEART CATHETERIZATION Left 10/12/2021    Procedure: CATHETERIZATION, HEART, LEFT;  Surgeon: Tiffanie Santos MD;  Location: [facility] CATH LAB;  Service: Cardiology;  Laterality: Left;    SENTINEL LYMPH NODE BIOPSY Left 2020    Procedure: BIOPSY, LYMPH NODE, SENTINEL;  Surgeon: Vincent Moyer MD;  Location: [facility];  Service: General;  Laterality: Left;    SIMPLE MASTECTOMY Left 2020    Procedure: MASTECTOMY, SIMPLE;  Surgeon: Vincent Moyer MD;  Location: [facility] OR;  Service: General;  Laterality: Left;    THYROID LOBECTOMY Left     TOTAL ABDOMINAL HYSTERECTOMY      TUBAL LIGATION         Review of patient's allergies indicates:  No Known Allergies    No current facility-administered medications on file prior to encounter.     Current Outpatient Medications on  File Prior to Encounter   Medication Sig    amLODIPine (NORVASC) 5 MG tablet Take 1 tablet (5 mg total) by mouth once daily.    anastrozole (ARIMIDEX) 1 mg Tab TAKE 1 TABLET BY MOUTH EVERY DAY    aspirin 81 MG Chew Take 1 tablet (81 mg total) by mouth once daily.    busPIRone (BUSPAR) 15 MG tablet TAKE 1 TABLET BY MOUTH 2 TIMES DAILY.    calcium citrate (CALCITRATE) 200 mg (950 mg) tablet Take 1 tablet by mouth once daily.    carvediloL (COREG) 3.125 MG tablet TAKE 1 TABLET BY MOUTH TWICE A DAY WITH MEALS    cyanocobalamin 2000 MCG tablet Take 2,000 mcg by mouth 2 (two) times a day.    donepeziL (ARICEPT) 5 MG tablet Take 1 tablet (5 mg total) by mouth every evening.    ferrous sulfate (FEOSOL) 325 mg (65 mg iron) Tab tablet Take 65 mg by mouth once daily.    furosemide (LASIX) 20 MG tablet Take 1 tablet (20 mg total) by mouth every Mon, Wed, Fri.    isosorbide mononitrate (IMDUR) 30 MG 24 hr tablet TAKE 1 TABLET BY MOUTH EVERY DAY    memantine (NAMENDA) 5 MG Tab Take 1 tablet (5 mg total) by mouth 2 (two) times daily.    QUEtiapine (SEROQUEL) 100 MG Tab Take 1 tablet (100 mg total) by mouth every evening.    tamsulosin (FLOMAX) 0.4 mg Cap Take 1 capsule by mouth once daily.    telmisartan (MICARDIS) 20 MG Tab TAKE 1 TABLET BY MOUTH EVERY DAY    vitamin D 1000 units Tab Take 1,000 Units by mouth once daily.     Family History       Problem Relation (Age of Onset)    Alcohol abuse Mother    Allergic rhinitis Daughter    Cataracts Father    Cervical cancer Daughter (32)    Cirrhosis Mother    Diabetes Brother    Heart attack Brother    Heart murmur Brother    Hypertension Father, Daughter    No Known Problems Daughter    Prostate cancer Father (80)          Tobacco Use    Smoking status: Former     Packs/day: 1.00     Years: 50.00     Pack years: 50.00     Types: Cigarettes     Quit date: 2020     Years since quittin.8    Smokeless tobacco: Never   Substance and Sexual Activity    Alcohol  use: Never     Alcohol/week: 28.0 standard drinks     Types: 28 Cans of beer per week    Drug use: No    Sexual activity: Never     Partners: Male     Review of Systems   Constitutional:  Positive for activity change, appetite change and fever.   Neurological:  Positive for weakness.   Objective:     Vital Signs (Most Recent):  Temp: 100.2 °F (37.9 °C) (07/09/23 1132)  Pulse: 110 (07/09/23 1500)  Resp: 20 (07/09/23 1500)  BP: (!) 144/62 (07/09/23 1500)  SpO2: 98 % (07/09/23 1304) Vital Signs (24h Range):  Temp:  [100.2 °F (37.9 °C)] 100.2 °F (37.9 °C)  Pulse:  [109-113] 110  Resp:  [18-20] 20  SpO2:  [97 %-98 %] 98 %  BP: ()/(54-80) 144/62     Weight: 57.3 kg (126 lb 5.2 oz)  Body mass index is 24.67 kg/m².     Physical Exam  Vitals and nursing note reviewed.   Constitutional:       Appearance: Normal appearance.   Cardiovascular:      Rate and Rhythm: Normal rate and regular rhythm.      Pulses: Normal pulses.      Heart sounds: Normal heart sounds.   Pulmonary:      Effort: Pulmonary effort is normal.      Breath sounds: Normal breath sounds. No wheezing.   Abdominal:      General: Bowel sounds are normal. There is no distension.      Palpations: Abdomen is soft.      Tenderness: There is no abdominal tenderness.   Musculoskeletal:         General: No swelling. Normal range of motion.   Skin:     General: Skin is warm and dry.   Neurological:      Comments: Oriented to person and place, lethargic, opens eyes to voice, moves all extremities spontaneously               Significant Labs: All pertinent labs within the past 24 hours have been reviewed.    Significant Imaging: I have reviewed all pertinent imaging results/findings within the past 24 hours.    Assessment/Plan:     * Sepsis  This patient does have evidence of infective focus  My overall impression is sepsis.  Source: Urinary Tract  Antibiotics given-   Antibiotics (72h ago, onward)    Start     Stop Route Frequency Ordered    07/10/23 0900   cefTRIAXone (ROCEPHIN) 2 g in dextrose 5 % in water (D5W) 5 % 100 mL IVPB (MB+)         -- IV Every 24 hours (non-standard times) 07/09/23 1613        Latest lactate reviewed-  Recent Labs   Lab 07/09/23  1236   LACTATE 1.5     Organ dysfunction indicated by Acute kidney injury    - blood and urine culture pending  - continue rocephin  - trend wbc and fever curve      Acute cystitis  - follow up urine culture  - see further plan above      Acute renal failure superimposed on stage 3 chronic kidney disease  - baseline creatinine 1.3, today 1.7, will gently hydrate over night and repeat labs in the morning  - avoid nephrotoxic agents and renal dose meds as able       Chronic combined systolic and diastolic CHF (congestive heart failure)  Patient is identified as having Systolic (HFrEF) heart failure that is Chronic. CHF is currently controlled. Latest ECHO performed and demonstrates- Results for orders placed during the hospital encounter of 06/22/23    Echo    Interpretation Summary  · The left ventricle is normal in size with mild mild asymmetric hypertrophy eccentric hypertrophy and low normal systolic function.  · The estimated ejection fraction is 50%.  · Normal left ventricular diastolic function.  · Normal right ventricular size with normal right ventricular systolic function.  · Mild to moderate tricuspid regurgitation.  · Intermediate central venous pressure (8 mmHg).  · The estimated PA systolic pressure is 43 mmHg.  · There is pulmonary hypertension.  . Continue Beta Blocker and monitor clinical status closely. Monitor on telemetry. Patient is off CHF pathway.  Monitor strict Is&Os and daily weights.  Place on fluid restriction of 1.5 L. Cardiology has not been any consulted. Continue to stress to patient importance of self efficacy and  on diet for CHF. Last BNP reviewed- and noted below No results for input(s): BNP, BNPTRIAGEBLO in the last 168 hours..    - euvolemic at exam, holding home lasix d/t  worsening DANIEL  - will monitor fluid status and restart as necessary     Coronary artery disease of native artery of native heart with stable angina pectoris        Essential hypertension  - restart home coreg, Norvasc and Imdur; holding arb in the setting of DANIEL        VTE Risk Mitigation (From admission, onward)         Ordered     enoxaparin injection 40 mg  Daily         07/09/23 1542     IP VTE HIGH RISK PATIENT  Once         07/09/23 1542     Place sequential compression device  Until discontinued         07/09/23 1542                     On 07/09/2023, patient should be placed in hospital observation services under my care in collaboration with Dr. Barahona.      Ruby Deluca NP  Department of Hospital Medicine  [facility] - Emergency Dept.

## 2023-07-09 NOTE — FIRST PROVIDER EVALUATION
Emergency Department TeleTriage Encounter Note      CHIEF COMPLAINT    Chief Complaint   Patient presents with    Weakness     Weakness, loss of appetite, fell asleep this morning and couldn't wake her up.       VITAL SIGNS   Initial Vitals [07/09/23 1132]   BP Pulse Resp Temp SpO2   (!) 93/54 (!) 113 20 100.2 °F (37.9 °C) 97 %      MAP       --            ALLERGIES    Review of patient's allergies indicates:  No Known Allergies    PROVIDER TRIAGE NOTE  This is a teletriage evaluation of a 72 y.o. female presenting to the ED complaining of weakness. Patient has dementia so daughter provides history. Patient has been weak and confused since waking this morning. She has had frequent UTIs but no complaints recently according to the daughter.     Patient is alert, but is fatigued. She does not speak during teletriage. She is sitting upright in the chair.    I messaged the onsite providers. Sepsis workup initiated. Given patient's history of CHF, limited fluid bolus given.    Initial orders will be placed and care will be transferred to an alternate provider when patient is roomed for a full evaluation. Any additional orders and the final disposition will be determined by that provider.         ORDERS  Labs Reviewed   CULTURE, BLOOD   CULTURE, BLOOD   CBC W/ AUTO DIFFERENTIAL   COMPREHENSIVE METABOLIC PANEL   LACTIC ACID, PLASMA   LACTIC ACID, PLASMA   URINALYSIS, REFLEX TO URINE CULTURE       ED Orders (720h ago, onward)      Start Ordered     Status Ordering Provider    07/09/23 1602 07/09/23 1204  Lactic acid, plasma #2  In 4 hours         Ordered JOSEFINA العلي G.    07/09/23 1215 07/09/23 1214  sodium chloride 0.9% bolus 1,000 mL 1,000 mL  Once         Ordered ZOHRAMAULIKY G.    07/09/23 1202 07/09/23 1204  ED Preference List Used to Initiate Sepsis Orders  Until discontinued         Ordered ZOHRA, JOSEFINA G.    07/09/23 1202 07/09/23 1204  Blood culture x two cultures. Draw prior to antibiotics.  Every 15 min       Comments: Aerobic and anaerobic     Order ID Start Status Ordering Provider   [id number] 07/09/23 1202 Ordered JOSEFINA العلي.   [id number] 07/09/23 1217 Ordered ZOHRAJOSEFINA STALLINGS G.       Ordered ZOHRAJOSEFINA G.    07/09/23 1202 07/09/23 1204  CBC auto differential  STAT         Ordered ZOHRAJOSEFINA G.    07/09/23 1202 07/09/23 1204  Comprehensive metabolic panel  STAT         Ordered ZOHRAJOSEFINA G.    07/09/23 1202 07/09/23 1204  Lactic acid, plasma #1  STAT         Ordered ZOHRAJOSEFINA G.    07/09/23 1202 07/09/23 1204  Vital signs  Every 15 min        Comments: Every 15 minutes until SBP greater than 90 or MAP greater than 65, then every 30 minutes times one hour, then every one hour.    Ordered JOSEFINA العلي G.    07/09/23 1202 07/09/23 1204  Bed rest  Until discontinued         Ordered ZOHRAMAULIKY G.    07/09/23 1202 07/09/23 1204  Cardiac Monitoring - Adult  Continuous        Comments: Notify Physician If:    Ordered ZOHRAJOSEFINA G.    07/09/23 1202 07/09/23 1204  Pulse Oximetry Continuous  Continuous         Ordered ZOHRAMAULIKY G.    07/09/23 1202 07/09/23 1204  Strict intake and output  Until discontinued         Ordered ZOHRA, JOSEFINA G.    07/09/23 1202 07/09/23 1204  Urinalysis, Reflex to Urine Culture Urine, Clean Catch  STAT         Ordered ZOHRAJOSEFINA G.    07/09/23 1202 07/09/23 1204  Saline lock IV  Once         Ordered ZOHRA, JOSEFINA G.    07/09/23 1202 07/09/23 1204  EKG 12-lead  Once         Ordered ZOHRA, JOSEFINA G.    07/09/23 1202 07/09/23 1204  X-Ray Chest AP Portable  1 time imaging         Ordered JOSEFINA العلي G.              Virtual Visit Note: The provider triage portion of this emergency department evaluation and documentation was performed via [employer], a HIPAA-compliant telemedicine application, in concert with a tele-presenter in the room. A face to face patient evaluation with one of my colleagues will occur once the patient is placed in an emergency department room.      DISCLAIMER:  This note was prepared with [employer] voice recognition transcription software. Garbled syntax, mangled pronouns, and other bizarre constructions may be attributed to that software system.

## 2023-07-09 NOTE — HPI
Ms. Rodriguez is a 72 year old female with a PMH of CAD, CHFmrEF, chronic constipation, breast cancer s/p mastectomy,  HTN and dementia who presents to Ed with daugther d/t AMS and generalized weakness which started yesterday.  Patient is a poor historian, so hx was taken from daughters at bedside and chart review.  Per daughter, mother was more confused than normal yesterday and today she noticed her mother was more lethargic as well and had some difficulty with getting into car and going to [employer] this am.  At baseline, patient ambulates with walker with no assistance and oriented to person.   In the ED tmax was 100.2, patient noted to be tachycardiac as well.  Lab work notable for WBC 27, creatinine 1.7 (baseline 1.3).  UA with many bacteria and WBC.  Patient started on IV abx.  Blood and urine cultures obtained,.  She will be admitted to observation for sepsis d/t UTI.      Code Status Full  Surrogate Decision Maker daughter

## 2023-07-09 NOTE — ASSESSMENT & PLAN NOTE
This patient does have evidence of infective focus  My overall impression is sepsis.  Source: Urinary Tract  Antibiotics given-   Antibiotics (72h ago, onward)    Start     Stop Route Frequency Ordered    07/10/23 0900  cefTRIAXone (ROCEPHIN) 2 g in dextrose 5 % in water (D5W) 5 % 100 mL IVPB (MB+)         -- IV Every 24 hours (non-standard times) 07/09/23 1613        Latest lactate reviewed-  Recent Labs   Lab 07/09/23  1236   LACTATE 1.5     Organ dysfunction indicated by Acute kidney injury    - blood and urine culture pending  - continue rocephin  - trend wbc and fever curve

## 2023-07-09 NOTE — PHARMACY MED REC
"Admission Medication History     The home medication history was taken by Nelson Monroe.    You may go to "Admission" then "Reconcile Home Medications" tabs to review and/or act upon these items.     The home medication list has been updated by the Pharmacy department.   Please read ALL comments highlighted in yellow.   Please address this information as you see fit.    Feel free to contact us if you have any questions or require assistance.      Medications listed below were obtained from: Analytic software- [employer] and Medical records  (Not in a hospital admission)      Nelson Monroe  [id number]    Current Outpatient Medications on File Prior to Encounter   Medication Sig Dispense Refill Last Dose    amLODIPine (NORVASC) 5 MG tablet Take 1 tablet (5 mg total) by mouth once daily. 30 tablet 11     anastrozole (ARIMIDEX) 1 mg Tab TAKE 1 TABLET BY MOUTH EVERY DAY 90 tablet 3     aspirin 81 MG Chew Take 1 tablet (81 mg total) by mouth once daily. 90 tablet 3     busPIRone (BUSPAR) 15 MG tablet TAKE 1 TABLET BY MOUTH 2 TIMES DAILY. 180 tablet 3     calcium citrate (CALCITRATE) 200 mg (950 mg) tablet Take 1 tablet by mouth once daily.       carvediloL (COREG) 3.125 MG tablet TAKE 1 TABLET BY MOUTH TWICE A DAY WITH MEALS 180 tablet 3     cyanocobalamin 2000 MCG tablet Take 2,000 mcg by mouth 2 (two) times a day.       donepeziL (ARICEPT) 5 MG tablet Take 1 tablet (5 mg total) by mouth every evening. 90 tablet 3     ferrous sulfate (FEOSOL) 325 mg (65 mg iron) Tab tablet Take 65 mg by mouth once daily.       furosemide (LASIX) 20 MG tablet Take 1 tablet (20 mg total) by mouth every Mon, Wed, Fri. 60 tablet 2     isosorbide mononitrate (IMDUR) 30 MG 24 hr tablet TAKE 1 TABLET BY MOUTH EVERY DAY 90 tablet 3     memantine (NAMENDA) 5 MG Tab Take 1 tablet (5 mg total) by mouth 2 (two) times daily. 180 tablet 3     QUEtiapine (SEROQUEL) 100 MG Tab Take 1 tablet (100 mg total) by mouth every evening. 30 tablet 11     " tamsulosin (FLOMAX) 0.4 mg Cap Take 1 capsule by mouth once daily.       telmisartan (MICARDIS) 20 MG Tab TAKE 1 TABLET BY MOUTH EVERY DAY 90 tablet 3     vitamin D 1000 units Tab Take 1,000 Units by mouth once daily.                            .

## 2023-07-09 NOTE — ED PROVIDER NOTES
SCRIBE #1 NOTE: I, Selam Yeager, am scribing for, and in the presence of, Luisa Godfrey DO. I have scribed the entire note.       History     Chief Complaint   Patient presents with    Weakness     Weakness, loss of appetite, fell asleep this morning and couldn't wake her up.     Review of patient's allergies indicates:  No Known Allergies      History of Present Illness     HPI    7/9/2023, 2:58 PM  History obtained from the patient      History of Present Illness: Leia Rodriguez is a 72 y.o. female patient with a PMHx of arthritis, breast cancer, hemorrhoids, CHF, and obesity who presents to the Emergency Department for evaluation of weakness which was onset this morning. The pts was getting into the car this morning to go to [employer] and needed assistance. During the drive and service, she fell asleep and was difficult to arouse.  Now, the pt has a fever.  Symptoms are constant and moderate in severity. No mitigating or exacerbating factors reported. Associated sxs include constipation and odorous urine. Patient denies any cough, and all other sxs at this time. Prior Tx not reported. Pt currently has an swelling in her mouth from poor fitting dentures which is causing decreased PO.  The pt also has a history of an irregular heartbeat. Pt is also taking Miralax to relieve constipation. Pt's cardiologist is Dr. Manjarrez. No further complaints or concerns at this time.      Arrival mode: Personal transportation.     PCP: Yasmin Navarro MD        Past Medical History:  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    Lipoma of abdominal wall     Major neurocognitive disorder 06/21/2022    Obesity     Overactive bladder     Pap smear abnormality  of cervix with ASCUS favoring benign     Thyroid nodule     Tobacco use disorder     Tubular adenoma of colon     Urinary incontinence        Past Surgical History:  Past Surgical History:   Procedure Laterality Date    ANTERIOR VAGINAL REPAIR      BLADDER SURGERY      BREAST LUMPECTOMY Left 2017    CATARACT EXTRACTION Left 2022     SECTION      X2    COLONOSCOPY N/A 3/8/2017    Procedure: COLONOSCOPY;  Surgeon: Tyron Paris MD;  Location: [facility];  Service: Endoscopy;  Laterality: N/A;    COLONOSCOPY N/A 2020    Procedure: COLONOSCOPY;  Surgeon: Keira Ellison MD;  Location: [facility];  Service: Endoscopy;  Laterality: N/A;    ESOPHAGOGASTRODUODENOSCOPY N/A 2020    Procedure: EGD (ESOPHAGOGASTRODUODENOSCOPY);  Surgeon: Keira Ellison MD;  Location: [facility];  Service: Endoscopy;  Laterality: N/A;  new onset iron deficiency with prior history of breast cancer    INTRALUMINAL GASTROINTESTINAL TRACT IMAGING VIA CAPSULE N/A 10/28/2020    Procedure: IMAGING PROCEDURE, GI TRACT, INTRALUMINAL, VIA CAPSULE;  Surgeon: Finesse Jha RN;  Location: [facility];  Service: Endoscopy;  Laterality: N/A;    LEFT HEART CATHETERIZATION Left 10/12/2021    Procedure: CATHETERIZATION, HEART, LEFT;  Surgeon: Tiffanie Santos MD;  Location: [facility] CATH LAB;  Service: Cardiology;  Laterality: Left;    SENTINEL LYMPH NODE BIOPSY Left 2020    Procedure: BIOPSY, LYMPH NODE, SENTINEL;  Surgeon: Vincent Moyer MD;  Location: [facility] OR;  Service: General;  Laterality: Left;    SIMPLE MASTECTOMY Left 2020    Procedure: MASTECTOMY, SIMPLE;  Surgeon: Vincent Moyer MD;  Location: [facility] OR;  Service: General;  Laterality: Left;    THYROID LOBECTOMY Left     TOTAL ABDOMINAL HYSTERECTOMY      TUBAL LIGATION           Family History:  Family History   Problem Relation Age of Onset    Hypertension Father     Cataracts Father     Prostate cancer Father 80    Cervical cancer Daughter 32    Allergic rhinitis  Daughter     Cirrhosis Mother     Alcohol abuse Mother     Hypertension Daughter     Diabetes Brother     Heart attack Brother     Heart murmur Brother     No Known Problems Daughter        Social History:  Social History     Tobacco Use    Smoking status: Former     Packs/day: 1.00     Years: 50.00     Pack years: 50.00     Types: Cigarettes     Quit date: 2020     Years since quittin.8    Smokeless tobacco: Never   Substance and Sexual Activity    Alcohol use: Never     Alcohol/week: 28.0 standard drinks     Types: 28 Cans of beer per week    Drug use: No    Sexual activity: Never     Partners: Male        Review of Systems     Review of Systems   Constitutional:  Positive for fever.   HENT:          (+) jaw swelling.    Respiratory:  Negative for cough and shortness of breath.    Cardiovascular:  Negative for chest pain.   Gastrointestinal:  Positive for constipation. Negative for abdominal pain, diarrhea, nausea and vomiting.   Genitourinary:         (+) odorous urine   Neurological:  Positive for weakness.      Physical Exam     Initial Vitals [23 1132]   BP Pulse Resp Temp SpO2   (!) 93/54 (!) 113 20 100.2 °F (37.9 °C) 97 %      MAP       --          Physical Exam  Nursing Notes and Vital Signs Reviewed.  Constitutional: Patient is in no acute distress. Well-developed and well-nourished.  Head: Atraumatic. Normocephalic.   Eyes: PERRL. EOM intact. Conjunctivae are not pale. No scleral icterus. Blindness in right eye.  ENT: Mucous membranes are moist. No drooling or submandibular swelling.  Swelling of the right lower mandible.  Neck: Supple. Full ROM. No lymphadenopathy.  Cardiovascular: Tachycardic. Irregularly irregular rhythm. No murmurs, rubs, or gallops. Distal pulses are 2+ and symmetric.  Pulmonary/Chest: No respiratory distress. Clear to auscultation bilaterally. No wheezing or rales.  Abdominal: Soft and non-distended.  There is no tenderness.  No rebound, guarding, or rigidity. Good  bowel sounds.  Genitourinary: No CVA tenderness  Musculoskeletal: Moves all extremities. No obvious deformities. No edema. No calf tenderness.  Skin: Warm and dry.  Neurological: Sleeping but easy to arouse. Normal speech.  No acute focal neurological deficits are appreciated.  Psychiatric: Normal affect. Good eye contact. Appropriate in content.     ED Course   Critical Care    Date/Time: 7/9/2023 2:05 PM  Performed by: Luisa Godfrey DO  Authorized by: Luisa Godfrey DO   Direct patient critical care time: 10 minutes  Additional history critical care time: 5 minutes  Ordering / reviewing critical care time: 10 minutes  Documentation critical care time: 10 minutes  Consulting other physicians critical care time: 10 minutes  Total critical care time (exclusive of procedural time) : 45 minutes  Critical care time was exclusive of separately billable procedures and treating other patients and teaching time.  Critical care was necessary to treat or prevent imminent or life-threatening deterioration of the following conditions: sepsis.  Critical care was time spent personally by me on the following activities: development of treatment plan with patient or surrogate, discussions with consultants, evaluation of patient's response to treatment, examination of patient, obtaining history from patient or surrogate, ordering and performing treatments and interventions, ordering and review of laboratory studies, ordering and review of radiographic studies, pulse oximetry and re-evaluation of patient's condition.      ED Vital Signs:  Vitals:    07/09/23 1132 07/09/23 1304 07/09/23 1500 07/09/23 1755   BP: (!) 93/54 115/80 (!) 144/62    Pulse: (!) 113 109 110    Resp: 20 18 20    Temp: 100.2 °F (37.9 °C)   98.7 °F (37.1 °C)   TempSrc: Oral   Oral   SpO2: 97% 98%     Weight:  57.3 kg (126 lb 5.2 oz)     Height: 5' (1.524 m)       07/09/23 2000 07/10/23 0045 07/10/23 0700 07/10/23 0830   BP: 127/60 (!) 118/56 127/60 133/61    Pulse: (!) 114 (!) 113 94 110   Resp: 16 14 19    Temp: 99.5 °F (37.5 °C) 100 °F (37.8 °C)     TempSrc: Oral Oral     SpO2: 96% 98% 98%    Weight:       Height:        07/10/23 1000 07/10/23 1100 07/10/23 1255 07/10/23 1303   BP: (!) 111/57 136/61 95/62 136/65   Pulse: 99 99 105 106   Resp: (!) 21 20 20 20   Temp:   100.2 °F (37.9 °C) 100.2 °F (37.9 °C)   TempSrc:   Oral Oral   SpO2: 99% 99% 99% 98%   Weight:       Height:        07/10/23 1318 07/10/23 1400   BP: (!) 144/65 (!) 104/54   Pulse: 109 97   Resp: 18 20   Temp: (!) 100.6 °F (38.1 °C) (!) 101 °F (38.3 °C)   TempSrc: Oral Oral   SpO2: 99% 98%   Weight:     Height:         Abnormal Lab Results:  Labs Reviewed   RAPID ORGANISM ID BY PCR (FROM BLOOD CULTURE) - Abnormal; Notable for the following components:       Result Value    Enterobacterales See species for ID (*)     Escherichia coli Detected (*)     All other components within normal limits    Narrative:     Aerobic and anaerobic   CBC W/ AUTO DIFFERENTIAL - Abnormal; Notable for the following components:    WBC 27.23 (*)     RBC 3.31 (*)     Hemoglobin 9.7 (*)     Hematocrit 30.0 (*)     Immature Granulocytes 1.8 (*)     Gran # (ANC) 23.9 (*)     Immature Grans (Abs) 0.50 (*)     Mono # 1.7 (*)     Gran % 87.8 (*)     Lymph % 4.0 (*)     All other components within normal limits   COMPREHENSIVE METABOLIC PANEL - Abnormal; Notable for the following components:    Glucose 141 (*)     Creatinine 1.7 (*)     ALT 7 (*)     eGFR 32 (*)     All other components within normal limits   URINALYSIS, REFLEX TO URINE CULTURE - Abnormal; Notable for the following components:    Color, UA Orange (*)     Appearance, UA Hazy (*)     Protein, UA 2+ (*)     Occult Blood UA 1+ (*)     Leukocytes, UA 2+ (*)     All other components within normal limits    Narrative:     Specimen Source->Urine   URINALYSIS MICROSCOPIC - Abnormal; Notable for the following components:    RBC, UA 32 (*)     WBC, UA >100 (*)     WBC Clumps,  UA Many (*)     Bacteria Many (*)     Hyaline Casts, UA 2 (*)     All other components within normal limits    Narrative:     Specimen Source->Urine   BASIC METABOLIC PANEL - Abnormal; Notable for the following components:    Sodium 135 (*)     Calcium 8.4 (*)     eGFR 44 (*)     All other components within normal limits   CBC W/ AUTO DIFFERENTIAL - Abnormal; Notable for the following components:    WBC 19.85 (*)     RBC 2.77 (*)     Hemoglobin 7.9 (*)     Hematocrit 25.3 (*)     MCHC 31.2 (*)     Immature Granulocytes 1.1 (*)     Gran # (ANC) 16.5 (*)     Immature Grans (Abs) 0.22 (*)     Mono # 1.5 (*)     Gran % 83.1 (*)     Lymph % 7.4 (*)     All other components within normal limits   CULTURE, BLOOD    Narrative:     Aerobic and anaerobic   CULTURE, BLOOD    Narrative:     Aerobic and anaerobic   CULTURE, URINE   LACTIC ACID, PLASMA   LACTIC ACID, PLASMA   TYPE & SCREEN   PREPARE RBC SOFT        All Lab Results:  Results for orders placed or performed during the hospital encounter of 07/09/23   Blood culture x two cultures. Draw prior to antibiotics.    Specimen: Peripheral, Antecubital, Right; Blood   Result Value Ref Range    Blood Culture, Routine Gram stain sada bottle: Gram negative rods     Blood Culture, Routine       Results called to and read back by: Belinda Gaspar RN 07/10/2023  11:05   Blood culture x two cultures. Draw prior to antibiotics.    Specimen: Peripheral, Hand, Left; Blood   Result Value Ref Range    Blood Culture, Routine Gram stain sada bottle: Gram negative rods     Blood Culture, Routine       Results called to and read back by: Belinda Gaspar RN 07/10/2023  11:05   Rapid Organism ID by PCR (from Blood culture)   Result Value Ref Range    Enterococcus faecalis Not Detected Not Detected    Enterococcus faecium Not Detected Not Detected    Listeria monocytogenes Not Detected Not Detected    Staphylococcus spp. Not Detected Not Detected    Staphylococcus aureus Not Detected Not Detected     Staphylococcus epidermidis Not Detected Not Detected    Staphylococcus lugdunensis Not Detected Not Detected    Streptococcus species Not Detected Not Detected    Streptococcus agalactiae Not Detected Not Detected    Streptococcus pneumoniae Not Detected Not Detected    Streptococcus pyogenes Not Detected Not Detected    Acinetobacter calcoaceticus/baumannii complex Not Detected Not Detected    Bacteroides fragilis Not Detected Not Detected    Enterobacterales See species for ID (A) Not Detected    Enterobacter cloacae complex Not Detected Not Detected    Escherichia coli Detected (A) Not Detected    Klebsiella aerogenes Not Detected Not Detected    Klebsiella oxytoca Not Detected Not Detected    Klebsiella pneumoniae group Not Detected Not Detected    Proteus Not Detected Not Detected    Salmonella sp Not Detected Not Detected    Serratia marcescens Not Detected Not Detected    Haemophilus influenzae Not Detected Not Detected    Neisseria meningtidis Not Detected Not Detected    Pseudomonas aeruginosa Not Detected Not Detected    Stenotrophomonas maltophilia Not Detected Not Detected    Candida albicans Not Detected Not Detected    Candida auris Not Detected Not Detected    Candida glabrata Not Detected Not Detected    Candida krusei Not Detected Not Detected    Candida parapsilosis Not Detected Not Detected    Candida tropicalis Not Detected Not Detected    Cryptococcus neoformans/gattii Not Detected Not Detected    CTX-M (ESBL ) Not Detected Not Detected    IMP (Carbapenem resistant) Not Detected Not Detected    KPC resistance gene (Carbapenem resistant) Not Detected Not Detected    mcr-1  Not Detected Not Detected    mec A/C  Test Not Applicable Not Detected    mec A/C and MREJ (MRSA) gene Test Not Applicable Not Detected    NDM (Carbapenem resistant) Not Detected Not Detected    OXA-48-like (Carbapenem resistant) Not Detected Not Detected    van A/B (VRE gene) Test Not Applicable Not Detected    VIM  (Carbapenem resistant) Not Detected Not Detected   CBC auto differential   Result Value Ref Range    WBC 27.23 (H) 3.90 - 12.70 K/uL    RBC 3.31 (L) 4.00 - 5.40 M/uL    Hemoglobin 9.7 (L) 12.0 - 16.0 g/dL    Hematocrit 30.0 (L) 37.0 - 48.5 %    MCV 91 82 - 98 fL    MCH 29.3 27.0 - 31.0 pg    MCHC 32.3 32.0 - 36.0 g/dL    RDW 13.2 11.5 - 14.5 %    Platelets 380 150 - 450 K/uL    MPV 9.2 9.2 - 12.9 fL    Immature Granulocytes 1.8 (H) 0.0 - 0.5 %    Gran # (ANC) 23.9 (H) 1.8 - 7.7 K/uL    Immature Grans (Abs) 0.50 (H) 0.00 - 0.04 K/uL    Lymph # 1.1 1.0 - 4.8 K/uL    Mono # 1.7 (H) 0.3 - 1.0 K/uL    Eos # 0.0 0.0 - 0.5 K/uL    Baso # 0.07 0.00 - 0.20 K/uL    nRBC 0 0 /100 WBC    Gran % 87.8 (H) 38.0 - 73.0 %    Lymph % 4.0 (L) 18.0 - 48.0 %    Mono % 6.1 4.0 - 15.0 %    Eosinophil % 0.0 0.0 - 8.0 %    Basophil % 0.3 0.0 - 1.9 %    Differential Method Automated    Comprehensive metabolic panel   Result Value Ref Range    Sodium 137 136 - 145 mmol/L    Potassium 4.5 3.5 - 5.1 mmol/L    Chloride 98 95 - 110 mmol/L    CO2 24 23 - 29 mmol/L    Glucose 141 (H) 70 - 110 mg/dL    BUN 18 8 - 23 mg/dL    Creatinine 1.7 (H) 0.5 - 1.4 mg/dL    Calcium 10.0 8.7 - 10.5 mg/dL    Total Protein 7.9 6.0 - 8.4 g/dL    Albumin 3.5 3.5 - 5.2 g/dL    Total Bilirubin 0.8 0.1 - 1.0 mg/dL    Alkaline Phosphatase 79 55 - 135 U/L    AST 12 10 - 40 U/L    ALT 7 (L) 10 - 44 U/L    eGFR 32 (A) >60 mL/min/1.73 m^2    Anion Gap 15 8 - 16 mmol/L   Lactic acid, plasma #1   Result Value Ref Range    Lactate (Lactic Acid) 1.5 0.5 - 2.2 mmol/L   Lactic acid, plasma #2   Result Value Ref Range    Lactate (Lactic Acid) 1.2 0.5 - 2.2 mmol/L   Urinalysis, Reflex to Urine Culture Urine, Clean Catch    Specimen: Urine   Result Value Ref Range    Specimen UA Urine, Clean Catch     Color, UA Orange (A) Yellow, Straw, Cynthia    Appearance, UA Hazy (A) Clear    pH, UA 8.0 5.0 - 8.0    Specific Gravity, UA 1.010 1.005 - 1.030    Protein, UA 2+ (A) Negative     Glucose, UA Negative Negative    Ketones, UA Negative Negative    Bilirubin (UA) Negative Negative    Occult Blood UA 1+ (A) Negative    Nitrite, UA Negative Negative    Urobilinogen, UA Negative <2.0 EU/dL    Leukocytes, UA 2+ (A) Negative   Urinalysis Microscopic   Result Value Ref Range    RBC, UA 32 (H) 0 - 4 /hpf    WBC, UA >100 (H) 0 - 5 /hpf    WBC Clumps, UA Many (A) None-Rare    Bacteria Many (A) None-Occ /hpf    Hyaline Casts, UA 2 (A) 0-1/lpf /lpf    Unclass Natalie UA Occasional None-Moderate    Microscopic Comment SEE COMMENT    Basic Metabolic Panel (BMP)   Result Value Ref Range    Sodium 135 (L) 136 - 145 mmol/L    Potassium 4.0 3.5 - 5.1 mmol/L    Chloride 103 95 - 110 mmol/L    CO2 23 23 - 29 mmol/L    Glucose 92 70 - 110 mg/dL    BUN 21 8 - 23 mg/dL    Creatinine 1.3 0.5 - 1.4 mg/dL    Calcium 8.4 (L) 8.7 - 10.5 mg/dL    Anion Gap 9 8 - 16 mmol/L    eGFR 44 (A) >60 mL/min/1.73 m^2   CBC with Automated Differential   Result Value Ref Range    WBC 19.85 (H) 3.90 - 12.70 K/uL    RBC 2.77 (L) 4.00 - 5.40 M/uL    Hemoglobin 7.9 (L) 12.0 - 16.0 g/dL    Hematocrit 25.3 (L) 37.0 - 48.5 %    MCV 91 82 - 98 fL    MCH 28.5 27.0 - 31.0 pg    MCHC 31.2 (L) 32.0 - 36.0 g/dL    RDW 13.2 11.5 - 14.5 %    Platelets 286 150 - 450 K/uL    MPV 9.4 9.2 - 12.9 fL    Immature Granulocytes 1.1 (H) 0.0 - 0.5 %    Gran # (ANC) 16.5 (H) 1.8 - 7.7 K/uL    Immature Grans (Abs) 0.22 (H) 0.00 - 0.04 K/uL    Lymph # 1.5 1.0 - 4.8 K/uL    Mono # 1.5 (H) 0.3 - 1.0 K/uL    Eos # 0.1 0.0 - 0.5 K/uL    Baso # 0.05 0.00 - 0.20 K/uL    nRBC 0 0 /100 WBC    Gran % 83.1 (H) 38.0 - 73.0 %    Lymph % 7.4 (L) 18.0 - 48.0 %    Mono % 7.5 4.0 - 15.0 %    Eosinophil % 0.6 0.0 - 8.0 %    Basophil % 0.3 0.0 - 1.9 %    Differential Method Automated    Type & Screen   Result Value Ref Range    Group & Rh O POS     Indirect Mack NEG     Specimen Outdate 07/13/2023 23:59    Prepare RBC 1 Unit   Result Value Ref Range    UNIT NUMBER [id number]      Product Code [id number]     DISPENSE STATUS ISSUED     CODING SYSTEM [id number]     Unit Blood Type Code 5100     Unit Blood Type O POS     Unit Expiration [id number]     CROSSMATCH INTERPRETATION Compatible          Imaging Results:  Imaging Results              CT Head Without Contrast (Final result)  Result time 07/09/23 14:34:07      Final result by Lasha Murphy Jr., MD (07/09/23 14:34:07)                   Impression:      No acute findings.  Unchanged chronic infarcts as detailed above.    All CT scans at this facility use dose modulation, iterative reconstruction, and/or weight base dosing when appropriate to reduce radiation dose to as low as reasonably achievable.      Electronically signed by: Lasha Murphy Jr., MD  Date:    07/09/2023  Time:    14:34               Narrative:    EXAMINATION:  CT HEAD WITHOUT CONTRAST    CLINICAL HISTORY:  Mental status change, unknown cause;    TECHNIQUE:  Contiguous axial CT images were obtained from the skull base through the vertex without intravenous contrast.    COMPARISON:  Prior MRI of the brain from 12/03/2022.    FINDINGS:  No intracranial hemorrhage. No mass effect or midline shift. Unchanged chronic infarction within the right superior frontal lobe and deep white matter extending into the corona radiata.  Chronic infarct within the deep white matter of the left frontal lobe is also unchanged.  Mild patchy low-density within the periventricular white matter.  The ventricles and sulci are normal in size and configuration. The paranasal sinuses and mastoid air cells are clear.  No acute fractures.  Right-sided phthisis bulbi.                                       X-Ray Chest AP Portable (Final result)  Result time 07/09/23 12:57:34      Final result by Amadeo Gore MD (07/09/23 12:57:34)                   Impression:      No acute findings.      Electronically signed by: Amadeo Gore MD  Date:    07/09/2023  Time:    12:57               Narrative:     EXAMINATION:  XR CHEST AP PORTABLE    CLINICAL HISTORY:  Sepsis;    TECHNIQUE:  Single frontal view of the chest was performed.    COMPARISON:  12/03/2022    FINDINGS:  The cardiomediastinal silhouette is normal.    The lungs are clear.  No pleural effusions.    No acute osseous findings.  No advanced arthritic changes.                                       The EKG was ordered, reviewed, and independently interpreted by the ED provider.  Interpretation time: 12:18  Rate: 113 BPM  Rhythm: Sinus tachycardia with Premature arterial complexes.  Interpretation: T wave abnormality, consider inferolateral ischemia. When compared with ECG of 18-JAN-2023 13:12, T wave inversion now evident in Inferior leads. T wave inversion now evident in lateral leads. No STEMI.             The Emergency Provider reviewed the vital signs and test results, which are outlined above.     ED Discussion     3:35 PM: Discussed case with Ruby Deluca NP ([facility]). Dr. Barahona agrees with current care and management of pt and accepts admission.   Admitting Service:   Admitting Physician: Dr. Barahona   Admit to: OBS     3:35 PM: Re-evaluated pt. I have discussed test results, shared treatment plan, and the need for admission with patient and family at bedside. Pt and family express understanding at this time and agree with all information. All questions answered. Pt and family have no further questions or concerns at this time. Pt is ready for admit.         ED Course as of 07/10/23 1410   Sun Jul 09, 2023   1508 72-year-old female with altered mental status secondary to UTI.  Meet SIRS criteria with a pulse of 109, temp of 101, and a white blood cell count of 27.23.  Head CT unremarkable.  Lactic acid normal and SBP> 90.  Urine and blood cultures are pending.  Given 1 L of fluids and a dose of Rocephin.  Chest x-ray shows no signs of pneumonia.  CMP shows an DANIEL. [NF]      ED Course User Index  [NF] Luisa Godfrey,      Medical  Decision Making:   Clinical Tests:   Lab Tests: Ordered and Reviewed  Radiological Study: Ordered and Reviewed  Medical Tests: Ordered and Reviewed         ED Medication(s):  Medications   amLODIPine tablet 5 mg (5 mg Oral Given 7/10/23 0830)   anastrozole tablet 1 mg (1 mg Oral Given 7/10/23 1117)   aspirin chewable tablet 81 mg (81 mg Oral Given 7/10/23 0830)   carvediloL tablet 3.125 mg (3.125 mg Oral Given 7/10/23 0830)   donepeziL tablet 5 mg (5 mg Oral Given 7/9/23 2016)   ferrous sulfate tablet 1 each (1 each Oral Given 7/10/23 0829)   isosorbide mononitrate 24 hr tablet 30 mg (30 mg Oral Given 7/10/23 0830)   memantine tablet 5 mg (5 mg Oral Given 7/10/23 0829)   QUEtiapine tablet 100 mg (100 mg Oral Given 7/9/23 2017)   tamsulosin 24 hr capsule 0.4 mg (0.4 mg Oral Given 7/10/23 0830)   sodium chloride 0.9% flush 10 mL (has no administration in time range)   naloxone 0.4 mg/mL injection 0.02 mg (has no administration in time range)   glucose chewable tablet 16 g (has no administration in time range)   glucose chewable tablet 24 g (has no administration in time range)   glucagon (human recombinant) injection 1 mg (has no administration in time range)   dextrose 10% bolus 125 mL 125 mL (has no administration in time range)   dextrose 10% bolus 250 mL 250 mL (has no administration in time range)   enoxaparin injection 40 mg (has no administration in time range)   acetaminophen tablet 650 mg (650 mg Oral Given 7/10/23 1258)   senna-docusate 8.6-50 mg per tablet 1 tablet (1 tablet Oral Given 7/10/23 0829)   ondansetron injection 4 mg (has no administration in time range)   melatonin tablet 6 mg (has no administration in time range)   cefTRIAXone (ROCEPHIN) 2 g in dextrose 5 % in water (D5W) 5 % 100 mL IVPB (MB+) (0 g Intravenous Stopped 7/10/23 0905)   0.9%  NaCl infusion ( Intravenous Stopped 7/10/23 1002)   0.9%  NaCl infusion (for blood administration) (has no administration in time range)   sodium chloride  0.9% bolus 1,000 mL 1,000 mL (0 mLs Intravenous Stopped 7/9/23 1527)   cefTRIAXone (ROCEPHIN) 1 g in dextrose 5 % in water (D5W) 5 % 100 mL IVPB (MB+) (0 g Intravenous Stopped 7/9/23 1527)       New Prescriptions    No medications on file               Scribe Attestation:   Scribe #1: I performed the above scribed service and the documentation accurately describes the services I performed. I attest to the accuracy of the note.     Attending:   Physician Attestation Statement for Scribe #1: I, Luisa Godfrey DO, personally performed the services described in this documentation, as scribed by Selam Yeager, in my presence, and it is both accurate and complete.           Clinical Impression       ICD-10-CM ICD-9-CM   1. Sepsis  A41.9 038.9     995.91   2. Tachycardia  R00.0 785.0     R07.9 786.50   3. Iron deficiency anemia secondary to inadequate dietary iron intake  D50.8 280.1   4. Anemia due to stage 3b chronic kidney disease  N18.32 285.21        5. Acute cystitis with hematuria  N30.01 595.0   6. DANIEL (acute kidney injury)  N17.9 584.9       Disposition:   Disposition: Placed in Observation  Condition: Stable      Luisa Godfrey DO  07/10/23 1411

## 2023-07-09 NOTE — ASSESSMENT & PLAN NOTE
Patient is identified as having Systolic (HFrEF) heart failure that is Chronic. CHF is currently controlled. Latest ECHO performed and demonstrates- Results for orders placed during the hospital encounter of 06/22/23    Echo    Interpretation Summary  · The left ventricle is normal in size with mild mild asymmetric hypertrophy eccentric hypertrophy and low normal systolic function.  · The estimated ejection fraction is 50%.  · Normal left ventricular diastolic function.  · Normal right ventricular size with normal right ventricular systolic function.  · Mild to moderate tricuspid regurgitation.  · Intermediate central venous pressure (8 mmHg).  · The estimated PA systolic pressure is 43 mmHg.  · There is pulmonary hypertension.  . Continue Beta Blocker and monitor clinical status closely. Monitor on telemetry. Patient is off CHF pathway.  Monitor strict Is&Os and daily weights.  Place on fluid restriction of 1.5 L. Cardiology has not been any consulted. Continue to stress to patient importance of self efficacy and  on diet for CHF. Last BNP reviewed- and noted below No results for input(s): BNP, BNPTRIAGEBLO in the last 168 hours..    - euvolemic at exam, holding home lasix d/t worsening DANIEL  - will monitor fluid status and restart as necessary

## 2023-07-10 PROBLEM — R78.81 BACTEREMIA: Status: ACTIVE | Noted: 2023-07-10

## 2023-07-10 LAB
ABO + RH BLD: NORMAL
ACINETOBACTER CALCOACETICUS/BAUMANNII COMPLEX: NOT DETECTED
ANION GAP SERPL CALC-SCNC: 9 MMOL/L (ref 8–16)
BACTEROIDES FRAGILIS: NOT DETECTED
BASOPHILS # BLD AUTO: 0.05 K/UL (ref 0–0.2)
BASOPHILS NFR BLD: 0.3 % (ref 0–1.9)
BLD GP AB SCN CELLS X3 SERPL QL: NORMAL
BLD PROD TYP BPU: NORMAL
BLOOD UNIT EXPIRATION DATE: NORMAL
BLOOD UNIT TYPE CODE: 5100
BLOOD UNIT TYPE: NORMAL
BUN SERPL-MCNC: 21 MG/DL (ref 8–23)
CALCIUM SERPL-MCNC: 8.4 MG/DL (ref 8.7–10.5)
CANDIDA ALBICANS: NOT DETECTED
CANDIDA AURIS: NOT DETECTED
CANDIDA GLABRATA: NOT DETECTED
CANDIDA KRUSEI: NOT DETECTED
CANDIDA PARAPSILOSIS: NOT DETECTED
CANDIDA TROPICALIS: NOT DETECTED
CHLORIDE SERPL-SCNC: 103 MMOL/L (ref 95–110)
CO2 SERPL-SCNC: 23 MMOL/L (ref 23–29)
CODING SYSTEM: NORMAL
CREAT SERPL-MCNC: 1.3 MG/DL (ref 0.5–1.4)
CROSSMATCH INTERPRETATION: NORMAL
CRYPTOCOCCUS NEOFORMANS/GATTII: NOT DETECTED
CTX-M GENE: NOT DETECTED
DIFFERENTIAL METHOD: ABNORMAL
DISPENSE STATUS: NORMAL
ENTEROBACTER CLOACAE COMPLEX: NOT DETECTED
ENTEROBACTERALES: ABNORMAL
ENTEROCOCCUS FAECALIS: NOT DETECTED
ENTEROCOCCUS FAECIUM: NOT DETECTED
EOSINOPHIL # BLD AUTO: 0.1 K/UL (ref 0–0.5)
EOSINOPHIL NFR BLD: 0.6 % (ref 0–8)
ERYTHROCYTE [DISTWIDTH] IN BLOOD BY AUTOMATED COUNT: 13.2 % (ref 11.5–14.5)
ESCHERICHIA COLI: DETECTED
EST. GFR  (NO RACE VARIABLE): 44 ML/MIN/1.73 M^2
GLUCOSE SERPL-MCNC: 92 MG/DL (ref 70–110)
HAEMOPHILUS INFLUENZAE: NOT DETECTED
HCT VFR BLD AUTO: 25.3 % (ref 37–48.5)
HGB BLD-MCNC: 7.9 G/DL (ref 12–16)
IMM GRANULOCYTES # BLD AUTO: 0.22 K/UL (ref 0–0.04)
IMM GRANULOCYTES NFR BLD AUTO: 1.1 % (ref 0–0.5)
IMP GENE: NOT DETECTED
KLEBSIELLA AEROGENES: NOT DETECTED
KLEBSIELLA OXYTOCA: NOT DETECTED
KLEBSIELLA PNEUMONIAE GROUP: NOT DETECTED
KPC: NOT DETECTED
LISTERIA MONOCYTOGENES: NOT DETECTED
LYMPHOCYTES # BLD AUTO: 1.5 K/UL (ref 1–4.8)
LYMPHOCYTES NFR BLD: 7.4 % (ref 18–48)
MCH RBC QN AUTO: 28.5 PG (ref 27–31)
MCHC RBC AUTO-ENTMCNC: 31.2 G/DL (ref 32–36)
MCR-1: NOT DETECTED
MCV RBC AUTO: 91 FL (ref 82–98)
MEC A/C AND MREJ (MRSA): ABNORMAL
MEC A/C: ABNORMAL
MONOCYTES # BLD AUTO: 1.5 K/UL (ref 0.3–1)
MONOCYTES NFR BLD: 7.5 % (ref 4–15)
NDM GENE: NOT DETECTED
NEISSERIA MENINGITIDIS: NOT DETECTED
NEUTROPHILS # BLD AUTO: 16.5 K/UL (ref 1.8–7.7)
NEUTROPHILS NFR BLD: 83.1 % (ref 38–73)
NRBC BLD-RTO: 0 /100 WBC
NUM UNITS TRANS PACKED RBC: NORMAL
OXA-48-LIKE: NOT DETECTED
PLATELET # BLD AUTO: 286 K/UL (ref 150–450)
PMV BLD AUTO: 9.4 FL (ref 9.2–12.9)
POTASSIUM SERPL-SCNC: 4 MMOL/L (ref 3.5–5.1)
PROTEUS SPECIES: NOT DETECTED
PSEUDOMONAS AERUGINOSA: NOT DETECTED
RBC # BLD AUTO: 2.77 M/UL (ref 4–5.4)
SALMONELLA SP: NOT DETECTED
SERRATIA MARCESCENS: NOT DETECTED
SODIUM SERPL-SCNC: 135 MMOL/L (ref 136–145)
SPECIMEN OUTDATE: NORMAL
STAPHYLOCOCCUS AUREUS: NOT DETECTED
STAPHYLOCOCCUS EPIDERMIDIS: NOT DETECTED
STAPHYLOCOCCUS LUGDUNESIS: NOT DETECTED
STAPHYLOCOCCUS SPECIES: NOT DETECTED
STENOTROPHOMONAS MALTOPHILIA: NOT DETECTED
STREPTOCOCCUS AGALACTIAE: NOT DETECTED
STREPTOCOCCUS PNEUMONIAE: NOT DETECTED
STREPTOCOCCUS PYOGENES: NOT DETECTED
STREPTOCOCCUS SPECIES: NOT DETECTED
VAN A/B: ABNORMAL
VIM GENE: NOT DETECTED
WBC # BLD AUTO: 19.85 K/UL (ref 3.9–12.7)

## 2023-07-10 PROCEDURE — P9016 RBC LEUKOCYTES REDUCED: HCPCS | Performed by: NURSE PRACTITIONER

## 2023-07-10 PROCEDURE — 85025 COMPLETE CBC W/AUTO DIFF WBC: CPT | Performed by: NURSE PRACTITIONER

## 2023-07-10 PROCEDURE — 96361 HYDRATE IV INFUSION ADD-ON: CPT

## 2023-07-10 PROCEDURE — 25000003 PHARM REV CODE 250: Performed by: NURSE PRACTITIONER

## 2023-07-10 PROCEDURE — 36430 TRANSFUSION BLD/BLD COMPNT: CPT

## 2023-07-10 PROCEDURE — 36415 COLL VENOUS BLD VENIPUNCTURE: CPT | Performed by: NURSE PRACTITIONER

## 2023-07-10 PROCEDURE — 80048 BASIC METABOLIC PNL TOTAL CA: CPT | Performed by: NURSE PRACTITIONER

## 2023-07-10 PROCEDURE — G0378 HOSPITAL OBSERVATION PER HR: HCPCS

## 2023-07-10 PROCEDURE — 86900 BLOOD TYPING SEROLOGIC ABO: CPT | Performed by: NURSE PRACTITIONER

## 2023-07-10 PROCEDURE — 63600175 PHARM REV CODE 636 W HCPCS: Performed by: NURSE PRACTITIONER

## 2023-07-10 PROCEDURE — 96372 THER/PROPH/DIAG INJ SC/IM: CPT | Performed by: NURSE PRACTITIONER

## 2023-07-10 PROCEDURE — 86920 COMPATIBILITY TEST SPIN: CPT | Performed by: NURSE PRACTITIONER

## 2023-07-10 RX ORDER — HYDROCODONE BITARTRATE AND ACETAMINOPHEN 500; 5 MG/1; MG/1
TABLET ORAL
Status: DISCONTINUED | OUTPATIENT
Start: 2023-07-10 | End: 2023-07-12 | Stop reason: HOSPADM

## 2023-07-10 RX ORDER — HYDROCODONE BITARTRATE AND ACETAMINOPHEN 500; 5 MG/1; MG/1
TABLET ORAL
Status: DISCONTINUED | OUTPATIENT
Start: 2023-07-10 | End: 2023-07-10

## 2023-07-10 RX ORDER — IBUPROFEN 400 MG/1
400 TABLET ORAL ONCE
Status: COMPLETED | OUTPATIENT
Start: 2023-07-10 | End: 2023-07-10

## 2023-07-10 RX ADMIN — DONEPEZIL HYDROCHLORIDE 5 MG: 5 TABLET, FILM COATED ORAL at 09:07

## 2023-07-10 RX ADMIN — FERROUS SULFATE TAB 325 MG (65 MG ELEMENTAL FE) 1 EACH: 325 (65 FE) TAB at 08:07

## 2023-07-10 RX ADMIN — MEMANTINE 5 MG: 5 TABLET ORAL at 08:07

## 2023-07-10 RX ADMIN — SENNOSIDES AND DOCUSATE SODIUM 1 TABLET: 50; 8.6 TABLET ORAL at 09:07

## 2023-07-10 RX ADMIN — SENNOSIDES AND DOCUSATE SODIUM 1 TABLET: 50; 8.6 TABLET ORAL at 08:07

## 2023-07-10 RX ADMIN — CARVEDILOL 3.12 MG: 3.12 TABLET, FILM COATED ORAL at 08:07

## 2023-07-10 RX ADMIN — ANASTROZOLE 1 MG: 1 TABLET, COATED ORAL at 11:07

## 2023-07-10 RX ADMIN — AMLODIPINE BESYLATE 5 MG: 5 TABLET ORAL at 08:07

## 2023-07-10 RX ADMIN — MEMANTINE 5 MG: 5 TABLET ORAL at 09:07

## 2023-07-10 RX ADMIN — ACETAMINOPHEN 650 MG: 325 TABLET ORAL at 12:07

## 2023-07-10 RX ADMIN — ISOSORBIDE MONONITRATE 30 MG: 30 TABLET, EXTENDED RELEASE ORAL at 08:07

## 2023-07-10 RX ADMIN — ENOXAPARIN SODIUM 40 MG: 40 INJECTION SUBCUTANEOUS at 05:07

## 2023-07-10 RX ADMIN — IBUPROFEN 400 MG: 400 TABLET ORAL at 02:07

## 2023-07-10 RX ADMIN — QUETIAPINE FUMARATE 100 MG: 100 TABLET ORAL at 09:07

## 2023-07-10 RX ADMIN — CARVEDILOL 3.12 MG: 3.12 TABLET, FILM COATED ORAL at 05:07

## 2023-07-10 RX ADMIN — MELATONIN TAB 3 MG 6 MG: 3 TAB at 09:07

## 2023-07-10 RX ADMIN — ASPIRIN 81 MG CHEWABLE TABLET 81 MG: 81 TABLET CHEWABLE at 08:07

## 2023-07-10 RX ADMIN — CEFTRIAXONE 2 G: 2 INJECTION, POWDER, FOR SOLUTION INTRAMUSCULAR; INTRAVENOUS at 08:07

## 2023-07-10 RX ADMIN — TAMSULOSIN HYDROCHLORIDE 0.4 MG: 0.4 CAPSULE ORAL at 08:07

## 2023-07-10 NOTE — ASSESSMENT & PLAN NOTE
This patient does have evidence of infective focus  My overall impression is sepsis.  Source: Urinary Tract  Antibiotics given-   Antibiotics (72h ago, onward)    Start     Stop Route Frequency Ordered    07/10/23 0900  cefTRIAXone (ROCEPHIN) 2 g in dextrose 5 % in water (D5W) 5 % 100 mL IVPB (MB+)         -- IV Every 24 hours (non-standard times) 07/09/23 1613        Latest lactate reviewed-  Recent Labs   Lab 07/09/23  1236 07/09/23  1617   LACTATE 1.5 1.2     Organ dysfunction indicated by Acute kidney injury    - follow cultures  -BC: + GNR  - continue rocephin  - trend wbc and fever curve

## 2023-07-10 NOTE — ASSESSMENT & PLAN NOTE
BC: +GNR, Rapid PCR: +E. Coli, Enterobacterales    --Review sensitivity report when available, will adjust regimen pending results

## 2023-07-10 NOTE — ASSESSMENT & PLAN NOTE
- restart home coreg, Norvasc and Imdur; holding arb in the setting of DANIEL  -DANIEL improving, BP soft, continue to hold arb

## 2023-07-10 NOTE — ASSESSMENT & PLAN NOTE
- baseline creatinine 1.3, on admission 1.7  -Cr at baseline  - avoid nephrotoxic agents and renal dose meds as able

## 2023-07-10 NOTE — HOSPITAL COURSE
The patient is a 72 year old female admitted for Sepsis 2/2 Ecoli UTI and Ecoli Bacteremia on IV Rocephin. Previous cultures showed pan sensitive e.coli. WBC normalized. Afebrile. Blood and urine cultures grew E.coli sensitive to levaquin. ID following. Will discharge on oral Levaquin x 14 days (renal dosed).   Pt also reported generalized weakness, fatigue, poor appetite, and trouble swallowing. Speech evaluated pt- no aspiration. Likely poor fitting dentures. PT/OT recommended HH. CM consulted. HH arranged. The patient will f/u with PCP and ID. The patient was seen and examined today and determined to be stable for discharge.

## 2023-07-10 NOTE — SUBJECTIVE & OBJECTIVE
Interval History: Tmax: 101, BC: +GNR.     Review of Systems   Constitutional:  Positive for activity change, appetite change and fever.   Gastrointestinal:  Positive for abdominal pain.   Musculoskeletal:  Positive for back pain.   Neurological:  Positive for weakness.   All other systems reviewed and are negative.  Objective:     Vital Signs (Most Recent):  Temp: (!) 101 °F (38.3 °C) (07/10/23 1400)  Pulse: 97 (07/10/23 1400)  Resp: 20 (07/10/23 1400)  BP: (!) 104/54 (07/10/23 1400)  SpO2: 98 % (07/10/23 1400) Vital Signs (24h Range):  Temp:  [98.7 °F (37.1 °C)-101 °F (38.3 °C)] 101 °F (38.3 °C)  Pulse:  [] 97  Resp:  [14-21] 20  SpO2:  [96 %-99 %] 98 %  BP: ()/(54-65) 104/54     Weight: 57.3 kg (126 lb 5.2 oz)  Body mass index is 24.67 kg/m².    Intake/Output Summary (Last 24 hours) at 7/10/2023 1438  Last data filed at 7/10/2023 1110  Gross per 24 hour   Intake 2206.09 ml   Output --   Net 2206.09 ml         Physical Exam  Vitals and nursing note reviewed.   Constitutional:       Appearance: Normal appearance.   Cardiovascular:      Rate and Rhythm: Normal rate and regular rhythm.      Pulses: Normal pulses.      Heart sounds: Normal heart sounds.   Pulmonary:      Effort: Pulmonary effort is normal.      Breath sounds: Normal breath sounds. No wheezing.   Abdominal:      General: Bowel sounds are normal. There is no distension.      Palpations: Abdomen is soft.      Tenderness: There is no abdominal tenderness.   Musculoskeletal:         General: No swelling. Normal range of motion.   Skin:     General: Skin is warm and dry.   Neurological:      Mental Status: She is lethargic.      Comments: Oriented to person and place, lethargic, opens eyes to voice, moves all extremities spontaneously    Psychiatric:         Cognition and Memory: She exhibits impaired recent memory and impaired remote memory.           Significant Labs: All pertinent labs within the past 24 hours have been reviewed.  CBC:    Recent Labs   Lab 07/09/23  1236 07/10/23  0733   WBC 27.23* 19.85*   HGB 9.7* 7.9*   HCT 30.0* 25.3*    286     CMP:   Recent Labs   Lab 07/09/23  1236 07/10/23  0733    135*   K 4.5 4.0   CL 98 103   CO2 24 23   * 92   BUN 18 21   CREATININE 1.7* 1.3   CALCIUM 10.0 8.4*   PROT 7.9  --    ALBUMIN 3.5  --    BILITOT 0.8  --    ALKPHOS 79  --    AST 12  --    ALT 7*  --    ANIONGAP 15 9       Significant Imaging: I have reviewed all pertinent imaging results/findings within the past 24 hours.

## 2023-07-10 NOTE — PROGRESS NOTES
[facility] - Emergency Dept.  [facility]  Progress Note    Patient Name: Leia Rodriguez  MRN: [id number]  Patient Class: OP- Observation   Admission Date: 7/9/2023  Length of Stay: 0 days  Attending Physician: Vargas Reddy MD  Primary Care Provider: Yasmin Navarro MD        Subjective:     Principal Problem:Sepsis        HPI:  Ms. Rodriguez is a 72 year old female with a PMH of CAD, CHFmrEF, chronic constipation, breast cancer s/p mastectomy,  HTN and dementia who presents to Ed with daugther d/t AMS and generalized weakness which started yesterday.  Patient is a poor historian, so hx was taken from daughters at bedside and chart review.  Per daughter, mother was more confused than normal yesterday and today she noticed her mother was more lethargic as well and had some difficulty with getting into car and going to [employer] this am.  At baseline, patient ambulates with walker with no assistance and oriented to person.   In the ED tmax was 100.2, patient noted to be tachycardiac as well.  Lab work notable for WBC 27, creatinine 1.7 (baseline 1.3).  UA with many bacteria and WBC.  Patient started on IV abx.  Blood and urine cultures obtained,.  She will be admitted to observation for sepsis d/t UTI.      Code Status Full  Surrogate Decision Maker daughter      Overview/Hospital Course:  72 year old female, under the care of [facility] for management of sepsis, UTI, GNR Bacteremia. BC: +GNR, Rapid PCR: Enterobacterales, E. Coli. Continues to be febrile, Tmax: 101. Temp: 100.6, treated with APAP 650mg PO, Temp increased to 101, given 1x dose Ibuprofen. Cr improved to 1.3. WBC trend down to 19.85. Hgb: 7.9, continued tachycardia, dyspnea, tachypnea. Ordered 1U PRBC. Repeat labs in AM. Continue to monitor temp.       Interval History: Tmax: 101, BC: +GNR.     Review of Systems   Constitutional:  Positive for activity change, appetite change and fever.   Gastrointestinal:  Positive for abdominal pain.    Musculoskeletal:  Positive for back pain.   Neurological:  Positive for weakness.   All other systems reviewed and are negative.  Objective:     Vital Signs (Most Recent):  Temp: (!) 101 °F (38.3 °C) (07/10/23 1400)  Pulse: 97 (07/10/23 1400)  Resp: 20 (07/10/23 1400)  BP: (!) 104/54 (07/10/23 1400)  SpO2: 98 % (07/10/23 1400) Vital Signs (24h Range):  Temp:  [98.7 °F (37.1 °C)-101 °F (38.3 °C)] 101 °F (38.3 °C)  Pulse:  [] 97  Resp:  [14-21] 20  SpO2:  [96 %-99 %] 98 %  BP: ()/(54-65) 104/54     Weight: 57.3 kg (126 lb 5.2 oz)  Body mass index is 24.67 kg/m².    Intake/Output Summary (Last 24 hours) at 7/10/2023 1438  Last data filed at 7/10/2023 1110  Gross per 24 hour   Intake 2206.09 ml   Output --   Net 2206.09 ml         Physical Exam  Vitals and nursing note reviewed.   Constitutional:       Appearance: Normal appearance.   Cardiovascular:      Rate and Rhythm: Normal rate and regular rhythm.      Pulses: Normal pulses.      Heart sounds: Normal heart sounds.   Pulmonary:      Effort: Pulmonary effort is normal.      Breath sounds: Normal breath sounds. No wheezing.   Abdominal:      General: Bowel sounds are normal. There is no distension.      Palpations: Abdomen is soft.      Tenderness: There is no abdominal tenderness.   Musculoskeletal:         General: No swelling. Normal range of motion.   Skin:     General: Skin is warm and dry.   Neurological:      Mental Status: She is lethargic.      Comments: Oriented to person and place, lethargic, opens eyes to voice, moves all extremities spontaneously    Psychiatric:         Cognition and Memory: She exhibits impaired recent memory and impaired remote memory.           Significant Labs: All pertinent labs within the past 24 hours have been reviewed.  CBC:   Recent Labs   Lab 07/09/23  1236 07/10/23  0733   WBC 27.23* 19.85*   HGB 9.7* 7.9*   HCT 30.0* 25.3*    286     CMP:   Recent Labs   Lab 07/09/23  1236 07/10/23  0733    135*    K 4.5 4.0   CL 98 103   CO2 24 23   * 92   BUN 18 21   CREATININE 1.7* 1.3   CALCIUM 10.0 8.4*   PROT 7.9  --    ALBUMIN 3.5  --    BILITOT 0.8  --    ALKPHOS 79  --    AST 12  --    ALT 7*  --    ANIONGAP 15 9       Significant Imaging: I have reviewed all pertinent imaging results/findings within the past 24 hours.      Assessment/Plan:      * Sepsis  This patient does have evidence of infective focus  My overall impression is sepsis.  Source: Urinary Tract  Antibiotics given-   Antibiotics (72h ago, onward)    Start     Stop Route Frequency Ordered    07/10/23 0900  cefTRIAXone (ROCEPHIN) 2 g in dextrose 5 % in water (D5W) 5 % 100 mL IVPB (MB+)         -- IV Every 24 hours (non-standard times) 07/09/23 1613        Latest lactate reviewed-  Recent Labs   Lab 07/09/23  1236 07/09/23  1617   LACTATE 1.5 1.2     Organ dysfunction indicated by Acute kidney injury    - follow cultures  -BC: + GNR  - continue rocephin  - trend wbc and fever curve      Bacteremia  BC: +GNR, Rapid PCR: +E. Coli, Enterobacterales    --Review sensitivity report when available, will adjust regimen pending results      Acute renal failure superimposed on stage 3 chronic kidney disease  - baseline creatinine 1.3, on admission 1.7  -Cr at baseline  - avoid nephrotoxic agents and renal dose meds as able       Chronic combined systolic and diastolic CHF (congestive heart failure)  Patient is identified as having Systolic (HFrEF) heart failure that is Chronic. CHF is currently controlled. Latest ECHO performed and demonstrates- Results for orders placed during the hospital encounter of 06/22/23    Echo    Interpretation Summary  · The left ventricle is normal in size with mild mild asymmetric hypertrophy eccentric hypertrophy and low normal systolic function.  · The estimated ejection fraction is 50%.  · Normal left ventricular diastolic function.  · Normal right ventricular size with normal right ventricular systolic function.  · Mild to  moderate tricuspid regurgitation.  · Intermediate central venous pressure (8 mmHg).  · The estimated PA systolic pressure is 43 mmHg.  · There is pulmonary hypertension.  . Continue Beta Blocker and monitor clinical status closely. Monitor on telemetry. Patient is off CHF pathway.  Monitor strict Is&Os and daily weights.  Place on fluid restriction of 1.5 L. Cardiology has not been any consulted. Continue to stress to patient importance of self efficacy and  on diet for CHF. Last BNP reviewed- and noted below No results for input(s): BNP, BNPTRIAGEBLO in the last 168 hours..    - euvolemic at exam, holding home lasix for now, soft BP  - will monitor fluid status and restart as necessary     Coronary artery disease of native artery of native heart with stable angina pectoris        Acute cystitis  - follow up urine culture  - see further plan above      Essential hypertension  - restart home coreg, Norvasc and Imdur; holding arb in the setting of DANIEL  -DANIEL improving, BP soft, continue to hold arb        VTE Risk Mitigation (From admission, onward)         Ordered     enoxaparin injection 40 mg  Daily         07/09/23 1542     IP VTE HIGH RISK PATIENT  Once         07/09/23 1542     Place sequential compression device  Until discontinued         07/09/23 1542                Discharge Planning   SYLVIA:      Code Status: Full Code   Is the patient medically ready for discharge?:     Reason for patient still in hospital (select all that apply): Patient trending condition                     Karmen Lux NP  Department of Hospital Medicine   [facility] - Emergency Dept.

## 2023-07-10 NOTE — ASSESSMENT & PLAN NOTE
Patient is identified as having Systolic (HFrEF) heart failure that is Chronic. CHF is currently controlled. Latest ECHO performed and demonstrates- Results for orders placed during the hospital encounter of 06/22/23    Echo    Interpretation Summary  · The left ventricle is normal in size with mild mild asymmetric hypertrophy eccentric hypertrophy and low normal systolic function.  · The estimated ejection fraction is 50%.  · Normal left ventricular diastolic function.  · Normal right ventricular size with normal right ventricular systolic function.  · Mild to moderate tricuspid regurgitation.  · Intermediate central venous pressure (8 mmHg).  · The estimated PA systolic pressure is 43 mmHg.  · There is pulmonary hypertension.  . Continue Beta Blocker and monitor clinical status closely. Monitor on telemetry. Patient is off CHF pathway.  Monitor strict Is&Os and daily weights.  Place on fluid restriction of 1.5 L. Cardiology has not been any consulted. Continue to stress to patient importance of self efficacy and  on diet for CHF. Last BNP reviewed- and noted below No results for input(s): BNP, BNPTRIAGEBLO in the last 168 hours..    - euvolemic at exam, holding home lasix for now, soft BP  - will monitor fluid status and restart as necessary

## 2023-07-11 PROBLEM — B96.20 BACTEREMIA DUE TO ESCHERICHIA COLI: Status: ACTIVE | Noted: 2023-07-10

## 2023-07-11 PROBLEM — N39.0 E-COLI UTI: Status: ACTIVE | Noted: 2021-03-07

## 2023-07-11 PROBLEM — B96.20 E-COLI UTI: Status: ACTIVE | Noted: 2021-03-07

## 2023-07-11 LAB
ANION GAP SERPL CALC-SCNC: 8 MMOL/L (ref 8–16)
BACTERIA UR CULT: ABNORMAL
BASOPHILS # BLD AUTO: 0.04 K/UL (ref 0–0.2)
BASOPHILS NFR BLD: 0.4 % (ref 0–1.9)
BUN SERPL-MCNC: 21 MG/DL (ref 8–23)
CALCIUM SERPL-MCNC: 8.6 MG/DL (ref 8.7–10.5)
CHLORIDE SERPL-SCNC: 102 MMOL/L (ref 95–110)
CO2 SERPL-SCNC: 25 MMOL/L (ref 23–29)
CREAT SERPL-MCNC: 1.1 MG/DL (ref 0.5–1.4)
DIFFERENTIAL METHOD: ABNORMAL
EOSINOPHIL # BLD AUTO: 0.2 K/UL (ref 0–0.5)
EOSINOPHIL NFR BLD: 1.5 % (ref 0–8)
ERYTHROCYTE [DISTWIDTH] IN BLOOD BY AUTOMATED COUNT: 13.7 % (ref 11.5–14.5)
EST. GFR  (NO RACE VARIABLE): 54 ML/MIN/1.73 M^2
GLUCOSE SERPL-MCNC: 95 MG/DL (ref 70–110)
HCT VFR BLD AUTO: 31.2 % (ref 37–48.5)
HGB BLD-MCNC: 10.3 G/DL (ref 12–16)
IMM GRANULOCYTES # BLD AUTO: 0.22 K/UL (ref 0–0.04)
IMM GRANULOCYTES NFR BLD AUTO: 2 % (ref 0–0.5)
LYMPHOCYTES # BLD AUTO: 0.5 K/UL (ref 1–4.8)
LYMPHOCYTES NFR BLD: 4.9 % (ref 18–48)
MCH RBC QN AUTO: 29.5 PG (ref 27–31)
MCHC RBC AUTO-ENTMCNC: 33 G/DL (ref 32–36)
MCV RBC AUTO: 89 FL (ref 82–98)
MONOCYTES # BLD AUTO: 0.6 K/UL (ref 0.3–1)
MONOCYTES NFR BLD: 5 % (ref 4–15)
NEUTROPHILS # BLD AUTO: 9.6 K/UL (ref 1.8–7.7)
NEUTROPHILS NFR BLD: 86.2 % (ref 38–73)
NRBC BLD-RTO: 0 /100 WBC
PLATELET # BLD AUTO: 260 K/UL (ref 150–450)
PMV BLD AUTO: 9.5 FL (ref 9.2–12.9)
POTASSIUM SERPL-SCNC: 3.7 MMOL/L (ref 3.5–5.1)
RBC # BLD AUTO: 3.49 M/UL (ref 4–5.4)
SODIUM SERPL-SCNC: 135 MMOL/L (ref 136–145)
WBC # BLD AUTO: 11.1 K/UL (ref 3.9–12.7)

## 2023-07-11 PROCEDURE — 99223 PR INITIAL HOSPITAL CARE,LEVL III: ICD-10-PCS | Mod: NSCH,,, | Performed by: INTERNAL MEDICINE

## 2023-07-11 PROCEDURE — 96366 THER/PROPH/DIAG IV INF ADDON: CPT

## 2023-07-11 PROCEDURE — 80048 BASIC METABOLIC PNL TOTAL CA: CPT | Performed by: NURSE PRACTITIONER

## 2023-07-11 PROCEDURE — 99223 1ST HOSP IP/OBS HIGH 75: CPT | Mod: NSCH,,, | Performed by: INTERNAL MEDICINE

## 2023-07-11 PROCEDURE — 21400001 HC TELEMETRY ROOM

## 2023-07-11 PROCEDURE — 36415 COLL VENOUS BLD VENIPUNCTURE: CPT | Performed by: NURSE PRACTITIONER

## 2023-07-11 PROCEDURE — 36415 COLL VENOUS BLD VENIPUNCTURE: CPT | Performed by: STUDENT IN AN ORGANIZED HEALTH CARE EDUCATION/TRAINING PROGRAM

## 2023-07-11 PROCEDURE — 85025 COMPLETE CBC W/AUTO DIFF WBC: CPT | Performed by: NURSE PRACTITIONER

## 2023-07-11 PROCEDURE — 25000003 PHARM REV CODE 250: Performed by: NURSE PRACTITIONER

## 2023-07-11 PROCEDURE — 63600175 PHARM REV CODE 636 W HCPCS: Performed by: STUDENT IN AN ORGANIZED HEALTH CARE EDUCATION/TRAINING PROGRAM

## 2023-07-11 PROCEDURE — 11000001 HC ACUTE MED/SURG PRIVATE ROOM

## 2023-07-11 PROCEDURE — 87040 BLOOD CULTURE FOR BACTERIA: CPT | Performed by: STUDENT IN AN ORGANIZED HEALTH CARE EDUCATION/TRAINING PROGRAM

## 2023-07-11 PROCEDURE — 63600175 PHARM REV CODE 636 W HCPCS: Performed by: NURSE PRACTITIONER

## 2023-07-11 RX ORDER — DIPHENHYDRAMINE HCL 12.5MG/5ML
12.5 ELIXIR ORAL EVERY 4 HOURS PRN
Status: DISCONTINUED | OUTPATIENT
Start: 2023-07-11 | End: 2023-07-11

## 2023-07-11 RX ORDER — MAG HYDROX/ALUMINUM HYD/SIMETH 200-200-20
15 SUSPENSION, ORAL (FINAL DOSE FORM) ORAL EVERY 4 HOURS PRN
Status: DISCONTINUED | OUTPATIENT
Start: 2023-07-11 | End: 2023-07-11

## 2023-07-11 RX ORDER — SODIUM CHLORIDE 9 MG/ML
INJECTION, SOLUTION INTRAVENOUS
Status: DISCONTINUED | OUTPATIENT
Start: 2023-07-11 | End: 2023-07-12 | Stop reason: HOSPADM

## 2023-07-11 RX ORDER — MEROPENEM AND SODIUM CHLORIDE 1 G/50ML
1 INJECTION, SOLUTION INTRAVENOUS
Status: DISCONTINUED | OUTPATIENT
Start: 2023-07-11 | End: 2023-07-12

## 2023-07-11 RX ADMIN — DONEPEZIL HYDROCHLORIDE 5 MG: 5 TABLET, FILM COATED ORAL at 09:07

## 2023-07-11 RX ADMIN — ISOSORBIDE MONONITRATE 30 MG: 30 TABLET, EXTENDED RELEASE ORAL at 08:07

## 2023-07-11 RX ADMIN — AMLODIPINE BESYLATE 5 MG: 5 TABLET ORAL at 08:07

## 2023-07-11 RX ADMIN — SENNOSIDES AND DOCUSATE SODIUM 1 TABLET: 50; 8.6 TABLET ORAL at 09:07

## 2023-07-11 RX ADMIN — CEFTRIAXONE 2 G: 2 INJECTION, POWDER, FOR SOLUTION INTRAMUSCULAR; INTRAVENOUS at 08:07

## 2023-07-11 RX ADMIN — ENOXAPARIN SODIUM 40 MG: 40 INJECTION SUBCUTANEOUS at 05:07

## 2023-07-11 RX ADMIN — SENNOSIDES AND DOCUSATE SODIUM 1 TABLET: 50; 8.6 TABLET ORAL at 08:07

## 2023-07-11 RX ADMIN — ANASTROZOLE 1 MG: 1 TABLET, COATED ORAL at 08:07

## 2023-07-11 RX ADMIN — MEROPENEM AND SODIUM CHLORIDE 1 G: 1 INJECTION, SOLUTION INTRAVENOUS at 06:07

## 2023-07-11 RX ADMIN — ASPIRIN 81 MG CHEWABLE TABLET 81 MG: 81 TABLET CHEWABLE at 08:07

## 2023-07-11 RX ADMIN — MEMANTINE 5 MG: 5 TABLET ORAL at 08:07

## 2023-07-11 RX ADMIN — TAMSULOSIN HYDROCHLORIDE 0.4 MG: 0.4 CAPSULE ORAL at 08:07

## 2023-07-11 RX ADMIN — MELATONIN TAB 3 MG 6 MG: 3 TAB at 09:07

## 2023-07-11 RX ADMIN — MEMANTINE 5 MG: 5 TABLET ORAL at 09:07

## 2023-07-11 RX ADMIN — FERROUS SULFATE TAB 325 MG (65 MG ELEMENTAL FE) 1 EACH: 325 (65 FE) TAB at 08:07

## 2023-07-11 RX ADMIN — QUETIAPINE FUMARATE 100 MG: 100 TABLET ORAL at 09:07

## 2023-07-11 RX ADMIN — CARVEDILOL 3.12 MG: 3.12 TABLET, FILM COATED ORAL at 05:07

## 2023-07-11 RX ADMIN — CARVEDILOL 3.12 MG: 3.12 TABLET, FILM COATED ORAL at 08:07

## 2023-07-11 NOTE — ASSESSMENT & PLAN NOTE
on admission serum Cr 1.7  Improved with IVFs  - avoid nephrotoxic agents and renal dose meds as able

## 2023-07-11 NOTE — PLAN OF CARE
Discussed poc with pt, pt verbalized understanding    Purposeful rounding every 2hours    VS wnl  Cardiac monitoring in use, tele monitor # [id number]  Fall precautions in place, remains injury free  Pt denies c/o n/v and pain    Accurate I&Os  Bed locked at lowest position  Call light within reach    Chart check complete  Will cont with POC     Problem: Adult Inpatient Plan of Care  Goal: Plan of Care Review  Outcome: Ongoing, Progressing  Goal: Patient-Specific Goal (Individualized)  Outcome: Ongoing, Progressing  Goal: Absence of Hospital-Acquired Illness or Injury  Outcome: Ongoing, Progressing  Goal: Optimal Comfort and Wellbeing  Outcome: Ongoing, Progressing  Goal: Readiness for Transition of Care  Outcome: Ongoing, Progressing     Problem: Adjustment to Illness (Sepsis/Septic Shock)  Goal: Optimal Coping  Outcome: Ongoing, Progressing     Problem: Bleeding (Sepsis/Septic Shock)  Goal: Absence of Bleeding  Outcome: Ongoing, Progressing     Problem: Glycemic Control Impaired (Sepsis/Septic Shock)  Goal: Blood Glucose Level Within Desired Range  Outcome: Ongoing, Progressing     Problem: Infection Progression (Sepsis/Septic Shock)  Goal: Absence of Infection Signs and Symptoms  Outcome: Ongoing, Progressing     Problem: Nutrition Impaired (Sepsis/Septic Shock)  Goal: Optimal Nutrition Intake  Outcome: Ongoing, Progressing     Problem: Fluid and Electrolyte Imbalance (Acute Kidney Injury/Impairment)  Goal: Fluid and Electrolyte Balance  Outcome: Ongoing, Progressing     Problem: Oral Intake Inadequate (Acute Kidney Injury/Impairment)  Goal: Optimal Nutrition Intake  Outcome: Ongoing, Progressing     Problem: Renal Function Impairment (Acute Kidney Injury/Impairment)  Goal: Effective Renal Function  Outcome: Ongoing, Progressing     Problem: Fall Injury Risk  Goal: Absence of Fall and Fall-Related Injury  Outcome: Ongoing, Progressing     Problem: Skin Injury Risk Increased  Goal: Skin Health and Integrity  Outcome:  Ongoing, Progressing

## 2023-07-11 NOTE — ASSESSMENT & PLAN NOTE
- restart home coreg, Norvasc and Imdur  holding arb in the setting of DANIEL  -DANIEL improving, BP soft, continue to hold arb

## 2023-07-11 NOTE — ASSESSMENT & PLAN NOTE
E.coli bacteremia  continue IV Rocephin  Repeat blood cultures pending    Review sensitivity report when available, will adjust regimen pending results

## 2023-07-11 NOTE — PLAN OF CARE
Discussed poc with pt, pt verbalized understanding    Purposeful rounding every 2hours    VS wnl  Cardiac monitoring in use, pt is NSR, tele monitor # [id number]  Fall precautions in place, remains injury free  Pain and nausea under control with PRN meds    IVFs  Accurate I&Os  Abx given as prescribed  Bed locked at lowest position  Call light within reach    Chart check complete  Will cont with POC

## 2023-07-11 NOTE — PROGRESS NOTES
[facility] Medicine  Progress Note    Patient Name: Leia Rodriguez  MRN: [id number]  Patient Class: IP- Inpatient   Admission Date: 7/9/2023  Length of Stay: 0 days  Attending Physician: Amina Campoverde, *  Primary Care Provider: Yasmin Navarro MD        Subjective:     Principal Problem:Bacteremia due to Escherichia coli        HPI:  Ms. Rodriguez is a 72 year old female with a PMH of CAD, CHFmrEF, chronic constipation, breast cancer s/p mastectomy,  HTN and dementia who presents to Ed with daugther d/t AMS and generalized weakness which started yesterday.  Patient is a poor historian, so hx was taken from daughters at bedside and chart review.  Per daughter, mother was more confused than normal yesterday and today she noticed her mother was more lethargic as well and had some difficulty with getting into car and going to [employer] this am.  At baseline, patient ambulates with walker with no assistance and oriented to person.   In the ED tmax was 100.2, patient noted to be tachycardiac as well.  Lab work notable for WBC 27, creatinine 1.7 (baseline 1.3).  UA with many bacteria and WBC.  Patient started on IV abx.  Blood and urine cultures obtained,.  She will be admitted to observation for sepsis d/t UTI.      Code Status Full  Surrogate Decision Maker daughter      Overview/Hospital Course:  The patient is a 72 year old female Admitted for Sepsis 2/2 Ecoli UTI and Ecoli Bacteremia on IV Rocephin. Previous cultures showed pan sensitive e.coli. WBC trending down. Remains febrile - T max 101.1F.   Pt also reports generalized weakness, fatigue, poor appetite, and trouble swallowing. Will consult Speech/PT/OT      Interval History: +fever, generalized weakness, fatigue, poor appetite, and trouble swallowing  Review of Systems   Constitutional:  Positive for activity change, appetite change, fatigue and fever. Negative for chills, diaphoresis and unexpected weight change.   HENT:  Positive for trouble  swallowing. Negative for congestion, nosebleeds, sinus pressure and sore throat.    Eyes:  Negative for pain, discharge and visual disturbance.   Respiratory:  Negative for cough, chest tightness, shortness of breath, wheezing and stridor.    Cardiovascular:  Negative for chest pain, palpitations and leg swelling.   Gastrointestinal:  Negative for abdominal distention, abdominal pain, blood in stool, constipation, diarrhea, nausea and vomiting.   Endocrine: Negative for cold intolerance and heat intolerance.   Genitourinary:  Negative for difficulty urinating, dysuria, flank pain, frequency and urgency.   Musculoskeletal:  Negative for arthralgias, back pain, joint swelling, myalgias, neck pain and neck stiffness.   Skin:  Negative for rash and wound.   Allergic/Immunologic: Negative for food allergies and immunocompromised state.   Neurological:  Positive for weakness. Negative for dizziness, seizures, syncope, facial asymmetry, speech difficulty, light-headedness, numbness and headaches.   Hematological:  Negative for adenopathy.   Psychiatric/Behavioral:  Negative for agitation, confusion and hallucinations.    All other systems reviewed and are negative.  Objective:     Vital Signs (Most Recent):  Temp: (!) 101.1 °F (38.4 °C) (07/11/23 1606)  Pulse: 98 (07/11/23 1606)  Resp: 18 (07/11/23 1606)  BP: 119/65 (07/11/23 1606)  SpO2: 97 % (07/11/23 1606) Vital Signs (24h Range):  Temp:  [97.5 °F (36.4 °C)-101.1 °F (38.4 °C)] 101.1 °F (38.4 °C)  Pulse:  [] 98  Resp:  [18-22] 18  SpO2:  [96 %-99 %] 97 %  BP: (119-145)/(59-91) 119/65     Weight: 61.9 kg (136 lb 7.4 oz)  Body mass index is 26.65 kg/m².    Intake/Output Summary (Last 24 hours) at 7/11/2023 1640  Last data filed at 7/11/2023 1535  Gross per 24 hour   Intake --   Output 650 ml   Net -650 ml           Physical Exam  Vitals and nursing note reviewed.   Constitutional:       General: She is not in acute distress.     Appearance: Normal appearance. She is  well-developed. She is not diaphoretic.   HENT:      Head: Normocephalic and atraumatic.      Nose: Nose normal.   Eyes:      General: No scleral icterus.     Conjunctiva/sclera: Conjunctivae normal.   Neck:      Trachea: No tracheal deviation.   Cardiovascular:      Rate and Rhythm: Regular rhythm. Tachycardia present.      Pulses: Normal pulses.      Heart sounds: Normal heart sounds. No murmur heard.    No friction rub. No gallop.   Pulmonary:      Effort: Pulmonary effort is normal. No respiratory distress.      Breath sounds: Normal breath sounds. No stridor. No wheezing or rales.   Chest:      Chest wall: No tenderness.   Abdominal:      General: Bowel sounds are normal. There is no distension.      Palpations: Abdomen is soft. There is no mass.      Tenderness: There is no abdominal tenderness. There is no guarding or rebound.   Musculoskeletal:         General: No swelling, tenderness or deformity. Normal range of motion.      Cervical back: Normal range of motion and neck supple.   Skin:     General: Skin is warm and dry.      Coloration: Skin is not pale.      Findings: No erythema or rash.   Neurological:      Mental Status: She is alert and oriented to person, place, and time.      Cranial Nerves: No cranial nerve deficit.      Motor: No abnormal muscle tone.      Coordination: Coordination normal.   Psychiatric:         Behavior: Behavior normal.         Thought Content: Thought content normal.         Cognition and Memory: Cognition normal.           Significant Labs: All pertinent labs within the past 24 hours have been reviewed.  CBC:   Recent Labs   Lab 07/10/23  0733 07/11/23  0636   WBC 19.85* 11.10   HGB 7.9* 10.3*   HCT 25.3* 31.2*    260       CMP:   Recent Labs   Lab 07/10/23  0733 07/11/23  0636   * 135*   K 4.0 3.7    102   CO2 23 25   GLU 92 95   BUN 21 21   CREATININE 1.3 1.1   CALCIUM 8.4* 8.6*   ANIONGAP 9 8         Significant Imaging: I have reviewed all pertinent  imaging results/findings within the past 24 hours.      Assessment/Plan:      * Bacteremia due to Escherichia coli  E.coli bacteremia  continue IV Rocephin  Repeat blood cultures pending    Review sensitivity report when available, will adjust regimen pending results      E-coli UTI  See above       Sepsis  This patient does have evidence of infective focus  My overall impression is sepsis.  Source: Urinary Tract  Antibiotics given-   Antibiotics (72h ago, onward)    Start     Stop Route Frequency Ordered    07/10/23 0900  cefTRIAXone (ROCEPHIN) 2 g in dextrose 5 % in water (D5W) 5 % 100 mL IVPB (MB+)         -- IV Every 24 hours (non-standard times) 07/09/23 1613        Latest lactate reviewed-  Recent Labs   Lab 07/09/23  1236 07/09/23  1617   LACTATE 1.5 1.2     Organ dysfunction indicated by Acute kidney injury    - follow cultures  -Urine and BC: + Ecoli   - continue rocephin  - trend wbc and fever curve      Acute renal failure superimposed on stage 3 chronic kidney disease   on admission serum Cr 1.7  Improved with IVFs  - avoid nephrotoxic agents and renal dose meds as able       Chronic combined systolic and diastolic CHF (congestive heart failure)  Patient is identified as having Systolic (HFrEF) heart failure that is Chronic. CHF is currently controlled. Latest ECHO performed and demonstrates- Results for orders placed during the hospital encounter of 06/22/23    Echo    Interpretation Summary  · The left ventricle is normal in size with mild mild asymmetric hypertrophy eccentric hypertrophy and low normal systolic function.  · The estimated ejection fraction is 50%.  · Normal left ventricular diastolic function.  · Normal right ventricular size with normal right ventricular systolic function.  · Mild to moderate tricuspid regurgitation.  · Intermediate central venous pressure (8 mmHg).  · The estimated PA systolic pressure is 43 mmHg.  · There is pulmonary hypertension.  . Continue Beta Blocker and  monitor clinical status closely. Monitor on telemetry. Patient is off CHF pathway.  Monitor strict Is&Os and daily weights.  Place on fluid restriction of 1.5 L. Cardiology has not been any consulted. Continue to stress to patient importance of self efficacy and  on diet for CHF. Last BNP reviewed- and noted below No results for input(s): BNP, BNPTRIAGEBLO in the last 168 hours..    - euvolemic at exam, holding home lasix for now, soft BP  - will monitor fluid status and restart as necessary     Coronary artery disease of native artery of native heart with stable angina pectoris        Essential hypertension  - restart home coreg, Norvasc and Imdur  holding arb in the setting of DANIEL  -DANIEL improving, BP soft, continue to hold arb        VTE Risk Mitigation (From admission, onward)         Ordered     enoxaparin injection 40 mg  Daily         07/09/23 1542     IP VTE HIGH RISK PATIENT  Once         07/09/23 1542     Place sequential compression device  Until discontinued         07/09/23 1542                Discharge Planning   SYLVIA:      Code Status: Full Code   Is the patient medically ready for discharge?:     Reason for patient still in hospital (select all that apply): Patient trending condition  Discharge Plan A: Home with family                  Rosanna Arevalo NP  Department of Hospital Medicine   [facility]

## 2023-07-11 NOTE — SUBJECTIVE & OBJECTIVE
Interval History: +fever, generalized weakness, fatigue, poor appetite, and trouble swallowing  Review of Systems   Constitutional:  Positive for activity change, appetite change, fatigue and fever. Negative for chills, diaphoresis and unexpected weight change.   HENT:  Positive for trouble swallowing. Negative for congestion, nosebleeds, sinus pressure and sore throat.    Eyes:  Negative for pain, discharge and visual disturbance.   Respiratory:  Negative for cough, chest tightness, shortness of breath, wheezing and stridor.    Cardiovascular:  Negative for chest pain, palpitations and leg swelling.   Gastrointestinal:  Negative for abdominal distention, abdominal pain, blood in stool, constipation, diarrhea, nausea and vomiting.   Endocrine: Negative for cold intolerance and heat intolerance.   Genitourinary:  Negative for difficulty urinating, dysuria, flank pain, frequency and urgency.   Musculoskeletal:  Negative for arthralgias, back pain, joint swelling, myalgias, neck pain and neck stiffness.   Skin:  Negative for rash and wound.   Allergic/Immunologic: Negative for food allergies and immunocompromised state.   Neurological:  Positive for weakness. Negative for dizziness, seizures, syncope, facial asymmetry, speech difficulty, light-headedness, numbness and headaches.   Hematological:  Negative for adenopathy.   Psychiatric/Behavioral:  Negative for agitation, confusion and hallucinations.    All other systems reviewed and are negative.  Objective:     Vital Signs (Most Recent):  Temp: (!) 101.1 °F (38.4 °C) (07/11/23 1606)  Pulse: 98 (07/11/23 1606)  Resp: 18 (07/11/23 1606)  BP: 119/65 (07/11/23 1606)  SpO2: 97 % (07/11/23 1606) Vital Signs (24h Range):  Temp:  [97.5 °F (36.4 °C)-101.1 °F (38.4 °C)] 101.1 °F (38.4 °C)  Pulse:  [] 98  Resp:  [18-22] 18  SpO2:  [96 %-99 %] 97 %  BP: (119-145)/(59-91) 119/65     Weight: 61.9 kg (136 lb 7.4 oz)  Body mass index is 26.65 kg/m².    Intake/Output Summary  (Last 24 hours) at 7/11/2023 1640  Last data filed at 7/11/2023 1535  Gross per 24 hour   Intake --   Output 650 ml   Net -650 ml           Physical Exam  Vitals and nursing note reviewed.   Constitutional:       General: She is not in acute distress.     Appearance: Normal appearance. She is well-developed. She is not diaphoretic.   HENT:      Head: Normocephalic and atraumatic.      Nose: Nose normal.   Eyes:      General: No scleral icterus.     Conjunctiva/sclera: Conjunctivae normal.   Neck:      Trachea: No tracheal deviation.   Cardiovascular:      Rate and Rhythm: Regular rhythm. Tachycardia present.      Pulses: Normal pulses.      Heart sounds: Normal heart sounds. No murmur heard.    No friction rub. No gallop.   Pulmonary:      Effort: Pulmonary effort is normal. No respiratory distress.      Breath sounds: Normal breath sounds. No stridor. No wheezing or rales.   Chest:      Chest wall: No tenderness.   Abdominal:      General: Bowel sounds are normal. There is no distension.      Palpations: Abdomen is soft. There is no mass.      Tenderness: There is no abdominal tenderness. There is no guarding or rebound.   Musculoskeletal:         General: No swelling, tenderness or deformity. Normal range of motion.      Cervical back: Normal range of motion and neck supple.   Skin:     General: Skin is warm and dry.      Coloration: Skin is not pale.      Findings: No erythema or rash.   Neurological:      Mental Status: She is alert and oriented to person, place, and time.      Cranial Nerves: No cranial nerve deficit.      Motor: No abnormal muscle tone.      Coordination: Coordination normal.   Psychiatric:         Behavior: Behavior normal.         Thought Content: Thought content normal.         Cognition and Memory: Cognition normal.           Significant Labs: All pertinent labs within the past 24 hours have been reviewed.  CBC:   Recent Labs   Lab 07/10/23  0733 07/11/23  0636   WBC 19.85* 11.10   HGB  7.9* 10.3*   HCT 25.3* 31.2*    260       CMP:   Recent Labs   Lab 07/10/23  0733 07/11/23  0636   * 135*   K 4.0 3.7    102   CO2 23 25   GLU 92 95   BUN 21 21   CREATININE 1.3 1.1   CALCIUM 8.4* 8.6*   ANIONGAP 9 8         Significant Imaging: I have reviewed all pertinent imaging results/findings within the past 24 hours.

## 2023-07-11 NOTE — ASSESSMENT & PLAN NOTE
This patient does have evidence of infective focus  My overall impression is sepsis.  Source: Urinary Tract  Antibiotics given-   Antibiotics (72h ago, onward)    Start     Stop Route Frequency Ordered    07/10/23 0900  cefTRIAXone (ROCEPHIN) 2 g in dextrose 5 % in water (D5W) 5 % 100 mL IVPB (MB+)         -- IV Every 24 hours (non-standard times) 07/09/23 1613        Latest lactate reviewed-  Recent Labs   Lab 07/09/23  1236 07/09/23  1617   LACTATE 1.5 1.2     Organ dysfunction indicated by Acute kidney injury    - follow cultures  -Urine and BC: + Ecoli   - continue rocephin  - trend wbc and fever curve

## 2023-07-11 NOTE — PLAN OF CARE
[facility]  Initial Discharge Assessment       Primary Care Provider: Yasmin Navarro MD    Admission Diagnosis: Iron deficiency anemia secondary to inadequate dietary iron intake [D50.8]  Tachycardia [R00.0]  Acute cystitis with hematuria [N30.01]  DANIEL (acute kidney injury) [N17.9]  Chest pain [R07.9]  Sepsis [A41.9]  Anemia due to stage 3b chronic kidney disease [N18.32, D63.1]    Admission Date: 7/9/2023  Expected Discharge Date: per attending         Payor: [employer] MEDICARE / Plan: [employer] 65 / Product Type: Medicare Advantage /     Extended Emergency Contact Information  Primary Emergency Contact: Moe Figueroa  Address: [address] [place] [address] [place]  Mobile Phone: [phone]  Relation: Daughter  Secondary Emergency Contact: Zheng Rodriguez  Address: [address] [place] [address] [place]  Mobile Phone: [phone]  Relation: Daughter    Discharge Plan A: Home with family         [id number] - [place], [place] - [address] AT AT [facility]  [address] [facility] [address]  Phone: [phone] Fax: [phone]    [id number] - [place] - [address] AT [facility] [address] [facility]  [address]  [facility] [address]  Phone: [phone] Fax: [phone]      Initial Assessment (most recent)       Adult Discharge Assessment - 07/11/23 0918          Discharge Assessment    Assessment Type Discharge Planning Assessment     Confirmed/corrected address, phone number and insurance Yes     Confirmed Demographics Correct on Facesheet     Source of Information family     When was your last doctors appointment? 10/21/22     Communicated SYLVIA with patient/caregiver Date not available/Unable to determine     Reason For Admission sepsis     People in Home spouse     Do you expect to return to your current living situation? Yes     Do  you have help at home or someone to help you manage your care at home? Yes     Who are your caregiver(s) and their phone number(s)? spouse     Prior to hospitilization cognitive status: Alert/Oriented     Current cognitive status: Alert/Oriented     Equipment Currently Used at Home walker, rolling;wheelchair;rollator     Readmission within 30 days? No     Patient currently being followed by outpatient case management? No     Do you currently have service(s) that help you manage your care at home? No     Do you take prescription medications? Yes     Do you have prescription coverage? Yes     [facility]     Do you have any problems affording any of your prescribed medications? No     Is the patient taking medications as prescribed? yes     Who is going to help you get home at discharge? daughter     How do you get to doctors appointments? family or friend will provide     Are you on dialysis? No     Do you take coumadin? No     Discharge Plan A Home with family

## 2023-07-12 ENCOUNTER — TELEPHONE (OUTPATIENT)
Dept: FAMILY MEDICINE | Facility: CLINIC | Age: 73
End: 2023-07-12
Payer: MEDICARE

## 2023-07-12 VITALS
WEIGHT: 136.44 LBS | HEART RATE: 73 BPM | RESPIRATION RATE: 18 BRPM | SYSTOLIC BLOOD PRESSURE: 111 MMHG | DIASTOLIC BLOOD PRESSURE: 56 MMHG | BODY MASS INDEX: 26.79 KG/M2 | HEIGHT: 60 IN | OXYGEN SATURATION: 99 % | TEMPERATURE: 99 F

## 2023-07-12 PROBLEM — N18.30 ACUTE RENAL FAILURE SUPERIMPOSED ON STAGE 3 CHRONIC KIDNEY DISEASE: Status: RESOLVED | Noted: 2023-07-09 | Resolved: 2023-07-12

## 2023-07-12 PROBLEM — N17.9 ACUTE RENAL FAILURE SUPERIMPOSED ON STAGE 3 CHRONIC KIDNEY DISEASE: Status: RESOLVED | Noted: 2023-07-09 | Resolved: 2023-07-12

## 2023-07-12 LAB
ANION GAP SERPL CALC-SCNC: 11 MMOL/L (ref 8–16)
BACTERIA BLD CULT: ABNORMAL
BASOPHILS # BLD AUTO: 0.02 K/UL (ref 0–0.2)
BASOPHILS NFR BLD: 0.3 % (ref 0–1.9)
BUN SERPL-MCNC: 20 MG/DL (ref 8–23)
CALCIUM SERPL-MCNC: 8.5 MG/DL (ref 8.7–10.5)
CHLORIDE SERPL-SCNC: 104 MMOL/L (ref 95–110)
CO2 SERPL-SCNC: 22 MMOL/L (ref 23–29)
CREAT SERPL-MCNC: 1.1 MG/DL (ref 0.5–1.4)
DIFFERENTIAL METHOD: ABNORMAL
EOSINOPHIL # BLD AUTO: 0.2 K/UL (ref 0–0.5)
EOSINOPHIL NFR BLD: 3.3 % (ref 0–8)
ERYTHROCYTE [DISTWIDTH] IN BLOOD BY AUTOMATED COUNT: 13.7 % (ref 11.5–14.5)
EST. GFR  (NO RACE VARIABLE): 54 ML/MIN/1.73 M^2
GLUCOSE SERPL-MCNC: 75 MG/DL (ref 70–110)
HCT VFR BLD AUTO: 28.1 % (ref 37–48.5)
HGB BLD-MCNC: 9 G/DL (ref 12–16)
IMM GRANULOCYTES # BLD AUTO: 0.14 K/UL (ref 0–0.04)
IMM GRANULOCYTES NFR BLD AUTO: 2 % (ref 0–0.5)
LYMPHOCYTES # BLD AUTO: 1.2 K/UL (ref 1–4.8)
LYMPHOCYTES NFR BLD: 16.5 % (ref 18–48)
MCH RBC QN AUTO: 29.1 PG (ref 27–31)
MCHC RBC AUTO-ENTMCNC: 32 G/DL (ref 32–36)
MCV RBC AUTO: 91 FL (ref 82–98)
MONOCYTES # BLD AUTO: 0.7 K/UL (ref 0.3–1)
MONOCYTES NFR BLD: 9.7 % (ref 4–15)
NEUTROPHILS # BLD AUTO: 4.8 K/UL (ref 1.8–7.7)
NEUTROPHILS NFR BLD: 68.2 % (ref 38–73)
NRBC BLD-RTO: 0 /100 WBC
PLATELET # BLD AUTO: 269 K/UL (ref 150–450)
PMV BLD AUTO: 9.5 FL (ref 9.2–12.9)
POTASSIUM SERPL-SCNC: 3.6 MMOL/L (ref 3.5–5.1)
RBC # BLD AUTO: 3.09 M/UL (ref 4–5.4)
SODIUM SERPL-SCNC: 137 MMOL/L (ref 136–145)
WBC # BLD AUTO: 6.98 K/UL (ref 3.9–12.7)

## 2023-07-12 PROCEDURE — 92610 EVALUATE SWALLOWING FUNCTION: CPT

## 2023-07-12 PROCEDURE — 63600175 PHARM REV CODE 636 W HCPCS: Performed by: INTERNAL MEDICINE

## 2023-07-12 PROCEDURE — 97116 GAIT TRAINING THERAPY: CPT

## 2023-07-12 PROCEDURE — 25000003 PHARM REV CODE 250: Performed by: INTERNAL MEDICINE

## 2023-07-12 PROCEDURE — 97535 SELF CARE MNGMENT TRAINING: CPT

## 2023-07-12 PROCEDURE — 97530 THERAPEUTIC ACTIVITIES: CPT

## 2023-07-12 PROCEDURE — 85025 COMPLETE CBC W/AUTO DIFF WBC: CPT | Performed by: NURSE PRACTITIONER

## 2023-07-12 PROCEDURE — 80048 BASIC METABOLIC PNL TOTAL CA: CPT | Performed by: NURSE PRACTITIONER

## 2023-07-12 PROCEDURE — 25000003 PHARM REV CODE 250: Performed by: STUDENT IN AN ORGANIZED HEALTH CARE EDUCATION/TRAINING PROGRAM

## 2023-07-12 PROCEDURE — 25000003 PHARM REV CODE 250: Performed by: NURSE PRACTITIONER

## 2023-07-12 PROCEDURE — 36415 COLL VENOUS BLD VENIPUNCTURE: CPT | Performed by: NURSE PRACTITIONER

## 2023-07-12 PROCEDURE — 97166 OT EVAL MOD COMPLEX 45 MIN: CPT

## 2023-07-12 PROCEDURE — 97163 PT EVAL HIGH COMPLEX 45 MIN: CPT

## 2023-07-12 RX ORDER — LEVOFLOXACIN 750 MG/1
750 TABLET ORAL EVERY OTHER DAY
Status: DISCONTINUED | OUTPATIENT
Start: 2023-07-12 | End: 2023-07-12

## 2023-07-12 RX ORDER — LEVOFLOXACIN 750 MG/1
750 TABLET ORAL EVERY OTHER DAY
Qty: 7 TABLET | Refills: 0 | Status: SHIPPED | OUTPATIENT
Start: 2023-07-14 | End: 2023-07-12 | Stop reason: HOSPADM

## 2023-07-12 RX ORDER — LEVOFLOXACIN 500 MG/1
500 TABLET, FILM COATED ORAL DAILY
Qty: 7 TABLET | Refills: 0 | Status: SHIPPED | OUTPATIENT
Start: 2023-07-13 | End: 2023-07-20

## 2023-07-12 RX ORDER — LEVOFLOXACIN 750 MG/1
750 TABLET ORAL EVERY OTHER DAY
Status: DISCONTINUED | OUTPATIENT
Start: 2023-07-12 | End: 2023-07-12 | Stop reason: HOSPADM

## 2023-07-12 RX ADMIN — CEFTRIAXONE 2 G: 2 INJECTION, POWDER, FOR SOLUTION INTRAMUSCULAR; INTRAVENOUS at 06:07

## 2023-07-12 RX ADMIN — ANASTROZOLE 1 MG: 1 TABLET, COATED ORAL at 08:07

## 2023-07-12 RX ADMIN — ISOSORBIDE MONONITRATE 30 MG: 30 TABLET, EXTENDED RELEASE ORAL at 08:07

## 2023-07-12 RX ADMIN — FERROUS SULFATE TAB 325 MG (65 MG ELEMENTAL FE) 1 EACH: 325 (65 FE) TAB at 08:07

## 2023-07-12 RX ADMIN — MEMANTINE 5 MG: 5 TABLET ORAL at 08:07

## 2023-07-12 RX ADMIN — SODIUM CHLORIDE: 9 INJECTION, SOLUTION INTRAVENOUS at 06:07

## 2023-07-12 RX ADMIN — TAMSULOSIN HYDROCHLORIDE 0.4 MG: 0.4 CAPSULE ORAL at 08:07

## 2023-07-12 RX ADMIN — ASPIRIN 81 MG CHEWABLE TABLET 81 MG: 81 TABLET CHEWABLE at 08:07

## 2023-07-12 RX ADMIN — CARVEDILOL 3.12 MG: 3.12 TABLET, FILM COATED ORAL at 08:07

## 2023-07-12 RX ADMIN — SENNOSIDES AND DOCUSATE SODIUM 1 TABLET: 50; 8.6 TABLET ORAL at 08:07

## 2023-07-12 RX ADMIN — LEVOFLOXACIN 750 MG: 750 TABLET, FILM COATED ORAL at 12:07

## 2023-07-12 NOTE — DISCHARGE SUMMARY
[facility] Medicine  Discharge Summary      Patient Name: Leia Rodriguez  MRN: [id number]  [facility]: [id number]  Patient Class: IP- Inpatient  Admission Date: 7/9/2023  Hospital Length of Stay: 1 days  Discharge Date and Time: 7/12/2023  3:05 PM  Attending Physician: Dr. Campoverde  Discharging Provider: Rosanna Arevalo NP  Primary Care Provider: Yasmin Navarro MD    Primary Care Team: Networked reference to record PCT     HPI:   Ms. Rodriguez is a 72 year old female with a PMH of CAD, CHFmrEF, chronic constipation, breast cancer s/p mastectomy,  HTN and dementia who presents to Ed with daugther d/t AMS and generalized weakness which started yesterday.  Patient is a poor historian, so hx was taken from daughters at bedside and chart review.  Per daughter, mother was more confused than normal yesterday and today she noticed her mother was more lethargic as well and had some difficulty with getting into car and going to [employer] this am.  At baseline, patient ambulates with walker with no assistance and oriented to person.   In the ED tmax was 100.2, patient noted to be tachycardiac as well.  Lab work notable for WBC 27, creatinine 1.7 (baseline 1.3).  UA with many bacteria and WBC.  Patient started on IV abx.  Blood and urine cultures obtained,.  She will be admitted to observation for sepsis d/t UTI.      Code Status Full  Surrogate Decision Maker daughter        Hospital Course:   The patient is a 72 year old female admitted for Sepsis 2/2 Ecoli UTI and Ecoli Bacteremia on IV Rocephin. Previous cultures showed pan sensitive e.coli. WBC normalized. Afebrile. Blood and urine cultures grew E.coli sensitive to levaquin. ID following. Will discharge on oral Levaquin x 14 days (renal dosed).   Pt also reported generalized weakness, fatigue, poor appetite, and trouble swallowing. Speech evaluated pt- no aspiration. Likely poor fitting dentures. PT/OT recommended HH. CM consulted. HH arranged. The patient will f/u with  PCP and ID. The patient was seen and examined today and determined to be stable for discharge.         Goals of Care Treatment Preferences:  Code Status: Full Code      Consults:   Consults (From admission, onward)        Status Ordering Provider     Inpatient consult to Social Work  Once        Provider:  (Not yet assigned)    Completed SEBASTIAN LUCIANO     Inpatient consult to Registered Dietitian/Nutritionist  Once        Provider:  (Not yet assigned)    Completed SEBASTIAN LUCIANO     Inpatient consult to Infectious Diseases  Once        Provider:  Amadeo Austin MD, FRANCA De Los Santos            Final Active Diagnoses:    Diagnosis Date Noted POA    PRINCIPAL PROBLEM:  Bacteremia due to Escherichia coli [R78.81, B96.20] 07/10/2023 Yes    E-coli UTI [N39.0, B96.20] 03/07/2021 Yes    Sepsis [A41.9] 07/09/2023 Yes    Chronic combined systolic and diastolic CHF (congestive heart failure) [I50.42] 10/12/2021 Yes     Chronic    Essential hypertension [I10]  Yes     Chronic      Problems Resolved During this Admission:    Diagnosis Date Noted Date Resolved POA    Acute renal failure superimposed on stage 3 chronic kidney disease [N17.9, N18.30] 07/09/2023 07/12/2023 Yes       Discharged Condition: stable    Disposition: Home-Health Care [facility]    Follow Up:   Follow-up Information     Yasmin Navarro MD Follow up in 3 day(s).    Specialty: Family Medicine  Contact information:  [address] [facility] [address] [phone]             Amadeo Austin MD, LOVELY Follow up in 2 week(s).    Specialties: Infectious Diseases, Hospitalist  Contact information:  [address] [facility] [place] [address] [phone]                       Patient Instructions:      Ambulatory referral/consult to Infectious Disease   Standing Status: Future   Referral Priority: Routine Referral Type: Consultation   Referral Reason: Specialty Services Required   Requested Specialty: Infectious  Diseases   Number of Visits Requested: 1     Diet Cardiac     Activity as tolerated       Significant Diagnostic Studies:   Imaging Results          CT Head Without Contrast (Final result)  Result time 07/09/23 14:34:07    Final result by Lasha Murphy Jr., MD (07/09/23 14:34:07)                 Impression:      No acute findings.  Unchanged chronic infarcts as detailed above.    All CT scans at this facility use dose modulation, iterative reconstruction, and/or weight base dosing when appropriate to reduce radiation dose to as low as reasonably achievable.      Electronically signed by: Lasha Murphy Jr., MD  Date:    07/09/2023  Time:    14:34             Narrative:    EXAMINATION:  CT HEAD WITHOUT CONTRAST    CLINICAL HISTORY:  Mental status change, unknown cause;    TECHNIQUE:  Contiguous axial CT images were obtained from the skull base through the vertex without intravenous contrast.    COMPARISON:  Prior MRI of the brain from 12/03/2022.    FINDINGS:  No intracranial hemorrhage. No mass effect or midline shift. Unchanged chronic infarction within the right superior frontal lobe and deep white matter extending into the corona radiata.  Chronic infarct within the deep white matter of the left frontal lobe is also unchanged.  Mild patchy low-density within the periventricular white matter.  The ventricles and sulci are normal in size and configuration. The paranasal sinuses and mastoid air cells are clear.  No acute fractures.  Right-sided phthisis bulbi.                               X-Ray Chest AP Portable (Final result)  Result time 07/09/23 12:57:34    Final result by Amadeo Gore MD (07/09/23 12:57:34)                 Impression:      No acute findings.      Electronically signed by: Amadeo Gore MD  Date:    07/09/2023  Time:    12:57             Narrative:    EXAMINATION:  XR CHEST AP PORTABLE    CLINICAL HISTORY:  Sepsis;    TECHNIQUE:  Single frontal view of the chest was  performed.    COMPARISON:  12/03/2022    FINDINGS:  The cardiomediastinal silhouette is normal.    The lungs are clear.  No pleural effusions.    No acute osseous findings.  No advanced arthritic changes.                                Pending Diagnostic Studies:     None         Medications:  Reconciled Home Medications:      Medication List      START taking these medications    aluminum-magnesium hydroxide-simethicone 200-200-20 mg/5 mL Susp, diphenhydrAMINE 12.5 mg/5 mL Liqd  Swish and spit 5 mLs every 4 (four) hours as needed (discomfort).     levoFLOXacin 500 MG tablet  Commonly known as: LEVAQUIN  Take 1 tablet (500 mg total) by mouth once daily. for 7 days  Start taking on: July 13, 2023        CONTINUE taking these medications    amLODIPine 5 MG tablet  Commonly known as: NORVASC  Take 1 tablet (5 mg total) by mouth once daily.     anastrozole 1 mg Tab  Commonly known as: ARIMIDEX  TAKE 1 TABLET BY MOUTH EVERY DAY     aspirin 81 MG Chew  Take 1 tablet (81 mg total) by mouth once daily.     busPIRone 15 MG tablet  Commonly known as: BUSPAR  TAKE 1 TABLET BY MOUTH 2 TIMES DAILY.     calcium citrate 200 mg (950 mg) tablet  Commonly known as: CALCITRATE  Take 1 tablet by mouth once daily.     carvediloL 3.125 MG tablet  Commonly known as: COREG  TAKE 1 TABLET BY MOUTH TWICE A DAY WITH MEALS     cyanocobalamin 2000 MCG tablet  Take 2,000 mcg by mouth 2 (two) times a day.     ferrous sulfate 325 mg (65 mg iron) Tab tablet  Commonly known as: FEOSOL  Take 65 mg by mouth once daily.     isosorbide mononitrate 30 MG 24 hr tablet  Commonly known as: IMDUR  TAKE 1 TABLET BY MOUTH EVERY DAY     memantine 5 MG Tab  Commonly known as: NAMENDA  Take 1 tablet (5 mg total) by mouth 2 (two) times daily.     QUEtiapine 100 MG Tab  Commonly known as: SEROQUEL  Take 1 tablet (100 mg total) by mouth every evening.     tamsulosin 0.4 mg Cap  Commonly known as: FLOMAX  Take 1 capsule by mouth once daily.     vitamin D 1000 units  Tab  Commonly known as: VITAMIN D3  Take 1,000 Units by mouth once daily.        STOP taking these medications    donepeziL 5 MG tablet  Commonly known as: ARICEPT     furosemide 20 MG tablet  Commonly known as: LASIX     telmisartan 20 MG Tab  Commonly known as: MICARDIS            Indwelling Lines/Drains at time of discharge:   Lines/Drains/Airways     None                 Time spent on the discharge of patient: 45 minutes         Rosanna Arevalo NP  Department of Hospital Medicine  [facility]

## 2023-07-12 NOTE — ASSESSMENT & PLAN NOTE
Echo    Interpretation Summary  · The left ventricle is normal in size with mild mild asymmetric hypertrophy eccentric hypertrophy and low normal systolic function.  · The estimated ejection fraction is 50%.  · Normal left ventricular diastolic function.  · Normal right ventricular size with normal right ventricular systolic function.  · Mild to moderate tricuspid regurgitation.  · Intermediate central venous pressure (8 mmHg).  · The estimated PA systolic pressure is 43 mmHg.  · There is pulmonary hypertension.  . Diuresis as per primary team

## 2023-07-12 NOTE — PLAN OF CARE
See eval for details. Pt displayed deficits with functional mobility/ transfers, deficits with adl's skills also decrease b ue strength/endurance. Recommendation: HOME HEALTH O.T. WITH 24 HOUR CARE

## 2023-07-12 NOTE — NURSING
Discharge summary, health teachings and instructions given to patient with family at bedside--verbalized understanding. IV removed-no complications noted. Tele monitor removed and returned to the monitor room. All questions answered with regards to discharge instructions. Reminded of the importance of follow-up appointments. Instructed about awaiting the new prescribed meds from [facility] outpatient pharmacy before getting discharged-verbalized understanding.

## 2023-07-12 NOTE — PT/OT/SLP EVAL
Speech Language Pathology Evaluation  Bedside Swallow    Patient Name:  Leia Rodriguez   MRN:  [id number]  Admitting Diagnosis: Bacteremia due to Escherichia coli    Recommendations:                 General Recommendations:   dysphagia tx, diet f/u  Diet recommendations:  Minced & Moist Diet - IDDSI Level 5, Thin liquids - IDDSI Level 0   Aspiration Precautions: Assistance with meals, Feed only when awake/alert, Frequent oral care, HOB to 90 degrees, Meds whole 1 at a time, Remain upright 30 minutes post meal, Small bites/sips, and Standard aspiration precautions   General Precautions: Standard, aspiration  Communication strategies:  provide increased time to answer, hx dementia      History:     Past Medical History:   Diagnosis Date    Arthritis     EDGAR HANDS, KNEES    Behavioral change 2022    Blind right eye     Traumatic    Breast cancer 06/15/2017    0.8 cm Grade1 INTRADUCTAL BREAST 9 positve margin (left)    Essential hypertension     Hemorrhoids     Immunodeficiency due to chemotherapy 2021    Lipoma of abdominal wall     Major neurocognitive disorder 2022    Obesity     Overactive bladder     Pap smear abnormality of cervix with ASCUS favoring benign     Thyroid nodule     Tobacco use disorder     Tubular adenoma of colon     Urinary incontinence        Past Surgical History:   Procedure Laterality Date    ANTERIOR VAGINAL REPAIR      BLADDER SURGERY      BREAST LUMPECTOMY Left     CATARACT EXTRACTION Left 2022     SECTION      X2    COLONOSCOPY N/A 3/8/2017    Procedure: COLONOSCOPY;  Surgeon: Tyron Paris MD;  Location: [facility];  Service: Endoscopy;  Laterality: N/A;    COLONOSCOPY N/A 2020    Procedure: COLONOSCOPY;  Surgeon: Keira Ellison MD;  Location: [facility];  Service: Endoscopy;  Laterality: N/A;    ESOPHAGOGASTRODUODENOSCOPY N/A 2020    Procedure: EGD (ESOPHAGOGASTRODUODENOSCOPY);  Surgeon: Keira Ellison MD;  Location: [facility];   Service: Endoscopy;  Laterality: N/A;  new onset iron deficiency with prior history of breast cancer    INTRALUMINAL GASTROINTESTINAL TRACT IMAGING VIA CAPSULE N/A 10/28/2020    Procedure: IMAGING PROCEDURE, GI TRACT, INTRALUMINAL, VIA CAPSULE;  Surgeon: Finesse Jha RN;  Location: [facility] ENDO;  Service: Endoscopy;  Laterality: N/A;    LEFT HEART CATHETERIZATION Left 10/12/2021    Procedure: CATHETERIZATION, HEART, LEFT;  Surgeon: Tiffanie Santos MD;  Location: [facility] CATH LAB;  Service: Cardiology;  Laterality: Left;    SENTINEL LYMPH NODE BIOPSY Left 9/8/2020    Procedure: BIOPSY, LYMPH NODE, SENTINEL;  Surgeon: Vincent Moyer MD;  Location: [facility] OR;  Service: General;  Laterality: Left;    SIMPLE MASTECTOMY Left 9/8/2020    Procedure: MASTECTOMY, SIMPLE;  Surgeon: Vincent Moyer MD;  Location: [facility] OR;  Service: General;  Laterality: Left;    THYROID LOBECTOMY Left 2005    TOTAL ABDOMINAL HYSTERECTOMY      TUBAL LIGATION         Social History: Patient lives with her  at home.  They reportedly receive assist by daughter.  Pt with hx Alzheimers dementia and associated behavioral disturbance, impacting communication and functional ADL's.    Prior Intubation HX:  N/A     Modified Barium Swallow: N/A    CT HEAD WITHOUT CONTRAST 7/9/2023     CLINICAL HISTORY:  Mental status change, unknown cause;     TECHNIQUE:  Contiguous axial CT images were obtained from the skull base through the vertex without intravenous contrast.     COMPARISON:  Prior MRI of the brain from 12/03/2022.     FINDINGS:  No intracranial hemorrhage. No mass effect or midline shift. Unchanged chronic infarction within the right superior frontal lobe and deep white matter extending into the corona radiata.  Chronic infarct within the deep white matter of the left frontal lobe is also unchanged.  Mild patchy low-density within the periventricular white matter.  The ventricles and sulci are normal in size and configuration. The paranasal sinuses and  mastoid air cells are clear.  No acute fractures.  Right-sided phthisis bulbi.     Impression:     No acute findings.  Unchanged chronic infarcts as detailed above.     All CT scans at this facility use dose modulation, iterative reconstruction, and/or weight base dosing when appropriate to reduce radiation dose to as low as reasonably achievable.        Electronically signed by: Lasha Murphy Jr., MD  Date:                                            07/09/2023  Time:                                           14:34    MRI BRAIN W WO CONTRAST 10/12/2022     CLINICAL HISTORY:  Breast cancer.  Other amnesiaMemory loss;     TECHNIQUE:  Standard multiplanar noncontrast and contrast enhanced sequences of the brain performed with 6cc Gadavist.     COMPARISON:  02/24/2022 MRI.     FINDINGS:  Mild atrophy with white matter degeneration is present.  Left parasagittal frontal lobe white matter infarction.  Right centrum semiovale/corona radiata white matter infarction also unchanged.     There is a persistent plaque-like area of dural enhancement superficial to the left frontal convexity.  The approximate diameter is 12 mm.  Approximate thickness 5 mm.  Similar to the previous study.  Possible dural based metastatic disease versus meningioma.  No detrimental change.     No new findings.     Deformed right ocular globe.     Diffusion sequence is normal.     Impression:     Stable MRI of the brain.  Atrophy.  Old infarcts.  Left frontal dural-based enhancement with considerations including meningioma versus dural metastatic disease.  No change since 02/24/2022.        Electronically signed by: Vinicius Swift MD  Date:                                            10/12/2022  Time:                                           09:20    XR CHEST AP PORTABLE 7/9/2023     CLINICAL HISTORY:  Sepsis;     TECHNIQUE:  Single frontal view of the chest was performed.     COMPARISON:  12/03/2022     FINDINGS:  The cardiomediastinal silhouette  "is normal.     The lungs are clear.  No pleural effusions.     No acute osseous findings.  No advanced arthritic changes.     Impression:     No acute findings.        Electronically signed by: Amadeo Gore MD  Date:                                            07/09/2023  Time:                                           12:57    Prior diet: Pt unable to elaborate on consistency of home diet.  Pt's  reported poor PO intake.    Subjective     Pt seen bedside for ST swallowing evaluation.  Sitting upright  in chair.  No c/o pain.  Tearful and stated, "I'm ready to go home."  Pt's  present.  Patient goals: to go home; Recommended ACP/goals of care discussion     Pain/Comfort:  Pain Rating 1: 0/10  Pain Rating Post-Intervention 1: 0/10  Pain Rating 2: 0/10  Pain Rating Post-Intervention 2: 0/10    Respiratory Status: Room air    Objective:     Oral Musculature Evaluation  Oral Musculature: general weakness  Dentition: edentulous (ulcer present on mandibular gum)  Secretion Management: adequate  Mucosal Quality: good  Mandibular Strength and Mobility: impaired  Oral Labial Strength and Mobility: WFL  Lingual Strength and Mobility: impaired strength  Velar Elevation: WFL  Buccal Strength and Mobility: WFL  Volitional Cough: present  Volitional Swallow: present  Voice Prior to PO Intake: WFL    In consideration of the interrelationships between body systems and swallowing, History was provided by patient, family, and/or taken from chart review. The following are medical conditions present in the patient's history which can result in or be attributed to dysphagia:  Respiratory Emphysema   Cardiovascular Congestive heart failure  Decreased EF  HTN  Atherosclerosis of aorta   Digestive Dysphagia  Poor po intake   Infections  Sepsis   Urinary Chronic kidney disease (CKD)  Recurrent UTI  Renal mass   Endocrine None noted in this category   Nervous cerebrovascular accident (CVA)   Dementia with behavioral " disturbance  Major neurocognitive disorder   Skeletal osteoarthritis   Immune Immunodeficiency due to chemotherapy   Cancer Breast    Psychiatric  Depression  Agitation s/t dementia   Congenital  None noted in this category   Other Alcohol and tobacco abuse         Bedside Clinical Swallow Evaluation:    Clinical Swallow Examination:   Patient self-fed with hand/hand assist by SLP throughout evaluation. Encouragement needed to consume PO intake.  Reported oral pain d/t ulcer on mandibular gum line.  No longer able to wear dentures s/p pain. Patient presented with:     CONSISTENCY  NOTES   THIN (IDDSI 0) Controlled cup/straw   No overt s/s of dysphagia   PUREE (IDDSI 4/Extremely Thick)   TSP/TBSP bites of pudding No overt s/s of dysphagia   SOLID (IDDSI 7/Regular) Bite of Radha Doone cookie    Reduced efficiency. Required liquids to assist in bolus transit.     Thickened liquids were not used in this assessment. Callum (2018) reported that thickened liquids have no sound evidence as reducing risk of pneumonia in patients with dysphagia and can cause harm by increasing risk of dehydration. It also presents an increased risk of UTI, electrolyte imbalance, constipation, fecal impaction, cognitive impairment, functional decline, and even death (Langmore, 2002; Vance, 2016).  This supports the assertion that we should confirm a patient requires thickened liquids with an instrumental swallow study prior to recommending them.  Thickened liquids are associated with risks including dehydration, increased pharyngeal residue, potential interference with medication absorption, and decreased quality of life (Aramis, 2013). Thickened liquids are also more likely to be silently aspirated than thin liquids (Laurent et al., 2018).     References:   Aramis MARTINI (2013). Thickening agents used for dysphagia management: Effect on bioavailability of water, medication and feelings of satiety. Nutrition Journal, 12, 54.  https://doi.org/10.1186/8773-8845-75-54    VINI Mancilla, CARY De La Torre, JOHN Tucker, & EZEQUIEL Magana. (2018). Cough response to aspiration in thin and thick fluids during FEES in hospitalized inpatients. International journal of language & communication disorders, 53(5), 909-918. https://doi.org/10.1111/0755-5052.93972    INTERPRETATION:  Clinical swallow evaluation (CSE) revealed oral phase characterized by lingual, labial, and buccal strength and range of motion reduced for lip closure, bolus preparation and propulsion. The patient had no anterior loss of the bolus with complete closure of the lips around the utensils. No residue remained in the oral cavity following the swallow. Patient without overt clinical signs/symptoms of aspiration on any PO trials given. Patient presents with a possibly inefficient swallow as indicated by weakness, frailty and poor PO intake. Patient denied globus sensation or odynophagia; however, observed her utilizing thin liquids to assist in solid bolus transit.  Pt also c/o oral pain s/t gum ulcer. Contributing risk factors for dysphagia include cognitive deficits requiring increased level of assist.     Clinical signs of oropharyngeal dysphagia, likely chronic related to cognitive and functional decline in the setting of advancing dementia with significant medical comorbidities.  Swallowing prognosis is guarded.  Instrumental study is not clinically indicated at this time and pt would benefit from goals of care discussion.    Compensatory Strategies  Aspiration precautions      Assessment:     Leia Rodriguez is a 72 y.o. female with an SLP diagnosis of suspected Dysphagia with baseline cognitive-linguistic impairment s/t hx dementia with behavioral disturbance & multiple CVAs.  She presents with flat affect, generalized weakness and difficulty engaging in conversation.  No overt s/s of aspiration; however, OM efficiency reduced and recommended for IDDSI 5-minced/moist solids and IDDSI  0-thin liquids.  Pt c/o ulcer on mandibular gum line.  No c/o globus sensation or odynophagia; however, noted pt required liquid assist for solid bolus transit.  Guarded rehab potential.      In the setting of pt's significant medical co-morbidities and hx major neurocognitive disorder related to Alzheimer's disease, pt would benefit from Advanced Care Planning/goals of care discussion with treating physician and/or Palliative Medicine team, as she is at high risk of continued cognitive and functional decline.    Goals:   Multidisciplinary Problems       SLP Goals          Problem: SLP    Goal Priority Disciplines Outcome   SLP Goal     SLP    Description: 1.  Pt will consume the least restrictive PO diet without incident.                       Plan:     Patient to be seen:  2 x/week   Plan of Care expires:  07/19/23  Plan of Care reviewed with:  patient, spouse   SLP Follow-Up:  Yes       Discharge recommendations:  home with home health   Barriers to Discharge:  None    Time Tracking:     SLP Treatment Date:   07/12/23  Speech Start Time:  0930  Speech Stop Time:  1000     Speech Total Time (min):  30 min    Billable Minutes: Eval Swallow and Oral Function 15 minutes and Self Care/Home Management Training 15 minutes    07/12/2023

## 2023-07-12 NOTE — CONSULTS
[facility] - Med Surg  Infectious Disease  Consult Note    Patient Name: Leia Rodriguez  MRN: [id number]  Admission Date: 7/9/2023  Hospital Length of Stay: 1 days  Attending Physician: Amina Campoverde, *  Primary Care Provider: Yasmin Navarro MD     Isolation Status: No active isolations    Patient information was obtained from patient, past medical records and ER records.      Consults  Assessment/Plan:     Cardiac/Vascular  Chronic combined systolic and diastolic CHF (congestive heart failure)    Echo    Interpretation Summary  · The left ventricle is normal in size with mild mild asymmetric hypertrophy eccentric hypertrophy and low normal systolic function.  · The estimated ejection fraction is 50%.  · Normal left ventricular diastolic function.  · Normal right ventricular size with normal right ventricular systolic function.  · Mild to moderate tricuspid regurgitation.  · Intermediate central venous pressure (8 mmHg).  · The estimated PA systolic pressure is 43 mmHg.  · There is pulmonary hypertension.  . Diuresis as per primary team    Essential hypertension  BP control as per primary team    Renal/  Acute renal failure superimposed on stage 3 chronic kidney disease  Will monitor serum creatinine , avoid nephrotoxic meds     E-coli UTI  Will use Rocephin , monitor repeat blood culture    ID  * Bacteremia due to Escherichia coli  Will use Rocephin based on latest drug susceptibility     Sepsis  This patient does have evidence of infective focus  My overall impression is sepsis.  Source: Urinary Tract  Antibiotics given-   Antibiotics (72h ago, onward)    Start     Stop Route Frequency Ordered    07/12/23 0615  cefTRIAXone (ROCEPHIN) 2 g in dextrose 5 % in water (D5W) 5 % 100 mL IVPB (MB+)         -- IV Every 24 hours (non-standard times) 07/12/23 0504        Latest lactate reviewed-  Recent Labs   Lab 07/09/23  1236 07/09/23  1617   LACTATE 1.5 1.2     Will stop Meropenem and switch to  Rocephin        Thank you for your consult. I will follow-up with patient. Please contact us if you have any additional questions.    Amadeo Austin MD, [facility]  Infectious Disease  [facility]    Subjective:     Principal Problem: Bacteremia due to Escherichia coli    HPI:   72 year old female with a PMH of CAD, CHFmrEF, chronic constipation, breast cancer s/p mastectomy,  HTN and dementia who was admitted with AMS and generalized weakness .  Th ere was associated history of fever .In the ED tmax was 100.2.  Labs and imaging test -  06/23-  CT abd -The splenic lesion identified as hypoenhancing on the prior exam of 11/02/2022 is not well delineated on this noncontrast exam.  Further evaluation with contrast-enhanced CT or MRI or ultrasound would be recommended.  Spleen does not appear enlarged on this exam.     2. Bladder wall thickening.  Possible retained stone or recently passed stone within the bladder.  Correlation with urinalysis and further evaluation as warranted.  Bladder wall thickening mildly worse in comparison to prior 11-2-22.     3. Constipation favored over impaction.  Correlation with symptoms.  MRI of abd-07/03-Small circumscribed T2 hypointense splenic lesion corresponding to that seen on comparison CT.  No significant change in size or appearance.  More likely benign.  Question old hematoma.   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    Lipoma of abdominal wall     Major neurocognitive disorder 06/21/2022    Obesity      Overactive bladder     Pap smear abnormality of cervix with ASCUS favoring benign     Thyroid nodule     Tobacco use disorder     Tubular adenoma of colon     Urinary incontinence        Past Surgical History:   Procedure Laterality Date    ANTERIOR VAGINAL REPAIR      BLADDER SURGERY      BREAST LUMPECTOMY Left 2017    CATARACT EXTRACTION Left 2022     SECTION      X2    COLONOSCOPY N/A 3/8/2017    Procedure: COLONOSCOPY;  Surgeon: Tyron Paris MD;  Location: [facility];  Service: Endoscopy;  Laterality: N/A;    COLONOSCOPY N/A 2020    Procedure: COLONOSCOPY;  Surgeon: Keira Ellison MD;  Location: [facility];  Service: Endoscopy;  Laterality: N/A;    ESOPHAGOGASTRODUODENOSCOPY N/A 2020    Procedure: EGD (ESOPHAGOGASTRODUODENOSCOPY);  Surgeon: Keira Ellison MD;  Location: [facility];  Service: Endoscopy;  Laterality: N/A;  new onset iron deficiency with prior history of breast cancer    INTRALUMINAL GASTROINTESTINAL TRACT IMAGING VIA CAPSULE N/A 10/28/2020    Procedure: IMAGING PROCEDURE, GI TRACT, INTRALUMINAL, VIA CAPSULE;  Surgeon: Finesse Jha RN;  [facility];  Service: Endoscopy;  Laterality: N/A;    LEFT HEART CATHETERIZATION Left 10/12/2021    Procedure: CATHETERIZATION, HEART, LEFT;  Surgeon: Tiffanie Santos MD;  Location: [facility] CATH LAB;  Service: Cardiology;  Laterality: Left;    SENTINEL LYMPH NODE BIOPSY Left 2020    Procedure: BIOPSY, LYMPH NODE, SENTINEL;  Surgeon: Vincent Moyer MD;  Location: [facility] OR;  Service: General;  Laterality: Left;    SIMPLE MASTECTOMY Left 2020    Procedure: MASTECTOMY, SIMPLE;  Surgeon: Vincent Moyer MD;  Location: [facility] OR;  Service: General;  Laterality: Left;    THYROID LOBECTOMY Left     TOTAL ABDOMINAL HYSTERECTOMY      TUBAL LIGATION         Review of patient's allergies indicates:  No Known Allergies    Medications:  Medications Prior to Admission   Medication Sig    amLODIPine (NORVASC) 5  MG tablet Take 1 tablet (5 mg total) by mouth once daily.    anastrozole (ARIMIDEX) 1 mg Tab TAKE 1 TABLET BY MOUTH EVERY DAY    aspirin 81 MG Chew Take 1 tablet (81 mg total) by mouth once daily.    busPIRone (BUSPAR) 15 MG tablet TAKE 1 TABLET BY MOUTH 2 TIMES DAILY.    calcium citrate (CALCITRATE) 200 mg (950 mg) tablet Take 1 tablet by mouth once daily.    carvediloL (COREG) 3.125 MG tablet TAKE 1 TABLET BY MOUTH TWICE A DAY WITH MEALS    cyanocobalamin 2000 MCG tablet Take 2,000 mcg by mouth 2 (two) times a day.    donepeziL (ARICEPT) 5 MG tablet Take 1 tablet (5 mg total) by mouth every evening.    ferrous sulfate (FEOSOL) 325 mg (65 mg iron) Tab tablet Take 65 mg by mouth once daily.    furosemide (LASIX) 20 MG tablet Take 1 tablet (20 mg total) by mouth every Mon, Wed, Fri.    isosorbide mononitrate (IMDUR) 30 MG 24 hr tablet TAKE 1 TABLET BY MOUTH EVERY DAY    memantine (NAMENDA) 5 MG Tab Take 1 tablet (5 mg total) by mouth 2 (two) times daily.    QUEtiapine (SEROQUEL) 100 MG Tab Take 1 tablet (100 mg total) by mouth every evening.    tamsulosin (FLOMAX) 0.4 mg Cap Take 1 capsule by mouth once daily.    telmisartan (MICARDIS) 20 MG Tab TAKE 1 TABLET BY MOUTH EVERY DAY    vitamin D 1000 units Tab Take 1,000 Units by mouth once daily.     Antibiotics (From admission, onward)      Start     Stop Route Frequency Ordered    07/11/23 1830  meropenem-0.9% sodium chloride 1 g/50 mL IVPB         -- IV Every 12 hours (non-standard times) 07/11/23 1728          Antifungals (From admission, onward)      None          Antivirals (From admission, onward)      None             Immunization History   Administered Date(s) Administered    COVID-19, MRNA, LN-S, PF (Pfizer) (Purple Cap) 03/05/2021, 03/26/2021, 01/03/2022    Influenza (FLUAD) - Quadrivalent - Adjuvanted - PF *Preferred* (65+) 11/11/2020, 10/01/2021, 10/26/2022    Influenza - High Dose - PF (65 years and older) 11/08/2017, 12/06/2018,  2020    Influenza - Quadrivalent 2015, 2017    Pneumococcal Conjugate - 13 Valent 2017    Pneumococcal Polysaccharide - 23 Valent 2019    Tdap 2013       Family History       Problem Relation (Age of Onset)    Alcohol abuse Mother    Allergic rhinitis Daughter    Cataracts Father    Cervical cancer Daughter (32)    Cirrhosis Mother    Diabetes Brother    Heart attack Brother    Heart murmur Brother    Hypertension Father, Daughter    No Known Problems Daughter    Prostate cancer Father (80)          Social History     Socioeconomic History    Marital status:    Tobacco Use    Smoking status: Former     Packs/day: 1.00     Years: 50.00     Pack years: 50.00     Types: Cigarettes     Quit date: 2020     Years since quittin.9    Smokeless tobacco: Never   Substance and Sexual Activity    Alcohol use: Never     Alcohol/week: 28.0 standard drinks     Types: 28 Cans of beer per week    Drug use: No    Sexual activity: Never     Partners: Male   Social History Narrative    The patient is .  She is retired from [employer].     Social Determinants of Health     Financial Resource Strain: Medium Risk    Difficulty of Paying Living Expenses: Somewhat hard   Food Insecurity: Food Insecurity Present    Worried About Running Out of Food in the Last Year: Sometimes true    Ran Out of Food in the Last Year: Sometimes true   Transportation Needs: Unknown    Lack of Transportation (Medical): No   Physical Activity: Inactive    Days of Exercise per Week: 0 days    Minutes of Exercise per Session: 0 min   Stress: No Stress Concern Present    Feeling of Stress : Only a little   Social Connections: Socially Integrated    Frequency of Communication with Friends and Family: More than three times a week    Frequency of Social Gatherings with Friends and Family: More than three times a week    Attends Spiritism Services: More than 4 times per year    Attends  Club or Organization Meetings: More than 4 times per year    Marital Status:    Housing Stability: Unknown    Unable to Pay for Housing in the Last Year: No    Unstable Housing in the Last Year: No     Review of Systems   Unable to perform ROS: Dementia   Constitutional:  Negative for activity change, appetite change and chills.   Objective:     Vital Signs (Most Recent):  Temp: 98.3 °F (36.8 °C) (07/12/23 0427)  Pulse: 94 (07/12/23 0427)  Resp: 19 (07/12/23 0427)  BP: (!) 108/56 (07/12/23 0427)  SpO2: 96 % (07/12/23 0427) Vital Signs (24h Range):  Temp:  [98.3 °F (36.8 °C)-101.1 °F (38.4 °C)] 98.3 °F (36.8 °C)  Pulse:  [] 94  Resp:  [18-20] 19  SpO2:  [96 %-97 %] 96 %  BP: (108-145)/(56-68) 108/56     Weight: 61.9 kg (136 lb 7.4 oz)  Body mass index is 26.65 kg/m².    Estimated Creatinine Clearance: 38.6 mL/min (based on SCr of 1.1 mg/dL).     Physical Exam  Vitals and nursing note reviewed.   HENT:      Head: Normocephalic.   Cardiovascular:      Rate and Rhythm: Normal rate.   Pulmonary:      Effort: Pulmonary effort is normal.   Abdominal:      General: Abdomen is flat.   Musculoskeletal:         General: Normal range of motion.      Cervical back: Normal range of motion.   Skin:     Coloration: Skin is not jaundiced.   Neurological:      Mental Status: She is alert.        Significant Labs: Blood Culture:   Recent Labs   Lab 07/09/23  1230 07/09/23  1239 07/11/23  0815 07/11/23  0817   LABBLOO Gram stain sada bottle: Gram negative rods  Results called to and read back by: Belinda Gaspar RN 07/10/2023  11:05  ESCHERICHIA COLI  Susceptibility pending  * Gram stain sada bottle: Gram negative rods  Results called to and read back by: Belinda Gaspar RN 07/10/2023  11:05  ESCHERICHIA COLI  For susceptibility see order #[id number]  * No Growth to date No Growth to date     CBC:   Recent Labs   Lab 07/10/23  0733 07/11/23  0636   WBC 19.85* 11.10   HGB 7.9* 10.3*   HCT 25.3* 31.2*    260     CMP:    Recent Labs   Lab 07/10/23  0733 07/11/23  0636   * 135*   K 4.0 3.7    102   CO2 23 25   GLU 92 95   BUN 21 21   CREATININE 1.3 1.1   CALCIUM 8.4* 8.6*   ANIONGAP 9 8     Urine Culture:   Recent Labs   Lab 07/09/23  1329   LABURIN ESCHERICHIA COLI  >100,000 cfu/ml  *     Urine Studies:   Recent Labs   Lab 07/09/23  1329   COLORU [place]*   APPEARANCEUA Hazy*   PHUR 8.0   SPECGRAV 1.010   PROTEINUA 2+*   GLUCUA Negative   KETONESU Negative   BILIRUBINUA Negative   OCCULTUA 1+*   NITRITE Negative   UROBILINOGEN Negative   LEUKOCYTESUR 2+*   RBCUA 32*   WBCUA >100*   BACTERIA Many*   HYALINECASTS 2*     All pertinent labs within the past 24 hours have been reviewed.    Significant Imaging: I have reviewed all pertinent imaging results/findings within the past 24 hours.

## 2023-07-12 NOTE — TELEPHONE ENCOUNTER
----- Message from Mariaa Hale RN sent at 7/12/2023 12:51 PM CDT -----  Please call patient for an appointment in 3 days. Thanks!

## 2023-07-12 NOTE — PLAN OF CARE
Nutrition Recommendations  1. Recommend pt continues Cardiac, IDDSI Level 5 Minced and Moist diet as medically appropriate   2. Recommend Boost Plus TID to help fill nutritional gap   3. Recommend encouragement and assistance at meal times  4. Recommend pt continues bowel regimen   5. Weigh daily     Goals:   1. Pt will consume >50% of EEN by RD f/u   2. Pt will recieve and tolerate boost plus TID prior to RD f/u   3. Pt will have BM by RD f/u  Nutrition Goal Status: new   Communication of RD Recs: other (comment) (POC: Sticky Note)  Haylee Powell, Dietetic Intern

## 2023-07-12 NOTE — SUBJECTIVE & OBJECTIVE
Past Medical History:   Diagnosis Date    Arthritis     EDGAR HANDS, KNEES    Behavioral change 2022    Blind right eye     Traumatic    Breast cancer 06/15/2017    0.8 cm Grade1 INTRADUCTAL BREAST 9 positve margin (left)    Essential hypertension     Hemorrhoids     Immunodeficiency due to chemotherapy 2021    Lipoma of abdominal wall     Major neurocognitive disorder 2022    Obesity     Overactive bladder     Pap smear abnormality of cervix with ASCUS favoring benign     Thyroid nodule     Tobacco use disorder     Tubular adenoma of colon     Urinary incontinence        Past Surgical History:   Procedure Laterality Date    ANTERIOR VAGINAL REPAIR      BLADDER SURGERY      BREAST LUMPECTOMY Left 2017    CATARACT EXTRACTION Left 2022     SECTION      X2    COLONOSCOPY N/A 3/8/2017    Procedure: COLONOSCOPY;  Surgeon: Tyron Paris MD;  Location: [facility];  Service: Endoscopy;  Laterality: N/A;    COLONOSCOPY N/A 2020    Procedure: COLONOSCOPY;  Surgeon: Keira Ellison MD;  Location: [facility];  Service: Endoscopy;  Laterality: N/A;    ESOPHAGOGASTRODUODENOSCOPY N/A 2020    Procedure: EGD (ESOPHAGOGASTRODUODENOSCOPY);  Surgeon: Keira Ellison MD;  Location: [facility];  Service: Endoscopy;  Laterality: N/A;  new onset iron deficiency with prior history of breast cancer    INTRALUMINAL GASTROINTESTINAL TRACT IMAGING VIA CAPSULE N/A 10/28/2020    Procedure: IMAGING PROCEDURE, GI TRACT, INTRALUMINAL, VIA CAPSULE;  Surgeon: Finesse Jha RN;  Location: [facility];  Service: Endoscopy;  Laterality: N/A;    LEFT HEART CATHETERIZATION Left 10/12/2021    Procedure: CATHETERIZATION, HEART, LEFT;  Surgeon: Tiffanie Santos MD;  Location: [facility] CATH LAB;  Service: Cardiology;  Laterality: Left;    SENTINEL LYMPH NODE BIOPSY Left 2020    Procedure: BIOPSY, LYMPH NODE, SENTINEL;  Surgeon: Vincent Moyer MD;  Location: [facility] OR;  Service: General;  Laterality: Left;    SIMPLE  MASTECTOMY Left 9/8/2020    Procedure: MASTECTOMY, SIMPLE;  Surgeon: Vincent Moyer MD;  Location: [facility];  Service: General;  Laterality: Left;    THYROID LOBECTOMY Left 2005    TOTAL ABDOMINAL HYSTERECTOMY      TUBAL LIGATION         Review of patient's allergies indicates:  No Known Allergies    Medications:  Medications Prior to Admission   Medication Sig    amLODIPine (NORVASC) 5 MG tablet Take 1 tablet (5 mg total) by mouth once daily.    anastrozole (ARIMIDEX) 1 mg Tab TAKE 1 TABLET BY MOUTH EVERY DAY    aspirin 81 MG Chew Take 1 tablet (81 mg total) by mouth once daily.    busPIRone (BUSPAR) 15 MG tablet TAKE 1 TABLET BY MOUTH 2 TIMES DAILY.    calcium citrate (CALCITRATE) 200 mg (950 mg) tablet Take 1 tablet by mouth once daily.    carvediloL (COREG) 3.125 MG tablet TAKE 1 TABLET BY MOUTH TWICE A DAY WITH MEALS    cyanocobalamin 2000 MCG tablet Take 2,000 mcg by mouth 2 (two) times a day.    donepeziL (ARICEPT) 5 MG tablet Take 1 tablet (5 mg total) by mouth every evening.    ferrous sulfate (FEOSOL) 325 mg (65 mg iron) Tab tablet Take 65 mg by mouth once daily.    furosemide (LASIX) 20 MG tablet Take 1 tablet (20 mg total) by mouth every Mon, Wed, Fri.    isosorbide mononitrate (IMDUR) 30 MG 24 hr tablet TAKE 1 TABLET BY MOUTH EVERY DAY    memantine (NAMENDA) 5 MG Tab Take 1 tablet (5 mg total) by mouth 2 (two) times daily.    QUEtiapine (SEROQUEL) 100 MG Tab Take 1 tablet (100 mg total) by mouth every evening.    tamsulosin (FLOMAX) 0.4 mg Cap Take 1 capsule by mouth once daily.    telmisartan (MICARDIS) 20 MG Tab TAKE 1 TABLET BY MOUTH EVERY DAY    vitamin D 1000 units Tab Take 1,000 Units by mouth once daily.     Antibiotics (From admission, onward)      Start     Stop Route Frequency Ordered    07/11/23 1830  meropenem-0.9% sodium chloride 1 g/50 mL IVPB         -- IV Every 12 hours (non-standard times) 07/11/23 1728          Antifungals (From admission, onward)      None          Antivirals (From  admission, onward)      None             Immunization History   Administered Date(s) Administered    COVID-19, MRNA, LN-S, PF (Pfizer) (Purple Cap) 2021, 2021, 2022    Influenza (FLUAD) - Quadrivalent - Adjuvanted - PF *Preferred* (65+) 2020, 10/01/2021, 10/26/2022    Influenza - High Dose - PF (65 years and older) 2017, 2018, 2020    Influenza - Quadrivalent 2015, 2017    Pneumococcal Conjugate - 13 Valent 2017    Pneumococcal Polysaccharide - 23 Valent 2019    Tdap 2013       Family History       Problem Relation (Age of Onset)    Alcohol abuse Mother    Allergic rhinitis Daughter    Cataracts Father    Cervical cancer Daughter (32)    Cirrhosis Mother    Diabetes Brother    Heart attack Brother    Heart murmur Brother    Hypertension Father, Daughter    No Known Problems Daughter    Prostate cancer Father (80)          Social History     Socioeconomic History    Marital status:    Tobacco Use    Smoking status: Former     Packs/day: 1.00     Years: 50.00     Pack years: 50.00     Types: Cigarettes     Quit date: 2020     Years since quittin.9    Smokeless tobacco: Never   Substance and Sexual Activity    Alcohol use: Never     Alcohol/week: 28.0 standard drinks     Types: 28 Cans of beer per week    Drug use: No    Sexual activity: Never     Partners: Male   Social History Narrative    The patient is .  She is retired from [employer].     Social Determinants of Health     Financial Resource Strain: Medium Risk    Difficulty of Paying Living Expenses: Somewhat hard   Food Insecurity: Food Insecurity Present    Worried About Running Out of Food in the Last Year: Sometimes true    Ran Out of Food in the Last Year: Sometimes true   Transportation Needs: Unknown    Lack of Transportation (Medical): No   Physical Activity: Inactive    Days of Exercise per Week: 0 days    Minutes of Exercise per Session: 0 min   Stress: No  Stress Concern Present    Feeling of Stress : Only a little   Social Connections: Socially Integrated    Frequency of Communication with Friends and Family: More than three times a week    Frequency of Social Gatherings with Friends and Family: More than three times a week    Attends [employer] Services: More than 4 times per year    Attends Club or Organization Meetings: More than 4 times per year    Marital Status:    Housing Stability: Unknown    Unable to Pay for Housing in the Last Year: No    Unstable Housing in the Last Year: No     Review of Systems   Unable to perform ROS: Dementia   Constitutional:  Negative for activity change, appetite change and chills.   Objective:     Vital Signs (Most Recent):  Temp: 98.3 °F (36.8 °C) (07/12/23 0427)  Pulse: 94 (07/12/23 0427)  Resp: 19 (07/12/23 0427)  BP: (!) 108/56 (07/12/23 0427)  SpO2: 96 % (07/12/23 0427) Vital Signs (24h Range):  Temp:  [98.3 °F (36.8 °C)-101.1 °F (38.4 °C)] 98.3 °F (36.8 °C)  Pulse:  [] 94  Resp:  [18-20] 19  SpO2:  [96 %-97 %] 96 %  BP: (108-145)/(56-68) 108/56     Weight: 61.9 kg (136 lb 7.4 oz)  Body mass index is 26.65 kg/m².    Estimated Creatinine Clearance: 38.6 mL/min (based on SCr of 1.1 mg/dL).     Physical Exam  Vitals and nursing note reviewed.   HENT:      Head: Normocephalic.   Cardiovascular:      Rate and Rhythm: Normal rate.   Pulmonary:      Effort: Pulmonary effort is normal.   Abdominal:      General: Abdomen is flat.   Musculoskeletal:         General: Normal range of motion.      Cervical back: Normal range of motion.   Skin:     Coloration: Skin is not jaundiced.   Neurological:      Mental Status: She is alert.        Significant Labs: Blood Culture:   Recent Labs   Lab 07/09/23  1230 07/09/23  1239 07/11/23  0815 07/11/23  0817   LABBLOO Gram stain sada bottle: Gram negative rods  Results called to and read back by: Belinda Gaspar RN 07/10/2023  11:05  ESCHERICHIA COLI  Susceptibility pending  * Gram stain sada  bottle: Gram negative rods  Results called to and read back by: Belinda Gaspar RN 07/10/2023  11:05  ESCHERICHIA COLI  For susceptibility see order #[id number]  * No Growth to date No Growth to date     CBC:   Recent Labs   Lab 07/10/23  0733 07/11/23  0636   WBC 19.85* 11.10   HGB 7.9* 10.3*   HCT 25.3* 31.2*    260     CMP:   Recent Labs   Lab 07/10/23  0733 07/11/23  0636   * 135*   K 4.0 3.7    102   CO2 23 25   GLU 92 95   BUN 21 21   CREATININE 1.3 1.1   CALCIUM 8.4* 8.6*   ANIONGAP 9 8     Urine Culture:   Recent Labs   Lab 07/09/23  1329   LABURIN ESCHERICHIA COLI  >100,000 cfu/ml  *     Urine Studies:   Recent Labs   Lab 07/09/23  1329   COLORU [place]*   APPEARANCEUA Hazy*   PHUR 8.0   SPECGRAV 1.010   PROTEINUA 2+*   GLUCUA Negative   KETONESU Negative   BILIRUBINUA Negative   OCCULTUA 1+*   NITRITE Negative   UROBILINOGEN Negative   LEUKOCYTESUR 2+*   RBCUA 32*   WBCUA >100*   BACTERIA Many*   HYALINECASTS 2*     All pertinent labs within the past 24 hours have been reviewed.    Significant Imaging: I have reviewed all pertinent imaging results/findings within the past 24 hours.

## 2023-07-12 NOTE — PLAN OF CARE
07/12/23 1109   Post-Acute Status   Post-Acute Authorization Home Health   Home Health Status Referrals Sent   Coverage [facility] Resources/Appts/Education Provided Appointments scheduled and added to [facility]   Discharge Plan   Discharge Plan A Home Health     Home health referral sent to [facility].

## 2023-07-12 NOTE — PT/OT/SLP EVAL
Occupational Therapy Evaluation and Treatment    Name: Leia Rodriguez  MRN: [id number]  Admitting Diagnosis: Bacteremia due to Escherichia coli  Recent Surgery: * No surgery found *      Recommendations:     Discharge Recommendations: home health OT (WITH 24 HOUR CARE)  Level of Assistance Recommended: 24 hours significant assistance  Discharge Equipment Recommendations: to be determined by next level of care  Barriers to discharge:      Assessment:     Leia Rodriguez is a 72 y.o. female with a medical diagnosis of Bacteremia due to Escherichia coli. She presents with performance deficits affecting function including weakness, gait instability, decreased upper extremity function, decreased ROM, impaired endurance, impaired balance, impaired self care skills, impaired functional mobility, pain, decreased lower extremity function, decreased safety awareness.     Rehab Prognosis: Good; patient would benefit from acute OT services to address these deficits and reach maximum level of function.    Plan:     Patient to be seen 2 x/week to address the above listed problems via self-care/home management, therapeutic activities, therapeutic exercises  Plan of Care Expires: 07/26/23  Plan of Care Reviewed with: patient    Subjective     Chief Complaint:  DEBILITY AND GENERALIZE WEAKNESS  Patient Comments/Goals:   Pain/Comfort:  Pain Rating 1: 0/10    Patients cultural, spiritual, Orthodox conflicts given the current situation:      Social History:  Living Environment: Patient lives with their spouse in a single story home with number of outside stair(s): 0  Prior Level of Function: Prior to admission, patient was modified independent with ADLs using rolling walker for mobility  Roles and Routines: Patient was not driving and not working prior to admission.  Equipment Used at Home: walker, rolling, shower chair  DME owned (not currently used):  UNKNOWN  Assistance Upon Discharge: family    Objective:     Communicated with  DEBILITY AND GENERALIZED WEAKNESS prior to session. Patient found HOB elevated with peripheral IV, PureWick, telemetry upon OT entry to room.    General Precautions: Standard, fall   Orthopedic Precautions: N/A   Braces: N/A    Respiratory Status: Room air    Occupational Performance    Gait belt applied - Yes    Bed Mobility:   Rolling/Turning to Left with moderate assistance  Scooting anteriorly to EOB to have both feet planted on floor: moderate assistance  Supine to sit from right side of bed with minimum assistance    Functional Mobility/Transfers:  Sit <> Stand Transfer with moderate assistance with rolling walker  Bed <> Chair Transfer using Step Transfer technique with moderate assistance with rolling walker      Activities of Daily Living:  Upper Body Dressing: maximal assistance  Lower Body Dressing: maximal assistance    Cognitive/Visual Perceptual:  Cognitive/Psychosocial Skills:    -     Oriented to: Person and Place  -     Follows Commands/attention: Follows one-step commands  -     Communication: UNABLE TO ASSESS  -     Memory: UNABLE TO ASSESS  -     Safety awareness/insight to disability: impaired    Physical Exam:  Balance:    -     Sitting: supervision and minimum assistance  -     Standing: minimum assistance  Upper Extremity Range of Motion:     -       Right Upper Extremity: AAROM WFL  -       Left Upper Extremity: AAROM WFL  Upper Extremity Strength:    -       Right Upper Extremity: MMT: 2 TO 2+/5 GROSSLY  -       Left Upper Extremity: MMT: 2 TO 2+/5 GROSSLY   Strength:    -       Right Upper Extremity: MMT: 3/5 GROSSLY  -       Left Upper Extremity: MMT: 3/5 GROSSLY    AMPAC 6 Click ADL:  AMPAC Total Score: 14    Treatment & Education:  Therapist provided facilitation and instruction of proper body mechanics, energy conservation, and fall prevention strategies during tasks listed above  Patient educated on role of OT, POC, and goals for therapy  Patient educated on importance of OOB  activities with staff member assistance and sitting OOB majority of the day      Patient clear to stand pivot transfer with RN/PCT, assist xMIN A / MOD A WITYH STEP<PIVOT TRANSFER .    Patient left up in chair with all lines intact, call button in reach, RN notified, and spouse present.    GOALS:   Multidisciplinary Problems       Occupational Therapy Goals          Problem: Occupational Therapy    Goal Priority Disciplines Outcome Interventions   Occupational Therapy Goal     OT, PT/OT     Description: O.T. GOALS TO BE MET BY 23  PT WILL TOLERATE  1 SET X 15 REPS B UE ROM EXERCISE  SBA WITH UE DRESSING  SBA WITH BSC TRANSFERS                       History:     Past Medical History:   Diagnosis Date    Arthritis     EDGAR HANDS, KNEES    Behavioral change 2022    Blind right eye     Traumatic    Breast cancer 06/15/2017    0.8 cm Grade1 INTRADUCTAL BREAST 9 positve margin (left)    Essential hypertension     Hemorrhoids     Immunodeficiency due to chemotherapy 2021    Lipoma of abdominal wall     Major neurocognitive disorder 2022    Obesity     Overactive bladder     Pap smear abnormality of cervix with ASCUS favoring benign     Thyroid nodule     Tobacco use disorder     Tubular adenoma of colon     Urinary incontinence          Past Surgical History:   Procedure Laterality Date    ANTERIOR VAGINAL REPAIR      BLADDER SURGERY      BREAST LUMPECTOMY Left     CATARACT EXTRACTION Left 2022     SECTION      X2    COLONOSCOPY N/A 3/8/2017    Procedure: COLONOSCOPY;  Surgeon: Tyron Paris MD;  Location: [facility];  Service: Endoscopy;  Laterality: N/A;    COLONOSCOPY N/A 2020    Procedure: COLONOSCOPY;  Surgeon: Keira Ellison MD;  Location: [facility];  Service: Endoscopy;  Laterality: N/A;    ESOPHAGOGASTRODUODENOSCOPY N/A 2020    Procedure: EGD (ESOPHAGOGASTRODUODENOSCOPY);  Surgeon: Keira Ellison MD;  Location: [facility];  Service: Endoscopy;   Laterality: N/A;  new onset iron deficiency with prior history of breast cancer    INTRALUMINAL GASTROINTESTINAL TRACT IMAGING VIA CAPSULE N/A 10/28/2020    Procedure: IMAGING PROCEDURE, GI TRACT, INTRALUMINAL, VIA CAPSULE;  Surgeon: Finesse Jha RN;  Location: [facility];  Service: Endoscopy;  Laterality: N/A;    LEFT HEART CATHETERIZATION Left 10/12/2021    Procedure: CATHETERIZATION, HEART, LEFT;  Surgeon: Tiffanie Santos MD;  Location: [facility] CATH LAB;  Service: Cardiology;  Laterality: Left;    SENTINEL LYMPH NODE BIOPSY Left 9/8/2020    Procedure: BIOPSY, LYMPH NODE, SENTINEL;  Surgeon: Vincent Moyer MD;  Location: [facility] OR;  Service: General;  Laterality: Left;    SIMPLE MASTECTOMY Left 9/8/2020    Procedure: MASTECTOMY, SIMPLE;  Surgeon: Vincent Moyer MD;  Location: [facility];  Service: General;  Laterality: Left;    THYROID LOBECTOMY Left 2005    TOTAL ABDOMINAL HYSTERECTOMY      TUBAL LIGATION         Time Tracking:     OT Date of Treatment: 07/12/23  OT Start Time: 0905  OT Stop Time: 0935  OT Total Time (min): 30 min    Billable Minutes: Evaluation 15 MINUTES and Therapeutic Activity 15 MINUTES    7/12/2023

## 2023-07-12 NOTE — PLAN OF CARE
[facility] - Med Surg  Discharge Final Note    Primary Care Provider: Yasmin Navarro MD    Expected Discharge Date: 7/12/2023    Final Discharge Note (most recent)       Final Note - 07/12/23 1111          Final Note    Assessment Type Final Discharge Note     Anticipated Discharge Disposition Home-Health Care [facility]     Hospital Resources/Appts/Education Provided Appointments scheduled and added to AVS                                  Contact Info       Yasmin Navarro MD   Specialty: Family Medicine   Relationship: PCP - General    [address] [facility] [address]   Phone: [phone]       Next Steps: Follow up in 3 day(s)    Amadeo Austin MD, [facility]   Specialty: Infectious Diseases, Hospitalist    [address] [facility] [address]   Phone: [phone]       Next Steps: Follow up in 2 week(s)

## 2023-07-12 NOTE — TELEPHONE ENCOUNTER
Pt was hospitalized with sepsis.  Talked  with daughter.  Needs hospital f/u within 3 days.  Friday 220p Dr. Navarro booked.  Pt continuing antibiotics and was released this afternoon.

## 2023-07-12 NOTE — PLAN OF CARE
Discussed poc with pt, pt verbalized understanding    Purposeful rounding every 2hours    VS wnl  Cardiac monitoring in use, tele monitor # [id number]  Fall precautions in place, remains injury free  Pt denies c/o n/v and pain    Accurate I&Os  Bed locked at lowest position  Call light within reach    Chart check complete  Will cont with POC     Problem: Adult Inpatient Plan of Care  Goal: Plan of Care Review  Outcome: Ongoing, Progressing  Goal: Patient-Specific Goal (Individualized)  Outcome: Ongoing, Progressing  Goal: Absence of Hospital-Acquired Illness or Injury  Outcome: Ongoing, Progressing  Goal: Optimal Comfort and Wellbeing  Outcome: Ongoing, Progressing  Goal: Readiness for Transition of Care  Outcome: Ongoing, Progressing     Problem: Adjustment to Illness (Sepsis/Septic Shock)  Goal: Optimal Coping  Outcome: Ongoing, Progressing     Problem: Bleeding (Sepsis/Septic Shock)  Goal: Absence of Bleeding  Outcome: Ongoing, Progressing     Problem: Glycemic Control Impaired (Sepsis/Septic Shock)  Goal: Blood Glucose Level Within Desired Range  Outcome: Ongoing, Progressing     Problem: Infection Progression (Sepsis/Septic Shock)  Goal: Absence of Infection Signs and Symptoms  Outcome: Ongoing, Progressing     Problem: Nutrition Impaired (Sepsis/Septic Shock)  Goal: Optimal Nutrition Intake  Outcome: Ongoing, Progressing     Problem: Fluid and Electrolyte Imbalance (Acute Kidney Injury/Impairment)  Goal: Fluid and Electrolyte Balance  Outcome: Ongoing, Progressing     Problem: Oral Intake Inadequate (Acute Kidney Injury/Impairment)  Goal: Optimal Nutrition Intake  Outcome: Ongoing, Progressing     Problem: Renal Function Impairment (Acute Kidney Injury/Impairment)  Goal: Effective Renal Function  Outcome: Ongoing, Progressing     Problem: Fall Injury Risk  Goal: Absence of Fall and Fall-Related Injury  Outcome: Ongoing, Progressing     Problem: Skin Injury Risk Increased  Goal: Skin Health and Integrity  Outcome:  Ongoing, Progressing

## 2023-07-12 NOTE — CONSULTS
[facility] - Med Surg  Adult Nutrition  Consult Note    SUMMARY     Recommendations    Recommendation/Intervention:   1. Recommend pt continues Cardiac, IDDSI Level 5 Minced and Moist diet as medically appropriate   2. Recommend Boost Plus TID to help fill nutritional gap   3. Recommend encouragement and assistance at meal times  4. Recommend pt continues bowel regimen   5. Weigh daily    Goals:   1. Pt will consume >50% of EEN by RD f/u   2. Pt will recieve and tolerate boost plus TID prior to RD f/u   3. Pt will have BM by RD f/u  Nutrition Goal Status: new (POC: Sticky Note)  Communication of RD Recs: other (comment) (POC: Sticky Note)    Assessment and Plan    Nutrition Problem:  Mild Protein-Calorie Malnutrition  Malnutrition in the context of Chronic Illness/Injury    Related to (etiology):  Psychological causes     Signs and Symptoms (as evidenced by):  Energy Intake: <50% of estimated energy requirement for 3 days  Body Fat Depletion: mild depletion of orbitals and triceps   Muscle Mass Depletion: mild depletion of temples, clavicle region, interosseous muscle, and lower extremities   Weight Loss: 3.5% x 3 weeks     Interventions(treatment strategy):  1. Sodium and Fat Modified Diet: Low Na/Low Saturated Fat and Cholesterol, Minced moist food Level five [place]  2. Commercial Beverage   3. Collaboration of care with other medical providers     Nutrition Diagnosis Status:  New      Malnutrition Assessment  Malnutrition Context: chronic illness  Malnutrition Level: mild  Skin (Micronutrient): dry, other (see comments) (Fredy Score: 15 (mild risk))       Subcutaneous Fat (Malnutrition): mild depletion  Muscle Mass (Malnutrition): mild depletion   Orbital Region (Subcutaneous Fat Loss): mild depletion (Slightly darker circles; Somewhat hallow look)  Upper Arm Region (Subcutaneous Fat Loss): mild depletion (Some depth pinch but not ample; Fingers almost touch)   [place] (Muscle Loss): moderate depletion  (Slight depression)  Clavicle Bone Region (Muscle Loss): mild depletion (Visible in male, Some protrusion in female)  Clavicle and Acromion Bone Region (Muscle Loss): mild depletion (Acromion process may slightly protrude)  Dorsal Hand (Muscle Loss): mild depletion (Slightly depressed)  Patellar Region (Muscle Loss): mild depletion (Knee cap less prominent more rounded)  Anterior Thigh Region (Muscle Loss): well nourished (Well rounded; no depressions)  Posterior Calf Region (Muscle Loss): well nourished (Well-developed bulb of muscle)                 Reason for Assessment    Reason For Assessment: consult  Diagnosis: other (see comments) (Bacteremia due to Escherichia coli)  Relevant Medical History: Essential hypertension (Chronic), E-coli UTI, Chronic combined systolic and diastolic CHF (congestive heart failure) (Chronic), Sepsis, Acute renal failure superimposed on stage 3 chronic kidney disease  Interdisciplinary Rounds: attended    General Information Comments:   7/12: 72 y.o. F, Admitted for Bacterium due to Escherichia coli. Daughter brought pt in after she appeared more confused and lethargic then usual. PMH of Dementia, Chronic Constipation, and Breast cancer. Per NP note, Pt reports generalized weakness, fatigue, poor appetite, and trouble swallowing. Spoke with pt at bedside. Pt stated she has a good appetite. Family spoke up and said she does not. Saw breakfast tray and pt ate about 25%. Pt denied any N/V/D. Pt did not know her UBW. Pt said she takes a supplement at home and thinks it is a multivitamin but doesn't know for sure. Pt expressed that her daughter gives it to her. NFPE performed mild malnutrition present. Pt did state that she has noticed herself losing weight. Skin: Dry. Fredy Score: 15 (mild risk). LBM: 7/8. Labs, Meds, Weight reviewed. Labs 7/12: eGFR 54, Calcium (L). Meds to note Amlodipine, anastrozole, aspirin, carvedilol, ceftriaxone, donepezil, enoxaparin, ferrous sulfate,  isosorbide, memantine, quetiapine, senna-docusate, tamsulosin. Weight charted 7/10: 136 lbs. BMI: 26.65 (Normal for age). RD will continue to monitor.    Nutrition Risk Screen    Nutrition Risk Screen: difficulty chewing/swallowing, reduced oral intake over the last month    Nutrition/Diet History    Patient Reported Diet/Restrictions/Preferences: heart healthy  Spiritual, Cultural Beliefs, Sikhism Practices, Values that Affect Care: no  Food Allergies: NKFA  Factors Affecting Nutritional Intake: constipation, impaired cognitive status/motor control, chewing difficulties/inability to chew food, difficulty/impaired swallowing, decreased appetite    Anthropometrics    Temp: 99.2 °F (37.3 °C)  Height Method: Stated  Height: 5' (152.4 cm)  Height (inches): 60 in  Weight Method: Bed Scale  Weight: 61.9 kg (136 lb 7.4 oz)  Weight (lb): 136.47 lb  Ideal Body Weight (IBW), Female: 100 lb  % Ideal Body Weight, Female (lb): 136.47 %  BMI (Calculated): 26.7  BMI Grade: other (see comments) (Normal for Age)  Weight Loss: unintentional     Wt Readings from Last 15 Encounters:   07/12/23 61.9 kg (136 lb 7.4 oz)   06/22/23 64 kg (141 lb)   06/19/23 64.2 kg (141 lb 8.6 oz)   06/12/23 63.2 kg (139 lb 5.3 oz)   03/13/23 61.4 kg (135 lb 5.8 oz)   03/07/23 63.6 kg (140 lb 3.4 oz)   03/07/23 63.6 kg (140 lb 3.4 oz)   12/12/22 67.4 kg (148 lb 9.4 oz)   12/06/22 69.2 kg (152 lb 8.9 oz)   12/03/22 68 kg (150 lb)   11/26/22 68.9 kg (151 lb 14.4 oz)   11/16/22 68.9 kg (152 lb)   10/26/22 68 kg (150 lb)   10/12/22 68.4 kg (150 lb 12.7 oz)   08/18/22 68.9 kg (151 lb 14.4 oz)     Lab/Procedures/Meds    Pertinent Labs Reviewed: reviewed  Pertinent Labs Comments: eGFR 54, Calcium (L)  Pertinent Medications Reviewed: reviewed  Pertinent Medications Comments: Amlodipine, anastrozole, aspirin, carvedilol, ceftriaxone, donepezil, enoxaparin, ferrous sulfate, isosorbide, memantine, quetiapine, senna-docusate, tamsulosin  BMP  Lab Results   Component  Value Date     07/12/2023    K 3.6 07/12/2023     07/12/2023    CO2 22 (L) 07/12/2023    BUN 20 07/12/2023    CREATININE 1.1 07/12/2023    CALCIUM 8.5 (L) 07/12/2023    ANIONGAP 11 07/12/2023    EGFRNORACEVR 54 (A) 07/12/2023     Lab Results   Component Value Date    ALBUMIN 3.5 07/09/2023     Lab Results   Component Value Date    ALT 7 (L) 07/09/2023    AST 12 07/09/2023    ALKPHOS 79 07/09/2023    BILITOT 0.8 07/09/2023     Lab Results   Component Value Date    CALCIUM 8.5 (L) 07/12/2023    PHOS 3.3 09/07/2021     Scheduled Meds:   amLODIPine  5 mg Oral Daily    anastrozole  1 mg Oral Daily    aspirin  81 mg Oral Daily    carvediloL  3.125 mg Oral BID WM    donepeziL  5 mg Oral QHS    enoxparin  40 mg Subcutaneous Daily    ferrous sulfate  1 tablet Oral Daily    isosorbide mononitrate  30 mg Oral Daily    levoFLOXacin  750 mg Oral Every other day    memantine  5 mg Oral BID    QUEtiapine  100 mg Oral QHS    senna-docusate 8.6-50 mg  1 tablet Oral BID    tamsulosin  1 capsule Oral Daily     Continuous Infusions:  PRN Meds:.sodium chloride, sodium chloride 0.9%, acetaminophen, aluminum-magnesium hydroxide-simethicone (MAALOX) 200-200-20 mg/5 mL, diphenhydrAMINE (BENYLIN) 12.5 mg/5 mL, dextrose 10%, dextrose 10%, glucagon (human recombinant), glucose, glucose, melatonin, naloxone, ondansetron, sodium chloride 0.9%     Physical Findings/Assessment         Estimated/Assessed Needs    Weight Used For Calorie Calculations: 61.9 kg (136 lb 7.4 oz)  Energy Calorie Requirements (kcal): 1486 kcal (24 kcal/kg ABW (CHF, Malnourished))  Energy Need Method: Kcal/kg  Protein Requirements: 87g (1.4g/kg (CHF, Clinically stabled depleted patients))  Weight Used For Protein Calculations: 61.9 kg (136 lb 7.4 oz)  Fluid Requirements (mL): 1-2 L/day (CHF)  Estimated Fluid Requirement Method: other (see comments)  RDA Method (mL): 1486  CHO Requirement: 186g (1486/8)      Nutrition Prescription Ordered    Current Diet Order:  Cardiac, IDDSI Level 5 Minced and Moist    Evaluation of Received Nutrient/Fluid Intake    I/O: (Net since admit)   7/12: -1556.1 mL    Energy Calories Required: not meeting needs  Protein Required: not meeting needs  Fluid Required: meeting needs  % Intake of Estimated Energy Needs: 0 - 25 %  % Meal Intake: 0 - 25 %    Nutrition Risk    Level of Risk/Frequency of Follow-up: high (f/u 2x weekly)       Monitor and Evaluation    Food and Nutrient Intake: energy intake, food and beverage intake  Food and Nutrient Adminstration: diet order  Knowledge/Beliefs/Attitudes: food and nutrition knowledge/skill, beliefs and attitudes  Anthropometric Measurements: weight, weight change, body mass index  Biochemical Data, Medical Tests and Procedures: electrolyte and renal panel, gastrointestinal profile       Nutrition Follow-Up    RD Follow-up?: Yes  Haylee Powell Dietetic Intern

## 2023-07-12 NOTE — PT/OT/SLP EVAL
Physical Therapy Evaluation    Patient Name:  Leia Rodriguez   MRN:  [id number]    Recommendations:     Discharge Recommendations: home health PT (WITH 24HR SPV)   Discharge Equipment Recommendations: none   Barriers to discharge: None    Assessment:     Leia Rodriguez is a 72 y.o. female admitted with a medical diagnosis of Bacteremia due to Escherichia coli.  She presents with the following impairments/functional limitations: weakness, gait instability, decreased upper extremity function, impaired balance, impaired endurance, decreased lower extremity function, decreased safety awareness, impaired self care skills, impaired cognition, impaired functional mobility .    Rehab Prognosis: Good; patient would benefit from acute skilled PT services to address these deficits and reach maximum level of function.    Recent Surgery: * No surgery found *      Plan:     During this hospitalization, patient to be seen 3 x/week to address the identified rehab impairments via gait training, therapeutic activities, therapeutic exercises and progress toward the following goals:    Plan of Care Expires:  07/26/23    Subjective     Chief Complaint: NONE STATED  Patient/Family Comments/goals: PT A POOR HISTORIAN WITH POOR COMMUNICATION 2/2 TO DEMENTIA. PT  PRESENT BUT IN RESTROOM FOR DURATION OF THERAPY. PT APPEARS SAD AND HAS DIFFICULTY UNDERSTANDING INSTRUCTIONS.  Pain/Comfort:  Pain Rating 1: 0/10    Patients cultural, spiritual, Hinduism conflicts given the current situation:      Living Environment:  PT POOR HISTORIAN. HISTORY OBTAINED FROM . PT LIVES WITH HER  IN A SINGLE STORY HOME WITH NO STEPS TO ENTER. PT DAUGHTER IS AT THEIR HOME SO OFTEN, SHE PRACTICALLY LIVES THERE AS WELL.   Prior to admission, patients level of function was MOD I WITH RW AND ASSISTANCE FROM  AND DAUGHTER. PT ATTENDS  3 DAYS/WK. PT DOES NOT DRIVE OR WORK..  Equipment used at home: walker, rolling, shower chair.  DME  owned (not currently used): none.  Upon discharge, patient will have assistance from  AND DAUGHTER.    Objective:     Communicated with NURSE MARY SHEIKH AND Epic CHART REVIEW prior to session.  Patient found supine with peripheral IV, PureWick, telemetry  upon PT entry to room.    General Precautions: Standard, fall  Orthopedic Precautions:N/A   Braces: N/A  Respiratory Status: Room air    Exams:  Cognitive Exam:  Patient is oriented to Person and Place  RLE ROM: WFL  RLE Strength: NOT TESTED D/T DIFFICULTY UNDERSTANDING INSTRUCTIONS  LLE ROM: WFL  LLE Strength: NOT TESTED D/T DIFFICULTY UNDERSTANDING INSTRUCTIONS    Functional Mobility:  Bed Mobility:     Scooting: maximal assistance and WITH VC FOR INSTRUCTIONS  Supine to Sit: minimum assistance and WITH VC FOR INSTRUCTIONS  Transfers:     Sit to Stand:  minimum assistance with rolling walker  Gait: PT GT 40' WITH RW, CGA, AND VC FOR SAFE RW USE      AM-PAC 6 CLICK MOBILITY  Total Score:16       Treatment & Education:  PT WITH VERY LITTLE COMMUNICATION. PT REQUIRED VC DURING ENTIRETY OF SESSION FOR COMFORT AND ENCOURAGEMENT. PT REQUIRED MIN A AND VC FOR SUP TO SIT. PT REQUIRED MAX A AND VC TO SCOOT TO EOB D/T LACK OF UNDERSTANDING. ROM/MMT SCREEN PERFORMED WITH PT SITTING EOB. PT PERFORMED SIT TO STAND WITH RW AND MIN A. PT GT 40' WITH RW, CGA, AND VC FOR SAFE AD USE. PT RETURN TO ROOM AND T/F TO CHAIR WITH RW, CGA, AND VC. PT EDUCATED ON HEP INCLUDING SEATED AP, LAQ, AND MIP X10. PT INSTRUCTED TO NOT GET OUT OF CHAIR WITHOUT SOMEONE PRESENT TO ASSIST HER. PT EDUCATED ON WHEN AND HOW TO UTILIZE CALL BUTTON FOR ANY NEEDS.    Patient left up in chair with all lines intact, call button in reach, chair alarm on,  present, and BREAKFAST ON BEDSIDE TABLE .    GOALS:   Multidisciplinary Problems       Physical Therapy Goals          Problem: Physical Therapy    Goal Priority Disciplines Outcome Goal Variances Interventions   Physical Therapy Goal     PT, PT/OT       Description: LT23  1. PT WILL REQUIRE SPV FOR ALL BED MOB TO ALLOW FOR OOB ACTIVITY  2. PT WILL REQUIRE RW AND SPV TO PERFORM SIT TO STAND FACILITATE ADLS  3. PT WILL ' WITH RW AND SPV TO PROGRESS FUNC MOB                       History:     Past Medical History:   Diagnosis Date    Arthritis     EDGAR HANDS, KNEES    Behavioral change 2022    Blind right eye     Traumatic    Breast cancer 06/15/2017    0.8 cm Grade1 INTRADUCTAL BREAST 9 positve margin (left)    Essential hypertension     Hemorrhoids     Immunodeficiency due to chemotherapy 2021    Lipoma of abdominal wall     Major neurocognitive disorder 2022    Obesity     Overactive bladder     Pap smear abnormality of cervix with ASCUS favoring benign     Thyroid nodule     Tobacco use disorder     Tubular adenoma of colon     Urinary incontinence        Past Surgical History:   Procedure Laterality Date    ANTERIOR VAGINAL REPAIR      BLADDER SURGERY      BREAST LUMPECTOMY Left     CATARACT EXTRACTION Left 2022     SECTION      X2    COLONOSCOPY N/A 3/8/2017    Procedure: COLONOSCOPY;  Surgeon: Tyron Paris MD;  Location: [facility];  Service: Endoscopy;  Laterality: N/A;    COLONOSCOPY N/A 2020    Procedure: COLONOSCOPY;  Surgeon: Keira Ellison MD;  Location: [facility];  Service: Endoscopy;  Laterality: N/A;    ESOPHAGOGASTRODUODENOSCOPY N/A 2020    Procedure: EGD (ESOPHAGOGASTRODUODENOSCOPY);  Surgeon: Keira Ellison MD;  Location: [facility];  Service: Endoscopy;  Laterality: N/A;  new onset iron deficiency with prior history of breast cancer    INTRALUMINAL GASTROINTESTINAL TRACT IMAGING VIA CAPSULE N/A 10/28/2020    Procedure: IMAGING PROCEDURE, GI TRACT, INTRALUMINAL, VIA CAPSULE;  Surgeon: Finesse Jha RN;  Location: [facility];  Service: Endoscopy;  Laterality: N/A;    LEFT HEART CATHETERIZATION Left 10/12/2021    Procedure: CATHETERIZATION, HEART, LEFT;  Surgeon: Tiffanie ALLAN  MD Tom;  Location: [facility] CATH LAB;  Service: Cardiology;  Laterality: Left;    SENTINEL LYMPH NODE BIOPSY Left 9/8/2020    Procedure: BIOPSY, LYMPH NODE, SENTINEL;  Surgeon: Vincent Moyer MD;  Location: [facility] OR;  Service: General;  Laterality: Left;    SIMPLE MASTECTOMY Left 9/8/2020    Procedure: MASTECTOMY, SIMPLE;  Surgeon: Vincent Moyer MD;  Location: [facility] OR;  Service: General;  Laterality: Left;    THYROID LOBECTOMY Left 2005    TOTAL ABDOMINAL HYSTERECTOMY      TUBAL LIGATION         Time Tracking:     PT Received On: 07/12/23  PT Start Time: 0904     PT Stop Time: 0928  PT Total Time (min): 24 min     Billable Minutes: Evaluation 12 and Gait Training 12 07/12/2023

## 2023-07-12 NOTE — ASSESSMENT & PLAN NOTE
This patient does have evidence of infective focus  My overall impression is sepsis.  Source: Urinary Tract  Antibiotics given-   Antibiotics (72h ago, onward)    Start     Stop Route Frequency Ordered    07/12/23 0615  cefTRIAXone (ROCEPHIN) 2 g in dextrose 5 % in water (D5W) 5 % 100 mL IVPB (MB+)         -- IV Every 24 hours (non-standard times) 07/12/23 0504        Latest lactate reviewed-  Recent Labs   Lab 07/09/23  1236 07/09/23  1617   LACTATE 1.5 1.2     Will stop Meropenem and switch to Rocephin   negative

## 2023-07-12 NOTE — HPI
72 year old female with a PMH of CAD, CHFmrEF, chronic constipation, breast cancer s/p mastectomy,  HTN and dementia who was admitted with AMS and generalized weakness .  Th ere was associated history of fever .In the ED tmax was 100.2.  Labs and imaging test -  06/23-  CT abd -The splenic lesion identified as hypoenhancing on the prior exam of 11/02/2022 is not well delineated on this noncontrast exam.  Further evaluation with contrast-enhanced CT or MRI or ultrasound would be recommended.  Spleen does not appear enlarged on this exam.     2. Bladder wall thickening.  Possible retained stone or recently passed stone within the bladder.  Correlation with urinalysis and further evaluation as warranted.  Bladder wall thickening mildly worse in comparison to prior 11-2-22.     3. Constipation favored over impaction.  Correlation with symptoms.  MRI of abd-07/03-Small circumscribed T2 hypointense splenic lesion corresponding to that seen on comparison CT.  No significant change in size or appearance.  More likely benign.  Question old hematoma.   Blood and urine culture-07/09 E coli   Component      Latest Ref Rng & Units 7/11/2023 7/10/2023 7/9/2023 6/16/2023   WBC      3.90 - 12.70 K/uL 11.10 19.85 (H) 27.23 (H) 9.71

## 2023-07-13 ENCOUNTER — PATIENT OUTREACH (OUTPATIENT)
Dept: ADMINISTRATIVE | Facility: CLINIC | Age: 73
End: 2023-07-13
Payer: MEDICARE

## 2023-07-13 NOTE — PROGRESS NOTES
C3 nurse attempted to contact Leia Rodriguez  for a TCC post hospital discharge follow up call. No answer. Left voicemail with callback information. The patient has a scheduled HOSFU appointment with Yasmin Navarro MD ([facility]) on 7/14/23 @ 2:20 PM.

## 2023-07-14 ENCOUNTER — OFFICE VISIT (OUTPATIENT)
Dept: FAMILY MEDICINE | Facility: CLINIC | Age: 73
End: 2023-07-14
Payer: MEDICARE

## 2023-07-14 ENCOUNTER — LAB VISIT (OUTPATIENT)
Dept: LAB | Facility: HOSPITAL | Age: 73
End: 2023-07-14
Attending: FAMILY MEDICINE
Payer: MEDICARE

## 2023-07-14 VITALS
HEART RATE: 72 BPM | TEMPERATURE: 98 F | BODY MASS INDEX: 26.44 KG/M2 | OXYGEN SATURATION: 98 % | SYSTOLIC BLOOD PRESSURE: 122 MMHG | WEIGHT: 134.69 LBS | DIASTOLIC BLOOD PRESSURE: 70 MMHG | HEIGHT: 60 IN

## 2023-07-14 DIAGNOSIS — F02.80 MAJOR NEUROCOGNITIVE DISORDER DUE TO ALZHEIMER'S DISEASE: ICD-10-CM

## 2023-07-14 DIAGNOSIS — R78.81 BACTEREMIA DUE TO ESCHERICHIA COLI: Primary | ICD-10-CM

## 2023-07-14 DIAGNOSIS — I25.118 CORONARY ARTERY DISEASE OF NATIVE ARTERY OF NATIVE HEART WITH STABLE ANGINA PECTORIS: Chronic | ICD-10-CM

## 2023-07-14 DIAGNOSIS — B96.20 BACTEREMIA DUE TO ESCHERICHIA COLI: Primary | ICD-10-CM

## 2023-07-14 DIAGNOSIS — G30.9 MAJOR NEUROCOGNITIVE DISORDER DUE TO ALZHEIMER'S DISEASE: ICD-10-CM

## 2023-07-14 DIAGNOSIS — I10 ESSENTIAL HYPERTENSION: Chronic | ICD-10-CM

## 2023-07-14 DIAGNOSIS — I50.42 CHRONIC COMBINED SYSTOLIC AND DIASTOLIC CHF (CONGESTIVE HEART FAILURE): Chronic | ICD-10-CM

## 2023-07-14 LAB
ANION GAP SERPL CALC-SCNC: 9 MMOL/L (ref 8–16)
BUN SERPL-MCNC: 15 MG/DL (ref 8–23)
CALCIUM SERPL-MCNC: 9.9 MG/DL (ref 8.7–10.5)
CHLORIDE SERPL-SCNC: 100 MMOL/L (ref 95–110)
CHOLEST SERPL-MCNC: 192 MG/DL (ref 120–199)
CHOLEST/HDLC SERPL: 5.5 {RATIO} (ref 2–5)
CO2 SERPL-SCNC: 27 MMOL/L (ref 23–29)
CREAT SERPL-MCNC: 1 MG/DL (ref 0.5–1.4)
EST. GFR  (NO RACE VARIABLE): 59.9 ML/MIN/1.73 M^2
GLUCOSE SERPL-MCNC: 80 MG/DL (ref 70–110)
HDLC SERPL-MCNC: 35 MG/DL (ref 40–75)
HDLC SERPL: 18.2 % (ref 20–50)
LDLC SERPL CALC-MCNC: 128 MG/DL (ref 63–159)
NONHDLC SERPL-MCNC: 157 MG/DL
POTASSIUM SERPL-SCNC: 3.9 MMOL/L (ref 3.5–5.1)
SODIUM SERPL-SCNC: 136 MMOL/L (ref 136–145)
TRIGL SERPL-MCNC: 145 MG/DL (ref 30–150)

## 2023-07-14 PROCEDURE — 1101F PT FALLS ASSESS-DOCD LE1/YR: CPT | Mod: CPTII,S$GLB,, | Performed by: FAMILY MEDICINE

## 2023-07-14 PROCEDURE — 80061 LIPID PANEL: CPT | Performed by: FAMILY MEDICINE

## 2023-07-14 PROCEDURE — 1160F PR REVIEW ALL MEDS BY PRESCRIBER/CLIN PHARMACIST DOCUMENTED: ICD-10-PCS | Mod: CPTII,S$GLB,, | Performed by: FAMILY MEDICINE

## 2023-07-14 PROCEDURE — 4010F ACE/ARB THERAPY RXD/TAKEN: CPT | Mod: CPTII,S$GLB,, | Performed by: FAMILY MEDICINE

## 2023-07-14 PROCEDURE — 36415 COLL VENOUS BLD VENIPUNCTURE: CPT | Mod: PO | Performed by: FAMILY MEDICINE

## 2023-07-14 PROCEDURE — 1125F PR PAIN SEVERITY QUANTIFIED, PAIN PRESENT: ICD-10-PCS | Mod: CPTII,S$GLB,, | Performed by: FAMILY MEDICINE

## 2023-07-14 PROCEDURE — 3074F PR MOST RECENT SYSTOLIC BLOOD PRESSURE < 130 MM HG: ICD-10-PCS | Mod: CPTII,S$GLB,, | Performed by: FAMILY MEDICINE

## 2023-07-14 PROCEDURE — 1159F MED LIST DOCD IN RCRD: CPT | Mod: CPTII,S$GLB,, | Performed by: FAMILY MEDICINE

## 2023-07-14 PROCEDURE — 1101F PR PT FALLS ASSESS DOC 0-1 FALLS W/OUT INJ PAST YR: ICD-10-PCS | Mod: CPTII,S$GLB,, | Performed by: FAMILY MEDICINE

## 2023-07-14 PROCEDURE — 3074F SYST BP LT 130 MM HG: CPT | Mod: CPTII,S$GLB,, | Performed by: FAMILY MEDICINE

## 2023-07-14 PROCEDURE — 1160F RVW MEDS BY RX/DR IN RCRD: CPT | Mod: CPTII,S$GLB,, | Performed by: FAMILY MEDICINE

## 2023-07-14 PROCEDURE — 3008F BODY MASS INDEX DOCD: CPT | Mod: CPTII,S$GLB,, | Performed by: FAMILY MEDICINE

## 2023-07-14 PROCEDURE — 80048 BASIC METABOLIC PNL TOTAL CA: CPT | Performed by: FAMILY MEDICINE

## 2023-07-14 PROCEDURE — 99999 PR PBB SHADOW E&M-EST. PATIENT-LVL IV: ICD-10-PCS | Mod: PBBFAC,,, | Performed by: FAMILY MEDICINE

## 2023-07-14 PROCEDURE — 3008F PR BODY MASS INDEX (BMI) DOCUMENTED: ICD-10-PCS | Mod: CPTII,S$GLB,, | Performed by: FAMILY MEDICINE

## 2023-07-14 PROCEDURE — 3078F DIAST BP <80 MM HG: CPT | Mod: CPTII,S$GLB,, | Performed by: FAMILY MEDICINE

## 2023-07-14 PROCEDURE — 4010F PR ACE/ARB THEARPY RXD/TAKEN: ICD-10-PCS | Mod: CPTII,S$GLB,, | Performed by: FAMILY MEDICINE

## 2023-07-14 PROCEDURE — 99496 TRANSJ CARE MGMT HIGH F2F 7D: CPT | Mod: S$GLB,,, | Performed by: FAMILY MEDICINE

## 2023-07-14 PROCEDURE — 1111F DSCHRG MED/CURRENT MED MERGE: CPT | Mod: CPTII,S$GLB,, | Performed by: FAMILY MEDICINE

## 2023-07-14 PROCEDURE — 3288F PR FALLS RISK ASSESSMENT DOCUMENTED: ICD-10-PCS | Mod: CPTII,S$GLB,, | Performed by: FAMILY MEDICINE

## 2023-07-14 PROCEDURE — 3078F PR MOST RECENT DIASTOLIC BLOOD PRESSURE < 80 MM HG: ICD-10-PCS | Mod: CPTII,S$GLB,, | Performed by: FAMILY MEDICINE

## 2023-07-14 PROCEDURE — 99999 PR PBB SHADOW E&M-EST. PATIENT-LVL IV: CPT | Mod: PBBFAC,,, | Performed by: FAMILY MEDICINE

## 2023-07-14 PROCEDURE — 1159F PR MEDICATION LIST DOCUMENTED IN MEDICAL RECORD: ICD-10-PCS | Mod: CPTII,S$GLB,, | Performed by: FAMILY MEDICINE

## 2023-07-14 PROCEDURE — 1125F AMNT PAIN NOTED PAIN PRSNT: CPT | Mod: CPTII,S$GLB,, | Performed by: FAMILY MEDICINE

## 2023-07-14 PROCEDURE — 99496 TRANSITIONAL CARE MANAGE SERVICE 7 DAY DISCHARGE: ICD-10-PCS | Mod: S$GLB,,, | Performed by: FAMILY MEDICINE

## 2023-07-14 PROCEDURE — 1111F PR DISCHARGE MEDS RECONCILED W/ CURRENT OUTPATIENT MED LIST: ICD-10-PCS | Mod: CPTII,S$GLB,, | Performed by: FAMILY MEDICINE

## 2023-07-14 PROCEDURE — 3288F FALL RISK ASSESSMENT DOCD: CPT | Mod: CPTII,S$GLB,, | Performed by: FAMILY MEDICINE

## 2023-07-14 NOTE — PROGRESS NOTES
Transitional Care Note  Subjective:       Patient ID: Leia Rodriguez is a 73 y.o. female.  Chief Complaint: Follow-up (HOSP. F/U. Pt states that she is feeling well.)    Family and/or Caretaker present at visit?  Yes.  Diagnostic tests reviewed/disposition: I have reviewed all completed as well as pending diagnostic tests at the time of discharge.  Disease/illness education: YES  Home health/community services discussion/referrals: Patient has home health established at [facility] .   Establishment or re-establishment of referral orders for community resources: No other necessary community resources.   Discussion with other health care providers:  Will contact hospitalist about discontinuation of certain medications .       CHIEF COMPLAINT: This is a 73-year-old female here for follow-up hospitalization.     SUBJECTIVE: Patient presented to the ED 5 days ago with generalized weakness, lethargy and increased confusion.  She was unable to ambulate on her own with walker.  In the ED her temperature was 100.2° and she was tachycardic.  Urinalysis in the emergency room indicated many bacteria and WBCs.  She was placed on IV antibiotics and admitted for observation for urosepsis.  Cultures grew out E coli in urine and blood and patient was placed on IV Rocephin.  She was discharged on Levaquin 48 hours ago which she will take for a total of one-week.  Patient had barium swallow which indicated no aspiration.  Patient became more alert by the time of discharge.  Home health was ordered with first visit today.  Patient has a history of recurrent UTIs and takes Flomax for urinary retention.  She has chronic kidney disease stage 3a.  Lab in ED showed low calcium.    The patient is accompanied by her daughter today who reports improvement in condition.  Patient continues to have progression of dementia with behavioral disturbance including agitation and difficulty sleeping at night.  She takes Namenda 5 mg twice  daily, BuSpar 15 mg twice daily and Seroquel 100 mg at night.     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  Her blood pressure today is 122/70.        The patient had left mastectomy for recurrent breast cancer in September 2020 and had previous breast cancer in 2017 and underwent lumpectomy and radiation followed by Arimidex.  She continues to take Arimidex status mastectomy. She takes meloxicam for osteoarthritis of hands.  Patient stopped smoking in August 2020 after 50 pack-year history of smoking.Patient had GI workup for iron deficiency anemia.  EGD findings:  gastric AVM.  Adenomatous polyps were found on colonoscopy.  Video capsule endoscopy was negative.  Patient takes iron supplement daily.     ROS:  GENERAL: Patient denies fever, chills, night sweats. Patient denies weight loss. Patient denies anorexia, fatigue, or swollen glands.  Positive for weakness.  SKIN: Patient denies rash or hair loss.  HEENT: Patient denies sore throat, ear pain, hearing loss, nasal congestion, or runny nose. Patient denies visual disturbance, eye irritation or discharge.  LUNGS: Patient denies cough, wheeze or hemoptysis.  CARDIOVASCULAR: Patient denies chest pain, shortness of breath, palpitations, syncope or lower extremity edema.  GI: Patient denies abdominal pain, nausea, vomiting, diarrhea, constipation, blood in stool or melena.  GENITOURINARY: Patient denies pelvic pain, vaginal discharge, itch or odor. Patient denies irregular vaginal bleeding. Patient denies dysuria, frequency, hematuria, or nocturia.  Positive for urinary retention.  BREASTS: Patient denies breast pain, mass or nipple discharge.  MUSCULOSKELETAL: Patient denies joint swelling, redness or warmth.  Positive for joint pain.  NEUROLOGIC: Patient denies  headache, vertigo, paresthesias, weakness in limb, dysarthria, dysphagia or abnormality of gait.  PSYCHIATRIC: Patient denies anxiety or depression.  Positive for memory loss.     OBJECTIVE:   GENERAL: Well-developed well-nourished minimally overweight black female alert and oriented x3, in no acute distress.  Moderate cognitive impairment.  Mostly nonverbal.  No hallucinations, delusions or flight of ideas.    SKIN: Clear without rash. Normal color and tone.  HEENT: Eyes: Clear conjunctivae.  No scleral icterus.  NECK: Supple, normal range of motion. No masses, lymphadenopathy or enlarged thyroid. No JVD. Carotids 2+ and equal. No bruits.  LUNGS: Clear to auscultation. Normal respiratory effort.  CARDIOVASCULAR: Regular rhythm, normal S1, S2 without murmur, gallop or rub.  EXTREMITIES: Without cyanosis, clubbing or edema. Distal pulses 2+ and equal. Normal range of motion in all extremities. No joint effusion, erythema or warmth. Bilateral hammertoes. Calluses/hyperkeratotic areas plantar surface of both feet.  NEUROLOGIC: Cranial nerves II through XII without deficit. Motor strength equal bilaterally. Sensation normal to touch.  Gait unsteady without support.  No tremor.    ASSESSMENT:  1. Bacteremia due to Escherichia coli    2. Major neurocognitive disorder due to Alzheimer's disease    3. Chronic combined systolic and diastolic CHF (congestive heart failure)    4. Coronary artery disease of native artery of native heart with stable angina pectoris    5. Essential hypertension      PLAN:  1. Complete antibiotics.    2. Keep well hydrated.  3. Continue regular medications including Flomax.    4. Continue home health.    5. Check BMP and lipid panel.    6. Follow-up if no improvement or worsening symptoms.    30 minutes of total time spent on the encounter, which includes face to face time and non-face to face time preparing to see the patient (eg, review of tests), Obtaining and/or reviewing separately obtained  history, Documenting clinical information in the electronic or other health record, Independently interpreting results (not separately reported) and communicating results to the patient/family/caregiver, or Care coordination (not separately reported).     This note is generated with speech recognition software and is subject to transcription error and sound alike phrases that may be missed by proofreading.

## 2023-07-14 NOTE — PROGRESS NOTES
C3 nurse attempted to contact Leia Rodriguez  for a TCC post hospital discharge follow up call. No answer. Left voicemail with callback information. The patient has a scheduled HOSFU appointment with Yasmin Navarro MD ([facility]) on 7/14/23 @ 2:20 PM.

## 2023-07-14 NOTE — PROGRESS NOTES
C3 nurse spoke with Leia Rodriguez (daughter) for a TCC post hospital discharge follow up call. The patient had a scheduled HOSFU appointment with Yasmin Navarro MD ([facility]) on 7/14/23 @ 2:20 PM.

## 2023-07-16 LAB
BACTERIA BLD CULT: NORMAL
BACTERIA BLD CULT: NORMAL

## 2023-07-18 NOTE — PHYSICIAN QUERY
PT Name: Leia Rodriguez  MR #: [id number]    DOCUMENTATION CLARIFICATION     CDS/: Yasmin Dallas RN          Contact Information: [email or web address]      This form is a permanent document in the medical record.     Query Date: July 18, 2023    By submitting this query, we are merely seeking further clarification of documentation.. Please utilize your independent clinical judgment when addressing the question(s) below.    The medical record contains the following:   Indicators  Supporting Clinical Findings Location in Medical Record     x Energy Intake <50% of estimated energy requirement for 3 days 7/12/2023 RD Consult      x Weight Loss Weight Loss: 3.5% x 3 weeks  7/12/2023 RD Consult      x Fat Loss Body Fat Depletion: mild depletion of orbitals and triceps  7/12/2023 RD Consult      x Muscle Loss Muscle Mass Depletion: mild depletion of temples, clavicle region, interosseous muscle, and lower extremities  7/12/2023 RD Consult     Edema/Fluid Accumulation      Reduced  Strength (by dynamometer)       x Weight, BMI, Usual Body Weight Weight: 61.9 kg (136 lb 7.4 oz)  Weight (lb): 136.47 lb  BMI (Calculated): 26.7 7/12/2023 RD Consult     Delayed Wound Healing       x Registered Dietician Diagnosis Mild Protein-Calorie Malnutrition  Malnutrition in the context of Chronic Illness/Injury 7/12/2023 RD Consult      x Acute or Chronic Illness Sepsis, acute cystitis, acute renal failure superimposed on CKD3  PMH of CAD, CHFmrEF, chronic constipation, breast cancer s/p mastectomy,  HTN and dementia  7/9/2023 H&P     Social or Environmental Circumstances       x Treatment continues Cardiac, IDDSI Level 5 Minced and Moist diet as medically appropriate   Recommend Boost Plus TID to help fill nutritional gap   Recommend encouragement and assistance at meal times  Recommend pt continues bowel regimen   Weigh daily 7/12/2023 RD Consult     Other       Academy of Nutrition and Dietetics (Academy) and the American  Society for Parenteral and Enteral Nutrition (A.S.P.E.N.) Clinical Characteristics to support Malnutrition   Malnutrition in the Context of Acute Illness or Injury Malnutrition in the Context of Chronic Illness or Injury Malnutrition in the Context of Social or Environmental Circumstances   Malnutrition Level Moderate Severe Moderate Severe   Moderate   Severe   Energy Intake <75%                   >7 days <50%                 >5 days <75%           >1 month <75%                      >1 month   <75% for >3 months   <50% for >1 month   Weight Loss   1-2% in 1 week >2% in 1 week 5% in 1 month >5% in 1 month 5% in 1 month >5% in 1 month    5% in 1 month >5% in 1 month 7.5% in 3 months >7.5% in 3 months 7.5% in 3 months >7.5% in 3 months    7.5% in 3 months >7.5% in 3 months 10% in 6 months >10% in 6 months 10% in 6 months >10% in 6 months        20% in 1 year                    >20% in 1 year                                                                  20% in 1 year                            >20% in 1 year                                                  Subcutaneous Fat Loss Mild  Moderate  Mild  Severe    Mild   Severe   Muscle Loss Mild  Moderate  Mild  Severe    Mild   Severe   Edema/Fluid Accumulation Mild Moderate to severe  Mild  Severe   Mild   Severe   Reduced  Strength         (based on standards supplied by  of dynamometer) N/A Measurably reduced N/A Measurably reduced N/A Measurably reduced     Criteria for mild malnutrition is defined as 1 characteristic outlined above within the established moderate or severe parameters.  A minimum of 2 out of the 6 characteristics noted above are recommended for a diagnosis of moderate or severe malnutrition.  Chronic illness/injury is a disease/condition lasting 3 months or longer.    The noted clinical guidelines are only system guidelines and do not replace the providers clinical judgment.    Provider, please specify diagnosis or diagnoses  associated with above clinical findings.    [ x ] Mild Malnutrition - 1 characteristic outlined above within the established moderate or severe parameters   [  ] Malnutrition ruled out   [  ] Other Nutritional Diagnosis (please specify): _______   [  ] Clinically Undetermined     Please document in your progress notes daily for the duration of treatment until resolved and  include in your discharge summary.      References:    CANDY Santo, & NEHEMIAH Wilson (2022, April). Assessment and management of anorexia and cachexia in palliative care. Retrieved May 23, 2022, from https://www.[employer]/contents/assessment-and-management-of-anorexia-and-cachexia-in-palliative-care?ndagjAvl=9213&source=see_link     KHUSHBOO Koo, PhD, RD, Madelaine TORRES P., PhD, RN, GREGORIO Marx MD, PhD, Simón JUAREZ A., MS, RD, [facility], CARY Mitchell, MS, RD, The Academy Malnutrition Work Group, The A.S.P.E.N. Board of Directors. (2012). Consensus Statement: Academy of Nutrition and Dietetics and American Society for Parenteral and Enteral Nutrition: Characteristics Recommended for the Identification and Documentation of Adult Malnutrition (Undernutrition). Journal of Parenteral and Enteral Nutrition, 36(3), 275-283. doi:10.1177/2857853029712715     Form No. 76215

## 2023-07-24 RX ORDER — ISOSORBIDE MONONITRATE 30 MG/1
TABLET, EXTENDED RELEASE ORAL
Qty: 30 TABLET | Refills: 11 | Status: SHIPPED | OUTPATIENT
Start: 2023-07-24

## 2023-07-25 ENCOUNTER — PATIENT MESSAGE (OUTPATIENT)
Dept: SURGERY | Facility: HOSPITAL | Age: 73
End: 2023-07-25
Payer: MEDICARE

## 2023-07-26 ENCOUNTER — INFUSION (OUTPATIENT)
Dept: INFUSION THERAPY | Facility: HOSPITAL | Age: 73
End: 2023-07-26
Attending: INTERNAL MEDICINE
Payer: MEDICARE

## 2023-07-26 VITALS
RESPIRATION RATE: 18 BRPM | TEMPERATURE: 98 F | DIASTOLIC BLOOD PRESSURE: 62 MMHG | OXYGEN SATURATION: 99 % | HEART RATE: 112 BPM | SYSTOLIC BLOOD PRESSURE: 125 MMHG

## 2023-07-26 DIAGNOSIS — D50.9 IRON DEFICIENCY ANEMIA, UNSPECIFIED IRON DEFICIENCY ANEMIA TYPE: Primary | ICD-10-CM

## 2023-07-26 PROCEDURE — 96365 THER/PROPH/DIAG IV INF INIT: CPT

## 2023-07-26 PROCEDURE — 96375 TX/PRO/DX INJ NEW DRUG ADDON: CPT

## 2023-07-26 PROCEDURE — 63600175 PHARM REV CODE 636 W HCPCS: Mod: JZ,JG | Performed by: NURSE PRACTITIONER

## 2023-07-26 PROCEDURE — 25000003 PHARM REV CODE 250: Performed by: NURSE PRACTITIONER

## 2023-07-26 RX ORDER — SODIUM CHLORIDE 0.9 % (FLUSH) 0.9 %
10 SYRINGE (ML) INJECTION
Status: CANCELLED | OUTPATIENT
Start: 2023-08-02

## 2023-07-26 RX ORDER — HEPARIN 100 UNIT/ML
5 SYRINGE INTRAVENOUS
Status: CANCELLED | OUTPATIENT
Start: 2023-08-02

## 2023-07-26 RX ORDER — SODIUM CHLORIDE 9 MG/ML
INJECTION, SOLUTION INTRAVENOUS CONTINUOUS
Status: CANCELLED | OUTPATIENT
Start: 2023-08-02

## 2023-07-26 RX ADMIN — FERRIC CARBOXYMALTOSE INJECTION 750 MG: 50 INJECTION, SOLUTION INTRAVENOUS at 01:07

## 2023-07-26 NOTE — PLAN OF CARE
Problem: Adult Inpatient Plan of Care  Goal: Plan of Care Review  Outcome: Ongoing, Progressing  Flowsheets (Taken 7/26/2023 1420)  Plan of Care Reviewed With:   patient   daughter  Goal: Optimal Comfort and Wellbeing  Outcome: Ongoing, Progressing  Intervention: Provide Person-Centered Care  Flowsheets (Taken 7/26/2023 1420)  Trust Relationship/Rapport:   care explained   choices provided   emotional support provided   empathic listening provided   questions answered   questions encouraged   reassurance provided   thoughts/feelings acknowledged

## 2023-08-02 ENCOUNTER — INFUSION (OUTPATIENT)
Dept: INFUSION THERAPY | Facility: HOSPITAL | Age: 73
End: 2023-08-02
Attending: INTERNAL MEDICINE
Payer: MEDICARE

## 2023-08-02 VITALS
OXYGEN SATURATION: 98 % | TEMPERATURE: 98 F | RESPIRATION RATE: 18 BRPM | DIASTOLIC BLOOD PRESSURE: 64 MMHG | SYSTOLIC BLOOD PRESSURE: 118 MMHG | HEART RATE: 111 BPM

## 2023-08-02 DIAGNOSIS — D50.9 IRON DEFICIENCY ANEMIA, UNSPECIFIED IRON DEFICIENCY ANEMIA TYPE: Primary | ICD-10-CM

## 2023-08-02 PROCEDURE — 25000003 PHARM REV CODE 250: Performed by: NURSE PRACTITIONER

## 2023-08-02 PROCEDURE — 96365 THER/PROPH/DIAG IV INF INIT: CPT

## 2023-08-02 PROCEDURE — 63600175 PHARM REV CODE 636 W HCPCS: Mod: JZ,JG | Performed by: NURSE PRACTITIONER

## 2023-08-02 RX ORDER — SODIUM CHLORIDE 9 MG/ML
INJECTION, SOLUTION INTRAVENOUS CONTINUOUS
OUTPATIENT
Start: 2023-08-09

## 2023-08-02 RX ORDER — SODIUM CHLORIDE 0.9 % (FLUSH) 0.9 %
10 SYRINGE (ML) INJECTION
OUTPATIENT
Start: 2023-08-09

## 2023-08-02 RX ORDER — HEPARIN 100 UNIT/ML
5 SYRINGE INTRAVENOUS
OUTPATIENT
Start: 2023-08-09

## 2023-08-02 RX ADMIN — FERRIC CARBOXYMALTOSE INJECTION 750 MG: 50 INJECTION, SOLUTION INTRAVENOUS at 01:08

## 2023-08-02 NOTE — PLAN OF CARE
Problem: Adult Inpatient Plan of Care  Goal: Plan of Care Review  Outcome: Ongoing, Progressing  Flowsheets (Taken 8/2/2023 1304)  Plan of Care Reviewed With: patient  Goal: Optimal Comfort and Wellbeing  Outcome: Ongoing, Progressing  Intervention: Provide Person-Centered Care  Flowsheets (Taken 8/2/2023 1304)  Trust Relationship/Rapport:   care explained   choices provided   emotional support provided   empathic listening provided   questions answered   questions encouraged   reassurance provided   thoughts/feelings acknowledged

## 2023-08-02 NOTE — PLAN OF CARE
Problem: Adult Inpatient Plan of Care  Goal: Plan of Care Review  Outcome: Ongoing, Progressing  Goal: Optimal Comfort and Wellbeing  Outcome: Ongoing, Progressing     Problem: Fall Injury Risk  Goal: Absence of Fall and Fall-Related Injury  Outcome: Ongoing, Progressing  Intervention: Identify and Manage Contributors  Flowsheets (Taken 8/2/2023 1312)  Self-Care Promotion: BADL personal objects within reach  Medication Review/Management: medications reviewed  Intervention: Promote Injury-Free Environment  Flowsheets (Taken 8/2/2023 1312)  Safety Promotion/Fall Prevention:   in recliner, wheels locked   nonskid shoes/socks when out of bed   family to remain at bedside

## 2023-08-15 ENCOUNTER — PATIENT MESSAGE (OUTPATIENT)
Dept: FAMILY MEDICINE | Facility: CLINIC | Age: 73
End: 2023-08-15
Payer: MEDICARE

## 2023-08-22 ENCOUNTER — OFFICE VISIT (OUTPATIENT)
Dept: CARDIOLOGY | Facility: CLINIC | Age: 73
End: 2023-08-22
Payer: MEDICARE

## 2023-08-22 VITALS
OXYGEN SATURATION: 92 % | SYSTOLIC BLOOD PRESSURE: 100 MMHG | HEART RATE: 106 BPM | DIASTOLIC BLOOD PRESSURE: 62 MMHG | HEIGHT: 60 IN | WEIGHT: 121.94 LBS | BODY MASS INDEX: 23.94 KG/M2

## 2023-08-22 DIAGNOSIS — I25.118 CORONARY ARTERY DISEASE OF NATIVE ARTERY OF NATIVE HEART WITH STABLE ANGINA PECTORIS: Chronic | ICD-10-CM

## 2023-08-22 DIAGNOSIS — I73.9 PVD (PERIPHERAL VASCULAR DISEASE): ICD-10-CM

## 2023-08-22 DIAGNOSIS — R00.0 SINUS TACHYCARDIA: ICD-10-CM

## 2023-08-22 DIAGNOSIS — I10 ESSENTIAL HYPERTENSION: Primary | Chronic | ICD-10-CM

## 2023-08-22 DIAGNOSIS — I50.42 CHRONIC COMBINED SYSTOLIC AND DIASTOLIC CHF (CONGESTIVE HEART FAILURE): Chronic | ICD-10-CM

## 2023-08-22 DIAGNOSIS — I27.20 PULMONARY HTN: ICD-10-CM

## 2023-08-22 PROCEDURE — 99214 PR OFFICE/OUTPT VISIT, EST, LEVL IV, 30-39 MIN: ICD-10-PCS | Mod: S$GLB,,, | Performed by: INTERNAL MEDICINE

## 2023-08-22 PROCEDURE — 1160F RVW MEDS BY RX/DR IN RCRD: CPT | Mod: CPTII,S$GLB,, | Performed by: INTERNAL MEDICINE

## 2023-08-22 PROCEDURE — 3078F PR MOST RECENT DIASTOLIC BLOOD PRESSURE < 80 MM HG: ICD-10-PCS | Mod: CPTII,S$GLB,, | Performed by: INTERNAL MEDICINE

## 2023-08-22 PROCEDURE — 1126F PR PAIN SEVERITY QUANTIFIED, NO PAIN PRESENT: ICD-10-PCS | Mod: CPTII,S$GLB,, | Performed by: INTERNAL MEDICINE

## 2023-08-22 PROCEDURE — 4010F PR ACE/ARB THEARPY RXD/TAKEN: ICD-10-PCS | Mod: CPTII,S$GLB,, | Performed by: INTERNAL MEDICINE

## 2023-08-22 PROCEDURE — 3008F PR BODY MASS INDEX (BMI) DOCUMENTED: ICD-10-PCS | Mod: CPTII,S$GLB,, | Performed by: INTERNAL MEDICINE

## 2023-08-22 PROCEDURE — 4010F ACE/ARB THERAPY RXD/TAKEN: CPT | Mod: CPTII,S$GLB,, | Performed by: INTERNAL MEDICINE

## 2023-08-22 PROCEDURE — 99214 OFFICE O/P EST MOD 30 MIN: CPT | Mod: S$GLB,,, | Performed by: INTERNAL MEDICINE

## 2023-08-22 PROCEDURE — 3074F PR MOST RECENT SYSTOLIC BLOOD PRESSURE < 130 MM HG: ICD-10-PCS | Mod: CPTII,S$GLB,, | Performed by: INTERNAL MEDICINE

## 2023-08-22 PROCEDURE — 1101F PT FALLS ASSESS-DOCD LE1/YR: CPT | Mod: CPTII,S$GLB,, | Performed by: INTERNAL MEDICINE

## 2023-08-22 PROCEDURE — 99999 PR PBB SHADOW E&M-EST. PATIENT-LVL IV: ICD-10-PCS | Mod: PBBFAC,,, | Performed by: INTERNAL MEDICINE

## 2023-08-22 PROCEDURE — 3288F FALL RISK ASSESSMENT DOCD: CPT | Mod: CPTII,S$GLB,, | Performed by: INTERNAL MEDICINE

## 2023-08-22 PROCEDURE — 3288F PR FALLS RISK ASSESSMENT DOCUMENTED: ICD-10-PCS | Mod: CPTII,S$GLB,, | Performed by: INTERNAL MEDICINE

## 2023-08-22 PROCEDURE — 1159F PR MEDICATION LIST DOCUMENTED IN MEDICAL RECORD: ICD-10-PCS | Mod: CPTII,S$GLB,, | Performed by: INTERNAL MEDICINE

## 2023-08-22 PROCEDURE — 3074F SYST BP LT 130 MM HG: CPT | Mod: CPTII,S$GLB,, | Performed by: INTERNAL MEDICINE

## 2023-08-22 PROCEDURE — 1159F MED LIST DOCD IN RCRD: CPT | Mod: CPTII,S$GLB,, | Performed by: INTERNAL MEDICINE

## 2023-08-22 PROCEDURE — 3008F BODY MASS INDEX DOCD: CPT | Mod: CPTII,S$GLB,, | Performed by: INTERNAL MEDICINE

## 2023-08-22 PROCEDURE — 3078F DIAST BP <80 MM HG: CPT | Mod: CPTII,S$GLB,, | Performed by: INTERNAL MEDICINE

## 2023-08-22 PROCEDURE — 99999 PR PBB SHADOW E&M-EST. PATIENT-LVL IV: CPT | Mod: PBBFAC,,, | Performed by: INTERNAL MEDICINE

## 2023-08-22 PROCEDURE — 1101F PR PT FALLS ASSESS DOC 0-1 FALLS W/OUT INJ PAST YR: ICD-10-PCS | Mod: CPTII,S$GLB,, | Performed by: INTERNAL MEDICINE

## 2023-08-22 PROCEDURE — 1126F AMNT PAIN NOTED NONE PRSNT: CPT | Mod: CPTII,S$GLB,, | Performed by: INTERNAL MEDICINE

## 2023-08-22 PROCEDURE — 1160F PR REVIEW ALL MEDS BY PRESCRIBER/CLIN PHARMACIST DOCUMENTED: ICD-10-PCS | Mod: CPTII,S$GLB,, | Performed by: INTERNAL MEDICINE

## 2023-08-22 RX ORDER — METOPROLOL SUCCINATE 50 MG/1
50 TABLET, EXTENDED RELEASE ORAL DAILY
Qty: 30 TABLET | Refills: 11 | Status: SHIPPED | OUTPATIENT
Start: 2023-08-22 | End: 2023-10-05 | Stop reason: SDUPTHER

## 2023-08-22 RX ORDER — TELMISARTAN 20 MG/1
20 TABLET ORAL DAILY
Qty: 90 TABLET | Refills: 3 | Status: SHIPPED | OUTPATIENT
Start: 2023-08-22 | End: 2024-08-21

## 2023-08-22 RX ORDER — FUROSEMIDE 20 MG/1
20 TABLET ORAL DAILY PRN
Start: 2023-08-22 | End: 2023-10-27

## 2023-08-22 NOTE — PROGRESS NOTES
Subjective:   Patient ID:  Leia Rodriguez is a 73 y.o. female who presents for follow up of No chief complaint on file.      71 yo female, 6 months f/u  PMH CAD, CHFmrEF, h/o beast cancer 4 yrs ago lumpectomy and chemo, recurrent in  s/p mastectomy and chemo ongoing, and HTN, dementia lower back pain wheelchair helps  Quit smoking in  after 50 yrs smoking.  Some residual left chest pain after mastectomy,   GRISSOM and fatigue after fast walking,   No palpitation, leg swelling, dizziness and faint.    ekg in  NSR and TWI on V2 to v6 and inferior leads   ECHO normal EF and severe LVH   ECHo EF 45%, mod MR and LAE  Weight stable    03/2021 admitted for CHF exacerbation.  and Troponin 0.44 flat. elg NSR and TWI on lateral leads. Improved SOB after diuresis. Then BNP dropped to 58  Now SOB sable. Sleeps on 1 pillow    07/2021 visit   echo Day 15, cycle 6 Biplane=44% 4D LVQ=45% Avg GPLS= -16.1   MPI inferoapical scar  GRISSOM while long distance walking  No chest pain, dizziness faint, leg swelling  Decent appetite  F/u with Dr. Fernández on stage I HER2 positive breast carcinoma being treated with Herceptin q. 3 weeks     visit  Dynamical TWI on EKG   MPI showed apical inferior fixed perfusion defect  No chest pain . Limited walking due to lower back pain  No orthopnea      visit  No chest pain. GRISSOM mild and chronic. Limited activity due to fatigue and leg pain.   S/p LHC done on 10/12/2021, distal % OM2/3 40% and midRCA 50% lesion and EF 45% and LVEDP 17 mmHG. Moderate MR. Continue medical Rx  Serial echo studies    Echo 2/22/21 Cycle 4 Day 20  4dLVQ=47%  AutoEF=48%  BRIDGETT unable to obtain accurate measurements   5-26-21  Day 15, cycle 6  Biplane=44%  4D LVQ=45%  Avg GPLS= -16.1   9-13-21 Day 20, Cycle 11  Biplane=42%  4D LVQ=49%  Avg GPLS= -14.8%    12/2021 visit  11/ went to ER [facility]. EKG sinus tachy and PVCs. BNP up to 800. CXR pulm. Edema.  Added lasix 20 mg daily  Now dyspnea improved. No leg swelling chest pain. Sleeps on 1 pillow and no PND.   On iron infusion rx fro anemia     visit  BNP normal and Cr elevated 1.4. will hold lasix  No leg swelling, dizziness sob orthopnea. On fluid restriction     visit  Improved appetite after held chemo due to decreased EF about .   Gained the weight. No SOB, sleeps on 2 pillows     visit   Twice ER visits for not feeling well, SOB, the lab and CRX mri unremarkable, except some +UTI.Occurred at night and the family concerning anxiety ?  EKG in  NSR PACs    06/23 visit  Per daughter, pt c/o SOB + orthopnea and leg swelling. Lost 13 lbs in 6 months. Had teeth work recently, dementia slightyl worse. Some mood disturbance. Talked to family member.     Interval history  07/23 admitted for UTI and sepsis. Low BP and d/c ARB.   Cr 1.0 before d/c  Today BP low.   07/23 ekg sinus tachy and PACs; echo EF 50% LVH  No dizziness faint sob and chest pain.  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    Lipoma of abdominal wall     Major neurocognitive disorder 06/21/2022    Obesity     Overactive bladder     Pap smear abnormality of cervix with ASCUS favoring benign     Thyroid nodule     Tobacco use disorder     Tubular adenoma of colon     Urinary incontinence        Past Surgical History:   Procedure Laterality Date    ANTERIOR VAGINAL REPAIR      BLADDER SURGERY      BREAST LUMPECTOMY Left 2017    CATARACT EXTRACTION  Left 2022     SECTION      X2    COLONOSCOPY N/A 3/8/2017    Procedure: COLONOSCOPY;  Surgeon: Tyron Paris MD;  Location: [facility] ENDO;  Service: Endoscopy;  Laterality: N/A;    COLONOSCOPY N/A 2020    Procedure: COLONOSCOPY;  Surgeon: Keira Ellison MD;  Location: [facility] ENDO;  Service: Endoscopy;  Laterality: N/A;    ESOPHAGOGASTRODUODENOSCOPY N/A 2020    Procedure: EGD (ESOPHAGOGASTRODUODENOSCOPY);  Surgeon: Keira Ellison MD;  Location: [facility] ENDO;  Service: Endoscopy;  Laterality: N/A;  new onset iron deficiency with prior history of breast cancer    INTRALUMINAL GASTROINTESTINAL TRACT IMAGING VIA CAPSULE N/A 10/28/2020    Procedure: IMAGING PROCEDURE, GI TRACT, INTRALUMINAL, VIA CAPSULE;  Surgeon: Finesse Jha RN;  Location: [facility] ENDO;  Service: Endoscopy;  Laterality: N/A;    LEFT HEART CATHETERIZATION Left 10/12/2021    Procedure: CATHETERIZATION, HEART, LEFT;  Surgeon: Tiffanie Santos MD;  Location: [facility] CATH LAB;  Service: Cardiology;  Laterality: Left;    SENTINEL LYMPH NODE BIOPSY Left 2020    Procedure: BIOPSY, LYMPH NODE, SENTINEL;  Surgeon: Vincent Moyer MD;  Location: [facility] OR;  Service: General;  Laterality: Left;    SIMPLE MASTECTOMY Left 2020    Procedure: MASTECTOMY, SIMPLE;  Surgeon: Vincent Moyer MD;  Location: [facility] OR;  Service: General;  Laterality: Left;    THYROID LOBECTOMY Left     TOTAL ABDOMINAL HYSTERECTOMY      TUBAL LIGATION         Social History     Tobacco Use    Smoking status: Former     Current packs/day: 0.00     Average packs/day: 1 pack/day for 50.0 years (50.0 ttl pk-yrs)     Types: Cigarettes     Start date: 1970     Quit date: 2020     Years since quitting: 3.0    Smokeless tobacco: Never   Substance Use Topics    Alcohol use: Never     Alcohol/week: 28.0 standard drinks of alcohol     Types: 28 Cans of beer per week    Drug use: No       Family History   Problem Relation Age of Onset    Hypertension  Father     Cataracts Father     Prostate cancer Father 80    Cervical cancer Daughter 32    Allergic rhinitis Daughter     Cirrhosis Mother     Alcohol abuse Mother     Hypertension Daughter     Diabetes Brother     Heart attack Brother     Heart murmur Brother     No Known Problems Daughter          ROS    Objective:   Physical Exam  HENT:      Head: Normocephalic.   Eyes:      Pupils: Pupils are equal, round, and reactive to light.   Neck:      Thyroid: No thyromegaly.      Vascular: Normal carotid pulses. No carotid bruit or JVD.   Cardiovascular:      Rate and Rhythm: Normal rate and regular rhythm. Frequent Extrasystoles are present.     Chest Wall: PMI is not displaced.      Pulses: Normal pulses.           Carotid pulses are 2+ on the right side and 2+ on the left side.     Heart sounds: Normal heart sounds. No murmur heard.     No gallop. No S3 sounds.   Pulmonary:      Effort: No respiratory distress.      Breath sounds: Normal breath sounds. No stridor.   Abdominal:      General: Bowel sounds are normal.      Palpations: Abdomen is soft.      Tenderness: There is no abdominal tenderness. There is no rebound.   Musculoskeletal:      Right lower leg: Edema present.   Skin:     General: Skin is warm and dry.      Findings: No rash.   Neurological:      Mental Status: She is alert.       Lab Results   Component Value Date    CHOL 192 07/14/2023    CHOL 119 (L) 03/31/2021    CHOL 207 (H) 09/03/2019     Lab Results   Component Value Date    HDL 35 (L) 07/14/2023    HDL 46 03/31/2021    HDL 53 09/03/2019     Lab Results   Component Value Date    LDLCALC 128.0 07/14/2023    LDLCALC 61.2 (L) 03/31/2021    LDLCALC 134.2 09/03/2019     Lab Results   Component Value Date    TRIG 145 07/14/2023    TRIG 59 03/31/2021    TRIG 99 09/03/2019     Lab Results   Component Value Date    CHOLHDL 18.2 (L) 07/14/2023    CHOLHDL 38.7 03/31/2021    CHOLHDL 25.6 09/03/2019       Chemistry        Component Value Date/Time  "    07/14/2023 1519    K 3.9 07/14/2023 1519     07/14/2023 1519    CO2 27 07/14/2023 1519    BUN 15 07/14/2023 1519    CREATININE 1.0 07/14/2023 1519    GLU 80 07/14/2023 1519        Component Value Date/Time    CALCIUM 9.9 07/14/2023 1519    ALKPHOS 79 07/09/2023 1236    AST 12 07/09/2023 1236    ALT 7 (L) 07/09/2023 1236    BILITOT 0.8 07/09/2023 1236    ESTGFRAFRICA 48.0 (A) 06/20/2022 1020    EGFRNONAA 41.7 (A) 06/20/2022 1020          No results found for: "LABA1C", "HGBA1C"  Lab Results   Component Value Date    TSH 0.765 01/27/2022     Lab Results   Component Value Date    INR 1.1 10/01/2021     Lab Results   Component Value Date    WBC 6.98 07/12/2023    HGB 9.0 (L) 07/12/2023    HCT 28.1 (L) 07/12/2023    MCV 91 07/12/2023     07/12/2023     BMP  Sodium   Date Value Ref Range Status   07/14/2023 136 136 - 145 mmol/L Final     Potassium   Date Value Ref Range Status   07/14/2023 3.9 3.5 - 5.1 mmol/L Final     Chloride   Date Value Ref Range Status   07/14/2023 100 95 - 110 mmol/L Final     CO2   Date Value Ref Range Status   07/14/2023 27 23 - 29 mmol/L Final     BUN   Date Value Ref Range Status   07/14/2023 15 8 - 23 mg/dL Final     Creatinine   Date Value Ref Range Status   07/14/2023 1.0 0.5 - 1.4 mg/dL Final     Calcium   Date Value Ref Range Status   07/14/2023 9.9 8.7 - 10.5 mg/dL Final     Anion Gap   Date Value Ref Range Status   07/14/2023 9 8 - 16 mmol/L Final     eGFR if    Date Value Ref Range Status   06/20/2022 48.0 (A) >60 mL/min/1.73 m^2 Final     eGFR if non    Date Value Ref Range Status   06/20/2022 41.7 (A) >60 mL/min/1.73 m^2 Final     Comment:     Calculation used to obtain the estimated glomerular filtration  rate (eGFR) is the CKD-EPI equation.        BNP  @LABRCNTIP(BNP,BNPTRIAGEBLO)@  @LABRCNTIP(troponini)@  CrCl cannot be calculated (Patient's most recent lab result is older than the maximum 7 days allowed.).  No results found in " the last 24 hours.  No results found in the last 24 hours.  No results found in the last 24 hours.    Assessment:      1. Essential hypertension    2. Coronary artery disease of native artery of native heart with stable angina pectoris    3. Chronic combined systolic and diastolic CHF (congestive heart failure)    4. Pulmonary HTN    5. Sinus tachycardia    6. PVD (peripheral vascular disease)        Plan:   D/c coreg and Amlodipine  Resume telmisartan 20 mg daily for HTN CHFmrEF  Add toprolXL 50 mg for HTN CHF and sinus tachy with PACs  Continue ASA and imdur  Continue Lasix as needed for PVD    RTC in 3 M

## 2023-08-31 RX ORDER — SPIRONOLACTONE 25 MG/1
TABLET ORAL
Qty: 90 TABLET | Refills: 3 | Status: SHIPPED | OUTPATIENT
Start: 2023-08-31 | End: 2023-10-05

## 2023-09-01 ENCOUNTER — EXTERNAL HOME HEALTH (OUTPATIENT)
Dept: HOME HEALTH SERVICES | Facility: HOSPITAL | Age: 73
End: 2023-09-01
Payer: MEDICARE

## 2023-09-12 PROCEDURE — G0179 MD RECERTIFICATION HHA PT: HCPCS | Mod: ,,, | Performed by: FAMILY MEDICINE

## 2023-09-12 PROCEDURE — G0179 PR HOME HEALTH MD RECERTIFICATION: ICD-10-PCS | Mod: ,,, | Performed by: FAMILY MEDICINE

## 2023-09-19 ENCOUNTER — TELEPHONE (OUTPATIENT)
Dept: INFECTIOUS DISEASES | Facility: CLINIC | Age: 73
End: 2023-09-19
Payer: MEDICARE

## 2023-09-20 ENCOUNTER — OFFICE VISIT (OUTPATIENT)
Dept: INFECTIOUS DISEASES | Facility: CLINIC | Age: 73
End: 2023-09-20
Payer: MEDICARE

## 2023-09-20 VITALS
WEIGHT: 121.94 LBS | HEART RATE: 51 BPM | DIASTOLIC BLOOD PRESSURE: 78 MMHG | SYSTOLIC BLOOD PRESSURE: 170 MMHG | HEIGHT: 60 IN | OXYGEN SATURATION: 77 % | BODY MASS INDEX: 23.94 KG/M2 | TEMPERATURE: 97 F

## 2023-09-20 DIAGNOSIS — Z17.0 MALIGNANT NEOPLASM OF LOWER-INNER QUADRANT OF LEFT BREAST IN FEMALE, ESTROGEN RECEPTOR POSITIVE: ICD-10-CM

## 2023-09-20 DIAGNOSIS — N39.0 E-COLI UTI: ICD-10-CM

## 2023-09-20 DIAGNOSIS — I10 ESSENTIAL HYPERTENSION: Chronic | ICD-10-CM

## 2023-09-20 DIAGNOSIS — C50.312 MALIGNANT NEOPLASM OF LOWER-INNER QUADRANT OF LEFT BREAST IN FEMALE, ESTROGEN RECEPTOR POSITIVE: ICD-10-CM

## 2023-09-20 DIAGNOSIS — N39.41 URGE INCONTINENCE OF URINE: Primary | ICD-10-CM

## 2023-09-20 DIAGNOSIS — B96.20 E-COLI UTI: ICD-10-CM

## 2023-09-20 LAB
BACTERIA #/AREA URNS HPF: ABNORMAL /HPF
BILIRUB UR QL STRIP: NEGATIVE
CLARITY UR: ABNORMAL
COLOR UR: YELLOW
GLUCOSE UR QL STRIP: NEGATIVE
HGB UR QL STRIP: ABNORMAL
HYALINE CASTS #/AREA URNS LPF: 0 /LPF
KETONES UR QL STRIP: NEGATIVE
LEUKOCYTE ESTERASE UR QL STRIP: ABNORMAL
MICROSCOPIC COMMENT: ABNORMAL
NITRITE UR QL STRIP: POSITIVE
PH UR STRIP: 8 [PH] (ref 5–8)
PROT UR QL STRIP: ABNORMAL
RBC #/AREA URNS HPF: 50 /HPF (ref 0–4)
SP GR UR STRIP: 1.01 (ref 1–1.03)
SQUAMOUS #/AREA URNS HPF: 4 /HPF
URN SPEC COLLECT METH UR: ABNORMAL
WBC #/AREA URNS HPF: >100 /HPF (ref 0–5)
WBC CLUMPS URNS QL MICRO: ABNORMAL

## 2023-09-20 PROCEDURE — 99999 PR PBB SHADOW E&M-EST. PATIENT-LVL III: CPT | Mod: PBBFAC,,, | Performed by: INTERNAL MEDICINE

## 2023-09-20 PROCEDURE — 3288F PR FALLS RISK ASSESSMENT DOCUMENTED: ICD-10-PCS | Mod: CPTII,S$GLB,, | Performed by: INTERNAL MEDICINE

## 2023-09-20 PROCEDURE — 1126F PR PAIN SEVERITY QUANTIFIED, NO PAIN PRESENT: ICD-10-PCS | Mod: CPTII,S$GLB,, | Performed by: INTERNAL MEDICINE

## 2023-09-20 PROCEDURE — 87086 URINE CULTURE/COLONY COUNT: CPT | Performed by: INTERNAL MEDICINE

## 2023-09-20 PROCEDURE — 99999 PR PBB SHADOW E&M-EST. PATIENT-LVL III: ICD-10-PCS | Mod: PBBFAC,,, | Performed by: INTERNAL MEDICINE

## 2023-09-20 PROCEDURE — 1101F PT FALLS ASSESS-DOCD LE1/YR: CPT | Mod: CPTII,S$GLB,, | Performed by: INTERNAL MEDICINE

## 2023-09-20 PROCEDURE — 3077F SYST BP >= 140 MM HG: CPT | Mod: CPTII,S$GLB,, | Performed by: INTERNAL MEDICINE

## 2023-09-20 PROCEDURE — 3078F DIAST BP <80 MM HG: CPT | Mod: CPTII,S$GLB,, | Performed by: INTERNAL MEDICINE

## 2023-09-20 PROCEDURE — 87186 SC STD MICRODIL/AGAR DIL: CPT | Performed by: INTERNAL MEDICINE

## 2023-09-20 PROCEDURE — 1126F AMNT PAIN NOTED NONE PRSNT: CPT | Mod: CPTII,S$GLB,, | Performed by: INTERNAL MEDICINE

## 2023-09-20 PROCEDURE — 99214 PR OFFICE/OUTPT VISIT, EST, LEVL IV, 30-39 MIN: ICD-10-PCS | Mod: S$GLB,,, | Performed by: INTERNAL MEDICINE

## 2023-09-20 PROCEDURE — 3008F PR BODY MASS INDEX (BMI) DOCUMENTED: ICD-10-PCS | Mod: CPTII,S$GLB,, | Performed by: INTERNAL MEDICINE

## 2023-09-20 PROCEDURE — 3078F PR MOST RECENT DIASTOLIC BLOOD PRESSURE < 80 MM HG: ICD-10-PCS | Mod: CPTII,S$GLB,, | Performed by: INTERNAL MEDICINE

## 2023-09-20 PROCEDURE — 99214 OFFICE O/P EST MOD 30 MIN: CPT | Mod: S$GLB,,, | Performed by: INTERNAL MEDICINE

## 2023-09-20 PROCEDURE — 87088 URINE BACTERIA CULTURE: CPT | Performed by: INTERNAL MEDICINE

## 2023-09-20 PROCEDURE — 4010F PR ACE/ARB THEARPY RXD/TAKEN: ICD-10-PCS | Mod: CPTII,S$GLB,, | Performed by: INTERNAL MEDICINE

## 2023-09-20 PROCEDURE — 3077F PR MOST RECENT SYSTOLIC BLOOD PRESSURE >= 140 MM HG: ICD-10-PCS | Mod: CPTII,S$GLB,, | Performed by: INTERNAL MEDICINE

## 2023-09-20 PROCEDURE — 1159F PR MEDICATION LIST DOCUMENTED IN MEDICAL RECORD: ICD-10-PCS | Mod: CPTII,S$GLB,, | Performed by: INTERNAL MEDICINE

## 2023-09-20 PROCEDURE — 3008F BODY MASS INDEX DOCD: CPT | Mod: CPTII,S$GLB,, | Performed by: INTERNAL MEDICINE

## 2023-09-20 PROCEDURE — 87077 CULTURE AEROBIC IDENTIFY: CPT | Performed by: INTERNAL MEDICINE

## 2023-09-20 PROCEDURE — 1159F MED LIST DOCD IN RCRD: CPT | Mod: CPTII,S$GLB,, | Performed by: INTERNAL MEDICINE

## 2023-09-20 PROCEDURE — 4010F ACE/ARB THERAPY RXD/TAKEN: CPT | Mod: CPTII,S$GLB,, | Performed by: INTERNAL MEDICINE

## 2023-09-20 PROCEDURE — 1101F PR PT FALLS ASSESS DOC 0-1 FALLS W/OUT INJ PAST YR: ICD-10-PCS | Mod: CPTII,S$GLB,, | Performed by: INTERNAL MEDICINE

## 2023-09-20 PROCEDURE — 3288F FALL RISK ASSESSMENT DOCD: CPT | Mod: CPTII,S$GLB,, | Performed by: INTERNAL MEDICINE

## 2023-09-20 PROCEDURE — 81000 URINALYSIS NONAUTO W/SCOPE: CPT | Performed by: INTERNAL MEDICINE

## 2023-09-20 RX ORDER — NITROFURANTOIN 25; 75 MG/1; MG/1
100 CAPSULE ORAL 2 TIMES DAILY
Qty: 14 CAPSULE | Refills: 0 | Status: SHIPPED | OUTPATIENT
Start: 2023-09-20 | End: 2023-09-27

## 2023-09-20 NOTE — ASSESSMENT & PLAN NOTE
Will repeat UA, -this will be a challenge to collect as she is incontinent -will review options for collection

## 2023-09-20 NOTE — PROGRESS NOTES
Subjective     Patient ID: Leia Rodriguez is a 73 y.o. female.    Chief Complaint:  Follow up bacteremia   HPI  Last ID note-   07/11/23   72 year old female with a PMH of CAD, CHFmrEF, chronic constipation, breast cancer s/p mastectomy,  HTN and dementia who was admitted with AMS and generalized weakness .  Th ere was associated history of fever .In the ED tmax was 100.2.  Labs and imaging test -  06/23-  CT abd -The splenic lesion identified as hypoenhancing on the prior exam of 11/02/2022 is not well delineated on this noncontrast exam.  Further evaluation with contrast-enhanced CT or MRI or ultrasound would be recommended.  Spleen does not appear enlarged on this exam.     2. Bladder wall thickening.  Possible retained stone or recently passed stone within the bladder.  Correlation with urinalysis and further evaluation as warranted.  Bladder wall thickening mildly worse in comparison to prior 11-2-22.     3. Constipation favored over impaction.  Correlation with symptoms.  MRI of abd-07/03-Small circumscribed T2 hypointense splenic lesion corresponding to that seen on comparison CT.  No significant change in size or appearance.  More likely benign.  Question old hematoma.   Blood and urine culture-07/09 E coli   Component      Latest Ref Rng & Units 7/11/2023 7/10/2023 7/9/2023 6/16/2023   WBC      3.90 - 12.70 K/uL 11.10 19.85 (H) 27.23 (H) 9.71       09/20-    She comes with her daughter , she is incontinent and her daughter is concerned about new UTI .  She is in a wheelchair.  Review of Systems   Constitutional:  Negative for activity change, appetite change, chills and diaphoresis.   Neurological:  Negative for coordination difficulties.          Objective     Physical Exam  Vitals and nursing note reviewed.   HENT:      Head: Normocephalic.   Musculoskeletal:      Cervical back: Normal range of motion.      Comments: Ambulates with a wheel chair    Neurological:      Mental Status: She is alert. Mental  status is at baseline.            Assessment and Plan   Problem List Items Addressed This Visit       Essential hypertension (Chronic)     Will follow PCP,on Toprol         Urinary incontinence - Primary    Relevant Orders    Urinalysis, Reflex to Urine Culture Urine, Clean Catch    Malignant neoplasm of lower-inner quadrant of left breast in female, estrogen receptor positive     Oncology follow up         E-coli UTI     Will repeat UA, -this will be a challenge to collect as she is incontinent -will review options for collection          Will send macrobid but will need to confirm urine culture prior to starting

## 2023-09-22 RX ORDER — GRANULES FOR ORAL 3 G/1
3 POWDER ORAL ONCE
Qty: 3 G | Refills: 0 | Status: SHIPPED | OUTPATIENT
Start: 2023-09-22 | End: 2023-09-23

## 2023-09-23 RX ORDER — GRANULES FOR ORAL 3 G/1
3 POWDER ORAL ONCE
Qty: 3 G | Refills: 0 | Status: SHIPPED | OUTPATIENT
Start: 2023-09-23 | End: 2023-09-23

## 2023-09-25 LAB — BACTERIA UR CULT: ABNORMAL

## 2023-09-29 ENCOUNTER — TELEPHONE (OUTPATIENT)
Dept: INFECTIOUS DISEASES | Facility: CLINIC | Age: 73
End: 2023-09-29
Payer: MEDICARE

## 2023-09-29 ENCOUNTER — PATIENT MESSAGE (OUTPATIENT)
Dept: INFECTIOUS DISEASES | Facility: CLINIC | Age: 73
End: 2023-09-29
Payer: MEDICARE

## 2023-09-29 NOTE — TELEPHONE ENCOUNTER
Attempted to inform of book out on 10/18 with Nnadi. Pt did not answer. LVM to rtc to clinic. Message sent to pt. Appt r/s same day at 8:30 am with Natalie rain.

## 2023-10-03 ENCOUNTER — EXTERNAL HOME HEALTH (OUTPATIENT)
Dept: HOME HEALTH SERVICES | Facility: HOSPITAL | Age: 73
End: 2023-10-03
Payer: MEDICARE

## 2023-10-03 ENCOUNTER — TELEPHONE (OUTPATIENT)
Dept: FAMILY MEDICINE | Facility: CLINIC | Age: 73
End: 2023-10-03
Payer: MEDICARE

## 2023-10-03 NOTE — TELEPHONE ENCOUNTER
----- Message from Geovany Wiggins sent at 10/3/2023 10:31 AM CDT -----  Name of Who is Calling: [facility]        What is the request in detail: [facility] would like a call back from the office in regards to changing pt wound care and frequency. Please advise       Can the clinic reply by [facility]:NO        What Number to Call Back if not in [facility] [phone]

## 2023-10-05 ENCOUNTER — TELEPHONE (OUTPATIENT)
Dept: CARDIOLOGY | Facility: CLINIC | Age: 73
End: 2023-10-05
Payer: MEDICARE

## 2023-10-05 RX ORDER — METOPROLOL SUCCINATE 25 MG/1
25 TABLET, EXTENDED RELEASE ORAL DAILY
Qty: 90 TABLET | Refills: 2 | Status: SHIPPED | OUTPATIENT
Start: 2023-10-05

## 2023-10-05 NOTE — TELEPHONE ENCOUNTER
Spoke with home health nurse in regards to pt's medication changes. Report low BP  Stop aldactone  And decrease ToprolXL from 50 mg daily to 25 mg daily

## 2023-10-05 NOTE — TELEPHONE ENCOUNTER
Spoke with pt's home health nurse in regards to pt's bp. Home health nurse stated during morning assessment bp was 90/50. She asked the pt to stand to change a dressing on her backside and pt was wobbly and dizzy up standing. Pt's medication was recently changed and nurse would like to be advised on what to do.          ----- Message from Alda Fuller sent at 10/5/2023  9:15 AM CDT -----  Regarding: pt call back  Name of Who is Calling:Home Health Nurse [facility]         What is the request in detail: Requesting call back for pt in regards to pt feeling dizzy and low blood pressure. Please advise     Symptom: Low Blood Pressure - Caller Reports  Outcome: Schedule a same-day appointment or talk to a nurse or provider within 1 hour.  Reason: Caller denied all higher acuity questions          Can the clinic reply by [facility]: no         What Number to Call Back if not in [facility]: [phone]

## 2023-10-16 PROBLEM — N39.0 E-COLI UTI: Status: RESOLVED | Noted: 2021-03-07 | Resolved: 2023-10-16

## 2023-10-16 PROBLEM — B96.20 E-COLI UTI: Status: RESOLVED | Noted: 2021-03-07 | Resolved: 2023-10-16

## 2023-10-16 PROBLEM — A41.9 SEPSIS: Status: RESOLVED | Noted: 2023-07-09 | Resolved: 2023-10-16

## 2023-10-18 ENCOUNTER — OFFICE VISIT (OUTPATIENT)
Dept: INFECTIOUS DISEASES | Facility: CLINIC | Age: 73
End: 2023-10-18
Payer: MEDICARE

## 2023-10-18 VITALS
SYSTOLIC BLOOD PRESSURE: 190 MMHG | BODY MASS INDEX: 23.75 KG/M2 | HEIGHT: 60 IN | DIASTOLIC BLOOD PRESSURE: 81 MMHG | WEIGHT: 121 LBS | HEART RATE: 106 BPM

## 2023-10-18 DIAGNOSIS — I50.42 CHRONIC COMBINED SYSTOLIC AND DIASTOLIC CHF (CONGESTIVE HEART FAILURE): Chronic | ICD-10-CM

## 2023-10-18 DIAGNOSIS — I10 ESSENTIAL HYPERTENSION: Chronic | ICD-10-CM

## 2023-10-18 DIAGNOSIS — N39.41 URGE INCONTINENCE OF URINE: ICD-10-CM

## 2023-10-18 PROCEDURE — 1101F PT FALLS ASSESS-DOCD LE1/YR: CPT | Mod: CPTII,S$GLB,, | Performed by: STUDENT IN AN ORGANIZED HEALTH CARE EDUCATION/TRAINING PROGRAM

## 2023-10-18 PROCEDURE — 3288F PR FALLS RISK ASSESSMENT DOCUMENTED: ICD-10-PCS | Mod: CPTII,S$GLB,, | Performed by: STUDENT IN AN ORGANIZED HEALTH CARE EDUCATION/TRAINING PROGRAM

## 2023-10-18 PROCEDURE — 99999 PR PBB SHADOW E&M-EST. PATIENT-LVL IV: ICD-10-PCS | Mod: PBBFAC,,, | Performed by: STUDENT IN AN ORGANIZED HEALTH CARE EDUCATION/TRAINING PROGRAM

## 2023-10-18 PROCEDURE — 3077F PR MOST RECENT SYSTOLIC BLOOD PRESSURE >= 140 MM HG: ICD-10-PCS | Mod: CPTII,S$GLB,, | Performed by: STUDENT IN AN ORGANIZED HEALTH CARE EDUCATION/TRAINING PROGRAM

## 2023-10-18 PROCEDURE — 3077F SYST BP >= 140 MM HG: CPT | Mod: CPTII,S$GLB,, | Performed by: STUDENT IN AN ORGANIZED HEALTH CARE EDUCATION/TRAINING PROGRAM

## 2023-10-18 PROCEDURE — 3008F PR BODY MASS INDEX (BMI) DOCUMENTED: ICD-10-PCS | Mod: CPTII,S$GLB,, | Performed by: STUDENT IN AN ORGANIZED HEALTH CARE EDUCATION/TRAINING PROGRAM

## 2023-10-18 PROCEDURE — 3008F BODY MASS INDEX DOCD: CPT | Mod: CPTII,S$GLB,, | Performed by: STUDENT IN AN ORGANIZED HEALTH CARE EDUCATION/TRAINING PROGRAM

## 2023-10-18 PROCEDURE — 3079F DIAST BP 80-89 MM HG: CPT | Mod: CPTII,S$GLB,, | Performed by: STUDENT IN AN ORGANIZED HEALTH CARE EDUCATION/TRAINING PROGRAM

## 2023-10-18 PROCEDURE — 99999 PR PBB SHADOW E&M-EST. PATIENT-LVL IV: CPT | Mod: PBBFAC,,, | Performed by: STUDENT IN AN ORGANIZED HEALTH CARE EDUCATION/TRAINING PROGRAM

## 2023-10-18 PROCEDURE — 1159F PR MEDICATION LIST DOCUMENTED IN MEDICAL RECORD: ICD-10-PCS | Mod: CPTII,S$GLB,, | Performed by: STUDENT IN AN ORGANIZED HEALTH CARE EDUCATION/TRAINING PROGRAM

## 2023-10-18 PROCEDURE — 4010F ACE/ARB THERAPY RXD/TAKEN: CPT | Mod: CPTII,S$GLB,, | Performed by: STUDENT IN AN ORGANIZED HEALTH CARE EDUCATION/TRAINING PROGRAM

## 2023-10-18 PROCEDURE — 99214 OFFICE O/P EST MOD 30 MIN: CPT | Mod: S$GLB,,, | Performed by: STUDENT IN AN ORGANIZED HEALTH CARE EDUCATION/TRAINING PROGRAM

## 2023-10-18 PROCEDURE — 4010F PR ACE/ARB THEARPY RXD/TAKEN: ICD-10-PCS | Mod: CPTII,S$GLB,, | Performed by: STUDENT IN AN ORGANIZED HEALTH CARE EDUCATION/TRAINING PROGRAM

## 2023-10-18 PROCEDURE — 1126F PR PAIN SEVERITY QUANTIFIED, NO PAIN PRESENT: ICD-10-PCS | Mod: CPTII,S$GLB,, | Performed by: STUDENT IN AN ORGANIZED HEALTH CARE EDUCATION/TRAINING PROGRAM

## 2023-10-18 PROCEDURE — 3288F FALL RISK ASSESSMENT DOCD: CPT | Mod: CPTII,S$GLB,, | Performed by: STUDENT IN AN ORGANIZED HEALTH CARE EDUCATION/TRAINING PROGRAM

## 2023-10-18 PROCEDURE — 99214 PR OFFICE/OUTPT VISIT, EST, LEVL IV, 30-39 MIN: ICD-10-PCS | Mod: S$GLB,,, | Performed by: STUDENT IN AN ORGANIZED HEALTH CARE EDUCATION/TRAINING PROGRAM

## 2023-10-18 PROCEDURE — 1159F MED LIST DOCD IN RCRD: CPT | Mod: CPTII,S$GLB,, | Performed by: STUDENT IN AN ORGANIZED HEALTH CARE EDUCATION/TRAINING PROGRAM

## 2023-10-18 PROCEDURE — 1126F AMNT PAIN NOTED NONE PRSNT: CPT | Mod: CPTII,S$GLB,, | Performed by: STUDENT IN AN ORGANIZED HEALTH CARE EDUCATION/TRAINING PROGRAM

## 2023-10-18 PROCEDURE — 3079F PR MOST RECENT DIASTOLIC BLOOD PRESSURE 80-89 MM HG: ICD-10-PCS | Mod: CPTII,S$GLB,, | Performed by: STUDENT IN AN ORGANIZED HEALTH CARE EDUCATION/TRAINING PROGRAM

## 2023-10-18 PROCEDURE — 1101F PR PT FALLS ASSESS DOC 0-1 FALLS W/OUT INJ PAST YR: ICD-10-PCS | Mod: CPTII,S$GLB,, | Performed by: STUDENT IN AN ORGANIZED HEALTH CARE EDUCATION/TRAINING PROGRAM

## 2023-10-18 RX ORDER — ESTRADIOL 0.1 MG/G
4 CREAM VAGINAL DAILY
Qty: 120 G | Refills: 3 | Status: SHIPPED | OUTPATIENT
Start: 2023-10-18 | End: 2024-02-15

## 2023-10-18 NOTE — PATIENT INSTRUCTIONS
Try over the counter vitamin C  Sending estrogen cream to pharmacy  Antibiotic insurance denied is called fosfomycin

## 2023-10-18 NOTE — ASSESSMENT & PLAN NOTE
--Recommend urology and GYN follow up to evaluate incontinence   --Would ask urology collect in/out cath U/A to rule out bladder infection  --Recommend cystoscopy to look for lesions   --Will get renal US to look for hydronephrosis   --Discussed trying vitamin C daily as well as Estrace as preventive measures   --Follow up with ID as needed

## 2023-10-18 NOTE — PROGRESS NOTES
Infectious Disease Clinic Note    Patient Name: Leia Rodriguez  YOB: 1950    PRESENTING HISTORY       History of Present Illness:  Ms. Leia Rodriguez is a 73 y.o. female w/ significant PMHx of emphysema, CHF, pulmonary HTN, essential HTN, breast cancer, and bacteremia due to E coli who presents as follow up for recent UTI. Patient seen on 9/20 with reports of incontinence. Urinalysis showed marked pyuria >100 as well as RBC. However, urine culture only grew CONS. Felt to be contaminant. Patient given single dose of Fosfomycin. Today she reports insurance did not cover fosfomycin so never received that dose. Daughter says back in July patient was septic due to bacteremia. Urine now beginning to smell odd. Patient with inverted uterus. Had tried Flomax. History of bladder prolapse. Agreeable to urology and GYN evaluation. Will hold additional antibiotics for now.       Review of Systems:  Constitutional: no fever or chills  Eyes: no visual changes  ENT: no nasal congestion or sore throat  Respiratory: no cough or shortness of breath  Cardiovascular: no chest pain  Gastrointestinal: no nausea or vomiting, no abdominal pain, no constipation, better BM on Miralax   Genitourinary: no hematuria or dysuria (unable to feel pain due to pinched nerve)   Musculoskeletal: no arthralgias or myalgias  Skin: no rash  Neurological: no headaches, numbness, or paresthesias    The following portions of the patient's history were reviewed and updated as appropriate: allergies, current medications, past family history, past medical history, past social history, past surgical history, and problem list.    PAST HISTORY:     Immunization History   Administered Date(s) Administered    COVID-19, MRNA, LN-S, PF (Pfizer) (Purple Cap) 03/05/2021, 03/26/2021, 01/03/2022    Influenza (FLUAD) - Quadrivalent - Adjuvanted - PF *Preferred* (65+) 11/11/2020, 10/01/2021, 10/26/2022    Influenza - High Dose - PF (65 years and older)  2017, 2018, 2020    Influenza - Quadrivalent 2015, 2017    Pneumococcal Conjugate - 13 Valent 2017    Pneumococcal Polysaccharide - 23 Valent 2019    Tdap 2013       Past Medical History:   Diagnosis Date    Arthritis     EDGAR HANDS, KNEES    Behavioral change 2022    Blind right eye     Traumatic    Breast cancer 06/15/2017    0.8 cm Grade1 INTRADUCTAL BREAST 9 positve margin (left)    Essential hypertension     Hemorrhoids     Immunodeficiency due to chemotherapy 2021    Lipoma of abdominal wall     Major neurocognitive disorder 2022    Obesity     Overactive bladder     Pap smear abnormality of cervix with ASCUS favoring benign     Thyroid nodule     Tobacco use disorder     Tubular adenoma of colon     Urinary incontinence        Past Surgical History:   Procedure Laterality Date    ANTERIOR VAGINAL REPAIR      BLADDER SURGERY      BREAST LUMPECTOMY Left     CATARACT EXTRACTION Left 2022     SECTION      X2    COLONOSCOPY N/A 3/8/2017    Procedure: COLONOSCOPY;  Surgeon: Tyron Paris MD;  Location: [facility];  Service: Endoscopy;  Laterality: N/A;    COLONOSCOPY N/A 2020    Procedure: COLONOSCOPY;  Surgeon: Keira Ellison MD;  Location: [facility];  Service: Endoscopy;  Laterality: N/A;    ESOPHAGOGASTRODUODENOSCOPY N/A 2020    Procedure: EGD (ESOPHAGOGASTRODUODENOSCOPY);  Surgeon: Keira Ellison MD;  Location: [facility];  Service: Endoscopy;  Laterality: N/A;  new onset iron deficiency with prior history of breast cancer    INTRALUMINAL GASTROINTESTINAL TRACT IMAGING VIA CAPSULE N/A 10/28/2020    Procedure: IMAGING PROCEDURE, GI TRACT, INTRALUMINAL, VIA CAPSULE;  Surgeon: Finesse Jha RN;  Location: [facility] ENDO;  Service: Endoscopy;  Laterality: N/A;    LEFT HEART CATHETERIZATION Left 10/12/2021    Procedure: CATHETERIZATION, HEART, LEFT;  Surgeon: Tiffanie Santos MD;  Location: [facility] CATH LAB;  Service:  Cardiology;  Laterality: Left;    SENTINEL LYMPH NODE BIOPSY Left 9/8/2020    Procedure: BIOPSY, LYMPH NODE, SENTINEL;  Surgeon: Vincent Moyer MD;  Location: [facility] OR;  Service: General;  Laterality: Left;    SIMPLE MASTECTOMY Left 9/8/2020    Procedure: MASTECTOMY, SIMPLE;  Surgeon: Vincent Moyer MD;  Location: [facility] OR;  Service: General;  Laterality: Left;    THYROID LOBECTOMY Left 2005    TOTAL ABDOMINAL HYSTERECTOMY      TUBAL LIGATION         Family History   Problem Relation Age of Onset    Hypertension Father     Cataracts Father     Prostate cancer Father 80    Cervical cancer Daughter 32    Allergic rhinitis Daughter     Cirrhosis Mother     Alcohol abuse Mother     Hypertension Daughter     Diabetes Brother     Heart attack Brother     Heart murmur Brother     No Known Problems Daughter        Social History     Socioeconomic History    Marital status:    Tobacco Use    Smoking status: Former     Current packs/day: 0.00     Average packs/day: 1 pack/day for 50.0 years (50.0 ttl pk-yrs)     Types: Cigarettes     Start date: 8/17/1970     Quit date: 8/17/2020     Years since quitting: 3.1    Smokeless tobacco: Never   Substance and Sexual Activity    Alcohol use: Never     Alcohol/week: 28.0 standard drinks of alcohol     Types: 28 Cans of beer per week    Drug use: No    Sexual activity: Never     Partners: Male   Social History Narrative    The patient is .  She is retired from [employer].     Social Determinants of Health     Financial Resource Strain: Medium Risk (3/7/2023)    Overall Financial Resource Strain (CARDIA)     Difficulty of Paying Living Expenses: Somewhat hard   Food Insecurity: Food Insecurity Present (3/7/2023)    Hunger Vital Sign     Worried About Running Out of Food in the Last Year: Sometimes true     Ran Out of Food in the Last Year: Sometimes true   Transportation Needs: Unknown (3/7/2023)    PRAPARE - Transportation     Lack of Transportation (Medical): No    Physical Activity: Inactive (3/7/2023)    Exercise Vital Sign     Days of Exercise per Week: 0 days     Minutes of Exercise per Session: 0 min   Stress: No Stress Concern Present (3/7/2023)    [employer] - Occupational Stress Questionnaire     Feeling of Stress : Only a little   Social Connections: Socially Integrated (3/7/2023)    Social Connection and Isolation Panel [NHANES]     Frequency of Communication with Friends and Family: More than three times a week     Frequency of Social Gatherings with Friends and Family: More than three times a week     Attends [employer] Services: More than 4 times per year     Attends Club or Organization Meetings: More than 4 times per year     Marital Status:    Housing Stability: Unknown (3/7/2023)    Housing Stability Vital Sign     Unable to Pay for Housing in the Last Year: No     Unstable Housing in the Last Year: No       MEDICATIONS & ALLERGIES:     Current Outpatient Medications on File Prior to Visit   Medication Sig    aluminum-magnesium hydroxide-simethicone 200-200-20 mg/5 mL Susp, diphenhydrAMINE 12.5 mg/5 mL Liqd Swish and spit 5 mLs every 4 (four) hours as needed (discomfort).    anastrozole (ARIMIDEX) 1 mg Tab TAKE 1 TABLET BY MOUTH EVERY DAY    aspirin 81 MG Chew Take 1 tablet (81 mg total) by mouth once daily.    busPIRone (BUSPAR) 15 MG tablet TAKE 1 TABLET BY MOUTH 2 TIMES DAILY.    calcium citrate (CALCITRATE) 200 mg (950 mg) tablet Take 1 tablet by mouth once daily.    cyanocobalamin 2000 MCG tablet Take 2,000 mcg by mouth 2 (two) times a day.    ferrous sulfate (FEOSOL) 325 mg (65 mg iron) Tab tablet Take 65 mg by mouth once daily.    furosemide (LASIX) 20 MG tablet Take 1 tablet (20 mg total) by mouth daily as needed (leg edema).    isosorbide mononitrate (IMDUR) 30 MG 24 hr tablet TAKE 1 TABLET BY MOUTH EVERY DAY    memantine (NAMENDA) 5 MG Tab Take 1 tablet (5 mg total) by mouth 2 (two) times daily.    metoprolol  succinate (TOPROL-XL) 25 MG 24 hr tablet Take 1 tablet (25 mg total) by mouth once daily.    QUEtiapine (SEROQUEL) 100 MG Tab Take 1 tablet (100 mg total) by mouth every evening.    tamsulosin (FLOMAX) 0.4 mg Cap Take 1 capsule by mouth once daily.    telmisartan (MICARDIS) 20 MG Tab Take 1 tablet (20 mg total) by mouth once daily.    vitamin D 1000 units Tab Take 1,000 Units by mouth once daily.     No current facility-administered medications on file prior to visit.       Review of patient's allergies indicates:  No Known Allergies    OBJECTIVE:   Vital Signs:  There were no vitals filed for this visit.    No results found for this or any previous visit (from the past 24 hour(s)).      Physical Exam:   General:  Well developed, well nourished, no acute distress  HEENT:  Normocephalic, atraumatic  CVS:  RRR, S1 and S2 normal, no murmurs, rubs, gallops  Resp:  Lungs clear to auscultation, no wheezes, rales, rhonchi  GI:  Abdomen soft, non-tender, non-distended, normoactive bowel sounds, no masses  MSK:  No muscle atrophy, peripheral edema, full range of motion  Skin:  No rashes, ulcers, erythema    ASSESSMENT:     Essential hypertension  Continue current antihypertensives. Follow up with PCP.     Urinary incontinence  --Recommend urology and GYN follow up to evaluate incontinence   --Would ask urology collect in/out cath U/A to rule out bladder infection  --Recommend cystoscopy to look for lesions   --Will get renal US to look for hydronephrosis   --Discussed trying vitamin C daily as well as Estrace as preventive measures   --Follow up with ID in 1 month     Chronic combined systolic and diastolic CHF (congestive heart failure)  Continue diuretic. Follow up with PCP.     PLAN:     Diagnoses and all orders for this visit:    Essential hypertension    Urge incontinence of urine    Chronic combined systolic and diastolic CHF (congestive heart failure)      The total time for evaluation and management services  performed on 10/18/23 was greater than 30 minutes.        Raman Estrada, DO   Infectious Diseases

## 2023-10-20 ENCOUNTER — PATIENT MESSAGE (OUTPATIENT)
Dept: FAMILY MEDICINE | Facility: CLINIC | Age: 73
End: 2023-10-20
Payer: MEDICARE

## 2023-10-23 ENCOUNTER — PATIENT MESSAGE (OUTPATIENT)
Dept: FAMILY MEDICINE | Facility: CLINIC | Age: 73
End: 2023-10-23
Payer: MEDICARE

## 2023-10-23 ENCOUNTER — DOCUMENTATION ONLY (OUTPATIENT)
Dept: HEMATOLOGY/ONCOLOGY | Facility: CLINIC | Age: 73
End: 2023-10-23
Payer: MEDICARE

## 2023-10-23 NOTE — PROGRESS NOTES
Spoke to patients daughter. She was wondering when he follow up would be and with who. She will follow up in December, with iron def and anemia labs prior. She will also has Right mammogram diag prior as well.   Karol Goodwin PA-C  Hematology Oncology

## 2023-10-26 ENCOUNTER — DOCUMENT SCAN (OUTPATIENT)
Dept: HOME HEALTH SERVICES | Facility: HOSPITAL | Age: 73
End: 2023-10-26
Payer: MEDICARE

## 2023-10-27 RX ORDER — FUROSEMIDE 20 MG/1
20 TABLET ORAL 2 TIMES DAILY
Qty: 180 TABLET | Refills: 3 | Status: SHIPPED | OUTPATIENT
Start: 2023-10-27

## 2023-10-31 ENCOUNTER — HOSPITAL ENCOUNTER (OUTPATIENT)
Dept: RADIOLOGY | Facility: HOSPITAL | Age: 73
Discharge: HOME | End: 2023-10-31
Attending: STUDENT IN AN ORGANIZED HEALTH CARE EDUCATION/TRAINING PROGRAM
Payer: MEDICARE

## 2023-10-31 DIAGNOSIS — N39.41 URGE INCONTINENCE OF URINE: ICD-10-CM

## 2023-10-31 PROCEDURE — 76770 US EXAM ABDO BACK WALL COMP: CPT | Mod: TC

## 2023-10-31 PROCEDURE — 76770 US EXAM ABDO BACK WALL COMP: CPT | Mod: 26,,, | Performed by: RADIOLOGY

## 2023-10-31 PROCEDURE — 76770 US RETROPERITONEAL COMPLETE: ICD-10-PCS | Mod: 26,,, | Performed by: RADIOLOGY

## 2023-11-01 ENCOUNTER — OFFICE VISIT (OUTPATIENT)
Dept: UROLOGY | Facility: CLINIC | Age: 73
End: 2023-11-01
Payer: MEDICARE

## 2023-11-01 VITALS
HEART RATE: 84 BPM | WEIGHT: 121.06 LBS | SYSTOLIC BLOOD PRESSURE: 147 MMHG | BODY MASS INDEX: 23.64 KG/M2 | DIASTOLIC BLOOD PRESSURE: 63 MMHG

## 2023-11-01 DIAGNOSIS — Z87.440 HISTORY OF RECURRENT UTIS: Primary | ICD-10-CM

## 2023-11-01 DIAGNOSIS — N39.41 URGE INCONTINENCE OF URINE: ICD-10-CM

## 2023-11-01 PROCEDURE — 99999 PR PBB SHADOW E&M-EST. PATIENT-LVL IV: ICD-10-PCS | Mod: PBBFAC,,, | Performed by: NURSE PRACTITIONER

## 2023-11-01 PROCEDURE — 3288F FALL RISK ASSESSMENT DOCD: CPT | Mod: CPTII,S$GLB,, | Performed by: NURSE PRACTITIONER

## 2023-11-01 PROCEDURE — 87186 SC STD MICRODIL/AGAR DIL: CPT | Performed by: NURSE PRACTITIONER

## 2023-11-01 PROCEDURE — 87086 URINE CULTURE/COLONY COUNT: CPT | Performed by: NURSE PRACTITIONER

## 2023-11-01 PROCEDURE — 3077F SYST BP >= 140 MM HG: CPT | Mod: CPTII,S$GLB,, | Performed by: NURSE PRACTITIONER

## 2023-11-01 PROCEDURE — 3288F PR FALLS RISK ASSESSMENT DOCUMENTED: ICD-10-PCS | Mod: CPTII,S$GLB,, | Performed by: NURSE PRACTITIONER

## 2023-11-01 PROCEDURE — 1101F PR PT FALLS ASSESS DOC 0-1 FALLS W/OUT INJ PAST YR: ICD-10-PCS | Mod: CPTII,S$GLB,, | Performed by: NURSE PRACTITIONER

## 2023-11-01 PROCEDURE — 3078F PR MOST RECENT DIASTOLIC BLOOD PRESSURE < 80 MM HG: ICD-10-PCS | Mod: CPTII,S$GLB,, | Performed by: NURSE PRACTITIONER

## 2023-11-01 PROCEDURE — 4010F PR ACE/ARB THEARPY RXD/TAKEN: ICD-10-PCS | Mod: CPTII,S$GLB,, | Performed by: NURSE PRACTITIONER

## 2023-11-01 PROCEDURE — 1101F PT FALLS ASSESS-DOCD LE1/YR: CPT | Mod: CPTII,S$GLB,, | Performed by: NURSE PRACTITIONER

## 2023-11-01 PROCEDURE — 99213 PR OFFICE/OUTPT VISIT, EST, LEVL III, 20-29 MIN: ICD-10-PCS | Mod: S$GLB,,, | Performed by: NURSE PRACTITIONER

## 2023-11-01 PROCEDURE — 99213 OFFICE O/P EST LOW 20 MIN: CPT | Mod: S$GLB,,, | Performed by: NURSE PRACTITIONER

## 2023-11-01 PROCEDURE — 3078F DIAST BP <80 MM HG: CPT | Mod: CPTII,S$GLB,, | Performed by: NURSE PRACTITIONER

## 2023-11-01 PROCEDURE — 3077F PR MOST RECENT SYSTOLIC BLOOD PRESSURE >= 140 MM HG: ICD-10-PCS | Mod: CPTII,S$GLB,, | Performed by: NURSE PRACTITIONER

## 2023-11-01 PROCEDURE — 3008F PR BODY MASS INDEX (BMI) DOCUMENTED: ICD-10-PCS | Mod: CPTII,S$GLB,, | Performed by: NURSE PRACTITIONER

## 2023-11-01 PROCEDURE — 87077 CULTURE AEROBIC IDENTIFY: CPT | Performed by: NURSE PRACTITIONER

## 2023-11-01 PROCEDURE — 3008F BODY MASS INDEX DOCD: CPT | Mod: CPTII,S$GLB,, | Performed by: NURSE PRACTITIONER

## 2023-11-01 PROCEDURE — 1159F MED LIST DOCD IN RCRD: CPT | Mod: CPTII,S$GLB,, | Performed by: NURSE PRACTITIONER

## 2023-11-01 PROCEDURE — 1159F PR MEDICATION LIST DOCUMENTED IN MEDICAL RECORD: ICD-10-PCS | Mod: CPTII,S$GLB,, | Performed by: NURSE PRACTITIONER

## 2023-11-01 PROCEDURE — 87088 URINE BACTERIA CULTURE: CPT | Performed by: NURSE PRACTITIONER

## 2023-11-01 PROCEDURE — 99999 PR PBB SHADOW E&M-EST. PATIENT-LVL IV: CPT | Mod: PBBFAC,,, | Performed by: NURSE PRACTITIONER

## 2023-11-01 PROCEDURE — 4010F ACE/ARB THERAPY RXD/TAKEN: CPT | Mod: CPTII,S$GLB,, | Performed by: NURSE PRACTITIONER

## 2023-11-01 NOTE — PROGRESS NOTES
Chief Complaint:   Urge incontinence  Recurrent UTIs    HPI:   Patient is a 73-year-old female that is presenting with her daughter.  Patient has a history of dementia.  Patient was recently seen by infectious disease and has a history of bacteremia due to E coli.  Daughter states that patient does not feel the urge to have a bowel movement or to void.  Has been in adult diapers for years.  Reports decrease in sensation occurred after a traumatic fall.  No history of gross hematuria or renal stones. Recent renal ultrasound indicated bilateral, simple renal cyst and nonspecific bladder wall thickening.    Allergies:  Patient has no known allergies.    Medications:  has a current medication list which includes the following prescription(s): aluminum-magnesium hydroxide-simethicone 200-200-20 mg/5 mL Susp, diphenhydrAMINE 12.5 mg/5 mL Liqd, anastrozole, aspirin, buspirone, calcium citrate, cyanocobalamin, estradiol, ferrous sulfate, fluad quad 2023-24(65y up)(pf), furosemide, isosorbide mononitrate, memantine, metoprolol succinate, quetiapine, telmisartan, and vitamin d.    Review of Systems:  General: No fever, chills, fatigability, or weight loss.  Skin: No rashes, itching, or changes in color or texture of skin.  Chest: Denies GRISSOM, cyanosis, wheezing, cough, and sputum production.  Abdomen: Appetite fine. No weight loss. Denies diarrhea, abdominal pain, hematemesis, or blood in stool.  Musculoskeletal: No joint stiffness or swelling. Denies back pain.  : As above.  All other review of systems negative.    PMH:   has a past medical history of Arthritis, Behavioral change (09/21/2022), Blind right eye, Breast cancer (06/15/2017), Essential hypertension, Hemorrhoids, Immunodeficiency due to chemotherapy (04/21/2021), Lipoma of abdominal wall, Major neurocognitive disorder (06/21/2022), Obesity, Overactive bladder, Pap smear abnormality of cervix with ASCUS favoring benign, Thyroid nodule, Tobacco use disorder,  Tubular adenoma of colon, and Urinary incontinence.    PSH:   has a past surgical history that includes Anterior vaginal repair; Thyroid lobectomy (Left, ); Bladder surgery;  section; Tubal ligation; Colonoscopy (N/A, 3/8/2017); Total abdominal hysterectomy; Colonoscopy (N/A, 2020); Esophagogastroduodenoscopy (N/A, 2020); Simple mastectomy (Left, 2020); [place] lymph node biopsy (Left, 2020); Intraluminal gastrointestinal tract imaging via capsule (N/A, 10/28/2020); Left heart catheterization (Left, 10/12/2021); Breast lumpectomy (Left, ); and Cataract extraction (Left, 2022).    FamHx: family history includes Alcohol abuse in her mother; Allergic rhinitis in her daughter; Cataracts in her father; Cervical cancer (age of onset: 32) in her daughter; Cirrhosis in her mother; Diabetes in her brother; Heart attack in her brother; Heart murmur in her brother; Hypertension in her daughter and father; No Known Problems in her daughter; Prostate cancer (age of onset: 80) in her father.    SocHx:  reports that she quit smoking about 3 years ago. Her smoking use included cigarettes. She started smoking about 53 years ago. She has a 50.0 pack-year smoking history. She has never used smokeless tobacco. She reports that she does not drink alcohol and does not use drugs.      Physical Exam:  Vitals:    23 1422   BP: (!) 147/63   Pulse: 84     General: A&Ox3, no apparent distress, no deformities  Neck: No masses, normal thyroid  Lungs: normal inspiration, no use of accessory muscles  Heart: normal pulse, no arrhythmias  Abdomen: Soft, NT, ND, no masses, no hernias, no hepatosplenomegaly  Lymphatic: Neck and groin nodes negative  Skin: The skin is warm and dry. No jaundice.  Ext: No c/c/e.  :  Normal external female genitalia.  Vaginal atrophy with grade 2 cystocele and rectocele with Valsalva maneuver      Labs/Studies:   10/31/2023  EXAMINATION:  US RETROPERITONEAL COMPLETE      CLINICAL HISTORY:  Urge incontinence     TECHNIQUE:  Ultrasound of the kidneys and urinary bladder was performed including color flow and Doppler evaluation of the kidneys.     COMPARISON:  None.     FINDINGS:  Right kidney: The right kidney measures 9.5 cm. No cortical thinning. No loss of corticomedullary distinction. Resistive index measures 0.74.  There are 2 simple cysts at the upper pole with the larger measuring 1.6 cm.  No renal stone. No hydronephrosis.     Left kidney: The left kidney measures 9.8 cm. No cortical thinning. No loss of corticomedullary distinction. Resistive index measures 0.70.  Multiple simple cysts with the largest measuring 4.2 cm, at the midpole.  No renal stone. No hydronephrosis.     Bladder wall thickening measuring 6 mm.  No visualized bladder calculus.     Impression:     Bilateral simple renal cysts.     Nonspecific bladder wall thickening.    Impression/Plan:   1. Urge incontinence  Patient does not void voluntarily, therefore, anticholinergics would be contraindicated.  Patient also has chronic constipation that could exacerbate secondary to overactive bladder medication.  Physical exam indicated a large stool burden.  Patient to be scheduled with MD for cystoscopy, MD to consider urodynamic studies secondary to decrease sensation with voiding.      2. Recurrent urinary tract infections   Patient was scheduled for cystoscopy with MD.   Catheterized urine sample was sent for culture

## 2023-11-04 LAB — BACTERIA UR CULT: ABNORMAL

## 2023-11-05 RX ORDER — NITROFURANTOIN 25; 75 MG/1; MG/1
100 CAPSULE ORAL 2 TIMES DAILY
Qty: 20 CAPSULE | Refills: 0 | Status: SHIPPED | OUTPATIENT
Start: 2023-11-05 | End: 2023-11-10

## 2023-11-06 ENCOUNTER — OFFICE VISIT (OUTPATIENT)
Dept: URGENT CARE | Facility: CLINIC | Age: 73
End: 2023-11-06
Payer: MEDICARE

## 2023-11-06 VITALS
BODY MASS INDEX: 23.75 KG/M2 | HEART RATE: 113 BPM | OXYGEN SATURATION: 100 % | RESPIRATION RATE: 20 BRPM | WEIGHT: 121 LBS | DIASTOLIC BLOOD PRESSURE: 86 MMHG | SYSTOLIC BLOOD PRESSURE: 164 MMHG | TEMPERATURE: 101 F | HEIGHT: 60 IN

## 2023-11-06 DIAGNOSIS — U07.1 COVID-19: Primary | ICD-10-CM

## 2023-11-06 DIAGNOSIS — J34.9 SINUS PROBLEM: ICD-10-CM

## 2023-11-06 LAB
CTP QC/QA: YES
SARS-COV-2 AG RESP QL IA.RAPID: POSITIVE

## 2023-11-06 PROCEDURE — 99214 PR OFFICE/OUTPT VISIT, EST, LEVL IV, 30-39 MIN: ICD-10-PCS | Mod: S$GLB,,, | Performed by: NURSE PRACTITIONER

## 2023-11-06 PROCEDURE — 87811 SARS CORONAVIRUS 2 ANTIGEN POCT, MANUAL READ: ICD-10-PCS | Mod: QW,S$GLB,, | Performed by: NURSE PRACTITIONER

## 2023-11-06 PROCEDURE — 99214 OFFICE O/P EST MOD 30 MIN: CPT | Mod: S$GLB,,, | Performed by: NURSE PRACTITIONER

## 2023-11-06 PROCEDURE — 87811 SARS-COV-2 COVID19 W/OPTIC: CPT | Mod: QW,S$GLB,, | Performed by: NURSE PRACTITIONER

## 2023-11-06 RX ORDER — ACETAMINOPHEN 325 MG/1
650 TABLET ORAL
Status: COMPLETED | OUTPATIENT
Start: 2023-11-06 | End: 2023-11-06

## 2023-11-06 RX ADMIN — ACETAMINOPHEN 650 MG: 325 TABLET ORAL at 08:11

## 2023-11-06 NOTE — PROGRESS NOTES
Subjective:      Patient ID: Leia Rodriguez is a 73 y.o. female.    Vitals:  height is 5' (1.524 m) and weight is 54.9 kg (121 lb). Her tympanic temperature is 100.5 °F (38.1 °C) (abnormal). Her blood pressure is 164/86 (abnormal) and her pulse is 113 (abnormal). Her respiration is 20 and oxygen saturation is 100%.     Chief Complaint: Sinus Problem    Leia Rodriguez is a 73 y.o female who is presenting for evaluation of sinus congestion. Associated sxs cough, and low grade fever. Treatments tried include OTC tylenol, and coricidin. Daughter who accompanied pt reports pt got influenza vaccine on 10/31.     Sinus Problem  This is a new problem. The current episode started yesterday. There has been no fever. Associated symptoms include congestion and coughing. Pertinent negatives include no sneezing. Past treatments include oral decongestants and acetaminophen. The treatment provided no relief.     HENT:  Positive for congestion.    Respiratory:  Positive for cough.    Allergic/Immunologic: Negative for sneezing.      Objective:     Vitals:    11/06/23 0804   BP: (!) 164/86   BP Location: Right arm   Patient Position: Sitting   BP Method: Large (Manual)   Pulse: (!) 113   Resp: 20   Temp: (!) 100.5 °F (38.1 °C)   TempSrc: Tympanic   SpO2: 100%   Weight: 54.9 kg (121 lb)   Height: 5' (1.524 m)       Physical Exam   Constitutional: She is oriented to person, place, and time. She appears well-developed. She is cooperative.  Non-toxic appearance. She does not appear ill. No distress.   HENT:   Head: Normocephalic and atraumatic.   Ears:   Right Ear: Hearing, tympanic membrane, external ear and ear canal normal.   Left Ear: Hearing, tympanic membrane, external ear and ear canal normal.   Nose: Congestion present. No mucosal edema, rhinorrhea or nasal deformity. No epistaxis. Right sinus exhibits no maxillary sinus tenderness and no frontal sinus tenderness. Left sinus exhibits no maxillary sinus tenderness and no frontal  sinus tenderness.   Mouth/Throat: Uvula is midline, oropharynx is clear and moist and mucous membranes are normal. Mucous membranes are moist. No trismus in the jaw. Normal dentition. No uvula swelling. No oropharyngeal exudate, posterior oropharyngeal edema or posterior oropharyngeal erythema.   Eyes: Conjunctivae and lids are normal. Pupils are equal, round, and reactive to light. No scleral icterus. Extraocular movement intact   Neck: Trachea normal and phonation normal. Neck supple. No edema present. No erythema present. No neck rigidity present.   Cardiovascular: Normal rate, regular rhythm, normal heart sounds and normal pulses.   Pulmonary/Chest: Effort normal and breath sounds normal. No respiratory distress. She has no decreased breath sounds. She has no rhonchi.   Abdominal: Normal appearance.   Musculoskeletal: Normal range of motion.         General: No deformity. Normal range of motion.   Neurological: She is alert and oriented to person, place, and time. She exhibits normal muscle tone. Coordination normal.   Skin: Skin is warm, dry, intact, not diaphoretic and not pale.   Psychiatric: Her speech is normal and behavior is normal. Judgment and thought content normal.   Nursing note and vitals reviewed.      Assessment:     1. COVID-19    2. Sinus problem        Plan:   Patient stable for discharge and home management of condition       Follow up to PCP  as needed in 3-5 days;   Any weakness, chest pain SOB go to local ER for hospital care and evaluation      COVID-19  -     SARS Coronavirus 2 Antigen, POCT Manual Read  -     molnupiravir 200 mg capsule (EUA); Take 4 capsules (800 mg total) by mouth every 12 (twelve) hours.  Dispense: 40 capsule; Refill: 0  -     acetaminophen tablet 650 mg    Sinus problem  -     SARS Coronavirus 2 Antigen, POCT Manual Read        Patient Instructions   Increase fluids Get plenty of rest.   General infection control measures--cover mouth with sneezing coughing, wash  hands frequently   Rest activity ad haydee   Tylenol per package directions for fever body aches   Supportive care measures   Warm salt water gargles for throat comfort     Instructions for Patients with Confirmed or Suspected COVID-19    If you are awaiting your test result, you will either be called or it will be released to the patient portal.  If you have any questions about your test, please visit [email or web address] or call our COVID-19 information line at [phone].      Please isolate yourself at home.  You may leave home and/or return to work once the following conditions are met:    If you have symptoms and tested positive:  More than 5 days since symptoms first appeared AND  More than 24 hours fever free without medications AND       symptoms have improved   For five days after ending isolation, masks are required.    If you had no symptoms but tested positive:  More than 5 days since the date of the first positive test. If you develop symptoms, then use the guidelines above  For five days after ending isolation, masks are required.      Testing is not recommended if you are symptom free after completing isolation.

## 2023-11-06 NOTE — PATIENT INSTRUCTIONS
Increase fluids Get plenty of rest.   General infection control measures--cover mouth with sneezing coughing, wash hands frequently   Rest activity ad haydee   Tylenol per package directions for fever body aches   Supportive care measures   Warm salt water gargles for throat comfort     Instructions for Patients with Confirmed or Suspected COVID-19    If you are awaiting your test result, you will either be called or it will be released to the patient portal.  If you have any questions about your test, please visit [email or web address] or call our COVID-19 information line at [phone].      Please isolate yourself at home.  You may leave home and/or return to work once the following conditions are met:    If you have symptoms and tested positive:  More than 5 days since symptoms first appeared AND  More than 24 hours fever free without medications AND       symptoms have improved   For five days after ending isolation, masks are required.    If you had no symptoms but tested positive:  More than 5 days since the date of the first positive test. If you develop symptoms, then use the guidelines above  For five days after ending isolation, masks are required.      Testing is not recommended if you are symptom free after completing isolation.

## 2023-11-09 ENCOUNTER — TELEPHONE (OUTPATIENT)
Dept: URGENT CARE | Facility: CLINIC | Age: 73
End: 2023-11-09
Payer: MEDICARE

## 2023-11-10 ENCOUNTER — OFFICE VISIT (OUTPATIENT)
Dept: FAMILY MEDICINE | Facility: CLINIC | Age: 73
End: 2023-11-10
Payer: MEDICARE

## 2023-11-10 ENCOUNTER — PATIENT MESSAGE (OUTPATIENT)
Dept: HEMATOLOGY/ONCOLOGY | Facility: CLINIC | Age: 73
End: 2023-11-10
Payer: MEDICARE

## 2023-11-10 VITALS
BODY MASS INDEX: 23.63 KG/M2 | RESPIRATION RATE: 18 BRPM | DIASTOLIC BLOOD PRESSURE: 86 MMHG | OXYGEN SATURATION: 95 % | HEART RATE: 74 BPM | TEMPERATURE: 99 F | HEIGHT: 60 IN | SYSTOLIC BLOOD PRESSURE: 140 MMHG

## 2023-11-10 DIAGNOSIS — G30.9 MAJOR NEUROCOGNITIVE DISORDER DUE TO ALZHEIMER'S DISEASE: ICD-10-CM

## 2023-11-10 DIAGNOSIS — U07.1 COVID-19 VIRUS INFECTION: ICD-10-CM

## 2023-11-10 DIAGNOSIS — N39.0 RECURRENT UTI: ICD-10-CM

## 2023-11-10 DIAGNOSIS — F02.80 MAJOR NEUROCOGNITIVE DISORDER DUE TO ALZHEIMER'S DISEASE: ICD-10-CM

## 2023-11-10 DIAGNOSIS — L03.032 CELLULITIS OF TOE OF LEFT FOOT: Primary | ICD-10-CM

## 2023-11-10 PROCEDURE — 1125F AMNT PAIN NOTED PAIN PRSNT: CPT | Mod: CPTII,S$GLB,, | Performed by: FAMILY MEDICINE

## 2023-11-10 PROCEDURE — 99999 PR PBB SHADOW E&M-EST. PATIENT-LVL III: ICD-10-PCS | Mod: PBBFAC,,, | Performed by: FAMILY MEDICINE

## 2023-11-10 PROCEDURE — 1160F RVW MEDS BY RX/DR IN RCRD: CPT | Mod: CPTII,S$GLB,, | Performed by: FAMILY MEDICINE

## 2023-11-10 PROCEDURE — 3077F PR MOST RECENT SYSTOLIC BLOOD PRESSURE >= 140 MM HG: ICD-10-PCS | Mod: CPTII,S$GLB,, | Performed by: FAMILY MEDICINE

## 2023-11-10 PROCEDURE — 3079F PR MOST RECENT DIASTOLIC BLOOD PRESSURE 80-89 MM HG: ICD-10-PCS | Mod: CPTII,S$GLB,, | Performed by: FAMILY MEDICINE

## 2023-11-10 PROCEDURE — 3079F DIAST BP 80-89 MM HG: CPT | Mod: CPTII,S$GLB,, | Performed by: FAMILY MEDICINE

## 2023-11-10 PROCEDURE — 1159F MED LIST DOCD IN RCRD: CPT | Mod: CPTII,S$GLB,, | Performed by: FAMILY MEDICINE

## 2023-11-10 PROCEDURE — 1159F PR MEDICATION LIST DOCUMENTED IN MEDICAL RECORD: ICD-10-PCS | Mod: CPTII,S$GLB,, | Performed by: FAMILY MEDICINE

## 2023-11-10 PROCEDURE — 4010F ACE/ARB THERAPY RXD/TAKEN: CPT | Mod: CPTII,S$GLB,, | Performed by: FAMILY MEDICINE

## 2023-11-10 PROCEDURE — 3077F SYST BP >= 140 MM HG: CPT | Mod: CPTII,S$GLB,, | Performed by: FAMILY MEDICINE

## 2023-11-10 PROCEDURE — 99214 PR OFFICE/OUTPT VISIT, EST, LEVL IV, 30-39 MIN: ICD-10-PCS | Mod: S$GLB,,, | Performed by: FAMILY MEDICINE

## 2023-11-10 PROCEDURE — 1160F PR REVIEW ALL MEDS BY PRESCRIBER/CLIN PHARMACIST DOCUMENTED: ICD-10-PCS | Mod: CPTII,S$GLB,, | Performed by: FAMILY MEDICINE

## 2023-11-10 PROCEDURE — 3008F PR BODY MASS INDEX (BMI) DOCUMENTED: ICD-10-PCS | Mod: CPTII,S$GLB,, | Performed by: FAMILY MEDICINE

## 2023-11-10 PROCEDURE — 4010F PR ACE/ARB THEARPY RXD/TAKEN: ICD-10-PCS | Mod: CPTII,S$GLB,, | Performed by: FAMILY MEDICINE

## 2023-11-10 PROCEDURE — 1125F PR PAIN SEVERITY QUANTIFIED, PAIN PRESENT: ICD-10-PCS | Mod: CPTII,S$GLB,, | Performed by: FAMILY MEDICINE

## 2023-11-10 PROCEDURE — 3008F BODY MASS INDEX DOCD: CPT | Mod: CPTII,S$GLB,, | Performed by: FAMILY MEDICINE

## 2023-11-10 PROCEDURE — 99214 OFFICE O/P EST MOD 30 MIN: CPT | Mod: S$GLB,,, | Performed by: FAMILY MEDICINE

## 2023-11-10 PROCEDURE — 99999 PR PBB SHADOW E&M-EST. PATIENT-LVL III: CPT | Mod: PBBFAC,,, | Performed by: FAMILY MEDICINE

## 2023-11-10 RX ORDER — SULFAMETHOXAZOLE AND TRIMETHOPRIM 800; 160 MG/1; MG/1
1 TABLET ORAL 2 TIMES DAILY
Qty: 20 TABLET | Refills: 0 | Status: SHIPPED | OUTPATIENT
Start: 2023-11-10 | End: 2023-11-20

## 2023-11-10 NOTE — PROGRESS NOTES
CHIEF COMPLAINT: This is a 73-year-old female complaining of pain and swelling of toe.     SUBJECTIVE:  The patient brought in today by her daughter.  She was noted to have swelling and tenderness of left big toe by PCA for at least 1 week.  There was some concern for possible gout.  There is no history of fever, chills, or injury.  Patient is currently being treated for UTI with nitrofurantoin and and tested positive for COVID-19 infection November 6, 4 days ago.  The patient was supposed to receive prescription for molnupiravir but never received medication because it was unavailable at the pharmacy.  The pharmacy reports availability of Paxlovid. The patient's daughter reports improvement in respiratory symptoms including cough.    The patient has progressive dementia with behavioral disturbance including agitation and difficulty sleeping at night.  She takes Namenda 5 mg twice daily, BuSpar 15 mg twice daily and Seroquel 100 mg at night. 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  Her blood pressure today is 140/86.  The patient has a history of recurrent UTIs and takes Flomax for urinary retention.  She has chronic kidney disease stage 3a.         The patient had left mastectomy for recurrent breast cancer in September 2020 and had previous breast cancer in 2017 and underwent lumpectomy and radiation followed by Arimidex.  She continues to take Arimidex status mastectomy. She takes meloxicam for osteoarthritis of hands.  Patient stopped smoking in August 2020 after 50 pack-year history of smoking. Patient had GI workup for iron deficiency anemia.  EGD findings:  gastric AVM.  Adenomatous polyps were found on colonoscopy.  Video capsule endoscopy was negative.  Patient takes iron supplement daily.     ROS:  GENERAL:  Patient denies fever, chills, night sweats. Patient denies weight loss. Patient denies anorexia, fatigue, or swollen glands.  Positive for weakness.  SKIN: Patient denies rash or hair loss.  HEENT: Patient denies sore throat, ear pain, hearing loss, nasal congestion, or runny nose. Patient denies visual disturbance, eye irritation or discharge.  LUNGS: Patient denies cough, wheeze or hemoptysis.  CARDIOVASCULAR: Patient denies chest pain, shortness of breath, palpitations, syncope or lower extremity edema.  GI: Patient denies abdominal pain, nausea, vomiting, diarrhea, constipation, blood in stool or melena.  GENITOURINARY: Patient denies pelvic pain, vaginal discharge, itch or odor. Patient denies irregular vaginal bleeding. Patient denies dysuria, frequency, hematuria, or nocturia.  Positive for urinary retention.  BREASTS: Patient denies breast pain, mass or nipple discharge.  MUSCULOSKELETAL: Patient denies joint swelling, redness or warmth.  Positive for joint pain and swelling.  NEUROLOGIC: Patient denies headache, vertigo, paresthesias, weakness in limb, dysarthria, dysphagia or abnormality of gait.  PSYCHIATRIC: Patient denies anxiety or depression.  Positive for memory loss.    OBJECTIVE:   GENERAL: Well-developed well-nourished black female alert and oriented x3, in no acute distress.  Moderate cognitive impairment.  Essentially nonverbal.  Seated in wheelchair.  SKIN: Clear without rash. Normal color and tone.  Ears: Clear canals.  Clear TMs.  Nose:  Without congestion.  Pharynx:  Without injection or exudates.  HEENT: Eyes: Clear conjunctivae.  No scleral icterus.  NECK: Supple, normal range of motion. No masses, lymphadenopathy or enlarged thyroid. No JVD.   LUNGS: Clear to auscultation. Normal respiratory effort.  CARDIOVASCULAR: Regular rhythm, normal S1, S2 without murmur, gallop or rub.  EXTREMITIES: Without cyanosis or clubbing.  Multiple hammertoes including left big toe. Swelling and erythema  dorsal surface of left great toe at PIP joint.  Tenderness to palpation.  Distal pulses 2+ and equal.  Decreased range lower extremities.    NEUROLOGIC:  Motor strengt without or h equal bilaterally. Sensation normal to touch.  Gait unsteady without support. No tremor    ASSESSMENT:  1. Cellulitis of toe of left foot    2. COVID-19 virus infection    3. Recurrent UTI    4. Major neurocognitive disorder due to Alzheimer's disease      PLAN:  1.  Hold nitrofurantoin.  E coli also sensitive to sulfamethoxazole.  2.  Start Bactrim DS 1 tablet twice daily   3.  Apply moist heat q.i.d. for 15-20 minutes.    4.  Elevate lower extremity.    5.  No need to treat come since patient is at least 5 days since start of symptoms and improving.  6.  Keep well hydrated.    7.  Follow-up if no improvement or worsening symptoms.    30 minutes of total time spent on the encounter, which includes face to face time and non-face to face time preparing to see the patient (eg, review of tests), Obtaining and/or reviewing separately obtained history, Documenting clinical information in the electronic or other health record, Independently interpreting results (not separately reported) and communicating results to the patient/family/caregiver, or Care coordination (not separately reported).     This note is generated with speech recognition software and is subject to transcription error and sound alike phrases that may be missed by proofreading.

## 2023-11-11 PROCEDURE — G0179 MD RECERTIFICATION HHA PT: HCPCS | Mod: ,,, | Performed by: FAMILY MEDICINE

## 2023-11-17 ENCOUNTER — OFFICE VISIT (OUTPATIENT)
Dept: INFECTIOUS DISEASES | Facility: CLINIC | Age: 73
End: 2023-11-17
Payer: MEDICARE

## 2023-11-17 ENCOUNTER — LAB VISIT (OUTPATIENT)
Dept: LAB | Facility: HOSPITAL | Age: 73
End: 2023-11-17
Attending: STUDENT IN AN ORGANIZED HEALTH CARE EDUCATION/TRAINING PROGRAM
Payer: MEDICARE

## 2023-11-17 VITALS
HEART RATE: 69 BPM | SYSTOLIC BLOOD PRESSURE: 138 MMHG | DIASTOLIC BLOOD PRESSURE: 83 MMHG | WEIGHT: 121 LBS | BODY MASS INDEX: 23.75 KG/M2 | HEIGHT: 60 IN

## 2023-11-17 DIAGNOSIS — N39.0 E-COLI UTI: ICD-10-CM

## 2023-11-17 DIAGNOSIS — L08.9 TOE INFECTION: ICD-10-CM

## 2023-11-17 DIAGNOSIS — B96.20 E-COLI UTI: ICD-10-CM

## 2023-11-17 DIAGNOSIS — M86.179 OTHER ACUTE OSTEOMYELITIS, UNSPECIFIED ANKLE AND FOOT: Primary | ICD-10-CM

## 2023-11-17 LAB
ANION GAP SERPL CALC-SCNC: 7 MMOL/L (ref 8–16)
BUN SERPL-MCNC: 14 MG/DL (ref 8–23)
CALCIUM SERPL-MCNC: 9.5 MG/DL (ref 8.7–10.5)
CHLORIDE SERPL-SCNC: 103 MMOL/L (ref 95–110)
CO2 SERPL-SCNC: 25 MMOL/L (ref 23–29)
CREAT SERPL-MCNC: 1.4 MG/DL (ref 0.5–1.4)
EST. GFR  (NO RACE VARIABLE): 40 ML/MIN/1.73 M^2
GLUCOSE SERPL-MCNC: 75 MG/DL (ref 70–110)
POTASSIUM SERPL-SCNC: 4.6 MMOL/L (ref 3.5–5.1)
SODIUM SERPL-SCNC: 135 MMOL/L (ref 136–145)

## 2023-11-17 PROCEDURE — 1126F PR PAIN SEVERITY QUANTIFIED, NO PAIN PRESENT: ICD-10-PCS | Mod: CPTII,S$GLB,, | Performed by: STUDENT IN AN ORGANIZED HEALTH CARE EDUCATION/TRAINING PROGRAM

## 2023-11-17 PROCEDURE — 3288F PR FALLS RISK ASSESSMENT DOCUMENTED: ICD-10-PCS | Mod: CPTII,S$GLB,, | Performed by: STUDENT IN AN ORGANIZED HEALTH CARE EDUCATION/TRAINING PROGRAM

## 2023-11-17 PROCEDURE — 3075F SYST BP GE 130 - 139MM HG: CPT | Mod: CPTII,S$GLB,, | Performed by: STUDENT IN AN ORGANIZED HEALTH CARE EDUCATION/TRAINING PROGRAM

## 2023-11-17 PROCEDURE — 3008F PR BODY MASS INDEX (BMI) DOCUMENTED: ICD-10-PCS | Mod: CPTII,S$GLB,, | Performed by: STUDENT IN AN ORGANIZED HEALTH CARE EDUCATION/TRAINING PROGRAM

## 2023-11-17 PROCEDURE — 3079F PR MOST RECENT DIASTOLIC BLOOD PRESSURE 80-89 MM HG: ICD-10-PCS | Mod: CPTII,S$GLB,, | Performed by: STUDENT IN AN ORGANIZED HEALTH CARE EDUCATION/TRAINING PROGRAM

## 2023-11-17 PROCEDURE — 36415 COLL VENOUS BLD VENIPUNCTURE: CPT | Performed by: STUDENT IN AN ORGANIZED HEALTH CARE EDUCATION/TRAINING PROGRAM

## 2023-11-17 PROCEDURE — 80048 BASIC METABOLIC PNL TOTAL CA: CPT | Performed by: STUDENT IN AN ORGANIZED HEALTH CARE EDUCATION/TRAINING PROGRAM

## 2023-11-17 PROCEDURE — 1101F PT FALLS ASSESS-DOCD LE1/YR: CPT | Mod: CPTII,S$GLB,, | Performed by: STUDENT IN AN ORGANIZED HEALTH CARE EDUCATION/TRAINING PROGRAM

## 2023-11-17 PROCEDURE — 1159F PR MEDICATION LIST DOCUMENTED IN MEDICAL RECORD: ICD-10-PCS | Mod: CPTII,S$GLB,, | Performed by: STUDENT IN AN ORGANIZED HEALTH CARE EDUCATION/TRAINING PROGRAM

## 2023-11-17 PROCEDURE — 1126F AMNT PAIN NOTED NONE PRSNT: CPT | Mod: CPTII,S$GLB,, | Performed by: STUDENT IN AN ORGANIZED HEALTH CARE EDUCATION/TRAINING PROGRAM

## 2023-11-17 PROCEDURE — 3008F BODY MASS INDEX DOCD: CPT | Mod: CPTII,S$GLB,, | Performed by: STUDENT IN AN ORGANIZED HEALTH CARE EDUCATION/TRAINING PROGRAM

## 2023-11-17 PROCEDURE — 99999 PR PBB SHADOW E&M-EST. PATIENT-LVL IV: CPT | Mod: PBBFAC,,, | Performed by: STUDENT IN AN ORGANIZED HEALTH CARE EDUCATION/TRAINING PROGRAM

## 2023-11-17 PROCEDURE — 1101F PR PT FALLS ASSESS DOC 0-1 FALLS W/OUT INJ PAST YR: ICD-10-PCS | Mod: CPTII,S$GLB,, | Performed by: STUDENT IN AN ORGANIZED HEALTH CARE EDUCATION/TRAINING PROGRAM

## 2023-11-17 PROCEDURE — 99214 OFFICE O/P EST MOD 30 MIN: CPT | Mod: S$GLB,,, | Performed by: STUDENT IN AN ORGANIZED HEALTH CARE EDUCATION/TRAINING PROGRAM

## 2023-11-17 PROCEDURE — 3079F DIAST BP 80-89 MM HG: CPT | Mod: CPTII,S$GLB,, | Performed by: STUDENT IN AN ORGANIZED HEALTH CARE EDUCATION/TRAINING PROGRAM

## 2023-11-17 PROCEDURE — 4010F ACE/ARB THERAPY RXD/TAKEN: CPT | Mod: CPTII,S$GLB,, | Performed by: STUDENT IN AN ORGANIZED HEALTH CARE EDUCATION/TRAINING PROGRAM

## 2023-11-17 PROCEDURE — 99999 PR PBB SHADOW E&M-EST. PATIENT-LVL IV: ICD-10-PCS | Mod: PBBFAC,,, | Performed by: STUDENT IN AN ORGANIZED HEALTH CARE EDUCATION/TRAINING PROGRAM

## 2023-11-17 PROCEDURE — 3075F PR MOST RECENT SYSTOLIC BLOOD PRESS GE 130-139MM HG: ICD-10-PCS | Mod: CPTII,S$GLB,, | Performed by: STUDENT IN AN ORGANIZED HEALTH CARE EDUCATION/TRAINING PROGRAM

## 2023-11-17 PROCEDURE — 99214 PR OFFICE/OUTPT VISIT, EST, LEVL IV, 30-39 MIN: ICD-10-PCS | Mod: S$GLB,,, | Performed by: STUDENT IN AN ORGANIZED HEALTH CARE EDUCATION/TRAINING PROGRAM

## 2023-11-17 PROCEDURE — 3288F FALL RISK ASSESSMENT DOCD: CPT | Mod: CPTII,S$GLB,, | Performed by: STUDENT IN AN ORGANIZED HEALTH CARE EDUCATION/TRAINING PROGRAM

## 2023-11-17 PROCEDURE — 4010F PR ACE/ARB THEARPY RXD/TAKEN: ICD-10-PCS | Mod: CPTII,S$GLB,, | Performed by: STUDENT IN AN ORGANIZED HEALTH CARE EDUCATION/TRAINING PROGRAM

## 2023-11-17 PROCEDURE — 1159F MED LIST DOCD IN RCRD: CPT | Mod: CPTII,S$GLB,, | Performed by: STUDENT IN AN ORGANIZED HEALTH CARE EDUCATION/TRAINING PROGRAM

## 2023-11-17 NOTE — PROGRESS NOTES
Infectious Disease Clinic Note    Patient Name: Leia Rodriguez  YOB: 1950    PRESENTING HISTORY       History of Present Illness:  Ms. Leia Rodriguez is a 73 y.o. female w/ significant PMHx of emphysema, CHF, pulmonary HTN, essential HTN, breast cancer, and bacteremia due to E coli who has been seeing ID as follow up for recent UTI. Patient initially seen on 9/20 with reports of incontinence. Urinalysis showed marked pyuria >100 as well as RBC. However, urine culture only grew CONS. Felt to be contaminant. Patient given single dose of Fosfomycin. Today she reports insurance did not cover fosfomycin so never received that dose. Daughter says back in July patient was septic due to bacteremia. Urine now beginning to smell odd. Patient with inverted uterus. Had tried Flomax. History of bladder prolapse. Agreeable to urology and GYN evaluation. Will hold additional antibiotics for now.  In and out cath performed 11/1 grew E coli. Patient given course of TMP/SMX. Was then seen at urgent care 11/6 and diagnosed with COVID. Not able to complete course of Paxlovid. Issues with pharmacy. Left big toe swollen per review of PCP note on 11/10. Was advised to apply moist heat. Patient not currently having pain in toe. Dry ulcer apparent. Has been on Bactrim for 7 days. Has 3 days remaining. Currently urinating without complications. Never has burning. Patient's daughter says left big toe was hurting prior to this week and more swollen compared with right. Now a dry ulcer. No drainage. Tolerating Bactrim. Asking if she should resume Macrobid after completing this treatment. Advised her not to take more antibiotic as UTI not suspected and would not provide good toe coverage. Daughter voiced understanding. Agreeable to left foot MRI to rule out bone infection. Will follow up after MRI.     Imaging and culture below reviewed and interpreted:     EXAMINATION:  US RETROPERITONEAL COMPLETE     CLINICAL HISTORY:  Urge  incontinence     TECHNIQUE:  Ultrasound of the kidneys and urinary bladder was performed including color flow and Doppler evaluation of the kidneys.     COMPARISON:  None.     FINDINGS:  Right kidney: The right kidney measures 9.5 cm. No cortical thinning. No loss of corticomedullary distinction. Resistive index measures 0.74.  There are 2 simple cysts at the upper pole with the larger measuring 1.6 cm.  No renal stone. No hydronephrosis.     Left kidney: The left kidney measures 9.8 cm. No cortical thinning. No loss of corticomedullary distinction. Resistive index measures 0.70.  Multiple simple cysts with the largest measuring 4.2 cm, at the midpole.  No renal stone. No hydronephrosis.     Bladder wall thickening measuring 6 mm.  No visualized bladder calculus.     Impression:     Bilateral simple renal cysts.     Nonspecific bladder wall thickening.        Electronically signed by: Adolph Mitchell  Date:                                            10/31/2023  Time:                                           14:50           Exam Ended: 10/31/23 14:43 Last Resulted: 10/31/23 14:50               Component 2 wk ago   Urine Culture, Routine  Abnormal   ESCHERICHIA COLI  >100,000 cfu/ml    Resulting Agency [facility]        Susceptibility     Escherichia coli     CULTURE, URINE     Amox/K Clav'ate >16/8 mcg/mL Resistant     Amp/Sulbactam 16/8 mcg/mL Intermediate     Ampicillin >16 mcg/mL Resistant     Cefazolin 16 mcg/mL Intermediate     Cefepime <=2 mcg/mL Sensitive     Ceftriaxone <=1 mcg/mL Sensitive     Ciprofloxacin <=1 mcg/mL Sensitive     Ertapenem <=0.5 mcg/mL Sensitive     Gentamicin <=4 mcg/mL Sensitive     Levofloxacin <=2 mcg/mL Sensitive     Meropenem <=1 mcg/mL Sensitive     Nitrofurantoin <=32 mcg/mL Sensitive     Piperacillin/Tazo <=16 mcg/mL Sensitive     Tobramycin <=4 mcg/mL Sensitive     Trimeth/Sulfa <=2/38 mcg/mL Sensitive               Linear View         Specimen Collected: 11/01/23 15:18 Last  Resulted: 11/04/23 21:03             Review of Systems:  Constitutional: no fever or chills  Eyes: no visual changes  ENT: no nasal congestion or sore throat  Respiratory: no cough or shortness of breath  Cardiovascular: no chest pain  Gastrointestinal: no nausea or vomiting, no abdominal pain, no constipation, no diarrhea  Genitourinary: no hematuria or dysuria  Musculoskeletal: no arthralgias or myalgias  Skin: left big toe ulceration; dry; no pain today   Neurological: no headaches, numbness, or paresthesias    The following portions of the patient's history were reviewed and updated as appropriate: allergies, current medications, past family history, past medical history, past social history, past surgical history, and problem list.    PAST HISTORY:     Immunization History   Administered Date(s) Administered    COVID-19, MRNA, LN-S, PF (Pfizer) (Purple Cap) 03/05/2021, 03/26/2021, 01/03/2022    Influenza (FLUAD) - Quadrivalent - Adjuvanted - PF *Preferred* (65+) 11/11/2020, 10/01/2021, 10/26/2022, 10/31/2023    Influenza - High Dose - PF (65 years and older) 11/08/2017, 12/06/2018, 01/22/2020    Influenza - Quadrivalent 09/24/2015, 02/20/2017    Pneumococcal Conjugate - 13 Valent 02/20/2017    Pneumococcal Polysaccharide - 23 Valent 09/03/2019    Tdap 03/11/2013       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    Lipoma of abdominal wall     Major neurocognitive disorder 06/21/2022    Obesity     Overactive bladder     Pap smear abnormality of cervix with ASCUS favoring benign     Thyroid nodule     Tobacco use disorder     Tubular adenoma of colon     Urinary incontinence        Past Surgical History:   Procedure Laterality Date     ANTERIOR VAGINAL REPAIR      BLADDER SURGERY      BREAST LUMPECTOMY Left 2017    CATARACT EXTRACTION Left 2022     SECTION      X2    COLONOSCOPY N/A 3/8/2017    Procedure: COLONOSCOPY;  Surgeon: Tyron Paris MD;  Location: [facility] ENDO;  Service: Endoscopy;  Laterality: N/A;    COLONOSCOPY N/A 2020    Procedure: COLONOSCOPY;  Surgeon: Keira Ellison MD;  Location: [facility] ENDO;  Service: Endoscopy;  Laterality: N/A;    ESOPHAGOGASTRODUODENOSCOPY N/A 2020    Procedure: EGD (ESOPHAGOGASTRODUODENOSCOPY);  Surgeon: Keira Ellison MD;  Location: [facility] ENDO;  Service: Endoscopy;  Laterality: N/A;  new onset iron deficiency with prior history of breast cancer    INTRALUMINAL GASTROINTESTINAL TRACT IMAGING VIA CAPSULE N/A 10/28/2020    Procedure: IMAGING PROCEDURE, GI TRACT, INTRALUMINAL, VIA CAPSULE;  Surgeon: Finesse Jha RN;  Location: [facility] ENDO;  Service: Endoscopy;  Laterality: N/A;    LEFT HEART CATHETERIZATION Left 10/12/2021    Procedure: CATHETERIZATION, HEART, LEFT;  Surgeon: Tiffanie Santos MD;  Location: [facility] CATH LAB;  Service: Cardiology;  Laterality: Left;    SENTINEL LYMPH NODE BIOPSY Left 2020    Procedure: BIOPSY, LYMPH NODE, SENTINEL;  Surgeon: Vincent Moyer MD;  Location: [facility] OR;  Service: General;  Laterality: Left;    SIMPLE MASTECTOMY Left 2020    Procedure: MASTECTOMY, SIMPLE;  Surgeon: Vincent Moyer MD;  Location: [facility] OR;  Service: General;  Laterality: Left;    THYROID LOBECTOMY Left     TOTAL ABDOMINAL HYSTERECTOMY      TUBAL LIGATION         Family History   Problem Relation Age of Onset    Hypertension Father     Cataracts Father     Prostate cancer Father 80    Cervical cancer Daughter 32    Allergic rhinitis Daughter     Cirrhosis Mother     Alcohol abuse Mother     Hypertension Daughter     Diabetes Brother     Heart attack Brother     Heart murmur Brother     No Known Problems Daughter        Social History     Socioeconomic History    Marital  status:    Tobacco Use    Smoking status: Former     Current packs/day: 0.00     Average packs/day: 1 pack/day for 50.0 years (50.0 ttl pk-yrs)     Types: Cigarettes     Start date: 8/17/1970     Quit date: 8/17/2020     Years since quitting: 3.2    Smokeless tobacco: Never   Substance and Sexual Activity    Alcohol use: Never     Alcohol/week: 28.0 standard drinks of alcohol     Types: 28 Cans of beer per week    Drug use: No    Sexual activity: Never     Partners: Male   Social History Narrative    The patient is .  She is retired from [employer].     Social Determinants of Health     Financial Resource Strain: Low Risk  (11/10/2023)    Overall Financial Resource Strain (CARDIA)     Difficulty of Paying Living Expenses: Not very hard   Food Insecurity: Food Insecurity Present (11/10/2023)    Hunger Vital Sign     Worried About Running Out of Food in the Last Year: Never true     Ran Out of Food in the Last Year: Sometimes true   Transportation Needs: No Transportation Needs (11/10/2023)    PRAPARE - Transportation     Lack of Transportation (Medical): No     Lack of Transportation (Non-Medical): No   Physical Activity: Inactive (11/10/2023)    Exercise Vital Sign     Days of Exercise per Week: 0 days     Minutes of Exercise per Session: 0 min   Stress: Stress Concern Present (11/10/2023)    [employer] - Occupational Stress Questionnaire     Feeling of Stress : To some extent   Social Connections: Socially Integrated (11/10/2023)    Social Connection and Isolation Panel [NHANES]     Frequency of Communication with Friends and Family: More than three times a week     Frequency of Social Gatherings with Friends and Family: More than three times a week     Attends [employer] Services: More than 4 times per year     Active Member of Clubs or Organizations: Yes     Attends Club or Organization Meetings: Never     Marital Status:    Housing Stability: Low Risk   (11/10/2023)    Housing Stability Vital Sign     Unable to Pay for Housing in the Last Year: No     Number of Places Lived in the Last Year: 1     Unstable Housing in the Last Year: No       MEDICATIONS & ALLERGIES:     Current Outpatient Medications on File Prior to Visit   Medication Sig    aluminum-magnesium hydroxide-simethicone 200-200-20 mg/5 mL Susp, diphenhydrAMINE 12.5 mg/5 mL Liqd Swish and spit 5 mLs every 4 (four) hours as needed (discomfort).    anastrozole (ARIMIDEX) 1 mg Tab TAKE 1 TABLET BY MOUTH EVERY DAY    aspirin 81 MG Chew Take 1 tablet (81 mg total) by mouth once daily.    busPIRone (BUSPAR) 15 MG tablet TAKE 1 TABLET BY MOUTH 2 TIMES DAILY.    calcium citrate (CALCITRATE) 200 mg (950 mg) tablet Take 1 tablet by mouth once daily.    cyanocobalamin 2000 MCG tablet Take 2,000 mcg by mouth 2 (two) times a day.    estradioL (ESTRACE) 0.01 % (0.1 mg/gram) vaginal cream Place 4 g vaginally once daily.    ferrous sulfate (FEOSOL) 325 mg (65 mg iron) Tab tablet Take 65 mg by mouth once daily.    flu vac 2023 65up-iteMQ21B,PF, (FLUAD QUAD 2023-24,65Y UP,,PF,) 60 mcg (15 mcg x 4)/0.5 mL Syrg Inject 0.5 mLs into the muscle.    furosemide (LASIX) 20 MG tablet TAKE 1 TABLET BY MOUTH TWICE A DAY    isosorbide mononitrate (IMDUR) 30 MG 24 hr tablet TAKE 1 TABLET BY MOUTH EVERY DAY    memantine (NAMENDA) 5 MG Tab Take 1 tablet (5 mg total) by mouth 2 (two) times daily.    metoprolol succinate (TOPROL-XL) 25 MG 24 hr tablet Take 1 tablet (25 mg total) by mouth once daily.    QUEtiapine (SEROQUEL) 100 MG Tab Take 1 tablet (100 mg total) by mouth every evening.    sulfamethoxazole-trimethoprim 800-160mg (BACTRIM DS) 800-160 mg Tab Take 1 tablet by mouth 2 (two) times daily. for 10 days    telmisartan (MICARDIS) 20 MG Tab Take 1 tablet (20 mg total) by mouth once daily.    vitamin D 1000 units Tab Take 1,000 Units by mouth once daily.     No current facility-administered medications on file prior to visit.        Review of patient's allergies indicates:  No Known Allergies    OBJECTIVE:   Vital Signs:  Vitals:    11/17/23 0915   BP: 138/83   Pulse: 69   Weight: 54.9 kg (121 lb)   Height: 5' (1.524 m)       No results found for this or any previous visit (from the past 24 hour(s)).      Physical Exam:   General:  Well developed, well nourished, no acute distress; in wheelchair   HEENT:  Normocephalic, atraumatic  CVS:  RRR, S1 and S2 normal, no murmurs, rubs, gallops  Resp:  Lungs clear to auscultation, no wheezes, rales, rhonchi  MSK:  No muscle atrophy, peripheral edema, full range of motion  Skin:  photo below   Psych:  Alert and oriented to person, place, and time        ASSESSMENT:     Essential hypertension  Continue current antihypertensives. Follow up with PCP.      Urinary incontinence  --Recommend urology and GYN follow up to evaluate incontinence   --Advise not collecting clean catch urinalysis if UTI suspected in the future  --Only in/out cath from now on; foul odor not indication for UTI rule out   --Recommend cystoscopy to look for lesions   --Renal US was unrevealing   --Continue vitamin C daily as well as Estrace as preventive measures   --Follow up with ID in 1 month      Chronic combined systolic and diastolic CHF (congestive heart failure)  Continue diuretic. Follow up with PCP.       PLAN:     Diagnoses and all orders for this visit:    Other acute osteomyelitis, unspecified ankle and foot  -     MRI Foot Toes W WO Contrast Left; Future    E-coli UTI  -     BASIC METABOLIC PANEL; Future    Toe infection      The total time for evaluation and management services performed on 11/17/23 was greater than 30 minutes.        Raman Estrada, DO   Infectious Diseases

## 2023-11-17 NOTE — ASSESSMENT & PLAN NOTE
--Left big toe ulceration  --Completing 10 day course of Bactrim via PCP  --Will obtain MRI with/without contrast of left foot to rule out osteomyelitis   --Conservative management for now   --Follow up with ID after MRI

## 2023-11-24 ENCOUNTER — HOSPITAL ENCOUNTER (OUTPATIENT)
Dept: RADIOLOGY | Facility: HOSPITAL | Age: 73
Discharge: HOME | End: 2023-11-24
Attending: STUDENT IN AN ORGANIZED HEALTH CARE EDUCATION/TRAINING PROGRAM
Payer: MEDICARE

## 2023-11-24 DIAGNOSIS — M86.179 OTHER ACUTE OSTEOMYELITIS, UNSPECIFIED ANKLE AND FOOT: ICD-10-CM

## 2023-11-24 PROCEDURE — 73720 MRI LWR EXTREMITY W/O&W/DYE: CPT | Mod: TC,PN,LT

## 2023-11-24 PROCEDURE — 73720 MRI FOOT TOES W WO CONTRAST LEFT: ICD-10-PCS | Mod: 26,LT,, | Performed by: RADIOLOGY

## 2023-11-24 PROCEDURE — 73720 MRI LWR EXTREMITY W/O&W/DYE: CPT | Mod: 26,LT,, | Performed by: RADIOLOGY

## 2023-11-24 PROCEDURE — 25500020 PHARM REV CODE 255: Mod: PN | Performed by: STUDENT IN AN ORGANIZED HEALTH CARE EDUCATION/TRAINING PROGRAM

## 2023-11-24 PROCEDURE — A9585 GADOBUTROL INJECTION: HCPCS | Mod: PN | Performed by: STUDENT IN AN ORGANIZED HEALTH CARE EDUCATION/TRAINING PROGRAM

## 2023-11-24 RX ORDER — GADOBUTROL 604.72 MG/ML
5 INJECTION INTRAVENOUS
Status: COMPLETED | OUTPATIENT
Start: 2023-11-24 | End: 2023-11-24

## 2023-11-24 RX ADMIN — GADOBUTROL 5 ML: 604.72 INJECTION INTRAVENOUS at 01:11

## 2023-11-27 ENCOUNTER — PROCEDURE VISIT (OUTPATIENT)
Dept: UROLOGY | Facility: CLINIC | Age: 73
End: 2023-11-27
Payer: MEDICARE

## 2023-11-27 VITALS
WEIGHT: 121 LBS | DIASTOLIC BLOOD PRESSURE: 60 MMHG | SYSTOLIC BLOOD PRESSURE: 110 MMHG | HEART RATE: 79 BPM | BODY MASS INDEX: 23.63 KG/M2

## 2023-11-27 DIAGNOSIS — N39.0 RECURRENT UTI: Primary | ICD-10-CM

## 2023-11-27 DIAGNOSIS — R33.9 URINARY RETENTION: ICD-10-CM

## 2023-11-27 PROCEDURE — 52000 PR CYSTOURETHROSCOPY: ICD-10-PCS | Mod: S$GLB,,, | Performed by: UROLOGY

## 2023-11-27 PROCEDURE — 52000 CYSTOURETHROSCOPY: CPT | Mod: S$GLB,,, | Performed by: UROLOGY

## 2023-11-27 RX ORDER — CIPROFLOXACIN 500 MG/1
500 TABLET ORAL
Status: COMPLETED | OUTPATIENT
Start: 2023-11-27 | End: 2023-11-27

## 2023-11-27 RX ORDER — LIDOCAINE HYDROCHLORIDE 20 MG/ML
JELLY TOPICAL
Status: COMPLETED | OUTPATIENT
Start: 2023-11-27 | End: 2023-11-27

## 2023-11-27 RX ORDER — BETHANECHOL CHLORIDE 5 MG/1
5 TABLET ORAL 3 TIMES DAILY
Qty: 90 TABLET | Refills: 11 | Status: SHIPPED | OUTPATIENT
Start: 2023-11-27 | End: 2024-11-26

## 2023-11-27 RX ADMIN — LIDOCAINE HYDROCHLORIDE 10 ML: 20 JELLY TOPICAL at 11:11

## 2023-11-27 RX ADMIN — CIPROFLOXACIN 500 MG: 500 TABLET ORAL at 11:11

## 2023-11-27 NOTE — PROGRESS NOTES
Per Dr. Quinonez oral ciprofloxacin 500 MG was given to pt. Pt instructed to remain in clinic for 15 minutes to monitor for signs & symptoms of adverse reaction. Pt verbalized understanding.      Loraine Milanes RN

## 2023-11-27 NOTE — PROCEDURES
Procedure: Cystoscopy    Reason for procedure: recurrent UTI    Workup has included: renal US showing simple cysts and thickened bladder wall.     Procedure in detail:   Informed consent was obtained. The patient's genitalia was prepped and draped in the usual sterile fashion. Lidocaine jelly was administered per urethra. I utilized the flexible cystoscope and advanced it into the urethral meatus. Bladder access was obtained. Systematic review of the bladder was performed, noting no stones, foreign bodies or masses. There was trabeculation present and the bladder was already distended upon entry. Bilateral ureteral orifices were visualized and noted to be effluxing clear urine. Retroflexion was utilized to visualize the bladder neck, noting no lesions. The scope was slowly backed out of the urethra, noting no urethral lesions. The patient tolerated the procedure well. Estimated blood loss was 0cc.       Bladder scan following procedure (with no water instilled) was 299cc    Leia was seen today for other.    Diagnoses and all orders for this visit:    Recurrent UTI    Urinary retention    Other orders  -     ciprofloxacin HCl tablet 500 mg  -     LIDOcaine HCl 2% urojet  -     bethanechol (URECHOLINE) 5 MG Tab; Take 1 tablet (5 mg total) by mouth 3 (three) times daily.        Follow up in about 3 months (around 2/27/2024) for PVR on arrival.

## 2023-12-03 ENCOUNTER — HOSPITAL ENCOUNTER (EMERGENCY)
Facility: HOSPITAL | Age: 73
Discharge: HOME | End: 2023-12-03
Attending: EMERGENCY MEDICINE
Payer: MEDICARE

## 2023-12-03 VITALS
WEIGHT: 130.5 LBS | DIASTOLIC BLOOD PRESSURE: 65 MMHG | HEART RATE: 70 BPM | TEMPERATURE: 99 F | BODY MASS INDEX: 25.49 KG/M2 | OXYGEN SATURATION: 97 % | SYSTOLIC BLOOD PRESSURE: 133 MMHG | RESPIRATION RATE: 19 BRPM

## 2023-12-03 DIAGNOSIS — N39.0 URINARY TRACT INFECTION WITH HEMATURIA, SITE UNSPECIFIED: Primary | ICD-10-CM

## 2023-12-03 DIAGNOSIS — R31.9 URINARY TRACT INFECTION WITH HEMATURIA, SITE UNSPECIFIED: Primary | ICD-10-CM

## 2023-12-03 LAB
ALBUMIN SERPL BCP-MCNC: 3.9 G/DL (ref 3.5–5.2)
ALP SERPL-CCNC: 82 U/L (ref 55–135)
ALT SERPL W/O P-5'-P-CCNC: 10 U/L (ref 10–44)
ANION GAP SERPL CALC-SCNC: 15 MMOL/L (ref 8–16)
AST SERPL-CCNC: 11 U/L (ref 10–40)
BASOPHILS # BLD AUTO: 0.05 K/UL (ref 0–0.2)
BASOPHILS NFR BLD: 0.5 % (ref 0–1.9)
BILIRUB SERPL-MCNC: 0.6 MG/DL (ref 0.1–1)
BILIRUB UR QL STRIP: NEGATIVE
BUN SERPL-MCNC: 14 MG/DL (ref 8–23)
CALCIUM SERPL-MCNC: 9.6 MG/DL (ref 8.7–10.5)
CHLORIDE SERPL-SCNC: 108 MMOL/L (ref 95–110)
CLARITY UR: CLEAR
CO2 SERPL-SCNC: 21 MMOL/L (ref 23–29)
COLOR UR: YELLOW
CREAT SERPL-MCNC: 0.9 MG/DL (ref 0.5–1.4)
DIFFERENTIAL METHOD: ABNORMAL
EOSINOPHIL # BLD AUTO: 0.4 K/UL (ref 0–0.5)
EOSINOPHIL NFR BLD: 4.1 % (ref 0–8)
ERYTHROCYTE [DISTWIDTH] IN BLOOD BY AUTOMATED COUNT: 13.7 % (ref 11.5–14.5)
EST. GFR  (NO RACE VARIABLE): >60 ML/MIN/1.73 M^2
GLUCOSE SERPL-MCNC: 92 MG/DL (ref 70–110)
GLUCOSE UR QL STRIP: NEGATIVE
HCT VFR BLD AUTO: 34.9 % (ref 37–48.5)
HGB BLD-MCNC: 11.1 G/DL (ref 12–16)
HGB UR QL STRIP: NEGATIVE
IMM GRANULOCYTES # BLD AUTO: 0.04 K/UL (ref 0–0.04)
IMM GRANULOCYTES NFR BLD AUTO: 0.4 % (ref 0–0.5)
KETONES UR QL STRIP: NEGATIVE
LEUKOCYTE ESTERASE UR QL STRIP: ABNORMAL
LYMPHOCYTES # BLD AUTO: 2 K/UL (ref 1–4.8)
LYMPHOCYTES NFR BLD: 19.9 % (ref 18–48)
MCH RBC QN AUTO: 29.4 PG (ref 27–31)
MCHC RBC AUTO-ENTMCNC: 31.8 G/DL (ref 32–36)
MCV RBC AUTO: 92 FL (ref 82–98)
MICROSCOPIC COMMENT: ABNORMAL
MONOCYTES # BLD AUTO: 0.7 K/UL (ref 0.3–1)
MONOCYTES NFR BLD: 7 % (ref 4–15)
NEUTROPHILS # BLD AUTO: 6.8 K/UL (ref 1.8–7.7)
NEUTROPHILS NFR BLD: 68.1 % (ref 38–73)
NITRITE UR QL STRIP: NEGATIVE
NRBC BLD-RTO: 0 /100 WBC
PH UR STRIP: 6 [PH] (ref 5–8)
PLATELET # BLD AUTO: 338 K/UL (ref 150–450)
PMV BLD AUTO: 10 FL (ref 9.2–12.9)
POTASSIUM SERPL-SCNC: 3.5 MMOL/L (ref 3.5–5.1)
PROT SERPL-MCNC: 8.2 G/DL (ref 6–8.4)
PROT UR QL STRIP: ABNORMAL
RBC # BLD AUTO: 3.78 M/UL (ref 4–5.4)
RBC #/AREA URNS HPF: 2 /HPF (ref 0–4)
SODIUM SERPL-SCNC: 144 MMOL/L (ref 136–145)
SP GR UR STRIP: 1.02 (ref 1–1.03)
URN SPEC COLLECT METH UR: ABNORMAL
UROBILINOGEN UR STRIP-ACNC: NEGATIVE EU/DL
WBC # BLD AUTO: 9.94 K/UL (ref 3.9–12.7)
WBC #/AREA URNS HPF: 16 /HPF (ref 0–5)
YEAST URNS QL MICRO: ABNORMAL

## 2023-12-03 PROCEDURE — 63600175 PHARM REV CODE 636 W HCPCS: Performed by: EMERGENCY MEDICINE

## 2023-12-03 PROCEDURE — 96365 THER/PROPH/DIAG IV INF INIT: CPT

## 2023-12-03 PROCEDURE — 99285 EMERGENCY DEPT VISIT HI MDM: CPT | Mod: 25

## 2023-12-03 PROCEDURE — 87088 URINE BACTERIA CULTURE: CPT | Performed by: NURSE PRACTITIONER

## 2023-12-03 PROCEDURE — 87086 URINE CULTURE/COLONY COUNT: CPT | Performed by: NURSE PRACTITIONER

## 2023-12-03 PROCEDURE — 81000 URINALYSIS NONAUTO W/SCOPE: CPT | Performed by: NURSE PRACTITIONER

## 2023-12-03 PROCEDURE — 85025 COMPLETE CBC W/AUTO DIFF WBC: CPT | Performed by: NURSE PRACTITIONER

## 2023-12-03 PROCEDURE — 25000003 PHARM REV CODE 250: Performed by: EMERGENCY MEDICINE

## 2023-12-03 PROCEDURE — 80053 COMPREHEN METABOLIC PANEL: CPT | Performed by: NURSE PRACTITIONER

## 2023-12-03 RX ORDER — CEFDINIR 300 MG/1
300 CAPSULE ORAL 2 TIMES DAILY
Qty: 20 CAPSULE | Refills: 0 | Status: SHIPPED | OUTPATIENT
Start: 2023-12-03 | End: 2023-12-15

## 2023-12-03 RX ORDER — CEFDINIR 300 MG/1
300 CAPSULE ORAL 2 TIMES DAILY
Qty: 20 CAPSULE | Refills: 0 | Status: SHIPPED | OUTPATIENT
Start: 2023-12-03 | End: 2023-12-03 | Stop reason: SDUPTHER

## 2023-12-03 RX ADMIN — CEFTRIAXONE 1 G: 1 INJECTION, POWDER, FOR SOLUTION INTRAMUSCULAR; INTRAVENOUS at 09:12

## 2023-12-03 NOTE — FIRST PROVIDER EVALUATION
Emergency Department TeleTriage Encounter Note      CHIEF COMPLAINT    Chief Complaint   Patient presents with    Excessive Bleeding     Pt with history of dementia and incontinence had a large amount of blood in her brief on being changed this morning.  Pt denies complaints.       VITAL SIGNS   Initial Vitals [12/03/23 1443]   BP Pulse Resp Temp SpO2   111/60 84 18 99 °F (37.2 °C) 99 %      MAP       --            ALLERGIES    Review of patient's allergies indicates:  No Known Allergies    PROVIDER TRIAGE NOTE  This is a teletriage evaluation of a 73 y.o. female presenting to the ED complaining of blood noted in pt's diaper today.  Blood was noted with BM. Pt has pmhx of dementia so daughter speaks for patient. No anticoagulant use. Pt's daughter reports that she has been constipated recently so she was giving miralax.  No reported abd pain or fever.     Alert, no distress.     Initial orders will be placed and care will be transferred to an alternate provider when patient is roomed for a full evaluation. Any additional orders and the final disposition will be determined by that provider.         ORDERS  Labs Reviewed - No data to display    ED Orders (720h ago, onward)      Start Ordered     Status Ordering Provider    Unscheduled 12/03/23 1626  CBC auto differential  STAT         Ordered ULYSSES GERMAIN    Unscheduled 12/03/23 1626  Comprehensive metabolic panel  STAT         Ordered ULYSSES GERMAIN    Unscheduled 12/03/23 1626  Insert Saline lock IV  Once         Ordered ULYSSES GERMAIN    Unscheduled 12/03/23 1626  Urinalysis, Reflex to Urine Culture Urine, Clean Catch  STAT         Ordered ULYSSES GERMAIN              Virtual Visit Note: The provider triage portion of this emergency department evaluation and documentation was performed via [employer], a HIPAA-compliant telemedicine application, in concert with a tele-presenter in the room. A face to face patient  evaluation with one of my colleagues will occur once the patient is placed in an emergency department room.      DISCLAIMER: This note was prepared with [email or web address] voice recognition transcription software. Garbled syntax, mangled pronouns, and other bizarre constructions may be attributed to that software system.

## 2023-12-04 NOTE — ED PROVIDER NOTES
SCRIBE #1 NOTE: I, Julito Lorenzana, am scribing for, and in the presence of, Tristian Duong Do, MD. I have scribed the entire note.       History     Chief Complaint   Patient presents with    Excessive Bleeding     Pt with history of dementia and incontinence had a large amount of blood in her brief on being changed this morning.  Pt denies complaints.     Review of patient's allergies indicates:  No Known Allergies      History of Present Illness     HPI    12/3/2023, 6:37 PM  History obtained from the patient's daughter      History of Present Illness: Leia Rodriguez is a 73 y.o. female patient with a PMHx of a pinched nerve with urinary incontinence and numbness to groin who presents to the Emergency Department for evaluation of excessive bleeding which onset gradually today. Patient's daughter states patient noticed copious blood on diaper today Symptoms are constant and moderate in severity. No mitigating or exacerbating factors reported. Associated sxs include . Patient denies any vomiting, and all other sxs at this time. No further complaints or concerns at this time.     External note reviewed: Procedural note with Dr. Quinonez of Urology on 11/27 shown normal bladder on cystoscopy.        Arrival mode: Personal vehicle    PCP: Yasmin Navarro MD        Past Medical History:  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    Lipoma of abdominal wall     Major neurocognitive disorder 06/21/2022    Obesity     Overactive bladder     Pap smear abnormality of cervix with ASCUS favoring benign     Thyroid nodule     Tobacco use disorder     Tubular adenoma of colon     Urinary incontinence        Past Surgical History:  Past Surgical History:    Procedure Laterality Date    ANTERIOR VAGINAL REPAIR      BLADDER SURGERY      BREAST LUMPECTOMY Left 2017    CATARACT EXTRACTION Left 2022     SECTION      X2    COLONOSCOPY N/A 3/8/2017    Procedure: COLONOSCOPY;  Surgeon: Tyron Paris MD;  Location: [facility] ENDO;  Service: Endoscopy;  Laterality: N/A;    COLONOSCOPY N/A 2020    Procedure: COLONOSCOPY;  Surgeon: Keira Ellison MD;  Location: [facility] ENDO;  Service: Endoscopy;  Laterality: N/A;    ESOPHAGOGASTRODUODENOSCOPY N/A 2020    Procedure: EGD (ESOPHAGOGASTRODUODENOSCOPY);  Surgeon: Keira Ellison MD;  Location: [facility] ENDO;  Service: Endoscopy;  Laterality: N/A;  new onset iron deficiency with prior history of breast cancer    INTRALUMINAL GASTROINTESTINAL TRACT IMAGING VIA CAPSULE N/A 10/28/2020    Procedure: IMAGING PROCEDURE, GI TRACT, INTRALUMINAL, VIA CAPSULE;  Surgeon: Finesse Jha RN;  Location: [facility] ENDO;  Service: Endoscopy;  Laterality: N/A;    LEFT HEART CATHETERIZATION Left 10/12/2021    Procedure: CATHETERIZATION, HEART, LEFT;  Surgeon: Tiffanie Santos MD;  Location: [facility] CATH LAB;  Service: Cardiology;  Laterality: Left;    SENTINEL LYMPH NODE BIOPSY Left 2020    Procedure: BIOPSY, LYMPH NODE, SENTINEL;  Surgeon: Vincent Moyer MD;  Location: [facility] OR;  Service: General;  Laterality: Left;    SIMPLE MASTECTOMY Left 2020    Procedure: MASTECTOMY, SIMPLE;  Surgeon: Vincent Moyer MD;  Location: [facility] OR;  Service: General;  Laterality: Left;    THYROID LOBECTOMY Left     TOTAL ABDOMINAL HYSTERECTOMY      TUBAL LIGATION           Family History:  Family History   Problem Relation Age of Onset    Hypertension Father     Cataracts Father     Prostate cancer Father 80    Cervical cancer Daughter 32    Allergic rhinitis Daughter     Cirrhosis Mother     Alcohol abuse Mother     Hypertension Daughter     Diabetes Brother     Heart attack Brother     Heart murmur Brother     No Known Problems Daughter         Social History:  Social History     Tobacco Use    Smoking status: Former     Current packs/day: 0.00     Average packs/day: 1 pack/day for 50.0 years (50.0 ttl pk-yrs)     Types: Cigarettes     Start date: 8/17/1970     Quit date: 8/17/2020     Years since quitting: 3.2    Smokeless tobacco: Never   Substance and Sexual Activity    Alcohol use: Never     Alcohol/week: 28.0 standard drinks of alcohol     Types: 28 Cans of beer per week    Drug use: No    Sexual activity: Never     Partners: Male        Review of Systems     Review of Systems   Unable to perform ROS: Dementia   Genitourinary:         (+) Bleeding from groin area per daughter        Physical Exam     Initial Vitals [12/03/23 1443]   BP Pulse Resp Temp SpO2   111/60 84 18 99 °F (37.2 °C) 99 %      MAP       --          Physical Exam   Nursing Notes and Vital Signs Reviewed.  Constitutional: Patient is in no acute distress. Well-developed and well-nourished.  Head: Atraumatic. Normocephalic.  Eyes: PERRL. EOM intact. Conjunctivae are not pale. No scleral icterus.  ENT: Mucous membranes are moist. Oropharynx is clear and symmetric.    Neck: Supple. Full ROM. No lymphadenopathy.  Cardiovascular: Regular rate. Regular rhythm. No murmurs, rubs, or gallops. Distal pulses are 2+ and symmetric.  Pulmonary/Chest: No respiratory distress. Clear to auscultation bilaterally. No wheezing or rales.  Abdominal: Soft and non-distended.  There is no tenderness.  No rebound, guarding, or rigidity.   Pelvic: A female chaperone was present for this examination. There is obvious bleeding from the urethra. There is fullness to suprapubic region.    Musculoskeletal: Moves all extremities. No obvious deformities. No edema. No calf tenderness.  Skin: Warm and dry.  Neurological:  Alert, awake, and appropriate.  Normal speech.  No acute focal neurological deficits are appreciated.  Psychiatric: Normal affect. Good eye contact. Appropriate in content.     ED Course    Procedures  ED Vital Signs:  Vitals:    12/03/23 1443 12/03/23 1823 12/03/23 1843 12/03/23 1900   BP: 111/60 (!) 181/79  (!) 151/67   Pulse: 84 84  70   Resp: 18 (!) 21  17   Temp: 99 °F (37.2 °C)      TempSrc: Oral      SpO2: 99% 99%  99%   Weight:   59.2 kg (130 lb 8.2 oz)     12/03/23 2000 12/03/23 2100 12/03/23 2242   BP: (!) 155/70 131/63 133/65   Pulse: 67 68 70   Resp: 17 19 19   Temp:   98.9 °F (37.2 °C)   TempSrc:   Oral   SpO2: 99% 98% 97%   Weight:          Abnormal Lab Results:  Labs Reviewed   CBC W/ AUTO DIFFERENTIAL - Abnormal; Notable for the following components:       Result Value    RBC 3.78 (*)     Hemoglobin 11.1 (*)     Hematocrit 34.9 (*)     MCHC 31.8 (*)     All other components within normal limits   COMPREHENSIVE METABOLIC PANEL - Abnormal; Notable for the following components:    CO2 21 (*)     All other components within normal limits   URINALYSIS, REFLEX TO URINE CULTURE - Abnormal; Notable for the following components:    Protein, UA Trace (*)     Leukocytes, UA 2+ (*)     All other components within normal limits    Narrative:     Specimen Source->Urine   URINALYSIS MICROSCOPIC - Abnormal; Notable for the following components:    WBC, UA 16 (*)     Yeast, UA Rare (*)     All other components within normal limits    Narrative:     Specimen Source->Urine   CULTURE, URINE   URINALYSIS, REFLEX TO URINE CULTURE        All Lab Results:  Results for orders placed or performed during the hospital encounter of 12/03/23   CBC auto differential   Result Value Ref Range    WBC 9.94 3.90 - 12.70 K/uL    RBC 3.78 (L) 4.00 - 5.40 M/uL    Hemoglobin 11.1 (L) 12.0 - 16.0 g/dL    Hematocrit 34.9 (L) 37.0 - 48.5 %    MCV 92 82 - 98 fL    MCH 29.4 27.0 - 31.0 pg    MCHC 31.8 (L) 32.0 - 36.0 g/dL    RDW 13.7 11.5 - 14.5 %    Platelets 338 150 - 450 K/uL    MPV 10.0 9.2 - 12.9 fL    Immature Granulocytes 0.4 0.0 - 0.5 %    Gran # (ANC) 6.8 1.8 - 7.7 K/uL    Immature Grans (Abs) 0.04 0.00 - 0.04 K/uL     Lymph # 2.0 1.0 - 4.8 K/uL    Mono # 0.7 0.3 - 1.0 K/uL    Eos # 0.4 0.0 - 0.5 K/uL    Baso # 0.05 0.00 - 0.20 K/uL    nRBC 0 0 /100 WBC    Gran % 68.1 38.0 - 73.0 %    Lymph % 19.9 18.0 - 48.0 %    Mono % 7.0 4.0 - 15.0 %    Eosinophil % 4.1 0.0 - 8.0 %    Basophil % 0.5 0.0 - 1.9 %    Differential Method Automated    Comprehensive metabolic panel   Result Value Ref Range    Sodium 144 136 - 145 mmol/L    Potassium 3.5 3.5 - 5.1 mmol/L    Chloride 108 95 - 110 mmol/L    CO2 21 (L) 23 - 29 mmol/L    Glucose 92 70 - 110 mg/dL    BUN 14 8 - 23 mg/dL    Creatinine 0.9 0.5 - 1.4 mg/dL    Calcium 9.6 8.7 - 10.5 mg/dL    Total Protein 8.2 6.0 - 8.4 g/dL    Albumin 3.9 3.5 - 5.2 g/dL    Total Bilirubin 0.6 0.1 - 1.0 mg/dL    Alkaline Phosphatase 82 55 - 135 U/L    AST 11 10 - 40 U/L    ALT 10 10 - 44 U/L    eGFR >60 >60 mL/min/1.73 m^2    Anion Gap 15 8 - 16 mmol/L   Urinalysis, Reflex to Urine Culture Urine, Clean Catch    Specimen: Urine   Result Value Ref Range    Specimen UA Urine, Clean Catch     Color, UA Yellow Yellow, Straw, Cynthia    Appearance, UA Clear Clear    pH, UA 6.0 5.0 - 8.0    Specific Gravity, UA 1.020 1.005 - 1.030    Protein, UA Trace (A) Negative    Glucose, UA Negative Negative    Ketones, UA Negative Negative    Bilirubin (UA) Negative Negative    Occult Blood UA Negative Negative    Nitrite, UA Negative Negative    Urobilinogen, UA Negative <2.0 EU/dL    Leukocytes, UA 2+ (A) Negative   Urinalysis Microscopic   Result Value Ref Range    RBC, UA 2 0 - 4 /hpf    WBC, UA 16 (H) 0 - 5 /hpf    Yeast, UA Rare (A) None    Microscopic Comment SEE COMMENT          Imaging Results:  Imaging Results              CT Renal Stone Study ABD Pelvis WO (Final result)  Result time 12/03/23 22:21:49      Final result by Skyler Pineda MD (12/03/23 22:21:49)                   Impression:      Bladder wall thickening concerning for infection.  Correlation with urinalysis.    Constipation favored over  impaction.    Complete findings as above.    All CT scans at this facility are performed  using dose modulation techniques as appropriate to performed exam including the following:  automated exposure control; adjustment of mA and/or kV according to the patients size (this includes techniques or standardized protocols for targeted exams where dose is matched to indication/reason for exam: i.e. extremities or head);  iterative reconstruction technique.      Electronically signed by: Skyler Pineda  Date:    12/03/2023  Time:    22:21               Narrative:    EXAMINATION:  CT RENAL STONE STUDY ABD PELVIS WO    CLINICAL HISTORY:  Flank pain, kidney stone suspected;Nephrolithiasis, symptomatic/complicated;    TECHNIQUE:  Low dose axial images, sagittal and coronal reformations were obtained from the lung bases to the pubic symphysis.  Contrast was not administered.    COMPARISON:  Multiple priors.    FINDINGS:  Heart: Normal in size. No pericardial effusion.    Lung Bases: Well aerated, without consolidation or pleural fluid.    Liver: Normal in size and attenuation, with no focal hepatic lesions.    Gallbladder: Sludge in the gallbladder.  No findings for acute cholecystitis.    Bile Ducts: No evidence of dilated ducts.    Pancreas: No mass or peripancreatic fat stranding.    Spleen: Previously noted splenic lesion not well delineated on this noncontrast exam.    Adrenals: Unremarkable.    Kidneys/ Ureters: Nonobstructive nephrolithiasis.  Bilateral simple renal cysts.    Bladder: Bladder wall thickening.  Correlation with urinalysis to exclude infection.    Reproductive organs: Status post hysterectomy.    GI Tract/Mesentery: No evidence of bowel obstruction or inflammation.  Probable constipation.  Impaction less favored but not excluded.    Peritoneal Space: No ascites. No free air.    Retroperitoneum: No significant adenopathy.    Abdominal wall: Unremarkable.    Vasculature: Aortoiliac atherosclerosis.  No  aneurysm.    Bones: No acute fracture.  Multifocal osseous degenerative changes.                                       The Emergency Provider reviewed the vital signs and test results, which are outlined above.     ED Discussion     10:28 PM: Reassessed pt at this time. Patient's pharmacy did not have Ciprofloxacin in stock. Will start patient on cefdinir. Discussed with pt all pertinent ED information and results. Discussed pt dx and plan of tx. Gave pt all f/u and return to the ED instructions. Chart was sent to Dr. Quinonez for review. All questions and concerns were addressed at this time. Pt expresses understanding of information and instructions, and is comfortable with plan to discharge. Pt is stable for discharge.    I discussed with patient and/or family/caretaker that evaluation in the ED does not suggest any emergent or life threatening medical conditions requiring immediate intervention beyond what was provided in the ED, and I believe patient is safe for discharge.  Regardless, an unremarkable evaluation in the ED does not preclude the development or presence of a serious of life threatening condition. As such, patient was instructed to return immediately for any worsening or change in current symptoms.         Medical Decision Making  Patient with hematuria    Amount and/or Complexity of Data Reviewed  Independent Historian:      Details: Daughter  Labs: ordered. Decision-making details documented in ED Course.  Radiology: ordered. Decision-making details documented in ED Course.  Discussion of management or test interpretation with external provider(s): Pt with UTI, Ct no acute.  Patient's pharmacy did not have Ciprofloxacin in stock which was prescribed by Dr. Quinonez. Will start patient on cefdinir. Received Rocephin IV in ER.  Discussed with family and pt all pertinent ED information and results. Discussed pt dx and plan of tx. Gave pt all f/u and return to the ED instructions. Chart was sent to  Dr. Quinonez for review    Risk  Prescription drug management.  Risk Details: Differential diagnosis;  Gastroenteritis, Bowel obstruction, Colitis, Diverticulitis, Cholecystitis, Appendicitis, Perforated bowel, Infectious etiology, UTI, Pyelonephritis,  kidney stone                    ED Medication(s):  Medications   cefTRIAXone (ROCEPHIN) 1 g in dextrose 5 % in water (D5W) 100 mL IVPB (MB+) (0 g Intravenous Stopped 12/3/23 2150)       Discharge Medication List as of 12/3/2023 10:31 PM           Follow-up Information       Yasmin Navarro MD. Schedule an appointment as soon as possible for a visit in 2 days.    Specialty: Family Medicine  Contact information:  [address] [facility] [address] [phone]               Yenni Quinonez MD. Schedule an appointment as soon as possible for a visit in 2 days.    Specialty: Urology  Contact information:  [address] [facility] [id number] [phone]                                 Scribe Attestation:   Scribe #1: I performed the above scribed service and the documentation accurately describes the services I performed. I attest to the accuracy of the note.     Attending:   Physician Attestation Statement for Scribe #1: I, Tristian Duong Do, MD, personally performed the services described in this documentation, as scribed by Julito Lorenzana, in my presence, and it is both accurate and complete.           Clinical Impression       ICD-10-CM ICD-9-CM   1. Urinary tract infection with hematuria, site unspecified  N39.0 599.0    R31.9 599.70       Disposition:   Disposition: Discharged  Condition: Stable         Tristian Alexander MD  12/03/23 0553

## 2023-12-05 LAB — BACTERIA UR CULT: ABNORMAL

## 2023-12-07 RX ORDER — DONEPEZIL HYDROCHLORIDE 5 MG/1
5 TABLET, FILM COATED ORAL NIGHTLY
Qty: 90 TABLET | Refills: 3 | Status: SHIPPED | OUTPATIENT
Start: 2023-12-07

## 2023-12-07 RX ORDER — QUETIAPINE FUMARATE 100 MG/1
100 TABLET, FILM COATED ORAL NIGHTLY
Qty: 90 TABLET | Refills: 3 | Status: SHIPPED | OUTPATIENT
Start: 2023-12-07

## 2023-12-12 ENCOUNTER — OFFICE VISIT (OUTPATIENT)
Dept: CARDIOLOGY | Facility: CLINIC | Age: 73
End: 2023-12-12
Payer: MEDICARE

## 2023-12-12 VITALS
DIASTOLIC BLOOD PRESSURE: 60 MMHG | SYSTOLIC BLOOD PRESSURE: 117 MMHG | BODY MASS INDEX: 23.55 KG/M2 | WEIGHT: 120.56 LBS

## 2023-12-12 DIAGNOSIS — J43.8 OTHER EMPHYSEMA: ICD-10-CM

## 2023-12-12 DIAGNOSIS — I70.0 ATHEROSCLEROSIS OF AORTA: ICD-10-CM

## 2023-12-12 DIAGNOSIS — I25.118 CORONARY ARTERY DISEASE OF NATIVE ARTERY OF NATIVE HEART WITH STABLE ANGINA PECTORIS: Primary | Chronic | ICD-10-CM

## 2023-12-12 DIAGNOSIS — I27.20 PULMONARY HTN: ICD-10-CM

## 2023-12-12 DIAGNOSIS — I50.42 CHRONIC COMBINED SYSTOLIC AND DIASTOLIC CHF (CONGESTIVE HEART FAILURE): Chronic | ICD-10-CM

## 2023-12-12 DIAGNOSIS — I10 ESSENTIAL HYPERTENSION: Chronic | ICD-10-CM

## 2023-12-12 DIAGNOSIS — I73.9 PVD (PERIPHERAL VASCULAR DISEASE): ICD-10-CM

## 2023-12-12 DIAGNOSIS — R93.1 DECREASED CARDIAC EJECTION FRACTION: ICD-10-CM

## 2023-12-12 PROCEDURE — 3078F PR MOST RECENT DIASTOLIC BLOOD PRESSURE < 80 MM HG: ICD-10-PCS | Mod: CPTII,S$GLB,, | Performed by: INTERNAL MEDICINE

## 2023-12-12 PROCEDURE — 3074F PR MOST RECENT SYSTOLIC BLOOD PRESSURE < 130 MM HG: ICD-10-PCS | Mod: CPTII,S$GLB,, | Performed by: INTERNAL MEDICINE

## 2023-12-12 PROCEDURE — 1101F PT FALLS ASSESS-DOCD LE1/YR: CPT | Mod: CPTII,S$GLB,, | Performed by: INTERNAL MEDICINE

## 2023-12-12 PROCEDURE — 3008F BODY MASS INDEX DOCD: CPT | Mod: CPTII,S$GLB,, | Performed by: INTERNAL MEDICINE

## 2023-12-12 PROCEDURE — 1160F RVW MEDS BY RX/DR IN RCRD: CPT | Mod: CPTII,S$GLB,, | Performed by: INTERNAL MEDICINE

## 2023-12-12 PROCEDURE — 99214 PR OFFICE/OUTPT VISIT, EST, LEVL IV, 30-39 MIN: ICD-10-PCS | Mod: S$GLB,,, | Performed by: INTERNAL MEDICINE

## 2023-12-12 PROCEDURE — 99999 PR PBB SHADOW E&M-EST. PATIENT-LVL IV: ICD-10-PCS | Mod: PBBFAC,,, | Performed by: INTERNAL MEDICINE

## 2023-12-12 PROCEDURE — 1159F MED LIST DOCD IN RCRD: CPT | Mod: CPTII,S$GLB,, | Performed by: INTERNAL MEDICINE

## 2023-12-12 PROCEDURE — 3288F FALL RISK ASSESSMENT DOCD: CPT | Mod: CPTII,S$GLB,, | Performed by: INTERNAL MEDICINE

## 2023-12-12 PROCEDURE — 3078F DIAST BP <80 MM HG: CPT | Mod: CPTII,S$GLB,, | Performed by: INTERNAL MEDICINE

## 2023-12-12 PROCEDURE — 4010F PR ACE/ARB THEARPY RXD/TAKEN: ICD-10-PCS | Mod: CPTII,S$GLB,, | Performed by: INTERNAL MEDICINE

## 2023-12-12 PROCEDURE — 3074F SYST BP LT 130 MM HG: CPT | Mod: CPTII,S$GLB,, | Performed by: INTERNAL MEDICINE

## 2023-12-12 PROCEDURE — 99214 OFFICE O/P EST MOD 30 MIN: CPT | Mod: S$GLB,,, | Performed by: INTERNAL MEDICINE

## 2023-12-12 PROCEDURE — 4010F ACE/ARB THERAPY RXD/TAKEN: CPT | Mod: CPTII,S$GLB,, | Performed by: INTERNAL MEDICINE

## 2023-12-12 PROCEDURE — 1159F PR MEDICATION LIST DOCUMENTED IN MEDICAL RECORD: ICD-10-PCS | Mod: CPTII,S$GLB,, | Performed by: INTERNAL MEDICINE

## 2023-12-12 PROCEDURE — 3008F PR BODY MASS INDEX (BMI) DOCUMENTED: ICD-10-PCS | Mod: CPTII,S$GLB,, | Performed by: INTERNAL MEDICINE

## 2023-12-12 PROCEDURE — 1160F PR REVIEW ALL MEDS BY PRESCRIBER/CLIN PHARMACIST DOCUMENTED: ICD-10-PCS | Mod: CPTII,S$GLB,, | Performed by: INTERNAL MEDICINE

## 2023-12-12 PROCEDURE — 99999 PR PBB SHADOW E&M-EST. PATIENT-LVL IV: CPT | Mod: PBBFAC,,, | Performed by: INTERNAL MEDICINE

## 2023-12-12 PROCEDURE — 1101F PR PT FALLS ASSESS DOC 0-1 FALLS W/OUT INJ PAST YR: ICD-10-PCS | Mod: CPTII,S$GLB,, | Performed by: INTERNAL MEDICINE

## 2023-12-12 PROCEDURE — 3288F PR FALLS RISK ASSESSMENT DOCUMENTED: ICD-10-PCS | Mod: CPTII,S$GLB,, | Performed by: INTERNAL MEDICINE

## 2023-12-12 NOTE — PROGRESS NOTES
Subjective:   Patient ID:  Leia Rodriguez is a 73 y.o. female who presents for follow up of No chief complaint on file.      74 yo female, 4 months f/u  PMH CAD, CHFmrEF, h/o beast cancer 4 yrs ago lumpectomy and chemo, recurrent in  s/p mastectomy and chemo ongoing, and HTN, dementia lower back pain wheelchair helps  Quit smoking in  after 50 yrs smoking.  Some residual left chest pain after mastectomy,   GRISSOM and fatigue after fast walking,   No palpitation, leg swelling, dizziness and faint.    ekg in  NSR and TWI on V2 to v6 and inferior leads   ECHO normal EF and severe LVH   ECHo EF 45%, mod MR and LAE  Weight stable    03/2021 admitted for CHF exacerbation.  and Troponin 0.44 flat. elg NSR and TWI on lateral leads. Improved SOB after diuresis. Then BNP dropped to 58  Now SOB sable. Sleeps on 1 pillow    07/2021 visit   echo Day 15, cycle 6 Biplane=44% 4D LVQ=45% Avg GPLS= -16.1   MPI inferoapical scar  GRISSOM while long distance walking  No chest pain, dizziness faint, leg swelling  Decent appetite  F/u with Dr. Fernández on stage I HER2 positive breast carcinoma being treated with Herceptin q. 3 weeks     visit  Dynamical TWI on EKG   MPI showed apical inferior fixed perfusion defect  No chest pain . Limited walking due to lower back pain  No orthopnea      visit  No chest pain. GRISSOM mild and chronic. Limited activity due to fatigue and leg pain.   S/p LHC done on 10/12/2021, distal % OM2/3 40% and midRCA 50% lesion and EF 45% and LVEDP 17 mmHG. Moderate MR. Continue medical Rx  Serial echo studies    Echo 2/22/21 Cycle 4 Day 20  4dLVQ=47%  AutoEF=48%  BRIDGETT unable to obtain accurate measurements   5-26-21  Day 15, cycle 6  Biplane=44%  4D LVQ=45%  Avg GPLS= -16.1   9-13-21 Day 20, Cycle 11  Biplane=42%  4D LVQ=49%  Avg GPLS= -14.8%    12/2021 visit  11/ went to ER [facility]. EKG sinus tachy and PVCs. BNP up to 800. CXR pulm. Edema.  Added lasix 20 mg daily  Now dyspnea improved. No leg swelling chest pain. Sleeps on 1 pillow and no PND.   On iron infusion rx fro anemia     visit  Improved appetite after held chemo due to decreased EF about .   Gained the weight. No SOB, sleeps on 2 pillows     visit   Twice ER visits for not feeling well, SOB, the lab and CRX mri unremarkable, except some +UTI.Occurred at night and the family concerning anxiety ?  EKG in  NSR PACs     visit  Per daughter, pt c/o SOB + orthopnea and leg swelling. Lost 13 lbs in 6 months. Had teeth work recently, dementia slightyl worse. Some mood disturbance. Talked to family member.      visit   admitted for UTI and sepsis. Low BP and d/c ARB.   Cr 1.0 before d/c  Today BP low.    ekg sinus tachy and PACs; echo EF 50% LVH  No dizziness faint sob and chest pain.  to 120 mmHG  RLE edema, worse at sitting and better after laying     10/05/23 addendum   Report low BP  Stop aldactone  And decrease ToprolXL from 50 mg daily to 25 mg daily    Interval history  No orthopnea dizziness. No fall   echo EF 50% PAP 43 mmHG        Past Medical History:   Diagnosis Date    Arthritis     EDGAR HANDS, KNEES    Behavioral change 2022    Blind right eye     Traumatic    Breast cancer 06/15/2017    0.8 cm Grade1 INTRADUCTAL BREAST 9 positve margin (left)    Essential hypertension     Hemorrhoids     Immunodeficiency due to chemotherapy 2021    Lipoma of abdominal wall     Major neurocognitive disorder 2022    Obesity     Overactive bladder     Pap smear abnormality of cervix with ASCUS favoring benign     Thyroid nodule     Tobacco use disorder     Tubular adenoma of colon     Urinary incontinence        Past Surgical History:   Procedure Laterality Date    ANTERIOR VAGINAL REPAIR      BLADDER SURGERY      BREAST LUMPECTOMY Left 2017    CATARACT EXTRACTION Left 2022     SECTION      X2    COLONOSCOPY N/A  3/8/2017    Procedure: COLONOSCOPY;  Surgeon: Tyron Paris MD;  Location: [facility] ENDO;  Service: Endoscopy;  Laterality: N/A;    COLONOSCOPY N/A 8/26/2020    Procedure: COLONOSCOPY;  Surgeon: Keira Ellison MD;  Location: [facility] ENDO;  Service: Endoscopy;  Laterality: N/A;    ESOPHAGOGASTRODUODENOSCOPY N/A 8/26/2020    Procedure: EGD (ESOPHAGOGASTRODUODENOSCOPY);  Surgeon: Keira Ellison MD;  Location: [facility] ENDO;  Service: Endoscopy;  Laterality: N/A;  new onset iron deficiency with prior history of breast cancer    INTRALUMINAL GASTROINTESTINAL TRACT IMAGING VIA CAPSULE N/A 10/28/2020    Procedure: IMAGING PROCEDURE, GI TRACT, INTRALUMINAL, VIA CAPSULE;  Surgeon: Finesse Jha RN;  Location: [facility] ENDO;  Service: Endoscopy;  Laterality: N/A;    LEFT HEART CATHETERIZATION Left 10/12/2021    Procedure: CATHETERIZATION, HEART, LEFT;  Surgeon: Tiffanie Santos MD;  Location: [facility] CATH LAB;  Service: Cardiology;  Laterality: Left;    SENTINEL LYMPH NODE BIOPSY Left 9/8/2020    Procedure: BIOPSY, LYMPH NODE, SENTINEL;  Surgeon: Vincent Moyer MD;  Location: [facility] OR;  Service: General;  Laterality: Left;    SIMPLE MASTECTOMY Left 9/8/2020    Procedure: MASTECTOMY, SIMPLE;  Surgeon: Vincent Moyer MD;  Location: [facility] OR;  Service: General;  Laterality: Left;    THYROID LOBECTOMY Left 2005    TOTAL ABDOMINAL HYSTERECTOMY      TUBAL LIGATION         Social History     Tobacco Use    Smoking status: Former     Current packs/day: 0.00     Average packs/day: 1 pack/day for 50.0 years (50.0 ttl pk-yrs)     Types: Cigarettes     Start date: 8/17/1970     Quit date: 8/17/2020     Years since quitting: 3.3    Smokeless tobacco: Never   Substance Use Topics    Alcohol use: Never     Alcohol/week: 28.0 standard drinks of alcohol     Types: 28 Cans of beer per week    Drug use: No       Family History   Problem Relation Age of Onset    Hypertension Father     Cataracts Father     Prostate cancer Father 80    Cervical cancer  Daughter 32    Allergic rhinitis Daughter     Cirrhosis Mother     Alcohol abuse Mother     Hypertension Daughter     Diabetes Brother     Heart attack Brother     Heart murmur Brother     No Known Problems Daughter          ROS    Objective:   Physical Exam  HENT:      Head: Normocephalic.   Eyes:      Pupils: Pupils are equal, round, and reactive to light.   Neck:      Thyroid: No thyromegaly.      Vascular: Normal carotid pulses. No carotid bruit or JVD.   Cardiovascular:      Rate and Rhythm: Normal rate and regular rhythm. Frequent Extrasystoles are present.     Chest Wall: PMI is not displaced.      Pulses: Normal pulses.           Carotid pulses are 2+ on the right side and 2+ on the left side.     Heart sounds: Normal heart sounds. No murmur heard.     No gallop. No S3 sounds.   Pulmonary:      Effort: No respiratory distress.      Breath sounds: Normal breath sounds. No stridor.   Abdominal:      General: Bowel sounds are normal.      Palpations: Abdomen is soft.      Tenderness: There is no abdominal tenderness. There is no rebound.   Musculoskeletal:      Right lower leg: No edema.   Skin:     General: Skin is warm and dry.      Findings: No rash.   Neurological:      Mental Status: She is alert.         Lab Results   Component Value Date    CHOL 192 07/14/2023    CHOL 119 (L) 03/31/2021    CHOL 207 (H) 09/03/2019     Lab Results   Component Value Date    HDL 35 (L) 07/14/2023    HDL 46 03/31/2021    HDL 53 09/03/2019     Lab Results   Component Value Date    LDLCALC 128.0 07/14/2023    LDLCALC 61.2 (L) 03/31/2021    LDLCALC 134.2 09/03/2019     Lab Results   Component Value Date    TRIG 145 07/14/2023    TRIG 59 03/31/2021    TRIG 99 09/03/2019     Lab Results   Component Value Date    CHOLHDL 18.2 (L) 07/14/2023    CHOLHDL 38.7 03/31/2021    CHOLHDL 25.6 09/03/2019       Chemistry        Component Value Date/Time     12/03/2023 1633    K 3.5 12/03/2023 1633     12/03/2023 1633    CO2 21 (L)  "12/03/2023 1633    BUN 14 12/03/2023 1633    CREATININE 0.9 12/03/2023 1633    GLU 92 12/03/2023 1633        Component Value Date/Time    CALCIUM 9.6 12/03/2023 1633    ALKPHOS 82 12/03/2023 1633    AST 11 12/03/2023 1633    ALT 10 12/03/2023 1633    BILITOT 0.6 12/03/2023 1633    ESTGFRAFRICA 48.0 (A) 06/20/2022 1020    EGFRNONAA 41.7 (A) 06/20/2022 1020          No results found for: "LABA1C", "HGBA1C"  Lab Results   Component Value Date    TSH 0.765 01/27/2022     Lab Results   Component Value Date    INR 1.1 10/01/2021     Lab Results   Component Value Date    WBC 9.94 12/03/2023    HGB 11.1 (L) 12/03/2023    HCT 34.9 (L) 12/03/2023    MCV 92 12/03/2023     12/03/2023     BMP  Sodium   Date Value Ref Range Status   12/03/2023 144 136 - 145 mmol/L Final     Potassium   Date Value Ref Range Status   12/03/2023 3.5 3.5 - 5.1 mmol/L Final     Chloride   Date Value Ref Range Status   12/03/2023 108 95 - 110 mmol/L Final     CO2   Date Value Ref Range Status   12/03/2023 21 (L) 23 - 29 mmol/L Final     BUN   Date Value Ref Range Status   12/03/2023 14 8 - 23 mg/dL Final     Creatinine   Date Value Ref Range Status   12/03/2023 0.9 0.5 - 1.4 mg/dL Final     Calcium   Date Value Ref Range Status   12/03/2023 9.6 8.7 - 10.5 mg/dL Final     Anion Gap   Date Value Ref Range Status   12/03/2023 15 8 - 16 mmol/L Final     eGFR if    Date Value Ref Range Status   06/20/2022 48.0 (A) >60 mL/min/1.73 m^2 Final     eGFR if non    Date Value Ref Range Status   06/20/2022 41.7 (A) >60 mL/min/1.73 m^2 Final     Comment:     Calculation used to obtain the estimated glomerular filtration  rate (eGFR) is the CKD-EPI equation.        BNP  @LABRCNTIP(BNP,BNPTRIAGEBLO)@  @LABRCNTIP(troponini)@  CrCl cannot be calculated (Patient's most recent lab result is older than the maximum 7 days allowed.).  No results found in the last 24 hours.  No results found in the last 24 hours.  No results found in the " last 24 hours.    Assessment:      1. Coronary artery disease of native artery of native heart with stable angina pectoris    2. Essential hypertension    3. Chronic combined systolic and diastolic CHF (congestive heart failure)    4. Decreased cardiac ejection fraction    5. Atherosclerosis of aorta    6. Pulmonary HTN    7. PVD (peripheral vascular disease)    8. Other emphysema        Plan:   Continue elmisartan 20 mg daily and toprolXL 50 mg for HTN CHF and sinus tachy with PACs  Continue ASA and imdur  Continue Lasix as needed for PVD    Counseled DASH  Check Lipid profile with PCP in 6 months  Recommend heart-healthy diet, weight control and regular exercise.  Dipti. Risk modification.   I have reviewed all pertinent labs and cardiac studies independently. Plans and recommendations have been formulated under my direct supervision. All questions answered and patient voiced understanding.   If symptoms persist go to the ED  RTC in 6 months

## 2023-12-14 ENCOUNTER — OFFICE VISIT (OUTPATIENT)
Dept: INFECTIOUS DISEASES | Facility: CLINIC | Age: 73
End: 2023-12-14
Payer: MEDICARE

## 2023-12-14 VITALS
DIASTOLIC BLOOD PRESSURE: 98 MMHG | WEIGHT: 120.56 LBS | SYSTOLIC BLOOD PRESSURE: 176 MMHG | HEIGHT: 60 IN | BODY MASS INDEX: 23.67 KG/M2 | HEART RATE: 64 BPM

## 2023-12-14 DIAGNOSIS — I10 ESSENTIAL HYPERTENSION: ICD-10-CM

## 2023-12-14 DIAGNOSIS — N39.41 URGE INCONTINENCE OF URINE: Primary | ICD-10-CM

## 2023-12-14 DIAGNOSIS — L08.9 TOE INFECTION: ICD-10-CM

## 2023-12-14 DIAGNOSIS — I50.42 CHRONIC COMBINED SYSTOLIC AND DIASTOLIC CHF (CONGESTIVE HEART FAILURE): ICD-10-CM

## 2023-12-14 PROCEDURE — 3080F PR MOST RECENT DIASTOLIC BLOOD PRESSURE >= 90 MM HG: ICD-10-PCS | Mod: CPTII,S$GLB,, | Performed by: STUDENT IN AN ORGANIZED HEALTH CARE EDUCATION/TRAINING PROGRAM

## 2023-12-14 PROCEDURE — 1101F PT FALLS ASSESS-DOCD LE1/YR: CPT | Mod: CPTII,S$GLB,, | Performed by: STUDENT IN AN ORGANIZED HEALTH CARE EDUCATION/TRAINING PROGRAM

## 2023-12-14 PROCEDURE — 3077F PR MOST RECENT SYSTOLIC BLOOD PRESSURE >= 140 MM HG: ICD-10-PCS | Mod: CPTII,S$GLB,, | Performed by: STUDENT IN AN ORGANIZED HEALTH CARE EDUCATION/TRAINING PROGRAM

## 2023-12-14 PROCEDURE — 1101F PR PT FALLS ASSESS DOC 0-1 FALLS W/OUT INJ PAST YR: ICD-10-PCS | Mod: CPTII,S$GLB,, | Performed by: STUDENT IN AN ORGANIZED HEALTH CARE EDUCATION/TRAINING PROGRAM

## 2023-12-14 PROCEDURE — 3077F SYST BP >= 140 MM HG: CPT | Mod: CPTII,S$GLB,, | Performed by: STUDENT IN AN ORGANIZED HEALTH CARE EDUCATION/TRAINING PROGRAM

## 2023-12-14 PROCEDURE — 4010F ACE/ARB THERAPY RXD/TAKEN: CPT | Mod: CPTII,S$GLB,, | Performed by: STUDENT IN AN ORGANIZED HEALTH CARE EDUCATION/TRAINING PROGRAM

## 2023-12-14 PROCEDURE — 3008F BODY MASS INDEX DOCD: CPT | Mod: CPTII,S$GLB,, | Performed by: STUDENT IN AN ORGANIZED HEALTH CARE EDUCATION/TRAINING PROGRAM

## 2023-12-14 PROCEDURE — 99214 PR OFFICE/OUTPT VISIT, EST, LEVL IV, 30-39 MIN: ICD-10-PCS | Mod: S$GLB,,, | Performed by: STUDENT IN AN ORGANIZED HEALTH CARE EDUCATION/TRAINING PROGRAM

## 2023-12-14 PROCEDURE — 1159F MED LIST DOCD IN RCRD: CPT | Mod: CPTII,S$GLB,, | Performed by: STUDENT IN AN ORGANIZED HEALTH CARE EDUCATION/TRAINING PROGRAM

## 2023-12-14 PROCEDURE — 3008F PR BODY MASS INDEX (BMI) DOCUMENTED: ICD-10-PCS | Mod: CPTII,S$GLB,, | Performed by: STUDENT IN AN ORGANIZED HEALTH CARE EDUCATION/TRAINING PROGRAM

## 2023-12-14 PROCEDURE — 99999 PR PBB SHADOW E&M-EST. PATIENT-LVL IV: ICD-10-PCS | Mod: PBBFAC,,, | Performed by: STUDENT IN AN ORGANIZED HEALTH CARE EDUCATION/TRAINING PROGRAM

## 2023-12-14 PROCEDURE — 99999 PR PBB SHADOW E&M-EST. PATIENT-LVL IV: CPT | Mod: PBBFAC,,, | Performed by: STUDENT IN AN ORGANIZED HEALTH CARE EDUCATION/TRAINING PROGRAM

## 2023-12-14 PROCEDURE — 99214 OFFICE O/P EST MOD 30 MIN: CPT | Mod: S$GLB,,, | Performed by: STUDENT IN AN ORGANIZED HEALTH CARE EDUCATION/TRAINING PROGRAM

## 2023-12-14 PROCEDURE — 4010F PR ACE/ARB THEARPY RXD/TAKEN: ICD-10-PCS | Mod: CPTII,S$GLB,, | Performed by: STUDENT IN AN ORGANIZED HEALTH CARE EDUCATION/TRAINING PROGRAM

## 2023-12-14 PROCEDURE — 3080F DIAST BP >= 90 MM HG: CPT | Mod: CPTII,S$GLB,, | Performed by: STUDENT IN AN ORGANIZED HEALTH CARE EDUCATION/TRAINING PROGRAM

## 2023-12-14 PROCEDURE — 3288F PR FALLS RISK ASSESSMENT DOCUMENTED: ICD-10-PCS | Mod: CPTII,S$GLB,, | Performed by: STUDENT IN AN ORGANIZED HEALTH CARE EDUCATION/TRAINING PROGRAM

## 2023-12-14 PROCEDURE — 1159F PR MEDICATION LIST DOCUMENTED IN MEDICAL RECORD: ICD-10-PCS | Mod: CPTII,S$GLB,, | Performed by: STUDENT IN AN ORGANIZED HEALTH CARE EDUCATION/TRAINING PROGRAM

## 2023-12-14 PROCEDURE — 3288F FALL RISK ASSESSMENT DOCD: CPT | Mod: CPTII,S$GLB,, | Performed by: STUDENT IN AN ORGANIZED HEALTH CARE EDUCATION/TRAINING PROGRAM

## 2023-12-14 RX ORDER — MUPIROCIN 20 MG/G
OINTMENT TOPICAL 2 TIMES DAILY
Qty: 22 G | Refills: 1 | Status: SHIPPED | OUTPATIENT
Start: 2023-12-14 | End: 2024-01-13

## 2024-01-03 DIAGNOSIS — Z17.0 MALIGNANT NEOPLASM OF LOWER-INNER QUADRANT OF LEFT BREAST IN FEMALE, ESTROGEN RECEPTOR POSITIVE: Primary | ICD-10-CM

## 2024-01-03 DIAGNOSIS — C50.312 MALIGNANT NEOPLASM OF LOWER-INNER QUADRANT OF LEFT BREAST IN FEMALE, ESTROGEN RECEPTOR POSITIVE: Primary | ICD-10-CM

## 2024-01-17 ENCOUNTER — EXTERNAL HOME HEALTH (OUTPATIENT)
Dept: HOME HEALTH SERVICES | Facility: HOSPITAL | Age: 74
End: 2024-01-17
Payer: MEDICARE

## 2024-01-31 ENCOUNTER — OFFICE VISIT (OUTPATIENT)
Dept: UROLOGY | Facility: CLINIC | Age: 74
End: 2024-01-31
Payer: MEDICARE

## 2024-01-31 VITALS
WEIGHT: 120 LBS | HEART RATE: 77 BPM | BODY MASS INDEX: 23.56 KG/M2 | SYSTOLIC BLOOD PRESSURE: 108 MMHG | HEIGHT: 60 IN | DIASTOLIC BLOOD PRESSURE: 59 MMHG

## 2024-01-31 DIAGNOSIS — Z87.440 HISTORY OF RECURRENT UTIS: Primary | ICD-10-CM

## 2024-01-31 PROCEDURE — 99214 OFFICE O/P EST MOD 30 MIN: CPT | Mod: S$GLB,,, | Performed by: NURSE PRACTITIONER

## 2024-01-31 PROCEDURE — 99999 PR PBB SHADOW E&M-EST. PATIENT-LVL IV: CPT | Mod: PBBFAC,,, | Performed by: NURSE PRACTITIONER

## 2024-01-31 NOTE — PROGRESS NOTES
"Chief Complaint:   Recurrent UTIs    HPI:   Patient is a 73-year-old female that is presenting with her daughter.  Patient has a history of dementia, HPI obtained by daughter.  This is a three-month follow-up secondary to cystoscopy per Dr. Quinonez.  Patient was prescribed bethanecol 3 times daily.  PVR is 1 mL.  Urine in clinic is negative.  Daughter states that patient is voiding "normally", nocturia is twice nightly.  Denies gross hematuria.  Cristiano HERRON  11/27/2023  Cystoscopy  11/01/2023  Patient is a 73-year-old female that is presenting with her daughter.  Patient has a history of dementia.  Patient was recently seen by infectious disease and has a history of bacteremia due to E coli.  Daughter states that patient does not feel the urge to have a bowel movement or to void.  Has been in adult diapers for years.  Reports decrease in sensation occurred after a traumatic fall.  No history of gross hematuria or renal stones. Recent renal ultrasound indicated bilateral, simple renal cyst and nonspecific bladder wall thickening.   Allergies:  Patient has no known allergies.    Medications:  has a current medication list which includes the following prescription(s): aluminum-magnesium hydroxide-simethicone 200-200-20 mg/5 mL Susp, diphenhydrAMINE 12.5 mg/5 mL Liqd, anastrozole, aspirin, bethanechol, buspirone, calcium citrate, cyanocobalamin, donepezil, estradiol, ferrous sulfate, fluad quad 2023-24(65y up)(pf), furosemide, isosorbide mononitrate, memantine, metoprolol succinate, quetiapine, telmisartan, and vitamin d.    Review of Systems:  General: No fever, chills, fatigability, or weight loss.  Skin: No rashes, itching, or changes in color or texture of skin.  Chest: Denies GRISSOM, cyanosis, wheezing, cough, and sputum production.  Abdomen: Appetite fine. No weight loss. Denies diarrhea, abdominal pain, hematemesis, or blood in stool.  Musculoskeletal: No joint stiffness or swelling. Denies back pain.  : As " above.  All other review of systems negative.    PMH:   has a past medical history of Arthritis, Behavioral change (2022), Blind right eye, Breast cancer (06/15/2017), Essential hypertension, Hemorrhoids, Immunodeficiency due to chemotherapy (2021), Lipoma of abdominal wall, Major neurocognitive disorder (2022), Obesity, Overactive bladder, Pap smear abnormality of cervix with ASCUS favoring benign, Thyroid nodule, Tobacco use disorder, Tubular adenoma of colon, and Urinary incontinence.    PSH:   has a past surgical history that includes Anterior vaginal repair; Thyroid lobectomy (Left, ); Bladder surgery;  section; Tubal ligation; Colonoscopy (N/A, 3/8/2017); Total abdominal hysterectomy; Colonoscopy (N/A, 2020); Esophagogastroduodenoscopy (N/A, 2020); Simple mastectomy (Left, 2020); Ros[place] lymph node biopsy (Left, 2020); Intraluminal gastrointestinal tract imaging via capsule (N/A, 10/28/2020); Left heart catheterization (Left, 10/12/2021); Breast lumpectomy (Left, ); and Cataract extraction (Left, 2022).    FamHx: family history includes Alcohol abuse in her mother; Allergic rhinitis in her daughter; Cataracts in her father; Cervical cancer (age of onset: 32) in her daughter; Cirrhosis in her mother; Diabetes in her brother; Heart attack in her brother; Heart murmur in her brother; Hypertension in her daughter and father; No Known Problems in her daughter; Prostate cancer (age of onset: 80) in her father.    SocHx:  reports that she quit smoking about 3 years ago. Her smoking use included cigarettes. She started smoking about 53 years ago. She has a 50.0 pack-year smoking history. She has never used smokeless tobacco. She reports that she does not drink alcohol and does not use drugs.      Physical Exam:  General: A&Ox3, no apparent distress, no deformities  Neck: No masses, normal thyroid  Lungs: normal inspiration, no use of accessory muscles  Heart:  normal pulse, no arrhythmias  Abdomen: Soft, NT, ND, no masses, no hernias, no hepatosplenomegaly  Lymphatic: Neck and groin nodes negative  Skin: The skin is warm and dry. No jaundice..    Impression/Plan:   Patient to remain on Bethanechol 5 mg and to return to clinic in 6 months for re-evaluation.

## 2024-02-01 ENCOUNTER — LAB VISIT (OUTPATIENT)
Dept: LAB | Facility: HOSPITAL | Age: 74
End: 2024-02-01
Attending: NURSE PRACTITIONER
Payer: MEDICARE

## 2024-02-01 DIAGNOSIS — Z87.440 HISTORY OF RECURRENT UTIS: ICD-10-CM

## 2024-02-01 PROCEDURE — 87086 URINE CULTURE/COLONY COUNT: CPT | Performed by: NURSE PRACTITIONER

## 2024-02-02 ENCOUNTER — PATIENT MESSAGE (OUTPATIENT)
Dept: UROLOGY | Facility: CLINIC | Age: 74
End: 2024-02-02
Payer: MEDICARE

## 2024-02-02 LAB
BACTERIA UR CULT: NORMAL
BACTERIA UR CULT: NORMAL

## 2024-02-08 ENCOUNTER — TELEPHONE (OUTPATIENT)
Dept: UROLOGY | Facility: CLINIC | Age: 74
End: 2024-02-08
Payer: MEDICARE

## 2024-02-08 ENCOUNTER — OFFICE VISIT (OUTPATIENT)
Dept: SURGERY | Facility: CLINIC | Age: 74
End: 2024-02-08
Payer: MEDICARE

## 2024-02-08 VITALS
BODY MASS INDEX: 23.44 KG/M2 | HEIGHT: 60 IN | TEMPERATURE: 97 F | SYSTOLIC BLOOD PRESSURE: 139 MMHG | DIASTOLIC BLOOD PRESSURE: 61 MMHG | HEART RATE: 82 BPM

## 2024-02-08 DIAGNOSIS — R16.1 SPLENIC MASS: Primary | ICD-10-CM

## 2024-02-08 PROCEDURE — 99999 PR PBB SHADOW E&M-EST. PATIENT-LVL III: CPT | Mod: PBBFAC,,, | Performed by: SURGERY

## 2024-02-08 PROCEDURE — 99214 OFFICE O/P EST MOD 30 MIN: CPT | Mod: S$GLB,,, | Performed by: SURGERY

## 2024-02-08 NOTE — PROGRESS NOTES
Patient ID: Leia Rodriguez is a 73 y.o. female.    Chief Complaint: No chief complaint on file.    HPI:  70-year-old female s/p left mastectomy with sentinel lymph node biopsy 9/8/20 presents for 6 month follow up splenic mass. She has been doing well with no changes in the interim.    referred for splenic mass.  Patient was undergoing evaluation of renal mass and hydronephrosis incidentally noted to have splenic mass.    for follow up. Doing well with no complaints.     presents to discuss surgery. In the interim she has undergone PET scan which was negative for metastatic disease. Genetic testing pending    Initially referred for recurrent left breast cancer.  She recently underwent biopsy showing invasive ductal carcinoma ER+/MS- Her 2 -.  She previously underwent a left breast lumpectomy with adjuvant radiation in 2017.      For Seema Negro:  Breast cancer follow-up - last visit was 5/29/19    Pt has a history of Stage 0 DCIS of Lt. breast.  She presented in March of 2017 when diagnostic MMG confirmed the presence of a round mass with indistinct margins in the Lt. breast at the 7 o'clock position. The mass measured 9 x 5 x 6 mm. The patient was referred to Dr. Duke and on 6/15/17 underwent wire localization lumpectomy. Pathology revealed an 8 mm focus of ductal carcinoma in situ. The tumor was low grade. There was tumor present at the anterior inferior margin. Ninety percent of the tumor cells were ER positive, 5- 10% of the tumor cells were MS positive. The patient was referred for adjuvant radiotherapy. Completed a course of partial breast irradiation to the LIQ of the Lt. breast on 8/14/17.  Currently on hormonal therapy with Arimidex that started 8/2017.  Due off 8/2022    DEXA- 6/29/18- wnl- f/u in 2 years due to AI- 6/2020 due    Risk factors identified:     Menarche at 9 y/o  G 3 P 3  First pregnancy at 18 y/o  LMP: 50's  Estrogen: none  Radiation to the neck or chest wall - has had left breast  radiation  Prior breast biopsies or atypical hyperplasia- left breast lumpectomy     FH: no breast cancer, daughter cervical cancer 30's, father prostate cancer 70's-     Body mass index is 23.44 kg/m².    Review of Systems   Constitutional: Negative.  Negative for activity change and unexpected weight change.   HENT: Negative.  Negative for hearing loss, rhinorrhea and trouble swallowing.    Eyes: Negative.  Negative for discharge and visual disturbance.   Respiratory: Negative.  Negative for chest tightness and wheezing.    Cardiovascular: Negative.  Negative for chest pain and palpitations.   Gastrointestinal: Negative.  Negative for blood in stool, constipation, diarrhea and vomiting.        No reflux   Endocrine: Negative.  Negative for polydipsia and polyuria.   Genitourinary: Negative.  Negative for difficulty urinating, dysuria, hematuria and menstrual problem.   Musculoskeletal: Negative.  Negative for joint swelling and neck pain.   Skin: Negative.    Allergic/Immunologic: Negative.    Neurological: Negative.  Negative for headaches.   Hematological: Negative.  Negative for adenopathy.   Psychiatric/Behavioral: Negative.  Negative for confusion and dysphoric mood.  Breast: Pt denies any breast pain, nipple discharge, or palpable mass. No prior trauma or bruising. No breast surgeries or abnormalities.    Current Outpatient Medications   Medication Sig Dispense Refill    aluminum-magnesium hydroxide-simethicone 200-200-20 mg/5 mL Susp, diphenhydrAMINE 12.5 mg/5 mL Liqd Swish and spit 5 mLs every 4 (four) hours as needed (discomfort). 30 mL 0    anastrozole (ARIMIDEX) 1 mg Tab TAKE 1 TABLET BY MOUTH EVERY DAY 90 tablet 3    aspirin 81 MG Chew Take 1 tablet (81 mg total) by mouth once daily. 90 tablet 3    bethanechol (URECHOLINE) 5 MG Tab Take 1 tablet (5 mg total) by mouth 3 (three) times daily. 90 tablet 11    busPIRone (BUSPAR) 15 MG tablet TAKE 1 TABLET BY MOUTH 2 TIMES DAILY. 180 tablet 3    calcium  citrate (CALCITRATE) 200 mg (950 mg) tablet Take 1 tablet by mouth once daily.      cyanocobalamin 2000 MCG tablet Take 2,000 mcg by mouth 2 (two) times a day.      donepeziL (ARICEPT) 5 MG tablet TAKE 1 TABLET BY MOUTH EVERY DAY IN THE EVENING 90 tablet 3    estradioL (ESTRACE) 0.01 % (0.1 mg/gram) vaginal cream Place 4 g vaginally once daily. 120 g 3    ferrous sulfate (FEOSOL) 325 mg (65 mg iron) Tab tablet Take 65 mg by mouth once daily.      flu vac 2023 [id number],PF, (FLUAD QUAD 2023-24,65Y UP,,PF,) 60 mcg (15 mcg x 4)/0.5 mL Syrg Inject 0.5 mLs into the muscle. 0.5 mL 0    furosemide (LASIX) 20 MG tablet TAKE 1 TABLET BY MOUTH TWICE A  tablet 3    isosorbide mononitrate (IMDUR) 30 MG 24 hr tablet TAKE 1 TABLET BY MOUTH EVERY DAY 30 tablet 11    memantine (NAMENDA) 5 MG Tab Take 1 tablet (5 mg total) by mouth 2 (two) times daily. 180 tablet 3    metoprolol succinate (TOPROL-XL) 25 MG 24 hr tablet Take 1 tablet (25 mg total) by mouth once daily. 90 tablet 2    QUEtiapine (SEROQUEL) 100 MG Tab TAKE 1 TABLET BY MOUTH EVERY EVENING. 90 tablet 3    telmisartan (MICARDIS) 20 MG Tab Take 1 tablet (20 mg total) by mouth once daily. 90 tablet 3    vitamin D 1000 units Tab Take 1,000 Units by mouth once daily.       No current facility-administered medications for this visit.       Review of patient's allergies indicates:  No Known Allergies    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    Lipoma of abdominal wall     Major neurocognitive disorder 06/21/2022    Obesity     Overactive bladder     Pap smear abnormality of cervix with ASCUS favoring benign     Thyroid nodule     Tobacco use disorder     Tubular adenoma of colon     Urinary  incontinence        Past Surgical History:   Procedure Laterality Date    ANTERIOR VAGINAL REPAIR      BLADDER SURGERY      BREAST LUMPECTOMY Left 2017    CATARACT EXTRACTION Left 2022     SECTION      X2    COLONOSCOPY N/A 3/8/2017    Procedure: COLONOSCOPY;  Surgeon: Tyron Paris MD;  Location: [facility] ENDO;  Service: Endoscopy;  Laterality: N/A;    COLONOSCOPY N/A 2020    Procedure: COLONOSCOPY;  Surgeon: Keira Ellison MD;  Location: [facility] ENDO;  Service: Endoscopy;  Laterality: N/A;    ESOPHAGOGASTRODUODENOSCOPY N/A 2020    Procedure: EGD (ESOPHAGOGASTRODUODENOSCOPY);  Surgeon: Keira Ellison MD;  Location: [facility] ENDO;  Service: Endoscopy;  Laterality: N/A;  new onset iron deficiency with prior history of breast cancer    INTRALUMINAL GASTROINTESTINAL TRACT IMAGING VIA CAPSULE N/A 10/28/2020    Procedure: IMAGING PROCEDURE, GI TRACT, INTRALUMINAL, VIA CAPSULE;  Surgeon: Finesse Jha RN;  Location: [facility] ENDO;  Service: Endoscopy;  Laterality: N/A;    LEFT HEART CATHETERIZATION Left 10/12/2021    Procedure: CATHETERIZATION, HEART, LEFT;  Surgeon: Tiffanie Santos MD;  Location: [facility] CATH LAB;  Service: Cardiology;  Laterality: Left;    SENTINEL LYMPH NODE BIOPSY Left 2020    Procedure: BIOPSY, LYMPH NODE, SENTINEL;  Surgeon: Vincent Moyer MD;  Location: [facility] OR;  Service: General;  Laterality: Left;    SIMPLE MASTECTOMY Left 2020    Procedure: MASTECTOMY, SIMPLE;  Surgeon: Vincent Moyer MD;  Location: [facility] OR;  Service: General;  Laterality: Left;    THYROID LOBECTOMY Left     TOTAL ABDOMINAL HYSTERECTOMY      TUBAL LIGATION         Family History   Problem Relation Age of Onset    Hypertension Father     Cataracts Father     Prostate cancer Father 80    Cervical cancer Daughter 32    Allergic rhinitis Daughter     Cirrhosis Mother     Alcohol abuse Mother     Hypertension Daughter     Diabetes Brother     Heart attack Brother     Heart murmur Brother     No Known  Problems Daughter        Social History     Socioeconomic History    Marital status:    Tobacco Use    Smoking status: Former     Current packs/day: 0.00     Average packs/day: 1 pack/day for 50.0 years (50.0 ttl pk-yrs)     Types: Cigarettes     Start date: 8/17/1970     Quit date: 8/17/2020     Years since quitting: 3.4    Smokeless tobacco: Never   Substance and Sexual Activity    Alcohol use: Never     Alcohol/week: 28.0 standard drinks of alcohol     Types: 28 Cans of beer per week    Drug use: No    Sexual activity: Never     Partners: Male   Social History Narrative    The patient is .  She is retired from [employer].     Social Determinants of Health     Financial Resource Strain: Low Risk  (11/10/2023)    Overall Financial Resource Strain (CARDIA)     Difficulty of Paying Living Expenses: Not very hard   Food Insecurity: Food Insecurity Present (11/10/2023)    Hunger Vital Sign     Worried About Running Out of Food in the Last Year: Never true     Ran Out of Food in the Last Year: Sometimes true   Transportation Needs: No Transportation Needs (11/10/2023)    PRAPARE - Transportation     Lack of Transportation (Medical): No     Lack of Transportation (Non-Medical): No   Physical Activity: Inactive (11/10/2023)    Exercise Vital Sign     Days of Exercise per Week: 0 days     Minutes of Exercise per Session: 0 min   Stress: Stress Concern Present (11/10/2023)    [employer] [place] [employer] - Occupational Stress Questionnaire     Feeling of Stress : To some extent   Social Connections: Socially Integrated (11/10/2023)    Social Connection and Isolation Panel [NHANES]     Frequency of Communication with Friends and Family: More than three times a week     Frequency of Social Gatherings with Friends and Family: More than three times a week     Attends Synagogue Services: More than 4 times per year     Active Member of Clubs or Organizations: Yes     Attends Club or Organization  Meetings: Never     Marital Status:    Housing Stability: Low Risk  (11/10/2023)    Housing Stability Vital Sign     Unable to Pay for Housing in the Last Year: No     Number of Places Lived in the Last Year: 1     Unstable Housing in the Last Year: No       Vitals:    02/08/24 1519   BP: 139/61   Pulse: 82   Temp: 97.4 °F (36.3 °C)       Physical Exam  Constitutional:       Appearance: She is well-developed.   HENT:      Head: Normocephalic and atraumatic.      Right Ear: External ear normal.      Left Ear: External ear normal.      Mouth/Throat:      Pharynx: No oropharyngeal exudate.   Eyes:      General: No scleral icterus.        Right eye: No discharge.         Left eye: No discharge.      Conjunctiva/sclera: Conjunctivae normal.      Pupils: Pupils are equal, round, and reactive to light.   Neck:      Thyroid: No thyromegaly.   Cardiovascular:      Rate and Rhythm: Normal rate and regular rhythm.      Heart sounds: Normal heart sounds.   Pulmonary:      Effort: Pulmonary effort is normal.      Breath sounds: Normal breath sounds.   Chest:   Breasts:     Right: No inverted nipple, mass, nipple discharge, skin change or tenderness.      Left: No inverted nipple, mass, nipple discharge, skin change or tenderness.       Abdominal:      General: Bowel sounds are normal.      Palpations: Abdomen is soft.   Musculoskeletal:         General: Normal range of motion.      Right shoulder: No crepitus. Normal strength.      Cervical back: Normal range of motion and neck supple.   Lymphadenopathy:      Head:      Right side of head: No submental, submandibular, tonsillar, preauricular, posterior auricular or occipital adenopathy.      Left side of head: No submental, submandibular, tonsillar, preauricular, posterior auricular or occipital adenopathy.      Cervical: No cervical adenopathy.      Right cervical: No superficial or posterior cervical adenopathy.     Left cervical: No superficial or posterior cervical  adenopathy.      Upper Body:      Right upper body: No supraclavicular adenopathy.      Left upper body: No supraclavicular adenopathy.   Skin:     General: Skin is warm and dry.      Coloration: Skin is not pale.      Findings: No erythema or rash.   Neurological:      Mental Status: She is alert and oriented to person, place, and time.      Deep Tendon Reflexes: Reflexes are normal and symmetric.   Psychiatric:         Behavior: Behavior normal.         Thought Content: Thought content normal.         Judgment: Judgment normal.       Result:   Mammo Digital Diagnostic Bilat w/ Navjot  US Breast Left Limited     History:  Patient is 70 y.o. and is seen for a diagnostic mammogram.     Films Compared:  Compared to: 05/29/2019 Mammo Digital Diagnostic Bilat w/ Navjot and 05/16/2018 Mammo Digital Diagnostic Bilat with Tomosynthesis_CAD     Findings:  This procedure was performed using tomosynthesis. Computer-aided detection was utilized in the interpretation of this examination.  The breasts are heterogeneously dense, which may obscure small masses.      Left  Mammo Digital Diagnostic Bilat w/ Navjot  There is an 11 mm oval mass with spiculated margins seen in the upper outer quadrant of the left breast in the posterior depth.      US Breast Left Limited  There is an irregularly shaped, non-parallel, hypoechoic mass with microlobulated margins with shadowing seen in the left breast at 2 o'clock, 10 cm from the nipple.      No suspicious left axillary adenopathy demonstrated.      Right     Mammo Digital Diagnostic Bilat w/ Navjot  There is no evidence of suspicious masses, calcifications, or other abnormal findings in the right breast.     Impression:  Left  Mass: Left breast 11 mm mass at the upper outer posterior position. Assessment: 5 - Highly suggestive of malignancy. Biopsy is recommended.      Right  There is no mammographic or sonographic evidence of malignancy in the right breast.     BI-RADS Category:   Overall: 5 -  Highly Suggestive of Malignancy     Recommendation:  Ultrasound guided Biopsy is recommended.     PET/CT:  Impression:  New diagnosis of left breast carcinoma.  No evidence of FDG avid regional lymphadenopathy or distant metastatic disease.      Final Pathologic Diagnosis 1.  Left axilla, sentinel lymph node #1, excisional biopsy: 3 benign lymph   nodes, negative for metastatic carcinoma (0/3).          Confirmed by negative immunohistochemical staining with cytokeratins   AE1/AE3, CAM 5.2 and wide spectrum keratin with          satisfactory positive and negative controls.   2.  Left axilla, sentinel lymph node #2, excisional biopsy: 1 benign lymph   node, negative for metastatic carcinoma (0/1).          Confirmed by negative immunohistochemical staining with cytokeratins   AE1/AE3, CAM 5.2 and wide spectrum keratin with          satisfactory positive and negative controls.   3.  Left axilla, sentinel lymph node #3, excisional biopsy: 1 benign lymph   node, negative for metastatic carcinoma (0/1).          Confirmed by negative immunohistochemical staining with cytokeratins   AE1/AE3, CAM 5.2 and wide spectrum keratin with          satisfactory positive and negative controls.   4.  Left breast, mastectomy: Invasive ductal carcinoma, multifocal with two   (2) foci measuring as follows:  Lesion #1 (Blocks 4E-F, corresponding to   prior          biopsy site/clip): 12 mm (1.2 cm) and Lesion #2          (Block 4D)  8 mm (0.8 cm) in greatest dimensions.          Microcalcifications are present associated with invasive carcinoma.          Margins: Invasive carcinoma is present at closest approach within less   than 1 mm (within less than 0.1 cm) of the                  black/deep surgical margin (this is the smaller lesion #2   measuring 8 mm in total size).  Invasive carcinoma is                  greater than 10 mm (greater than 1 cm) from all other surgical   margins.                  Ductal carcinoma in situ is greater  than 10 mm (greater than 1   cm) from all surgical margins.          Ductal carcinoma in situ, high nuclear grade with solid and   comedonecrosis patterns are present and associated with          invasive tumor.          Previous biopsy cavity site and clip are identified.          Incidental calcified fibroadenoma.          Unremarkable nipple and skin.          See synoptic report in comment section for further details.   5.  Excess skin left breast, excision: Unremarkable skin and soft tissue.          No evidence of malignancy.   Comment:  Synoptic report   Procedure:  Total mastectomy   Specimen laterality:  Left   Tumor focality:  Multifocal; 2 foci   Tumor size: Focus/Lesion #1:  Greatest dimension:  12 mm (1.2 cm)          Focus/Lesion #2:  Greatest dimension:  8 mm (0.8 cm)   Histologic type:  Invasive carcinoma of no special type (ductal).   Histologic grade: Glandular/tubular differentiation:  2          Nuclear pleomorphism:  3          Mitotic rate:  1          Overall grade:  Grade  2   Ductal carcinoma in situ:  Present Negative for extensive intraductal   component   Tumor extension:   Skin is present and uninvolved by tumor.          Nipple is present and uninvolved by tumor.          Skeletal muscle is not present for evaluation.   Margins:         - Invasive carcinoma is present at closest approach within   less than 1 mm (within less than 0.1 cm) of the                  black/deep surgical margin (this is the smaller lesion #2   measuring 8 mm in total size).  Invasive carcinoma is                  greater than 10 mm (greater than 1 cm) from all other surgical   margins.                  - Ductal carcinoma in situ is greater than 10 mm (greater than   1 cm) from all surgical margins.   Regional lymph nodes:                             Number examined:  5                             Number involved:  0   Treatment effect and breast:  No known presurgical therapy   Pathologic stage classification:  "  TNM descriptors:  "m" (multiple foci of invasive carcinoma)   Primary tumor:        pT1c:  Tumor greater than 10 mm but less than or equal to 2 mm in   greatest dimension.   Regional lymph nodes:        (sn)pN0:  No regional lymph node metastasis   Distant metastasis:        pMX:  Cannot be assessed.     Ancillary studies:   Immunostaining for lesion #1, which is the larger lesion associated with clip   and biopsy site measuring 12 mm in greatest dimension (corresponding to the   lesion seen in blocks 4 E and F):   Immunohistochemical stains are completed on this patient's previous biopsy   from the left upper outer quadrant breast biopsy from August 2020, case   number [id number], with the following results:   "BREAST BIOMARKER RESULTS   Estrogen receptor (ER): Low Positive (10% weak)   Progesterone receptor (CT): Negative (less than 1%)   HER2 IHC: Equivocal (Score 2+), HER2 FISH is pending and will be reported in a   supplemental   Ki-67 proliferation index: 50%   SUPPLEMENTAL (2):   HER2, Breast Tumor, FISH, Tissue   Result Summary:   Negative"   Immunohistochemical staining for lesion #2, which is the smaller calcified 8   mm lesion represented Block 4 D will be completed for hormonal markers and   results will follow in supplemental report.     Tumor Blocks for possible Future Studies:  4E-F (Lesion #1); 4D (Lesion #2)         CT urogram:   2. There is a 2.1 cm splenic mass. Etiology uncertain. Further characterization with a splenic MRI is recommended.    MRI:  Impression:  Small circumscribed T2 hypointense splenic lesion corresponding to that seen on comparison CT.  No significant change in size or appearance.  More likely benign.  Question old hematoma.  Given that this is well seen on CT, consider 6 month follow-up.    CT 12/3/23:  Spleen: Previously noted splenic lesion not well delineated on this noncontrast exam.     Assessment & Plan:  71 y/o female s/p left breast mastectomy with sentinel lymph " node biopsy     Incidental splenic mass on imaging.  Last MRI reviewed along with recent CT 12/23  We discussed findings and options for repeat imaging with contrast study. At this time patient is not concerned about small lesion previously noted and does not want further imaging. Discussed potential risks associated with no further testing and all questions answered. Will proceed with no further surveillance imaging at this time for splenic lesion    30 minutes of total time spent on the encounter, which includes face to face time and non-face to face time preparing to see the patient (eg, review of tests), Obtaining and/or reviewing separately obtained history, Documenting clinical information in the electronic or other health record, Independently interpreting results (not separately reported) and communicating results to the patient/family/caregiver, or Care coordination (not separately reported).

## 2024-02-08 NOTE — TELEPHONE ENCOUNTER
02/08/2024 1307 - Outgoing call placed to patient, patient's daughter verified name and date of birth of patient and her own name, informed the daughter that urine culture is negative for infection, daughter verbalized understanding and no further assistance needed.    Bill Banuelos RN    ----- Message from Dilcia Rob NP sent at 2/4/2024  9:31 AM CST -----  Please call family members, patient has dementia, and informed them that urine culture is negative for infection.

## 2024-02-22 ENCOUNTER — HOSPITAL ENCOUNTER (OUTPATIENT)
Dept: RADIOLOGY | Facility: HOSPITAL | Age: 74
Discharge: HOME | End: 2024-02-22
Attending: INTERNAL MEDICINE
Payer: MEDICARE

## 2024-02-22 VITALS — WEIGHT: 119.94 LBS | HEIGHT: 60 IN | BODY MASS INDEX: 23.55 KG/M2

## 2024-02-22 DIAGNOSIS — Z17.0 MALIGNANT NEOPLASM OF LOWER-INNER QUADRANT OF LEFT BREAST IN FEMALE, ESTROGEN RECEPTOR POSITIVE: ICD-10-CM

## 2024-02-22 DIAGNOSIS — C50.312 MALIGNANT NEOPLASM OF LOWER-INNER QUADRANT OF LEFT BREAST IN FEMALE, ESTROGEN RECEPTOR POSITIVE: ICD-10-CM

## 2024-02-22 PROCEDURE — 77063 BREAST TOMOSYNTHESIS BI: CPT | Mod: 26,52,, | Performed by: RADIOLOGY

## 2024-02-22 PROCEDURE — 77067 SCR MAMMO BI INCL CAD: CPT | Mod: 26,52,, | Performed by: RADIOLOGY

## 2024-02-22 PROCEDURE — 77067 SCR MAMMO BI INCL CAD: CPT | Mod: TC,52

## 2024-02-29 ENCOUNTER — DOCUMENT SCAN (OUTPATIENT)
Dept: HOME HEALTH SERVICES | Facility: HOSPITAL | Age: 74
End: 2024-02-29
Payer: MEDICARE

## 2024-04-06 DIAGNOSIS — F03.911 AGITATION DUE TO DEMENTIA: ICD-10-CM

## 2024-04-06 DIAGNOSIS — R46.89 BEHAVIORAL CHANGE: ICD-10-CM

## 2024-04-06 DIAGNOSIS — F03.90 MAJOR NEUROCOGNITIVE DISORDER: ICD-10-CM

## 2024-04-08 ENCOUNTER — OFFICE VISIT (OUTPATIENT)
Dept: FAMILY MEDICINE | Facility: CLINIC | Age: 74
End: 2024-04-08
Payer: MEDICARE

## 2024-04-08 VITALS
SYSTOLIC BLOOD PRESSURE: 148 MMHG | WEIGHT: 125.44 LBS | DIASTOLIC BLOOD PRESSURE: 74 MMHG | RESPIRATION RATE: 18 BRPM | TEMPERATURE: 97 F | BODY MASS INDEX: 24.63 KG/M2 | HEIGHT: 60 IN | HEART RATE: 70 BPM

## 2024-04-08 DIAGNOSIS — I73.9 PVD (PERIPHERAL VASCULAR DISEASE): ICD-10-CM

## 2024-04-08 DIAGNOSIS — Z17.0 MALIGNANT NEOPLASM OF LOWER-INNER QUADRANT OF LEFT BREAST IN FEMALE, ESTROGEN RECEPTOR POSITIVE: ICD-10-CM

## 2024-04-08 DIAGNOSIS — I27.20 PULMONARY HTN: ICD-10-CM

## 2024-04-08 DIAGNOSIS — F33.0 MILD EPISODE OF RECURRENT MAJOR DEPRESSIVE DISORDER: ICD-10-CM

## 2024-04-08 DIAGNOSIS — D63.1 ANEMIA DUE TO STAGE 3B CHRONIC KIDNEY DISEASE: ICD-10-CM

## 2024-04-08 DIAGNOSIS — I10 ESSENTIAL HYPERTENSION: Chronic | ICD-10-CM

## 2024-04-08 DIAGNOSIS — C50.312 MALIGNANT NEOPLASM OF LOWER-INNER QUADRANT OF LEFT BREAST IN FEMALE, ESTROGEN RECEPTOR POSITIVE: ICD-10-CM

## 2024-04-08 DIAGNOSIS — I70.0 ATHEROSCLEROSIS OF AORTA: ICD-10-CM

## 2024-04-08 DIAGNOSIS — M85.80 OSTEOPENIA, UNSPECIFIED LOCATION: ICD-10-CM

## 2024-04-08 DIAGNOSIS — T45.1X5A IMMUNODEFICIENCY DUE TO CHEMOTHERAPY: ICD-10-CM

## 2024-04-08 DIAGNOSIS — D50.8 IRON DEFICIENCY ANEMIA SECONDARY TO INADEQUATE DIETARY IRON INTAKE: ICD-10-CM

## 2024-04-08 DIAGNOSIS — Z79.899 IMMUNODEFICIENCY DUE TO CHEMOTHERAPY: ICD-10-CM

## 2024-04-08 DIAGNOSIS — I50.42 CHRONIC COMBINED SYSTOLIC AND DIASTOLIC CHF (CONGESTIVE HEART FAILURE): Chronic | ICD-10-CM

## 2024-04-08 DIAGNOSIS — D84.821 IMMUNODEFICIENCY DUE TO CHEMOTHERAPY: ICD-10-CM

## 2024-04-08 DIAGNOSIS — I25.118 CORONARY ARTERY DISEASE OF NATIVE ARTERY OF NATIVE HEART WITH STABLE ANGINA PECTORIS: Chronic | ICD-10-CM

## 2024-04-08 DIAGNOSIS — F02.80 MAJOR NEUROCOGNITIVE DISORDER DUE TO ALZHEIMER'S DISEASE: ICD-10-CM

## 2024-04-08 DIAGNOSIS — G30.9 MAJOR NEUROCOGNITIVE DISORDER DUE TO ALZHEIMER'S DISEASE: ICD-10-CM

## 2024-04-08 DIAGNOSIS — N18.32 ANEMIA DUE TO STAGE 3B CHRONIC KIDNEY DISEASE: ICD-10-CM

## 2024-04-08 DIAGNOSIS — Z99.89 DEPENDENCE ON OTHER ENABLING MACHINES AND DEVICES: ICD-10-CM

## 2024-04-08 DIAGNOSIS — J43.8 OTHER EMPHYSEMA: ICD-10-CM

## 2024-04-08 DIAGNOSIS — N39.0 RECURRENT UTI: ICD-10-CM

## 2024-04-08 DIAGNOSIS — F10.21 HISTORY OF ALCOHOLISM: ICD-10-CM

## 2024-04-08 DIAGNOSIS — Z00.00 ENCOUNTER FOR PREVENTIVE HEALTH EXAMINATION: Primary | ICD-10-CM

## 2024-04-08 PROCEDURE — 1160F RVW MEDS BY RX/DR IN RCRD: CPT | Mod: CPTII,S$GLB,, | Performed by: NURSE PRACTITIONER

## 2024-04-08 PROCEDURE — 1159F MED LIST DOCD IN RCRD: CPT | Mod: CPTII,S$GLB,, | Performed by: NURSE PRACTITIONER

## 2024-04-08 PROCEDURE — 3288F FALL RISK ASSESSMENT DOCD: CPT | Mod: CPTII,S$GLB,, | Performed by: NURSE PRACTITIONER

## 2024-04-08 PROCEDURE — G0439 PPPS, SUBSEQ VISIT: HCPCS | Mod: S$GLB,,, | Performed by: NURSE PRACTITIONER

## 2024-04-08 PROCEDURE — 1126F AMNT PAIN NOTED NONE PRSNT: CPT | Mod: CPTII,S$GLB,, | Performed by: NURSE PRACTITIONER

## 2024-04-08 PROCEDURE — 1101F PT FALLS ASSESS-DOCD LE1/YR: CPT | Mod: CPTII,S$GLB,, | Performed by: NURSE PRACTITIONER

## 2024-04-08 PROCEDURE — 99999 PR PBB SHADOW E&M-EST. PATIENT-LVL IV: CPT | Mod: PBBFAC,,, | Performed by: NURSE PRACTITIONER

## 2024-04-08 PROCEDURE — 3078F DIAST BP <80 MM HG: CPT | Mod: CPTII,S$GLB,, | Performed by: NURSE PRACTITIONER

## 2024-04-08 PROCEDURE — 1170F FXNL STATUS ASSESSED: CPT | Mod: CPTII,S$GLB,, | Performed by: NURSE PRACTITIONER

## 2024-04-08 PROCEDURE — 3077F SYST BP >= 140 MM HG: CPT | Mod: CPTII,S$GLB,, | Performed by: NURSE PRACTITIONER

## 2024-04-08 RX ORDER — BUSPIRONE HYDROCHLORIDE 15 MG/1
15 TABLET ORAL 2 TIMES DAILY
Qty: 180 TABLET | Refills: 3 | Status: SHIPPED | OUTPATIENT
Start: 2024-04-08

## 2024-04-08 NOTE — PROGRESS NOTES
Leia Rodriguez presented for a follow-up Medicare AWV today. The following components were reviewed and updated:  Patient accompanied by   Daughter, who was present throughout the visit and aided in information gathering.        Medical history  Family History  Social history  Allergies and Current Medications  Health Risk Assessment  Health Maintenance  Care Team    **See Completed Assessments for Annual Wellness visit with in the encounter summary    The following assessments were completed:  Depression Screening  Cognitive function Screening  Timed Get Up Test  Whisper Test      Opioid documentation:      Patient does not have a current opioid prescription.          Vitals:    04/08/24 1412   BP: (!) 148/74   Pulse: 70   Resp: 18   Temp: 97 °F (36.1 °C)   Weight: 56.9 kg (125 lb 7.1 oz)   Height: 5' (1.524 m)     Body mass index is 24.5 kg/m².       Physical Exam  Vitals and nursing note reviewed.   HENT:      Head: Normocephalic.   Eyes:      Pupils: Pupils are equal, round, and reactive to light.   Cardiovascular:      Rate and Rhythm: Normal rate. Rhythm irregular.      Pulses: Normal pulses.      Heart sounds: Murmur heard.   Pulmonary:      Effort: Pulmonary effort is normal.      Breath sounds: Normal breath sounds.   Musculoskeletal:         General: Normal range of motion.   Skin:     General: Skin is warm.   Neurological:      Mental Status: She is alert. Mental status is at baseline. She is confused.      Motor: Weakness present.      Gait: Gait abnormal.   Psychiatric:         Mood and Affect: Affect is flat.         Behavior: Behavior is slowed.         Cognition and Memory: Cognition is impaired. Memory is impaired. She exhibits impaired recent memory and impaired remote memory.       Current Outpatient Medications   Medication Instructions    aluminum-magnesium hydroxide-simethicone 200-200-20 mg/5 mL Susp, diphenhydrAMINE 12.5 mg/5 mL Liqd 5 mLs, Swish & Spit, Every 4 hours PRN    anastrozole  (ARIMIDEX) 1 mg Tab TAKE 1 TABLET BY MOUTH EVERY DAY    aspirin 81 mg, Oral, Daily    bethanechol (URECHOLINE) 5 mg, Oral, 3 times daily    busPIRone (BUSPAR) 15 mg, Oral, 2 times daily    calcium citrate (CALCITRATE) 200 mg (950 mg) tablet 1 tablet, Oral, Daily    cyanocobalamin 2,000 mcg, Oral, 2 times daily    donepeziL (ARICEPT) 5 mg, Oral, Nightly    estradioL (ESTRACE) 4 g, Vaginal, Daily    ferrous sulfate (FEOSOL) 65 mg, Oral, Daily    flu vac 2023 65up-[id number],PF, (FLUAD QUAD 2023-24,65Y UP,,PF,) 60 mcg (15 mcg x 4)/0.5 mL Syrg 0.5 mLs, Intramuscular    furosemide (LASIX) 20 mg, Oral, 2 times daily    isosorbide mononitrate (IMDUR) 30 MG 24 hr tablet TAKE 1 TABLET BY MOUTH EVERY DAY    memantine (NAMENDA) 5 mg, Oral, 2 times daily    metoprolol succinate (TOPROL-XL) 25 mg, Oral, Daily    QUEtiapine (SEROQUEL) 100 mg, Oral, Nightly    telmisartan (MICARDIS) 20 mg, Oral, Daily    vitamin D (VITAMIN D3) 1,000 Units, Oral, Daily         Diagnoses and health risks identified today and associated recommendations/orders:  1. Encounter for preventive health examination  Reviewed missed vaccines    2. Mild episode of recurrent major depressive disorder  Chronic and Ongoing on SEROQUEL- dtg states episodes of crying spells  Due to see Neuro-pt dtg t set up [place]    3. Major neurocognitive disorder due to Alzheimer's disease  Chronic and Ongoing on Namenda/Aricept -states pd of agitation- taking BUSPAR and Seorqel  Dtg to call and get an [place] with neuro for med eval    4. Chronic combined systolic and diastolic CHF (congestive heart failure)  Chronic and Stable on LASIX prn and Imdur - NO SOB. Continue current treatment plan as previously prescribed with your card    5. Coronary artery disease of native artery of native heart with stable angina pectoris  Chronic and Stable on Indur and Metprolol. Continue current treatment plan as previously prescribed with your card    6. Essential hypertension  Chronic and Stable on   Micardis, Imdur and Metoprolol- elevated to day. Continue current treatment plan as previously prescribed with your card    7. Malignant neoplasm of lower-inner quadrant of left breast in female, estrogen receptor positive  Chronic and Stable.  Last mammogram 2/24/ 2024  S/P LUMPECTOMY  Tx'dCHEMO/RADIATION  CURRENTLY ON Arimidex  Followed by heme/surgeon    8. Atherosclerosis of aorta  Chronic and Stable. Continue current treatment plan as previously prescribed with your CARD    9. Anemia due to stage 3b chronic kidney disease  Chronic and Stable on OTC- dtg states pt get Procrit as needed from  nephrologist    10. Pulmonary HTN  Chronic and Stable on  Micardis, Imdur and Metoprolol- elevated to day. Continue current treatment plan as previously prescribed with your PCP    11. PVD (peripheral vascular disease)  Chronic and Stable. Continue current treatment plan as previously prescribed with your CARD    12. Osteopenia, unspecified location  Chronic and Stable on vitamin D/Calcium . Dexa UTD. Continue current treatment plan as previously prescribed with your PCP    14. Other emphysema  Chronic/stable - no meds - no smoking. Followed by PCP    15. History of alcoholism  No longer drinks alcohol. Followed by PCP    16. Immunodeficiency due to chemotherapy  Chronic and Stable b blood counts. Continue current treatment plan as previously prescribed with your hem    18. Dependence on other enabling machines and devices  Chronic and Ongoing with walker - dt states no recent falls    20 Recurrent UTI   Chronic and Ongoing. Dtg states dark urine due to decrease water intake-  no fever or abd pain schedule to see urologist    Provided Leia with a 5-10 year written screening schedule and personal prevention plan. Recommendations were developed using the USPSTF age appropriate recommendations. Education, counseling, and referrals were provided as needed.  After Visit Summary printed and given to patient which includes a list  of additional screenings\tests needed.    Follow up in about 1 year (around 4/8/2025) for Follow up with PCP/specialist, BERNARDO.  Teresa Monroe NP

## 2024-04-08 NOTE — PATIENT INSTRUCTIONS
Counseling and Referral of Other Preventative  (Italic type indicates deductible and co-insurance are waived)    Patient Name: Leia Rodriguez  Today's Date: 4/8/2024    Health Maintenance       Date Due Completion Date    Annual UACr Never done ---    Shingles Vaccine (1 of 2) Never done ---    High Dose Statin Never done ---    RSV Vaccine (Age 60+ and Pregnant patients) (1 - 1-dose 60+ series) Never done ---    TETANUS VACCINE 03/11/2023 3/11/2013    COVID-19 Vaccine (4 - 2023-24 season) 09/01/2023 1/3/2022    LDCT Lung Screen 03/20/2024 3/20/2023    Mammogram 02/22/2025 2/22/2024    DEXA Scan 07/20/2025 7/20/2022    Override on 2/20/2017: Not Clinically Appropriate    Colorectal Cancer Screening 08/26/2025 8/26/2020    Lipid Panel 07/14/2028 7/14/2023        No orders of the defined types were placed in this encounter.    The following information is provided to all patients.  This information is to help you find resources for any of the problems found today that may be affecting your health:                  Living healthy guide: www.[facility].[place].gov      Understanding Diabetes: www.diabetes.org      Eating healthy: www.cdc.gov/healthyweight      CDC home safety checklist: www.cdc.gov/steadi/patient.html      Agency on Aging: www.goea.[place].[facility]      Alcoholics anonymous (AA): www.aa.org      Physical Activity: www.[place].nih.gov/no7gygt      Tobacco use: www.quitwithusla.org

## 2024-04-09 ENCOUNTER — PATIENT MESSAGE (OUTPATIENT)
Dept: UROLOGY | Facility: CLINIC | Age: 74
End: 2024-04-09
Payer: MEDICARE

## 2024-04-09 ENCOUNTER — PATIENT MESSAGE (OUTPATIENT)
Dept: NEUROLOGY | Facility: CLINIC | Age: 74
End: 2024-04-09
Payer: MEDICARE

## 2024-04-10 DIAGNOSIS — N30.00 ACUTE CYSTITIS WITHOUT HEMATURIA: Primary | ICD-10-CM

## 2024-04-15 ENCOUNTER — LAB VISIT (OUTPATIENT)
Dept: LAB | Facility: HOSPITAL | Age: 74
End: 2024-04-15
Attending: NURSE PRACTITIONER
Payer: MEDICARE

## 2024-04-15 DIAGNOSIS — N30.00 ACUTE CYSTITIS WITHOUT HEMATURIA: ICD-10-CM

## 2024-04-15 PROCEDURE — 87086 URINE CULTURE/COLONY COUNT: CPT | Performed by: NURSE PRACTITIONER

## 2024-04-15 PROCEDURE — 87088 URINE BACTERIA CULTURE: CPT | Performed by: NURSE PRACTITIONER

## 2024-04-15 PROCEDURE — 87186 SC STD MICRODIL/AGAR DIL: CPT | Performed by: NURSE PRACTITIONER

## 2024-04-15 PROCEDURE — 87077 CULTURE AEROBIC IDENTIFY: CPT | Performed by: NURSE PRACTITIONER

## 2024-04-17 ENCOUNTER — TELEPHONE (OUTPATIENT)
Dept: UROLOGY | Facility: CLINIC | Age: 74
End: 2024-04-17
Payer: MEDICARE

## 2024-04-17 RX ORDER — CEFDINIR 300 MG/1
300 CAPSULE ORAL 2 TIMES DAILY
Qty: 20 CAPSULE | Refills: 0 | Status: SHIPPED | OUTPATIENT
Start: 2024-04-17 | End: 2024-04-27

## 2024-04-17 NOTE — TELEPHONE ENCOUNTER
04/17/2024 1349 - .Outgoing call placed to patient, patient's daughter verified name and date of birth, daughter notified of below per Dilcia Rob NP, daughter verbalized understanding and will notify the patient and assist with getting prescription, no further assistance needed.      ----- Message from Dilcia Rob NP sent at 4/17/2024 10:16 AM CDT -----  Please call patient inform her that preliminary urine culture is positive for urinary tract infection.  Omnicef antibiotics have been sent to her local pharmacy.  We will contact her with final urine culture results.

## 2024-04-18 LAB — BACTERIA UR CULT: ABNORMAL

## 2024-04-23 ENCOUNTER — OFFICE VISIT (OUTPATIENT)
Dept: UROLOGY | Facility: CLINIC | Age: 74
End: 2024-04-23
Payer: MEDICARE

## 2024-04-23 VITALS
RESPIRATION RATE: 14 BRPM | BODY MASS INDEX: 24.63 KG/M2 | HEART RATE: 74 BPM | SYSTOLIC BLOOD PRESSURE: 268 MMHG | HEIGHT: 60 IN | WEIGHT: 125.44 LBS | DIASTOLIC BLOOD PRESSURE: 79 MMHG

## 2024-04-23 DIAGNOSIS — N30.00 ACUTE CYSTITIS WITHOUT HEMATURIA: Primary | ICD-10-CM

## 2024-04-23 PROBLEM — B96.20 BACTEREMIA DUE TO ESCHERICHIA COLI: Status: RESOLVED | Noted: 2023-07-10 | Resolved: 2024-04-23

## 2024-04-23 PROBLEM — F99 INAPPROPRIATE BEHAVIOR: Status: RESOLVED | Noted: 2022-09-21 | Resolved: 2024-04-23

## 2024-04-23 PROBLEM — L08.9 TOE INFECTION: Status: RESOLVED | Noted: 2023-11-17 | Resolved: 2024-04-23

## 2024-04-23 PROBLEM — R78.81 BACTEREMIA DUE TO ESCHERICHIA COLI: Status: RESOLVED | Noted: 2023-07-10 | Resolved: 2024-04-23

## 2024-04-23 PROCEDURE — 3077F SYST BP >= 140 MM HG: CPT | Mod: CPTII,S$GLB,, | Performed by: NURSE PRACTITIONER

## 2024-04-23 PROCEDURE — 99999 PR PBB SHADOW E&M-EST. PATIENT-LVL IV: CPT | Mod: PBBFAC,,, | Performed by: NURSE PRACTITIONER

## 2024-04-23 PROCEDURE — 3008F BODY MASS INDEX DOCD: CPT | Mod: CPTII,S$GLB,, | Performed by: NURSE PRACTITIONER

## 2024-04-23 PROCEDURE — 1159F MED LIST DOCD IN RCRD: CPT | Mod: CPTII,S$GLB,, | Performed by: NURSE PRACTITIONER

## 2024-04-23 PROCEDURE — 1126F AMNT PAIN NOTED NONE PRSNT: CPT | Mod: CPTII,S$GLB,, | Performed by: NURSE PRACTITIONER

## 2024-04-23 PROCEDURE — 3078F DIAST BP <80 MM HG: CPT | Mod: CPTII,S$GLB,, | Performed by: NURSE PRACTITIONER

## 2024-04-23 PROCEDURE — 99214 OFFICE O/P EST MOD 30 MIN: CPT | Mod: S$GLB,,, | Performed by: NURSE PRACTITIONER

## 2024-04-23 NOTE — PROGRESS NOTES
"Chief Complaint:   E coli cystitis    HPI:  Patient is presenting with her daughter.  Patient was recently treated for urine culture indicated E coli.  Is currently completing Omnicef.  Is asymptomatic.  01/31/2024  Patient is a 73-year-old female that is presenting with her daughter.  Patient has a history of dementia, HPI obtained by daughter.  This is a three-month follow-up secondary to cystoscopy per Dr. Quinonez.  Patient was prescribed bethanecol 3 times daily.  PVR is 1 mL.  Urine in clinic is negative.  Daughter states that patient is voiding "normally", nocturia is twice nightly.  Denies gross hematuria.  Cristiano HERRON  11/27/2023  Cystoscopy  11/01/2023  Patient is a 73-year-old female that is presenting with her daughter.  Patient has a history of dementia.  Patient was recently seen by infectious disease and has a history of bacteremia due to E coli.  Daughter states that patient does not feel the urge to have a bowel movement or to void.  Has been in adult diapers for years.  Reports decrease in sensation occurred after a traumatic fall.  No history of gross hematuria or renal stones. Recent renal ultrasound indicated bilateral, simple renal cyst and nonspecific bladder wall thickening.     Allergies:  Patient has no known allergies.    Medications:  has a current medication list which includes the following prescription(s): aluminum-magnesium hydroxide-simethicone 200-200-20 mg/5 mL Susp, diphenhydrAMINE 12.5 mg/5 mL Liqd, anastrozole, aspirin, buspirone, calcium citrate, cefdinir, cyanocobalamin, donepezil, estradiol, ferrous sulfate, fluad quad 2023-24(65y up)(pf), furosemide, isosorbide mononitrate, memantine, metoprolol succinate, quetiapine, telmisartan, and vitamin d.    Review of Systems:  General: No fever, chills, fatigability, or weight loss.  Skin: No rashes, itching, or changes in color or texture of skin.  Chest: Denies GRISSOM, cyanosis, wheezing, cough, and sputum production.  Abdomen: " Appetite fine. No weight loss. Denies diarrhea, abdominal pain, hematemesis, or blood in stool.  Musculoskeletal: No joint stiffness or swelling. Denies back pain.  : As above.  All other review of systems negative.    PMH:   has a past medical history of Arthritis, Behavioral change (2022), Blind right eye, Breast cancer (06/15/2017), Essential hypertension, Hemorrhoids, Immunodeficiency due to chemotherapy (2021), Lipoma of abdominal wall, Major neurocognitive disorder (2022), Obesity, Overactive bladder, Pap smear abnormality of cervix with ASCUS favoring benign, Thyroid nodule, Tobacco use disorder, Tubular adenoma of colon, and Urinary incontinence.    PSH:   has a past surgical history that includes Anterior vaginal repair; Thyroid lobectomy (Left, ); Bladder surgery;  section; Tubal ligation; Colonoscopy (N/A, 3/8/2017); Total abdominal hysterectomy; Colonoscopy (N/A, 2020); Esophagogastroduodenoscopy (N/A, 2020); Simple mastectomy (Left, 2020); [place] lymph node biopsy (Left, 2020); Intraluminal gastrointestinal tract imaging via capsule (N/A, 10/28/2020); Left heart catheterization (Left, 10/12/2021); Breast lumpectomy (Left, ); and Cataract extraction (Left, 2022).    FamHx: family history includes Alcohol abuse in her mother; Allergic rhinitis in her daughter; Cataracts in her father; Cervical cancer (age of onset: 32) in her daughter; Cirrhosis in her mother; Diabetes in her brother; Heart attack in her brother; Heart murmur in her brother; Hypertension in her daughter and father; No Known Problems in her daughter; Prostate cancer (age of onset: 80) in her father.    SocHx:  reports that she quit smoking about 3 years ago. Her smoking use included cigarettes. She started smoking about 53 years ago. She has a 50 pack-year smoking history. She has never used smokeless tobacco. She reports that she does not drink alcohol and does not use drugs.       Physical Exam:  General: A&Ox3, no apparent distress, no deformities  Neck: No masses, normal thyroid  Lungs: normal inspiration, no use of accessory muscles  Heart: normal pulse, no arrhythmias  Abdomen: Soft, NT, ND, no masses, no hernias, no hepatosplenomegaly  Lymphatic: Neck and groin nodes negative  Skin: The skin is warm and dry. No jaundice.    Labs/Studies:   10/31/2023  EXAMINATION:  US RETROPERITONEAL COMPLETE     CLINICAL HISTORY:  Urge incontinence     TECHNIQUE:  Ultrasound of the kidneys and urinary bladder was performed including color flow and Doppler evaluation of the kidneys.     COMPARISON:  None.     FINDINGS:  Right kidney: The right kidney measures 9.5 cm. No cortical thinning. No loss of corticomedullary distinction. Resistive index measures 0.74.  There are 2 simple cysts at the upper pole with the larger measuring 1.6 cm.  No renal stone. No hydronephrosis.     Left kidney: The left kidney measures 9.8 cm. No cortical thinning. No loss of corticomedullary distinction. Resistive index measures 0.70.  Multiple simple cysts with the largest measuring 4.2 cm, at the midpole.  No renal stone. No hydronephrosis.     Bladder wall thickening measuring 6 mm.  No visualized bladder calculus.     Impression:  Bilateral simple renal cysts.  Nonspecific bladder wall thickening.  Impression/Plan:   E coli cystitis  Patient unable to give specimen and is currently on antibiotics.  Will wait for urine culture, Monday.  Patient to return to clinic for re-evaluation in 2 weeks, will consider nightly antibiotics at that time.

## 2024-04-23 NOTE — TELEPHONE ENCOUNTER
I called patient daughter and an appointment has been scheduled for patient. She has also been added to the waiting list in case of a sooner appointment.

## 2024-04-24 DIAGNOSIS — C50.312 MALIGNANT NEOPLASM OF LOWER-INNER QUADRANT OF LEFT BREAST IN FEMALE, ESTROGEN RECEPTOR POSITIVE: ICD-10-CM

## 2024-04-24 DIAGNOSIS — Z17.0 MALIGNANT NEOPLASM OF LOWER-INNER QUADRANT OF LEFT BREAST IN FEMALE, ESTROGEN RECEPTOR POSITIVE: ICD-10-CM

## 2024-04-24 RX ORDER — METOPROLOL SUCCINATE 25 MG/1
25 TABLET, EXTENDED RELEASE ORAL
Qty: 90 TABLET | Refills: 2 | Status: SHIPPED | OUTPATIENT
Start: 2024-04-24

## 2024-04-25 RX ORDER — ANASTROZOLE 1 MG/1
TABLET ORAL
Qty: 90 TABLET | Refills: 3 | Status: SHIPPED | OUTPATIENT
Start: 2024-04-25

## 2024-05-01 ENCOUNTER — PATIENT MESSAGE (OUTPATIENT)
Dept: UROLOGY | Facility: CLINIC | Age: 74
End: 2024-05-01
Payer: MEDICARE

## 2024-05-01 ENCOUNTER — PATIENT MESSAGE (OUTPATIENT)
Dept: NEUROLOGY | Facility: CLINIC | Age: 74
End: 2024-05-01
Payer: MEDICARE

## 2024-05-01 RX ORDER — MEMANTINE HYDROCHLORIDE 5 MG/1
5 TABLET ORAL 2 TIMES DAILY
Qty: 180 TABLET | Refills: 3 | Status: SHIPPED | OUTPATIENT
Start: 2024-05-01 | End: 2025-04-26

## 2024-05-03 ENCOUNTER — LAB VISIT (OUTPATIENT)
Dept: LAB | Facility: HOSPITAL | Age: 74
End: 2024-05-03
Attending: NURSE PRACTITIONER
Payer: MEDICARE

## 2024-05-03 DIAGNOSIS — N30.00 ACUTE CYSTITIS WITHOUT HEMATURIA: ICD-10-CM

## 2024-05-03 PROCEDURE — 87086 URINE CULTURE/COLONY COUNT: CPT | Performed by: NURSE PRACTITIONER

## 2024-05-05 LAB
BACTERIA UR CULT: NORMAL
BACTERIA UR CULT: NORMAL

## 2024-05-06 ENCOUNTER — TELEPHONE (OUTPATIENT)
Dept: NEUROLOGY | Facility: CLINIC | Age: 74
End: 2024-05-06
Payer: MEDICARE

## 2024-05-06 NOTE — TELEPHONE ENCOUNTER
Daughter requested appt to be switched over to in person. Appt was changed and she did verbalize understanding.

## 2024-05-06 NOTE — TELEPHONE ENCOUNTER
----- Message from Raina Ridley sent at 5/6/2024  9:45 AM CDT -----  Contact: Moe/ Daughter  Moe is calling requesting to make appt on 07/08 in patient. Please call Moe at [phone]

## 2024-05-14 ENCOUNTER — OFFICE VISIT (OUTPATIENT)
Dept: UROLOGY | Facility: CLINIC | Age: 74
End: 2024-05-14
Payer: MEDICARE

## 2024-05-14 VITALS
DIASTOLIC BLOOD PRESSURE: 82 MMHG | HEIGHT: 60 IN | HEART RATE: 59 BPM | RESPIRATION RATE: 16 BRPM | BODY MASS INDEX: 24.5 KG/M2 | SYSTOLIC BLOOD PRESSURE: 181 MMHG

## 2024-05-14 DIAGNOSIS — Z87.440 HISTORY OF RECURRENT UTIS: Primary | ICD-10-CM

## 2024-05-14 PROCEDURE — 3079F DIAST BP 80-89 MM HG: CPT | Mod: CPTII,S$GLB,, | Performed by: NURSE PRACTITIONER

## 2024-05-14 PROCEDURE — 1101F PT FALLS ASSESS-DOCD LE1/YR: CPT | Mod: CPTII,S$GLB,, | Performed by: NURSE PRACTITIONER

## 2024-05-14 PROCEDURE — 3008F BODY MASS INDEX DOCD: CPT | Mod: CPTII,S$GLB,, | Performed by: NURSE PRACTITIONER

## 2024-05-14 PROCEDURE — 99999 PR PBB SHADOW E&M-EST. PATIENT-LVL IV: CPT | Mod: PBBFAC,,, | Performed by: NURSE PRACTITIONER

## 2024-05-14 PROCEDURE — 99214 OFFICE O/P EST MOD 30 MIN: CPT | Mod: S$GLB,,, | Performed by: NURSE PRACTITIONER

## 2024-05-14 PROCEDURE — 3288F FALL RISK ASSESSMENT DOCD: CPT | Mod: CPTII,S$GLB,, | Performed by: NURSE PRACTITIONER

## 2024-05-14 PROCEDURE — 1159F MED LIST DOCD IN RCRD: CPT | Mod: CPTII,S$GLB,, | Performed by: NURSE PRACTITIONER

## 2024-05-14 PROCEDURE — 1126F AMNT PAIN NOTED NONE PRSNT: CPT | Mod: CPTII,S$GLB,, | Performed by: NURSE PRACTITIONER

## 2024-05-14 PROCEDURE — 3077F SYST BP >= 140 MM HG: CPT | Mod: CPTII,S$GLB,, | Performed by: NURSE PRACTITIONER

## 2024-05-14 RX ORDER — CEPHALEXIN 250 MG/1
250 CAPSULE ORAL NIGHTLY
Qty: 90 CAPSULE | Refills: 3 | Status: SHIPPED | OUTPATIENT
Start: 2024-05-14 | End: 2025-05-14

## 2024-05-14 NOTE — PROGRESS NOTES
"Chief Complaint:   Recurrent urinary tract infections    HPI:   Patient is presenting with her daughter.  Patient has recurrent urinary tract infections.  Is currently asymptomatic.  Will consider and discuss prophylactic antibiotics with daughter.  Unable to give urine sample.  04/23/2024  Patient is presenting with her daughter.  Patient was recently treated for urine culture indicated E coli.  Is currently completing Omnicef.  Is asymptomatic.  01/31/2024  Patient is a 73-year-old female that is presenting with her daughter.  Patient has a history of dementia, HPI obtained by daughter.  This is a three-month follow-up secondary to cystoscopy per Dr. Quinonez.  Patient was prescribed bethanecol 3 times daily.  PVR is 1 mL.  Urine in clinic is negative.  Daughter states that patient is voiding "normally", nocturia is twice nightly.  Denies gross hematuria.  Cristiano HERRON  11/27/2023  Cystoscopy  11/01/2023  Patient is a 73-year-old female that is presenting with her daughter.  Patient has a history of dementia.  Patient was recently seen by infectious disease and has a history of bacteremia due to E coli.  Daughter states that patient does not feel the urge to have a bowel movement or to void.  Has been in adult diapers for years.  Reports decrease in sensation occurred after a traumatic fall.  No history of gross hematuria or renal stones. Recent renal ultrasound indicated bilateral, simple renal cyst and nonspecific bladder wall thickening.   Allergies:  Patient has no known allergies.    Medications:  has a current medication list which includes the following prescription(s): aluminum-magnesium hydroxide-simethicone 200-200-20 mg/5 mL Susp, diphenhydrAMINE 12.5 mg/5 mL Liqd, anastrozole, aspirin, buspirone, calcium citrate, cyanocobalamin, donepezil, estradiol, ferrous sulfate, fluad quad 2023-24(65y up)(pf), furosemide, isosorbide mononitrate, memantine, metoprolol succinate, quetiapine, telmisartan, and vitamin " d.    Review of Systems:  General: No fever, chills, fatigability, or weight loss.  Skin: No rashes, itching, or changes in color or texture of skin.  Chest: Denies GRISSOM, cyanosis, wheezing, cough, and sputum production.  Abdomen: Appetite fine. No weight loss. Denies diarrhea, abdominal pain, hematemesis, or blood in stool.  Musculoskeletal: No joint stiffness or swelling. Denies back pain.  : As above.  All other review of systems negative.    PMH:   has a past medical history of Arthritis, Behavioral change (2022), Blind right eye, Breast cancer (06/15/2017), Essential hypertension, Hemorrhoids, Immunodeficiency due to chemotherapy (2021), Lipoma of abdominal wall, Major neurocognitive disorder (2022), Obesity, Overactive bladder, Pap smear abnormality of cervix with ASCUS favoring benign, Thyroid nodule, Tobacco use disorder, Tubular adenoma of colon, and Urinary incontinence.    PSH:   has a past surgical history that includes Anterior vaginal repair; Thyroid lobectomy (Left, ); Bladder surgery;  section; Tubal ligation; Colonoscopy (N/A, 3/8/2017); Total abdominal hysterectomy; Colonoscopy (N/A, 2020); Esophagogastroduodenoscopy (N/A, 2020); Simple mastectomy (Left, 2020); [place] lymph node biopsy (Left, 2020); Intraluminal gastrointestinal tract imaging via capsule (N/A, 10/28/2020); Left heart catheterization (Left, 10/12/2021); Breast lumpectomy (Left, ); and Cataract extraction (Left, 2022).    FamHx: family history includes Alcohol abuse in her mother; Allergic rhinitis in her daughter; Cataracts in her father; Cervical cancer (age of onset: 32) in her daughter; Cirrhosis in her mother; Diabetes in her brother; Heart attack in her brother; Heart murmur in her brother; Hypertension in her daughter and father; No Known Problems in her daughter; Prostate cancer (age of onset: 80) in her father.    SocHx:  reports that she quit smoking about 3 years  ago. Her smoking use included cigarettes. She started smoking about 53 years ago. She has a 50 pack-year smoking history. She has never used smokeless tobacco. She reports that she does not drink alcohol and does not use drugs.      Physical Exam:    General: A&Ox3, no apparent distress, no deformities  Neck: No masses, normal thyroid  Lungs: normal inspiration, no use of accessory muscles  Heart: normal pulse, no arrhythmias  Abdomen: Soft, NT, ND, no masses, no hernias, no hepatosplenomegaly  Lymphatic: Neck and groin nodes negative  Skin: The skin is warm and dry. No jaundice.    Labs/Studies:   12/03/2023  EXAMINATION:  CT RENAL STONE STUDY ABD PELVIS WO     CLINICAL HISTORY:  Flank pain, kidney stone suspected;Nephrolithiasis, symptomatic/complicated;     TECHNIQUE:  Low dose axial images, sagittal and coronal reformations were obtained from the lung bases to the pubic symphysis.  Contrast was not administered.     COMPARISON:  Multiple priors.     FINDINGS:  Heart: Normal in size. No pericardial effusion.     Lung Bases: Well aerated, without consolidation or pleural fluid.     Liver: Normal in size and attenuation, with no focal hepatic lesions.     Gallbladder: Sludge in the gallbladder.  No findings for acute cholecystitis.     Bile Ducts: No evidence of dilated ducts.     Pancreas: No mass or peripancreatic fat stranding.     Spleen: Previously noted splenic lesion not well delineated on this noncontrast exam.     Adrenals: Unremarkable.     Kidneys/ Ureters: Nonobstructive nephrolithiasis.  Bilateral simple renal cysts.     Bladder: Bladder wall thickening.  Correlation with urinalysis to exclude infection.     Reproductive organs: Status post hysterectomy.     GI Tract/Mesentery: No evidence of bowel obstruction or inflammation.  Probable constipation.  Impaction less favored but not excluded.     Peritoneal Space: No ascites. No free air.     Retroperitoneum: No significant adenopathy.     Abdominal  wall: Unremarkable.     Vasculature: Aortoiliac atherosclerosis.  No aneurysm.     Bones: No acute fracture.  Multifocal osseous degenerative changes.     Impression:     Bladder wall thickening concerning for infection.  Correlation with urinalysis.     Constipation favored over impaction.  Impression/Plan:   Recurrent urinary tract infections  Unable to prescribe Hiprex, decrease kidney functions.  Patient to start Keflex 250 mg once nightly and return to clinic in 6 months for re-evaluation.

## 2024-06-07 ENCOUNTER — OFFICE VISIT (OUTPATIENT)
Dept: NEUROLOGY | Facility: CLINIC | Age: 74
End: 2024-06-07
Payer: MEDICARE

## 2024-06-07 ENCOUNTER — LAB VISIT (OUTPATIENT)
Dept: LAB | Facility: HOSPITAL | Age: 74
End: 2024-06-07
Attending: NURSE PRACTITIONER
Payer: MEDICARE

## 2024-06-07 VITALS
SYSTOLIC BLOOD PRESSURE: 105 MMHG | HEIGHT: 61 IN | RESPIRATION RATE: 16 BRPM | BODY MASS INDEX: 23.7 KG/M2 | DIASTOLIC BLOOD PRESSURE: 67 MMHG | HEART RATE: 42 BPM

## 2024-06-07 DIAGNOSIS — F02.818 MAJOR NEUROCOGNITIVE DISORDER DUE TO MULTIPLE ETIOLOGIES WITH BEHAVIORAL DISTURBANCE: ICD-10-CM

## 2024-06-07 DIAGNOSIS — R41.3 MEMORY LOSS: ICD-10-CM

## 2024-06-07 DIAGNOSIS — N39.46 MIXED STRESS AND URGE URINARY INCONTINENCE: ICD-10-CM

## 2024-06-07 DIAGNOSIS — F03.911 AGITATION DUE TO DEMENTIA: ICD-10-CM

## 2024-06-07 DIAGNOSIS — Z91.89 AT RISK FOR PROLONGED QT INTERVAL SYNDROME: ICD-10-CM

## 2024-06-07 DIAGNOSIS — G47.00 INSOMNIA, UNSPECIFIED TYPE: ICD-10-CM

## 2024-06-07 DIAGNOSIS — F33.0 MILD EPISODE OF RECURRENT MAJOR DEPRESSIVE DISORDER: ICD-10-CM

## 2024-06-07 DIAGNOSIS — F99 INAPPROPRIATE BEHAVIOR: ICD-10-CM

## 2024-06-07 DIAGNOSIS — F02.80 MAJOR NEUROCOGNITIVE DISORDER DUE TO ALZHEIMER'S DISEASE: Primary | ICD-10-CM

## 2024-06-07 DIAGNOSIS — E66.9 OBESITY (BMI 30.0-34.9): ICD-10-CM

## 2024-06-07 DIAGNOSIS — F10.21 ALCOHOL USE DISORDER, MODERATE, IN SUSTAINED REMISSION: ICD-10-CM

## 2024-06-07 DIAGNOSIS — R46.89 BEHAVIORAL CHANGE: ICD-10-CM

## 2024-06-07 DIAGNOSIS — R41.3 OTHER AMNESIA: ICD-10-CM

## 2024-06-07 DIAGNOSIS — N18.32 CHRONIC KIDNEY DISEASE, STAGE 3B: ICD-10-CM

## 2024-06-07 DIAGNOSIS — F10.21 HISTORY OF ALCOHOLISM: ICD-10-CM

## 2024-06-07 DIAGNOSIS — R90.89 ABNORMAL FINDING ON MRI OF BRAIN: ICD-10-CM

## 2024-06-07 DIAGNOSIS — G30.9 MAJOR NEUROCOGNITIVE DISORDER DUE TO ALZHEIMER'S DISEASE: Primary | ICD-10-CM

## 2024-06-07 DIAGNOSIS — F03.90 MAJOR NEUROCOGNITIVE DISORDER: ICD-10-CM

## 2024-06-07 LAB
CREAT SERPL-MCNC: 1.3 MG/DL (ref 0.5–1.4)
EST. GFR  (NO RACE VARIABLE): 43.7 ML/MIN/1.73 M^2

## 2024-06-07 PROCEDURE — 3288F FALL RISK ASSESSMENT DOCD: CPT | Mod: CPTII,S$GLB,, | Performed by: NURSE PRACTITIONER

## 2024-06-07 PROCEDURE — 1101F PT FALLS ASSESS-DOCD LE1/YR: CPT | Mod: CPTII,S$GLB,, | Performed by: NURSE PRACTITIONER

## 2024-06-07 PROCEDURE — 3074F SYST BP LT 130 MM HG: CPT | Mod: CPTII,S$GLB,, | Performed by: NURSE PRACTITIONER

## 2024-06-07 PROCEDURE — 99214 OFFICE O/P EST MOD 30 MIN: CPT | Mod: S$GLB,,, | Performed by: NURSE PRACTITIONER

## 2024-06-07 PROCEDURE — 99999 PR PBB SHADOW E&M-EST. PATIENT-LVL V: CPT | Mod: PBBFAC,,, | Performed by: NURSE PRACTITIONER

## 2024-06-07 PROCEDURE — 3078F DIAST BP <80 MM HG: CPT | Mod: CPTII,S$GLB,, | Performed by: NURSE PRACTITIONER

## 2024-06-07 PROCEDURE — 1160F RVW MEDS BY RX/DR IN RCRD: CPT | Mod: CPTII,S$GLB,, | Performed by: NURSE PRACTITIONER

## 2024-06-07 PROCEDURE — 1159F MED LIST DOCD IN RCRD: CPT | Mod: CPTII,S$GLB,, | Performed by: NURSE PRACTITIONER

## 2024-06-07 PROCEDURE — 4010F ACE/ARB THERAPY RXD/TAKEN: CPT | Mod: CPTII,S$GLB,, | Performed by: NURSE PRACTITIONER

## 2024-06-07 PROCEDURE — 82565 ASSAY OF CREATININE: CPT | Performed by: NURSE PRACTITIONER

## 2024-06-07 PROCEDURE — 3008F BODY MASS INDEX DOCD: CPT | Mod: CPTII,S$GLB,, | Performed by: NURSE PRACTITIONER

## 2024-06-07 PROCEDURE — 1126F AMNT PAIN NOTED NONE PRSNT: CPT | Mod: CPTII,S$GLB,, | Performed by: NURSE PRACTITIONER

## 2024-06-07 PROCEDURE — 36415 COLL VENOUS BLD VENIPUNCTURE: CPT | Performed by: NURSE PRACTITIONER

## 2024-06-07 RX ORDER — QUETIAPINE FUMARATE 200 MG/1
200 TABLET, FILM COATED ORAL NIGHTLY
Qty: 30 TABLET | Refills: 11 | Status: SHIPPED | OUTPATIENT
Start: 2024-06-07 | End: 2025-06-07

## 2024-06-07 RX ORDER — LAMOTRIGINE 25 MG/1
25 TABLET ORAL 2 TIMES DAILY
Qty: 60 TABLET | Refills: 11 | Status: SHIPPED | OUTPATIENT
Start: 2024-06-07 | End: 2025-06-07

## 2024-06-07 NOTE — PATIENT INSTRUCTIONS
Increase Seroquel 200 mg oral table to help with insomnia. Instructed the family to watch for excessive sedation or paradoxical agitation.         Will start Lamotrigine/Lamictal LTG 25 mg twice per day to help for agitation and mood stabilization.        Continues Buspar/Buspirone 15 mg po BID for inappropriate picking pulling behaviors

## 2024-06-07 NOTE — PROGRESS NOTES
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       SYMPTOMATIC MANAGEMENT     Increase Seroquel 200 mg po slow titration  to help with insomnia. Instructed the family to watch for excessive sedation or paradoxical agitation.     Will start LTG 25 mg BID to help for agitation and mood stabilization.      Failed Trazodone 150 mg QHS    Future considerations:     Risperdal 1 mg QHS to assist with psychotic symptoms and behavioral disturbances     HOME CARE     Palliative Care Home based consult     Falling Down Precautions. Gait is affected by the disease as well.     Avoid driving and access to firearms     Incremental 24/Care     Help with finances and decision making.    Join support group.    Proofing the house and use labeling.    Avoid antihistamines and anticholinergics.    Avoid changing routine.    Use written reminders.    Avoid multitasking.    Healthy diet, exercise (physical and cognitive).    Good sleep hygiene.      LEFT  MERALGIA PARAESTHETICA/NUMBNESS     Clinically Monitor      Avoid prolonged standing.     Wear loose clothes.    No need for neuropathic pain med's Numbness complaint only       URINARY INCONTINENCE      Refer to Urologist        MEDICAL/SURGICAL COMORBIDITIES     All relevant medical comorbidities noted and managed by primary care physician and medical care team.          MISCELLANEOUS MEDICAL PROBLEMS       HEALTHY LIFESTYLE AND PREVENTATIVE CARE    Encouraged the patient to adhere to the age-appropriate health maintenance guidelines including screening tests and vaccinations.     Discussed the overall importance of healthy lifestyle, optimal weight, exercise, healthy diet, good sleep hygiene and avoiding drugs including smoking, alcohol and recreational drugs. The patient verbalized full understanding.       Advised the patient to follow COVID-19 prevention measures.       I spent 30 minutes total E/M more than 50 % spent  face to face with the patient    RTC 8 week       Tiff Lloyd, MSN NP      Collaborating Provider: Barbara Hurst MD, FAAN Neurologist/Epileptologist

## 2024-06-12 ENCOUNTER — PATIENT OUTREACH (OUTPATIENT)
Dept: ADMINISTRATIVE | Facility: HOSPITAL | Age: 74
End: 2024-06-12
Payer: MEDICARE

## 2024-06-12 NOTE — PROGRESS NOTES
VBHM Score: 1     LDCT Lung Screen    Tetanus Vaccine  Shingles/Zoster Vaccine  RSV Vaccine                  Health Maintenance Topic(s) Outreach Outcomes & Actions Taken:    Low Dose CT Screening - Outreach Outcomes & Actions Taken  : No updates           Additional Notes:  Unable to reach, LVM               Care Management, Digital Medicine, and/or Education Referrals    OPCM Risk Score: 74.2                 Additional Notes:

## 2024-06-19 DIAGNOSIS — I70.0 ATHEROSCLEROSIS OF AORTA: ICD-10-CM

## 2024-06-19 DIAGNOSIS — I10 ESSENTIAL HYPERTENSION: Primary | Chronic | ICD-10-CM

## 2024-06-21 ENCOUNTER — HOSPITAL ENCOUNTER (OUTPATIENT)
Dept: CARDIOLOGY | Facility: HOSPITAL | Age: 74
Discharge: HOME | End: 2024-06-21
Attending: INTERNAL MEDICINE
Payer: MEDICARE

## 2024-06-21 ENCOUNTER — OFFICE VISIT (OUTPATIENT)
Dept: CARDIOLOGY | Facility: CLINIC | Age: 74
End: 2024-06-21
Payer: MEDICARE

## 2024-06-21 VITALS
HEART RATE: 82 BPM | BODY MASS INDEX: 22.08 KG/M2 | DIASTOLIC BLOOD PRESSURE: 64 MMHG | WEIGHT: 116.88 LBS | SYSTOLIC BLOOD PRESSURE: 112 MMHG | OXYGEN SATURATION: 99 %

## 2024-06-21 DIAGNOSIS — I50.43 ACUTE ON CHRONIC COMBINED SYSTOLIC AND DIASTOLIC CONGESTIVE HEART FAILURE: Primary | ICD-10-CM

## 2024-06-21 DIAGNOSIS — F03.911 AGITATION DUE TO DEMENTIA: ICD-10-CM

## 2024-06-21 DIAGNOSIS — I50.42 CHRONIC COMBINED SYSTOLIC AND DIASTOLIC CHF (CONGESTIVE HEART FAILURE): Chronic | ICD-10-CM

## 2024-06-21 DIAGNOSIS — I10 ESSENTIAL HYPERTENSION: Chronic | ICD-10-CM

## 2024-06-21 DIAGNOSIS — I27.20 PULMONARY HTN: ICD-10-CM

## 2024-06-21 DIAGNOSIS — J43.8 OTHER EMPHYSEMA: ICD-10-CM

## 2024-06-21 DIAGNOSIS — I25.118 CORONARY ARTERY DISEASE OF NATIVE ARTERY OF NATIVE HEART WITH STABLE ANGINA PECTORIS: Chronic | ICD-10-CM

## 2024-06-21 DIAGNOSIS — R93.1 DECREASED CARDIAC EJECTION FRACTION: ICD-10-CM

## 2024-06-21 DIAGNOSIS — I70.0 ATHEROSCLEROSIS OF AORTA: ICD-10-CM

## 2024-06-21 DIAGNOSIS — I73.9 PVD (PERIPHERAL VASCULAR DISEASE): ICD-10-CM

## 2024-06-21 PROCEDURE — 93005 ELECTROCARDIOGRAM TRACING: CPT

## 2024-06-21 PROCEDURE — 93010 ELECTROCARDIOGRAM REPORT: CPT | Mod: ,,, | Performed by: INTERNAL MEDICINE

## 2024-06-21 PROCEDURE — 99999 PR PBB SHADOW E&M-EST. PATIENT-LVL IV: CPT | Mod: PBBFAC,,, | Performed by: INTERNAL MEDICINE

## 2024-06-21 RX ORDER — METOPROLOL SUCCINATE 25 MG/1
25 TABLET, EXTENDED RELEASE ORAL 2 TIMES DAILY
Qty: 180 TABLET | Refills: 2 | Status: SHIPPED | OUTPATIENT
Start: 2024-06-21

## 2024-06-21 RX ORDER — FUROSEMIDE 20 MG/1
20 TABLET ORAL DAILY PRN
Qty: 180 TABLET | Refills: 3 | Status: SHIPPED | OUTPATIENT
Start: 2024-06-21

## 2024-06-21 NOTE — PROGRESS NOTES
Subjective:   Patient ID:  Leia Rodriguez is a 73 y.o. female who presents for follow up of No chief complaint on file.      72 yo female, 6 months f/u  PMH CAD, PACs, CHFmrEF, h/o beast cancer 4 yrs ago lumpectomy and chemo, recurrent in  s/p mastectomy and chemo ongoing, and HTN, dementia lower back pain wheelchair helps  Quit smoking in  after 50 yrs smoking.  Some residual left chest pain after mastectomy,   GRISSOM and fatigue after fast walking,   No palpitation, leg swelling, dizziness and faint.    ekg in  NSR and TWI on V2 to v6 and inferior leads   ECHO normal EF and severe LVH   ECHo EF 45%, mod MR and LAE  Weight stable    03/2021 admitted for CHF exacerbation.  and Troponin 0.44 flat. elg NSR and TWI on lateral leads. Improved SOB after diuresis. Then BNP dropped to 58  Now SOB sable. Sleeps on 1 pillow    07/2021 visit   echo Day 15, cycle 6 Biplane=44% 4D LVQ=45% Avg GPLS= -16.1   MPI inferoapical scar  GRISSOM while long distance walking  No chest pain, dizziness faint, leg swelling  Decent appetite  F/u with Dr. Fernández on stage I HER2 positive breast carcinoma being treated with Herceptin q. 3 weeks     visit  Dynamical TWI on EKG   MPI showed apical inferior fixed perfusion defect  No chest pain . Limited walking due to lower back pain  No orthopnea      visit  No chest pain. GRISSOM mild and chronic. Limited activity due to fatigue and leg pain.   S/p LHC done on 10/12/2021, distal % OM2/3 40% and midRCA 50% lesion and EF 45% and LVEDP 17 mmHG. Moderate MR. Continue medical Rx  Serial echo studies    Echo 2/22/21 Cycle 4 Day 20  4dLVQ=47%  AutoEF=48%  BRIDGETT unable to obtain accurate measurements   5-26-21  Day 15, cycle 6  Biplane=44%  4D LVQ=45%  Avg GPLS= -16.1   9-13-21 Day 20, Cycle 11  Biplane=42%  4D LVQ=49%  Avg GPLS= -14.8%    12/2021 visit  11/ went to ER OM. EKG sinus tachy and PVCs. BNP up to 800. CXR pulm.  Edema. Added lasix 20 mg daily  Now dyspnea improved. No leg swelling chest pain. Sleeps on 1 pillow and no PND.   On iron infusion rx fro anemia     visit  Improved appetite after held chemo due to decreased EF about .   Gained the weight. No SOB, sleeps on 2 pillows     visit   Twice ER visits for not feeling well, SOB, the lab and CRX mri unremarkable, except some +UTI.Occurred at night and the family concerning anxiety ?  EKG in  NSR PACs    06/23 visit  Per daughter, pt c/o SOB + orthopnea and leg swelling. Lost 13 lbs in 6 months. Had teeth work recently, dementia slightyl worse. Some mood disturbance. Talked to family member.     08/23 visit  07/23 admitted for UTI and sepsis. Low BP and d/c ARB.   Cr 1.0 before d/c  Today BP low.   07/23 ekg sinus tachy and PACs; echo EF 50% LVH  No dizziness faint sob and chest pain.  to 120 mmHG  RLE edema, worse at sitting and better after laying     10/05/23 addendum   Report low BP  Stop aldactone  And decrease ToprolXL from 50 mg daily to 25 mg daily    12/23 visit  No orthopnea dizziness. No fall  06/23 echo EF 50% PAP 43 mmHG    Interval history  Dementia. States that some bad feeling in AM  No syncope.   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    Lipoma of abdominal wall     Major neurocognitive disorder 06/21/2022    Obesity     Overactive bladder     Pap smear abnormality of cervix with ASCUS favoring benign     Thyroid nodule     Tobacco use disorder     Tubular adenoma of colon     Urinary incontinence        Past Surgical History:    Procedure Laterality Date    ANTERIOR VAGINAL REPAIR      BLADDER SURGERY      BREAST LUMPECTOMY Left 2017    CATARACT EXTRACTION Left 2022     SECTION      X2    COLONOSCOPY N/A 3/8/2017    Procedure: COLONOSCOPY;  Surgeon: Tyron Paris MD;  Location: [facility] ENDO;  Service: Endoscopy;  Laterality: N/A;    COLONOSCOPY N/A 2020    Procedure: COLONOSCOPY;  Surgeon: Keira Ellison MD;  Location: [facility] ENDO;  Service: Endoscopy;  Laterality: N/A;    ESOPHAGOGASTRODUODENOSCOPY N/A 2020    Procedure: EGD (ESOPHAGOGASTRODUODENOSCOPY);  Surgeon: Keira Ellison MD;  Location: [facility] ENDO;  Service: Endoscopy;  Laterality: N/A;  new onset iron deficiency with prior history of breast cancer    INTRALUMINAL GASTROINTESTINAL TRACT IMAGING VIA CAPSULE N/A 10/28/2020    Procedure: IMAGING PROCEDURE, GI TRACT, INTRALUMINAL, VIA CAPSULE;  Surgeon: Finesse Jha RN;  Location: [facility] ENDO;  Service: Endoscopy;  Laterality: N/A;    LEFT HEART CATHETERIZATION Left 10/12/2021    Procedure: CATHETERIZATION, HEART, LEFT;  Surgeon: Tiffanie Santos MD;  Location: [facility] CATH LAB;  Service: Cardiology;  Laterality: Left;    SENTINEL LYMPH NODE BIOPSY Left 2020    Procedure: BIOPSY, LYMPH NODE, SENTINEL;  Surgeon: Vincent Moyer MD;  Location: [facility] OR;  Service: General;  Laterality: Left;    SIMPLE MASTECTOMY Left 2020    Procedure: MASTECTOMY, SIMPLE;  Surgeon: Vincent Moyer MD;  Location: [facility] OR;  Service: General;  Laterality: Left;    THYROID LOBECTOMY Left     TOTAL ABDOMINAL HYSTERECTOMY      TUBAL LIGATION         Social History     Tobacco Use    Smoking status: Former     Current packs/day: 0.00     Average packs/day: 1 pack/day for 50.0 years (50.0 ttl pk-yrs)     Types: Cigarettes     Start date: 1970     Quit date: 2020     Years since quitting: 3.8    Smokeless tobacco: Never   Substance Use Topics    Alcohol use: Never     Alcohol/week: 28.0 standard drinks  of alcohol     Types: 28 Cans of beer per week    Drug use: No       Family History   Problem Relation Name Age of Onset    Hypertension Father      Cataracts Father      Prostate cancer Father  80    Cervical cancer Daughter Cenetra 32    Allergic rhinitis Daughter Cenetra     Cirrhosis Mother      Alcohol abuse Mother      Hypertension Daughter Sheronica     Diabetes Brother      Heart attack Brother      Heart murmur Brother      No Known Problems Daughter Gladis [place]    Objective:   Physical Exam  HENT:      Head: Normocephalic.   Eyes:      Pupils: Pupils are equal, round, and reactive to light.   Neck:      Thyroid: No thyromegaly.      Vascular: Normal carotid pulses. No carotid bruit or JVD.   Cardiovascular:      Rate and Rhythm: Normal rate and regular rhythm. Frequent Extrasystoles are present.     Chest Wall: PMI is not displaced.      Pulses: Normal pulses.           Carotid pulses are 2+ on the right side and 2+ on the left side.     Heart sounds: Normal heart sounds. No murmur heard.     No gallop. No S3 sounds.   Pulmonary:      Effort: No respiratory distress.      Breath sounds: Normal breath sounds. No stridor.   Abdominal:      General: Bowel sounds are normal.      Palpations: Abdomen is soft.      Tenderness: There is no abdominal tenderness. There is no rebound.   Musculoskeletal:      Right lower leg: No edema.   Skin:     General: Skin is warm and dry.      Findings: No rash.   Neurological:      Mental Status: She is alert.     Lab Results   Component Value Date    CHOL 192 07/14/2023    CHOL 119 (L) 03/31/2021    CHOL 207 (H) 09/03/2019     Lab Results   Component Value Date    HDL 35 (L) 07/14/2023    HDL 46 03/31/2021    HDL 53 09/03/2019     Lab Results   Component Value Date    LDLCALC 128.0 07/14/2023    LDLCALC 61.2 (L) 03/31/2021    LDLCALC 134.2 09/03/2019     Lab Results   Component Value Date    TRIG 145 07/14/2023    TRIG 59 03/31/2021    TRIG 99 09/03/2019  "    Lab Results   Component Value Date    CHOLHDL 18.2 (L) 07/14/2023    CHOLHDL 38.7 03/31/2021    CHOLHDL 25.6 09/03/2019       Chemistry        Component Value Date/Time     12/03/2023 1633    K 3.5 12/03/2023 1633     12/03/2023 1633    CO2 21 (L) 12/03/2023 1633    BUN 14 12/03/2023 1633    CREATININE 1.3 06/07/2024 1510    GLU 92 12/03/2023 1633        Component Value Date/Time    CALCIUM 9.6 12/03/2023 1633    ALKPHOS 82 12/03/2023 1633    AST 11 12/03/2023 1633    ALT 10 12/03/2023 1633    BILITOT 0.6 12/03/2023 1633    ESTGFRAFRICA 48.0 (A) 06/20/2022 1020    EGFRNONAA 41.7 (A) 06/20/2022 1020          No results found for: "LABA1C", "HGBA1C"  Lab Results   Component Value Date    TSH 0.765 01/27/2022     Lab Results   Component Value Date    INR 1.1 10/01/2021     Lab Results   Component Value Date    WBC 9.94 12/03/2023    HGB 11.1 (L) 12/03/2023    HCT 34.9 (L) 12/03/2023    MCV 92 12/03/2023     12/03/2023     BMP  Sodium   Date Value Ref Range Status   12/03/2023 144 136 - 145 mmol/L Final     Potassium   Date Value Ref Range Status   12/03/2023 3.5 3.5 - 5.1 mmol/L Final     Chloride   Date Value Ref Range Status   12/03/2023 108 95 - 110 mmol/L Final     CO2   Date Value Ref Range Status   12/03/2023 21 (L) 23 - 29 mmol/L Final     BUN   Date Value Ref Range Status   12/03/2023 14 8 - 23 mg/dL Final     Creatinine   Date Value Ref Range Status   06/07/2024 1.3 0.5 - 1.4 mg/dL Final     Calcium   Date Value Ref Range Status   12/03/2023 9.6 8.7 - 10.5 mg/dL Final     Anion Gap   Date Value Ref Range Status   12/03/2023 15 8 - 16 mmol/L Final     eGFR if    Date Value Ref Range Status   06/20/2022 48.0 (A) >60 mL/min/1.73 m^2 Final     eGFR if non    Date Value Ref Range Status   06/20/2022 41.7 (A) >60 mL/min/1.73 m^2 Final     Comment:     Calculation used to obtain the estimated glomerular filtration  rate (eGFR) is the CKD-EPI equation.    "     BNP  @LABRCNTIP(BNP,BNPTRIAGEBLO)@  @LABRCNTIP(troponini)@  CrCl cannot be calculated (Patient's most recent lab result is older than the maximum 7 days allowed.).  No results found in the last 24 hours.  No results found in the last 24 hours.  No results found in the last 24 hours.    Assessment:      1. Acute on chronic combined systolic and diastolic congestive heart failure    2. Agitation due to dementia    3. Other emphysema    4. Atherosclerosis of aorta    5. Chronic combined systolic and diastolic CHF (congestive heart failure)    6. Coronary artery disease of native artery of native heart with stable angina pectoris    7. Essential hypertension    8. Pulmonary HTN    9. PVD (peripheral vascular disease)    10. Decreased cardiac ejection fraction        Plan:   Increase toprolXL to 25 mg bid for PACs  D/c telmisartan to avoid low BP  Contiune lasix ONLY as needed and imdur    Dipti. Risk modification.   I have reviewed all pertinent labs and cardiac studies independently. Plans and recommendations have been formulated under my direct supervision. All questions answered and patient voiced understanding.   If symptoms persist go to the ED  RTC in 6 months

## 2024-06-22 LAB
OHS QRS DURATION: 80 MS
OHS QTC CALCULATION: 446 MS

## 2024-06-23 LAB
OHS QRS DURATION: 80 MS
OHS QTC CALCULATION: 446 MS

## 2024-06-26 ENCOUNTER — HOSPITAL ENCOUNTER (OUTPATIENT)
Dept: RADIOLOGY | Facility: HOSPITAL | Age: 74
Discharge: HOME | End: 2024-06-26
Attending: NURSE PRACTITIONER
Payer: MEDICARE

## 2024-06-26 DIAGNOSIS — R41.3 OTHER AMNESIA: ICD-10-CM

## 2024-06-26 PROCEDURE — 70553 MRI BRAIN STEM W/O & W/DYE: CPT | Mod: TC

## 2024-06-26 PROCEDURE — 70553 MRI BRAIN STEM W/O & W/DYE: CPT | Mod: 26,,, | Performed by: RADIOLOGY

## 2024-06-26 PROCEDURE — 25500020 PHARM REV CODE 255: Performed by: NURSE PRACTITIONER

## 2024-06-26 PROCEDURE — A9585 GADOBUTROL INJECTION: HCPCS | Performed by: NURSE PRACTITIONER

## 2024-06-26 RX ORDER — GADOBUTROL 604.72 MG/ML
10 INJECTION INTRAVENOUS
Status: COMPLETED | OUTPATIENT
Start: 2024-06-26 | End: 2024-06-26

## 2024-06-26 RX ADMIN — GADOBUTROL 5 ML: 604.72 INJECTION INTRAVENOUS at 04:06

## 2024-06-27 ENCOUNTER — TELEPHONE (OUTPATIENT)
Dept: NEUROSURGERY | Facility: CLINIC | Age: 74
End: 2024-06-27
Payer: MEDICARE

## 2024-06-27 DIAGNOSIS — F33.0 MILD EPISODE OF RECURRENT MAJOR DEPRESSIVE DISORDER: ICD-10-CM

## 2024-06-27 DIAGNOSIS — Z91.89 AT RISK FOR PROLONGED QT INTERVAL SYNDROME: ICD-10-CM

## 2024-06-27 DIAGNOSIS — G93.9 LESION OF BRAIN: ICD-10-CM

## 2024-06-27 DIAGNOSIS — F02.818 MAJOR NEUROCOGNITIVE DISORDER DUE TO MULTIPLE ETIOLOGIES WITH BEHAVIORAL DISTURBANCE: ICD-10-CM

## 2024-06-27 DIAGNOSIS — R41.3 OTHER AMNESIA: ICD-10-CM

## 2024-06-27 DIAGNOSIS — G30.9 MAJOR NEUROCOGNITIVE DISORDER DUE TO ALZHEIMER'S DISEASE: Primary | ICD-10-CM

## 2024-06-27 DIAGNOSIS — F02.80 MAJOR NEUROCOGNITIVE DISORDER DUE TO ALZHEIMER'S DISEASE: Primary | ICD-10-CM

## 2024-06-27 DIAGNOSIS — G47.00 INSOMNIA, UNSPECIFIED TYPE: ICD-10-CM

## 2024-06-27 DIAGNOSIS — F03.911 AGITATION DUE TO DEMENTIA: ICD-10-CM

## 2024-06-27 NOTE — PROGRESS NOTES
MRI BRAIN O:    06-      No acute intracranial abnormality.    Slight interval increase in size of a homogeneously enhancing lesion overlying the left frontal lobe most compatible with a meningioma.  Minimal underlying parenchymal mass effect without signal change.    Unchanged tiny, remote infarcts.    Mild chronic microvascular ischemic change.    Recommend follow up with Neurosurgery regarding interval change noted in enhancing lesion

## 2024-06-28 NOTE — TELEPHONE ENCOUNTER
----- Message from Ervin Juarez sent at 6/28/2024 10:36 AM CDT -----  Contact: Daughter  Type:  Patient Returning Call    Who Called:  Daughter/Cenetra  Who Left Message for Patient:  Silva  Does the patient know what this is regarding?:  Yes  Best Call Back Number:  [phone]  Additional Information:

## 2024-07-12 ENCOUNTER — PATIENT MESSAGE (OUTPATIENT)
Dept: NEUROLOGY | Facility: CLINIC | Age: 74
End: 2024-07-12
Payer: MEDICARE

## 2024-07-12 DIAGNOSIS — G30.9 MAJOR NEUROCOGNITIVE DISORDER DUE TO ALZHEIMER'S DISEASE: Primary | ICD-10-CM

## 2024-07-12 DIAGNOSIS — F03.911 AGITATION DUE TO DEMENTIA: ICD-10-CM

## 2024-07-12 DIAGNOSIS — R41.3 OTHER AMNESIA: ICD-10-CM

## 2024-07-12 DIAGNOSIS — F02.80 MAJOR NEUROCOGNITIVE DISORDER DUE TO ALZHEIMER'S DISEASE: Primary | ICD-10-CM

## 2024-07-12 RX ORDER — LAMOTRIGINE 25 MG/1
50 TABLET ORAL 2 TIMES DAILY
Qty: 120 TABLET | Refills: 11 | Status: SHIPPED | OUTPATIENT
Start: 2024-07-12 | End: 2025-07-12

## 2024-07-12 RX ORDER — QUETIAPINE FUMARATE 300 MG/1
300 TABLET, FILM COATED ORAL NIGHTLY
Qty: 30 TABLET | Refills: 11 | Status: SHIPPED | OUTPATIENT
Start: 2024-07-12 | End: 2025-07-12

## 2024-07-17 ENCOUNTER — OFFICE VISIT (OUTPATIENT)
Dept: NEUROSURGERY | Facility: CLINIC | Age: 74
End: 2024-07-17
Payer: MEDICARE

## 2024-07-17 VITALS
DIASTOLIC BLOOD PRESSURE: 78 MMHG | HEIGHT: 61 IN | WEIGHT: 116.88 LBS | SYSTOLIC BLOOD PRESSURE: 137 MMHG | BODY MASS INDEX: 22.07 KG/M2 | HEART RATE: 72 BPM | RESPIRATION RATE: 18 BRPM

## 2024-07-17 DIAGNOSIS — G30.9 MAJOR NEUROCOGNITIVE DISORDER DUE TO ALZHEIMER'S DISEASE: ICD-10-CM

## 2024-07-17 DIAGNOSIS — F02.80 MAJOR NEUROCOGNITIVE DISORDER DUE TO ALZHEIMER'S DISEASE: ICD-10-CM

## 2024-07-17 DIAGNOSIS — G93.9 LESION OF BRAIN: ICD-10-CM

## 2024-07-17 PROCEDURE — 4010F ACE/ARB THERAPY RXD/TAKEN: CPT | Mod: CPTII,S$GLB,, | Performed by: NEUROLOGICAL SURGERY

## 2024-07-17 PROCEDURE — 1126F AMNT PAIN NOTED NONE PRSNT: CPT | Mod: CPTII,S$GLB,, | Performed by: NEUROLOGICAL SURGERY

## 2024-07-17 PROCEDURE — 99215 OFFICE O/P EST HI 40 MIN: CPT | Mod: S$GLB,,, | Performed by: NEUROLOGICAL SURGERY

## 2024-07-17 PROCEDURE — 1159F MED LIST DOCD IN RCRD: CPT | Mod: CPTII,S$GLB,, | Performed by: NEUROLOGICAL SURGERY

## 2024-07-17 PROCEDURE — 3008F BODY MASS INDEX DOCD: CPT | Mod: CPTII,S$GLB,, | Performed by: NEUROLOGICAL SURGERY

## 2024-07-17 PROCEDURE — 1101F PT FALLS ASSESS-DOCD LE1/YR: CPT | Mod: CPTII,S$GLB,, | Performed by: NEUROLOGICAL SURGERY

## 2024-07-17 PROCEDURE — 3288F FALL RISK ASSESSMENT DOCD: CPT | Mod: CPTII,S$GLB,, | Performed by: NEUROLOGICAL SURGERY

## 2024-07-17 PROCEDURE — 3078F DIAST BP <80 MM HG: CPT | Mod: CPTII,S$GLB,, | Performed by: NEUROLOGICAL SURGERY

## 2024-07-17 PROCEDURE — 3075F SYST BP GE 130 - 139MM HG: CPT | Mod: CPTII,S$GLB,, | Performed by: NEUROLOGICAL SURGERY

## 2024-07-22 RX ORDER — ISOSORBIDE MONONITRATE 30 MG/1
TABLET, EXTENDED RELEASE ORAL
Qty: 90 TABLET | Refills: 3 | Status: SHIPPED | OUTPATIENT
Start: 2024-07-22

## 2024-07-28 ENCOUNTER — PATIENT MESSAGE (OUTPATIENT)
Dept: INFECTIOUS DISEASES | Facility: CLINIC | Age: 74
End: 2024-07-28
Payer: MEDICARE

## 2024-07-29 RX ORDER — MUPIROCIN 20 MG/G
OINTMENT TOPICAL 3 TIMES DAILY
Qty: 30 G | Refills: 6 | Status: SHIPPED | OUTPATIENT
Start: 2024-07-29 | End: 2024-08-28

## 2024-07-31 NOTE — PROGRESS NOTES
Patient ID: Leia Rodriguez is a 74 y.o. female.    Chief Complaint: Survivorship- breast cancer     HPI: Breast cancer follow-up - last visit with me was 7/14/2020    Pt has a history of Stage 0 DCIS of Lt. breast.  She presented in March of 2017 when diagnostic MMG confirmed the presence of a round mass with indistinct margins in the Lt. breast at the 7 o'clock position. The mass measured 9 x 5 x 6 mm. The patient was referred to Dr. Duke and on 6/15/17 underwent wire localization lumpectomy. Pathology revealed an 8 mm focus of ductal carcinoma in situ. The tumor was low grade. There was tumor present at the anterior inferior margin. Ninety percent of the tumor cells were ER positive, 5- 10% of the tumor cells were AL positive. The patient was referred for adjuvant radiotherapy. Completed a course of partial breast irradiation to the LIQ of the Lt. breast on 8/14/17.  Currently on hormonal therapy with Arimidex that started 8/2017. Was Due off 8/2022-     8/5/2020 pt had an abnormal left mammo that lead to a biopsy.    Diagnosis     Left breast upper outer quadrant, biopsy:  Invasive ductal carcinoma, Grade 2 ( Tubules=3, nuclei=3, mitosis=1),  measuring 1.4 cm (14 mm) in greatest linear dimension within the core biopsy  Ductal carcinoma in situ (DCIS), nuclear grade 3, cribriform pattern,  measuring 3 mm  Microcalcifications present  Molecular markers are pending and will be reported in a supplemental       9/8/2020 pt underwent a left mastectomy with Dr. Vincent Moyer  1.  Left axilla, sentinel lymph node #1, excisional biopsy: 3 benign lymph  nodes, negative for metastatic carcinoma (0/3).         Confirmed by negative immunohistochemical staining with cytokeratins  AE1/AE3, CAM 5.2 and wide spectrum keratin with         satisfactory positive and negative controls.  2.  Left axilla, sentinel lymph node #2, excisional biopsy: 1 benign lymph  node, negative for metastatic carcinoma (0/1).         Confirmed by  negative immunohistochemical staining with cytokeratins  AE1/AE3, CAM 5.2 and wide spectrum keratin with         satisfactory positive and negative controls.  3.  Left axilla, sentinel lymph node #3, excisional biopsy: 1 benign lymph  node, negative for metastatic carcinoma (0/1).         Confirmed by negative immunohistochemical staining with cytokeratins  AE1/AE3, CAM 5.2 and wide spectrum keratin with         satisfactory positive and negative controls.  4.  Left breast, mastectomy: Invasive ductal carcinoma, multifocal with two  (2) foci measuring as follows:  Lesion #1 (Blocks 4E-F, corresponding to  prior         biopsy site/clip): 12 mm (1.2 cm) and Lesion #2         (Block 4D)  8 mm (0.8 cm) in greatest dimensions.         Microcalcifications are present associated with invasive carcinoma.         Margins: Invasive carcinoma is present at closest approach within less  than 1 mm (within less than 0.1 cm) of the                 black/deep surgical margin (this is the smaller lesion #2  measuring 8 mm in total size).  Invasive carcinoma is                 greater than 10 mm (greater than 1 cm) from all other surgical  margins.                 Ductal carcinoma in situ is greater than 10 mm (greater than 1  cm) from all surgical margins.         Ductal carcinoma in situ, high nuclear grade with solid and  comedonecrosis patterns are present and associated with         invasive tumor.         Previous biopsy cavity site and clip are identified.         Incidental calcified fibroadenoma.         Unremarkable nipple and skin.         See synoptic report in comment section for further details.  5.  Excess skin left breast, excision: Unremarkable skin and soft tissue.         No evidence of malignancy.  Comment:  Synoptic report  Procedure:  Total mastectomy     Pt had wound healing issues and a wound vac. Short course of platinum drug and herceptin recommended. Due to the delay in healing pt was treated with  Herceptin only which was discontinued after 9 cycles due to decreased EF. She was started on Arimidex 8/2017 with her first left breast cancer and was diagnosed with the recurrence 3 years into her endocrine therapy.     Pt has continued to take Arimidex- unsure if was taking the entire time going through herceptin or started back when herceptin discontinued 11/2021.     Last DEXA scan on 7/20/22 showed osteopenia, and she was started on calcium + vitamin D.     Due now for her Dexa scan    Last visit with Oncology was 6/19/2023.     Pt's daughter relates her dementia has progressed. Daughter states she continues to take her Arimidex daily  Risk factors identified:     Menarche at 9 y/o  G 3 P 3  First pregnancy at 18 y/o  LMP: 50's  Estrogen: none  Radiation to the neck or chest wall - has had left breast radiation  Prior breast biopsies or atypical hyperplasia- left breast lumpectomy     FH: no breast cancer, daughter cervical cancer 30's, father prostate cancer 70's-     Interval History:     Persistent symptoms/side effects of treatment:    Functional changes: ROM, neuropathy, weakness or fatigue- none    Psychosocial challenges- pt has dementia and unable to assess    Lymphedema- none    Diet/Nutrition- maintaining healthy wt    Sexual Dysfunction or challenges- n/a        Review of Systems   Constitutional: Negative.    HENT: Negative.     Eyes: Negative.    Respiratory: Negative.     Cardiovascular: Negative.    Gastrointestinal: Negative.    Endocrine: Negative.    Genitourinary: Negative.    Musculoskeletal: Negative.    Skin: Negative.    Allergic/Immunologic: Negative.    Neurological: Negative.    Hematological: Negative.  Negative for adenopathy.   Psychiatric/Behavioral: Negative.     Breast: Pt daughter denies any breast pain, nipple discharge, or palpable mass. No prior trauma or bruising.    Current Outpatient Medications   Medication Sig Dispense Refill    aluminum-magnesium hydroxide-simethicone  200-200-20 mg/5 mL Susp, diphenhydrAMINE 12.5 mg/5 mL Liqd Swish and spit 5 mLs every 4 (four) hours as needed (discomfort). 30 mL 0    anastrozole (ARIMIDEX) 1 mg Tab TAKE 1 TABLET BY MOUTH EVERY DAY 90 tablet 3    busPIRone (BUSPAR) 15 MG tablet TAKE 1 TABLET BY MOUTH TWICE A  tablet 3    calcium citrate (CALCITRATE) 200 mg (950 mg) tablet Take 1 tablet by mouth once daily.      cephALEXin (KEFLEX) 250 MG capsule Take 1 capsule (250 mg total) by mouth every evening. 90 capsule 3    cyanocobalamin 2000 MCG tablet Take 2,000 mcg by mouth 2 (two) times a day.      donepeziL (ARICEPT) 5 MG tablet TAKE 1 TABLET BY MOUTH EVERY DAY IN THE EVENING 90 tablet 3    ferrous sulfate (FEOSOL) 325 mg (65 mg iron) Tab tablet Take 65 mg by mouth once daily.      flu vac 2023 [id number],PF, (FLUAD QUAD 2023-24,65Y UP,,PF,) 60 mcg (15 mcg x 4)/0.5 mL Syrg Inject 0.5 mLs into the muscle. 0.5 mL 0    furosemide (LASIX) 20 MG tablet Take 1 tablet (20 mg total) by mouth daily as needed (edema). 180 tablet 3    isosorbide mononitrate (IMDUR) 30 MG 24 hr tablet TAKE 1 TABLET BY MOUTH EVERY DAY 90 tablet 3    lamoTRIgine (LAMICTAL) 25 MG tablet Take 2 tablets (50 mg total) by mouth 2 (two) times daily. 120 tablet 11    memantine (NAMENDA) 5 MG Tab Take 1 tablet (5 mg total) by mouth 2 (two) times daily. 180 tablet 3    metoprolol succinate (TOPROL-XL) 25 MG 24 hr tablet Take 1 tablet (25 mg total) by mouth 2 (two) times daily. 180 tablet 2    mupirocin (BACTROBAN) 2 % ointment Apply topically 3 (three) times daily. 30 g 6    QUEtiapine (SEROQUEL) 300 MG Tab Take 1 tablet (300 mg total) by mouth every evening. 30 tablet 11    vitamin D 1000 units Tab Take 1,000 Units by mouth once daily.      aspirin 81 MG Chew Take 1 tablet (81 mg total) by mouth once daily. 90 tablet 3    estradioL (ESTRACE) 0.01 % (0.1 mg/gram) vaginal cream Place 4 g vaginally once daily. 120 g 3     No current facility-administered medications for this visit.        Review of patient's allergies indicates:  No Known Allergies    Past Medical History:   Diagnosis Date    Arthritis     EDGAR HANDS, KNEES    Behavioral change 2022    Blind right eye     Traumatic    Breast cancer 06/15/2017    0.8 cm Grade1 INTRADUCTAL BREAST 9 positve margin (left)    Essential hypertension     Hemorrhoids     Immunodeficiency due to chemotherapy 2021    Lipoma of abdominal wall     Major neurocognitive disorder 2022    Obesity     Overactive bladder     Pap smear abnormality of cervix with ASCUS favoring benign     Thyroid nodule     Tobacco use disorder     Tubular adenoma of colon     Urinary incontinence        Past Surgical History:   Procedure Laterality Date    ANTERIOR VAGINAL REPAIR      BLADDER SURGERY      BREAST LUMPECTOMY Left 2017    CATARACT EXTRACTION Left 2022     SECTION      X2    COLONOSCOPY N/A 3/8/2017    Procedure: COLONOSCOPY;  Surgeon: Tyron Paris MD;  Location: [facility];  Service: Endoscopy;  Laterality: N/A;    COLONOSCOPY N/A 2020    Procedure: COLONOSCOPY;  Surgeon: Keira Ellison MD;  Location: [facility];  Service: Endoscopy;  Laterality: N/A;    ESOPHAGOGASTRODUODENOSCOPY N/A 2020    Procedure: EGD (ESOPHAGOGASTRODUODENOSCOPY);  Surgeon: Keira Ellison MD;  Location: [facility];  Service: Endoscopy;  Laterality: N/A;  new onset iron deficiency with prior history of breast cancer    INTRALUMINAL GASTROINTESTINAL TRACT IMAGING VIA CAPSULE N/A 10/28/2020    Procedure: IMAGING PROCEDURE, GI TRACT, INTRALUMINAL, VIA CAPSULE;  Surgeon: Finesse Jha RN;  Location: [facility];  Service: Endoscopy;  Laterality: N/A;    LEFT HEART CATHETERIZATION Left 10/12/2021    Procedure: CATHETERIZATION, HEART, LEFT;  Surgeon: Tiffanie aSntos MD;  Location: [facility] CATH LAB;  Service: Cardiology;  Laterality: Left;    SENTINEL LYMPH NODE BIOPSY Left 2020    Procedure: BIOPSY, LYMPH NODE, SENTINEL;  Surgeon: Vincent Moyer MD;   Location: [facility] OR;  Service: General;  Laterality: Left;    SIMPLE MASTECTOMY Left 2020    Procedure: MASTECTOMY, SIMPLE;  Surgeon: Vincent Moyer MD;  Location: [facility] OR;  Service: General;  Laterality: Left;    THYROID LOBECTOMY Left     TOTAL ABDOMINAL HYSTERECTOMY      TUBAL LIGATION         Family History   Problem Relation Name Age of Onset    Hypertension Father      Cataracts Father      Prostate cancer Father  80    Cervical cancer Daughter Cenetra 32    Allergic rhinitis Daughter Cenetra     Cirrhosis Mother      Alcohol abuse Mother      Hypertension Daughter Sheronica     Diabetes Brother      Heart attack Brother      Heart murmur Brother      No Known Problems Daughter Gladis        Social History     Socioeconomic History    Marital status:    Tobacco Use    Smoking status: Former     Current packs/day: 0.00     Average packs/day: 1 pack/day for 50.0 years (50.0 ttl pk-yrs)     Types: Cigarettes     Start date: 1970     Quit date: 2020     Years since quittin.0    Smokeless tobacco: Never   Substance and Sexual Activity    Alcohol use: Never     Alcohol/week: 28.0 standard drinks of alcohol     Types: 28 Cans of beer per week    Drug use: No    Sexual activity: Never     Partners: Male   Social History Narrative    The patient is .  She is retired from [employer].     Social Determinants of Health     Financial Resource Strain: Low Risk  (2024)    Overall Financial Resource Strain (CARDIA)     Difficulty of Paying Living Expenses: Not very hard   Food Insecurity: Food Insecurity Present (2024)    Hunger Vital Sign     Worried About Running Out of Food in the Last Year: Never true     Ran Out of Food in the Last Year: Sometimes true   Transportation Needs: No Transportation Needs (2024)    PRAPARE - Transportation     Lack of Transportation (Medical): No     Lack of Transportation (Non-Medical): No   Physical Activity: Inactive (2024)    Exercise  Vital Sign     Days of Exercise per Week: 0 days     Minutes of Exercise per Session: 0 min   Stress: Stress Concern Present (4/8/2024)    [employer] [place] [employer] - Occupational Stress Questionnaire     Feeling of Stress : To some extent   Housing Stability: Low Risk  (4/8/2024)    Housing Stability Vital Sign     Unable to Pay for Housing in the Last Year: No     Number of Places Lived in the Last Year: 1     Unstable Housing in the Last Year: No       Vitals:    08/21/24 1406   Resp: 16         Physical Exam  Constitutional:       Appearance: She is well-developed. She is ill-appearing.   HENT:      Head: Normocephalic and atraumatic.      Right Ear: External ear normal.      Left Ear: External ear normal.   Eyes:      General: No scleral icterus.     Conjunctiva/sclera: Conjunctivae normal.   Neck:      Thyroid: No thyromegaly.   Chest:   Breasts:     Right: No inverted nipple, mass, nipple discharge, skin change or tenderness.       Abdominal:      Palpations: Abdomen is soft.   Musculoskeletal:         General: Normal range of motion.      Cervical back: Normal range of motion and neck supple.   Lymphadenopathy:      Head:      Right side of head: No submental, submandibular, tonsillar, preauricular, posterior auricular or occipital adenopathy.      Left side of head: No submental, submandibular, tonsillar, preauricular, posterior auricular or occipital adenopathy.      Cervical: No cervical adenopathy.      Right cervical: No superficial or posterior cervical adenopathy.     Left cervical: No superficial or posterior cervical adenopathy.      Upper Body:      Right upper body: No supraclavicular or axillary adenopathy.      Left upper body: No supraclavicular or axillary adenopathy.   Skin:     General: Skin is warm and dry.      Coloration: Skin is not pale.      Findings: No erythema or rash.   Psychiatric:      Comments: Non verbal but follows commands         2/22/2024- right mammo wnl          Assessment & Plan:  Malignant neoplasm of upper-inner quadrant of left breast in female, estrogen receptor positive    Malignant neoplasm of lower-inner quadrant of left breast in female, estrogen receptor positive    Ductal carcinoma in situ (DCIS) of left breast    Osteopenia, unspecified location    Encounter for screening breast examination    Encounter for follow-up surveillance of breast cancer    Counseling and coordination of care    Fibrocystic breast changes, unspecified laterality      1. History of DCIS left breast s/p radiation and on adjuvant therapy- arimidex 1 mg 8/2017 until 8/2022.   Pt diagnosed with left breast recurrent cancer- underwent left breast simple mastectomy - negative nodes-8/2020  Pt was recommended to have short course of platinum chemo and herceptin- had wound vac and delayed healing- herceptin initiated 12/2020 subcutaneous injection and discontinued 11/2021 after 9 treatments due to decline in EF  Pt has been taking Arimidex since 2017- with first breast cancer. Has completed 7 years of therapy thus far.   Last visit with oncology 6/2023- needs to re-establish care for endocrine management - determine duration of therapy and bone density f/u- last bone density 7/2022- revealed osteopenia  2. Mammogram 2/22/2024- right mammo wnl  3. Negative clinical exam -   4. call for any changes  5. RTC Feb 2025 for right mammo and exam then yearly (depending on cognitive status)  6. History of major neurocognitive disorder- pt family relate dementia getting worse.      TIME SPENT WITH PATIENT: Time spent: 40 minutes in face to face discussion concerning diagnosis, review of test results, management of disease, medication counseling, counseling of patient and coordination of care between various health care providers . Greater than half the time spent was used for coordination of care and counseling of patient.

## 2024-08-21 ENCOUNTER — OFFICE VISIT (OUTPATIENT)
Dept: SURGERY | Facility: CLINIC | Age: 74
End: 2024-08-21
Payer: MEDICARE

## 2024-08-21 VITALS — BODY MASS INDEX: 20.7 KG/M2 | HEIGHT: 63 IN | RESPIRATION RATE: 16 BRPM

## 2024-08-21 DIAGNOSIS — C50.212 MALIGNANT NEOPLASM OF UPPER-INNER QUADRANT OF LEFT BREAST IN FEMALE, ESTROGEN RECEPTOR POSITIVE: Primary | ICD-10-CM

## 2024-08-21 DIAGNOSIS — Z71.89 COUNSELING AND COORDINATION OF CARE: ICD-10-CM

## 2024-08-21 DIAGNOSIS — D05.12 DUCTAL CARCINOMA IN SITU (DCIS) OF LEFT BREAST: ICD-10-CM

## 2024-08-21 DIAGNOSIS — N60.19 FIBROCYSTIC BREAST CHANGES, UNSPECIFIED LATERALITY: ICD-10-CM

## 2024-08-21 DIAGNOSIS — Z12.39 ENCOUNTER FOR SCREENING BREAST EXAMINATION: ICD-10-CM

## 2024-08-21 DIAGNOSIS — Z17.0 MALIGNANT NEOPLASM OF LOWER-INNER QUADRANT OF LEFT BREAST IN FEMALE, ESTROGEN RECEPTOR POSITIVE: ICD-10-CM

## 2024-08-21 DIAGNOSIS — Z12.31 ENCOUNTER FOR SCREENING MAMMOGRAM FOR MALIGNANT NEOPLASM OF BREAST: ICD-10-CM

## 2024-08-21 DIAGNOSIS — M85.80 OSTEOPENIA, UNSPECIFIED LOCATION: ICD-10-CM

## 2024-08-21 DIAGNOSIS — Z17.0 MALIGNANT NEOPLASM OF UPPER-INNER QUADRANT OF LEFT BREAST IN FEMALE, ESTROGEN RECEPTOR POSITIVE: Primary | ICD-10-CM

## 2024-08-21 DIAGNOSIS — Z08 ENCOUNTER FOR FOLLOW-UP SURVEILLANCE OF BREAST CANCER: ICD-10-CM

## 2024-08-21 DIAGNOSIS — C50.312 MALIGNANT NEOPLASM OF LOWER-INNER QUADRANT OF LEFT BREAST IN FEMALE, ESTROGEN RECEPTOR POSITIVE: ICD-10-CM

## 2024-08-21 DIAGNOSIS — Z85.3 ENCOUNTER FOR FOLLOW-UP SURVEILLANCE OF BREAST CANCER: ICD-10-CM

## 2024-08-21 PROCEDURE — 99999 PR PBB SHADOW E&M-EST. PATIENT-LVL III: CPT | Mod: PBBFAC,,, | Performed by: NURSE PRACTITIONER

## 2024-08-28 RX ORDER — TELMISARTAN 20 MG/1
20 TABLET ORAL
Qty: 90 TABLET | Refills: 3 | Status: SHIPPED | OUTPATIENT
Start: 2024-08-28

## 2024-09-03 ENCOUNTER — TELEPHONE (OUTPATIENT)
Dept: INFECTIOUS DISEASES | Facility: CLINIC | Age: 74
End: 2024-09-03
Payer: MEDICARE

## 2024-09-03 ENCOUNTER — PATIENT MESSAGE (OUTPATIENT)
Dept: INFECTIOUS DISEASES | Facility: CLINIC | Age: 74
End: 2024-09-03
Payer: MEDICARE

## 2024-09-03 DIAGNOSIS — L08.9 TOE INFECTION: ICD-10-CM

## 2024-09-03 DIAGNOSIS — M86.179 OTHER ACUTE OSTEOMYELITIS, UNSPECIFIED ANKLE AND FOOT: Primary | ICD-10-CM

## 2024-09-04 ENCOUNTER — OFFICE VISIT (OUTPATIENT)
Dept: HEMATOLOGY/ONCOLOGY | Facility: CLINIC | Age: 74
End: 2024-09-04
Payer: MEDICARE

## 2024-09-04 ENCOUNTER — LAB VISIT (OUTPATIENT)
Dept: LAB | Facility: HOSPITAL | Age: 74
End: 2024-09-04
Attending: INTERNAL MEDICINE
Payer: MEDICARE

## 2024-09-04 VITALS
BODY MASS INDEX: 21.58 KG/M2 | WEIGHT: 121.81 LBS | DIASTOLIC BLOOD PRESSURE: 78 MMHG | HEART RATE: 67 BPM | HEIGHT: 63 IN | SYSTOLIC BLOOD PRESSURE: 169 MMHG | OXYGEN SATURATION: 98 % | TEMPERATURE: 97 F

## 2024-09-04 DIAGNOSIS — N18.32 ANEMIA DUE TO STAGE 3B CHRONIC KIDNEY DISEASE: ICD-10-CM

## 2024-09-04 DIAGNOSIS — Z17.0 MALIGNANT NEOPLASM OF LOWER-INNER QUADRANT OF LEFT BREAST IN FEMALE, ESTROGEN RECEPTOR POSITIVE: ICD-10-CM

## 2024-09-04 DIAGNOSIS — M81.0 AGE-RELATED OSTEOPOROSIS WITHOUT CURRENT PATHOLOGICAL FRACTURE: ICD-10-CM

## 2024-09-04 DIAGNOSIS — C50.312 MALIGNANT NEOPLASM OF LOWER-INNER QUADRANT OF LEFT BREAST IN FEMALE, ESTROGEN RECEPTOR POSITIVE: ICD-10-CM

## 2024-09-04 DIAGNOSIS — D63.1 ANEMIA DUE TO STAGE 3B CHRONIC KIDNEY DISEASE: ICD-10-CM

## 2024-09-04 DIAGNOSIS — D50.8 IRON DEFICIENCY ANEMIA SECONDARY TO INADEQUATE DIETARY IRON INTAKE: Primary | ICD-10-CM

## 2024-09-04 DIAGNOSIS — D50.8 IRON DEFICIENCY ANEMIA SECONDARY TO INADEQUATE DIETARY IRON INTAKE: ICD-10-CM

## 2024-09-04 DIAGNOSIS — M85.80 OSTEOPENIA, UNSPECIFIED LOCATION: ICD-10-CM

## 2024-09-04 DIAGNOSIS — Z87.891 HISTORY OF TOBACCO USE: ICD-10-CM

## 2024-09-04 LAB
ALBUMIN SERPL BCP-MCNC: 3.8 G/DL (ref 3.5–5.2)
ALP SERPL-CCNC: 72 U/L (ref 55–135)
ALT SERPL W/O P-5'-P-CCNC: 15 U/L (ref 10–44)
ANION GAP SERPL CALC-SCNC: 10 MMOL/L (ref 8–16)
ANION GAP SERPL CALC-SCNC: 10 MMOL/L (ref 8–16)
AST SERPL-CCNC: 15 U/L (ref 10–40)
BASOPHILS # BLD AUTO: 0.05 K/UL (ref 0–0.2)
BASOPHILS NFR BLD: 0.7 % (ref 0–1.9)
BILIRUB SERPL-MCNC: 0.4 MG/DL (ref 0.1–1)
BUN SERPL-MCNC: 18 MG/DL (ref 8–23)
BUN SERPL-MCNC: 18 MG/DL (ref 8–23)
CALCIUM SERPL-MCNC: 9.5 MG/DL (ref 8.7–10.5)
CALCIUM SERPL-MCNC: 9.5 MG/DL (ref 8.7–10.5)
CHLORIDE SERPL-SCNC: 104 MMOL/L (ref 95–110)
CHLORIDE SERPL-SCNC: 104 MMOL/L (ref 95–110)
CO2 SERPL-SCNC: 26 MMOL/L (ref 23–29)
CO2 SERPL-SCNC: 26 MMOL/L (ref 23–29)
CREAT SERPL-MCNC: 1.2 MG/DL (ref 0.5–1.4)
CREAT SERPL-MCNC: 1.2 MG/DL (ref 0.5–1.4)
DIFFERENTIAL METHOD BLD: ABNORMAL
EOSINOPHIL # BLD AUTO: 0.5 K/UL (ref 0–0.5)
EOSINOPHIL NFR BLD: 7.3 % (ref 0–8)
ERYTHROCYTE [DISTWIDTH] IN BLOOD BY AUTOMATED COUNT: 13.8 % (ref 11.5–14.5)
EST. GFR  (NO RACE VARIABLE): 48 ML/MIN/1.73 M^2
EST. GFR  (NO RACE VARIABLE): 48 ML/MIN/1.73 M^2
FERRITIN SERPL-MCNC: 2561 NG/ML (ref 20–300)
GLUCOSE SERPL-MCNC: 99 MG/DL (ref 70–110)
GLUCOSE SERPL-MCNC: 99 MG/DL (ref 70–110)
HCT VFR BLD AUTO: 36.6 % (ref 37–48.5)
HGB BLD-MCNC: 11.4 G/DL (ref 12–16)
IMM GRANULOCYTES # BLD AUTO: 0.03 K/UL (ref 0–0.04)
IMM GRANULOCYTES NFR BLD AUTO: 0.4 % (ref 0–0.5)
IRON SERPL-MCNC: 39 UG/DL (ref 30–160)
LYMPHOCYTES # BLD AUTO: 1.8 K/UL (ref 1–4.8)
LYMPHOCYTES NFR BLD: 25 % (ref 18–48)
MCH RBC QN AUTO: 29.4 PG (ref 27–31)
MCHC RBC AUTO-ENTMCNC: 31.1 G/DL (ref 32–36)
MCV RBC AUTO: 94 FL (ref 82–98)
MONOCYTES # BLD AUTO: 0.6 K/UL (ref 0.3–1)
MONOCYTES NFR BLD: 8.2 % (ref 4–15)
NEUTROPHILS # BLD AUTO: 4.3 K/UL (ref 1.8–7.7)
NEUTROPHILS NFR BLD: 58.4 % (ref 38–73)
NRBC BLD-RTO: 0 /100 WBC
PLATELET # BLD AUTO: 244 K/UL (ref 150–450)
PMV BLD AUTO: 10.3 FL (ref 9.2–12.9)
POTASSIUM SERPL-SCNC: 4.4 MMOL/L (ref 3.5–5.1)
POTASSIUM SERPL-SCNC: 4.4 MMOL/L (ref 3.5–5.1)
PROT SERPL-MCNC: 7.7 G/DL (ref 6–8.4)
RBC # BLD AUTO: 3.88 M/UL (ref 4–5.4)
SATURATED IRON: 15 % (ref 20–50)
SODIUM SERPL-SCNC: 140 MMOL/L (ref 136–145)
SODIUM SERPL-SCNC: 140 MMOL/L (ref 136–145)
TOTAL IRON BINDING CAPACITY: 262 UG/DL (ref 250–450)
TRANSFERRIN SERPL-MCNC: 177 MG/DL (ref 200–375)
WBC # BLD AUTO: 7.29 K/UL (ref 3.9–12.7)

## 2024-09-04 PROCEDURE — 84466 ASSAY OF TRANSFERRIN: CPT | Performed by: INTERNAL MEDICINE

## 2024-09-04 PROCEDURE — 99215 OFFICE O/P EST HI 40 MIN: CPT | Mod: S$GLB,,, | Performed by: INTERNAL MEDICINE

## 2024-09-04 PROCEDURE — 1160F RVW MEDS BY RX/DR IN RCRD: CPT | Mod: CPTII,S$GLB,, | Performed by: INTERNAL MEDICINE

## 2024-09-04 PROCEDURE — 80053 COMPREHEN METABOLIC PANEL: CPT | Performed by: INTERNAL MEDICINE

## 2024-09-04 PROCEDURE — 3078F DIAST BP <80 MM HG: CPT | Mod: CPTII,S$GLB,, | Performed by: INTERNAL MEDICINE

## 2024-09-04 PROCEDURE — 3008F BODY MASS INDEX DOCD: CPT | Mod: CPTII,S$GLB,, | Performed by: INTERNAL MEDICINE

## 2024-09-04 PROCEDURE — 3288F FALL RISK ASSESSMENT DOCD: CPT | Mod: CPTII,S$GLB,, | Performed by: INTERNAL MEDICINE

## 2024-09-04 PROCEDURE — 85025 COMPLETE CBC W/AUTO DIFF WBC: CPT | Performed by: INTERNAL MEDICINE

## 2024-09-04 PROCEDURE — 99999 PR PBB SHADOW E&M-EST. PATIENT-LVL V: CPT | Mod: PBBFAC,,, | Performed by: INTERNAL MEDICINE

## 2024-09-04 PROCEDURE — 1126F AMNT PAIN NOTED NONE PRSNT: CPT | Mod: CPTII,S$GLB,, | Performed by: INTERNAL MEDICINE

## 2024-09-04 PROCEDURE — 4010F ACE/ARB THERAPY RXD/TAKEN: CPT | Mod: CPTII,S$GLB,, | Performed by: INTERNAL MEDICINE

## 2024-09-04 PROCEDURE — 1159F MED LIST DOCD IN RCRD: CPT | Mod: CPTII,S$GLB,, | Performed by: INTERNAL MEDICINE

## 2024-09-04 PROCEDURE — 3077F SYST BP >= 140 MM HG: CPT | Mod: CPTII,S$GLB,, | Performed by: INTERNAL MEDICINE

## 2024-09-04 PROCEDURE — 1101F PT FALLS ASSESS-DOCD LE1/YR: CPT | Mod: CPTII,S$GLB,, | Performed by: INTERNAL MEDICINE

## 2024-09-04 PROCEDURE — 36415 COLL VENOUS BLD VENIPUNCTURE: CPT | Performed by: INTERNAL MEDICINE

## 2024-09-04 PROCEDURE — 82728 ASSAY OF FERRITIN: CPT | Performed by: INTERNAL MEDICINE

## 2024-09-04 RX ORDER — ANASTROZOLE 1 MG/1
1 TABLET ORAL DAILY
Qty: 90 TABLET | Refills: 3 | Status: SHIPPED | OUTPATIENT
Start: 2024-09-04

## 2024-09-04 RX ORDER — DONEPEZIL HYDROCHLORIDE 5 MG/1
5 TABLET, FILM COATED ORAL NIGHTLY
Qty: 90 TABLET | Refills: 3 | Status: SHIPPED | OUTPATIENT
Start: 2024-09-04

## 2024-09-04 NOTE — PROGRESS NOTES
Subjective:       Patient ID: Leia Rodriguez is a 74 y.o. female.    Chief Complaint: Results and Breast Cancer    HPI:  74-year-old history of bilateral breast cancer.  Patient most recent breast cancer ERPR positive.  Patient is currently in her 4th of 5 year follow-up.  ECOG status 2    Past Medical History:   Diagnosis Date    Arthritis     EDGAR HANDS, KNEES    Behavioral change 2022    Blind right eye     Traumatic    Breast cancer 06/15/2017    0.8 cm Grade1 INTRADUCTAL BREAST 9 positve margin (left)    Essential hypertension     Hemorrhoids     Immunodeficiency due to chemotherapy 2021    Lipoma of abdominal wall     Major neurocognitive disorder 2022    Obesity     Overactive bladder     Pap smear abnormality of cervix with ASCUS favoring benign     Thyroid nodule     Tobacco use disorder     Tubular adenoma of colon     Urinary incontinence      Family History   Problem Relation Name Age of Onset    Hypertension Father      Cataracts Father      Prostate cancer Father  80    Cervical cancer Daughter Cenetra 32    Allergic rhinitis Daughter Cenetra     Cirrhosis Mother      Alcohol abuse Mother      Hypertension Daughter Sheronica     Diabetes Brother      Heart attack Brother      Heart murmur Brother      No Known Problems Daughter [place]      Social History     Socioeconomic History    Marital status:    Tobacco Use    Smoking status: Former     Current packs/day: 0.00     Average packs/day: 1 pack/day for 50.0 years (50.0 ttl pk-yrs)     Types: Cigarettes     Start date: 1970     Quit date: 2020     Years since quittin.0    Smokeless tobacco: Never   Substance and Sexual Activity    Alcohol use: Never     Alcohol/week: 28.0 standard drinks of alcohol     Types: 28 Cans of beer per week    Drug use: No    Sexual activity: Never     Partners: Male   Social History Narrative    The patient is .  She is retired from [employer].     Social Determinants of Health      Financial Resource Strain: Low Risk  (2024)    Overall Financial Resource Strain (CARDIA)     Difficulty of Paying Living Expenses: Not very hard   Food Insecurity: Food Insecurity Present (2024)    Hunger Vital Sign     Worried About Running Out of Food in the Last Year: Never true     Ran Out of Food in the Last Year: Sometimes true   Transportation Needs: No Transportation Needs (2024)    PRAPARE - Transportation     Lack of Transportation (Medical): No     Lack of Transportation (Non-Medical): No   Physical Activity: Inactive (2024)    Exercise Vital Sign     Days of Exercise per Week: 0 days     Minutes of Exercise per Session: 0 min   Stress: Stress Concern Present (2024)    [place] [employer] - Occupational Stress Questionnaire     Feeling of Stress : To some extent   Housing Stability: Low Risk  (2024)    Housing Stability Vital Sign     Unable to Pay for Housing in the Last Year: No     Number of Places Lived in the Last Year: 1     Unstable Housing in the Last Year: No     Past Surgical History:   Procedure Laterality Date    ANTERIOR VAGINAL REPAIR      BLADDER SURGERY      BREAST LUMPECTOMY Left     CATARACT EXTRACTION Left 2022     SECTION      X2    COLONOSCOPY N/A 3/8/2017    Procedure: COLONOSCOPY;  Surgeon: Tyron Paris MD;  Location: [facility];  Service: Endoscopy;  Laterality: N/A;    COLONOSCOPY N/A 2020    Procedure: COLONOSCOPY;  Surgeon: Keira Ellison MD;  Location: [facility];  Service: Endoscopy;  Laterality: N/A;    ESOPHAGOGASTRODUODENOSCOPY N/A 2020    Procedure: EGD (ESOPHAGOGASTRODUODENOSCOPY);  Surgeon: Keira Ellison MD;  Location: [facility];  Service: Endoscopy;  Laterality: N/A;  new onset iron deficiency with prior history of breast cancer    INTRALUMINAL GASTROINTESTINAL TRACT IMAGING VIA CAPSULE N/A 10/28/2020    Procedure: IMAGING PROCEDURE, GI TRACT, INTRALUMINAL, VIA CAPSULE;   Surgeon: Finesse Jha RN;  Location: [facility] ENDO;  Service: Endoscopy;  Laterality: N/A;    LEFT HEART CATHETERIZATION Left 10/12/2021    Procedure: CATHETERIZATION, HEART, LEFT;  Surgeon: Tiffanie Santos MD;  Location: [facility] CATH LAB;  Service: Cardiology;  Laterality: Left;    SENTINEL LYMPH NODE BIOPSY Left 9/8/2020    Procedure: BIOPSY, LYMPH NODE, SENTINEL;  Surgeon: Vincent Moyer MD;  Location: [facility] OR;  Service: General;  Laterality: Left;    SIMPLE MASTECTOMY Left 9/8/2020    Procedure: MASTECTOMY, SIMPLE;  Surgeon: Vincent Moyer MD;  Location: [facility] OR;  Service: General;  Laterality: Left;    THYROID LOBECTOMY Left 2005    TOTAL ABDOMINAL HYSTERECTOMY      TUBAL LIGATION         Labs:  Lab Results   Component Value Date    WBC 9.94 12/03/2023    HGB 11.1 (L) 12/03/2023    HCT 34.9 (L) 12/03/2023    MCV 92 12/03/2023     12/03/2023     BMP  Lab Results   Component Value Date     12/03/2023    K 3.5 12/03/2023     12/03/2023    CO2 21 (L) 12/03/2023    BUN 14 12/03/2023    CREATININE 1.3 06/07/2024    CALCIUM 9.6 12/03/2023    ANIONGAP 15 12/03/2023    ESTGFRAFRICA 48.0 (A) 06/20/2022    EGFRNONAA 41.7 (A) 06/20/2022     Lab Results   Component Value Date    ALT 10 12/03/2023    AST 11 12/03/2023    ALKPHOS 82 12/03/2023    BILITOT 0.6 12/03/2023       Lab Results   Component Value Date    IRON 36 06/16/2023    TIBC 266 06/16/2023    FERRITIN 1,014 (H) 06/16/2023     Lab Results   Component Value Date    OPHQKFRB90 >2000 (H) 08/18/2022     Lab Results   Component Value Date    FOLATE 14.4 08/18/2022     Lab Results   Component Value Date    TSH 0.765 01/27/2022         Review of Systems   Constitutional:  Positive for fatigue. Negative for activity change, appetite change, chills, diaphoresis, fever and unexpected weight change.   HENT:  Negative for congestion, dental problem, drooling, ear discharge, ear pain, facial swelling, hearing loss, mouth sores, nosebleeds, postnasal drip,  rhinorrhea, sinus pressure, sneezing, sore throat, tinnitus, trouble swallowing and voice change.    Eyes:  Negative for photophobia, pain, discharge, redness, itching and visual disturbance.   Respiratory:  Negative for cough, choking, chest tightness, shortness of breath, wheezing and stridor.    Cardiovascular:  Negative for chest pain, palpitations and leg swelling.   Gastrointestinal:  Negative for abdominal distention, abdominal pain, anal bleeding, blood in stool, constipation, diarrhea, nausea, rectal pain and vomiting.   Endocrine: Negative for cold intolerance, heat intolerance, polydipsia, polyphagia and polyuria.   Genitourinary:  Negative for decreased urine volume, difficulty urinating, dyspareunia, dysuria, enuresis, flank pain, frequency, genital sores, hematuria, menstrual problem, pelvic pain, urgency, vaginal bleeding, vaginal discharge and vaginal pain.   Musculoskeletal:  Negative for arthralgias, back pain, gait problem, joint swelling, myalgias, neck pain and neck stiffness.   Skin:  Negative for color change, pallor and rash.   Allergic/Immunologic: Negative for environmental allergies, food allergies and immunocompromised state.   Neurological:  Positive for weakness. Negative for dizziness, tremors, seizures, syncope, facial asymmetry, speech difficulty, light-headedness, numbness and headaches.   Hematological:  Negative for adenopathy. Does not bruise/bleed easily.   Psychiatric/Behavioral:  Negative for agitation, behavioral problems, confusion, decreased concentration, dysphoric mood, hallucinations, self-injury, sleep disturbance and suicidal ideas. The patient is not nervous/anxious and is not hyperactive.        Objective:      Physical Exam  Vitals reviewed.   Constitutional:       General: She is not in acute distress.     Appearance: She is well-developed. She is not diaphoretic.   HENT:      Head: Normocephalic and atraumatic.      Right Ear: External ear normal.      Left Ear:  External ear normal.      Nose: Nose normal.      Right Sinus: No maxillary sinus tenderness or frontal sinus tenderness.      Left Sinus: No maxillary sinus tenderness or frontal sinus tenderness.      Mouth/Throat:      Pharynx: No oropharyngeal exudate.   Eyes:      General: Lids are normal. No scleral icterus.        Right eye: No discharge.         Left eye: No discharge.      Conjunctiva/sclera: Conjunctivae normal.      Right eye: Right conjunctiva is not injected. No hemorrhage.     Left eye: Left conjunctiva is not injected. No hemorrhage.     Pupils: Pupils are equal, round, and reactive to light.   Neck:      Thyroid: No thyromegaly.      Vascular: No JVD.      Trachea: No tracheal deviation.   Cardiovascular:      Rate and Rhythm: Normal rate.   Pulmonary:      Effort: Pulmonary effort is normal. No respiratory distress.      Breath sounds: No stridor.   Chest:      Chest wall: No tenderness.   Abdominal:      General: Bowel sounds are normal. There is no distension.      Palpations: Abdomen is soft. There is no hepatomegaly, splenomegaly or mass.      Tenderness: There is no abdominal tenderness. There is no rebound.   Musculoskeletal:         General: No tenderness. Normal range of motion.      Cervical back: Normal range of motion and neck supple.   Lymphadenopathy:      Cervical: No cervical adenopathy.      Upper Body:      Right upper body: No supraclavicular adenopathy.      Left upper body: No supraclavicular adenopathy.   Skin:     General: Skin is dry.      Findings: No erythema or rash.   Neurological:      Mental Status: She is alert and oriented to person, place, and time.      Cranial Nerves: No cranial nerve deficit.      Coordination: Coordination normal.   Psychiatric:         Behavior: Behavior normal.         Thought Content: Thought content normal.         Judgment: Judgment normal.             Assessment:      1. Iron deficiency anemia secondary to inadequate dietary iron intake     2. Osteopenia, unspecified location    3. Age-related osteoporosis without current pathological fracture    4. Malignant neoplasm of lower-inner quadrant of left breast in female, estrogen receptor positive    5. Anemia due to stage 3b chronic kidney disease    6. History of tobacco use           Med Onc Chart Routing      Follow up with physician . Follow-up MD APAP 6 months with BMP for Prolia   Follow up with SHANIQUE    Infusion scheduling note    Injection scheduling note Needs to be set up for Prolia Q 6 months   Labs   Scheduling:  Preferred lab:  Lab interval:  CBC CMP serum iron TIBC ferritin.   Imaging   Needs CT chest previous history of tobacco use   Pharmacy appointment    Other referrals                   Plan:     Patient is completing 4 of 5 years of Arimidex 1 mg daily would recommend continuing.  Would also recommend that patient continue follow-up with breast surgery for mammographic screening.  Of remaining breast tissue if available.  The patient needs to have a bone density last performed in 2022 see whether not Prolia should be administered to her would strongly recommend consideration addition serum iron TIBC ferritin will be obtained previous history of iron deficiency in the past anemia secondary to done.  Orders written reviewed would will communicate results through electronic patient portal will start patient on Prolia Q 6 months.  Because of osteopenia noted previously in 22.  Orders written reviewed follow-up in 6 months can be seen by APAP MD standing BNP placed        Mario Fernández Jr, MD FACP

## 2024-09-05 ENCOUNTER — HOSPITAL ENCOUNTER (OUTPATIENT)
Dept: RADIOLOGY | Facility: HOSPITAL | Age: 74
Discharge: HOME | End: 2024-09-05
Attending: PODIATRIST
Payer: MEDICARE

## 2024-09-05 ENCOUNTER — OFFICE VISIT (OUTPATIENT)
Dept: PODIATRY | Facility: CLINIC | Age: 74
End: 2024-09-05
Payer: MEDICARE

## 2024-09-05 DIAGNOSIS — D84.821 IMMUNODEFICIENCY DUE TO CHEMOTHERAPY: ICD-10-CM

## 2024-09-05 DIAGNOSIS — I25.118 CORONARY ARTERY DISEASE OF NATIVE ARTERY OF NATIVE HEART WITH STABLE ANGINA PECTORIS: Chronic | ICD-10-CM

## 2024-09-05 DIAGNOSIS — I50.42 CHRONIC COMBINED SYSTOLIC AND DIASTOLIC CHF (CONGESTIVE HEART FAILURE): Chronic | ICD-10-CM

## 2024-09-05 DIAGNOSIS — R93.1 DECREASED CARDIAC EJECTION FRACTION: ICD-10-CM

## 2024-09-05 DIAGNOSIS — Z79.899 IMMUNODEFICIENCY DUE TO CHEMOTHERAPY: ICD-10-CM

## 2024-09-05 DIAGNOSIS — F02.80 MAJOR NEUROCOGNITIVE DISORDER DUE TO ALZHEIMER'S DISEASE: ICD-10-CM

## 2024-09-05 DIAGNOSIS — I27.20 PULMONARY HTN: ICD-10-CM

## 2024-09-05 DIAGNOSIS — G30.9 MAJOR NEUROCOGNITIVE DISORDER DUE TO ALZHEIMER'S DISEASE: ICD-10-CM

## 2024-09-05 DIAGNOSIS — M79.675 PAIN OF LEFT GREAT TOE: ICD-10-CM

## 2024-09-05 DIAGNOSIS — F03.911 AGITATION DUE TO DEMENTIA: ICD-10-CM

## 2024-09-05 DIAGNOSIS — L97.522 ULCER OF GREAT TOE, LEFT, WITH FAT LAYER EXPOSED: ICD-10-CM

## 2024-09-05 DIAGNOSIS — I10 ESSENTIAL HYPERTENSION: Chronic | ICD-10-CM

## 2024-09-05 DIAGNOSIS — I73.9 ARTERIAL INSUFFICIENCY OF LOWER EXTREMITY: ICD-10-CM

## 2024-09-05 DIAGNOSIS — Z87.891 HISTORY OF TOBACCO USE: ICD-10-CM

## 2024-09-05 DIAGNOSIS — T45.1X5A IMMUNODEFICIENCY DUE TO CHEMOTHERAPY: ICD-10-CM

## 2024-09-05 DIAGNOSIS — L97.522 ULCER OF GREAT TOE, LEFT, WITH FAT LAYER EXPOSED: Primary | ICD-10-CM

## 2024-09-05 PROCEDURE — 11042 DBRDMT SUBQ TIS 1ST 20SQCM/<: CPT | Mod: S$GLB,,, | Performed by: PODIATRIST

## 2024-09-05 PROCEDURE — 87186 SC STD MICRODIL/AGAR DIL: CPT | Performed by: PODIATRIST

## 2024-09-05 PROCEDURE — 1101F PT FALLS ASSESS-DOCD LE1/YR: CPT | Mod: CPTII,S$GLB,, | Performed by: PODIATRIST

## 2024-09-05 PROCEDURE — 87070 CULTURE OTHR SPECIMN AEROBIC: CPT | Performed by: PODIATRIST

## 2024-09-05 PROCEDURE — 99999 PR PBB SHADOW E&M-EST. PATIENT-LVL IV: CPT | Mod: PBBFAC,,, | Performed by: PODIATRIST

## 2024-09-05 PROCEDURE — 1160F RVW MEDS BY RX/DR IN RCRD: CPT | Mod: CPTII,S$GLB,, | Performed by: PODIATRIST

## 2024-09-05 PROCEDURE — 3288F FALL RISK ASSESSMENT DOCD: CPT | Mod: CPTII,S$GLB,, | Performed by: PODIATRIST

## 2024-09-05 PROCEDURE — 99214 OFFICE O/P EST MOD 30 MIN: CPT | Mod: 25,S$GLB,, | Performed by: PODIATRIST

## 2024-09-05 PROCEDURE — 1125F AMNT PAIN NOTED PAIN PRSNT: CPT | Mod: CPTII,S$GLB,, | Performed by: PODIATRIST

## 2024-09-05 PROCEDURE — 87075 CULTR BACTERIA EXCEPT BLOOD: CPT | Performed by: PODIATRIST

## 2024-09-05 PROCEDURE — 73630 X-RAY EXAM OF FOOT: CPT | Mod: TC,LT

## 2024-09-05 PROCEDURE — 1159F MED LIST DOCD IN RCRD: CPT | Mod: CPTII,S$GLB,, | Performed by: PODIATRIST

## 2024-09-05 PROCEDURE — 4010F ACE/ARB THERAPY RXD/TAKEN: CPT | Mod: CPTII,S$GLB,, | Performed by: PODIATRIST

## 2024-09-05 NOTE — PROGRESS NOTES
"Subjective:       Patient ID: Leia Rodriguez is a 74 y.o. female.    Chief Complaint: Toe Pain (Toe wound, no pain , nondibetic,  )      HPI: Leia Rodirguez presents to the clinic today, for evaluation and treatment concerning an ulceration/wound/bulla(e) to the LLE 1st toe. Patient's Primary Care Provider is Yasmin Navarro MD. The PMHx. does include CKD, PAD, and acquired foot/ankle deformity/deformities. The wound(s) have/has been present for several weeks. Most recent local wound care w/ Bactroban. Patient does not have home health services. Was referred by Dr. Austin. States prior MRI evaluation last year. States antalgic gait.    No results found for: "HGBA1C"    Review of patient's allergies indicates:  No Known Allergies    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    Lipoma of abdominal wall     Major neurocognitive disorder 06/21/2022    Obesity     Overactive bladder     Pap smear abnormality of cervix with ASCUS favoring benign     Thyroid nodule     Tobacco use disorder     Tubular adenoma of colon     Urinary incontinence        Family History   Problem Relation Name Age of Onset    Hypertension Father      Cataracts Father      Prostate cancer Father  80    Cervical cancer Daughter Cenetra 32    Allergic rhinitis Daughter Cenetra     Cirrhosis Mother      Alcohol abuse Mother      Hypertension Daughter Sheronica     Diabetes Brother      Heart attack Brother      Heart murmur Brother      No Known Problems Daughter [place]        Social History     Socioeconomic History    Marital status:    Tobacco Use    Smoking status: Former     Current packs/day: 0.00     Average packs/day: 1 pack/day for 50.0 years (50.0 ttl pk-yrs) "     Types: Cigarettes     Start date: 1970     Quit date: 2020     Years since quittin.0    Smokeless tobacco: Never   Substance and Sexual Activity    Alcohol use: Never     Alcohol/week: 28.0 standard drinks of alcohol     Types: 28 Cans of beer per week    Drug use: No    Sexual activity: Never     Partners: Male   Social History Narrative    The patient is .  She is retired from [employer].     Social Determinants of Health     Financial Resource Strain: Low Risk  (2024)    Overall Financial Resource Strain (CARDIA)     Difficulty of Paying Living Expenses: Not very hard   Food Insecurity: Food Insecurity Present (2024)    Hunger Vital Sign     Worried About Running Out of Food in the Last Year: Never true     Ran Out of Food in the Last Year: Sometimes true   Transportation Needs: No Transportation Needs (2024)    PRAPARE - Transportation     Lack of Transportation (Medical): No     Lack of Transportation (Non-Medical): No   Physical Activity: Inactive (2024)    Exercise Vital Sign     Days of Exercise per Week: 0 days     Minutes of Exercise per Session: 0 min   Stress: Stress Concern Present (2024)    [place] of [employer]ational [employer] - Occupational Stress Questionnaire     Feeling of Stress : To some extent   Housing Stability: Low Risk  (2024)    Housing Stability Vital Sign     Unable to Pay for Housing in the Last Year: No     Number of Places Lived in the Last Year: 1     Unstable Housing in the Last Year: No       Past Surgical History:   Procedure Laterality Date    ANTERIOR VAGINAL REPAIR      BLADDER SURGERY      BREAST LUMPECTOMY Left     CATARACT EXTRACTION Left 2022     SECTION      X2    COLONOSCOPY N/A 3/8/2017    Procedure: COLONOSCOPY;  Surgeon: Tyron Paris MD;  Location: [facility];  Service: Endoscopy;  Laterality: N/A;    COLONOSCOPY N/A 2020    Procedure: COLONOSCOPY;  Surgeon: Keira Ellison MD;   Location: [facility] ENDO;  Service: Endoscopy;  Laterality: N/A;    ESOPHAGOGASTRODUODENOSCOPY N/A 8/26/2020    Procedure: EGD (ESOPHAGOGASTRODUODENOSCOPY);  Surgeon: Keira Ellison MD;  Location: [facility] ENDO;  Service: Endoscopy;  Laterality: N/A;  new onset iron deficiency with prior history of breast cancer    INTRALUMINAL GASTROINTESTINAL TRACT IMAGING VIA CAPSULE N/A 10/28/2020    Procedure: IMAGING PROCEDURE, GI TRACT, INTRALUMINAL, VIA CAPSULE;  Surgeon: Finesse Jha RN;  Location: [facility] ENDO;  Service: Endoscopy;  Laterality: N/A;    LEFT HEART CATHETERIZATION Left 10/12/2021    Procedure: CATHETERIZATION, HEART, LEFT;  Surgeon: Tiffanie Santos MD;  Location: [facility] CATH LAB;  Service: Cardiology;  Laterality: Left;    SENTINEL LYMPH NODE BIOPSY Left 9/8/2020    Procedure: BIOPSY, LYMPH NODE, SENTINEL;  Surgeon: Vincent Moyer MD;  Location: [facility] OR;  Service: General;  Laterality: Left;    SIMPLE MASTECTOMY Left 9/8/2020    Procedure: MASTECTOMY, SIMPLE;  Surgeon: Vincent Moyer MD;  Location: [facility] OR;  Service: General;  Laterality: Left;    THYROID LOBECTOMY Left 2005    TOTAL ABDOMINAL HYSTERECTOMY      TUBAL LIGATION         Review of Systems   Unable to perform ROS: Dementia          Objective:   There were no vitals taken for this visit.    Physical Exam  LOWER EXTREMITY PHYSICAL EXAMINATION  VASCULAR: The LLE dorsalis pedis pulse is 0/4 and the posterior tibial pulse is 0/4. Hair growth is absent on the dorsal foot and digits. Proximal to distal, warm to warm. Capillary refill time is WNL at less than 3s. Minimal edema is noted.     ORTHOPEDIC: Hallux malleus is noted. Hammer toes are noted. Plantarflexed metatarsals are noted with fat pad atrophy.    NEUROLOGY: Proprioception is intact. Sensation to light touch is intact.     DERMATOLOGY: Ulceration, dorsal LLE 1st toe at the IPJ. The area measures approximately 0.80cm x 0.40cm x 0.20cm. No malodor or drainage is noted. No fluctuance is noted. No probe to  bone is noted. Hyperpigmentation is noted.    Assessment:     1. Ulcer of great toe, left, with fat layer exposed    2. Pain of left great toe    3. Arterial insufficiency of lower extremity    4. Major neurocognitive disorder due to Alzheimer's disease    5. Agitation due to dementia    6. Coronary artery disease of native artery of native heart with stable angina pectoris    7. Chronic combined systolic and diastolic CHF (congestive heart failure)    8. Essential hypertension    9. Decreased cardiac ejection fraction    10. Pulmonary HTN    11. Immunodeficiency due to chemotherapy    12. History of tobacco use        Plan:     Ulcer of great toe, left, with fat layer exposed  -     Ambulatory referral/consult to Home Health; Future; Expected date: 09/06/2024  -     CV Ultrasound doppler arterial legs bilat; Future  -     Ankle Brachial Indices (ROXANNE); Future  -     X-Ray Foot Complete Left; Future; Expected date: 09/05/2024  -     Culture, Anaerobic  -     Aerobic culture    Pain of left great toe  -     Ambulatory referral/consult to Podiatry    Arterial insufficiency of lower extremity  -     Ambulatory referral/consult to Podiatry    Major neurocognitive disorder due to Alzheimer's disease    Agitation due to dementia    Coronary artery disease of native artery of native heart with stable angina pectoris    Chronic combined systolic and diastolic CHF (congestive heart failure)    Essential hypertension    Decreased cardiac ejection fraction    Pulmonary HTN    Immunodeficiency due to chemotherapy    History of tobacco use      Thorough discussion is had with the patient today, concerning the diagnosis, its etiology, and the treatment algorithm at present.     Please update XR.    Prior MRI is reviewed in detail with the patient. All questions and concerns regarding findings and its/their implications are outlined and discussed.    The wound was surgically debrided after adequate prep with alcohol and/or betadine  paint. Excisional wound debridement was performed using sharp #10/#15 blade/rounded scalpel and tissue nipper, with removal of all non-viable skin and soft tissues; necrotic skin/tissue formation. The woundbase/wound bed was also debrided to encourage bleeding as to promote/stimulate healing. Debridement was excisional and included epidermal, dermal and subcutaneous tissues. Post debridement measurements are as above. Hemostasis was achieved. Patient tolerated procedure well and without complications. Local woundcare with topical Abx ointment dressings and bandage thereafter.     Sx. Shoe for gait.    Start [facility].    Check ROXANNE PVR.     Limit gait.        Future Appointments   Date Time Provider Department Center   9/12/2024  3:15 PM CARDIOVASCULAR 3, ONLH ON SPECCPR [facility]   9/12/2024  4:00 PM CARDIOVASCULAR 3, ONL ON SPECCPR [facility]   9/16/2024  8:00 AM [facility] CT1 LIMIT 500 LBS [facility]GV[facility] CT SCAN [facility]   9/16/2024  1:00 PM INJECTION 1, [facility] INFUSION [facility] INFSN [facility]   11/14/2024 11:20 AM Dilcia Rob NP ON UROLOGY  Medical C   1/9/2025  2:00 PM Jose G Manjarrez MD [facility] CARDIO [facility]   2/7/2025  4:30 PM Layne Lloyd NP ON NEURO [facility]

## 2024-09-06 ENCOUNTER — TELEPHONE (OUTPATIENT)
Dept: PODIATRY | Facility: CLINIC | Age: 74
End: 2024-09-06
Payer: MEDICARE

## 2024-09-06 DIAGNOSIS — L08.9 TOE INFECTION: Primary | ICD-10-CM

## 2024-09-06 NOTE — TELEPHONE ENCOUNTER
Spoke with her daughter.           ----- Message from Ana Cristina Sabillon sent at 9/6/2024  4:04 PM CDT -----  Contact: JULISSA GRESHAM [[id number]]  .Type:  Patient Returning Call    Who Called:JULISSA GRESHAM [[id number]]  Who Left Message for Patient:  Does the patient know what this is regarding?:   Would the patient rather a call back or a response via [facility]?  call  Best Call Back Number: .[phone] (home)   Additional Information:

## 2024-09-07 LAB
BACTERIA SPEC AEROBE CULT: ABNORMAL
BACTERIA SPEC ANAEROBE CULT: NORMAL

## 2024-09-09 DIAGNOSIS — L97.522 ULCER OF GREAT TOE, LEFT, WITH FAT LAYER EXPOSED: Primary | ICD-10-CM

## 2024-09-09 RX ORDER — LEVOFLOXACIN 750 MG/1
750 TABLET ORAL EVERY OTHER DAY
Qty: 7 TABLET | Refills: 0 | Status: SHIPPED | OUTPATIENT
Start: 2024-09-09 | End: 2024-09-22

## 2024-09-10 PROCEDURE — G0180 MD CERTIFICATION HHA PATIENT: HCPCS | Mod: ,,, | Performed by: PODIATRIST

## 2024-09-12 ENCOUNTER — HOSPITAL ENCOUNTER (OUTPATIENT)
Dept: CARDIOLOGY | Facility: HOSPITAL | Age: 74
Discharge: HOME | End: 2024-09-12
Attending: PODIATRIST
Payer: MEDICARE

## 2024-09-12 VITALS
BODY MASS INDEX: 21.44 KG/M2 | SYSTOLIC BLOOD PRESSURE: 198 MMHG | DIASTOLIC BLOOD PRESSURE: 90 MMHG | DIASTOLIC BLOOD PRESSURE: 90 MMHG | HEIGHT: 63 IN | HEIGHT: 63 IN | WEIGHT: 121 LBS | BODY MASS INDEX: 21.44 KG/M2 | SYSTOLIC BLOOD PRESSURE: 198 MMHG | WEIGHT: 121 LBS

## 2024-09-12 DIAGNOSIS — L97.522 ULCER OF GREAT TOE, LEFT, WITH FAT LAYER EXPOSED: ICD-10-CM

## 2024-09-12 DIAGNOSIS — L97.522 ULCER OF GREAT TOE, LEFT, WITH FAT LAYER EXPOSED: Primary | ICD-10-CM

## 2024-09-12 DIAGNOSIS — I73.9 ARTERIAL INSUFFICIENCY OF LOWER EXTREMITY: ICD-10-CM

## 2024-09-12 LAB
LEFT ABI: 0.71
LEFT ANT TIBIAL SYS PSV: 78 CM/S
LEFT ARM BP: 187 MMHG
LEFT CFA PSV: 129 CM/S
LEFT DORSALIS PEDIS: 139 MMHG
LEFT PERONEAL SYS PSV: 77 CM/S
LEFT POPLITEAL PSV: 58 CM/S
LEFT POST TIBIAL SYS PSV: 158 CM/S
LEFT POSTERIOR TIBIAL: 140 MMHG
LEFT PROFUNDA SYS PSV: 89 CM/S
LEFT SUPER FEMORAL DIST SYS PSV: 84 CM/S
LEFT SUPER FEMORAL MID SYS PSV: 177 CM/S
LEFT SUPER FEMORAL OSTIAL SYS PSV: 95 CM/S
LEFT SUPER FEMORAL PROX SYS PSV: 84 CM/S
LEFT TBI: 0.89
LEFT TIB/PER TRUNK SYS PSV: 114 CM/S
LEFT TOE PRESSURE: 176 MMHG
OHS CV LEFT LOWER EXTREMITY ABI (NO CALC): 0.71
OHS CV RIGHT ABI LOWER EXTREMITY (NO CALC): 0.66
RIGHT ABI: 0.66
RIGHT ANT TIBIAL SYS PSV: 51 CM/S
RIGHT ARM BP: 198 MMHG
RIGHT CFA PSV: 197 CM/S
RIGHT DORSALIS PEDIS: 124 MMHG
RIGHT PERONEAL SYS PSV: 51 CM/S
RIGHT POPLITEAL PSV: 34 CM/S
RIGHT POST TIBIAL SYS PSV: 50 CM/S
RIGHT POSTERIOR TIBIAL: 131 MMHG
RIGHT PROFUNDA SYS PSV: 63 CM/S
RIGHT SUPER FEMORAL DIST SYS PSV: 60 CM/S
RIGHT SUPER FEMORAL MID SYS PSV: 86 CM/S
RIGHT SUPER FEMORAL OSTIAL SYS PSV: 107 CM/S
RIGHT SUPER FEMORAL PROX SYS PSV: 112 CM/S
RIGHT TBI: 0.49
RIGHT TIB/PER TRUNK SYS PSV: 31 CM/S
RIGHT TOE PRESSURE: 98 MMHG

## 2024-09-12 PROCEDURE — 93925 LOWER EXTREMITY STUDY: CPT | Mod: 26,,, | Performed by: INTERNAL MEDICINE

## 2024-09-12 PROCEDURE — 93922 UPR/L XTREMITY ART 2 LEVELS: CPT | Mod: 26,,, | Performed by: INTERNAL MEDICINE

## 2024-09-12 PROCEDURE — 93925 LOWER EXTREMITY STUDY: CPT

## 2024-09-12 PROCEDURE — 93922 UPR/L XTREMITY ART 2 LEVELS: CPT

## 2024-09-16 ENCOUNTER — INFUSION (OUTPATIENT)
Dept: INFUSION THERAPY | Facility: HOSPITAL | Age: 74
End: 2024-09-16
Attending: INTERNAL MEDICINE
Payer: MEDICARE

## 2024-09-16 ENCOUNTER — TELEPHONE (OUTPATIENT)
Dept: HEMATOLOGY/ONCOLOGY | Facility: CLINIC | Age: 74
End: 2024-09-16
Payer: MEDICARE

## 2024-09-16 VITALS — TEMPERATURE: 99 F | DIASTOLIC BLOOD PRESSURE: 80 MMHG | HEART RATE: 69 BPM | SYSTOLIC BLOOD PRESSURE: 150 MMHG

## 2024-09-16 DIAGNOSIS — Z79.899 IMMUNODEFICIENCY DUE TO CHEMOTHERAPY: ICD-10-CM

## 2024-09-16 DIAGNOSIS — T45.1X5A IMMUNODEFICIENCY DUE TO CHEMOTHERAPY: ICD-10-CM

## 2024-09-16 DIAGNOSIS — D84.821 IMMUNODEFICIENCY DUE TO CHEMOTHERAPY: ICD-10-CM

## 2024-09-16 DIAGNOSIS — M81.0 AGE-RELATED OSTEOPOROSIS WITHOUT CURRENT PATHOLOGICAL FRACTURE: Primary | ICD-10-CM

## 2024-09-16 PROCEDURE — 63600175 PHARM REV CODE 636 W HCPCS: Mod: JZ,JG | Performed by: INTERNAL MEDICINE

## 2024-09-16 PROCEDURE — 96372 THER/PROPH/DIAG INJ SC/IM: CPT

## 2024-09-16 RX ADMIN — DENOSUMAB 60 MG: 60 INJECTION SUBCUTANEOUS at 01:09

## 2024-09-16 NOTE — TELEPHONE ENCOUNTER
----- Message from Tiffanie Lebron sent at 9/16/2024  7:55 AM CDT -----  Contact: DAUGHTER    .Type: Patient Call Back        Who called:   patient DAUGHTER       What is the request in detail:    CALLED IN CONCERNING CT SCAN   Can the clinic reply by [facility]?           Would the patient rather a call back or a response via [facility]?   CALL      Best call back number:   [phone]

## 2024-09-16 NOTE — TELEPHONE ENCOUNTER
Spoke to patient's daughter: Stated she changed the date and time of patient's CT and wanted to know was it okay to do so.    Responded : yes it was ok the change date of CT Scan

## 2024-09-16 NOTE — DISCHARGE INSTRUCTIONS
[facility]  [address] [facility]  [address]  [phone] phone     [phone] fax  Hours of Operation: Monday- Friday 8:00am- 5:00pm  After hours phone  [phone]  Hematology / Oncology Physicians on call      ESTELA Foster Dr., NP Phaon Dunbar, NP Khelsea Conley, FNP    Please call with any concerns regarding your appointment today.

## 2024-09-17 ENCOUNTER — TELEPHONE (OUTPATIENT)
Dept: CARDIOLOGY | Facility: CLINIC | Age: 74
End: 2024-09-17
Payer: MEDICARE

## 2024-09-17 NOTE — TELEPHONE ENCOUNTER
Called and appt was changed to 10/2/24 at 1pm at [place].        Daughter voiced understanding.

## 2024-09-17 NOTE — TELEPHONE ENCOUNTER
----- Message from Tiffanie Santos MD sent at 9/16/2024 11:01 AM CDT -----  Regarding: RE: Referral placed 2/2 recent ROXANNE/PVR  Please work her in thxs unless u want me to see her this week on oneal or 7:40 on thursday  ----- Message -----  From: Missy Hernandez CMA  Sent: 9/16/2024  10:10 AM CDT  To: Tiffanie Santos MD  Subject: RE: Referral placed 2/2 recent ROXANNE/PVR           The only referral I see was 9/12.  I do not see one from Feb.  Due to insurance this is the first available.  Unless you want to see her sooner I can see about moving it up.        Missy  ----- Message -----  From: Tiffanie Santos MD  Sent: 9/12/2024   7:50 PM CDT  To: Obi Simon DPM; Missy Hernandez CMA  Subject: RE: Referral placed 2/2 recent ROXANNE/PVR           Thxs DR Alfred Louis can you get patient in as fast as we can thxs  ----- Message -----  From: Obi Simon DPM  Sent: 9/12/2024   7:10 PM CDT  To: Tiffanie Santos MD; Obi Simon DPM; #  Subject: Referral placed 2/2 recent ROXANNE/PVR

## 2024-09-24 ENCOUNTER — HOSPITAL ENCOUNTER (OUTPATIENT)
Dept: RADIOLOGY | Facility: HOSPITAL | Age: 74
Discharge: HOME | End: 2024-09-24
Attending: INTERNAL MEDICINE
Payer: MEDICARE

## 2024-09-24 PROCEDURE — 71250 CT THORAX DX C-: CPT | Mod: TC

## 2024-09-24 PROCEDURE — 71250 CT THORAX DX C-: CPT | Mod: 26,,, | Performed by: RADIOLOGY

## 2024-10-02 ENCOUNTER — OFFICE VISIT (OUTPATIENT)
Dept: CARDIOLOGY | Facility: CLINIC | Age: 74
End: 2024-10-02
Payer: MEDICARE

## 2024-10-02 VITALS
BODY MASS INDEX: 21.45 KG/M2 | DIASTOLIC BLOOD PRESSURE: 71 MMHG | SYSTOLIC BLOOD PRESSURE: 132 MMHG | WEIGHT: 121.06 LBS | HEART RATE: 79 BPM | OXYGEN SATURATION: 96 % | HEIGHT: 63 IN

## 2024-10-02 DIAGNOSIS — C50.312 MALIGNANT NEOPLASM OF LOWER-INNER QUADRANT OF LEFT BREAST IN FEMALE, ESTROGEN RECEPTOR POSITIVE: ICD-10-CM

## 2024-10-02 DIAGNOSIS — Z17.0 MALIGNANT NEOPLASM OF LOWER-INNER QUADRANT OF LEFT BREAST IN FEMALE, ESTROGEN RECEPTOR POSITIVE: ICD-10-CM

## 2024-10-02 DIAGNOSIS — I70.0 ATHEROSCLEROSIS OF AORTA: ICD-10-CM

## 2024-10-02 DIAGNOSIS — L97.502: ICD-10-CM

## 2024-10-02 DIAGNOSIS — I10 ESSENTIAL HYPERTENSION: Chronic | ICD-10-CM

## 2024-10-02 DIAGNOSIS — D17.1 LIPOMA OF ABDOMINAL WALL: ICD-10-CM

## 2024-10-02 DIAGNOSIS — I50.42 CHRONIC COMBINED SYSTOLIC AND DIASTOLIC CHF (CONGESTIVE HEART FAILURE): Chronic | ICD-10-CM

## 2024-10-02 DIAGNOSIS — F02.80 MAJOR NEUROCOGNITIVE DISORDER DUE TO ALZHEIMER'S DISEASE: ICD-10-CM

## 2024-10-02 DIAGNOSIS — D50.8 IRON DEFICIENCY ANEMIA SECONDARY TO INADEQUATE DIETARY IRON INTAKE: ICD-10-CM

## 2024-10-02 DIAGNOSIS — I73.9 PVD (PERIPHERAL VASCULAR DISEASE): ICD-10-CM

## 2024-10-02 DIAGNOSIS — G30.9 MAJOR NEUROCOGNITIVE DISORDER DUE TO ALZHEIMER'S DISEASE: ICD-10-CM

## 2024-10-02 DIAGNOSIS — L97.522 SKIN ULCER OF TOE OF LEFT FOOT WITH FAT LAYER EXPOSED: ICD-10-CM

## 2024-10-02 DIAGNOSIS — I27.20 PULMONARY HTN: ICD-10-CM

## 2024-10-02 DIAGNOSIS — I25.118 CORONARY ARTERY DISEASE OF NATIVE ARTERY OF NATIVE HEART WITH STABLE ANGINA PECTORIS: Chronic | ICD-10-CM

## 2024-10-02 DIAGNOSIS — L97.522 ULCER OF GREAT TOE, LEFT, WITH FAT LAYER EXPOSED: Primary | ICD-10-CM

## 2024-10-02 DIAGNOSIS — I73.9 ARTERIAL INSUFFICIENCY OF LOWER EXTREMITY: ICD-10-CM

## 2024-10-02 DIAGNOSIS — D63.1 ANEMIA DUE TO STAGE 3B CHRONIC KIDNEY DISEASE: ICD-10-CM

## 2024-10-02 DIAGNOSIS — N18.32 ANEMIA DUE TO STAGE 3B CHRONIC KIDNEY DISEASE: ICD-10-CM

## 2024-10-02 DIAGNOSIS — Z87.891 HISTORY OF TOBACCO USE: ICD-10-CM

## 2024-10-02 PROCEDURE — 3078F DIAST BP <80 MM HG: CPT | Mod: CPTII,S$GLB,, | Performed by: INTERNAL MEDICINE

## 2024-10-02 PROCEDURE — 99999 PR PBB SHADOW E&M-EST. PATIENT-LVL V: CPT | Mod: PBBFAC,,, | Performed by: INTERNAL MEDICINE

## 2024-10-02 PROCEDURE — 99205 OFFICE O/P NEW HI 60 MIN: CPT | Mod: S$GLB,,, | Performed by: INTERNAL MEDICINE

## 2024-10-02 PROCEDURE — 3008F BODY MASS INDEX DOCD: CPT | Mod: CPTII,S$GLB,, | Performed by: INTERNAL MEDICINE

## 2024-10-02 PROCEDURE — 4010F ACE/ARB THERAPY RXD/TAKEN: CPT | Mod: CPTII,S$GLB,, | Performed by: INTERNAL MEDICINE

## 2024-10-02 PROCEDURE — 3288F FALL RISK ASSESSMENT DOCD: CPT | Mod: CPTII,S$GLB,, | Performed by: INTERNAL MEDICINE

## 2024-10-02 PROCEDURE — 1159F MED LIST DOCD IN RCRD: CPT | Mod: CPTII,S$GLB,, | Performed by: INTERNAL MEDICINE

## 2024-10-02 PROCEDURE — 1101F PT FALLS ASSESS-DOCD LE1/YR: CPT | Mod: CPTII,S$GLB,, | Performed by: INTERNAL MEDICINE

## 2024-10-02 PROCEDURE — 1125F AMNT PAIN NOTED PAIN PRSNT: CPT | Mod: CPTII,S$GLB,, | Performed by: INTERNAL MEDICINE

## 2024-10-02 PROCEDURE — 3075F SYST BP GE 130 - 139MM HG: CPT | Mod: CPTII,S$GLB,, | Performed by: INTERNAL MEDICINE

## 2024-10-02 RX ORDER — PRAVASTATIN SODIUM 40 MG/1
40 TABLET ORAL DAILY
Qty: 90 TABLET | Refills: 3 | Status: SHIPPED | OUTPATIENT
Start: 2024-10-02 | End: 2025-10-02

## 2024-10-02 NOTE — H&P (VIEW-ONLY)
Subjective:   Patient ID:  Leia Rodriguez is a 74 y.o. female who presents for evaluation of Chest Pain and Shortness of Breath      HPI  72 yo female, 6 months f/u  PMH CAD, PACs, CHFmrEF, h/o beast cancer 4 yrs ago lumpectomy and chemo, recurrent in  s/p mastectomy and chemo ongoing, and HTN, dementia lower back pain wheelchair helps  Quit smoking in  after 50 yrs smoking.  Some residual left chest pain after mastectomy,   GRISSOM and fatigue after fast walking,   No palpitation, leg swelling, dizziness and faint.     ekg in  NSR and TWI on V2 to v6 and inferior leads   ECHO normal EF and severe LVH   ECHo EF 45%, mod MR and LAE  Weight stable     03/2021 admitted for CHF exacerbation.  and Troponin 0.44 flat. elg NSR and TWI on lateral leads. Improved SOB after diuresis. Then BNP dropped to 58  Now SOB sable. Sleeps on 1 pillow     07/2021 visit   echo Day 15, cycle 6 Biplane=44% 4D LVQ=45% Avg GPLS= -16.1   MPI inferoapical scar  GRISSOM while long distance walking  No chest pain, dizziness faint, leg swelling  Decent appetite  F/u with Dr. Fernández on stage I HER2 positive breast carcinoma being treated with Herceptin q. 3 weeks      visit  Dynamical TWI on EKG   MPI showed apical inferior fixed perfusion defect  No chest pain . Limited walking due to lower back pain  No orthopnea       visit  No chest pain. GRISSOM mild and chronic. Limited activity due to fatigue and leg pain.   S/p LHC done on 10/12/2021, distal % OM2/3 40% and midRCA 50% lesion and EF 45% and LVEDP 17 mmHG. Moderate MR. Continue medical Rx  Serial echo studies     Echo 2/22/21 Cycle 4 Day 20  4dLVQ=47%  AutoEF=48%  BRIDGETT unable to obtain accurate measurements   5-26-21  Day 15, cycle 6  Biplane=44%  4D LVQ=45%  Avg GPLS= -16.1   9-13-21 Day 20, Cycle 11  Biplane=42%  4D LVQ=49%  Avg GPLS= -14.8%     12/2021 visit  11/ went to ER [facility]. EKG sinus tachy and PVCs. BNP up  to 800. CXR pulm. Edema. Added lasix 20 mg daily  Now dyspnea improved. No leg swelling chest pain. Sleeps on 1 pillow and no PND.   On iron infusion rx fro anemia      visit  Improved appetite after held chemo due to decreased EF about .   Gained the weight. No SOB, sleeps on 2 pillows      visit   Twice ER visits for not feeling well, SOB, the lab and CRX mri unremarkable, except some +UTI.Occurred at night and the family concerning anxiety ?  EKG in  NSR PACs     06/23 visit  Per daughter, pt c/o SOB + orthopnea and leg swelling. Lost 13 lbs in 6 months. Had teeth work recently, dementia slightyl worse. Some mood disturbance. Talked to family member.      08/23 visit  07/23 admitted for UTI and sepsis. Low BP and d/c ARB.   Cr 1.0 before d/c  Today BP low.   07/23 ekg sinus tachy and PACs; echo EF 50% LVH  No dizziness faint sob and chest pain.  to 120 mmHG  RLE edema, worse at sitting and better after laying      10/05/23 addendum   Report low BP  Stop aldactone  And decrease ToprolXL from 50 mg daily to 25 mg daily     12/23 visit  No orthopnea dizziness. No fall  06/23 echo EF 50% PAP 43 mmHG     Interval history  Dementia. States that some bad feeling in AM  No syncope.   EKG reviewed by myself today reveals NSR nonspecific STT change PACs  Leg swelling controlled            10/2/2024 referred from podiatry DR HUTCHINS for toe ulcer.  She has recurrent left big toe ulcer gets dark and painful.  She was seen by DR HUTCHINS THE TOE GETS DARK AND PAINFUL NOW THE ULCER IS STILL EVIDENT THE TOE IS SWOLLEN DARK IN COLOUR PAIN IMPROVED POST ANTIBIOTICS. SHE IS ON [facility] SEDENTARY SIDE. SHE GOES TO DAY CARE LIKE SET UP THREE TIMES A WEEK. SHE LIVES WITH HER  BUT HAS SITTERS . THE TOE HAS THIS RECURRENT PROBLEM NMULTIPLE TIMES OVER THE PAST YEAR.   HAS SEVERE PVD AND HAS SUGGESTION OF OSTEOMYELITIS BY XRAY   Past Medical History:   Diagnosis Date    Arthritis     EDGAR HANDS, KNEES     Behavioral change 2022    Blind right eye     Traumatic    Breast cancer 06/15/2017    0.8 cm Grade1 INTRADUCTAL BREAST 9 positve margin (left)    Essential hypertension     Hemorrhoids     Immunodeficiency due to chemotherapy 2021    Lipoma of abdominal wall     Major neurocognitive disorder 2022    Obesity     Overactive bladder     Pap smear abnormality of cervix with ASCUS favoring benign     Thyroid nodule     Tobacco use disorder     Tubular adenoma of colon     Urinary incontinence        Past Surgical History:   Procedure Laterality Date    ANTERIOR VAGINAL REPAIR      BLADDER SURGERY      BREAST LUMPECTOMY Left 2017    CATARACT EXTRACTION Left 2022     SECTION      X2    COLONOSCOPY N/A 3/8/2017    Procedure: COLONOSCOPY;  Surgeon: Tyron Paris MD;  Location: [facility];  Service: Endoscopy;  Laterality: N/A;    COLONOSCOPY N/A 2020    Procedure: COLONOSCOPY;  Surgeon: Keira Ellison MD;  Location: [facility];  Service: Endoscopy;  Laterality: N/A;    ESOPHAGOGASTRODUODENOSCOPY N/A 2020    Procedure: EGD (ESOPHAGOGASTRODUODENOSCOPY);  Surgeon: Keira Ellison MD;  Location: [facility];  Service: Endoscopy;  Laterality: N/A;  new onset iron deficiency with prior history of breast cancer    INTRALUMINAL GASTROINTESTINAL TRACT IMAGING VIA CAPSULE N/A 10/28/2020    Procedure: IMAGING PROCEDURE, GI TRACT, INTRALUMINAL, VIA CAPSULE;  Surgeon: Finesse Jha RN;  Location: [facility] ENDO;  Service: Endoscopy;  Laterality: N/A;    LEFT HEART CATHETERIZATION Left 10/12/2021    Procedure: CATHETERIZATION, HEART, LEFT;  Surgeon: Tiffanie Santos MD;  Location: [facility];  Service: Cardiology;  Laterality: Left;    SENTINEL LYMPH NODE BIOPSY Left 2020    Procedure: BIOPSY, LYMPH NODE, SENTINEL;  Surgeon: Vincent Moyer MD;  Location: [facility] OR;  Service: General;  Laterality: Left;    SIMPLE MASTECTOMY Left 2020    Procedure: MASTECTOMY, SIMPLE;  Surgeon: Vincent NIEVES  MD João;  Location: [facility] OR;  Service: General;  Laterality: Left;    THYROID LOBECTOMY Left 2005    TOTAL ABDOMINAL HYSTERECTOMY      TUBAL LIGATION         Social History     Tobacco Use    Smoking status: Former     Current packs/day: 0.00     Average packs/day: 1 pack/day for 50.0 years (50.0 ttl pk-yrs)     Types: Cigarettes     Start date: 1970     Quit date: 2020     Years since quittin.1    Smokeless tobacco: Never   Substance Use Topics    Alcohol use: Never     Alcohol/week: 28.0 standard drinks of alcohol     Types: 28 Cans of beer per week    Drug use: No       Family History   Problem Relation Name Age of Onset    Hypertension Father      Cataracts Father      Prostate cancer Father  80    Cervical cancer Daughter Cenetra 32    Allergic rhinitis Daughter Cenetra     Cirrhosis Mother      Alcohol abuse Mother      Hypertension Daughter Sheronica     Diabetes Brother      Heart attack Brother      Heart murmur Brother      No Known Problems Daughter [place]        Current Outpatient Medications   Medication Sig    aluminum-magnesium hydroxide-simethicone 200-200-20 mg/5 mL Susp, diphenhydrAMINE 12.5 mg/5 mL Liqd Swish and spit 5 mLs every 4 (four) hours as needed (discomfort).    anastrozole (ARIMIDEX) 1 mg Tab Take 1 tablet (1 mg total) by mouth once daily.    aspirin 81 MG Chew Take 1 tablet (81 mg total) by mouth once daily.    busPIRone (BUSPAR) 15 MG tablet TAKE 1 TABLET BY MOUTH TWICE A DAY    calcium citrate (CALCITRATE) 200 mg (950 mg) tablet Take 1 tablet by mouth once daily.    cephALEXin (KEFLEX) 250 MG capsule Take 1 capsule (250 mg total) by mouth every evening.    cyanocobalamin 2000 MCG tablet Take 2,000 mcg by mouth 2 (two) times a day.    donepeziL (ARICEPT) 5 MG tablet TAKE 1 TABLET BY MOUTH EVERY DAY IN THE EVENING    estradioL (ESTRACE) 0.01 % (0.1 mg/gram) vaginal cream Place 4 g vaginally once daily.    ferrous sulfate (FEOSOL) 325 mg (65 mg iron) Tab tablet Take 65 mg  by mouth once daily.    flu vac 2023 65up-[id number],PF, (FLUAD QUAD 2023-24,65Y UP,,PF,) 60 mcg (15 mcg x 4)/0.5 mL Syrg Inject 0.5 mLs into the muscle.    furosemide (LASIX) 20 MG tablet Take 1 tablet (20 mg total) by mouth daily as needed (edema).    isosorbide mononitrate (IMDUR) 30 MG 24 hr tablet TAKE 1 TABLET BY MOUTH EVERY DAY    lamoTRIgine (LAMICTAL) 25 MG tablet Take 2 tablets (50 mg total) by mouth 2 (two) times daily.    memantine (NAMENDA) 5 MG Tab Take 1 tablet (5 mg total) by mouth 2 (two) times daily.    metoprolol succinate (TOPROL-XL) 25 MG 24 hr tablet Take 1 tablet (25 mg total) by mouth 2 (two) times daily.    QUEtiapine (SEROQUEL) 300 MG Tab Take 1 tablet (300 mg total) by mouth every evening.    telmisartan (MICARDIS) 20 MG Tab TAKE 1 TABLET BY MOUTH EVERY DAY    vitamin D 1000 units Tab Take 1,000 Units by mouth once daily.     No current facility-administered medications for this visit.     Current Outpatient Medications on File Prior to Visit   Medication Sig    aluminum-magnesium hydroxide-simethicone 200-200-20 mg/5 mL Susp, diphenhydrAMINE 12.5 mg/5 mL Liqd Swish and spit 5 mLs every 4 (four) hours as needed (discomfort).    anastrozole (ARIMIDEX) 1 mg Tab Take 1 tablet (1 mg total) by mouth once daily.    aspirin 81 MG Chew Take 1 tablet (81 mg total) by mouth once daily.    busPIRone (BUSPAR) 15 MG tablet TAKE 1 TABLET BY MOUTH TWICE A DAY    calcium citrate (CALCITRATE) 200 mg (950 mg) tablet Take 1 tablet by mouth once daily.    cephALEXin (KEFLEX) 250 MG capsule Take 1 capsule (250 mg total) by mouth every evening.    cyanocobalamin 2000 MCG tablet Take 2,000 mcg by mouth 2 (two) times a day.    donepeziL (ARICEPT) 5 MG tablet TAKE 1 TABLET BY MOUTH EVERY DAY IN THE EVENING    estradioL (ESTRACE) 0.01 % (0.1 mg/gram) vaginal cream Place 4 g vaginally once daily.    ferrous sulfate (FEOSOL) 325 mg (65 mg iron) Tab tablet Take 65 mg by mouth once daily.    flu vac 2023  [id number],PF, (FLUAD QUAD 2023-24,65Y UP,,PF,) 60 mcg (15 mcg x 4)/0.5 mL Syrg Inject 0.5 mLs into the muscle.    furosemide (LASIX) 20 MG tablet Take 1 tablet (20 mg total) by mouth daily as needed (edema).    isosorbide mononitrate (IMDUR) 30 MG 24 hr tablet TAKE 1 TABLET BY MOUTH EVERY DAY    lamoTRIgine (LAMICTAL) 25 MG tablet Take 2 tablets (50 mg total) by mouth 2 (two) times daily.    memantine (NAMENDA) 5 MG Tab Take 1 tablet (5 mg total) by mouth 2 (two) times daily.    metoprolol succinate (TOPROL-XL) 25 MG 24 hr tablet Take 1 tablet (25 mg total) by mouth 2 (two) times daily.    QUEtiapine (SEROQUEL) 300 MG Tab Take 1 tablet (300 mg total) by mouth every evening.    telmisartan (MICARDIS) 20 MG Tab TAKE 1 TABLET BY MOUTH EVERY DAY    vitamin D 1000 units Tab Take 1,000 Units by mouth once daily.     No current facility-administered medications on file prior to visit.       Review of patient's allergies indicates:  No Known Allergies    Review of Systems   Constitutional: Negative for diaphoresis, malaise/fatigue and weight gain.   HENT:  Negative for hoarse voice.    Eyes:  Negative for double vision and visual disturbance.   Cardiovascular:  Negative for chest pain, claudication, cyanosis, dyspnea on exertion, irregular heartbeat, leg swelling, near-syncope, orthopnea, palpitations, paroxysmal nocturnal dyspnea and syncope.   Respiratory:  Negative for cough, hemoptysis, shortness of breath and snoring.    Hematologic/Lymphatic: Negative for bleeding problem. Does not bruise/bleed easily.   Skin:  Positive for color change, poor wound healing and suspicious lesions.   Musculoskeletal:  Negative for muscle cramps, muscle weakness and myalgias.   Gastrointestinal:  Negative for bloating, abdominal pain, change in bowel habit, diarrhea, heartburn, hematemesis, hematochezia, melena and nausea.   Neurological:  Negative for excessive daytime sleepiness, dizziness, headaches, light-headedness, loss of  balance, numbness and weakness.   Psychiatric/Behavioral:  Negative for memory loss. The patient does not have insomnia.    Allergic/Immunologic: Negative for hives.       Objective:   Physical Exam  Vitals and nursing note reviewed.   Constitutional:       General: She is not in acute distress.     Appearance: Normal appearance. She is well-developed. She is not ill-appearing.   HENT:      Head: Normocephalic and atraumatic.   Eyes:      General: No scleral icterus.     Pupils: Pupils are equal, round, and reactive to light.   Neck:      Thyroid: No thyromegaly.      Vascular: Normal carotid pulses. No carotid bruit, hepatojugular reflux or JVD.      Trachea: No tracheal deviation.   Cardiovascular:      Rate and Rhythm: Normal rate and regular rhythm.      Pulses:           Carotid pulses are 2+ on the right side and 2+ on the left side.       Radial pulses are 2+ on the right side and 2+ on the left side.        Femoral pulses are 1+ on the right side and 1+ on the left side.       Popliteal pulses are 1+ on the right side and 1+ on the left side.        Dorsalis pedis pulses are 0 on the right side and 0 on the left side.        Posterior tibial pulses are 0 on the right side and 0 on the left side.      Heart sounds: Normal heart sounds. No murmur heard.     No friction rub. No gallop.   Pulmonary:      Effort: Pulmonary effort is normal. No respiratory distress.      Breath sounds: Normal breath sounds. No wheezing, rhonchi or rales.   Chest:      Chest wall: No tenderness.   Abdominal:      General: Bowel sounds are normal. There is no abdominal bruit.      Palpations: Abdomen is soft. There is no hepatomegaly or pulsatile mass.      Tenderness: There is no abdominal tenderness.   Musculoskeletal:      Right shoulder: No deformity.      Cervical back: Normal range of motion and neck supple.   Skin:     General: Skin is warm and dry.      Findings: No erythema or rash.      Nails: There is no clubbing.  "  Neurological:      Mental Status: She is alert and oriented to person, place, and time.      Cranial Nerves: No cranial nerve deficit.      Coordination: Coordination normal.   Psychiatric:         Speech: Speech normal.         Behavior: Behavior normal.       Vitals:    10/02/24 1311 10/02/24 1312   BP: (!) 147/78 132/71   BP Location: Right arm Left arm   Patient Position: Sitting Sitting   Pulse: 79 79   SpO2: 96% 96%   Weight: 54.9 kg (121 lb 0.5 oz) 54.9 kg (121 lb 0.5 oz)   Height: 5' 3" (1.6 m) 5' 3" (1.6 m)     Lab Results   Component Value Date    CHOL 192 07/14/2023    CHOL 119 (L) 03/31/2021    CHOL 207 (H) 09/03/2019     Body mass index is 21.44 kg/m².   No results found for: "LABA1C", "HGBA1C"   BMP  Lab Results   Component Value Date     09/04/2024     09/04/2024    K 4.4 09/04/2024    K 4.4 09/04/2024     09/04/2024     09/04/2024    CO2 26 09/04/2024    CO2 26 09/04/2024    BUN 18 09/04/2024    BUN 18 09/04/2024    CREATININE 1.2 09/04/2024    CREATININE 1.2 09/04/2024    CALCIUM 9.5 09/04/2024    CALCIUM 9.5 09/04/2024    ANIONGAP 10 09/04/2024    ANIONGAP 10 09/04/2024    EGFRNORACEVR 48 (A) 09/04/2024    EGFRNORACEVR 48 (A) 09/04/2024      Lab Results   Component Value Date    HDL 35 (L) 07/14/2023    HDL 46 03/31/2021    HDL 53 09/03/2019     Lab Results   Component Value Date    LDLCALC 128.0 07/14/2023    LDLCALC 61.2 (L) 03/31/2021    LDLCALC 134.2 09/03/2019     Lab Results   Component Value Date    TRIG 145 07/14/2023    TRIG 59 03/31/2021    TRIG 99 09/03/2019     Lab Results   Component Value Date    CHOLHDL 18.2 (L) 07/14/2023    CHOLHDL 38.7 03/31/2021    CHOLHDL 25.6 09/03/2019       Chemistry        Component Value Date/Time     09/04/2024 1334     09/04/2024 1334    K 4.4 09/04/2024 1334    K 4.4 09/04/2024 1334     09/04/2024 1334     09/04/2024 1334    CO2 26 09/04/2024 1334    CO2 26 09/04/2024 1334    BUN 18 09/04/2024 1334    BUN " 18 09/04/2024 1334    CREATININE 1.2 09/04/2024 1334    CREATININE 1.2 09/04/2024 1334    GLU 99 09/04/2024 1334    GLU 99 09/04/2024 1334        Component Value Date/Time    CALCIUM 9.5 09/04/2024 1334    CALCIUM 9.5 09/04/2024 1334    ALKPHOS 72 09/04/2024 1334    AST 15 09/04/2024 1334    ALT 15 09/04/2024 1334    BILITOT 0.4 09/04/2024 1334    ESTGFRAFRICA 48.0 (A) 06/20/2022 1020    EGFRNONAA 41.7 (A) 06/20/2022 1020          Lab Results   Component Value Date    TSH 0.765 01/27/2022     Lab Results   Component Value Date    INR 1.1 10/01/2021     Lab Results   Component Value Date    WBC 7.29 09/04/2024    HGB 11.4 (L) 09/04/2024    HCT 36.6 (L) 09/04/2024    MCV 94 09/04/2024     09/04/2024     BNP  @LABRCNTIP(BNP,BNPTRIAGEBLO)@  CrCl cannot be calculated (Patient's most recent lab result is older than the maximum 7 days allowed.).  Interpretation Summary  Show Result Comparison     Bilateral roxanne suggest moderate disease.    The waveforms in the cfa pfa and prox sfa bilaterally suggestive inflow disease.    The sfa and distal  have monophasic waveforms suggestive of severe sfa disease.  Findings    ROXANNE The right resting ROXANNE reduction is moderately decreased.   The right ankle [DT] artery has biphasic flow.   The right ankle [DP] artery has biphasic flow.   The left resting ROXANNE reduction is moderately decreased.   The left ankle [PT] artery has biphasic flow.  The left ankle [DP] artery has biphasic flow.         Medication Changes      None  Medication List  US Measurements - ROXANNE    Right   Right arm  mmHg         Right posterior tibial 131 mmHg         Right dorsalis pedis 124 mmHg         Right ROXANNE 0.66         Right toe pressure 98 mmHg         Right TBI 0.49          Left   Left arm  mmHg         Left posterior tibial 140 mmHg         Left dorsalis pedis 139 mmHg         Left ROXANNE 0.71         Left toe pressure 176 mmHg         Left TBI 0.89           Narrative &  Impression  EXAMINATION:  XR FOOT COMPLETE 3 VIEW LEFT     CLINICAL HISTORY:  Non-pressure chronic ulcer of other part of left foot with fat layer exposed     TECHNIQUE:  XR FOOT COMPLETE 3 VIEW LEFT     COMPARISON:  None.     FINDINGS:  Overlying bandaging material is noted.     Skin defect along the lateral aspect of the great toe concerning for ulceration. There is underlying erosion of the tuft of the distal phalanx of the 1st digit concerning for osteomyelitis.     No evidence of acute fracture.  Worsening osseous demineralization.  Moderate midfoot hypertrophy.  Mild sclerosis of the MTP joint.     Impression:     Findings concerning for osteomyelitis involving the distal tuft of the distal phalanx of the great toe.        Electronically signed by:Michael Montesinos  Date:                                            09/05/2024  Time:                                           18:36      Assessment:     1. Ulcer of great toe, left, with fat layer exposed    2. Arterial insufficiency of lower extremity    3. Essential hypertension    4. Coronary artery disease of native artery of native heart with stable angina pectoris    5. Chronic combined systolic and diastolic CHF (congestive heart failure)    6. History of tobacco use    7. Lipoma of abdominal wall    8. Malignant neoplasm of lower-inner quadrant of left breast in female, estrogen receptor positive    9. Iron deficiency anemia secondary to inadequate dietary iron intake    10. Atherosclerosis of aorta    11. Pulmonary HTN    12. Anemia due to stage 3b chronic kidney disease    13. Major neurocognitive disorder due to Alzheimer's disease    14. PVD (peripheral vascular disease)    15. Skin ulcer of toe of left foot with fat layer exposed      PATIENT HAS ISCHEMIC LEFT TOE WITH BLACK DISCOLORATION SUGGESTION OF OSTEOMYELITIS MULTILEVEL DISEASE RECURRENT TOE INFECTION WITH EVIDENCE OF SFA AND INFRAPOPLITEAL DISEASE SHE IS ON ANTIPLATELETS WILL ADD STATINS THAT WAS  DISCONTINUED FOR NO OBVIOUS REASON. WILL RESUME. HOWEVER DESPITE BEING FRAIL AND HER MULTIPLE COMORBIDITIES SHE DESERVES ANGIOGRAPHY TOP TRY TO REVACSULARIZE TH LEG AND HELP HEAL THIS AVOIDANCE OF RECURRENT INFECTION AND REST ISCHEMIA.   CADA SYMPTOMATIC CONTINUE ASA   HTN CONTROLLED CONTINUE LOW SALT DIET  HLP RESTART STATINS  CKD WILL CONTINUE WITH CONTRAST NEPHROPATHY PRECAUTIONS.  CHF WELL COMPENSATED  CONTINEU LOW SALT DIET.  Plan:   AOGRAM WITH RUN OFF/PTA  NEEDS ANESTHESIA  I have explained the risks, benefits , and alternatives of the procedure in detail.the patient voices understanding and all questions have been answered.the patient agrees to proceed as planned.   DISCUSSED RF MODIFICATION  DISCUSSED THE CASE WITH HER DAUGHTER WHO WAS PRESENT DURING THE VISIT.  DISCUSSED WITH DR HUTCHINS.

## 2024-10-02 NOTE — PROGRESS NOTES
Subjective:   Patient ID:  Leia Rodriguez is a 74 y.o. female who presents for evaluation of Chest Pain and Shortness of Breath      HPI  72 yo female, 6 months f/u  PMH CAD, PACs, CHFmrEF, h/o beast cancer 4 yrs ago lumpectomy and chemo, recurrent in  s/p mastectomy and chemo ongoing, and HTN, dementia lower back pain wheelchair helps  Quit smoking in  after 50 yrs smoking.  Some residual left chest pain after mastectomy,   GRISSOM and fatigue after fast walking,   No palpitation, leg swelling, dizziness and faint.     ekg in  NSR and TWI on V2 to v6 and inferior leads   ECHO normal EF and severe LVH   ECHo EF 45%, mod MR and LAE  Weight stable     03/2021 admitted for CHF exacerbation.  and Troponin 0.44 flat. elg NSR and TWI on lateral leads. Improved SOB after diuresis. Then BNP dropped to 58  Now SOB sable. Sleeps on 1 pillow     07/2021 visit   echo Day 15, cycle 6 Biplane=44% 4D LVQ=45% Avg GPLS= -16.1   MPI inferoapical scar  GRISSOM while long distance walking  No chest pain, dizziness faint, leg swelling  Decent appetite  F/u with Dr. Fernández on stage I HER2 positive breast carcinoma being treated with Herceptin q. 3 weeks      visit  Dynamical TWI on EKG   MPI showed apical inferior fixed perfusion defect  No chest pain . Limited walking due to lower back pain  No orthopnea       visit  No chest pain. GRISSOM mild and chronic. Limited activity due to fatigue and leg pain.   S/p LHC done on 10/12/2021, distal % OM2/3 40% and midRCA 50% lesion and EF 45% and LVEDP 17 mmHG. Moderate MR. Continue medical Rx  Serial echo studies     Echo 2/22/21 Cycle 4 Day 20  4dLVQ=47%  AutoEF=48%  BRIDGETT unable to obtain accurate measurements   5-26-21  Day 15, cycle 6  Biplane=44%  4D LVQ=45%  Avg GPLS= -16.1   9-13-21 Day 20, Cycle 11  Biplane=42%  4D LVQ=49%  Avg GPLS= -14.8%     12/2021 visit  11/ went to ER [facility]. EKG sinus tachy and PVCs. BNP up  to 800. CXR pulm. Edema. Added lasix 20 mg daily  Now dyspnea improved. No leg swelling chest pain. Sleeps on 1 pillow and no PND.   On iron infusion rx fro anemia      visit  Improved appetite after held chemo due to decreased EF about .   Gained the weight. No SOB, sleeps on 2 pillows      visit   Twice ER visits for not feeling well, SOB, the lab and CRX mri unremarkable, except some +UTI.Occurred at night and the family concerning anxiety ?  EKG in  NSR PACs     06/23 visit  Per daughter, pt c/o SOB + orthopnea and leg swelling. Lost 13 lbs in 6 months. Had teeth work recently, dementia slightyl worse. Some mood disturbance. Talked to family member.      08/23 visit  07/23 admitted for UTI and sepsis. Low BP and d/c ARB.   Cr 1.0 before d/c  Today BP low.   07/23 ekg sinus tachy and PACs; echo EF 50% LVH  No dizziness faint sob and chest pain.  to 120 mmHG  RLE edema, worse at sitting and better after laying      10/05/23 addendum   Report low BP  Stop aldactone  And decrease ToprolXL from 50 mg daily to 25 mg daily     12/23 visit  No orthopnea dizziness. No fall  06/23 echo EF 50% PAP 43 mmHG     Interval history  Dementia. States that some bad feeling in AM  No syncope.   EKG reviewed by myself today reveals NSR nonspecific STT change PACs  Leg swelling controlled            10/2/2024 referred from podiatry DR HUTCHINS for toe ulcer.  She has recurrent left big toe ulcer gets dark and painful.  She was seen by DR HUTCHINS THE TOE GETS DARK AND PAINFUL NOW THE ULCER IS STILL EVIDENT THE TOE IS SWOLLEN DARK IN COLOUR PAIN IMPROVED POST ANTIBIOTICS. SHE IS ON [facility] SEDENTARY SIDE. SHE GOES TO DAY CARE LIKE SET UP THREE TIMES A WEEK. SHE LIVES WITH HER  BUT HAS SITTERS . THE TOE HAS THIS RECURRENT PROBLEM NMULTIPLE TIMES OVER THE PAST YEAR.   HAS SEVERE PVD AND HAS SUGGESTION OF OSTEOMYELITIS BY XRAY   Past Medical History:   Diagnosis Date    Arthritis     EDGAR HANDS, KNEES     Behavioral change 2022    Blind right eye     Traumatic    Breast cancer 06/15/2017    0.8 cm Grade1 INTRADUCTAL BREAST 9 positve margin (left)    Essential hypertension     Hemorrhoids     Immunodeficiency due to chemotherapy 2021    Lipoma of abdominal wall     Major neurocognitive disorder 2022    Obesity     Overactive bladder     Pap smear abnormality of cervix with ASCUS favoring benign     Thyroid nodule     Tobacco use disorder     Tubular adenoma of colon     Urinary incontinence        Past Surgical History:   Procedure Laterality Date    ANTERIOR VAGINAL REPAIR      BLADDER SURGERY      BREAST LUMPECTOMY Left 2017    CATARACT EXTRACTION Left 2022     SECTION      X2    COLONOSCOPY N/A 3/8/2017    Procedure: COLONOSCOPY;  Surgeon: Tyron Paris MD;  Location: [facility];  Service: Endoscopy;  Laterality: N/A;    COLONOSCOPY N/A 2020    Procedure: COLONOSCOPY;  Surgeon: Keira Ellison MD;  Location: [facility];  Service: Endoscopy;  Laterality: N/A;    ESOPHAGOGASTRODUODENOSCOPY N/A 2020    Procedure: EGD (ESOPHAGOGASTRODUODENOSCOPY);  Surgeon: Keira Ellison MD;  Location: [facility];  Service: Endoscopy;  Laterality: N/A;  new onset iron deficiency with prior history of breast cancer    INTRALUMINAL GASTROINTESTINAL TRACT IMAGING VIA CAPSULE N/A 10/28/2020    Procedure: IMAGING PROCEDURE, GI TRACT, INTRALUMINAL, VIA CAPSULE;  Surgeon: Finesse Jha RN;  Location: [facility] ENDO;  Service: Endoscopy;  Laterality: N/A;    LEFT HEART CATHETERIZATION Left 10/12/2021    Procedure: CATHETERIZATION, HEART, LEFT;  Surgeon: Tiffanie Santos MD;  Location: [facility] CATH LAB;  Service: Cardiology;  Laterality: Left;    SENTINEL LYMPH NODE BIOPSY Left 2020    Procedure: BIOPSY, LYMPH NODE, SENTINEL;  Surgeon: Vincent Moyer MD;  Location: [facility] OR;  Service: General;  Laterality: Left;    SIMPLE MASTECTOMY Left 2020    Procedure: MASTECTOMY, SIMPLE;  Surgeon: Vincent NIEVES  MD João;  Location: [facility] OR;  Service: General;  Laterality: Left;    THYROID LOBECTOMY Left 2005    TOTAL ABDOMINAL HYSTERECTOMY      TUBAL LIGATION         Social History     Tobacco Use    Smoking status: Former     Current packs/day: 0.00     Average packs/day: 1 pack/day for 50.0 years (50.0 ttl pk-yrs)     Types: Cigarettes     Start date: 1970     Quit date: 2020     Years since quittin.1    Smokeless tobacco: Never   Substance Use Topics    Alcohol use: Never     Alcohol/week: 28.0 standard drinks of alcohol     Types: 28 Cans of beer per week    Drug use: No       Family History   Problem Relation Name Age of Onset    Hypertension Father      Cataracts Father      Prostate cancer Father  80    Cervical cancer Daughter Cenetra 32    Allergic rhinitis Daughter Cenetra     Cirrhosis Mother      Alcohol abuse Mother      Hypertension Daughter Sheronica     Diabetes Brother      Heart attack Brother      Heart murmur Brother      No Known Problems Daughter [place]        Current Outpatient Medications   Medication Sig    aluminum-magnesium hydroxide-simethicone 200-200-20 mg/5 mL Susp, diphenhydrAMINE 12.5 mg/5 mL Liqd Swish and spit 5 mLs every 4 (four) hours as needed (discomfort).    anastrozole (ARIMIDEX) 1 mg Tab Take 1 tablet (1 mg total) by mouth once daily.    aspirin 81 MG Chew Take 1 tablet (81 mg total) by mouth once daily.    busPIRone (BUSPAR) 15 MG tablet TAKE 1 TABLET BY MOUTH TWICE A DAY    calcium citrate (CALCITRATE) 200 mg (950 mg) tablet Take 1 tablet by mouth once daily.    cephALEXin (KEFLEX) 250 MG capsule Take 1 capsule (250 mg total) by mouth every evening.    cyanocobalamin 2000 MCG tablet Take 2,000 mcg by mouth 2 (two) times a day.    donepeziL (ARICEPT) 5 MG tablet TAKE 1 TABLET BY MOUTH EVERY DAY IN THE EVENING    estradioL (ESTRACE) 0.01 % (0.1 mg/gram) vaginal cream Place 4 g vaginally once daily.    ferrous sulfate (FEOSOL) 325 mg (65 mg iron) Tab tablet Take 65 mg  by mouth once daily.    flu vac 2023 [id number],PF, (FLUAD QUAD 2023-24,65Y UP,,PF,) 60 mcg (15 mcg x 4)/0.5 mL Syrg Inject 0.5 mLs into the muscle.    furosemide (LASIX) 20 MG tablet Take 1 tablet (20 mg total) by mouth daily as needed (edema).    isosorbide mononitrate (IMDUR) 30 MG 24 hr tablet TAKE 1 TABLET BY MOUTH EVERY DAY    lamoTRIgine (LAMICTAL) 25 MG tablet Take 2 tablets (50 mg total) by mouth 2 (two) times daily.    memantine (NAMENDA) 5 MG Tab Take 1 tablet (5 mg total) by mouth 2 (two) times daily.    metoprolol succinate (TOPROL-XL) 25 MG 24 hr tablet Take 1 tablet (25 mg total) by mouth 2 (two) times daily.    QUEtiapine (SEROQUEL) 300 MG Tab Take 1 tablet (300 mg total) by mouth every evening.    telmisartan (MICARDIS) 20 MG Tab TAKE 1 TABLET BY MOUTH EVERY DAY    vitamin D 1000 units Tab Take 1,000 Units by mouth once daily.     No current facility-administered medications for this visit.     Current Outpatient Medications on File Prior to Visit   Medication Sig    aluminum-magnesium hydroxide-simethicone 200-200-20 mg/5 mL Susp, diphenhydrAMINE 12.5 mg/5 mL Liqd Swish and spit 5 mLs every 4 (four) hours as needed (discomfort).    anastrozole (ARIMIDEX) 1 mg Tab Take 1 tablet (1 mg total) by mouth once daily.    aspirin 81 MG Chew Take 1 tablet (81 mg total) by mouth once daily.    busPIRone (BUSPAR) 15 MG tablet TAKE 1 TABLET BY MOUTH TWICE A DAY    calcium citrate (CALCITRATE) 200 mg (950 mg) tablet Take 1 tablet by mouth once daily.    cephALEXin (KEFLEX) 250 MG capsule Take 1 capsule (250 mg total) by mouth every evening.    cyanocobalamin 2000 MCG tablet Take 2,000 mcg by mouth 2 (two) times a day.    donepeziL (ARICEPT) 5 MG tablet TAKE 1 TABLET BY MOUTH EVERY DAY IN THE EVENING    estradioL (ESTRACE) 0.01 % (0.1 mg/gram) vaginal cream Place 4 g vaginally once daily.    ferrous sulfate (FEOSOL) 325 mg (65 mg iron) Tab tablet Take 65 mg by mouth once daily.    flu vac 2023  [id number],PF, (FLUAD QUAD 2023-24,65Y UP,,PF,) 60 mcg (15 mcg x 4)/0.5 mL Syrg Inject 0.5 mLs into the muscle.    furosemide (LASIX) 20 MG tablet Take 1 tablet (20 mg total) by mouth daily as needed (edema).    isosorbide mononitrate (IMDUR) 30 MG 24 hr tablet TAKE 1 TABLET BY MOUTH EVERY DAY    lamoTRIgine (LAMICTAL) 25 MG tablet Take 2 tablets (50 mg total) by mouth 2 (two) times daily.    memantine (NAMENDA) 5 MG Tab Take 1 tablet (5 mg total) by mouth 2 (two) times daily.    metoprolol succinate (TOPROL-XL) 25 MG 24 hr tablet Take 1 tablet (25 mg total) by mouth 2 (two) times daily.    QUEtiapine (SEROQUEL) 300 MG Tab Take 1 tablet (300 mg total) by mouth every evening.    telmisartan (MICARDIS) 20 MG Tab TAKE 1 TABLET BY MOUTH EVERY DAY    vitamin D 1000 units Tab Take 1,000 Units by mouth once daily.     No current facility-administered medications on file prior to visit.       Review of patient's allergies indicates:  No Known Allergies    Review of Systems   Constitutional: Negative for diaphoresis, malaise/fatigue and weight gain.   HENT:  Negative for hoarse voice.    Eyes:  Negative for double vision and visual disturbance.   Cardiovascular:  Negative for chest pain, claudication, cyanosis, dyspnea on exertion, irregular heartbeat, leg swelling, near-syncope, orthopnea, palpitations, paroxysmal nocturnal dyspnea and syncope.   Respiratory:  Negative for cough, hemoptysis, shortness of breath and snoring.    Hematologic/Lymphatic: Negative for bleeding problem. Does not bruise/bleed easily.   Skin:  Positive for color change, poor wound healing and suspicious lesions.   Musculoskeletal:  Negative for muscle cramps, muscle weakness and myalgias.   Gastrointestinal:  Negative for bloating, abdominal pain, change in bowel habit, diarrhea, heartburn, hematemesis, hematochezia, melena and nausea.   Neurological:  Negative for excessive daytime sleepiness, dizziness, headaches, light-headedness, loss of  balance, numbness and weakness.   Psychiatric/Behavioral:  Negative for memory loss. The patient does not have insomnia.    Allergic/Immunologic: Negative for hives.       Objective:   Physical Exam  Vitals and nursing note reviewed.   Constitutional:       General: She is not in acute distress.     Appearance: Normal appearance. She is well-developed. She is not ill-appearing.   HENT:      Head: Normocephalic and atraumatic.   Eyes:      General: No scleral icterus.     Pupils: Pupils are equal, round, and reactive to light.   Neck:      Thyroid: No thyromegaly.      Vascular: Normal carotid pulses. No carotid bruit, hepatojugular reflux or JVD.      Trachea: No tracheal deviation.   Cardiovascular:      Rate and Rhythm: Normal rate and regular rhythm.      Pulses:           Carotid pulses are 2+ on the right side and 2+ on the left side.       Radial pulses are 2+ on the right side and 2+ on the left side.        Femoral pulses are 1+ on the right side and 1+ on the left side.       Popliteal pulses are 1+ on the right side and 1+ on the left side.        Dorsalis pedis pulses are 0 on the right side and 0 on the left side.        Posterior tibial pulses are 0 on the right side and 0 on the left side.      Heart sounds: Normal heart sounds. No murmur heard.     No friction rub. No gallop.   Pulmonary:      Effort: Pulmonary effort is normal. No respiratory distress.      Breath sounds: Normal breath sounds. No wheezing, rhonchi or rales.   Chest:      Chest wall: No tenderness.   Abdominal:      General: Bowel sounds are normal. There is no abdominal bruit.      Palpations: Abdomen is soft. There is no hepatomegaly or pulsatile mass.      Tenderness: There is no abdominal tenderness.   Musculoskeletal:      Right shoulder: No deformity.      Cervical back: Normal range of motion and neck supple.   Skin:     General: Skin is warm and dry.      Findings: No erythema or rash.      Nails: There is no clubbing.  "  Neurological:      Mental Status: She is alert and oriented to person, place, and time.      Cranial Nerves: No cranial nerve deficit.      Coordination: Coordination normal.   Psychiatric:         Speech: Speech normal.         Behavior: Behavior normal.       Vitals:    10/02/24 1311 10/02/24 1312   BP: (!) 147/78 132/71   BP Location: Right arm Left arm   Patient Position: Sitting Sitting   Pulse: 79 79   SpO2: 96% 96%   Weight: 54.9 kg (121 lb 0.5 oz) 54.9 kg (121 lb 0.5 oz)   Height: 5' 3" (1.6 m) 5' 3" (1.6 m)     Lab Results   Component Value Date    CHOL 192 07/14/2023    CHOL 119 (L) 03/31/2021    CHOL 207 (H) 09/03/2019     Body mass index is 21.44 kg/m².   No results found for: "LABA1C", "HGBA1C"   BMP  Lab Results   Component Value Date     09/04/2024     09/04/2024    K 4.4 09/04/2024    K 4.4 09/04/2024     09/04/2024     09/04/2024    CO2 26 09/04/2024    CO2 26 09/04/2024    BUN 18 09/04/2024    BUN 18 09/04/2024    CREATININE 1.2 09/04/2024    CREATININE 1.2 09/04/2024    CALCIUM 9.5 09/04/2024    CALCIUM 9.5 09/04/2024    ANIONGAP 10 09/04/2024    ANIONGAP 10 09/04/2024    EGFRNORACEVR 48 (A) 09/04/2024    EGFRNORACEVR 48 (A) 09/04/2024      Lab Results   Component Value Date    HDL 35 (L) 07/14/2023    HDL 46 03/31/2021    HDL 53 09/03/2019     Lab Results   Component Value Date    LDLCALC 128.0 07/14/2023    LDLCALC 61.2 (L) 03/31/2021    LDLCALC 134.2 09/03/2019     Lab Results   Component Value Date    TRIG 145 07/14/2023    TRIG 59 03/31/2021    TRIG 99 09/03/2019     Lab Results   Component Value Date    CHOLHDL 18.2 (L) 07/14/2023    CHOLHDL 38.7 03/31/2021    CHOLHDL 25.6 09/03/2019       Chemistry        Component Value Date/Time     09/04/2024 1334     09/04/2024 1334    K 4.4 09/04/2024 1334    K 4.4 09/04/2024 1334     09/04/2024 1334     09/04/2024 1334    CO2 26 09/04/2024 1334    CO2 26 09/04/2024 1334    BUN 18 09/04/2024 1334    BUN " 18 09/04/2024 1334    CREATININE 1.2 09/04/2024 1334    CREATININE 1.2 09/04/2024 1334    GLU 99 09/04/2024 1334    GLU 99 09/04/2024 1334        Component Value Date/Time    CALCIUM 9.5 09/04/2024 1334    CALCIUM 9.5 09/04/2024 1334    ALKPHOS 72 09/04/2024 1334    AST 15 09/04/2024 1334    ALT 15 09/04/2024 1334    BILITOT 0.4 09/04/2024 1334    ESTGFRAFRICA 48.0 (A) 06/20/2022 1020    EGFRNONAA 41.7 (A) 06/20/2022 1020          Lab Results   Component Value Date    TSH 0.765 01/27/2022     Lab Results   Component Value Date    INR 1.1 10/01/2021     Lab Results   Component Value Date    WBC 7.29 09/04/2024    HGB 11.4 (L) 09/04/2024    HCT 36.6 (L) 09/04/2024    MCV 94 09/04/2024     09/04/2024     BNP  @LABRCNTIP(BNP,BNPTRIAGEBLO)@  CrCl cannot be calculated (Patient's most recent lab result is older than the maximum 7 days allowed.).  Interpretation Summary  Show Result Comparison     Bilateral roxanne suggest moderate disease.    The waveforms in the cfa pfa and prox sfa bilaterally suggestive inflow disease.    The sfa and distal  have monophasic waveforms suggestive of severe sfa disease.  Findings    ROXANNE The right resting ROXANNE reduction is moderately decreased.   The right ankle [DT] artery has biphasic flow.   The right ankle [DP] artery has biphasic flow.   The left resting ROXANNE reduction is moderately decreased.   The left ankle [PT] artery has biphasic flow.  The left ankle [DP] artery has biphasic flow.         Medication Changes      None  Medication List  US Measurements - ROXANNE    Right   Right arm  mmHg         Right posterior tibial 131 mmHg         Right dorsalis pedis 124 mmHg         Right ROXANNE 0.66         Right toe pressure 98 mmHg         Right TBI 0.49          Left   Left arm  mmHg         Left posterior tibial 140 mmHg         Left dorsalis pedis 139 mmHg         Left ROXANNE 0.71         Left toe pressure 176 mmHg         Left TBI 0.89           Narrative &  Impression  EXAMINATION:  XR FOOT COMPLETE 3 VIEW LEFT     CLINICAL HISTORY:  Non-pressure chronic ulcer of other part of left foot with fat layer exposed     TECHNIQUE:  XR FOOT COMPLETE 3 VIEW LEFT     COMPARISON:  None.     FINDINGS:  Overlying bandaging material is noted.     Skin defect along the lateral aspect of the great toe concerning for ulceration. There is underlying erosion of the tuft of the distal phalanx of the 1st digit concerning for osteomyelitis.     No evidence of acute fracture.  Worsening osseous demineralization.  Moderate midfoot hypertrophy.  Mild sclerosis of the MTP joint.     Impression:     Findings concerning for osteomyelitis involving the distal tuft of the distal phalanx of the great toe.        Electronically signed by:Michael Montesinos  Date:                                            09/05/2024  Time:                                           18:36      Assessment:     1. Ulcer of great toe, left, with fat layer exposed    2. Arterial insufficiency of lower extremity    3. Essential hypertension    4. Coronary artery disease of native artery of native heart with stable angina pectoris    5. Chronic combined systolic and diastolic CHF (congestive heart failure)    6. History of tobacco use    7. Lipoma of abdominal wall    8. Malignant neoplasm of lower-inner quadrant of left breast in female, estrogen receptor positive    9. Iron deficiency anemia secondary to inadequate dietary iron intake    10. Atherosclerosis of aorta    11. Pulmonary HTN    12. Anemia due to stage 3b chronic kidney disease    13. Major neurocognitive disorder due to Alzheimer's disease    14. PVD (peripheral vascular disease)    15. Skin ulcer of toe of left foot with fat layer exposed      PATIENT HAS ISCHEMIC LEFT TOE WITH BLACK DISCOLORATION SUGGESTION OF OSTEOMYELITIS MULTILEVEL DISEASE RECURRENT TOE INFECTION WITH EVIDENCE OF SFA AND INFRAPOPLITEAL DISEASE SHE IS ON ANTIPLATELETS WILL ADD STATINS THAT WAS  DISCONTINUED FOR NO OBVIOUS REASON. WILL RESUME. HOWEVER DESPITE BEING FRAIL AND HER MULTIPLE COMORBIDITIES SHE DESERVES ANGIOGRAPHY TOP TRY TO REVACSULARIZE TH LEG AND HELP HEAL THIS AVOIDANCE OF RECURRENT INFECTION AND REST ISCHEMIA.   CADA SYMPTOMATIC CONTINUE ASA   HTN CONTROLLED CONTINUE LOW SALT DIET  HLP RESTART STATINS  CKD WILL CONTINUE WITH CONTRAST NEPHROPATHY PRECAUTIONS.  CHF WELL COMPENSATED  CONTINEU LOW SALT DIET.  Plan:   AOGRAM WITH RUN OFF/PTA  NEEDS ANESTHESIA  I have explained the risks, benefits , and alternatives of the procedure in detail.the patient voices understanding and all questions have been answered.the patient agrees to proceed as planned.   DISCUSSED RF MODIFICATION  DISCUSSED THE CASE WITH HER DAUGHTER WHO WAS PRESENT DURING THE VISIT.  DISCUSSED WITH DR HUTCHINS.

## 2024-10-07 ENCOUNTER — ANESTHESIA EVENT (OUTPATIENT)
Dept: CARDIOLOGY | Facility: HOSPITAL | Age: 74
End: 2024-10-07
Payer: MEDICARE

## 2024-10-07 ENCOUNTER — ANESTHESIA (OUTPATIENT)
Dept: CARDIOLOGY | Facility: HOSPITAL | Age: 74
End: 2024-10-07
Payer: MEDICARE

## 2024-10-07 ENCOUNTER — HOSPITAL ENCOUNTER (OUTPATIENT)
Facility: HOSPITAL | Age: 74
Discharge: HOME | End: 2024-10-08
Attending: INTERNAL MEDICINE | Admitting: INTERNAL MEDICINE
Payer: MEDICARE

## 2024-10-07 DIAGNOSIS — I70.222 CRITICAL LIMB ISCHEMIA OF LEFT LOWER EXTREMITY: ICD-10-CM

## 2024-10-07 DIAGNOSIS — I73.9 ARTERIAL INSUFFICIENCY OF LOWER EXTREMITY: Primary | ICD-10-CM

## 2024-10-07 DIAGNOSIS — L97.522 ISCHEMIC ULCER OF TOE OF LEFT FOOT WITH FAT LAYER EXPOSED: ICD-10-CM

## 2024-10-07 DIAGNOSIS — I50.42 CHRONIC COMBINED SYSTOLIC AND DIASTOLIC CHF (CONGESTIVE HEART FAILURE): ICD-10-CM

## 2024-10-07 DIAGNOSIS — L97.522 ULCER OF GREAT TOE, LEFT, WITH FAT LAYER EXPOSED: ICD-10-CM

## 2024-10-07 DIAGNOSIS — I25.10 CAD, MULTIPLE VESSEL: ICD-10-CM

## 2024-10-07 LAB
ANION GAP SERPL CALC-SCNC: 9 MMOL/L (ref 8–16)
BASOPHILS # BLD AUTO: 0.04 K/UL (ref 0–0.2)
BASOPHILS NFR BLD: 0.7 % (ref 0–1.9)
BUN SERPL-MCNC: 16 MG/DL (ref 8–23)
CALCIUM SERPL-MCNC: 8.7 MG/DL (ref 8.7–10.5)
CHLORIDE SERPL-SCNC: 106 MMOL/L (ref 95–110)
CO2 SERPL-SCNC: 25 MMOL/L (ref 23–29)
CREAT SERPL-MCNC: 1.1 MG/DL (ref 0.5–1.4)
DIFFERENTIAL METHOD BLD: NORMAL
EOSINOPHIL # BLD AUTO: 0.5 K/UL (ref 0–0.5)
EOSINOPHIL NFR BLD: 7.7 % (ref 0–8)
ERYTHROCYTE [DISTWIDTH] IN BLOOD BY AUTOMATED COUNT: 13.4 % (ref 11.5–14.5)
EST. GFR  (NO RACE VARIABLE): 53 ML/MIN/1.73 M^2
GLUCOSE SERPL-MCNC: 85 MG/DL (ref 70–110)
HCT VFR BLD AUTO: 39.1 % (ref 37–48.5)
HGB BLD-MCNC: 12.5 G/DL (ref 12–16)
IMM GRANULOCYTES # BLD AUTO: 0.02 K/UL (ref 0–0.04)
IMM GRANULOCYTES NFR BLD AUTO: 0.3 % (ref 0–0.5)
INR PPP: 1 (ref 0.8–1.2)
LYMPHOCYTES # BLD AUTO: 1.8 K/UL (ref 1–4.8)
LYMPHOCYTES NFR BLD: 29.7 % (ref 18–48)
MCH RBC QN AUTO: 29.8 PG (ref 27–31)
MCHC RBC AUTO-ENTMCNC: 32 G/DL (ref 32–36)
MCV RBC AUTO: 93 FL (ref 82–98)
MONOCYTES # BLD AUTO: 0.4 K/UL (ref 0.3–1)
MONOCYTES NFR BLD: 6.8 % (ref 4–15)
NEUTROPHILS # BLD AUTO: 3.3 K/UL (ref 1.8–7.7)
NEUTROPHILS NFR BLD: 54.8 % (ref 38–73)
NRBC BLD-RTO: 0 /100 WBC
PLATELET # BLD AUTO: 242 K/UL (ref 150–450)
PMV BLD AUTO: 10.1 FL (ref 9.2–12.9)
POC ACTIVATED CLOTTING TIME K: 128 SEC (ref 74–137)
POC ACTIVATED CLOTTING TIME K: 195 SEC (ref 74–137)
POC ACTIVATED CLOTTING TIME K: 201 SEC (ref 74–137)
POC ACTIVATED CLOTTING TIME K: 214 SEC (ref 74–137)
POC ACTIVATED CLOTTING TIME K: 226 SEC (ref 74–137)
POC ACTIVATED CLOTTING TIME K: 232 SEC (ref 74–137)
POC ACTIVATED CLOTTING TIME K: 238 SEC (ref 74–137)
POTASSIUM SERPL-SCNC: 3.7 MMOL/L (ref 3.5–5.1)
PROTHROMBIN TIME: 11.9 SEC (ref 9–12.5)
RBC # BLD AUTO: 4.19 M/UL (ref 4–5.4)
SAMPLE: ABNORMAL
SAMPLE: NORMAL
SODIUM SERPL-SCNC: 140 MMOL/L (ref 136–145)
WBC # BLD AUTO: 5.99 K/UL (ref 3.9–12.7)

## 2024-10-07 PROCEDURE — 85347 COAGULATION TIME ACTIVATED: CPT | Performed by: INTERNAL MEDICINE

## 2024-10-07 PROCEDURE — C1730 CATH, EP, 19 OR FEW ELECT: HCPCS | Performed by: INTERNAL MEDICINE

## 2024-10-07 PROCEDURE — 37000009 HC ANESTHESIA EA ADD 15 MINS: Performed by: INTERNAL MEDICINE

## 2024-10-07 PROCEDURE — 85025 COMPLETE CBC W/AUTO DIFF WBC: CPT | Performed by: INTERNAL MEDICINE

## 2024-10-07 PROCEDURE — 80048 BASIC METABOLIC PNL TOTAL CA: CPT | Performed by: INTERNAL MEDICINE

## 2024-10-07 PROCEDURE — 63600175 PHARM REV CODE 636 W HCPCS: Performed by: NURSE ANESTHETIST, CERTIFIED REGISTERED

## 2024-10-07 PROCEDURE — 25500020 PHARM REV CODE 255: Performed by: INTERNAL MEDICINE

## 2024-10-07 PROCEDURE — 25000003 PHARM REV CODE 250: Performed by: INTERNAL MEDICINE

## 2024-10-07 PROCEDURE — 85610 PROTHROMBIN TIME: CPT | Performed by: INTERNAL MEDICINE

## 2024-10-07 PROCEDURE — C2623 CATH, TRANSLUMIN, DRUG-COAT: HCPCS | Performed by: INTERNAL MEDICINE

## 2024-10-07 PROCEDURE — C1894 INTRO/SHEATH, NON-LASER: HCPCS | Performed by: INTERNAL MEDICINE

## 2024-10-07 PROCEDURE — C1725 CATH, TRANSLUMIN NON-LASER: HCPCS | Performed by: INTERNAL MEDICINE

## 2024-10-07 PROCEDURE — 63600175 PHARM REV CODE 636 W HCPCS: Performed by: INTERNAL MEDICINE

## 2024-10-07 PROCEDURE — 75625 CONTRAST EXAM ABDOMINL AORTA: CPT | Performed by: INTERNAL MEDICINE

## 2024-10-07 PROCEDURE — 27201423 OPTIME MED/SURG SUP & DEVICES STERILE SUPPLY: Performed by: INTERNAL MEDICINE

## 2024-10-07 PROCEDURE — 37000008 HC ANESTHESIA 1ST 15 MINUTES: Performed by: INTERNAL MEDICINE

## 2024-10-07 PROCEDURE — C1887 CATHETER, GUIDING: HCPCS | Performed by: INTERNAL MEDICINE

## 2024-10-07 PROCEDURE — C1769 GUIDE WIRE: HCPCS | Performed by: INTERNAL MEDICINE

## 2024-10-07 PROCEDURE — 75716 ARTERY X-RAYS ARMS/LEGS: CPT | Mod: XU | Performed by: INTERNAL MEDICINE

## 2024-10-07 PROCEDURE — 25000003 PHARM REV CODE 250: Performed by: NURSE ANESTHETIST, CERTIFIED REGISTERED

## 2024-10-07 PROCEDURE — C1876 STENT, NON-COA/NON-COV W/DEL: HCPCS | Performed by: INTERNAL MEDICINE

## 2024-10-07 PROCEDURE — C9765 REVASC INTRA LITHOTRIP-STENT: HCPCS | Mod: LT | Performed by: INTERNAL MEDICINE

## 2024-10-07 PROCEDURE — 75625 CONTRAST EXAM ABDOMINL AORTA: CPT | Mod: 26,,, | Performed by: INTERNAL MEDICINE

## 2024-10-07 PROCEDURE — 37226 PR FEM/POPL REVASC W/STENT: CPT | Mod: LT,,, | Performed by: INTERNAL MEDICINE

## 2024-10-07 PROCEDURE — 75716 ARTERY X-RAYS ARMS/LEGS: CPT | Mod: 26,XU,, | Performed by: INTERNAL MEDICINE

## 2024-10-07 DEVICE — LIFESTENT® 5F VASCULAR STENT SYSTEM, 6 MM X 60 MM (135 CM DELIVERY CATHETER)
Type: IMPLANTABLE DEVICE | Site: LEG | Status: FUNCTIONAL
Brand: LIFESTENT®

## 2024-10-07 DEVICE — LIFESTENT® 5F VASCULAR STENT SYSTEM, 6 MM X 100 MM (135 CM DELIVERY CATHETER)
Type: IMPLANTABLE DEVICE | Site: LEG | Status: FUNCTIONAL
Brand: LIFESTENT®

## 2024-10-07 DEVICE — LIFESTENT® 5F VASCULAR STENT SYSTEM, 6 MM X 150 MM (135 CM DELIVERY CATHETER)
Type: IMPLANTABLE DEVICE | Site: LEG | Status: FUNCTIONAL
Brand: LIFESTENT®

## 2024-10-07 RX ORDER — MEMANTINE HYDROCHLORIDE 5 MG/1
5 TABLET ORAL 2 TIMES DAILY
Status: DISCONTINUED | OUTPATIENT
Start: 2024-10-07 | End: 2024-10-08 | Stop reason: HOSPADM

## 2024-10-07 RX ORDER — PROPOFOL 10 MG/ML
VIAL (ML) INTRAVENOUS
Status: DISCONTINUED | OUTPATIENT
Start: 2024-10-07 | End: 2024-10-07

## 2024-10-07 RX ORDER — CLOPIDOGREL BISULFATE 300 MG/1
300 TABLET, FILM COATED ORAL ONCE
Status: COMPLETED | OUTPATIENT
Start: 2024-10-07 | End: 2024-10-07

## 2024-10-07 RX ORDER — SODIUM CHLORIDE 9 MG/ML
INJECTION, SOLUTION INTRAVENOUS CONTINUOUS
Status: DISCONTINUED | OUTPATIENT
Start: 2024-10-07 | End: 2024-10-08

## 2024-10-07 RX ORDER — HEPARIN SODIUM 1000 [USP'U]/ML
INJECTION, SOLUTION INTRAVENOUS; SUBCUTANEOUS
Status: DISCONTINUED | OUTPATIENT
Start: 2024-10-07 | End: 2024-10-07

## 2024-10-07 RX ORDER — SODIUM CHLORIDE 0.9 % (FLUSH) 0.9 %
10 SYRINGE (ML) INJECTION
Status: DISCONTINUED | OUTPATIENT
Start: 2024-10-07 | End: 2024-10-08 | Stop reason: HOSPADM

## 2024-10-07 RX ORDER — NAPROXEN SODIUM 220 MG/1
81 TABLET, FILM COATED ORAL DAILY
Status: DISCONTINUED | OUTPATIENT
Start: 2024-10-08 | End: 2024-10-08 | Stop reason: HOSPADM

## 2024-10-07 RX ORDER — CLOPIDOGREL BISULFATE 75 MG/1
75 TABLET ORAL DAILY
Status: DISCONTINUED | OUTPATIENT
Start: 2024-10-08 | End: 2024-10-08 | Stop reason: HOSPADM

## 2024-10-07 RX ORDER — PHENYLEPHRINE HYDROCHLORIDE 10 MG/ML
INJECTION INTRAVENOUS
Status: DISCONTINUED | OUTPATIENT
Start: 2024-10-07 | End: 2024-10-07

## 2024-10-07 RX ORDER — NAPROXEN SODIUM 220 MG/1
81 TABLET, FILM COATED ORAL ONCE
Status: COMPLETED | OUTPATIENT
Start: 2024-10-07 | End: 2024-10-07

## 2024-10-07 RX ORDER — METOPROLOL SUCCINATE 25 MG/1
25 TABLET, EXTENDED RELEASE ORAL 2 TIMES DAILY
Status: DISCONTINUED | OUTPATIENT
Start: 2024-10-07 | End: 2024-10-08 | Stop reason: HOSPADM

## 2024-10-07 RX ORDER — ISOSORBIDE MONONITRATE 30 MG/1
30 TABLET, EXTENDED RELEASE ORAL DAILY
Status: DISCONTINUED | OUTPATIENT
Start: 2024-10-08 | End: 2024-10-08 | Stop reason: HOSPADM

## 2024-10-07 RX ORDER — LOSARTAN POTASSIUM 50 MG/1
50 TABLET ORAL DAILY
Status: DISCONTINUED | OUTPATIENT
Start: 2024-10-08 | End: 2024-10-08 | Stop reason: HOSPADM

## 2024-10-07 RX ORDER — PRAVASTATIN SODIUM 20 MG/1
40 TABLET ORAL DAILY
Status: DISCONTINUED | OUTPATIENT
Start: 2024-10-08 | End: 2024-10-08 | Stop reason: HOSPADM

## 2024-10-07 RX ORDER — ACETAMINOPHEN 325 MG/1
650 TABLET ORAL EVERY 4 HOURS PRN
Status: DISCONTINUED | OUTPATIENT
Start: 2024-10-07 | End: 2024-10-08 | Stop reason: HOSPADM

## 2024-10-07 RX ORDER — LAMOTRIGINE 25 MG/1
50 TABLET ORAL 2 TIMES DAILY
Status: DISCONTINUED | OUTPATIENT
Start: 2024-10-07 | End: 2024-10-08 | Stop reason: HOSPADM

## 2024-10-07 RX ORDER — FUROSEMIDE 20 MG/1
20 TABLET ORAL DAILY PRN
Status: DISCONTINUED | OUTPATIENT
Start: 2024-10-07 | End: 2024-10-08 | Stop reason: HOSPADM

## 2024-10-07 RX ORDER — HYDRALAZINE HYDROCHLORIDE 20 MG/ML
10 INJECTION INTRAMUSCULAR; INTRAVENOUS EVERY 6 HOURS PRN
Status: DISCONTINUED | OUTPATIENT
Start: 2024-10-07 | End: 2024-10-08 | Stop reason: HOSPADM

## 2024-10-07 RX ORDER — ANASTROZOLE 1 MG/1
1 TABLET ORAL DAILY
Status: DISCONTINUED | OUTPATIENT
Start: 2024-10-08 | End: 2024-10-08 | Stop reason: HOSPADM

## 2024-10-07 RX ORDER — DONEPEZIL HYDROCHLORIDE 5 MG/1
5 TABLET, FILM COATED ORAL NIGHTLY
Status: DISCONTINUED | OUTPATIENT
Start: 2024-10-07 | End: 2024-10-08 | Stop reason: HOSPADM

## 2024-10-07 RX ORDER — ONDANSETRON 8 MG/1
8 TABLET, ORALLY DISINTEGRATING ORAL EVERY 8 HOURS PRN
Status: DISCONTINUED | OUTPATIENT
Start: 2024-10-07 | End: 2024-10-08 | Stop reason: HOSPADM

## 2024-10-07 RX ORDER — NITROGLYCERIN 5 MG/ML
INJECTION, SOLUTION INTRAVENOUS
Status: DISCONTINUED | OUTPATIENT
Start: 2024-10-07 | End: 2024-10-07 | Stop reason: HOSPADM

## 2024-10-07 RX ORDER — ONDANSETRON HYDROCHLORIDE 2 MG/ML
INJECTION, SOLUTION INTRAVENOUS
Status: DISCONTINUED | OUTPATIENT
Start: 2024-10-07 | End: 2024-10-07

## 2024-10-07 RX ORDER — LANOLIN ALCOHOL/MO/W.PET/CERES
1 CREAM (GRAM) TOPICAL DAILY
Status: DISCONTINUED | OUTPATIENT
Start: 2024-10-08 | End: 2024-10-08 | Stop reason: HOSPADM

## 2024-10-07 RX ORDER — PROTAMINE SULFATE 10 MG/ML
10 INJECTION, SOLUTION INTRAVENOUS ONCE
Status: COMPLETED | OUTPATIENT
Start: 2024-10-07 | End: 2024-10-07

## 2024-10-07 RX ORDER — QUETIAPINE FUMARATE 100 MG/1
300 TABLET, FILM COATED ORAL NIGHTLY
Status: DISCONTINUED | OUTPATIENT
Start: 2024-10-07 | End: 2024-10-08 | Stop reason: HOSPADM

## 2024-10-07 RX ORDER — SODIUM CHLORIDE 9 MG/ML
INJECTION, SOLUTION INTRAVENOUS CONTINUOUS
Status: DISCONTINUED | OUTPATIENT
Start: 2024-10-07 | End: 2024-10-07

## 2024-10-07 RX ORDER — LIDOCAINE HYDROCHLORIDE 20 MG/ML
INJECTION INTRAVENOUS
Status: DISCONTINUED | OUTPATIENT
Start: 2024-10-07 | End: 2024-10-07

## 2024-10-07 RX ORDER — LIDOCAINE HYDROCHLORIDE 20 MG/ML
INJECTION, SOLUTION EPIDURAL; INFILTRATION; INTRACAUDAL; PERINEURAL
Status: DISCONTINUED | OUTPATIENT
Start: 2024-10-07 | End: 2024-10-07 | Stop reason: HOSPADM

## 2024-10-07 RX ORDER — IODIXANOL 320 MG/ML
INJECTION, SOLUTION INTRAVASCULAR
Status: DISCONTINUED | OUTPATIENT
Start: 2024-10-07 | End: 2024-10-07 | Stop reason: HOSPADM

## 2024-10-07 RX ORDER — CEPHALEXIN 500 MG/1
500 CAPSULE ORAL NIGHTLY
Status: DISCONTINUED | OUTPATIENT
Start: 2024-10-07 | End: 2024-10-08 | Stop reason: HOSPADM

## 2024-10-07 RX ORDER — DIPHENHYDRAMINE HCL 50 MG
50 CAPSULE ORAL ONCE
Status: COMPLETED | OUTPATIENT
Start: 2024-10-07 | End: 2024-10-07

## 2024-10-07 RX ADMIN — HEPARIN SODIUM 1000 UNITS: 1000 INJECTION, SOLUTION INTRAVENOUS; SUBCUTANEOUS at 01:10

## 2024-10-07 RX ADMIN — SODIUM CHLORIDE: 9 INJECTION, SOLUTION INTRAVENOUS at 03:10

## 2024-10-07 RX ADMIN — DIPHENHYDRAMINE HYDROCHLORIDE 50 MG: 50 CAPSULE ORAL at 11:10

## 2024-10-07 RX ADMIN — PHENYLEPHRINE HYDROCHLORIDE 200 MCG: 10 INJECTION INTRAVENOUS at 12:10

## 2024-10-07 RX ADMIN — LIDOCAINE HYDROCHLORIDE 100 MG: 20 INJECTION INTRAVENOUS at 12:10

## 2024-10-07 RX ADMIN — PHENYLEPHRINE HYDROCHLORIDE 300 MCG: 10 INJECTION INTRAVENOUS at 01:10

## 2024-10-07 RX ADMIN — HEPARIN SODIUM 2000 UNITS: 1000 INJECTION, SOLUTION INTRAVENOUS; SUBCUTANEOUS at 01:10

## 2024-10-07 RX ADMIN — SODIUM CHLORIDE: 0.9 INJECTION, SOLUTION INTRAVENOUS at 11:10

## 2024-10-07 RX ADMIN — ONDANSETRON 4 MG: 2 INJECTION INTRAMUSCULAR; INTRAVENOUS at 12:10

## 2024-10-07 RX ADMIN — BUSPIRONE HYDROCHLORIDE 15 MG: 10 TABLET ORAL at 08:10

## 2024-10-07 RX ADMIN — PROTAMINE SULFATE 10 MG: 10 INJECTION, SOLUTION INTRAVENOUS at 03:10

## 2024-10-07 RX ADMIN — MEMANTINE HYDROCHLORIDE 5 MG: 5 TABLET ORAL at 08:10

## 2024-10-07 RX ADMIN — QUETIAPINE FUMARATE 300 MG: 100 TABLET ORAL at 08:10

## 2024-10-07 RX ADMIN — PROPOFOL 120 MG: 10 INJECTION, EMULSION INTRAVENOUS at 12:10

## 2024-10-07 RX ADMIN — CLOPIDOGREL BISULFATE 300 MG: 300 TABLET, FILM COATED ORAL at 03:10

## 2024-10-07 RX ADMIN — LAMOTRIGINE 50 MG: 25 TABLET ORAL at 09:10

## 2024-10-07 RX ADMIN — HYDRALAZINE HYDROCHLORIDE 10 MG: 20 INJECTION, SOLUTION INTRAMUSCULAR; INTRAVENOUS at 04:10

## 2024-10-07 RX ADMIN — HEPARIN SODIUM 3000 UNITS: 1000 INJECTION, SOLUTION INTRAVENOUS; SUBCUTANEOUS at 12:10

## 2024-10-07 RX ADMIN — CEPHALEXIN 500 MG: 500 CAPSULE ORAL at 08:10

## 2024-10-07 RX ADMIN — SODIUM CHLORIDE: 0.9 INJECTION, SOLUTION INTRAVENOUS at 12:10

## 2024-10-07 RX ADMIN — METOPROLOL SUCCINATE 25 MG: 25 TABLET, EXTENDED RELEASE ORAL at 08:10

## 2024-10-07 RX ADMIN — ASPIRIN 81 MG CHEWABLE TABLET 81 MG: 81 TABLET CHEWABLE at 11:10

## 2024-10-07 RX ADMIN — PHENYLEPHRINE HYDROCHLORIDE 200 MCG: 10 INJECTION INTRAVENOUS at 01:10

## 2024-10-07 RX ADMIN — DONEPEZIL HYDROCHLORIDE 5 MG: 5 TABLET, FILM COATED ORAL at 08:10

## 2024-10-07 NOTE — Clinical Note
An angiography of the  abdominal aorta was performed with the catheter and power injected with 10 mL contrast at at 5 mL/s.

## 2024-10-07 NOTE — INTERVAL H&P NOTE
The patient has been examined and the H&P has been reviewed:    I concur with the findings and no changes have occurred since H&P was written.    Procedure risks, benefits and alternative options discussed and understood by patient/family.          Active Hospital Problems    Diagnosis  POA    *Ulcer of great toe, left, with fat layer exposed [L97.522]  Yes    Ischemic ulcer of toe with fat layer exposed [L97.502]  Yes    Acute on chronic combined systolic and diastolic congestive heart failure [I50.43]  Yes    Arterial insufficiency of lower extremity [I73.9]  Yes    Anemia due to stage 3b chronic kidney disease [N18.32, D63.1]  Yes    Pulmonary HTN [I27.20]  Yes     Echo  9/ 2021 ....The estimated PA systolic pressure is 42 mmHg.        Atherosclerosis of aorta [I70.0]  Yes      CT 2019 Aorta and vasculature: Atherosclerosis including coronary arteries        Coronary artery disease of native artery of native heart with stable angina pectoris [I25.118]  Yes     Chronic      Resolved Hospital Problems   No resolved problems to display.

## 2024-10-07 NOTE — TRANSFER OF CARE
"Anesthesia Transfer of Care Note    Patient: Leia Rodriguez    Procedure(s) Performed: Procedure(s) (LRB):  AORTOGRAM, WITH SERIALOGRAPHY (N/A)  LITHOTRIPSY, CORONARY TRANSLUMINAL, PERCUTANEOUS  PTA, Superficial Femoral Artery  Stent, Superficial Femoral Artery    Patient location: PACU    Anesthesia Type: general    Transport from OR: Transported from OR on room air with adequate spontaneous ventilation    Post pain: adequate analgesia    Post assessment: no apparent anesthetic complications and tolerated procedure well    Post vital signs: stable    Level of consciousness: awake    Nausea/Vomiting: no nausea/vomiting    Complications: none    Transfer of care protocol was followed    Last vitals: Visit Vitals  BP (!) 158/87 (BP Location: Left arm, Patient Position: Lying)   Pulse 87   Temp 36.7 °C (98.1 °F) (Temporal)   Resp 16   Ht 5' 3" (1.6 m)   Wt 54.4 kg (120 lb)   SpO2 100%   Breastfeeding No   BMI 21.26 kg/m²     "

## 2024-10-07 NOTE — Clinical Note
An angiography of the  left lower extremity was performed with the catheter and power injected with 10 mL contrast at at 5 mL/s.

## 2024-10-07 NOTE — ANESTHESIA PROCEDURE NOTES
Intubation    Date/Time: 10/7/2024 12:18 PM    Performed by: Will Pollack CRNA  Authorized by: Adolph Patel II, MD    Intubation:     Induction:  Intravenous    Intubated:  Postinduction    Mask Ventilation:  Not attempted    Attempts:  1    Attempted By:  CRNA    Difficult Airway Encountered?: No      Complications:  None    Airway Device:  Supraglottic airway/LMA (igel)    Airway Device Size:  3.0    Secured at:  The lips    Placement Verified By:  Capnometry    Complicating Factors:  None    Findings Post-Intubation:  BS equal bilateral and atraumatic/condition of teeth unchanged

## 2024-10-07 NOTE — ANESTHESIA PREPROCEDURE EVALUATION
10/07/2024  Leia Rodriguez is a 74 y.o., female.      Pre-op Assessment    I have reviewed the Patient Summary Reports.     I have reviewed the Nursing Notes. I have reviewed the NPO Status.   I have reviewed the Medications.     Review of Systems  Anesthesia Hx:  No problems with previous Anesthesia             Denies Family Hx of Anesthesia complications.    Denies Personal Hx of Anesthesia complications.                    Social:  Former Smoker, No Alcohol Use History of alcoholism      Hematology/Oncology:       -- Anemia:                              Oncology Comments: breast     EENT/Dental:   Blind right eye          Cardiovascular:     Hypertension   Denies MI. CAD    Denies CABG/stent.   Angina CHF       ECG has been reviewed. Acute on chronic combined systolic and diastolic congestive heart failure  Arterial insufficiency of lower extremity  Chronic combined systolic and diastolic CHF (congestive heart failure)    EKG 6/2024:  Sinus rhythm with occasional Premature ventricular complexes and Premature   atrial complexes 82  Moderate voltage criteria for LVH, may be normal variant ( R in aVL ,   Aj product )   T wave abnormality, consider inferolateral ischemia   Abnormal ECG   When compared with ECG of 09-JUL-2023 12:18,   Premature ventricular complexes are now Present     Echo 6/2023:  · The left ventricle is normal in size with mild mild asymmetric hypertrophy eccentric hypertrophy and low normal systolic function.  · The estimated ejection fraction is 50%.  · Normal left ventricular diastolic function.  · Normal right ventricular size with normal right ventricular systolic function.  · Mild to moderate tricuspid regurgitation.  · Intermediate central venous pressure (8 mmHg).  · The estimated PA systolic pressure is 43 mmHg.  · There is pulmonary hypertension.                              Pulmonary:   COPD  Denies Asthma.     Denies Sleep Apnea. Pulmonary HTN               Renal/:  Chronic Renal Disease, CKD   Urinary incontinence  Overactive bladder             Hepatic/GI:      Denies GERD. Denies Liver Disease.  Denies Hepatitis.           Musculoskeletal:  Arthritis   osteoporosis   Osteopenia            Neurological:    Denies CVA.    Denies Seizures.    Agitation due to dementia  Major neurocognitive disorder due to Alzheimer's disease     Dementia                         Endocrine:  Denies Diabetes. Denies Hypothyroidism.  Denies Hyperthyroidism.         Psych:    depression            Physical Exam  General: Well nourished    Airway:  Mallampati: II   Mouth Opening: Normal    Dental:  Edentulous    Chest/Lungs:  Normal Respiratory Rate, Clear to auscultation    Heart:  Rate: Normal  Rhythm: Regular Rhythm    Anesthesia Plan  Type of Anesthesia, risks & benefits discussed:    Anesthesia Type: Gen ETT, MAC, Gen Supraglottic Airway  Intra-op Monitoring Plan: Standard ASA Monitors  Post Op Pain Control Plan: multimodal analgesia  Induction:  IV  Airway Plan: Direct and Video, Post-Induction  Informed Consent: Informed consent signed with the Patient representative and all parties understand the risks and agree with anesthesia plan.  All questions answered.   ASA Score: 3  Day of Surgery Review of History & Physical: H&P Update referred to the surgeon/provider.    Ready For Surgery From Anesthesia Perspective.   .

## 2024-10-07 NOTE — NURSING
Report to Yadi CARBAJAL    S/P Aortagram  3 stents placed    NS at 75hr x 10 hrs.    Bedrest x 6 hrs    R groin dsg c,d,I.      History of dementia    Dtr at bedside and aware of room 223

## 2024-10-07 NOTE — ANESTHESIA POSTPROCEDURE EVALUATION
Anesthesia Post Evaluation    Patient: Leia Rodriguez    Procedure(s) Performed: Procedure(s) (LRB):  AORTOGRAM, WITH SERIALOGRAPHY (N/A)  LITHOTRIPSY, CORONARY TRANSLUMINAL, PERCUTANEOUS  PTA, Superficial Femoral Artery  Stent, Superficial Femoral Artery    Final Anesthesia Type: general      Patient location during evaluation: Cath Lab  Patient participation: Yes- Able to Participate  Level of consciousness: awake and alert  Post-procedure vital signs: reviewed and stable  Pain management: adequate  Airway patency: patent  ORION mitigation strategies: Extubation while patient is awake  PONV status at discharge: No PONV  Anesthetic complications: no      Cardiovascular status: hemodynamically stable  Respiratory status: spontaneous ventilation  Hydration status: euvolemic  Follow-up not needed.              Vitals Value Taken Time   /65 10/07/24 1637   Temp 36.1 °C (97 °F) 10/07/24 1505   Pulse 82 10/07/24 1639   Resp 43 10/07/24 1639   SpO2 100 % 10/07/24 1639   Vitals shown include unfiled device data.      Event Time   Out of Recovery 15:11:55         Pain/Naveen Score: Naveen Score: 7 (10/7/2024  3:15 PM)

## 2024-10-08 VITALS
SYSTOLIC BLOOD PRESSURE: 156 MMHG | HEART RATE: 87 BPM | DIASTOLIC BLOOD PRESSURE: 77 MMHG | BODY MASS INDEX: 21.26 KG/M2 | TEMPERATURE: 98 F | RESPIRATION RATE: 19 BRPM | WEIGHT: 120 LBS | OXYGEN SATURATION: 98 % | HEIGHT: 63 IN

## 2024-10-08 LAB
ANION GAP SERPL CALC-SCNC: 11 MMOL/L (ref 8–16)
BASOPHILS # BLD AUTO: 0.05 K/UL (ref 0–0.2)
BASOPHILS NFR BLD: 0.7 % (ref 0–1.9)
BUN SERPL-MCNC: 14 MG/DL (ref 8–23)
CALCIUM SERPL-MCNC: 8.2 MG/DL (ref 8.7–10.5)
CHLORIDE SERPL-SCNC: 110 MMOL/L (ref 95–110)
CO2 SERPL-SCNC: 17 MMOL/L (ref 23–29)
CREAT SERPL-MCNC: 0.9 MG/DL (ref 0.5–1.4)
DIFFERENTIAL METHOD BLD: ABNORMAL
EOSINOPHIL # BLD AUTO: 0.3 K/UL (ref 0–0.5)
EOSINOPHIL NFR BLD: 3.7 % (ref 0–8)
ERYTHROCYTE [DISTWIDTH] IN BLOOD BY AUTOMATED COUNT: 13.8 % (ref 11.5–14.5)
EST. GFR  (NO RACE VARIABLE): >60 ML/MIN/1.73 M^2
GLUCOSE SERPL-MCNC: 86 MG/DL (ref 70–110)
HCT VFR BLD AUTO: 41.4 % (ref 37–48.5)
HGB BLD-MCNC: 12.1 G/DL (ref 12–16)
IMM GRANULOCYTES # BLD AUTO: 0.03 K/UL (ref 0–0.04)
IMM GRANULOCYTES NFR BLD AUTO: 0.4 % (ref 0–0.5)
LYMPHOCYTES # BLD AUTO: 1.2 K/UL (ref 1–4.8)
LYMPHOCYTES NFR BLD: 16.2 % (ref 18–48)
MCH RBC QN AUTO: 30 PG (ref 27–31)
MCHC RBC AUTO-ENTMCNC: 29.2 G/DL (ref 32–36)
MCV RBC AUTO: 103 FL (ref 82–98)
MONOCYTES # BLD AUTO: 0.6 K/UL (ref 0.3–1)
MONOCYTES NFR BLD: 8.5 % (ref 4–15)
NEUTROPHILS # BLD AUTO: 5.3 K/UL (ref 1.8–7.7)
NEUTROPHILS NFR BLD: 70.5 % (ref 38–73)
NRBC BLD-RTO: 0 /100 WBC
PLATELET # BLD AUTO: 207 K/UL (ref 150–450)
PMV BLD AUTO: 10.3 FL (ref 9.2–12.9)
POCT GLUCOSE: 76 MG/DL (ref 70–110)
POTASSIUM SERPL-SCNC: 4.5 MMOL/L (ref 3.5–5.1)
RBC # BLD AUTO: 4.03 M/UL (ref 4–5.4)
SODIUM SERPL-SCNC: 138 MMOL/L (ref 136–145)
WBC # BLD AUTO: 7.55 K/UL (ref 3.9–12.7)

## 2024-10-08 PROCEDURE — 80048 BASIC METABOLIC PNL TOTAL CA: CPT | Performed by: INTERNAL MEDICINE

## 2024-10-08 PROCEDURE — 85025 COMPLETE CBC W/AUTO DIFF WBC: CPT | Performed by: INTERNAL MEDICINE

## 2024-10-08 PROCEDURE — 25000003 PHARM REV CODE 250: Performed by: INTERNAL MEDICINE

## 2024-10-08 PROCEDURE — 63600175 PHARM REV CODE 636 W HCPCS: Performed by: INTERNAL MEDICINE

## 2024-10-08 PROCEDURE — 36415 COLL VENOUS BLD VENIPUNCTURE: CPT | Performed by: INTERNAL MEDICINE

## 2024-10-08 RX ORDER — CLOPIDOGREL BISULFATE 75 MG/1
75 TABLET ORAL DAILY
Qty: 30 TABLET | Refills: 11 | Status: SHIPPED | OUTPATIENT
Start: 2024-10-09 | End: 2025-10-09

## 2024-10-08 RX ADMIN — CLOPIDOGREL BISULFATE 75 MG: 75 TABLET ORAL at 08:10

## 2024-10-08 RX ADMIN — FERROUS SULFATE TAB 325 MG (65 MG ELEMENTAL FE) 1 EACH: 325 (65 FE) TAB at 09:10

## 2024-10-08 RX ADMIN — ASPIRIN 81 MG CHEWABLE TABLET 81 MG: 81 TABLET CHEWABLE at 08:10

## 2024-10-08 RX ADMIN — BUSPIRONE HYDROCHLORIDE 15 MG: 10 TABLET ORAL at 08:10

## 2024-10-08 RX ADMIN — ANASTROZOLE 1 MG: 1 TABLET, COATED ORAL at 08:10

## 2024-10-08 RX ADMIN — LAMOTRIGINE 50 MG: 25 TABLET ORAL at 08:10

## 2024-10-08 RX ADMIN — PRAVASTATIN SODIUM 40 MG: 20 TABLET ORAL at 08:10

## 2024-10-08 RX ADMIN — METOPROLOL SUCCINATE 25 MG: 25 TABLET, EXTENDED RELEASE ORAL at 09:10

## 2024-10-08 RX ADMIN — ISOSORBIDE MONONITRATE 30 MG: 30 TABLET, EXTENDED RELEASE ORAL at 08:10

## 2024-10-08 RX ADMIN — MEMANTINE HYDROCHLORIDE 5 MG: 5 TABLET ORAL at 08:10

## 2024-10-08 RX ADMIN — HYDRALAZINE HYDROCHLORIDE 10 MG: 20 INJECTION, SOLUTION INTRAMUSCULAR; INTRAVENOUS at 08:10

## 2024-10-08 RX ADMIN — LOSARTAN POTASSIUM 50 MG: 50 TABLET, FILM COATED ORAL at 08:10

## 2024-10-08 NOTE — PLAN OF CARE
[facility] - Tele[facility] (Hospital)  Discharge Final Note    Primary Care Provider: Yasmin Navarro MD    Expected Discharge Date: 10/8/2024    Final Discharge Note (most recent)       Final Note - 10/08/24 1556          Final Note    Assessment Type Final Discharge Note     Anticipated Discharge Disposition Home-Health Care [facility]     Hospital Resources/Appts/Education Provided Appointments scheduled and added to AVS        Post-Acute Status    Post-Acute Authorization Home Health     Home Health Status Set-up Complete/Auth obtained     [facility]     Discharge Delays None known at this time                     Important Message from Medicare             DC Disposition: Resume [facility]  Family Notified: Patient at bedside  Transportation: personal transportation, Patient's daughter    Patient needed Home Health services resumed upon discharge. SW messaged [facility] that Patient was discharging today.     Patient has PCP hospital follow up with Estuardo Patel NP, on 10/16/24 at 11:40 am.

## 2024-10-08 NOTE — DISCHARGE SUMMARY
[facility] - Telemetry ([facility])  Cardiology  Discharge Summary      Patient Name: Leia Rodriguez  MRN: [id number]  Admission Date: 10/7/2024  Hospital Length of Stay: 0 days  Discharge Date and Time:  10/08/2024 3:50 PM  Attending Physician: Tiffanie Santos MD    Discharging Provider: Arielle Mart NP  Primary Care Physician: Yasmin Navarro MD    HPI:   73y/o F whose current medical conditions include  CAD, PACs, CHFmrEF, h/o beast cancer 4 yrs ago lumpectomy and chemo, recurrent in  s/p mastectomy and chemo ongoing, and HTN, dementia lower back pain wheelchair helps  Quit smoking in  after 50 yrs smoking. Who presented to [facility] for elevated LHC with Dr. Santos    Procedure(s) (LRB):  AORTOGRAM, WITH SERIALOGRAPHY (N/A)  LITHOTRIPSY, CORONARY TRANSLUMINAL, PERCUTANEOUS  PTA, Superficial Femoral Artery  Stent, Superficial Femoral Artery     Indwelling Lines/Drains at time of discharge:  Lines/Drains/Airways       None                   Hospital Course:  No notes on file    Goals of Care Treatment Preferences:  Code Status: Full Code      Consults:     Significant Diagnostic Studies: LHC, labs    Pending Diagnostic Studies:       None            Final Active Diagnoses:    Diagnosis Date Noted POA    PRINCIPAL PROBLEM:  Ulcer of great toe, left, with fat layer exposed [L97.522] 10/02/2024 Yes    Ischemic ulcer of toe with fat layer exposed [L97.502] 10/02/2024 Yes    Acute on chronic combined systolic and diastolic congestive heart failure [I50.43] 06/21/2024 Yes    Arterial insufficiency of lower extremity [I73.9] 08/22/2023 Yes    Anemia due to stage 3b chronic kidney disease [N18.32, D63.1] 03/15/2022 Yes    Pulmonary HTN [I27.20] 01/24/2022 Yes    Atherosclerosis of aorta [I70.0] 01/20/2022 Yes    Coronary artery disease of native artery of native heart with stable angina pectoris [I25.118] 07/01/2021 Yes     Chronic      Problems Resolved During this Admission:     No new Assessment & Plan  notes have been filed under this hospital service since the last note was generated.  Service: Cardiology      Discharged Condition: good    Disposition: Home or Self Care    Follow Up:    Patient Instructions:      Diet Cardiac     Other restrictions (specify):   Order Comments: No heavy bending or lifting no strenuous activity for 1 week ok to shower with back to beam     Notify your health care provider if you experience any of the following:  temperature >100.4     Notify your health care provider if you experience any of the following:  persistent nausea and vomiting or diarrhea     Notify your health care provider if you experience any of the following:  severe uncontrolled pain     Notify your health care provider if you experience any of the following:  redness, tenderness, or signs of infection (pain, swelling, redness, odor or green/yellow discharge around incision site)     Notify your health care provider if you experience any of the following:  difficulty breathing or increased cough     Notify your health care provider if you experience any of the following:  severe persistent headache     Notify your health care provider if you experience any of the following:  worsening rash     Notify your health care provider if you experience any of the following:  persistent dizziness, light-headedness, or visual disturbances     Notify your health care provider if you experience any of the following:  increased confusion or weakness     Notify your health care provider if you experience any of the following:     Remove dressing in 24 hours     Medications:  Reconciled Home Medications:      Medication List        START taking these medications      clopidogreL 75 mg tablet  Commonly known as: PLAVIX  Take 1 tablet (75 mg total) by mouth once daily.  Start taking on: October 9, 2024            CONTINUE taking these medications      aluminum-magnesium hydroxide-simethicone 200-200-20 mg/5 mL Susp, diphenhydrAMINE  12.5 mg/5 mL Liqd  Swish and spit 5 mLs every 4 (four) hours as needed (discomfort).     anastrozole 1 mg Tab  Commonly known as: ARIMIDEX  Take 1 tablet (1 mg total) by mouth once daily.     aspirin 81 MG Chew  Take 1 tablet (81 mg total) by mouth once daily.     busPIRone 15 MG tablet  Commonly known as: BUSPAR  TAKE 1 TABLET BY MOUTH TWICE A DAY     calcium citrate 200 mg (950 mg) tablet  Commonly known as: CALCITRATE  Take 1 tablet by mouth once daily.     cephALEXin 250 MG capsule  Commonly known as: KEFLEX  Take 1 capsule (250 mg total) by mouth every evening.     cyanocobalamin 2000 MCG tablet  Take 2,000 mcg by mouth 2 (two) times a day.     donepeziL 5 MG tablet  Commonly known as: ARICEPT  TAKE 1 TABLET BY MOUTH EVERY DAY IN THE EVENING     estradioL 0.01 % (0.1 mg/gram) vaginal cream  Commonly known as: ESTRACE  Place 4 g vaginally once daily.     ferrous sulfate 325 mg (65 mg iron) Tab tablet  Commonly known as: FEOSOL  Take 65 mg by mouth once daily.     FLUAD QUAD 2023-24(65Y UP)(PF) 60 mcg (15 mcg x 4)/0.5 mL Syrg  Generic drug: flu vac 2023 65up-ecsJS36D(PF)  Inject 0.5 mLs into the muscle.     furosemide 20 MG tablet  Commonly known as: LASIX  Take 1 tablet (20 mg total) by mouth daily as needed (edema).     isosorbide mononitrate 30 MG 24 hr tablet  Commonly known as: IMDUR  TAKE 1 TABLET BY MOUTH EVERY DAY     lamoTRIgine 25 MG tablet  Commonly known as: LAMICTAL  Take 2 tablets (50 mg total) by mouth 2 (two) times daily.     memantine 5 MG Tab  Commonly known as: NAMENDA  Take 1 tablet (5 mg total) by mouth 2 (two) times daily.     metoprolol succinate 25 MG 24 hr tablet  Commonly known as: TOPROL-XL  Take 1 tablet (25 mg total) by mouth 2 (two) times daily.     pravastatin 40 MG tablet  Commonly known as: PRAVACHOL  Take 1 tablet (40 mg total) by mouth once daily.     QUEtiapine 300 MG Tab  Commonly known as: SEROQUEL  Take 1 tablet (300 mg total) by mouth every evening.     telmisartan 20 MG  Tab  Commonly known as: MICARDIS  TAKE 1 TABLET BY MOUTH EVERY DAY     vitamin D 1000 units Tab  Commonly known as: VITAMIN D3  Take 1,000 Units by mouth once daily.              Time spent on the discharge of patient: 25 minutes    Arielle Mart NP  Cardiology  [facility] - Telemetry ([facility])

## 2024-10-08 NOTE — PLAN OF CARE
[facility] - Tele[facility] (Hospital)  Initial Discharge Assessment       Primary Care Provider: Yasmin Navarro MD    Admission Diagnosis: Critical limb ischemia of left lower extremity [I70.222]  Ulcer of great toe, left, with fat layer exposed [L97.522]  Ischemic ulcer of toe of left foot with fat layer exposed [L97.522]  CAD, multiple vessel [I25.10]  Chronic combined systolic and diastolic CHF (congestive heart failure) [I50.42]  Abnormal ankle brachial index (ROXANNE) [R68.89]  Abnormal ultrasound of lower extremity [R93.6]    Admission Date: 10/7/2024  Expected Discharge Date: Per Attending    Transition of Care Barriers: None    Payor: [employer] D [facility] / Plan: [employer] CHOICES / Product Type: Medicare Advantage /     Extended Emergency Contact Information  Primary Emergency Contact: Moe Figueroa  Address: [address] [place] [address] [place]  Mobile Phone: [phone]  Relation: Daughter  Secondary Emergency Contact: Zheng Rodriguez  Address: [address] [place] [address] [place]  Mobile Phone: [phone]  Relation: Daughter    Discharge Plan A: Home Health  Discharge Plan B: Home with family      [id number] [place] - [address] AT AT [facility]  [address] [facility] [address]  Phone: [phone] Fax: [phone]    [id number] [place] [facility] [address] AT [facility]  [address] [facility] [address]  Phone: [phone] Fax: [phone]      Initial Assessment (most recent)       Adult Discharge Assessment - 10/08/24 0913          Discharge Assessment    Assessment Type Discharge Planning Assessment     Confirmed/corrected address, phone number and insurance Yes     Confirmed Demographics Correct on Facesheet     Source of Information patient     When was your last doctors appointment? 10/02/24   Tiffanie Santos MD - Cardiology     Communicated SYLVIA with patient/caregiver Date not available/Unable to determine     Reason For Admission Ulcer of great toe, left, with fat layer exposed     People in Home child(andreia), adult     Facility Arrived From: home     Do you expect to return to your current living situation? Yes     Do you have help at home or someone to help you manage your care at home? Yes     Who are your caregiver(s) and their phone number(s)? Clemente vargas     Prior to hospitilization cognitive status: Alert/Oriented     Current cognitive status: Alert/Oriented     Walking or Climbing Stairs Difficulty no     Dressing/Bathing Difficulty no     Home Accessibility wheelchair accessible     Equipment Currently Used at Home none     Readmission within 30 days? No     Patient currently being followed by outpatient case management? No     Do you currently have service(s) that help you manage your care at home? No     Do you take prescription medications? Yes     Do you have prescription coverage? Yes     Coverage [employer] Managed Medicare     Do you have any problems affording any of your prescribed medications? No     Is the patient taking medications as prescribed? yes     Who is going to help you get home at discharge? Clemente vargas     How do you get to doctors appointments? family or friend will provide     Are you on dialysis? No     Do you take coumadin? No     Discharge Plan A Home Health     Discharge Plan B Home with family     DME Needed Upon Discharge  none     Discharge Plan discussed with: Patient     Transition of Care Barriers None        Transportation Needs    Has the lack of transportation kept you from medical appointments, meetings, work or from getting things needed for daily living? No                   Anticipated DC dispo: Home Health vs home with family  Prior Level of Function: Modified independence, daughter assists as needed  People in home: Clemente vargas     Comments:  CESAR  met with patient at bedside to introduce role and discuss discharge planning. Patient's daughter will be help at home and can provide transport at time of discharge. Confirmed demographics, insurance, and emergency contacts. Per Patient, she is current with a Home Health Agency, but could not remember name. SW will find out who/ if patient is current with a HH agency. SW updated Patient's whiteboard with contact information and anticipated DC plan. CM discharge needs depends on hospital progress. SW will continue following to assist with other needs.

## 2024-10-08 NOTE — PLAN OF CARE
Problem: Adult Inpatient Plan of Care  Goal: Plan of Care Review  Outcome: Progressing     Problem: Adult Inpatient Plan of Care  Goal: Patient-Specific Goal (Individualized)  Outcome: Progressing     Problem: Adult Inpatient Plan of Care  Goal: Absence of Hospital-Acquired Illness or Injury  Outcome: Progressing     Problem: Adult Inpatient Plan of Care  Goal: Readiness for Transition of Care  Outcome: Progressing

## 2024-10-14 ENCOUNTER — TELEPHONE (OUTPATIENT)
Dept: FAMILY MEDICINE | Facility: CLINIC | Age: 74
End: 2024-10-14
Payer: MEDICARE

## 2024-10-14 NOTE — TELEPHONE ENCOUNTER
----- Message from Med Assistant Chilel sent at 10/14/2024  1:23 PM CDT -----  Contact: Pt daughter [phone]  Can I schedule her in your daily change slot for tomorrow?  ----- Message -----  From: Nicole Wadsworth  Sent: 10/14/2024   1:05 PM CDT  To: Jorge HOLGUIN Staff    Pt daughter calling to speak with the nurse to see if the appointment on 10/16 can be reschedule After 1 pm or if she can get the appointment on 10/17 for hospital follow up. I tried to reschedule, but nothing available until Nov and the appointment needs to be done no later then 10/17. Please call to advise.

## 2024-10-14 NOTE — TELEPHONE ENCOUNTER
----- Message from Nikki sent at 10/14/2024  3:23 PM CDT -----  Contact: Moe/ Daughter  .Type:  Needs Reschedule     Who Called:  Moe     Would the patient rather a call back or a response via [facility]?  [facility]   Best Call Back Number:   Additional Information:  Moe is calling in regards to the hospital follow up appt and would like to know if the morning appt is still available on 10/15 due to she just realized the 2pm time will be conflicting with another appt.  She also states if it could be change that would be great and would like to get a response by pt portal       Thanks  
Reached out to the patient regarding appt.   
Spouse/Significant other

## 2024-10-15 ENCOUNTER — LAB VISIT (OUTPATIENT)
Dept: LAB | Facility: HOSPITAL | Age: 74
End: 2024-10-15
Attending: FAMILY MEDICINE
Payer: MEDICARE

## 2024-10-15 ENCOUNTER — OFFICE VISIT (OUTPATIENT)
Dept: FAMILY MEDICINE | Facility: CLINIC | Age: 74
End: 2024-10-15
Payer: MEDICARE

## 2024-10-15 VITALS
WEIGHT: 118.69 LBS | BODY MASS INDEX: 21.03 KG/M2 | TEMPERATURE: 98 F | OXYGEN SATURATION: 97 % | SYSTOLIC BLOOD PRESSURE: 142 MMHG | HEIGHT: 63 IN | HEART RATE: 75 BPM | DIASTOLIC BLOOD PRESSURE: 76 MMHG

## 2024-10-15 DIAGNOSIS — Z23 NEED FOR VACCINATION: ICD-10-CM

## 2024-10-15 DIAGNOSIS — F02.80 MAJOR NEUROCOGNITIVE DISORDER DUE TO ALZHEIMER'S DISEASE: ICD-10-CM

## 2024-10-15 DIAGNOSIS — I50.42 CHRONIC COMBINED SYSTOLIC AND DIASTOLIC CHF (CONGESTIVE HEART FAILURE): Chronic | ICD-10-CM

## 2024-10-15 DIAGNOSIS — C50.312 MALIGNANT NEOPLASM OF LOWER-INNER QUADRANT OF LEFT BREAST IN FEMALE, ESTROGEN RECEPTOR POSITIVE: ICD-10-CM

## 2024-10-15 DIAGNOSIS — I10 ESSENTIAL HYPERTENSION: Primary | Chronic | ICD-10-CM

## 2024-10-15 DIAGNOSIS — I25.118 CORONARY ARTERY DISEASE OF NATIVE ARTERY OF NATIVE HEART WITH STABLE ANGINA PECTORIS: Chronic | ICD-10-CM

## 2024-10-15 DIAGNOSIS — G30.9 MAJOR NEUROCOGNITIVE DISORDER DUE TO ALZHEIMER'S DISEASE: ICD-10-CM

## 2024-10-15 DIAGNOSIS — I10 ESSENTIAL HYPERTENSION: Chronic | ICD-10-CM

## 2024-10-15 DIAGNOSIS — Z17.0 MALIGNANT NEOPLASM OF LOWER-INNER QUADRANT OF LEFT BREAST IN FEMALE, ESTROGEN RECEPTOR POSITIVE: ICD-10-CM

## 2024-10-15 PROBLEM — L97.522 ULCER OF GREAT TOE, LEFT, WITH FAT LAYER EXPOSED: Status: RESOLVED | Noted: 2024-10-02 | Resolved: 2024-10-15

## 2024-10-15 PROBLEM — I50.43 ACUTE ON CHRONIC COMBINED SYSTOLIC AND DIASTOLIC CONGESTIVE HEART FAILURE: Status: RESOLVED | Noted: 2024-06-21 | Resolved: 2024-10-15

## 2024-10-15 PROCEDURE — 3008F BODY MASS INDEX DOCD: CPT | Mod: CPTII,S$GLB,, | Performed by: FAMILY MEDICINE

## 2024-10-15 PROCEDURE — 84443 ASSAY THYROID STIM HORMONE: CPT | Performed by: FAMILY MEDICINE

## 2024-10-15 PROCEDURE — 3077F SYST BP >= 140 MM HG: CPT | Mod: CPTII,S$GLB,, | Performed by: FAMILY MEDICINE

## 2024-10-15 PROCEDURE — 99214 OFFICE O/P EST MOD 30 MIN: CPT | Mod: 25,S$GLB,, | Performed by: FAMILY MEDICINE

## 2024-10-15 PROCEDURE — 3078F DIAST BP <80 MM HG: CPT | Mod: CPTII,S$GLB,, | Performed by: FAMILY MEDICINE

## 2024-10-15 PROCEDURE — 1159F MED LIST DOCD IN RCRD: CPT | Mod: CPTII,S$GLB,, | Performed by: FAMILY MEDICINE

## 2024-10-15 PROCEDURE — 90653 IIV ADJUVANT VACCINE IM: CPT | Mod: S$GLB,,, | Performed by: FAMILY MEDICINE

## 2024-10-15 PROCEDURE — 1125F AMNT PAIN NOTED PAIN PRSNT: CPT | Mod: CPTII,S$GLB,, | Performed by: FAMILY MEDICINE

## 2024-10-15 PROCEDURE — 36415 COLL VENOUS BLD VENIPUNCTURE: CPT | Mod: PO | Performed by: FAMILY MEDICINE

## 2024-10-15 PROCEDURE — 80061 LIPID PANEL: CPT | Performed by: FAMILY MEDICINE

## 2024-10-15 PROCEDURE — G0008 ADMIN INFLUENZA VIRUS VAC: HCPCS | Mod: S$GLB,,, | Performed by: FAMILY MEDICINE

## 2024-10-15 PROCEDURE — 99999 PR PBB SHADOW E&M-EST. PATIENT-LVL IV: CPT | Mod: PBBFAC,,, | Performed by: FAMILY MEDICINE

## 2024-10-15 PROCEDURE — 4010F ACE/ARB THERAPY RXD/TAKEN: CPT | Mod: CPTII,S$GLB,, | Performed by: FAMILY MEDICINE

## 2024-10-15 NOTE — PROGRESS NOTES
CHIEF COMPLAINT: This is a 73-year-old female here for follow up chronic medical conditions and for preventative health exam.     SUBJECTIVE:  The patient brought in today by her daughter. Patient was hospitalized recently for aortogram with stent placement in the arteries of LLE. She is taking Plav She continues to have ulceration of left big toe and is followed by podiatry. She takes prophylactic antibiotics for recurrent UTIs: Cephalexin 250 mg nightly.     The patient has progressive dementia with behavioral disturbance including agitation and difficulty sleeping at night.  She takes Namenda 5 mg twice daily and Aricept 5 mg nightly, BuSpar 15 mg twice daily, lamotrigine 50 mg twice daily and Seroquel 300 mg at night. The patient is followed by Cardiology for CAD, PAD, chronic diastolic and systolic heart failure with decreased ejection fraction and hypertension for which she takes aspirin 81 mg daily, Plavix 75 mg daily, metoprolol XL 25 mg daily, telmisartan 20 mg daily  and isosorbide 30 mg daily. She takes Lasix 20 mg as needed.  Her blood pressure today is 142/76.  She has chronic kidney disease stage 3a.         The patient had left mastectomy for recurrent breast cancer in September 2020 and had previous breast cancer in 2017 and underwent lumpectomy and radiation followed by Arimidex.  She continues to take Arimidex status mastectomy. She has osteoarthritis of hands.  Patient stopped smoking in August 2020 after 50 pack-year history of smoking. Patient had GI workup for iron deficiency anemia.  EGD findings:  gastric AVM.  Adenomatous polyps were found on colonoscopy.  Video capsule endoscopy was negative.  Patient takes iron infusions for iron deficiency anemia.      ROS:  GENERAL: Patient denies fever, chills, night sweats. Patient denies weight loss. Patient denies anorexia, fatigue, or swollen glands.  Positive for weakness.  SKIN: Patient denies rash or hair loss.  HEENT: Patient denies sore throat,  ear pain, hearing loss, nasal congestion, or runny nose. Patient denies visual disturbance, eye irritation or discharge.  LUNGS: Patient denies cough, wheeze or hemoptysis.  CARDIOVASCULAR: Patient denies chest pain, shortness of breath, palpitations, syncope or lower extremity edema.  GI: Patient denies abdominal pain, nausea, vomiting, diarrhea, constipation, blood in stool or melena.  GENITOURINARY: Patient denies pelvic pain, vaginal discharge, itch or odor. Patient denies irregular vaginal bleeding. Patient denies dysuria, frequency, hematuria, or nocturia.  Positive for urinary retention.  BREASTS: Patient denies breast pain, mass or nipple discharge.  MUSCULOSKELETAL: Patient denies joint swelling, redness or warmth.  Positive for joint pain and swelling.  NEUROLOGIC: Patient denies headache, vertigo, paresthesias, weakness in limb, dysarthria, dysphagia or abnormality of gait.  PSYCHIATRIC: Patient denies anxiety or depression.  Positive for memory loss.     OBJECTIVE:   GENERAL: Well-developed well-nourished black female alert and oriented x3, in no acute distress.  Moderate cognitive impairment.  Essentially nonverbal.  Seated in wheelchair.  History provided by daughter.  SKIN: Clear without rash. Normal color and tone.  Ears: Clear canals.  Clear TMs.  Nose:  Without congestion.  Pharynx:  Without injection or exudates.  HEENT: Eyes: Clear conjunctivae.  No scleral icterus.  NECK: Supple, normal range of motion. No masses, lymphadenopathy or enlarged thyroid. No JVD.   LUNGS: Clear to auscultation. Normal respiratory effort.  CARDIOVASCULAR: Regular rhythm, normal S1, S2 without murmur, gallop or rub.  EXTREMITIES: Without cyanosis or clubbing.  Multiple hammertoes including left big toe. Swelling and erythema dorsal surface of left great toe at PIP joint.  Tenderness to palpation.  Distal pulses 2+ and equal.  Decreased range lower extremities.    NEUROLOGIC:  Motor strengt without or h equal  bilaterally. Sensation normal to touch.  Gait unsteady without support. No tremor.    ASSESSMENT:  1. Essential hypertension    2. Coronary artery disease of native artery of native heart with stable angina pectoris    3. Chronic combined systolic and diastolic CHF (congestive heart failure)    4. Malignant neoplasm of lower-inner quadrant of left breast in female, estrogen receptor positive    5. Major neurocognitive disorder due to Alzheimer's disease      PLAN:  1. Stay active. Exercise regularly.  2. Age-appropriate counseling.  3.  Check lipid panel and TSH.  4.  Influenza vaccine.    5.  Follow-up in 6 months.    30 minutes of total time spent on the encounter, which includes face to face time and non-face to face time preparing to see the patient (eg, review of tests), Obtaining and/or reviewing separately obtained history, Documenting clinical information in the electronic or other health record, Independently interpreting results (not separately reported) and communicating results to the patient/family/caregiver, or Care coordination (not separately reported).     This note is generated with speech recognition software and is subject to transcription error and sound alike phrases that may be missed by proofreading.

## 2024-10-16 LAB
CHOLEST SERPL-MCNC: 185 MG/DL (ref 120–199)
CHOLEST/HDLC SERPL: 3.5 {RATIO} (ref 2–5)
HDLC SERPL-MCNC: 53 MG/DL (ref 40–75)
HDLC SERPL: 28.6 % (ref 20–50)
LDLC SERPL CALC-MCNC: 114.6 MG/DL (ref 63–159)
NONHDLC SERPL-MCNC: 132 MG/DL
TRIGL SERPL-MCNC: 87 MG/DL (ref 30–150)
TSH SERPL DL<=0.005 MIU/L-ACNC: 0.57 UIU/ML (ref 0.4–4)

## 2024-10-17 PROBLEM — I25.10 CAD, MULTIPLE VESSEL: Status: ACTIVE | Noted: 2021-07-01

## 2024-10-22 ENCOUNTER — EXTERNAL HOME HEALTH (OUTPATIENT)
Dept: HOME HEALTH SERVICES | Facility: HOSPITAL | Age: 74
End: 2024-10-22
Payer: MEDICARE

## 2024-10-23 ENCOUNTER — OFFICE VISIT (OUTPATIENT)
Dept: NEUROLOGY | Facility: CLINIC | Age: 74
End: 2024-10-23
Payer: MEDICARE

## 2024-10-23 ENCOUNTER — DOCUMENT SCAN (OUTPATIENT)
Dept: HOME HEALTH SERVICES | Facility: HOSPITAL | Age: 74
End: 2024-10-23
Payer: MEDICARE

## 2024-10-23 VITALS — SYSTOLIC BLOOD PRESSURE: 150 MMHG | DIASTOLIC BLOOD PRESSURE: 80 MMHG | HEART RATE: 81 BPM

## 2024-10-23 DIAGNOSIS — R46.89 BEHAVIORAL CHANGE: ICD-10-CM

## 2024-10-23 DIAGNOSIS — F02.80 MAJOR NEUROCOGNITIVE DISORDER DUE TO ALZHEIMER'S DISEASE: ICD-10-CM

## 2024-10-23 DIAGNOSIS — G30.9 MAJOR NEUROCOGNITIVE DISORDER DUE TO ALZHEIMER'S DISEASE: ICD-10-CM

## 2024-10-23 DIAGNOSIS — F03.911 AGITATION DUE TO DEMENTIA: ICD-10-CM

## 2024-10-23 DIAGNOSIS — F03.90 MAJOR NEUROCOGNITIVE DISORDER: ICD-10-CM

## 2024-10-23 DIAGNOSIS — R41.3 OTHER AMNESIA: ICD-10-CM

## 2024-10-23 PROCEDURE — 99999 PR PBB SHADOW E&M-EST. PATIENT-LVL III: CPT | Mod: PBBFAC,,, | Performed by: NURSE PRACTITIONER

## 2024-10-23 RX ORDER — LAMOTRIGINE 100 MG/1
100 TABLET ORAL 2 TIMES DAILY
Qty: 60 TABLET | Refills: 11 | Status: SHIPPED | OUTPATIENT
Start: 2024-10-23 | End: 2025-10-23

## 2024-10-23 RX ORDER — BUSPIRONE HYDROCHLORIDE 15 MG/1
15 TABLET ORAL 2 TIMES DAILY
Qty: 180 TABLET | Refills: 3 | Status: SHIPPED | OUTPATIENT
Start: 2024-10-23

## 2024-10-23 RX ORDER — DONEPEZIL HYDROCHLORIDE 5 MG/1
5 TABLET, FILM COATED ORAL NIGHTLY
Qty: 90 TABLET | Refills: 3 | Status: SHIPPED | OUTPATIENT
Start: 2024-10-23

## 2024-10-23 RX ORDER — MEMANTINE HYDROCHLORIDE 5 MG/1
5 TABLET ORAL 2 TIMES DAILY
Qty: 180 TABLET | Refills: 3 | Status: SHIPPED | OUTPATIENT
Start: 2024-10-23 | End: 2025-10-18

## 2024-10-23 RX ORDER — QUETIAPINE FUMARATE 300 MG/1
300 TABLET, FILM COATED ORAL NIGHTLY
Qty: 30 TABLET | Refills: 11 | Status: SHIPPED | OUTPATIENT
Start: 2024-10-23 | End: 2025-10-23

## 2024-10-23 NOTE — PROGRESS NOTES
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    3. 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 Major neurocognitive disorder due to Alzheimer's disease    5. Other amnesia                  Plan:          MAJOR NEUROCOGNITIVE DISORDER MODERATE TO SEVERE AD WITH MEMORY LOSS,  HISTORY OF BREAST CA, LEFT MASECTIOMY, HX OF ETOH ABUSE, FORMER SMOKER, RECURRENT UTI RESOLVED,  RT EYE BLINDNESS       EVALUATION     Continue  Memantine/Namenda  5 mg po  BID (Renal Dosing considerations)     Continue Donepezil/Aricept  5 mg po  HS  (Renal Dosing considerations)     Continues Buspar/Buspirone 15 mg po BID for inappropriate behaviors and  paradoxical agitation      MENINGIOMA ABNORMAL MRI BRAIN RESULTS    Follow up with Neurosurgery related to MRI Brain       SYMPTOMATIC MANAGEMENT     Continue Seroquel 300 mg po to help with insomnia. Instructed the family to watch for excessive sedation or paradoxical agitation.     Increase  LTG 50 mg BID to 100 mg po BID  to help for agitation and mood stabilization.      Failed Trazodone 150 mg QHS    Future considerations:     Risperdal 1 mg QHS to assist with psychotic symptoms and behavioral disturbances     HOME CARE      Palliative Care Home based consult     Falling Down Precautions. Gait is affected by the disease as well.     Avoid driving and access to firearms     Incremental 24/Care     Help with finances and decision making.    Join support group.    Proofing the house and use labeling.    Avoid antihistamines and anticholinergics.    Avoid changing routine.    Use written reminders.    Avoid multitasking.    Healthy diet, exercise (physical and cognitive).    Good sleep hygiene.      LEFT  MERALGIA PARAESTHETICA/NUMBNESS     Clinically Monitor      Avoid prolonged standing.     Wear loose clothes.    No need for neuropathic pain med's Numbness complaint only       URINARY INCONTINENCE     Refer to Urologist        MEDICAL/SURGICAL COMORBIDITIES     All relevant medical comorbidities noted and managed by primary care physician and medical care team.          MISCELLANEOUS MEDICAL PROBLEMS       HEALTHY LIFESTYLE AND PREVENTATIVE CARE    Encouraged the patient to adhere to the age-appropriate health maintenance guidelines including screening tests and vaccinations.     Discussed the overall importance of healthy lifestyle, optimal weight, exercise, healthy diet, good sleep hygiene and avoiding drugs including smoking, alcohol and recreational drugs. The patient verbalized full understanding.       Advised the patient to follow COVID-19 prevention measures.       I spent 30 minutes total E/M more than 50 % spent  face to face with the patient    RTC 6  month       Tiff Llyod, MSN NP      Collaborating Provider: Barbara Hurst MD, FAAN Neurologist/Epileptologist

## 2024-10-24 ENCOUNTER — OFFICE VISIT (OUTPATIENT)
Dept: CARDIOLOGY | Facility: CLINIC | Age: 74
End: 2024-10-24
Payer: MEDICARE

## 2024-10-24 VITALS — SYSTOLIC BLOOD PRESSURE: 138 MMHG | HEART RATE: 63 BPM | DIASTOLIC BLOOD PRESSURE: 70 MMHG | OXYGEN SATURATION: 95 %

## 2024-10-24 DIAGNOSIS — L97.522 ISCHEMIC ULCER OF TOE OF LEFT FOOT WITH FAT LAYER EXPOSED: Primary | ICD-10-CM

## 2024-10-24 DIAGNOSIS — D50.8 IRON DEFICIENCY ANEMIA SECONDARY TO INADEQUATE DIETARY IRON INTAKE: ICD-10-CM

## 2024-10-24 DIAGNOSIS — I10 ESSENTIAL HYPERTENSION: Chronic | ICD-10-CM

## 2024-10-24 DIAGNOSIS — I70.0 ATHEROSCLEROSIS OF AORTA: ICD-10-CM

## 2024-10-24 DIAGNOSIS — Z87.891 HISTORY OF TOBACCO USE: ICD-10-CM

## 2024-10-24 DIAGNOSIS — F02.80 MAJOR NEUROCOGNITIVE DISORDER DUE TO ALZHEIMER'S DISEASE: ICD-10-CM

## 2024-10-24 DIAGNOSIS — J43.8 OTHER EMPHYSEMA: ICD-10-CM

## 2024-10-24 DIAGNOSIS — I73.9 ARTERIAL INSUFFICIENCY OF LOWER EXTREMITY: ICD-10-CM

## 2024-10-24 DIAGNOSIS — D50.9 IRON DEFICIENCY ANEMIA, UNSPECIFIED IRON DEFICIENCY ANEMIA TYPE: ICD-10-CM

## 2024-10-24 DIAGNOSIS — I25.10 CAD, MULTIPLE VESSEL: ICD-10-CM

## 2024-10-24 DIAGNOSIS — G30.9 MAJOR NEUROCOGNITIVE DISORDER DUE TO ALZHEIMER'S DISEASE: ICD-10-CM

## 2024-10-24 DIAGNOSIS — I50.42 CHRONIC COMBINED SYSTOLIC AND DIASTOLIC CHF (CONGESTIVE HEART FAILURE): Chronic | ICD-10-CM

## 2024-10-24 DIAGNOSIS — R94.31 EKG ABNORMALITIES: ICD-10-CM

## 2024-10-24 PROCEDURE — 4010F ACE/ARB THERAPY RXD/TAKEN: CPT | Mod: CPTII,S$GLB,, | Performed by: INTERNAL MEDICINE

## 2024-10-24 PROCEDURE — 3078F DIAST BP <80 MM HG: CPT | Mod: CPTII,S$GLB,, | Performed by: INTERNAL MEDICINE

## 2024-10-24 PROCEDURE — 1159F MED LIST DOCD IN RCRD: CPT | Mod: CPTII,S$GLB,, | Performed by: INTERNAL MEDICINE

## 2024-10-24 PROCEDURE — 1101F PT FALLS ASSESS-DOCD LE1/YR: CPT | Mod: CPTII,S$GLB,, | Performed by: INTERNAL MEDICINE

## 2024-10-24 PROCEDURE — 99999 PR PBB SHADOW E&M-EST. PATIENT-LVL II: CPT | Mod: PBBFAC,,, | Performed by: INTERNAL MEDICINE

## 2024-10-24 PROCEDURE — 1160F RVW MEDS BY RX/DR IN RCRD: CPT | Mod: CPTII,S$GLB,, | Performed by: INTERNAL MEDICINE

## 2024-10-24 PROCEDURE — 99213 OFFICE O/P EST LOW 20 MIN: CPT | Mod: S$GLB,,, | Performed by: INTERNAL MEDICINE

## 2024-10-24 PROCEDURE — 3288F FALL RISK ASSESSMENT DOCD: CPT | Mod: CPTII,S$GLB,, | Performed by: INTERNAL MEDICINE

## 2024-10-24 PROCEDURE — 3075F SYST BP GE 130 - 139MM HG: CPT | Mod: CPTII,S$GLB,, | Performed by: INTERNAL MEDICINE

## 2024-10-24 NOTE — PROGRESS NOTES
Subjective:   Patient ID:  Leia Rodriguez is a 74 y.o. female who presents for follow up of No chief complaint on file.      HPI  74 yo female, 6 months f/u  PMH CAD, PACs, CHFmrEF, h/o beast cancer 4 yrs ago lumpectomy and chemo, recurrent in  s/p mastectomy and chemo ongoing, and HTN, dementia lower back pain wheelchair helps  Quit smoking in  after 50 yrs smoking.  Some residual left chest pain after mastectomy,   GRISSOM and fatigue after fast walking,   No palpitation, leg swelling, dizziness and faint.     ekg in  NSR and TWI on V2 to v6 and inferior leads   ECHO normal EF and severe LVH   ECHo EF 45%, mod MR and LAE  Weight stable     03/2021 admitted for CHF exacerbation.  and Troponin 0.44 flat. elg NSR and TWI on lateral leads. Improved SOB after diuresis. Then BNP dropped to 58  Now SOB sable. Sleeps on 1 pillow     07/2021 visit   echo Day 15, cycle 6 Biplane=44% 4D LVQ=45% Avg GPLS= -16.1   MPI inferoapical scar  GRISSOM while long distance walking  No chest pain, dizziness faint, leg swelling  Decent appetite  F/u with Dr. Fernández on stage I HER2 positive breast carcinoma being treated with Herceptin q. 3 weeks      visit  Dynamical TWI on EKG   MPI showed apical inferior fixed perfusion defect  No chest pain . Limited walking due to lower back pain  No orthopnea       visit  No chest pain. GRISSOM mild and chronic. Limited activity due to fatigue and leg pain.   S/p LHC done on 10/12/2021, distal % OM2/3 40% and midRCA 50% lesion and EF 45% and LVEDP 17 mmHG. Moderate MR. Continue medical Rx  Serial echo studies     Echo 2/22/21 Cycle 4 Day 20  4dLVQ=47%  AutoEF=48%  BRIDGETT unable to obtain accurate measurements   5-26-21  Day 15, cycle 6  Biplane=44%  4D LVQ=45%  Avg GPLS= -16.1   9-13-21 Day 20, Cycle 11  Biplane=42%  4D LVQ=49%  Avg GPLS= -14.8%     12/2021 visit  11/ went to ER [facility]. EKG sinus tachy and PVCs. BNP up to 800.  CXR pulm. Edema. Added lasix 20 mg daily  Now dyspnea improved. No leg swelling chest pain. Sleeps on 1 pillow and no PND.   On iron infusion rx fro anemia      visit  Improved appetite after held chemo due to decreased EF about .   Gained the weight. No SOB, sleeps on 2 pillows      visit   Twice ER visits for not feeling well, SOB, the lab and CRX mri unremarkable, except some +UTI.Occurred at night and the family concerning anxiety ?  EKG in  NSR PACs     06/23 visit  Per daughter, pt c/o SOB + orthopnea and leg swelling. Lost 13 lbs in 6 months. Had teeth work recently, dementia slightyl worse. Some mood disturbance. Talked to family member.      08/23 visit  07/23 admitted for UTI and sepsis. Low BP and d/c ARB.   Cr 1.0 before d/c  Today BP low.   07/23 ekg sinus tachy and PACs; echo EF 50% LVH  No dizziness faint sob and chest pain.  to 120 mmHG  RLE edema, worse at sitting and better after laying      10/05/23 addendum   Report low BP  Stop aldactone  And decrease ToprolXL from 50 mg daily to 25 mg daily     12/23 visit  No orthopnea dizziness. No fall  06/23 echo EF 50% PAP 43 mmHG     Interval history  Dementia. States that some bad feeling in AM  No syncope.   EKG reviewed by myself today reveals NSR nonspecific STT change PACs  Leg swelling controlled            10/2/2024 referred from podiatry DR HUTCHINS for toe ulcer.  She has recurrent left big toe ulcer gets dark and painful.  She was seen by DR HUTCHINS THE TOE GETS DARK AND PAINFUL NOW THE ULCER IS STILL EVIDENT THE TOE IS SWOLLEN DARK IN COLOUR PAIN IMPROVED POST ANTIBIOTICS. SHE IS ON [facility] SEDENTARY SIDE. SHE GOES TO DAY CARE LIKE SET UP THREE TIMES A WEEK. SHE LIVES WITH HER  BUT HAS SITTERS . THE TOE HAS THIS RECURRENT PROBLEM NMULTIPLE TIMES OVER THE PAST YEAR.   HAS SEVERE PVD AND HAS SUGGESTION OF OSTEOMYELITIS BY XRAY     10/24/2024   Has significant pvd had left sfa intervention has resolved her dark  discoloration of left toe. Has a callous ulcer still evident pain improved foot is warm. No groin issues clinically.  Past Medical History:   Diagnosis Date    Arthritis     EDGAR HANDS, KNEES    Behavioral change 2022    Blind right eye     Traumatic    Breast cancer 06/15/2017    0.8 cm Grade1 INTRADUCTAL BREAST 9 positve margin (left)    Essential hypertension     Hemorrhoids     Immunodeficiency due to chemotherapy 2021    Lipoma of abdominal wall     Major neurocognitive disorder 2022    Obesity     Overactive bladder     Pap smear abnormality of cervix with ASCUS favoring benign     Thyroid nodule     Tobacco use disorder     Tubular adenoma of colon     Urinary incontinence        Past Surgical History:   Procedure Laterality Date    ANTERIOR VAGINAL REPAIR      AORTOGRAPHY WITH SERIALOGRAPHY N/A 10/7/2024    Procedure: AORTOGRAM, WITH SERIALOGRAPHY;  Surgeon: Tiffanie Santos MD;  Location: [facility] CATH LAB;  Service: Cardiology;  Laterality: N/A;  MD requested Anesthesia    BLADDER SURGERY      BREAST LUMPECTOMY Left 2017    CATARACT EXTRACTION Left 2022     SECTION      X2    COLONOSCOPY N/A 3/8/2017    Procedure: COLONOSCOPY;  Surgeon: Tyron Paris MD;  Location: [facility];  Service: Endoscopy;  Laterality: N/A;    COLONOSCOPY N/A 2020    Procedure: COLONOSCOPY;  Surgeon: Keira Ellison MD;  Location: [facility];  Service: Endoscopy;  Laterality: N/A;    ESOPHAGOGASTRODUODENOSCOPY N/A 2020    Procedure: EGD (ESOPHAGOGASTRODUODENOSCOPY);  Surgeon: Keira Ellison MD;  Location: [facility];  Service: Endoscopy;  Laterality: N/A;  new onset iron deficiency with prior history of breast cancer    INTRALUMINAL GASTROINTESTINAL TRACT IMAGING VIA CAPSULE N/A 10/28/2020    Procedure: IMAGING PROCEDURE, GI TRACT, INTRALUMINAL, VIA CAPSULE;  Surgeon: Finesse Jha RN;  Location: [facility];  Service: Endoscopy;  Laterality: N/A;    LEFT HEART CATHETERIZATION Left  10/12/2021    Procedure: CATHETERIZATION, HEART, LEFT;  Surgeon: Tiffanie Santos MD;  Location: [facility] CATH LAB;  Service: Cardiology;  Laterality: Left;    LITHOTRIPSY, CORONARY TRANSLUMINAL, PERCUTANEOUS  10/7/2024    Procedure: LITHOTRIPSY, CORONARY TRANSLUMINAL, PERCUTANEOUS;  Surgeon: Tiffanie Santos MD;  Location: [facility] CATH LAB;  Service: Cardiology;;    PTA, SUPERFICIAL FEMORAL ARTERY  10/7/2024    Procedure: PTA, Superficial Femoral Artery;  Surgeon: Tiffanie Santos MD;  Location: [facility] CATH LAB;  Service: Cardiology;;    SENTINEL LYMPH NODE BIOPSY Left 2020    Procedure: BIOPSY, LYMPH NODE, SENTINEL;  Surgeon: Vincent Moyer MD;  Location: [facility] OR;  Service: General;  Laterality: Left;    SIMPLE MASTECTOMY Left 2020    Procedure: MASTECTOMY, SIMPLE;  Surgeon: Vincent Moyer MD;  Location: [facility] OR;  Service: General;  Laterality: Left;    STENT, SUPERFICIAL FEMORAL ARTERY  10/7/2024    Procedure: Stent, Superficial Femoral Artery;  Surgeon: Tiffanie Santos MD;  Location: [facility] CATH LAB;  Service: Cardiology;;    THYROID LOBECTOMY Left     TOTAL ABDOMINAL HYSTERECTOMY      TUBAL LIGATION         Social History     Tobacco Use    Smoking status: Former     Current packs/day: 0.00     Average packs/day: 1 pack/day for 50.0 years (50.0 ttl pk-yrs)     Types: Cigarettes     Start date: 1970     Quit date: 2020     Years since quittin.1    Smokeless tobacco: Never   Substance Use Topics    Alcohol use: Never     Alcohol/week: 28.0 standard drinks of alcohol     Types: 28 Cans of beer per week    Drug use: No       Family History   Problem Relation Name Age of Onset    Hypertension Father      Cataracts Father      Prostate cancer Father  80    Cervical cancer Daughter Cenetra 32    Allergic rhinitis Daughter Cenetra     Cirrhosis Mother      Alcohol abuse Mother      Hypertension Daughter Sheronica     Diabetes Brother      Heart attack Brother      Heart murmur Brother      No Known  Problems Daughter Gladis        Current Outpatient Medications   Medication Sig    aluminum-magnesium hydroxide-simethicone 200-200-20 mg/5 mL Susp, diphenhydrAMINE 12.5 mg/5 mL Liqd Swish and spit 5 mLs every 4 (four) hours as needed (discomfort).    anastrozole (ARIMIDEX) 1 mg Tab Take 1 tablet (1 mg total) by mouth once daily.    aspirin 81 MG Chew Take 1 tablet (81 mg total) by mouth once daily.    busPIRone (BUSPAR) 15 MG tablet Take 1 tablet (15 mg total) by mouth 2 (two) times daily.    calcium citrate (CALCITRATE) 200 mg (950 mg) tablet Take 1 tablet by mouth once daily.    cephALEXin (KEFLEX) 250 MG capsule Take 1 capsule (250 mg total) by mouth every evening.    clopidogreL (PLAVIX) 75 mg tablet Take 1 tablet (75 mg total) by mouth once daily.    cyanocobalamin 2000 MCG tablet Take 2,000 mcg by mouth 2 (two) times a day.    donepeziL (ARICEPT) 5 MG tablet Take 1 tablet (5 mg total) by mouth every evening.    estradioL (ESTRACE) 0.01 % (0.1 mg/gram) vaginal cream Place 4 g vaginally once daily.    ferrous sulfate (FEOSOL) 325 mg (65 mg iron) Tab tablet Take 65 mg by mouth once daily.    furosemide (LASIX) 20 MG tablet Take 1 tablet (20 mg total) by mouth daily as needed (edema).    isosorbide mononitrate (IMDUR) 30 MG 24 hr tablet TAKE 1 TABLET BY MOUTH EVERY DAY    lamoTRIgine (LAMICTAL) 100 MG tablet Take 1 tablet (100 mg total) by mouth 2 (two) times daily.    memantine (NAMENDA) 5 MG Tab Take 1 tablet (5 mg total) by mouth 2 (two) times daily.    metoprolol succinate (TOPROL-XL) 25 MG 24 hr tablet Take 1 tablet (25 mg total) by mouth 2 (two) times daily.    pravastatin (PRAVACHOL) 40 MG tablet Take 1 tablet (40 mg total) by mouth once daily.    QUEtiapine (SEROQUEL) 300 MG Tab Take 1 tablet (300 mg total) by mouth every evening.    telmisartan (MICARDIS) 20 MG Tab TAKE 1 TABLET BY MOUTH EVERY DAY    vitamin D 1000 units Tab Take 1,000 Units by mouth once daily.     No current facility-administered  medications for this visit.     Current Outpatient Medications on File Prior to Visit   Medication Sig    aluminum-magnesium hydroxide-simethicone 200-200-20 mg/5 mL Susp, diphenhydrAMINE 12.5 mg/5 mL Liqd Swish and spit 5 mLs every 4 (four) hours as needed (discomfort).    anastrozole (ARIMIDEX) 1 mg Tab Take 1 tablet (1 mg total) by mouth once daily.    aspirin 81 MG Chew Take 1 tablet (81 mg total) by mouth once daily.    busPIRone (BUSPAR) 15 MG tablet Take 1 tablet (15 mg total) by mouth 2 (two) times daily.    calcium citrate (CALCITRATE) 200 mg (950 mg) tablet Take 1 tablet by mouth once daily.    cephALEXin (KEFLEX) 250 MG capsule Take 1 capsule (250 mg total) by mouth every evening.    clopidogreL (PLAVIX) 75 mg tablet Take 1 tablet (75 mg total) by mouth once daily.    cyanocobalamin 2000 MCG tablet Take 2,000 mcg by mouth 2 (two) times a day.    donepeziL (ARICEPT) 5 MG tablet Take 1 tablet (5 mg total) by mouth every evening.    estradioL (ESTRACE) 0.01 % (0.1 mg/gram) vaginal cream Place 4 g vaginally once daily.    ferrous sulfate (FEOSOL) 325 mg (65 mg iron) Tab tablet Take 65 mg by mouth once daily.    furosemide (LASIX) 20 MG tablet Take 1 tablet (20 mg total) by mouth daily as needed (edema).    isosorbide mononitrate (IMDUR) 30 MG 24 hr tablet TAKE 1 TABLET BY MOUTH EVERY DAY    lamoTRIgine (LAMICTAL) 100 MG tablet Take 1 tablet (100 mg total) by mouth 2 (two) times daily.    memantine (NAMENDA) 5 MG Tab Take 1 tablet (5 mg total) by mouth 2 (two) times daily.    metoprolol succinate (TOPROL-XL) 25 MG 24 hr tablet Take 1 tablet (25 mg total) by mouth 2 (two) times daily.    pravastatin (PRAVACHOL) 40 MG tablet Take 1 tablet (40 mg total) by mouth once daily.    QUEtiapine (SEROQUEL) 300 MG Tab Take 1 tablet (300 mg total) by mouth every evening.    telmisartan (MICARDIS) 20 MG Tab TAKE 1 TABLET BY MOUTH EVERY DAY    vitamin D 1000 units Tab Take 1,000 Units by mouth once daily.     No current  facility-administered medications on file prior to visit.     Review of patient's allergies indicates:  No Known Allergies   Review of Systems   Constitutional: Negative for diaphoresis, malaise/fatigue and weight gain.   HENT:  Negative for hoarse voice.    Eyes:  Negative for double vision and visual disturbance.   Cardiovascular:  Negative for chest pain, claudication, cyanosis, dyspnea on exertion, irregular heartbeat, leg swelling, near-syncope, orthopnea, palpitations, paroxysmal nocturnal dyspnea and syncope.   Respiratory:  Negative for cough, hemoptysis, shortness of breath and snoring.    Hematologic/Lymphatic: Negative for bleeding problem. Does not bruise/bleed easily.   Skin:  Negative for color change and poor wound healing.   Musculoskeletal:  Negative for muscle cramps, muscle weakness and myalgias.   Gastrointestinal:  Negative for bloating, abdominal pain, change in bowel habit, diarrhea, heartburn, hematemesis, hematochezia, melena and nausea.   Neurological:  Negative for excessive daytime sleepiness, dizziness, headaches, light-headedness, loss of balance, numbness and weakness.   Psychiatric/Behavioral:  Negative for memory loss. The patient does not have insomnia.    Allergic/Immunologic: Negative for hives.       Objective:   Physical Exam  Constitutional:       General: She is not in acute distress.     Appearance: Normal appearance. She is well-developed. She is not ill-appearing.   HENT:      Head: Normocephalic and atraumatic.   Eyes:      General: No scleral icterus.     Pupils: Pupils are equal, round, and reactive to light.   Neck:      Thyroid: No thyromegaly.      Vascular: Normal carotid pulses. No carotid bruit, hepatojugular reflux or JVD.      Trachea: No tracheal deviation.   Cardiovascular:      Rate and Rhythm: Normal rate and regular rhythm.      Pulses:           Femoral pulses are 1+ on the right side and 1+ on the left side.       Popliteal pulses are 1+ on the right side  "and 1+ on the left side.        Dorsalis pedis pulses are 0 on the right side and 1+ on the left side.        Posterior tibial pulses are 0 on the right side and 0 on the left side.      Heart sounds: Normal heart sounds. No murmur heard.     No friction rub. No gallop.      Comments: GROIN SITE WELL HEALED.  Pulmonary:      Effort: Pulmonary effort is normal. No respiratory distress.      Breath sounds: Normal breath sounds. No wheezing, rhonchi or rales.   Chest:      Chest wall: No tenderness.   Abdominal:      General: Bowel sounds are normal. There is no abdominal bruit.      Palpations: Abdomen is soft. There is no hepatomegaly or pulsatile mass.      Tenderness: There is no abdominal tenderness.   Musculoskeletal:      Right shoulder: No deformity.      Cervical back: Normal range of motion and neck supple.   Skin:     General: Skin is warm and dry.      Findings: No erythema or rash.      Nails: There is no clubbing.   Neurological:      Mental Status: She is alert and oriented to person, place, and time.      Cranial Nerves: No cranial nerve deficit.      Coordination: Coordination normal.   Psychiatric:         Speech: Speech normal.         Behavior: Behavior normal.       Vitals:    10/24/24 1013   BP: 138/70   BP Location: Right arm   Patient Position: Sitting   Pulse: 63   SpO2: 95%     Lab Results   Component Value Date    CHOL 185 10/15/2024    CHOL 192 07/14/2023    CHOL 119 (L) 03/31/2021      There is no height or weight on file to calculate BMI.   No results found for: "LABA1C", "HGBA1C"   BMP  Lab Results   Component Value Date     10/08/2024    K 4.5 10/08/2024     10/08/2024    CO2 17 (L) 10/08/2024    BUN 14 10/08/2024    CREATININE 0.9 10/08/2024    CALCIUM 8.2 (L) 10/08/2024    ANIONGAP 11 10/08/2024    EGFRNORACEVR >60 10/08/2024      Lab Results   Component Value Date    HDL 53 10/15/2024    HDL 35 (L) 07/14/2023    HDL 46 03/31/2021     Lab Results   Component Value Date    " LDLCALC 114.6 10/15/2024    LDLCALC 128.0 07/14/2023    LDLCALC 61.2 (L) 03/31/2021     Lab Results   Component Value Date    TRIG 87 10/15/2024    TRIG 145 07/14/2023    TRIG 59 03/31/2021     Lab Results   Component Value Date    CHOLHDL 28.6 10/15/2024    CHOLHDL 18.2 (L) 07/14/2023    CHOLHDL 38.7 03/31/2021       Chemistry        Component Value Date/Time     10/08/2024 0446    K 4.5 10/08/2024 0446     10/08/2024 0446    CO2 17 (L) 10/08/2024 0446    BUN 14 10/08/2024 0446    CREATININE 0.9 10/08/2024 0446    GLU 86 10/08/2024 0446        Component Value Date/Time    CALCIUM 8.2 (L) 10/08/2024 0446    ALKPHOS 72 09/04/2024 1334    AST 15 09/04/2024 1334    ALT 15 09/04/2024 1334    BILITOT 0.4 09/04/2024 1334    ESTGFRAFRICA 48.0 (A) 06/20/2022 1020    EGFRNONAA 41.7 (A) 06/20/2022 1020          Lab Results   Component Value Date    TSH 0.571 10/15/2024     Lab Results   Component Value Date    INR 1.0 10/07/2024    INR 1.1 10/01/2021     Lab Results   Component Value Date    WBC 7.55 10/08/2024    HGB 12.1 10/08/2024    HCT 41.4 10/08/2024     (H) 10/08/2024     10/08/2024     BMP  Sodium   Date Value Ref Range Status   10/08/2024 138 136 - 145 mmol/L Final     Potassium   Date Value Ref Range Status   10/08/2024 4.5 3.5 - 5.1 mmol/L Final     Chloride   Date Value Ref Range Status   10/08/2024 110 95 - 110 mmol/L Final     CO2   Date Value Ref Range Status   10/08/2024 17 (L) 23 - 29 mmol/L Final     BUN   Date Value Ref Range Status   10/08/2024 14 8 - 23 mg/dL Final     Creatinine   Date Value Ref Range Status   10/08/2024 0.9 0.5 - 1.4 mg/dL Final     Calcium   Date Value Ref Range Status   10/08/2024 8.2 (L) 8.7 - 10.5 mg/dL Final     Anion Gap   Date Value Ref Range Status   10/08/2024 11 8 - 16 mmol/L Final     eGFR if    Date Value Ref Range Status   06/20/2022 48.0 (A) >60 mL/min/1.73 m^2 Final     eGFR if non    Date Value Ref Range Status    06/20/2022 41.7 (A) >60 mL/min/1.73 m^2 Final     Comment:     Calculation used to obtain the estimated glomerular filtration  rate (eGFR) is the CKD-EPI equation.        CrCl cannot be calculated (Patient's most recent lab result is older than the maximum 7 days allowed.).    Assessment:     1. Ischemic ulcer of toe of left foot with fat layer exposed    2. Essential hypertension    3. History of tobacco use    4. Chronic combined systolic and diastolic CHF (congestive heart failure)    5. EKG abnormalities    6. CAD, multiple vessel    7. Iron deficiency anemia secondary to inadequate dietary iron intake    8. Other emphysema    9. Atherosclerosis of aorta    10. Major neurocognitive disorder due to Alzheimer's disease    11. Iron deficiency anemia, unspecified iron deficiency anemia type    12. Arterial insufficiency of lower extremity    Reviewed angios s/p revacsularIzAtion of her left sfa has significant pvd. Has biphasic pulse in dp  will continue  dual antiplatelets. Her  early gangrenous changes resolved. Ulcer scab well healed. No discoloration good capillary refill    Plan:   Ene    Left lower extremity duplex f/u in 6 weeks    F/u with DR HUTCHINS

## 2024-11-09 PROCEDURE — G0179 MD RECERTIFICATION HHA PT: HCPCS | Mod: ,,, | Performed by: PODIATRIST

## 2024-11-14 ENCOUNTER — HOSPITAL ENCOUNTER (OUTPATIENT)
Dept: CARDIOLOGY | Facility: HOSPITAL | Age: 74
Discharge: HOME | End: 2024-11-14
Attending: INTERNAL MEDICINE
Payer: MEDICARE

## 2024-11-14 ENCOUNTER — OFFICE VISIT (OUTPATIENT)
Dept: UROLOGY | Facility: CLINIC | Age: 74
End: 2024-11-14
Payer: MEDICARE

## 2024-11-14 VITALS
SYSTOLIC BLOOD PRESSURE: 150 MMHG | HEART RATE: 68 BPM | BODY MASS INDEX: 21.26 KG/M2 | HEIGHT: 63 IN | DIASTOLIC BLOOD PRESSURE: 70 MMHG | WEIGHT: 120 LBS

## 2024-11-14 VITALS
WEIGHT: 118 LBS | BODY MASS INDEX: 20.91 KG/M2 | HEIGHT: 63 IN | BODY MASS INDEX: 20.91 KG/M2 | WEIGHT: 118 LBS | HEIGHT: 63 IN

## 2024-11-14 DIAGNOSIS — I73.9 ARTERIAL INSUFFICIENCY OF LOWER EXTREMITY: ICD-10-CM

## 2024-11-14 DIAGNOSIS — L97.522 ISCHEMIC ULCER OF TOE OF LEFT FOOT WITH FAT LAYER EXPOSED: ICD-10-CM

## 2024-11-14 DIAGNOSIS — N30.00 ACUTE CYSTITIS WITHOUT HEMATURIA: Primary | ICD-10-CM

## 2024-11-14 LAB
LEFT ABI: 1.02
LEFT ANT TIBIAL SYS PSV: 100 CM/S
LEFT CFA PSV: 300 CM/S
LEFT DORSALIS PEDIS: 116 MMHG
LEFT PERONEAL SYS PSV: 93 CM/S
LEFT POPLITEAL PSV: 97 CM/S
LEFT POST TIBIAL SYS PSV: 20 CM/S
LEFT POSTERIOR TIBIAL: 129 MMHG
LEFT PROFUNDA SYS PSV: 162 CM/S
LEFT SUPER FEMORAL DIST SYS PSV: 71 CM/S
LEFT SUPER FEMORAL MID SYS PSV: 60 CM/S
LEFT SUPER FEMORAL OSTIAL SYS PSV: 186 CM/S
LEFT SUPER FEMORAL PROX SYS PSV: 109 CM/S
LEFT TBI: 0.93
LEFT TIB/PER TRUNK SYS PSV: 97 CM/S
LEFT TOE PRESSURE: 118 MMHG
OHS CV LEFT LOWER EXTREMITY ABI (NO CALC): 1.02
OHS CV LOWER EXTREMITY STENT MEASUREMENTS - LEFT - DSFA S1 - DIST: 62
OHS CV LOWER EXTREMITY STENT MEASUREMENTS - LEFT - DSFA S1 - MID: 70
OHS CV LOWER EXTREMITY STENT MEASUREMENTS - LEFT - DSFA S1 - OST: 53
OHS CV LOWER EXTREMITY STENT MEASUREMENTS - LEFT - DSFA S1 - PROX: 71
OHS CV LOWER EXTREMITY STENT MEASUREMENTS - LEFT - MSFA S1 - DIST: 60
OHS CV LOWER EXTREMITY STENT MEASUREMENTS - LEFT - MSFA S1 - MID: 51
OHS CV LOWER EXTREMITY STENT MEASUREMENTS - LEFT - MSFA S1 - OST: 57
OHS CV LOWER EXTREMITY STENT MEASUREMENTS - LEFT - MSFA S1 - PROX: 60
OHS CV LOWER EXTREMITY STENT MEASUREMENTS - LEFT - PSFA S1 - DIST: 57
OHS CV LOWER EXTREMITY STENT MEASUREMENTS - LEFT - PSFA S1 - MID: 61
OHS CV LOWER EXTREMITY STENT MEASUREMENTS - LEFT - PSFA S1 - OST: 109
OHS CV LOWER EXTREMITY STENT MEASUREMENTS - LEFT - PSFA S1 - PROX: 61
POC RESIDUAL URINE VOLUME: 438 ML (ref 0–100)
RIGHT ABI: 0.9
RIGHT ARM BP: 127 MMHG
RIGHT POSTERIOR TIBIAL: 114 MMHG
RIGHT TBI: 0.9
RIGHT TOE PRESSURE: 114 MMHG

## 2024-11-14 PROCEDURE — 3077F SYST BP >= 140 MM HG: CPT | Mod: CPTII,S$GLB,, | Performed by: NURSE PRACTITIONER

## 2024-11-14 PROCEDURE — 1159F MED LIST DOCD IN RCRD: CPT | Mod: CPTII,S$GLB,, | Performed by: NURSE PRACTITIONER

## 2024-11-14 PROCEDURE — 1101F PT FALLS ASSESS-DOCD LE1/YR: CPT | Mod: CPTII,S$GLB,, | Performed by: NURSE PRACTITIONER

## 2024-11-14 PROCEDURE — 93922 UPR/L XTREMITY ART 2 LEVELS: CPT

## 2024-11-14 PROCEDURE — 3078F DIAST BP <80 MM HG: CPT | Mod: CPTII,S$GLB,, | Performed by: NURSE PRACTITIONER

## 2024-11-14 PROCEDURE — 51798 US URINE CAPACITY MEASURE: CPT | Mod: S$GLB,,, | Performed by: NURSE PRACTITIONER

## 2024-11-14 PROCEDURE — 93926 LOWER EXTREMITY STUDY: CPT | Mod: LT

## 2024-11-14 PROCEDURE — 1160F RVW MEDS BY RX/DR IN RCRD: CPT | Mod: CPTII,S$GLB,, | Performed by: NURSE PRACTITIONER

## 2024-11-14 PROCEDURE — 3008F BODY MASS INDEX DOCD: CPT | Mod: CPTII,S$GLB,, | Performed by: NURSE PRACTITIONER

## 2024-11-14 PROCEDURE — 3288F FALL RISK ASSESSMENT DOCD: CPT | Mod: CPTII,S$GLB,, | Performed by: NURSE PRACTITIONER

## 2024-11-14 PROCEDURE — 1126F AMNT PAIN NOTED NONE PRSNT: CPT | Mod: CPTII,S$GLB,, | Performed by: NURSE PRACTITIONER

## 2024-11-14 PROCEDURE — 99999 PR PBB SHADOW E&M-EST. PATIENT-LVL IV: CPT | Mod: PBBFAC,,, | Performed by: NURSE PRACTITIONER

## 2024-11-14 PROCEDURE — 4010F ACE/ARB THERAPY RXD/TAKEN: CPT | Mod: CPTII,S$GLB,, | Performed by: NURSE PRACTITIONER

## 2024-11-14 PROCEDURE — 93922 UPR/L XTREMITY ART 2 LEVELS: CPT | Mod: 26,,, | Performed by: INTERNAL MEDICINE

## 2024-11-14 PROCEDURE — 99214 OFFICE O/P EST MOD 30 MIN: CPT | Mod: S$GLB,,, | Performed by: NURSE PRACTITIONER

## 2024-11-14 PROCEDURE — 93926 LOWER EXTREMITY STUDY: CPT | Mod: 26,LT,, | Performed by: INTERNAL MEDICINE

## 2024-11-14 PROCEDURE — 87086 URINE CULTURE/COLONY COUNT: CPT | Performed by: NURSE PRACTITIONER

## 2024-11-14 RX ORDER — CEPHALEXIN 250 MG/1
250 CAPSULE ORAL NIGHTLY
Qty: 90 CAPSULE | Refills: 3 | Status: SHIPPED | OUTPATIENT
Start: 2024-11-14 | End: 2025-11-14

## 2024-11-14 NOTE — PROGRESS NOTES
"Chief Complaint:   Recurrent urinary tract infections    HPI:   Patient is presenting with her daughter for recurrent urinary tract infections.  Is unable to take Macrodantin or Hiprex, is currently on Keflex 250 mg nightly.  Daughter states that she has been asymptomatic since our last visit.  Is unable to give a urine sample.  Initial postvoid residual is 438 mL.  Recent renal functions were normal, creatinine 0.9.  05/14/2024  Patient is presenting with her daughter.  Patient has recurrent urinary tract infections.  Is currently asymptomatic.  Will consider and discuss prophylactic antibiotics with daughter.  Unable to give urine sample.  04/23/2024  Patient is presenting with her daughter.  Patient was recently treated for urine culture indicated E coli.  Is currently completing Omnicef.  Is asymptomatic.  01/31/2024  Patient is a 73-year-old female that is presenting with her daughter.  Patient has a history of dementia, HPI obtained by daughter.  This is a three-month follow-up secondary to cystoscopy per Dr. Quinonez.  Patient was prescribed bethanecol 3 times daily.  PVR is 1 mL.  Urine in clinic is negative.  Daughter states that patient is voiding "normally", nocturia is twice nightly.  Denies gross hematuria.  Cristiano HERRON  11/27/2023  Cystoscopy  11/01/2023  Patient is a 73-year-old female that is presenting with her daughter.  Patient has a history of dementia.  Patient was recently seen by infectious disease and has a history of bacteremia due to E coli.  Daughter states that patient does not feel the urge to have a bowel movement or to void.  Has been in adult diapers for years.  Reports decrease in sensation occurred after a traumatic fall.  No history of gross hematuria or renal stones. Recent renal ultrasound indicated bilateral, simple renal cyst and nonspecific bladder wall thickening.   Allergies:  Patient has no known allergies.    Medications:  has a current medication list which includes the " following prescription(s): aluminum-magnesium hydroxide-simethicone 200-200-20 mg/5 mL Susp, diphenhydrAMINE 12.5 mg/5 mL Liqd, anastrozole, aspirin, buspirone, calcium citrate, cephalexin, clopidogrel, cyanocobalamin, donepezil, estradiol, ferrous sulfate, furosemide, isosorbide mononitrate, lamotrigine, memantine, metoprolol succinate, pravastatin, quetiapine, telmisartan, and vitamin d.    Review of Systems:  General: No fever, chills, fatigability, or weight loss.  Skin: No rashes, itching, or changes in color or texture of skin.  Chest: Denies GRISSOM, cyanosis, wheezing, cough, and sputum production.  Abdomen: Appetite fine. No weight loss. Denies diarrhea, abdominal pain, hematemesis, or blood in stool.  Musculoskeletal: No joint stiffness or swelling. Denies back pain.  : As above.  All other review of systems negative.    PMH:   has a past medical history of Arthritis, Behavioral change (2022), Blind right eye, Breast cancer (06/15/2017), Essential hypertension, Hemorrhoids, Immunodeficiency due to chemotherapy (2021), Lipoma of abdominal wall, Major neurocognitive disorder (2022), Obesity, Overactive bladder, Pap smear abnormality of cervix with ASCUS favoring benign, Thyroid nodule, Tobacco use disorder, Tubular adenoma of colon, and Urinary incontinence.    PSH:   has a past surgical history that includes Anterior vaginal repair; Thyroid lobectomy (Left, ); Bladder surgery;  section; Tubal ligation; Colonoscopy (N/A, 3/8/2017); Total abdominal hysterectomy; Colonoscopy (N/A, 2020); Esophagogastroduodenoscopy (N/A, 2020); Simple mastectomy (Left, 2020); Wister lymph node biopsy (Left, 2020); Intraluminal gastrointestinal tract imaging via capsule (N/A, 10/28/2020); Left heart catheterization (Left, 10/12/2021); Breast lumpectomy (Left, ); Cataract extraction (Left, 2022); Aortography with serialography (N/A, 10/7/2024); lithotripsy, coronary  transluminal, percutaneous (10/7/2024); pta, superficial femoral artery (10/7/2024); and stent, superficial femoral artery (10/7/2024).    FamHx: family history includes Alcohol abuse in her mother; Allergic rhinitis in her daughter; Cataracts in her father; Cervical cancer (age of onset: 32) in her daughter; Cirrhosis in her mother; Diabetes in her brother; Heart attack in her brother; Heart murmur in her brother; Hypertension in her daughter and father; No Known Problems in her daughter; Prostate cancer (age of onset: 80) in her father.    SocHx:  reports that she quit smoking about 4 years ago. Her smoking use included cigarettes. She started smoking about 54 years ago. She has a 50 pack-year smoking history. She has never used smokeless tobacco. She reports that she does not drink alcohol and does not use drugs.      Physical Exam:  Vitals:    11/14/24 1129   BP: (!) 150/70   Pulse: 68     General: A&Ox3, no apparent distress, no deformities  Neck: No masses, normal thyroid  Lungs: normal inspiration, no use of accessory muscles  Heart: normal pulse, no arrhythmias  Abdomen: Soft, NT, no masses, no hernias, no hepatosplenomegaly. slightly distended  Lymphatic: Neck and groin nodes negative  Skin: The skin is warm and dry. No jaundice.  Ext: No c/c/e    Impression/Plan:   Cath urine sent for culture, patient to return to clinic in 2-3 weeks, nurse visit for repeat PVR.

## 2024-11-15 ENCOUNTER — TELEPHONE (OUTPATIENT)
Dept: CARDIOLOGY | Facility: CLINIC | Age: 74
End: 2024-11-15
Payer: MEDICARE

## 2024-11-15 NOTE — TELEPHONE ENCOUNTER
Called [phone] and spoke with daughter.  I advised results and she voiced understanding.         ----- Message from Tiffanie Santos MD sent at 11/15/2024  8:39 AM CST -----  Good blood flow to left leg

## 2024-11-16 LAB — BACTERIA UR CULT: NO GROWTH

## 2024-11-18 ENCOUNTER — TELEPHONE (OUTPATIENT)
Dept: NEUROLOGY | Facility: CLINIC | Age: 74
End: 2024-11-18
Payer: MEDICARE

## 2024-11-18 NOTE — TELEPHONE ENCOUNTER
----- Message from Kadie sent at 11/18/2024 12:37 PM CST -----  Type:  Patient Returning Call    Who Called:Mitesha/ Daughter  Who Left Message for Patient:  Does the patient know what this is regarding?:[facility] Paperwork  Would the patient rather a call back or a response via [facility]? Callback  Best Call Back Number:[phone]  Additional Information: Needing to send in [facility] paperwork to be filled out

## 2024-11-18 NOTE — TELEPHONE ENCOUNTER
Spoke with pt daughter in regards to daughter in regards to knowing info about FMLA and informed her of the process of how it works. She did verbalized understanding.

## 2024-11-19 ENCOUNTER — PATIENT MESSAGE (OUTPATIENT)
Dept: UROLOGY | Facility: CLINIC | Age: 74
End: 2024-11-19
Payer: MEDICARE

## 2024-11-21 ENCOUNTER — CLINICAL SUPPORT (OUTPATIENT)
Dept: UROLOGY | Facility: CLINIC | Age: 74
End: 2024-11-21
Payer: MEDICARE

## 2024-11-21 DIAGNOSIS — N30.00 ACUTE CYSTITIS WITHOUT HEMATURIA: Primary | ICD-10-CM

## 2024-11-21 LAB — POC RESIDUAL URINE VOLUME: 470 ML (ref 0–100)

## 2024-11-21 NOTE — PROGRESS NOTES
Pt came in for nurse visit , which was a pvr , placed patient on table , scanned bladder it was 470 . Let provider know wants to see her back with an nurse visit tomorrow on how to self cath . Gave patient SURESH roper was satisfied .

## 2024-11-22 ENCOUNTER — CLINICAL SUPPORT (OUTPATIENT)
Dept: UROLOGY | Facility: CLINIC | Age: 74
End: 2024-11-22
Payer: MEDICARE

## 2024-11-22 VITALS
HEIGHT: 63 IN | BODY MASS INDEX: 20.9 KG/M2 | SYSTOLIC BLOOD PRESSURE: 131 MMHG | DIASTOLIC BLOOD PRESSURE: 70 MMHG | WEIGHT: 117.94 LBS | HEART RATE: 87 BPM

## 2024-11-22 DIAGNOSIS — N39.41 URGE INCONTINENCE OF URINE: Primary | ICD-10-CM

## 2024-11-22 PROCEDURE — 99999 PR PBB SHADOW E&M-EST. PATIENT-LVL IV: CPT | Mod: PBBFAC,,,

## 2024-11-22 NOTE — PROGRESS NOTES
..Patient in today with her daughter  to learn how to self cath.  Instructions were given to the daughter. Supplies were gathered; pt instructed to wash her hands or use gloves if she preferred.  Told her to wash around the urethral opening with warm, antibacterial soapy water from front to back.  Then she should remove catheter from the package and lube it with KY jelly.  She was told to use her fingers to spread apart the labia and locate the urinary opening with her fingers.  If she preferred, she was told to use a mirror to help her find the opening.  Then she should insert catheter slowly inserted into the urethra.  Daughter was told that if she felt resistance when the catheter reaches the sphincter muscle, to have pt take a deep breath and gently apply steady pressure--do not force the catheter.  Was then told that when urine begins to flow, insert the catheter another inch to allow urine to flow into toilet.  Once urine flow stops, the catheter can be  removed and discarded.  Pt to [facility] daily per Ms. Rushing due to urinary incontinence.  Order faxed to [facility].

## 2024-11-27 ENCOUNTER — TELEPHONE (OUTPATIENT)
Dept: UROLOGY | Facility: CLINIC | Age: 74
End: 2024-11-27
Payer: MEDICARE

## 2024-11-29 ENCOUNTER — HOSPITAL ENCOUNTER (INPATIENT)
Facility: HOSPITAL | Age: 74
LOS: 5 days | Discharge: HOME HEALTH | DRG: 871 | End: 2024-12-04
Attending: EMERGENCY MEDICINE | Admitting: SPECIALIST
Payer: MEDICARE

## 2024-11-29 DIAGNOSIS — R41.3 OTHER AMNESIA: ICD-10-CM

## 2024-11-29 DIAGNOSIS — R09.2 RESPIRATORY ARREST: ICD-10-CM

## 2024-11-29 DIAGNOSIS — J96.01 ACUTE HYPOXEMIC RESPIRATORY FAILURE: ICD-10-CM

## 2024-11-29 DIAGNOSIS — G30.9 MAJOR NEUROCOGNITIVE DISORDER DUE TO ALZHEIMER'S DISEASE: ICD-10-CM

## 2024-11-29 DIAGNOSIS — F03.911 AGITATION DUE TO DEMENTIA: ICD-10-CM

## 2024-11-29 DIAGNOSIS — R41.82 ALTERED MENTAL STATUS, UNSPECIFIED ALTERED MENTAL STATUS TYPE: ICD-10-CM

## 2024-11-29 DIAGNOSIS — A41.9 SEVERE SEPSIS: ICD-10-CM

## 2024-11-29 DIAGNOSIS — J96.01 ACUTE RESPIRATORY FAILURE WITH HYPOXIA AND HYPERCARBIA: Primary | ICD-10-CM

## 2024-11-29 DIAGNOSIS — R65.20 SEVERE SEPSIS: ICD-10-CM

## 2024-11-29 DIAGNOSIS — J96.02 ACUTE RESPIRATORY FAILURE WITH HYPOXIA AND HYPERCARBIA: Primary | ICD-10-CM

## 2024-11-29 DIAGNOSIS — F02.80 MAJOR NEUROCOGNITIVE DISORDER DUE TO ALZHEIMER'S DISEASE: ICD-10-CM

## 2024-11-29 PROBLEM — G93.41 ENCEPHALOPATHY, METABOLIC: Status: ACTIVE | Noted: 2024-11-29

## 2024-11-29 PROBLEM — R33.9 URINARY RETENTION: Status: ACTIVE | Noted: 2024-11-29

## 2024-11-29 PROBLEM — R55 SYNCOPE: Status: ACTIVE | Noted: 2024-11-29

## 2024-11-29 PROBLEM — J95.811 POSTPROCEDURAL PNEUMOTHORAX: Status: ACTIVE | Noted: 2024-11-29

## 2024-11-29 LAB
ALBUMIN SERPL BCP-MCNC: 3.4 G/DL (ref 3.5–5.2)
ALLENS TEST: ABNORMAL
ALP SERPL-CCNC: 71 U/L (ref 40–150)
ALT SERPL W/O P-5'-P-CCNC: 16 U/L (ref 10–44)
ANION GAP SERPL CALC-SCNC: 11 MMOL/L (ref 8–16)
AORTIC ROOT ANNULUS: 2.54 CM
APICAL FOUR CHAMBER EJECTION FRACTION: 33 %
APTT PPP: 28.3 SEC (ref 21–32)
ASCENDING AORTA: 2.52 CM
AST SERPL-CCNC: 17 U/L (ref 10–40)
AV INDEX (PROSTH): 0.44
AV MEAN GRADIENT: 4.4 MMHG
AV PEAK GRADIENT: 5.8 MMHG
AV VALVE AREA BY VELOCITY RATIO: 1.4 CM²
AV VALVE AREA: 1.2 CM²
AV VELOCITY RATIO: 0.5
BASOPHILS # BLD AUTO: 0.05 K/UL (ref 0–0.2)
BASOPHILS NFR BLD: 0.4 % (ref 0–1.9)
BILIRUB SERPL-MCNC: 0.4 MG/DL (ref 0.1–1)
BILIRUB UR QL STRIP: NEGATIVE
BNP SERPL-MCNC: 633 PG/ML (ref 0–99)
BSA FOR ECHO PROCEDURE: 1.56 M2
BUN SERPL-MCNC: 18 MG/DL (ref 8–23)
CALCIUM SERPL-MCNC: 8.5 MG/DL (ref 8.7–10.5)
CHLORIDE SERPL-SCNC: 107 MMOL/L (ref 95–110)
CLARITY UR: CLEAR
CO2 SERPL-SCNC: 24 MMOL/L (ref 23–29)
COLOR UR: YELLOW
CREAT SERPL-MCNC: 1.3 MG/DL (ref 0.5–1.4)
CV ECHO LV RWT: 0.48 CM
DELSYS: ABNORMAL
DIFFERENTIAL METHOD BLD: ABNORMAL
DOP CALC AO PEAK VEL: 1.2 M/S
DOP CALC AO VTI: 31.7 CM
DOP CALC LVOT AREA: 2.8 CM2
DOP CALC LVOT DIAMETER: 1.9 CM
DOP CALC LVOT PEAK VEL: 0.6 M/S
DOP CALC LVOT STROKE VOLUME: 39.4 CM3
DOP CALC RVOT PEAK VEL: 0.44 M/S
DOP CALC RVOT VTI: 8.9 CM
DOP CALCLVOT PEAK VEL VTI: 13.9 CM
E WAVE DECELERATION TIME: 186.19 MSEC
E/A RATIO: 0.72
E/E' RATIO: 11.75 M/S
ECHO LV POSTERIOR WALL: 1 CM (ref 0.6–1.1)
EJECTION FRACTION: 40 %
EOSINOPHIL # BLD AUTO: 0.6 K/UL (ref 0–0.5)
EOSINOPHIL NFR BLD: 4.2 % (ref 0–8)
ERYTHROCYTE [DISTWIDTH] IN BLOOD BY AUTOMATED COUNT: 13.9 % (ref 11.5–14.5)
ERYTHROCYTE [SEDIMENTATION RATE] IN BLOOD BY WESTERGREN METHOD: 22 MM/H
EST. GFR  (NO RACE VARIABLE): 43 ML/MIN/1.73 M^2
FIO2: 40
FRACTIONAL SHORTENING: 19 % (ref 28–44)
GLUCOSE SERPL-MCNC: 173 MG/DL (ref 70–110)
GLUCOSE UR QL STRIP: NEGATIVE
HCO3 UR-SCNC: 25.1 MMOL/L (ref 24–28)
HCT VFR BLD AUTO: 34.7 % (ref 37–48.5)
HGB BLD-MCNC: 10.9 G/DL (ref 12–16)
HGB UR QL STRIP: NEGATIVE
IMM GRANULOCYTES # BLD AUTO: 0.11 K/UL (ref 0–0.04)
IMM GRANULOCYTES NFR BLD AUTO: 0.8 % (ref 0–0.5)
INFLUENZA A, MOLECULAR: NEGATIVE
INFLUENZA B, MOLECULAR: NEGATIVE
INR PPP: 1.1 (ref 0.8–1.2)
INTERVENTRICULAR SEPTUM: 0.9 CM (ref 0.6–1.1)
IVC DIAMETER: 2.02 CM
KETONES UR QL STRIP: NEGATIVE
LA MAJOR: 4.93 CM
LA MINOR: 3.5 CM
LA WIDTH: 3 CM
LACTATE SERPL-SCNC: 2.9 MMOL/L (ref 0.5–2.2)
LACTATE SERPL-SCNC: 2.9 MMOL/L (ref 0.5–2.2)
LEFT ATRIUM AREA SYSTOLIC (APICAL 2 CHAMBER): 8.98 CM2
LEFT ATRIUM AREA SYSTOLIC (APICAL 4 CHAMBER): 12.96 CM2
LEFT ATRIUM SIZE: 2.36 CM
LEFT ATRIUM VOLUME INDEX MOD: 14.8 ML/M2
LEFT ATRIUM VOLUME INDEX: 16.1 ML/M2
LEFT ATRIUM VOLUME MOD: 22.68 ML
LEFT ATRIUM VOLUME: 24.64 CM3
LEFT INTERNAL DIMENSION IN SYSTOLE: 3.4 CM (ref 2.1–4)
LEFT VENTRICLE DIASTOLIC VOLUME INDEX: 52.19 ML/M2
LEFT VENTRICLE DIASTOLIC VOLUME: 79.85 ML
LEFT VENTRICLE END DIASTOLIC VOLUME APICAL 4 CHAMBER: 43.39 ML
LEFT VENTRICLE END SYSTOLIC VOLUME APICAL 2 CHAMBER: 15.15 ML
LEFT VENTRICLE END SYSTOLIC VOLUME APICAL 4 CHAMBER: 24.51 ML
LEFT VENTRICLE MASS INDEX: 83.5 G/M2
LEFT VENTRICLE SYSTOLIC VOLUME INDEX: 31.4 ML/M2
LEFT VENTRICLE SYSTOLIC VOLUME: 47.97 ML
LEFT VENTRICULAR INTERNAL DIMENSION IN DIASTOLE: 4.2 CM (ref 3.5–6)
LEFT VENTRICULAR MASS: 127.8 G
LEUKOCYTE ESTERASE UR QL STRIP: NEGATIVE
LV LATERAL E/E' RATIO: 11.75 M/S
LV SEPTAL E/E' RATIO: 11.75 M/S
LVED V (TEICH): 79.85 ML
LVES V (TEICH): 47.97 ML
LVOT MG: 0.82 MMHG
LVOT MV: 0.43 CM/S
LYMPHOCYTES # BLD AUTO: 2.4 K/UL (ref 1–4.8)
LYMPHOCYTES NFR BLD: 17.3 % (ref 18–48)
MAGNESIUM SERPL-MCNC: 2.2 MG/DL (ref 1.6–2.6)
MCH RBC QN AUTO: 30.1 PG (ref 27–31)
MCHC RBC AUTO-ENTMCNC: 31.4 G/DL (ref 32–36)
MCV RBC AUTO: 96 FL (ref 82–98)
MODE: ABNORMAL
MONOCYTES # BLD AUTO: 0.6 K/UL (ref 0.3–1)
MONOCYTES NFR BLD: 4 % (ref 4–15)
MV PEAK A VEL: 0.65 M/S
MV PEAK E VEL: 0.47 M/S
MV STENOSIS PRESSURE HALF TIME: 54 MS
MV VALVE AREA P 1/2 METHOD: 4.07 CM2
NEUTROPHILS # BLD AUTO: 10.2 K/UL (ref 1.8–7.7)
NEUTROPHILS NFR BLD: 73.3 % (ref 38–73)
NITRITE UR QL STRIP: NEGATIVE
NRBC BLD-RTO: 0 /100 WBC
OHS QRS DURATION: 88 MS
OHS QTC CALCULATION: 495 MS
PCO2 BLDA: 55.7 MMHG (ref 35–45)
PEEP: 8
PH SMN: 7.26 [PH] (ref 7.35–7.45)
PH UR STRIP: 6 [PH] (ref 5–8)
PHOSPHATE SERPL-MCNC: 5.1 MG/DL (ref 2.7–4.5)
PISA MRMAX VEL: 3.65 M/S
PISA TR MAX VEL: 2.24 M/S
PLATELET # BLD AUTO: 252 K/UL (ref 150–450)
PMV BLD AUTO: 10.1 FL (ref 9.2–12.9)
PO2 BLDA: 84 MMHG (ref 80–100)
POC BE: -2 MMOL/L
POC SATURATED O2: 94 % (ref 95–100)
POCT GLUCOSE: 124 MG/DL (ref 70–110)
POCT GLUCOSE: 131 MG/DL (ref 70–110)
POCT GLUCOSE: 57 MG/DL (ref 70–110)
POCT GLUCOSE: 81 MG/DL (ref 70–110)
POCT GLUCOSE: 93 MG/DL (ref 70–110)
POTASSIUM SERPL-SCNC: 4 MMOL/L (ref 3.5–5.1)
PROCALCITONIN SERPL IA-MCNC: 0.03 NG/ML
PROT SERPL-MCNC: 6.9 G/DL (ref 6–8.4)
PROT UR QL STRIP: ABNORMAL
PROTHROMBIN TIME: 12.1 SEC (ref 9–12.5)
PV MEAN GRADIENT: 0 MMHG
RA MAJOR: 3.64 CM
RA WIDTH: 2.9 CM
RBC # BLD AUTO: 3.62 M/UL (ref 4–5.4)
RIGHT VENTRICLE DIASTOLIC MID DIMENSION: 2.2 CM
RV MID DIAMA: 2.24 CM
SAMPLE: ABNORMAL
SARS-COV-2 RDRP RESP QL NAA+PROBE: NEGATIVE
SITE: ABNORMAL
SODIUM SERPL-SCNC: 142 MMOL/L (ref 136–145)
SP GR UR STRIP: 1.02 (ref 1–1.03)
SPECIMEN SOURCE: NORMAL
STJ: 2.59 CM
TDI LATERAL: 0.04 M/S
TDI SEPTAL: 0.04 M/S
TDI: 0.04 M/S
TR MAX PG: 20 MMHG
TRICUSPID ANNULAR PLANE SYSTOLIC EXCURSION: 1.73 CM
TROPONIN I SERPL DL<=0.01 NG/ML-MCNC: 0.02 NG/ML (ref 0–0.03)
URN SPEC COLLECT METH UR: ABNORMAL
UROBILINOGEN UR STRIP-ACNC: NEGATIVE EU/DL
VT: 380
WBC # BLD AUTO: 13.9 K/UL (ref 3.9–12.7)
Z-SCORE OF LEFT VENTRICULAR DIMENSION IN END DIASTOLE: -0.61
Z-SCORE OF LEFT VENTRICULAR DIMENSION IN END SYSTOLE: 1.62

## 2024-11-29 PROCEDURE — C9399 UNCLASSIFIED DRUGS OR BIOLOG: HCPCS | Performed by: EMERGENCY MEDICINE

## 2024-11-29 PROCEDURE — 63600175 PHARM REV CODE 636 W HCPCS: Performed by: EMERGENCY MEDICINE

## 2024-11-29 PROCEDURE — 20000000 HC ICU ROOM

## 2024-11-29 PROCEDURE — 63600175 PHARM REV CODE 636 W HCPCS: Performed by: SPECIALIST

## 2024-11-29 PROCEDURE — 25000003 PHARM REV CODE 250: Performed by: EMERGENCY MEDICINE

## 2024-11-29 PROCEDURE — 83735 ASSAY OF MAGNESIUM: CPT | Performed by: EMERGENCY MEDICINE

## 2024-11-29 PROCEDURE — 25000003 PHARM REV CODE 250: Performed by: NURSE PRACTITIONER

## 2024-11-29 PROCEDURE — 99291 CRITICAL CARE FIRST HOUR: CPT

## 2024-11-29 PROCEDURE — 27200966 HC CLOSED SUCTION SYSTEM

## 2024-11-29 PROCEDURE — 99900026 HC AIRWAY MAINTENANCE (STAT)

## 2024-11-29 PROCEDURE — 93005 ELECTROCARDIOGRAM TRACING: CPT

## 2024-11-29 PROCEDURE — 87205 SMEAR GRAM STAIN: CPT | Performed by: NURSE PRACTITIONER

## 2024-11-29 PROCEDURE — 51702 INSERT TEMP BLADDER CATH: CPT

## 2024-11-29 PROCEDURE — 94761 N-INVAS EAR/PLS OXIMETRY MLT: CPT | Mod: XB

## 2024-11-29 PROCEDURE — 25500020 PHARM REV CODE 255: Performed by: SPECIALIST

## 2024-11-29 PROCEDURE — 99900035 HC TECH TIME PER 15 MIN (STAT)

## 2024-11-29 PROCEDURE — 25000003 PHARM REV CODE 250: Performed by: SPECIALIST

## 2024-11-29 PROCEDURE — 27100108

## 2024-11-29 PROCEDURE — 96365 THER/PROPH/DIAG IV INF INIT: CPT

## 2024-11-29 PROCEDURE — 85730 THROMBOPLASTIN TIME PARTIAL: CPT | Performed by: EMERGENCY MEDICINE

## 2024-11-29 PROCEDURE — 81003 URINALYSIS AUTO W/O SCOPE: CPT | Performed by: EMERGENCY MEDICINE

## 2024-11-29 PROCEDURE — 84145 PROCALCITONIN (PCT): CPT | Performed by: EMERGENCY MEDICINE

## 2024-11-29 PROCEDURE — 83605 ASSAY OF LACTIC ACID: CPT | Performed by: EMERGENCY MEDICINE

## 2024-11-29 PROCEDURE — 82803 BLOOD GASES ANY COMBINATION: CPT

## 2024-11-29 PROCEDURE — 94002 VENT MGMT INPAT INIT DAY: CPT

## 2024-11-29 PROCEDURE — C1751 CATH, INF, PER/CENT/MIDLINE: HCPCS

## 2024-11-29 PROCEDURE — 87502 INFLUENZA DNA AMP PROBE: CPT | Performed by: EMERGENCY MEDICINE

## 2024-11-29 PROCEDURE — 25000003 PHARM REV CODE 250

## 2024-11-29 PROCEDURE — 36415 COLL VENOUS BLD VENIPUNCTURE: CPT | Performed by: EMERGENCY MEDICINE

## 2024-11-29 PROCEDURE — 02HV33Z INSERTION OF INFUSION DEVICE INTO SUPERIOR VENA CAVA, PERCUTANEOUS APPROACH: ICD-10-PCS | Performed by: INTERNAL MEDICINE

## 2024-11-29 PROCEDURE — 84484 ASSAY OF TROPONIN QUANT: CPT | Performed by: EMERGENCY MEDICINE

## 2024-11-29 PROCEDURE — 36569 INSJ PICC 5 YR+ W/O IMAGING: CPT

## 2024-11-29 PROCEDURE — 83880 ASSAY OF NATRIURETIC PEPTIDE: CPT | Performed by: EMERGENCY MEDICINE

## 2024-11-29 PROCEDURE — 84100 ASSAY OF PHOSPHORUS: CPT | Performed by: EMERGENCY MEDICINE

## 2024-11-29 PROCEDURE — 85610 PROTHROMBIN TIME: CPT | Performed by: EMERGENCY MEDICINE

## 2024-11-29 PROCEDURE — 96375 TX/PRO/DX INJ NEW DRUG ADDON: CPT

## 2024-11-29 PROCEDURE — 87070 CULTURE OTHR SPECIMN AEROBIC: CPT | Performed by: NURSE PRACTITIONER

## 2024-11-29 PROCEDURE — 27100171 HC OXYGEN HIGH FLOW UP TO 24 HOURS

## 2024-11-29 PROCEDURE — 80053 COMPREHEN METABOLIC PANEL: CPT | Performed by: EMERGENCY MEDICINE

## 2024-11-29 PROCEDURE — 93010 ELECTROCARDIOGRAM REPORT: CPT | Mod: ,,, | Performed by: INTERNAL MEDICINE

## 2024-11-29 PROCEDURE — 63600175 PHARM REV CODE 636 W HCPCS: Performed by: NURSE PRACTITIONER

## 2024-11-29 PROCEDURE — 87635 SARS-COV-2 COVID-19 AMP PRB: CPT | Performed by: EMERGENCY MEDICINE

## 2024-11-29 PROCEDURE — 36600 WITHDRAWAL OF ARTERIAL BLOOD: CPT

## 2024-11-29 PROCEDURE — 87040 BLOOD CULTURE FOR BACTERIA: CPT | Mod: 59 | Performed by: EMERGENCY MEDICINE

## 2024-11-29 PROCEDURE — 85025 COMPLETE CBC W/AUTO DIFF WBC: CPT | Performed by: EMERGENCY MEDICINE

## 2024-11-29 RX ORDER — SODIUM CHLORIDE 0.9 % (FLUSH) 0.9 %
10 SYRINGE (ML) INJECTION
Status: DISCONTINUED | OUTPATIENT
Start: 2024-11-29 | End: 2024-12-04 | Stop reason: HOSPADM

## 2024-11-29 RX ORDER — GLUCAGON 1 MG
1 KIT INJECTION
Status: DISCONTINUED | OUTPATIENT
Start: 2024-11-29 | End: 2024-12-04 | Stop reason: HOSPADM

## 2024-11-29 RX ORDER — CHLORHEXIDINE GLUCONATE ORAL RINSE 1.2 MG/ML
15 SOLUTION DENTAL 2 TIMES DAILY
Status: DISCONTINUED | OUTPATIENT
Start: 2024-11-29 | End: 2024-11-29

## 2024-11-29 RX ORDER — INSULIN ASPART 100 [IU]/ML
0-10 INJECTION, SOLUTION INTRAVENOUS; SUBCUTANEOUS EVERY 6 HOURS PRN
Status: DISCONTINUED | OUTPATIENT
Start: 2024-11-29 | End: 2024-12-04 | Stop reason: HOSPADM

## 2024-11-29 RX ORDER — NOREPINEPHRINE BITARTRATE 0.02 MG/ML
INJECTION, SOLUTION INTRAVENOUS
Status: COMPLETED
Start: 2024-11-29 | End: 2024-11-29

## 2024-11-29 RX ORDER — NOREPINEPHRINE BITARTRATE 0.02 MG/ML
0-3 INJECTION, SOLUTION INTRAVENOUS CONTINUOUS
Status: DISCONTINUED | OUTPATIENT
Start: 2024-11-29 | End: 2024-11-29

## 2024-11-29 RX ORDER — FENTANYL CITRATE-0.9 % NACL/PF 10 MCG/ML
0-250 PLASTIC BAG, INJECTION (ML) INTRAVENOUS CONTINUOUS
Status: DISCONTINUED | OUTPATIENT
Start: 2024-11-29 | End: 2024-12-01

## 2024-11-29 RX ORDER — MAGNESIUM SULFATE HEPTAHYDRATE 40 MG/ML
2 INJECTION, SOLUTION INTRAVENOUS
Status: DISCONTINUED | OUTPATIENT
Start: 2024-11-29 | End: 2024-12-03

## 2024-11-29 RX ORDER — POTASSIUM CHLORIDE 7.45 MG/ML
60 INJECTION INTRAVENOUS
Status: DISCONTINUED | OUTPATIENT
Start: 2024-11-29 | End: 2024-12-03

## 2024-11-29 RX ORDER — DEXMEDETOMIDINE HYDROCHLORIDE 4 UG/ML
0-1.4 INJECTION INTRAVENOUS CONTINUOUS
Status: DISCONTINUED | OUTPATIENT
Start: 2024-11-29 | End: 2024-11-29

## 2024-11-29 RX ORDER — ZINC GLUCONATE 50 MG
50 TABLET ORAL DAILY
COMMUNITY

## 2024-11-29 RX ORDER — POTASSIUM CHLORIDE 7.45 MG/ML
40 INJECTION INTRAVENOUS
Status: DISCONTINUED | OUTPATIENT
Start: 2024-11-29 | End: 2024-12-03

## 2024-11-29 RX ORDER — MUPIROCIN 20 MG/G
OINTMENT TOPICAL 2 TIMES DAILY
Status: COMPLETED | OUTPATIENT
Start: 2024-11-29 | End: 2024-12-03

## 2024-11-29 RX ORDER — MAGNESIUM SULFATE HEPTAHYDRATE 40 MG/ML
4 INJECTION, SOLUTION INTRAVENOUS
Status: DISCONTINUED | OUTPATIENT
Start: 2024-11-29 | End: 2024-12-03

## 2024-11-29 RX ORDER — ACETAMINOPHEN 500 MG
5000 TABLET ORAL DAILY
COMMUNITY

## 2024-11-29 RX ORDER — ACETAMINOPHEN 325 MG/1
650 TABLET ORAL EVERY 4 HOURS PRN
Status: DISCONTINUED | OUTPATIENT
Start: 2024-11-29 | End: 2024-12-04 | Stop reason: HOSPADM

## 2024-11-29 RX ORDER — ONDANSETRON HYDROCHLORIDE 2 MG/ML
4 INJECTION, SOLUTION INTRAVENOUS EVERY 6 HOURS PRN
Status: DISCONTINUED | OUTPATIENT
Start: 2024-11-29 | End: 2024-12-04 | Stop reason: HOSPADM

## 2024-11-29 RX ORDER — CHLORHEXIDINE GLUCONATE ORAL RINSE 1.2 MG/ML
15 SOLUTION DENTAL 2 TIMES DAILY
Status: DISCONTINUED | OUTPATIENT
Start: 2024-11-29 | End: 2024-12-02

## 2024-11-29 RX ORDER — POTASSIUM CHLORIDE 7.45 MG/ML
80 INJECTION INTRAVENOUS
Status: DISCONTINUED | OUTPATIENT
Start: 2024-11-29 | End: 2024-12-03

## 2024-11-29 RX ORDER — FAMOTIDINE 10 MG/ML
20 INJECTION INTRAVENOUS DAILY
Status: DISCONTINUED | OUTPATIENT
Start: 2024-11-29 | End: 2024-12-03

## 2024-11-29 RX ORDER — PROPOFOL 10 MG/ML
0-50 INJECTION, EMULSION INTRAVENOUS CONTINUOUS
Status: DISCONTINUED | OUTPATIENT
Start: 2024-11-29 | End: 2024-12-01

## 2024-11-29 RX ADMIN — NOREPINEPHRINE BITARTRATE 0.02 MCG/KG/MIN: 0.02 INJECTION, SOLUTION INTRAVENOUS at 10:11

## 2024-11-29 RX ADMIN — SODIUM CHLORIDE 1716 ML: 9 INJECTION, SOLUTION INTRAVENOUS at 10:11

## 2024-11-29 RX ADMIN — PROPOFOL 40 MCG/KG/MIN: 10 INJECTION, EMULSION INTRAVENOUS at 11:11

## 2024-11-29 RX ADMIN — METHYLPREDNISOLONE SODIUM SUCCINATE 40 MG: 40 INJECTION, POWDER, FOR SOLUTION INTRAMUSCULAR; INTRAVENOUS at 05:11

## 2024-11-29 RX ADMIN — MUPIROCIN: 20 OINTMENT TOPICAL at 09:11

## 2024-11-29 RX ADMIN — PIPERACILLIN SODIUM AND TAZOBACTAM SODIUM 4.5 G: 4; .5 INJECTION, POWDER, FOR SOLUTION INTRAVENOUS at 01:11

## 2024-11-29 RX ADMIN — MUPIROCIN: 20 OINTMENT TOPICAL at 12:11

## 2024-11-29 RX ADMIN — PROPOFOL 50 MCG/KG/MIN: 10 INJECTION, EMULSION INTRAVENOUS at 12:11

## 2024-11-29 RX ADMIN — VANCOMYCIN HYDROCHLORIDE 1250 MG: 1.25 INJECTION, POWDER, LYOPHILIZED, FOR SOLUTION INTRAVENOUS at 01:11

## 2024-11-29 RX ADMIN — CHLORHEXIDINE GLUCONATE 0.12% ORAL RINSE 15 ML: 1.2 LIQUID ORAL at 12:11

## 2024-11-29 RX ADMIN — IOHEXOL 100 ML: 350 INJECTION, SOLUTION INTRAVENOUS at 11:11

## 2024-11-29 RX ADMIN — CHLORHEXIDINE GLUCONATE 0.12% ORAL RINSE 15 ML: 1.2 LIQUID ORAL at 09:11

## 2024-11-29 RX ADMIN — PIPERACILLIN SODIUM AND TAZOBACTAM SODIUM 4.5 G: 4; .5 INJECTION, POWDER, FOR SOLUTION INTRAVENOUS at 09:11

## 2024-11-29 RX ADMIN — DEXTROSE MONOHYDRATE 125 ML: 100 INJECTION, SOLUTION INTRAVENOUS at 06:11

## 2024-11-29 RX ADMIN — Medication 25 MCG/HR: at 12:11

## 2024-11-29 RX ADMIN — FAMOTIDINE 20 MG: 10 INJECTION, SOLUTION INTRAVENOUS at 12:11

## 2024-11-29 RX ADMIN — METHYLPREDNISOLONE SODIUM SUCCINATE 40 MG: 40 INJECTION, POWDER, FOR SOLUTION INTRAMUSCULAR; INTRAVENOUS at 11:11

## 2024-11-29 RX ADMIN — DEXMEDETOMIDINE HYDROCHLORIDE 0.2 MCG/KG/HR: 4 INJECTION INTRAVENOUS at 09:11

## 2024-11-29 RX ADMIN — PROPOFOL 50 MCG/KG/MIN: 10 INJECTION, EMULSION INTRAVENOUS at 05:11

## 2024-11-29 NOTE — ASSESSMENT & PLAN NOTE
"This patient does have evidence of infective focus  My overall impression is sepsis.  Source: Unknown  Antibiotics given-   Antibiotics (72h ago, onward)      Start     Stop Route Frequency Ordered    11/29/24 1300  piperacillin-tazobactam (ZOSYN) 4.5 g in D5W 100 mL IVPB (MB+)         -- IV Every 8 hours (non-standard times) 11/29/24 1146    11/29/24 1245  mupirocin 2 % ointment         12/04/24 0859 Nasl 2 times daily 11/29/24 1143    11/29/24 1145  vancomycin 1,250 mg in 0.9% NaCl 250 mL IVPB (admixture device)         -- IV Once 11/29/24 1040    11/29/24 1139  vancomycin - pharmacy to dose  (vancomycin IVPB (PEDS and ADULTS))        Placed in "And" Linked Group    -- IV pharmacy to manage frequency 11/29/24 1040          Latest lactate reviewed-  Recent Labs   Lab 11/29/24  0929   LACTATE 2.9*     Organ dysfunction indicated by Acute respiratory failure and Encephalopathy    Fluid challenge- received in ER    Post- resuscitation assessment Yes Perfusion exam was performed within 6 hours of septic shock presentation after bolus shows Adequate tissue perfusion assessed by non-invasive monitoring       Will Not start Pressors- Levophed for MAP of 65    Pressors briefly started in the ER, now off  Start zosyn, vanc  History of recurrent UTIs- pancultures pending  Trend LA  "

## 2024-11-29 NOTE — ASSESSMENT & PLAN NOTE
Patients blood pressure range in the last 24 hours was: BP  Min: 58/43  Max: 160/74.The patient's inpatient anti-hypertensive regimen is listed below:  Current Antihypertensives       Plan  - Hypotensive on arrival after multiple sedatives requiring pressors  - Now off pressors, will utilize prn meds if BP trends up,  holding home meds at this time

## 2024-11-29 NOTE — ASSESSMENT & PLAN NOTE
Patient has Combined Systolic and Diastolic heart failure that is Acute on chronic. On presentation their CHF was well compensated. Most recent BNP and echo results are listed below.  Recent Labs     11/29/24  0929   *     Latest ECHO  Results for orders placed during the hospital encounter of 06/22/23    Echo    Interpretation Summary  · The left ventricle is normal in size with mild mild asymmetric hypertrophy eccentric hypertrophy and low normal systolic function.  · The estimated ejection fraction is 50%.  · Normal left ventricular diastolic function.  · Normal right ventricular size with normal right ventricular systolic function.  · Mild to moderate tricuspid regurgitation.  · Intermediate central venous pressure (8 mmHg).  · The estimated PA systolic pressure is 43 mmHg.  · There is pulmonary hypertension.    Current Heart Failure Medications  NORepinephrine 4 mg in sodium chloride 0.9% 250 mL infusion (premix), Continuous, Intravenous    Plan  - Monitor strict I&Os and daily weights.    - Place on telemetry  - Cardiology has not been consulted  - Received fluid bolus in ER, will watch for fluid overload  - New Echo pending

## 2024-11-29 NOTE — NURSING
Pt arrived to unit from ER intubated, placed on ICU bed and monitors. Precedex gtt & NS bolus bolus infusing. Pt nodding/gesturing appropriately, attempting to speak. BSWR in use. Dr. Espinoza at bedside. Family in waiting area.

## 2024-11-29 NOTE — ED PROVIDER NOTES
"SCRIBE(s) #1 NOTE: I, Rosita Cuenca & Roslyn Das, am scribing for, and in the presence of, Mark Davis MD, have scribed the entire note.       History     Chief Complaint   Patient presents with    Respiratory Arrest     Per EMS, pt went unresponsive in front of family after stating she was going to die. Agonal on scene, intubated en route.     Review of patient's allergies indicates:  No Known Allergies      History of Present Illness     HPI    11/29/2024, 10:14 AM  History obtained from the Ambulance Service      History of Present Illness: Leia Rodriguez is a 74 y.o. female patient with a PMHx of CAD, PACs, CHF, breast cancer 4 years ago, HTN, dementia, and ongoing chemo who presents to the Emergency Department after being found by family to be unresponsive 10 minutes after seeing the pt awake and talking. The pt did tell her family that she felt like she was "dying." Fire department found her with agonal respirations and intubated her, lost that airway, and then tried a Combi tube that was unsuccessful so they reintubated her in the field. She was significantly hypertensive initially and given ketamine (total 200 mg) and fentanyl. She arrived in the emergency department intubated with spontaneous respirations but unresponsive satting in the high 90s.      Arrival mode: Ambulance Service    PCP: Yasmin Navarro MD        Past Medical History:  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    Lipoma of abdominal wall     Major neurocognitive disorder 06/21/2022    Obesity     Overactive bladder     Pap smear abnormality of cervix with ASCUS favoring benign     Thyroid nodule     Tobacco use disorder     Tubular adenoma of colon     " Urinary incontinence        Past Surgical History:  Past Surgical History:   Procedure Laterality Date    ANTERIOR VAGINAL REPAIR      AORTOGRAPHY WITH SERIALOGRAPHY N/A 10/7/2024    Procedure: AORTOGRAM, WITH SERIALOGRAPHY;  Surgeon: Tiffanie Santos MD;  Location: [facility] CATH LAB;  Service: Cardiology;  Laterality: N/A;  MD requested Anesthesia    BLADDER SURGERY      BREAST LUMPECTOMY Left     CATARACT EXTRACTION Left 2022     SECTION      X2    COLONOSCOPY N/A 3/8/2017    Procedure: COLONOSCOPY;  Surgeon: Tyron Paris MD;  Location: [facility];  Service: Endoscopy;  Laterality: N/A;    COLONOSCOPY N/A 2020    Procedure: COLONOSCOPY;  Surgeon: Keira Ellison MD;  Location: [facility];  Service: Endoscopy;  Laterality: N/A;    ESOPHAGOGASTRODUODENOSCOPY N/A 2020    Procedure: EGD (ESOPHAGOGASTRODUODENOSCOPY);  Surgeon: Keira Ellison MD;  Location: [facility];  Service: Endoscopy;  Laterality: N/A;  new onset iron deficiency with prior history of breast cancer    INTRALUMINAL GASTROINTESTINAL TRACT IMAGING VIA CAPSULE N/A 10/28/2020    Procedure: IMAGING PROCEDURE, GI TRACT, INTRALUMINAL, VIA CAPSULE;  Surgeon: Finesse Jha RN;  Location: [facility];  Service: Endoscopy;  Laterality: N/A;    LEFT HEART CATHETERIZATION Left 10/12/2021    Procedure: CATHETERIZATION, HEART, LEFT;  Surgeon: Tiffanie Santos MD;  Location: [facility] CATH LAB;  Service: Cardiology;  Laterality: Left;    LITHOTRIPSY, CORONARY TRANSLUMINAL, PERCUTANEOUS  10/7/2024    Procedure: LITHOTRIPSY, CORONARY TRANSLUMINAL, PERCUTANEOUS;  Surgeon: Tiffanie Santos MD;  Location: [facility] CATH LAB;  Service: Cardiology;;    PTA, SUPERFICIAL FEMORAL ARTERY  10/7/2024    Procedure: PTA, Superficial Femoral Artery;  Surgeon: Tiffanie Santos MD;  Location: [facility] CATH LAB;  Service: Cardiology;;    SENTINEL LYMPH NODE BIOPSY Left 2020    Procedure: BIOPSY, LYMPH NODE, SENTINEL;  Surgeon: Vincent Moyer MD;  Location: [facility]  OR;  Service: General;  Laterality: Left;    SIMPLE MASTECTOMY Left 2020    Procedure: MASTECTOMY, SIMPLE;  Surgeon: Vincent Moyer MD;  Location: [facility] OR;  Service: General;  Laterality: Left;    STENT, SUPERFICIAL FEMORAL ARTERY  10/7/2024    Procedure: Stent, Superficial Femoral Artery;  Surgeon: Tiffanie Santos MD;  Location: [facility] CATH LAB;  Service: Cardiology;;    THYROID LOBECTOMY Left     TOTAL ABDOMINAL HYSTERECTOMY      TUBAL LIGATION           Family History:  Family History   Problem Relation Name Age of Onset    Hypertension Father      Cataracts Father      Prostate cancer Father  80    Cervical cancer Daughter Cenetra 32    Allergic rhinitis Daughter Cenetra     Cirrhosis Mother      Alcohol abuse Mother      Hypertension Daughter Sheronica     Diabetes Brother      Heart attack Brother      Heart murmur Brother      No Known Problems Daughter Gladis        Social History:  Social History     Tobacco Use    Smoking status: Former     Current packs/day: 0.00     Average packs/day: 1 pack/day for 50.0 years (50.0 ttl pk-yrs)     Types: Cigarettes     Start date: 1970     Quit date: 2020     Years since quittin.2    Smokeless tobacco: Never   Substance and Sexual Activity    Alcohol use: Never     Alcohol/week: 28.0 standard drinks of alcohol     Types: 28 Cans of beer per week    Drug use: No    Sexual activity: Never     Partners: Male        Review of Systems     Review of Systems   Unable to perform ROS: Severe respiratory distress        Physical Exam     Initial Vitals [24 0923]   BP Pulse Resp Temp SpO2   (!) 85/58 97 20 97.2 °F (36.2 °C) 100 %      MAP       --          Physical Exam  Nursing Notes and Vital Signs Reviewed.  Constitutional: Patient is in distress. Pt is unresponsive upon exam.  Head: Atraumatic. Normocephalic.  Eyes: PERRL. Conjunctivae are not pale. No scleral icterus.  ENT: Mucous membranes are moist. Oropharynx is clear and symmetric.    Neck: Supple.  Full ROM. No lymphadenopathy.  Cardiovascular: Regular rate. Regular rhythm. No murmurs, rubs, or gallops. Distal pulses are 2+ and symmetric.  Pulmonary/Chest: Severe respiratory distress.  Abdominal: Abdomen distended.  Genitourinary: No CVA tenderness  Musculoskeletal: No obvious deformities  Skin: Warm and dry.  Neurological: No acute focal neurological deficits are appreciated.  Psychiatric: Appropriate in content.     ED Course   Procedures  ED Vital Signs:  Vitals:    11/29/24 1017 11/29/24 1021 11/29/24 1025 11/29/24 1030   BP: (!) 58/43 (!) 69/47 (!) 63/44 (!) 96/58   Pulse: 77 71 70 69   Resp: (!) 22 (!) 22 (!) 22 (!) 22   Temp:       TempSrc:       SpO2: 98% 99% 99% 99%   Weight:       Height:        11/29/24 1035 11/29/24 1040 11/29/24 1050 11/29/24 1100   BP: (!) 81/52 (!) 94/54 124/67 136/70   Pulse: 68 66 69 71   Resp: (!) 22 (!) 22     Temp:       TempSrc:       SpO2: 99% 100% 100% 100%   Weight:       Height:        11/29/24 1105 11/29/24 1109 11/29/24 1115 11/29/24 1120   BP: (!) 143/72  (!) 160/74 (!) 156/74   Pulse: 74  72 71   Resp:   (!) 22 (!) 22   Temp:       TempSrc:       SpO2: 100% 96% 100% 100%   Weight:       Height:        11/29/24 1130 11/29/24 1150 11/29/24 1200   BP: (!) 152/73  (!) 172/81   Pulse: 69 69 76   Resp: (!) 22 (!) 72 (!) 22   Temp:  96 °F (35.6 °C)    TempSrc:  Rectal    SpO2: 100%     Weight:  57.9 kg (127 lb 10.3 oz)    Height:  5' (1.524 m)        Abnormal Lab Results:  Labs Reviewed   CBC W/ AUTO DIFFERENTIAL - Abnormal       Result Value    WBC 13.90 (*)     RBC 3.62 (*)     Hemoglobin 10.9 (*)     Hematocrit 34.7 (*)     MCV 96      MCH 30.1      MCHC 31.4 (*)     RDW 13.9      Platelets 252      MPV 10.1      Immature Granulocytes 0.8 (*)     Gran # (ANC) 10.2 (*)     Immature Grans (Abs) 0.11 (*)     Lymph # 2.4      Mono # 0.6      Eos # 0.6 (*)     Baso # 0.05      nRBC 0      Gran % 73.3 (*)     Lymph % 17.3 (*)     Mono % 4.0      Eosinophil % 4.2       Basophil % 0.4      Differential Method Automated     COMPREHENSIVE METABOLIC PANEL - Abnormal    Sodium 142      Potassium 4.0      Chloride 107      CO2 24      Glucose 173 (*)     BUN 18      Creatinine 1.3      Calcium 8.5 (*)     Total Protein 6.9      Albumin 3.4 (*)     Total Bilirubin 0.4      Alkaline Phosphatase 71      AST 17      ALT 16      eGFR 43 (*)     Anion Gap 11     LACTIC ACID, PLASMA - Abnormal    Lactate (Lactic Acid) 2.9 (*)    URINALYSIS, REFLEX TO URINE CULTURE - Abnormal    Specimen UA Urine, Clean Catch      Color, UA Yellow      Appearance, UA Clear      pH, UA 6.0      Specific Gravity, UA 1.020      Protein, UA Trace (*)     Glucose, UA Negative      Ketones, UA Negative      Bilirubin (UA) Negative      Occult Blood UA Negative      Nitrite, UA Negative      Urobilinogen, UA Negative      Leukocytes, UA Negative      Narrative:     Specimen Source->Urine   B-TYPE NATRIURETIC PEPTIDE - Abnormal     (*)    PHOSPHORUS - Abnormal    Phosphorus 5.1 (*)    ISTAT PROCEDURE - Abnormal    POC PH 7.261 (*)     POC PCO2 55.7 (*)     POC PO2 84      POC HCO3 25.1      POC BE -2      POC SATURATED O2 94      Rate 22      Sample ARTERIAL      Site RR      Allens Test Pass      DelSys Adult Vent      Mode AC/PRVC      Vt 380      PEEP 8      FiO2 40     INFLUENZA A & B BY MOLECULAR    Influenza A, Molecular Negative      Influenza B, Molecular Negative      Flu A & B Source Nasal swab     CULTURE, BLOOD   CULTURE, BLOOD   TROPONIN I    Troponin I 0.023     PROCALCITONIN    Procalcitonin 0.03     MAGNESIUM    Magnesium 2.2     PROTIME-INR    Prothrombin Time 12.1      INR 1.1     APTT    aPTT 28.3     SARS-COV-2 RNA AMPLIFICATION, QUAL    SARS-CoV-2 RNA, Amplification, Qual Negative     LACTIC ACID, PLASMA   POCT GLUCOSE MONITORING CONTINUOUS        All Lab Results:  Results for orders placed or performed during the hospital encounter of 11/29/24   EKG 12-lead    Collection Time: 11/29/24   9:23 AM   Result Value Ref Range    QRS Duration 88 ms    [facility] QTC Calculation 495 ms   CBC auto differential    Collection Time: 11/29/24  9:29 AM   Result Value Ref Range    WBC 13.90 (H) 3.90 - 12.70 K/uL    RBC 3.62 (L) 4.00 - 5.40 M/uL    Hemoglobin 10.9 (L) 12.0 - 16.0 g/dL    Hematocrit 34.7 (L) 37.0 - 48.5 %    MCV 96 82 - 98 fL    MCH 30.1 27.0 - 31.0 pg    MCHC 31.4 (L) 32.0 - 36.0 g/dL    RDW 13.9 11.5 - 14.5 %    Platelets 252 150 - 450 K/uL    MPV 10.1 9.2 - 12.9 fL    Immature Granulocytes 0.8 (H) 0.0 - 0.5 %    Gran # (ANC) 10.2 (H) 1.8 - 7.7 K/uL    Immature Grans (Abs) 0.11 (H) 0.00 - 0.04 K/uL    Lymph # 2.4 1.0 - 4.8 K/uL    Mono # 0.6 0.3 - 1.0 K/uL    Eos # 0.6 (H) 0.0 - 0.5 K/uL    Baso # 0.05 0.00 - 0.20 K/uL    nRBC 0 0 /100 WBC    Gran % 73.3 (H) 38.0 - 73.0 %    Lymph % 17.3 (L) 18.0 - 48.0 %    Mono % 4.0 4.0 - 15.0 %    Eosinophil % 4.2 0.0 - 8.0 %    Basophil % 0.4 0.0 - 1.9 %    Differential Method Automated    Comprehensive metabolic panel    Collection Time: 11/29/24  9:29 AM   Result Value Ref Range    Sodium 142 136 - 145 mmol/L    Potassium 4.0 3.5 - 5.1 mmol/L    Chloride 107 95 - 110 mmol/L    CO2 24 23 - 29 mmol/L    Glucose 173 (H) 70 - 110 mg/dL    BUN 18 8 - 23 mg/dL    Creatinine 1.3 0.5 - 1.4 mg/dL    Calcium 8.5 (L) 8.7 - 10.5 mg/dL    Total Protein 6.9 6.0 - 8.4 g/dL    Albumin 3.4 (L) 3.5 - 5.2 g/dL    Total Bilirubin 0.4 0.1 - 1.0 mg/dL    Alkaline Phosphatase 71 40 - 150 U/L    AST 17 10 - 40 U/L    ALT 16 10 - 44 U/L    eGFR 43 (A) >60 mL/min/1.73 m^2    Anion Gap 11 8 - 16 mmol/L   Lactic acid, plasma #1    Collection Time: 11/29/24  9:29 AM   Result Value Ref Range    Lactate (Lactic Acid) 2.9 (H) 0.5 - 2.2 mmol/L   Troponin I    Collection Time: 11/29/24  9:29 AM   Result Value Ref Range    Troponin I 0.023 0.000 - 0.026 ng/mL   Procalcitonin    Collection Time: 11/29/24  9:29 AM   Result Value Ref Range    Procalcitonin 0.03 <0.25 ng/mL   Brain natriuretic peptide     Collection Time: 11/29/24  9:29 AM   Result Value Ref Range     (H) 0 - 99 pg/mL   Phosphorus    Collection Time: 11/29/24  9:29 AM   Result Value Ref Range    Phosphorus 5.1 (H) 2.7 - 4.5 mg/dL   Magnesium    Collection Time: 11/29/24  9:29 AM   Result Value Ref Range    Magnesium 2.2 1.6 - 2.6 mg/dL   Protime-INR    Collection Time: 11/29/24  9:29 AM   Result Value Ref Range    Prothrombin Time 12.1 9.0 - 12.5 sec    INR 1.1 0.8 - 1.2   APTT    Collection Time: 11/29/24  9:29 AM   Result Value Ref Range    aPTT 28.3 21.0 - 32.0 sec   Influenza A & B by Molecular    Collection Time: 11/29/24  9:48 AM    Specimen: Nasal Swab   Result Value Ref Range    Influenza A, Molecular Negative Negative    Influenza B, Molecular Negative Negative    Flu A & B Source Nasal swab    Urinalysis, Reflex to Urine Culture Urine, Clean Catch    Collection Time: 11/29/24  9:48 AM    Specimen: Urine   Result Value Ref Range    Specimen UA Urine, Clean Catch     Color, UA Yellow Yellow, Straw, Cynthia    Appearance, UA Clear Clear    pH, UA 6.0 5.0 - 8.0    Specific Gravity, UA 1.020 1.005 - 1.030    Protein, UA Trace (A) Negative    Glucose, UA Negative Negative    Ketones, UA Negative Negative    Bilirubin (UA) Negative Negative    Occult Blood UA Negative Negative    Nitrite, UA Negative Negative    Urobilinogen, UA Negative <2.0 EU/dL    Leukocytes, UA Negative Negative   ISTAT PROCEDURE    Collection Time: 11/29/24  9:49 AM   Result Value Ref Range    POC PH 7.261 (LL) 7.35 - 7.45    POC PCO2 55.7 (H) 35 - 45 mmHg    POC PO2 84 80 - 100 mmHg    POC HCO3 25.1 24 - 28 mmol/L    POC BE -2 -2 to 2 mmol/L    POC SATURATED O2 94 95 - 100 %    Rate 22     Sample ARTERIAL     Site RR     Allens Test Pass     DelSys Adult Vent     Mode AC/PRVC     Vt 380     PEEP 8     FiO2 40    COVID-19 Rapid Screening    Collection Time: 11/29/24  9:51 AM   Result Value Ref Range    SARS-CoV-2 RNA, Amplification, Qual Negative Negative     *Note:  Due to a large number of results and/or encounters for the requested time period, some results have not been displayed. A complete set of results can be found in Results Review.       Imaging Results:  Imaging Results              CTA Chest Non-Coronary (PE Studies) (Final result)  Result time 11/29/24 11:48:33      Final result by Jaret Rodríguez MD (11/29/24 11:48:33)                   Impression:      No pulmonary embolism seen.    Moderate right pneumothorax and small right greater than left pleural effusion.    Interlobular septal thickening with scattered confluent opacification in the dependent mid to upper lungs possibly related to edema or infection.    Endotracheal tube terminates in the proximal right mainstem bronchus.  Recommend retraction by several cm.      Electronically signed by: Jaret Rodríguez  Date:    11/29/2024  Time:    11:48               Narrative:    EXAMINATION:  CTA CHEST NON CORONARY (PE STUDIES)    CLINICAL HISTORY:  Pulmonary embolism (PE) suspected, high prob;    TECHNIQUE:  Low dose axial images, sagittal and coronal reformations were obtained from the thoracic inlet to the lung bases following the IV administration of 100 mL of Omnipaque 350.  Contrast timing was optimized to evaluate the pulmonary arteries.  MIP images were performed.    COMPARISON:  CT dated 09/24/2024    FINDINGS:  No significant pulmonary embolus seen.  Pulmonary trunk and main pulmonary arteries appear normal in caliber.    Heart is normal in size.  There is mild reflux of contrast into the inferior vena cava suggesting some degree of right heart dysfunction.  Otherwise no CT evidence of significant right heart strain.  No significant pericardial effusion.    Scattered coronary and aortic atherosclerosis.    Trachea and bronchial trees are patent with normal branching pattern.  Endotracheal tube terminates in the right mainstem bronchus.    Interlobular septal thickening noted throughout the bilateral  lungs with scattered confluent opacification in the dependent aspect of the right greater than left mid to upper lungs.    Moderate right pneumothorax.  Small right greater than left pleural effusion.    No new concerning mediastinal or hilar lymphadenopathy.    Unchanged asymmetric enlargement of the left thyroid gland with heterogeneous appearance including coarse calcification.  Surgical clips seen in the left axilla.  Soft tissues of the visualized lower neck, chest wall, and axilla are otherwise unremarkable.    Esophagogastric tube appears to terminate in the mid gastric body.  Visualized upper abdominal contents otherwise unremarkable.    No acute bony abnormality.  No aggressive lytic or blastic lesion.  Degenerative changes noted in the spine and shoulders.                                       CT Head Without Contrast (Final result)  Result time 11/29/24 11:37:07      Final result by Jaret Rodríguez MD (11/29/24 11:37:07)                   Impression:      No acute intracranial abnormality.    Small left frontal meningioma appears unchanged with no significant mass effect on the left frontal lobe.    Cerebral volume loss and nonspecific white matter changes likely related to chronic microvascular ischemia.    Mild paranasal sinus disease.      Electronically signed by: Jaret Rodríguez  Date:    11/29/2024  Time:    11:37               Narrative:    EXAMINATION:  CT HEAD WITHOUT CONTRAST    CLINICAL HISTORY:  Mental status change, unknown cause;    TECHNIQUE:  Low dose axial images were obtained through the head.  Coronal and sagittal reformations were also performed. Contrast was not administered.    COMPARISON:  MRI dated 06/26/2024.  CT dated 07/09/2023    FINDINGS:  Cerebral volume loss with associated sulcal and ventricular prominence.  No evidence of hydrocephalus.    Scattered hypoattenuation noted in the cerebral white matter.    Small extra-axial lesion overlying the left frontal lobe is  unchanged compared to prior studies with no significant mass effect on the left frontal lobe.    No evidence of acute territorial infarct or intracranial hemorrhage.  No significant midline shift or mass effect.    Midline structures and posterior fossa structures appear unremarkable.  Basal cisterns are clear.  Bilateral carotid siphon and distal vertebral artery calcification noted.    Calvarium is intact without acute or aggressive abnormality.  Right phthisis bulbi.  Postsurgical appearance of the left orbital lens.  Visualized orbits and globes otherwise unremarkable.  Mild mucosal thickening of the of the ethmoid air cells and maxillary sinuses.  Mastoid air cells are clear.                                       XR Gastric tube check, non-radiologist performed (Final result)  Result time 11/29/24 11:54:49      Final result by Jaret Rodríguez MD (11/29/24 11:54:49)                   Impression:      Endotracheal tube appears to terminate at the proximal right mainstem bronchus.  Recommend retraction by several cm.    Orogastric tube appears appropriately position.    Interstitial and patchy confluent opacities in the right greater than left mid to lower lungs with additional mild hazy opacification in the right lower lung.      Electronically signed by: Jaret Rodríguez  Date:    11/29/2024  Time:    11:54               Narrative:    EXAMINATION:  XR GASTRIC TUBE CHECK, NON-RADIOLOGIST PERFORMED    CLINICAL HISTORY:  OG tube placement;    TECHNIQUE:  Frontal view of the chest and upper abdomen    COMPARISON:  None    FINDINGS:  Endotracheal tube terminates at the proximal right mainstem bronchus.  Orogastric tube extends beyond field of view with distal tip and side port appearing to terminate in the right hemiabdomen.  Aortic arch calcification noted.  Cardiomediastinal silhouette is otherwise within normal limits.  Interstitial opacities and more confluent hazy opacities noted in the right greater  than left mid to upper lungs.  Additional mild hazy opacification noted in the right lower lung.  No acute bony abnormality.  Degenerative changes noted in the spine and shoulders.  Surgical clips noted in the left axilla.                                       The EKG was ordered, reviewed, and independently interpreted by the ED provider.  Interpretation time: 09:23  Rate: 82 BPM  Rhythm: normal sinus rhythm  Interpretation: Marked T wave abnormality, consider anterolateral ischemia. Prolonged QT. No STEMI. Nonspecific ST and T wave abnormalities.T Wave inversion anterolaterally.   No significant change from the EKG on 06/21/24.           The Emergency Provider reviewed the vital signs and test results, which are outlined above.     ED Discussion       10:54 AM: Discussed case with Dr. Espinoza (Critical Care). Alexis agrees with current care and management of pt and accepts admission.   Admitting Service: Critical Care Medicine  Admitting Physician: Dr. Espinoza  Admit to: Inpatient    10:54 AM: Re-evaluated pt.  I have discussed test results, shared treatment plan, and the need for admission with patient/family at bedside. Pt/family express understanding at this time and agree with all information. All questions answered. Pt/family member(s) have no further questions or concerns at this time. Pt is ready for admit.    ED Course as of 11/29/24 1222   Fri Nov 29, 2024   0940 Rechecked NG and ET tubes. Both are in place correctly. [BG]   1017 BNP(!): 633 [LELA]   1018 Troponin I: 0.023 [LELA]   1018 Phosphorus Level(!): 5.1 [LELA]   1018 Lactic Acid Level(!): 2.9 [LELA]   1018 WBC(!): 13.90 [LELA]   1018 BUN: 18 [LELA]   1018 Creatinine: 1.3 [LELA]   1018 XR Gastric tube check, non-radiologist performed  NG tube in appropriate position [LELA]      ED Course User Index  [BG] Rosita Cuenca  [LELA] Mark Davis MD     Medical Decision Making  73y/o F whose current medical conditions include CAD, PACs, CHFmrEF, h/o beast cancer 4 yrs  "ago lumpectomy and chemo, recurrent in  s/p mastectomy and chemo ongoing, and HTN, dementia lower back pain wheelchair helps    Quit smoking in  who was found unresponsive by her family 10 minutes after seeing her awake/talking-she did say to her family that she felt like she was "dying. " Fire department found her with agonal respirations and intubated her, lost that airway and then tried a Combi tube that was unsuccessful so they reintubated her in the field. She was significantly hypertensive initially and given ketamine (total 200 mg) and fentanyl. She arrived in the emergency department intubated with spontaneous respirations but unresponsive satting in the high 90s. ABG shows pH 7.26, pCO2 55.7, PaO2 of 84 correlating with the 94% sat. White blood cell count is elevated at 13.9, H&H is decreased to 10.9/34.7 in serum lactic acid was slightly elevated at 2.9. Troponin and protocol are normal and BNP is elevated to 633. Chest x-ray consistent with pulmonary edema but can not exclude pneumonia and ordered vancomycin. As noted above her blood pressure is dropping on Precedex. CT of the head is still pending and I am starting Levophed peripherally and setting up for  anticipated central line. She will need ICU level care     Problems Addressed:  Acute hypoxemic respiratory failure: acute illness or injury that poses a threat to life or bodily functions  Altered mental status, unspecified altered mental status type: acute illness or injury that poses a threat to life or bodily functions  Respiratory arrest: acute illness or injury that poses a threat to life or bodily functions    Amount and/or Complexity of Data Reviewed  Independent Historian: EMS     Details:  States patient was initially intubated with some difficulty and some how tube got dislodged-they started a Combi tube that was not very effective so they intubated the patient 2nd time with minimal difficulty- some bright red blood " "noted  Labs: ordered. Decision-making details documented in ED Course.  Radiology: ordered and independent interpretation performed. Decision-making details documented in ED Course.     Details: CTA impression:    "No pulmonary embolism seen.   Moderate right pneumothorax and small right greater than left pleural effusion.   Interlobular septal thickening with scattered confluent opacification in the dependent mid to upper lungs possibly related to edema or infection.   Endotracheal tube terminates in the proximal right mainstem bronchus. Recommend retraction by several cm."    I did not appreciate the pneumothorax on the x-ray I obtained here in the emergency department-they did have a slightly traumatic intubation in the field but not sure of the etiology. Her blood pressures had stabilized off pressor so I did not place a central line down here in the emergency department.     ECG/medicine tests: ordered and independent interpretation performed. Decision-making details documented in ED Course.     Details:  No STEMI    Risk  Prescription drug management.  Decision regarding hospitalization.    Critical Care  Total time providing critical care: 60 minutes                ED Medication(s):  Medications   NORepinephrine 4 mg in sodium chloride 0.9% 250 mL infusion (premix) (0 mcg/kg/min × 57.2 kg Intravenous Stopped 11/29/24 1110)   vancomycin - pharmacy to dose (has no administration in time range)   vancomycin 1,250 mg in 0.9% NaCl 250 mL IVPB (admixture device) (has no administration in time range)   mupirocin 2 % ointment (has no administration in time range)   chlorhexidine 0.12 % solution 15 mL (15 mLs Mouth/Throat Given 11/29/24 1215)   sodium chloride 0.9% flush 10 mL (has no administration in time range)   potassium chloride 10 mEq in 100 mL IVPB (has no administration in time range)     And   potassium chloride 10 mEq in 100 mL IVPB (has no administration in time range)     And   potassium chloride 10 mEq " in 100 mL IVPB (has no administration in time range)   magnesium sulfate 2g in water 50mL IVPB (premix) (has no administration in time range)   magnesium sulfate 2g in water 50mL IVPB (premix) (has no administration in time range)   sodium phosphate 15 mmol in D5W 250 mL IVPB (has no administration in time range)   sodium phosphate 20.01 mmol in D5W 250 mL IVPB (has no administration in time range)   sodium phosphate 30 mmol in D5W 250 mL IVPB (has no administration in time range)   acetaminophen tablet 650 mg (has no administration in time range)   famotidine (PF) injection 20 mg (20 mg Intravenous Given 11/29/24 1216)   ondansetron injection 4 mg (has no administration in time range)   glucagon (human recombinant) injection 1 mg (has no administration in time range)   insulin aspart U-100 pen 0-10 Units (has no administration in time range)   dextrose 10% bolus 125 mL 125 mL (has no administration in time range)   dextrose 10% bolus 250 mL 250 mL (has no administration in time range)   piperacillin-tazobactam (ZOSYN) 4.5 g in D5W 100 mL IVPB (MB+) (has no administration in time range)   propofol (DIPRIVAN) 10 mg/mL infusion (50 mcg/kg/min × 57.9 kg Intravenous New Bag 11/29/24 1215)   fentaNYL 2500 mcg in 0.9% sodium chloride 250 mL infusion premix (has no administration in time range)   sodium chloride 0.9% bolus 1,716 mL 1,716 mL (1,716 mLs Intravenous New Bag 11/29/24 1014)   iohexoL (OMNIPAQUE 350) injection 100 mL (100 mLs Intravenous Given 11/29/24 1106)       Current Discharge Medication List                  Scribe Attestation:   Scribe(s) #1: I performed the above scribed service and the documentation accurately describes the services I performed. I attest to the accuracy of the note.     Attending:   Physician Attestation Statement for Scribe(s) #1: I, Mark Davis MD, personally performed the services described in this documentation, as scribed by Rosita Cuenca & Roslyn Das in my presence, and it is  both accurate and complete.         Attending Attestation:         Attending Critical Care:   Critical Care Times:   Direct Patient Care (initial evaluation, reassessments, and time considering the case)................................................................20 minutes.   Additional History from reviewing old medical records or taking additional history from the family, EMS, PCP, etc.......................15 minutes.   Ordering, Reviewing, and Interpreting Diagnostic Studies...............................................................................................................10 minutes.   Documentation..................................................................................................................................................................................10 minutes.   Consultation with other Physicians. .................................................................................................................................................5 minutes.   ==============================================================  Total Critical Care Time - exclusive of procedural time: 60 minutes.  ==============================================================  Critical care was necessary to treat or prevent imminent or life-threatening deterioration of the following conditions: respiratory failure.   Critical care was time spent personally by me on the following activities: obtaining history from patient or relative, examination of patient, review of old charts, ordering lab, x-rays, and/or EKG, development of treatment plan with patient or relative, ordering and performing treatments and interventions, evaluation of patient's response to treatment, discussions with primary provider, interpretation of cardiac measurements and re-evaluation of patient's conition.   Critical Care Condition: life-threatening            Clinical Impression     Final diagnoses:  [R09.2] Respiratory  arrest  [J96.01] Acute hypoxemic respiratory failure  [R41.82] Altered mental status, unspecified altered mental status type     Disposition:   Disposition: Admitted  Condition: Serious       Mark Davis MD  11/29/24 1944

## 2024-11-29 NOTE — CONSULTS
Pharmacokinetic Initial Assessment: IV Vancomycin    Assessment/Plan:  Initiate intravenous vancomycin with a loading dose of 20 mg/kg (1250 mg) once with subsequent doses when random concentrations are less than 20 mcg/mL  Desired empiric serum trough concentration is 10 to 20 mcg/mL  Draw vancomycin random level on 11/30 at 0430 with AM labs.  Pharmacy will continue to follow and monitor vancomycin.      Please contact pharmacy at extension [phone] with any questions regarding this assessment.     Thank you for the consult,   Katherine McArdle, Pharm.D. 11/29/2024 2:27 PM         Patient brief summary:  Leia Rodriguez is a 74 y.o. female initiated on antimicrobial therapy with IV Vancomycin for treatment of suspected sepsis of unknown source     Drug Allergies:   Review of patient's allergies indicates:  No Known Allergies    Actual Body Weight:   57.2 kg    Renal Function:   Estimated Creatinine Clearance: 30.5 mL/min (based on SCr of 1.3 mg/dL).    Dialysis Method (if applicable):  N/A    CBC (last 72 hours):  Recent Labs   Lab Result Units 11/29/24  0929   WBC K/uL 13.90*   Hemoglobin g/dL 10.9*   Hematocrit % 34.7*   Platelets K/uL 252   Gran % % 73.3*   Lymph % % 17.3*   Mono % % 4.0   Eosinophil % % 4.2   Basophil % % 0.4   Differential Method  Automated       Metabolic Panel (last 72 hours):  Recent Labs   Lab Result Units 11/29/24  0929 11/29/24  0948   Sodium mmol/L 142  --    Potassium mmol/L 4.0  --    Chloride mmol/L 107  --    CO2 mmol/L 24  --    Glucose mg/dL 173*  --    Glucose, UA   --  Negative   BUN mg/dL 18  --    Creatinine mg/dL 1.3  --    Albumin g/dL 3.4*  --    Total Bilirubin mg/dL 0.4  --    Alkaline Phosphatase U/L 71  --    AST U/L 17  --    ALT U/L 16  --    Magnesium mg/dL 2.2  --    Phosphorus mg/dL 5.1*  --        Microbiologic Results:  Microbiology Results (last 7 days)       Procedure Component Value Units Date/Time    Culture, Respiratory with Gram Stain [[id number]]  Collected: 11/29/24 1217    Order Status: Sent Specimen: Respiratory from Sputum Updated: 11/29/24 1224    Influenza A & B by Molecular [[id number]] Collected: 11/29/24 0948    Order Status: Completed Specimen: Nasal Swab Updated: 11/29/24 1038     Influenza A, Molecular Negative     Influenza B, Molecular Negative     Flu A & B Source Nasal swab    Blood culture x two cultures. Draw prior to antibiotics. [[id number]] Collected: 11/29/24 1015    Order Status: Sent Specimen: Blood from Peripheral, Hand, Left     Blood culture x two cultures. Draw prior to antibiotics. [[id number]] Collected: 11/29/24 1000    Order Status: Sent Specimen: Blood from Peripheral, Antecubital, Left

## 2024-11-29 NOTE — ASSESSMENT & PLAN NOTE
Patient with dementia with likely etiology of alzheimer's dementia. Dementia is moderate. The patient does have signs of behavioral disturbance. Home dementia medications are Held or Continued: held.. Continue non-pharmacologic interventions to prevent delirium (No VS between 11PM-5AM, activity during day, opening blinds, providing glasses/hearing aids, and up in chair during daytime). Will avoid narcotics and benzos unless absolutely necessary. PRN anti-psychotics are not prescribed to avoid self harm behaviors.    Patient intubated and sedated at this time.  Seems to be on multiple behavioral meds at home, may need adjusting, holding at this time.

## 2024-11-29 NOTE — ASSESSMENT & PLAN NOTE
Patient with Hypercapnic and Hypoxic Respiratory failure which is Acute.  she is not on home oxygen. Supplemental oxygen was provided and noted- Vent Mode: A/C  Oxygen Concentration (%):  [] 40  Resp Rate Total:  [22 br/min-25 br/min] 22 br/min  Vt Set:  [380 mL-420 mL] 420 mL  PEEP/CPAP:  [8 cmH20] 8 cmH20  Mean Airway Pressure:  [16 cmH20] 16 cmH20    .   Signs/symptoms of respiratory failure include- lethargy. Contributing diagnoses includes - Aspiration, CHF, and Pleural effusion Labs and images were reviewed. Patient Has recent ABG, which has been reviewed. Will treat underlying causes and adjust management of respiratory failure as follows-     CTA Chest without PE, moderate ptx, bilateral pleural effusions    Keep sedated today  Wean vent settings as tolerated  Brief code status discussion with daughter at bedside who says that the patient would want CPR/ventilator if her heart and breathing stops.

## 2024-11-29 NOTE — ASSESSMENT & PLAN NOTE
Moderate R ptx after intubation  -Withdraw ETT  -Obtain CXR in 2 hours, may require Chest tube placement if does not improve   Attending with

## 2024-11-29 NOTE — H&P
[facility] - Intensive Care ([facility])  Critical Care Medicine  History & Physical    Patient Name: Leia Rodriguez  MRN: [id number]  Admission Date: 11/29/2024  Hospital Length of Stay: 0 days  Code Status: Full Code  Attending Physician: Jeane Espinoza MD   Primary Care Provider: Yasmin Navarro MD   Principal Problem: Acute respiratory failure with hypoxia and hypercarbia    Subjective:     HPI:  Leia Rodriguez is a 74 yr old female with a history of CAD, CHF, bilateral breast cancer, s/p mastectomy on surveillance, HTN, dementia who was brought to the ED by EMS on 11/29 after her family found her unresponsive. She was found to have agonal breathing and intubated in the field with difficulty. She was hypertensive initially, received ketamine, fentanyl in the field. She was started on Precedex in the ER and became hypotensive. Given fluid bolus and briefly required Levophed, hypotension resolved. CTH negative, CTA chest negative for PE but shows moderate R ptx with pleural effusions.    LA 2.9    pH 7.26  CO2 55.2    Critical care medicine consulted for admission.    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    Lipoma of abdominal wall     Major neurocognitive disorder 06/21/2022    Obesity     Overactive bladder     Pap smear abnormality of cervix with ASCUS favoring benign     Thyroid nodule     Tobacco use disorder     Tubular adenoma of colon     Urinary incontinence        Past Surgical History:   Procedure Laterality Date    ANTERIOR VAGINAL REPAIR      AORTOGRAPHY WITH SERIALOGRAPHY N/A 10/7/2024    Procedure: AORTOGRAM, WITH SERIALOGRAPHY;  Surgeon: Tiffanie Santos MD;   Location: [facility] CATH LAB;  Service: Cardiology;  Laterality: N/A;  MD requested Anesthesia    BLADDER SURGERY      BREAST LUMPECTOMY Left 2017    CATARACT EXTRACTION Left 2022     SECTION      X2    COLONOSCOPY N/A 3/8/2017    Procedure: COLONOSCOPY;  Surgeon: Tyron Paris MD;  Location: [facility] ENDO;  Service: Endoscopy;  Laterality: N/A;    COLONOSCOPY N/A 2020    Procedure: COLONOSCOPY;  Surgeon: Keira Ellison MD;  Location: [facility];  Service: Endoscopy;  Laterality: N/A;    ESOPHAGOGASTRODUODENOSCOPY N/A 2020    Procedure: EGD (ESOPHAGOGASTRODUODENOSCOPY);  Surgeon: Keira Ellison MD;  Location: [facility];  Service: Endoscopy;  Laterality: N/A;  new onset iron deficiency with prior history of breast cancer    INTRALUMINAL GASTROINTESTINAL TRACT IMAGING VIA CAPSULE N/A 10/28/2020    Procedure: IMAGING PROCEDURE, GI TRACT, INTRALUMINAL, VIA CAPSULE;  Surgeon: Finesse Jha RN;  Location: [facility];  Service: Endoscopy;  Laterality: N/A;    LEFT HEART CATHETERIZATION Left 10/12/2021    Procedure: CATHETERIZATION, HEART, LEFT;  Surgeon: Tiffanie Santos MD;  Location: [facility] CATH LAB;  Service: Cardiology;  Laterality: Left;    LITHOTRIPSY, CORONARY TRANSLUMINAL, PERCUTANEOUS  10/7/2024    Procedure: LITHOTRIPSY, CORONARY TRANSLUMINAL, PERCUTANEOUS;  Surgeon: Tiffanie Santos MD;  Location: [facility] CATH LAB;  Service: Cardiology;;    PTA, SUPERFICIAL FEMORAL ARTERY  10/7/2024    Procedure: PTA, Superficial Femoral Artery;  Surgeon: Tiffanie Santos MD;  Location: [facility] CATH LAB;  Service: Cardiology;;    SENTINEL LYMPH NODE BIOPSY Left 2020    Procedure: BIOPSY, LYMPH NODE, SENTINEL;  Surgeon: Vincent Moyer MD;  Location: [facility] OR;  Service: General;  Laterality: Left;    SIMPLE MASTECTOMY Left 2020    Procedure: MASTECTOMY, SIMPLE;  Surgeon: Vincent Moyer MD;  Location: [facility] OR;  Service: General;  Laterality: Left;    STENT, SUPERFICIAL FEMORAL ARTERY  10/7/2024    Procedure:  Stent, Superficial Femoral Artery;  Surgeon: Tiffanie Santos MD;  Location: [facility] CATH LAB;  Service: Cardiology;;    THYROID LOBECTOMY Left 2005    TOTAL ABDOMINAL HYSTERECTOMY      TUBAL LIGATION         Review of patient's allergies indicates:  No Known Allergies    Family History       Problem Relation (Age of Onset)    Alcohol abuse Mother    Allergic rhinitis Daughter    Cataracts Father    Cervical cancer Daughter (32)    Cirrhosis Mother    Diabetes Brother    Heart attack Brother    Heart murmur Brother    Hypertension Father, Daughter    No Known Problems Daughter    Prostate cancer Father (80)          Tobacco Use    Smoking status: Former     Current packs/day: 0.00     Average packs/day: 1 pack/day for 50.0 years (50.0 ttl pk-yrs)     Types: Cigarettes     Start date: 1970     Quit date: 2020     Years since quittin.2    Smokeless tobacco: Never   Substance and Sexual Activity    Alcohol use: Never     Alcohol/week: 28.0 standard drinks of alcohol     Types: 28 Cans of beer per week    Drug use: No    Sexual activity: Never     Partners: Male         Review of Systems   Unable to perform ROS: Intubated     Objective:     Vital Signs (Most Recent):  Temp: 96 °F (35.6 °C) (24 1150)  Pulse: 76 (24 1200)  Resp: (!) 22 (24 1200)  BP: (!) 172/81 (24 1200)  SpO2: 100 % (24 1130) Vital Signs (24h Range):  Temp:  [96 °F (35.6 °C)-97.2 °F (36.2 °C)] 96 °F (35.6 °C)  Pulse:  [] 76  Resp:  [20-72] 22  SpO2:  [95 %-100 %] 100 %  BP: ()/(43-81) 172/81     Weight: 57.9 kg (127 lb 10.3 oz)  Body mass index is 24.93 kg/m².      Intake/Output Summary (Last 24 hours) at 2024 1239  Last data filed at 2024 1119  Gross per 24 hour   Intake 19.17 ml   Output 250 ml   Net -230.83 ml        Physical Exam  Constitutional:       General: She is not in acute distress.     Appearance: She is ill-appearing.      Interventions: She is sedated, intubated and  restrained.   HENT:      Head: Normocephalic and atraumatic.      Nose: Nose normal.      Mouth/Throat:      Mouth: Mucous membranes are moist.   Eyes:      Extraocular Movements: Extraocular movements intact.   Cardiovascular:      Rate and Rhythm: Normal rate and regular rhythm.      Pulses: Normal pulses.      Heart sounds: Normal heart sounds.   Pulmonary:      Effort: Pulmonary effort is normal. No respiratory distress. She is intubated.      Breath sounds: Rhonchi present.   Abdominal:      General: Bowel sounds are normal.      Hernia: A hernia is present.   Musculoskeletal:         General: No swelling. Normal range of motion.      Cervical back: Normal range of motion and neck supple.   Skin:     General: Skin is warm and dry.   Neurological:      Comments: Intubated, sedated, follows commands, moves all extremities spontaneously          Vents:  Vent Mode: A/C (11/29/24 1109)  Set Rate: 22 BPM (11/29/24 1109)  Vt Set: 420 mL (11/29/24 1109)  PEEP/CPAP: (S) 5 cmH20 (11/29/24 1210)  Oxygen Concentration (%): 40 (11/29/24 1200)  Peak Airway Pressure: 32 cmH20 (11/29/24 1109)  Plateau Pressure: 0 cmH20 (11/29/24 1109)  Total Ve: 9.45 L/m (11/29/24 1109)  Negative Inspiratory Force (cm H2O): 0 (11/29/24 1109)  F/VT Ratio<105 (RSBI): (!) 51.28 (11/29/24 0923)    Lines/Drains/Airways       Drain  Duration                  NG/OG Tube 11/29/24 0945 Brown sump 16 Fr. Right mouth <1 day         Urethral Catheter 11/29/24 0947 Silicone;Straight-tip 16 Fr. <1 day              Airway  Duration                  Airway - Non-Surgical 11/29/24 <1 day              Peripheral Intravenous Line  Duration                  Peripheral IV - Single Lumen 11/29/24 1012 20 G Posterior;Right Hand <1 day         Peripheral IV - Single Lumen 11/29/24 18 G Right Antecubital <1 day                    Significant Labs:    CBC/Anemia Profile:  Recent Labs   Lab 11/29/24  0929   WBC 13.90*   HGB 10.9*   HCT 34.7*      MCV 96   RDW  13.9        Chemistries:  Recent Labs   Lab 11/29/24  0929      K 4.0      CO2 24   BUN 18   CREATININE 1.3   CALCIUM 8.5*   ALBUMIN 3.4*   PROT 6.9   BILITOT 0.4   ALKPHOS 71   ALT 16   AST 17   MG 2.2   PHOS 5.1*       All pertinent labs within the past 24 hours have been reviewed.    Significant Imaging:   I have reviewed all pertinent imaging results/findings within the past 24 hours.    Assessment/Plan:     Neuro  Encephalopathy, metabolic  Unclear presentation, per family, patient became unresponsive at home, EMS found her agonal breathing  CTH negative, patient now waking up, following commands    Major neurocognitive disorder due to Alzheimer's disease  Patient with dementia with likely etiology of alzheimer's dementia. Dementia is moderate. The patient does have signs of behavioral disturbance. Home dementia medications are Held or Continued: held.. Continue non-pharmacologic interventions to prevent delirium (No VS between 11PM-5AM, activity during day, opening blinds, providing glasses/hearing aids, and up in chair during daytime). Will avoid narcotics and benzos unless absolutely necessary. PRN anti-psychotics are not prescribed to avoid self harm behaviors.    Patient intubated and sedated at this time.  Seems to be on multiple behavioral meds at home, may need adjusting, holding at this time.    Pulmonary  * Acute respiratory failure with hypoxia and hypercarbia  Patient with Hypercapnic and Hypoxic Respiratory failure which is Acute.  she is not on home oxygen. Supplemental oxygen was provided and noted- Vent Mode: A/C  Oxygen Concentration (%):  [] 40  Resp Rate Total:  [22 br/min-25 br/min] 22 br/min  Vt Set:  [380 mL-420 mL] 420 mL  PEEP/CPAP:  [8 cmH20] 8 cmH20  Mean Airway Pressure:  [16 cmH20] 16 cmH20    .   Signs/symptoms of respiratory failure include- lethargy. Contributing diagnoses includes - Aspiration, CHF, and Pleural effusion Labs and images were reviewed. Patient Has  recent ABG, which has been reviewed. Will treat underlying causes and adjust management of respiratory failure as follows-     CTA Chest without PE, moderate ptx, bilateral pleural effusions    Keep sedated today  Wean vent settings as tolerated  Brief code status discussion with daughter at bedside who says that the patient would want CPR/ventilator if her heart and breathing stops.    Postprocedural pneumothorax  Moderate R ptx after intubation  -Withdraw ETT  -Obtain CXR in 2 hours, may require Chest tube placement if does not improve    Cardiac/Vascular  Chronic combined systolic and diastolic CHF (congestive heart failure)  Patient has Combined Systolic and Diastolic heart failure that is Acute on chronic. On presentation their CHF was well compensated. Most recent BNP and echo results are listed below.  Recent Labs     11/29/24  0929   *     Latest ECHO  Results for orders placed during the hospital encounter of 06/22/23    Echo    Interpretation Summary  · The left ventricle is normal in size with mild mild asymmetric hypertrophy eccentric hypertrophy and low normal systolic function.  · The estimated ejection fraction is 50%.  · Normal left ventricular diastolic function.  · Normal right ventricular size with normal right ventricular systolic function.  · Mild to moderate tricuspid regurgitation.  · Intermediate central venous pressure (8 mmHg).  · The estimated PA systolic pressure is 43 mmHg.  · There is pulmonary hypertension.    Current Heart Failure Medications  NORepinephrine 4 mg in sodium chloride 0.9% 250 mL infusion (premix), Continuous, Intravenous    Plan  - Monitor strict I&Os and daily weights.    - Place on telemetry  - Cardiology has not been consulted  - Received fluid bolus in ER, will watch for fluid overload  - New Echo pending    Essential hypertension  Patients blood pressure range in the last 24 hours was: BP  Min: 58/43  Max: 160/74.The patient's inpatient anti-hypertensive  "regimen is listed below:  Current Antihypertensives       Plan  - Hypotensive on arrival after multiple sedatives requiring pressors  - Now off pressors, will utilize prn meds if BP trends up,  holding home meds at this time    Renal/  Urinary retention  Per daughter, was getting straight cath'd at home for urinary retention  -Zuluaga in place    ID  Severe sepsis  This patient does have evidence of infective focus  My overall impression is sepsis.  Source: Unknown  Antibiotics given-   Antibiotics (72h ago, onward)      Start     Stop Route Frequency Ordered    11/29/24 1300  piperacillin-tazobactam (ZOSYN) 4.5 g in D5W 100 mL IVPB (MB+)         -- IV Every 8 hours (non-standard times) 11/29/24 1146    11/29/24 1245  mupirocin 2 % ointment         12/04/24 0859 Nasl 2 times daily 11/29/24 1143    11/29/24 1145  vancomycin 1,250 mg in 0.9% NaCl 250 mL IVPB (admixture device)         -- IV Once 11/29/24 1040    11/29/24 1139  vancomycin - pharmacy to dose  (vancomycin IVPB (PEDS and ADULTS))        Placed in "And" Linked Group    -- IV pharmacy to manage frequency 11/29/24 1040          Latest lactate reviewed-  Recent Labs   Lab 11/29/24  0929   LACTATE 2.9*     Organ dysfunction indicated by Acute respiratory failure and Encephalopathy    Fluid challenge- received in ER    Post- resuscitation assessment Yes Perfusion exam was performed within 6 hours of septic shock presentation after bolus shows Adequate tissue perfusion assessed by non-invasive monitoring       Will Not start Pressors- Levophed for MAP of 65    Pressors briefly started in the ER, now off  Start zosyn, vanc  History of recurrent UTIs- pancultures pending  Trend LA    Immunology/Multi System  Immunodeficiency due to chemotherapy  Greater risk for sepsis  Cultures pending  Broad spec abx      PUP: Pepcid  DVT ppx: LVX    Critical Care Time: 45 minutes  Critical secondary to Patient has a condition that poses threat to life and bodily function: Acute " respiratory failure requiring mech ventilation, severe sepsis    Critical care was time spent personally by me on the following activities: development of treatment plan with patient or surrogate and bedside caregivers, discussions with consultants, evaluation of patient's response to treatment, examination of patient, ordering and performing treatments and interventions, ordering and review of laboratory studies, ordering and review of radiographic studies, pulse oximetry, re-evaluation of patient's condition. This critical care time did not overlap with that of any other provider or involve time for any procedures.     Flor Doyle NP  Critical Care Medicine  [facility] - Intensive Care ([facility])

## 2024-11-29 NOTE — HPI
Leia Rodriguez is a 74 yr old female with a history of CAD, CHF, bilateral breast cancer, s/p mastectomy on surveillance, HTN, dementia who was brought to the ED by EMS on 11/29 after her family found her unresponsive. She was found to have agonal breathing and intubated in the field with difficulty. She was hypertensive initially, received ketamine, fentanyl in the field. She was started on Precedex in the ER and became hypotensive. Given fluid bolus and briefly required Levophed, hypotension resolved. CTH negative, CTA chest negative for PE but shows moderate R ptx with pleural effusions.    LA 2.9    pH 7.26  CO2 55.2    Critical care medicine consulted for admission.

## 2024-11-29 NOTE — SUBJECTIVE & OBJECTIVE
Past Medical History:   Diagnosis Date    Arthritis     EDGAR HANDS, KNEES    Behavioral change 2022    Blind right eye     Traumatic    Breast cancer 06/15/2017    0.8 cm Grade1 INTRADUCTAL BREAST 9 positve margin (left)    Essential hypertension     Hemorrhoids     Immunodeficiency due to chemotherapy 2021    Lipoma of abdominal wall     Major neurocognitive disorder 2022    Obesity     Overactive bladder     Pap smear abnormality of cervix with ASCUS favoring benign     Thyroid nodule     Tobacco use disorder     Tubular adenoma of colon     Urinary incontinence        Past Surgical History:   Procedure Laterality Date    ANTERIOR VAGINAL REPAIR      AORTOGRAPHY WITH SERIALOGRAPHY N/A 10/7/2024    Procedure: AORTOGRAM, WITH SERIALOGRAPHY;  Surgeon: Tiffanie Santos MD;  Location: [facility] CATH LAB;  Service: Cardiology;  Laterality: N/A;  MD requested Anesthesia    BLADDER SURGERY      BREAST LUMPECTOMY Left     CATARACT EXTRACTION Left 2022     SECTION      X2    COLONOSCOPY N/A 3/8/2017    Procedure: COLONOSCOPY;  Surgeon: Tyron Paris MD;  Location: [facility];  Service: Endoscopy;  Laterality: N/A;    COLONOSCOPY N/A 2020    Procedure: COLONOSCOPY;  Surgeon: Keira Ellison MD;  Location: [facility];  Service: Endoscopy;  Laterality: N/A;    ESOPHAGOGASTRODUODENOSCOPY N/A 2020    Procedure: EGD (ESOPHAGOGASTRODUODENOSCOPY);  Surgeon: Keira Ellison MD;  Location: [facility];  Service: Endoscopy;  Laterality: N/A;  new onset iron deficiency with prior history of breast cancer    INTRALUMINAL GASTROINTESTINAL TRACT IMAGING VIA CAPSULE N/A 10/28/2020    Procedure: IMAGING PROCEDURE, GI TRACT, INTRALUMINAL, VIA CAPSULE;  Surgeon: Finesse Jha RN;  Location: [facility];  Service: Endoscopy;  Laterality: N/A;    LEFT HEART CATHETERIZATION Left 10/12/2021    Procedure: CATHETERIZATION, HEART, LEFT;  Surgeon: Tiffanie Santos MD;  Location: [facility] CATH LAB;  Service:  Cardiology;  Laterality: Left;    LITHOTRIPSY, CORONARY TRANSLUMINAL, PERCUTANEOUS  10/7/2024    Procedure: LITHOTRIPSY, CORONARY TRANSLUMINAL, PERCUTANEOUS;  Surgeon: Tiffanie Santos MD;  Location: [facility] CATH LAB;  Service: Cardiology;;    PTA, SUPERFICIAL FEMORAL ARTERY  10/7/2024    Procedure: PTA, Superficial Femoral Artery;  Surgeon: Tiffanie Santos MD;  Location: [facility] CATH LAB;  Service: Cardiology;;    SENTINEL LYMPH NODE BIOPSY Left 2020    Procedure: BIOPSY, LYMPH NODE, SENTINEL;  Surgeon: Vincent Moyer MD;  Location: [facility] OR;  Service: General;  Laterality: Left;    SIMPLE MASTECTOMY Left 2020    Procedure: MASTECTOMY, SIMPLE;  Surgeon: Vincent Moyer MD;  Location: [facility] OR;  Service: General;  Laterality: Left;    STENT, SUPERFICIAL FEMORAL ARTERY  10/7/2024    Procedure: Stent, Superficial Femoral Artery;  Surgeon: Tiffanie Santos MD;  Location: [facility] CATH LAB;  Service: Cardiology;;    THYROID LOBECTOMY Left     TOTAL ABDOMINAL HYSTERECTOMY      TUBAL LIGATION         Review of patient's allergies indicates:  No Known Allergies    Family History       Problem Relation (Age of Onset)    Alcohol abuse Mother    Allergic rhinitis Daughter    Cataracts Father    Cervical cancer Daughter (32)    Cirrhosis Mother    Diabetes Brother    Heart attack Brother    Heart murmur Brother    Hypertension Father, Daughter    No Known Problems Daughter    Prostate cancer Father (80)          Tobacco Use    Smoking status: Former     Current packs/day: 0.00     Average packs/day: 1 pack/day for 50.0 years (50.0 ttl pk-yrs)     Types: Cigarettes     Start date: 1970     Quit date: 2020     Years since quittin.2    Smokeless tobacco: Never   Substance and Sexual Activity    Alcohol use: Never     Alcohol/week: 28.0 standard drinks of alcohol     Types: 28 Cans of beer per week    Drug use: No    Sexual activity: Never     Partners: Male         Review of Systems   Unable to perform ROS: Intubated      Objective:     Vital Signs (Most Recent):  Temp: 96 °F (35.6 °C) (11/29/24 1150)  Pulse: 76 (11/29/24 1200)  Resp: (!) 22 (11/29/24 1200)  BP: (!) 172/81 (11/29/24 1200)  SpO2: 100 % (11/29/24 1130) Vital Signs (24h Range):  Temp:  [96 °F (35.6 °C)-97.2 °F (36.2 °C)] 96 °F (35.6 °C)  Pulse:  [] 76  Resp:  [20-72] 22  SpO2:  [95 %-100 %] 100 %  BP: ()/(43-81) 172/81     Weight: 57.9 kg (127 lb 10.3 oz)  Body mass index is 24.93 kg/m².      Intake/Output Summary (Last 24 hours) at 11/29/2024 1239  Last data filed at 11/29/2024 1119  Gross per 24 hour   Intake 19.17 ml   Output 250 ml   Net -230.83 ml        Physical Exam  Constitutional:       General: She is not in acute distress.     Appearance: She is ill-appearing.      Interventions: She is sedated, intubated and restrained.   HENT:      Head: Normocephalic and atraumatic.      Nose: Nose normal.      Mouth/Throat:      Mouth: Mucous membranes are moist.   Eyes:      Extraocular Movements: Extraocular movements intact.   Cardiovascular:      Rate and Rhythm: Normal rate and regular rhythm.      Pulses: Normal pulses.      Heart sounds: Normal heart sounds.   Pulmonary:      Effort: Pulmonary effort is normal. No respiratory distress. She is intubated.      Breath sounds: Rhonchi present.   Abdominal:      General: Bowel sounds are normal.      Hernia: A hernia is present.   Musculoskeletal:         General: No swelling. Normal range of motion.      Cervical back: Normal range of motion and neck supple.   Skin:     General: Skin is warm and dry.   Neurological:      Comments: Intubated, sedated, follows commands, moves all extremities spontaneously          Vents:  Vent Mode: A/C (11/29/24 1109)  Set Rate: 22 BPM (11/29/24 1109)  Vt Set: 420 mL (11/29/24 1109)  PEEP/CPAP: (S) 5 cmH20 (11/29/24 1210)  Oxygen Concentration (%): 40 (11/29/24 1200)  Peak Airway Pressure: 32 cmH20 (11/29/24 1109)  Plateau Pressure: 0 cmH20 (11/29/24 1109)  Total Ve:  9.45 L/m (11/29/24 1109)  Negative Inspiratory Force (cm H2O): 0 (11/29/24 1109)  F/VT Ratio<105 (RSBI): (!) 51.28 (11/29/24 0923)    Lines/Drains/Airways       Drain  Duration                  NG/OG Tube 11/29/24 0945 [place] sump 16 Fr. Right mouth <1 day         Urethral Catheter 11/29/24 0947 Silicone;Straight-tip 16 Fr. <1 day              Airway  Duration                  Airway - Non-Surgical 11/29/24 <1 day              Peripheral Intravenous Line  Duration                  Peripheral IV - Single Lumen 11/29/24 1012 20 G Posterior;Right Hand <1 day         Peripheral IV - Single Lumen 11/29/24 18 G Right Antecubital <1 day                    Significant Labs:    CBC/Anemia Profile:  Recent Labs   Lab 11/29/24  0929   WBC 13.90*   HGB 10.9*   HCT 34.7*      MCV 96   RDW 13.9        Chemistries:  Recent Labs   Lab 11/29/24  0929      K 4.0      CO2 24   BUN 18   CREATININE 1.3   CALCIUM 8.5*   ALBUMIN 3.4*   PROT 6.9   BILITOT 0.4   ALKPHOS 71   ALT 16   AST 17   MG 2.2   PHOS 5.1*       All pertinent labs within the past 24 hours have been reviewed.    Significant Imaging:   I have reviewed all pertinent imaging results/findings within the past 24 hours.

## 2024-11-29 NOTE — PHARMACY MED REC
"Admission Medication History     The home medication history was taken by Chelsea Winkler.    You may go to "Admission" then "Reconcile Home Medications" tabs to review and/or act upon these items.     The home medication list has been updated by the Pharmacy department.   Please read ALL comments highlighted in yellow.   Please address this information as you see fit.    Feel free to contact us if you have any questions or require assistance.      The medications listed below were removed from the home medication list. Please reorder if appropriate:  Patient reports no longer taking the following medication(s):  Calcitrate 950 mg  Plavix 75 mg  B12 46066 mcg  Etrace 0.01% vaginal cream  Ferrous sulfate 325 mg  Vitamin D 1000 units        Medications listed below were obtained from: Patient/family and Analytic software- [employer], patient brought in medications that's currently being taken      LAST MED REC COMPLETED:     Chelsea Winkler  [id number]      Current Outpatient Medications on File Prior to Encounter   Medication Sig Dispense Refill Last Dose/Taking    aluminum-magnesium hydroxide-simethicone 200-200-20 mg/5 mL Susp, diphenhydrAMINE 12.5 mg/5 mL Liqd Swish and spit 5 mLs every 4 (four) hours as needed (discomfort). 30 mL 0 Unknown    anastrozole (ARIMIDEX) 1 mg Tab Take 1 tablet (1 mg total) by mouth once daily. 90 tablet 3 Unknown    aspirin 81 MG Chew Take 1 tablet (81 mg total) by mouth once daily. 90 tablet 3 Unknown    busPIRone (BUSPAR) 15 MG tablet Take 1 tablet (15 mg total) by mouth 2 (two) times daily. 180 tablet 3 Unknown    cephALEXin (KEFLEX) 250 MG capsule Take 1 capsule (250 mg total) by mouth every evening. 90 capsule 3 Unknown    cholecalciferol, vitamin D3, (VITAMIN D3) 125 mcg (5,000 unit) Tab Take 5,000 Units by mouth once daily.   Unknown    donepeziL (ARICEPT) 5 MG tablet Take 1 tablet (5 mg total) by mouth every evening. 90 tablet 3 Unknown    furosemide (LASIX) 20 MG tablet Take 1 " tablet (20 mg total) by mouth daily as needed (edema). (Patient taking differently: Take 20 mg by mouth daily as needed (edema). Monday, Wednesday, Friday) 180 tablet 3 Unknown    isosorbide mononitrate (IMDUR) 30 MG 24 hr tablet TAKE 1 TABLET BY MOUTH EVERY DAY (Patient taking differently: Take 30 mg by mouth once daily.) 90 tablet 3     lamoTRIgine (LAMICTAL) 100 MG tablet Take 1 tablet (100 mg total) by mouth 2 (two) times daily. 60 tablet 11 Unknown    memantine (NAMENDA) 5 MG Tab Take 1 tablet (5 mg total) by mouth 2 (two) times daily. 180 tablet 3 Unknown    metoprolol succinate (TOPROL-XL) 25 MG 24 hr tablet Take 1 tablet (25 mg total) by mouth 2 (two) times daily. 180 tablet 2 Unknown    pravastatin (PRAVACHOL) 40 MG tablet Take 1 tablet (40 mg total) by mouth once daily. (Patient not taking: Reported on 11/29/2024) 90 tablet 3 Not Taking    QUEtiapine (SEROQUEL) 300 MG Tab Take 1 tablet (300 mg total) by mouth every evening. 30 tablet 11 Unknown    telmisartan (MICARDIS) 20 MG Tab TAKE 1 TABLET BY MOUTH EVERY DAY (Patient not taking: Reported on 11/29/2024) 90 tablet 3 Not Taking    zinc gluconate 50 mg tablet Take 50 mg by mouth once daily.   Unknown                                   .

## 2024-11-29 NOTE — ASSESSMENT & PLAN NOTE
Unclear presentation, per family, patient became unresponsive at home, EMS found her agonal breathing  CTH negative, patient now waking up, following commands

## 2024-11-30 LAB
ALBUMIN SERPL BCP-MCNC: 3.2 G/DL (ref 3.5–5.2)
ALLENS TEST: ABNORMAL
ALLENS TEST: ABNORMAL
ALP SERPL-CCNC: 63 U/L (ref 40–150)
ALT SERPL W/O P-5'-P-CCNC: 14 U/L (ref 10–44)
ANION GAP SERPL CALC-SCNC: 8 MMOL/L (ref 8–16)
AST SERPL-CCNC: 14 U/L (ref 10–40)
BASOPHILS # BLD AUTO: 0.01 K/UL (ref 0–0.2)
BASOPHILS NFR BLD: 0.1 % (ref 0–1.9)
BILIRUB SERPL-MCNC: 0.6 MG/DL (ref 0.1–1)
BUN SERPL-MCNC: 19 MG/DL (ref 8–23)
CALCIUM SERPL-MCNC: 8.1 MG/DL (ref 8.7–10.5)
CHLORIDE SERPL-SCNC: 109 MMOL/L (ref 95–110)
CO2 SERPL-SCNC: 22 MMOL/L (ref 23–29)
CREAT SERPL-MCNC: 1 MG/DL (ref 0.5–1.4)
DELSYS: ABNORMAL
DELSYS: ABNORMAL
DIFFERENTIAL METHOD BLD: ABNORMAL
EOSINOPHIL # BLD AUTO: 0 K/UL (ref 0–0.5)
EOSINOPHIL NFR BLD: 0.1 % (ref 0–8)
ERYTHROCYTE [DISTWIDTH] IN BLOOD BY AUTOMATED COUNT: 13.5 % (ref 11.5–14.5)
ERYTHROCYTE [SEDIMENTATION RATE] IN BLOOD BY WESTERGREN METHOD: 16 MM/H
ERYTHROCYTE [SEDIMENTATION RATE] IN BLOOD BY WESTERGREN METHOD: 22 MM/H
EST. GFR  (NO RACE VARIABLE): 59 ML/MIN/1.73 M^2
FIO2: 100
FIO2: 50
GLUCOSE SERPL-MCNC: 129 MG/DL (ref 70–110)
GLUCOSE SERPL-MCNC: 165 MG/DL (ref 70–110)
HCO3 UR-SCNC: 20 MMOL/L (ref 24–28)
HCO3 UR-SCNC: 21.4 MMOL/L (ref 24–28)
HCT VFR BLD AUTO: 33.3 % (ref 37–48.5)
HGB BLD-MCNC: 10.7 G/DL (ref 12–16)
IMM GRANULOCYTES # BLD AUTO: 0.03 K/UL (ref 0–0.04)
IMM GRANULOCYTES NFR BLD AUTO: 0.4 % (ref 0–0.5)
LYMPHOCYTES # BLD AUTO: 0.6 K/UL (ref 1–4.8)
LYMPHOCYTES NFR BLD: 7.9 % (ref 18–48)
MAGNESIUM SERPL-MCNC: 2 MG/DL (ref 1.6–2.6)
MCH RBC QN AUTO: 29.2 PG (ref 27–31)
MCHC RBC AUTO-ENTMCNC: 32.1 G/DL (ref 32–36)
MCV RBC AUTO: 91 FL (ref 82–98)
MODE: ABNORMAL
MODE: ABNORMAL
MONOCYTES # BLD AUTO: 0.1 K/UL (ref 0.3–1)
MONOCYTES NFR BLD: 1.6 % (ref 4–15)
NEUTROPHILS # BLD AUTO: 6.6 K/UL (ref 1.8–7.7)
NEUTROPHILS NFR BLD: 89.9 % (ref 38–73)
NRBC BLD-RTO: 0 /100 WBC
PCO2 BLDA: 24.4 MMHG (ref 35–45)
PCO2 BLDA: 38.1 MMHG (ref 35–45)
PEEP: 5
PEEP: 5
PH SMN: 7.36 [PH] (ref 7.35–7.45)
PH SMN: 7.52 [PH] (ref 7.35–7.45)
PHOSPHATE SERPL-MCNC: 2.6 MG/DL (ref 2.7–4.5)
PLATELET # BLD AUTO: 226 K/UL (ref 150–450)
PMV BLD AUTO: 10.5 FL (ref 9.2–12.9)
PO2 BLDA: 222 MMHG (ref 80–100)
PO2 BLDA: 472 MMHG (ref 80–100)
POC BE: -3 MMOL/L
POC BE: -4 MMOL/L
POC SATURATED O2: 100 % (ref 95–100)
POC SATURATED O2: 100 % (ref 95–100)
POCT GLUCOSE: 145 MG/DL (ref 70–110)
POCT GLUCOSE: 157 MG/DL (ref 70–110)
POCT GLUCOSE: 38 MG/DL (ref 70–110)
POCT GLUCOSE: 69 MG/DL (ref 70–110)
POCT GLUCOSE: 71 MG/DL (ref 70–110)
POCT GLUCOSE: 82 MG/DL (ref 70–110)
POTASSIUM SERPL-SCNC: 4 MMOL/L (ref 3.5–5.1)
PROT SERPL-MCNC: 6.5 G/DL (ref 6–8.4)
RBC # BLD AUTO: 3.67 M/UL (ref 4–5.4)
SAMPLE: ABNORMAL
SAMPLE: ABNORMAL
SITE: ABNORMAL
SITE: ABNORMAL
SODIUM SERPL-SCNC: 139 MMOL/L (ref 136–145)
VANCOMYCIN SERPL-MCNC: 16.2 UG/ML
VT: 340
VT: 420
WBC # BLD AUTO: 7.34 K/UL (ref 3.9–12.7)

## 2024-11-30 PROCEDURE — 25000003 PHARM REV CODE 250: Performed by: EMERGENCY MEDICINE

## 2024-11-30 PROCEDURE — 84100 ASSAY OF PHOSPHORUS: CPT | Performed by: NURSE PRACTITIONER

## 2024-11-30 PROCEDURE — 20000000 HC ICU ROOM

## 2024-11-30 PROCEDURE — 63600175 PHARM REV CODE 636 W HCPCS: Performed by: EMERGENCY MEDICINE

## 2024-11-30 PROCEDURE — 85025 COMPLETE CBC W/AUTO DIFF WBC: CPT | Performed by: NURSE PRACTITIONER

## 2024-11-30 PROCEDURE — 25000003 PHARM REV CODE 250: Performed by: SPECIALIST

## 2024-11-30 PROCEDURE — 99900035 HC TECH TIME PER 15 MIN (STAT)

## 2024-11-30 PROCEDURE — 25000003 PHARM REV CODE 250: Performed by: NURSE PRACTITIONER

## 2024-11-30 PROCEDURE — 94761 N-INVAS EAR/PLS OXIMETRY MLT: CPT

## 2024-11-30 PROCEDURE — 82947 ASSAY GLUCOSE BLOOD QUANT: CPT | Performed by: SPECIALIST

## 2024-11-30 PROCEDURE — 83735 ASSAY OF MAGNESIUM: CPT | Performed by: NURSE PRACTITIONER

## 2024-11-30 PROCEDURE — 80053 COMPREHEN METABOLIC PANEL: CPT | Performed by: NURSE PRACTITIONER

## 2024-11-30 PROCEDURE — 63600175 PHARM REV CODE 636 W HCPCS: Performed by: SPECIALIST

## 2024-11-30 PROCEDURE — 82803 BLOOD GASES ANY COMBINATION: CPT

## 2024-11-30 PROCEDURE — 36600 WITHDRAWAL OF ARTERIAL BLOOD: CPT

## 2024-11-30 PROCEDURE — 63600175 PHARM REV CODE 636 W HCPCS: Performed by: NURSE PRACTITIONER

## 2024-11-30 PROCEDURE — 27100171 HC OXYGEN HIGH FLOW UP TO 24 HOURS

## 2024-11-30 PROCEDURE — 80202 ASSAY OF VANCOMYCIN: CPT | Performed by: SPECIALIST

## 2024-11-30 PROCEDURE — 94003 VENT MGMT INPAT SUBQ DAY: CPT

## 2024-11-30 PROCEDURE — 99900026 HC AIRWAY MAINTENANCE (STAT)

## 2024-11-30 RX ORDER — FUROSEMIDE 10 MG/ML
40 INJECTION INTRAMUSCULAR; INTRAVENOUS DAILY
Status: DISCONTINUED | OUTPATIENT
Start: 2024-11-30 | End: 2024-12-02

## 2024-11-30 RX ORDER — NOREPINEPHRINE BITARTRATE 0.02 MG/ML
0-3 INJECTION, SOLUTION INTRAVENOUS CONTINUOUS
Status: DISCONTINUED | OUTPATIENT
Start: 2024-11-30 | End: 2024-12-02

## 2024-11-30 RX ADMIN — METHYLPREDNISOLONE SODIUM SUCCINATE 40 MG: 40 INJECTION, POWDER, FOR SOLUTION INTRAMUSCULAR; INTRAVENOUS at 05:11

## 2024-11-30 RX ADMIN — MUPIROCIN: 20 OINTMENT TOPICAL at 08:11

## 2024-11-30 RX ADMIN — PIPERACILLIN SODIUM AND TAZOBACTAM SODIUM 4.5 G: 4; .5 INJECTION, POWDER, FOR SOLUTION INTRAVENOUS at 05:11

## 2024-11-30 RX ADMIN — PROPOFOL 50 MCG/KG/MIN: 10 INJECTION, EMULSION INTRAVENOUS at 10:11

## 2024-11-30 RX ADMIN — CHLORHEXIDINE GLUCONATE 0.12% ORAL RINSE 15 ML: 1.2 LIQUID ORAL at 08:11

## 2024-11-30 RX ADMIN — SODIUM PHOSPHATE, MONOBASIC, MONOHYDRATE AND SODIUM PHOSPHATE, DIBASIC, ANHYDROUS 15 MMOL: 142; 276 INJECTION, SOLUTION INTRAVENOUS at 05:11

## 2024-11-30 RX ADMIN — FUROSEMIDE 40 MG: 10 INJECTION, SOLUTION INTRAMUSCULAR; INTRAVENOUS at 10:11

## 2024-11-30 RX ADMIN — PROPOFOL 50 MCG/KG/MIN: 10 INJECTION, EMULSION INTRAVENOUS at 05:11

## 2024-11-30 RX ADMIN — DEXTROSE MONOHYDRATE 125 ML: 100 INJECTION, SOLUTION INTRAVENOUS at 12:11

## 2024-11-30 RX ADMIN — CHLORHEXIDINE GLUCONATE 0.12% ORAL RINSE 15 ML: 1.2 LIQUID ORAL at 09:11

## 2024-11-30 RX ADMIN — METHYLPREDNISOLONE SODIUM SUCCINATE 40 MG: 40 INJECTION, POWDER, FOR SOLUTION INTRAMUSCULAR; INTRAVENOUS at 12:11

## 2024-11-30 RX ADMIN — PROPOFOL 50 MCG/KG/MIN: 10 INJECTION, EMULSION INTRAVENOUS at 09:11

## 2024-11-30 RX ADMIN — FAMOTIDINE 20 MG: 10 INJECTION, SOLUTION INTRAVENOUS at 08:11

## 2024-11-30 RX ADMIN — MUPIROCIN: 20 OINTMENT TOPICAL at 09:11

## 2024-11-30 RX ADMIN — PIPERACILLIN SODIUM AND TAZOBACTAM SODIUM 4.5 G: 4; .5 INJECTION, POWDER, FOR SOLUTION INTRAVENOUS at 01:11

## 2024-11-30 RX ADMIN — NOREPINEPHRINE BITARTRATE 0.02 MCG/KG/MIN: 0.02 INJECTION, SOLUTION INTRAVENOUS at 03:11

## 2024-11-30 RX ADMIN — POTASSIUM CHLORIDE 40 MEQ: 7.46 INJECTION, SOLUTION INTRAVENOUS at 05:11

## 2024-11-30 RX ADMIN — DEXTROSE MONOHYDRATE 125 ML: 100 INJECTION, SOLUTION INTRAVENOUS at 01:11

## 2024-11-30 RX ADMIN — PIPERACILLIN SODIUM AND TAZOBACTAM SODIUM 4.5 G: 4; .5 INJECTION, POWDER, FOR SOLUTION INTRAVENOUS at 09:11

## 2024-11-30 RX ADMIN — VANCOMYCIN HYDROCHLORIDE 1000 MG: 1 INJECTION, POWDER, LYOPHILIZED, FOR SOLUTION INTRAVENOUS at 06:11

## 2024-11-30 RX ADMIN — PROPOFOL 50 MCG/KG/MIN: 10 INJECTION, EMULSION INTRAVENOUS at 06:11

## 2024-11-30 NOTE — SUBJECTIVE & OBJECTIVE
Objective:     Vital Signs (Most Recent):  Temp: 96.3 °F (35.7 °C) (11/30/24 0902)  Pulse: 65 (11/30/24 0902)  Resp: 16 (11/30/24 0902)  BP: (!) 150/60 (11/30/24 0902)  SpO2: 100 % (11/30/24 0902) Vital Signs (24h Range):  Temp:  [93.7 °F (34.3 °C)-97.3 °F (36.3 °C)] 96.3 °F (35.7 °C)  Pulse:  [] 65  Resp:  [16-35] 16  SpO2:  [95 %-100 %] 100 %  BP: ()/(43-90) 150/60     Weight: 63.5 kg (139 lb 15.9 oz)  Body mass index is 27.34 kg/m².      Intake/Output Summary (Last 24 hours) at 11/30/2024 0903  Last data filed at 11/30/2024 0801  Gross per 24 hour   Intake 2733.37 ml   Output 1097 ml   Net 1636.37 ml        Physical Exam  Vitals and nursing note reviewed.   HENT:      Head: Normocephalic.   Eyes:      Pupils: Pupils are equal, round, and reactive to light.   Cardiovascular:      Rate and Rhythm: Normal rate.   Pulmonary:      Effort: No respiratory distress.      Breath sounds: Rhonchi present.   Abdominal:      Palpations: Abdomen is soft.   Neurological:      General: No focal deficit present.           Review of Systems   Unable to perform ROS: Acuity of condition       Vents:  Vent Mode: A/C (11/30/24 0902)  Ventilator Initiated: Yes (11/29/24 1000)  Set Rate: 16 BPM (11/30/24 0902)  Vt Set: 340 mL (11/30/24 0902)  PEEP/CPAP: 5 cmH20 (11/30/24 0902)  Oxygen Concentration (%): 100 (11/30/24 0902)  Peak Airway Pressure: 24 cmH20 (11/30/24 0902)  Plateau Pressure: 16 cmH20 (11/30/24 0902)  Total Ve: 5.55 L/m (11/30/24 0902)  Negative Inspiratory Force (cm H2O): 0 (11/30/24 0902)  F/VT Ratio<105 (RSBI): (!) 46.38 (11/30/24 0902)    Lines/Drains/Airways       Peripherally Inserted Central Catheter Line  Duration             PICC Double Lumen 11/29/24 1850 right basilic <1 day              Drain  Duration                  NG/OG Tube 11/29/24 0945 [place] sump 16 Fr. Right mouth <1 day         Urethral Catheter 11/29/24 0947 Silicone;Straight-tip 16 Fr. <1 day              Airway  Duration                   Airway - Non-Surgical 11/29/24 1 day              Peripheral Intravenous Line  Duration                  Peripheral IV - Single Lumen 11/29/24 18 G Right Antecubital 1 day         Peripheral IV - Single Lumen 11/29/24 1012 20 G Posterior;Right Hand <1 day                    Significant Labs:    CBC/Anemia Profile:  Recent Labs   Lab 11/29/24  0929 11/30/24  0334   WBC 13.90* 7.34   HGB 10.9* 10.7*   HCT 34.7* 33.3*    226   MCV 96 91   RDW 13.9 13.5        Chemistries:  Recent Labs   Lab 11/29/24  0929 11/30/24  0334    139   K 4.0 4.0    109   CO2 24 22*   BUN 18 19   CREATININE 1.3 1.0   CALCIUM 8.5* 8.1*   ALBUMIN 3.4* 3.2*   PROT 6.9 6.5   BILITOT 0.4 0.6   ALKPHOS 71 63   ALT 16 14   AST 17 14   MG 2.2 2.0   PHOS 5.1* 2.6*       All pertinent labs within the past 24 hours have been reviewed.    Significant Imaging:  I have reviewed all pertinent imaging results/findings within the past 24 hours.

## 2024-11-30 NOTE — PROGRESS NOTES
Pharmacokinetic Assessment Follow Up: IV Vancomycin    Vancomycin serum concentration assessment(s):    The random level was drawn correctly and can be used to guide therapy at this time. The measurement is within the desired definitive target range of 10 to 20 mcg/mL.    Vancomycin Regimen Plan:    Administer vancomycin 1000 mg IV once and obtain a random vancomycin level at 0100 on 12/1/24.    Drug levels (last 3 results):  Recent Labs   Lab Result Units 11/30/24  0334   Vancomycin, Random ug/mL 16.2       Pharmacy will continue to follow and monitor vancomycin.    Please contact pharmacy at extension [phone] for questions regarding this assessment.    Thank you for the consult,   Colin Jarrett       Patient brief summary:  Leia Rodriguez is a 74 y.o. female initiated on antimicrobial therapy with IV Vancomycin for treatment of sepsis    The patient's current regimen is pulse dosing.    Drug Allergies:   Review of patient's allergies indicates:  No Known Allergies    Actual Body Weight:   63.5 kg    Renal Function:   Estimated Creatinine Clearance: 41.7 mL/min (based on SCr of 1 mg/dL).,     Dialysis Method (if applicable):  N/A    CBC (last 72 hours):  Recent Labs   Lab Result Units 11/29/24  0929 11/30/24  0334   WBC K/uL 13.90* 7.34   Hemoglobin g/dL 10.9* 10.7*   Hematocrit % 34.7* 33.3*   Platelets K/uL 252 226   Gran % % 73.3* 89.9*   Lymph % % 17.3* 7.9*   Mono % % 4.0 1.6*   Eosinophil % % 4.2 0.1   Basophil % % 0.4 0.1   Differential Method  Automated Automated       Metabolic Panel (last 72 hours):  Recent Labs   Lab Result Units 11/29/24  0929 11/29/24  0948 11/30/24  0334   Sodium mmol/L 142  --  139   Potassium mmol/L 4.0  --  4.0   Chloride mmol/L 107  --  109   CO2 mmol/L 24  --  22*   Glucose mg/dL 173*  --  129*   Glucose, UA   --  Negative  --    BUN mg/dL 18  --  19   Creatinine mg/dL 1.3  --  1.0   Albumin g/dL 3.4*  --  3.2*   Total Bilirubin mg/dL 0.4  --  0.6   Alkaline Phosphatase U/L 71   --  63   AST U/L 17  --  14   ALT U/L 16  --  14   Magnesium mg/dL 2.2  --  2.0   Phosphorus mg/dL 5.1*  --  2.6*       Vancomycin Administrations:  vancomycin given in the last 96 hours                     vancomycin 1,250 mg in 0.9% NaCl 250 mL IVPB (admixture device) (mg) 1,250 mg New Bag 11/29/24 1313                    Microbiologic Results:  Microbiology Results (last 7 days)       Procedure Component Value Units Date/Time    Culture, Respiratory with Gram Stain [[id number]] Collected: 11/29/24 1217    Order Status: Completed Specimen: Respiratory from Sputum Updated: 11/30/24 0306     Gram Stain (Respiratory) <10 epithelial cells per low power field.     Gram Stain (Respiratory) Rare WBC's     Gram Stain (Respiratory) No organisms seen    Blood culture x two cultures. Draw prior to antibiotics. [[id number]] Collected: 11/29/24 1015    Order Status: Completed Specimen: Blood from Peripheral, Hand, Left Updated: 11/30/24 0115     Blood Culture, Routine No Growth to date    Narrative:      Aerobic and anaerobic    Blood culture x two cultures. Draw prior to antibiotics. [[id number]] Collected: 11/29/24 1000    Order Status: Completed Specimen: Blood from Peripheral, Antecubital, Left Updated: 11/30/24 0115     Blood Culture, Routine No Growth to date    Narrative:      Aerobic and anaerobic    Influenza A & B by Molecular [[id number]] Collected: 11/29/24 0948    Order Status: Completed Specimen: Nasal Swab Updated: 11/29/24 1038     Influenza A, Molecular Negative     Influenza B, Molecular Negative     Flu A & B Source Nasal swab

## 2024-11-30 NOTE — PLAN OF CARE
[facility] - Intensive Care (Hospital)  Initial Discharge Assessment       Primary Care Provider: Yasmin Navarro MD    Admission Diagnosis: Respiratory arrest [R09.2]  Severe sepsis [A41.9, R65.20]    Admission Date: 11/29/2024  Expected Discharge Date:     Transition of Care Barriers: None    Payor: [employer] [facility] / Plan: [employer] CHOICES / Product Type: Medicare Advantage /     Extended Emergency Contact Information  Primary Emergency Contact: Moe Figueroa  Address: [address] [place] [address] [facility] [place]  Mobile Phone: [phone]  Relation: Daughter  Secondary Emergency Contact: Zheng Rodriguez  Address: [address] [place] [address] [place]  Mobile Phone: [phone]  Relation: Daughter    Discharge Plan A: Home with family         [id number] - [place], [place] - [address] AT AT [facility]  [address]  [place] [address]  Phone: [phone] Fax: [phone]    [id number] - [place] - [address] AT [facility] [address] [facility]  [address]  [facility] [place] [address]  Phone: [phone] Fax: [phone]      Initial Assessment (most recent)       Adult Discharge Assessment - 11/30/24 1107          Discharge Assessment    Assessment Type Discharge Planning Assessment     Confirmed/corrected address, phone number and insurance Yes     Confirmed Demographics Correct on Facesheet     Source of Information family     Does patient/caregiver understand observation status Yes     Communicated SYLVIA with patient/caregiver Date not available/Unable to determine     People in Home spouse     Do you expect to return to your current living situation? Yes     Prior to hospitilization cognitive status: Alert/Oriented     Walking or Climbing Stairs Difficulty yes     Walking or Climbing Stairs ambulation difficulty, requires equipment     Dressing/Bathing Difficulty yes      Dressing/Bathing bathing difficulty, assistance 1 person     Equipment Currently Used at Home rollator;cane, straight;walker, rolling     Readmission within 30 days? No     Do you currently have service(s) that help you manage your care at home? Yes     Name and Contact number of agency [facility]     Is the pt/caregiver preference to resume services with current agency Yes     Do you take prescription medications? Yes     Do you have prescription coverage? Yes     Do you have any problems affording any of your prescribed medications? No     Is the patient taking medications as prescribed? yes     Who is going to help you get home at discharge? family     How do you get to doctors appointments? family or friend will provide     Are you on dialysis? No     Do you take coumadin? No     Discharge Plan A Home with family     DME Needed Upon Discharge  none     Discharge Plan discussed with: Adult children     Transition of Care Barriers None

## 2024-11-30 NOTE — CONSULTS
[facility] - Intensive Care (Hospital)  Adult Nutrition  Consult Note    SUMMARY     Recommendations    1. Tube feeding recommendations:  Peptamen AF with goal rate of of 45ml/hr.      -Start TF at rate of 10ml/hr.  Increase by 10mls q 8 hours until goal rate is reached as tolerated.      -TF to provide 1296kcals, 82gPRO, 121gCHO, 877mls h2O.     -Flush with 100mls of FW q 6 hours or per md order     -RD to continue to make adjustments to TF formula and rate recommendations at follow ups depending on patient status.       2. Monitor labs     3. Collaboration by nutrition professional with other providers    Goals: Patient to start on ED support prior within 24 hours  Nutrition Goal Status: new  Communication of RD Recs: other (comment)    Assessment and Plan  Nutrition Problem  Inadequate energy intake     Related to (etiology):   Decreased inability to consume sufficient protein or energy     Signs and Symptoms (as evidenced by):   intubation    Interventions (treatment strategy):  Enteral Nutrition Management   Collaboration by nutrition professional with other providers    Nutrition Diagnosis Status:   New      Malnutrition Assessment       ON site RD to complete NFPE at next patient visit.                                 Reason for Assessment    Reason For Assessment: consult    Diagnosis: pulmonary disease  Patient Active Problem List   Diagnosis    Urinary incontinence    Primary osteoarthritis of both hands    Essential hypertension    History of tobacco use    Lipoma of abdominal wall    Malignant neoplasm of lower-inner quadrant of left breast in female, estrogen receptor positive    Age-related osteoporosis without current pathological fracture    Decreased cardiac ejection fraction    Acute respiratory failure with hypoxia and hypercarbia    Immunodeficiency due to chemotherapy    CAD, multiple vessel    Iron deficiency anemia secondary to inadequate dietary iron intake    Chronic combined systolic  and diastolic CHF (congestive heart failure)    Emphysema of lung    Atherosclerosis of aorta    Pulmonary HTN    Anemia due to stage 3b chronic kidney disease    Major neurocognitive disorder due to Alzheimer's disease    History of alcoholism    Mild episode of recurrent major depressive disorder    Agitation due to dementia    Renal mass    Hydronephrosis    Osteopenia    Iron deficiency anemia    Severe sepsis    Sinus tachycardia    Arterial insufficiency of lower extremity    Ischemic ulcer of toe with fat layer exposed    Encephalopathy, metabolic    Postprocedural pneumothorax    Urinary retention     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    Lipoma of abdominal wall     Major neurocognitive disorder 06/21/2022    Obesity     Overactive bladder     Pap smear abnormality of cervix with ASCUS favoring benign     Thyroid nodule     Tobacco use disorder     Tubular adenoma of colon     Urinary incontinence      General Information Comments:   11/30/2024: Patient is currently npo, on vent, sedation and in ICU with OG tube in place. RD consulted for TF recommendations.  TF recommendations provided above and in sticky notes.  Propofol kcals noted.  Labs reviewed.  NKFA.  LBM: 11/27/2024(per family report).  RD to continue to monitor TF formula, rate, tolerance and adjust recommendations accordingly to patients needs at each follow up visit.    Nutrition Discharge Planning: EN support at this time    Nutrition Risk Screen    Nutrition Risk Screen: tube feeding or parenteral nutrition  Nutrition Related Social Determinants of Health: SDOH: Unable to assess at this time.     Nutrition/Diet History    Spiritual, Cultural Beliefs, Confucianist  "Practices, Values that Affect Care: no  Food Allergies: NKFA  Factors Affecting Nutritional Intake: NPO, on mechanical ventilation    Anthropometrics    Temp: 97.5 °F (36.4 °C)  Height Method: Estimated  Height: 5' (152.4 cm)  Height (inches): 60 in  Weight Method: Bed Scale  Weight: 63.5 kg (139 lb 15.9 oz)  Weight (lb): 139.99 lb  Ideal Body Weight (IBW), Female: 100 lb  % Ideal Body Weight, Female (lb): 139.99 %  BMI (Calculated): 27.3  BMI Grade: 25 - 29.9 - overweight       Lab/Procedures/Meds    Pertinent Labs Reviewed: reviewed  BMP  Lab Results   Component Value Date     11/30/2024    K 4.0 11/30/2024     11/30/2024    CO2 22 (L) 11/30/2024    BUN 19 11/30/2024    CREATININE 1.0 11/30/2024    CALCIUM 8.1 (L) 11/30/2024    ANIONGAP 8 11/30/2024    EGFRNORACEVR 59 (A) 11/30/2024     No results found for: "LABA1C", "HGBA1C"  Lab Results   Component Value Date    CALCIUM 8.1 (L) 11/30/2024    PHOS 2.6 (L) 11/30/2024       Pertinent Medications Reviewed: reviewed  Scheduled Meds:   chlorhexidine  15 mL Mouth/Throat BID    famotidine (PF)  20 mg Intravenous Daily    furosemide (LASIX) injection  40 mg Intravenous Daily    methylPREDNISolone injection (PEDS and ADULTS)  40 mg Intravenous Q6H    mupirocin   Nasal BID    piperacillin-tazobactam (Zosyn) IV (PEDS and ADULTS) (extended infusion is not appropriate)  4.5 g Intravenous Q8H     Continuous Infusions:   fentanyl  0-250 mcg/hr Intravenous Continuous 5 mL/hr at 11/30/24 1700 50 mcg/hr at 11/30/24 1700    NORepinephrine bitartrate-D5W  0-3 mcg/kg/min Intravenous Continuous 9.5 mL/hr at 11/30/24 1700 0.04 mcg/kg/min at 11/30/24 1700    propofoL  0-50 mcg/kg/min Intravenous Continuous 17.4 mL/hr at 11/30/24 1721 50 mcg/kg/min at 11/30/24 1721     PRN Meds:.  Current Facility-Administered Medications:     acetaminophen, 650 mg, Oral, Q4H PRN    dextrose 10%, 12.5 g, Intravenous, PRN    dextrose 10%, 25 g, Intravenous, PRN    glucagon (human " recombinant), 1 mg, Intramuscular, PRN    insulin aspart U-100, 0-10 Units, Subcutaneous, Q6H PRN    magnesium sulfate IVPB, 2 g, Intravenous, PRN    magnesium sulfate IVPB, 4 g, Intravenous, PRN    ondansetron, 4 mg, Intravenous, Q6H PRN    potassium chloride, 40 mEq, Intravenous, PRN **AND** potassium chloride, 60 mEq, Intravenous, PRN **AND** potassium chloride, 80 mEq, Intravenous, PRN    sodium chloride 0.9%, 10 mL, Intravenous, PRN    sodium phosphate 15 mmol in D5W 250 mL IVPB, 15 mmol, Intravenous, PRN    sodium phosphate 20.01 mmol in D5W 250 mL IVPB, 20.01 mmol, Intravenous, PRN    sodium phosphate 30 mmol in D5W 250 mL IVPB, 30 mmol, Intravenous, PRN    Pharmacy to dose Vancomycin consult, , , Once **AND** vancomycin - pharmacy to dose, , Intravenous, pharmacy to manage frequency    Physical Findings/Assessment         Estimated/Assessed Needs    Weight Used For Calorie Calculations: 63.5 kg (139 lb 15.9 oz)  Energy Calorie Requirements (kcal): 25-30kcals/kg (7444-3556)  Energy Need Method: Will State, Kcal/kg (VTOT RAY)  Protein Requirements: 1.2g/kg (76g/day)  Weight Used For Protein Calculations: 63.5 kg (139 lb 15.9 oz)  Fluid Requirements (mL): 1ml/kcal or per md order  Estimated Fluid Requirement Method: RDA Method  RDA Method (mL): 25  CHO Requirement: 225-250      Nutrition Prescription Ordered    Current Diet Order: NPO    Evaluation of Received Nutrient/Fluid Intake    Other Calories (kcal): 153 (kcals from propofol for current 24 hours)  I/O: 11/30/2024: +1550mL  Energy Calories Required: not meeting needs  Protein Required: not meeting needs  Fluid Required: not meeting needs  Comments: LBM: 11/27/2024  Tolerance: other (see comments) (NPO)  % Intake of Estimated Energy Needs: Other: NPO  % Meal Intake: NPO    Nutrition Risk    Level of Risk/Frequency of Follow-up: moderate - high (Follow up: 1-2 x per week)       Monitor and Evaluation    Food and Nutrient Intake: enteral nutrition  intake  Food and Nutrient Adminstration: enteral and parenteral nutrition administration  Physical Activity and Function: factors affecting access to physical activity, nutrition-related ADLs and IADLs  Anthropometric Measurements: height/length, weight, body mass index, weight change  Biochemical Data, Medical Tests and Procedures: electrolyte and renal panel, gastrointestinal profile, glucose/endocrine profile, inflammatory profile, lipid profile       Nutrition Follow-Up    RD Follow-up?: Yes  Karol Ramires MS, RDN, LDN

## 2024-11-30 NOTE — PLAN OF CARE
Pt remains intubated/sedated on vent 100% FiO2 5 Peep  Fent gtt and Prop gtt titrated for RASS -3  Sinus rhythm with PACs on monitor  Hypothermic, bare hugger applied   [place] in place with adequate urine output  BSWR in use  Updated POC with daughters at bedside  Safety precautions maintained  Turned q2

## 2024-11-30 NOTE — PLAN OF CARE
Nutrition Plan of Care:    Recommendations     1. Tube feeding recommendations:  Peptamen AF with goal rate of of 45ml/hr.      -Start TF at rate of 10ml/hr.  Increase by 10mls q 8 hours until goal rate is reached as tolerated.      -TF to provide 1296kcals, 82gPRO, 121gCHO, 877mls h2O.     -Flush with 100mls of FW q 6 hours or per md order     -RD to continue to make adjustments to TF formula and rate recommendations at follow ups depending on patient status.       2. Monitor labs     3. Collaboration by nutrition professional with other providers     Goals: Patient to start on ED support prior within 24 hours  Nutrition Goal Status: new  Communication of RD Recs: other (comment)     Assessment and Plan  Nutrition Problem  Inadequate energy intake      Related to (etiology):   Decreased inability to consume sufficient protein or energy      Signs and Symptoms (as evidenced by):   intubation     Interventions (treatment strategy):  Enteral Nutrition Management   Collaboration by nutrition professional with other providers     Nutrition Diagnosis Status:   Ricky Ramires, MS, RDN, LDN     no

## 2024-11-30 NOTE — ASSESSMENT & PLAN NOTE
Moderate R ptx after intubation  -Withdraw ETT  -Obtain CXR in 2 hours, may require Chest tube placement if does not improve    11/30 No worsening. Limited apical Ptx. May need CT guided catheter aspiration. Will follow closely.

## 2024-11-30 NOTE — ASSESSMENT & PLAN NOTE
Patient has Combined Systolic and Diastolic heart failure that is Acute on chronic. On presentation their CHF was well compensated. Most recent BNP and echo results are listed below.  Recent Labs     11/29/24  0929   *       Latest ECHO  Results for orders placed during the hospital encounter of 06/22/23    Echo    Interpretation Summary  · The left ventricle is normal in size with mild mild asymmetric hypertrophy eccentric hypertrophy and low normal systolic function.  · The estimated ejection fraction is 50%.  · Normal left ventricular diastolic function.  · Normal right ventricular size with normal right ventricular systolic function.  · Mild to moderate tricuspid regurgitation.  · Intermediate central venous pressure (8 mmHg).  · The estimated PA systolic pressure is 43 mmHg.  · There is pulmonary hypertension.    Current Heart Failure Medications       Plan  - Monitor strict I&Os and daily weights.    - Place on telemetry  - Cardiology has not been consulted  - Received fluid bolus in ER, will watch for fluid overload  - New Echo pending      11/30 Echo 40% EF  Keep I/O matched or neg  Diuretics prn  CxR Improved.

## 2024-11-30 NOTE — HOSPITAL COURSE
11/30 Sedated / intubated. Hemodynamics stable.   ABG reviewed. Vent settings changed.    12/1 Stable overnight.     12/02/2024: Patient extubated without issue. Precedex stopped this morning. Repeat CXR pending. Prisca added. If remains stable off Precedex will SD to hosp med. PT/OT/SLP evals pending.

## 2024-11-30 NOTE — PLAN OF CARE
GCS 7. RASS goal -3 and maintained with Prop and Fent for sedation and titrated per orders see MAR. SPO2 100% on vent settings AC/VC, rate 22, FiO2 30%, Vt 340, and PEEP 5. HR SB-SR ranging in 50s-60s and BP normotensive throughout the shift. Zuluaga in place with total UOP of 263 mL during this shift. POC reviewed with patient and family. Bed in lowest position, side rails up x 3, wheels locked, and alarms on and audible.

## 2024-11-30 NOTE — PROGRESS NOTES
[facility] ([facility])  Critical Care Medicine  Progress Note    Patient Name: Leia Rodriguez  MRN: [id number]  Admission Date: 11/29/2024  Hospital Length of Stay: 1 days  Code Status: Full Code  Attending Provider: Jeane Espinoza MD  Primary Care Provider: Yasmin Navarro MD   Principal Problem: Acute respiratory failure with hypoxia and hypercarbia    Subjective:     HPI:  Leia Rodriguez is a 74 yr old female with a history of CAD, CHF, bilateral breast cancer, s/p mastectomy on surveillance, HTN, dementia who was brought to the ED by EMS on 11/29 after her family found her unresponsive. She was found to have agonal breathing and intubated in the field with difficulty. She was hypertensive initially, received ketamine, fentanyl in the field. She was started on Precedex in the ER and became hypotensive. Given fluid bolus and briefly required Levophed, hypotension resolved. CTH negative, CTA chest negative for PE but shows moderate R ptx with pleural effusions.    LA 2.9    pH 7.26  CO2 55.2    Critical care medicine consulted for admission.    Hospital/ICU Course:  11/30 Sedated / intubated. Hemodynamics stable.   ABG reviewed. Vent settings changed.        Objective:     Vital Signs (Most Recent):  Temp: 96.3 °F (35.7 °C) (11/30/24 0902)  Pulse: 65 (11/30/24 0902)  Resp: 16 (11/30/24 0902)  BP: (!) 150/60 (11/30/24 0902)  SpO2: 100 % (11/30/24 0902) Vital Signs (24h Range):  Temp:  [93.7 °F (34.3 °C)-97.3 °F (36.3 °C)] 96.3 °F (35.7 °C)  Pulse:  [] 65  Resp:  [16-35] 16  SpO2:  [95 %-100 %] 100 %  BP: ()/(43-90) 150/60     Weight: 63.5 kg (139 lb 15.9 oz)  Body mass index is 27.34 kg/m².      Intake/Output Summary (Last 24 hours) at 11/30/2024 0903  Last data filed at 11/30/2024 0801  Gross per 24 hour   Intake 2733.37 ml   Output 1097 ml   Net 1636.37 ml        Physical Exam  Vitals and nursing note reviewed.   HENT:      Head: Normocephalic.   Eyes:      Pupils: Pupils  are equal, round, and reactive to light.   Cardiovascular:      Rate and Rhythm: Normal rate.   Pulmonary:      Effort: No respiratory distress.      Breath sounds: Rhonchi present.   Abdominal:      Palpations: Abdomen is soft.   Neurological:      General: No focal deficit present.           Review of Systems   Unable to perform ROS: Acuity of condition       Vents:  Vent Mode: A/C (11/30/24 0902)  Ventilator Initiated: Yes (11/29/24 1000)  Set Rate: 16 BPM (11/30/24 0902)  Vt Set: 340 mL (11/30/24 0902)  PEEP/CPAP: 5 cmH20 (11/30/24 0902)  Oxygen Concentration (%): 100 (11/30/24 0902)  Peak Airway Pressure: 24 cmH20 (11/30/24 0902)  Plateau Pressure: 16 cmH20 (11/30/24 0902)  Total Ve: 5.55 L/m (11/30/24 0902)  Negative Inspiratory Force (cm H2O): 0 (11/30/24 0902)  F/VT Ratio<105 (RSBI): (!) 46.38 (11/30/24 0902)    Lines/Drains/Airways       Peripherally Inserted Central Catheter Line  Duration             PICC Double Lumen 11/29/24 1850 right basilic <1 day              Drain  Duration                  NG/OG Tube 11/29/24 0945 [place] sump 16 Fr. Right mouth <1 day         Urethral Catheter 11/29/24 0947 Silicone;Straight-tip 16 Fr. <1 day              Airway  Duration                  Airway - Non-Surgical 11/29/24 1 day              Peripheral Intravenous Line  Duration                  Peripheral IV - Single Lumen 11/29/24 18 G Right Antecubital 1 day         Peripheral IV - Single Lumen 11/29/24 1012 20 G Posterior;Right Hand <1 day                    Significant Labs:    CBC/Anemia Profile:  Recent Labs   Lab 11/29/24  0929 11/30/24  0334   WBC 13.90* 7.34   HGB 10.9* 10.7*   HCT 34.7* 33.3*    226   MCV 96 91   RDW 13.9 13.5        Chemistries:  Recent Labs   Lab 11/29/24  0929 11/30/24  0334    139   K 4.0 4.0    109   CO2 24 22*   BUN 18 19   CREATININE 1.3 1.0   CALCIUM 8.5* 8.1*   ALBUMIN 3.4* 3.2*   PROT 6.9 6.5   BILITOT 0.4 0.6   ALKPHOS 71 63   ALT 16 14   AST 17 14   MG 2.2 2.0    PHOS 5.1* 2.6*       All pertinent labs within the past 24 hours have been reviewed.    Significant Imaging:  I have reviewed all pertinent imaging results/findings within the past 24 hours.    ABG  Recent Labs   Lab 11/30/24  0319   PH 7.522*   PO2 222*   PCO2 24.4*   HCO3 20.0*   BE -3*     Assessment/Plan:     Neuro  Encephalopathy, metabolic  Unclear presentation, per family, patient became unresponsive at home, EMS found her agonal breathing  CTH negative, patient now waking up, following commands    Major neurocognitive disorder due to Alzheimer's disease  Patient with dementia with likely etiology of alzheimer's dementia. Dementia is moderate. The patient does have signs of behavioral disturbance. Home dementia medications are Held or Continued: held.. Continue non-pharmacologic interventions to prevent delirium (No VS between 11PM-5AM, activity during day, opening blinds, providing glasses/hearing aids, and up in chair during daytime). Will avoid narcotics and benzos unless absolutely necessary. PRN anti-psychotics are not prescribed to avoid self harm behaviors.    Patient intubated and sedated at this time.  Seems to be on multiple behavioral meds at home, may need adjusting, holding at this time.    Pulmonary  * Acute respiratory failure with hypoxia and hypercarbia  Patient with Hypercapnic and Hypoxic Respiratory failure which is Acute.  she is not on home oxygen. Supplemental oxygen was provided and noted- Vent Mode: A/C  Oxygen Concentration (%):  [] 100  Resp Rate Total:  [16 br/min-25 br/min] 16 br/min  Vt Set:  [340 mL-420 mL] 340 mL  PEEP/CPAP:  [5 cmH20-8 cmH20] 5 cmH20  Mean Airway Pressure:  [7.9 vfJ44-47 cmH20] 7.9 cmH20    .   Signs/symptoms of respiratory failure include- lethargy. Contributing diagnoses includes - Aspiration, CHF, and Pleural effusion Labs and images were reviewed. Patient Has recent ABG, which has been reviewed. Will treat underlying causes and adjust management of  respiratory failure as follows-     CTA Chest without PE, moderate ptx, bilateral pleural effusions    Keep sedated today  Wean vent settings as tolerated  Brief code status discussion with daughter at bedside who says that the patient would want CPR/ventilator if her heart and breathing stops.    11/30   Stable on vent  Rate decreased  ETT adjusted  On 50% oxygen over 200 mmHg.    Apparently was a difficult intubation. Ramón tube was placed which is typically traumatic. Needs more time to make sure she doesn't have airway edema.  D/w daughters at the bedside.   She has underlying dementia and they do not think she will tolerate weaning sedation well. They understand she will be difficult to take care of if she comes off the vent. They understand based on her multiple co morbidities she carries a poor prognosis but want to give her more time.      Postprocedural pneumothorax  Moderate R ptx after intubation  -Withdraw ETT  -Obtain CXR in 2 hours, may require Chest tube placement if does not improve    11/30 No worsening. Limited apical Ptx. May need CT guided catheter aspiration. Will follow closely.    Cardiac/Vascular  Chronic combined systolic and diastolic CHF (congestive heart failure)  Patient has Combined Systolic and Diastolic heart failure that is Acute on chronic. On presentation their CHF was well compensated. Most recent BNP and echo results are listed below.  Recent Labs     11/29/24  0929   *       Latest ECHO  Results for orders placed during the hospital encounter of 06/22/23    Echo    Interpretation Summary  · The left ventricle is normal in size with mild mild asymmetric hypertrophy eccentric hypertrophy and low normal systolic function.  · The estimated ejection fraction is 50%.  · Normal left ventricular diastolic function.  · Normal right ventricular size with normal right ventricular systolic function.  · Mild to moderate tricuspid regurgitation.  · Intermediate central venous pressure  "(8 mmHg).  · The estimated PA systolic pressure is 43 mmHg.  · There is pulmonary hypertension.    Current Heart Failure Medications       Plan  - Monitor strict I&Os and daily weights.    - Place on telemetry  - Cardiology has not been consulted  - Received fluid bolus in ER, will watch for fluid overload  - New Echo pending      11/30 Echo 40% EF  Keep I/O matched or neg  Diuretics prn  CxR Improved.      Essential hypertension  Patients blood pressure range in the last 24 hours was: BP  Min: 58/43  Max: 160/74.The patient's inpatient anti-hypertensive regimen is listed below:  Current Antihypertensives       Plan  - Hypotensive on arrival after multiple sedatives requiring pressors  - Now off pressors, will utilize prn meds if BP trends up,  holding home meds at this time    Renal/  Urinary retention  Per daughter, was getting straight cath'd at home for urinary retention  -Zuluaga in place    ID  Severe sepsis  This patient does have evidence of infective focus  My overall impression is sepsis.  Source: Unknown  Antibiotics given-   Antibiotics (72h ago, onward)      Start     Stop Route Frequency Ordered    11/29/24 1300  piperacillin-tazobactam (ZOSYN) 4.5 g in D5W 100 mL IVPB (MB+)         -- IV Every 8 hours (non-standard times) 11/29/24 1146    11/29/24 1245  mupirocin 2 % ointment         12/04/24 0859 Nasl 2 times daily 11/29/24 1143    11/29/24 1145  vancomycin 1,250 mg in 0.9% NaCl 250 mL IVPB (admixture device)         -- IV Once 11/29/24 1040    11/29/24 1139  vancomycin - pharmacy to dose  (vancomycin IVPB (PEDS and ADULTS))        Placed in "And" Linked Group    -- IV pharmacy to manage frequency 11/29/24 1040          Latest lactate reviewed-  Recent Labs   Lab 11/29/24  0929   LACTATE 2.9*     Organ dysfunction indicated by Acute respiratory failure and Encephalopathy    Fluid challenge- received in ER    Post- resuscitation assessment Yes Perfusion exam was performed within 6 hours of septic " shock presentation after bolus shows Adequate tissue perfusion assessed by non-invasive monitoring       Will Not start Pressors- Levophed for MAP of 65    Pressors briefly started in the ER, now off  Start zosyn, vanc  History of recurrent UTIs- pancultures pending  Trend LA    Immunology/Multi System  Immunodeficiency due to chemotherapy  Greater risk for sepsis  Cultures pending  Broad spec abx       Critical Care Daily Checklist:    A: Awake: RASS Goal/Actual Goal: RASS Goal: -3-->moderate sedation  Actual:     B: Spontaneous Breathing Trial Performed?     C: SAT & SBT Coordinated?  yes                      D: Delirium: CAM-ICU Overall CAM-ICU: Positive   E: Early Mobility Performed? yes   F: Feeding Goal:    Status:     Current Diet Order   Procedures    Diet NPO      AS: Analgesia/Sedation yes   T: Thromboembolic Prophylaxis yes   H: HOB > 300 yes   U: Stress Ulcer Prophylaxis (if needed) yes   G: Glucose Control yes   B: Bowel Function     I: Indwelling Catheter (Lines & Zuluaga) Necessity yes   D: De-escalation of Antimicrobials/Pharmacotherapies yes    Plan for the day/ETD yes    Code Status:  Family/Goals of Care: Full Code  yes     Critical Care Time: 36 minutes  Critical secondary to Patient has a condition that poses threat to life and bodily function: Severe Respiratory Distress and Acute Renal Failure      Critical care was time spent personally by me on the following activities: development of treatment plan with patient or surrogate and bedside caregivers, discussions with consultants, evaluation of patient's response to treatment, examination of patient, ordering and performing treatments and interventions, ordering and review of laboratory studies, ordering and review of radiographic studies, pulse oximetry, re-evaluation of patient's condition. This critical care time did not overlap with that of any other provider or involve time for any procedures.     Jeane Espinoza MD  Critical Care  Medicine  [facility] ([facility])

## 2024-11-30 NOTE — ASSESSMENT & PLAN NOTE
Patient with Hypercapnic and Hypoxic Respiratory failure which is Acute.  she is not on home oxygen. Supplemental oxygen was provided and noted- Vent Mode: A/C  Oxygen Concentration (%):  [] 100  Resp Rate Total:  [16 br/min-25 br/min] 16 br/min  Vt Set:  [340 mL-420 mL] 340 mL  PEEP/CPAP:  [5 cmH20-8 cmH20] 5 cmH20  Mean Airway Pressure:  [7.9 xeA32-18 cmH20] 7.9 cmH20    .   Signs/symptoms of respiratory failure include- lethargy. Contributing diagnoses includes - Aspiration, CHF, and Pleural effusion Labs and images were reviewed. Patient Has recent ABG, which has been reviewed. Will treat underlying causes and adjust management of respiratory failure as follows-     CTA Chest without PE, moderate ptx, bilateral pleural effusions    Keep sedated today  Wean vent settings as tolerated  Brief code status discussion with daughter at bedside who says that the patient would want CPR/ventilator if her heart and breathing stops.    11/30   Stable on vent  Rate decreased  ETT adjusted  On 50% oxygen over 200 mmHg.    Apparently was a difficult intubation. Ramón tube was placed which is typically traumatic. Needs more time to make sure she doesn't have airway edema.  D/w daughters at the bedside.   She has underlying dementia and they do not think she will tolerate weaning sedation well. They understand she will be difficult to take care of if she comes off the vent. They understand based on her multiple co morbidities she carries a poor prognosis but want to give her more time.

## 2024-11-30 NOTE — PROCEDURES
Leia Rodriguez is a 74 y.o. female patient.    Temp: (!) 94.7 °F (34.8 °C) (11/29/24 1830)  Pulse: (!) 58 (11/29/24 1919)  Resp: (!) 22 (11/29/24 1919)  BP: (!) 157/81 (11/29/24 1830)  SpO2: 100 % (11/29/24 1919)  Weight: 57.2 kg (126 lb) (11/29/24 1200)  Height: 5' (152.4 cm) (11/29/24 1200)    PICC  Performed by: Colin Ireland RN  Consent Done: Yes  Time out: Immediately prior to procedure a time out was called to verify the correct patient, procedure, equipment, support staff and site/side marked as required  Indications: med administration  Anesthesia: local infiltration  Local anesthetic: lidocaine 1% without epinephrine  Anesthetic Total (mL): 3  Skin prep agent dried: skin prep agent completely dried prior to procedure  Sterile barriers: all five maximum sterile barriers used - cap, mask, sterile gown, sterile gloves, and large sterile sheet  Hand hygiene: hand hygiene performed prior to central venous catheter insertion  Location details: right basilic  Catheter type: double lumen  Catheter size: 4 Fr  Catheter Length: 33cm    Ultrasound guidance: yes  Vessel Caliber: medium and patent, compressibility normal  Vascular Doppler: not done  Needle advanced into vessel with real time Ultrasound guidance.  Guidewire confirmed in vessel.  Sterile sheath used.  no esophageal manometryNumber of attempts: 1  Post-procedure: blood return through all ports, chlorhexidine patch and sterile dressing applied  Estimated blood loss (mL): 0  Specimens: No  Implants: No          Name RAVEN Shaw  11/29/2024

## 2024-11-30 NOTE — CONSULTS
IR Note  R apical pneumothorax slightly improved compared yesterdays exam, currently too small for placement of a pigtail chest tube. Recommend continued monitoring with Q12 hour chest X-rays. If patient decompensates, please obtain stat chest X-ray and notify IR.     Thank you   Mian Petersen MD

## 2024-12-01 LAB
ALBUMIN SERPL BCP-MCNC: 3.1 G/DL (ref 3.5–5.2)
ALP SERPL-CCNC: 55 U/L (ref 40–150)
ALT SERPL W/O P-5'-P-CCNC: 11 U/L (ref 10–44)
ANION GAP SERPL CALC-SCNC: 12 MMOL/L (ref 8–16)
AST SERPL-CCNC: 9 U/L (ref 10–40)
BASOPHILS # BLD AUTO: 0.02 K/UL (ref 0–0.2)
BASOPHILS NFR BLD: 0.1 % (ref 0–1.9)
BILIRUB SERPL-MCNC: 0.4 MG/DL (ref 0.1–1)
BUN SERPL-MCNC: 26 MG/DL (ref 8–23)
CALCIUM SERPL-MCNC: 7.3 MG/DL (ref 8.7–10.5)
CHLORIDE SERPL-SCNC: 106 MMOL/L (ref 95–110)
CO2 SERPL-SCNC: 21 MMOL/L (ref 23–29)
CREAT SERPL-MCNC: 1.5 MG/DL (ref 0.5–1.4)
DIFFERENTIAL METHOD BLD: ABNORMAL
EOSINOPHIL # BLD AUTO: 0 K/UL (ref 0–0.5)
EOSINOPHIL NFR BLD: 0.1 % (ref 0–8)
ERYTHROCYTE [DISTWIDTH] IN BLOOD BY AUTOMATED COUNT: 14.4 % (ref 11.5–14.5)
EST. GFR  (NO RACE VARIABLE): 37 ML/MIN/1.73 M^2
GLUCOSE SERPL-MCNC: 137 MG/DL (ref 70–110)
HCT VFR BLD AUTO: 32.8 % (ref 37–48.5)
HGB BLD-MCNC: 10.2 G/DL (ref 12–16)
IMM GRANULOCYTES # BLD AUTO: 0.07 K/UL (ref 0–0.04)
IMM GRANULOCYTES NFR BLD AUTO: 0.5 % (ref 0–0.5)
LYMPHOCYTES # BLD AUTO: 0.7 K/UL (ref 1–4.8)
LYMPHOCYTES NFR BLD: 4.9 % (ref 18–48)
MAGNESIUM SERPL-MCNC: 2.2 MG/DL (ref 1.6–2.6)
MCH RBC QN AUTO: 29.3 PG (ref 27–31)
MCHC RBC AUTO-ENTMCNC: 31.1 G/DL (ref 32–36)
MCV RBC AUTO: 94 FL (ref 82–98)
MONOCYTES # BLD AUTO: 0.6 K/UL (ref 0.3–1)
MONOCYTES NFR BLD: 4.7 % (ref 4–15)
NEUTROPHILS # BLD AUTO: 12.2 K/UL (ref 1.8–7.7)
NEUTROPHILS NFR BLD: 89.7 % (ref 38–73)
NRBC BLD-RTO: 0 /100 WBC
PHOSPHATE SERPL-MCNC: 5.2 MG/DL (ref 2.7–4.5)
PLATELET # BLD AUTO: 280 K/UL (ref 150–450)
PMV BLD AUTO: 10.4 FL (ref 9.2–12.9)
POCT GLUCOSE: 152 MG/DL (ref 70–110)
POCT GLUCOSE: 160 MG/DL (ref 70–110)
POCT GLUCOSE: 166 MG/DL (ref 70–110)
POTASSIUM SERPL-SCNC: 4.5 MMOL/L (ref 3.5–5.1)
PROT SERPL-MCNC: 6.4 G/DL (ref 6–8.4)
RBC # BLD AUTO: 3.48 M/UL (ref 4–5.4)
SODIUM SERPL-SCNC: 139 MMOL/L (ref 136–145)
VANCOMYCIN SERPL-MCNC: 20.6 UG/ML
WBC # BLD AUTO: 13.56 K/UL (ref 3.9–12.7)

## 2024-12-01 PROCEDURE — 94003 VENT MGMT INPAT SUBQ DAY: CPT

## 2024-12-01 PROCEDURE — 99900026 HC AIRWAY MAINTENANCE (STAT)

## 2024-12-01 PROCEDURE — 27100092 HC HIGH FLOW DELIVERY CANNULA

## 2024-12-01 PROCEDURE — 20000000 HC ICU ROOM

## 2024-12-01 PROCEDURE — 25000003 PHARM REV CODE 250: Performed by: SPECIALIST

## 2024-12-01 PROCEDURE — 27000249 HC VAPOTHERM CIRCUIT

## 2024-12-01 PROCEDURE — 63600175 PHARM REV CODE 636 W HCPCS: Performed by: NURSE PRACTITIONER

## 2024-12-01 PROCEDURE — 85025 COMPLETE CBC W/AUTO DIFF WBC: CPT | Performed by: NURSE PRACTITIONER

## 2024-12-01 PROCEDURE — 83735 ASSAY OF MAGNESIUM: CPT | Performed by: NURSE PRACTITIONER

## 2024-12-01 PROCEDURE — 63600175 PHARM REV CODE 636 W HCPCS: Performed by: EMERGENCY MEDICINE

## 2024-12-01 PROCEDURE — 80202 ASSAY OF VANCOMYCIN: CPT | Performed by: SPECIALIST

## 2024-12-01 PROCEDURE — 25000003 PHARM REV CODE 250: Performed by: EMERGENCY MEDICINE

## 2024-12-01 PROCEDURE — 80053 COMPREHEN METABOLIC PANEL: CPT | Performed by: NURSE PRACTITIONER

## 2024-12-01 PROCEDURE — 99900035 HC TECH TIME PER 15 MIN (STAT)

## 2024-12-01 PROCEDURE — 94761 N-INVAS EAR/PLS OXIMETRY MLT: CPT

## 2024-12-01 PROCEDURE — 84100 ASSAY OF PHOSPHORUS: CPT | Performed by: NURSE PRACTITIONER

## 2024-12-01 PROCEDURE — C9399 UNCLASSIFIED DRUGS OR BIOLOG: HCPCS | Performed by: SPECIALIST

## 2024-12-01 PROCEDURE — 94799 UNLISTED PULMONARY SVC/PX: CPT

## 2024-12-01 PROCEDURE — 63600175 PHARM REV CODE 636 W HCPCS: Performed by: SPECIALIST

## 2024-12-01 PROCEDURE — 25000003 PHARM REV CODE 250: Performed by: NURSE PRACTITIONER

## 2024-12-01 PROCEDURE — 27100171 HC OXYGEN HIGH FLOW UP TO 24 HOURS

## 2024-12-01 RX ORDER — DEXMEDETOMIDINE HYDROCHLORIDE 4 UG/ML
0-1.5 INJECTION INTRAVENOUS CONTINUOUS
Status: DISCONTINUED | OUTPATIENT
Start: 2024-12-01 | End: 2024-12-02

## 2024-12-01 RX ADMIN — PROPOFOL 50 MCG/KG/MIN: 10 INJECTION, EMULSION INTRAVENOUS at 01:12

## 2024-12-01 RX ADMIN — MUPIROCIN: 20 OINTMENT TOPICAL at 08:12

## 2024-12-01 RX ADMIN — PIPERACILLIN SODIUM AND TAZOBACTAM SODIUM 4.5 G: 4; .5 INJECTION, POWDER, FOR SOLUTION INTRAVENOUS at 10:12

## 2024-12-01 RX ADMIN — DEXMEDETOMIDINE HYDROCHLORIDE 0.2 MCG/KG/HR: 4 INJECTION INTRAVENOUS at 09:12

## 2024-12-01 RX ADMIN — METHYLPREDNISOLONE SODIUM SUCCINATE 40 MG: 40 INJECTION, POWDER, FOR SOLUTION INTRAMUSCULAR; INTRAVENOUS at 05:12

## 2024-12-01 RX ADMIN — DEXMEDETOMIDINE HYDROCHLORIDE 0.6 MCG/KG/HR: 4 INJECTION INTRAVENOUS at 03:12

## 2024-12-01 RX ADMIN — METHYLPREDNISOLONE SODIUM SUCCINATE 40 MG: 40 INJECTION, POWDER, FOR SOLUTION INTRAMUSCULAR; INTRAVENOUS at 11:12

## 2024-12-01 RX ADMIN — FUROSEMIDE 40 MG: 10 INJECTION, SOLUTION INTRAMUSCULAR; INTRAVENOUS at 08:12

## 2024-12-01 RX ADMIN — MUPIROCIN: 20 OINTMENT TOPICAL at 09:12

## 2024-12-01 RX ADMIN — INSULIN ASPART 2 UNITS: 100 INJECTION, SOLUTION INTRAVENOUS; SUBCUTANEOUS at 05:12

## 2024-12-01 RX ADMIN — PIPERACILLIN SODIUM AND TAZOBACTAM SODIUM 4.5 G: 4; .5 INJECTION, POWDER, FOR SOLUTION INTRAVENOUS at 04:12

## 2024-12-01 RX ADMIN — CHLORHEXIDINE GLUCONATE 0.12% ORAL RINSE 15 ML: 1.2 LIQUID ORAL at 08:12

## 2024-12-01 RX ADMIN — METHYLPREDNISOLONE SODIUM SUCCINATE 40 MG: 40 INJECTION, POWDER, FOR SOLUTION INTRAMUSCULAR; INTRAVENOUS at 01:12

## 2024-12-01 RX ADMIN — PROPOFOL 50 MCG/KG/MIN: 10 INJECTION, EMULSION INTRAVENOUS at 08:12

## 2024-12-01 RX ADMIN — FAMOTIDINE 20 MG: 10 INJECTION, SOLUTION INTRAVENOUS at 08:12

## 2024-12-01 RX ADMIN — PIPERACILLIN SODIUM AND TAZOBACTAM SODIUM 4.5 G: 4; .5 INJECTION, POWDER, FOR SOLUTION INTRAVENOUS at 12:12

## 2024-12-01 RX ADMIN — VANCOMYCIN HYDROCHLORIDE 1000 MG: 1 INJECTION, POWDER, LYOPHILIZED, FOR SOLUTION INTRAVENOUS at 05:12

## 2024-12-01 RX ADMIN — CHLORHEXIDINE GLUCONATE 0.12% ORAL RINSE 15 ML: 1.2 LIQUID ORAL at 09:12

## 2024-12-01 RX ADMIN — INSULIN ASPART 2 UNITS: 100 INJECTION, SOLUTION INTRAVENOUS; SUBCUTANEOUS at 11:12

## 2024-12-01 NOTE — PROGRESS NOTES
Pharmacokinetic Assessment Follow Up: IV Vancomycin    Vancomycin serum concentration assessment(s):    The random level was drawn correctly and can be used to guide therapy at this time. The measurement is above the desired definitive target range of 10 to 20 mcg/mL.    Vancomycin Regimen Plan:    Administer vancomycin 1000 mg IV at 0630 on 12/1/24 and obtain a vancomycin random level at 0430 on 12/2/24 (with routine AM labs).    Drug levels (last 3 results):  Recent Labs   Lab Result Units 11/30/24  0334 12/01/24  0148   Vancomycin, Random ug/mL 16.2 20.6       Pharmacy will continue to follow and monitor vancomycin.    Please contact pharmacy at extension [phone] for questions regarding this assessment.    Thank you for the consult,   Colin Jarrett       Patient brief summary:  Leia Rodriguez is a 74 y.o. female initiated on antimicrobial therapy with IV Vancomycin for treatment of sepsis    The patient's current regimen is pulse dosing.    Drug Allergies:   Review of patient's allergies indicates:  No Known Allergies    Actual Body Weight:   63.5 kg    Renal Function:   Estimated Creatinine Clearance: 41.7 mL/min (based on SCr of 1 mg/dL).,     Dialysis Method (if applicable):  N/A    CBC (last 72 hours):  Recent Labs   Lab Result Units 11/29/24  0929 11/30/24  0334   WBC K/uL 13.90* 7.34   Hemoglobin g/dL 10.9* 10.7*   Hematocrit % 34.7* 33.3*   Platelets K/uL 252 226   Gran % % 73.3* 89.9*   Lymph % % 17.3* 7.9*   Mono % % 4.0 1.6*   Eosinophil % % 4.2 0.1   Basophil % % 0.4 0.1   Differential Method  Automated Automated       Metabolic Panel (last 72 hours):  Recent Labs   Lab Result Units 11/29/24  0929 11/29/24  0948 11/30/24  0334 11/30/24  1311   Sodium mmol/L 142  --  139  --    Potassium mmol/L 4.0  --  4.0  --    Chloride mmol/L 107  --  109  --    CO2 mmol/L 24  --  22*  --    Glucose mg/dL 173*  --  129* 165*   Glucose, UA   --  Negative  --   --    BUN mg/dL 18  --  19  --    Creatinine mg/dL 1.3   --  1.0  --    Albumin g/dL 3.4*  --  3.2*  --    Total Bilirubin mg/dL 0.4  --  0.6  --    Alkaline Phosphatase U/L 71  --  63  --    AST U/L 17  --  14  --    ALT U/L 16  --  14  --    Magnesium mg/dL 2.2  --  2.0  --    Phosphorus mg/dL 5.1*  --  2.6*  --        Vancomycin Administrations:  vancomycin given in the last 96 hours                     vancomycin (VANCOCIN) 1,000 mg in 0.9% NaCl 250 mL IVPB (admixture device) (mg) 1,000 mg New Bag 11/30/24 0629    vancomycin 1,250 mg in 0.9% NaCl 250 mL IVPB (admixture device) (mg) 1,250 mg New Bag 11/29/24 1313                    Microbiologic Results:  Microbiology Results (last 7 days)       Procedure Component Value Units Date/Time    Blood culture x two cultures. Draw prior to antibiotics. [[id number]] Collected: 11/29/24 1000    Order Status: Completed Specimen: Blood from Peripheral, Antecubital, Left Updated: 11/30/24 1812     Blood Culture, Routine No Growth to date      No Growth to date    Narrative:      Aerobic and anaerobic    Blood culture x two cultures. Draw prior to antibiotics. [[id number]] Collected: 11/29/24 1015    Order Status: Completed Specimen: Blood from Peripheral, Hand, Left Updated: 11/30/24 1812     Blood Culture, Routine No Growth to date      No Growth to date    Narrative:      Aerobic and anaerobic    Culture, Respiratory with Gram Stain [[id number]] Collected: 11/29/24 1217    Order Status: Completed Specimen: Respiratory from Sputum Updated: 11/30/24 0306     Gram Stain (Respiratory) <10 epithelial cells per low power field.     Gram Stain (Respiratory) Rare WBC's     Gram Stain (Respiratory) No organisms seen    Influenza A & B by Molecular [[id number]] Collected: 11/29/24 0948    Order Status: Completed Specimen: Nasal Swab Updated: 11/29/24 1038     Influenza A, Molecular Negative     Influenza B, Molecular Negative     Flu A & B Source Nasal swab

## 2024-12-01 NOTE — PROGRESS NOTES
[facility] - Intensive Care ([facility])  Critical Care Medicine  Progress Note    Patient Name: Leia Rodriguez  MRN: [id number]  Admission Date: 11/29/2024  Hospital Length of Stay: 2 days  Code Status: Full Code  Attending Provider: Jeane Espinoza MD  Primary Care Provider: Yasmin Navarro MD   Principal Problem: Acute respiratory failure with hypoxia and hypercarbia    Subjective:     HPI:  Leia Rodriguez is a 74 yr old female with a history of CAD, CHF, bilateral breast cancer, s/p mastectomy on surveillance, HTN, dementia who was brought to the ED by EMS on 11/29 after her family found her unresponsive. She was found to have agonal breathing and intubated in the field with difficulty. She was hypertensive initially, received ketamine, fentanyl in the field. She was started on Precedex in the ER and became hypotensive. Given fluid bolus and briefly required Levophed, hypotension resolved. CTH negative, CTA chest negative for PE but shows moderate R ptx with pleural effusions.    LA 2.9    pH 7.26  CO2 55.2    Critical care medicine consulted for admission.    Hospital/ICU Course:  11/30 Sedated / intubated. Hemodynamics stable.   ABG reviewed. Vent settings changed.    12/1 Stable overnight.         Objective:     Vital Signs (Most Recent):  Temp: 96.4 °F (35.8 °C) (12/01/24 0745)  Pulse: (!) 53 (12/01/24 0745)  Resp: 16 (12/01/24 0745)  BP: (!) 124/57 (12/01/24 0745)  SpO2: 100 % (12/01/24 0745) Vital Signs (24h Range):  Temp:  [96.4 °F (35.8 °C)-97.9 °F (36.6 °C)] 96.4 °F (35.8 °C)  Pulse:  [50-89] 53  Resp:  [13-33] 16  SpO2:  [100 %] 100 %  BP: ()/(48-80) 124/57     Weight: 59.8 kg (131 lb 13.4 oz)  Body mass index is 25.75 kg/m².      Intake/Output Summary (Last 24 hours) at 12/1/2024 0921  Last data filed at 12/1/2024 0900  Gross per 24 hour   Intake 1691.3 ml   Output 1602 ml   Net 89.3 ml        Physical Exam  Vitals and nursing note reviewed.   Constitutional:       General: She is not  in acute distress.     Appearance: She is ill-appearing. She is not toxic-appearing or diaphoretic.   HENT:      Head: Normocephalic.   Eyes:      Pupils: Pupils are equal, round, and reactive to light.   Cardiovascular:      Rate and Rhythm: Tachycardia present.      Pulses: Normal pulses.   Pulmonary:      Effort: No respiratory distress.   Abdominal:      Palpations: Abdomen is soft.   Neurological:      General: No focal deficit present.           Review of Systems   Unable to perform ROS: Acuity of condition       Vents:  Vent Mode: A/C (12/01/24 0730)  Ventilator Initiated: Yes (11/29/24 1000)  Set Rate: 16 BPM (12/01/24 0730)  Vt Set: 340 mL (12/01/24 0730)  PEEP/CPAP: 5 cmH20 (12/01/24 0730)  Oxygen Concentration (%): 100 (12/01/24 0745)  Peak Airway Pressure: 31 cmH20 (12/01/24 0730)  Plateau Pressure: 12 cmH20 (12/01/24 0730)  Total Ve: 5.53 L/m (12/01/24 0730)  Negative Inspiratory Force (cm H2O): 0 (12/01/24 0730)  F/VT Ratio<105 (RSBI): (!) 46.51 (12/01/24 0730)    Lines/Drains/Airways       Peripherally Inserted Central Catheter Line  Duration             PICC Double Lumen 11/29/24 1850 right basilic 1 day              Drain  Duration                  NG/OG Tube 11/29/24 0945 Rupert sump 16 Fr. Right mouth 1 day         Urethral Catheter 11/29/24 0947 Silicone;Straight-tip 16 Fr. 1 day              Airway  Duration                  Airway - Non-Surgical 11/29/24 2 days              Peripheral Intravenous Line  Duration                  Peripheral IV - Single Lumen 11/29/24 18 G Right Antecubital 2 days         Peripheral IV - Single Lumen 11/29/24 1012 20 G Posterior;Right Hand 1 day                    Significant Labs:    CBC/Anemia Profile:  Recent Labs   Lab 11/29/24  0929 11/30/24  0334 12/01/24  0417   WBC 13.90* 7.34 13.56*   HGB 10.9* 10.7* 10.2*   HCT 34.7* 33.3* 32.8*    226 280   MCV 96 91 94   RDW 13.9 13.5 14.4        Chemistries:  Recent Labs   Lab 11/29/24  0929 11/30/24  0334  12/01/24  0417    139 139   K 4.0 4.0 4.5    109 106   CO2 24 22* 21*   BUN 18 19 26*   CREATININE 1.3 1.0 1.5*   CALCIUM 8.5* 8.1* 7.3*   ALBUMIN 3.4* 3.2* 3.1*   PROT 6.9 6.5 6.4   BILITOT 0.4 0.6 0.4   ALKPHOS 71 63 55   ALT 16 14 11   AST 17 14 9*   MG 2.2 2.0 2.2   PHOS 5.1* 2.6* 5.2*       All pertinent labs within the past 24 hours have been reviewed.    Significant Imaging:  I have reviewed all pertinent imaging results/findings within the past 24 hours.    ABG  Recent Labs   Lab 11/30/24  1235   PH 7.356   PO2 472*   PCO2 38.1   HCO3 21.4*   BE -4*     Assessment/Plan:     Neuro  Encephalopathy, metabolic  Unclear presentation, per family, patient became unresponsive at home, EMS found her agonal breathing  CTH negative, patient now waking up, following commands    Major neurocognitive disorder due to Alzheimer's disease  Patient with dementia with likely etiology of alzheimer's dementia. Dementia is moderate. The patient does have signs of behavioral disturbance. Home dementia medications are Held or Continued: held.. Continue non-pharmacologic interventions to prevent delirium (No VS between 11PM-5AM, activity during day, opening blinds, providing glasses/hearing aids, and up in chair during daytime). Will avoid narcotics and benzos unless absolutely necessary. PRN anti-psychotics are not prescribed to avoid self harm behaviors.    Patient intubated and sedated at this time.  Seems to be on multiple behavioral meds at home, may need adjusting, holding at this time.    Pulmonary  * Acute respiratory failure with hypoxia and hypercarbia  Patient with Hypercapnic and Hypoxic Respiratory failure which is Acute.  she is not on home oxygen. Supplemental oxygen was provided and noted- Vent Mode: A/C  Oxygen Concentration (%):  [100] 100  Resp Rate Total:  [16 br/min-20 br/min] 16 br/min  Vt Set:  [340 mL] 340 mL  PEEP/CPAP:  [5 cmH20] 5 cmH20  Mean Airway Pressure:  [8 cmH20-9.7 cmH20] 8.7 cmH20    .    Signs/symptoms of respiratory failure include- lethargy. Contributing diagnoses includes - Aspiration, CHF, and Pleural effusion Labs and images were reviewed. Patient Has recent ABG, which has been reviewed. Will treat underlying causes and adjust management of respiratory failure as follows-     CTA Chest without PE, moderate ptx, bilateral pleural effusions    Keep sedated today  Wean vent settings as tolerated  Brief code status discussion with daughter at bedside who says that the patient would want CPR/ventilator if her heart and breathing stops.    11/30   Stable on vent  Rate decreased  ETT adjusted  On 50% oxygen over 200 mmHg.    Apparently was a difficult intubation. Ramón tube was placed which is typically traumatic. Needs more time to make sure she doesn't have airway edema.  D/w daughters at the bedside.   She has underlying dementia and they do not think she will tolerate weaning sedation well. They understand she will be difficult to take care of if she comes off the vent. They understand based on her multiple co morbidities she carries a poor prognosis but want to give her more time.    12/1 Wean sedation  DC fentanyl and wean off diprivan  Will change to precedex and attempt SBT    Postprocedural pneumothorax  Moderate R ptx after intubation  -Withdraw ETT  -Obtain CXR in 2 hours, may require Chest tube placement if does not improve    11/30 No worsening. Limited apical Ptx. May need CT guided catheter aspiration. Will follow closely.    Cardiac/Vascular  Chronic combined systolic and diastolic CHF (congestive heart failure)  Patient has Combined Systolic and Diastolic heart failure that is Acute on chronic. On presentation their CHF was well compensated. Most recent BNP and echo results are listed below.  Recent Labs     11/29/24  0929   *       Latest ECHO  Results for orders placed during the hospital encounter of 06/22/23    Echo    Interpretation Summary  · The left ventricle is normal  in size with mild mild asymmetric hypertrophy eccentric hypertrophy and low normal systolic function.  · The estimated ejection fraction is 50%.  · Normal left ventricular diastolic function.  · Normal right ventricular size with normal right ventricular systolic function.  · Mild to moderate tricuspid regurgitation.  · Intermediate central venous pressure (8 mmHg).  · The estimated PA systolic pressure is 43 mmHg.  · There is pulmonary hypertension.    Current Heart Failure Medications  furosemide injection 40 mg, Daily, Intravenous  NORepinephrine 4 mg in sodium chloride 0.9% 250 mL infusion (premix), Continuous, Intravenous    Plan  - Monitor strict I&Os and daily weights.    - Place on telemetry  - Cardiology has not been consulted  - Received fluid bolus in ER, will watch for fluid overload  - New Echo pending      11/30 Echo 40% EF  Keep I/O matched or neg  Diuretics prn  CxR Improved.    12/1 Compensated      Essential hypertension  Patients blood pressure range in the last 24 hours was: BP  Min: 58/43  Max: 160/74.The patient's inpatient anti-hypertensive regimen is listed below:  Current Antihypertensives       Plan  - Hypotensive on arrival after multiple sedatives requiring pressors  - Now off pressors, will utilize prn meds if BP trends up,  holding home meds at this time    Renal/  Urinary retention  Per daughter, was getting straight cath'd at home for urinary retention  -Zuluaga in place    ID  Severe sepsis  This patient does have evidence of infective focus  My overall impression is sepsis.  Source: Unknown  Antibiotics given-   Antibiotics (72h ago, onward)      Start     Stop Route Frequency Ordered    11/29/24 1300  piperacillin-tazobactam (ZOSYN) 4.5 g in D5W 100 mL IVPB (MB+)         -- IV Every 8 hours (non-standard times) 11/29/24 1146    11/29/24 1245  mupirocin 2 % ointment         12/04/24 0859 Nasl 2 times daily 11/29/24 1143    11/29/24 1145  vancomycin 1,250 mg in 0.9% NaCl 250 mL IVPB  "(admixture device)         -- IV Once 11/29/24 1040    11/29/24 1139  vancomycin - pharmacy to dose  (vancomycin IVPB (PEDS and ADULTS))        Placed in "And" Linked Group    -- IV pharmacy to manage frequency 11/29/24 1040          Latest lactate reviewed-  Recent Labs   Lab 11/29/24  0929   LACTATE 2.9*     Organ dysfunction indicated by Acute respiratory failure and Encephalopathy    Fluid challenge- received in ER    Post- resuscitation assessment Yes Perfusion exam was performed within 6 hours of septic shock presentation after bolus shows Adequate tissue perfusion assessed by non-invasive monitoring       Will Not start Pressors- Levophed for MAP of 65    Pressors briefly started in the ER, now off  Start zosyn, vanc  History of recurrent UTIs- pancultures pending  Trend LA    Immunology/Multi System  Immunodeficiency due to chemotherapy  Greater risk for sepsis  Cultures pending  Broad spec abx       Critical Care Daily Checklist:    A: Awake: RASS Goal/Actual Goal: RASS Goal: -3-->moderate sedation  Actual:     B: Spontaneous Breathing Trial Performed?     C: SAT & SBT Coordinated?  yes                      D: Delirium: CAM-ICU Overall CAM-ICU: Positive   E: Early Mobility Performed? yes   F: Feeding Goal: Goals: Patient to start on ED support prior within 24 hours  Status: Nutrition Goal Status: new   Current Diet Order   Procedures    Diet NPO      AS: Analgesia/Sedation yes   T: Thromboembolic Prophylaxis yes   H: HOB > 300 yes   U: Stress Ulcer Prophylaxis (if needed) yes   G: Glucose Control    B: Bowel Function     I: Indwelling Catheter (Lines & Zuluaga) Necessity yes   D: De-escalation of Antimicrobials/Pharmacotherapies yes    Plan for the day/ETD yes    Code Status:  Family/Goals of Care: Full Code  yes     Critical Care Time: 38 minutes  Critical secondary to Patient has a condition that poses threat to life and bodily function: Severe Respiratory Distress      Critical care was time spent " personally by me on the following activities: development of treatment plan with patient or surrogate and bedside caregivers, discussions with consultants, evaluation of patient's response to treatment, examination of patient, ordering and performing treatments and interventions, ordering and review of laboratory studies, ordering and review of radiographic studies, pulse oximetry, re-evaluation of patient's condition. This critical care time did not overlap with that of any other provider or involve time for any procedures.     Jeane Espinoza MD  Critical Care Medicine  [facility] - Intensive Care [facility])

## 2024-12-01 NOTE — PLAN OF CARE
GCS 7. RASS goal -3 and maintained with Prop and Fent for sedation and titrated per orders see MAR. SPO2 100% on vent settings AC/VC, rate 16, FiO2 100%, Vt 340, and PEEP 5. HR SB-SR ranging in 50s-60s and MAP > 65 with Levo gtts continued. Zuluaga in place with total UOP of 402 mL during this shift. POC reviewed with patient and family. Bed in lowest position, side rails up x 3, wheels locked, and alarms on and audible.

## 2024-12-01 NOTE — ASSESSMENT & PLAN NOTE
Patient has Combined Systolic and Diastolic heart failure that is Acute on chronic. On presentation their CHF was well compensated. Most recent BNP and echo results are listed below.  Recent Labs     11/29/24  0929   *       Latest ECHO  Results for orders placed during the hospital encounter of 06/22/23    Echo    Interpretation Summary  · The left ventricle is normal in size with mild mild asymmetric hypertrophy eccentric hypertrophy and low normal systolic function.  · The estimated ejection fraction is 50%.  · Normal left ventricular diastolic function.  · Normal right ventricular size with normal right ventricular systolic function.  · Mild to moderate tricuspid regurgitation.  · Intermediate central venous pressure (8 mmHg).  · The estimated PA systolic pressure is 43 mmHg.  · There is pulmonary hypertension.    Current Heart Failure Medications  furosemide injection 40 mg, Daily, Intravenous  NORepinephrine 4 mg in sodium chloride 0.9% 250 mL infusion (premix), Continuous, Intravenous    Plan  - Monitor strict I&Os and daily weights.    - Place on telemetry  - Cardiology has not been consulted  - Received fluid bolus in ER, will watch for fluid overload  - New Echo pending      11/30 Echo 40% EF  Keep I/O matched or neg  Diuretics prn  CxR Improved.    12/1 Compensated

## 2024-12-01 NOTE — SUBJECTIVE & OBJECTIVE
Objective:     Vital Signs (Most Recent):  Temp: 96.4 °F (35.8 °C) (12/01/24 0745)  Pulse: (!) 53 (12/01/24 0745)  Resp: 16 (12/01/24 0745)  BP: (!) 124/57 (12/01/24 0745)  SpO2: 100 % (12/01/24 0745) Vital Signs (24h Range):  Temp:  [96.4 °F (35.8 °C)-97.9 °F (36.6 °C)] 96.4 °F (35.8 °C)  Pulse:  [50-89] 53  Resp:  [13-33] 16  SpO2:  [100 %] 100 %  BP: ()/(48-80) 124/57     Weight: 59.8 kg (131 lb 13.4 oz)  Body mass index is 25.75 kg/m².      Intake/Output Summary (Last 24 hours) at 12/1/2024 0921  Last data filed at 12/1/2024 0900  Gross per 24 hour   Intake 1691.3 ml   Output 1602 ml   Net 89.3 ml        Physical Exam  Vitals and nursing note reviewed.   Constitutional:       General: She is not in acute distress.     Appearance: She is ill-appearing. She is not toxic-appearing or diaphoretic.   HENT:      Head: Normocephalic.   Eyes:      Pupils: Pupils are equal, round, and reactive to light.   Cardiovascular:      Rate and Rhythm: Tachycardia present.      Pulses: Normal pulses.   Pulmonary:      Effort: No respiratory distress.   Abdominal:      Palpations: Abdomen is soft.   Neurological:      General: No focal deficit present.           Review of Systems   Unable to perform ROS: Acuity of condition       Vents:  Vent Mode: A/C (12/01/24 0730)  Ventilator Initiated: Yes (11/29/24 1000)  Set Rate: 16 BPM (12/01/24 0730)  Vt Set: 340 mL (12/01/24 0730)  PEEP/CPAP: 5 cmH20 (12/01/24 0730)  Oxygen Concentration (%): 100 (12/01/24 0745)  Peak Airway Pressure: 31 cmH20 (12/01/24 0730)  Plateau Pressure: 12 cmH20 (12/01/24 0730)  Total Ve: 5.53 L/m (12/01/24 0730)  Negative Inspiratory Force (cm H2O): 0 (12/01/24 0730)  F/VT Ratio<105 (RSBI): (!) 46.51 (12/01/24 0730)    Lines/Drains/Airways       Peripherally Inserted Central Catheter Line  Duration             PICC Double Lumen 11/29/24 1850 right basilic 1 day              Drain  Duration                  NG/OG Tube 11/29/24 0945 Rik sosa 16 Fr.  Right mouth 1 day         Urethral Catheter 11/29/24 0947 Silicone;Straight-tip 16 Fr. 1 day              Airway  Duration                  Airway - Non-Surgical 11/29/24 2 days              Peripheral Intravenous Line  Duration                  Peripheral IV - Single Lumen 11/29/24 18 G Right Antecubital 2 days         Peripheral IV - Single Lumen 11/29/24 1012 20 G Posterior;Right Hand 1 day                    Significant Labs:    CBC/Anemia Profile:  Recent Labs   Lab 11/29/24 0929 11/30/24 0334 12/01/24 0417   WBC 13.90* 7.34 13.56*   HGB 10.9* 10.7* 10.2*   HCT 34.7* 33.3* 32.8*    226 280   MCV 96 91 94   RDW 13.9 13.5 14.4        Chemistries:  Recent Labs   Lab 11/29/24 0929 11/30/24 0334 12/01/24 0417    139 139   K 4.0 4.0 4.5    109 106   CO2 24 22* 21*   BUN 18 19 26*   CREATININE 1.3 1.0 1.5*   CALCIUM 8.5* 8.1* 7.3*   ALBUMIN 3.4* 3.2* 3.1*   PROT 6.9 6.5 6.4   BILITOT 0.4 0.6 0.4   ALKPHOS 71 63 55   ALT 16 14 11   AST 17 14 9*   MG 2.2 2.0 2.2   PHOS 5.1* 2.6* 5.2*       All pertinent labs within the past 24 hours have been reviewed.    Significant Imaging:  I have reviewed all pertinent imaging results/findings within the past 24 hours.

## 2024-12-01 NOTE — PLAN OF CARE
Pt remains intubated/sedated on vent 100% FiO2 5 Peep  Fent gtt and Prop gtt titrated for RASS -3 / -4  Sinus Angel - Sinus rhythm with PACs on monitor; levo gtt initiated this afternoon  Hypothermic, bare hugger applied this afternoon  Zuluaga in place with adequate urine output  Tube feeding starting this afternoon @ 10 mL/hr with goal 45 mL/hr  BSWR in use  PRN D10 bolus given x2 see MAR  Updated POC with daughters at bedside  Safety precautions maintained  Turned q2

## 2024-12-01 NOTE — ASSESSMENT & PLAN NOTE
Patient with Hypercapnic and Hypoxic Respiratory failure which is Acute.  she is not on home oxygen. Supplemental oxygen was provided and noted- Vent Mode: A/C  Oxygen Concentration (%):  [100] 100  Resp Rate Total:  [16 br/min-20 br/min] 16 br/min  Vt Set:  [340 mL] 340 mL  PEEP/CPAP:  [5 cmH20] 5 cmH20  Mean Airway Pressure:  [8 cmH20-9.7 cmH20] 8.7 cmH20    .   Signs/symptoms of respiratory failure include- lethargy. Contributing diagnoses includes - Aspiration, CHF, and Pleural effusion Labs and images were reviewed. Patient Has recent ABG, which has been reviewed. Will treat underlying causes and adjust management of respiratory failure as follows-     CTA Chest without PE, moderate ptx, bilateral pleural effusions    Keep sedated today  Wean vent settings as tolerated  Brief code status discussion with daughter at bedside who says that the patient would want CPR/ventilator if her heart and breathing stops.    11/30   Stable on vent  Rate decreased  ETT adjusted  On 50% oxygen over 200 mmHg.    Apparently was a difficult intubation. Ramón tube was placed which is typically traumatic. Needs more time to make sure she doesn't have airway edema.  D/w daughters at the bedside.   She has underlying dementia and they do not think she will tolerate weaning sedation well. They understand she will be difficult to take care of if she comes off the vent. They understand based on her multiple co morbidities she carries a poor prognosis but want to give her more time.    12/1 Wean sedation  DC fentanyl and wean off diprivan  Will change to precedex and attempt SBT

## 2024-12-02 PROBLEM — Z79.899 POLYPHARMACY: Status: ACTIVE | Noted: 2024-12-02

## 2024-12-02 PROBLEM — E16.2 HYPOGLYCEMIA: Status: ACTIVE | Noted: 2024-12-02

## 2024-12-02 LAB
ALBUMIN SERPL BCP-MCNC: 3.1 G/DL (ref 3.5–5.2)
ALP SERPL-CCNC: 54 U/L (ref 40–150)
ALT SERPL W/O P-5'-P-CCNC: 13 U/L (ref 10–44)
ANION GAP SERPL CALC-SCNC: 13 MMOL/L (ref 8–16)
ANION GAP SERPL CALC-SCNC: 8 MMOL/L (ref 8–16)
AST SERPL-CCNC: 10 U/L (ref 10–40)
BACTERIA SPEC AEROBE CULT: NORMAL
BACTERIA SPEC AEROBE CULT: NORMAL
BASOPHILS # BLD AUTO: 0 K/UL (ref 0–0.2)
BASOPHILS NFR BLD: 0 % (ref 0–1.9)
BILIRUB SERPL-MCNC: 0.7 MG/DL (ref 0.1–1)
BUN SERPL-MCNC: 30 MG/DL (ref 8–23)
BUN SERPL-MCNC: 30 MG/DL (ref 8–23)
CALCIUM SERPL-MCNC: 7.6 MG/DL (ref 8.7–10.5)
CALCIUM SERPL-MCNC: 7.9 MG/DL (ref 8.7–10.5)
CHLORIDE SERPL-SCNC: 104 MMOL/L (ref 95–110)
CHLORIDE SERPL-SCNC: 98 MMOL/L (ref 95–110)
CO2 SERPL-SCNC: 25 MMOL/L (ref 23–29)
CO2 SERPL-SCNC: 25 MMOL/L (ref 23–29)
CREAT SERPL-MCNC: 1.2 MG/DL (ref 0.5–1.4)
CREAT SERPL-MCNC: 1.4 MG/DL (ref 0.5–1.4)
DIFFERENTIAL METHOD BLD: ABNORMAL
EOSINOPHIL # BLD AUTO: 0 K/UL (ref 0–0.5)
EOSINOPHIL NFR BLD: 0 % (ref 0–8)
ERYTHROCYTE [DISTWIDTH] IN BLOOD BY AUTOMATED COUNT: 13.8 % (ref 11.5–14.5)
EST. GFR  (NO RACE VARIABLE): 40 ML/MIN/1.73 M^2
EST. GFR  (NO RACE VARIABLE): 48 ML/MIN/1.73 M^2
GLUCOSE SERPL-MCNC: 121 MG/DL (ref 70–110)
GLUCOSE SERPL-MCNC: 142 MG/DL (ref 70–110)
GRAM STN SPEC: NORMAL
HCT VFR BLD AUTO: 36.4 % (ref 37–48.5)
HGB BLD-MCNC: 11.7 G/DL (ref 12–16)
IMM GRANULOCYTES # BLD AUTO: 0.06 K/UL (ref 0–0.04)
IMM GRANULOCYTES NFR BLD AUTO: 0.7 % (ref 0–0.5)
LYMPHOCYTES # BLD AUTO: 0.5 K/UL (ref 1–4.8)
LYMPHOCYTES NFR BLD: 4.9 % (ref 18–48)
MAGNESIUM SERPL-MCNC: 2.3 MG/DL (ref 1.6–2.6)
MCH RBC QN AUTO: 29.2 PG (ref 27–31)
MCHC RBC AUTO-ENTMCNC: 32.1 G/DL (ref 32–36)
MCV RBC AUTO: 91 FL (ref 82–98)
MONOCYTES # BLD AUTO: 0.3 K/UL (ref 0.3–1)
MONOCYTES NFR BLD: 3.3 % (ref 4–15)
NEUTROPHILS # BLD AUTO: 8.4 K/UL (ref 1.8–7.7)
NEUTROPHILS NFR BLD: 91.1 % (ref 38–73)
NRBC BLD-RTO: 0 /100 WBC
PHOSPHATE SERPL-MCNC: 4 MG/DL (ref 2.7–4.5)
PLATELET # BLD AUTO: 200 K/UL (ref 150–450)
PMV BLD AUTO: 10.7 FL (ref 9.2–12.9)
POCT GLUCOSE: 142 MG/DL (ref 70–110)
POCT GLUCOSE: 146 MG/DL (ref 70–110)
POCT GLUCOSE: 150 MG/DL (ref 70–110)
POCT GLUCOSE: 178 MG/DL (ref 70–110)
POCT GLUCOSE: 38 MG/DL (ref 70–110)
POCT GLUCOSE: 41 MG/DL (ref 70–110)
POCT GLUCOSE: 44 MG/DL (ref 70–110)
POCT GLUCOSE: 55 MG/DL (ref 70–110)
POCT GLUCOSE: 59 MG/DL (ref 70–110)
POTASSIUM SERPL-SCNC: 3.6 MMOL/L (ref 3.5–5.1)
POTASSIUM SERPL-SCNC: 4.1 MMOL/L (ref 3.5–5.1)
PROT SERPL-MCNC: 7 G/DL (ref 6–8.4)
RBC # BLD AUTO: 4.01 M/UL (ref 4–5.4)
SODIUM SERPL-SCNC: 136 MMOL/L (ref 136–145)
SODIUM SERPL-SCNC: 137 MMOL/L (ref 136–145)
WBC # BLD AUTO: 9.19 K/UL (ref 3.9–12.7)

## 2024-12-02 PROCEDURE — 63600175 PHARM REV CODE 636 W HCPCS: Performed by: NURSE PRACTITIONER

## 2024-12-02 PROCEDURE — 99900035 HC TECH TIME PER 15 MIN (STAT)

## 2024-12-02 PROCEDURE — 80053 COMPREHEN METABOLIC PANEL: CPT | Performed by: NURSE PRACTITIONER

## 2024-12-02 PROCEDURE — 25000003 PHARM REV CODE 250: Performed by: SPECIALIST

## 2024-12-02 PROCEDURE — 25000003 PHARM REV CODE 250: Performed by: INTERNAL MEDICINE

## 2024-12-02 PROCEDURE — 85025 COMPLETE CBC W/AUTO DIFF WBC: CPT | Performed by: NURSE PRACTITIONER

## 2024-12-02 PROCEDURE — C9399 UNCLASSIFIED DRUGS OR BIOLOG: HCPCS | Performed by: SPECIALIST

## 2024-12-02 PROCEDURE — 80048 BASIC METABOLIC PNL TOTAL CA: CPT | Mod: XB | Performed by: INTERNAL MEDICINE

## 2024-12-02 PROCEDURE — 94761 N-INVAS EAR/PLS OXIMETRY MLT: CPT

## 2024-12-02 PROCEDURE — 97530 THERAPEUTIC ACTIVITIES: CPT

## 2024-12-02 PROCEDURE — 84100 ASSAY OF PHOSPHORUS: CPT | Performed by: NURSE PRACTITIONER

## 2024-12-02 PROCEDURE — 25000003 PHARM REV CODE 250: Performed by: NURSE PRACTITIONER

## 2024-12-02 PROCEDURE — 36415 COLL VENOUS BLD VENIPUNCTURE: CPT | Performed by: INTERNAL MEDICINE

## 2024-12-02 PROCEDURE — 97162 PT EVAL MOD COMPLEX 30 MIN: CPT

## 2024-12-02 PROCEDURE — 92610 EVALUATE SWALLOWING FUNCTION: CPT

## 2024-12-02 PROCEDURE — 27000221 HC OXYGEN, UP TO 24 HOURS

## 2024-12-02 PROCEDURE — 21400001 HC TELEMETRY ROOM

## 2024-12-02 PROCEDURE — 83735 ASSAY OF MAGNESIUM: CPT | Performed by: NURSE PRACTITIONER

## 2024-12-02 PROCEDURE — 97166 OT EVAL MOD COMPLEX 45 MIN: CPT

## 2024-12-02 PROCEDURE — 63600175 PHARM REV CODE 636 W HCPCS: Performed by: SPECIALIST

## 2024-12-02 RX ORDER — ENOXAPARIN SODIUM 100 MG/ML
40 INJECTION SUBCUTANEOUS EVERY 24 HOURS
Status: DISCONTINUED | OUTPATIENT
Start: 2024-12-02 | End: 2024-12-04 | Stop reason: HOSPADM

## 2024-12-02 RX ORDER — FUROSEMIDE 10 MG/ML
20 INJECTION INTRAMUSCULAR; INTRAVENOUS DAILY
Status: DISCONTINUED | OUTPATIENT
Start: 2024-12-02 | End: 2024-12-04 | Stop reason: HOSPADM

## 2024-12-02 RX ORDER — DEXTROSE MONOHYDRATE 100 MG/ML
INJECTION, SOLUTION INTRAVENOUS CONTINUOUS
Status: DISCONTINUED | OUTPATIENT
Start: 2024-12-02 | End: 2024-12-02

## 2024-12-02 RX ORDER — IPRATROPIUM BROMIDE AND ALBUTEROL SULFATE 2.5; .5 MG/3ML; MG/3ML
3 SOLUTION RESPIRATORY (INHALATION) EVERY 6 HOURS PRN
Status: DISCONTINUED | OUTPATIENT
Start: 2024-12-02 | End: 2024-12-04 | Stop reason: HOSPADM

## 2024-12-02 RX ADMIN — METHYLPREDNISOLONE SODIUM SUCCINATE 40 MG: 40 INJECTION, POWDER, FOR SOLUTION INTRAMUSCULAR; INTRAVENOUS at 06:12

## 2024-12-02 RX ADMIN — DEXMEDETOMIDINE HYDROCHLORIDE 0.7 MCG/KG/HR: 4 INJECTION INTRAVENOUS at 02:12

## 2024-12-02 RX ADMIN — METHYLPREDNISOLONE SODIUM SUCCINATE 40 MG: 40 INJECTION, POWDER, FOR SOLUTION INTRAMUSCULAR; INTRAVENOUS at 05:12

## 2024-12-02 RX ADMIN — DEXTROSE MONOHYDRATE: 100 INJECTION, SOLUTION INTRAVENOUS at 10:12

## 2024-12-02 RX ADMIN — MUPIROCIN: 20 OINTMENT TOPICAL at 08:12

## 2024-12-02 RX ADMIN — DEXTROSE MONOHYDRATE 125 ML: 100 INJECTION, SOLUTION INTRAVENOUS at 11:12

## 2024-12-02 RX ADMIN — FAMOTIDINE 20 MG: 10 INJECTION, SOLUTION INTRAVENOUS at 09:12

## 2024-12-02 RX ADMIN — DEXTROSE MONOHYDRATE: 100 INJECTION, SOLUTION INTRAVENOUS at 03:12

## 2024-12-02 RX ADMIN — MUPIROCIN: 20 OINTMENT TOPICAL at 09:12

## 2024-12-02 RX ADMIN — ENOXAPARIN SODIUM 40 MG: 40 INJECTION SUBCUTANEOUS at 05:12

## 2024-12-02 RX ADMIN — DEXTROSE MONOHYDRATE 250 ML: 100 INJECTION, SOLUTION INTRAVENOUS at 02:12

## 2024-12-02 RX ADMIN — METHYLPREDNISOLONE SODIUM SUCCINATE 40 MG: 40 INJECTION, POWDER, FOR SOLUTION INTRAMUSCULAR; INTRAVENOUS at 12:12

## 2024-12-02 RX ADMIN — PIPERACILLIN SODIUM AND TAZOBACTAM SODIUM 4.5 G: 4; .5 INJECTION, POWDER, FOR SOLUTION INTRAVENOUS at 04:12

## 2024-12-02 RX ADMIN — FUROSEMIDE 20 MG: 10 INJECTION, SOLUTION INTRAMUSCULAR; INTRAVENOUS at 09:12

## 2024-12-02 NOTE — ASSESSMENT & PLAN NOTE
Per daughter, was getting straight cath'd at home for urinary retention- followed by urology  -Zuluaag in place- will need to leave in place upon discharge and follow up outpatient with urology

## 2024-12-02 NOTE — SUBJECTIVE & OBJECTIVE
Interval History: Patient sleeping, calm, wakes to voice, oriented to self, place, not time or situation.    Objective:     Vital Signs (Most Recent):  Temp: 97.5 °F (36.4 °C) (12/02/24 0300)  Pulse: 66 (12/02/24 0600)  Resp: 15 (12/02/24 0600)  BP: 112/61 (12/02/24 0600)  SpO2: 100 % (12/02/24 0600) Vital Signs (24h Range):  Temp:  [95.5 °F (35.3 °C)-97.8 °F (36.6 °C)] 97.5 °F (36.4 °C)  Pulse:  [50-88] 66  Resp:  [10-25] 15  SpO2:  [100 %] 100 %  BP: (102-173)/() 112/61     Weight: 63.1 kg (139 lb 1.8 oz)  Body mass index is 27.17 kg/m².      Intake/Output Summary (Last 24 hours) at 12/2/2024 0741  Last data filed at 12/2/2024 0600  Gross per 24 hour   Intake 965.29 ml   Output 2501 ml   Net -1535.71 ml        Physical Exam  Constitutional:       General: She is not in acute distress.     Appearance: She is ill-appearing.   HENT:      Head: Normocephalic and atraumatic.      Nose: Nose normal.      Mouth/Throat:      Mouth: Mucous membranes are moist.   Eyes:      Pupils: Pupils are equal, round, and reactive to light.   Cardiovascular:      Rate and Rhythm: Normal rate and regular rhythm.      Pulses: Normal pulses.      Heart sounds: Normal heart sounds.   Pulmonary:      Effort: Pulmonary effort is normal.      Breath sounds: Normal breath sounds.   Abdominal:      General: Bowel sounds are normal. There is no distension.      Palpations: Abdomen is soft.      Tenderness: There is no abdominal tenderness.   Musculoskeletal:         General: No swelling.      Cervical back: Normal range of motion and neck supple.   Skin:     General: Skin is warm and dry.   Neurological:      General: No focal deficit present.      Mental Status: Mental status is at baseline. She is disoriented.           Review of Systems   Unable to perform ROS: Dementia       Vents:  Vent Mode: A/C (12/01/24 1511)  Ventilator Initiated: Yes (11/29/24 1000)  Set Rate: 12 BPM (12/01/24 1511)  Vt Set: 340 mL (12/01/24 1511)  PEEP/CPAP: 5  cmH20 (12/01/24 1511)  Oxygen Concentration (%): 40 (12/01/24 1932)  Peak Airway Pressure: 16 cmH20 (12/01/24 1511)  Plateau Pressure: 12 cmH20 (12/01/24 1511)  Total Ve: 0 L/m (12/01/24 1511)  Negative Inspiratory Force (cm H2O): 0 (12/01/24 1511)  F/VT Ratio<105 (RSBI): (!) 26.58 (12/01/24 1326)    Lines/Drains/Airways       Peripherally Inserted Central Catheter Line  Duration             PICC Double Lumen 11/29/24 1850 right basilic 2 days              Drain  Duration                  Urethral Catheter 11/29/24 0947 Silicone;Straight-tip 16 Fr. 2 days              Peripheral Intravenous Line  Duration                  Peripheral IV - Single Lumen 11/29/24 1012 20 G Posterior;Right Hand 2 days                    Significant Labs:    CBC/Anemia Profile:  Recent Labs   Lab 12/01/24  0417 12/02/24  0501   WBC 13.56* 9.19   HGB 10.2* 11.7*   HCT 32.8* 36.4*    200   MCV 94 91   RDW 14.4 13.8        Chemistries:  Recent Labs   Lab 12/01/24  0417 12/02/24  0501    137   K 4.5 4.1    104   CO2 21* 25   BUN 26* 30*   CREATININE 1.5* 1.2   CALCIUM 7.3* 7.6*   ALBUMIN 3.1* 3.1*   PROT 6.4 7.0   BILITOT 0.4 0.7   ALKPHOS 55 54   ALT 11 13   AST 9* 10   MG 2.2 2.3   PHOS 5.2* 4.0       All pertinent labs within the past 24 hours have been reviewed.    Significant Imaging:  I have reviewed all pertinent imaging results/findings within the past 24 hours.

## 2024-12-02 NOTE — ASSESSMENT & PLAN NOTE
Patient with dementia with likely etiology of alzheimer's dementia. Dementia is moderate. The patient does have signs of behavioral disturbance. Home dementia medications are Held or Continued: held.. Continue non-pharmacologic interventions to prevent delirium (No VS between 11PM-5AM, activity during day, opening blinds, providing glasses/hearing aids, and up in chair during daytime). Will avoid narcotics and benzos unless absolutely necessary. PRN anti-psychotics are not prescribed to avoid self harm behaviors.    Patient intubated and sedated at this time.  Seems to be on multiple behavioral meds at home, may need adjusting, holding at this time.    Polypharmacy at home possible etiology for AMS/respiratory failure- consider decreasing vs. Stopping home behavioral meds

## 2024-12-02 NOTE — PLAN OF CARE
See eval for details. Pt displayed deficits with functional mobility/ transfers, deficits with adl's skills also decrease b ue strength/endurance. Recommendation: TBD

## 2024-12-02 NOTE — PLAN OF CARE
P.T. EVAL COMPLETE.  PT CURRENTLY REQUIRES MODA FOR SUP>SIT AND MODA X 2 FOR SIT>SUP.  P.T. D/C REC: TBD PENDING FURTHER PARTICIPATION

## 2024-12-02 NOTE — ASSESSMENT & PLAN NOTE
Patients blood pressure range in the last 24 hours was: BP  Min: 111/55  Max: 173/72.The patient's inpatient anti-hypertensive regimen is listed below:  Current Antihypertensives  furosemide injection 20 mg, Daily, Intravenous    Plan  - Hypotensive on arrival after multiple sedatives requiring pressors  - Now off pressors  - Restart home BP meds as indicated

## 2024-12-02 NOTE — ASSESSMENT & PLAN NOTE
Moderate R ptx after intubation  -Withdraw ETT  -Obtain CXR in 2 hours, may require Chest tube placement if does not improve    11/30 No worsening. Limited apical Ptx. May need CT guided catheter aspiration. Will follow closely.    12/2: Has been improving without intervention, oxygenating well on nasal cannula. Repeat CXR pending.

## 2024-12-02 NOTE — PT/OT/SLP EVAL
Physical Therapy Evaluation and Treatement    Patient Name:  Leia Rodriguez   MRN:  [id number]    Recommendations:     Discharge Recommendations:  (TBD PENDING FURTHER PROGRESS)   Discharge Equipment Recommendations:  (TBD)   Barriers to discharge:  unknown    Assessment:     Leia Rodriguez is a 74 y.o. female admitted with a medical diagnosis of Acute respiratory failure with hypoxia and hypercarbia.  She presents with the following impairments/functional limitations: weakness, impaired endurance, impaired cognition, impaired functional mobility, gait instability, impaired balance, pain, decreased safety awareness, decreased lower extremity function, decreased upper extremity function, decreased coordination.    Rehab Prognosis: Fair; patient would benefit from acute skilled PT services to address these deficits and reach maximum level of function.    Recent Surgery: * No surgery found *     Plan:     During this hospitalization, patient to be seen 3 x/week to address the identified rehab impairments via gait training, therapeutic activities, therapeutic exercises and progress toward the following goals:    Plan of Care Expires:  12/16/24    Subjective     Chief Complaint: NONE, PT CONFUSED/DISORIENTED, TEARFUL, RESISTIVE TO TOUCH AND MOVEMENT, PRESENTS WITH GARBLED MUMBLING SPEECH  Patient/Family Comments/goals:   Pain/Comfort:  Pain Rating 1:  (PT UNABLE TO VERBALIZE PAIN BUT FACIAL GRIMACING AND CRYING WITH PROM TO BLE'S)  Location - Side 1: Bilateral  Location - Orientation 1: generalized  Location 1: leg  Pain Addressed 1: Reposition    Patients cultural, spiritual, Shinto conflicts given the current situation:      Living Environment:  PT IS POOR HISTORIAN, NO FAMILY PRESENT, PT REPORTS SHE LIVES WITH  AND 2 DAUGHTERS  Prior to admission, patients level of function was ?.  Equipment used at home:  (UNKNOWN).  DME owned (not currently used):  UNKNOWN .  Upon discharge, patient will have assistance  "from ?.    Objective:     Communicated with NURSE GUERRA prior to session.  Patient found HOB elevated with telemetry, peripheral IV, pulse ox (continuous), blood pressure cuff, bed alarm, pressure relief boots, SCD, PICC line, ceballos catheter  upon PT entry to room.    General Precautions: Standard, fall, vision impaired (BLIND R EYE PER CHART)  Orthopedic Precautions: NA  Braces: N/A  Respiratory Status: Room air    Exams:  Cognitive Exam:  UNABLE TO ASSESS FULLY DUE TO GARBED/MUMBLING SPEECH, SOFT SPOKEN, CONFUSED/DISORIENTED, UNABLE TO ANSWER QUESTIONS, ABLE TO FOLLOW SIMPLE 1 STEP COMMANDS  Postural Exam:  Patient presented with the following abnormalities:    -       Rounded shoulders  -       Forward head  RLE ROM: LIMITED, GUARDED AND RESISTIVE, APPEARED TO BE PAINFUL WITH PROM IN ALL PLANES OF MOVEMENT  RLE Strength: UNABLE TO ASSESS  LLE ROM: LIMITED, GUARDED AND RESISTIVE, APPEARED TO BE PAINFUL WITH PROM IN ALL PLANES OF MOVEMENT  LLE Strength: UNABLE TO ASSESS    Functional Mobility:  Bed Mobility:     Rolling Left:  moderate assistance  Rolling Right: moderate assistance  Scooting: moderate assistance-SEATED SCOOT  Bridging: total assistance and of 2 persons-SUPINE SCOOT TOWARD HOB  Supine to Sit: moderate assistance  Sit to Supine: moderate assistance and of 2 persons  PT TOLERATED SUPINE PROM IN ALL AVAILABLE PLANES OF MOVEMENT WITH REST AS NEEDED APPROX. 10 REPS  Balance: POOR+ STATIC SITTING BALANCE  PT TOLERATED SITTING EOB FOR APPROX. 10 MINUTES WITH [facility], CUES FOR UPRIGHT POSTURE, MINIMALLY ENGAGING IN ACTIVITY  PT REQUIRED CALMING AND ENCOURAGEMENT DUE TO TEARFUL OF SITUATION, RE-ORIENTED PT BUT LIMITED BY CONFUSION    AM-PAC 6 CLICK MOBILITY  Total Score:8     Treatment & Education:  PT EDUCATION:  - ROLE OF P.T. AND POC IN ACUTE CARE HOSPITAL SETTING  - RISK FOR FALLS DUE TO GENERALIZED WEAKNESS, EDUCATED ON "CALL DON'T FALL", ENCOURAGED TO CALL FOR ASSISTANCE WITH ALL NEEDS     Patient left " HOB elevated with all lines intact, call button in reach, bed alarm on, and NURSE notified.    GOALS:   Multidisciplinary Problems       Physical Therapy Goals          Problem: Physical Therapy    Goal Priority Disciplines Outcome Interventions   Physical Therapy Goal     PT, PT/OT     Description: LTG'S TO BE MET IN 14 DAYS (16-24)  PT WILL REQUIRE [facility] FOR BED MOBILITY  PT WILL REQUIRE [facility] FOR BED<>CHAIR TF'S  PT WILL  FEET WITH RW AND [facility]  PT WILL INC AMPAC SCORE BY 2 POINTS TO PROGRESS GROSS FUNC MOBILITY                         History:     Past Medical History:   Diagnosis Date    Arthritis     EDGAR HANDS, KNEES    Behavioral change 2022    Blind right eye     Traumatic    Breast cancer 06/15/2017    0.8 cm Grade1 INTRADUCTAL BREAST 9 positve margin (left)    Essential hypertension     Hemorrhoids     Immunodeficiency due to chemotherapy 2021    Lipoma of abdominal wall     Major neurocognitive disorder 2022    Obesity     Overactive bladder     Pap smear abnormality of cervix with ASCUS favoring benign     Thyroid nodule     Tobacco use disorder     Tubular adenoma of colon     Urinary incontinence        Past Surgical History:   Procedure Laterality Date    ANTERIOR VAGINAL REPAIR      AORTOGRAPHY WITH SERIALOGRAPHY N/A 10/7/2024    Procedure: AORTOGRAM, WITH SERIALOGRAPHY;  Surgeon: Tiffanie Santos MD;  Location: [facility] CATH LAB;  Service: Cardiology;  Laterality: N/A;  MD requested Anesthesia    BLADDER SURGERY      BREAST LUMPECTOMY Left 2017    CATARACT EXTRACTION Left 2022     SECTION      X2    COLONOSCOPY N/A 3/8/2017    Procedure: COLONOSCOPY;  Surgeon: Tyron Paris MD;  Location: [facility];  Service: Endoscopy;  Laterality: N/A;    COLONOSCOPY N/A 2020    Procedure: COLONOSCOPY;  Surgeon: Keira Ellison MD;  Location: [facility];  Service: Endoscopy;  Laterality: N/A;    ESOPHAGOGASTRODUODENOSCOPY N/A 2020    Procedure: EGD  (ESOPHAGOGASTRODUODENOSCOPY);  Surgeon: Keira Ellison MD;  Location: [facility] ENDO;  Service: Endoscopy;  Laterality: N/A;  new onset iron deficiency with prior history of breast cancer    INTRALUMINAL GASTROINTESTINAL TRACT IMAGING VIA CAPSULE N/A 10/28/2020    Procedure: IMAGING PROCEDURE, GI TRACT, INTRALUMINAL, VIA CAPSULE;  Surgeon: Finesse Jha RN;  Location: [facility] ENDO;  Service: Endoscopy;  Laterality: N/A;    LEFT HEART CATHETERIZATION Left 10/12/2021    Procedure: CATHETERIZATION, HEART, LEFT;  Surgeon: Tiffanie Santos MD;  Location: [facility] CATH LAB;  Service: Cardiology;  Laterality: Left;    LITHOTRIPSY, CORONARY TRANSLUMINAL, PERCUTANEOUS  10/7/2024    Procedure: LITHOTRIPSY, CORONARY TRANSLUMINAL, PERCUTANEOUS;  Surgeon: Tiffanie Santos MD;  Location: [facility] CATH LAB;  Service: Cardiology;;    PTA, SUPERFICIAL FEMORAL ARTERY  10/7/2024    Procedure: PTA, Superficial Femoral Artery;  Surgeon: Tiffanie Santos MD;  Location: [facility] CATH LAB;  Service: Cardiology;;    SENTINEL LYMPH NODE BIOPSY Left 9/8/2020    Procedure: BIOPSY, LYMPH NODE, SENTINEL;  Surgeon: Vincent Moyer MD;  Location: [facility] OR;  Service: General;  Laterality: Left;    SIMPLE MASTECTOMY Left 9/8/2020    Procedure: MASTECTOMY, SIMPLE;  Surgeon: Vincent Moyer MD;  Location: [facility] OR;  Service: General;  Laterality: Left;    STENT, SUPERFICIAL FEMORAL ARTERY  10/7/2024    Procedure: Stent, Superficial Femoral Artery;  Surgeon: Tiffanie Santos MD;  Location: [facility] CATH LAB;  Service: Cardiology;;    THYROID LOBECTOMY Left 2005    TOTAL ABDOMINAL HYSTERECTOMY      TUBAL LIGATION         Time Tracking:     PT Received On: 12/02/24  PT Start Time: 1050     PT Stop Time: 1120  PT Total Time (min): 30 min     Billable Minutes: Evaluation 15 and Therapeutic Activity 15    12/02/2024

## 2024-12-02 NOTE — PT/OT/SLP EVAL
"Speech Language Pathology Evaluation  Bedside Swallow    Patient Name:  Leia Rodriguez   MRN:  [id number]  Admitting Diagnosis: Acute respiratory failure with hypoxia and hypercarbia    Recommendations:                 General Recommendations:   Diet f/u; caregiver education; ACP/GOC conversation  Diet recommendations:  Puree Diet - IDDSI Level 4, Thin liquids - IDDSI Level 0   Aspiration Precautions: Assistance with meals, Eliminate distractions, Feed only when awake/alert, Frequent oral care, HOB to 90 degrees, Monitor for s/s of aspiration, Small bites/sips, and Standard aspiration precautions   General Precautions: Standard, aspiration  Communication strategies:  provide increased time to answer and cognitive impairment/dementia, hx CVAs    Assessment:     Leia Rodriguez is a 74 y.o. female admitted to [facility] ICU with dx metabolic encephalopathy with questionable polypharmacy, urinary retention, acute respiratory failure with hypoxia and hypercarbia requiring ventilator management 11/30-12/1/24 and post-procedural (intubation) pneumothorax. She exhibits improving mentation, although presents with baseline cognitive-linguistic impairment s/t hx dementia (+behavioral disturbance), cerebrovascular disease and major neurocognitive disorder.  Dysarthria noted; however, speech was clear when pt stated "I want to go home" multiple times throughout session.  No overt s/s of aspiration present during bedside CSE; however, efficiency impacted by generalized weakness/frailty and edentulous oral cavity.  Pt unable to manipulation/masticate soft/chopped solids, requiring ST to remove cookie piece from mouth.  She is recommended for IDDSI 4-pureed solids and IDDSI 0-thin liquids, following aspiration precautions and feeding assist.  Suspected cognitive/functional decline since ST last evaluated 7/12/2023 warranting ongoing ACP/GOC conversation.  Guarded rehab prognosis.     History:     Past Medical History:   Diagnosis Date    " Arthritis     EDGAR HANDS, KNEES    Behavioral change 2022    Blind right eye     Traumatic    Breast cancer 06/15/2017    0.8 cm Grade1 INTRADUCTAL BREAST 9 positve margin (left)    Essential hypertension     Hemorrhoids     Immunodeficiency due to chemotherapy 2021    Lipoma of abdominal wall     Major neurocognitive disorder 2022    Obesity     Overactive bladder     Pap smear abnormality of cervix with ASCUS favoring benign     Thyroid nodule     Tobacco use disorder     Tubular adenoma of colon     Urinary incontinence        Past Surgical History:   Procedure Laterality Date    ANTERIOR VAGINAL REPAIR      AORTOGRAPHY WITH SERIALOGRAPHY N/A 10/7/2024    Procedure: AORTOGRAM, WITH SERIALOGRAPHY;  Surgeon: Tiffanie Santos MD;  Location: [facility] CATH LAB;  Service: Cardiology;  Laterality: N/A;  MD requested Anesthesia    BLADDER SURGERY      BREAST LUMPECTOMY Left     CATARACT EXTRACTION Left 2022     SECTION      X2    COLONOSCOPY N/A 3/8/2017    Procedure: COLONOSCOPY;  Surgeon: Tyron Paris MD;  Location: [facility];  Service: Endoscopy;  Laterality: N/A;    COLONOSCOPY N/A 2020    Procedure: COLONOSCOPY;  Surgeon: Keira Ellison MD;  Location: [facility];  Service: Endoscopy;  Laterality: N/A;    ESOPHAGOGASTRODUODENOSCOPY N/A 2020    Procedure: EGD (ESOPHAGOGASTRODUODENOSCOPY);  Surgeon: Keira Ellison MD;  Location: [facility];  Service: Endoscopy;  Laterality: N/A;  new onset iron deficiency with prior history of breast cancer    INTRALUMINAL GASTROINTESTINAL TRACT IMAGING VIA CAPSULE N/A 10/28/2020    Procedure: IMAGING PROCEDURE, GI TRACT, INTRALUMINAL, VIA CAPSULE;  Surgeon: Finesse Jha RN;  Location: [facility];  Service: Endoscopy;  Laterality: N/A;    LEFT HEART CATHETERIZATION Left 10/12/2021    Procedure: CATHETERIZATION, HEART, LEFT;  Surgeon: Tiffanie Santos MD;  Location: [facility] CATH LAB;  Service: Cardiology;  Laterality: Left;     LITHOTRIPSY, CORONARY TRANSLUMINAL, PERCUTANEOUS  10/7/2024    Procedure: LITHOTRIPSY, CORONARY TRANSLUMINAL, PERCUTANEOUS;  Surgeon: Tiffanie Santos MD;  Location: [facility] CATH LAB;  Service: Cardiology;;    PTA, SUPERFICIAL FEMORAL ARTERY  10/7/2024    Procedure: PTA, Superficial Femoral Artery;  Surgeon: Tiffanie Santos MD;  Location: [facility] CATH LAB;  Service: Cardiology;;    SENTINEL LYMPH NODE BIOPSY Left 9/8/2020    Procedure: BIOPSY, LYMPH NODE, SENTINEL;  Surgeon: Vincent Moyer MD;  Location: [facility] OR;  Service: General;  Laterality: Left;    SIMPLE MASTECTOMY Left 9/8/2020    Procedure: MASTECTOMY, SIMPLE;  Surgeon: Vincent Moyer MD;  Location: [facility] OR;  Service: General;  Laterality: Left;    STENT, SUPERFICIAL FEMORAL ARTERY  10/7/2024    Procedure: Stent, Superficial Femoral Artery;  Surgeon: Tiffanie Santos MD;  Location: [facility] CATH LAB;  Service: Cardiology;;    THYROID LOBECTOMY Left 2005    TOTAL ABDOMINAL HYSTERECTOMY      TUBAL LIGATION         Social History: Patient lives with family, who assist with ADL's.  Baseline major neurocognitive impairment, dementia and hx multiple CVAs.    Prior Intubation HX:  11/30-12/1/2024    Modified Barium Swallow: N/A    Prior diet: Unknown diet prior to admission; however, ST recommended IDDSI 5-minced/moist solids and IDDSI 0-thin liquids last assessment 7/2023.    CTA CHEST NON CORONARY (PE STUDIES)     CLINICAL HISTORY:  Pulmonary embolism (PE) suspected, high prob;     TECHNIQUE:  Low dose axial images, sagittal and coronal reformations were obtained from the thoracic inlet to the lung bases following the IV administration of 100 mL of Omnipaque 350.  Contrast timing was optimized to evaluate the pulmonary arteries.  MIP images were performed.     COMPARISON:  CT dated 09/24/2024     FINDINGS:  No significant pulmonary embolus seen.  Pulmonary trunk and main pulmonary arteries appear normal in caliber.     Heart is normal in size.  There is mild reflux of  contrast into the inferior vena cava suggesting some degree of right heart dysfunction.  Otherwise no CT evidence of significant right heart strain.  No significant pericardial effusion.     Scattered coronary and aortic atherosclerosis.     Trachea and bronchial trees are patent with normal branching pattern.  Endotracheal tube terminates in the right mainstem bronchus.     Interlobular septal thickening noted throughout the bilateral lungs with scattered confluent opacification in the dependent aspect of the right greater than left mid to upper lungs.     Moderate right pneumothorax.  Small right greater than left pleural effusion.     No new concerning mediastinal or hilar lymphadenopathy.     Unchanged asymmetric enlargement of the left thyroid gland with heterogeneous appearance including coarse calcification.  Surgical clips seen in the left axilla.  Soft tissues of the visualized lower neck, chest wall, and axilla are otherwise unremarkable.     Esophagogastric tube appears to terminate in the mid gastric body.  Visualized upper abdominal contents otherwise unremarkable.     No acute bony abnormality.  No aggressive lytic or blastic lesion.  Degenerative changes noted in the spine and shoulders.     Impression:     No pulmonary embolism seen.     Moderate right pneumothorax and small right greater than left pleural effusion.     Interlobular septal thickening with scattered confluent opacification in the dependent mid to upper lungs possibly related to edema or infection.     Endotracheal tube terminates in the proximal right mainstem bronchus.  Recommend retraction by several cm.        Electronically signed by:Jaret Rodríguez  Date:                                            11/29/2024  Time:                                           11:48    XR CHEST 1 VIEW     CLINICAL HISTORY:  eval ptx;     FINDINGS:  Single view of the chest.  Comparison 12/01/2024     Cardiac silhouette is borderline enlarged.  The  lungs demonstrate no evidence of active disease.  Mild bibasilar dependent changes.  No evidence of pleural effusion or pneumothorax.  Bones appear intact.  Moderate degenerative changes and moderate atherosclerotic disease.  Right-sided PICC line tip overlies the SVC.     Impression:     No significant pneumothorax identified.        Electronically signed by:Earnest Monterroso MD  Date:                                            12/02/2024  Time:                                           09:18    CT HEAD WITHOUT CONTRAST     CLINICAL HISTORY:  Mental status change, unknown cause;     TECHNIQUE:  Low dose axial images were obtained through the head.  Coronal and sagittal reformations were also performed. Contrast was not administered.     COMPARISON:  MRI dated 06/26/2024.  CT dated 07/09/2023     FINDINGS:  Cerebral volume loss with associated sulcal and ventricular prominence.  No evidence of hydrocephalus.     Scattered hypoattenuation noted in the cerebral white matter.     Small extra-axial lesion overlying the left frontal lobe is unchanged compared to prior studies with no significant mass effect on the left frontal lobe.     No evidence of acute territorial infarct or intracranial hemorrhage.  No significant midline shift or mass effect.     Midline structures and posterior fossa structures appear unremarkable.  Basal cisterns are clear.  Bilateral carotid siphon and distal vertebral artery calcification noted.     Calvarium is intact without acute or aggressive abnormality.  Right phthisis bulbi.  Postsurgical appearance of the left orbital lens.  Visualized orbits and globes otherwise unremarkable.  Mild mucosal thickening of the of the ethmoid air cells and maxillary sinuses.  Mastoid air cells are clear.     Impression:     No acute intracranial abnormality.     Small left frontal meningioma appears unchanged with no significant mass effect on the left frontal lobe.     Cerebral volume loss and nonspecific  white matter changes likely related to chronic microvascular ischemia.     Mild paranasal sinus disease.        Electronically signed by:Jaret Rodríguez  Date:                                            11/29/2024  Time:                                           11:37    6/26/2024 MRI BRAIN W WO CONTRAST     CLINICAL HISTORY:  Memory loss;Meningioma;Mental status change, unknown cause;.  Other amnesia     TECHNIQUE:  Multiplanar multisequence MR imaging of the brain was performed before and after the administration of 5 mL Gadavist  intravenous contrast.     COMPARISON:  Head CT 07/09/2023; MRI brain 12/03/2022 with priors     FINDINGS:  Intracranial compartment:     Ventricles and sulci are normal in size for age without evidence of hydrocephalus. No extra-axial blood or fluid collections.  The cerebellar tonsils are in expected location.  The visualized portions of the sella appear within normal limits.     No intracranial restricted diffusion.  Unchanged right centrum semiovale encephalomalacia.  Additional tiny foci of encephalomalacia again appreciated within the left centrum semiovale.  The brain parenchyma demonstrates a few scattered areas of periventricular and subcortical T2/FLAIR signal hyperintensity, which are nonspecific most suggestive of mild chronic microvascular ischemic change.  No parenchymal edema, mass or mass effect.  No abnormal susceptibility weighted artifact.  Interval increase in size of a homogeneously enhancing dural-based lesion overlying the left frontal lobe measuring 0.8 x 2.1 x 2.6 cm, previously 0.6 x 1.8 x 1.8 cm.  Minimal mass effect on the adjacent left frontal lobe without underlying parenchymal signal change.  No new intracranial enhancement.     Normal vascular flow voids are preserved.     Skull/extracranial contents (limited evaluation): Paranasal sinuses and mastoid air cells are clear.  Chronic right globe phthisis bulbi.  Postsurgical changes of the left lens.    "  Marrow signal is within normal limits.     Impression:     No acute intracranial abnormality.     Slight interval increase in size of a homogeneously enhancing lesion overlying the left frontal lobe most compatible with a meningioma.  Minimal underlying parenchymal mass effect without signal change.     Unchanged tiny, remote infarcts.     Mild chronic microvascular ischemic change.        Electronically signed by:Madhu Aly  Date:                                            06/26/2024  Time:                                           16:48    Subjective     Pt seen bedside for ST. "I want to go home" repeated throughout session. No c/o pain. No family present.  Patient goals:  To go home; Recommended ACP/goals of care      Pain/Comfort:  Pain Rating 1: 0/10  Pain Rating Post-Intervention 1: 0/10  Pain Rating 2: 0/10  Pain Rating Post-Intervention 2: 0/10    Respiratory Status:  NC    Objective:     Oral Musculature Evaluation  Oral Musculature: general weakness  Dentition: edentulous  Secretion Management: adequate  Mucosal Quality: good  Mandibular Strength and Mobility: impaired  Oral Labial Strength and Mobility: WFL  Lingual Strength and Mobility: impaired strength  Velar Elevation: WFL  Buccal Strength and Mobility: decreased tone  Volitional Cough: present  Volitional Swallow: present  Voice Prior to PO Intake: decreased vocal intensity    Bedside Swallow Eval:   Clinical Swallow Examination:   Of note, patient was fed by SLP throughout evaluation. Patient presented with:     CONSISTENCY  NOTES   THIN (IDDSI 0) Water via straw    No overt s/s of aspiration. Mild delay.   PUREE (IDDSI 4/Extremely Thick)   TSP/TBSP bites of applesauce  No overt s/s of aspiration. Slowed transit.   SOLID (IDDSI 6/soft & bite sized) Jamie cracker piece Unable to manipulate/mastication cookie piece; requested ST remove from oral cavity. Edentulous.     Thickened liquids were not used in this assessment. Callum (2018) " reported that thickened liquids have no sound evidence at reducing the risk of pneumonia in patients with dysphagia and can cause harm by increasing their risk of dehydration. It also presents an increased risk of UTI, electrolyte imbalance, constipation, fecal impaction, cognitive impairment, functional decline and even death (Hilariomore, 2002; Vance, 2016).  Thickened liquids are associated with risks including dehydration, increased pharyngeal residue, potential interference with medication absorption, and decreased quality of life (Aramis, 2013). Thickened liquids are also more likely to be silently aspirated than thin liquids (Laurent et al., 2018). This supports the assertion that we should confirm a patient requires thickened liquids with an instrumental swallow study prior to recommending them.    References:   Aramis MARTINI (2013). Thickening agents used for dysphagia management: Effect on bioavailability of water, medication and feelings of satiety. Nutrition Journal, 12, 54. https://doi.org/10.1186/8393-9583-87-54    EZEQUIEL Mancilla., CARY De La Torre, JOHN Tucker, & VINI Magana (2018). Cough response to aspiration in thin and thick fluids during FEES in hospitalized inpatients. International journal of language & communication disorders, 53(5), 909-918. https://doi.org/10.1111/8323-0104.68317    INTERPRETATION AND RISK ASSESSMENT:  Clinical swallow evaluation (CSE) revealed oral phase characterized by lingual, labial, and buccal strength and range of motion reduced for lip closure, bolus preparation and propulsion. The patient had no anterior loss of the bolus with complete closure of the lips around the straw, tsp. Soft solid residue remained in the oral cavity s/t poor manipulation/mastication. Patient without overt clinical signs/symptoms of aspiration on any thin liquids or pureed solids; however, she presents with a possibly inefficient swallow as indicated by weakness/frailty and edentulous oral cavity.  Contributing risk factors for dysphagia include cognitive deficits. Patient with increased risk for silent aspiration given potential sensory deficits related to hx stroke and prolonged intubation.    Clinical signs of oropharyngeal dysphagia, likely chronic related to progressive cognitive and functional decline in the setting of advancing age and dementia with major neurocognitive impairment. Swallowing prognosis is guarded.      Goals:   Multidisciplinary Problems       SLP Goals          Problem: SLP    Goal Priority Disciplines Outcome   SLP Goal     SLP    Description: 1.  Pt will consume the least restrictive PO diet without incident.                       Plan:     Patient to be seen:  2 x/week, 1 x/week   Plan of Care expires:  12/09/24  Plan of Care reviewed with:  patient   SLP Follow-Up:  Yes       Discharge recommendations:   (Pending acute progress; home based palliative medicine referral with ongoing ACP/GOC conversation with family warranted.)   Barriers to Discharge:  None    Time Tracking:     SLP Treatment Date:   12/02/24  Speech Start Time:  0930  Speech Stop Time:  0945     Speech Total Time (min):  15 min    Billable Minutes: Eval Swallow and Oral Function 15 minutes    12/02/2024

## 2024-12-02 NOTE — PROGRESS NOTES
[facility] - Intensive Care ([facility])  Critical Care Medicine  Progress Note    Patient Name: Leia Rodriguez  MRN: [id number]  Admission Date: 11/29/2024  Hospital Length of Stay: 3 days  Code Status: Full Code  Attending Provider: Jeane Espinoza MD  Primary Care Provider: Yasmin Navarro MD   Principal Problem: Acute respiratory failure with hypoxia and hypercarbia    Subjective:     HPI:  Leia Rodriguez is a 74 yr old female with a history of CAD, CHF, bilateral breast cancer, s/p mastectomy on surveillance, HTN, dementia who was brought to the ED by EMS on 11/29 after her family found her unresponsive. She was found to have agonal breathing and intubated in the field with difficulty. She was hypertensive initially, received ketamine, fentanyl in the field. She was started on Precedex in the ER and became hypotensive. Given fluid bolus and briefly required Levophed, hypotension resolved. CTH negative, CTA chest negative for PE but shows moderate R ptx with pleural effusions.    LA 2.9    pH 7.26  CO2 55.2    Critical care medicine consulted for admission.    Hospital/ICU Course:  11/30 Sedated / intubated. Hemodynamics stable.   ABG reviewed. Vent settings changed.    12/1 Stable overnight.     12/02/2024: Patient extubated without issue. Precedex stopped this morning. Repeat CXR pending. Prisca added. If remains stable off Precedex will SD to hosp med. PT/OT/SLP evals pending.    Interval History: Patient sleeping, calm, wakes to voice, oriented to self, place, not time or situation.    Objective:     Vital Signs (Most Recent):  Temp: 97.5 °F (36.4 °C) (12/02/24 0300)  Pulse: 66 (12/02/24 0600)  Resp: 15 (12/02/24 0600)  BP: 112/61 (12/02/24 0600)  SpO2: 100 % (12/02/24 0600) Vital Signs (24h Range):  Temp:  [95.5 °F (35.3 °C)-97.8 °F (36.6 °C)] 97.5 °F (36.4 °C)  Pulse:  [50-88] 66  Resp:  [10-25] 15  SpO2:  [100 %] 100 %  BP: (102-173)/() 112/61     Weight: 63.1 kg (139 lb 1.8 oz)  Body mass  index is 27.17 kg/m².      Intake/Output Summary (Last 24 hours) at 12/2/2024 0741  Last data filed at 12/2/2024 0600  Gross per 24 hour   Intake 965.29 ml   Output 2501 ml   Net -1535.71 ml        Physical Exam  Constitutional:       General: She is not in acute distress.     Appearance: She is ill-appearing.   HENT:      Head: Normocephalic and atraumatic.      Nose: Nose normal.      Mouth/Throat:      Mouth: Mucous membranes are moist.   Eyes:      Pupils: Pupils are equal, round, and reactive to light.   Cardiovascular:      Rate and Rhythm: Normal rate and regular rhythm.      Pulses: Normal pulses.      Heart sounds: Normal heart sounds.   Pulmonary:      Effort: Pulmonary effort is normal.      Breath sounds: Normal breath sounds.   Abdominal:      General: Bowel sounds are normal. There is no distension.      Palpations: Abdomen is soft.      Tenderness: There is no abdominal tenderness.   Musculoskeletal:         General: No swelling.      Cervical back: Normal range of motion and neck supple.   Skin:     General: Skin is warm and dry.   Neurological:      General: No focal deficit present.      Mental Status: Mental status is at baseline. She is disoriented.           Review of Systems   Unable to perform ROS: Dementia       Vents:  Vent Mode: A/C (12/01/24 1511)  Ventilator Initiated: Yes (11/29/24 1000)  Set Rate: 12 BPM (12/01/24 1511)  Vt Set: 340 mL (12/01/24 1511)  PEEP/CPAP: 5 cmH20 (12/01/24 1511)  Oxygen Concentration (%): 40 (12/01/24 1932)  Peak Airway Pressure: 16 cmH20 (12/01/24 1511)  Plateau Pressure: 12 cmH20 (12/01/24 1511)  Total Ve: 0 L/m (12/01/24 1511)  Negative Inspiratory Force (cm H2O): 0 (12/01/24 1511)  F/VT Ratio<105 (RSBI): (!) 26.58 (12/01/24 1326)    Lines/Drains/Airways       Peripherally Inserted Central Catheter Line  Duration             PICC Double Lumen 11/29/24 1850 right basilic 2 days              Drain  Duration                  Urethral Catheter 11/29/24 0955  Silicone;Straight-tip 16 Fr. 2 days              Peripheral Intravenous Line  Duration                  Peripheral IV - Single Lumen 11/29/24 1012 20 G Posterior;Right Hand 2 days                    Significant Labs:    CBC/Anemia Profile:  Recent Labs   Lab 12/01/24  0417 12/02/24  0501   WBC 13.56* 9.19   HGB 10.2* 11.7*   HCT 32.8* 36.4*    200   MCV 94 91   RDW 14.4 13.8        Chemistries:  Recent Labs   Lab 12/01/24  0417 12/02/24  0501    137   K 4.5 4.1    104   CO2 21* 25   BUN 26* 30*   CREATININE 1.5* 1.2   CALCIUM 7.3* 7.6*   ALBUMIN 3.1* 3.1*   PROT 6.4 7.0   BILITOT 0.4 0.7   ALKPHOS 55 54   ALT 11 13   AST 9* 10   MG 2.2 2.3   PHOS 5.2* 4.0       All pertinent labs within the past 24 hours have been reviewed.    Significant Imaging:  I have reviewed all pertinent imaging results/findings within the past 24 hours.    ABG  Recent Labs   Lab 11/30/24  1235   PH 7.356   PO2 472*   PCO2 38.1   HCO3 21.4*   BE -4*     Assessment/Plan:     Neuro  Encephalopathy, metabolic  Unclear presentation, per family, patient became unresponsive at home, EMS found her agonal breathing  CTH negative, patient now waking up, following commands    Polypharmacy possible etiology- consider stopping vs. Decreasing home med doses    Major neurocognitive disorder due to Alzheimer's disease  Patient with dementia with likely etiology of alzheimer's dementia. Dementia is moderate. The patient does have signs of behavioral disturbance. Home dementia medications are Held or Continued: held.. Continue non-pharmacologic interventions to prevent delirium (No VS between 11PM-5AM, activity during day, opening blinds, providing glasses/hearing aids, and up in chair during daytime). Will avoid narcotics and benzos unless absolutely necessary. PRN anti-psychotics are not prescribed to avoid self harm behaviors.    Patient intubated and sedated at this time.  Seems to be on multiple behavioral meds at home, may need  adjusting, holding at this time.    Polypharmacy at home possible etiology for AMS/respiratory failure- consider decreasing vs. Stopping home behavioral meds    Pulmonary  * Acute respiratory failure with hypoxia and hypercarbia  Patient with Hypercapnic and Hypoxic Respiratory failure which is Acute.  she is not on home oxygen. Supplemental oxygen was provided and noted- Vent Mode: A/C  Oxygen Concentration (%):  [] 40  Resp Rate Total:  [15 br/min-21 br/min] 15 br/min  Vt Set:  [340 mL] 340 mL  PEEP/CPAP:  [5 cmH20] 5 cmH20  Mean Airway Pressure:  [6.5 cmH20-10 cmH20] 6.5 cmH20    .   Signs/symptoms of respiratory failure include- lethargy. Contributing diagnoses includes - Aspiration, CHF, and Pleural effusion Labs and images were reviewed. Patient Has recent ABG, which has been reviewed. Will treat underlying causes and adjust management of respiratory failure as follows-     CTA Chest without PE, moderate ptx, bilateral pleural effusions    Keep sedated today  Wean vent settings as tolerated  Brief code status discussion with daughter at bedside who says that the patient would want CPR/ventilator if her heart and breathing stops.    11/30   Stable on vent  Rate decreased  ETT adjusted  On 50% oxygen over 200 mmHg.    Apparently was a difficult intubation. Ramón tube was placed which is typically traumatic. Needs more time to make sure she doesn't have airway edema.  D/w daughters at the bedside.   She has underlying dementia and they do not think she will tolerate weaning sedation well. They understand she will be difficult to take care of if she comes off the vent. They understand based on her multiple co morbidities she carries a poor prognosis but want to give her more time.    12/1 Wean sedation  DC fentanyl and wean off diprivan  Will change to precedex and attempt SBT    12/2 Patient extubated yesterday without issue. Precedex drip stopped this morning. Will SD to hosp med if remains stable off  Precedex. Weaning O2 as tolerated.    Postprocedural pneumothorax  Moderate R ptx after intubation  -Withdraw ETT  -Obtain CXR in 2 hours, may require Chest tube placement if does not improve    11/30 No worsening. Limited apical Ptx. May need CT guided catheter aspiration. Will follow closely.    12/2: Has been improving without intervention, oxygenating well on nasal cannula. Repeat CXR pending.    Cardiac/Vascular  Chronic combined systolic and diastolic CHF (congestive heart failure)  Patient has Combined Systolic and Diastolic heart failure that is Acute on chronic. On presentation their CHF was well compensated. Most recent BNP and echo results are listed below.  Recent Labs     11/29/24  0929   *       Latest ECHO  Results for orders placed during the hospital encounter of 06/22/23    Echo    Interpretation Summary  · The left ventricle is normal in size with mild mild asymmetric hypertrophy eccentric hypertrophy and low normal systolic function.  · The estimated ejection fraction is 50%.  · Normal left ventricular diastolic function.  · Normal right ventricular size with normal right ventricular systolic function.  · Mild to moderate tricuspid regurgitation.  · Intermediate central venous pressure (8 mmHg).  · The estimated PA systolic pressure is 43 mmHg.  · There is pulmonary hypertension.    Current Heart Failure Medications  furosemide injection 20 mg, Daily, Intravenous    Plan  - Monitor strict I&Os and daily weights.    - Place on telemetry  - Cardiology has not been consulted  - Received fluid bolus in ER, will watch for fluid overload  - New Echo pending      11/30 Echo 40% EF  Keep I/O matched or neg  Diuretics prn  CxR Improved.  12/1 Compensated  12/2: Daily lasix decreased, can transition to home po lasix once passes swallow study    Essential hypertension  Patients blood pressure range in the last 24 hours was: BP  Min: 111/55  Max: 173/72.The patient's inpatient anti-hypertensive  "regimen is listed below:  Current Antihypertensives  furosemide injection 20 mg, Daily, Intravenous    Plan  - Hypotensive on arrival after multiple sedatives requiring pressors  - Now off pressors  - Restart home BP meds as indicated    Renal/  Urinary retention  Per daughter, was getting straight cath'd at home for urinary retention- followed by urology  -Zuluaga in place- will need to leave in place upon discharge and follow up outpatient with urology    ID  Severe sepsis  This patient does have evidence of infective focus  My overall impression is sepsis.  Source: Unknown  Antibiotics given-   Antibiotics (72h ago, onward)      Start     Stop Route Frequency Ordered    11/29/24 1300  piperacillin-tazobactam (ZOSYN) 4.5 g in D5W 100 mL IVPB (MB+)         -- IV Every 8 hours (non-standard times) 11/29/24 1146    11/29/24 1245  mupirocin 2 % ointment         12/04/24 0859 Nasl 2 times daily 11/29/24 1143    11/29/24 1145  vancomycin 1,250 mg in 0.9% NaCl 250 mL IVPB (admixture device)         -- IV Once 11/29/24 1040    11/29/24 1139  vancomycin - pharmacy to dose  (vancomycin IVPB (PEDS and ADULTS))        Placed in "And" Linked Group    -- IV pharmacy to manage frequency 11/29/24 1040          Latest lactate reviewed-  Recent Labs   Lab 11/29/24  0929   LACTATE 2.9*     Organ dysfunction indicated by Acute respiratory failure and Encephalopathy    Fluid challenge- received in ER    Post- resuscitation assessment Yes Perfusion exam was performed within 6 hours of septic shock presentation after bolus shows Adequate tissue perfusion assessed by non-invasive monitoring       Will Not start Pressors- Levophed for MAP of 65    Pressors briefly started in the ER, now off  Start zosyn, vanc  History of recurrent UTIs- pancultures pending  Trend LA    Immunology/Multi System  Immunodeficiency due to chemotherapy  Greater risk for sepsis  Cultures negative- will stop abx    Palliative Care  Polypharmacy  Consider stopping " vs. Adjusting home behavioral meds    PUP: Famotidine  DVT ppx: apixaban    Level III     Critical care was time spent personally by me on the following activities: development of treatment plan with patient or surrogate and bedside caregivers, discussions with consultants, evaluation of patient's response to treatment, examination of patient, ordering and performing treatments and interventions, ordering and review of laboratory studies, ordering and review of radiographic studies, pulse oximetry, re-evaluation of patient's condition. This critical care time did not overlap with that of any other provider or involve time for any procedures.     Flor Doyle NP  Critical Care Medicine  [facility] - Intensive Care ([facility])

## 2024-12-02 NOTE — ASSESSMENT & PLAN NOTE
Patient has Combined Systolic and Diastolic heart failure that is Acute on chronic. On presentation their CHF was well compensated. Most recent BNP and echo results are listed below.  Recent Labs     11/29/24  0929   *       Latest ECHO  Results for orders placed during the hospital encounter of 06/22/23    Echo    Interpretation Summary  · The left ventricle is normal in size with mild mild asymmetric hypertrophy eccentric hypertrophy and low normal systolic function.  · The estimated ejection fraction is 50%.  · Normal left ventricular diastolic function.  · Normal right ventricular size with normal right ventricular systolic function.  · Mild to moderate tricuspid regurgitation.  · Intermediate central venous pressure (8 mmHg).  · The estimated PA systolic pressure is 43 mmHg.  · There is pulmonary hypertension.    Current Heart Failure Medications  furosemide injection 20 mg, Daily, Intravenous    Plan  - Monitor strict I&Os and daily weights.    - Place on telemetry  - Cardiology has not been consulted  - Received fluid bolus in ER, will watch for fluid overload  - New Echo pending      11/30 Echo 40% EF  Keep I/O matched or neg  Diuretics prn  CxR Improved.  12/1 Compensated  12/2: Daily lasix decreased, can transition to home po lasix once passes swallow study

## 2024-12-02 NOTE — NURSING
Patient accuchecks checked per finger stick. Patient glucose 55. Protocol followed and rechecked. Patient glucose lower and protocol followed and MD notified. Patient given orange and tolerated juice well. Pt still communicating and oriented to self.     MD at bedside and order obtained for continuous D10 infusion. Glucose checked again and reading is still low. Blood sample from PICC source used and indicating 178 reading while finger stick is 59.     MD made aware and BMP ordered. Pending lab results and further orders.     Pt still oriented to self and follows commands.

## 2024-12-02 NOTE — PLAN OF CARE
GCS 12. RASS goal -3 and maintained with Precedex for sedation and titrated per orders see MAR. SPO2 100% on 2 L nasal cannula. HR SB-SR with PACs ranging in 50s-60s and BP normotensive throughout the shift. Zuluaga in place with total UOP of 613 mL during this shift. POC reviewed with patient and family. Bed in lowest position, side rails up x 3, wheels locked, and alarms on and audible.

## 2024-12-02 NOTE — ASSESSMENT & PLAN NOTE
Unclear presentation, per family, patient became unresponsive at home, EMS found her agonal breathing  CTH negative, patient now waking up, following commands    Polypharmacy possible etiology- consider stopping vs. Decreasing home med doses

## 2024-12-02 NOTE — ASSESSMENT & PLAN NOTE
Patient with Hypercapnic and Hypoxic Respiratory failure which is Acute.  she is not on home oxygen. Supplemental oxygen was provided and noted- Vent Mode: A/C  Oxygen Concentration (%):  [] 40  Resp Rate Total:  [15 br/min-21 br/min] 15 br/min  Vt Set:  [340 mL] 340 mL  PEEP/CPAP:  [5 cmH20] 5 cmH20  Mean Airway Pressure:  [6.5 cmH20-10 cmH20] 6.5 cmH20    .   Signs/symptoms of respiratory failure include- lethargy. Contributing diagnoses includes - Aspiration, CHF, and Pleural effusion Labs and images were reviewed. Patient Has recent ABG, which has been reviewed. Will treat underlying causes and adjust management of respiratory failure as follows-     CTA Chest without PE, moderate ptx, bilateral pleural effusions    Keep sedated today  Wean vent settings as tolerated  Brief code status discussion with daughter at bedside who says that the patient would want CPR/ventilator if her heart and breathing stops.    11/30   Stable on vent  Rate decreased  ETT adjusted  On 50% oxygen over 200 mmHg.    Apparently was a difficult intubation. Ramón tube was placed which is typically traumatic. Needs more time to make sure she doesn't have airway edema.  D/w daughters at the bedside.   She has underlying dementia and they do not think she will tolerate weaning sedation well. They understand she will be difficult to take care of if she comes off the vent. They understand based on her multiple co morbidities she carries a poor prognosis but want to give her more time.    12/1 Wean sedation  DC fentanyl and wean off diprivan  Will change to precedex and attempt SBT    12/2 Patient extubated yesterday without issue. Precedex drip stopped this morning. Will SD to hosp [facility] if remains stable off Precedex. Weaning O2 as tolerated.

## 2024-12-02 NOTE — PLAN OF CARE
Pt tolerating Vapotherm 30L 50%, VSS, Afebrile, Oriented to self.  Fent gtt and Prop gtt titrated off and transitioned to Precedex for RASS -3  Sinus Angel - Sinus rhythm with PACs on monitor alternating to AFIB rate controlled throughout shift, Dr. Espinoza aware  ceballos in place with adequate urine outpt  Updated POC with daughter via phone  Safety precautions maintained  Turned q2 with wedge/pillow, heels elevated with boots, SCDs in place call light within reach

## 2024-12-02 NOTE — NURSING
Pt extubated to Vapotherm. Dr. Espinoza at bedside. RT Aiyana at bedside. NGT d/c'd. VSS. Pt resting in no acute distress.

## 2024-12-02 NOTE — PT/OT/SLP EVAL
Occupational Therapy Evaluation and Treatment    Name: Leia Rodriguez  MRN: [id number]  Admitting Diagnosis: Acute respiratory failure with hypoxia and hypercarbia  Recent Surgery: * No surgery found *      Recommendations:     Discharge Recommendations:  (tbd)  Level of Assistance Recommended: 24 hours significant assistance  Discharge Equipment Recommendations: none  Barriers to discharge:      Assessment:     Leia Rodriguez is a 74 y.o. female with a medical diagnosis of Acute respiratory failure with hypoxia and hypercarbia. She presents with performance deficits affecting function including weakness, impaired self care skills, impaired balance, impaired endurance, impaired functional mobility, gait instability, decreased safety awareness, decreased lower extremity function, decreased upper extremity function.     Rehab Prognosis: Good; patient would benefit from acute OT services to address these deficits and reach maximum level of function.    Plan:     Patient to be seen 2 x/week to address the above listed problems via self-care/home management, therapeutic activities, therapeutic exercises  Plan of Care Expires: 12/16/24  Plan of Care Reviewed with: patient    Subjective     Chief Complaint: debility and generalized weakness  Pain/Comfort:  Pain Rating 1: 0/10    Patients cultural, spiritual, Anabaptism conflicts given the current situation:      Social History: poor historian unaware of living environment and functional mobility      Objective:     Communicated with nurse Doran and epic chart review prior to session. Patient found HOB elevated with   upon OT entry to room.    General Precautions: Standard, fall   Orthopedic Precautions: N/A   Braces: N/A    Respiratory Status: Room air    Occupational Performance      Bed Mobility:   Rolling/Turning to Left with moderate assistance  Rolling/Turning to Right with moderate assistance  Scooting anteriorly to EOB to have both feet planted on floor: moderate  assistance  Supine to sit from right side of bed with moderate assistance  Supine to sit from left side of bed with moderate assistance      Activities of Daily Living:  Upper Body Dressing: total assistance  Lower Body Dressing: total assistance    Cognitive/Visual Perceptual:  Cognitive/Psychosocial Skills:    -     Oriented to: unable to assess due to aphasia  -     Follows Commands/attention: Easily distracted  -     Communication: expressive aphasia and receptive aphasia  -     Memory: unable to determine due to APHASIA  -     Safety awareness/insight to disability: impaired    Physical Exam:  Balance:    -     Sitting: maximal assistance    [facility] 6 Click ADL:  [facility] Total Score: 6    Treatment & Education:  Therapist provided facilitation and instruction of proper body mechanics, energy conservation, and fall prevention strategies during tasks listed above  Patient educated on role of OT, POC, and goals for therapy      Patient not clear to transfer with RN/PCT.    Patient left HOB elevated with all lines intact, call button in reach, and RN notified.    GOALS:   Multidisciplinary Problems       Occupational Therapy Goals          Problem: Occupational Therapy    Goal Priority Disciplines Outcome Interventions   Occupational Therapy Goal     OT, PT/OT     Description: O.T. goals to met by 12-16-24  Pt will tolerate 1 set x 10 reps aarom exercise  Mod a with ue dressing  Mod a with bsc transfers                       History:     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    Lipoma of abdominal wall     Major neurocognitive disorder 06/21/2022    Obesity     Overactive bladder     Pap smear abnormality of cervix with ASCUS favoring benign      Thyroid nodule     Tobacco use disorder     Tubular adenoma of colon     Urinary incontinence          Past Surgical History:   Procedure Laterality Date    ANTERIOR VAGINAL REPAIR      AORTOGRAPHY WITH SERIALOGRAPHY N/A 10/7/2024    Procedure: AORTOGRAM, WITH SERIALOGRAPHY;  Surgeon: Tiffanie Santos MD;  Location: [facility];  Service: Cardiology;  Laterality: N/A;  MD requested Anesthesia    BLADDER SURGERY      BREAST LUMPECTOMY Left 2017    CATARACT EXTRACTION Left 2022     SECTION      X2    COLONOSCOPY N/A 3/8/2017    Procedure: COLONOSCOPY;  Surgeon: Tyron Paris MD;  Location: [facility];  Service: Endoscopy;  Laterality: N/A;    COLONOSCOPY N/A 2020    Procedure: COLONOSCOPY;  Surgeon: Keira Ellison MD;  Location: [facility];  Service: Endoscopy;  Laterality: N/A;    ESOPHAGOGASTRODUODENOSCOPY N/A 2020    Procedure: EGD (ESOPHAGOGASTRODUODENOSCOPY);  Surgeon: Keira Ellison MD;  Location: [facility];  Service: Endoscopy;  Laterality: N/A;  new onset iron deficiency with prior history of breast cancer    INTRALUMINAL GASTROINTESTINAL TRACT IMAGING VIA CAPSULE N/A 10/28/2020    Procedure: IMAGING PROCEDURE, GI TRACT, INTRALUMINAL, VIA CAPSULE;  Surgeon: Finesse Jha RN;  Location: [facility];  Service: Endoscopy;  Laterality: N/A;    LEFT HEART CATHETERIZATION Left 10/12/2021    Procedure: CATHETERIZATION, HEART, LEFT;  Surgeon: Tiffanie Santos MD;  Location: [facility];  Service: Cardiology;  Laterality: Left;    LITHOTRIPSY, CORONARY TRANSLUMINAL, PERCUTANEOUS  10/7/2024    Procedure: LITHOTRIPSY, CORONARY TRANSLUMINAL, PERCUTANEOUS;  Surgeon: Tiffanie Santos MD;  Location: [facility];  Service: Cardiology;;    PTA, SUPERFICIAL FEMORAL ARTERY  10/7/2024    Procedure: PTA, Superficial Femoral Artery;  Surgeon: Tiffanie Santos MD;  Location: [facility];  Service: Cardiology;;    SENTINEL LYMPH NODE BIOPSY Left 2020    Procedure: BIOPSY, LYMPH NODE,  SENTINEL;  Surgeon: Vincent Moyer MD;  Location: [facility] OR;  Service: General;  Laterality: Left;    SIMPLE MASTECTOMY Left 9/8/2020    Procedure: MASTECTOMY, SIMPLE;  Surgeon: Vincent Moyer MD;  Location: [facility] OR;  Service: General;  Laterality: Left;    STENT, SUPERFICIAL FEMORAL ARTERY  10/7/2024    Procedure: Stent, Superficial Femoral Artery;  Surgeon: Tiffanie Santos MD;  Location: [facility];  Service: Cardiology;;    THYROID LOBECTOMY Left 2005    TOTAL ABDOMINAL HYSTERECTOMY      TUBAL LIGATION         Time Tracking:     OT Date of Treatment: 12/02/24  OT Start Time: 1108  OT Stop Time: 1128  OT Total Time (min): 20 min    Billable Minutes: Evaluation 10 MINUTES and Therapeutic Activity 10 MINUTES    12/2/2024

## 2024-12-03 DIAGNOSIS — N60.19 FIBROCYSTIC BREAST CHANGES, UNSPECIFIED LATERALITY: ICD-10-CM

## 2024-12-03 DIAGNOSIS — Z85.3 ENCOUNTER FOR FOLLOW-UP SURVEILLANCE OF BREAST CANCER: Primary | ICD-10-CM

## 2024-12-03 DIAGNOSIS — Z08 ENCOUNTER FOR FOLLOW-UP SURVEILLANCE OF BREAST CANCER: Primary | ICD-10-CM

## 2024-12-03 PROBLEM — E43 SEVERE PROTEIN-CALORIE MALNUTRITION: Status: ACTIVE | Noted: 2024-12-03

## 2024-12-03 PROBLEM — D64.9 ANEMIA: Status: ACTIVE | Noted: 2024-12-03

## 2024-12-03 PROBLEM — E83.39 HYPOPHOSPHATEMIA: Status: ACTIVE | Noted: 2024-12-03

## 2024-12-03 PROBLEM — E87.6 HYPOKALEMIA: Status: ACTIVE | Noted: 2024-12-03

## 2024-12-03 PROBLEM — E83.39 HYPOPHOSPHATEMIA: Status: RESOLVED | Noted: 2024-12-03 | Resolved: 2024-12-03

## 2024-12-03 LAB
ALBUMIN SERPL BCP-MCNC: 3.3 G/DL (ref 3.5–5.2)
ALP SERPL-CCNC: 53 U/L (ref 40–150)
ALT SERPL W/O P-5'-P-CCNC: 10 U/L (ref 10–44)
ANION GAP SERPL CALC-SCNC: 10 MMOL/L (ref 8–16)
AST SERPL-CCNC: 12 U/L (ref 10–40)
BASOPHILS # BLD AUTO: 0.02 K/UL (ref 0–0.2)
BASOPHILS NFR BLD: 0.2 % (ref 0–1.9)
BILIRUB SERPL-MCNC: 0.7 MG/DL (ref 0.1–1)
BUN SERPL-MCNC: 32 MG/DL (ref 8–23)
CALCIUM SERPL-MCNC: 7.7 MG/DL (ref 8.7–10.5)
CHLORIDE SERPL-SCNC: 101 MMOL/L (ref 95–110)
CO2 SERPL-SCNC: 26 MMOL/L (ref 23–29)
CREAT SERPL-MCNC: 1.2 MG/DL (ref 0.5–1.4)
DIFFERENTIAL METHOD BLD: ABNORMAL
EOSINOPHIL # BLD AUTO: 0 K/UL (ref 0–0.5)
EOSINOPHIL NFR BLD: 0.2 % (ref 0–8)
ERYTHROCYTE [DISTWIDTH] IN BLOOD BY AUTOMATED COUNT: 14 % (ref 11.5–14.5)
EST. GFR  (NO RACE VARIABLE): 48 ML/MIN/1.73 M^2
GLUCOSE SERPL-MCNC: 107 MG/DL (ref 70–110)
HCT VFR BLD AUTO: 35.2 % (ref 37–48.5)
HGB BLD-MCNC: 11.7 G/DL (ref 12–16)
IMM GRANULOCYTES # BLD AUTO: 0.07 K/UL (ref 0–0.04)
IMM GRANULOCYTES NFR BLD AUTO: 0.5 % (ref 0–0.5)
LYMPHOCYTES # BLD AUTO: 1.4 K/UL (ref 1–4.8)
LYMPHOCYTES NFR BLD: 10.5 % (ref 18–48)
MAGNESIUM SERPL-MCNC: 2.3 MG/DL (ref 1.6–2.6)
MCH RBC QN AUTO: 29.8 PG (ref 27–31)
MCHC RBC AUTO-ENTMCNC: 33.2 G/DL (ref 32–36)
MCV RBC AUTO: 90 FL (ref 82–98)
MONOCYTES # BLD AUTO: 0.9 K/UL (ref 0.3–1)
MONOCYTES NFR BLD: 7.1 % (ref 4–15)
NEUTROPHILS # BLD AUTO: 10.5 K/UL (ref 1.8–7.7)
NEUTROPHILS NFR BLD: 81.5 % (ref 38–73)
NRBC BLD-RTO: 0 /100 WBC
PHOSPHATE SERPL-MCNC: 3.1 MG/DL (ref 2.7–4.5)
PLATELET # BLD AUTO: 261 K/UL (ref 150–450)
PMV BLD AUTO: 10.5 FL (ref 9.2–12.9)
POCT GLUCOSE: 103 MG/DL (ref 70–110)
POCT GLUCOSE: 120 MG/DL (ref 70–110)
POCT GLUCOSE: 127 MG/DL (ref 70–110)
POCT GLUCOSE: 133 MG/DL (ref 70–110)
POCT GLUCOSE: 135 MG/DL (ref 70–110)
POTASSIUM SERPL-SCNC: 2.9 MMOL/L (ref 3.5–5.1)
POTASSIUM SERPL-SCNC: 3.6 MMOL/L (ref 3.5–5.1)
PROT SERPL-MCNC: 7.1 G/DL (ref 6–8.4)
RBC # BLD AUTO: 3.93 M/UL (ref 4–5.4)
SODIUM SERPL-SCNC: 137 MMOL/L (ref 136–145)
WBC # BLD AUTO: 12.89 K/UL (ref 3.9–12.7)

## 2024-12-03 PROCEDURE — 25000003 PHARM REV CODE 250: Performed by: NURSE PRACTITIONER

## 2024-12-03 PROCEDURE — 63600175 PHARM REV CODE 636 W HCPCS: Performed by: NURSE PRACTITIONER

## 2024-12-03 PROCEDURE — 85025 COMPLETE CBC W/AUTO DIFF WBC: CPT | Performed by: NURSE PRACTITIONER

## 2024-12-03 PROCEDURE — 84132 ASSAY OF SERUM POTASSIUM: CPT | Performed by: INTERNAL MEDICINE

## 2024-12-03 PROCEDURE — 21400001 HC TELEMETRY ROOM

## 2024-12-03 PROCEDURE — 84100 ASSAY OF PHOSPHORUS: CPT | Performed by: NURSE PRACTITIONER

## 2024-12-03 PROCEDURE — 92526 ORAL FUNCTION THERAPY: CPT

## 2024-12-03 PROCEDURE — 97530 THERAPEUTIC ACTIVITIES: CPT | Mod: CQ

## 2024-12-03 PROCEDURE — 80053 COMPREHEN METABOLIC PANEL: CPT | Performed by: NURSE PRACTITIONER

## 2024-12-03 PROCEDURE — 83735 ASSAY OF MAGNESIUM: CPT | Performed by: NURSE PRACTITIONER

## 2024-12-03 PROCEDURE — 11000001 HC ACUTE MED/SURG PRIVATE ROOM

## 2024-12-03 PROCEDURE — 97530 THERAPEUTIC ACTIVITIES: CPT

## 2024-12-03 RX ORDER — POTASSIUM CHLORIDE 29.8 MG/ML
80 INJECTION INTRAVENOUS
Status: DISCONTINUED | OUTPATIENT
Start: 2024-12-03 | End: 2024-12-03

## 2024-12-03 RX ORDER — POTASSIUM CHLORIDE 29.8 MG/ML
40 INJECTION INTRAVENOUS
Status: DISCONTINUED | OUTPATIENT
Start: 2024-12-03 | End: 2024-12-03

## 2024-12-03 RX ORDER — POTASSIUM CHLORIDE 14.9 MG/ML
60 INJECTION INTRAVENOUS
Status: DISCONTINUED | OUTPATIENT
Start: 2024-12-03 | End: 2024-12-03

## 2024-12-03 RX ADMIN — METHYLPREDNISOLONE SODIUM SUCCINATE 40 MG: 40 INJECTION, POWDER, FOR SOLUTION INTRAMUSCULAR; INTRAVENOUS at 05:12

## 2024-12-03 RX ADMIN — FAMOTIDINE 20 MG: 10 INJECTION, SOLUTION INTRAVENOUS at 08:12

## 2024-12-03 RX ADMIN — ENOXAPARIN SODIUM 40 MG: 40 INJECTION SUBCUTANEOUS at 05:12

## 2024-12-03 RX ADMIN — POTASSIUM CHLORIDE 80 MEQ: 29.8 INJECTION, SOLUTION INTRAVENOUS at 04:12

## 2024-12-03 RX ADMIN — FUROSEMIDE 20 MG: 10 INJECTION, SOLUTION INTRAMUSCULAR; INTRAVENOUS at 08:12

## 2024-12-03 RX ADMIN — MUPIROCIN: 20 OINTMENT TOPICAL at 08:12

## 2024-12-03 RX ADMIN — MUPIROCIN: 20 OINTMENT TOPICAL at 09:12

## 2024-12-03 NOTE — PT/OT/SLP PROGRESS
Occupational Therapy   Treatment and Discharge    Name: Leia Rodriguez  MRN: [id number]  Admitting Diagnosis:  Acute respiratory failure with hypoxia and hypercarbia       Recommendations:     Discharge Recommendations: No Therapy Indicated (recommending return home with 24/7 SPV and A vs basic nursing home placement)  Discharge Equipment Recommendations:  hospital bed, wheelchair, lift device  Barriers to discharge:   (unknown)    Assessment:     Leia Rodriguez is a 74 y.o. female with a medical diagnosis of Acute respiratory failure with hypoxia and hypercarbia.     Patient unable to follow commands. Oriented only to self with max cueing. Total A of 2 for all mobility and ADLs. Noted to have h/o Alzheimer's dementia grossly hindering rehabilitation potential. Not appropriate for continued skilled intervention.     Plan:     Discharge OT  Plan of Care Reviewed with: patient    Subjective     Chief Complaint: intermittent nonsensical mumbling  Patient/Family Comments/goals: unable to report  Pain/Comfort:  Pain Rating 1:  (no nonverbal indicators of pain throughout)  Pain Addressed 1:  (activity pacing)    Objective:     Communicated with: Nurse, Suki, prior to session.  Patient found HOB elevated with blood pressure cuff, pulse ox (continuous), telemetry, ceballos catheter, pressure relief boots, SCD, PICC line, PRAFO, bed alarm upon OT entry to room.    General Precautions: Standard, fall    Orthopedic Precautions:N/A  Braces: N/A  Respiratory Status: Room air     Occupational Performance:     Bed Mobility:    Patient completed Rolling/Turning to Left with  total assistance and 2 persons  Patient completed Rolling/Turning to Right with total assistance and 2 persons  Patient completed Scooting/Bridging with total assistance and 2 persons  Patient completed Supine to Sit with total assistance and 2 persons  Patient completed Sit to Supine with total assistance and 2 persons   Static sitting EOB x10 minutes with max  A    Functional Mobility/Transfers:  Patient completed Sit <> Stand Transfer with total assistance and of 2 persons  with  no assistive device   Sustained <10 seconds with total A of 2- able to achieve 1/2 upright stand    Activities of Daily Living:  Grooming: total A    AMPAC 6 Click ADL: 6    Treatment & Education:  Provided with PROM to B UE while seated EOB. Noted to have hypotonicity in L UE and resistive to movement to R UE.    Patient left HOB elevated with all lines intact, call button in reach, bed alarm on, and nurse notified    GOALS:   Multidisciplinary Problems       Occupational Therapy Goals          Problem: Occupational Therapy    Goal Priority Disciplines Outcome Interventions   Occupational Therapy Goal     OT, PT/OT Unable to Meet    Description: O.T. goals to met by 12-16-24  Pt will tolerate 1 set x 10 reps aarom exercise  Mod a with ue dressing  Mod a with bsc transfers                       Time Tracking:     OT Date of Treatment: 12/03/24  OT Start Time: 0830  OT Stop Time: 0900  OT Total Time (min): 30 min    Billable Minutes:Therapeutic Activity 30    Yadi Green OT  12/3/2024

## 2024-12-03 NOTE — PLAN OF CARE
Nutrition Recommendations/Interventions for malnutrition 12/3/24:   1. Recommend pt continues on a Pureed (IDDSI Level 4) diet, modify texture per SLP recommendations   2. If PO intake 25% or < x 3 days, recommend re consult for alternative means of nutrition  3. Recommend pt continues Boost plus TID to assist filling nutritional gaps  4. Recommend a bowel regimen (no BM x 6 days) as warranted   5. Encourage PO and supplement intake, recommend feeding assistance   6. Updated weight, twice weekly  7. Collaboration by nutrition professional with other providers     Goals:   1. Patient to start on ED support prior within 24 hours (Resolved)   2. Pt's diet will be advanced prior to RD follow up   3. Pt will tolerate and consume >75% EEN and EPN prior to RD follow up   4. Pt will have a BM prior to RD follow up    MARIELLA Medrano, RDN, LDN

## 2024-12-03 NOTE — ASSESSMENT & PLAN NOTE
Patient with Hypercapnic and Hypoxic Respiratory failure which is Acute on chronic.  she is not on home oxygen. Supplemental oxygen was provided and noted- Oxygen Concentration (%):  [40-50] 40    .   Signs/symptoms of respiratory failure include- lethargy. Contributing diagnoses includes - Aspiration, CHF, Pleural effusion, and pneumothorax  Labs and images were reviewed. Patient Has recent ABG, which has been reviewed. Will treat underlying causes and adjust management of respiratory failure as follows- continue oxygen as needed

## 2024-12-03 NOTE — PLAN OF CARE
Discussed poc with pt, pt verbalized understanding    Purposeful rounding every 2hours    VS wnl  Cardiac monitoring in use, pt is NSR, tele monitor #[id number]  Blood glucose monitoring   Fall precautions in place, remains injury free  Pt denies c/o pain and nausea at this time  Pain and nausea under control with PRN meds      Accurate I&Os  Abx given as prescribed  Bed locked at lowest position  Call light within reach    Chart check complete  Will cont with POC

## 2024-12-03 NOTE — PLAN OF CARE
Problem: Infection  Goal: Absence of Infection Signs and Symptoms  Outcome: Progressing     Problem: Adult Inpatient Plan of Care  Goal: Plan of Care Review  Outcome: Progressing  Goal: Patient-Specific Goal (Individualized)  Outcome: Progressing  Goal: Absence of Hospital-Acquired Illness or Injury  Outcome: Progressing  Goal: Optimal Comfort and Wellbeing  Outcome: Progressing  Goal: Readiness for Transition of Care  Outcome: Progressing     Problem: Fall Injury Risk  Goal: Absence of Fall and Fall-Related Injury  Outcome: Progressing     Problem: Sepsis/Septic Shock  Goal: Optimal Coping  Outcome: Progressing  Goal: Absence of Bleeding  Outcome: Progressing  Goal: Blood Glucose Level Within Targeted Range  Outcome: Progressing  Goal: Absence of Infection Signs and Symptoms  Outcome: Progressing  Goal: Optimal Nutrition Intake  Outcome: Progressing     Problem: Skin Injury Risk Increased  Goal: Skin Health and Integrity  Outcome: Progressing

## 2024-12-03 NOTE — ASSESSMENT & PLAN NOTE
Uncertain etiology, polypharmacy, sepsis, hypoglycemia, acute on chronic respiratory failure and biventricular heart failure

## 2024-12-03 NOTE — ASSESSMENT & PLAN NOTE
This patient does have evidence of infective focus  My overall impression is sepsis.  Source: Unknown  Antibiotics given-   Antibiotics (72h ago, onward)      Start     Stop Route Frequency Ordered    11/29/24 1245  mupirocin 2 % ointment         12/04/24 0859 Nasl 2 times daily 11/29/24 1143          Latest lactate reviewed-    Organ dysfunction indicated by Acute respiratory failure and Encephalopathy    Fluid challenge received in ER and subsequently followed up in ICU    Post- resuscitation assessment Yes Perfusion exam was performed within 6 hours of septic shock presentation after bolus shows Adequate tissue perfusion assessed by non-invasive monitoring       Will Not start Pressors- Levophed for MAP of 65  Source control achieved by:  Antibiotics

## 2024-12-03 NOTE — PROGRESS NOTES
[facility] - Intensive Care ([facility])  Adult Nutrition  Progress Note    SUMMARY       Recommendations    Recommendation/Intervention:   1. Recommend pt continues on a Pureed (IDDSI Level 4) diet, modify texture per SLP recommendations   2. If PO intake 25% or < x 3 days, recommend re consult for alternative means of nutrition  3. Recommend pt continues Boost plus TID to assist filling nutritional gaps  4. Recommend a bowel regimen (no BM x 6 days) as warranted   5. Encourage PO and supplement intake, recommend feeding assistance   6. Updated weight, twice weekly    Goals:   1. Patient to start on ED support prior within 24 hours (Resolved)   2. Pt's diet will be advanced prior to RD follow up   3. Pt will tolerate and consume >75% EEN and EPN prior to RD follow up   4. Pt will have a BM prior to RD follow up  Nutrition Goal Status: resolved, new  Communication of RD Recs: other (comment)    Assessment and Plan    Endocrine  Severe protein-calorie malnutrition  Malnutrition Type:  Context: acute on chronic illness  Level: severe    Related to (etiology):   Physiological causes increasing nutrient needs d/t illness  Psychological causes    Signs and Symptoms (as evidenced by):   Inc loss of subcutaneous fat  Inc muscle loss  Unable or unwilling to eat sufficient protein/energy to maintain a healthy weight  Food avoidance and/or lack of interest in food  Sepsis    Malnutrition Characteristic Summary:  Subcutaneous Fat (Malnutrition): moderate depletion  Muscle Mass (Malnutrition): moderate depletion  Fluid Accumulation (Malnutrition):  (1+ trace)    Interventions/Recommendations (treatment strategy):  1. IDDSI Pureed Level 4 (green) texture modified diet  2. Commercial beverage medical food supplement therapy  3. Management of nutrition related prescription medicine  4. Feeding assistance management  5. Collaboration by nutrition professional with other providers    Nutrition Diagnosis Status:   New         Malnutrition  Assessment (12/3/24):  Malnutrition Context: acute illness or injury, chronic illness  Malnutrition Level: severe  Skin (Micronutrient): edema (Fredy score = 14 (moderate risk)  Teeth (Micronutrient): edentulous       Subcutaneous Fat (Malnutrition): moderate depletion  Muscle Mass (Malnutrition): moderate depletion  Fluid Accumulation (Malnutrition):  (1+ trace)   Orbital Region (Subcutaneous Fat Loss): moderate depletion   [place] (Muscle Loss): moderate depletion                 Reason for Assessment    Reason For Assessment: consult  Diagnosis: pulmonary disease  General Information Comments:   11/30/24: Patient is currently npo, on vent, sedation and in ICU with OG tube in place. RD consulted for TF recommendations.  TF recommendations provided above and in sticky notes.  Propofol kcals noted.  Labs reviewed.  NKFA.  LBM: 11/27/2024(per family report).  RD to continue to monitor TF formula, rate, tolerance and adjust recommendations accordingly to patients needs at each follow up visit.    Follow up:   12/3/24: RD follow up. Pt currently on a Pureed (IDDSI Level 4) diet, in the ICU. EMR noted pt extubated on 12/2/24, currently on no sedation, disorientation noted. SLP recommended Pureed diet, details per SLP note 12/3/24. Visited pt at bedside, pt sleeping. Visual NFPE performed, moderate malnutrition noted, will perform full NFPE at follow up when appropriate. Spoke to RN, stated that the pt ate her food yesterday but refusing food today d/t she does not like, reported that the pt has a sitter at home who is supposed to bring her dentures. Discussed Boost, RN believes pt may be receptive, RD added to pt's orders and trays. Spoke to SLP, confirmed pt has AMS and stated pt pocketing food, plan for SLP to re-evaluate once pt has dentures. LBM 11/27 (x 6 days no BM). 1+ trace generalized edema noted. Labs, meds, weights reviewed. Weight charted 11/14/24 120 lbs, 12/3/24 133 lbs (BMI 26.09, Normal for age),  +13 lb wt loss x < 1 month, re weigh for accuracy warranted. RD will continue to follow and monitor pt's nutritional status during admit.     Nutrition Discharge Planning: Cardiac diet, texture per SLP recommendations + a daily MTV + Boost plus as warranted or Dependent on medical treatment     Nutrition Risk Screen    Nutrition Risk Screen: no indicators present    Nutrition Related Social Determinants of Health: SDOH: Unable to assess at this time.     Nutrition/Diet History    Spiritual, Cultural Beliefs, Druze Practices, Values that Affect Care: no  Food Allergies: NKFA  Factors Affecting Nutritional Intake: NPO, on mechanical ventilation    Anthropometrics    Temp: 99.5 °F (37.5 °C)  Height Method: Estimated  Height: 5' (152.4 cm)  Height (inches): 60 in  Weight Method: Bed Scale  Weight: 60.6 kg (133 lb 9.6 oz)  Weight (lb): 133.6 lb  Ideal Body Weight (IBW), Female: 100 lb  % Ideal Body Weight, Female (lb): 139.99 %  BMI (Calculated): 26.1  BMI Grade: 25 - 29.9 - overweight     Wt Readings from Last 15 Encounters:   12/03/24 60.6 kg (133 lb 9.6 oz)   11/22/24 53.5 kg (117 lb 15.1 oz)   11/14/24 53.5 kg (118 lb)   11/14/24 53.5 kg (118 lb)   11/14/24 54.4 kg (120 lb)   10/15/24 53.8 kg (118 lb 11.5 oz)   10/07/24 54.4 kg (120 lb)   10/02/24 54.9 kg (121 lb 0.5 oz)   09/12/24 54.9 kg (121 lb)   09/12/24 54.9 kg (121 lb)   09/04/24 55.3 kg (121 lb 12.9 oz)   07/17/24 53 kg (116 lb 13.5 oz)   06/21/24 53 kg (116 lb 13.5 oz)   04/23/24 56.9 kg (125 lb 7.1 oz)   04/08/24 56.9 kg (125 lb 7.1 oz)     Lab/Procedures/Meds    Pertinent Labs Reviewed: reviewed  Pertinent Medications Reviewed: reviewed    BMP  Lab Results   Component Value Date     12/03/2024    K 2.9 (L) 12/03/2024     12/03/2024    CO2 26 12/03/2024    BUN 32 (H) 12/03/2024    CREATININE 1.2 12/03/2024    CALCIUM 7.7 (L) 12/03/2024    ANIONGAP 10 12/03/2024    EGFRNORACEVR 48 (A) 12/03/2024     Lab Results   Component Value Date     "CALCIUM 7.7 (L) 12/03/2024    PHOS 3.1 12/03/2024     Lab Results   Component Value Date    ALBUMIN 3.3 (L) 12/03/2024     Lab Results   Component Value Date    ALT 10 12/03/2024    AST 12 12/03/2024    ALKPHOS 53 12/03/2024    BILITOT 0.7 12/03/2024     Recent Labs   Lab 12/03/24  1134   POCTGLUCOSE 135*     No results found for: "LABA1C", "HGBA1C"    Lab Results   Component Value Date    WBC 12.89 (H) 12/03/2024    HGB 11.7 (L) 12/03/2024    HCT 35.2 (L) 12/03/2024    MCV 90 12/03/2024     12/03/2024       Scheduled Meds:   enoxparin  40 mg Subcutaneous Q24H (prophylaxis, 1700)    furosemide (LASIX) injection  20 mg Intravenous Daily    methylPREDNISolone injection (PEDS and ADULTS)  40 mg Intravenous Q12H    mupirocin   Nasal BID     Continuous Infusions:  PRN Meds:.  Current Facility-Administered Medications:     acetaminophen, 650 mg, Oral, Q4H PRN    albuterol-ipratropium, 3 mL, Nebulization, Q6H PRN    dextrose 10%, 12.5 g, Intravenous, PRN    dextrose 10%, 25 g, Intravenous, PRN    glucagon (human recombinant), 1 mg, Intramuscular, PRN    insulin aspart U-100, 0-10 Units, Subcutaneous, Q6H PRN    magnesium sulfate IVPB, 2 g, Intravenous, PRN    magnesium sulfate IVPB, 4 g, Intravenous, PRN    ondansetron, 4 mg, Intravenous, Q6H PRN    potassium chloride in water, 40 mEq, Intravenous, PRN **AND** potassium chloride in water, 60 mEq, Intravenous, PRN **AND** potassium chloride in water, 80 mEq, Intravenous, PRN    sodium chloride 0.9%, 10 mL, Intravenous, PRN    sodium phosphate 15 mmol in D5W 250 mL IVPB, 15 mmol, Intravenous, PRN    sodium phosphate 20.01 mmol in D5W 250 mL IVPB, 20.01 mmol, Intravenous, PRN    sodium phosphate 30 mmol in D5W 250 mL IVPB, 30 mmol, Intravenous, PRN      Physical Findings/Assessment         Estimated/Assessed Needs    Weight Used For Calorie Calculations: 60.6 kg (133 lb 9.6 oz)  Energy Calorie Requirements (kcal): 3418-6646 kcals (25-30 kcals/kg ABW (Sepsis vs Critical " care-non vent vs CHF)  Energy Need Method: Kcal/kg  Protein Requirements: 48-79 g (0.8-1.3 g/kg ABW (Sepsis vs CHF vs Critical care-non vent)  Weight Used For Protein Calculations: 60.6 kg (133 lb 9.6 oz)  Fluid Requirements (mL): 1-2 L/day (CHF, per MD/NP)  Estimated Fluid Requirement Method: other (see comments)  RDA Method (mL): 1515  CHO Requirement: 189-227 g (1791-2146 kcals/8)      Nutrition Prescription Ordered    Current Diet Order: Pureed (IDDSI Level 4) diet  Oral Nutrition Supplement: Boost plus TID    Evaluation of Received Nutrient/Fluid Intake  I/O: (Net since admit):  11/30/2024: +1550 mL  12/3/2024: -1444.9 mL    Energy Calories Required: not meeting needs  Protein Required: not meeting needs  Fluid Required: not meeting needs  Total Fluid Intake (mL): 636.5  Tolerance: tolerating (pt refusing)  % Intake of Estimated Energy Needs: 0 - 25 %  % Meal Intake: 0 - 25 %    Nutrition Risk    Level of Risk/Frequency of Follow-up: moderate - high (Follow up: 1-2 x per week)     Monitor and Evaluation    Food and Nutrient Intake: enteral nutrition intake  Food and Nutrient Adminstration: enteral and parenteral nutrition administration  Physical Activity and Function: factors affecting access to physical activity, nutrition-related ADLs and IADLs  Anthropometric Measurements: height/length, weight, body mass index, weight change  Biochemical Data, Medical Tests and Procedures: electrolyte and renal panel, gastrointestinal profile, glucose/endocrine profile, inflammatory profile, lipid profile     Nutrition Follow-Up    RD Follow-up?: Yes  Nova Ricketts, BS, RDN, LDN

## 2024-12-03 NOTE — PT/OT/SLP PROGRESS
"Speech Language Pathology Treatment    Patient Name:  Leia Rodriguez   MRN:  [id number]  Admitting Diagnosis: Acute respiratory failure with hypoxia and hypercarbia    Recommendations:                 General Recommendations:   dysphagia management/caregiver education ; ACP/GOC conversation  Diet recommendations:  Puree Diet - IDDSI Level 4, Liquid Diet Level: Thin liquids - IDDSI Level 0   Aspiration Precautions: Assistance with meals, Eliminate distractions, Feed only when awake/alert, Frequent oral care, HOB to 90 degrees, Small bites/sips, and Standard aspiration precautions   General Precautions: Standard, aspiration, pureed diet  Communication strategies:  provide increased time to answer and dementia    Assessment:     Leia Rodriguez is a 74 y.o. female admitted to [facility] ICU with dx metabolic encephalopathy with questionable polypharmacy, urinary retention, acute respiratory failure with hypoxia and hypercarbia requiring ventilator management 11/30-12/1/24 and post-procedural (intubation) pneumothorax. She exhibits improving mentation, although presents with baseline cognitive-linguistic impairment s/t hx dementia (+behavioral disturbance), cerebrovascular disease and major neurocognitive disorder.      12/3/24: Minimal communication/verbalizations today, mostly head nodding and tearful, stating "leave" and "home." She is consuming IDDSI 4-pureed solids and IDDSI 0-thin liquids without incident, although intake fluctuates.  Efficiency remains poor s/t generalized weakness/frailty and edentulous oral cavity.  Pt unable to manipulation/masticate soft/chopped solids, requiring ST to remove whole cookie piece from mouth again today.  She is recommended to continue IDDSI 4-pureed solids and IDDSI 0-thin liquids, following aspiration precautions and feeding assist.  Suspected cognitive/functional decline since ST last evaluated 7/12/2023 warranting ongoing ACP/GOC conversation.  Guarded/poor prognosis.     Subjective "     Pt seen bedside for ST.  Alert, eyes open.  Flat affect and tearful.  No c/o pain. No family present.  Patient goals: to go home     Pain/Comfort:  Pain Rating 1: 0/10  Pain Rating Post-Intervention 1: 0/10  Pain Rating 2: 0/10  Pain Rating Post-Intervention 2: 0/10    Objective:     Has the patient been evaluated by SLP for swallowing?   Yes  Keep patient NPO? No     Tx feeds of thin liquids and pureed solids without overt s/s of aspiration.  Attempted aman cracker piece; however, pt unable to manipulate, masticate or transit for deglutition. Whole piece/unchewed cracker removed from oral cavity by SLP.  Nursing notified family to bring dentures yesterday.    Communication:  Awake, eyes open.  Head nods to basic/personal questions.  Minimal verbalizations.    Goals:   Multidisciplinary Problems       SLP Goals          Problem: SLP    Goal Priority Disciplines Outcome   SLP Goal     SLP Progressing   Description: 1.  Pt will consume the least restrictive PO diet without incident.                       Plan:     Patient to be seen:  2 x/week, 1 x/week   Plan of Care expires:  12/09/24  Plan of Care reviewed with:  patient   SLP Follow-Up:  Yes       Discharge recommendations:   (Recommended ACP/Goals of care conversation)   Barriers to Discharge:  None    Time Tracking:     SLP Treatment Date:   12/03/24  Speech Start Time:  1000  Speech Stop Time:  1015     Speech Total Time (min):  15 min    Billable Minutes: Treatment Swallowing Dysfunction 15 minutes    12/03/2024

## 2024-12-03 NOTE — ASSESSMENT & PLAN NOTE
"Patient has Combined Systolic and Diastolic heart failure that is Acute on chronic. On presentation their CHF was well compensated. Most recent BNP and echo results are listed below.  No results for input(s): "BNP" in the last 72 hours.  Latest ECHO  Results for orders placed during the hospital encounter of 11/29/24    Echo    Interpretation Summary    Left Ventricle: The left ventricle is normal in size. Normal wall thickness. There is concentric remodeling. Global hypokinesis present. There is mildly reduced systolic function with a visually estimated ejection fraction of 40 - 50%. Ejection fraction is approximately 40%. There is normal diastolic function.    Right Ventricle: Normal right ventricular cavity size. Wall thickness is normal. Systolic function is normal.    Mitral Valve: There is mild regurgitation.    Tricuspid Valve: There is trace regurgitation. There is mild pulmonary hypertension.    IVC/SVC: Patient is ventilated, cannot use IVC diameter to estimate right atrial pressure.    Current Heart Failure Medications  furosemide injection 20 mg, Daily, Intravenous    Plan  - Monitor strict I&Os and daily weights.    - Place on telemetry  - Low sodium diet  - Place on fluid restriction of 1.5 L.   - Cardiology has not been consulted  - The patient's volume status is stable but not at their baseline as indicated by edema  Elevated BNP  "

## 2024-12-03 NOTE — HOSPITAL COURSE
Patient is 74-year-old female with a history of significant dementia who was found down at home and brought by EMS to the hospital.  She was intubated secondary to agonal breathing.  Difficult intubation resulting in small pneumothorax.  She was started on Precedex on admission became hypotensive briefly required Levophed and was admitted to critical care.  Patient was stabilized extubated and was stable off of Precedex.  Patient had significant hypoglycemia requiring D10 infusion overnight which has been discontinued with patient able to maintain blood glucose greater than 100.  Patient was transitioned to telemetry floor.  She was seen in consultation by Physical therapy and Occupational therapy with recommendations to discharge home with 24 hour a day care as well as home health.  Patient's functional status extremely limited by her dementia but family is aware and patient will be discharged home.  She will be discharged home on tapering steroids and it is recommended that she follow up with her PCP for evaluation of multiple behavioral med she is on that might need adjusting.  Recommended she repeat I chest x-ray given her small pneumothorax which resolved while in the hospital.  Due to urinary retention with history of straight caths patient will have Zuluaga left in place upon discharge and follow up with her urologist as an outpatient.  Patient seen and examined on day of discharge stable for discharge home.  Patient will follow up with her outpatient physicians.

## 2024-12-03 NOTE — ASSESSMENT & PLAN NOTE
Patient's blood pressure range in the last 24 hours was: BP  Min: 102/68  Max: 191/79.The patient's inpatient anti-hypertensive regimen is listed below:  Current Antihypertensives  furosemide injection 20 mg, Daily, Intravenous    Plan  - BP is uncontrolled, will adjust as follows:  Add p.r.n. hydralazine  -

## 2024-12-03 NOTE — ASSESSMENT & PLAN NOTE
Malnutrition Type:  Context: acute on chronic illness  Level: severe    Related to (etiology):   Physiological causes increasing nutrient needs d/t illness  Psychological causes    Signs and Symptoms (as evidenced by):   Inc loss of subcutaneous fat  Inc muscle loss  Unable or unwilling to eat sufficient protein/energy to maintain a healthy weight  Food avoidance and/or lack of interest in food  Sepsis    Malnutrition Characteristic Summary:  Subcutaneous Fat (Malnutrition): moderate depletion  Muscle Mass (Malnutrition): moderate depletion  Fluid Accumulation (Malnutrition):  (1+ trace)    Interventions/Recommendations (treatment strategy):  1. IDDSI Pureed Level 4 (green) texture modified diet  2. Commercial beverage medical food supplement therapy  3. Management of nutrition related prescription medicine  4. Feeding assistance management  5. Collaboration by nutrition professional with other providers    Nutrition Diagnosis Status:   New

## 2024-12-03 NOTE — PLAN OF CARE
12/03/24 1010   Rounds   Attendance Provider;Nurse    Discharge Plan A Skilled Nursing Facility   Why the patient remains in the hospital Requires continued medical care   Transition of Care Barriers   (Dementia)     Pending therapy recommendations. Current with [facility] health.

## 2024-12-03 NOTE — PT/OT/SLP PROGRESS
Physical Therapy  Treatment    Leia Rodriguez   MRN: [id number]   Admitting Diagnosis: Acute respiratory failure with hypoxia and hypercarbia    PT Received On: 24  PT Start Time: 08     PT Stop Time: 905    PT Total Time (min): 25 min       Billable Minutes:  Therapeutic Activity 25    Treatment Type: Treatment  PT/PTA: PTA     Number of PTA visits since last PT visit: 1       General Precautions: Standard, fall  Orthopedic Precautions: N/A  Braces: N/A  Respiratory Status: Room air         Subjective:  Communicated with patient's nurse, Suki, and completed Epic chart review prior to session.  Patient predominantly non verbal throughout session. Able to state name and  with prompting.     Per nurse, patient's  had recently gone to rehab and was not home with patient directly prior to admit. She further states that sitters are in and out but unsure of support from daughter.     Pain/Comfort  Pain Rating 1: 0/10 (NO NONVERBAL INDICATORS OF PAIN)    Objective:   Patient found with: bed alarm, blood pressure cuff, ceballos catheter, pressure relief boots, peripheral IV, PICC line, pulse ox (continuous), SCD, telemetry    Supine > sit EOB: Total A of 2    Forward scoot towards EOB: Total A of 2    Seated EOB x 10 min total focusing on increased tolerance to upright position, core stability, trunk control and CV endurance.   Maintained self supported sitting balance with Max A   Unable to maintain unsupported sitting balance at this time.     Sit > 1/2 stand w/ HHAx2: Total A of 2 (very short interval with significant anterior lean)    Sit > supine: Total A of 2    Supine scoot towards HOB: Total A of 2    AM-PAC 6 CLICK MOBILITY  How much help from another person does this patient currently need?   1 = Unable, Total/Dependent Assistance  2 = A lot, Maximum/Moderate Assistance  3 = A little, Minimum/Contact Guard/Supervision  4 = None, Modified [place]/Independent    Turning over in bed (including  adjusting bedclothes, sheets and blankets)?: 1  Sitting down on and standing up from a chair with arms (e.g., wheelchair, bedside commode, etc.): 1  Moving from lying on back to sitting on the side of the bed?: 1  Moving to and from a bed to a chair (including a wheelchair)?: 1  Need to walk in hospital room?: 1  Climbing 3-5 steps with a railing?: 1  Basic Mobility Total Score: 6    AM-PAC Raw Score CMS G-Code Modifier Level of Impairment Assistance   6 % Total / Unable   7 - 9 CM 80 - 100% Maximal Assist   10 - 14 CL 60 - 80% Moderate Assist   15 - 19 CK 40 - 60% Moderate Assist   20 - 22 CJ 20 - 40% Minimal Assist   23 CI 1-20% SBA / CGA   24 CH 0% Independent/ Mod I     Patient left with bed in chair position with all lines intact, call button in reach, bed alarm on, and nurse notified.    Assessment:  Leia Rodriguez is a 74 y.o. female with a medical diagnosis of Acute respiratory failure with hypoxia and hypercarbia and presents with overall decline in functional mobility. During today's session, patient demonstrated an inability to follow basic commands throughout session. While seated EOB she demonstrated mild to moderate L trunk rotation with lateral lean towards same side. After attempting stand, patient demonstrated an uncontrolled anterior lean and appeared significantly fatigued. Plan to speak with [facility] PT to determine goals of care and plan moving forward due to poor prognosis.     Rehab identified problem list/impairments: weakness, impaired endurance, impaired sensation, impaired self care skills, impaired functional mobility, gait instability, impaired balance, decreased safety awareness, decreased lower extremity function, decreased upper extremity function, decreased coordination, impaired cognition, decreased ROM, impaired cardiopulmonary response to activity    Rehab potential is poor.    Activity tolerance: Poor    Discharge recommendations:  (HOME W/ 24 HR CARE VS BASIC NH)       Barriers to discharge:      Equipment recommendations: to be determined by next level of care     GOALS:   Multidisciplinary Problems       Physical Therapy Goals          Problem: Physical Therapy    Goal Priority Disciplines Outcome Interventions   Physical Therapy Goal     PT, PT/OT     Description: LTG'S TO BE MET IN 14 DAYS (12-16-24)  PT WILL REQUIRE [facility] FOR BED MOBILITY  PT WILL REQUIRE [facility] FOR BED<>CHAIR TF'S  PT WILL  FEET WITH RW AND [facility]  PT WILL INC AMPAC SCORE BY 2 POINTS TO PROGRESS GROSS FUNC MOBILITY                         PLAN:    Patient to be seen 3 x/week to address the above listed problems via gait training, therapeutic activities, therapeutic exercises, neuromuscular re-education  Plan of Care expires: 12/16/24  Plan of Care reviewed with: patient         12/03/2024

## 2024-12-03 NOTE — CONSULTS
[facility] ([facility])  Hospital Medicine  Consult Note    Patient Name: Leia Rodriguez  MRN: [id number]  Admission Date: 11/29/2024  Hospital Length of Stay: 3 days  Attending Physician: Leida Dumont MD   Primary Care Provider: Yasmin Navarro MD           Patient information was obtained from past medical records, ER records, and primary team.     Consults  Subjective:     Principal Problem: Acute respiratory failure with hypoxia and hypercarbia    Chief Complaint:   Chief Complaint   Patient presents with    Respiratory Arrest     Per EMS, pt went unresponsive in front of family after stating she was going to die. Agonal on scene, intubated en route.        HPI:  Pt is a 74 yr old female with a history of CAD, CHF, bilateral breast cancer, s/p mastectomy on surveillance, HTN, dementia who was brought to the ED by EMS on 11/29 after her family found her unresponsive. She was found to have agonal breathing and intubated in the field with difficulty. She was hypertensive initially, received ketamine, fentanyl in the field. She was started on Precedex in the ER and became hypotensive. Given fluid bolus and briefly required Levophed, hypotension resolved. CTH negative, CTA chest negative for PE but shows moderate R ptx with pleural effusions.     LA 2.9    pH 7.26  CO2 55.2     Critical care medicine consulted for admission.     Hospital/ICU Course:  11/30 Sedated / intubated. Hemodynamics stable.   ABG reviewed. Vent settings changed.     12/1 Stable overnight.      12/02/2024: Patient extubated without issue. Precedex stopped this morning. Repeat CXR pending. Prisca added.  Patient remained stable overnight therefore Hospital Medicine was consulted to assume care    Past Medical History:   Diagnosis Date    Arthritis     EDGAR HANDS, KNEES    Behavioral change 09/21/2022    Blind right eye     Traumatic    Breast cancer 06/15/2017    0.8 cm Grade1 INTRADUCTAL BREAST 9 positve margin  (left)    Essential hypertension     Hemorrhoids     Immunodeficiency due to chemotherapy 2021    Lipoma of abdominal wall     Major neurocognitive disorder 2022    Obesity     Overactive bladder     Pap smear abnormality of cervix with ASCUS favoring benign     Thyroid nodule     Tobacco use disorder     Tubular adenoma of colon     Urinary incontinence        Past Surgical History:   Procedure Laterality Date    ANTERIOR VAGINAL REPAIR      AORTOGRAPHY WITH SERIALOGRAPHY N/A 10/7/2024    Procedure: AORTOGRAM, WITH SERIALOGRAPHY;  Surgeon: Tiffanie Satnos MD;  Location: [facility] CATH LAB;  Service: Cardiology;  Laterality: N/A;  MD requested Anesthesia    BLADDER SURGERY      BREAST LUMPECTOMY Left 2017    CATARACT EXTRACTION Left 2022     SECTION      X2    COLONOSCOPY N/A 3/8/2017    Procedure: COLONOSCOPY;  Surgeon: Tyron Paris MD;  Location: [facility];  Service: Endoscopy;  Laterality: N/A;    COLONOSCOPY N/A 2020    Procedure: COLONOSCOPY;  Surgeon: Keira Ellison MD;  Location: [facility];  Service: Endoscopy;  Laterality: N/A;    ESOPHAGOGASTRODUODENOSCOPY N/A 2020    Procedure: EGD (ESOPHAGOGASTRODUODENOSCOPY);  Surgeon: Keira Ellison MD;  Location: [facility];  Service: Endoscopy;  Laterality: N/A;  new onset iron deficiency with prior history of breast cancer    INTRALUMINAL GASTROINTESTINAL TRACT IMAGING VIA CAPSULE N/A 10/28/2020    Procedure: IMAGING PROCEDURE, GI TRACT, INTRALUMINAL, VIA CAPSULE;  Surgeon: Finesse Jha RN;  Location: [facility];  Service: Endoscopy;  Laterality: N/A;    LEFT HEART CATHETERIZATION Left 10/12/2021    Procedure: CATHETERIZATION, HEART, LEFT;  Surgeon: Tiffanie Santos MD;  Location: [facility] CATH LAB;  Service: Cardiology;  Laterality: Left;    LITHOTRIPSY, CORONARY TRANSLUMINAL, PERCUTANEOUS  10/7/2024    Procedure: LITHOTRIPSY, CORONARY TRANSLUMINAL, PERCUTANEOUS;  Surgeon: Tiffanie Santos MD;  Location: [facility] CATH LAB;   Service: Cardiology;;    PTA, SUPERFICIAL FEMORAL ARTERY  10/7/2024    Procedure: PTA, Superficial Femoral Artery;  Surgeon: Tiffanie Santos MD;  Location: [facility] CATH LAB;  Service: Cardiology;;    SENTINEL LYMPH NODE BIOPSY Left 9/8/2020    Procedure: BIOPSY, LYMPH NODE, SENTINEL;  Surgeon: Vincent Moyer MD;  Location: [facility] OR;  Service: General;  Laterality: Left;    SIMPLE MASTECTOMY Left 9/8/2020    Procedure: MASTECTOMY, SIMPLE;  Surgeon: Vincent Moyer MD;  Location: [facility] OR;  Service: General;  Laterality: Left;    STENT, SUPERFICIAL FEMORAL ARTERY  10/7/2024    Procedure: Stent, Superficial Femoral Artery;  Surgeon: Tiffanie Santos MD;  Location: [facility] CATH LAB;  Service: Cardiology;;    THYROID LOBECTOMY Left 2005    TOTAL ABDOMINAL HYSTERECTOMY      TUBAL LIGATION         Review of patient's allergies indicates:  No Known Allergies    No current facility-administered medications on file prior to encounter.     Current Outpatient Medications on File Prior to Encounter   Medication Sig    aluminum-magnesium hydroxide-simethicone 200-200-20 mg/5 mL Susp, diphenhydrAMINE 12.5 mg/5 mL Liqd Swish and spit 5 mLs every 4 (four) hours as needed (discomfort).    anastrozole (ARIMIDEX) 1 mg Tab Take 1 tablet (1 mg total) by mouth once daily.    aspirin 81 MG Chew Take 1 tablet (81 mg total) by mouth once daily.    busPIRone (BUSPAR) 15 MG tablet Take 1 tablet (15 mg total) by mouth 2 (two) times daily.    cephALEXin (KEFLEX) 250 MG capsule Take 1 capsule (250 mg total) by mouth every evening.    cholecalciferol, vitamin D3, (VITAMIN D3) 125 mcg (5,000 unit) Tab Take 5,000 Units by mouth once daily.    donepeziL (ARICEPT) 5 MG tablet Take 1 tablet (5 mg total) by mouth every evening.    furosemide (LASIX) 20 MG tablet Take 1 tablet (20 mg total) by mouth daily as needed (edema). (Patient taking differently: Take 20 mg by mouth daily as needed (edema). Monday, Wednesday, Friday)    isosorbide mononitrate (IMDUR) 30 MG  24 hr tablet TAKE 1 TABLET BY MOUTH EVERY DAY (Patient taking differently: Take 30 mg by mouth once daily.)    lamoTRIgine (LAMICTAL) 100 MG tablet Take 1 tablet (100 mg total) by mouth 2 (two) times daily.    memantine (NAMENDA) 5 MG Tab Take 1 tablet (5 mg total) by mouth 2 (two) times daily.    metoprolol succinate (TOPROL-XL) 25 MG 24 hr tablet Take 1 tablet (25 mg total) by mouth 2 (two) times daily.    pravastatin (PRAVACHOL) 40 MG tablet Take 1 tablet (40 mg total) by mouth once daily. (Patient not taking: Reported on 2024)    QUEtiapine (SEROQUEL) 300 MG Tab Take 1 tablet (300 mg total) by mouth every evening.    telmisartan (MICARDIS) 20 MG Tab TAKE 1 TABLET BY MOUTH EVERY DAY (Patient not taking: Reported on 2024)    zinc gluconate 50 mg tablet Take 50 mg by mouth once daily.     Family History       Problem Relation (Age of Onset)    Alcohol abuse Mother    Allergic rhinitis Daughter    Cataracts Father    Cervical cancer Daughter (32)    Cirrhosis Mother    Diabetes Brother    Heart attack Brother    Heart murmur Brother    Hypertension Father, Daughter    No Known Problems Daughter    Prostate cancer Father (80)          Tobacco Use    Smoking status: Former     Current packs/day: 0.00     Average packs/day: 1 pack/day for 50.0 years (50.0 ttl pk-yrs)     Types: Cigarettes     Start date: 1970     Quit date: 2020     Years since quittin.2    Smokeless tobacco: Never   Substance and Sexual Activity    Alcohol use: Never     Alcohol/week: 28.0 standard drinks of alcohol     Types: 28 Cans of beer per week    Drug use: No    Sexual activity: Never     Partners: Male     Review of Systems   Constitutional:  Positive for activity change, appetite change and fatigue. Negative for fever.   Respiratory:  Negative for cough and shortness of breath.    Cardiovascular:  Negative for chest pain and leg swelling.   Gastrointestinal:  Negative for nausea and vomiting.   Neurological:   Positive for weakness.   Psychiatric/Behavioral:  Positive for confusion.    All other systems reviewed and are negative.    Objective:     Vital Signs (Most Recent):  Temp: 99.1 °F (37.3 °C) (12/02/24 1500)  Pulse: 81 (12/02/24 1700)  Resp: (!) 60 (12/02/24 1700)  BP: (!) 146/108 (12/02/24 1700)  SpO2: 100 % (12/02/24 1700) Vital Signs (24h Range):  Temp:  [97.5 °F (36.4 °C)-99.1 °F (37.3 °C)] 99.1 °F (37.3 °C)  Pulse:  [] 81  Resp:  [9-60] 60  SpO2:  [97 %-100 %] 100 %  BP: (102-191)/() 146/108     Weight: 63.1 kg (139 lb 1.8 oz)  Body mass index is 27.17 kg/m².     Physical Exam  Constitutional:       General: She is not in acute distress.     Appearance: She is ill-appearing (chronically).   HENT:      Head: Normocephalic and atraumatic.      Nose: Nose normal.      Mouth/Throat:      Mouth: Mucous membranes are moist.      Pharynx: Oropharynx is clear.   Eyes:      Extraocular Movements: Extraocular movements intact.      Conjunctiva/sclera: Conjunctivae normal.   Cardiovascular:      Rate and Rhythm: Normal rate and regular rhythm.      Pulses: Normal pulses.      Heart sounds: Normal heart sounds.   Pulmonary:      Effort: Pulmonary effort is normal.      Breath sounds: Normal breath sounds.   Abdominal:      General: Bowel sounds are normal. There is no distension.      Palpations: Abdomen is soft.      Tenderness: There is no abdominal tenderness.   Musculoskeletal:         General: No swelling.      Cervical back: Normal range of motion and neck supple.   Skin:     General: Skin is warm and dry.   Neurological:      General: No focal deficit present.      Mental Status: Mental status is at baseline. She is disoriented.      Motor: Weakness present.          Significant Labs: All pertinent labs within the past 24 hours have been reviewed.    CBC:   Recent Labs   Lab 12/01/24  0417 12/02/24  0501   WBC 13.56* 9.19   HGB 10.2* 11.7*   HCT 32.8* 36.4*    200     CMP:   Recent Labs   Lab  "12/01/24  0417 12/02/24  0501 12/02/24  1645    137 136   K 4.5 4.1 3.6    104 98   CO2 21* 25 25   * 121* 142*   BUN 26* 30* 30*   CREATININE 1.5* 1.2 1.4   CALCIUM 7.3* 7.6* 7.9*   PROT 6.4 7.0  --    ALBUMIN 3.1* 3.1*  --    BILITOT 0.4 0.7  --    ALKPHOS 55 54  --    AST 9* 10  --    ALT 11 13  --    ANIONGAP 12 8 13     Lactic Acid: No results for input(s): "LACTATE" in the last 48 hours.  Magnesium:   Recent Labs   Lab 12/01/24  0417 12/02/24  0501   MG 2.2 2.3     POCT Glucose:   Recent Labs   Lab 12/02/24  1538 12/02/24  1557 12/02/24  1559   POCTGLUCOSE 41* 178* 59*       TSH:   Recent Labs   Lab 10/15/24  1450   TSH 0.571       Significant Imaging: I have reviewed all pertinent imaging results/findings within the past 24 hours.  Assessment/Plan:     * Acute respiratory failure with hypoxia and hypercarbia  Patient with Hypercapnic and Hypoxic Respiratory failure which is Acute on chronic.  she is not on home oxygen. Supplemental oxygen was provided and noted- Oxygen Concentration (%):  [40-50] 40    .   Signs/symptoms of respiratory failure include- lethargy. Contributing diagnoses includes - Aspiration, CHF, Pleural effusion, and pneumothorax  Labs and images were reviewed. Patient Has recent ABG, which has been reviewed. Will treat underlying causes and adjust management of respiratory failure as follows- continue oxygen as needed    Hypoglycemia    Patient with minimal p.o. intake due to mental status  Multiple episodes of hypoglycemia today  Patient on D10 we will continue through the night and re-evaluate in a.m..    Polypharmacy        Urinary retention    Patient with chronic urinary retention requiring in and out caths at home.  Patient was Zuluaga catheter and we will leave in place until follow up with Urology as outpatient    Postprocedural pneumothorax    Improved without intervention    Encephalopathy, metabolic    Uncertain etiology, polypharmacy, sepsis, hypoglycemia, acute " "on chronic respiratory failure and biventricular heart failure    Severe sepsis  This patient does have evidence of infective focus  My overall impression is sepsis.  Source: Unknown  Antibiotics given-   Antibiotics (72h ago, onward)      Start     Stop Route Frequency Ordered    11/29/24 1245  mupirocin 2 % ointment         12/04/24 0859 Nasl 2 times daily 11/29/24 1143          Latest lactate reviewed-    Organ dysfunction indicated by Acute respiratory failure and Encephalopathy    Fluid challenge received in ER and subsequently followed up in ICU    Post- resuscitation assessment Yes Perfusion exam was performed within 6 hours of septic shock presentation after bolus shows Adequate tissue perfusion assessed by non-invasive monitoring       Will Not start Pressors- Levophed for MAP of 65  Source control achieved by:  Antibiotics    Major neurocognitive disorder due to Alzheimer's disease  Patient with dementia with likely etiology of alzheimer's dementia. Dementia is moderate. The patient does have signs of behavioral disturbance. Home dementia medications are Held or Continued: held.. Continue non-pharmacologic interventions to prevent delirium (No VS between 11PM-5AM, activity during day, opening blinds, providing glasses/hearing aids, and up in chair during daytime). Will avoid narcotics and benzos unless absolutely necessary. PRN anti-psychotics are not prescribed to avoid self harm behaviors.    Chronic combined systolic and diastolic CHF (congestive heart failure)  Patient has Combined Systolic and Diastolic heart failure that is Acute on chronic. On presentation their CHF was well compensated. Most recent BNP and echo results are listed below.  No results for input(s): "BNP" in the last 72 hours.  Latest ECHO  Results for orders placed during the hospital encounter of 11/29/24    Echo    Interpretation Summary    Left Ventricle: The left ventricle is normal in size. Normal wall thickness. There is " concentric remodeling. Global hypokinesis present. There is mildly reduced systolic function with a visually estimated ejection fraction of 40 - 50%. Ejection fraction is approximately 40%. There is normal diastolic function.    Right Ventricle: Normal right ventricular cavity size. Wall thickness is normal. Systolic function is normal.    Mitral Valve: There is mild regurgitation.    Tricuspid Valve: There is trace regurgitation. There is mild pulmonary hypertension.    IVC/SVC: Patient is ventilated, cannot use IVC diameter to estimate right atrial pressure.    Current Heart Failure Medications  furosemide injection 20 mg, Daily, Intravenous    Plan  - Monitor strict I&Os and daily weights.    - Place on telemetry  - Low sodium diet  - Place on fluid restriction of 1.5 L.   - Cardiology has not been consulted  - The patient's volume status is stable but not at their baseline as indicated by edema  Elevated BNP    Immunodeficiency due to chemotherapy        History of tobacco use        Essential hypertension  Patient's blood pressure range in the last 24 hours was: BP  Min: 102/68  Max: 191/79.The patient's inpatient anti-hypertensive regimen is listed below:  Current Antihypertensives  furosemide injection 20 mg, Daily, Intravenous    Plan  - BP is uncontrolled, will adjust as follows:  Add p.r.n. hydralazine  -       VTE Risk Mitigation (From admission, onward)           Ordered     enoxaparin injection 40 mg  Every 24 hours         12/02/24 1020     IP VTE HIGH RISK PATIENT  Once         11/29/24 1630     Place sequential compression device  Until discontinued         11/29/24 1146                    Critical care time spent on the evaluation and treatment of severe organ dysfunction, review of pertinent labs and imaging studies, discussions with consulting providers and discussions with patient/family: 34 minutes.    Thank you for your consult.  I will assume care of patient    Leida LANE Debbie,  MD  Department of Hospital Medicine   [facility] ([facility])

## 2024-12-03 NOTE — PLAN OF CARE
Patient unable to follow commands. Total A of 2 for all mobility and ADLs. Not appropriate for continued skilled OT intervention. D/C OT.

## 2024-12-03 NOTE — HPI
Pt is a 74 yr old female with a history of CAD, CHF, bilateral breast cancer, s/p mastectomy on surveillance, HTN, dementia who was brought to the ED by EMS on 11/29 after her family found her unresponsive. She was found to have agonal breathing and intubated in the field with difficulty. She was hypertensive initially, received ketamine, fentanyl in the field. She was started on Precedex in the ER and became hypotensive. Given fluid bolus and briefly required Levophed, hypotension resolved. CTH negative, CTA chest negative for PE but shows moderate R ptx with pleural effusions.     LA 2.9    pH 7.26  CO2 55.2     Critical care medicine consulted for admission.     Hospital/ICU Course:  11/30 Sedated / intubated. Hemodynamics stable.   ABG reviewed. Vent settings changed.     12/1 Stable overnight.      12/02/2024: Patient extubated without issue. Precedex stopped this morning. Repeat CXR pending. Prisca added.  Patient remained stable overnight therefore Hospital Medicine was consulted to assume care

## 2024-12-03 NOTE — ASSESSMENT & PLAN NOTE
Patient with chronic urinary retention requiring in and out caths at home.  Patient was Zuluaga catheter and we will leave in place until follow up with Urology as outpatient

## 2024-12-03 NOTE — ASSESSMENT & PLAN NOTE
Patient with dementia with likely etiology of alzheimer's dementia. Dementia is moderate. The patient does have signs of behavioral disturbance. Home dementia medications are Held or Continued: held.. Continue non-pharmacologic interventions to prevent delirium (No VS between 11PM-5AM, activity during day, opening blinds, providing glasses/hearing aids, and up in chair during daytime). Will avoid narcotics and benzos unless absolutely necessary. PRN anti-psychotics are not prescribed to avoid self harm behaviors.

## 2024-12-03 NOTE — PLAN OF CARE
Disoriented to place, time, and situation. SPO2 100% on RA. HR NSR and BP normotensive throughout the shift. Zuluaga in place with total UOP of 346 mL during this shift. No BM during this shift. D10 gtts off since 2300 and BG remained within normal range throughout the shift. POC reviewed with patient and family. Bed in lowest position, side rails up x 3, wheels locked, and alarms on and audible.

## 2024-12-03 NOTE — SUBJECTIVE & OBJECTIVE
Past Medical History:   Diagnosis Date    Arthritis     EDGAR HANDS, KNEES    Behavioral change 2022    Blind right eye     Traumatic    Breast cancer 06/15/2017    0.8 cm Grade1 INTRADUCTAL BREAST 9 positve margin (left)    Essential hypertension     Hemorrhoids     Immunodeficiency due to chemotherapy 2021    Lipoma of abdominal wall     Major neurocognitive disorder 2022    Obesity     Overactive bladder     Pap smear abnormality of cervix with ASCUS favoring benign     Thyroid nodule     Tobacco use disorder     Tubular adenoma of colon     Urinary incontinence        Past Surgical History:   Procedure Laterality Date    ANTERIOR VAGINAL REPAIR      AORTOGRAPHY WITH SERIALOGRAPHY N/A 10/7/2024    Procedure: AORTOGRAM, WITH SERIALOGRAPHY;  Surgeon: Tiffanie Santos MD;  Location: [facility] CATH LAB;  Service: Cardiology;  Laterality: N/A;  MD requested Anesthesia    BLADDER SURGERY      BREAST LUMPECTOMY Left     CATARACT EXTRACTION Left 2022     SECTION      X2    COLONOSCOPY N/A 3/8/2017    Procedure: COLONOSCOPY;  Surgeon: Tyron Paris MD;  Location: [facility];  Service: Endoscopy;  Laterality: N/A;    COLONOSCOPY N/A 2020    Procedure: COLONOSCOPY;  Surgeon: Keira Ellison MD;  Location: [facility];  Service: Endoscopy;  Laterality: N/A;    ESOPHAGOGASTRODUODENOSCOPY N/A 2020    Procedure: EGD (ESOPHAGOGASTRODUODENOSCOPY);  Surgeon: Keira Ellison MD;  Location: [facility];  Service: Endoscopy;  Laterality: N/A;  new onset iron deficiency with prior history of breast cancer    INTRALUMINAL GASTROINTESTINAL TRACT IMAGING VIA CAPSULE N/A 10/28/2020    Procedure: IMAGING PROCEDURE, GI TRACT, INTRALUMINAL, VIA CAPSULE;  Surgeon: Finesse Jha RN;  Location: [facility];  Service: Endoscopy;  Laterality: N/A;    LEFT HEART CATHETERIZATION Left 10/12/2021    Procedure: CATHETERIZATION, HEART, LEFT;  Surgeon: Tiffanie Santos MD;  Location: [facility] CATH LAB;  Service:  Cardiology;  Laterality: Left;    LITHOTRIPSY, CORONARY TRANSLUMINAL, PERCUTANEOUS  10/7/2024    Procedure: LITHOTRIPSY, CORONARY TRANSLUMINAL, PERCUTANEOUS;  Surgeon: Tiffanie Santos MD;  Location: [facility] CATH LAB;  Service: Cardiology;;    PTA, SUPERFICIAL FEMORAL ARTERY  10/7/2024    Procedure: PTA, Superficial Femoral Artery;  Surgeon: Tiffanie Santos MD;  Location: [facility] CATH LAB;  Service: Cardiology;;    SENTINEL LYMPH NODE BIOPSY Left 9/8/2020    Procedure: BIOPSY, LYMPH NODE, SENTINEL;  Surgeon: Vincent Moyer MD;  Location: [facility] OR;  Service: General;  Laterality: Left;    SIMPLE MASTECTOMY Left 9/8/2020    Procedure: MASTECTOMY, SIMPLE;  Surgeon: Vincent Moyer MD;  Location: [facility] OR;  Service: General;  Laterality: Left;    STENT, SUPERFICIAL FEMORAL ARTERY  10/7/2024    Procedure: Stent, Superficial Femoral Artery;  Surgeon: Tiffanie Santos MD;  Location: [facility] CATH LAB;  Service: Cardiology;;    THYROID LOBECTOMY Left 2005    TOTAL ABDOMINAL HYSTERECTOMY      TUBAL LIGATION         Review of patient's allergies indicates:  No Known Allergies    No current facility-administered medications on file prior to encounter.     Current Outpatient Medications on File Prior to Encounter   Medication Sig    aluminum-magnesium hydroxide-simethicone 200-200-20 mg/5 mL Susp, diphenhydrAMINE 12.5 mg/5 mL Liqd Swish and spit 5 mLs every 4 (four) hours as needed (discomfort).    anastrozole (ARIMIDEX) 1 mg Tab Take 1 tablet (1 mg total) by mouth once daily.    aspirin 81 MG Chew Take 1 tablet (81 mg total) by mouth once daily.    busPIRone (BUSPAR) 15 MG tablet Take 1 tablet (15 mg total) by mouth 2 (two) times daily.    cephALEXin (KEFLEX) 250 MG capsule Take 1 capsule (250 mg total) by mouth every evening.    cholecalciferol, vitamin D3, (VITAMIN D3) 125 mcg (5,000 unit) Tab Take 5,000 Units by mouth once daily.    donepeziL (ARICEPT) 5 MG tablet Take 1 tablet (5 mg total) by mouth every evening.    furosemide (LASIX)  20 MG tablet Take 1 tablet (20 mg total) by mouth daily as needed (edema). (Patient taking differently: Take 20 mg by mouth daily as needed (edema). Monday, Wednesday, Friday)    isosorbide mononitrate (IMDUR) 30 MG 24 hr tablet TAKE 1 TABLET BY MOUTH EVERY DAY (Patient taking differently: Take 30 mg by mouth once daily.)    lamoTRIgine (LAMICTAL) 100 MG tablet Take 1 tablet (100 mg total) by mouth 2 (two) times daily.    memantine (NAMENDA) 5 MG Tab Take 1 tablet (5 mg total) by mouth 2 (two) times daily.    metoprolol succinate (TOPROL-XL) 25 MG 24 hr tablet Take 1 tablet (25 mg total) by mouth 2 (two) times daily.    pravastatin (PRAVACHOL) 40 MG tablet Take 1 tablet (40 mg total) by mouth once daily. (Patient not taking: Reported on 2024)    QUEtiapine (SEROQUEL) 300 MG Tab Take 1 tablet (300 mg total) by mouth every evening.    telmisartan (MICARDIS) 20 MG Tab TAKE 1 TABLET BY MOUTH EVERY DAY (Patient not taking: Reported on 2024)    zinc gluconate 50 mg tablet Take 50 mg by mouth once daily.     Family History       Problem Relation (Age of Onset)    Alcohol abuse Mother    Allergic rhinitis Daughter    Cataracts Father    Cervical cancer Daughter (32)    Cirrhosis Mother    Diabetes Brother    Heart attack Brother    Heart murmur Brother    Hypertension Father, Daughter    No Known Problems Daughter    Prostate cancer Father (80)          Tobacco Use    Smoking status: Former     Current packs/day: 0.00     Average packs/day: 1 pack/day for 50.0 years (50.0 ttl pk-yrs)     Types: Cigarettes     Start date: 1970     Quit date: 2020     Years since quittin.2    Smokeless tobacco: Never   Substance and Sexual Activity    Alcohol use: Never     Alcohol/week: 28.0 standard drinks of alcohol     Types: 28 Cans of beer per week    Drug use: No    Sexual activity: Never     Partners: Male     Review of Systems   Constitutional:  Positive for activity change, appetite change and fatigue.  Negative for fever.   Respiratory:  Negative for cough and shortness of breath.    Cardiovascular:  Negative for chest pain and leg swelling.   Gastrointestinal:  Negative for nausea and vomiting.   Neurological:  Positive for weakness.   Psychiatric/Behavioral:  Positive for confusion.    All other systems reviewed and are negative.    Objective:     Vital Signs (Most Recent):  Temp: 99.1 °F (37.3 °C) (12/02/24 1500)  Pulse: 81 (12/02/24 1700)  Resp: (!) 60 (12/02/24 1700)  BP: (!) 146/108 (12/02/24 1700)  SpO2: 100 % (12/02/24 1700) Vital Signs (24h Range):  Temp:  [97.5 °F (36.4 °C)-99.1 °F (37.3 °C)] 99.1 °F (37.3 °C)  Pulse:  [] 81  Resp:  [9-60] 60  SpO2:  [97 %-100 %] 100 %  BP: (102-191)/() 146/108     Weight: 63.1 kg (139 lb 1.8 oz)  Body mass index is 27.17 kg/m².     Physical Exam  Constitutional:       General: She is not in acute distress.     Appearance: She is ill-appearing (chronically).   HENT:      Head: Normocephalic and atraumatic.      Nose: Nose normal.      Mouth/Throat:      Mouth: Mucous membranes are moist.      Pharynx: Oropharynx is clear.   Eyes:      Extraocular Movements: Extraocular movements intact.      Conjunctiva/sclera: Conjunctivae normal.   Cardiovascular:      Rate and Rhythm: Normal rate and regular rhythm.      Pulses: Normal pulses.      Heart sounds: Normal heart sounds.   Pulmonary:      Effort: Pulmonary effort is normal.      Breath sounds: Normal breath sounds.   Abdominal:      General: Bowel sounds are normal. There is no distension.      Palpations: Abdomen is soft.      Tenderness: There is no abdominal tenderness.   Musculoskeletal:         General: No swelling.      Cervical back: Normal range of motion and neck supple.   Skin:     General: Skin is warm and dry.   Neurological:      General: No focal deficit present.      Mental Status: Mental status is at baseline. She is disoriented.      Motor: Weakness present.          Significant Labs: All  "pertinent labs within the past 24 hours have been reviewed.    CBC:   Recent Labs   Lab 12/01/24  0417 12/02/24  0501   WBC 13.56* 9.19   HGB 10.2* 11.7*   HCT 32.8* 36.4*    200     CMP:   Recent Labs   Lab 12/01/24  0417 12/02/24  0501 12/02/24  1645    137 136   K 4.5 4.1 3.6    104 98   CO2 21* 25 25   * 121* 142*   BUN 26* 30* 30*   CREATININE 1.5* 1.2 1.4   CALCIUM 7.3* 7.6* 7.9*   PROT 6.4 7.0  --    ALBUMIN 3.1* 3.1*  --    BILITOT 0.4 0.7  --    ALKPHOS 55 54  --    AST 9* 10  --    ALT 11 13  --    ANIONGAP 12 8 13     Lactic Acid: No results for input(s): "LACTATE" in the last 48 hours.  Magnesium:   Recent Labs   Lab 12/01/24  0417 12/02/24  0501   MG 2.2 2.3     POCT Glucose:   Recent Labs   Lab 12/02/24  1538 12/02/24  1557 12/02/24  1559   POCTGLUCOSE 41* 178* 59*       TSH:   Recent Labs   Lab 10/15/24  1450   TSH 0.571       Significant Imaging: I have reviewed all pertinent imaging results/findings within the past 24 hours.  "

## 2024-12-03 NOTE — ASSESSMENT & PLAN NOTE
Patient with minimal p.o. intake due to mental status  Multiple episodes of hypoglycemia today  Patient on D10 we will continue through the night and re-evaluate in a.m..

## 2024-12-04 VITALS
SYSTOLIC BLOOD PRESSURE: 129 MMHG | TEMPERATURE: 98 F | HEIGHT: 60 IN | OXYGEN SATURATION: 95 % | WEIGHT: 126.75 LBS | HEART RATE: 80 BPM | BODY MASS INDEX: 24.88 KG/M2 | RESPIRATION RATE: 20 BRPM | DIASTOLIC BLOOD PRESSURE: 65 MMHG

## 2024-12-04 DIAGNOSIS — D63.1 ANEMIA DUE TO STAGE 3B CHRONIC KIDNEY DISEASE: ICD-10-CM

## 2024-12-04 DIAGNOSIS — N18.32 ANEMIA DUE TO STAGE 3B CHRONIC KIDNEY DISEASE: ICD-10-CM

## 2024-12-04 PROBLEM — R65.20 SEVERE SEPSIS: Status: RESOLVED | Noted: 2023-07-09 | Resolved: 2024-12-04

## 2024-12-04 PROBLEM — A41.9 SEVERE SEPSIS: Status: RESOLVED | Noted: 2023-07-09 | Resolved: 2024-12-04

## 2024-12-04 PROBLEM — J95.811 POSTPROCEDURAL PNEUMOTHORAX: Status: RESOLVED | Noted: 2024-11-29 | Resolved: 2024-12-04

## 2024-12-04 PROBLEM — G93.41 ENCEPHALOPATHY, METABOLIC: Status: RESOLVED | Noted: 2024-11-29 | Resolved: 2024-12-04

## 2024-12-04 PROBLEM — E16.2 HYPOGLYCEMIA: Status: RESOLVED | Noted: 2024-12-02 | Resolved: 2024-12-04

## 2024-12-04 LAB
ALBUMIN SERPL BCP-MCNC: 3.3 G/DL (ref 3.5–5.2)
ALP SERPL-CCNC: 59 U/L (ref 40–150)
ALT SERPL W/O P-5'-P-CCNC: 12 U/L (ref 10–44)
ANION GAP SERPL CALC-SCNC: 11 MMOL/L (ref 8–16)
AST SERPL-CCNC: 12 U/L (ref 10–40)
BACTERIA BLD CULT: NORMAL
BACTERIA BLD CULT: NORMAL
BASOPHILS # BLD AUTO: 0.02 K/UL (ref 0–0.2)
BASOPHILS NFR BLD: 0.2 % (ref 0–1.9)
BILIRUB SERPL-MCNC: 0.8 MG/DL (ref 0.1–1)
BUN SERPL-MCNC: 30 MG/DL (ref 8–23)
CALCIUM SERPL-MCNC: 7.8 MG/DL (ref 8.7–10.5)
CHLORIDE SERPL-SCNC: 103 MMOL/L (ref 95–110)
CO2 SERPL-SCNC: 25 MMOL/L (ref 23–29)
CREAT SERPL-MCNC: 1 MG/DL (ref 0.5–1.4)
DIFFERENTIAL METHOD BLD: ABNORMAL
EOSINOPHIL # BLD AUTO: 0 K/UL (ref 0–0.5)
EOSINOPHIL NFR BLD: 0.2 % (ref 0–8)
ERYTHROCYTE [DISTWIDTH] IN BLOOD BY AUTOMATED COUNT: 13.9 % (ref 11.5–14.5)
EST. GFR  (NO RACE VARIABLE): 59 ML/MIN/1.73 M^2
GLUCOSE SERPL-MCNC: 108 MG/DL (ref 70–110)
HCT VFR BLD AUTO: 35.6 % (ref 37–48.5)
HGB BLD-MCNC: 11.7 G/DL (ref 12–16)
IMM GRANULOCYTES # BLD AUTO: 0.13 K/UL (ref 0–0.04)
IMM GRANULOCYTES NFR BLD AUTO: 1.1 % (ref 0–0.5)
LYMPHOCYTES # BLD AUTO: 1.4 K/UL (ref 1–4.8)
LYMPHOCYTES NFR BLD: 11.5 % (ref 18–48)
MCH RBC QN AUTO: 29.5 PG (ref 27–31)
MCHC RBC AUTO-ENTMCNC: 32.9 G/DL (ref 32–36)
MCV RBC AUTO: 90 FL (ref 82–98)
MONOCYTES # BLD AUTO: 1 K/UL (ref 0.3–1)
MONOCYTES NFR BLD: 8.1 % (ref 4–15)
NEUTROPHILS # BLD AUTO: 9.3 K/UL (ref 1.8–7.7)
NEUTROPHILS NFR BLD: 78.9 % (ref 38–73)
NRBC BLD-RTO: 0 /100 WBC
PLATELET # BLD AUTO: 288 K/UL (ref 150–450)
PMV BLD AUTO: 10.5 FL (ref 9.2–12.9)
POCT GLUCOSE: 124 MG/DL (ref 70–110)
POCT GLUCOSE: 126 MG/DL (ref 70–110)
POCT GLUCOSE: 135 MG/DL (ref 70–110)
POTASSIUM SERPL-SCNC: 3.4 MMOL/L (ref 3.5–5.1)
PROT SERPL-MCNC: 7 G/DL (ref 6–8.4)
RBC # BLD AUTO: 3.96 M/UL (ref 4–5.4)
SODIUM SERPL-SCNC: 139 MMOL/L (ref 136–145)
WBC # BLD AUTO: 11.82 K/UL (ref 3.9–12.7)

## 2024-12-04 PROCEDURE — 63600175 PHARM REV CODE 636 W HCPCS: Performed by: NURSE PRACTITIONER

## 2024-12-04 PROCEDURE — 80053 COMPREHEN METABOLIC PANEL: CPT | Performed by: NURSE PRACTITIONER

## 2024-12-04 PROCEDURE — 92526 ORAL FUNCTION THERAPY: CPT

## 2024-12-04 PROCEDURE — 25000003 PHARM REV CODE 250: Performed by: INTERNAL MEDICINE

## 2024-12-04 PROCEDURE — 85025 COMPLETE CBC W/AUTO DIFF WBC: CPT | Performed by: NURSE PRACTITIONER

## 2024-12-04 RX ORDER — QUETIAPINE FUMARATE 50 MG/1
50 TABLET, FILM COATED ORAL DAILY
Qty: 30 TABLET | Refills: 0 | Status: SHIPPED | OUTPATIENT
Start: 2024-12-04 | End: 2025-01-03

## 2024-12-04 RX ORDER — METOPROLOL SUCCINATE 25 MG/1
25 TABLET, EXTENDED RELEASE ORAL DAILY
Status: DISCONTINUED | OUTPATIENT
Start: 2024-12-04 | End: 2024-12-04 | Stop reason: HOSPADM

## 2024-12-04 RX ORDER — PREDNISONE 10 MG/1
TABLET ORAL
Qty: 18 TABLET | Refills: 0 | Status: ON HOLD | OUTPATIENT
Start: 2024-12-04 | End: 2024-12-13 | Stop reason: HOSPADM

## 2024-12-04 RX ORDER — FUROSEMIDE 20 MG/1
20 TABLET ORAL DAILY
Qty: 30 TABLET | Refills: 0 | Status: SHIPPED | OUTPATIENT
Start: 2024-12-04 | End: 2025-01-03

## 2024-12-04 RX ORDER — ISOSORBIDE MONONITRATE 30 MG/1
30 TABLET, EXTENDED RELEASE ORAL DAILY
Status: DISCONTINUED | OUTPATIENT
Start: 2024-12-04 | End: 2024-12-04 | Stop reason: HOSPADM

## 2024-12-04 RX ORDER — QUETIAPINE FUMARATE 200 MG/1
200 TABLET, FILM COATED ORAL NIGHTLY
Qty: 30 TABLET | Refills: 0 | Status: SHIPPED | OUTPATIENT
Start: 2024-12-04 | End: 2025-01-03

## 2024-12-04 RX ADMIN — FUROSEMIDE 20 MG: 10 INJECTION, SOLUTION INTRAMUSCULAR; INTRAVENOUS at 09:12

## 2024-12-04 RX ADMIN — METHYLPREDNISOLONE SODIUM SUCCINATE 40 MG: 40 INJECTION, POWDER, FOR SOLUTION INTRAMUSCULAR; INTRAVENOUS at 06:12

## 2024-12-04 RX ADMIN — METOPROLOL SUCCINATE 25 MG: 25 TABLET, EXTENDED RELEASE ORAL at 09:12

## 2024-12-04 RX ADMIN — ISOSORBIDE MONONITRATE 30 MG: 30 TABLET, EXTENDED RELEASE ORAL at 09:12

## 2024-12-04 NOTE — ASSESSMENT & PLAN NOTE
Uncertain etiology, polypharmacy, sepsis, hypoglycemia, acute on chronic respiratory failure and biventricular heart failure  Chronic progressive dementia

## 2024-12-04 NOTE — ASSESSMENT & PLAN NOTE
"Patient has Combined Systolic and Diastolic heart failure that is Acute on chronic. On presentation their CHF was well compensated. Most recent BNP and echo results are listed below.  No results for input(s): "BNP" in the last 72 hours.  Latest ECHO  Results for orders placed during the hospital encounter of 11/29/24    Echo    Interpretation Summary    Left Ventricle: The left ventricle is normal in size. Normal wall thickness. There is concentric remodeling. Global hypokinesis present. There is mildly reduced systolic function with a visually estimated ejection fraction of 40 - 50%. Ejection fraction is approximately 40%. There is normal diastolic function.    Right Ventricle: Normal right ventricular cavity size. Wall thickness is normal. Systolic function is normal.    Mitral Valve: There is mild regurgitation.    Tricuspid Valve: There is trace regurgitation. There is mild pulmonary hypertension.    IVC/SVC: Patient is ventilated, cannot use IVC diameter to estimate right atrial pressure.    Current Heart Failure Medications  furosemide injection 20 mg, Daily, Intravenous    Plan  - Monitor strict I&Os and daily weights.    - Place on telemetry  - Low sodium diet  - Place on fluid restriction of 1.5 L.   - Cardiology has not been consulted  - The patient's volume status is stable but not at their baseline as indicated by edema  Elevated BNP  "

## 2024-12-04 NOTE — ASSESSMENT & PLAN NOTE
Patient's most recent potassium results are listed below.   Recent Labs     12/02/24  1645 12/03/24  0343 12/03/24  1553   K 3.6 2.9* 3.6     Plan  - Replete potassium per protocol  - Monitor potassium Daily  - Patient's hypokalemia is stable

## 2024-12-04 NOTE — NURSING
Pt being discharged Home with home health in stable condition. PICC line removed, catheter intact, pt tolerated well. Zuluaga will remain in place.Tele monitor removed, given to US. Discharge instructions to be reviewed with pts daughter upon her arrival.

## 2024-12-04 NOTE — ASSESSMENT & PLAN NOTE
Patient with minimal p.o. intake due to mental status  Multiple episodes of hypoglycemia today  Patient on D10 overnight with improvement after DC.  Continue to monitor blood glucose.

## 2024-12-04 NOTE — ASSESSMENT & PLAN NOTE
Nutrition consulted. Most recent weight and BMI monitored-     Measurements:  Wt Readings from Last 1 Encounters:   12/03/24 60.6 kg (133 lb 9.6 oz)   Body mass index is 26.09 kg/m².    Patient has been screened and assessed by RD.    Malnutrition Type:  Context: acute illness or injury, chronic illness  Level: severe    Malnutrition Characteristic Summary:  Subcutaneous Fat (Malnutrition): moderate depletion  Muscle Mass (Malnutrition): moderate depletion  Fluid Accumulation (Malnutrition):  (1+ trace)    Interventions/Recommendations (treatment strategy):  1. Tube feeding recommendations:  Peptamen AF with goal rate of of 45ml/hr.    -Start TF at rate of 10ml/hr.  Increase by 10mls q 8 hours until goal rate is reached as tolerated.    -TF to provide 1296kcals, 82gPRO, 121gCHO, 877mls h2O.   -Flush with 100mls of FW q 6 hours or per md order   -RD to continue to make adjustments to TF formula and rate recommendations at follow ups depending on patient status.     2. Monitor labs   3. Collaboration by nutrition professional with other providers

## 2024-12-04 NOTE — ASSESSMENT & PLAN NOTE
Patient with Hypercapnic and Hypoxic Respiratory failure which is Acute on chronic.  she is not on home oxygen. Supplemental oxygen was provided and noted-      .   Signs/symptoms of respiratory failure include- lethargy. Contributing diagnoses includes - Aspiration, CHF, Pleural effusion, and pneumothorax  Labs and images were reviewed. Patient Has recent ABG, which has been reviewed. Will treat underlying causes and adjust management of respiratory failure as follows- continue oxygen as needed    Resolved

## 2024-12-04 NOTE — DISCHARGE INSTRUCTIONS
Take all meds as prescribed  Seroquel has been decreased to 200 q.h.s. and 50 q.a.m. as needed   continue Lasix daily  Zuluaga to stay in until followed up with Urology

## 2024-12-04 NOTE — PT/OT/SLP PROGRESS
Speech Language Pathology Treatment    Patient Name:  Leia Rodriguez   MRN:  [id number]  Admitting Diagnosis: Acute respiratory failure with hypoxia and hypercarbia    Recommendations:                 General Recommendations:  Follow-up not indicated; Recommended ACP/GOC conversation  Diet recommendations:  Puree Diet - IDDSI Level 4, Liquid Diet Level: Thin liquids - IDDSI Level 0   Aspiration Precautions: Check for pocketing/oral residue, Feed only when awake/alert, Frequent oral care, HOB to 90 degrees, Meds crushed in puree, Monitor for s/s of aspiration, Small bites/sips, and Standard aspiration precautions   General Precautions: Standard, aspiration, pureed diet  Communication strategies:  provide increased time to answer and dementia    Assessment:     Leia Rodriguez is a 74 y.o. female admitted to [facility] ICU with dx metabolic encephalopathy with questionable polypharmacy, urinary retention, acute respiratory failure with hypoxia and hypercarbia requiring ventilator management 11/30-12/1/24 and post-procedural (intubation) pneumothorax. She exhibits improving mentation, although presents with baseline cognitive-linguistic impairment s/t hx dementia (+behavioral disturbance), cerebrovascular disease and major neurocognitive disorder.      12/4/24: Pt alert with improved verbal output, although intelligibility reduced s/t dysarthria of speech.  Pt perseverating on same questions, asking about her  and wanting to see him.  Dentures present at bedside, required adhesive s/t loose fit.  Tx feeds of soft solids completed, but pt remains inefficient for diet advancement at this time. Slight improvement with mastication/rotary movement; however, pt pocketing semi-chewed solids (right buccal cavity) and did not initiate A-P transit for deglutition.  Pt cognitive impairment also impacting efficiency. She is recommended to continue IDDSI 4-pureed solids and IDDSI 0-thin liquids with 1:1 feeder assist/encouragement  and consideration of palliative medicine referral.    Subjective     Pt seen bedside for ST. Wearing sunglasses and [place] necklace. Asking about her  and wanting to see him. No family present. No c/o pain.  Patient goals: to go home, see      Pain/Comfort:  Pain Rating 1: 0/10  Pain Rating Post-Intervention 1: 0/10  Pain Rating 2: 0/10  Pain Rating Post-Intervention 2: 0/10    Respiratory Status: Room air    Objective:     Has the patient been evaluated by SLP for swallowing?   Yes  Keep patient NPO? No   Current Respiratory Status: RA    Pt lacking initiation/ability to self feed lunch meal.  Requires direct, 1:1 feeder assist and encouragement/cues to swallow throughout meal.  Awake/alert with baseline cognitive impairment/dementia.  ST completed tx feeds of soft/chopped solids with dentures present; however, notable favoring of right side during mastication (limited left OM movement) with eventual right buccal pocketing of semi-chewed aman cracker pieces. Absent A-P transit for deglutition of soft solids.  ST removed pieces of food from oral cavity.  Trace residue cleared with liquid rinse.  She consumed Ensure supplemental beverage during session and declined consumption of pureed items on tray.    Goals:   Multidisciplinary Problems       SLP Goals          Problem: SLP    Goal Priority Disciplines Outcome   SLP Goal     SLP Not Progressing   Description: 1.  Pt will consume the least restrictive PO diet without incident.                       Plan:     Patient to be seen:  2 x/week, 1 x/week   Plan of Care expires:  12/09/24  Plan of Care reviewed with:  patient   SLP Follow-Up:  No       Discharge recommendations:  No Therapy Indicated   Barriers to Discharge:  None    Time Tracking:     SLP Treatment Date:   12/04/24  Speech Start Time:  1230  Speech Stop Time:  1245     Speech Total Time (min):  15 min    Billable Minutes: Treatment Swallowing Dysfunction 15 minutes    12/04/2024

## 2024-12-04 NOTE — PLAN OF CARE
Sw spoke with pt's dtr, Moe, to discuss care at home. Sw explained that PT/OT do not recommend SNF and recommend HH with 24/7 support vs NH placement. Pt's dtr stated her family cares for pt 24/7 while pt also has sitter services at home. Pt's dtr stated pt also goes to an adult day care 3x/week.     Sw updated Attending MD, Dr. Dumont.

## 2024-12-04 NOTE — SUBJECTIVE & OBJECTIVE
Interval History:  Patient seen and examined at bedside.  She is awake alert.  She remains confused.  Waiting therapy recommendations.    Review of Systems   Constitutional:  Positive for activity change, appetite change and fatigue. Negative for fever.   Respiratory:  Negative for cough and shortness of breath.    Cardiovascular:  Negative for chest pain and leg swelling.   Gastrointestinal:  Negative for nausea and vomiting.   Neurological:  Positive for weakness.   Psychiatric/Behavioral:  Positive for confusion.    All other systems reviewed and are negative.    Objective:     Vital Signs (Most Recent):  Temp: 99.1 °F (37.3 °C) (12/03/24 1642)  Pulse: 104 (12/03/24 1642)  Resp: 18 (12/03/24 1642)  BP: (!) 144/69 (12/03/24 1642)  SpO2: 98 % (12/03/24 1500) Vital Signs (24h Range):  Temp:  [98.7 °F (37.1 °C)-99.5 °F (37.5 °C)] 99.1 °F (37.3 °C)  Pulse:  [] 104  Resp:  [16-27] 18  SpO2:  [98 %-100 %] 98 %  BP: (123-170)/() 144/69     Weight: 60.6 kg (133 lb 9.6 oz)  Body mass index is 26.09 kg/m².    Intake/Output Summary (Last 24 hours) at 12/3/2024 1834  Last data filed at 12/3/2024 1300  Gross per 24 hour   Intake 258.01 ml   Output 1303 ml   Net -1044.99 ml         Physical Exam  Constitutional:       General: She is not in acute distress.     Appearance: She is ill-appearing (chronically).   HENT:      Head: Normocephalic and atraumatic.      Nose: Nose normal.      Mouth/Throat:      Mouth: Mucous membranes are moist.      Pharynx: Oropharynx is clear.   Eyes:      Extraocular Movements: Extraocular movements intact.      Conjunctiva/sclera: Conjunctivae normal.   Cardiovascular:      Rate and Rhythm: Normal rate and regular rhythm.      Pulses: Normal pulses.      Heart sounds: Normal heart sounds.   Pulmonary:      Effort: Pulmonary effort is normal.      Breath sounds: Normal breath sounds.   Abdominal:      General: Bowel sounds are normal. There is no distension.      Palpations: Abdomen is  soft.      Tenderness: There is no abdominal tenderness.   Musculoskeletal:         General: No swelling.      Cervical back: Normal range of motion and neck supple.   Skin:     General: Skin is warm and dry.   Neurological:      General: No focal deficit present.      Mental Status: Mental status is at baseline. She is disoriented.      Motor: Weakness present.             Significant Labs: All pertinent labs within the past 24 hours have been reviewed.  CBC:   Recent Labs   Lab 12/02/24  0501 12/03/24  0343   WBC 9.19 12.89*   HGB 11.7* 11.7*   HCT 36.4* 35.2*    261     CMP:   Recent Labs   Lab 12/02/24  0501 12/02/24  1645 12/03/24  0343 12/03/24  1553    136 137  --    K 4.1 3.6 2.9* 3.6    98 101  --    CO2 25 25 26  --    * 142* 107  --    BUN 30* 30* 32*  --    CREATININE 1.2 1.4 1.2  --    CALCIUM 7.6* 7.9* 7.7*  --    PROT 7.0  --  7.1  --    ALBUMIN 3.1*  --  3.3*  --    BILITOT 0.7  --  0.7  --    ALKPHOS 54  --  53  --    AST 10  --  12  --    ALT 13  --  10  --    ANIONGAP 8 13 10  --        Significant Imaging: I have reviewed all pertinent imaging results/findings within the past 24 hours.

## 2024-12-04 NOTE — PLAN OF CARE
[facility] - Med Surg  Discharge Final Note    Primary Care Provider: Yasmin Navarro MD    Expected Discharge Date: 12/4/2024    Final Discharge Note (most recent)       Final Note - 12/04/24 1406          Final Note    Assessment Type Final Discharge Note     Anticipated Discharge Disposition [place] [facility]     Hospital Resources/Appts/Education Provided Appointments scheduled and added to AVS;Post-Acute resouces added to AVS        Post-Acute Status    Post-Acute Authorization Home Health     Home Health Status Set-up Complete/Auth obtained     Discharge Delays None known at this time                   Contact Info       Yasmin Navarro MD   Specialty: Family Medicine   Relationship: PCP - General    [address] [facility] [address]   Phone: [phone]       Next Steps: Follow up in 1 week(s)    Dilcia Rob NP   Specialty: Urology, Gynecology, Obstetrics    [address] [facility] [address]   Phone: [phone]       Next Steps: Follow up in 1 week(s)          Sw notified [facility] or d/c plans for today; orders sent via Epic.     Patient has no d/c needs at this time. Sw to follow up, as needed, for d/c planning purposes.

## 2024-12-04 NOTE — ASSESSMENT & PLAN NOTE
Patient's blood pressure range in the last 24 hours was: BP  Min: 123/73  Max: 170/81.The patient's inpatient anti-hypertensive regimen is listed below:  Current Antihypertensives  furosemide injection 20 mg, Daily, Intravenous    Plan  - BP is uncontrolled, will adjust as follows:  Add p.r.n. hydralazine  -

## 2024-12-04 NOTE — PLAN OF CARE
Discussed poc with pt's daughter at the bedside    Purposeful rounding       Cardiac monitoring in use tele monitor # [id number]  Blood glucose monitoring   Fall precautions in place, remains injury free  Accurate I&Os  Bed locked at lowest position  Call light within reach  Chart check continued  Will cont with POC

## 2024-12-04 NOTE — ASSESSMENT & PLAN NOTE
Patient with dementia with likely etiology of alzheimer's dementia. Dementia is moderate. The patient does have signs of behavioral disturbance. Home dementia medications are Held or Continued: held.. Continue non-pharmacologic interventions to prevent delirium (No VS between 11PM-5AM, activity during day, opening blinds, providing glasses/hearing aids, and up in chair during daytime). Will avoid narcotics and benzos unless absolutely necessary. PRN anti-psychotics are not prescribed to avoid self harm behaviors.    Patient seems to be at baseline we will discuss with family in a.m. home with home health/hospice

## 2024-12-04 NOTE — NURSING
Pt is not on the ordered continuous pulse oximetry orders, checked with provider if it is still needed and ensured they were aware that the pt is hypertensive, with no orders to treat or antihypertensive agents to give

## 2024-12-04 NOTE — ASSESSMENT & PLAN NOTE
Anemia is likely due to chronic disease due to Chronic Kidney Disease. Most recent hemoglobin and hematocrit are listed below.  Recent Labs     12/01/24  0417 12/02/24  0501 12/03/24  0343   HGB 10.2* 11.7* 11.7*   HCT 32.8* 36.4* 35.2*     Plan  - Monitor serial CBC: Daily  - Transfuse PRBC if patient becomes hemodynamically unstable, symptomatic or H/H drops below 7/21.  - Patient has not received any PRBC transfusions to date  - Patient's anemia is currently stable

## 2024-12-04 NOTE — PROGRESS NOTES
[facility] Medicine  Progress Note    Patient Name: Leia Rodriguez  MRN: [id number]  Patient Class: IP- Inpatient   Admission Date: 11/29/2024  Length of Stay: 4 days  Attending Physician: Leida Dumont MD  Primary Care Provider: Yasmin Navarro MD        Subjective     Principal Problem:Acute respiratory failure with hypoxia and hypercarbia        HPI:   Pt is a 74 yr old female with a history of CAD, CHF, bilateral breast cancer, s/p mastectomy on surveillance, HTN, dementia who was brought to the ED by EMS on 11/29 after her family found her unresponsive. She was found to have agonal breathing and intubated in the field with difficulty. She was hypertensive initially, received ketamine, fentanyl in the field. She was started on Precedex in the ER and became hypotensive. Given fluid bolus and briefly required Levophed, hypotension resolved. CTH negative, CTA chest negative for PE but shows moderate R ptx with pleural effusions.     LA 2.9    pH 7.26  CO2 55.2     Critical care medicine consulted for admission.     Hospital/ICU Course:  11/30 Sedated / intubated. Hemodynamics stable.   ABG reviewed. Vent settings changed.     12/1 Stable overnight.      12/02/2024: Patient extubated without issue. Precedex stopped this morning. Repeat CXR pending. Prisca added.  Patient remained stable overnight therefore Hospital Medicine was consulted to assume care    Overview/Hospital Course:  Patient is 74-year-old female with a history of significant dementia who was found down at home and brought by EMS to the hospital.  She was intubated secondary to agonal breathing.  Difficult intubation resulting in small pneumothorax.  She was started on Precedex on admission became hypotensive briefly required Levophed and was admitted to critical care.  Patient was stabilized extubated and was stable off of Precedex.  Patient had significant hypoglycemia requiring D10 infusion overnight which has been  discontinued with patient able to maintain blood glucose greater than 100.    Interval History:  Patient seen and examined at bedside.  She is awake alert.  She remains confused.  Waiting therapy recommendations.    Review of Systems   Constitutional:  Positive for activity change, appetite change and fatigue. Negative for fever.   Respiratory:  Negative for cough and shortness of breath.    Cardiovascular:  Negative for chest pain and leg swelling.   Gastrointestinal:  Negative for nausea and vomiting.   Neurological:  Positive for weakness.   Psychiatric/Behavioral:  Positive for confusion.    All other systems reviewed and are negative.    Objective:     Vital Signs (Most Recent):  Temp: 99.1 °F (37.3 °C) (12/03/24 1642)  Pulse: 104 (12/03/24 1642)  Resp: 18 (12/03/24 1642)  BP: (!) 144/69 (12/03/24 1642)  SpO2: 98 % (12/03/24 1500) Vital Signs (24h Range):  Temp:  [98.7 °F (37.1 °C)-99.5 °F (37.5 °C)] 99.1 °F (37.3 °C)  Pulse:  [] 104  Resp:  [16-27] 18  SpO2:  [98 %-100 %] 98 %  BP: (123-170)/() 144/69     Weight: 60.6 kg (133 lb 9.6 oz)  Body mass index is 26.09 kg/m².    Intake/Output Summary (Last 24 hours) at 12/3/2024 1834  Last data filed at 12/3/2024 1300  Gross per 24 hour   Intake 258.01 ml   Output 1303 ml   Net -1044.99 ml         Physical Exam  Constitutional:       General: She is not in acute distress.     Appearance: She is ill-appearing (chronically).   HENT:      Head: Normocephalic and atraumatic.      Nose: Nose normal.      Mouth/Throat:      Mouth: Mucous membranes are moist.      Pharynx: Oropharynx is clear.   Eyes:      Extraocular Movements: Extraocular movements intact.      Conjunctiva/sclera: Conjunctivae normal.   Cardiovascular:      Rate and Rhythm: Normal rate and regular rhythm.      Pulses: Normal pulses.      Heart sounds: Normal heart sounds.   Pulmonary:      Effort: Pulmonary effort is normal.      Breath sounds: Normal breath sounds.   Abdominal:      General:  Bowel sounds are normal. There is no distension.      Palpations: Abdomen is soft.      Tenderness: There is no abdominal tenderness.   Musculoskeletal:         General: No swelling.      Cervical back: Normal range of motion and neck supple.   Skin:     General: Skin is warm and dry.   Neurological:      General: No focal deficit present.      Mental Status: Mental status is at baseline. She is disoriented.      Motor: Weakness present.             Significant Labs: All pertinent labs within the past 24 hours have been reviewed.  CBC:   Recent Labs   Lab 12/02/24  0501 12/03/24  0343   WBC 9.19 12.89*   HGB 11.7* 11.7*   HCT 36.4* 35.2*    261     CMP:   Recent Labs   Lab 12/02/24  0501 12/02/24  1645 12/03/24  0343 12/03/24  1553    136 137  --    K 4.1 3.6 2.9* 3.6    98 101  --    CO2 25 25 26  --    * 142* 107  --    BUN 30* 30* 32*  --    CREATININE 1.2 1.4 1.2  --    CALCIUM 7.6* 7.9* 7.7*  --    PROT 7.0  --  7.1  --    ALBUMIN 3.1*  --  3.3*  --    BILITOT 0.7  --  0.7  --    ALKPHOS 54  --  53  --    AST 10  --  12  --    ALT 13  --  10  --    ANIONGAP 8 13 10  --        Significant Imaging: I have reviewed all pertinent imaging results/findings within the past 24 hours.    Assessment and Plan     * Acute respiratory failure with hypoxia and hypercarbia  Patient with Hypercapnic and Hypoxic Respiratory failure which is Acute on chronic.  she is not on home oxygen. Supplemental oxygen was provided and noted-      .   Signs/symptoms of respiratory failure include- lethargy. Contributing diagnoses includes - Aspiration, CHF, Pleural effusion, and pneumothorax  Labs and images were reviewed. Patient Has recent ABG, which has been reviewed. Will treat underlying causes and adjust management of respiratory failure as follows- continue oxygen as needed    Resolved    Hypokalemia  Patient's most recent potassium results are listed below.   Recent Labs     12/02/24  1645 12/03/24 0343  12/03/24  1553   K 3.6 2.9* 3.6     Plan  - Replete potassium per protocol  - Monitor potassium Daily  - Patient's hypokalemia is stable      Anemia  Anemia is likely due to chronic disease due to Chronic Kidney Disease. Most recent hemoglobin and hematocrit are listed below.  Recent Labs     12/01/24  0417 12/02/24  0501 12/03/24  0343   HGB 10.2* 11.7* 11.7*   HCT 32.8* 36.4* 35.2*     Plan  - Monitor serial CBC: Daily  - Transfuse PRBC if patient becomes hemodynamically unstable, symptomatic or H/H drops below 7/21.  - Patient has not received any PRBC transfusions to date  - Patient's anemia is currently stable      Severe protein-calorie malnutrition  Nutrition consulted. Most recent weight and BMI monitored-     Measurements:  Wt Readings from Last 1 Encounters:   12/03/24 60.6 kg (133 lb 9.6 oz)   Body mass index is 26.09 kg/m².    Patient has been screened and assessed by RD.    Malnutrition Type:  Context: acute illness or injury, chronic illness  Level: severe    Malnutrition Characteristic Summary:  Subcutaneous Fat (Malnutrition): moderate depletion  Muscle Mass (Malnutrition): moderate depletion  Fluid Accumulation (Malnutrition):  (1+ trace)    Interventions/Recommendations (treatment strategy):  1. Tube feeding recommendations:  Peptamen AF with goal rate of of 45ml/hr.    -Start TF at rate of 10ml/hr.  Increase by 10mls q 8 hours until goal rate is reached as tolerated.    -TF to provide 1296kcals, 82gPRO, 121gCHO, 877mls h2O.   -Flush with 100mls of FW q 6 hours or per md order   -RD to continue to make adjustments to TF formula and rate recommendations at follow ups depending on patient status.     2. Monitor labs   3. Collaboration by nutrition professional with other providers      Hypoglycemia    Patient with minimal p.o. intake due to mental status  Multiple episodes of hypoglycemia today  Patient on D10 overnight with improvement after DC.  Continue to monitor blood  glucose.    Polypharmacy        Urinary retention    Patient with chronic urinary retention requiring in and out caths at home.  Patient was Zuluaga catheter and we will leave in place until follow up with Urology as outpatient    Postprocedural pneumothorax    Improved without intervention    Encephalopathy, metabolic    Uncertain etiology, polypharmacy, sepsis, hypoglycemia, acute on chronic respiratory failure and biventricular heart failure  Chronic progressive dementia    Severe sepsis  This patient does have evidence of infective focus  My overall impression is sepsis.  Source: Unknown  Antibiotics given-   Antibiotics (72h ago, onward)      Start     Stop Route Frequency Ordered    11/29/24 1245  mupirocin 2 % ointment         12/04/24 0859 Nasl 2 times daily 11/29/24 1143          Latest lactate reviewed-    Organ dysfunction indicated by Acute respiratory failure and Encephalopathy    Fluid challenge received in ER and subsequently followed up in ICU    Post- resuscitation assessment Yes Perfusion exam was performed within 6 hours of septic shock presentation after bolus shows Adequate tissue perfusion assessed by non-invasive monitoring       Will Not start Pressors- Levophed for MAP of 65  Source control achieved by:  Antibiotics    Major neurocognitive disorder due to Alzheimer's disease  Patient with dementia with likely etiology of alzheimer's dementia. Dementia is moderate. The patient does have signs of behavioral disturbance. Home dementia medications are Held or Continued: held.. Continue non-pharmacologic interventions to prevent delirium (No VS between 11PM-5AM, activity during day, opening blinds, providing glasses/hearing aids, and up in chair during daytime). Will avoid narcotics and benzos unless absolutely necessary. PRN anti-psychotics are not prescribed to avoid self harm behaviors.    Patient seems to be at baseline we will discuss with family in a.m. home with home  "health/hospice    Chronic combined systolic and diastolic CHF (congestive heart failure)  Patient has Combined Systolic and Diastolic heart failure that is Acute on chronic. On presentation their CHF was well compensated. Most recent BNP and echo results are listed below.  No results for input(s): "BNP" in the last 72 hours.  Latest ECHO  Results for orders placed during the hospital encounter of 11/29/24    Echo    Interpretation Summary    Left Ventricle: The left ventricle is normal in size. Normal wall thickness. There is concentric remodeling. Global hypokinesis present. There is mildly reduced systolic function with a visually estimated ejection fraction of 40 - 50%. Ejection fraction is approximately 40%. There is normal diastolic function.    Right Ventricle: Normal right ventricular cavity size. Wall thickness is normal. Systolic function is normal.    Mitral Valve: There is mild regurgitation.    Tricuspid Valve: There is trace regurgitation. There is mild pulmonary hypertension.    IVC/SVC: Patient is ventilated, cannot use IVC diameter to estimate right atrial pressure.    Current Heart Failure Medications  furosemide injection 20 mg, Daily, Intravenous    Plan  - Monitor strict I&Os and daily weights.    - Place on telemetry  - Low sodium diet  - Place on fluid restriction of 1.5 L.   - Cardiology has not been consulted  - The patient's volume status is stable but not at their baseline as indicated by edema  Elevated BNP    Immunodeficiency due to chemotherapy        History of tobacco use        Essential hypertension  Patient's blood pressure range in the last 24 hours was: BP  Min: 123/73  Max: 170/81.The patient's inpatient anti-hypertensive regimen is listed below:  Current Antihypertensives  furosemide injection 20 mg, Daily, Intravenous    Plan  - BP is uncontrolled, will adjust as follows:  Add p.r.n. hydralazine  -       VTE Risk Mitigation (From admission, onward)           Ordered     " enoxaparin injection 40 mg  Every 24 hours         12/02/24 1020     IP VTE HIGH RISK PATIENT  Once         11/29/24 1630     Place sequential compression device  Until discontinued         11/29/24 1146                    Discharge Planning   SYLVIA:      Code Status: Full Code   Medical Readiness for Discharge Date:   Discharge Plan A: Skilled Nursing Facility                Please place Justification for DME        Leida Lewis MD  Department of Hospital Medicine   '[facility]

## 2024-12-06 ENCOUNTER — TELEPHONE (OUTPATIENT)
Dept: UROLOGY | Facility: CLINIC | Age: 74
End: 2024-12-06
Payer: MEDICARE

## 2024-12-06 ENCOUNTER — OFFICE VISIT (OUTPATIENT)
Dept: FAMILY MEDICINE | Facility: CLINIC | Age: 74
End: 2024-12-06
Payer: MEDICARE

## 2024-12-06 ENCOUNTER — PATIENT OUTREACH (OUTPATIENT)
Dept: ADMINISTRATIVE | Facility: CLINIC | Age: 74
End: 2024-12-06
Payer: MEDICARE

## 2024-12-06 VITALS
BODY MASS INDEX: 24.76 KG/M2 | SYSTOLIC BLOOD PRESSURE: 110 MMHG | HEIGHT: 60 IN | DIASTOLIC BLOOD PRESSURE: 70 MMHG | OXYGEN SATURATION: 95 % | TEMPERATURE: 97 F | HEART RATE: 81 BPM

## 2024-12-06 DIAGNOSIS — G30.9 MAJOR NEUROCOGNITIVE DISORDER DUE TO ALZHEIMER'S DISEASE: ICD-10-CM

## 2024-12-06 DIAGNOSIS — I50.43 ACUTE ON CHRONIC COMBINED SYSTOLIC AND DIASTOLIC HEART FAILURE: ICD-10-CM

## 2024-12-06 DIAGNOSIS — F02.80 MAJOR NEUROCOGNITIVE DISORDER DUE TO ALZHEIMER'S DISEASE: ICD-10-CM

## 2024-12-06 DIAGNOSIS — I10 ESSENTIAL HYPERTENSION: Chronic | ICD-10-CM

## 2024-12-06 DIAGNOSIS — J96.01 ACUTE RESPIRATORY FAILURE WITH HYPOXIA AND HYPERCAPNIA: Primary | ICD-10-CM

## 2024-12-06 DIAGNOSIS — J96.02 ACUTE RESPIRATORY FAILURE WITH HYPOXIA AND HYPERCAPNIA: Primary | ICD-10-CM

## 2024-12-06 DIAGNOSIS — R53.1 GENERALIZED WEAKNESS: ICD-10-CM

## 2024-12-06 DIAGNOSIS — R33.9 URINARY RETENTION: ICD-10-CM

## 2024-12-06 PROCEDURE — 99999 PR PBB SHADOW E&M-EST. PATIENT-LVL V: CPT | Mod: PBBFAC,,, | Performed by: FAMILY MEDICINE

## 2024-12-06 RX ORDER — POTASSIUM CHLORIDE 750 MG/1
10 CAPSULE, EXTENDED RELEASE ORAL DAILY
Qty: 90 CAPSULE | Refills: 1 | Status: SHIPPED | OUTPATIENT
Start: 2024-12-06

## 2024-12-06 NOTE — PROGRESS NOTES
CHIEF COMPLAINT:  This is a 74-year-old female here for follow-up hospitalization.    SUBJECTIVE:  The patient presented to the ER in respiratory distress on November 29 and was hospitalized in the ICU for acute respiratory failure with hypoxia and hypercarbia requiring intubation.  Family were told she may have aspirated and chest x-ray showed patchy right upper lobe pneumonia.  She was treated for possible sepsis with antibiotics.  She also had acute on chronic combined systolic and diastolic CHF, episodes of hypoglycemia and hypokalemia.  Patient was discharged with Zuluaga catheter in place.  She was previously being followed by Urology for urinary retention and her daughter was doing in and out caths at home.  Patient has home health and physical therapy has been requested.  Patient's family states that she is weak but is eating regularly.  She denies chest pain, shortness for breath, palpitations or lower extremity swelling.    The patient has progressive dementia with behavioral disturbance including agitation and difficulty sleeping at night.  She takes Namenda 5 mg twice daily and Aricept 5 mg nightly, BuSpar 15 mg twice daily, lamotrigine 50 mg twice daily and Seroquel 300 mg at night. The patient is followed by Cardiology for CAD, PAD, chronic diastolic and systolic heart failure with decreased ejection fraction and hypertension for which she takes aspirin 81 mg daily, Plavix 75 mg daily, metoprolol XL 25 mg daily, telmisartan 20 mg daily  and isosorbide 30 mg daily. She takes Lasix 20 mg as needed.  Her blood pressure today is 110/70.  Patient is status post stent placement in arteries of left lower extremity.  She continues to have left big toe ulcer which is followed by podiatry.  She has chronic kidney disease stage 3a. She takes prophylactic antibiotics for recurrent UTIs: Cephalexin 250 mg nightly.        The patient had left mastectomy for recurrent breast cancer in September 2020 and had previous  breast cancer in 2017 and underwent lumpectomy and radiation followed by Arimidex.  She continues to take Arimidex status mastectomy. She has osteoarthritis of hands.  Patient stopped smoking in August 2020 after 50 pack-year history of smoking. Patient had GI workup for iron deficiency anemia.  EGD findings:  gastric AVM.  Adenomatous polyps were found on colonoscopy.  Video capsule endoscopy was negative.  Patient takes iron infusions for iron deficiency anemia.      ROS:  GENERAL: Patient denies fever, chills, night sweats. Patient denies weight loss. Patient denies anorexia, fatigue, or swollen glands.  Positive for weakness.  SKIN: Patient denies rash.  HEENT: Patient denies sore throat, ear pain, hearing loss, nasal congestion, or runny nose. Patient denies visual disturbance, eye irritation or discharge.  LUNGS: Patient denies cough, wheeze or hemoptysis.  CARDIOVASCULAR: Patient denies chest pain, shortness of breath, palpitations, syncope or lower extremity edema.  GI: Patient denies abdominal pain, nausea, vomiting, diarrhea, constipation, blood in stool or melena.  GENITOURINARY: Patient denies pelvic pain, vaginal discharge, itch or odor. Patient denies irregular vaginal bleeding. Patient denies dysuria, frequency, hematuria, or nocturia.  Positive for urinary retention.  MUSCULOSKELETAL: Patient denies joint swelling, redness or warmth.  Positive for joint pain and swelling.  NEUROLOGIC: Patient denies headache, vertigo, paresthesias, weakness in limb, dysarthria, dysphagia or abnormality of gait.  PSYCHIATRIC: Patient denies anxiety or depression.  Positive for memory loss.     OBJECTIVE:   GENERAL: Well-developed well-nourished black female alert and oriented x3, in no acute distress.  Severe cognitive impairment.  Essentially nonverbal.  Seated in wheelchair.  History provided by daughter.  SKIN: Clear without rash. Normal color and tone.  Ears: Clear canals.  Clear TMs.  Nose:  Without congestion.   Pharynx:  Without injection or exudates.  HEENT: Eyes: Clear conjunctivae.  No scleral icterus.  NECK: Supple, normal range of motion. No masses, lymphadenopathy or enlarged thyroid. No JVD.   LUNGS: Clear to auscultation. Normal respiratory effort.  O2 saturation 95%.  CARDIOVASCULAR: Regular rhythm, normal S1, S2 without murmur, gallop or rub.  EXTREMITIES: Without cyanosis or clubbing.  Multiple hammertoes including left big toe. Swelling and erythema dorsal surface of left great toe at PIP joint.  Tenderness to palpation.  Distal pulses 2+ and equal.  Decreased range lower extremities.    NEUROLOGIC:  Motor strengt without or h equal bilaterally. Sensation normal to touch.  Gait unsteady without support. No tremor.    ASSESSMENT:  1. Acute respiratory failure with hypoxia and hypercapnia    2. Acute on chronic combined systolic and diastolic heart failure    3. Urinary retention    4. Generalized weakness    5. Essential hypertension    6. Major neurocognitive disorder due to Alzheimer's disease      PLAN:  1. Home health consult for PT/OT/ST.    2. Urology consult for Zuluaga catheter/urinary retention.    3. Start potassium supplementation 10 mEq daily.  4. Follow-up in 6 months.    30 minutes of total time spent on the encounter, which includes face to face time and non-face to face time preparing to see the patient (eg, review of tests), Obtaining and/or reviewing separately obtained history, Documenting clinical information in the electronic or other health record, Independently interpreting results (not separately reported) and communicating results to the patient/family/caregiver, or Care coordination (not separately reported).     This note is generated with speech recognition software and is subject to transcription error and sound alike phrases that may be missed by proofreading.

## 2024-12-06 NOTE — TELEPHONE ENCOUNTER
----- Message from Med Assistant Luz Maria sent at 12/6/2024  2:28 PM CST -----  Regarding: FW: New Patient Appointment ##STAT##  Hey, this pt is established with us already because he has been seeing [facility]. However, I can't schedule him at the [facility] because they are booked until January, and I can't schedule him at [facility] because of his insurance.  ----- Message -----  From: Andra Gill  Sent: 12/6/2024  10:47 AM CST  To: [facility] Urology Clinical Staff  Subject: New Patient Appointment ##STAT##                 Good Morning, patient in the office seeing Dr. Yasmin Navarro she is requesting patient be seen #STAT# referral/consut for Urinary retention (Zuluaga inserted now). She is wanting patient scheduled before leaving the office. Please message me back with appointment. Thanks

## 2024-12-06 NOTE — PROGRESS NOTES
C3 nurse attempted to contact Leia Rodriguez for a TCC post hospital discharge follow up call. No answer. Left voicemail with callback information. The patient has a scheduled HOSFU appointment with Yasmin Navarro on 12/06/24 @ 4651.

## 2024-12-07 NOTE — DISCHARGE SUMMARY
[facility] Medicine  Discharge Summary      Patient Name: Leia Rodriguez  MRN: [id number]  CHUCK: [id number]  Patient Class: IP- Inpatient  Admission Date: 11/29/2024  Hospital Length of Stay: 5 days  Discharge Date and Time: 12/4/2024  5:06 PM  Attending Physician: No att. providers found   Discharging Provider: Leida Lewis MD  Primary Care Provider: Yasmin Navarro MD    Primary Care Team: Networked reference to record PCT     HPI:    Pt is a 74 yr old female with a history of CAD, CHF, bilateral breast cancer, s/p mastectomy on surveillance, HTN, dementia who was brought to the ED by EMS on 11/29 after her family found her unresponsive. She was found to have agonal breathing and intubated in the field with difficulty. She was hypertensive initially, received ketamine, fentanyl in the field. She was started on Precedex in the ER and became hypotensive. Given fluid bolus and briefly required Levophed, hypotension resolved. CTH negative, CTA chest negative for PE but shows moderate R ptx with pleural effusions.     LA 2.9    pH 7.26  CO2 55.2     Critical care medicine consulted for admission.     Hospital/ICU Course:  11/30 Sedated / intubated. Hemodynamics stable.   ABG reviewed. Vent settings changed.     12/1 Stable overnight.      12/02/2024: Patient extubated without issue. Precedex stopped this morning. Repeat CXR pending. Prisca added.  Patient remained stable overnight therefore Hospital Medicine was consulted to assume care    * No surgery found *      Hospital Course:   Patient is 74-year-old female with a history of significant dementia who was found down at home and brought by EMS to the hospital.  She was intubated secondary to agonal breathing.  Difficult intubation resulting in small pneumothorax.  She was started on Precedex on admission became hypotensive briefly required Levophed and was admitted to critical care.  Patient was stabilized extubated and was stable  off of Precedex.  Patient had significant hypoglycemia requiring D10 infusion overnight which has been discontinued with patient able to maintain blood glucose greater than 100.  Patient was transitioned to telemetry floor.  She was seen in consultation by Physical therapy and Occupational therapy with recommendations to discharge home with 24 hour a day care as well as home health.  Patient's functional status extremely limited by her dementia but family is aware and patient will be discharged home.  She will be discharged home on tapering steroids and it is recommended that she follow up with her PCP for evaluation of multiple behavioral med she is on that might need adjusting.  Recommended she repeat I chest x-ray given her small pneumothorax which resolved while in the hospital.  Due to urinary retention with history of straight caths patient will have Zuluaga left in place upon discharge and follow up with her urologist as an outpatient.  Patient seen and examined on day of discharge stable for discharge home.  Patient will follow up with her outpatient physicians.     Goals of Care Treatment Preferences:  Code Status: Full Code      SDOH Screening:  The patient was unable to be screened for utility difficulties, food insecurity, transport difficulties, housing insecurity, and interpersonal safety, so no concerns could be identified this admission.     Consults:   Consults (From admission, onward)          Status Ordering Provider     Inpatient consult to Interventional Radiology  Once        Provider:  (Not yet assigned)    Completed SHELLEY MAN     Inpatient consult to Registered Dietitian/Nutritionist  Once        Provider:  (Not yet assigned)    Completed SHELLEY MAN.            * Acute respiratory failure with hypoxia and hypercarbia  Patient with Hypercapnic and Hypoxic Respiratory failure which is Acute on chronic.  she is not on home oxygen. Supplemental oxygen was provided and noted-       .   Signs/symptoms of respiratory failure include- lethargy. Contributing diagnoses includes - Aspiration, CHF, Pleural effusion, and pneumothorax  Labs and images were reviewed. Patient Has recent ABG, which has been reviewed. Will treat underlying causes and adjust management of respiratory failure as follows- continue oxygen as needed    Resolved    Hypokalemia  Patient's most recent potassium results are listed below.   Recent Labs     12/02/24  1645 12/03/24  0343 12/03/24  1553   K 3.6 2.9* 3.6     Plan  - Replete potassium per protocol  - Monitor potassium Daily  - Patient's hypokalemia is stable      Anemia  Anemia is likely due to chronic disease due to Chronic Kidney Disease. Most recent hemoglobin and hematocrit are listed below.  Recent Labs     12/01/24  0417 12/02/24  0501 12/03/24  0343   HGB 10.2* 11.7* 11.7*   HCT 32.8* 36.4* 35.2*     Plan  - Monitor serial CBC: Daily  - Transfuse PRBC if patient becomes hemodynamically unstable, symptomatic or H/H drops below 7/21.  - Patient has not received any PRBC transfusions to date  - Patient's anemia is currently stable      Severe protein-calorie malnutrition  Nutrition consulted. Most recent weight and BMI monitored-     Measurements:  Wt Readings from Last 1 Encounters:   12/03/24 60.6 kg (133 lb 9.6 oz)   Body mass index is 26.09 kg/m².    Patient has been screened and assessed by RD.    Malnutrition Type:  Context: acute illness or injury, chronic illness  Level: severe    Malnutrition Characteristic Summary:  Subcutaneous Fat (Malnutrition): moderate depletion  Muscle Mass (Malnutrition): moderate depletion  Fluid Accumulation (Malnutrition):  (1+ trace)    Interventions/Recommendations (treatment strategy):  1. Tube feeding recommendations:  Peptamen AF with goal rate of of 45ml/hr.    -Start TF at rate of 10ml/hr.  Increase by 10mls q 8 hours until goal rate is reached as tolerated.    -TF to provide 1296kcals, 82gPRO, 121gCHO, 877mls h2O.    "-Flush with 100mls of FW q 6 hours or per md order   -RD to continue to make adjustments to TF formula and rate recommendations at follow ups depending on patient status.     2. Monitor labs   3. Collaboration by nutrition professional with other providers      Polypharmacy        Urinary retention    Patient with chronic urinary retention requiring in and out caths at home.  Patient was Zuluaga catheter and we will leave in place until follow up with Urology as outpatient    Major neurocognitive disorder due to Alzheimer's disease  Patient with dementia with likely etiology of alzheimer's dementia. Dementia is moderate. The patient does have signs of behavioral disturbance. Home dementia medications are Held or Continued: held.. Continue non-pharmacologic interventions to prevent delirium (No VS between 11PM-5AM, activity during day, opening blinds, providing glasses/hearing aids, and up in chair during daytime). Will avoid narcotics and benzos unless absolutely necessary. PRN anti-psychotics are not prescribed to avoid self harm behaviors.    Patient seems to be at baseline we will discuss with family in a.m. home with home health/hospice    Chronic combined systolic and diastolic CHF (congestive heart failure)  Patient has Combined Systolic and Diastolic heart failure that is Acute on chronic. On presentation their CHF was well compensated. Most recent BNP and echo results are listed below.  No results for input(s): "BNP" in the last 72 hours.  Latest ECHO  Results for orders placed during the hospital encounter of 11/29/24    Echo    Interpretation Summary    Left Ventricle: The left ventricle is normal in size. Normal wall thickness. There is concentric remodeling. Global hypokinesis present. There is mildly reduced systolic function with a visually estimated ejection fraction of 40 - 50%. Ejection fraction is approximately 40%. There is normal diastolic function.    Right Ventricle: Normal right ventricular " cavity size. Wall thickness is normal. Systolic function is normal.    Mitral Valve: There is mild regurgitation.    Tricuspid Valve: There is trace regurgitation. There is mild pulmonary hypertension.    IVC/SVC: Patient is ventilated, cannot use IVC diameter to estimate right atrial pressure.    Current Heart Failure Medications  furosemide injection 20 mg, Daily, Intravenous    Plan  - Monitor strict I&Os and daily weights.    - Place on telemetry  - Low sodium diet  - Place on fluid restriction of 1.5 L.   - Cardiology has not been consulted  - The patient's volume status is stable but not at their baseline as indicated by edema  Elevated BNP    Immunodeficiency due to chemotherapy        History of tobacco use        Essential hypertension  Patient's blood pressure range in the last 24 hours was: BP  Min: 123/73  Max: 170/81.The patient's inpatient anti-hypertensive regimen is listed below:  Current Antihypertensives  furosemide injection 20 mg, Daily, Intravenous    Plan  - BP is uncontrolled, will adjust as follows:  Add p.r.n. hydralazine  -       Final Active Diagnoses:    Diagnosis Date Noted POA    PRINCIPAL PROBLEM:  Acute respiratory failure with hypoxia and hypercarbia [J96.01, J96.02] 03/06/2021 Yes    Severe protein-calorie malnutrition [E43] 12/03/2024 Yes    Anemia [D64.9] 12/03/2024 Yes    Hypokalemia [E87.6] 12/03/2024 Yes    Polypharmacy [Z79.899] 12/02/2024 Not Applicable    Urinary retention [R33.9] 11/29/2024 Yes    Major neurocognitive disorder due to Alzheimer's disease [G30.9, F02.80] 09/21/2022 Yes    Chronic combined systolic and diastolic CHF (congestive heart failure) [I50.42] 10/12/2021 Yes     Chronic    Immunodeficiency due to chemotherapy [D84.821, T45.1X5A, Z79.899] 04/21/2021 Not Applicable    History of tobacco use [Z87.891] 02/20/2017 Not Applicable    Essential hypertension [I10]  Yes     Chronic      Problems Resolved During this Admission:    Diagnosis Date Noted Date  Resolved POA    Hypophosphatemia [E83.39] 12/03/2024 12/03/2024 Unknown    Hypoglycemia [E16.2] 12/02/2024 12/04/2024 No    Encephalopathy, metabolic [G93.41] 11/29/2024 12/04/2024 Yes    Postprocedural pneumothorax [J95.811] 11/29/2024 12/04/2024 Yes    Severe sepsis [A41.9, R65.20] 07/09/2023 12/04/2024 Yes       Discharged Condition: fair    Disposition: Home-Health Care [facility]    Follow Up:   Contact information for follow-up providers       Yasmin Navarro MD Follow up in 1 week(s).    Specialty: Family Medicine  Contact information:  [address] [facility] [address] [phone]               Rushing, Dilcia, NP Follow up in 1 week(s).    Specialties: Urology, Gynecology, Obstetrics  Contact information:  [address] [facility] [address] [phone]                       Contact information for after-discharge care       [facility]       [facility]    Service: Home Health Services  Contact information:  [address] [facility] [address] [phone]                                 Patient Instructions:      Ambulatory referral/consult to [facility]   Standing Status: Future   Referral Priority: Routine Referral Type: Consultation   Referral Reason: Specialty Services Required   Number of Visits Requested: 1     Diet Dysphagia Pureed     SUBSEQUENT HOME HEALTH ORDERS   Order Comments: Resume home health with nurse, PT, OT, ST and aid     Order Specific Question Answer Comments   What Home Health Agency is the patient currently using? [facility]     Notify your health care provider if you experience any of the following:  temperature >100.4     Notify your health care provider if you experience any of the following:  persistent nausea and vomiting or diarrhea     Other restrictions (specify):   Order Comments: Leave ceballos cath in and instruct family on leg bag if desired. Referral to urology for follow up     Activity as  tolerated       Significant Diagnostic Studies: Labs: All labs within the past 24 hours have been reviewed    Pending Diagnostic Studies:       None           Medications:  Reconciled Home Medications:      Medication List        START taking these medications      predniSONE 10 MG tablet  Commonly known as: DELTASONE  Take 1 tablet (10 mg total) by mouth 2 (two) times daily for 5 days, THEN 1 tablet (10 mg total) once daily for 5 days, THEN ½ tablet (5 mg total) once daily for 5 days.  Start taking on: December 4, 2024            CHANGE how you take these medications      furosemide 20 MG tablet  Commonly known as: LASIX  Take 1 tablet (20 mg total) by mouth once daily.  What changed:   when to take this  reasons to take this     * QUEtiapine 200 MG Tab  Commonly known as: SEROQUEL  Take 1 tablet (200 mg total) by mouth every evening.  What changed:   medication strength  how much to take     * QUEtiapine 50 MG tablet  Commonly known as: SEROQUEL  Take 1 tablet (50 mg total) by mouth once daily.  What changed: You were already taking a medication with the same name, and this prescription was added. Make sure you understand how and when to take each.           * This list has 2 medication(s) that are the same as other medications prescribed for you. Read the directions carefully, and ask your doctor or other care provider to review them with you.                CONTINUE taking these medications      aluminum-magnesium hydroxide-simethicone 200-200-20 mg/5 mL Susp, diphenhydrAMINE 12.5 mg/5 mL Liqd  Swish and spit 5 mLs every 4 (four) hours as needed (discomfort).     anastrozole 1 mg Tab  Commonly known as: ARIMIDEX  Take 1 tablet (1 mg total) by mouth once daily.     aspirin 81 MG Chew  Take 1 tablet (81 mg total) by mouth once daily.     busPIRone 15 MG tablet  Commonly known as: BUSPAR  Take 1 tablet (15 mg total) by mouth 2 (two) times daily.     donepeziL 5 MG tablet  Commonly known as: ARICEPT  Take 1 tablet  (5 mg total) by mouth every evening.     isosorbide mononitrate 30 MG 24 hr tablet  Commonly known as: IMDUR  TAKE 1 TABLET BY MOUTH EVERY DAY     lamoTRIgine 100 MG tablet  Commonly known as: LAMICTAL  Take 1 tablet (100 mg total) by mouth 2 (two) times daily.     memantine 5 MG Tab  Commonly known as: NAMENDA  Take 1 tablet (5 mg total) by mouth 2 (two) times daily.     metoprolol succinate 25 MG 24 hr tablet  Commonly known as: TOPROL-XL  Take 1 tablet (25 mg total) by mouth 2 (two) times daily.     VITAMIN D3 125 mcg (5,000 unit) Tab  Generic drug: cholecalciferol (vitamin D3)  Take 5,000 Units by mouth once daily.     zinc gluconate 50 mg tablet  Take 50 mg by mouth once daily.              Indwelling Lines/Drains at time of discharge:   Lines/Drains/Airways       None                   Time spent on the discharge of patient: 42 minutes         Leida Lewis MD  Department of Hospital Medicine  [facility] - Med Surg

## 2024-12-09 ENCOUNTER — TELEPHONE (OUTPATIENT)
Dept: UROLOGY | Facility: CLINIC | Age: 74
End: 2024-12-09
Payer: MEDICARE

## 2024-12-09 NOTE — TELEPHONE ENCOUNTER
DARI SCHEDULED PT AN APPT     ----- Message from Addie sent at 12/9/2024  8:21 AM CST -----  Contact: Moe (daughter)  .Type:  Patient Returning Call    Who Called:Rosaleeluci (daughter)  Who Left Message for Patient:Dari Wiggins   Does the patient know what this is regarding?:missed call she received on Friday  Would the patient rather a call back or a response via [facility]? Call back   Best Call Back Number:[phone]  Additional Information:

## 2024-12-10 ENCOUNTER — TELEPHONE (OUTPATIENT)
Dept: FAMILY MEDICINE | Facility: CLINIC | Age: 74
End: 2024-12-10
Payer: MEDICARE

## 2024-12-10 NOTE — LETTER
December 06, 2024    [facility]  [address] [place] [address]  Phone: [phone]  Fax: [phone]       Patient: Leia Rodriguez   YOB: 1950  Date of Visit: 12/06/2024    To Whom It May Concern:    Sallie Rodriguez daughter (Moe Figueroa) was at [facility] on 12/06/2024. She may return to work/school on 12/10/2024 with no restrictions. If you have any questions or concerns, or if I can be of further assistance, please do not hesitate to contact me.    Sincerely,    Yasmin Navarro MD

## 2024-12-10 NOTE — TELEPHONE ENCOUNTER
----- Message from Med Assistant Granados sent at 12/10/2024  2:13 PM CST -----  Contact: pt's daughter/Moe Figueroa  Please advise  ----- Message -----  From: Mona Head  Sent: 12/10/2024   8:11 AM CST  To: Melanie NIEVES Staff    Type:  Request an excuse for pt's daughter    Name of Caller: pt's daughter/Moe  Best Call Back Number: [phone]  Additional Information:  Moe is calling in rgd to the pt's f/u appt on 12/6, and is needing a signed excuse for missing work on the days of 12/6, 12/7, and 12/9 due to her having to take care of the pt.  Moe would like to pick it up once ready.  Please call her to advise when ready for p/u

## 2024-12-12 ENCOUNTER — HOSPITAL ENCOUNTER (OUTPATIENT)
Facility: HOSPITAL | Age: 74
Discharge: HOME | End: 2024-12-13
Attending: EMERGENCY MEDICINE | Admitting: STUDENT IN AN ORGANIZED HEALTH CARE EDUCATION/TRAINING PROGRAM
Payer: MEDICARE

## 2024-12-12 ENCOUNTER — TELEPHONE (OUTPATIENT)
Dept: CARDIOLOGY | Facility: CLINIC | Age: 74
End: 2024-12-12
Payer: MEDICARE

## 2024-12-12 DIAGNOSIS — F03.911 AGITATION DUE TO DEMENTIA: ICD-10-CM

## 2024-12-12 DIAGNOSIS — G30.9 MAJOR NEUROCOGNITIVE DISORDER DUE TO ALZHEIMER'S DISEASE: ICD-10-CM

## 2024-12-12 DIAGNOSIS — N39.0 ACUTE LOWER UTI: Primary | ICD-10-CM

## 2024-12-12 DIAGNOSIS — I73.9 ARTERIAL INSUFFICIENCY OF LOWER EXTREMITY: ICD-10-CM

## 2024-12-12 DIAGNOSIS — F02.80 MAJOR NEUROCOGNITIVE DISORDER DUE TO ALZHEIMER'S DISEASE: ICD-10-CM

## 2024-12-12 DIAGNOSIS — F33.0 MILD EPISODE OF RECURRENT MAJOR DEPRESSIVE DISORDER: ICD-10-CM

## 2024-12-12 DIAGNOSIS — R41.82 ALTERED MENTAL STATUS: ICD-10-CM

## 2024-12-12 LAB
ALBUMIN SERPL BCP-MCNC: 3.8 G/DL (ref 3.5–5.2)
ALP SERPL-CCNC: 57 U/L (ref 40–150)
ALT SERPL W/O P-5'-P-CCNC: 9 U/L (ref 10–44)
AMMONIA PLAS-SCNC: 22 UMOL/L (ref 10–50)
AMPHET+METHAMPHET UR QL: NEGATIVE
ANION GAP SERPL CALC-SCNC: 10 MMOL/L (ref 8–16)
AST SERPL-CCNC: 13 U/L (ref 10–40)
BACTERIA #/AREA URNS HPF: ABNORMAL /HPF
BARBITURATES UR QL SCN>200 NG/ML: NEGATIVE
BASOPHILS # BLD AUTO: 0.03 K/UL (ref 0–0.2)
BASOPHILS NFR BLD: 0.5 % (ref 0–1.9)
BENZODIAZ UR QL SCN>200 NG/ML: NEGATIVE
BILIRUB SERPL-MCNC: 0.7 MG/DL (ref 0.1–1)
BILIRUB UR QL STRIP: NEGATIVE
BUN SERPL-MCNC: 21 MG/DL (ref 8–23)
BZE UR QL SCN: NEGATIVE
CALCIUM SERPL-MCNC: 9.3 MG/DL (ref 8.7–10.5)
CANNABINOIDS UR QL SCN: NEGATIVE
CHLORIDE SERPL-SCNC: 102 MMOL/L (ref 95–110)
CLARITY UR: ABNORMAL
CO2 SERPL-SCNC: 27 MMOL/L (ref 23–29)
COLOR UR: YELLOW
CREAT SERPL-MCNC: 1.2 MG/DL (ref 0.5–1.4)
CREAT UR-MCNC: 141 MG/DL (ref 15–325)
DIFFERENTIAL METHOD BLD: ABNORMAL
EOSINOPHIL # BLD AUTO: 0.2 K/UL (ref 0–0.5)
EOSINOPHIL NFR BLD: 3.3 % (ref 0–8)
ERYTHROCYTE [DISTWIDTH] IN BLOOD BY AUTOMATED COUNT: 14.9 % (ref 11.5–14.5)
EST. GFR  (NO RACE VARIABLE): 48 ML/MIN/1.73 M^2
ETHANOL SERPL-MCNC: <10 MG/DL
GLUCOSE SERPL-MCNC: 89 MG/DL (ref 70–110)
GLUCOSE UR QL STRIP: NEGATIVE
HCT VFR BLD AUTO: 36.1 % (ref 37–48.5)
HCV AB SERPL QL IA: NEGATIVE
HEP C VIRUS HOLD SPECIMEN: NORMAL
HGB BLD-MCNC: 11.2 G/DL (ref 12–16)
HGB UR QL STRIP: ABNORMAL
HIV 1+2 AB+HIV1 P24 AG SERPL QL IA: NEGATIVE
HYALINE CASTS #/AREA URNS LPF: 5 /LPF
IMM GRANULOCYTES # BLD AUTO: 0.06 K/UL (ref 0–0.04)
IMM GRANULOCYTES NFR BLD AUTO: 0.9 % (ref 0–0.5)
KETONES UR QL STRIP: NEGATIVE
LACTATE SERPL-SCNC: 1.2 MMOL/L (ref 0.5–2.2)
LEUKOCYTE ESTERASE UR QL STRIP: ABNORMAL
LIPASE SERPL-CCNC: 6 U/L (ref 4–60)
LYMPHOCYTES # BLD AUTO: 1.9 K/UL (ref 1–4.8)
LYMPHOCYTES NFR BLD: 29.9 % (ref 18–48)
MCH RBC QN AUTO: 29.5 PG (ref 27–31)
MCHC RBC AUTO-ENTMCNC: 31 G/DL (ref 32–36)
MCV RBC AUTO: 95 FL (ref 82–98)
METHADONE UR QL SCN>300 NG/ML: NEGATIVE
MICROSCOPIC COMMENT: ABNORMAL
MONOCYTES # BLD AUTO: 0.4 K/UL (ref 0.3–1)
MONOCYTES NFR BLD: 6.9 % (ref 4–15)
NEUTROPHILS # BLD AUTO: 3.8 K/UL (ref 1.8–7.7)
NEUTROPHILS NFR BLD: 58.5 % (ref 38–73)
NITRITE UR QL STRIP: NEGATIVE
NRBC BLD-RTO: 0 /100 WBC
OPIATES UR QL SCN: NEGATIVE
PCP UR QL SCN>25 NG/ML: NEGATIVE
PH UR STRIP: 6 [PH] (ref 5–8)
PLATELET # BLD AUTO: 280 K/UL (ref 150–450)
PMV BLD AUTO: 10.2 FL (ref 9.2–12.9)
POTASSIUM SERPL-SCNC: 3.4 MMOL/L (ref 3.5–5.1)
PROT SERPL-MCNC: 7.3 G/DL (ref 6–8.4)
PROT UR QL STRIP: ABNORMAL
RBC # BLD AUTO: 3.8 M/UL (ref 4–5.4)
RBC #/AREA URNS HPF: >100 /HPF (ref 0–4)
SODIUM SERPL-SCNC: 139 MMOL/L (ref 136–145)
SP GR UR STRIP: 1.02 (ref 1–1.03)
SQUAMOUS #/AREA URNS HPF: 27 /HPF
TOXICOLOGY INFORMATION: NORMAL
TROPONIN I SERPL DL<=0.01 NG/ML-MCNC: 0.03 NG/ML (ref 0–0.03)
TROPONIN I SERPL DL<=0.01 NG/ML-MCNC: 0.04 NG/ML (ref 0–0.03)
TROPONIN I SERPL DL<=0.01 NG/ML-MCNC: 0.04 NG/ML (ref 0–0.03)
TSH SERPL DL<=0.005 MIU/L-ACNC: 0.96 UIU/ML (ref 0.4–4)
UNIDENT CRYS URNS QL MICRO: ABNORMAL
URN SPEC COLLECT METH UR: ABNORMAL
UROBILINOGEN UR STRIP-ACNC: NEGATIVE EU/DL
WBC # BLD AUTO: 6.42 K/UL (ref 3.9–12.7)
WBC #/AREA URNS HPF: 73 /HPF (ref 0–5)
WBC CLUMPS URNS QL MICRO: ABNORMAL
YEAST URNS QL MICRO: ABNORMAL

## 2024-12-12 PROCEDURE — 96360 HYDRATION IV INFUSION INIT: CPT | Mod: 59

## 2024-12-12 PROCEDURE — 96372 THER/PROPH/DIAG INJ SC/IM: CPT | Performed by: NURSE PRACTITIONER

## 2024-12-12 PROCEDURE — 51702 INSERT TEMP BLADDER CATH: CPT

## 2024-12-12 PROCEDURE — 81000 URINALYSIS NONAUTO W/SCOPE: CPT | Mod: 59 | Performed by: EMERGENCY MEDICINE

## 2024-12-12 PROCEDURE — 93005 ELECTROCARDIOGRAM TRACING: CPT

## 2024-12-12 PROCEDURE — 84484 ASSAY OF TROPONIN QUANT: CPT | Performed by: EMERGENCY MEDICINE

## 2024-12-12 PROCEDURE — 87389 HIV-1 AG W/HIV-1&-2 AB AG IA: CPT | Performed by: EMERGENCY MEDICINE

## 2024-12-12 PROCEDURE — 93010 ELECTROCARDIOGRAM REPORT: CPT | Mod: ,,, | Performed by: INTERNAL MEDICINE

## 2024-12-12 PROCEDURE — 87086 URINE CULTURE/COLONY COUNT: CPT | Performed by: EMERGENCY MEDICINE

## 2024-12-12 PROCEDURE — 63600175 PHARM REV CODE 636 W HCPCS: Performed by: NURSE PRACTITIONER

## 2024-12-12 PROCEDURE — 87040 BLOOD CULTURE FOR BACTERIA: CPT | Performed by: EMERGENCY MEDICINE

## 2024-12-12 PROCEDURE — 82140 ASSAY OF AMMONIA: CPT | Performed by: EMERGENCY MEDICINE

## 2024-12-12 PROCEDURE — 99285 EMERGENCY DEPT VISIT HI MDM: CPT | Mod: 25

## 2024-12-12 PROCEDURE — 96375 TX/PRO/DX INJ NEW DRUG ADDON: CPT

## 2024-12-12 PROCEDURE — 85025 COMPLETE CBC W/AUTO DIFF WBC: CPT | Performed by: EMERGENCY MEDICINE

## 2024-12-12 PROCEDURE — 80307 DRUG TEST PRSMV CHEM ANLYZR: CPT | Performed by: EMERGENCY MEDICINE

## 2024-12-12 PROCEDURE — 83605 ASSAY OF LACTIC ACID: CPT | Performed by: EMERGENCY MEDICINE

## 2024-12-12 PROCEDURE — 96361 HYDRATE IV INFUSION ADD-ON: CPT

## 2024-12-12 PROCEDURE — 25000003 PHARM REV CODE 250: Performed by: NURSE PRACTITIONER

## 2024-12-12 PROCEDURE — 82077 ASSAY SPEC XCP UR&BREATH IA: CPT | Performed by: EMERGENCY MEDICINE

## 2024-12-12 PROCEDURE — 86803 HEPATITIS C AB TEST: CPT | Performed by: EMERGENCY MEDICINE

## 2024-12-12 PROCEDURE — 80053 COMPREHEN METABOLIC PANEL: CPT | Performed by: EMERGENCY MEDICINE

## 2024-12-12 PROCEDURE — 84443 ASSAY THYROID STIM HORMONE: CPT | Performed by: EMERGENCY MEDICINE

## 2024-12-12 PROCEDURE — 96374 THER/PROPH/DIAG INJ IV PUSH: CPT

## 2024-12-12 PROCEDURE — G0378 HOSPITAL OBSERVATION PER HR: HCPCS

## 2024-12-12 PROCEDURE — 84484 ASSAY OF TROPONIN QUANT: CPT | Mod: 91 | Performed by: NURSE PRACTITIONER

## 2024-12-12 PROCEDURE — 36415 COLL VENOUS BLD VENIPUNCTURE: CPT | Performed by: NURSE PRACTITIONER

## 2024-12-12 PROCEDURE — 63600175 PHARM REV CODE 636 W HCPCS: Performed by: STUDENT IN AN ORGANIZED HEALTH CARE EDUCATION/TRAINING PROGRAM

## 2024-12-12 PROCEDURE — 83690 ASSAY OF LIPASE: CPT | Performed by: EMERGENCY MEDICINE

## 2024-12-12 RX ORDER — ACETAMINOPHEN 325 MG/1
650 TABLET ORAL EVERY 8 HOURS PRN
Status: DISCONTINUED | OUTPATIENT
Start: 2024-12-12 | End: 2024-12-13 | Stop reason: HOSPADM

## 2024-12-12 RX ORDER — MEMANTINE HYDROCHLORIDE 5 MG/1
5 TABLET ORAL 2 TIMES DAILY
Status: DISCONTINUED | OUTPATIENT
Start: 2024-12-12 | End: 2024-12-13 | Stop reason: HOSPADM

## 2024-12-12 RX ORDER — LAMOTRIGINE 100 MG/1
100 TABLET ORAL 2 TIMES DAILY
Status: DISCONTINUED | OUTPATIENT
Start: 2024-12-12 | End: 2024-12-13 | Stop reason: HOSPADM

## 2024-12-12 RX ORDER — ISOSORBIDE MONONITRATE 30 MG/1
30 TABLET, EXTENDED RELEASE ORAL DAILY
Status: DISCONTINUED | OUTPATIENT
Start: 2024-12-13 | End: 2024-12-13 | Stop reason: HOSPADM

## 2024-12-12 RX ORDER — HYDRALAZINE HYDROCHLORIDE 20 MG/ML
10 INJECTION INTRAMUSCULAR; INTRAVENOUS EVERY 6 HOURS PRN
Status: DISCONTINUED | OUTPATIENT
Start: 2024-12-12 | End: 2024-12-12

## 2024-12-12 RX ORDER — QUETIAPINE FUMARATE 100 MG/1
200 TABLET, FILM COATED ORAL NIGHTLY
Status: DISCONTINUED | OUTPATIENT
Start: 2024-12-12 | End: 2024-12-13 | Stop reason: HOSPADM

## 2024-12-12 RX ORDER — NAPROXEN SODIUM 220 MG/1
81 TABLET, FILM COATED ORAL DAILY
Status: DISCONTINUED | OUTPATIENT
Start: 2024-12-12 | End: 2024-12-13 | Stop reason: HOSPADM

## 2024-12-12 RX ORDER — QUETIAPINE FUMARATE 25 MG/1
50 TABLET, FILM COATED ORAL DAILY
Status: DISCONTINUED | OUTPATIENT
Start: 2024-12-13 | End: 2024-12-13 | Stop reason: HOSPADM

## 2024-12-12 RX ORDER — DONEPEZIL HYDROCHLORIDE 5 MG/1
5 TABLET, FILM COATED ORAL NIGHTLY
Status: DISCONTINUED | OUTPATIENT
Start: 2024-12-12 | End: 2024-12-13 | Stop reason: HOSPADM

## 2024-12-12 RX ORDER — ANASTROZOLE 1 MG/1
1 TABLET ORAL DAILY
Status: DISCONTINUED | OUTPATIENT
Start: 2024-12-12 | End: 2024-12-13 | Stop reason: HOSPADM

## 2024-12-12 RX ORDER — HYDRALAZINE HYDROCHLORIDE 20 MG/ML
10 INJECTION INTRAMUSCULAR; INTRAVENOUS EVERY 6 HOURS PRN
Status: DISCONTINUED | OUTPATIENT
Start: 2024-12-12 | End: 2024-12-13 | Stop reason: HOSPADM

## 2024-12-12 RX ORDER — POTASSIUM CHLORIDE 750 MG/1
30 TABLET, EXTENDED RELEASE ORAL ONCE
Status: COMPLETED | OUTPATIENT
Start: 2024-12-12 | End: 2024-12-12

## 2024-12-12 RX ORDER — DEXTROSE, SODIUM CHLORIDE, SODIUM LACTATE, POTASSIUM CHLORIDE, AND CALCIUM CHLORIDE 5; .6; .31; .03; .02 G/100ML; G/100ML; G/100ML; G/100ML; G/100ML
INJECTION, SOLUTION INTRAVENOUS CONTINUOUS
Status: DISCONTINUED | OUTPATIENT
Start: 2024-12-12 | End: 2024-12-13 | Stop reason: HOSPADM

## 2024-12-12 RX ORDER — SODIUM CHLORIDE 0.9 % (FLUSH) 0.9 %
10 SYRINGE (ML) INJECTION
Status: DISCONTINUED | OUTPATIENT
Start: 2024-12-12 | End: 2024-12-13 | Stop reason: HOSPADM

## 2024-12-12 RX ORDER — CEFTRIAXONE 1 G/1
1 INJECTION, POWDER, FOR SOLUTION INTRAMUSCULAR; INTRAVENOUS
Status: DISCONTINUED | OUTPATIENT
Start: 2024-12-12 | End: 2024-12-13 | Stop reason: HOSPADM

## 2024-12-12 RX ORDER — ENOXAPARIN SODIUM 100 MG/ML
30 INJECTION SUBCUTANEOUS EVERY 24 HOURS
Status: DISCONTINUED | OUTPATIENT
Start: 2024-12-12 | End: 2024-12-13 | Stop reason: HOSPADM

## 2024-12-12 RX ORDER — METOPROLOL SUCCINATE 25 MG/1
25 TABLET, EXTENDED RELEASE ORAL 2 TIMES DAILY
Status: DISCONTINUED | OUTPATIENT
Start: 2024-12-12 | End: 2024-12-13 | Stop reason: HOSPADM

## 2024-12-12 RX ORDER — ONDANSETRON HYDROCHLORIDE 2 MG/ML
4 INJECTION, SOLUTION INTRAVENOUS EVERY 8 HOURS PRN
Status: DISCONTINUED | OUTPATIENT
Start: 2024-12-12 | End: 2024-12-13 | Stop reason: HOSPADM

## 2024-12-12 RX ADMIN — LAMOTRIGINE 100 MG: 100 TABLET ORAL at 09:12

## 2024-12-12 RX ADMIN — METOPROLOL SUCCINATE 25 MG: 25 TABLET, EXTENDED RELEASE ORAL at 09:12

## 2024-12-12 RX ADMIN — QUETIAPINE FUMARATE 200 MG: 100 TABLET ORAL at 09:12

## 2024-12-12 RX ADMIN — ENOXAPARIN SODIUM 30 MG: 30 INJECTION SUBCUTANEOUS at 03:12

## 2024-12-12 RX ADMIN — ANASTROZOLE 1 MG: 1 TABLET, COATED ORAL at 03:12

## 2024-12-12 RX ADMIN — MEMANTINE HYDROCHLORIDE 5 MG: 5 TABLET ORAL at 09:12

## 2024-12-12 RX ADMIN — SODIUM CHLORIDE, SODIUM LACTATE, POTASSIUM CHLORIDE, CALCIUM CHLORIDE AND DEXTROSE MONOHYDRATE: 5; 600; 310; 30; 20 INJECTION, SOLUTION INTRAVENOUS at 04:12

## 2024-12-12 RX ADMIN — CEFTRIAXONE 1 G: 1 INJECTION, POWDER, FOR SOLUTION INTRAMUSCULAR; INTRAVENOUS at 03:12

## 2024-12-12 RX ADMIN — DONEPEZIL HYDROCHLORIDE 5 MG: 5 TABLET, FILM COATED ORAL at 09:12

## 2024-12-12 RX ADMIN — BUSPIRONE HYDROCHLORIDE 15 MG: 10 TABLET ORAL at 09:12

## 2024-12-12 RX ADMIN — HYDRALAZINE HYDROCHLORIDE 10 MG: 20 INJECTION INTRAMUSCULAR; INTRAVENOUS at 05:12

## 2024-12-12 RX ADMIN — POTASSIUM CHLORIDE 30 MEQ: 750 TABLET, EXTENDED RELEASE ORAL at 03:12

## 2024-12-12 RX ADMIN — ASPIRIN 81 MG CHEWABLE TABLET 81 MG: 81 TABLET CHEWABLE at 03:12

## 2024-12-12 NOTE — ASSESSMENT & PLAN NOTE
"Patient has Combined Systolic and Diastolic heart failure that is Chronic. On presentation their CHF was well compensated. Most recent BNP and echo results are listed below.  No results for input(s): "BNP" in the last 72 hours.  Latest ECHO  Results for orders placed during the hospital encounter of 11/29/24    Echo    Interpretation Summary    Left Ventricle: The left ventricle is normal in size. Normal wall thickness. There is concentric remodeling. Global hypokinesis present. There is mildly reduced systolic function with a visually estimated ejection fraction of 40 - 50%. Ejection fraction is approximately 40%. There is normal diastolic function.    Right Ventricle: Normal right ventricular cavity size. Wall thickness is normal. Systolic function is normal.    Mitral Valve: There is mild regurgitation.    Tricuspid Valve: There is trace regurgitation. There is mild pulmonary hypertension.    IVC/SVC: Patient is ventilated, cannot use IVC diameter to estimate right atrial pressure.    Current Heart Failure Medications  metoprolol succinate (TOPROL-XL) 24 hr tablet 25 mg, 2 times daily, Oral    Plan  - Monitor strict I&Os and daily weights.    - Place on telemetry  - Low sodium diet  - Place on fluid restriction of 1.5 L.   - Cardiology has not been consulted  - The patient's volume status is at their baseline      "

## 2024-12-12 NOTE — H&P
[facility] Medicine  History & Physical    Patient Name: Leia Rodriguez  MRN: [id number]  Patient Class: OP- Observation  Admission Date: 12/12/2024  Attending Physician: Amina Campoverde,*   Primary Care Provider: Yasmin Navarro MD         Patient information was obtained from relative(s) and ER records.     Subjective:     Principal Problem:UTI (urinary tract infection)    Chief Complaint:   Chief Complaint   Patient presents with    Altered Mental Status     Pt with history of dementia is here with her daughter who states the patient is less responsive and talkative than usual today.  Pt has also been weak x 3 days.  She was recently discharged from this hospital for aspiration.        HPI: Leia Rodriguez is a 74 year old female who has  has a past medical history of Arthritis, Behavioral change, Blind right eye, Breast cancer, Dementia, Essential hypertension, Hemorrhoids, Immunodeficiency due to chemotherapy, Lipoma of abdominal wall, Major neurocognitive disorder, Obesity, Overactive bladder, Pap smear abnormality of cervix with ASCUS favoring benign, Thyroid nodule, Tobacco use disorder, Tubular adenoma of colon, and Urinary incontinence who presented to the ED, with daughter, for evaluation of generalized weakness. Onset for the past 3-4 days but became worse today while going to a Urology appt. ED workup showed WBC count 6.42K, Hgb/Hct 11.2/36.1, K+ 3.4, troponin 0.029, UA with 3+ leukos (UA micro WBC 73, many WBC clumps). CXR with no acute findings, pneumothorax resolved. CT head with chronic microvascular ischemic changes.  Pt admitted to observation for UTI. Discussed code status with daughter at bedside. Patient has no POA. She will remain FULL CODE until all the patient's daughters can come together and decide.     Of note patient was admitted on 11/29/24  after she was found down at home and brought by EMS to the hospital.  She was intubated secondary to agonal  breathing.  Difficult intubation resulting in small pneumothorax.  She was started on Precedex on admission became hypotensive briefly required Levophed and was admitted to critical care.  Patient was stabilized extubated and was stable off of Precedex. Patient had significant hypoglycemia requiring D10 infusion overnight which has been discontinued with patient able to maintain blood glucose greater than 100. Eventually transitioned to telemetry floor.  She was seen in consultation by Physical therapy and Occupational therapy with recommendations to discharge home with 24 hour a day care as well as home health. Patient's functional status extremely limited by her dementia but family is aware and patient will be discharged home. She was discharged home on tapering steroids and it is recommended that she follow up with her PCP for evaluation of multiple behavioral med she is on that might need adjusting. Recommended she repeat chest x-ray given her small pneumothorax which resolved while in the hospital. Due to urinary retention with history of straight caths patient will have Zuluaga left in place upon discharge and follow up with her urologist as an outpatient.    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    Lipoma of abdominal wall     Major neurocognitive disorder 06/21/2022    Obesity     Overactive bladder     Pap smear abnormality of cervix with ASCUS favoring benign     Thyroid nodule     Tobacco use disorder     Tubular adenoma of colon     Urinary incontinence        Past Surgical History:   Procedure Laterality Date    ANTERIOR VAGINAL REPAIR      AORTOGRAPHY WITH SERIALOGRAPHY N/A 10/7/2024    Procedure: AORTOGRAM, WITH SERIALOGRAPHY;   Surgeon: Tiffanie Santos MD;  Location: [facility] CATH [facility];  Service: Cardiology;  Laterality: N/A;  MD requested Anesthesia    BLADDER SURGERY      BREAST LUMPECTOMY Left 2017    CATARACT EXTRACTION Left 2022     SECTION      X2    COLONOSCOPY N/A 3/8/2017    Procedure: COLONOSCOPY;  Surgeon: Tyron Paris MD;  Location: [facility];  Service: Endoscopy;  Laterality: N/A;    COLONOSCOPY N/A 2020    Procedure: COLONOSCOPY;  Surgeon: Keira Ellison MD;  Location: [facility];  Service: Endoscopy;  Laterality: N/A;    ESOPHAGOGASTRODUODENOSCOPY N/A 2020    Procedure: EGD (ESOPHAGOGASTRODUODENOSCOPY);  Surgeon: Keira Ellison MD;  Location: [facility];  Service: Endoscopy;  Laterality: N/A;  new onset iron deficiency with prior history of breast cancer    INTRALUMINAL GASTROINTESTINAL TRACT IMAGING VIA CAPSULE N/A 10/28/2020    Procedure: IMAGING PROCEDURE, GI TRACT, INTRALUMINAL, VIA CAPSULE;  Surgeon: Finesse Jha RN;  Location: [facility];  Service: Endoscopy;  Laterality: N/A;    LEFT HEART CATHETERIZATION Left 10/12/2021    Procedure: CATHETERIZATION, HEART, LEFT;  Surgeon: Tiffanie Santos MD;  Location: [facility] CATH [facility];  Service: Cardiology;  Laterality: Left;    LITHOTRIPSY, CORONARY TRANSLUMINAL, PERCUTANEOUS  10/7/2024    Procedure: LITHOTRIPSY, CORONARY TRANSLUMINAL, PERCUTANEOUS;  Surgeon: Tiffanie Santos MD;  Location: [facility]ATH [facility];  Service: Cardiology;;    PTA, SUPERFICIAL FEMORAL ARTERY  10/7/2024    Procedure: PTA, Superficial Femoral Artery;  Surgeon: Tiffanie Santos MD;  Location: [facility]ATH [facility];  Service: Cardiology;;    SENTINEL LYMPH NODE BIOPSY Left 2020    Procedure: BIOPSY, LYMPH NODE, SENTINEL;  Surgeon: Vincent Moyer MD;  Location: [facility] OR;  Service: General;  Laterality: Left;    SIMPLE MASTECTOMY Left 2020    Procedure: MASTECTOMY, SIMPLE;  Surgeon: Vincent Moyer MD;  Location: [facility] OR;  Service: General;  Laterality: Left;    STENT, SUPERFICIAL FEMORAL  ARTERY  10/7/2024    Procedure: Stent, Superficial Femoral Artery;  Surgeon: Tiffanie Santos MD;  Location: [facility] CATH LAB;  Service: Cardiology;;    THYROID LOBECTOMY Left 2005    TOTAL ABDOMINAL HYSTERECTOMY      TUBAL LIGATION         Review of patient's allergies indicates:  No Known Allergies    No current facility-administered medications on file prior to encounter.     Current Outpatient Medications on File Prior to Encounter   Medication Sig    aluminum-magnesium hydroxide-simethicone 200-200-20 mg/5 mL Susp, diphenhydrAMINE 12.5 mg/5 mL Liqd Swish and spit 5 mLs every 4 (four) hours as needed (discomfort). (Patient not taking: Reported on 12/9/2024)    anastrozole (ARIMIDEX) 1 mg Tab Take 1 tablet (1 mg total) by mouth once daily.    aspirin 81 MG Chew Take 1 tablet (81 mg total) by mouth once daily.    busPIRone (BUSPAR) 15 MG tablet Take 1 tablet (15 mg total) by mouth 2 (two) times daily.    cholecalciferol, vitamin D3, (VITAMIN D3) 125 mcg (5,000 unit) Tab Take 5,000 Units by mouth once daily.    donepeziL (ARICEPT) 5 MG tablet Take 1 tablet (5 mg total) by mouth every evening.    furosemide (LASIX) 20 MG tablet Take 1 tablet (20 mg total) by mouth once daily.    isosorbide mononitrate (IMDUR) 30 MG 24 hr tablet TAKE 1 TABLET BY MOUTH EVERY DAY    lamoTRIgine (LAMICTAL) 100 MG tablet Take 1 tablet (100 mg total) by mouth 2 (two) times daily.    memantine (NAMENDA) 5 MG Tab Take 1 tablet (5 mg total) by mouth 2 (two) times daily.    metoprolol succinate (TOPROL-XL) 25 MG 24 hr tablet Take 1 tablet (25 mg total) by mouth 2 (two) times daily.    potassium chloride (MICRO-K) 10 MEQ CpSR Take 1 capsule (10 mEq total) by mouth once daily. (Patient not taking: Reported on 12/9/2024)    predniSONE (DELTASONE) 10 MG tablet Take 1 tablet (10 mg total) by mouth 2 (two) times daily for 5 days, THEN 1 tablet (10 mg total) once daily for 5 days, THEN ½ tablet (5 mg total) once daily for 5 days.    QUEtiapine  (SEROQUEL) 200 MG Tab Take 1 tablet (200 mg total) by mouth every evening.    QUEtiapine (SEROQUEL) 50 MG tablet Take 1 tablet (50 mg total) by mouth once daily. (Patient taking differently: Take 50 mg by mouth nightly.)    zinc gluconate 50 mg tablet Take 50 mg by mouth once daily.     Family History       Problem Relation (Age of Onset)    Alcohol abuse Mother    Allergic rhinitis Daughter    Cataracts Father    Cervical cancer Daughter (32)    Cirrhosis Mother    Diabetes Brother    Heart attack Brother    Heart murmur Brother    Hypertension Father, Daughter    No Known Problems Daughter    Prostate cancer Father (80)          Tobacco Use    Smoking status: Former     Current packs/day: 0.00     Average packs/day: 1 pack/day for 50.0 years (50.0 ttl pk-yrs)     Types: Cigarettes     Start date: 1970     Quit date: 2020     Years since quittin.3    Smokeless tobacco: Never   Substance and Sexual Activity    Alcohol use: Never     Alcohol/week: 28.0 standard drinks of alcohol     Types: 28 Cans of beer per week    Drug use: No    Sexual activity: Never     Partners: Male     Review of Systems   Constitutional:  Positive for activity change. Negative for chills and fever.   HENT:  Negative for trouble swallowing.    Eyes:  Negative for visual disturbance.   Respiratory:  Negative for cough, choking, chest tightness and shortness of breath.    Cardiovascular:  Negative for chest pain.   Gastrointestinal:  Negative for abdominal pain, constipation, diarrhea, nausea and vomiting.   Genitourinary:         Zuluaga   Musculoskeletal:  Negative for arthralgias.   Skin:  Negative for color change.   Neurological:  Positive for weakness.   Psychiatric/Behavioral:  Negative for agitation, behavioral problems and confusion.      Objective:     Vital Signs (Most Recent):  Temp: 98.2 °F (36.8 °C) (24 1418)  Pulse: 74 (24 1418)  Resp: 18 (24 1418)  BP: 136/72 (24 1418)  SpO2: (!) 94 %  (12/12/24 1418) Vital Signs (24h Range):  Temp:  [98.1 °F (36.7 °C)-98.2 °F (36.8 °C)] 98.2 °F (36.8 °C)  Pulse:  [68-75] 74  Resp:  [16-20] 18  SpO2:  [91 %-97 %] 94 %  BP: (118-188)/(57-75) 136/72     Weight: 45.9 kg (101 lb 1.6 oz)  Body mass index is 19.74 kg/m².     Physical Exam  Vitals reviewed.   Constitutional:       General: She is not in acute distress.     Appearance: She is ill-appearing.   HENT:      Head: Normocephalic.      Mouth/Throat:      Mouth: Mucous membranes are moist.   Eyes:      Extraocular Movements: Extraocular movements intact.   Cardiovascular:      Rate and Rhythm: Normal rate.      Pulses: Normal pulses.   Pulmonary:      Effort: Pulmonary effort is normal. No respiratory distress.      Breath sounds: Normal breath sounds.   Abdominal:      General: Bowel sounds are normal. There is no distension.      Palpations: Abdomen is soft.      Tenderness: There is no abdominal tenderness.   Genitourinary:     Comments: Zuluaga catheter draining clear yellow urine  Musculoskeletal:      Cervical back: Neck supple.      Right lower leg: No edema.      Left lower leg: No edema.   Skin:     General: Skin is warm.   Neurological:      Mental Status: She is alert.      Comments: Oriented to person and place   Psychiatric:         Mood and Affect: Mood normal.         Behavior: Behavior normal.         Thought Content: Thought content normal.                Significant Labs: All pertinent labs within the past 24 hours have been reviewed.  CBC:   Recent Labs   Lab 12/12/24  1002   WBC 6.42   HGB 11.2*   HCT 36.1*        CMP:   Recent Labs   Lab 12/12/24  1002      K 3.4*      CO2 27   GLU 89   BUN 21   CREATININE 1.2   CALCIUM 9.3   PROT 7.3   ALBUMIN 3.8   BILITOT 0.7   ALKPHOS 57   AST 13   ALT 9*   ANIONGAP 10     Lactic Acid:   Recent Labs   Lab 12/12/24  1002   LACTATE 1.2     Lipase:   Recent Labs   Lab 12/12/24  1002   LIPASE 6     Troponin:   Recent Labs   Lab 12/12/24  1002    TROPONINI 0.029*     TSH:   Recent Labs   Lab 12/12/24  1002   TSH 0.959     Urine Studies:   Recent Labs   Lab 12/12/24  1037   COLORU Yellow   APPEARANCEUA Hazy*   PHUR 6.0   SPECGRAV 1.025   PROTEINUA 1+*   GLUCUA Negative   KETONESU Negative   BILIRUBINUA Negative   OCCULTUA 3+*   NITRITE Negative   UROBILINOGEN Negative   LEUKOCYTESUR 3+*   RBCUA >100*   WBCUA 73*   BACTERIA Rare   SQUAMEPITHEL 27   HYALINECASTS 5*       Significant Imaging:   Imaging Results              CT Head Without Contrast (Final result)  Result time 12/12/24 10:34:18      Final result by Earnest Monterroso MD (12/12/24 10:34:18)                   Impression:      Chronic microvascular ischemic changes.      Electronically signed by: Earnest Monterroso MD  Date:    12/12/2024  Time:    10:34               Narrative:    EXAMINATION:  CT HEAD WITHOUT CONTRAST    CLINICAL HISTORY:  Mental status change, unknown cause;    TECHNIQUE:  Low dose axial CT images obtained throughout the head without intravenous contrast. Sagittal and coronal reconstructions were performed.  All CT scans at this facility use dose modulation, iterative reconstruction and/or weight based dosing when appropriate to reduce radiation dose to as low as reasonably achievable.    COMPARISON:  11/29/2024    FINDINGS:  Intracranial compartment:    The brain parenchyma demonstrates areas of decreased attenuation with mild to moderate periventricular white matter consistent with chronic microvascular ischemic changes.  Stable small area of encephalomalacia right frontal lobe which could reflect remote infarct.  No parenchymal mass, hemorrhage, edema or major vascular distribution infarct.  Vascular calcifications are noted.    Mild prominence of the sulci and ventricles are consistent with age-related involutional changes.    No extra-axial blood or fluid collections.    Skull/extracranial contents (limited evaluation): No fracture.  Scattered mild mucosal thickening.  Mastoid  air cells and paranasal sinuses are essentially clear.                                       X-Ray Chest AP Portable (Final result)  Result time 12/12/24 09:45:28      Final result by Hermilo Allred MD (12/12/24 09:45:28)                   Impression:      1.  Negative for acute process involving the chest.    2.  Stable findings as noted above.      Electronically signed by: Hermilo Allred MD  Date:    12/12/2024  Time:    09:45               Narrative:    EXAMINATION:  XR CHEST AP PORTABLE    CLINICAL HISTORY:  altered mental status;    COMPARISON:  December 2, 2024, as well as studies dating back to December 3, 2022.    FINDINGS:  EKG leads overlie the chest.  Clothing artifact is present.  The lungs are clear. The cardiac silhouette size is borderline enlarged.  The trachea is midline and the mediastinal width is normal. Negative for focal infiltrate, effusion or pneumothorax. Pulmonary vasculature is normal. Negative for osseous abnormalities. Tortuous aorta with calcifications of the aortic knob.  There are degenerative changes of the spine and both shoulder girdles.  Stable postoperative changes to the left breast and left axilla.  Nonspecific bowel gas pattern.    Interval removal of right arm PICC line.                                     Assessment/Plan:     * UTI (urinary tract infection)  -UA appears to be consistent with UTI  -Previous urine culture in 04/2024 had E. Coli   -Start Rocephin 1 mg IV daily  -Monitor urine culture      Hypokalemia  Patient's most recent potassium results are listed below.   Recent Labs     12/12/24  1002   K 3.4*     Plan  - Replete potassium per protocol  - Monitor potassium Daily  - Patient's hypokalemia is  currently being treated.    Anemia  Most recent hemoglobin and hematocrit are listed below.  Recent Labs     12/12/24  1002   HGB 11.2*   HCT 36.1*     Plan  - Monitor serial CBC: Daily  - Transfuse PRBC if patient becomes hemodynamically unstable, symptomatic or H/H  "drops below 7/21.  - Patient has not received any PRBC transfusions to date  - Patient's anemia is currently stable    Urinary retention  -Had ceballos catheter placed on previous admission 11/29/24 - 12/4/24  -She had an appt today, 12/12/24, with Urology to evaluate need for ceballos per daughter  -New ceballos catheter placed in ED today  -F/U with Urology upon discharge    Major neurocognitive disorder due to Alzheimer's disease  -Mood stable  -Continue Memantine     Chronic combined systolic and diastolic CHF (congestive heart failure)  Patient has Combined Systolic and Diastolic heart failure that is Chronic. On presentation their CHF was well compensated. Most recent BNP and echo results are listed below.  No results for input(s): "BNP" in the last 72 hours.  Latest ECHO  Results for orders placed during the hospital encounter of 11/29/24    Echo    Interpretation Summary    Left Ventricle: The left ventricle is normal in size. Normal wall thickness. There is concentric remodeling. Global hypokinesis present. There is mildly reduced systolic function with a visually estimated ejection fraction of 40 - 50%. Ejection fraction is approximately 40%. There is normal diastolic function.    Right Ventricle: Normal right ventricular cavity size. Wall thickness is normal. Systolic function is normal.    Mitral Valve: There is mild regurgitation.    Tricuspid Valve: There is trace regurgitation. There is mild pulmonary hypertension.    IVC/SVC: Patient is ventilated, cannot use IVC diameter to estimate right atrial pressure.    Current Heart Failure Medications  metoprolol succinate (TOPROL-XL) 24 hr tablet 25 mg, 2 times daily, Oral    Plan  - Monitor strict I&Os and daily weights.    - Place on telemetry  - Low sodium diet  - Place on fluid restriction of 1.5 L.   - Cardiology has not been consulted  - The patient's volume status is at their baseline        Elevated troponin  -Initial troponin 0.029  -About 13 days ago was " 0.023  -Serial troponin pending  -Elevated troponin may be secondary to demand ischemia from elevated BP  -Patient does not appear in pain  -She denies chest pain and/or equivalent  -Monitor      Malignant neoplasm of lower-inner quadrant of left breast in female, estrogen receptor positive  -Followed by Dr. Fernández outpatient  -Continue Arimidex (per office note on 09/04/24 - she is completing 4 of 5 years of Arimidex 1 mg daily)      Essential hypertension  Patient's blood pressure range in the last 24 hours was: BP  Min: 118/57  Max: 188/75.The patient's inpatient anti-hypertensive regimen is listed below:  Current Antihypertensives  metoprolol succinate (TOPROL-XL) 24 hr tablet 25 mg, 2 times daily, Oral  isosorbide mononitrate 24 hr tablet 30 mg, Daily, Oral    Plan  - BP is controlled, no changes needed to their regimen  - Hydralazine PRN      VTE Risk Mitigation (From admission, onward)           Ordered     enoxaparin injection 30 mg  Daily         12/12/24 1446     IP VTE HIGH RISK PATIENT  Once         12/12/24 1446     Place sequential compression device  Until discontinued         12/12/24 1446                         On 12/12/2024, patient should be placed in hospital observation services under Dr. Campoverde's care in collaboration with Luisa Mancilla NP.      Pharmacist Renal Dose Adjustment Note    Leia Rodriguez is a 74 y.o. female being treated with the medication enoxaparin.     Patient Data:    Vital Signs (Most Recent):  Temp: 98.2 °F (36.8 °C) (12/12/24 1418)  Pulse: 74 (12/12/24 1418)  Resp: 18 (12/12/24 1418)  BP: 136/72 (12/12/24 1418)  SpO2: (!) 94 % (12/12/24 1418) Vital Signs (72h Range):  Temp:  [98.1 °F (36.7 °C)-98.2 °F (36.8 °C)]   Pulse:  [68-75]   Resp:  [16-20]   BP: (118-188)/(57-75)   SpO2:  [91 %-97 %]      Recent Labs   Lab 12/12/24  1002   CREATININE 1.2     Serum creatinine: 1.2 mg/dL 12/12/24 1002  Estimated creatinine clearance: 30 mL/min    Enoxaparin 40 mg subq every 24 hours  will be changed to enoxaparin 30 mg subq every 24 hours per renal dose protocol for CrCl 10-30 ml/min.     Thank you,  Pharmacist's Name: YUNIEL Sosa  Department of Hospital Medicine  [facility] - Telemetry ([facility])

## 2024-12-12 NOTE — PROGRESS NOTES
Pharmacist Renal Dose Adjustment Note    Leia Rodriguez is a 74 y.o. female being treated with the medication enoxaparin.     Patient Data:    Vital Signs (Most Recent):  Temp: 98.2 °F (36.8 °C) (12/12/24 1418)  Pulse: 74 (12/12/24 1418)  Resp: 18 (12/12/24 1418)  BP: 136/72 (12/12/24 1418)  SpO2: (!) 94 % (12/12/24 1418) Vital Signs (72h Range):  Temp:  [98.1 °F (36.7 °C)-98.2 °F (36.8 °C)]   Pulse:  [68-75]   Resp:  [16-20]   BP: (118-188)/(57-75)   SpO2:  [91 %-97 %]      Recent Labs   Lab 12/12/24  1002   CREATININE 1.2     Serum creatinine: 1.2 mg/dL 12/12/24 1002  Estimated creatinine clearance: 30 mL/min    Enoxaparin 40 mg subq every 24 hours will be changed to enoxaparin 30 mg subq every 24 hours per renal dose protocol for CrCl 10-30 ml/min.     Thank you,  Pharmacist's Name: Madhu Lugo

## 2024-12-12 NOTE — ASSESSMENT & PLAN NOTE
-UA appears to be consistent with UTI  -Previous urine culture in 04/2024 had E. Coli   -Start Rocephin 1 mg IV daily  -Monitor urine culture

## 2024-12-12 NOTE — ASSESSMENT & PLAN NOTE
-Had ceballos catheter placed on previous admission 11/29/24 - 12/4/24  -She had an appt today, 12/12/24, with Urology to evaluate need for ceballos per daughter  -New ceballos catheter placed in ED today  -F/U with Urology upon discharge

## 2024-12-12 NOTE — ASSESSMENT & PLAN NOTE
-Initial troponin 0.029  -About 13 days ago was 0.023  -Serial troponin pending  -Elevated troponin may be secondary to demand ischemia from elevated BP  -Patient does not appear in pain  -She denies chest pain and/or equivalent  -Monitor

## 2024-12-12 NOTE — ASSESSMENT & PLAN NOTE
Patient's blood pressure range in the last 24 hours was: BP  Min: 118/57  Max: 188/75.The patient's inpatient anti-hypertensive regimen is listed below:  Current Antihypertensives  metoprolol succinate (TOPROL-XL) 24 hr tablet 25 mg, 2 times daily, Oral  isosorbide mononitrate 24 hr tablet 30 mg, Daily, Oral    Plan  - BP is controlled, no changes needed to their regimen  - Hydralazine PRN

## 2024-12-12 NOTE — ASSESSMENT & PLAN NOTE
Most recent hemoglobin and hematocrit are listed below.  Recent Labs     12/12/24  1002   HGB 11.2*   HCT 36.1*     Plan  - Monitor serial CBC: Daily  - Transfuse PRBC if patient becomes hemodynamically unstable, symptomatic or H/H drops below 7/21.  - Patient has not received any PRBC transfusions to date  - Patient's anemia is currently stable

## 2024-12-12 NOTE — PLAN OF CARE
A220/A220 EZEQUIELAna Lilia Rodriguez is a 74 y.o.female admitted on 12/12/2024 for UTI (urinary tract infection)   Code Status: Full Code MRN: [id number]   Review of patient's allergies indicates:  No Known Allergies  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    Lipoma of abdominal wall     Major neurocognitive disorder 06/21/2022    Obesity     Overactive bladder     Pap smear abnormality of cervix with ASCUS favoring benign     Thyroid nodule     Tobacco use disorder     Tubular adenoma of colon     Urinary incontinence       PRN meds    acetaminophen, 650 mg, Q8H PRN  hydrALAZINE, 10 mg, Q6H PRN  ondansetron, 4 mg, Q8H PRN  sodium chloride 0.9%, 10 mL, PRN      Chart check completed. Will continue plan of care.         Leonel Coma Scale Score: 14       Rhythm: normal sinus rhythm    Cardiac/Telemetry Box Number: [id number]  VTE Core Measure: (SCDs) Sequential compression device initiated/maintained Last Bowel Movement: 12/11/24  Diet GI Soft Fluid - 1500mL; Standard Tray  Voiding Characteristics: urethral catheter (bladder)  Fredy Score: 13  Fall Risk Score: 14  Accucheck []   Freq?      Lines/Drains/Airways       Drain  Duration                  Urethral Catheter 12/12/24 1133 <1 day              Peripheral Intravenous Line  Duration                  Peripheral IV - Single Lumen 12/12/24 1244 20 G Anterior;Right Wrist <1 day         Peripheral IV - Single Lumen 12/12/24 1244 20 G Right Antecubital <1 day

## 2024-12-12 NOTE — SUBJECTIVE & OBJECTIVE
Past Medical History:   Diagnosis Date    Arthritis     EDGAR HANDS, KNEES    Behavioral change 2022    Blind right eye     Traumatic    Breast cancer 06/15/2017    0.8 cm Grade1 INTRADUCTAL BREAST 9 positve margin (left)    Essential hypertension     Hemorrhoids     Immunodeficiency due to chemotherapy 2021    Lipoma of abdominal wall     Major neurocognitive disorder 2022    Obesity     Overactive bladder     Pap smear abnormality of cervix with ASCUS favoring benign     Thyroid nodule     Tobacco use disorder     Tubular adenoma of colon     Urinary incontinence        Past Surgical History:   Procedure Laterality Date    ANTERIOR VAGINAL REPAIR      AORTOGRAPHY WITH SERIALOGRAPHY N/A 10/7/2024    Procedure: AORTOGRAM, WITH SERIALOGRAPHY;  Surgeon: Tiffanie Santos MD;  Location: [facility] CATH LAB;  Service: Cardiology;  Laterality: N/A;  MD requested Anesthesia    BLADDER SURGERY      BREAST LUMPECTOMY Left     CATARACT EXTRACTION Left 2022     SECTION      X2    COLONOSCOPY N/A 3/8/2017    Procedure: COLONOSCOPY;  Surgeon: Tyron Paris MD;  Location: [facility];  Service: Endoscopy;  Laterality: N/A;    COLONOSCOPY N/A 2020    Procedure: COLONOSCOPY;  Surgeon: Keira Ellison MD;  Location: [facility];  Service: Endoscopy;  Laterality: N/A;    ESOPHAGOGASTRODUODENOSCOPY N/A 2020    Procedure: EGD (ESOPHAGOGASTRODUODENOSCOPY);  Surgeon: Keira Ellison MD;  Location: [facility];  Service: Endoscopy;  Laterality: N/A;  new onset iron deficiency with prior history of breast cancer    INTRALUMINAL GASTROINTESTINAL TRACT IMAGING VIA CAPSULE N/A 10/28/2020    Procedure: IMAGING PROCEDURE, GI TRACT, INTRALUMINAL, VIA CAPSULE;  Surgeon: Finesse Jha RN;  Location: [facility];  Service: Endoscopy;  Laterality: N/A;    LEFT HEART CATHETERIZATION Left 10/12/2021    Procedure: CATHETERIZATION, HEART, LEFT;  Surgeon: Tiffanie Santos MD;  Location: [facility] CATH LAB;  Service:  Cardiology;  Laterality: Left;    LITHOTRIPSY, CORONARY TRANSLUMINAL, PERCUTANEOUS  10/7/2024    Procedure: LITHOTRIPSY, CORONARY TRANSLUMINAL, PERCUTANEOUS;  Surgeon: Tiffanie Santos MD;  Location: [facility] CATH LAB;  Service: Cardiology;;    PTA, SUPERFICIAL FEMORAL ARTERY  10/7/2024    Procedure: PTA, Superficial Femoral Artery;  Surgeon: Tiffanie Santos MD;  Location: [facility] CATH LAB;  Service: Cardiology;;    SENTINEL LYMPH NODE BIOPSY Left 9/8/2020    Procedure: BIOPSY, LYMPH NODE, SENTINEL;  Surgeon: Vincent Moyer MD;  Location: [facility] OR;  Service: General;  Laterality: Left;    SIMPLE MASTECTOMY Left 9/8/2020    Procedure: MASTECTOMY, SIMPLE;  Surgeon: Vincent Moyer MD;  Location: [facility] OR;  Service: General;  Laterality: Left;    STENT, SUPERFICIAL FEMORAL ARTERY  10/7/2024    Procedure: Stent, Superficial Femoral Artery;  Surgeon: Tiffanie Santos MD;  Location: [facility] CATH LAB;  Service: Cardiology;;    THYROID LOBECTOMY Left 2005    TOTAL ABDOMINAL HYSTERECTOMY      TUBAL LIGATION         Review of patient's allergies indicates:  No Known Allergies    No current facility-administered medications on file prior to encounter.     Current Outpatient Medications on File Prior to Encounter   Medication Sig    aluminum-magnesium hydroxide-simethicone 200-200-20 mg/5 mL Susp, diphenhydrAMINE 12.5 mg/5 mL Liqd Swish and spit 5 mLs every 4 (four) hours as needed (discomfort). (Patient not taking: Reported on 12/9/2024)    anastrozole (ARIMIDEX) 1 mg Tab Take 1 tablet (1 mg total) by mouth once daily.    aspirin 81 MG Chew Take 1 tablet (81 mg total) by mouth once daily.    busPIRone (BUSPAR) 15 MG tablet Take 1 tablet (15 mg total) by mouth 2 (two) times daily.    cholecalciferol, vitamin D3, (VITAMIN D3) 125 mcg (5,000 unit) Tab Take 5,000 Units by mouth once daily.    donepeziL (ARICEPT) 5 MG tablet Take 1 tablet (5 mg total) by mouth every evening.    furosemide (LASIX) 20 MG tablet Take 1 tablet (20 mg total) by mouth  once daily.    isosorbide mononitrate (IMDUR) 30 MG 24 hr tablet TAKE 1 TABLET BY MOUTH EVERY DAY    lamoTRIgine (LAMICTAL) 100 MG tablet Take 1 tablet (100 mg total) by mouth 2 (two) times daily.    memantine (NAMENDA) 5 MG Tab Take 1 tablet (5 mg total) by mouth 2 (two) times daily.    metoprolol succinate (TOPROL-XL) 25 MG 24 hr tablet Take 1 tablet (25 mg total) by mouth 2 (two) times daily.    potassium chloride (MICRO-K) 10 MEQ CpSR Take 1 capsule (10 mEq total) by mouth once daily. (Patient not taking: Reported on 2024)    predniSONE (DELTASONE) 10 MG tablet Take 1 tablet (10 mg total) by mouth 2 (two) times daily for 5 days, THEN 1 tablet (10 mg total) once daily for 5 days, THEN ½ tablet (5 mg total) once daily for 5 days.    QUEtiapine (SEROQUEL) 200 MG Tab Take 1 tablet (200 mg total) by mouth every evening.    QUEtiapine (SEROQUEL) 50 MG tablet Take 1 tablet (50 mg total) by mouth once daily. (Patient taking differently: Take 50 mg by mouth nightly.)    zinc gluconate 50 mg tablet Take 50 mg by mouth once daily.     Family History       Problem Relation (Age of Onset)    Alcohol abuse Mother    Allergic rhinitis Daughter    Cataracts Father    Cervical cancer Daughter (32)    Cirrhosis Mother    Diabetes Brother    Heart attack Brother    Heart murmur Brother    Hypertension Father, Daughter    No Known Problems Daughter    Prostate cancer Father (80)          Tobacco Use    Smoking status: Former     Current packs/day: 0.00     Average packs/day: 1 pack/day for 50.0 years (50.0 ttl pk-yrs)     Types: Cigarettes     Start date: 1970     Quit date: 2020     Years since quittin.3    Smokeless tobacco: Never   Substance and Sexual Activity    Alcohol use: Never     Alcohol/week: 28.0 standard drinks of alcohol     Types: 28 Cans of beer per week    Drug use: No    Sexual activity: Never     Partners: Male     Review of Systems   Constitutional:  Positive for activity change. Negative for  chills and fever.   HENT:  Negative for trouble swallowing.    Eyes:  Negative for visual disturbance.   Respiratory:  Negative for cough, choking, chest tightness and shortness of breath.    Cardiovascular:  Negative for chest pain.   Gastrointestinal:  Negative for abdominal pain, constipation, diarrhea, nausea and vomiting.   Genitourinary:         Zuluaga   Musculoskeletal:  Negative for arthralgias.   Skin:  Negative for color change.   Neurological:  Positive for weakness.   Psychiatric/Behavioral:  Negative for agitation, behavioral problems and confusion.      Objective:     Vital Signs (Most Recent):  Temp: 98.2 °F (36.8 °C) (12/12/24 1418)  Pulse: 74 (12/12/24 1418)  Resp: 18 (12/12/24 1418)  BP: 136/72 (12/12/24 1418)  SpO2: (!) 94 % (12/12/24 1418) Vital Signs (24h Range):  Temp:  [98.1 °F (36.7 °C)-98.2 °F (36.8 °C)] 98.2 °F (36.8 °C)  Pulse:  [68-75] 74  Resp:  [16-20] 18  SpO2:  [91 %-97 %] 94 %  BP: (118-188)/(57-75) 136/72     Weight: 45.9 kg (101 lb 1.6 oz)  Body mass index is 19.74 kg/m².     Physical Exam  Vitals reviewed.   Constitutional:       General: She is not in acute distress.     Appearance: She is ill-appearing.   HENT:      Head: Normocephalic.      Mouth/Throat:      Mouth: Mucous membranes are moist.   Eyes:      Extraocular Movements: Extraocular movements intact.   Cardiovascular:      Rate and Rhythm: Normal rate.      Pulses: Normal pulses.   Pulmonary:      Effort: Pulmonary effort is normal. No respiratory distress.      Breath sounds: Normal breath sounds.   Abdominal:      General: Bowel sounds are normal. There is no distension.      Palpations: Abdomen is soft.      Tenderness: There is no abdominal tenderness.   Genitourinary:     Comments: Zuluaga catheter draining clear yellow urine  Musculoskeletal:      Cervical back: Neck supple.      Right lower leg: No edema.      Left lower leg: No edema.   Skin:     General: Skin is warm.   Neurological:      Mental Status: She is  alert.      Comments: Oriented to person and place   Psychiatric:         Mood and Affect: Mood normal.         Behavior: Behavior normal.         Thought Content: Thought content normal.                Significant Labs: All pertinent labs within the past 24 hours have been reviewed.  CBC:   Recent Labs   Lab 12/12/24  1002   WBC 6.42   HGB 11.2*   HCT 36.1*        CMP:   Recent Labs   Lab 12/12/24  1002      K 3.4*      CO2 27   GLU 89   BUN 21   CREATININE 1.2   CALCIUM 9.3   PROT 7.3   ALBUMIN 3.8   BILITOT 0.7   ALKPHOS 57   AST 13   ALT 9*   ANIONGAP 10     Lactic Acid:   Recent Labs   Lab 12/12/24  1002   LACTATE 1.2     Lipase:   Recent Labs   Lab 12/12/24  1002   LIPASE 6     Troponin:   Recent Labs   Lab 12/12/24  1002   TROPONINI 0.029*     TSH:   Recent Labs   Lab 12/12/24  1002   TSH 0.959     Urine Studies:   Recent Labs   Lab 12/12/24  1037   COLORU Yellow   APPEARANCEUA Hazy*   PHUR 6.0   SPECGRAV 1.025   PROTEINUA 1+*   GLUCUA Negative   KETONESU Negative   BILIRUBINUA Negative   OCCULTUA 3+*   NITRITE Negative   UROBILINOGEN Negative   LEUKOCYTESUR 3+*   RBCUA >100*   WBCUA 73*   BACTERIA Rare   SQUAMEPITHEL 27   HYALINECASTS 5*       Significant Imaging:   Imaging Results              CT Head Without Contrast (Final result)  Result time 12/12/24 10:34:18      Final result by Earnest Monterroso MD (12/12/24 10:34:18)                   Impression:      Chronic microvascular ischemic changes.      Electronically signed by: Earnest Monterroso MD  Date:    12/12/2024  Time:    10:34               Narrative:    EXAMINATION:  CT HEAD WITHOUT CONTRAST    CLINICAL HISTORY:  Mental status change, unknown cause;    TECHNIQUE:  Low dose axial CT images obtained throughout the head without intravenous contrast. Sagittal and coronal reconstructions were performed.  All CT scans at this facility use dose modulation, iterative reconstruction and/or weight based dosing when appropriate to reduce  radiation dose to as low as reasonably achievable.    COMPARISON:  11/29/2024    FINDINGS:  Intracranial compartment:    The brain parenchyma demonstrates areas of decreased attenuation with mild to moderate periventricular white matter consistent with chronic microvascular ischemic changes.  Stable small area of encephalomalacia right frontal lobe which could reflect remote infarct.  No parenchymal mass, hemorrhage, edema or major vascular distribution infarct.  Vascular calcifications are noted.    Mild prominence of the sulci and ventricles are consistent with age-related involutional changes.    No extra-axial blood or fluid collections.    Skull/extracranial contents (limited evaluation): No fracture.  Scattered mild mucosal thickening.  Mastoid air cells and paranasal sinuses are essentially clear.                                       X-Ray Chest AP Portable (Final result)  Result time 12/12/24 09:45:28      Final result by Hermilo Allred MD (12/12/24 09:45:28)                   Impression:      1.  Negative for acute process involving the chest.    2.  Stable findings as noted above.      Electronically signed by: Hermilo Allred MD  Date:    12/12/2024  Time:    09:45               Narrative:    EXAMINATION:  XR CHEST AP PORTABLE    CLINICAL HISTORY:  altered mental status;    COMPARISON:  December 2, 2024, as well as studies dating back to December 3, 2022.    FINDINGS:  EKG leads overlie the chest.  Clothing artifact is present.  The lungs are clear. The cardiac silhouette size is borderline enlarged.  The trachea is midline and the mediastinal width is normal. Negative for focal infiltrate, effusion or pneumothorax. Pulmonary vasculature is normal. Negative for osseous abnormalities. Tortuous aorta with calcifications of the aortic knob.  There are degenerative changes of the spine and both shoulder girdles.  Stable postoperative changes to the left breast and left axilla.  Nonspecific bowel gas  pattern.    Interval removal of right arm PICC line.

## 2024-12-12 NOTE — ASSESSMENT & PLAN NOTE
-Followed by Dr. Fernández outpatient  -Continue Arimidex (per office note on 09/04/24 - she is completing 4 of 5 years of Arimidex 1 mg daily)

## 2024-12-12 NOTE — TELEPHONE ENCOUNTER
Called [phone] and spoke with daughter and appt was moved.       ----- Message from Optynmejia sent at 12/12/2024  9:44 AM CST -----  Contact: Moe/ Daughter  .Type:  Needs Medical Advice    Who Called:  Moe     Would the patient rather a call back or a response via [facility]?  Call back if any questions   Best Call Back Number:  [phone]    Additional Information:  Moe is calling in regards to informing the provider that pt will not make the appt schedule on today due to being in the ER at the [facility] location       Thanks

## 2024-12-12 NOTE — HPI
Leia Rodriguez is a 74 year old female who has  has a past medical history of Arthritis, Behavioral change, Blind right eye, Breast cancer, Dementia, Essential hypertension, Hemorrhoids, Immunodeficiency due to chemotherapy, Lipoma of abdominal wall, Major neurocognitive disorder, Obesity, Overactive bladder, Pap smear abnormality of cervix with ASCUS favoring benign, Thyroid nodule, Tobacco use disorder, Tubular adenoma of colon, and Urinary incontinence who presented to the ED, with daughter, for evaluation of generalized weakness. Onset for the past 3-4 days but became worse today while going to a Urology appt. ED workup showed WBC count 6.42K, Hgb/Hct 11.2/36.1, K+ 3.4, troponin 0.029, UA with 3+ leukos (UA micro WBC 73, many WBC clumps). CXR with no acute findings, pneumothorax resolved. CT head with chronic microvascular ischemic changes.  Pt admitted to observation for UTI. Discussed code status with daughter at bedside. Patient has no POA. She will remain FULL CODE until all the patient's daughters can come together and decide.     Of note patient was admitted on 11/29/24  after she was found down at home and brought by EMS to the hospital.  She was intubated secondary to agonal breathing.  Difficult intubation resulting in small pneumothorax.  She was started on Precedex on admission became hypotensive briefly required Levophed and was admitted to critical care.  Patient was stabilized extubated and was stable off of Precedex. Patient had significant hypoglycemia requiring D10 infusion overnight which has been discontinued with patient able to maintain blood glucose greater than 100. Eventually transitioned to telemetry floor.  She was seen in consultation by Physical therapy and Occupational therapy with recommendations to discharge home with 24 hour a day care as well as home health. Patient's functional status extremely limited by her dementia but family is aware and patient will be discharged home. She  was discharged home on tapering steroids and it is recommended that she follow up with her PCP for evaluation of multiple behavioral med she is on that might need adjusting. Recommended she repeat chest x-ray given her small pneumothorax which resolved while in the hospital. Due to urinary retention with history of straight caths patient will have Zuluaga left in place upon discharge and follow up with her urologist as an outpatient.

## 2024-12-12 NOTE — ED PROVIDER NOTES
SCRIBE #1 NOTE: I, Jacinta Coley & Shiloh Hernandez, am scribing for, and in the presence of, Jasper Lopez Jr., MD. I have scribed the entire note.       History     Chief Complaint   Patient presents with    Altered Mental Status     Pt with history of dementia is here with her daughter who states the patient is less responsive and talkative than usual today.  Pt has also been weak x 3 days.  She was recently discharged from this hospital for aspiration.     Review of patient's allergies indicates:  No Known Allergies      History of Present Illness     HPI    12/12/2024, 9:26 AM  History obtained from the daughter and patient  Limited HPT due to pt's AMS.      History of Present Illness: Leia Rodriguez is a 74 y.o. female patient with a PMHx of obesity, dementia, HTN, tobacco use disorder, blind right eye, breast cancer, arthritis, and pap smear abnormality of cervix with ASCUS favoring benign who presents to the Emergency Department for evaluation of AMS which began this morning. Symptoms are constant and moderate in severity. No mitigating or exacerbating factors reported. Associated sxs include body aches, feeling weakness, and confusion. Patient's daughter states the pt's weakness started 3 days ago and that the pt has difficulty walking. Daughter reports the patient was recently discharged from this hospital for aspiration and that the patient was in the ICU for a few days for acute respiratory failure. Daughter is currently administering Lasix and potassium. Pt's daughter also reports the pt's ceballos was supposed to be removed today; the pt's ceballos was placed 1.5-2 weeks ago. Prior to ceballos placement, the pt's urinary output averaged 400 mL/day with cath use. No further complaints or concerns at this time.       Arrival mode: Personal Transportation    PCP: Yasmin Navarro MD        Past Medical History:  Past Medical History:   Diagnosis Date    Arthritis     EDGAR HANDS, KNEES    Behavioral change 09/21/2022     Blind right eye     Traumatic    Breast cancer 06/15/2017    0.8 cm Grade1 INTRADUCTAL BREAST 9 positve margin (left)    Essential hypertension     Hemorrhoids     Immunodeficiency due to chemotherapy 2021    Lipoma of abdominal wall     Major neurocognitive disorder 2022    Obesity     Overactive bladder     Pap smear abnormality of cervix with ASCUS favoring benign     Thyroid nodule     Tobacco use disorder     Tubular adenoma of colon     Urinary incontinence        Past Surgical History:  Past Surgical History:   Procedure Laterality Date    ANTERIOR VAGINAL REPAIR      AORTOGRAPHY WITH SERIALOGRAPHY N/A 10/7/2024    Procedure: AORTOGRAM, WITH SERIALOGRAPHY;  Surgeon: Tiffanie Santos MD;  Location: [facility] CATH LAB;  Service: Cardiology;  Laterality: N/A;  MD requested Anesthesia    BLADDER SURGERY      BREAST LUMPECTOMY Left 2017    CATARACT EXTRACTION Left 2022     SECTION      X2    COLONOSCOPY N/A 3/8/2017    Procedure: COLONOSCOPY;  Surgeon: Tyron Paris MD;  Location: [facility];  Service: Endoscopy;  Laterality: N/A;    COLONOSCOPY N/A 2020    Procedure: COLONOSCOPY;  Surgeon: Keira Ellison MD;  Location: [facility];  Service: Endoscopy;  Laterality: N/A;    ESOPHAGOGASTRODUODENOSCOPY N/A 2020    Procedure: EGD (ESOPHAGOGASTRODUODENOSCOPY);  Surgeon: Keira Ellison MD;  Location: [facility];  Service: Endoscopy;  Laterality: N/A;  new onset iron deficiency with prior history of breast cancer    INTRALUMINAL GASTROINTESTINAL TRACT IMAGING VIA CAPSULE N/A 10/28/2020    Procedure: IMAGING PROCEDURE, GI TRACT, INTRALUMINAL, VIA CAPSULE;  Surgeon: Finesse Jha RN;  Location: [facility];  Service: Endoscopy;  Laterality: N/A;    LEFT HEART CATHETERIZATION Left 10/12/2021    Procedure: CATHETERIZATION, HEART, LEFT;  Surgeon: Tiffanie Santos MD;  Location: [facility] CATH LAB;  Service: Cardiology;  Laterality: Left;    LITHOTRIPSY, CORONARY TRANSLUMINAL, PERCUTANEOUS   10/7/2024    Procedure: LITHOTRIPSY, CORONARY TRANSLUMINAL, PERCUTANEOUS;  Surgeon: Tiffanie Santos MD;  Location: [facility] CATH LAB;  Service: Cardiology;;    PTA, SUPERFICIAL FEMORAL ARTERY  10/7/2024    Procedure: PTA, Superficial Femoral Artery;  Surgeon: Tiffanie Santos MD;  Location: [facility] CATH LAB;  Service: Cardiology;;    SENTINEL LYMPH NODE BIOPSY Left 2020    Procedure: BIOPSY, LYMPH NODE, SENTINEL;  Surgeon: Vincent Moyer MD;  Location: [facility] OR;  Service: General;  Laterality: Left;    SIMPLE MASTECTOMY Left 2020    Procedure: MASTECTOMY, SIMPLE;  Surgeon: Vincent Moyer MD;  Location: [facility] OR;  Service: General;  Laterality: Left;    STENT, SUPERFICIAL FEMORAL ARTERY  10/7/2024    Procedure: Stent, Superficial Femoral Artery;  Surgeon: Tiffanie Santos MD;  Location: [facility] CATH LAB;  Service: Cardiology;;    THYROID LOBECTOMY Left     TOTAL ABDOMINAL HYSTERECTOMY      TUBAL LIGATION           Family History:  Family History   Problem Relation Name Age of Onset    Hypertension Father      Cataracts Father      Prostate cancer Father  80    Cervical cancer Daughter Cenetra 32    Allergic rhinitis Daughter Cenetra     Cirrhosis Mother      Alcohol abuse Mother      Hypertension Daughter Sheronica     Diabetes Brother      Heart attack Brother      Heart murmur Brother      No Known Problems Daughter [place]        Social History:  Social History     Tobacco Use    Smoking status: Former     Current packs/day: 0.00     Average packs/day: 1 pack/day for 50.0 years (50.0 ttl pk-yrs)     Types: Cigarettes     Start date: 1970     Quit date: 2020     Years since quittin.3    Smokeless tobacco: Never   Substance and Sexual Activity    Alcohol use: Never     Alcohol/week: 28.0 standard drinks of alcohol     Types: 28 Cans of beer per week    Drug use: No    Sexual activity: Never     Partners: Male        Review of Systems     Review of Systems   Unable to perform ROS: Dementia   Constitutional:   Negative for fever.        (+) body aches   HENT:  Negative for sore throat.    Respiratory:  Negative for shortness of breath.    Cardiovascular:  Negative for chest pain.   Gastrointestinal:  Negative for nausea.   Genitourinary:  Negative for dysuria.   Musculoskeletal:  Negative for back pain.   Skin:  Negative for rash.   Neurological:  Positive for weakness.   Hematological:  Does not bruise/bleed easily.   Psychiatric/Behavioral:  Positive for confusion.    All other systems reviewed and are negative.     Physical Exam     Initial Vitals   BP Pulse Resp Temp SpO2   12/12/24 0900 12/12/24 0900 12/12/24 0900 12/12/24 0900 12/12/24 0925   (!) 118/57 75 16 98.1 °F (36.7 °C) 95 %      MAP       --                 Physical Exam  Nursing Notes and Vital Signs Reviewed.  Constitutional: Patient is in no acute distress. Well-developed and well-nourished.  Patient appears very fatigued  Head: Atraumatic. Normocephalic.  Eyes:  EOM intact.  No scleral icterus.  ENT: Mucous membranes are moist.  Nares clear   Neck:  Full ROM. No JVD.  Cardiovascular: Regular rate. Regular rhythm No murmurs, rubs, or gallops. Distal pulses are 2+ and symmetric  Pulmonary/Chest: No respiratory distress. Clear to auscultation bilaterally. No wheezing or rales.  Equal chest wall rise bilaterally  Abdominal: Soft and non-distended.  There is no tenderness.  No rebound, guarding, or rigidity. Good bowel sounds.  Genitourinary: No CVA tenderness.  No suprapubic tenderness.  Indwelling Zuluaga on arrival.  Urine is clear  Musculoskeletal: Moves all extremities. No obvious deformities.  5 x 5 strength in all extremities   Skin: Warm and dry.  Neurological:  Alert, awake, and appropriate.  Normal speech.  No acute focal neurological deficits are appreciated.  Two through 12 intact bilaterally.  Per daughter the patient is confused from baseline and much more fatigued  Psychiatric: Normal affect. Good eye contact. Appropriate in content.       ED  Course   Procedures  ED Vital Signs:  Vitals:    12/12/24 0900 12/12/24 0919 12/12/24 0920 12/12/24 0925   BP: (!) 118/57  (!) 188/75    Pulse: 75  71 69   Resp: 16  18 19   Temp: 98.1 °F (36.7 °C)      TempSrc: Oral      SpO2:    95%   Weight:  45.9 kg (101 lb 1.6 oz)      12/12/24 0930 12/12/24 1102 12/12/24 1200   BP: (!) 142/63 (!) 181/75 (!) 144/67   Pulse: 71 73 71   Resp: 20 18 18   Temp:      TempSrc:      SpO2: (!) 91% 97% 97%   Weight:          Abnormal Lab Results:  Labs Reviewed   CBC W/ AUTO DIFFERENTIAL - Abnormal       Result Value    WBC 6.42      RBC 3.80 (*)     Hemoglobin 11.2 (*)     Hematocrit 36.1 (*)     MCV 95      MCH 29.5      MCHC 31.0 (*)     RDW 14.9 (*)     Platelets 280      MPV 10.2      Immature Granulocytes 0.9 (*)     Gran # (ANC) 3.8      Immature Grans (Abs) 0.06 (*)     Lymph # 1.9      Mono # 0.4      Eos # 0.2      Baso # 0.03      nRBC 0      Gran % 58.5      Lymph % 29.9      Mono % 6.9      Eosinophil % 3.3      Basophil % 0.5      Differential Method Automated      Narrative:     Release to patient->Immediate   COMPREHENSIVE METABOLIC PANEL - Abnormal    Sodium 139      Potassium 3.4 (*)     Chloride 102      CO2 27      Glucose 89      BUN 21      Creatinine 1.2      Calcium 9.3      Total Protein 7.3      Albumin 3.8      Total Bilirubin 0.7      Alkaline Phosphatase 57      AST 13      ALT 9 (*)     eGFR 48 (*)     Anion Gap 10      Narrative:     Release to patient->Immediate   URINALYSIS, REFLEX TO URINE CULTURE - Abnormal    Specimen UA Urine, Catheterized      Color, UA Yellow      Appearance, UA Hazy (*)     pH, UA 6.0      Specific Gravity, UA 1.025      Protein, UA 1+ (*)     Glucose, UA Negative      Ketones, UA Negative      Bilirubin (UA) Negative      Occult Blood UA 3+ (*)     Nitrite, UA Negative      Urobilinogen, UA Negative      Leukocytes, UA 3+ (*)     Narrative:     Specimen Source->Urine   TROPONIN I - Abnormal    Troponin I 0.029 (*)     Narrative:      Release to patient->Immediate   URINALYSIS MICROSCOPIC - Abnormal    RBC, UA >100 (*)     WBC, UA 73 (*)     WBC Clumps, UA Many (*)     Bacteria Rare      Yeast, UA Few (*)     Squam Epithel, UA 27      Hyaline Casts, UA 5 (*)     Unclass Natalie UA Few      Microscopic Comment SEE COMMENT      Narrative:     Specimen Source->Urine   CULTURE, BLOOD   CULTURE, BLOOD   CULTURE, URINE   HEPATITIS C ANTIBODY    Hepatitis C Ab Negative      Narrative:     Release to patient->Immediate   HEP C VIRUS HOLD SPECIMEN    HEP C Virus Hold Specimen Hold for HCV sendout      Narrative:     Release to patient->Immediate   HIV 1 / 2 ANTIBODY    HIV 1/2 Ag/Ab Negative      Narrative:     Release to patient->Immediate   LIPASE    Lipase 6      Narrative:     Release to patient->Immediate   LACTIC ACID, PLASMA    Lactate (Lactic Acid) 1.2     TSH    TSH 0.959      Narrative:     Release to patient->Immediate   DRUG SCREEN PANEL, URINE EMERGENCY    Benzodiazepines Negative      Methadone metabolites Negative      Cocaine (Metab.) Negative      Opiate Scrn, Ur Negative      Barbiturate Screen, Ur Negative      Amphetamine Screen, Ur Negative      THC Negative      Phencyclidine Negative      Creatinine, Urine 141.0      Toxicology Information SEE COMMENT      Narrative:     Specimen Source->Urine   AMMONIA    Ammonia 22     ALCOHOL,MEDICAL (ETHANOL)    Alcohol, Serum <10          All Lab Results:  Results for orders placed or performed during the hospital encounter of 12/12/24   Hepatitis C Antibody    Collection Time: 12/12/24 10:02 AM   Result Value Ref Range    Hepatitis C Ab Negative Negative   HCV Virus Hold Specimen    Collection Time: 12/12/24 10:02 AM   Result Value Ref Range    HEP C Virus Hold Specimen Hold for HCV sendout    HIV 1/2 Ag/Ab (4th Gen)    Collection Time: 12/12/24 10:02 AM   Result Value Ref Range    HIV 1/2 Ag/Ab Negative Negative   CBC auto differential    Collection Time: 12/12/24 10:02 AM   Result Value Ref  Range    WBC 6.42 3.90 - 12.70 K/uL    RBC 3.80 (L) 4.00 - 5.40 M/uL    Hemoglobin 11.2 (L) 12.0 - 16.0 g/dL    Hematocrit 36.1 (L) 37.0 - 48.5 %    MCV 95 82 - 98 fL    MCH 29.5 27.0 - 31.0 pg    MCHC 31.0 (L) 32.0 - 36.0 g/dL    RDW 14.9 (H) 11.5 - 14.5 %    Platelets 280 150 - 450 K/uL    MPV 10.2 9.2 - 12.9 fL    Immature Granulocytes 0.9 (H) 0.0 - 0.5 %    Gran # (ANC) 3.8 1.8 - 7.7 K/uL    Immature Grans (Abs) 0.06 (H) 0.00 - 0.04 K/uL    Lymph # 1.9 1.0 - 4.8 K/uL    Mono # 0.4 0.3 - 1.0 K/uL    Eos # 0.2 0.0 - 0.5 K/uL    Baso # 0.03 0.00 - 0.20 K/uL    nRBC 0 0 /100 WBC    Gran % 58.5 38.0 - 73.0 %    Lymph % 29.9 18.0 - 48.0 %    Mono % 6.9 4.0 - 15.0 %    Eosinophil % 3.3 0.0 - 8.0 %    Basophil % 0.5 0.0 - 1.9 %    Differential Method Automated    Comprehensive metabolic panel    Collection Time: 12/12/24 10:02 AM   Result Value Ref Range    Sodium 139 136 - 145 mmol/L    Potassium 3.4 (L) 3.5 - 5.1 mmol/L    Chloride 102 95 - 110 mmol/L    CO2 27 23 - 29 mmol/L    Glucose 89 70 - 110 mg/dL    BUN 21 8 - 23 mg/dL    Creatinine 1.2 0.5 - 1.4 mg/dL    Calcium 9.3 8.7 - 10.5 mg/dL    Total Protein 7.3 6.0 - 8.4 g/dL    Albumin 3.8 3.5 - 5.2 g/dL    Total Bilirubin 0.7 0.1 - 1.0 mg/dL    Alkaline Phosphatase 57 40 - 150 U/L    AST 13 10 - 40 U/L    ALT 9 (L) 10 - 44 U/L    eGFR 48 (A) >60 mL/min/1.73 m^2    Anion Gap 10 8 - 16 mmol/L   Lipase    Collection Time: 12/12/24 10:02 AM   Result Value Ref Range    Lipase 6 4 - 60 U/L   Lactic acid, plasma    Collection Time: 12/12/24 10:02 AM   Result Value Ref Range    Lactate (Lactic Acid) 1.2 0.5 - 2.2 mmol/L   TSH    Collection Time: 12/12/24 10:02 AM   Result Value Ref Range    TSH 0.959 0.400 - 4.000 uIU/mL   Troponin I    Collection Time: 12/12/24 10:02 AM   Result Value Ref Range    Troponin I 0.029 (H) 0.000 - 0.026 ng/mL   Ammonia    Collection Time: 12/12/24 10:02 AM   Result Value Ref Range    Ammonia 22 10 - 50 umol/L   Ethanol    Collection Time:  12/12/24 10:02 AM   Result Value Ref Range    Alcohol, Serum <10 <10 mg/dL   Urinalysis, Reflex to Urine Culture Urine, Catheterized    Collection Time: 12/12/24 10:37 AM    Specimen: Urine, Clean Catch   Result Value Ref Range    Specimen UA Urine, Catheterized     Color, UA Yellow Yellow, Straw, Cynthia    Appearance, UA Hazy (A) Clear    pH, UA 6.0 5.0 - 8.0    Specific Gravity, UA 1.025 1.005 - 1.030    Protein, UA 1+ (A) Negative    Glucose, UA Negative Negative    Ketones, UA Negative Negative    Bilirubin (UA) Negative Negative    Occult Blood UA 3+ (A) Negative    Nitrite, UA Negative Negative    Urobilinogen, UA Negative <2.0 EU/dL    Leukocytes, UA 3+ (A) Negative   Drug screen panel, emergency    Collection Time: 12/12/24 10:37 AM   Result Value Ref Range    Benzodiazepines Negative Negative    Methadone metabolites Negative Negative    Cocaine (Metab.) Negative Negative    Opiate Scrn, Ur Negative Negative    Barbiturate Screen, Ur Negative Negative    Amphetamine Screen, Ur Negative Negative    THC Negative Negative    Phencyclidine Negative Negative    Creatinine, Urine 141.0 15.0 - 325.0 mg/dL    Toxicology Information SEE COMMENT    Urinalysis Microscopic    Collection Time: 12/12/24 10:37 AM   Result Value Ref Range    RBC, UA >100 (H) 0 - 4 /hpf    WBC, UA 73 (H) 0 - 5 /hpf    WBC Clumps, UA Many (A) None-Rare    Bacteria Rare None-Occ /hpf    Yeast, UA Few (A) None    Squam Epithel, UA 27 /hpf    Hyaline Casts, UA 5 (A) 0-1/lpf /lpf    Unclass Natalie UA Few None-Moderate    Microscopic Comment SEE COMMENT      *Note: Due to a large number of results and/or encounters for the requested time period, some results have not been displayed. A complete set of results can be found in Results Review.       Imaging Results:  Imaging Results              CT Head Without Contrast (Final result)  Result time 12/12/24 10:34:18      Final result by Earnest Monterroso MD (12/12/24 10:34:18)                    Impression:      Chronic microvascular ischemic changes.      Electronically signed by: Earnest Monterroso MD  Date:    12/12/2024  Time:    10:34               Narrative:    EXAMINATION:  CT HEAD WITHOUT CONTRAST    CLINICAL HISTORY:  Mental status change, unknown cause;    TECHNIQUE:  Low dose axial CT images obtained throughout the head without intravenous contrast. Sagittal and coronal reconstructions were performed.  All CT scans at this facility use dose modulation, iterative reconstruction and/or weight based dosing when appropriate to reduce radiation dose to as low as reasonably achievable.    COMPARISON:  11/29/2024    FINDINGS:  Intracranial compartment:    The brain parenchyma demonstrates areas of decreased attenuation with mild to moderate periventricular white matter consistent with chronic microvascular ischemic changes.  Stable small area of encephalomalacia right frontal lobe which could reflect remote infarct.  No parenchymal mass, hemorrhage, edema or major vascular distribution infarct.  Vascular calcifications are noted.    Mild prominence of the sulci and ventricles are consistent with age-related involutional changes.    No extra-axial blood or fluid collections.    Skull/extracranial contents (limited evaluation): No fracture.  Scattered mild mucosal thickening.  Mastoid air cells and paranasal sinuses are essentially clear.                                       X-Ray Chest AP Portable (Final result)  Result time 12/12/24 09:45:28      Final result by Hermilo Allred MD (12/12/24 09:45:28)                   Impression:      1.  Negative for acute process involving the chest.    2.  Stable findings as noted above.      Electronically signed by: Hermilo Allred MD  Date:    12/12/2024  Time:    09:45               Narrative:    EXAMINATION:  XR CHEST AP PORTABLE    CLINICAL HISTORY:  altered mental status;    COMPARISON:  December 2, 2024, as well as studies dating back to December 3,  2022.    FINDINGS:  EKG leads overlie the chest.  Clothing artifact is present.  The lungs are clear. The cardiac silhouette size is borderline enlarged.  The trachea is midline and the mediastinal width is normal. Negative for focal infiltrate, effusion or pneumothorax. Pulmonary vasculature is normal. Negative for osseous abnormalities. Tortuous aorta with calcifications of the aortic knob.  There are degenerative changes of the spine and both shoulder girdles.  Stable postoperative changes to the left breast and left axilla.  Nonspecific bowel gas pattern.    Interval removal of right arm PICC line.                                       The EKG was ordered, reviewed, and independently interpreted by the ED provider.  Interpretation time: 9:35  Rate: 84 BPM  Rhythm: normal sinus rhythm  Interpretation: Moderate voltage criteria for LVH, may be normal variant (Sokolow-[place], [place] product). T wave abnormality, consider inferior ischemia. T wave abnormality, consider anterolateral ischemia. No STEMI.         The Emergency Provider reviewed the vital signs and test results, which are outlined above.     ED Discussion     11:58 AM: Discussed case with Amina Campoverde MD ([facility]). Dr. Campoverde agrees with current care and management of pt and accepts admission.   Admitting Service: Hospital  Admitting Physician: Dr. Campoverde  Admit to: obs - med/tele    ED Course as of 12/12/24 1226   Thu Dec 12, 2024   1052 Cardiac monitor interpretation  Independent interpretation  Indication: Altered mental status  Normal sinus rhythm.  Rate 70.  No STEMI [RT]      ED Course User Index  [RT] Jasper Lopez Jr., MD     Medical Decision Making  Differential diagnosis: Altered mental status, UTI, pneumonia, electrolyte abnormality, dehydration    That has evaluated history physical examination.  Patient has a indwelling Zuluaga catheter recent admission.  She arrives complaining of confusion.  Workup shows urinary tract  infection was likely cause of her confusion and fatigue.  She is being admitted to Hospital Medicine for further evaluation and treatment    Amount and/or Complexity of Data Reviewed  Independent Historian: caregiver     Details: Daughter at bedside.  External Data Reviewed: labs and notes.     Details: Patient's troponin appears to be around her baseline.  Labs: ordered. Decision-making details documented in ED Course.     Details: UA consistent with a UTI.  UDS is negative TSH is normal mild elevation troponin 0.029.  Lipase is normal.  CMP is benign.  Lactate alcohol and ammonia normal.  The patient has normal white count with a an 11.2 hemoglobin  Radiology: ordered. Decision-making details documented in ED Course.     Details: Head CT shows chronic changes.  Chest x-ray is clear  ECG/medicine tests: ordered and independent interpretation performed. Decision-making details documented in ED Course.     Details: No STEMI  Discussion of management or test interpretation with external provider(s): Discussed the case with hospital medicine graciously accepts    Risk  OTC drugs.  Prescription drug management.  Decision regarding hospitalization.                ED Medication(s):  Medications - No data to display    New Prescriptions    No medications on file               Scribe Attestation:   Scribe #1: I performed the above scribed service and the documentation accurately describes the services I performed. I attest to the accuracy of the note.     Attending:   Physician Attestation Statement for Scribe #1: I, Jasper Lopez Jr., MD, personally performed the services described in this documentation, as scribed by Jacinta Coley & Shiloh Hernandez, in my presence, and it is both accurate and complete.           Clinical Impression       ICD-10-CM ICD-9-CM   1. Acute lower UTI  N39.0 599.0   2. Altered mental status  R41.82 780.97       Disposition:   Disposition: Placed in Observation  Condition: Stable       Jasper Lopez  SHANTELLE Giraldo MD  12/12/24 5314

## 2024-12-12 NOTE — ASSESSMENT & PLAN NOTE
Patient's most recent potassium results are listed below.   Recent Labs     12/12/24  1002   K 3.4*     Plan  - Replete potassium per protocol  - Monitor potassium Daily  - Patient's hypokalemia is  currently being treated.

## 2024-12-13 ENCOUNTER — DOCUMENTATION ONLY (OUTPATIENT)
Dept: PALLIATIVE MEDICINE | Facility: HOSPITAL | Age: 74
End: 2024-12-13
Payer: MEDICARE

## 2024-12-13 VITALS
BODY MASS INDEX: 19.87 KG/M2 | HEART RATE: 80 BPM | SYSTOLIC BLOOD PRESSURE: 118 MMHG | TEMPERATURE: 99 F | WEIGHT: 101.19 LBS | RESPIRATION RATE: 16 BRPM | DIASTOLIC BLOOD PRESSURE: 58 MMHG | OXYGEN SATURATION: 100 % | HEIGHT: 60 IN

## 2024-12-13 LAB
ALBUMIN SERPL BCP-MCNC: 3.4 G/DL (ref 3.5–5.2)
ALP SERPL-CCNC: 51 U/L (ref 40–150)
ALT SERPL W/O P-5'-P-CCNC: 7 U/L (ref 10–44)
ANION GAP SERPL CALC-SCNC: 12 MMOL/L (ref 8–16)
AST SERPL-CCNC: 13 U/L (ref 10–40)
BASOPHILS # BLD AUTO: 0.03 K/UL (ref 0–0.2)
BASOPHILS NFR BLD: 0.4 % (ref 0–1.9)
BILIRUB SERPL-MCNC: 0.6 MG/DL (ref 0.1–1)
BUN SERPL-MCNC: 16 MG/DL (ref 8–23)
CALCIUM SERPL-MCNC: 8.9 MG/DL (ref 8.7–10.5)
CHLORIDE SERPL-SCNC: 106 MMOL/L (ref 95–110)
CO2 SERPL-SCNC: 20 MMOL/L (ref 23–29)
CREAT SERPL-MCNC: 1.1 MG/DL (ref 0.5–1.4)
DIFFERENTIAL METHOD BLD: ABNORMAL
EOSINOPHIL # BLD AUTO: 0.4 K/UL (ref 0–0.5)
EOSINOPHIL NFR BLD: 4.4 % (ref 0–8)
ERYTHROCYTE [DISTWIDTH] IN BLOOD BY AUTOMATED COUNT: 14.6 % (ref 11.5–14.5)
EST. GFR  (NO RACE VARIABLE): 53 ML/MIN/1.73 M^2
GLUCOSE SERPL-MCNC: 99 MG/DL (ref 70–110)
HCT VFR BLD AUTO: 34.4 % (ref 37–48.5)
HGB BLD-MCNC: 10.8 G/DL (ref 12–16)
IMM GRANULOCYTES # BLD AUTO: 0.06 K/UL (ref 0–0.04)
IMM GRANULOCYTES NFR BLD AUTO: 0.7 % (ref 0–0.5)
LYMPHOCYTES # BLD AUTO: 1.5 K/UL (ref 1–4.8)
LYMPHOCYTES NFR BLD: 18 % (ref 18–48)
MCH RBC QN AUTO: 29.4 PG (ref 27–31)
MCHC RBC AUTO-ENTMCNC: 31.4 G/DL (ref 32–36)
MCV RBC AUTO: 94 FL (ref 82–98)
MONOCYTES # BLD AUTO: 0.8 K/UL (ref 0.3–1)
MONOCYTES NFR BLD: 9.9 % (ref 4–15)
NEUTROPHILS # BLD AUTO: 5.6 K/UL (ref 1.8–7.7)
NEUTROPHILS NFR BLD: 66.6 % (ref 38–73)
NRBC BLD-RTO: 0 /100 WBC
OHS QRS DURATION: 90 MS
OHS QTC CALCULATION: 460 MS
PLATELET # BLD AUTO: 248 K/UL (ref 150–450)
PMV BLD AUTO: 10.1 FL (ref 9.2–12.9)
POTASSIUM SERPL-SCNC: 4.3 MMOL/L (ref 3.5–5.1)
PROT SERPL-MCNC: 6.5 G/DL (ref 6–8.4)
RBC # BLD AUTO: 3.67 M/UL (ref 4–5.4)
SODIUM SERPL-SCNC: 138 MMOL/L (ref 136–145)
WBC # BLD AUTO: 8.4 K/UL (ref 3.9–12.7)

## 2024-12-13 PROCEDURE — 97535 SELF CARE MNGMENT TRAINING: CPT

## 2024-12-13 PROCEDURE — 36415 COLL VENOUS BLD VENIPUNCTURE: CPT | Performed by: NURSE PRACTITIONER

## 2024-12-13 PROCEDURE — 85025 COMPLETE CBC W/AUTO DIFF WBC: CPT | Performed by: NURSE PRACTITIONER

## 2024-12-13 PROCEDURE — G0378 HOSPITAL OBSERVATION PER HR: HCPCS

## 2024-12-13 PROCEDURE — 80053 COMPREHEN METABOLIC PANEL: CPT | Performed by: NURSE PRACTITIONER

## 2024-12-13 PROCEDURE — 99214 OFFICE O/P EST MOD 30 MIN: CPT | Mod: ,,, | Performed by: UROLOGY

## 2024-12-13 PROCEDURE — 25000003 PHARM REV CODE 250: Performed by: NURSE PRACTITIONER

## 2024-12-13 PROCEDURE — 96361 HYDRATE IV INFUSION ADD-ON: CPT

## 2024-12-13 PROCEDURE — 92610 EVALUATE SWALLOWING FUNCTION: CPT

## 2024-12-13 PROCEDURE — 63600175 PHARM REV CODE 636 W HCPCS: Performed by: STUDENT IN AN ORGANIZED HEALTH CARE EDUCATION/TRAINING PROGRAM

## 2024-12-13 RX ORDER — MULTIVITAMIN/IRON/FOLIC ACID 18MG-0.4MG
1 TABLET ORAL DAILY
Qty: 30 TABLET | Refills: 0 | Status: SHIPPED | OUTPATIENT
Start: 2024-12-13 | End: 2025-01-12

## 2024-12-13 RX ORDER — NITROFURANTOIN 25; 75 MG/1; MG/1
100 CAPSULE ORAL 2 TIMES DAILY
Qty: 6 CAPSULE | Refills: 0 | Status: SHIPPED | OUTPATIENT
Start: 2024-12-14 | End: 2024-12-17

## 2024-12-13 RX ADMIN — LAMOTRIGINE 100 MG: 100 TABLET ORAL at 09:12

## 2024-12-13 RX ADMIN — BUSPIRONE HYDROCHLORIDE 15 MG: 10 TABLET ORAL at 09:12

## 2024-12-13 RX ADMIN — MEMANTINE HYDROCHLORIDE 5 MG: 5 TABLET ORAL at 09:12

## 2024-12-13 RX ADMIN — SODIUM CHLORIDE, SODIUM LACTATE, POTASSIUM CHLORIDE, CALCIUM CHLORIDE AND DEXTROSE MONOHYDRATE: 5; 600; 310; 30; 20 INJECTION, SOLUTION INTRAVENOUS at 10:12

## 2024-12-13 RX ADMIN — ANASTROZOLE 1 MG: 1 TABLET, COATED ORAL at 09:12

## 2024-12-13 RX ADMIN — ASPIRIN 81 MG CHEWABLE TABLET 81 MG: 81 TABLET CHEWABLE at 09:12

## 2024-12-13 RX ADMIN — METOPROLOL SUCCINATE 25 MG: 25 TABLET, EXTENDED RELEASE ORAL at 09:12

## 2024-12-13 RX ADMIN — ISOSORBIDE MONONITRATE 30 MG: 30 TABLET, EXTENDED RELEASE ORAL at 09:12

## 2024-12-13 RX ADMIN — QUETIAPINE FUMARATE 50 MG: 25 TABLET ORAL at 09:12

## 2024-12-13 NOTE — HOSPITAL COURSE
Admitted under Hospital Medicine for AMS/UTI   Currently on IV antibiotics, pending cultures   Given patient's age, underlying comorbidities, worsening dementia, recurrent hospitalizations, had code status discussion with family-full code for now, consulted palliative;     12/13/24  Examination of patient done at bedside, appeared alert awake, mentation improved, at baseline mentation per daughter at bedside   Acute events overnight   Appeared hemodynamically stable   Afebrile, no leukocytosis   Received IV antibiotics during hospital stay UTI   Cultures negative to date   Had extensive discussion with patient's daughter regarding code status, also had palliative on board, despite multiple conversations,-opted for full code for now, agreed for home-based palliative, ordered accordingly   Planning for discharge today, ordered home health, home-based palliative, strongly recommended patient's daughters to consider discussions on code status, follow up with palliative, agreed with the plan   Ordered medications to be delivered bedside prior to discharge;

## 2024-12-13 NOTE — PLAN OF CARE
"   12/13/24 1514   Post-Acute Status   Post-Acute Authorization Home [facility]   Home Health Status Set-up Complete/Auth obtained   Discharge Delays None known at this time   Discharge Plan   Discharge Plan A [facility]     Current with [facility].  Referral with orders sent via [employer].    [facility] has their own pallitive care program and can transition to their [facility]  "

## 2024-12-13 NOTE — SUBJECTIVE & OBJECTIVE
Interval History:     Alert awake, confused   Currently on antibiotics for UTI   Awaiting palliative follow up with family       Review of Systems      Constitutional:  Positive for activity change. Negative for chills and fever.   HENT:  Negative for trouble swallowing.    Eyes:  Negative for visual disturbance.   Respiratory:  Negative for cough, choking, chest tightness and shortness of breath.    Cardiovascular:  Negative for chest pain.   Gastrointestinal:  Negative for abdominal pain, constipation, diarrhea, nausea and vomiting.   Genitourinary:         Zuluaga   Musculoskeletal:  Negative for arthralgias.   Skin:  Negative for color change.   Neurological:  Positive for weakness.   Psychiatric/Behavioral:  Negative for agitation, behavioral problems and confusion.       Objective:     Vital Signs (Most Recent):  Temp: 98.2 °F (36.8 °C) (12/13/24 0833)  Pulse: 94 (12/13/24 0842)  Resp: 18 (12/13/24 0833)  BP: (!) 124/56 (12/13/24 0833)  SpO2: 96 % (12/13/24 0413) Vital Signs (24h Range):  Temp:  [98.1 °F (36.7 °C)-98.9 °F (37.2 °C)] 98.2 °F (36.8 °C)  Pulse:  [66-94] 94  Resp:  [17-20] 18  SpO2:  [94 %-98 %] 96 %  BP: (124-180)/(56-76) 124/56     Weight: 45.9 kg (101 lb 3.1 oz)  Body mass index is 19.76 kg/m².    Intake/Output Summary (Last 24 hours) at 12/13/2024 1215  Last data filed at 12/13/2024 0653  Gross per 24 hour   Intake 767.14 ml   Output 450 ml   Net 317.14 ml         Physical Exam      Constitutional:       General: She is not in acute distress.     Appearance: She is ill-appearing.   HENT:      Head: Normocephalic.      Mouth/Throat:      Mouth: Mucous membranes are moist.   Eyes:      Extraocular Movements: Extraocular movements intact.   Cardiovascular:      Rate and Rhythm: Normal rate.      Pulses: Normal pulses.   Pulmonary:      Effort: Pulmonary effort is normal. No respiratory distress.      Breath sounds: Normal breath sounds.   Abdominal:      General: Bowel sounds are normal. There is  no distension.      Palpations: Abdomen is soft.      Tenderness: There is no abdominal tenderness.   Genitourinary:     Comments: Zuluaga catheter draining clear yellow urine  Musculoskeletal:      Cervical back: Neck supple.      Right lower leg: No edema.      Left lower leg: No edema.   Skin:     General: Skin is warm.   Neurological:      Mental Status: She is alert.      Comments: Oriented to person and place   Psychiatric:         Mood and Affect: Mood normal.         Behavior: Behavior normal.         Thought Content: Thought content normal.   Significant Labs: All pertinent labs within the past 24 hours have been reviewed.  CBC:   Recent Labs   Lab 12/12/24  1002 12/13/24  0420   WBC 6.42 8.40   HGB 11.2* 10.8*   HCT 36.1* 34.4*    248     CMP:   Recent Labs   Lab 12/12/24  1002 12/13/24  0420    138   K 3.4* 4.3    106   CO2 27 20*   GLU 89 99   BUN 21 16   CREATININE 1.2 1.1   CALCIUM 9.3 8.9   PROT 7.3 6.5   ALBUMIN 3.8 3.4*   BILITOT 0.7 0.6   ALKPHOS 57 51   AST 13 13   ALT 9* 7*   ANIONGAP 10 12       Significant Imaging:     Imaging Results              CT Head Without Contrast (Final result)  Result time 12/12/24 10:34:18      Final result by Earnest Monterroso MD (12/12/24 10:34:18)                   Impression:      Chronic microvascular ischemic changes.      Electronically signed by: Earnest Monterroso MD  Date:    12/12/2024  Time:    10:34               Narrative:    EXAMINATION:  CT HEAD WITHOUT CONTRAST    CLINICAL HISTORY:  Mental status change, unknown cause;    TECHNIQUE:  Low dose axial CT images obtained throughout the head without intravenous contrast. Sagittal and coronal reconstructions were performed.  All CT scans at this facility use dose modulation, iterative reconstruction and/or weight based dosing when appropriate to reduce radiation dose to as low as reasonably achievable.    COMPARISON:  11/29/2024    FINDINGS:  Intracranial compartment:    The brain parenchyma  demonstrates areas of decreased attenuation with mild to moderate periventricular white matter consistent with chronic microvascular ischemic changes.  Stable small area of encephalomalacia right frontal lobe which could reflect remote infarct.  No parenchymal mass, hemorrhage, edema or major vascular distribution infarct.  Vascular calcifications are noted.    Mild prominence of the sulci and ventricles are consistent with age-related involutional changes.    No extra-axial blood or fluid collections.    Skull/extracranial contents (limited evaluation): No fracture.  Scattered mild mucosal thickening.  Mastoid air cells and paranasal sinuses are essentially clear.                                       X-Ray Chest AP Portable (Final result)  Result time 12/12/24 09:45:28      Final result by Hermilo Allred MD (12/12/24 09:45:28)                   Impression:      1.  Negative for acute process involving the chest.    2.  Stable findings as noted above.      Electronically signed by: Hermilo Allred MD  Date:    12/12/2024  Time:    09:45               Narrative:    EXAMINATION:  XR CHEST AP PORTABLE    CLINICAL HISTORY:  altered mental status;    COMPARISON:  December 2, 2024, as well as studies dating back to December 3, 2022.    FINDINGS:  EKG leads overlie the chest.  Clothing artifact is present.  The lungs are clear. The cardiac silhouette size is borderline enlarged.  The trachea is midline and the mediastinal width is normal. Negative for focal infiltrate, effusion or pneumothorax. Pulmonary vasculature is normal. Negative for osseous abnormalities. Tortuous aorta with calcifications of the aortic knob.  There are degenerative changes of the spine and both shoulder girdles.  Stable postoperative changes to the left breast and left axilla.  Nonspecific bowel gas pattern.    Interval removal of right arm PICC line.

## 2024-12-13 NOTE — PLAN OF CARE
Problem: Skin Injury Risk Increased  Goal: Skin Health and Integrity  Outcome: Progressing     Problem: Infection  Goal: Absence of Infection Signs and Symptoms  Outcome: Progressing     Problem: Adult Inpatient Plan of Care  Goal: Plan of Care Review  Outcome: Progressing  Goal: Patient-Specific Goal (Individualized)  Outcome: Progressing  Goal: Absence of Hospital-Acquired Illness or Injury  Outcome: Progressing  Goal: Optimal Comfort and Wellbeing  Outcome: Progressing  Goal: Readiness for Transition of Care  Outcome: Progressing     Problem: Coping Ineffective  Goal: Effective Coping  Outcome: Progressing

## 2024-12-13 NOTE — PLAN OF CARE
[facility] - Telemetry (Hospital)  Discharge Assessment    Primary Care Provider: Yasmin Navarro MD     Discharge Assessment (most recent)       BRIEF DISCHARGE ASSESSMENT - 12/13/24 1517          Discharge Planning    Assessment Type Discharge Planning Brief Assessment (P)      Resource/Environmental Concerns none (P)      Support Systems Children (P)      Equipment Currently Used at Home cane, straight;walker, rolling;rollator (P)      Current Living Arrangements home (P)      Patient/Family Anticipates Transition to home with family (P)      Patient/Family Anticipated Services at Transition home health care (P)      DME Needed Upon Discharge  none (P)      Discharge Plan A Home Health (P)

## 2024-12-13 NOTE — PLAN OF CARE
[facility] - Telemetry (Hospital)  Discharge Final Note    Primary Care Provider: Yasmin Navarro MD    Expected Discharge Date: 12/13/2024    Final Discharge Note (most recent)       Final Note - 12/13/24 1627          Final Note    Assessment Type Final Discharge Note (P)      Anticipated Discharge Disposition Home-Health Care Svc (P)         Post-Acute Status    Post-Acute Authorization Home Health (P)      Home Health Status Set-up Complete/Auth obtained (P)    [facility]                    Important Message from Medicare             Contact Info       Yasmin Navarro MD   Specialty: Family Medicine   Relationship: PCP - General    [id number] [id number]   Phone: [phone]       Next Steps: Follow up in 1 week(s)

## 2024-12-13 NOTE — CONSULTS
Urology Consult    Consulting Physician: Nirali HERRON    Reason for consultation: urinary retention    Chief Complaint:  UTI (urinary tract infection)    HPI:    Leia Rodriguez is a 74 y.o. female who presented to the emergency room with altered mental status and weakness.  She has a known history of dementia in his minimally mobile.  She has followed with the urology over the last year due to recurrent UTIs.  Evaluation revealed incomplete emptying.  She was being maintained on prophylactic Keflex antibiotic.    Her daughters at the bedside today is able to provide history.  She states this problem has been dating back to about 6 years ago.  She had an MRI at that time that showed some pinched nerves in her lower back.  At that time the urology thought that this may be contributing to her incomplete emptying.  She has never been managed with an indwelling Zuluaga.  Her daughter has been trying to catheterize her once a day.  She has been getting about 400-450 cc when she catheterize her.    She did undergo cystoscopy in the office this past year by Dr. Quinonez.  At that time she was also having incomplete emptying.  There was no evidence of significant prolapse.  Upper tract showed no evidence of hydronephrosis.    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    Lipoma of abdominal wall     Major neurocognitive disorder 06/21/2022    Obesity     Overactive bladder     Pap smear abnormality of cervix with ASCUS favoring benign     Thyroid nodule     Tobacco use disorder     Tubular adenoma of colon     Urinary incontinence         Past Surgical History:   Procedure Laterality Date    ANTERIOR VAGINAL REPAIR      AORTOGRAPHY WITH  SERIALOGRAPHY N/A 10/7/2024    Procedure: AORTOGRAM, WITH SERIALOGRAPHY;  Surgeon: Tiffanie Santos MD;  Location: [facility] CATH LAB;  Service: Cardiology;  Laterality: N/A;  MD requested Anesthesia    BLADDER SURGERY      BREAST LUMPECTOMY Left     CATARACT EXTRACTION Left 2022     SECTION      X2    COLONOSCOPY N/A 3/8/2017    Procedure: COLONOSCOPY;  Surgeon: Tyron Paris MD;  Location: [facility] ENDO;  Service: Endoscopy;  Laterality: N/A;    COLONOSCOPY N/A 2020    Procedure: COLONOSCOPY;  Surgeon: Keira Ellison MD;  Location: [facility];  Service: Endoscopy;  Laterality: N/A;    ESOPHAGOGASTRODUODENOSCOPY N/A 2020    Procedure: EGD (ESOPHAGOGASTRODUODENOSCOPY);  Surgeon: Keira Ellison MD;  Location: [facility];  Service: Endoscopy;  Laterality: N/A;  new onset iron deficiency with prior history of breast cancer    INTRALUMINAL GASTROINTESTINAL TRACT IMAGING VIA CAPSULE N/A 10/28/2020    Procedure: IMAGING PROCEDURE, GI TRACT, INTRALUMINAL, VIA CAPSULE;  Surgeon: Finesse Jha RN;  Location: [facility];  Service: Endoscopy;  Laterality: N/A;    LEFT HEART CATHETERIZATION Left 10/12/2021    Procedure: CATHETERIZATION, HEART, LEFT;  Surgeon: Tiffanie Santos MD;  Location: [facility] CATH LAB;  Service: Cardiology;  Laterality: Left;    LITHOTRIPSY, CORONARY TRANSLUMINAL, PERCUTANEOUS  10/7/2024    Procedure: LITHOTRIPSY, CORONARY TRANSLUMINAL, PERCUTANEOUS;  Surgeon: Tiffanie Santos MD;  Location: [facility] CATH LAB;  Service: Cardiology;;    PTA, SUPERFICIAL FEMORAL ARTERY  10/7/2024    Procedure: PTA, Superficial Femoral Artery;  Surgeon: Tiffanie Santos MD;  Location: [facility] CATH LAB;  Service: Cardiology;;    SENTINEL LYMPH NODE BIOPSY Left 2020    Procedure: BIOPSY, LYMPH NODE, SENTINEL;  Surgeon: Vincent Moyer MD;  Location: [facility] OR;  Service: General;  Laterality: Left;    SIMPLE MASTECTOMY Left 2020    Procedure: MASTECTOMY, SIMPLE;  Surgeon: Vincent Moyer MD;  Location: [facility]  OR;  Service: General;  Laterality: Left;    STENT, SUPERFICIAL FEMORAL ARTERY  10/7/2024    Procedure: Stent, Superficial Femoral Artery;  Surgeon: Tiffanie Santos MD;  Location: [facility] CATH LAB;  Service: Cardiology;;    THYROID LOBECTOMY Left 2005    TOTAL ABDOMINAL HYSTERECTOMY      TUBAL LIGATION          Social History     Socioeconomic History    Marital status:    Tobacco Use    Smoking status: Former     Current packs/day: 0.00     Average packs/day: 1 pack/day for 50.0 years (50.0 ttl pk-yrs)     Types: Cigarettes     Start date: 1970     Quit date: 2020     Years since quittin.3    Smokeless tobacco: Never   Substance and Sexual Activity    Alcohol use: Never     Alcohol/week: 28.0 standard drinks of alcohol     Types: 28 Cans of beer per week    Drug use: No    Sexual activity: Never     Partners: Male   Social History Narrative    The patient is .  She is retired from [employer].     Social Drivers of Health     Financial Resource Strain: Patient Declined (2024)    Overall Financial Resource Strain (CARDIA)     Difficulty of Paying Living Expenses: Patient declined   Food Insecurity: Patient Declined (2024)    Hunger Vital Sign     Worried About Running Out of Food in the Last Year: Patient declined     Ran Out of Food in the Last Year: Patient declined   Transportation Needs: Patient Declined (2024)    TRANSPORTATION NEEDS     Transportation : Patient declined   Physical Activity: Inactive (2024)    Exercise Vital Sign     Days of Exercise per Week: 0 days     Minutes of Exercise per Session: 0 min   Stress: Patient Declined (2024)    [employer] [place] [employer] - Occupational Stress Questionnaire     Feeling of Stress : Patient declined   Recent Concern: Stress - Stress Concern Present (2024)    [employer] [place] [employer] - Occupational Stress Questionnaire     Feeling of Stress : Very much   Housing Stability:  Patient Declined (12/12/2024)    Housing Stability Vital Sign     Unable to Pay for Housing in the Last Year: Patient declined     Homeless in the Last Year: Patient declined        Family History   Problem Relation Name Age of Onset    Hypertension Father      Cataracts Father      Prostate cancer Father  80    Cervical cancer Daughter Cenetra 32    Allergic rhinitis Daughter Cenetra     Cirrhosis Mother      Alcohol abuse Mother      Hypertension Daughter Sheronica     Diabetes Brother      Heart attack Brother      Heart murmur Brother      No Known Problems Daughter Gladis         Review of patient's allergies indicates:  No Known Allergies    Medications:      No current facility-administered medications on file prior to encounter.     Current Outpatient Medications on File Prior to Encounter   Medication Sig Dispense Refill    aluminum-magnesium hydroxide-simethicone 200-200-20 mg/5 mL Susp, diphenhydrAMINE 12.5 mg/5 mL Liqd Swish and spit 5 mLs every 4 (four) hours as needed (discomfort). (Patient not taking: Reported on 12/9/2024) 30 mL 0    anastrozole (ARIMIDEX) 1 mg Tab Take 1 tablet (1 mg total) by mouth once daily. 90 tablet 3    aspirin 81 MG Chew Take 1 tablet (81 mg total) by mouth once daily. 90 tablet 3    busPIRone (BUSPAR) 15 MG tablet Take 1 tablet (15 mg total) by mouth 2 (two) times daily. 180 tablet 3    cholecalciferol, vitamin D3, (VITAMIN D3) 125 mcg (5,000 unit) Tab Take 5,000 Units by mouth once daily.      donepeziL (ARICEPT) 5 MG tablet Take 1 tablet (5 mg total) by mouth every evening. 90 tablet 3    furosemide (LASIX) 20 MG tablet Take 1 tablet (20 mg total) by mouth once daily. 30 tablet 0    isosorbide mononitrate (IMDUR) 30 MG 24 hr tablet TAKE 1 TABLET BY MOUTH EVERY DAY 90 tablet 3    lamoTRIgine (LAMICTAL) 100 MG tablet Take 1 tablet (100 mg total) by mouth 2 (two) times daily. 60 tablet 11    memantine (NAMENDA) 5 MG Tab Take 1 tablet (5 mg total) by mouth 2 (two) times daily.  "180 tablet 3    metoprolol succinate (TOPROL-XL) 25 MG 24 hr tablet Take 1 tablet (25 mg total) by mouth 2 (two) times daily. 180 tablet 2    potassium chloride (MICRO-K) 10 MEQ CpSR Take 1 capsule (10 mEq total) by mouth once daily. (Patient not taking: Reported on 12/9/2024) 90 capsule 1    predniSONE (DELTASONE) 10 MG tablet Take 1 tablet (10 mg total) by mouth 2 (two) times daily for 5 days, THEN 1 tablet (10 mg total) once daily for 5 days, THEN ½ tablet (5 mg total) once daily for 5 days. 18 tablet 0    QUEtiapine (SEROQUEL) 200 MG Tab Take 1 tablet (200 mg total) by mouth every evening. 30 tablet 0    QUEtiapine (SEROQUEL) 50 MG tablet Take 1 tablet (50 mg total) by mouth once daily. (Patient taking differently: Take 50 mg by mouth nightly.) 30 tablet 0    zinc gluconate 50 mg tablet Take 50 mg by mouth once daily.         VITALS (Last 24H):  Temp:  [98.1 °F (36.7 °C)-99.4 °F (37.4 °C)]   Pulse:  [66-94]   Resp:  [16-18]   BP: (104-180)/(53-76)   SpO2:  [94 %-98 %]     INTAKE & OUTPUT (Last 24H):    Intake/Output Summary (Last 24 hours) at 12/13/2024 1344  Last data filed at 12/13/2024 0653  Gross per 24 hour   Intake 767.14 ml   Output 450 ml   Net 317.14 ml         PHYSICAL EXAM:    Patient responds and is alert but not oriented.    Abdomen is soft nontender   Zuluaga catheter draining clear urine    Laboratory Data:  Reviewed and noted in plan where applicable. Please see chart for full laboratory data.    Recent Labs   Lab 12/12/24 1943   TROPONINI 0.035*    No results for input(s): "POCTGLUCOSE" in the last 24 hours.     Lab Results   Component Value Date    INR 1.1 11/29/2024    INR 1.0 10/07/2024    INR 1.1 10/01/2021       Lab Results   Component Value Date    WBC 8.40 12/13/2024    HGB 10.8 (L) 12/13/2024    HCT 34.4 (L) 12/13/2024    MCV 94 12/13/2024     12/13/2024       BMP  Recent Labs   Lab 12/13/24  0420   GLU 99      K 4.3      CO2 20*   BUN 16   CREATININE 1.1   CALCIUM 8.9 "         Radiology:  Reviewed and noted in plan where applicable. Please see chart for full radiology data.  CT Head Without Contrast  Narrative: EXAMINATION:  CT HEAD WITHOUT CONTRAST    CLINICAL HISTORY:  Mental status change, unknown cause;    TECHNIQUE:  Low dose axial CT images obtained throughout the head without intravenous contrast. Sagittal and coronal reconstructions were performed.  All CT scans at this facility use dose modulation, iterative reconstruction and/or weight based dosing when appropriate to reduce radiation dose to as low as reasonably achievable.    COMPARISON:  11/29/2024    FINDINGS:  Intracranial compartment:    The brain parenchyma demonstrates areas of decreased attenuation with mild to moderate periventricular white matter consistent with chronic microvascular ischemic changes.  Stable small area of encephalomalacia right frontal lobe which could reflect remote infarct.  No parenchymal mass, hemorrhage, edema or major vascular distribution infarct.  Vascular calcifications are noted.    Mild prominence of the sulci and ventricles are consistent with age-related involutional changes.    No extra-axial blood or fluid collections.    Skull/extracranial contents (limited evaluation): No fracture.  Scattered mild mucosal thickening.  Mastoid air cells and paranasal sinuses are essentially clear.  Impression: Chronic microvascular ischemic changes.    Electronically signed by: Earnest Monterroso MD  Date:    12/12/2024  Time:    10:34  X-Ray Chest AP Portable  Narrative: EXAMINATION:  XR CHEST AP PORTABLE    CLINICAL HISTORY:  altered mental status;    COMPARISON:  December 2, 2024, as well as studies dating back to December 3, 2022.    FINDINGS:  EKG leads overlie the chest.  Clothing artifact is present.  The lungs are clear. The cardiac silhouette size is borderline enlarged.  The trachea is midline and the mediastinal width is normal. Negative for focal infiltrate, effusion or pneumothorax.  Pulmonary vasculature is normal. Negative for osseous abnormalities. Tortuous aorta with calcifications of the aortic knob.  There are degenerative changes of the spine and both shoulder girdles.  Stable postoperative changes to the left breast and left axilla.  Nonspecific bowel gas pattern.    Interval removal of right arm PICC line.  Impression: 1.  Negative for acute process involving the chest.    2.  Stable findings as noted above.    Electronically signed by: Hermilo Allred MD  Date:    12/12/2024  Time:    09:45       ASSESSMENT/PLAN:   Chronic incomplete bladder emptying.  Dementia.  History of recurrent UTI with history of urosepsis in the past.  Discussed with daughter that patient may me better managed with a chronic indwelling Zuluaga.  She is minimally mobile.  She has demonstrated recurrent febrile or severe infections.  I suspect hospitalizations in severe infections we will go down with a chronic indwelling catheter.  As far as etiology could be due to anticholinergic medicine she is on for dementia.  Also could be related to history of neurogenic bladder due to spinal cord compression.  Patient was given a g of Rocephin and urine culture was obtained during this hospitalization.  Blood cultures were no growth.  Patient likely has chronic colonization with a normal white count.  However with altered mental status would recommend treatment of potential UTI until cultures come back for total of 5 days.  She can be transitioned to oral therapy for discharge.  Patient did have an indwelling catheter which was changed during this hospitalization.  I recommend getting home health to change her catheter Q 30 days.  Please call with questions or concerns.  She was scheduled to have urodynamics in February.  I do not feel that she is able to participate in this to give us any clinically significant information.  I have canceled this appointment.

## 2024-12-13 NOTE — PROGRESS NOTES
[facility] Medicine  Progress Note    Patient Name: Leia Rodriguez  MRN: [id number]  Patient Class: OP- Observation   Admission Date: 12/12/2024  Length of Stay: 0 days  Attending Physician: Amina Campoverde,*  Primary Care Provider: Yasmin Navarro MD        Subjective     Principal Problem:UTI (urinary tract infection)        HPI:  Leia Rodriguez is a 74 year old female who has  has a past medical history of Arthritis, Behavioral change, Blind right eye, Breast cancer, Dementia, Essential hypertension, Hemorrhoids, Immunodeficiency due to chemotherapy, Lipoma of abdominal wall, Major neurocognitive disorder, Obesity, Overactive bladder, Pap smear abnormality of cervix with ASCUS favoring benign, Thyroid nodule, Tobacco use disorder, Tubular adenoma of colon, and Urinary incontinence who presented to the ED, with daughter, for evaluation of generalized weakness. Onset for the past 3-4 days but became worse today while going to a Urology appt. ED workup showed WBC count 6.42K, Hgb/Hct 11.2/36.1, K+ 3.4, troponin 0.029, UA with 3+ leukos (UA micro WBC 73, many WBC clumps). CXR with no acute findings, pneumothorax resolved. CT head with chronic microvascular ischemic changes.  Pt admitted to observation for UTI. Discussed code status with daughter at bedside. Patient has no POA. She will remain FULL CODE until all the patient's daughters can come together and decide.     Of note patient was admitted on 11/29/24  after she was found down at home and brought by EMS to the hospital.  She was intubated secondary to agonal breathing.  Difficult intubation resulting in small pneumothorax.  She was started on Precedex on admission became hypotensive briefly required Levophed and was admitted to critical care.  Patient was stabilized extubated and was stable off of Precedex. Patient had significant hypoglycemia requiring D10 infusion overnight which has been discontinued with patient able  to maintain blood glucose greater than 100. Eventually transitioned to telemetry floor.  She was seen in consultation by Physical therapy and Occupational therapy with recommendations to discharge home with 24 hour a day care as well as home health. Patient's functional status extremely limited by her dementia but family is aware and patient will be discharged home. She was discharged home on tapering steroids and it is recommended that she follow up with her PCP for evaluation of multiple behavioral med she is on that might need adjusting. Recommended she repeat chest x-ray given her small pneumothorax which resolved while in the hospital. Due to urinary retention with history of straight caths patient will have Zuluaga left in place upon discharge and follow up with her urologist as an outpatient.    Overview/Hospital Course:  Admitted under Hospital Medicine for AMS/UTI   Currently on IV antibiotics, pending cultures   Given patient's age, underlying comorbidities, worsening dementia, recurrent hospitalizations, had code status discussion with family-full code for now, consulted palliative;     Interval History:     Alert awake, confused   Currently on antibiotics for UTI   Awaiting palliative follow up with family       Review of Systems      Constitutional:  Positive for activity change. Negative for chills and fever.   HENT:  Negative for trouble swallowing.    Eyes:  Negative for visual disturbance.   Respiratory:  Negative for cough, choking, chest tightness and shortness of breath.    Cardiovascular:  Negative for chest pain.   Gastrointestinal:  Negative for abdominal pain, constipation, diarrhea, nausea and vomiting.   Genitourinary:         Zuluaga   Musculoskeletal:  Negative for arthralgias.   Skin:  Negative for color change.   Neurological:  Positive for weakness.   Psychiatric/Behavioral:  Negative for agitation, behavioral problems and confusion.       Objective:     Vital Signs (Most Recent):  Temp:  98.2 °F (36.8 °C) (12/13/24 0833)  Pulse: 94 (12/13/24 0842)  Resp: 18 (12/13/24 0833)  BP: (!) 124/56 (12/13/24 0833)  SpO2: 96 % (12/13/24 0413) Vital Signs (24h Range):  Temp:  [98.1 °F (36.7 °C)-98.9 °F (37.2 °C)] 98.2 °F (36.8 °C)  Pulse:  [66-94] 94  Resp:  [17-20] 18  SpO2:  [94 %-98 %] 96 %  BP: (124-180)/(56-76) 124/56     Weight: 45.9 kg (101 lb 3.1 oz)  Body mass index is 19.76 kg/m².    Intake/Output Summary (Last 24 hours) at 12/13/2024 1215  Last data filed at 12/13/2024 0653  Gross per 24 hour   Intake 767.14 ml   Output 450 ml   Net 317.14 ml         Physical Exam      Constitutional:       General: She is not in acute distress.     Appearance: She is ill-appearing.   HENT:      Head: Normocephalic.      Mouth/Throat:      Mouth: Mucous membranes are moist.   Eyes:      Extraocular Movements: Extraocular movements intact.   Cardiovascular:      Rate and Rhythm: Normal rate.      Pulses: Normal pulses.   Pulmonary:      Effort: Pulmonary effort is normal. No respiratory distress.      Breath sounds: Normal breath sounds.   Abdominal:      General: Bowel sounds are normal. There is no distension.      Palpations: Abdomen is soft.      Tenderness: There is no abdominal tenderness.   Genitourinary:     Comments: Zuluaga catheter draining clear yellow urine  Musculoskeletal:      Cervical back: Neck supple.      Right lower leg: No edema.      Left lower leg: No edema.   Skin:     General: Skin is warm.   Neurological:      Mental Status: She is alert.      Comments: Oriented to person and place   Psychiatric:         Mood and Affect: Mood normal.         Behavior: Behavior normal.         Thought Content: Thought content normal.   Significant Labs: All pertinent labs within the past 24 hours have been reviewed.  CBC:   Recent Labs   Lab 12/12/24  1002 12/13/24  0420   WBC 6.42 8.40   HGB 11.2* 10.8*   HCT 36.1* 34.4*    248     CMP:   Recent Labs   Lab 12/12/24  1002 12/13/24  0420    138   K  3.4* 4.3    106   CO2 27 20*   GLU 89 99   BUN 21 16   CREATININE 1.2 1.1   CALCIUM 9.3 8.9   PROT 7.3 6.5   ALBUMIN 3.8 3.4*   BILITOT 0.7 0.6   ALKPHOS 57 51   AST 13 13   ALT 9* 7*   ANIONGAP 10 12       Significant Imaging:     Imaging Results              CT Head Without Contrast (Final result)  Result time 12/12/24 10:34:18      Final result by Earnest Monterroso MD (12/12/24 10:34:18)                   Impression:      Chronic microvascular ischemic changes.      Electronically signed by: Earnest Monterroso MD  Date:    12/12/2024  Time:    10:34               Narrative:    EXAMINATION:  CT HEAD WITHOUT CONTRAST    CLINICAL HISTORY:  Mental status change, unknown cause;    TECHNIQUE:  Low dose axial CT images obtained throughout the head without intravenous contrast. Sagittal and coronal reconstructions were performed.  All CT scans at this facility use dose modulation, iterative reconstruction and/or weight based dosing when appropriate to reduce radiation dose to as low as reasonably achievable.    COMPARISON:  11/29/2024    FINDINGS:  Intracranial compartment:    The brain parenchyma demonstrates areas of decreased attenuation with mild to moderate periventricular white matter consistent with chronic microvascular ischemic changes.  Stable small area of encephalomalacia right frontal lobe which could reflect remote infarct.  No parenchymal mass, hemorrhage, edema or major vascular distribution infarct.  Vascular calcifications are noted.    Mild prominence of the sulci and ventricles are consistent with age-related involutional changes.    No extra-axial blood or fluid collections.    Skull/extracranial contents (limited evaluation): No fracture.  Scattered mild mucosal thickening.  Mastoid air cells and paranasal sinuses are essentially clear.                                       X-Ray Chest AP Portable (Final result)  Result time 12/12/24 09:45:28      Final result by Hermilo Allred MD (12/12/24  09:45:28)                   Impression:      1.  Negative for acute process involving the chest.    2.  Stable findings as noted above.      Electronically signed by: Hermilo Allred MD  Date:    12/12/2024  Time:    09:45               Narrative:    EXAMINATION:  XR CHEST AP PORTABLE    CLINICAL HISTORY:  altered mental status;    COMPARISON:  December 2, 2024, as well as studies dating back to December 3, 2022.    FINDINGS:  EKG leads overlie the chest.  Clothing artifact is present.  The lungs are clear. The cardiac silhouette size is borderline enlarged.  The trachea is midline and the mediastinal width is normal. Negative for focal infiltrate, effusion or pneumothorax. Pulmonary vasculature is normal. Negative for osseous abnormalities. Tortuous aorta with calcifications of the aortic knob.  There are degenerative changes of the spine and both shoulder girdles.  Stable postoperative changes to the left breast and left axilla.  Nonspecific bowel gas pattern.    Interval removal of right arm PICC line.                                       Assessment and Plan     * UTI (urinary tract infection)  -UA appears to be consistent with UTI  -Previous urine culture in 04/2024 had E. Coli   -Start Rocephin 1 mg IV daily  -Monitor urine culture      Hypokalemia  Patient's most recent potassium results are listed below.   Recent Labs     12/12/24  1002   K 3.4*     Plan  - Replete potassium per protocol  - Monitor potassium Daily  - Patient's hypokalemia is  currently being treated.    Anemia  Most recent hemoglobin and hematocrit are listed below.  Recent Labs     12/12/24  1002   HGB 11.2*   HCT 36.1*     Plan  - Monitor serial CBC: Daily  - Transfuse PRBC if patient becomes hemodynamically unstable, symptomatic or H/H drops below 7/21.  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 On presentation their CHF was well compensated. Most recent BNP and echo results are listed below.  No results for input(s): "BNP" in the last 72 hours.  Latest ECHO  Results for orders placed during the hospital encounter of 11/29/24    Echo    Interpretation Summary    Left Ventricle: The left ventricle is normal in size. Normal wall thickness. There is concentric remodeling. Global hypokinesis present. There is mildly reduced systolic function with a visually estimated ejection fraction of 40 - 50%. Ejection fraction is approximately 40%. There is normal diastolic function.    Right Ventricle: Normal right ventricular cavity size. Wall thickness is normal. Systolic function is normal.    Mitral Valve: There is mild regurgitation.    Tricuspid Valve: There is trace regurgitation. There is mild pulmonary hypertension.    IVC/SVC: Patient is ventilated, cannot use IVC diameter to estimate right atrial pressure.    Current Heart Failure Medications  metoprolol succinate (TOPROL-XL) 24 hr tablet 25 mg, 2 times daily, Oral    Plan  - Monitor strict I&Os and daily weights.    - Place on telemetry  - Low sodium diet  - Place on fluid restriction of 1.5 L.   - Cardiology has not been consulted  - The patient's volume status is at their baseline        Elevated troponin  -Initial troponin 0.029  -About 13 days ago was 0.023  -Serial troponin pending  -Elevated troponin may be secondary to demand ischemia from elevated BP  -Patient does not appear in pain  -She denies chest pain and/or " equivalent  -Monitor      Malignant neoplasm of lower-inner quadrant of left breast in female, estrogen receptor positive  -Followed by Dr. Fernández outpatient  -Continue Arimidex (per office note on 09/04/24 - she is completing 4 of 5 years of Arimidex 1 mg daily)      Essential hypertension  Patient's blood pressure range in the last 24 hours was: BP  Min: 118/57  Max: 188/75.The patient's inpatient anti-hypertensive regimen is listed below:  Current Antihypertensives  metoprolol succinate (TOPROL-XL) 24 hr tablet 25 mg, 2 times daily, Oral  isosorbide mononitrate 24 hr tablet 30 mg, Daily, Oral    Plan  - BP is controlled, no changes needed to their regimen  - Hydralazine PRN      VTE Risk Mitigation (From admission, onward)           Ordered     enoxaparin injection 30 mg  Daily         12/12/24 1446     IP VTE HIGH RISK PATIENT  Once         12/12/24 1446     Place sequential compression device  Until discontinued         12/12/24 1446                    Discharge Planning   SYLVIA:      Code Status: Full Code   Medical Readiness for Discharge Date:                            Robertmejia Campoverde MD  Department of Hospital Medicine   [place] - Telemetry ([facility])

## 2024-12-13 NOTE — PT/OT/SLP EVAL
"Speech Language Pathology Evaluation  Bedside Swallow    Patient Name:  Leia Rodriguez   MRN:  [id number]  Admitting Diagnosis: UTI (urinary tract infection)    Recommendations:                 General Recommendations:  Follow-up not indicated as pt at baseline with progressive cognitive/functional decline in the setting of dementia with poor ST rehab prognosis; Pt/family would benefit from ongoing ACP, GOC conversations and consideration of palliative medicine referral.  Diet recommendations:  Puree Diet - IDDSI Level 4, Thin liquids - IDDSI Level 0   Aspiration Precautions: Assistance with meals, Check for pocketing/oral residue, Eliminate distractions, Feed only when awake/alert, Frequent oral care, HOB to 90 degrees, Remain upright 30 minutes post meal, Small bites/sips, and Standard aspiration precautions   General Precautions: Standard, aspiration  Communication strategies:  provide increased time to answer and dementia    Assessment:     Leia Rodriguez is a 74 y.o. female admitted to [facility] with dx AMS s/t UTI with hx significant for dementia (+behavioral disturbance), cerebrovascular disease and major neurocognitive disorder.  She presents with adequate SUMANTH; however, noted progressive cognitive and functional decline over previous admissions.  Pt again stating, "I want to go home" throughout session.  I personally have evaluated Ms. Rodriguez during two previous/recent admissions on 7/12/2023 and 11/29-12/4/2024.  No overt s/s of aspiration present during bedside CSE; however, efficiency is negatively impacted by OM weakness/frailty, ill-fitting dentures and cognitive impairment.  No significant change in results of bedside CSE compared to last admission/ST evaluation.  She requires ADL assist, including feeding and oral care/hygiene.  She is recommended for IDDSI 4-pureed solids and IDDSI 0-thin liquids, following aspiration precautions and 1:1 feeder assist.  Pt/family is highly recommended for ongoing " ACP, GOC conversation and education on stages of dementia with consideration of palliative medicine referral.  Pt with poor rehab prognosis. No further acute ST intervention indicated at this time.  ST will sign off.    History:     Past Medical History:   Diagnosis Date    Arthritis     EDGAR HANDS, KNEES    Behavioral change 2022    Blind right eye     Traumatic    Breast cancer 06/15/2017    0.8 cm Grade1 INTRADUCTAL BREAST 9 positve margin (left)    Essential hypertension     Hemorrhoids     Immunodeficiency due to chemotherapy 2021    Lipoma of abdominal wall     Major neurocognitive disorder 2022    Obesity     Overactive bladder     Pap smear abnormality of cervix with ASCUS favoring benign     Thyroid nodule     Tobacco use disorder     Tubular adenoma of colon     Urinary incontinence        Past Surgical History:   Procedure Laterality Date    ANTERIOR VAGINAL REPAIR      AORTOGRAPHY WITH SERIALOGRAPHY N/A 10/7/2024    Procedure: AORTOGRAM, WITH SERIALOGRAPHY;  Surgeon: Tiffanie Santos MD;  Location: [facility] CATH LAB;  Service: Cardiology;  Laterality: N/A;  MD requested Anesthesia    BLADDER SURGERY      BREAST LUMPECTOMY Left 2017    CATARACT EXTRACTION Left 2022     SECTION      X2    COLONOSCOPY N/A 3/8/2017    Procedure: COLONOSCOPY;  Surgeon: Tyron Paris MD;  Location: [facility];  Service: Endoscopy;  Laterality: N/A;    COLONOSCOPY N/A 2020    Procedure: COLONOSCOPY;  Surgeon: Keira Ellison MD;  Location: [facility];  Service: Endoscopy;  Laterality: N/A;    ESOPHAGOGASTRODUODENOSCOPY N/A 2020    Procedure: EGD (ESOPHAGOGASTRODUODENOSCOPY);  Surgeon: Keira Ellison MD;  Location: [facility];  Service: Endoscopy;  Laterality: N/A;  new onset iron deficiency with prior history of breast cancer    INTRALUMINAL GASTROINTESTINAL TRACT IMAGING VIA CAPSULE N/A 10/28/2020    Procedure: IMAGING PROCEDURE, GI TRACT, INTRALUMINAL, VIA CAPSULE;  Surgeon:  Finesse Jha RN;  Location: [facility] ENDO;  Service: Endoscopy;  Laterality: N/A;    LEFT HEART CATHETERIZATION Left 10/12/2021    Procedure: CATHETERIZATION, HEART, LEFT;  Surgeon: Tiffanie Santos MD;  Location: [facility] CATH LAB;  Service: Cardiology;  Laterality: Left;    LITHOTRIPSY, CORONARY TRANSLUMINAL, PERCUTANEOUS  10/7/2024    Procedure: LITHOTRIPSY, CORONARY TRANSLUMINAL, PERCUTANEOUS;  Surgeon: Tiffanie Santos MD;  Location: [facility] CATH LAB;  Service: Cardiology;;    PTA, SUPERFICIAL FEMORAL ARTERY  10/7/2024    Procedure: PTA, Superficial Femoral Artery;  Surgeon: Tiffanie Santos MD;  Location: [facility] CATH LAB;  Service: Cardiology;;    SENTINEL LYMPH NODE BIOPSY Left 9/8/2020    Procedure: BIOPSY, LYMPH NODE, SENTINEL;  Surgeon: Vincent Moyer MD;  Location: [facility] OR;  Service: General;  Laterality: Left;    SIMPLE MASTECTOMY Left 9/8/2020    Procedure: MASTECTOMY, SIMPLE;  Surgeon: Vincent Moyer MD;  Location: [facility] OR;  Service: General;  Laterality: Left;    STENT, SUPERFICIAL FEMORAL ARTERY  10/7/2024    Procedure: Stent, Superficial Femoral Artery;  Surgeon: Tiffanie Santos MD;  Location: [facility] CATH LAB;  Service: Cardiology;;    THYROID LOBECTOMY Left 2005    TOTAL ABDOMINAL HYSTERECTOMY      TUBAL LIGATION         Social History: Patient lives with family at home and receives 24/hour assist.  Of note, last admission on 12/3/2024, PT and OT reported pt total A of 2 with poor activity tolerance and rehab potential and recommended continued 24/hour care vs. Basic NH.    Prior Intubation HX:  N/A this admission.  Last weeks admission (11/29-12/4/24), dx metabolic encephalopathy with questionable polypharmacy, urinary retention, acute respiratory failure with hypoxia and hypercarbia requiring ventilator management 11/30-12/1/24.     Modified Barium Swallow: N/A    CT HEAD WITHOUT CONTRAST     CLINICAL HISTORY:  Mental status change, unknown cause;     TECHNIQUE:  Low dose axial CT images obtained throughout the  head without intravenous contrast. Sagittal and coronal reconstructions were performed.  All CT scans at this facility use dose modulation, iterative reconstruction and/or weight based dosing when appropriate to reduce radiation dose to as low as reasonably achievable.     COMPARISON:  11/29/2024     FINDINGS:  Intracranial compartment:     The brain parenchyma demonstrates areas of decreased attenuation with mild to moderate periventricular white matter consistent with chronic microvascular ischemic changes.  Stable small area of encephalomalacia right frontal lobe which could reflect remote infarct.  No parenchymal mass, hemorrhage, edema or major vascular distribution infarct.  Vascular calcifications are noted.     Mild prominence of the sulci and ventricles are consistent with age-related involutional changes.     No extra-axial blood or fluid collections.     Skull/extracranial contents (limited evaluation): No fracture.  Scattered mild mucosal thickening.  Mastoid air cells and paranasal sinuses are essentially clear.     Impression:     Chronic microvascular ischemic changes.        Electronically signed by:Earnest Monterroso MD  Date:                                            12/12/2024  Time:                                           10:34    XR CHEST AP PORTABLE     CLINICAL HISTORY:  altered mental status;     COMPARISON:  December 2, 2024, as well as studies dating back to December 3, 2022.     FINDINGS:  EKG leads overlie the chest.  Clothing artifact is present.  The lungs are clear. The cardiac silhouette size is borderline enlarged.  The trachea is midline and the mediastinal width is normal. Negative for focal infiltrate, effusion or pneumothorax. Pulmonary vasculature is normal. Negative for osseous abnormalities. Tortuous aorta with calcifications of the aortic knob.  There are degenerative changes of the spine and both shoulder girdles.  Stable postoperative changes to the left breast and left  axilla.  Nonspecific bowel gas pattern.     Interval removal of right arm PICC line.     Impression:     1.  Negative for acute process involving the chest.     2.  Stable findings as noted above.        Electronically signed by:Hermilo Allred MD  Date:                                            12/12/2024  Time:                                           09:45    Prior diet: Pt recommended for a pureed diet last admission.    Subjective     Pt seen bedside for ST swallowing evaluation.  Pt wearing her sunglasses and Karla necklace.  Awake and able to engage in basic greetings, questions.  Often asking about going home.  Attending and palliative medicine nurse and NP present during session.  Patient goals: to go home     Pain/Comfort:  Pain Rating 1: 0/10  Pain Rating Post-Intervention 1: 0/10  Pain Rating 2: 0/10  Pain Rating Post-Intervention 2: 0/10    Respiratory Status: Room air    Objective:     Oral Musculature Evaluation  Oral Musculature: general weakness  Structural Abnormalities: +frailty  Dentition: upper and lower dentures, other (see comments) (ill-fitting dentures, not improving efficiency during mastication of solids.)  Secretion Management: adequate  Mucosal Quality: good  Mandibular Strength and Mobility: impaired  Oral Labial Strength and Mobility: WFL  Lingual Strength and Mobility: impaired strength  Velar Elevation: WFL  Buccal Strength and Mobility: decreased tone, other (see comments) (muscle-temporal wasting)  Volitional Cough: present  Volitional Swallow: present  Voice Prior to PO Intake: decreased vocal intensity    Bedside Swallow Eval:   Clinical Swallow Examination:   Of note, patient was fed by SLP throughout evaluation. Patient presented with:      CONSISTENCY   NOTES   THIN (IDDSI 0) Water via cup and straw     No overt s/s of aspiration.   PUREE (IDDSI 4/Extremely Thick)    TSP/TBSP bites of applesauce  No overt s/s of aspiration.    SOLID (IDDSI 6/soft & bite sized) Jamie  cracker pieces Dentures present but ill-fitting--loose during rotary movement.  Pt attempts to masticate but then stops, orally holds and pockets via right buccal cavity.  Cueing required throughout.  Appears unaware/cognitive impairment.  ST removed semi-chewed cracker residue from oral/buccal cavity.      Thickened liquids were not used in this assessment. Davefe (2018) reported that thickened liquids have no sound evidence at reducing the risk of pneumonia in patients with dysphagia and can cause harm by increasing their risk of dehydration. It also presents an increased risk of UTI, electrolyte imbalance, constipation, fecal impaction, cognitive impairment, functional decline and even death (Langmore, 2002; Vance, 2016).  Thickened liquids are associated with risks including dehydration, increased pharyngeal residue, potential interference with medication absorption, and decreased quality of life (Aramis, 2013). Thickened liquids are also more likely to be silently aspirated than thin liquids (Laurent et al., 2018). This supports the assertion that we should confirm a patient requires thickened liquids with an instrumental swallow study prior to recommending them.     References:   Aramis MARTINI (2013). Thickening agents used for dysphagia management: Effect on bioavailability of water, medication and feelings of satiety. Nutrition Journal, 12, 54. https://doi.org/10.1186/6399-5801-84-54     EZEQUIEL Mancilla., CARY De La Torre, JOHN Tucker, & EZEQUIEL Magana. (2018). Cough response to aspiration in thin and thick fluids during FEES in hospitalized inpatients. International journal of language & communication disorders, 53(5), 909-918. https://doi.org/10.1111/4585-5772.78643     INTERPRETATION AND RISK ASSESSMENT:  Clinical swallow evaluation (CSE) revealed oral phase characterized by lingual, labial, and buccal strength and range of motion reduced for lip closure, bolus preparation and propulsion. The patient had no anterior  loss of the bolus with complete closure of the lips around the utensils. Soft solid residue remained in the oral/buccal cavities s/t poor manipulation/mastication and AP transit. Patient without overt clinical signs/symptoms of aspiration on thin liquids or pureed solids; however, she presents with a possibly inefficient swallow as indicated by weakness/frailty, Ill-fitting dentures and cognitive impairment.  Contributing risk factors for dysphagia include progressive nature of dementia and hx cerebrovascular disease.     Clinical signs of oropharyngeal dysphagia, likely chronic related to progressive cognitive and functional decline in the setting of advancing age and dementia with major neurocognitive impairment. Swallowing prognosis and rehab potential is poor.    Goals: N/A    Plan:     Plan of Care reviewed with:  patient, other (see comments) (MD and palliative medicine team present in room during ST evaluation.  Palliative medicine NP also on phone with daughter.)   SLP Follow-Up:  No       Discharge recommendations:  No Therapy Indicated   Barriers to Discharge:  None    Time Tracking:     SLP Treatment Date:   12/13/24  Speech Start Time:  0930  Speech Stop Time:  1000     Speech Total Time (min):  30 min    Billable Minutes: Eval Swallow and Oral Function 15 minutes and Self Care/Home Management Training 15 minutes    12/13/2024

## 2024-12-13 NOTE — PLAN OF CARE
A220/A220 EZEQUIELAna Lilia Rodriguez is a 74 y.o.female admitted on 12/12/2024 for UTI (urinary tract infection)   Code Status: Full Code MRN: [id number]   Review of patient's allergies indicates:  No Known Allergies  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    Lipoma of abdominal wall     Major neurocognitive disorder 06/21/2022    Obesity     Overactive bladder     Pap smear abnormality of cervix with ASCUS favoring benign     Thyroid nodule     Tobacco use disorder     Tubular adenoma of colon     Urinary incontinence       PRN meds    acetaminophen, 650 mg, Q8H PRN  hydrALAZINE, 10 mg, Q6H PRN  ondansetron, 4 mg, Q8H PRN  sodium chloride 0.9%, 10 mL, PRN      Chart check completed. Will continue plan of care.         Leonel Coma Scale Score: 15     Lead Monitored: Lead II Rhythm: normal sinus rhythm    Cardiac/Telemetry Box Number: [id number]  VTE Core Measure: (SCDs) Sequential compression device initiated/maintained Last Bowel Movement: 12/11/24  Diet GI Soft Fluid - 1500mL; Standard Tray  Voiding Characteristics: ureteral catheter  Fredy Score: 13  Fall Risk Score: 14  Accucheck []   Freq?      Lines/Drains/Airways       Drain  Duration                  Urethral Catheter 12/12/24 1133 <1 day              Peripheral Intravenous Line  Duration                  Peripheral IV - Single Lumen 12/12/24 1244 20 G Anterior;Right Wrist <1 day         Peripheral IV - Single Lumen 12/12/24 1244 20 G Right Antecubital <1 day

## 2024-12-14 LAB — BACTERIA UR CULT: NORMAL
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
## 2024-12-14 NOTE — DISCHARGE SUMMARY
[facility] - Telemetry ([facility])  [facility] Medicine  Discharge Summary      Patient Name: Leia Rodriguez  MRN: [id number]  [facility]: [id number]  Patient Class: OP- Observation  Admission Date: 12/12/2024  Hospital Length of Stay: 0 days  Discharge Date and Time: 12/13/24  Attending Physician: Amina Campoverde,*   Discharging Provider: Amina Campoverde MD  Primary Care Provider: Yasmin Navarro MD    Primary Care Team: Networked reference to record PCT     HPI:   Leia Rodriguez is a 74 year old female who has  has a past medical history of Arthritis, Behavioral change, Blind right eye, Breast cancer, Dementia, Essential hypertension, Hemorrhoids, Immunodeficiency due to chemotherapy, Lipoma of abdominal wall, Major neurocognitive disorder, Obesity, Overactive bladder, Pap smear abnormality of cervix with ASCUS favoring benign, Thyroid nodule, Tobacco use disorder, Tubular adenoma of colon, and Urinary incontinence who presented to the ED, with daughter, for evaluation of generalized weakness. Onset for the past 3-4 days but became worse today while going to a Urology appt. ED workup showed WBC count 6.42K, Hgb/Hct 11.2/36.1, K+ 3.4, troponin 0.029, UA with 3+ leukos (UA micro WBC 73, many WBC clumps). CXR with no acute findings, pneumothorax resolved. CT head with chronic microvascular ischemic changes.  Pt admitted to observation for UTI. Discussed code status with daughter at bedside. Patient has no POA. She will remain FULL CODE until all the patient's daughters can come together and decide.     Of note patient was admitted on 11/29/24  after she was found down at home and brought by EMS to the hospital.  She was intubated secondary to agonal breathing.  Difficult intubation resulting in small pneumothorax.  She was started on Precedex on admission became hypotensive briefly required Levophed and was admitted to critical care.  Patient was stabilized extubated and was stable off of Precedex. Patient had  significant hypoglycemia requiring D10 infusion overnight which has been discontinued with patient able to maintain blood glucose greater than 100. Eventually transitioned to telemetry floor.  She was seen in consultation by Physical therapy and Occupational therapy with recommendations to discharge home with 24 hour a day care as well as home health. Patient's functional status extremely limited by her dementia but family is aware and patient will be discharged home. She was discharged home on tapering steroids and it is recommended that she follow up with her PCP for evaluation of multiple behavioral med she is on that might need adjusting. Recommended she repeat chest x-ray given her small pneumothorax which resolved while in the hospital. Due to urinary retention with history of straight caths patient will have Zuluaga left in place upon discharge and follow up with her urologist as an outpatient.    * No surgery found *      Hospital Course:   Admitted under Hospital Medicine for AMS/UTI   Currently on IV antibiotics, pending cultures   Given patient's age, underlying comorbidities, worsening dementia, recurrent hospitalizations, had code status discussion with family-full code for now, consulted palliative;     12/13/24  Examination of patient done at bedside, appeared alert awake, mentation improved, at baseline mentation per daughter at bedside   Acute events overnight   Appeared hemodynamically stable   Afebrile, no leukocytosis   Received IV antibiotics during hospital stay UTI   Cultures negative to date   Had extensive discussion with patient's daughter regarding code status, also had palliative on board, despite multiple conversations,-opted for full code for now, agreed for home-based palliative, ordered accordingly   Planning for discharge today, ordered home health, home-based palliative, strongly recommended patient's daughters to consider discussions on code status, follow up with palliative, agreed  with the plan   Ordered medications to be delivered bedside prior to discharge;    Very high-risk readmission, ordered NP at home program      Goals of Care Treatment Preferences:  Code Status: Full Code      SDOH Screening:  The patient declined to be screened for utility difficulties, food insecurity, transport difficulties, housing insecurity, and interpersonal safety, so no concerns could be identified this admission.     Consults:   Consults (From admission, onward)          Status Ordering Provider     Inpatient consult to Registered Dietitian/Nutritionist  Once        Provider:  (Not yet assigned)    Acknowledged FRANCA GIFFORD     Inpatient consult to Urology  Once        Provider:  Osman Hamlin MD    Acknowledged FRANCA GIFFORD     Inpatient consult to Palliative Care  Once        Provider:  Rebecca Capps NP    Acknowledged DIONY MENDOZA            * UTI (urinary tract infection)  -UA appears to be consistent with UTI  -Previous urine culture in 04/2024 had E. Coli   -Start Rocephin 1 mg IV daily  -Monitor urine culture      Hypokalemia  Patient's most recent potassium results are listed below.   Recent Labs     12/12/24  1002   K 3.4*     Plan  - Replete potassium per protocol  - Monitor potassium Daily  - Patient's hypokalemia is  currently being treated.    Anemia  Most recent hemoglobin and hematocrit are listed below.  Recent Labs     12/12/24  1002   HGB 11.2*   HCT 36.1*     Plan  - Monitor serial CBC: Daily  - Transfuse PRBC if patient becomes hemodynamically unstable, symptomatic or H/H drops below 7/21.  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 On presentation their CHF was well compensated. Most recent BNP and echo results are listed below.  No results for input(s): "BNP" in the last 72 hours.  Latest ECHO  Results for orders placed during the hospital encounter of 11/29/24    Echo    Interpretation Summary    Left Ventricle: The left ventricle is normal in size. Normal wall thickness. There is concentric remodeling. Global hypokinesis present. There is mildly reduced systolic function with a visually estimated ejection fraction of 40 - 50%. Ejection fraction is approximately 40%. There is normal diastolic function.    Right Ventricle: Normal right ventricular cavity size. Wall thickness is normal. Systolic function is normal.    Mitral Valve: There is mild regurgitation.    Tricuspid Valve: There is trace regurgitation. There is mild pulmonary hypertension.    IVC/SVC: Patient is ventilated, cannot use IVC diameter to estimate right atrial pressure.    Current Heart Failure Medications  metoprolol succinate (TOPROL-XL) 24 hr tablet 25 mg, 2 times daily, Oral    Plan  - Monitor strict I&Os and daily weights.    - Place on telemetry  - Low sodium diet  - Place on fluid restriction of 1.5 L.   - Cardiology has not been consulted  - The patient's volume status is at their baseline        Elevated troponin  -Initial troponin 0.029  -About 13 days ago was 0.023  -Serial troponin pending  -Elevated troponin may be secondary to demand ischemia from elevated BP  -Patient does not appear in pain  -She denies chest pain and/or equivalent  -Monitor      Malignant neoplasm of lower-inner quadrant of left breast in female, estrogen receptor positive  -Followed by Dr. Fernández outpatient  -Continue Arimidex (per office note on 09/04/24 - " she is completing 4 of 5 years of Arimidex 1 mg daily)      Essential hypertension  Patient's blood pressure range in the last 24 hours was: BP  Min: 118/57  Max: 188/75.The patient's inpatient anti-hypertensive regimen is listed below:  Current Antihypertensives  metoprolol succinate (TOPROL-XL) 24 hr tablet 25 mg, 2 times daily, Oral  isosorbide mononitrate 24 hr tablet 30 mg, Daily, Oral    Plan  - BP is controlled, no changes needed to their regimen  - Hydralazine PRN      Final Active Diagnoses:    Diagnosis Date Noted POA    PRINCIPAL PROBLEM:  UTI (urinary tract infection) [N39.0] 01/24/2022 Yes    Anemia [D64.9] 12/03/2024 Yes    Hypokalemia [E87.6] 12/03/2024 Yes    Urinary retention [R33.9] 11/29/2024 Yes    Major neurocognitive disorder due to Alzheimer's disease [G30.9, F02.80] 09/21/2022 Yes    Chronic combined systolic and diastolic CHF (congestive heart failure) [I50.42] 10/12/2021 Yes     Chronic    Elevated troponin [R79.89] 03/06/2021 Yes    Malignant neoplasm of lower-inner quadrant of left breast in female, estrogen receptor positive [C50.312, Z17.0] 06/15/2017 Not Applicable    Essential hypertension [I10]  Yes     Chronic      Problems Resolved During this Admission:       Discharged Condition: poor    Disposition: Home or Self Care    Follow Up:   Follow-up Information       Yasmin Navarro MD Follow up in 1 week(s).    Specialty: Family Medicine  Contact information:  [address] [facility] [address] [phone]                           Patient Instructions:      Ambulatory referral/consult to HOME Palliative Care   Standing Status: Future   Referral Priority: Routine Referral Type: Consultation   Requested Specialty: Hospice and Palliative Medicine   Number of Visits Requested: 1       Significant Diagnostic Studies:     Results for orders placed or performed during the hospital encounter of 12/12/24   EKG 12-lead    Collection Time: 12/12/24  9:35 AM   Result Value Ref Range     QRS Duration 90 ms    [facility] QTC Calculation 460 ms   Hepatitis C Antibody    Collection Time: 12/12/24 10:02 AM   Result Value Ref Range    Hepatitis C Ab Negative Negative   HCV Virus Hold Specimen    Collection Time: 12/12/24 10:02 AM   Result Value Ref Range    HEP C Virus Hold Specimen Hold for HCV sendout    HIV 1/2 Ag/Ab (4th Gen)    Collection Time: 12/12/24 10:02 AM   Result Value Ref Range    HIV 1/2 Ag/Ab Negative Negative   CBC auto differential    Collection Time: 12/12/24 10:02 AM   Result Value Ref Range    WBC 6.42 3.90 - 12.70 K/uL    RBC 3.80 (L) 4.00 - 5.40 M/uL    Hemoglobin 11.2 (L) 12.0 - 16.0 g/dL    Hematocrit 36.1 (L) 37.0 - 48.5 %    MCV 95 82 - 98 fL    MCH 29.5 27.0 - 31.0 pg    MCHC 31.0 (L) 32.0 - 36.0 g/dL    RDW 14.9 (H) 11.5 - 14.5 %    Platelets 280 150 - 450 K/uL    MPV 10.2 9.2 - 12.9 fL    Immature Granulocytes 0.9 (H) 0.0 - 0.5 %    Gran # (ANC) 3.8 1.8 - 7.7 K/uL    Immature Grans (Abs) 0.06 (H) 0.00 - 0.04 K/uL    Lymph # 1.9 1.0 - 4.8 K/uL    Mono # 0.4 0.3 - 1.0 K/uL    Eos # 0.2 0.0 - 0.5 K/uL    Baso # 0.03 0.00 - 0.20 K/uL    nRBC 0 0 /100 WBC    Gran % 58.5 38.0 - 73.0 %    Lymph % 29.9 18.0 - 48.0 %    Mono % 6.9 4.0 - 15.0 %    Eosinophil % 3.3 0.0 - 8.0 %    Basophil % 0.5 0.0 - 1.9 %    Differential Method Automated    Comprehensive metabolic panel    Collection Time: 12/12/24 10:02 AM   Result Value Ref Range    Sodium 139 136 - 145 mmol/L    Potassium 3.4 (L) 3.5 - 5.1 mmol/L    Chloride 102 95 - 110 mmol/L    CO2 27 23 - 29 mmol/L    Glucose 89 70 - 110 mg/dL    BUN 21 8 - 23 mg/dL    Creatinine 1.2 0.5 - 1.4 mg/dL    Calcium 9.3 8.7 - 10.5 mg/dL    Total Protein 7.3 6.0 - 8.4 g/dL    Albumin 3.8 3.5 - 5.2 g/dL    Total Bilirubin 0.7 0.1 - 1.0 mg/dL    Alkaline Phosphatase 57 40 - 150 U/L    AST 13 10 - 40 U/L    ALT 9 (L) 10 - 44 U/L    eGFR 48 (A) >60 mL/min/1.73 m^2    Anion Gap 10 8 - 16 mmol/L   Lipase    Collection Time: 12/12/24 10:02 AM   Result Value Ref  Range    Lipase 6 4 - 60 U/L   Lactic acid, plasma    Collection Time: 12/12/24 10:02 AM   Result Value Ref Range    Lactate (Lactic Acid) 1.2 0.5 - 2.2 mmol/L   TSH    Collection Time: 12/12/24 10:02 AM   Result Value Ref Range    TSH 0.959 0.400 - 4.000 uIU/mL   Troponin I    Collection Time: 12/12/24 10:02 AM   Result Value Ref Range    Troponin I 0.029 (H) 0.000 - 0.026 ng/mL   Ammonia    Collection Time: 12/12/24 10:02 AM   Result Value Ref Range    Ammonia 22 10 - 50 umol/L   Ethanol    Collection Time: 12/12/24 10:02 AM   Result Value Ref Range    Alcohol, Serum <10 <10 mg/dL   Blood culture #2 **CANNOT BE ORDERED STAT**    Collection Time: 12/12/24 10:03 AM    Specimen: Peripheral, Antecubital, Right; Blood   Result Value Ref Range    Blood Culture, Routine No Growth to date    Blood culture #1 **CANNOT BE ORDERED STAT**    Collection Time: 12/12/24 10:04 AM    Specimen: Peripheral, Hand, Right; Blood   Result Value Ref Range    Blood Culture, Routine No Growth to date    Urinalysis, Reflex to Urine Culture Urine, Catheterized    Collection Time: 12/12/24 10:37 AM    Specimen: Urine, Clean Catch   Result Value Ref Range    Specimen UA Urine, Catheterized     Color, UA Yellow Yellow, Straw, Cynthia    Appearance, UA Hazy (A) Clear    pH, UA 6.0 5.0 - 8.0    Specific Gravity, UA 1.025 1.005 - 1.030    Protein, UA 1+ (A) Negative    Glucose, UA Negative Negative    Ketones, UA Negative Negative    Bilirubin (UA) Negative Negative    Occult Blood UA 3+ (A) Negative    Nitrite, UA Negative Negative    Urobilinogen, UA Negative <2.0 EU/dL    Leukocytes, UA 3+ (A) Negative   Drug screen panel, emergency    Collection Time: 12/12/24 10:37 AM   Result Value Ref Range    Benzodiazepines Negative Negative    Methadone metabolites Negative Negative    Cocaine (Metab.) Negative Negative    Opiate Scrn, Ur Negative Negative    Barbiturate Screen, Ur Negative Negative    Amphetamine Screen, Ur Negative Negative    THC  Negative Negative    Phencyclidine Negative Negative    Creatinine, Urine 141.0 15.0 - 325.0 mg/dL    Toxicology Information SEE COMMENT    Urinalysis Microscopic    Collection Time: 12/12/24 10:37 AM   Result Value Ref Range    RBC, UA >100 (H) 0 - 4 /hpf    WBC, UA 73 (H) 0 - 5 /hpf    WBC Clumps, UA Many (A) None-Rare    Bacteria Rare None-Occ /hpf    Yeast, UA Few (A) None    Squam Epithel, UA 27 /hpf    Hyaline Casts, UA 5 (A) 0-1/lpf /lpf    Unclass Natalie UA Few None-Moderate    Microscopic Comment SEE COMMENT    Troponin I    Collection Time: 12/12/24  4:14 PM   Result Value Ref Range    Troponin I 0.041 (H) 0.000 - 0.026 ng/mL   Troponin I    Collection Time: 12/12/24  7:43 PM   Result Value Ref Range    Troponin I 0.035 (H) 0.000 - 0.026 ng/mL   Comprehensive metabolic panel    Collection Time: 12/13/24  4:20 AM   Result Value Ref Range    Sodium 138 136 - 145 mmol/L    Potassium 4.3 3.5 - 5.1 mmol/L    Chloride 106 95 - 110 mmol/L    CO2 20 (L) 23 - 29 mmol/L    Glucose 99 70 - 110 mg/dL    BUN 16 8 - 23 mg/dL    Creatinine 1.1 0.5 - 1.4 mg/dL    Calcium 8.9 8.7 - 10.5 mg/dL    Total Protein 6.5 6.0 - 8.4 g/dL    Albumin 3.4 (L) 3.5 - 5.2 g/dL    Total Bilirubin 0.6 0.1 - 1.0 mg/dL    Alkaline Phosphatase 51 40 - 150 U/L    AST 13 10 - 40 U/L    ALT 7 (L) 10 - 44 U/L    eGFR 53 (A) >60 mL/min/1.73 m^2    Anion Gap 12 8 - 16 mmol/L   CBC auto differential    Collection Time: 12/13/24  4:20 AM   Result Value Ref Range    WBC 8.40 3.90 - 12.70 K/uL    RBC 3.67 (L) 4.00 - 5.40 M/uL    Hemoglobin 10.8 (L) 12.0 - 16.0 g/dL    Hematocrit 34.4 (L) 37.0 - 48.5 %    MCV 94 82 - 98 fL    MCH 29.4 27.0 - 31.0 pg    MCHC 31.4 (L) 32.0 - 36.0 g/dL    RDW 14.6 (H) 11.5 - 14.5 %    Platelets 248 150 - 450 K/uL    MPV 10.1 9.2 - 12.9 fL    Immature Granulocytes 0.7 (H) 0.0 - 0.5 %    Gran # (ANC) 5.6 1.8 - 7.7 K/uL    Immature Grans (Abs) 0.06 (H) 0.00 - 0.04 K/uL    Lymph # 1.5 1.0 - 4.8 K/uL    Mono # 0.8 0.3 - 1.0 K/uL     Eos # 0.4 0.0 - 0.5 K/uL    Baso # 0.03 0.00 - 0.20 K/uL    nRBC 0 0 /100 WBC    Gran % 66.6 38.0 - 73.0 %    Lymph % 18.0 18.0 - 48.0 %    Mono % 9.9 4.0 - 15.0 %    Eosinophil % 4.4 0.0 - 8.0 %    Basophil % 0.4 0.0 - 1.9 %    Differential Method Automated      *Note: Due to a large number of results and/or encounters for the requested time period, some results have not been displayed. A complete set of results can be found in Results Review.        Imaging Results              CT Head Without Contrast (Final result)  Result time 12/12/24 10:34:18      Final result by Earnest Monterroso MD (12/12/24 10:34:18)                   Impression:      Chronic microvascular ischemic changes.      Electronically signed by: Earnest Monterroso MD  Date:    12/12/2024  Time:    10:34               Narrative:    EXAMINATION:  CT HEAD WITHOUT CONTRAST    CLINICAL HISTORY:  Mental status change, unknown cause;    TECHNIQUE:  Low dose axial CT images obtained throughout the head without intravenous contrast. Sagittal and coronal reconstructions were performed.  All CT scans at this facility use dose modulation, iterative reconstruction and/or weight based dosing when appropriate to reduce radiation dose to as low as reasonably achievable.    COMPARISON:  11/29/2024    FINDINGS:  Intracranial compartment:    The brain parenchyma demonstrates areas of decreased attenuation with mild to moderate periventricular white matter consistent with chronic microvascular ischemic changes.  Stable small area of encephalomalacia right frontal lobe which could reflect remote infarct.  No parenchymal mass, hemorrhage, edema or major vascular distribution infarct.  Vascular calcifications are noted.    Mild prominence of the sulci and ventricles are consistent with age-related involutional changes.    No extra-axial blood or fluid collections.    Skull/extracranial contents (limited evaluation): No fracture.  Scattered mild mucosal thickening.   Mastoid air cells and paranasal sinuses are essentially clear.                                       X-Ray Chest AP Portable (Final result)  Result time 12/12/24 09:45:28      Final result by Hermilo Allred MD (12/12/24 09:45:28)                   Impression:      1.  Negative for acute process involving the chest.    2.  Stable findings as noted above.      Electronically signed by: Hermilo Allred MD  Date:    12/12/2024  Time:    09:45               Narrative:    EXAMINATION:  XR CHEST AP PORTABLE    CLINICAL HISTORY:  altered mental status;    COMPARISON:  December 2, 2024, as well as studies dating back to December 3, 2022.    FINDINGS:  EKG leads overlie the chest.  Clothing artifact is present.  The lungs are clear. The cardiac silhouette size is borderline enlarged.  The trachea is midline and the mediastinal width is normal. Negative for focal infiltrate, effusion or pneumothorax. Pulmonary vasculature is normal. Negative for osseous abnormalities. Tortuous aorta with calcifications of the aortic knob.  There are degenerative changes of the spine and both shoulder girdles.  Stable postoperative changes to the left breast and left axilla.  Nonspecific bowel gas pattern.    Interval removal of right arm PICC line.                                       Pending Diagnostic Studies:       None           Medications:       Medication List        START taking these medications      HIGH POTENCY MULTIVIT (W-IRON) Tab  Generic drug: multivitamin-iron-folic acid  Take 1 tablet by mouth once daily.     nitrofurantoin (macrocrystal-monohydrate) 100 MG capsule  Commonly known as: MACROBID  Take 1 capsule (100 mg total) by mouth 2 (two) times daily. for 3 days  Start taking on: December 14, 2024            CHANGE how you take these medications      * QUEtiapine 200 MG Tab  Commonly known as: SEROQUEL  Take 1 tablet (200 mg total) by mouth every evening.  What changed: Another medication with the same name was changed.  Make sure you understand how and when to take each.     * QUEtiapine 50 MG tablet  Commonly known as: SEROQUEL  Take 1 tablet (50 mg total) by mouth once daily.  What changed: when to take this           * This list has 2 medication(s) that are the same as other medications prescribed for you. Read the directions carefully, and ask your doctor or other care provider to review them with you.                CONTINUE taking these medications      anastrozole 1 mg Tab  Commonly known as: ARIMIDEX  Take 1 tablet (1 mg total) by mouth once daily.     aspirin 81 MG Chew  Take 1 tablet (81 mg total) by mouth once daily.     busPIRone 15 MG tablet  Commonly known as: BUSPAR  Take 1 tablet (15 mg total) by mouth 2 (two) times daily.     donepeziL 5 MG tablet  Commonly known as: ARICEPT  Take 1 tablet (5 mg total) by mouth every evening.     furosemide 20 MG tablet  Commonly known as: LASIX  Take 1 tablet (20 mg total) by mouth once daily.     isosorbide mononitrate 30 MG 24 hr tablet  Commonly known as: IMDUR  TAKE 1 TABLET BY MOUTH EVERY DAY     lamoTRIgine 100 MG tablet  Commonly known as: LAMICTAL  Take 1 tablet (100 mg total) by mouth 2 (two) times daily.     memantine 5 MG Tab  Commonly known as: NAMENDA  Take 1 tablet (5 mg total) by mouth 2 (two) times daily.     metoprolol succinate 25 MG 24 hr tablet  Commonly known as: TOPROL-XL  Take 1 tablet (25 mg total) by mouth 2 (two) times daily.     VITAMIN D3 125 mcg (5,000 unit) Tab  Generic drug: cholecalciferol (vitamin D3)     zinc gluconate 50 mg tablet            STOP taking these medications      aluminum-magnesium hydroxide-simethicone 200-200-20 mg/5 mL Susp, diphenhydrAMINE 12.5 mg/5 mL Liqd     potassium chloride 10 MEQ Cpsr  Commonly known as: MICRO-K     predniSONE 10 MG tablet  Commonly known as: DELTASONE               Where to Get Your Medications        These medications were sent to [facility] [address] [place], [place]  [address]      Hours: Mon-Fri, 8a-5:30p Phone: [phone]   HIGH POTENCY MULTIVIT (W-IRON) Tab  nitrofurantoin (macrocrystal-monohydrate) 100 MG capsule          Indwelling Lines/Drains at time of discharge:   Lines/Drains/Airways       Drain  Duration                  Urethral Catheter 12/12/24 1133 1 day                    Time spent on the discharge of patient: 96 minutes         Amina Campoverde MD  Department of Hospital Medicine  '[place] - Telemetry ([facility])

## 2024-12-17 ENCOUNTER — PATIENT MESSAGE (OUTPATIENT)
Dept: UROLOGY | Facility: CLINIC | Age: 74
End: 2024-12-17
Payer: MEDICARE

## 2024-12-17 ENCOUNTER — TELEPHONE (OUTPATIENT)
Dept: FAMILY MEDICINE | Facility: CLINIC | Age: 74
End: 2024-12-17

## 2024-12-17 ENCOUNTER — OFFICE VISIT (OUTPATIENT)
Dept: FAMILY MEDICINE | Facility: CLINIC | Age: 74
End: 2024-12-17
Payer: MEDICARE

## 2024-12-17 VITALS — BODY MASS INDEX: 19.76 KG/M2 | HEIGHT: 60 IN | HEART RATE: 97 BPM | OXYGEN SATURATION: 95 % | TEMPERATURE: 98 F

## 2024-12-17 DIAGNOSIS — I50.42 CHRONIC COMBINED SYSTOLIC AND DIASTOLIC CHF (CONGESTIVE HEART FAILURE): ICD-10-CM

## 2024-12-17 DIAGNOSIS — R53.1 GENERALIZED WEAKNESS: Primary | ICD-10-CM

## 2024-12-17 DIAGNOSIS — R41.0 CONFUSION: ICD-10-CM

## 2024-12-17 DIAGNOSIS — G30.9 MAJOR NEUROCOGNITIVE DISORDER DUE TO ALZHEIMER'S DISEASE: ICD-10-CM

## 2024-12-17 DIAGNOSIS — E87.6 HYPOKALEMIA: ICD-10-CM

## 2024-12-17 DIAGNOSIS — F02.80 MAJOR NEUROCOGNITIVE DISORDER DUE TO ALZHEIMER'S DISEASE: ICD-10-CM

## 2024-12-17 LAB
BACTERIA BLD CULT: NORMAL
BACTERIA BLD CULT: NORMAL

## 2024-12-17 PROCEDURE — G2211 COMPLEX E/M VISIT ADD ON: HCPCS | Mod: S$GLB,,, | Performed by: FAMILY MEDICINE

## 2024-12-17 PROCEDURE — 1111F DSCHRG MED/CURRENT MED MERGE: CPT | Mod: CPTII,S$GLB,, | Performed by: FAMILY MEDICINE

## 2024-12-17 PROCEDURE — 3008F BODY MASS INDEX DOCD: CPT | Mod: CPTII,S$GLB,, | Performed by: FAMILY MEDICINE

## 2024-12-17 PROCEDURE — 99214 OFFICE O/P EST MOD 30 MIN: CPT | Mod: S$GLB,,, | Performed by: FAMILY MEDICINE

## 2024-12-17 PROCEDURE — 4010F ACE/ARB THERAPY RXD/TAKEN: CPT | Mod: CPTII,S$GLB,, | Performed by: FAMILY MEDICINE

## 2024-12-17 PROCEDURE — 99999 PR PBB SHADOW E&M-EST. PATIENT-LVL III: CPT | Mod: PBBFAC,,, | Performed by: FAMILY MEDICINE

## 2024-12-17 PROCEDURE — 1126F AMNT PAIN NOTED NONE PRSNT: CPT | Mod: CPTII,S$GLB,, | Performed by: FAMILY MEDICINE

## 2024-12-17 NOTE — TELEPHONE ENCOUNTER
I have not evaluated her yet nor know what she was hospitalized for.  So no I do not need lab right now.

## 2024-12-17 NOTE — PROGRESS NOTES
CHIEF COMPLAINT:  This is a 74-year-old female here for follow-up hospitalization.     SUBJECTIVE:  The patient presented to the ER   On December 12for evaluation of generalized weakness and confusion for 3-4 days.  Workup in the ED showed possible UTI.  Chest x-ray revealed resolution of previous pneumothorax.  CT scan of the head showed no acute intracranial process.  Chronic microvascular ischemic changes were noted.  The patient was admitted for observation for UTI.    (Of note, patient had previously been admitted on November 29 and hospitalized in the ICU for acute respiratory failure. )  Discussion with the family ensued regarding code status and palliative care but despite extensive discussion family opted for full code. Patient received IV antibiotics during her hospital stay for possible UTI.  Cultures however were negative. The patient's daughter is concerned because potassium supplement was discontinued at discharge and patient continues to take Lasix 5 days per week.  Patient's daughter reports that she has been giving her potassium supplement.    The patient has progressive dementia with behavioral disturbance including agitation and difficulty sleeping at night.  She takes Namenda 5 mg twice daily and Aricept 5 mg nightly, BuSpar 15 mg twice daily, lamotrigine 50 mg twice daily and Seroquel 300 mg at night. The patient is followed by Cardiology for CAD, PAD, chronic diastolic and systolic heart failure with decreased ejection fraction and hypertension for which she takes aspirin 81 mg daily, Plavix 75 mg daily, metoprolol XL 25 mg daily, telmisartan 20 mg daily  and isosorbide 30 mg daily. She takes Lasix 20 mg as needed.  Her blood pressure today is 110/70.  Patient is status post stent placement in arteries of left lower extremity.  She continues to have left big toe ulcer which is followed by podiatry.  She has chronic kidney disease stage 3a. She takes prophylactic antibiotics for recurrent UTIs:  Cephalexin 250 mg nightly.        The patient had left mastectomy for recurrent breast cancer in September 2020 and had previous breast cancer in 2017 and underwent lumpectomy and radiation followed by Arimidex.  She continues to take Arimidex status mastectomy. She has osteoarthritis of hands.  Patient stopped smoking in August 2020 after 50 pack-year history of smoking. Patient had GI workup for iron deficiency anemia.  EGD findings:  gastric AVM.  Adenomatous polyps were found on colonoscopy.  Video capsule endoscopy was negative.  Patient takes iron infusions for iron deficiency anemia.      ROS:  GENERAL: Patient denies fever, chills, night sweats. Patient denies weight loss. Patient denies anorexia, fatigue, or swollen glands.  Positive for weakness.  SKIN: Patient denies rash.  HEENT: Patient denies sore throat, ear pain, hearing loss, nasal congestion, or runny nose. Patient denies visual disturbance, eye irritation or discharge.  LUNGS: Patient denies cough, wheeze or hemoptysis.  CARDIOVASCULAR: Patient denies chest pain, shortness of breath, palpitations, syncope or lower extremity edema.  GI: Patient denies abdominal pain, nausea, vomiting, diarrhea, constipation, blood in stool or melena.  GENITOURINARY: Patient denies pelvic pain, vaginal discharge, itch or odor. Patient denies irregular vaginal bleeding. Patient denies dysuria, frequency, hematuria, or nocturia.  Positive for urinary retention.  MUSCULOSKELETAL: Patient denies joint swelling, redness or warmth.  Positive for joint pain and swelling.  NEUROLOGIC: Patient denies headache, vertigo, paresthesias, weakness in limb, dysarthria, dysphagia or abnormality of gait.  PSYCHIATRIC: Patient denies anxiety or depression.  Positive for memory loss.     OBJECTIVE:   GENERAL: Well-developed well-nourished black female alert and oriented x3, in no acute distress.  Severe cognitive impairment.  Essentially nonverbal.  Seated in wheelchair and asleep for  most of the visit.  History provided by daughter.  SKIN: Clear without rash. Normal color and tone.    HEENT: Eyes: Clear conjunctivae.  No scleral icterus.  NECK: Supple, normal range of motion. No masses, lymphadenopathy or enlarged thyroid. No JVD.   LUNGS: Clear to auscultation. Normal respiratory effort.  O2 saturation 95%.  CARDIOVASCULAR: Regular rhythm, normal S1, S2 without murmur, gallop or rub.  EXTREMITIES: Without cyanosis or clubbing.  Distal pulses 2+ and equal.  Decreased range lower extremities.    NEUROLOGIC:  Motor  strength equal bilaterally. Sensation normal to touch.  Gait unsteady without support. No tremor.    ASSESSMENT:  1. Generalized weakness    2. Confusion    3. Chronic combined systolic and diastolic CHF (congestive heart failure)    4. Major neurocognitive disorder due to Alzheimer's disease    5. Hypokalemia      PLAN:  1.  Check BNP for potassium status.  2.  Continue potassium supplement until results reviewed.    3.  Continue regular medications.    4.  Reconsider code status code.    5.  Home health.  6.  Follow-up in 6 months.    30 minutes of total time spent on the encounter, which includes face to face time and non-face to face time preparing to see the patient (eg, review of tests), Obtaining and/or reviewing separately obtained history, Documenting clinical information in the electronic or other health record, Independently interpreting results (not separately reported) and communicating results to the patient/family/caregiver, or Care coordination (not separately reported).     This note is generated with speech recognition software and is subject to transcription error and sound alike phrases that may be missed by proofreading.

## 2024-12-17 NOTE — TELEPHONE ENCOUNTER
[facility] Ms. Calhoun has called and is wanting to know if its any labs need to be done prior to pt coming in for 4:20 visit. Unique states pt is weak as of now. Unique states she will be their for another 5min.

## 2024-12-19 ENCOUNTER — TELEPHONE (OUTPATIENT)
Dept: FAMILY MEDICINE | Facility: CLINIC | Age: 74
End: 2024-12-19
Payer: MEDICARE

## 2024-12-20 ENCOUNTER — EXTERNAL HOME HEALTH (OUTPATIENT)
Dept: HOME HEALTH SERVICES | Facility: HOSPITAL | Age: 74
End: 2024-12-20
Payer: MEDICARE

## 2024-12-23 ENCOUNTER — TELEPHONE (OUTPATIENT)
Dept: UROLOGY | Facility: CLINIC | Age: 74
End: 2024-12-23
Payer: MEDICARE

## 2024-12-23 NOTE — TELEPHONE ENCOUNTER
Returned call to pts daughter;  she was requesting documentation to send to pts day care stating that it was ok for pt to attend day care with ceballos cath in place.  Ceballos was placed during pts most recent hospitalization; discharge notes faxed to [facility].

## 2025-01-08 PROCEDURE — G0179 MD RECERTIFICATION HHA PT: HCPCS | Mod: ,,, | Performed by: PODIATRIST

## 2025-01-09 ENCOUNTER — PATIENT MESSAGE (OUTPATIENT)
Dept: FAMILY MEDICINE | Facility: CLINIC | Age: 75
End: 2025-01-09
Payer: MEDICARE

## 2025-01-09 ENCOUNTER — OFFICE VISIT (OUTPATIENT)
Dept: CARDIOLOGY | Facility: CLINIC | Age: 75
End: 2025-01-09
Payer: MEDICARE

## 2025-01-09 VITALS
HEART RATE: 84 BPM | BODY MASS INDEX: 23.75 KG/M2 | HEIGHT: 60 IN | SYSTOLIC BLOOD PRESSURE: 119 MMHG | WEIGHT: 121 LBS | DIASTOLIC BLOOD PRESSURE: 72 MMHG

## 2025-01-09 DIAGNOSIS — I25.10 CAD, MULTIPLE VESSEL: ICD-10-CM

## 2025-01-09 DIAGNOSIS — E78.2 MIXED HYPERLIPIDEMIA: ICD-10-CM

## 2025-01-09 DIAGNOSIS — I70.0 ATHEROSCLEROSIS OF AORTA: ICD-10-CM

## 2025-01-09 DIAGNOSIS — I50.42 CHRONIC COMBINED SYSTOLIC AND DIASTOLIC CHF (CONGESTIVE HEART FAILURE): Chronic | ICD-10-CM

## 2025-01-09 DIAGNOSIS — I27.20 PULMONARY HTN: ICD-10-CM

## 2025-01-09 DIAGNOSIS — Z87.891 HISTORY OF TOBACCO USE: ICD-10-CM

## 2025-01-09 DIAGNOSIS — G30.9 MAJOR NEUROCOGNITIVE DISORDER DUE TO ALZHEIMER'S DISEASE: ICD-10-CM

## 2025-01-09 DIAGNOSIS — I70.222 CRITICAL LIMB ISCHEMIA OF LEFT LOWER EXTREMITY: Primary | ICD-10-CM

## 2025-01-09 DIAGNOSIS — I10 ESSENTIAL HYPERTENSION: Chronic | ICD-10-CM

## 2025-01-09 DIAGNOSIS — I50.42 CHRONIC COMBINED SYSTOLIC AND DIASTOLIC CHF (CONGESTIVE HEART FAILURE): ICD-10-CM

## 2025-01-09 DIAGNOSIS — F02.80 MAJOR NEUROCOGNITIVE DISORDER DUE TO ALZHEIMER'S DISEASE: ICD-10-CM

## 2025-01-09 DIAGNOSIS — L89.159 PRESSURE INJURY OF SKIN OF SACRAL REGION, UNSPECIFIED INJURY STAGE: Primary | ICD-10-CM

## 2025-01-09 PROCEDURE — 1159F MED LIST DOCD IN RCRD: CPT | Mod: CPTII,S$GLB,, | Performed by: INTERNAL MEDICINE

## 2025-01-09 PROCEDURE — 3074F SYST BP LT 130 MM HG: CPT | Mod: CPTII,S$GLB,, | Performed by: INTERNAL MEDICINE

## 2025-01-09 PROCEDURE — 1160F RVW MEDS BY RX/DR IN RCRD: CPT | Mod: CPTII,S$GLB,, | Performed by: INTERNAL MEDICINE

## 2025-01-09 PROCEDURE — 1101F PT FALLS ASSESS-DOCD LE1/YR: CPT | Mod: CPTII,S$GLB,, | Performed by: INTERNAL MEDICINE

## 2025-01-09 PROCEDURE — 3288F FALL RISK ASSESSMENT DOCD: CPT | Mod: CPTII,S$GLB,, | Performed by: INTERNAL MEDICINE

## 2025-01-09 PROCEDURE — 3078F DIAST BP <80 MM HG: CPT | Mod: CPTII,S$GLB,, | Performed by: INTERNAL MEDICINE

## 2025-01-09 PROCEDURE — 1126F AMNT PAIN NOTED NONE PRSNT: CPT | Mod: CPTII,S$GLB,, | Performed by: INTERNAL MEDICINE

## 2025-01-09 PROCEDURE — 99999 PR PBB SHADOW E&M-EST. PATIENT-LVL III: CPT | Mod: PBBFAC,,, | Performed by: INTERNAL MEDICINE

## 2025-01-09 PROCEDURE — 3008F BODY MASS INDEX DOCD: CPT | Mod: CPTII,S$GLB,, | Performed by: INTERNAL MEDICINE

## 2025-01-09 PROCEDURE — 99214 OFFICE O/P EST MOD 30 MIN: CPT | Mod: S$GLB,,, | Performed by: INTERNAL MEDICINE

## 2025-01-09 RX ORDER — ATORVASTATIN CALCIUM 40 MG/1
40 TABLET, FILM COATED ORAL NIGHTLY
Qty: 90 TABLET | Refills: 3 | Status: SHIPPED | OUTPATIENT
Start: 2025-01-09 | End: 2026-01-09

## 2025-01-09 NOTE — PROGRESS NOTES
Subjective:   Patient ID:  Leia Rodriguez is a 74 y.o. female who presents for follow up of No chief complaint on file.      74 yo female, 6 months f/u  PMH CAD, PAD s/p L SFA stents x2 in 10/24 by Dr. Santos, PACs, CHFmrEF, h/o beast cancer 4 yrs ago lumpectomy and chemo, recurrent in  s/p mastectomy and chemo ongoing, and HTN, dementia lower back pain wheelchair helps  Quit smoking in  after 50 yrs smoking.  Some residual left chest pain after mastectomy,   GRISSOM and fatigue after fast walking,   No palpitation, leg swelling, dizziness and faint.    ekg in  NSR and TWI on V2 to v6 and inferior leads   ECHO normal EF and severe LVH   ECHo EF 45%, mod MR and LAE  Weight stable    03/2021 admitted for CHF exacerbation.  and Troponin 0.44 flat. elg NSR and TWI on lateral leads. Improved SOB after diuresis. Then BNP dropped to 58  Now SOB sable. Sleeps on 1 pillow    07/2021 visit   echo Day 15, cycle 6 Biplane=44% 4D LVQ=45% Avg GPLS= -16.1   MPI inferoapical scar  GRISSOM while long distance walking  No chest pain, dizziness faint, leg swelling  Decent appetite  F/u with Dr. Fernández on stage I HER2 positive breast carcinoma being treated with Herceptin q. 3 weeks     visit  Dynamical TWI on EKG   MPI showed apical inferior fixed perfusion defect  No chest pain . Limited walking due to lower back pain  No orthopnea      visit  No chest pain. GRISSOM mild and chronic. Limited activity due to fatigue and leg pain.   S/p LHC done on 10/12/2021, distal % OM2/3 40% and midRCA 50% lesion and EF 45% and LVEDP 17 mmHG. Moderate MR. Continue medical Rx  Serial echo studies    Echo 2/22/21 Cycle 4 Day 20  4dLVQ=47%  AutoEF=48%  BRIDGETT unable to obtain accurate measurements   5-26-21  Day 15, cycle 6  Biplane=44%  4D LVQ=45%  Avg GPLS= -16.1   9-13-21 Day 20, Cycle 11  Biplane=42%  4D LVQ=49%  Avg GPLS= -14.8%    12/2021 visit  11/ went to [facility]. EKG  sinus tachy and PVCs. BNP up to 800. CXR pulm. Edema. Added lasix 20 mg daily  Now dyspnea improved. No leg swelling chest pain. Sleeps on 1 pillow and no PND.   On iron infusion rx fro anemia     visit  Improved appetite after held chemo due to decreased EF about .   Gained the weight. No SOB, sleeps on 2 pillows     visit   Twice ER visits for not feeling well, SOB, the lab and CRX mri unremarkable, except some +UTI.Occurred at night and the family concerning anxiety ?  EKG in  NSR PACs    06/23 visit  Per daughter, pt c/o SOB + orthopnea and leg swelling. Lost 13 lbs in 6 months. Had teeth work recently, dementia slightyl worse. Some mood disturbance. Talked to family member.     08/23 visit  07/23 admitted for UTI and sepsis. Low BP and d/c ARB.   Cr 1.0 before d/c  Today BP low.   07/23 ekg sinus tachy and PACs; echo EF 50% LVH  No dizziness faint sob and chest pain.  to 120 mmHG  RLE edema, worse at sitting and better after laying     10/05/23 addendum   Report low BP  Stop aldactone  And decrease ToprolXL from 50 mg daily to 25 mg daily    12/23 visit  No orthopnea dizziness. No fall  06/23 echo EF 50% PAP 43 mmHG    06/24 visit  Dementia. States that some bad feeling in AM  No syncope.   EKG reviewed by myself today reveals NSR nonspecific STT change PACs  Leg swelling controlled    Interval history  10/24 admitted for CLI s/p angio L SFA stents x2. Now leg swelling improved, remains pain   Dementic. Walker dependent. 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    Lipoma of abdominal wall     Major neurocognitive disorder 06/21/2022    Obesity     Overactive bladder     Pap  smear abnormality of cervix with ASCUS favoring benign     Thyroid nodule     Tobacco use disorder     Tubular adenoma of colon     Urinary incontinence        Past Surgical History:   Procedure Laterality Date    ANTERIOR VAGINAL REPAIR      AORTOGRAPHY WITH SERIALOGRAPHY N/A 10/7/2024    Procedure: AORTOGRAM, WITH SERIALOGRAPHY;  Surgeon: Tiffanie Santos MD;  Location: [facility] CATH LAB;  Service: Cardiology;  Laterality: N/A;  MD requested Anesthesia    BLADDER SURGERY      BREAST LUMPECTOMY Left 2017    CATARACT EXTRACTION Left 2022     SECTION      X2    COLONOSCOPY N/A 3/8/2017    Procedure: COLONOSCOPY;  Surgeon: Tyron Paris MD;  Location: [facility];  Service: Endoscopy;  Laterality: N/A;    COLONOSCOPY N/A 2020    Procedure: COLONOSCOPY;  Surgeon: Keira Ellison MD;  Location: [facility];  Service: Endoscopy;  Laterality: N/A;    ESOPHAGOGASTRODUODENOSCOPY N/A 2020    Procedure: EGD (ESOPHAGOGASTRODUODENOSCOPY);  Surgeon: Keira Ellison MD;  Location: [facility];  Service: Endoscopy;  Laterality: N/A;  new onset iron deficiency with prior history of breast cancer    INTRALUMINAL GASTROINTESTINAL TRACT IMAGING VIA CAPSULE N/A 10/28/2020    Procedure: IMAGING PROCEDURE, GI TRACT, INTRALUMINAL, VIA CAPSULE;  Surgeon: Finesse Jha RN;  Location: [facility];  Service: Endoscopy;  Laterality: N/A;    LEFT HEART CATHETERIZATION Left 10/12/2021    Procedure: CATHETERIZATION, HEART, LEFT;  Surgeon: Tiffanie Santos MD;  Location: [facility] CATH LAB;  Service: Cardiology;  Laterality: Left;    LITHOTRIPSY, CORONARY TRANSLUMINAL, PERCUTANEOUS  10/7/2024    Procedure: LITHOTRIPSY, CORONARY TRANSLUMINAL, PERCUTANEOUS;  Surgeon: Tiffanie Santos MD;  Location: [facility] CATH LAB;  Service: Cardiology;;    PTA, SUPERFICIAL FEMORAL ARTERY  10/7/2024    Procedure: PTA, Superficial Femoral Artery;  Surgeon: Tiffanie Santos MD;  Location: [facility] CATH LAB;  Service: Cardiology;;     SENTINEL LYMPH NODE BIOPSY Left 2020    Procedure: BIOPSY, LYMPH NODE, SENTINEL;  Surgeon: Vincent Moyer MD;  Location: [facility] OR;  Service: General;  Laterality: Left;    SIMPLE MASTECTOMY Left 2020    Procedure: MASTECTOMY, SIMPLE;  Surgeon: Vincent Moyer MD;  Location: [facility] OR;  Service: General;  Laterality: Left;    STENT, SUPERFICIAL FEMORAL ARTERY  10/7/2024    Procedure: Stent, Superficial Femoral Artery;  Surgeon: Tiffanie Santos MD;  Location: [facility] CATH LAB;  Service: Cardiology;;    THYROID LOBECTOMY Left 2005    TOTAL ABDOMINAL HYSTERECTOMY      TUBAL LIGATION         Social History     Tobacco Use    Smoking status: Former     Current packs/day: 0.00     Average packs/day: 1 pack/day for 50.0 years (50.0 ttl pk-yrs)     Types: Cigarettes     Start date: 1970     Quit date: 2020     Years since quittin.4    Smokeless tobacco: Never   Substance Use Topics    Alcohol use: Never     Alcohol/week: 28.0 standard drinks of alcohol     Types: 28 Cans of beer per week    Drug use: No       Family History   Problem Relation Name Age of Onset    Hypertension Father      Cataracts Father      Prostate cancer Father  80    Cervical cancer Daughter Cenetra 32    Allergic rhinitis Daughter Cenetra     Cirrhosis Mother      Alcohol abuse Mother      Hypertension Daughter Sheronica     Diabetes Brother      Heart attack Brother      Heart murmur Brother      No Known Problems Daughter Gladis          ROS    Objective:   Physical Exam  HENT:      Head: Normocephalic.   Eyes:      Pupils: Pupils are equal, round, and reactive to light.   Neck:      Thyroid: No thyromegaly.      Vascular: Normal carotid pulses. No carotid bruit or JVD.   Cardiovascular:      Rate and Rhythm: Normal rate and regular rhythm. Frequent Extrasystoles are present.     Chest Wall: PMI is not displaced.      Pulses: Normal pulses.           Carotid pulses are 2+ on the right side and 2+ on the left side.      "Heart sounds: Normal heart sounds. No murmur heard.     No gallop. No S3 sounds.   Pulmonary:      Effort: No respiratory distress.      Breath sounds: Normal breath sounds. No stridor.   Abdominal:      General: Bowel sounds are normal.      Palpations: Abdomen is soft.      Tenderness: There is no abdominal tenderness. There is no rebound.   Musculoskeletal:      Right lower leg: No edema.   Skin:     General: Skin is warm and dry.      Findings: No rash.   Neurological:      Mental Status: She is alert.     Lab Results   Component Value Date    CHOL 185 10/15/2024    CHOL 192 07/14/2023    CHOL 119 (L) 03/31/2021     Lab Results   Component Value Date    HDL 53 10/15/2024    HDL 35 (L) 07/14/2023    HDL 46 03/31/2021     Lab Results   Component Value Date    LDLCALC 114.6 10/15/2024    LDLCALC 128.0 07/14/2023    LDLCALC 61.2 (L) 03/31/2021     Lab Results   Component Value Date    TRIG 87 10/15/2024    TRIG 145 07/14/2023    TRIG 59 03/31/2021     Lab Results   Component Value Date    CHOLHDL 28.6 10/15/2024    CHOLHDL 18.2 (L) 07/14/2023    CHOLHDL 38.7 03/31/2021       Chemistry        Component Value Date/Time     12/13/2024 0420    K 4.3 12/13/2024 0420     12/13/2024 0420    CO2 20 (L) 12/13/2024 0420    BUN 16 12/13/2024 0420    CREATININE 1.1 12/13/2024 0420    GLU 99 12/13/2024 0420        Component Value Date/Time    CALCIUM 8.9 12/13/2024 0420    ALKPHOS 51 12/13/2024 0420    AST 13 12/13/2024 0420    ALT 7 (L) 12/13/2024 0420    BILITOT 0.6 12/13/2024 0420    ESTGFRAFRICA 48.0 (A) 06/20/2022 1020    EGFRNONAA 41.7 (A) 06/20/2022 1020          No results found for: "LABA1C", "HGBA1C"  Lab Results   Component Value Date    TSH 0.959 12/12/2024     Lab Results   Component Value Date    INR 1.1 11/29/2024    INR 1.0 10/07/2024    INR 1.1 10/01/2021     Lab Results   Component Value Date    WBC 8.40 12/13/2024    HGB 10.8 (L) 12/13/2024    HCT 34.4 (L) 12/13/2024    MCV 94 12/13/2024     " 12/13/2024     BMP  Sodium   Date Value Ref Range Status   12/13/2024 138 136 - 145 mmol/L Final     Potassium   Date Value Ref Range Status   12/13/2024 4.3 3.5 - 5.1 mmol/L Final     Chloride   Date Value Ref Range Status   12/13/2024 106 95 - 110 mmol/L Final     CO2   Date Value Ref Range Status   12/13/2024 20 (L) 23 - 29 mmol/L Final     BUN   Date Value Ref Range Status   12/13/2024 16 8 - 23 mg/dL Final     Creatinine   Date Value Ref Range Status   12/13/2024 1.1 0.5 - 1.4 mg/dL Final     Calcium   Date Value Ref Range Status   12/13/2024 8.9 8.7 - 10.5 mg/dL Final     Anion Gap   Date Value Ref Range Status   12/13/2024 12 8 - 16 mmol/L Final     eGFR if    Date Value Ref Range Status   06/20/2022 48.0 (A) >60 mL/min/1.73 m^2 Final     eGFR if non    Date Value Ref Range Status   06/20/2022 41.7 (A) >60 mL/min/1.73 m^2 Final     Comment:     Calculation used to obtain the estimated glomerular filtration  rate (eGFR) is the CKD-EPI equation.        BNP  @LABRCNTIP(BNP,BNPTRIAGEBLO)@  @LABRCNTIP(troponini)@  CrCl cannot be calculated (Patient's most recent lab result is older than the maximum 7 days allowed.).  No results found in the last 24 hours.  No results found in the last 24 hours.  No results found in the last 24 hours.    Assessment:      1. Critical limb ischemia of left lower extremity    2. Chronic combined systolic and diastolic CHF (congestive heart failure)    3. CAD, multiple vessel    4. Atherosclerosis of aorta    5. Pulmonary HTN    6. Essential hypertension    7. History of tobacco use        Plan:   Add Lipitor 40 mg qhs for PAD and HLD   Continue asa Imdur metoprolol and Lasix  Continue supportive care  Check CMP and LIPid in 6 weeks   Rtc in 6m

## 2025-01-10 NOTE — TELEPHONE ENCOUNTER
Please FAX order for HH to [facility].    2.   Compose letter and provide to patient:    To Whom it May Concern:    Leia Rodriguez is under my care. Due to her declining health, including inability to walk and protein-calorie malnutrition, she has been unable to attend the adult day care program.     Please feel free to contact me if you have any further concerns.    Sincerely,        Yasmin Navarro MD

## 2025-01-30 ENCOUNTER — PATIENT MESSAGE (OUTPATIENT)
Dept: FAMILY MEDICINE | Facility: CLINIC | Age: 75
End: 2025-01-30
Payer: MEDICARE

## 2025-01-31 ENCOUNTER — PATIENT MESSAGE (OUTPATIENT)
Dept: NEUROLOGY | Facility: CLINIC | Age: 75
End: 2025-01-31
Payer: MEDICARE

## 2025-01-31 RX ORDER — DONEPEZIL HYDROCHLORIDE 5 MG/1
5 TABLET, FILM COATED ORAL NIGHTLY
Qty: 90 TABLET | Refills: 3 | Status: SHIPPED | OUTPATIENT
Start: 2025-01-31

## 2025-01-31 RX ORDER — QUETIAPINE FUMARATE 100 MG/1
100 TABLET, FILM COATED ORAL NIGHTLY
Qty: 90 TABLET | Refills: 3 | Status: SHIPPED | OUTPATIENT
Start: 2025-01-31

## 2025-02-03 ENCOUNTER — DOCUMENT SCAN (OUTPATIENT)
Dept: HOME HEALTH SERVICES | Facility: HOSPITAL | Age: 75
End: 2025-02-03
Payer: MEDICARE

## 2025-02-07 ENCOUNTER — PATIENT MESSAGE (OUTPATIENT)
Dept: FAMILY MEDICINE | Facility: CLINIC | Age: 75
End: 2025-02-07
Payer: MEDICARE

## 2025-02-07 ENCOUNTER — PATIENT MESSAGE (OUTPATIENT)
Dept: NEUROLOGY | Facility: CLINIC | Age: 75
End: 2025-02-07
Payer: MEDICARE

## 2025-02-07 DIAGNOSIS — L89.159 PRESSURE INJURY OF SKIN OF SACRAL REGION, UNSPECIFIED INJURY STAGE: Primary | ICD-10-CM

## 2025-02-12 ENCOUNTER — TELEPHONE (OUTPATIENT)
Dept: FAMILY MEDICINE | Facility: CLINIC | Age: 75
End: 2025-02-12
Payer: MEDICARE

## 2025-02-12 ENCOUNTER — OFFICE VISIT (OUTPATIENT)
Dept: NEUROLOGY | Facility: CLINIC | Age: 75
End: 2025-02-12
Payer: MEDICARE

## 2025-02-12 DIAGNOSIS — N39.46 MIXED STRESS AND URGE URINARY INCONTINENCE: ICD-10-CM

## 2025-02-12 DIAGNOSIS — J43.8 OTHER EMPHYSEMA: ICD-10-CM

## 2025-02-12 DIAGNOSIS — F10.21 HISTORY OF ALCOHOLISM: ICD-10-CM

## 2025-02-12 DIAGNOSIS — N18.32 CHRONIC KIDNEY DISEASE, STAGE 3B: ICD-10-CM

## 2025-02-12 DIAGNOSIS — F03.90 MAJOR NEUROCOGNITIVE DISORDER: Primary | ICD-10-CM

## 2025-02-12 DIAGNOSIS — R46.89 BEHAVIORAL CHANGE: ICD-10-CM

## 2025-02-12 DIAGNOSIS — R41.3 MEMORY LOSS: ICD-10-CM

## 2025-02-12 DIAGNOSIS — I25.10 CAD, MULTIPLE VESSEL: ICD-10-CM

## 2025-02-12 DIAGNOSIS — R00.0 SINUS TACHYCARDIA: ICD-10-CM

## 2025-02-12 DIAGNOSIS — E66.811 OBESITY (BMI 30.0-34.9): ICD-10-CM

## 2025-02-12 DIAGNOSIS — J96.01 ACUTE RESPIRATORY FAILURE WITH HYPOXIA AND HYPERCARBIA: ICD-10-CM

## 2025-02-12 DIAGNOSIS — I50.42 CHRONIC COMBINED SYSTOLIC AND DIASTOLIC CHF (CONGESTIVE HEART FAILURE): Chronic | ICD-10-CM

## 2025-02-12 DIAGNOSIS — Z91.89 AT RISK FOR PROLONGED QT INTERVAL SYNDROME: ICD-10-CM

## 2025-02-12 DIAGNOSIS — N39.41 URGE INCONTINENCE OF URINE: ICD-10-CM

## 2025-02-12 DIAGNOSIS — G30.9 MAJOR NEUROCOGNITIVE DISORDER DUE TO ALZHEIMER'S DISEASE: ICD-10-CM

## 2025-02-12 DIAGNOSIS — F03.911 AGITATION DUE TO DEMENTIA: ICD-10-CM

## 2025-02-12 DIAGNOSIS — R41.3 OTHER AMNESIA: ICD-10-CM

## 2025-02-12 DIAGNOSIS — I10 ESSENTIAL HYPERTENSION: Chronic | ICD-10-CM

## 2025-02-12 DIAGNOSIS — G47.00 INSOMNIA, UNSPECIFIED TYPE: ICD-10-CM

## 2025-02-12 DIAGNOSIS — R90.89 ABNORMAL FINDING ON MRI OF BRAIN: ICD-10-CM

## 2025-02-12 DIAGNOSIS — J96.02 ACUTE RESPIRATORY FAILURE WITH HYPOXIA AND HYPERCARBIA: ICD-10-CM

## 2025-02-12 DIAGNOSIS — I73.9 ARTERIAL INSUFFICIENCY OF LOWER EXTREMITY: ICD-10-CM

## 2025-02-12 DIAGNOSIS — F02.818 MAJOR NEUROCOGNITIVE DISORDER DUE TO MULTIPLE ETIOLOGIES WITH BEHAVIORAL DISTURBANCE: ICD-10-CM

## 2025-02-12 DIAGNOSIS — F02.80 MAJOR NEUROCOGNITIVE DISORDER DUE TO ALZHEIMER'S DISEASE: ICD-10-CM

## 2025-02-12 DIAGNOSIS — F99 INAPPROPRIATE BEHAVIOR: ICD-10-CM

## 2025-02-12 DIAGNOSIS — R93.1 DECREASED CARDIAC EJECTION FRACTION: ICD-10-CM

## 2025-02-12 DIAGNOSIS — G93.9 LESION OF BRAIN: ICD-10-CM

## 2025-02-12 DIAGNOSIS — I27.20 PULMONARY HTN: ICD-10-CM

## 2025-02-12 DIAGNOSIS — I70.0 ATHEROSCLEROSIS OF AORTA: ICD-10-CM

## 2025-02-12 DIAGNOSIS — F33.0 MILD EPISODE OF RECURRENT MAJOR DEPRESSIVE DISORDER: ICD-10-CM

## 2025-02-12 DIAGNOSIS — I70.222 CRITICAL LIMB ISCHEMIA OF LEFT LOWER EXTREMITY: ICD-10-CM

## 2025-02-12 DIAGNOSIS — F10.21 ALCOHOL USE DISORDER, MODERATE, IN SUSTAINED REMISSION: ICD-10-CM

## 2025-02-12 DIAGNOSIS — R79.89 ELEVATED TROPONIN: ICD-10-CM

## 2025-02-12 RX ORDER — QUETIAPINE FUMARATE 200 MG/1
200 TABLET, FILM COATED ORAL NIGHTLY
Qty: 30 TABLET | Refills: 11 | Status: SHIPPED | OUTPATIENT
Start: 2025-02-12 | End: 2026-02-12

## 2025-02-12 NOTE — TELEPHONE ENCOUNTER
----- Message from Valerie sent at 2/12/2025  2:45 PM CST -----  Contact: Unique Calhoun with [facility] is calling to speak with the nurse regarding wound. Reports needing wound care orders. Please give Unique a call back at [phone]

## 2025-02-12 NOTE — PROGRESS NOTES
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 loss with suspected prior vascular insults in the right centrum semiovale.        Follow up with Neurosurgery for evaluation of MRI Brain results, referral has been placed       CNP    06-    Major Neurocognitive disorder due to Multiple etiologies with behavioral disturbances: AD, CVD, MDD, Alcohol Abuse Remission        MRI Brain WWO Contrast    10-    Stable MRI of the brain.  Atrophy.  Old infarcts.  Left frontal dural-based enhancement with considerations including meningioma versus dural metastatic disease.  No change since 02/24/2022.       EKG RESULTS:     01-    QT Interval 366, HR 85     Normal results     Assessment:       1. Major neurocognitive disorder    2. Behavioral change    3. 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 Major neurocognitive disorder due to Alzheimer's disease    5. Other amnesia    6. Mild episode of recurrent major depressive disorder    7. Major neurocognitive disorder due to multiple etiologies with behavioral disturbance    8. Insomnia, unspecified type    9. At risk for prolonged QT interval  syndrome    10. Lesion of brain    11. Chronic kidney disease, stage 3b    12. Memory loss    13. Mixed stress and urge urinary incontinence    14. Alcohol use disorder, in sustained remission    15. History of alcoholism    16. Inappropriate behavior    17. Abnormal finding on MRI of brain    18. Obesity (BMI 30.0-34.9)    19. Urge incontinence of urine    20. Acute respiratory failure with hypoxia and hypercarbia    21. Other emphysema    22. Essential hypertension    23. Chronic combined systolic and diastolic CHF (congestive heart failure)    24. Decreased cardiac ejection fraction    25. Elevated troponin    26. CAD, multiple vessel    27. Atherosclerosis of aorta    28. Pulmonary HTN    29. Sinus tachycardia    30. Arterial insufficiency of lower extremity    31. Critical limb ischemia of left lower extremity                    Plan:          MAJOR NEUROCOGNITIVE DISORDER MODERATE TO SEVERE AD WITH MEMORY LOSS,  HISTORY OF BREAST CA, LEFT MASECTIOMY, HX OF ETOH ABUSE, FORMER SMOKER, RECURRENT UTI RESOLVED,  RT EYE BLINDNESS       EVALUATION     Refer to Hospice family researching agency to choose    Continue  Memantine/Namenda  5 mg po  BID (Renal Dosing considerations)     Continue Donepezil/Aricept  5 mg po  HS  (Renal Dosing considerations)     Continues Buspar/Buspirone 15 mg po BID for inappropriate behaviors and  paradoxical agitation      MENINGIOMA ABNORMAL MRI BRAIN RESULTS    Follow up with Neurosurgery related to MRI Brain       SYMPTOMATIC MANAGEMENT     Change Seroquel 300 mg to 200 mg po to help with insomnia. Instructed the family to watch for excessive sedation or paradoxical agitation.     Continue  mg po BID  to help for agitation and mood stabilization.      Failed Trazodone 150 mg QHS    Future considerations:     Risperdal 1 mg QHS to assist with psychotic symptoms and behavioral disturbances     HOME CARE     Palliative Care Home based consult     Falling Down Precautions. Gait is  affected by the disease as well.     Avoid driving and access to firearms     Incremental 24/Care     Help with finances and decision making.    Join support group.    Proofing the house and use labeling.    Avoid antihistamines and anticholinergics.    Avoid changing routine.    Use written reminders.    Avoid multitasking.    Healthy diet, exercise (physical and cognitive).    Good sleep hygiene.      LEFT  MERALGIA PARAESTHETICA/NUMBNESS     Clinically Monitor      Avoid prolonged standing.     Wear loose clothes.    No need for neuropathic pain med's Numbness complaint only       URINARY INCONTINENCE     managed Urologist        MEDICAL/SURGICAL COMORBIDITIES     All relevant medical comorbidities noted and managed by primary care physician and medical care team.          MISCELLANEOUS MEDICAL PROBLEMS       HEALTHY LIFESTYLE AND PREVENTATIVE CARE    Encouraged the patient to adhere to the age-appropriate health maintenance guidelines including screening tests and vaccinations.     Discussed the overall importance of healthy lifestyle, optimal weight, exercise, healthy diet, good sleep hygiene and avoiding drugs including smoking, alcohol and recreational drugs. The patient verbalized full understanding.       Advised the patient to follow COVID-19 prevention measures.       I spent 30 minutes total E/M more than 50 % spent  face to face with the patient    RTC 6  month       Tiff Lloyd, MSN NP      Collaborating Provider: Barbara Hurst MD, FAAN Neurologist/Epileptologist

## 2025-02-12 NOTE — TELEPHONE ENCOUNTER
Spoke with Unique notified her that the  order was faxed this morning. Unique states she didn't see it this morning and made it to pts home. I informed Unique that I will fax order over again.

## 2025-02-12 NOTE — TELEPHONE ENCOUNTER
----- Message from Valerie sent at 2/12/2025  2:45 PM CST -----  Contact: Unique Calhoun with [facility] is calling to speak with the nurse regarding wound. Reports needing wound care orders. Please give Unique a call back at [phone]

## 2025-02-19 ENCOUNTER — TELEPHONE (OUTPATIENT)
Dept: CARDIOLOGY | Facility: CLINIC | Age: 75
End: 2025-02-19
Payer: MEDICARE

## 2025-02-19 NOTE — TELEPHONE ENCOUNTER
Spoke with pt's daughter in regards to her being in hosp. Lab work was rescheduled to next week .                  ----- Message from Nestor sent at 2/19/2025  1:48 PM CST -----  Contact: nicanor/daughter  Nicanor is requesting a call back in regards to labs that was ordered fro ms de los santos , she hospitalized right now at [facility] facility.Please call her at [phone]

## 2025-02-27 ENCOUNTER — LAB VISIT (OUTPATIENT)
Dept: LAB | Facility: HOSPITAL | Age: 75
End: 2025-02-27
Attending: INTERNAL MEDICINE
Payer: MEDICARE

## 2025-02-27 ENCOUNTER — DOCUMENT SCAN (OUTPATIENT)
Dept: HOME HEALTH SERVICES | Facility: HOSPITAL | Age: 75
End: 2025-02-27
Payer: MEDICARE

## 2025-02-27 ENCOUNTER — OFFICE VISIT (OUTPATIENT)
Dept: FAMILY MEDICINE | Facility: CLINIC | Age: 75
End: 2025-02-27
Payer: MEDICARE

## 2025-02-27 VITALS
TEMPERATURE: 97 F | BODY MASS INDEX: 18.46 KG/M2 | SYSTOLIC BLOOD PRESSURE: 108 MMHG | RESPIRATION RATE: 18 BRPM | DIASTOLIC BLOOD PRESSURE: 60 MMHG | OXYGEN SATURATION: 97 % | HEIGHT: 60 IN | WEIGHT: 94 LBS | HEART RATE: 84 BPM

## 2025-02-27 DIAGNOSIS — C50.312 MALIGNANT NEOPLASM OF LOWER-INNER QUADRANT OF LEFT BREAST IN FEMALE, ESTROGEN RECEPTOR POSITIVE: ICD-10-CM

## 2025-02-27 DIAGNOSIS — Z17.0 MALIGNANT NEOPLASM OF LOWER-INNER QUADRANT OF LEFT BREAST IN FEMALE, ESTROGEN RECEPTOR POSITIVE: ICD-10-CM

## 2025-02-27 DIAGNOSIS — Z09 HOSPITAL DISCHARGE FOLLOW-UP: Primary | ICD-10-CM

## 2025-02-27 DIAGNOSIS — I70.222 CRITICAL LIMB ISCHEMIA OF LEFT LOWER EXTREMITY: ICD-10-CM

## 2025-02-27 DIAGNOSIS — E78.2 MIXED HYPERLIPIDEMIA: ICD-10-CM

## 2025-02-27 DIAGNOSIS — Z97.8 INDWELLING FOLEY CATHETER PRESENT: ICD-10-CM

## 2025-02-27 DIAGNOSIS — G30.9 MAJOR NEUROCOGNITIVE DISORDER DUE TO ALZHEIMER'S DISEASE: ICD-10-CM

## 2025-02-27 DIAGNOSIS — L89.159 PRESSURE INJURY OF SKIN OF SACRAL REGION, UNSPECIFIED INJURY STAGE: ICD-10-CM

## 2025-02-27 DIAGNOSIS — F02.80 MAJOR NEUROCOGNITIVE DISORDER DUE TO ALZHEIMER'S DISEASE: ICD-10-CM

## 2025-02-27 DIAGNOSIS — J18.9 PNEUMONIA DUE TO INFECTIOUS ORGANISM, UNSPECIFIED LATERALITY, UNSPECIFIED PART OF LUNG: ICD-10-CM

## 2025-02-27 DIAGNOSIS — R53.81 PHYSICAL DEBILITY: ICD-10-CM

## 2025-02-27 DIAGNOSIS — R19.5 DARK STOOLS: ICD-10-CM

## 2025-02-27 PROBLEM — N39.0 UTI (URINARY TRACT INFECTION): Status: RESOLVED | Noted: 2022-01-24 | Resolved: 2025-02-27

## 2025-02-27 PROBLEM — J96.02 ACUTE RESPIRATORY FAILURE WITH HYPOXIA AND HYPERCARBIA: Status: RESOLVED | Noted: 2021-03-06 | Resolved: 2025-02-27

## 2025-02-27 PROBLEM — L97.502: Status: RESOLVED | Noted: 2024-10-02 | Resolved: 2025-02-27

## 2025-02-27 PROBLEM — J96.01 ACUTE RESPIRATORY FAILURE WITH HYPOXIA AND HYPERCARBIA: Status: RESOLVED | Noted: 2021-03-06 | Resolved: 2025-02-27

## 2025-02-27 PROBLEM — D64.9 ANEMIA: Status: RESOLVED | Noted: 2024-12-03 | Resolved: 2025-02-27

## 2025-02-27 LAB
ALBUMIN SERPL BCP-MCNC: 2.8 G/DL (ref 3.5–5.2)
ALP SERPL-CCNC: 89 U/L (ref 40–150)
ALT SERPL W/O P-5'-P-CCNC: 21 U/L (ref 10–44)
ANION GAP SERPL CALC-SCNC: 17 MMOL/L (ref 8–16)
AST SERPL-CCNC: 67 U/L (ref 10–40)
BILIRUB SERPL-MCNC: 0.4 MG/DL (ref 0.1–1)
BUN SERPL-MCNC: 11 MG/DL (ref 8–23)
CALCIUM SERPL-MCNC: 9.2 MG/DL (ref 8.7–10.5)
CHLORIDE SERPL-SCNC: 110 MMOL/L (ref 95–110)
CHOLEST SERPL-MCNC: 172 MG/DL (ref 120–199)
CHOLEST/HDLC SERPL: 2.9 {RATIO} (ref 2–5)
CO2 SERPL-SCNC: 20 MMOL/L (ref 23–29)
CREAT SERPL-MCNC: 1.2 MG/DL (ref 0.5–1.4)
EST. GFR  (NO RACE VARIABLE): 47.8 ML/MIN/1.73 M^2
GLUCOSE SERPL-MCNC: 69 MG/DL (ref 70–110)
HDLC SERPL-MCNC: 59 MG/DL (ref 40–75)
HDLC SERPL: 34.3 % (ref 20–50)
LDLC SERPL CALC-MCNC: 95.4 MG/DL (ref 63–159)
NONHDLC SERPL-MCNC: 113 MG/DL
POTASSIUM SERPL-SCNC: 3 MMOL/L (ref 3.5–5.1)
PROT SERPL-MCNC: 6.8 G/DL (ref 6–8.4)
SODIUM SERPL-SCNC: 147 MMOL/L (ref 136–145)
TRIGL SERPL-MCNC: 88 MG/DL (ref 30–150)

## 2025-02-27 PROCEDURE — 80061 LIPID PANEL: CPT | Performed by: INTERNAL MEDICINE

## 2025-02-27 PROCEDURE — 99999 PR PBB SHADOW E&M-EST. PATIENT-LVL III: CPT | Mod: PBBFAC,,, | Performed by: REGISTERED NURSE

## 2025-02-27 PROCEDURE — 36415 COLL VENOUS BLD VENIPUNCTURE: CPT | Mod: PO | Performed by: INTERNAL MEDICINE

## 2025-02-27 PROCEDURE — 80053 COMPREHEN METABOLIC PANEL: CPT | Performed by: INTERNAL MEDICINE

## 2025-02-27 NOTE — PROGRESS NOTES
Leia Rodriguez  MRN:  [id number]  74 y.o. female    ~~~~~~~~~~~~~~~~~~~~~~~~~~~~~~~~    Transitional Care Note    Family and/or Caretaker present at visit?  Yes, daughter.  Diagnostic tests reviewed/disposition: No diagnosic tests pending after this hospitalization.  Disease/illness education:   Discussed, all questions answered.  Home health/community services discussion/referrals: Patient has home health established at [facility] .   Establishment or re-establishment of referral orders for community resources:  Family member reports need for Home Health Aide .   Discussion with other health care providers: No discussion with other health care providers necessary.      PCP:  Yasmin Navarro MD      SUBJECTIVE:      CHIEF COMPLAINT:  Hospital follow-up    HPI:    ADMITTED TO:  [facility]  DATES OF SERVICE:  2/21/25 to 2/23/25            Ms. Rodriguez was recently hospitalized at [facility] on 2/21/25 for hemoptysis. Diagnosed with PNA. She is finishing up a course of Augmentin as ordered. Ms. Rodriguez has had significant weight loss possibly due to difficulty swallowing and a poor appetite, which has worsened since the pneumonia. Her family has been trying various methods to encourage food and fluid intake, including protein shakes, popsicles, and soft foods, but with limited success. She has a persistent cough following the pneumonia, though it has improved since hospitalization. She occasionally has shortness of breath when lying flat, so the family keeps her elevated with pillows.    A urinary catheter was placed per Urology. Daughter reports it is changed every 30 days by Home Health.    Daughter has concerns for dark tarry stools. There has been no bright red blood noted in the stool. She is anemic and receives iron infusions.    Her activity level is minimal. She is not technically bed-bound, but has weak muscles. Her family tries to assist her with walking short distances to maintain some muscle strength.  Does have PT/OT  through Home Health.    Home Health nurse providing wound care for a decubitus ulcer to sacrum. Previous hospitalization for wound care and debridement. Daughter states improving some. Did complete course of doxycycline for 5 days.      REVIEW OF SYSTEMS:  Review of Systems   Constitutional:  Positive for activity change, appetite change, fatigue and unexpected weight change. Negative for chills, diaphoresis and fever.   Respiratory:  Positive for cough. Negative for shortness of breath and wheezing.    Cardiovascular: Negative.    Gastrointestinal:  Negative for anal bleeding, blood in stool, diarrhea and vomiting.        + dark tarry stools   Genitourinary:  Negative for hematuria.        + catheter present   Skin:  Positive for wound (healing decub ulcer to sacrum).   Neurological:  Positive for speech difficulty and weakness. Negative for tremors, seizures and facial asymmetry.        ALLERGIES:  Review of patient's allergies indicates:  No Known Allergies    CURRENT PROBLEM LIST:  Problem List[1]    CURRENT MEDICATION LIST:    Current Outpatient Medications:     anastrozole (ARIMIDEX) 1 mg Tab, Take 1 tablet (1 mg total) by mouth once daily., Disp: 90 tablet, Rfl: 3    aspirin 81 MG Chew, Take 1 tablet (81 mg total) by mouth once daily., Disp: 90 tablet, Rfl: 3    atorvastatin (LIPITOR) 40 MG tablet, Take 1 tablet (40 mg total) by mouth every evening., Disp: 90 tablet, Rfl: 3    busPIRone (BUSPAR) 15 MG tablet, Take 1 tablet (15 mg total) by mouth 2 (two) times daily., Disp: 180 tablet, Rfl: 3    cholecalciferol, vitamin D3, (VITAMIN D3) 125 mcg (5,000 unit) Tab, Take 5,000 Units by mouth once daily., Disp: , Rfl:     donepeziL (ARICEPT) 5 MG tablet, Take 1 tablet (5 mg total) by mouth every evening., Disp: 90 tablet, Rfl: 3    furosemide (LASIX) 20 MG tablet, Take 1 tablet (20 mg total) by mouth once daily., Disp: 30 tablet, Rfl: 0    isosorbide mononitrate (IMDUR) 30 MG 24 hr tablet, TAKE 1 TABLET BY MOUTH  EVERY DAY, Disp: 90 tablet, Rfl: 3    lamoTRIgine (LAMICTAL) 100 MG tablet, Take 1 tablet (100 mg total) by mouth 2 (two) times daily., Disp: 60 tablet, Rfl: 11    memantine (NAMENDA) 5 MG Tab, Take 1 tablet (5 mg total) by mouth 2 (two) times daily., Disp: 180 tablet, Rfl: 3    metoprolol succinate (TOPROL-XL) 25 MG 24 hr tablet, Take 1 tablet (25 mg total) by mouth 2 (two) times daily., Disp: 180 tablet, Rfl: 2    QUEtiapine (SEROQUEL) 200 MG Tab, Take 1 tablet (200 mg total) by mouth every evening., Disp: 30 tablet, Rfl: 11    zinc gluconate 50 mg tablet, Take 50 mg by mouth once daily., Disp: , Rfl:       Past medical, surgical, family and social histories have been reviewed today.      OBJECTIVE:     Vitals:    02/27/25 1002   BP: 108/60   Pulse: 84   Resp: 18   Temp: 97.1 °F (36.2 °C)   TempSrc: Tympanic   SpO2: 97%   Weight: 42.6 kg (94 lb)   Height: 5' (1.524 m)       Physical Exam  Vitals reviewed.   Cardiovascular:      Rate and Rhythm: Normal rate and regular rhythm.      Heart sounds: No murmur heard.     No friction rub. No gallop.   Pulmonary:      Effort: Pulmonary effort is normal.      Breath sounds: Normal breath sounds.   Genitourinary:     Comments: Indwelling cath present, patent & draining  Neurological:      Mental Status: She is alert. Mental status is at baseline.      Gait: Gait abnormal (in w/c).   Psychiatric:         Attention and Perception: She does not perceive auditory or visual hallucinations.         Mood and Affect: Affect is flat.         Speech: She is noncommunicative (nonverbal).           ASSESSMENT:     1. Hospital discharge follow-up    2. Pneumonia due to infectious organism, unspecified laterality, unspecified part of lung ----- acute illness, cough improved, no f/c.  Finish out course of abx as ordered.    3. Pressure injury of skin of sacral region, unspecified injury stage ----- acute issue, followed and managed by  wound care nurse.  Daughter reports  improving.    4. Major neurocognitive disorder due to Alzheimer's disease ---- chronic issue, followed and managed by Neurology.  Needs assistance with ADL's, referral to  for aide.  -     Ambulatory referral/consult to Home Health; Future; Expected date: 02/28/2025    5. Physical debility ----- see # 4  -     Ambulatory referral/consult to Home Health; Future; Expected date: 02/28/2025    6. Dark stools ---- order to  for lab/OBS due to c/o dark tarry stools  -     Ambulatory referral/consult to Home Health; Future; Expected date: 02/28/2025    7. Indwelling Zuluaga catheter present ---- patent and draining, followed by Urology    8. Malignant neoplasm of lower-inner quadrant of left breast in female, estrogen receptor positive ----- patient with h/o breast cancer in 2017, tx with lumpectomy and radiation which was completed 8/14/2017, followed by Arimidex.  Recurrent breast cancer in September 2020, tx with sentinel lymph node biopsy (neg nodes) with left mastectomy, and chemo.  On Arimidex 1 mg daily as ordered since 2017, followed by Dr. Fernández and Jamel.      PLAN:     Complete course of abx as ordered, repeat CXR when abx completed.  Referral update to [facility] --- added Aide and orders for OBS due to c/o dark tarry stools.  RTC as directed and/or prn.            MAURY Ford  [facility]       Future Appointments   Date Time Provider Department Center   3/17/2025  2:00 PM Seema Negro P, LIANA CC BR[facility]   3/19/2025 12:00 PM BRIAN COKER CC LAB [facility] LAB DS [facility]   3/19/2025  1:00 PM Mario Fernández MD [facility] HEM ONC [facility]   3/19/2025  1:30 PM INJECTION 1, [facility] INFUSION [facility] INFSN [facility]   4/24/2025 10:00 AM Layne Lloyd NP [facility] NEURO [facility]   7/15/2025  2:40 PM Jose G Manjarrez MD HG CARDIO [place]            [1]   Patient Active Problem List  Diagnosis    Urinary incontinence    Primary osteoarthritis of both hands    Essential hypertension    History  of tobacco use    Lipoma of abdominal wall    Malignant neoplasm of lower-inner quadrant of left breast in female, estrogen receptor positive    Age-related osteoporosis without current pathological fracture    Decreased cardiac ejection fraction    Elevated troponin    Immunodeficiency due to chemotherapy    CAD, multiple vessel    Iron deficiency anemia secondary to inadequate dietary iron intake    Chronic combined systolic and diastolic CHF (congestive heart failure)    Emphysema of lung    Atherosclerosis of aorta    Pulmonary HTN    Anemia due to stage 3b chronic kidney disease    Major neurocognitive disorder due to Alzheimer's disease    History of alcoholism    Mild episode of recurrent major depressive disorder    Agitation due to dementia    Renal mass    Hydronephrosis    Osteopenia    Iron deficiency anemia    Sinus tachycardia    Arterial insufficiency of lower extremity    Urinary retention    Polypharmacy    Severe protein-calorie malnutrition    Hypokalemia    Critical limb ischemia of left lower extremity

## 2025-02-28 ENCOUNTER — TELEPHONE (OUTPATIENT)
Dept: FAMILY MEDICINE | Facility: CLINIC | Age: 75
End: 2025-02-28
Payer: MEDICARE

## 2025-02-28 NOTE — TELEPHONE ENCOUNTER
----- Message from Anamaria sent at 2/28/2025 12:37 PM CST -----  Contact: [phone]  Type:  Needs Medical AdviceWho Called: SANNA WITH [facility] [phone] (please be specific): need to talk to the nurse about getting an verbal order for an stool sample How long has patient had these symptoms:  Pharmacy name and phone #:  Would the patient rather a call back or a response via [facility]? Call [facility] Call Back Number:  [phone] Information: [id number]

## 2025-03-02 ENCOUNTER — RESULTS FOLLOW-UP (OUTPATIENT)
Dept: CARDIOLOGY | Facility: CLINIC | Age: 75
End: 2025-03-02
Payer: MEDICARE

## 2025-03-02 RX ORDER — POTASSIUM CHLORIDE 750 MG/1
10 TABLET, EXTENDED RELEASE ORAL DAILY
Qty: 30 TABLET | Refills: 5 | Status: SHIPPED | OUTPATIENT
Start: 2025-03-02

## 2025-03-03 NOTE — TELEPHONE ENCOUNTER
[facility] to review -The lab  showed K 3.0 and slight elevation of Liver enzyme level  D/c Atorvastatin  Add KCL 10 meq    Left a MYCHART message    ----- Message from Jose G Manjarrez MD sent at 3/2/2025 10:40 AM CST -----  The lab  showed K 3.0 and slight elevation of Liver enzyme level  D/c Atorvastatin  Add KCL 10 meq  ----- Message -----  From: Jose, [employer] [facility] Interface  Sent: 2/27/2025  11:25 PM CST  To: Jose G Manjarrez MD

## 2025-03-05 ENCOUNTER — PATIENT MESSAGE (OUTPATIENT)
Dept: NEUROLOGY | Facility: CLINIC | Age: 75
End: 2025-03-05
Payer: MEDICARE

## 2025-03-05 ENCOUNTER — TELEPHONE (OUTPATIENT)
Dept: SURGERY | Facility: CLINIC | Age: 75
End: 2025-03-05
Payer: MEDICARE

## 2025-03-05 NOTE — TELEPHONE ENCOUNTER
Spoke with daughter of pt. Daughter stated that pt still not have her mammogram, due to standing. Daughter request to speak with radiology dept. Call transferred. Daughter states that once confirmed, she will call bk at later time to reschedule breast exam.

## 2025-03-06 ENCOUNTER — EXTERNAL HOME HEALTH (OUTPATIENT)
Dept: HOME HEALTH SERVICES | Facility: HOSPITAL | Age: 75
End: 2025-03-06
Payer: MEDICARE

## 2025-03-06 DIAGNOSIS — R46.89 BEHAVIORAL CHANGE: Primary | ICD-10-CM

## 2025-03-06 DIAGNOSIS — F03.911 AGITATION DUE TO DEMENTIA: ICD-10-CM

## 2025-03-06 DIAGNOSIS — G30.9 MAJOR NEUROCOGNITIVE DISORDER DUE TO ALZHEIMER'S DISEASE: ICD-10-CM

## 2025-03-06 DIAGNOSIS — F02.80 MAJOR NEUROCOGNITIVE DISORDER DUE TO ALZHEIMER'S DISEASE: ICD-10-CM

## 2025-03-19 ENCOUNTER — RESULTS FOLLOW-UP (OUTPATIENT)
Dept: HEMATOLOGY/ONCOLOGY | Facility: CLINIC | Age: 75
End: 2025-03-19

## 2025-03-19 ENCOUNTER — LAB VISIT (OUTPATIENT)
Dept: LAB | Facility: HOSPITAL | Age: 75
End: 2025-03-19
Attending: INTERNAL MEDICINE
Payer: MEDICARE

## 2025-03-19 ENCOUNTER — OFFICE VISIT (OUTPATIENT)
Dept: HEMATOLOGY/ONCOLOGY | Facility: CLINIC | Age: 75
End: 2025-03-19
Payer: MEDICARE

## 2025-03-19 VITALS — HEIGHT: 60 IN | TEMPERATURE: 97 F | HEART RATE: 111 BPM | OXYGEN SATURATION: 93 % | BODY MASS INDEX: 18.36 KG/M2

## 2025-03-19 DIAGNOSIS — D50.8 IRON DEFICIENCY ANEMIA SECONDARY TO INADEQUATE DIETARY IRON INTAKE: ICD-10-CM

## 2025-03-19 DIAGNOSIS — G30.9 MAJOR NEUROCOGNITIVE DISORDER DUE TO ALZHEIMER'S DISEASE: ICD-10-CM

## 2025-03-19 DIAGNOSIS — D50.0 IRON DEFICIENCY ANEMIA DUE TO CHRONIC BLOOD LOSS: ICD-10-CM

## 2025-03-19 DIAGNOSIS — R64 CACHEXIA: ICD-10-CM

## 2025-03-19 DIAGNOSIS — M85.80 OSTEOPENIA, UNSPECIFIED LOCATION: ICD-10-CM

## 2025-03-19 DIAGNOSIS — F02.80 MAJOR NEUROCOGNITIVE DISORDER DUE TO ALZHEIMER'S DISEASE: ICD-10-CM

## 2025-03-19 DIAGNOSIS — Z17.0 MALIGNANT NEOPLASM OF LOWER-INNER QUADRANT OF LEFT BREAST IN FEMALE, ESTROGEN RECEPTOR POSITIVE: Primary | ICD-10-CM

## 2025-03-19 DIAGNOSIS — Z79.811 AROMATASE INHIBITOR USE: ICD-10-CM

## 2025-03-19 DIAGNOSIS — C50.312 MALIGNANT NEOPLASM OF LOWER-INNER QUADRANT OF LEFT BREAST IN FEMALE, ESTROGEN RECEPTOR POSITIVE: Primary | ICD-10-CM

## 2025-03-19 PROBLEM — N18.32 ANEMIA DUE TO STAGE 3B CHRONIC KIDNEY DISEASE: Status: RESOLVED | Noted: 2022-03-15 | Resolved: 2025-03-19

## 2025-03-19 PROBLEM — D63.1 ANEMIA DUE TO STAGE 3B CHRONIC KIDNEY DISEASE: Status: RESOLVED | Noted: 2022-03-15 | Resolved: 2025-03-19

## 2025-03-19 LAB
ANION GAP SERPL CALC-SCNC: 13 MMOL/L (ref 8–16)
BUN SERPL-MCNC: 20 MG/DL (ref 8–23)
CALCIUM SERPL-MCNC: 8.9 MG/DL (ref 8.7–10.5)
CHLORIDE SERPL-SCNC: 103 MMOL/L (ref 95–110)
CO2 SERPL-SCNC: 23 MMOL/L (ref 23–29)
CREAT SERPL-MCNC: 1.2 MG/DL (ref 0.5–1.4)
EST. GFR  (NO RACE VARIABLE): 48 ML/MIN/1.73 M^2
GLUCOSE SERPL-MCNC: 94 MG/DL (ref 70–110)
POTASSIUM SERPL-SCNC: 4.4 MMOL/L (ref 3.5–5.1)
SODIUM SERPL-SCNC: 139 MMOL/L (ref 136–145)

## 2025-03-19 PROCEDURE — 1101F PT FALLS ASSESS-DOCD LE1/YR: CPT | Mod: CPTII,S$GLB,, | Performed by: INTERNAL MEDICINE

## 2025-03-19 PROCEDURE — 99999 PR PBB SHADOW E&M-EST. PATIENT-LVL V: CPT | Mod: PBBFAC,,, | Performed by: INTERNAL MEDICINE

## 2025-03-19 PROCEDURE — 36415 COLL VENOUS BLD VENIPUNCTURE: CPT | Performed by: INTERNAL MEDICINE

## 2025-03-19 PROCEDURE — 99214 OFFICE O/P EST MOD 30 MIN: CPT | Mod: S$GLB,,, | Performed by: INTERNAL MEDICINE

## 2025-03-19 PROCEDURE — 1160F RVW MEDS BY RX/DR IN RCRD: CPT | Mod: CPTII,S$GLB,, | Performed by: INTERNAL MEDICINE

## 2025-03-19 PROCEDURE — 1159F MED LIST DOCD IN RCRD: CPT | Mod: CPTII,S$GLB,, | Performed by: INTERNAL MEDICINE

## 2025-03-19 PROCEDURE — 1126F AMNT PAIN NOTED NONE PRSNT: CPT | Mod: CPTII,S$GLB,, | Performed by: INTERNAL MEDICINE

## 2025-03-19 PROCEDURE — 3008F BODY MASS INDEX DOCD: CPT | Mod: CPTII,S$GLB,, | Performed by: INTERNAL MEDICINE

## 2025-03-19 PROCEDURE — 80048 BASIC METABOLIC PNL TOTAL CA: CPT | Performed by: INTERNAL MEDICINE

## 2025-03-19 PROCEDURE — 3288F FALL RISK ASSESSMENT DOCD: CPT | Mod: CPTII,S$GLB,, | Performed by: INTERNAL MEDICINE

## 2025-03-19 RX ORDER — ANASTROZOLE 1 MG/1
1 TABLET ORAL DAILY
Qty: 90 TABLET | Refills: 3 | Status: SHIPPED | OUTPATIENT
Start: 2025-03-19

## 2025-03-19 NOTE — PROGRESS NOTES
Subjective:       Patient ID: Leia Rodriguez is a 74 y.o. female.    Chief Complaint: Results and Breast Cancer    HPI:  74-year-old female history of breast cancer continuing with Arimidex 1 mg daily family has decided to proceed with hospice because of rapidly progressing dementia.  ECOG status 4 accompanied by her daughters    Past Medical History:   Diagnosis Date    Arthritis     EDGAR HANDS, KNEES    Behavioral change 2022    Blind right eye     Traumatic    Breast cancer 06/15/2017    0.8 cm Grade1 INTRADUCTAL BREAST 9 positve margin (left)    Essential hypertension     Hemorrhoids     Immunodeficiency due to chemotherapy 2021    Lipoma of abdominal wall     Major neurocognitive disorder 2022    Obesity     Overactive bladder     Pap smear abnormality of cervix with ASCUS favoring benign     Thyroid nodule     Tobacco use disorder     Tubular adenoma of colon     Urinary incontinence      Family History   Problem Relation Name Age of Onset    Hypertension Father Kristopher Figueroa     Cataracts Father Kristopher Figueroa     Prostate cancer Father Kristopher Figueroa 80    Alcohol abuse Father Kristopher Figueroa     Cancer Father Kristopher Figueroa     Cervical cancer Daughter Cenetra R 32    Allergic rhinitis Daughter Cenetra R     Cancer Daughter Cenetra R     Cirrhosis Mother Leia Figueroa     Alcohol abuse Mother Leia Figueroa     Hypertension Daughter Sheronica of     Diabetes Brother Kamran Figueroa     Heart attack Brother Kamran Figueroa     Heart murmur Brother      No Known Problems Daughter Gladis      Social History[1]  Past Surgical History:   Procedure Laterality Date    ANTERIOR VAGINAL REPAIR      AORTOGRAPHY WITH SERIALOGRAPHY N/A 10/07/2024    Procedure: AORTOGRAM, WITH SERIALOGRAPHY;  Surgeon: Tiffanie Santos MD;  Location: [facility] CATH LAB;  Service: Cardiology;  Laterality: N/A;  MD requested Anesthesia    BLADDER SURGERY      BREAST LUMPECTOMY Left 2017    CATARACT EXTRACTION Left 2022     SECTION      X2     COLONOSCOPY N/A 03/08/2017    Procedure: COLONOSCOPY;  Surgeon: Tyron Paris MD;  Location: [facility] ENDO;  Service: Endoscopy;  Laterality: N/A;    COLONOSCOPY N/A 08/26/2020    Procedure: COLONOSCOPY;  Surgeon: Keira Ellison MD;  Location: [facility] ENDO;  Service: Endoscopy;  Laterality: N/A;    ESOPHAGOGASTRODUODENOSCOPY N/A 08/26/2020    Procedure: EGD (ESOPHAGOGASTRODUODENOSCOPY);  Surgeon: Keira Ellison MD;  Location: [facility] ENDO;  Service: Endoscopy;  Laterality: N/A;  new onset iron deficiency with prior history of breast cancer    EYE SURGERY      INTRALUMINAL GASTROINTESTINAL TRACT IMAGING VIA CAPSULE N/A 10/28/2020    Procedure: IMAGING PROCEDURE, GI TRACT, INTRALUMINAL, VIA CAPSULE;  Surgeon: Finesse Jha RN;  Location: [facility] ENDO;  Service: Endoscopy;  Laterality: N/A;    LEFT HEART CATHETERIZATION Left 10/12/2021    Procedure: CATHETERIZATION, HEART, LEFT;  Surgeon: Tiffanie Santos MD;  Location: [facility] CATH LAB;  Service: Cardiology;  Laterality: Left;    LITHOTRIPSY, CORONARY TRANSLUMINAL, PERCUTANEOUS  10/07/2024    Procedure: LITHOTRIPSY, CORONARY TRANSLUMINAL, PERCUTANEOUS;  Surgeon: Tiffanie Santos MD;  Location: [facility] CATH LAB;  Service: Cardiology;;    PTA, SUPERFICIAL FEMORAL ARTERY  10/07/2024    Procedure: PTA, Superficial Femoral Artery;  Surgeon: Tiffanie Santos MD;  Location: [facility] CATH LAB;  Service: Cardiology;;    SENTINEL LYMPH NODE BIOPSY Left 09/08/2020    Procedure: BIOPSY, LYMPH NODE, SENTINEL;  Surgeon: Vincent Moyer MD;  Location: [facility] OR;  Service: General;  Laterality: Left;    SIMPLE MASTECTOMY Left 09/08/2020    Procedure: MASTECTOMY, SIMPLE;  Surgeon: Vincent Moyer MD;  Location: [facility] OR;  Service: General;  Laterality: Left;    STENT, SUPERFICIAL FEMORAL ARTERY  10/07/2024    Procedure: Stent, Superficial Femoral Artery;  Surgeon: Tiffanie Santos MD;  Location: [facility] CATH LAB;  Service: Cardiology;;    THYROID LOBECTOMY Left 2005    TOTAL ABDOMINAL HYSTERECTOMY       TUBAL LIGATION         Labs:  Lab Results   Component Value Date    WBC 8.40 12/13/2024    HGB 10.8 (L) 12/13/2024    HCT 34.4 (L) 12/13/2024    MCV 94 12/13/2024     12/13/2024     BMP  Lab Results   Component Value Date     03/19/2025    K 4.4 03/19/2025     03/19/2025    CO2 23 03/19/2025    BUN 20 03/19/2025    CREATININE 1.2 03/19/2025    CALCIUM 8.9 03/19/2025    ANIONGAP 13 03/19/2025    ESTGFRAFRICA 48.0 (A) 06/20/2022    EGFRNONAA 41.7 (A) 06/20/2022     Lab Results   Component Value Date    ALT 21 02/27/2025    AST 67 (H) 02/27/2025    ALKPHOS 89 02/27/2025    BILITOT 0.4 02/27/2025       Lab Results   Component Value Date    IRON 39 09/04/2024    TIBC 262 09/04/2024    FERRITIN 2,561 (H) 09/04/2024     Lab Results   Component Value Date    LSFSUQWS07 >2000 (H) 08/18/2022     Lab Results   Component Value Date    FOLATE 14.4 08/18/2022     Lab Results   Component Value Date    TSH 0.959 12/12/2024         Review of Systems   Constitutional:  Positive for activity change, appetite change, fatigue and unexpected weight change. Negative for chills, diaphoresis and fever.   HENT:  Negative for congestion, dental problem, drooling, ear discharge, ear pain, facial swelling, hearing loss, mouth sores, nosebleeds, postnasal drip, rhinorrhea, sinus pressure, sneezing, sore throat, tinnitus, trouble swallowing and voice change.    Eyes:  Negative for photophobia, pain, discharge, redness, itching and visual disturbance.   Respiratory:  Negative for cough, choking, chest tightness, shortness of breath, wheezing and stridor.    Cardiovascular:  Negative for chest pain, palpitations and leg swelling.   Gastrointestinal:  Negative for abdominal distention, abdominal pain, anal bleeding, blood in stool, constipation, diarrhea, nausea, rectal pain and vomiting.   Endocrine: Negative for cold intolerance, heat intolerance, polydipsia, polyphagia and polyuria.   Genitourinary:  Negative for decreased  urine volume, difficulty urinating, dyspareunia, dysuria, enuresis, flank pain, frequency, genital sores, hematuria, menstrual problem, pelvic pain, urgency, vaginal bleeding, vaginal discharge and vaginal pain.   Musculoskeletal:  Positive for gait problem. Negative for arthralgias, back pain, joint swelling, myalgias, neck pain and neck stiffness.   Skin:  Negative for color change, pallor and rash.   Allergic/Immunologic: Negative for environmental allergies, food allergies and immunocompromised state.   Neurological:  Positive for weakness. Negative for dizziness, tremors, seizures, syncope, facial asymmetry, speech difficulty, light-headedness, numbness and headaches.   Hematological:  Negative for adenopathy. Does not bruise/bleed easily.   Psychiatric/Behavioral:  Positive for agitation, confusion, decreased concentration and dysphoric mood. Negative for behavioral problems, hallucinations, self-injury, sleep disturbance and suicidal ideas. The patient is nervous/anxious. The patient is not hyperactive.        Objective:      Physical Exam  Vitals reviewed.   Constitutional:       General: She is in acute distress.      Appearance: She is well-developed. She is cachectic. She is ill-appearing. She is not diaphoretic.   HENT:      Head: Normocephalic and atraumatic.      Right Ear: External ear normal.      Left Ear: External ear normal.      Nose: Nose normal.      Right Sinus: No maxillary sinus tenderness or frontal sinus tenderness.      Left Sinus: No maxillary sinus tenderness or frontal sinus tenderness.      Mouth/Throat:      Pharynx: No oropharyngeal exudate.   Eyes:      General: Lids are normal. No scleral icterus.        Right eye: No discharge.         Left eye: No discharge.      Conjunctiva/sclera: Conjunctivae normal.      Right eye: Right conjunctiva is not injected. No hemorrhage.     Left eye: Left conjunctiva is not injected. No hemorrhage.     Pupils: Pupils are equal, round, and reactive  to light.   Neck:      Thyroid: No thyromegaly.      Vascular: No JVD.      Trachea: No tracheal deviation.   Cardiovascular:      Rate and Rhythm: Normal rate.   Pulmonary:      Effort: Pulmonary effort is normal. No respiratory distress.      Breath sounds: No stridor.   Chest:      Chest wall: No tenderness.   Abdominal:      General: Bowel sounds are normal. There is no distension.      Palpations: Abdomen is soft. There is no hepatomegaly, splenomegaly or mass.      Tenderness: There is no abdominal tenderness. There is no rebound.   Musculoskeletal:         General: No tenderness. Normal range of motion.      Cervical back: Normal range of motion and neck supple.   Lymphadenopathy:      Cervical: No cervical adenopathy.      Upper Body:      Right upper body: No supraclavicular adenopathy.      Left upper body: No supraclavicular adenopathy.   Skin:     General: Skin is dry.      Findings: No erythema or rash.   Neurological:      Mental Status: She is alert and oriented to person, place, and time.      Cranial Nerves: No cranial nerve deficit.      Motor: Weakness present.      Coordination: Coordination abnormal.      Gait: Gait abnormal.   Psychiatric:         Behavior: Behavior normal.         Thought Content: Thought content normal.         Judgment: Judgment normal.             Assessment:      1. Malignant neoplasm of lower-inner quadrant of left breast in female, estrogen receptor positive    2. Iron deficiency anemia secondary to inadequate dietary iron intake    3. Iron deficiency anemia due to chronic blood loss    4. Aromatase inhibitor use           Med Onc Chart Routing      Follow up with physician No follow up needed.   Follow up with SHANIQUE    Infusion scheduling note    Injection scheduling note    Labs    Imaging    Pharmacy appointment    Other referrals                   Plan:     Reviewed information with family rapidly developing dementia.  Agree with daughters to proceed with hospice care  in encouraged told that they from my standpoint can stop Arimidex.  No need for Prolia at present point        Mario Fernández Jr, MD FACP         [1]   Social History  Socioeconomic History    Marital status:    Tobacco Use    Smoking status: Former     Current packs/day: 0.00     Average packs/day: 1 pack/day for 50.0 years (50.0 ttl pk-yrs)     Types: Cigarettes     Start date: 1970     Quit date: 2020     Years since quittin.5    Smokeless tobacco: Never   Substance and Sexual Activity    Alcohol use: Not Currently     Alcohol/week: 28.0 standard drinks of alcohol     Types: 28 Cans of beer per week    Drug use: No    Sexual activity: Not Currently     Partners: Male   Social History Narrative    The patient is .  She is retired from [employer] (formerly [employer]     Social Drivers of Health     Financial Resource Strain: Patient Declined (2024)    Overall Financial Resource Strain (CARDIA)     Difficulty of Paying Living Expenses: Patient declined   Food Insecurity: Patient Declined (2024)    Hunger Vital Sign     Worried About Running Out of Food in the Last Year: Patient declined     Ran Out of Food in the Last Year: Patient declined   Transportation Needs: Patient Declined (2024)    TRANSPORTATION NEEDS     Transportation : Patient declined   Physical Activity: Inactive (2024)    Exercise Vital Sign     Days of Exercise per Week: 0 days     Minutes of Exercise per Session: 0 min   Stress: Patient Declined (2024)    [employer] - Occupational Stress Questionnaire     Feeling of Stress : Patient declined   Recent Concern: Stress - Stress Concern Present (2024)    [employer] - Occupational Stress Questionnaire     Feeling of Stress : Very much   Housing Stability: Patient Declined (2024)    Housing Stability Vital Sign     Unable to Pay for Housing in the Last Year: Patient declined     Homeless in the  Last Year: Patient declined

## 2025-03-20 ENCOUNTER — TELEPHONE (OUTPATIENT)
Dept: HEMATOLOGY/ONCOLOGY | Facility: CLINIC | Age: 75
End: 2025-03-20
Payer: MEDICARE

## 2025-03-20 NOTE — TELEPHONE ENCOUNTER
Office Visit  3/19/2025  [facility] 2nd Fl  Default Flowsheet Data (all recorded)    Patient-Reported Data    Row Name 03/18/25 1842       OTHER   Distress Score 6 (P)        Practical Concerns None of these (P)        Social Concerns None of these (P)        Emotional Concerns Worry or anxiety;Sadness or depression (P)        Physical Concerns Pain;Sleep;Fatigue;Changes in eating;Loss or change of physical abilities (P)        Spiritual or Caodaism Concerns None of these (P)        SW Intern spoke with pt's dghtr re: the above. Pt's dghtr stated that pt is currently on hospice and experiencing a lot of distress due to the transition of switching doctors. Pt's dghtr stated that pt was unable to receive iron at last appointment because hospice will cover treatment and other expenses moving forward. Pt's dghtr declined any additional resources or interventions at this time. SW Intern will remain available.

## 2025-03-24 DIAGNOSIS — Z00.00 ENCOUNTER FOR MEDICARE ANNUAL WELLNESS EXAM: ICD-10-CM

## 2025-05-02 ENCOUNTER — DOCUMENT SCAN (OUTPATIENT)
Dept: HOME HEALTH SERVICES | Facility: HOSPITAL | Age: 75
End: 2025-05-02
Payer: MEDICARE

## 2025-05-14 ENCOUNTER — PATIENT OUTREACH (OUTPATIENT)
Dept: ADMINISTRATIVE | Facility: HOSPITAL | Age: 75
End: 2025-05-14
Payer: MEDICARE

## (undated) DEVICE — INTRODUCER 6 FR ARROW AK9601

## (undated) DEVICE — GAUZE SPONGE 4X4 12PLY

## (undated) DEVICE — NDL HYPODERMIC BLUNT 18G 1.5IN

## (undated) DEVICE — STAPLER SKIN PROXIMATE WIDE

## (undated) DEVICE — CATH LUTONIX DCB 018X130X5X300

## (undated) DEVICE — ADHESIVE MASTISOL VIAL 48/BX

## (undated) DEVICE — SEE MEDLINE ITEM 154981

## (undated) DEVICE — KIT SYR REUSABLE

## (undated) DEVICE — OMNIPAQUE 300MG 150ML VIAL

## (undated) DEVICE — CLIPPER BLADE MOD 4406 (CAREF)

## (undated) DEVICE — CONTRAST VISIPAQUE 150ML

## (undated) DEVICE — GUIDEWIRE WHOLEY HI TORQ 175CM

## (undated) DEVICE — BRA MAMMARY SUPPORT XLARGE

## (undated) DEVICE — SUT 2/0 30IN SILK BLK BRAI

## (undated) DEVICE — ELECTRODE REM PLYHSV RETURN 9

## (undated) DEVICE — APPLIER CLIP LIAGCLIP 9.375IN

## (undated) DEVICE — SUT VICRYL 3-0 27 SH

## (undated) DEVICE — Device

## (undated) DEVICE — CLOSURE SKIN STERI STRIP 1/2X4

## (undated) DEVICE — ANGIOTOUCH KIT

## (undated) DEVICE — DRAIN CHANNEL ROUND 15FR

## (undated) DEVICE — SHEATH INTRODUCER 6FR 11CM

## (undated) DEVICE — CATH IMPULSE PIGTAIL 6F 110CM

## (undated) DEVICE — SUT VICRYL 0 SH

## (undated) DEVICE — SEE MEDLINE ITEM 157137

## (undated) DEVICE — SEE MEDLINE ITEM 152530

## (undated) DEVICE — GUIDEWIRE ALLSTAR .014 X 300

## (undated) DEVICE — SYR 10CC LUER LOCK

## (undated) DEVICE — SUT CTD VICRYL 2-0 UND SUTU

## (undated) DEVICE — CATH ULTRAVERSE 018 5X150X150

## (undated) DEVICE — CATH LUTONIX DCB 018X130X5X40

## (undated) DEVICE — SEE MEDLINE ITEM 157216

## (undated) DEVICE — COVER OVERHEAD SURG LT BLUE

## (undated) DEVICE — MANIFOLD 4 PORT

## (undated) DEVICE — INTRODUCER VASC 6 FR X 45 CM

## (undated) DEVICE — CATH ANGIO SOFT VU 5FR 65CM

## (undated) DEVICE — APPLICATOR CHLORAPREP ORN 26ML

## (undated) DEVICE — SEE MEDLINE ITEM 157117

## (undated) DEVICE — SPONGE LAP 18X18 PREWASHED

## (undated) DEVICE — NDL SAFETY 25G X 1.5 ECLIPSE

## (undated) DEVICE — SPONGE GAUZE 16PLY 4X4

## (undated) DEVICE — SEE MEDLINE ITEM 157027

## (undated) DEVICE — SUT VICRYL 3-0 27 CT-1

## (undated) DEVICE — PIN STERILE SAFETY LARGE

## (undated) DEVICE — CONTAINER SPECIMEN STRL 4OZ

## (undated) DEVICE — KIT MANIFOLD LOW PRESS TUBING

## (undated) DEVICE — CATH ST GLIDE CATH 4FR

## (undated) DEVICE — GUIDEWIRE STF .035X260CM ANG

## (undated) DEVICE — CATH DIAG IMPULSE 6FR FL4

## (undated) DEVICE — UNDERGLOVES BIOGEL PI SIZE 7.5

## (undated) DEVICE — SUT VICRYL 2-0 SH VCP317H

## (undated) DEVICE — WIRE GUIDE TEFLON 3CM .035 145

## (undated) DEVICE — SUT CTD VICRYL 3-0 UND SUTU

## (undated) DEVICE — CATH ULTRAVERSE 014 4X300X150

## (undated) DEVICE — CATH DIAG IMPULSE 6FR FR4

## (undated) DEVICE — SUPPORT ULNA NERVE PROTECTOR

## (undated) DEVICE — SHEATH INTRODUCER 5FR 10CM

## (undated) DEVICE — GLOVE SURG BIOGEL LATEX SZ 7.5

## (undated) DEVICE — SEE MEDLINE ITEM 152622

## (undated) DEVICE — NDL PERCUTANEOUS 21G 7CM VASC

## (undated) DEVICE — EVACUATOR WOUND BULB 100CC

## (undated) DEVICE — SEE MEDLINE ITEM 146292

## (undated) DEVICE — SUT MCRYL PLUS 4-0 PS2 27IN

## (undated) DEVICE — NDL SAFETY 22G X 1.5 ECLIPSE

## (undated) DEVICE — PACK HEART CATH BR

## (undated) DEVICE — GLOVE SURGICAL LATEX SZ 7

## (undated) DEVICE — TAPE CLOTH SOFT MEDIPORE 4IN

## (undated) DEVICE — SUT VICRYL PLUS 4-0 P3 18IN

## (undated) DEVICE — SOL 9P NACL IRR PIC IL

## (undated) DEVICE — UNDERGLOVES BIOGEL PI SZ 7 LF

## (undated) DEVICE — SEE MEDLINE ITEM 146345

## (undated) DEVICE — PACK CATH LAB CUSTOM BR

## (undated) DEVICE — STOCKINETTE IMPERVIOUS MEDIUM